# Patient Record
Sex: FEMALE | Race: ASIAN | NOT HISPANIC OR LATINO
[De-identification: names, ages, dates, MRNs, and addresses within clinical notes are randomized per-mention and may not be internally consistent; named-entity substitution may affect disease eponyms.]

---

## 2017-04-13 ENCOUNTER — APPOINTMENT (OUTPATIENT)
Dept: RADIOLOGY | Facility: IMAGING CENTER | Age: 82
End: 2017-04-13

## 2017-04-13 ENCOUNTER — OUTPATIENT (OUTPATIENT)
Dept: OUTPATIENT SERVICES | Facility: HOSPITAL | Age: 82
LOS: 1 days | End: 2017-04-13
Payer: MEDICAID

## 2017-04-13 DIAGNOSIS — Z00.8 ENCOUNTER FOR OTHER GENERAL EXAMINATION: ICD-10-CM

## 2017-04-13 PROCEDURE — 77080 DXA BONE DENSITY AXIAL: CPT

## 2017-04-13 PROCEDURE — 71046 X-RAY EXAM CHEST 2 VIEWS: CPT

## 2022-05-04 ENCOUNTER — INPATIENT (INPATIENT)
Facility: HOSPITAL | Age: 87
LOS: 14 days | Discharge: HOME HEALTH | End: 2022-05-19
Attending: INTERNAL MEDICINE | Admitting: INTERNAL MEDICINE
Payer: MEDICARE

## 2022-05-04 VITALS
OXYGEN SATURATION: 100 % | DIASTOLIC BLOOD PRESSURE: 88 MMHG | RESPIRATION RATE: 16 BRPM | HEART RATE: 109 BPM | SYSTOLIC BLOOD PRESSURE: 179 MMHG | TEMPERATURE: 98 F

## 2022-05-04 DIAGNOSIS — R41.0 DISORIENTATION, UNSPECIFIED: ICD-10-CM

## 2022-05-04 LAB
ALBUMIN SERPL ELPH-MCNC: 3.7 G/DL — SIGNIFICANT CHANGE UP (ref 3.3–5)
ALP SERPL-CCNC: 104 U/L — SIGNIFICANT CHANGE UP (ref 40–120)
ALT FLD-CCNC: 23 U/L — SIGNIFICANT CHANGE UP (ref 4–33)
ANION GAP SERPL CALC-SCNC: 13 MMOL/L — SIGNIFICANT CHANGE UP (ref 7–14)
APPEARANCE UR: CLEAR — SIGNIFICANT CHANGE UP
APTT BLD: 29.6 SEC — SIGNIFICANT CHANGE UP (ref 27–36.3)
AST SERPL-CCNC: 43 U/L — HIGH (ref 4–32)
BASOPHILS # BLD AUTO: 0.08 K/UL — SIGNIFICANT CHANGE UP (ref 0–0.2)
BASOPHILS NFR BLD AUTO: 1 % — SIGNIFICANT CHANGE UP (ref 0–2)
BILIRUB SERPL-MCNC: 0.4 MG/DL — SIGNIFICANT CHANGE UP (ref 0.2–1.2)
BILIRUB UR-MCNC: NEGATIVE — SIGNIFICANT CHANGE UP
BLOOD GAS VENOUS COMPREHENSIVE RESULT: SIGNIFICANT CHANGE UP
BUN SERPL-MCNC: 18 MG/DL — SIGNIFICANT CHANGE UP (ref 7–23)
CA-I BLD-SCNC: 1.29 MMOL/L — SIGNIFICANT CHANGE UP (ref 1.15–1.29)
CALCIUM SERPL-MCNC: 11.4 MG/DL — HIGH (ref 8.4–10.5)
CHLORIDE SERPL-SCNC: 95 MMOL/L — LOW (ref 98–107)
CO2 SERPL-SCNC: 22 MMOL/L — SIGNIFICANT CHANGE UP (ref 22–31)
COLOR SPEC: SIGNIFICANT CHANGE UP
CREAT SERPL-MCNC: 1.24 MG/DL — SIGNIFICANT CHANGE UP (ref 0.5–1.3)
DIFF PNL FLD: NEGATIVE — SIGNIFICANT CHANGE UP
EGFR: 42 ML/MIN/1.73M2 — LOW
EOSINOPHIL # BLD AUTO: 0.14 K/UL — SIGNIFICANT CHANGE UP (ref 0–0.5)
EOSINOPHIL NFR BLD AUTO: 1.7 % — SIGNIFICANT CHANGE UP (ref 0–6)
FLUAV AG NPH QL: SIGNIFICANT CHANGE UP
FLUBV AG NPH QL: SIGNIFICANT CHANGE UP
GLUCOSE SERPL-MCNC: 86 MG/DL — SIGNIFICANT CHANGE UP (ref 70–99)
GLUCOSE UR QL: NEGATIVE — SIGNIFICANT CHANGE UP
HCT VFR BLD CALC: 38 % — SIGNIFICANT CHANGE UP (ref 34.5–45)
HGB BLD-MCNC: 12.6 G/DL — SIGNIFICANT CHANGE UP (ref 11.5–15.5)
IANC: 6.18 K/UL — SIGNIFICANT CHANGE UP (ref 1.8–7.4)
IGA FLD-MCNC: 301 MG/DL — SIGNIFICANT CHANGE UP (ref 70–400)
IGG FLD-MCNC: 1310 MG/DL — SIGNIFICANT CHANGE UP (ref 700–1600)
IGM SERPL-MCNC: 37 MG/DL — LOW (ref 40–230)
IMM GRANULOCYTES NFR BLD AUTO: 0.7 % — SIGNIFICANT CHANGE UP (ref 0–1.5)
INR BLD: 1.18 RATIO — HIGH (ref 0.88–1.16)
KETONES UR-MCNC: NEGATIVE — SIGNIFICANT CHANGE UP
LEUKOCYTE ESTERASE UR-ACNC: NEGATIVE — SIGNIFICANT CHANGE UP
LYMPHOCYTES # BLD AUTO: 1.18 K/UL — SIGNIFICANT CHANGE UP (ref 1–3.3)
LYMPHOCYTES # BLD AUTO: 14.5 % — SIGNIFICANT CHANGE UP (ref 13–44)
MCHC RBC-ENTMCNC: 29.4 PG — SIGNIFICANT CHANGE UP (ref 27–34)
MCHC RBC-ENTMCNC: 33.2 GM/DL — SIGNIFICANT CHANGE UP (ref 32–36)
MCV RBC AUTO: 88.8 FL — SIGNIFICANT CHANGE UP (ref 80–100)
MONOCYTES # BLD AUTO: 0.49 K/UL — SIGNIFICANT CHANGE UP (ref 0–0.9)
MONOCYTES NFR BLD AUTO: 6 % — SIGNIFICANT CHANGE UP (ref 2–14)
NEUTROPHILS # BLD AUTO: 6.18 K/UL — SIGNIFICANT CHANGE UP (ref 1.8–7.4)
NEUTROPHILS NFR BLD AUTO: 76.1 % — SIGNIFICANT CHANGE UP (ref 43–77)
NITRITE UR-MCNC: NEGATIVE — SIGNIFICANT CHANGE UP
NRBC # BLD: 0 /100 WBCS — SIGNIFICANT CHANGE UP
NRBC # FLD: 0 K/UL — SIGNIFICANT CHANGE UP
PH UR: 6.5 — SIGNIFICANT CHANGE UP (ref 5–8)
PLATELET # BLD AUTO: 444 K/UL — HIGH (ref 150–400)
POTASSIUM SERPL-MCNC: 4.7 MMOL/L — SIGNIFICANT CHANGE UP (ref 3.5–5.3)
POTASSIUM SERPL-SCNC: 4.7 MMOL/L — SIGNIFICANT CHANGE UP (ref 3.5–5.3)
PROT SERPL-MCNC: 7.3 G/DL — SIGNIFICANT CHANGE UP (ref 6–8.3)
PROT SERPL-MCNC: 7.7 G/DL — SIGNIFICANT CHANGE UP (ref 6–8.3)
PROT UR-MCNC: NEGATIVE — SIGNIFICANT CHANGE UP
PROTHROM AB SERPL-ACNC: 13.7 SEC — HIGH (ref 10.5–13.4)
PTH-INTACT FLD-MCNC: 29 PG/ML — SIGNIFICANT CHANGE UP (ref 15–65)
PTH-INTACT FLD-MCNC: 31 PG/ML — SIGNIFICANT CHANGE UP (ref 15–65)
RBC # BLD: 4.28 M/UL — SIGNIFICANT CHANGE UP (ref 3.8–5.2)
RBC # FLD: 12.6 % — SIGNIFICANT CHANGE UP (ref 10.3–14.5)
RSV RNA NPH QL NAA+NON-PROBE: SIGNIFICANT CHANGE UP
SARS-COV-2 RNA SPEC QL NAA+PROBE: SIGNIFICANT CHANGE UP
SODIUM SERPL-SCNC: 130 MMOL/L — LOW (ref 135–145)
SP GR SPEC: 1.01 — SIGNIFICANT CHANGE UP (ref 1–1.05)
TROPONIN T, HIGH SENSITIVITY RESULT: 15 NG/L — SIGNIFICANT CHANGE UP
TROPONIN T, HIGH SENSITIVITY RESULT: 16 NG/L — SIGNIFICANT CHANGE UP
TSH SERPL-MCNC: 0.71 UIU/ML — SIGNIFICANT CHANGE UP (ref 0.27–4.2)
UROBILINOGEN FLD QL: SIGNIFICANT CHANGE UP
WBC # BLD: 8.13 K/UL — SIGNIFICANT CHANGE UP (ref 3.8–10.5)
WBC # FLD AUTO: 8.13 K/UL — SIGNIFICANT CHANGE UP (ref 3.8–10.5)

## 2022-05-04 PROCEDURE — 86334 IMMUNOFIX E-PHORESIS SERUM: CPT | Mod: 26

## 2022-05-04 PROCEDURE — 99222 1ST HOSP IP/OBS MODERATE 55: CPT | Mod: GC

## 2022-05-04 PROCEDURE — 70450 CT HEAD/BRAIN W/O DYE: CPT | Mod: 26,MA

## 2022-05-04 PROCEDURE — 71045 X-RAY EXAM CHEST 1 VIEW: CPT | Mod: 26

## 2022-05-04 PROCEDURE — 93010 ELECTROCARDIOGRAM REPORT: CPT

## 2022-05-04 PROCEDURE — 84165 PROTEIN E-PHORESIS SERUM: CPT | Mod: 26

## 2022-05-04 PROCEDURE — 99284 EMERGENCY DEPT VISIT MOD MDM: CPT | Mod: FS,25

## 2022-05-04 RX ORDER — CEFTRIAXONE 500 MG/1
1000 INJECTION, POWDER, FOR SOLUTION INTRAMUSCULAR; INTRAVENOUS EVERY 24 HOURS
Refills: 0 | Status: DISCONTINUED | OUTPATIENT
Start: 2022-05-05 | End: 2022-05-05

## 2022-05-04 RX ORDER — ENOXAPARIN SODIUM 100 MG/ML
40 INJECTION SUBCUTANEOUS EVERY 24 HOURS
Refills: 0 | Status: DISCONTINUED | OUTPATIENT
Start: 2022-05-04 | End: 2022-05-19

## 2022-05-04 RX ORDER — SODIUM CHLORIDE 9 MG/ML
1000 INJECTION INTRAMUSCULAR; INTRAVENOUS; SUBCUTANEOUS ONCE
Refills: 0 | Status: COMPLETED | OUTPATIENT
Start: 2022-05-04 | End: 2022-05-04

## 2022-05-04 RX ORDER — THIAMINE MONONITRATE (VIT B1) 100 MG
100 TABLET ORAL DAILY
Refills: 0 | Status: COMPLETED | OUTPATIENT
Start: 2022-05-04 | End: 2022-05-08

## 2022-05-04 RX ORDER — CEFTRIAXONE 500 MG/1
1000 INJECTION, POWDER, FOR SOLUTION INTRAMUSCULAR; INTRAVENOUS ONCE
Refills: 0 | Status: COMPLETED | OUTPATIENT
Start: 2022-05-04 | End: 2022-05-04

## 2022-05-04 RX ORDER — LANOLIN ALCOHOL/MO/W.PET/CERES
6 CREAM (GRAM) TOPICAL AT BEDTIME
Refills: 0 | Status: DISCONTINUED | OUTPATIENT
Start: 2022-05-04 | End: 2022-05-09

## 2022-05-04 RX ORDER — SODIUM CHLORIDE 9 MG/ML
1000 INJECTION INTRAMUSCULAR; INTRAVENOUS; SUBCUTANEOUS
Refills: 0 | Status: DISCONTINUED | OUTPATIENT
Start: 2022-05-04 | End: 2022-05-05

## 2022-05-04 RX ADMIN — SODIUM CHLORIDE 1000 MILLILITER(S): 9 INJECTION INTRAMUSCULAR; INTRAVENOUS; SUBCUTANEOUS at 10:42

## 2022-05-04 RX ADMIN — SODIUM CHLORIDE 70 MILLILITER(S): 9 INJECTION INTRAMUSCULAR; INTRAVENOUS; SUBCUTANEOUS at 18:05

## 2022-05-04 RX ADMIN — CEFTRIAXONE 100 MILLIGRAM(S): 500 INJECTION, POWDER, FOR SOLUTION INTRAMUSCULAR; INTRAVENOUS at 10:43

## 2022-05-04 NOTE — PATIENT PROFILE ADULT - FALL HARM RISK - HARM RISK INTERVENTIONS

## 2022-05-04 NOTE — ED ADULT TRIAGE NOTE - CHIEF COMPLAINT QUOTE
Brought in by family for confusion for couple days, LKW Sunday, family stating pt is seeing things and talking to self, wandering.  Currently taking Trimethoprim for UTI hx of gout and HTN

## 2022-05-04 NOTE — CONSULT NOTE ADULT - SUBJECTIVE AND OBJECTIVE BOX
[facility] [place] [facility]   MD KRISTEN MARQUES MD KRISTINE SOLTANPOUR, ALBIN ABRAMS        TEL:  OFFICE: [phone]  From 5pm-7am answering service [phone]    --- INITIAL RENAL CONSULT NOTE ---date of service 22 @ 15:24    HPI:  86 yo F non smoker with PMHX of HTN, HLD, GERD, and recently dx depression, brought in by daughter with confusion and restlessness. Patient is A+Ox3 stating her daughter brought her in but was not clear for what reason. stating she is feeling tired but denied other complaints   nephrology consulted for electrolyte abnormalities      Allergies:  penicillin (Unknown)      PAST MEDICAL & SURGICAL HISTORY:      Home Medications Reviewed    Hospital Medications:   MEDICATIONS  (STANDING):      SOCIAL HISTORY:  Denies ETOh, Smoking,     FAMILY HISTORY:      REVIEW OF SYSTEMS:  CONSTITUTIONAL: No weakness, fevers or chills  EYES/ENT: No visual changes;  No vertigo or throat pain   NECK: No pain or stiffness  RESPIRATORY: No cough, wheezing, hemoptysis; No shortness of breath  CARDIOVASCULAR: No chest pain or palpitations.  GASTROINTESTINAL: No abdominal or epigastric pain. No nausea, vomiting, or hematemesis; No diarrhea or constipation. No melena or hematochezia.  GENITOURINARY: No dysuria, frequency, foamy urine, urinary urgency, incontinence or hematuria  NEUROLOGICAL: No numbness or weakness  SKIN: No itching, burning, rashes, or lesions   VASCULAR: No bilateral lower extremity edema.   All other review of systems is negative unless indicated above.    VITALS:  T(F): 98.3 (22 @ 14:16), Max: 98.7 (22 @ 10:39)  HR: 108 (22 @ 14:16)  BP: 161/81 (22 @ 14:16)  RR: 18 (22 @ 14:16)  SpO2: 100% (22 @ 14:16)  Wt(kg): --        PHYSICAL EXAM:  Constitutional: NAD  HEENT: anicteric sclera, oropharynx clear, MMM  Neck: No JVD  Respiratory: CTAB, no wheezes, rales or rhonchi  Cardiovascular: S1, S2, RRR  Gastrointestinal: BS+, soft, NT/ND  Extremities: No cyanosis or clubbing. No peripheral edema  Neurological: A/O x 3, no focal deficits  Psychiatric: Normal mood, normal affect  : No CVA tenderness. No chery.   Skin: No rashes      LABS:      130<L>  |  95<L>  |  18  ----------------------------<  86  4.7   |  22  |  1.24    Ca    11.4<H>      04 May 2022 10:43    TPro  7.7  /  Alb  3.7  /  TBili  0.4  /  DBili      /  AST  43<H>  /  ALT  23  /  AlkPhos  104      Creatinine Trend: 1.24 <--                        12.6   8.13  )-----------( 444      ( 04 May 2022 10:43 )             38.0     Urine Studies:  Urinalysis Basic - ( 04 May 2022 12:06 )    Color: Light Yellow / Appearance: Clear / S.006 / pH:   Gluc:  / Ketone: Negative  / Bili: Negative / Urobili: <2 mg/dL   Blood:  / Protein: Negative / Nitrite: Negative   Leuk Esterase: Negative / RBC:  / WBC    Sq Epi:  / Non Sq Epi:  / Bacteria:           RADIOLOGY & ADDITIONAL STUDIES:

## 2022-05-04 NOTE — ED ADULT NURSE NOTE - INTERVENTIONS DEFINITIONS
[place] to call system/Call bell, personal items and telephone within reach/Instruct patient to call for assistance/Room bathroom lighting operational/Non-slip footwear when patient is off stretcher/Physically safe environment: no spills, clutter or unnecessary equipment/Stretcher in lowest position, wheels locked, appropriate side rails in place/Provide visual cue, wrist band, yellow gown, etc./Monitor gait and stability/Monitor for mental status changes and reorient to person, place, and time

## 2022-05-04 NOTE — ED PROVIDER NOTE - CLINICAL SUMMARY MEDICAL DECISION MAKING FREE TEXT BOX
86 yo F non smoker with PMHX of HTN, HLD, GERD, and recently dx depression, here with Daughter at bedside brought in for worsening confusion and restlessness first beginning 2 days ago, intermittently, then today worse with hallucinations ( auditory and visual),and generalized weakness.  family concerned patient was going to wander. Recently dx with UTI  5 days ago has been on bactrim for 5 days ( unclear if patient missed a dose due to developing confusion). Patient A& o x2, normally ambulates and performs ADLS independently.   vitals noted,   concern for sepsis 2/2 to uti given recent dx.   Will send labs, cultures, cxr, ct head, ekg, trop.   will give ceftriaxone, plan for admission.

## 2022-05-04 NOTE — CONSULT NOTE ADULT - SUBJECTIVE AND OBJECTIVE BOX
Cardiology    HISTORY OF PRESENT ILLNESS:   Ms Rich is a pleasant 88yo woman sent in by family for confusion and hallucinations. Lives w/ her daughter but increasingly unable to take care of herself.    Patient is unable to provide a pertinent HPI / ROS due to confusion/metabolic encephalopathy.  Mumbles and doesnt answer questions in a meaningful way.  Has been taking Bactrim for a UTI.  Has abnormal electrolytes (low Na, high Ca) on admission.      PAST MEDICAL & SURGICAL HISTORY:  unknown    MEDICATIONS  (STANDING):  melatonin 5 milliGRAM(s) Oral at bedtime  thiamine Injectable 100 milliGRAM(s) IV Push daily    Allergies    penicillin (Unknown)    Intolerances    FAMILY HISTORY:    Non-contributary for premature coronary disease or sudden cardiac death    SOCIAL HISTORY:    [x ] Non-smoker  [ ] Smoker  [ ] Alcohol    PHYSICAL EXAM:  T(C): 36.8 (05-04-22 @ 14:16), Max: 37.1 (05-04-22 @ 10:39)  HR: 108 (05-04-22 @ 14:16) (106 - 109)  BP: 161/81 (05-04-22 @ 14:16) (158/67 - 179/88)  RR: 18 (05-04-22 @ 14:16) (16 - 18)  SpO2: 100% (05-04-22 @ 14:16) (100% - 100%)  Wt(kg): --    Appearance: thin elderly woman in no acute distress, oriented to name only.	  HEENT:   Normal oral mucosa, PERRL, EOMI	  Lymphatic: No lymphadenopathy , no edema  Cardiovascular: Normal S1 S2, No JVD, No murmurs , Peripheral pulses palpable 2+ bilaterally  Respiratory: Lungs clear to auscultation, normal effort 	  Gastrointestinal:  Soft, Non-tender, + BS	  Skin: No rashes, No ecchymoses, No cyanosis, warm to touch  Musculoskeletal: Normal range of motion, normal strength  Psychiatry:  Mood is "Im fine" & affect is flat    ECG: NSR, shortening/narrowing of T wave.  	  LABS:	 	                          12.6   8.13  )-----------( 444      ( 04 May 2022 10:43 )             38.0     05-04    130<L>  |  95<L>  |  18  ----------------------------<  86  4.7   |  22  |  1.24    Ca    11.4<H>      04 May 2022 10:43    TPro  7.7  /  Alb  3.7  /  TBili  0.4  /  DBili  x   /  AST  43<H>  /  ALT  23  /  AlkPhos  104  05-04  TSH: Thyroid Stimulating Hormone, Serum: 0.71 uIU/mL (05-04 @ 10:43)      ASSESSMENT/PLAN: 	87y Female with acute metabolic encephalopathy.  Previous UTI treatment. Hypercalcemic.    Ongoing nephro / ID / hematology workup.  Rec. IV hydration re: hypercalcemia.  Check an echocardiogram.  Maintain telemetry upstairs.  Will follow.      Nick Brooks M.D.  Cardiac Electrophysiology    office [phone]  pager [phone]

## 2022-05-04 NOTE — ED ADULT NURSE NOTE - OBJECTIVE STATEMENT
Pt received AOx3 (at the moment), ambulatory at baseline w. pmhx of HTN, HLD, GERD, recent dx w. depression presents to the ED w. chief complaint of confusion and visual hallucinations since 2 days ago. Per daughter at bedside proving history, pt was recently placed on abx for UTI about 5 days ago. Pt has not been sleeping for the past 2 days due to restlessness. Denies CP, SOB, NVD, abd pain, chills, fever, cough at the moment. Afebrile rectally. 20G placed in LAC. Safety precautions maintained.

## 2022-05-04 NOTE — ED PROVIDER NOTE - PROGRESS NOTE DETAILS
Spoke with Dr. Contreras. can admit patient to Dr. Shay. Discussed case with Dr. Shay, admission for confusion, unclear source, potentially hypercalcemia, UA clear, pending urine culture and labs. Patient can be admitted to Dr. Shay.

## 2022-05-04 NOTE — H&P ADULT - NSHPLABSRESULTS_GEN_ALL_CORE
Lab Results:  CBC  CBC Full  -  ( 04 May 2022 10:43 )  WBC Count : 8.13 K/uL  RBC Count : 4.28 M/uL  Hemoglobin : 12.6 g/dL  Hematocrit : 38.0 %  Platelet Count - Automated : 444 K/uL  Mean Cell Volume : 88.8 fL  Mean Cell Hemoglobin : 29.4 pg  Mean Cell Hemoglobin Concentration : 33.2 gm/dL  Auto Neutrophil # : 6.18 K/uL  Auto Lymphocyte # : 1.18 K/uL  Auto Monocyte # : 0.49 K/uL  Auto Eosinophil # : 0.14 K/uL  Auto Basophil # : 0.08 K/uL  Auto Neutrophil % : 76.1 %  Auto Lymphocyte % : 14.5 %  Auto Monocyte % : 6.0 %  Auto Eosinophil % : 1.7 %  Auto Basophil % : 1.0 %    .		Differential:	[] Automated		[] Manual  Chemistry                        12.6   8.13  )-----------( 444      ( 04 May 2022 10:43 )             38.0     05-04    130<L>  |  95<L>  |  18  ----------------------------<  86  4.7   |  22  |  1.24    Ca    11.4<H>      04 May 2022 10:43    TPro  7.3  /  Alb  x   /  TBili  x   /  DBili  x   /  AST  x   /  ALT  x   /  AlkPhos  x   05-04    LIVER FUNCTIONS - ( 04 May 2022 16:19 )  Alb: x     / Pro: 7.3 g/dL / ALK PHOS: x     / ALT: x     / AST: x     / GGT: x           PT/INR - ( 04 May 2022 10:43 )   PT: 13.7 sec;   INR: 1.18 ratio         PTT - ( 04 May 2022 10:43 )  PTT:29.6 sec  Urinalysis Basic - ( 04 May 2022 12:06 )    Color: Light Yellow / Appearance: Clear / S.006 / pH: x  Gluc: x / Ketone: Negative  / Bili: Negative / Urobili: <2 mg/dL   Blood: x / Protein: Negative / Nitrite: Negative   Leuk Esterase: Negative / RBC: x / WBC x   Sq Epi: x / Non Sq Epi: x / Bacteria: x            MICROBIOLOGY/CULTURES:      RADIOLOGY RESULTS: reviewed

## 2022-05-04 NOTE — ED PROVIDER NOTE - NS ED ATTENDING STATEMENT MOD
This was a shared visit with the BRYANT. I reviewed and verified the documentation and independently performed the documented:

## 2022-05-04 NOTE — CONSULT NOTE ADULT - NS ATTEND AMEND GEN_ALL_CORE FT
88 y/o female with history of HTN admitted with altered mental status, hallucinations. Hematology consulted for hypercalcemia.    - Calcium level of 11.4. Obtain PTH, PTHRP, plasma cell neoplasm workup.  - Continue to monitor BMP.  - Follow-up culture results.  - CT head with no acute changes.    Will continue to follow.    Bon Conti MD  Hematology/Oncology  O: [phone]

## 2022-05-04 NOTE — CONSULT NOTE ADULT - ASSESSMENT
86 yo F non smoker with PMHX of HTN, HLD, GERD, and recently dx depression, brought in by family for confusion. nephrology consulted for electrolyte abnormities    hypercalcemia  ? etiology  per patient takes MVI and vit d supplement  hold supplements   PTH 31, ionized calcium high normal  check vit d 1,25 vit d 25, PTHrp  spep, SIF, immuno free light chain  s/p IVF    hyponatremia  ? etiology  low SG in urine  check urine na, osmo  check TSH, cortisol in the AM  on mirtazepine for depression  monitor     htn  resume home meds  monitor 86 yo F non smoker with PMHX of HTN, HLD, GERD, and recently dx depression, brought in by family for confusion. nephrology consulted for electrolyte abnormities    Hypercalcemia  ? etiology  per patient takes MVI and vit d supplement  hold supplements   PTH 31, ionized calcium high normal  check vit d 1,25 vit d 25, PTHrp  spep, SIF, immuno free light chain  s/p IVF  Monitor Ca level    Hyponatremia  ? etiology  low SG in urine  check urine na, osmo  check TSH, cortisol in the AM  on mirtazepine for depression  monitor     HTN  resume home meds  monitor

## 2022-05-04 NOTE — H&P ADULT - ASSESSMENT
88 yo F non smoker with PMHX of HTN, HLD, GERD, and recently dx depression, here with Daughter at bedside brought in for worsening confusion and restlessness first beginning 2   days ago, intermittently, then today worse with hallucinations ( auditory and visual),and generalized weakness.  family concerned patient was going to wander. Recently dx with UTI  5 days ago has been on bactrim for 5 days ( unclear if patient missed a dose due to developing confusion). Patient A& o x2-3, having episode of confusion normally ambulates and performs ADLS independently. Denies cp ,sob, abdominal pain, nausea, vomiting, diarrhea, headache.    1 AMS  - likely metabolic encephalopathy sec to UTI  - cw antibiotics  - fu cultures  - ID following  - neuro fu apprreciated    2 Hypercalcemia  - unclear etiology  - ivf  - renal following     3 Lovenox for DVT prrophylaxis

## 2022-05-04 NOTE — ED PROVIDER NOTE - ATTENDING APP SHARED VISIT CONTRIBUTION OF CARE
87 year old with increasing confusion. recently treated for uti with bactrim. patient afebrile. labs for electrolyte abn vs renal failure vs liver failure vs anemia vs uti failing outpatient treatment vs pna vs cva vs progressive dementia. trop ekg bnp ua urine culture admit. given afebrile will not pursue LP at this time

## 2022-05-04 NOTE — CONSULT NOTE ADULT - SUBJECTIVE AND OBJECTIVE BOX
Patient is a 87y old  Female who presents with a chief complaint of   HPI:       REVIEW OF SYSTEMS  [  ] ROS unobtainable because:    [ x ] All other systems negative except as noted below    Constitutional:  [ ] fever [ ] chills  [ ] weight loss  [ ]night sweat  [ ]poor appetite/PO intake [ ]fatigue   Skin:  [ ] rash [ ] phlebitis	  Eyes: [ ] icterus [ ] pain  [ ] discharge	  ENMT: [ ] sore throat  [ ] thrush [ ] ulcers [ ] exudates [ ]anosmia  Respiratory: [ ] dyspnea [ ] hemoptysis [ ] cough [ ] sputum	  Cardiovascular:  [ ] chest pain [ ] palpitations [ ] edema	  Gastrointestinal:  [ ] nausea [ ] vomiting [ ] diarrhea [ ] constipation [ ] pain	  Genitourinary:  [ ] dysuria [ ] frequency [ ] hematuria [ ] discharge [ ] flank pain  [ ] incontinence  Musculoskeletal:  [ ] myalgias [ ] arthralgias [ ] arthritis  [ ] back pain  Neurological:  [ ] headache [ ] weakness [ ] seizures  [ ] confusion/altered mental status    prior hospital charts reviewed [V]  primary team notes reviewed [V]  other consultant notes reviewed [V]    PAST MEDICAL & SURGICAL HISTORY:  HLD  HTN    FAMILY HISTORY:  No pertinent family history in first degree relatives    SOCIAL HISTORY:  Lives with family    Allergies  penicillin (Unknown)      ANTIMICROBIALS:      ANTIMICROBIALS (past 90 days):   MEDICATIONS  (STANDING):  cefTRIAXone   IVPB   100 mL/Hr IV Intermittent (22 @ 10:43)    MEDICATIONS  (STANDING):      VITALS:  Vital Signs Last 24 Hrs  T(F): 98.3 (22 @ 14:16), Max: 98.7 (22 @ 10:39)  Vital Signs Last 24 Hrs  HR: 108 (22 @ 14:16) (106 - 109)  BP: 161/81 (22 @ 14:16) (158/67 - 179/88)  RR: 18 (22 @ 14:16)  SpO2: 100% (22 @ 14:16) (100% - 100%)  Wt(kg): --    PHYSICAL EXAM:  Constitutional: non-toxic, no distress  HEAD/EYES: anicteric, no conjunctival injection  ENT:  supple, no thrush  Cardiovascular:   normal S1/S2, no murmur, no edema  Respiratory:  clear BS bilaterally, no wheezes, no rales  GI:  soft, non-tender, normal bowel sounds  :  no chery, no CVA tenderness  Musculoskeletal:  no synovitis, normal ROM  Neurologic: awake and alert,  no focal findings  Skin:  no rash, no erythema, no phlebitis  Heme/Onc: no lymphadenopathy   Psychiatric:  awake, alert, appropriate mood      Labs:                        12.6   8.13  )-----------( 444      ( 04 May 2022 10:43 )             38.0         130<L>  |  95<L>  |  18  ----------------------------<  86  4.7   |  22  |  1.24    Ca    11.4<H>      04 May 2022 10:43    TPro  7.7  /  Alb  3.7  /  TBili  0.4  /  DBili  x   /  AST  43<H>  /  ALT  23  /  AlkPhos  104        WBC Trend:  WBC Count: 8.13 (22 @ 10:43)    Auto Neutrophil #: 6.18 K/uL (22 @ 10:43)    Auto Eosinophil %: 1.7 % (22 @ 10:43)    Urinalysis Basic - ( 04 May 2022 12:06 )    Color: Light Yellow / Appearance: Clear / S.006 / pH: x  Gluc: x / Ketone: Negative  / Bili: Negative / Urobili: <2 mg/dL   Blood: x / Protein: Negative / Nitrite: Negative   Leuk Esterase: Negative / RBC: x / WBC x   Sq Epi: x / Non Sq Epi: x / Bacteria: x        MICROBIOLOGY:        RADIOLOGY:  imaging below personally reviewed    r< from: CT Head No Cont (22 @ 11:41) >  IMPRESSION:  No evidence of an acute intracranial hemorrhage, midline shift, or   hydrocephalus. Chronic appearing lacunar infarct left thalamus.    < end of copied text >   Patient is a 87y old  Female who presents with a chief complaint of confusion.    HPI:  86 yo F non smoker with PMHX of HTN, HLD, GERD, and recently dx depression, here with Daughter at bedside brought in for worsening confusion and restlessness first beginning 2   days ago, intermittently, then today worse with hallucinations ( auditory and visual),and generalized weakness.  family concerned patient was going to wander. Recently dx with UTI  5 days ago has been on bactrim for 5 days ( unclear if patient missed a dose due to developing confusion). Patient A& o x2-3, having episode of confusion normally ambulates and performs ADLS independently. Denies cp ,sob, abdominal pain, nausea, vomiting, diarrhea, headache.    ID consulted for possible UTI. Patient currently not endorsing any dysuria, no abdominal pain. No fever/chills.        REVIEW OF SYSTEMS  [  ] ROS unobtainable because:    [ x ] All other systems negative except as noted below    Constitutional:  [ ] fever [ ] chills  [ ] weight loss  [ ]night sweat  [ ]poor appetite/PO intake [ ]fatigue   Skin:  [ ] rash [ ] phlebitis	  Eyes: [ ] icterus [ ] pain  [ ] discharge	  ENMT: [ ] sore throat  [ ] thrush [ ] ulcers [ ] exudates [ ]anosmia  Respiratory: [ ] dyspnea [ ] hemoptysis [ ] cough [ ] sputum	  Cardiovascular:  [ ] chest pain [ ] palpitations [ ] edema	  Gastrointestinal:  [ ] nausea [ ] vomiting [ ] diarrhea [ ] constipation [ ] pain	  Genitourinary:  [ ] dysuria [ ] frequency [ ] hematuria [ ] discharge [ ] flank pain  [ ] incontinence  Musculoskeletal:  [ ] myalgias [ ] arthralgias [ ] arthritis  [ ] back pain  Neurological:  [ ] headache [ ] weakness [ ] seizures  [ ] confusion/altered mental status    prior hospital charts reviewed [V]  primary team notes reviewed [V]  other consultant notes reviewed [V]    PAST MEDICAL & SURGICAL HISTORY:  HLD  HTN    FAMILY HISTORY:  No pertinent family history in first degree relatives    SOCIAL HISTORY:  Lives with family    Allergies  penicillin (Unknown)      ANTIMICROBIALS:      ANTIMICROBIALS (past 90 days):   MEDICATIONS  (STANDING):  cefTRIAXone   IVPB   100 mL/Hr IV Intermittent (22 @ 10:43)    MEDICATIONS  (STANDING):      VITALS:  Vital Signs Last 24 Hrs  T(F): 98.3 (22 @ 14:16), Max: 98.7 (22 @ 10:39)  Vital Signs Last 24 Hrs  HR: 108 (22 @ 14:16) (106 - 109)  BP: 161/81 (22 @ 14:16) (158/67 - 179/88)  RR: 18 (22 @ 14:16)  SpO2: 100% (22 @ 14:16) (100% - 100%)  Wt(kg): --    PHYSICAL EXAM:  Constitutional: no distress  HEAD/EYES: anicteric, no conjunctival injection  ENT:  supple, no thrush  Cardiovascular:   +S1/S2  Respiratory:  +BS bilaterally  GI:  soft, non-tender, +bowel sounds  :  no chery, no CVA tenderness  Musculoskeletal:  no synovitis, normal ROM  Neurologic: awake, confused, no focal findings  Skin:  no rash, no erythema, no phlebitis  Heme/Onc: no lymphadenopathy   Psychiatric:  awake, alert, appropriate mood      Labs:                        12.6   8.13  )-----------( 444      ( 04 May 2022 10:43 )             38.0         130<L>  |  95<L>  |  18  ----------------------------<  86  4.7   |  22  |  1.24    Ca    11.4<H>      04 May 2022 10:43    TPro  7.7  /  Alb  3.7  /  TBili  0.4  /  DBili  x   /  AST  43<H>  /  ALT  23  /  AlkPhos  104        WBC Trend:  WBC Count: 8.13 (22 @ 10:43)    Auto Neutrophil #: 6.18 K/uL (22 @ 10:43)    Auto Eosinophil %: 1.7 % (22 @ 10:43)    Urinalysis Basic - ( 04 May 2022 12:06 )    Color: Light Yellow / Appearance: Clear / S.006 / pH: x  Gluc: x / Ketone: Negative  / Bili: Negative / Urobili: <2 mg/dL   Blood: x / Protein: Negative / Nitrite: Negative   Leuk Esterase: Negative / RBC: x / WBC x   Sq Epi: x / Non Sq Epi: x / Bacteria: x        MICROBIOLOGY:        RADIOLOGY:  imaging below personally reviewed    r< from: CT Head No Cont (22 @ 11:41) >  IMPRESSION:  No evidence of an acute intracranial hemorrhage, midline shift, or   hydrocephalus. Chronic appearing lacunar infarct left thalamus.  < end of copied text >

## 2022-05-04 NOTE — CONSULT NOTE ADULT - SUBJECTIVE AND OBJECTIVE BOX
[facility]        Patient is a 87y old  Female who presents with a chief complaint of AMS (04 May 2022 14:17)    Excerpt from H&P,"  88 yo F non smoker with PMHX of HTN, HLD, GERD, and recently dx depression, here with Daughter at bedside brought in for worsening confusion and restlessness first beginning 2 days ago, intermittently, then today worse with hallucinations ( auditory and visual),and generalized weakness.  family concerned patient was going to wander. Recently dx with UTI  5 days ago has been on bactrim for 5 days ( unclear if patient missed a dose due to developing confusion). Patient A& o x2-3, having episode of confusion normally ambulates  hx obtained from chart   family not available at bedside at the time of my encounter            *****PAST MEDICAL / Surgical  HISTORY:  PAST MEDICAL & SURGICAL HISTORY:           *****FAMILY HISTORY:  FAMILY HISTORY:           *****SOCIAL HISTORY:  Alcohol: None  Smoking: None         *****ALLERGIES:   Allergies    penicillin (Unknown)    Intolerances             *****MEDICATIONS: current medication reviewed and documented.   MEDICATIONS  (STANDING):    MEDICATIONS  (PRN):           *****REVIEW OF SYSTEM:  GEN: no fever, no chills, no pain  RESP: no SOB, no cough, no sputum  CVS: no chest pain, no palpitations, no edema  GI: no abdominal pain, no nausea, no vomiting, no constipation, no diarrhea  : no dysurea, no frequency, no hematurea  Neuro: no headache, no dizziness  PSYCH: no anxiety, no depression  Derm : no itching, no rash         *****VITAL SIGNS:  T(C): 36.8 (22 @ 14:16), Max: 37.1 (22 @ 10:39)  HR: 108 (22 @ 14:16) (106 - 109)  BP: 161/81 (22 @ 14:16) (158/67 - 179/88)  RR: 18 (22 @ 14:16) (16 - 18)  SpO2: 100% (22 @ 14:16) (100% - 100%)  Wt(kg): --           *****PHYSICAL EXAM:   Alert oriented 2 did not know the date or the name of the hospital   Attention comprehension are limited  Able to name, repeat,  without any difficulty.   Able to follow 1 step commands.     EOMI fundi not visualized,  blinks to threat   Tongue is midline      Moving all 4 ext symmetrically    sensation is grossly symmetric  Gait : not assessed.  B/L down going toes               *****LAB AND IMAGIN.6   8.13  )-----------( 444      ( 04 May 2022 10:43 )             38.0               05-04    130<L>  |  95<L>  |  18  ----------------------------<  86  4.7   |  22  |  1.24    Ca    11.4<H>      04 May 2022 10:43    TPro  7.7  /  Alb  3.7  /  TBili  0.4  /  DBili  x   /  AST  43<H>  /  ALT  23  /  AlkPhos  104  05-04    PT/INR - ( 04 May 2022 10:43 )   PT: 13.7 sec;   INR: 1.18 ratio         PTT - ( 04 May 2022 10:43 )  PTT:29.6 sec                        Urinalysis Basic - ( 04 May 2022 12:06 )    Color: Light Yellow / Appearance: Clear / S.006 / pH: x  Gluc: x / Ketone: Negative  / Bili: Negative / Urobili: <2 mg/dL   Blood: x / Protein: Negative / Nitrite: Negative   Leuk Esterase: Negative / RBC: x / WBC x   Sq Epi: x / Non Sq Epi: x / Bacteria: x        [All pertinent recent Imaging reports reviewed]         *****A S S E S S M E N T   A N D   P L A N :        Excerpt from H&P,"  88 yo F non smoker with PMHX of HTN, HLD, GERD, and recently dx depression, here with Daughter at bedside brought in for worsening confusion and restlessness first beginning 2 days ago, intermittently, then today worse with hallucinations ( auditory and visual),and generalized weakness.  family concerned patient was going to wander. Recently dx with UTI  5 days ago has been on bactrim for 5 days ( unclear if patient missed a dose due to developing confusion). Patient A& o x2-3, having episode of confusion normally ambulates  hx obtained from chart   family not available at bedside at the time of my encounter     Problem/Recommendations 1: ams   likely toxic metabolic encephalopathy due to hyponatremia +/- hypercalcemia +/- uti worsening underlying cognitive impairment   Plan : rx infection   avoid daytime somnolence   encourage po intake   melatonin for sleep augmentation   thiamine iv 100 daily     Problem/Recommendations 2:weakness generalized   deconditioning vs. related to hypercalcemia    pt at risk for developing delirium, therefore please institute the following preventative measures if possible          - initiating early mobilization          -minimizing "tethers" - IV, oxygen, catheters, etc          -avoiding   sedatives          -maintaining hydration/nutrition          -avoid anticholinergics - diphenhydramine, etc          -pain control          -sleep wake cycle regulation; avoid day time somnolence           -supportive environment    ___________________________  Will follow with you.  Thank you,  Sandhya Crow MD  Diplomate of the American Board of Neurology and Psychiatry.  Diplomate of the American Board of Vascular Neurology.   [facility] ([facility]   Ph: [phone]    Differential diagnosis and plan of care discussed with patient after the evaluation.   Advanced care planning options discussed.   Pain assessed and judicious use of narcotics when appropriate was discussed.  Importance of Fall prevention discussed.  Counseling on Smoking and Alcohol cessation was offered when appropriate.  Counseling on Diet, exercise, and medication compliance was done.   83 minutes spent on the total encounter;  more than 50 % of the visit was spent on counseling  and or coordinating care by the attending physician.    Thank you for allowing me to participate in the care of this jose patient. Please do not hesitate to call me if you have any questions.     This and subsequent notes  will  inherently be subject to errors including those of syntax and substitutions which may escape proofreading. In such instances original meaning may be extrapolated by contextual derivation.

## 2022-05-04 NOTE — CONSULT NOTE ADULT - ASSESSMENT
Hematology/Oncology consulting on this 87 year old female with history of HTN, HLD and GERD presenting to [facility] ED with confusion and restlessness x 2 days and now with auditory and visual hallucinations. Currently on Bactrim for UTI. Labs significant for calcium 11.4      UTI  --ceftriaxone completed in ED  --urine culture pending    AMS  --CXR performed, results pending  --blood culture pending  --CT Head negative  --rec Neuro consult      Hypercalcemia  --calcium 11.4  --Checking PTH  --checking SPEP, Immunofixation, Immunoglobulin to rule out Multiple Myeloma    Patient may follow with Dr Conti of [facility] after discharge    Thank you for allowing us to participate in Mrs Rich's care    Tammy Mathews NP  Hematology/Oncology  [facility]  [phone] (Office)  [phone] (Alt office)  Evenings and weekends please call MD on call or office

## 2022-05-04 NOTE — H&P ADULT - NSHPPHYSICALEXAM_GEN_ALL_CORE
General: frrail  PERRLA  Neurology: A&Ox0  Respiratory: CTA B/L  CV: RRR, S1S2, no murmurs, rubs or gallops  Abdominal: Soft, NT, ND +BS, Last BM  Extremities: edema +  Skin Normal Airborne+Contact precautions

## 2022-05-04 NOTE — ED PROVIDER NOTE - OBJECTIVE STATEMENT
88 yo F non smoker with PMHX of HTN, HLD, GERD, and recently dx depression, here with Daughter at bedside brought in for worsening confusion and restlessness first beginning 2 days ago, intermittently, then today worse with hallucinations ( auditory and visual),and generalized weakness.  family concerned patient was going to wander. Recently dx with UTI  5 days ago has been on bactrim for 5 days ( unclear if patient missed a dose due to developing confusion). Patient A& o x2-3, having episode of confusion normally ambulates and performs ADLS independently.   Denies cp ,sob, abdominal pain, nausea, vomiting, diarrhea, headache.    Meds: Losartan 100mg, Metoprolol Succ ER 25mg, Fenofibrate, Pantoprazole, [id number]

## 2022-05-04 NOTE — H&P ADULT - HISTORY OF PRESENT ILLNESS
88 yo F non smoker with PMHX of HTN, HLD, GERD, and recently dx depression, here with Daughter at bedside brought in for worsening confusion and restlessness first beginning 2   days ago, intermittently, then today worse with hallucinations ( auditory and visual),and generalized weakness.  family concerned patient was going to wander. Recently dx with UTI  5 days ago has been on bactrim for 5 days ( unclear if patient missed a dose due to developing confusion). Patient A& o x2-3, having episode of confusion normally ambulates and performs ADLS independently. Denies cp ,sob, abdominal pain, nausea, vomiting, diarrhea, headache.

## 2022-05-04 NOTE — CONSULT NOTE ADULT - SUBJECTIVE AND OBJECTIVE BOX
Reason for consult: Hypercalcemia    HPI:   88 yo F non smoker with PMHX of HTN, HLD, GERD, and recently dx depression, here with Daughter at bedside brought in for worsening confusion and restlessness first beginning 2   days ago, intermittently, then today worse with hallucinations ( auditory and visual),and generalized weakness.  family concerned patient was going to wander. Recently dx with UTI  5 days ago has been on bactrim for 5 days ( unclear if patient missed a dose due to developing confusion). Patient A& o x2-3, having episode of confusion normally ambulates and performs ADLS independently. Denies cp ,sob, abdominal pain, nausea, vomiting, diarrhea, headache.    Hematology/Oncology consulting on this 87 year old female with history of HTN, HLD and GERD presenting to [facility] ED with confusion and restlessness x 2 days and now with auditory and visual hallucinations. Currently on Bactrim for UTI. Labs significant for calcium 11.4      PAST MEDICAL & SURGICAL HISTORY:      FAMILY HISTORY:      Alcohol Denied  Smoking: Nonsmoker  Drug Use: Denied  Marital Status:         Allergies    penicillin (Unknown)    Intolerances        MEDICATIONS  (STANDING):    MEDICATIONS  (PRN):      ROS  unable to assess, confusion    T(C): 36.8 (05-04-22 @ 14:16), Max: 37.1 (05-04-22 @ 10:39)  HR: 108 (05-04-22 @ 14:16) (106 - 109)  BP: 161/81 (05-04-22 @ 14:16) (158/67 - 179/88)  RR: 18 (05-04-22 @ 14:16) (16 - 18)  SpO2: 100% (05-04-22 @ 14:16) (100% - 100%)  Wt(kg): --    PE  NAD  Awake, disoriented  Anicteric, MMM  RRR  CTAB  Abd soft, NT, ND  No c/c/e  No rash grossly                            12.6   8.13  )-----------( 444      ( 04 May 2022 10:43 )             38.0       05-04    130<L>  |  95<L>  |  18  ----------------------------<  86  4.7   |  22  |  1.24    Ca    11.4<H>      04 May 2022 10:43    TPro  7.7  /  Alb  3.7  /  TBili  0.4  /  DBili  x   /  AST  43<H>  /  ALT  23  /  AlkPhos  104  05-04

## 2022-05-04 NOTE — CONSULT NOTE ADULT - ASSESSMENT
87 year old F with history of HTN, HLD and GERD presenting to [facility] ED with confusion and restlessness x 2 days, along with auditory and visual hallucinations. ID consulted for possible UTI.     Impression:  #Encephalopathy - DDx includes infectious 2/2 ?UTI vs non-infectious 2/2 electrolyte abnormalities. No evidence of meningitis, neck supple w/o rigidity. UA benign, but patient did receive course of Bactrim as outpatient.     Recs:  - c/w ceftriaxone 1g IV q24H for possible cystitis  - f/u blood cultures  - f/u urine culture  - monitor temp, WBCs    Plan discussed with ACP    Paulo Montana MD  Infectious Disease Fellow  Available on [employer] Teams  Before 9AM or after 5PM: [phone]

## 2022-05-04 NOTE — ED PROVIDER NOTE - CONSTITUTIONAL, MLM
normal... Well appearing, awake, alert, oriented to person, place, fleeting orientation to time and in no apparent distress.

## 2022-05-05 LAB
24R-OH-CALCIDIOL SERPL-MCNC: 76.6 NG/ML — SIGNIFICANT CHANGE UP (ref 30–80)
ANION GAP SERPL CALC-SCNC: 16 MMOL/L — HIGH (ref 7–14)
BUN SERPL-MCNC: 14 MG/DL — SIGNIFICANT CHANGE UP (ref 7–23)
CALCIUM SERPL-MCNC: 11.3 MG/DL — HIGH (ref 8.4–10.5)
CHLORIDE SERPL-SCNC: 101 MMOL/L — SIGNIFICANT CHANGE UP (ref 98–107)
CO2 SERPL-SCNC: 19 MMOL/L — LOW (ref 22–31)
CREAT SERPL-MCNC: 1.06 MG/DL — SIGNIFICANT CHANGE UP (ref 0.5–1.3)
CULTURE RESULTS: SIGNIFICANT CHANGE UP
EGFR: 51 ML/MIN/1.73M2 — LOW
GLUCOSE SERPL-MCNC: 92 MG/DL — SIGNIFICANT CHANGE UP (ref 70–99)
HCT VFR BLD CALC: 39.6 % — SIGNIFICANT CHANGE UP (ref 34.5–45)
HGB BLD-MCNC: 13 G/DL — SIGNIFICANT CHANGE UP (ref 11.5–15.5)
KAPPA LC SER QL IFE: 4.05 MG/DL — HIGH (ref 0.33–1.94)
KAPPA/LAMBDA FREE LIGHT CHAIN RATIO, SERUM: 0.74 RATIO — SIGNIFICANT CHANGE UP (ref 0.26–1.65)
LAMBDA LC SER QL IFE: 5.44 MG/DL — HIGH (ref 0.57–2.63)
MCHC RBC-ENTMCNC: 29.1 PG — SIGNIFICANT CHANGE UP (ref 27–34)
MCHC RBC-ENTMCNC: 32.8 GM/DL — SIGNIFICANT CHANGE UP (ref 32–36)
MCV RBC AUTO: 88.8 FL — SIGNIFICANT CHANGE UP (ref 80–100)
NRBC # BLD: 0 /100 WBCS — SIGNIFICANT CHANGE UP
NRBC # FLD: 0 K/UL — SIGNIFICANT CHANGE UP
PLATELET # BLD AUTO: 474 K/UL — HIGH (ref 150–400)
POTASSIUM SERPL-MCNC: 4.7 MMOL/L — SIGNIFICANT CHANGE UP (ref 3.5–5.3)
POTASSIUM SERPL-SCNC: 4.7 MMOL/L — SIGNIFICANT CHANGE UP (ref 3.5–5.3)
RBC # BLD: 4.46 M/UL — SIGNIFICANT CHANGE UP (ref 3.8–5.2)
RBC # FLD: 12.7 % — SIGNIFICANT CHANGE UP (ref 10.3–14.5)
SODIUM SERPL-SCNC: 136 MMOL/L — SIGNIFICANT CHANGE UP (ref 135–145)
SPECIMEN SOURCE: SIGNIFICANT CHANGE UP
VIT D25+D1,25 OH+D1,25 PNL SERPL-MCNC: 71 PG/ML — SIGNIFICANT CHANGE UP (ref 19.9–79.3)
WBC # BLD: 7.88 K/UL — SIGNIFICANT CHANGE UP (ref 3.8–10.5)
WBC # FLD AUTO: 7.88 K/UL — SIGNIFICANT CHANGE UP (ref 3.8–10.5)

## 2022-05-05 PROCEDURE — 93010 ELECTROCARDIOGRAM REPORT: CPT

## 2022-05-05 PROCEDURE — 93306 TTE W/DOPPLER COMPLETE: CPT | Mod: 26

## 2022-05-05 PROCEDURE — 90792 PSYCH DIAG EVAL W/MED SRVCS: CPT

## 2022-05-05 PROCEDURE — 99232 SBSQ HOSP IP/OBS MODERATE 35: CPT | Mod: GC

## 2022-05-05 RX ORDER — ACETAMINOPHEN 500 MG
1000 TABLET ORAL ONCE
Refills: 0 | Status: COMPLETED | OUTPATIENT
Start: 2022-05-05 | End: 2022-05-05

## 2022-05-05 RX ORDER — SODIUM CHLORIDE 9 MG/ML
1000 INJECTION INTRAMUSCULAR; INTRAVENOUS; SUBCUTANEOUS
Refills: 0 | Status: DISCONTINUED | OUTPATIENT
Start: 2022-05-05 | End: 2022-05-06

## 2022-05-05 RX ORDER — HALOPERIDOL DECANOATE 100 MG/ML
0.5 INJECTION INTRAMUSCULAR EVERY 6 HOURS
Refills: 0 | Status: DISCONTINUED | OUTPATIENT
Start: 2022-05-05 | End: 2022-05-09

## 2022-05-05 RX ORDER — OLANZAPINE 15 MG/1
2.5 TABLET, FILM COATED ORAL ONCE
Refills: 0 | Status: DISCONTINUED | OUTPATIENT
Start: 2022-05-05 | End: 2022-05-05

## 2022-05-05 RX ORDER — ACETAMINOPHEN 500 MG
650 TABLET ORAL EVERY 6 HOURS
Refills: 0 | Status: DISCONTINUED | OUTPATIENT
Start: 2022-05-05 | End: 2022-05-19

## 2022-05-05 RX ADMIN — Medication 6 MILLIGRAM(S): at 22:00

## 2022-05-05 RX ADMIN — ENOXAPARIN SODIUM 40 MILLIGRAM(S): 100 INJECTION SUBCUTANEOUS at 17:38

## 2022-05-05 RX ADMIN — Medication 1000 MILLIGRAM(S): at 12:05

## 2022-05-05 RX ADMIN — CEFTRIAXONE 100 MILLIGRAM(S): 500 INJECTION, POWDER, FOR SOLUTION INTRAMUSCULAR; INTRAVENOUS at 09:45

## 2022-05-05 RX ADMIN — SODIUM CHLORIDE 70 MILLILITER(S): 9 INJECTION INTRAMUSCULAR; INTRAVENOUS; SUBCUTANEOUS at 00:32

## 2022-05-05 RX ADMIN — Medication 400 MILLIGRAM(S): at 11:35

## 2022-05-05 RX ADMIN — Medication 6 MILLIGRAM(S): at 00:32

## 2022-05-05 RX ADMIN — Medication 100 MILLIGRAM(S): at 12:10

## 2022-05-05 RX ADMIN — SODIUM CHLORIDE 70 MILLILITER(S): 9 INJECTION INTRAMUSCULAR; INTRAVENOUS; SUBCUTANEOUS at 08:23

## 2022-05-05 NOTE — BH CONSULTATION LIAISON ASSESSMENT NOTE - NSBHCHARTREVIEWVS_PSY_A_CORE FT
Vital Signs Last 24 Hrs  T(C): 36.6 (05 May 2022 11:47), Max: 37.1 (04 May 2022 21:03)  T(F): 97.8 (05 May 2022 11:47), Max: 98.8 (04 May 2022 21:03)  HR: 122 (05 May 2022 11:47) (93 - 143)  BP: 146/90 (05 May 2022 11:47) (146/90 - 175/89)  BP(mean): --  RR: 18 (05 May 2022 11:47) (18 - 19)  SpO2: 100% (05 May 2022 11:47) (97% - 100%)

## 2022-05-05 NOTE — PROGRESS NOTE ADULT - ASSESSMENT
87 year old F with history of HTN, HLD and GERD presenting to [facility] ED with confusion and restlessness x 2 days, along with auditory and visual hallucinations. ID consulted for possible UTI.     Impression:  #Encephalopathy - Likely non-infectious 2/2 electrolyte abnormalities. Reported history of recent UTI treated with Bactrim. UA here without pyuria, UCx no growth. No evidence of meningitis, neck supple w/o rigidity    Recs:  - urine culture no growth, d/c ceftriaxone   - f/u blood cultures  - monitor temp, WBCs    Paulo Montana MD  Infectious Disease Fellow  Available on [employer] Teams  Before 9AM or after 5PM: [phone]

## 2022-05-05 NOTE — BH CONSULTATION LIAISON ASSESSMENT NOTE - SUMMARY
88 yo F with PPHx of depressive d/o ( per EMR) and PMHX of HTN, HLD, GERD, andpresented on 5/4 with confusionof 2 day duration, weakness and hallucinations ( visual and auditory) w/ recent dx of UTI ( tx w/ bactrim for 5 days) for whom Psychiatry was consulted d/t behavioral disturbance and agitation. Patient AAOx 1 on assessment and hypersomnolent. Patient will not open eyes for provider as she is scared her "dead  is sitting next to [her]." Per nursing staff patient was restless and responding to internal stimuli last night. Per EMR, prior to the UTI patient was AAO x 2-3 and able to perform ADLs on her own. Patient does not respond when asked about presence of SI/HI. Unable to elicit further information d/t severe disorientation and hypersomnolence.   ---    PLAN:  -No standing psychotropic medication at this time.  -Agitation: Haldol 0.5 mg PO q 6 Hrs PRN agitation IF Qtc < 500.  -Frequent day/night orientation recommended  -Please avoid Benzodiazepine/anticholinergic meds which may worsen delirium  -F/U TSH/FT4, Vitamin B12, Folate  -Monitor EKG, ensure QtC < 500 ( 5/5 Qtc: 409)  -Pending collateral Hx  -Psychiatry will continue to follow and reassess

## 2022-05-05 NOTE — BH CONSULTATION LIAISON ASSESSMENT NOTE - HPI (INCLUDE ILLNESS QUALITY, SEVERITY, DURATION, TIMING, CONTEXT, MODIFYING FACTORS, ASSOCIATED SIGNS AND SYMPTOMS)
86 yo F with PPHx of depressive d/o ( per EMR) and PMHX of HTN, HLD, GERD, andpresented on 5/4 with confusionof 2 day duration, weakness and hallucinations ( visual and auditory) w/ recent dx of UTI ( tx w/ bactrim for 5 days) for whom Psychiatry was consulted d/t behavioral disturbance and agitation. Patient AAOx 1 on assessment and hypersomnolent. Patient will not open eyes for provider as she is scared her "dead  is sitting next to [her]." Per nursing staff patient was restless and responding to internal stimuli last night. Per EMR, prior to the UTI patient was AAO x 2-3 and able to perform ADLs on her own. Patient does not respond when asked about presence of SI/HI. Unable to elicit further information d/t severe disorientation and hypersomnolence.     Attempted to call patient's daughter America for collateral history, unreachable;  hence will reattempt. 86 yo F with PPHx of depressive d/o ( per EMR) and PMHX of HTN, HLD, GERD, andpresented on 5/4 with confusionof 2 day duration, weakness and hallucinations ( visual and auditory) w/ recent dx of UTI ( tx w/ bactrim for 5 days) for whom Psychiatry was consulted d/t behavioral disturbance and agitation. Patient AAOx 1 on assessment and hypersomnolent. Patient will not open eyes for provider as she is scared her "dead  is sitting next to [her]." Per nursing staff patient was restless and responding to internal stimuli last night. Per EMR, prior to the UTI patient was AAO x 2-3 and able to perform ADLs on her own. Patient does not respond when asked about presence of SI/HI. Unable to elicit further information d/t severe disorientation and hypersomnolence.     ** Patient quite warm to touch on exam, and tachycardic; primary team made aware.     Attempted to call patient's daughter America for collateral history, unreachable;  hence will reattempt.

## 2022-05-05 NOTE — BH CONSULTATION LIAISON ASSESSMENT NOTE - CURRENT MEDICATION
MEDICATIONS  (STANDING):  enoxaparin Injectable 40 milliGRAM(s) SubCutaneous every 24 hours  melatonin 6 milliGRAM(s) Oral at bedtime  sodium chloride 0.9%. 1000 milliLiter(s) (70 mL/Hr) IV Continuous <Continuous>  thiamine Injectable 100 milliGRAM(s) IV Push daily    MEDICATIONS  (PRN):  acetaminophen     Tablet .. 650 milliGRAM(s) Oral every 6 hours PRN Temp greater or equal to 38C (100.4F), Mild Pain (1 - 3), Moderate Pain (4 - 6)  haloperidol     Tablet 0.5 milliGRAM(s) Oral every 6 hours PRN agitation

## 2022-05-05 NOTE — BH CONSULTATION LIAISON ASSESSMENT NOTE - NSBHCHARTREVIEWLAB_PSY_A_CORE FT
13.0   7.88  )-----------( 474      ( 05 May 2022 06:48 )             39.6     05-05    136  |  101  |  14  ----------------------------<  92  4.7   |  19<L>  |  1.06    Ca    11.3<H>      05 May 2022 06:48    TPro  7.3  /  Alb  x   /  TBili  x   /  DBili  x   /  AST  x   /  ALT  x   /  AlkPhos  x   05-04

## 2022-05-05 NOTE — PROGRESS NOTE ADULT - ASSESSMENT
88 yo F non smoker with PMHX of HTN, HLD, GERD, and recently dx depression, here with Daughter at bedside brought in for worsening confusion and restlessness first beginning 2   days ago, intermittently, then today worse with hallucinations ( auditory and visual),and generalized weakness.  family concerned patient was going to wander. Recently dx with UTI  5 days ago has been on bactrim for 5 days ( unclear if patient missed a dose due to developing confusion). Patient A& o x2-3, having episode of confusion normally ambulates and performs ADLS independently. Denies cp ,sob, abdominal pain, nausea, vomiting, diarrhea, headache.    1 AMS  - likely metabolic encephalopathy sec to UTI  - cw antibiotics  - fu cultures  - ID following  - neuro fu appreciated  - psych consult     2 Hypercalcemia  - unclear etiology  - ivf  - renal following     3 Lovenox for DVT prrophylaxis

## 2022-05-05 NOTE — PROGRESS NOTE ADULT - SUBJECTIVE AND OBJECTIVE BOX
[facility]   MD KRISTEN MARQUES MD KRISTINE SOLTANPOUR, ALBIN ABARMS    TEL:  OFFICE: [phone]  From 5pm-7am Answering Service [phone]    -- RENAL FOLLOW UP NOTE ---Date of Service 05-05-22 @ 12:06    Patient is a 87y old  Female who presents with a chief complaint of ams and dysuria (05 May 2022 11:13)      Patient seen and examined at bedside. No chest pain/sob    VITALS:  T(F): 97.8 (05-05-22 @ 11:47), Max: 98.8 (05-04-22 @ 21:03)  HR: 122 (05-05-22 @ 11:47)  BP: 146/90 (05-05-22 @ 11:47)  RR: 18 (05-05-22 @ 11:47)  SpO2: 100% (05-05-22 @ 11:47)  Wt(kg): --    Height (cm): 157.5 (05-05 @ 03:34)  Weight (kg): 58 (05-05 @ 03:34)  BMI (kg/m2): 23.4 (05-05 @ 03:34)  BSA (m2): 1.58 (05-05 @ 03:34)    PHYSICAL EXAM:  Constitutional: NAD  Neck: No JVD  Respiratory: CTAB, no wheezes, rales or rhonchi  Cardiovascular: S1, S2, RRR  Gastrointestinal: BS+, soft, NT/ND  Extremities: No peripheral edema    Hospital Medications:   MEDICATIONS  (STANDING):  enoxaparin Injectable 40 milliGRAM(s) SubCutaneous every 24 hours  melatonin 6 milliGRAM(s) Oral at bedtime  sodium chloride 0.9%. 1000 milliLiter(s) (70 mL/Hr) IV Continuous <Continuous>  thiamine Injectable 100 milliGRAM(s) IV Push daily      LABS:  05-05    136  |  101  |  14  ----------------------------<  92  4.7   |  19<L>  |  1.06    Ca    11.3<H>      05 May 2022 06:48    TPro  7.3  /  Alb      /  TBili      /  DBili      /  AST      /  ALT      /  AlkPhos      05-04    Creatinine Trend: 1.06 <--, 1.24 <--    Vitamin D, 25-Hydroxy: 76.6 ng/mL (05-05 @ 01:54)    calcium--  intact pth29  parathyroid hormone intact, serum--                            13.0   7.88  )-----------( 474      ( 05 May 2022 06:48 )             39.6     Urine Studies:  Urinalysis - [05-04-22 @ 12:06]      Color Light Yellow / Appearance Clear / SG 1.006 / pH 6.5      Gluc Negative / Ketone Negative  / Bili Negative / Urobili <2 mg/dL       Blood Negative / Protein Negative / Leuk Est Negative / Nitrite Negative      RBC  / WBC  / Hyaline  / Gran  / Sq Epi  / Non Sq Epi  / Bacteria       PTH -- (Ca --)      [05-04-22 @ 16:19]   29  PTH -- (Ca --)      [05-04-22 @ 12:06]   31  Vitamin D (25OH) 76.6      [05-05-22 @ 01:54]  TSH 0.71      [05-04-22 @ 10:43]        RADIOLOGY & ADDITIONAL STUDIES:

## 2022-05-05 NOTE — PROGRESS NOTE ADULT - ASSESSMENT
86 yo F non smoker with PMHX of HTN, HLD, GERD, and recently dx depression, brought in by family for confusion. 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

## 2022-05-05 NOTE — PROGRESS NOTE ADULT - SUBJECTIVE AND OBJECTIVE BOX
Cardiology    HISTORY OF PRESENT ILLNESS:   Ms Rich is a pleasant 86yo woman sent in by family for confusion and hallucinations. Lives w/ her daughter but increasingly unable to take care of herself.    Patient is unable to provide a pertinent HPI / ROS due to confusion/metabolic encephalopathy.  Mumbles and doesnt answer questions in a meaningful way.  Has been taking Bactrim for a UTI.  Has abnormal electrolytes (low Na, high Ca) on admission.    Date of service 5/5- no new complaints, resting comfortably. echo pending.    acetaminophen     Tablet .. 650 milliGRAM(s) Oral every 6 hours PRN  acetaminophen   IVPB .. 1000 milliGRAM(s) IV Intermittent once  cefTRIAXone   IVPB 1000 milliGRAM(s) IV Intermittent every 24 hours  enoxaparin Injectable 40 milliGRAM(s) SubCutaneous every 24 hours  haloperidol     Tablet 0.5 milliGRAM(s) Oral every 6 hours PRN  melatonin 6 milliGRAM(s) Oral at bedtime  sodium chloride 0.9%. 1000 milliLiter(s) IV Continuous <Continuous>  thiamine Injectable 100 milliGRAM(s) IV Push daily                        13.0   7.88  )-----------( 474      ( 05 May 2022 06:48 )             39.6       05-05    136  |  101  |  14  ----------------------------<  92  4.7   |  19<L>  |  1.06    Ca    11.3<H>      05 May 2022 06:48    TPro  7.3  /  Alb  x   /  TBili  x   /  DBili  x   /  AST  x   /  ALT  x   /  AlkPhos  x   05-04    T(C): 37.1 (05-04-22 @ 21:03), Max: 37.1 (05-04-22 @ 21:03)  HR: 143 (05-05-22 @ 08:08) (93 - 143)  BP: 175/89 (05-05-22 @ 08:08) (149/60 - 175/89)  RR: 18 (05-05-22 @ 08:08) (18 - 19)  SpO2: 98% (05-05-22 @ 08:08) (97% - 100%)  Wt(kg): --    I&O's Summary      Appearance: thin elderly woman in no acute distress, oriented to name only.	  HEENT:   Normal oral mucosa, PERRL, EOMI	  Lymphatic: No lymphadenopathy , no edema  Cardiovascular: Normal S1 S2, No JVD, No murmurs , Peripheral pulses palpable 2+ bilaterally  Respiratory: Lungs clear to auscultation, normal effort 	  Gastrointestinal:  Soft, Non-tender, + BS	  Skin: No rashes, No ecchymoses, No cyanosis, warm to touch  Musculoskeletal: Normal range of motion, normal strength  Psychiatry:  Mood is "Im fine" & affect is flat    ECG: NSR, shortening/narrowing of T wave.  	  LABS:	 	                        12.6   8.13  )-----------( 444      ( 04 May 2022 10:43 )             38.0     05-04    130<L>  |  95<L>  |  18  ----------------------------<  86  4.7   |  22  |  1.24    Ca    11.4<H>      04 May 2022 10:43    TPro  7.7  /  Alb  3.7  /  TBili  0.4  /  DBili  x   /  AST  43<H>  /  ALT  23  /  AlkPhos  104  05-04  TSH: Thyroid Stimulating Hormone, Serum: 0.71 uIU/mL (05-04 @ 10:43)      ASSESSMENT/PLAN: 	87y Female with acute metabolic encephalopathy.  Previous UTI treatment. Hypercalcemic.    Ongoing nephro / ID / hematology workup.  Rec. IV hydration re: hypercalcemia.  Check an echocardiogram - pending  Maintain telemetry upstairs.  Will follow.      Nick Brooks M.D.  Cardiac Electrophysiology    office [phone]  pager [phone] Returned patient's call, left  for Mary Ann to call back to schedule.  Call can be put through to [facility]

## 2022-05-05 NOTE — PROGRESS NOTE ADULT - SUBJECTIVE AND OBJECTIVE BOX
Patient is a 87y old  Female who presents with a chief complaint of ams and dysuria (05 May 2022 12:05)    Date of servie : 22 @ 13:21  INTERVAL HPI/OVERNIGHT EVENTS:  T(C): 36.6 (22 @ 11:47), Max: 37.1 (22 @ 21:03)  HR: 122 (22 @ 11:47) (93 - 143)  BP: 146/90 (22 @ 11:47) (146/90 - 175/89)  RR: 18 (22 @ 11:47) (18 - 19)  SpO2: 100% (22 @ 11:47) (97% - 100%)  Wt(kg): --  I&O's Summary      LABS:                        13.0   7.88  )-----------( 474      ( 05 May 2022 06:48 )             39.6         136  |  101  |  14  ----------------------------<  92  4.7   |  19<L>  |  1.06    Ca    11.3<H>      05 May 2022 06:48    TPro  7.3  /  Alb  x   /  TBili  x   /  DBili  x   /  AST  x   /  ALT  x   /  AlkPhos  x   -04    PT/INR - ( 04 May 2022 10:43 )   PT: 13.7 sec;   INR: 1.18 ratio         PTT - ( 04 May 2022 10:43 )  PTT:29.6 sec  Urinalysis Basic - ( 04 May 2022 12:06 )    Color: Light Yellow / Appearance: Clear / S.006 / pH: x  Gluc: x / Ketone: Negative  / Bili: Negative / Urobili: <2 mg/dL   Blood: x / Protein: Negative / Nitrite: Negative   Leuk Esterase: Negative / RBC: x / WBC x   Sq Epi: x / Non Sq Epi: x / Bacteria: x      CAPILLARY BLOOD GLUCOSE            Urinalysis Basic - ( 04 May 2022 12:06 )    Color: Light Yellow / Appearance: Clear / S.006 / pH: x  Gluc: x / Ketone: Negative  / Bili: Negative / Urobili: <2 mg/dL   Blood: x / Protein: Negative / Nitrite: Negative   Leuk Esterase: Negative / RBC: x / WBC x   Sq Epi: x / Non Sq Epi: x / Bacteria: x        MEDICATIONS  (STANDING):  enoxaparin Injectable 40 milliGRAM(s) SubCutaneous every 24 hours  melatonin 6 milliGRAM(s) Oral at bedtime  sodium chloride 0.9%. 1000 milliLiter(s) (70 mL/Hr) IV Continuous <Continuous>  thiamine Injectable 100 milliGRAM(s) IV Push daily    MEDICATIONS  (PRN):  acetaminophen     Tablet .. 650 milliGRAM(s) Oral every 6 hours PRN Temp greater or equal to 38C (100.4F), Mild Pain (1 - 3), Moderate Pain (4 - 6)  haloperidol     Tablet 0.5 milliGRAM(s) Oral every 6 hours PRN agitation          PHYSICAL EXAM:  GENERAL: frail, confused   CHEST/LUNG: Clear to percussion bilaterally; No rales, rhonchi, wheezing, or rubs  HEART: Regular rate and rhythm; No murmurs, rubs, or gallops  ABDOMEN: Soft, Nontender, Nondistended; Bowel sounds present  EXTREMITIES:  2+ Peripheral Pulses, No clubbing, cyanosis, or edema  LYMPH: No lymphadenopathy noted  SKIN: No rashes or lesions    Care Discussed with Consultants/Other Providers [x ] YES  [ ] NO

## 2022-05-05 NOTE — PROGRESS NOTE ADULT - SUBJECTIVE AND OBJECTIVE BOX
Follow Up:  encephalopathy    Interval History/ROS:  No fever/chills overnight    Allergies  penicillin (Unknown)    ANTIMICROBIALS:  cefTRIAXone   IVPB 1000 every 24 hours      OTHER MEDS:  MEDICATIONS  (STANDING):  acetaminophen     Tablet .. 650 every 6 hours PRN  acetaminophen   IVPB .. 1000 once  enoxaparin Injectable 40 every 24 hours  melatonin 6 at bedtime  OLANZapine 2.5 once      Vital Signs Last 24 Hrs  T(C): 37.1 (04 May 2022 21:03), Max: 37.1 (04 May 2022 10:39)  T(F): 98.8 (04 May 2022 21:03), Max: 98.8 (04 May 2022 21:03)  HR: 143 (05 May 2022 08:08) (93 - 143)  BP: 175/89 (05 May 2022 08:08) (149/60 - 175/89)  BP(mean): 96 (04 May 2022 10:02) (96 - 96)  RR: 18 (05 May 2022 08:08) (18 - 19)  SpO2: 98% (05 May 2022 08:08) (97% - 100%)    PHYSICAL EXAM:  Constitutional: no distress  HEAD/EYES: anicteric, no conjunctival injection  ENT:  supple, no thrush  Cardiovascular:   +S1/S2  Respiratory:  +BS bilaterally  GI:  soft, non-tender, +bowel sounds  :  no chery, no CVA tenderness  Musculoskeletal:  no synovitis, normal ROM  Neurologic: awake, no focal findings  Skin:  no rash, no erythema, no phlebitis                            13.0   7.88  )-----------( 474      ( 05 May 2022 06:48 )             39.6           136  |  101  |  14  ----------------------------<  92  4.7   |  19<L>  |  1.06    Ca    11.3<H>      05 May 2022 06:48    TPro  7.3  /  Alb  x   /  TBili  x   /  DBili  x   /  AST  x   /  ALT  x   /  AlkPhos  x         Urinalysis Basic - ( 04 May 2022 12:06 )    Color: Light Yellow / Appearance: Clear / S.006 / pH: x  Gluc: x / Ketone: Negative  / Bili: Negative / Urobili: <2 mg/dL   Blood: x / Protein: Negative / Nitrite: Negative   Leuk Esterase: Negative / RBC: x / WBC x   Sq Epi: x / Non Sq Epi: x / Bacteria: x        MICROBIOLOGY:      RADIOLOGY:  < from: Xray Chest 1 View AP/PA (22 @ 11:35) >  IMPRESSION: No acute pulmonary disease.    < end of copied text >     Follow Up:  encephalopathy    Interval History/ROS:  No fever/chills overnight. Still with intermittent agitation this AM.     Allergies  penicillin (Unknown)    ANTIMICROBIALS:  cefTRIAXone   IVPB 1000 every 24 hours      OTHER MEDS:  MEDICATIONS  (STANDING):  acetaminophen     Tablet .. 650 every 6 hours PRN  acetaminophen   IVPB .. 1000 once  enoxaparin Injectable 40 every 24 hours  melatonin 6 at bedtime  OLANZapine 2.5 once      Vital Signs Last 24 Hrs  T(C): 37.1 (04 May 2022 21:03), Max: 37.1 (04 May 2022 10:39)  T(F): 98.8 (04 May 2022 21:03), Max: 98.8 (04 May 2022 21:03)  HR: 143 (05 May 2022 08:08) (93 - 143)  BP: 175/89 (05 May 2022 08:08) (149/60 - 175/89)  BP(mean): 96 (04 May 2022 10:02) (96 - 96)  RR: 18 (05 May 2022 08:08) (18 - 19)  SpO2: 98% (05 May 2022 08:08) (97% - 100%)    PHYSICAL EXAM:  Constitutional: no distress  HEAD/EYES: anicteric, no conjunctival injection  ENT:  supple, no thrush  Cardiovascular:   +S1/S2  Respiratory:  +BS bilaterally  GI:  soft, non-tender, +bowel sounds  :  no chery, no CVA tenderness  Musculoskeletal:  no synovitis, normal ROM  Neurologic: awake, no focal findings  Skin:  no rash, no erythema, no phlebitis                            13.0   7.88  )-----------( 474      ( 05 May 2022 06:48 )             39.6           136  |  101  |  14  ----------------------------<  92  4.7   |  19<L>  |  1.06    Ca    11.3<H>      05 May 2022 06:48    TPro  7.3  /  Alb  x   /  TBili  x   /  DBili  x   /  AST  x   /  ALT  x   /  AlkPhos  x         Urinalysis Basic - ( 04 May 2022 12:06 )    Color: Light Yellow / Appearance: Clear / S.006 / pH: x  Gluc: x / Ketone: Negative  / Bili: Negative / Urobili: <2 mg/dL   Blood: x / Protein: Negative / Nitrite: Negative   Leuk Esterase: Negative / RBC: x / WBC x   Sq Epi: x / Non Sq Epi: x / Bacteria: x        MICROBIOLOGY:      RADIOLOGY:  < from: Xray Chest 1 View AP/PA (22 @ 11:35) >  IMPRESSION: No acute pulmonary disease.    < end of copied text >

## 2022-05-06 LAB
ALBUMIN SERPL ELPH-MCNC: 3.7 G/DL — SIGNIFICANT CHANGE UP (ref 3.3–5)
ALP SERPL-CCNC: 93 U/L — SIGNIFICANT CHANGE UP (ref 40–120)
ALT FLD-CCNC: 21 U/L — SIGNIFICANT CHANGE UP (ref 4–33)
ANION GAP SERPL CALC-SCNC: 16 MMOL/L — HIGH (ref 7–14)
AST SERPL-CCNC: 34 U/L — HIGH (ref 4–32)
BILIRUB SERPL-MCNC: 0.4 MG/DL — SIGNIFICANT CHANGE UP (ref 0.2–1.2)
BUN SERPL-MCNC: 14 MG/DL — SIGNIFICANT CHANGE UP (ref 7–23)
CALCIUM SERPL-MCNC: 11.2 MG/DL — HIGH (ref 8.4–10.5)
CHLORIDE SERPL-SCNC: 100 MMOL/L — SIGNIFICANT CHANGE UP (ref 98–107)
CO2 SERPL-SCNC: 20 MMOL/L — LOW (ref 22–31)
CREAT SERPL-MCNC: 0.98 MG/DL — SIGNIFICANT CHANGE UP (ref 0.5–1.3)
EGFR: 56 ML/MIN/1.73M2 — LOW
GLUCOSE SERPL-MCNC: 95 MG/DL — SIGNIFICANT CHANGE UP (ref 70–99)
HCT VFR BLD CALC: 39.9 % — SIGNIFICANT CHANGE UP (ref 34.5–45)
HGB BLD-MCNC: 13.1 G/DL — SIGNIFICANT CHANGE UP (ref 11.5–15.5)
MAGNESIUM SERPL-MCNC: 1.9 MG/DL — SIGNIFICANT CHANGE UP (ref 1.6–2.6)
MCHC RBC-ENTMCNC: 29.3 PG — SIGNIFICANT CHANGE UP (ref 27–34)
MCHC RBC-ENTMCNC: 32.8 GM/DL — SIGNIFICANT CHANGE UP (ref 32–36)
MCV RBC AUTO: 89.3 FL — SIGNIFICANT CHANGE UP (ref 80–100)
NRBC # BLD: 0 /100 WBCS — SIGNIFICANT CHANGE UP
NRBC # FLD: 0 K/UL — SIGNIFICANT CHANGE UP
PHOSPHATE SERPL-MCNC: 2.3 MG/DL — LOW (ref 2.5–4.5)
PLATELET # BLD AUTO: 476 K/UL — HIGH (ref 150–400)
POTASSIUM SERPL-MCNC: 4.4 MMOL/L — SIGNIFICANT CHANGE UP (ref 3.5–5.3)
POTASSIUM SERPL-SCNC: 4.4 MMOL/L — SIGNIFICANT CHANGE UP (ref 3.5–5.3)
PROT SERPL-MCNC: 7.6 G/DL — SIGNIFICANT CHANGE UP (ref 6–8.3)
RBC # BLD: 4.47 M/UL — SIGNIFICANT CHANGE UP (ref 3.8–5.2)
RBC # FLD: 12.7 % — SIGNIFICANT CHANGE UP (ref 10.3–14.5)
SODIUM SERPL-SCNC: 136 MMOL/L — SIGNIFICANT CHANGE UP (ref 135–145)
WBC # BLD: 8.13 K/UL — SIGNIFICANT CHANGE UP (ref 3.8–10.5)
WBC # FLD AUTO: 8.13 K/UL — SIGNIFICANT CHANGE UP (ref 3.8–10.5)

## 2022-05-06 PROCEDURE — 99232 SBSQ HOSP IP/OBS MODERATE 35: CPT

## 2022-05-06 RX ORDER — SODIUM,POTASSIUM PHOSPHATES 278-250MG
1 POWDER IN PACKET (EA) ORAL
Refills: 0 | Status: COMPLETED | OUTPATIENT
Start: 2022-05-06 | End: 2022-05-07

## 2022-05-06 RX ORDER — SODIUM CHLORIDE 9 MG/ML
1000 INJECTION INTRAMUSCULAR; INTRAVENOUS; SUBCUTANEOUS
Refills: 0 | Status: DISCONTINUED | OUTPATIENT
Start: 2022-05-06 | End: 2022-05-09

## 2022-05-06 RX ORDER — VALPROIC ACID (AS SODIUM SALT) 250 MG/5ML
100 SOLUTION, ORAL ORAL
Refills: 0 | Status: DISCONTINUED | OUTPATIENT
Start: 2022-05-06 | End: 2022-05-09

## 2022-05-06 RX ADMIN — Medication 100 MILLIGRAM(S): at 12:02

## 2022-05-06 RX ADMIN — Medication 63.75 MILLIMOLE(S): at 21:26

## 2022-05-06 RX ADMIN — SODIUM CHLORIDE 70 MILLILITER(S): 9 INJECTION INTRAMUSCULAR; INTRAVENOUS; SUBCUTANEOUS at 21:22

## 2022-05-06 RX ADMIN — HALOPERIDOL DECANOATE 0.5 MILLIGRAM(S): 100 INJECTION INTRAMUSCULAR at 03:21

## 2022-05-06 RX ADMIN — Medication 51 MILLIGRAM(S): at 19:24

## 2022-05-06 RX ADMIN — Medication 6 MILLIGRAM(S): at 21:28

## 2022-05-06 RX ADMIN — Medication 1 PACKET(S): at 12:02

## 2022-05-06 RX ADMIN — SODIUM CHLORIDE 70 MILLILITER(S): 9 INJECTION INTRAMUSCULAR; INTRAVENOUS; SUBCUTANEOUS at 18:25

## 2022-05-06 RX ADMIN — ENOXAPARIN SODIUM 40 MILLIGRAM(S): 100 INJECTION SUBCUTANEOUS at 17:05

## 2022-05-06 RX ADMIN — Medication 1 PACKET(S): at 17:04

## 2022-05-06 NOTE — PROGRESS NOTE ADULT - ASSESSMENT
86 yo F non smoker with PMHX of HTN, HLD, GERD, and recently dx depression, brought in by family for confusion. nephrology consulted for electrolyte abnormities    Hypercalcemia  per patient takes MVI and vit d supplement  hold supplements   PTH 31, ionized calcium high normal  elevated vit d possible sec to vit d toxicity   K/L 0.74  pending PTHrp spep, SIF  continue gentle hydration  Monitor Ca level    Hyponatremia  low SG in urine  normal TSH  likely polydipsia  on mirtazepine for depression  improved   monitor     HTN  acceptable  not on meds  monitor 86 yo F non smoker with PMHX of HTN, HLD, GERD, and recently dx depression, brought in by family for confusion. nephrology consulted for electrolyte abnormities    Hypercalcemia  per patient takes MVI and vit d supplement  hold supplements   PTH 31, ionized calcium high normal  elevated vit d possible sec to vit d toxicity   K/L 0.74  pending PTHrp spep, SIF  continue gentle hydration  Monitor Ca level    Hyponatremia  low SG in urine  normal TSH  likely polydipsia  on mirtazepine for depression  improved   monitor     HTN  acceptable  not on meds  monitor    Hypophosphatemia   Supplemented with Na phos of 30 mmol x one dose.

## 2022-05-06 NOTE — PHYSICAL THERAPY INITIAL EVALUATION ADULT - GENERAL OBSERVATIONS, REHAB EVAL
Pt received seated in chair, +telemetry, in NAD. Son at bedside. Pt agreeable to participate in PT evaluation. Pt left seated in chair, all lines intact and RN aware.

## 2022-05-06 NOTE — PROGRESS NOTE ADULT - NS ATTEND AMEND GEN_ALL_CORE FT
I have fully participated in the care of this patient. I have made amendments to the documentation where necessary, and agree with the history, physical exam, and plan as documented by the ACP.         Thank you for the consultation    Jaswinder Bailey MD  Hematology/Oncology  [phone]

## 2022-05-06 NOTE — PROGRESS NOTE ADULT - SUBJECTIVE AND OBJECTIVE BOX
Date of service: 05/06/22    HISTORY OF PRESENT ILLNESS:   Ms Rich is a pleasant 88yo woman sent in by family for confusion and hallucinations.     S: no chest pain or sob; ros limited but otherwise negative.         acetaminophen     Tablet .. 650 milliGRAM(s) Oral every 6 hours PRN  enoxaparin Injectable 40 milliGRAM(s) SubCutaneous every 24 hours  haloperidol     Tablet 0.5 milliGRAM(s) Oral every 6 hours PRN  melatonin 6 milliGRAM(s) Oral at bedtime  potassium phosphate / sodium phosphate Powder (PHOS-NaK) 1 Packet(s) Oral three times a day before meals  sodium chloride 0.9%. 1000 milliLiter(s) IV Continuous <Continuous>  thiamine Injectable 100 milliGRAM(s) IV Push daily                            13.1   8.13  )-----------( 476      ( 06 May 2022 06:42 )             39.9       05-06    136  |  100  |  14  ----------------------------<  95  4.4   |  20<L>  |  0.98    Ca    11.2<H>      06 May 2022 06:42  Phos  2.3     05-06  Mg     1.90     05-06    TPro  7.6  /  Alb  3.7  /  TBili  0.4  /  DBili  x   /  AST  34<H>  /  ALT  21  /  AlkPhos  93  05-06            T(C): 36.9 (05-06-22 @ 13:38), Max: 36.9 (05-06-22 @ 13:38)  HR: 112 (05-06-22 @ 13:38) (110 - 120)  BP: 144/76 (05-06-22 @ 13:38) (144/76 - 155/80)  RR: 18 (05-06-22 @ 13:38) (17 - 18)  SpO2: 99% (05-06-22 @ 13:38) (98% - 99%)  Wt(kg): --    I&O's Summary      HEENT:   Normal oral mucosa, PERRL, EOMI	  Lymphatic: No lymphadenopathy , no edema  Cardiovascular: Normal S1 S2, No JVD, No murmurs , Peripheral pulses palpable 2+ bilaterally  Respiratory: Lungs clear to auscultation, normal effort 	  Gastrointestinal:  Soft, Non-tender, + BS	  Skin: No rashes, No ecchymoses, No cyanosis, warm to touch      ECG: NSR, shortening/narrowing of T wave.  	  LABS:	 	                        12.6   8.13  )-----------( 444      ( 04 May 2022 10:43 )             38.0     05-04    130<L>  |  95<L>  |  18  ----------------------------<  86  4.7   |  22  |  1.24    Ca    11.4<H>      04 May 2022 10:43    TPro  7.7  /  Alb  3.7  /  TBili  0.4  /  DBili  x   /  AST  43<H>  /  ALT  23  /  AlkPhos  104  05-04  TSH: Thyroid Stimulating Hormone, Serum: 0.71 uIU/mL (05-04 @ 10:43)    Tele: SR     ASSESSMENT/PLAN: 	87y Female with acute metabolic encephalopathy.  Previous UTI treatment. Hypercalcemic.    -monitor tele - sr with no events today thus far  -tte with normal LV function   -treatment of hypercalcemia per primary team   -no further inpatient cardiac workup expected as long as tele remains stable    Carolyn Pike MD

## 2022-05-06 NOTE — PROGRESS NOTE ADULT - ASSESSMENT
88 yo F non smoker with PMHX of HTN, HLD, GERD, and recently dx depression, here with Daughter at bedside brought in for worsening confusion and restlessness first beginning 2   days ago, intermittently, then today worse with hallucinations ( auditory and visual),and generalized weakness.  family concerned patient was going to wander. Recently dx with UTI  5 days ago has been on bactrim for 5 days ( unclear if patient missed a dose due to developing confusion). Patient A& o x2-3, having episode of confusion normally ambulates and performs ADLS independently. Denies cp ,sob, abdominal pain, nausea, vomiting, diarrhea, headache.    1 AMS  - likely metabolic encephalopathy due to electrolytes abnormality   - dc antibiotics, uti ruled out   - fu cultures  - ID following  - neuro fu appreciated  - psych consult     2 Hypercalcemia  - unclear etiology  - ivf  - renal following     3 Lovenox for DVT prophylaxis

## 2022-05-06 NOTE — PROGRESS NOTE ADULT - SUBJECTIVE AND OBJECTIVE BOX
Patient is a 87y old  Female who presents with a chief complaint of ams and dysuria (06 May 2022 14:29)    Date of servie : 05-06-22 @ 18:44  INTERVAL HPI/OVERNIGHT EVENTS:  T(C): 36.9 (05-06-22 @ 13:38), Max: 36.9 (05-06-22 @ 13:38)  HR: 112 (05-06-22 @ 13:38) (110 - 118)  BP: 144/76 (05-06-22 @ 13:38) (144/76 - 155/80)  RR: 18 (05-06-22 @ 13:38) (17 - 18)  SpO2: 99% (05-06-22 @ 13:38) (98% - 99%)  Wt(kg): --  I&O's Summary      LABS:                        13.1   8.13  )-----------( 476      ( 06 May 2022 06:42 )             39.9     05-06    136  |  100  |  14  ----------------------------<  95  4.4   |  20<L>  |  0.98    Ca    11.2<H>      06 May 2022 06:42  Phos  2.3     05-06  Mg     1.90     05-06    TPro  7.6  /  Alb  3.7  /  TBili  0.4  /  DBili  x   /  AST  34<H>  /  ALT  21  /  AlkPhos  93  05-06        CAPILLARY BLOOD GLUCOSE                MEDICATIONS  (STANDING):  enoxaparin Injectable 40 milliGRAM(s) SubCutaneous every 24 hours  melatonin 6 milliGRAM(s) Oral at bedtime  potassium phosphate / sodium phosphate Powder (PHOS-NaK) 1 Packet(s) Oral three times a day before meals  sodium chloride 0.9%. 1000 milliLiter(s) (70 mL/Hr) IV Continuous <Continuous>  sodium phosphate IVPB 15 milliMole(s) IV Intermittent once  thiamine Injectable 100 milliGRAM(s) IV Push daily  valproate sodium IVPB 100 milliGRAM(s) IV Intermittent <User Schedule>    MEDICATIONS  (PRN):  acetaminophen     Tablet .. 650 milliGRAM(s) Oral every 6 hours PRN Temp greater or equal to 38C (100.4F), Mild Pain (1 - 3), Moderate Pain (4 - 6)  haloperidol     Tablet 0.5 milliGRAM(s) Oral every 6 hours PRN agitation          PHYSICAL EXAM:  GENERAL: frail  CHEST/LUNG: Clear to percussion bilaterally; No rales, rhonchi, wheezing, or rubs  HEART: Regular rate and rhythm; No murmurs, rubs, or gallops  ABDOMEN: Soft, Nontender, Nondistended; Bowel sounds present  EXTREMITIES: edema +    Care Discussed with Consultants/Other Providers [ ] YES  [ ] NO

## 2022-05-06 NOTE — PHYSICAL THERAPY INITIAL EVALUATION ADULT - PERTINENT HX OF CURRENT PROBLEM, REHAB EVAL
Pt is a 87 year old female presenting with worsening confusion and restlessness. Admitted for UTI, AMS, hypercalcemia. PMH listed below.

## 2022-05-06 NOTE — PROGRESS NOTE ADULT - NS ATTEND AMEND GEN_ALL_CORE FT
Pt Scr is improving. Pt Scr is improving. Phosphorus is low and was supplemented with 15 mmol of sodium phosphorus.

## 2022-05-06 NOTE — PROGRESS NOTE ADULT - SUBJECTIVE AND OBJECTIVE BOX
URSULA HARSH 87y [id number]    Patient is a 87y old  Female who presents with a chief complaint of ams and dysuria (06 May 2022 07:26)      Follow Up/CC:  ID following for    Interval History/ROS:    Allergies    penicillin (Unknown)    Intolerances        ANTIMICROBIALS:      MEDICATIONS  (STANDING):  enoxaparin Injectable 40 milliGRAM(s) SubCutaneous every 24 hours  melatonin 6 milliGRAM(s) Oral at bedtime  potassium phosphate / sodium phosphate Powder (PHOS-NaK) 1 Packet(s) Oral three times a day before meals  sodium chloride 0.9%. 1000 milliLiter(s) (70 mL/Hr) IV Continuous <Continuous>  thiamine Injectable 100 milliGRAM(s) IV Push daily    MEDICATIONS  (PRN):  acetaminophen     Tablet .. 650 milliGRAM(s) Oral every 6 hours PRN Temp greater or equal to 38C (100.4F), Mild Pain (1 - 3), Moderate Pain (4 - 6)  haloperidol     Tablet 0.5 milliGRAM(s) Oral every 6 hours PRN agitation        Vital Signs Last 24 Hrs  T(C): 36.8 (06 May 2022 06:24), Max: 36.8 (06 May 2022 06:24)  T(F): 98.2 (06 May 2022 06:24), Max: 98.2 (06 May 2022 06:24)  HR: 118 (06 May 2022 06:24) (110 - 122)  BP: 155/80 (06 May 2022 06:24) (144/86 - 155/80)  BP(mean): --  RR: 18 (06 May 2022 06:24) (17 - 18)  SpO2: 99% (06 May 2022 06:24) (98% - 100%)    CBC Full  -  ( 06 May 2022 06:42 )  WBC Count : 8.13 K/uL  RBC Count : 4.47 M/uL  Hemoglobin : 13.1 g/dL  Hematocrit : 39.9 %  Platelet Count - Automated : 476 K/uL  Mean Cell Volume : 89.3 fL  Mean Cell Hemoglobin : 29.3 pg  Mean Cell Hemoglobin Concentration : 32.8 gm/dL  Auto Neutrophil # : x  Auto Lymphocyte # : x  Auto Monocyte # : x  Auto Eosinophil # : x  Auto Basophil # : x  Auto Neutrophil % : x  Auto Lymphocyte % : x  Auto Monocyte % : x  Auto Eosinophil % : x  Auto Basophil % : x        136  |  100  |  14  ----------------------------<  95  4.4   |  20<L>  |  0.98    Ca    11.2<H>      06 May 2022 06:42  Phos  2.3       Mg     1.90         TPro  7.6  /  Alb  3.7  /  TBili  0.4  /  DBili  x   /  AST  34<H>  /  ALT  21  /  AlkPhos  93      LIVER FUNCTIONS - ( 06 May 2022 06:42 )  Alb: 3.7 g/dL / Pro: 7.6 g/dL / ALK PHOS: 93 U/L / ALT: 21 U/L / AST: 34 U/L / GGT: x           Urinalysis Basic - ( 04 May 2022 12:06 )    Color: Light Yellow / Appearance: Clear / S.006 / pH: x  Gluc: x / Ketone: Negative  / Bili: Negative / Urobili: <2 mg/dL   Blood: x / Protein: Negative / Nitrite: Negative   Leuk Esterase: Negative / RBC: x / WBC x   Sq Epi: x / Non Sq Epi: x / Bacteria: x        MICROBIOLOGY:  .Blood Blood-Peripheral  22   No growth to date.  --  --      Clean Catch Clean Catch (Midstream)  22   <10,000 CFU/mL Normal Urogenital Va  --  --              v            RADIOLOGY     URSULA HARSH 87y [id number]    Patient is a 87y old  Female who presents with a chief complaint of ams and dysuria (06 May 2022 07:26)      Follow Up/CC:  ID following for encephalopathy    Interval History/ROS: no fever    Allergies    penicillin (Unknown)    Intolerances        ANTIMICROBIALS:      MEDICATIONS  (STANDING):  enoxaparin Injectable 40 milliGRAM(s) SubCutaneous every 24 hours  melatonin 6 milliGRAM(s) Oral at bedtime  potassium phosphate / sodium phosphate Powder (PHOS-NaK) 1 Packet(s) Oral three times a day before meals  sodium chloride 0.9%. 1000 milliLiter(s) (70 mL/Hr) IV Continuous <Continuous>  thiamine Injectable 100 milliGRAM(s) IV Push daily    MEDICATIONS  (PRN):  acetaminophen     Tablet .. 650 milliGRAM(s) Oral every 6 hours PRN Temp greater or equal to 38C (100.4F), Mild Pain (1 - 3), Moderate Pain (4 - 6)  haloperidol     Tablet 0.5 milliGRAM(s) Oral every 6 hours PRN agitation        Vital Signs Last 24 Hrs  T(C): 36.8 (06 May 2022 06:24), Max: 36.8 (06 May 2022 06:24)  T(F): 98.2 (06 May 2022 06:24), Max: 98.2 (06 May 2022 06:24)  HR: 118 (06 May 2022 06:24) (110 - 122)  BP: 155/80 (06 May 2022 06:24) (144/86 - 155/80)  BP(mean): --  RR: 18 (06 May 2022 06:24) (17 - 18)  SpO2: 99% (06 May 2022 06:24) (98% - 100%)    CBC Full  -  ( 06 May 2022 06:42 )  WBC Count : 8.13 K/uL  RBC Count : 4.47 M/uL  Hemoglobin : 13.1 g/dL  Hematocrit : 39.9 %  Platelet Count - Automated : 476 K/uL  Mean Cell Volume : 89.3 fL  Mean Cell Hemoglobin : 29.3 pg  Mean Cell Hemoglobin Concentration : 32.8 gm/dL  Auto Neutrophil # : x  Auto Lymphocyte # : x  Auto Monocyte # : x  Auto Eosinophil # : x  Auto Basophil # : x  Auto Neutrophil % : x  Auto Lymphocyte % : x  Auto Monocyte % : x  Auto Eosinophil % : x  Auto Basophil % : x        136  |  100  |  14  ----------------------------<  95  4.4   |  20<L>  |  0.98    Ca    11.2<H>      06 May 2022 06:42  Phos  2.3       Mg     1.90         TPro  7.6  /  Alb  3.7  /  TBili  0.4  /  DBili  x   /  AST  34<H>  /  ALT  21  /  AlkPhos  93      LIVER FUNCTIONS - ( 06 May 2022 06:42 )  Alb: 3.7 g/dL / Pro: 7.6 g/dL / ALK PHOS: 93 U/L / ALT: 21 U/L / AST: 34 U/L / GGT: x           Urinalysis Basic - ( 04 May 2022 12:06 )    Color: Light Yellow / Appearance: Clear / S.006 / pH: x  Gluc: x / Ketone: Negative  / Bili: Negative / Urobili: <2 mg/dL   Blood: x / Protein: Negative / Nitrite: Negative   Leuk Esterase: Negative / RBC: x / WBC x   Sq Epi: x / Non Sq Epi: x / Bacteria: x        MICROBIOLOGY:  .Blood Blood-Peripheral  22   No growth to date.  --  --      Clean Catch Clean Catch (Midstream)  22   <10,000 CFU/mL Normal Urogenital Va  --  --              v            RADIOLOGY

## 2022-05-06 NOTE — PROGRESS NOTE ADULT - ASSESSMENT
87 year old F with history of HTN, HLD and GERD presenting to [facility] ED with confusion and restlessness x 2 days, along with auditory and visual hallucinations. ID consulted for possible UTI.     Impression:  #Encephalopathy - Likely non-infectious 2/2 electrolyte abnormalities. Reported history of recent UTI treated with Bactrim. UA here without pyuria, UCx no growth. No evidence of meningitis, neck supple w/o rigidity    Recs:  - urine culture no growth, d/c ceftriaxone   - blood cultures negative  - monitor temp, WBCs    Angel Chand  Attending Physician   Division of Infectious Disease  Office #[phone]  Available on Microsoft Teams also  After 5pm/weekend or no response, call #[phone]

## 2022-05-06 NOTE — PHYSICAL THERAPY INITIAL EVALUATION ADULT - PATIENT PROFILE REVIEW, REHAB EVAL
PT evaluate and treat orders received: no formal activity orders. Consult with JIM ROD, pt may participate in PT evaluation./yes

## 2022-05-06 NOTE — PHYSICAL THERAPY INITIAL EVALUATION ADULT - ADDITIONAL COMMENTS
Pt is not a reliable historian, reports living in a house, + stairs to negotiate. Prior to admission, pt ambulated independently, no assistive device.

## 2022-05-06 NOTE — PROGRESS NOTE ADULT - SUBJECTIVE AND OBJECTIVE BOX
[facility]      Seen earlier today, and examined.  - Today, patient is without complaints.           *****MEDICATIONS: Current medication reviewed and documented.    MEDICATIONS  (STANDING):  enoxaparin Injectable 40 milliGRAM(s) SubCutaneous every 24 hours  melatonin 6 milliGRAM(s) Oral at bedtime  potassium phosphate / sodium phosphate Powder (PHOS-NaK) 1 Packet(s) Oral three times a day before meals  sodium chloride 0.9%. 1000 milliLiter(s) (70 mL/Hr) IV Continuous <Continuous>  sodium phosphate IVPB 15 milliMole(s) IV Intermittent once  thiamine Injectable 100 milliGRAM(s) IV Push daily  valproate sodium IVPB 100 milliGRAM(s) IV Intermittent at bedtime    MEDICATIONS  (PRN):  acetaminophen     Tablet .. 650 milliGRAM(s) Oral every 6 hours PRN Temp greater or equal to 38C (100.4F), Mild Pain (1 - 3), Moderate Pain (4 - 6)  haloperidol     Tablet 0.5 milliGRAM(s) Oral every 6 hours PRN agitation          ***** VITAL SIGNS:  T(F): 98.4 (22 @ 13:38), Max: 98.4 (22 @ 13:38)  HR: 112 (22 @ 13:38) (110 - 120)  BP: 144/76 (22 @ 13:38) (144/76 - 155/80)  RR: 18 (22 @ 13:38) (17 - 18)  SpO2: 99% (22 @ 13:38) (98% - 99%)  Wt(kg): --  ,   I&O's Summary           *****PHYSICAL EXAM: Alert oriented 2 did not know the date or the name of the hospital   Attention comprehension are limited  Able to name, repeat,  without any difficulty.   Able to follow 1 step commands.     EOMI fundi not visualized,  blinks to threat   Tongue is midline      Moving all 4 ext symmetrically    sensation is grossly symmetric  Gait : not assessed.  B/L down going toes               *****LAB AND IMAGIN.1   8.13  )-----------( 476      ( 06 May 2022 06:42 )             39.9                   136  |  100  |  14  ----------------------------<  95  4.4   |  20<L>  |  0.98    Ca    11.2<H>      06 May 2022 06:42  Phos  2.3     05-  Mg     1.90     -    TPro  7.6  /  Alb  3.7  /  TBili  0.4  /  DBili  x   /  AST  34<H>  /  ALT  21  /  AlkPhos  93                           [All pertinent recent Imaging/Reports reviewed]           *****A S S E S S M E N T   A N D   P L A N :    Excerpt from H&P,"  86 yo F non smoker with PMHX of HTN, HLD, GERD, and recently dx depression, here with Daughter at bedside brought in for worsening confusion and restlessness first beginning 2 days ago, intermittently, then today worse with hallucinations ( auditory and visual),and generalized weakness.  family concerned patient was going to wander. Recently dx with UTI  5 days ago has been on bactrim for 5 days ( unclear if patient missed a dose due to developing confusion). Patient A& o x2-3, having episode of confusion normally ambulates  hx obtained from chart   family not available at bedside at the time of my encounter     Problem/Recommendations 1: ams   likely toxic metabolic encephalopathy due to hyponatremia +/- hypercalcemia +/- uti worsening underlying cognitive impairment   Plan : rx infection   avoid daytime somnolence   encourage po intake   melatonin for sleep augmentation   thiamine iv 100 daily     Problem/Recommendations 2:weakness generalized   deconditioning vs. related to hypercalcemia    pt at risk for developing delirium, therefore please institute the following preventative measures if possible          - initiating early mobilization          -minimizing "tethers" - IV, oxygen, catheters, etc          -avoiding   sedatives          -maintaining hydration/nutrition          -avoid anticholinergics - diphenhydramine, etc          -pain control          -sleep wake cycle regulation; avoid day time somnolence           -supportive environment      Thank you for allowing me to participate in the care of this patient. Will continue to follow patient periodically. Please do not hesitate to call me if you have any  questions or if there has been a change in patients neurological status     ________________  Sandhya Crow MD  [facility] (PN)[facility]  [phone]      33 minutes spent on total encounter; more than 50 % of the visit was  spent counseling about plan of care, compliance to diet/exercise and medication regimen and or  coordinating care by the attending physician.      It is advised that stroke patients follow up with SHEBA Salazar @ [phone] in 1- 2 weeks.   Others please follow up with Dr. Michael Nissenbaum [phone] [facility]      Seen earlier today, and examined.  - Today, patient is without complaints.           *****MEDICATIONS: Current medication reviewed and documented.    MEDICATIONS  (STANDING):  enoxaparin Injectable 40 milliGRAM(s) SubCutaneous every 24 hours  melatonin 6 milliGRAM(s) Oral at bedtime  potassium phosphate / sodium phosphate Powder (PHOS-NaK) 1 Packet(s) Oral three times a day before meals  sodium chloride 0.9%. 1000 milliLiter(s) (70 mL/Hr) IV Continuous <Continuous>  sodium phosphate IVPB 15 milliMole(s) IV Intermittent once  thiamine Injectable 100 milliGRAM(s) IV Push daily  valproate sodium IVPB 100 milliGRAM(s) IV Intermittent at bedtime    MEDICATIONS  (PRN):  acetaminophen     Tablet .. 650 milliGRAM(s) Oral every 6 hours PRN Temp greater or equal to 38C (100.4F), Mild Pain (1 - 3), Moderate Pain (4 - 6)  haloperidol     Tablet 0.5 milliGRAM(s) Oral every 6 hours PRN agitation          ***** VITAL SIGNS:  T(F): 98.4 (22 @ 13:38), Max: 98.4 (22 @ 13:38)  HR: 112 (22 @ 13:38) (110 - 120)  BP: 144/76 (22 @ 13:38) (144/76 - 155/80)  RR: 18 (22 @ 13:38) (17 - 18)  SpO2: 99% (22 @ 13:38) (98% - 99%)  Wt(kg): --  ,   I&O's Summary           *****PHYSICAL EXAM: Alert oriented 2 did not know the date knows the month or the name of the hospital   Attention comprehension are limited  Able to name, repeat,  without any difficulty.   Able to follow 1 step commands.     EOMI fundi not visualized,  blinks to threat   Tongue is midline      Moving all 4 ext symmetrically    sensation is grossly symmetric  Gait : not assessed.  B/L down going toes               *****LAB AND IMAGIN.1   8.13  )-----------( 476      ( 06 May 2022 06:42 )             39.9                   136  |  100  |  14  ----------------------------<  95  4.4   |  20<L>  |  0.98    Ca    11.2<H>      06 May 2022 06:42  Phos  2.3     05-  Mg     1.90     -    TPro  7.6  /  Alb  3.7  /  TBili  0.4  /  DBili  x   /  AST  34<H>  /  ALT  21  /  AlkPhos  93                           [All pertinent recent Imaging/Reports reviewed]           *****A S S E S S M E N T   A N D   P L A N :    Excerpt from H&P,"  86 yo F non smoker with PMHX of HTN, HLD, GERD, and recently dx depression, here with Daughter at bedside brought in for worsening confusion and restlessness first beginning 2 days ago, intermittently, then today worse with hallucinations ( auditory and visual),and generalized weakness.  family concerned patient was going to wander. Recently dx with UTI  5 days ago has been on bactrim for 5 days ( unclear if patient missed a dose due to developing confusion). Patient A& o x2-3, having episode of confusion normally ambulates  hx obtained from chart   family not available at bedside at the time of my encounter     Problem/Recommendations 1: ams   likely toxic metabolic encephalopathy due to hyponatremia +/- hypercalcemia +/- uti worsening underlying cognitive impairment   Plan : rx infection   avoid daytime somnolence   encourage po intake   melatonin for sleep augmentation   thiamine iv 100 daily     Problem/Recommendations 2:weakness generalized   deconditioning vs. related to hypercalcemia    pt at risk for developing delirium, therefore please institute the following preventative measures if possible          - initiating early mobilization          -minimizing "tethers" - IV, oxygen, catheters, etc          -avoiding   sedatives          -maintaining hydration/nutrition          -avoid anticholinergics - diphenhydramine, etc          -pain control          -sleep wake cycle regulation; avoid day time somnolence           -supportive environment      Thank you for allowing me to participate in the care of this patient. Will continue to follow patient periodically. Please do not hesitate to call me if you have any  questions or if there has been a change in patients neurological status     ________________  Sandhya Crow MD  [facility] (PN)[facility]  [phone]      33 minutes spent on total encounter; more than 50 % of the visit was  spent counseling about plan of care, compliance to diet/exercise and medication regimen and or  coordinating care by the attending physician.      It is advised that stroke patients follow up with SHEBA Salazar @ [phone] in 1- 2 weeks.   Others please follow up with Dr. Michael Nissenbaum [phone]

## 2022-05-06 NOTE — PROGRESS NOTE ADULT - SUBJECTIVE AND OBJECTIVE BOX
Patient sitting in chair in nursing corridor, confused but calm      MEDICATIONS  (STANDING):  enoxaparin Injectable 40 milliGRAM(s) SubCutaneous every 24 hours  melatonin 6 milliGRAM(s) Oral at bedtime  sodium chloride 0.9%. 1000 milliLiter(s) (70 mL/Hr) IV Continuous <Continuous>  thiamine Injectable 100 milliGRAM(s) IV Push daily    MEDICATIONS  (PRN):  acetaminophen     Tablet .. 650 milliGRAM(s) Oral every 6 hours PRN Temp greater or equal to 38C (100.4F), Mild Pain (1 - 3), Moderate Pain (4 - 6)  haloperidol     Tablet 0.5 milliGRAM(s) Oral every 6 hours PRN agitation      ROS  unable to assess, confused    Vital Signs Last 24 Hrs  T(C): 36.7 (05 May 2022 15:17), Max: 36.7 (05 May 2022 15:17)  T(F): 98 (05 May 2022 15:17), Max: 98 (05 May 2022 15:17)  HR: 120 (05 May 2022 15:17) (120 - 143)  BP: 144/86 (05 May 2022 15:17) (144/86 - 175/89)  BP(mean): --  RR: 18 (05 May 2022 15:17) (18 - 18)  SpO2: 99% (05 May 2022 15:17) (98% - 100%)    PE  NAD  Awake, disoriented  Anicteric, MMM  No c/c/e  No rash grossly                            13.1   8.13  )-----------( 476      ( 06 May 2022 06:42 )             39.9       05-05    136  |  101  |  14  ----------------------------<  92  4.7   |  19<L>  |  1.06    Ca    11.3<H>      05 May 2022 06:48    TPro  7.3  /  Alb  x   /  TBili  x   /  DBili  x   /  AST  x   /  ALT  x   /  AlkPhos  x   05-04      ACC: [id number] EXAM:  XR CHEST AP OR PA 1V                          PROCEDURE DATE:  05/04/2022          INTERPRETATION:  EXAMINATION: XR CHEST    CLINICAL INDICATION: Sepsis    TECHNIQUE: Single frontal, portable view of the chest was obtained.    COMPARISON: Chest x-ray 4/13/2017    FINDINGS:  The heart is normal in size. Calcified aortic knob.  Right perihilar opacity present but decreased from prior chest x-ray. No   new focal infiltrates.  There is no pneumothorax or pleural effusion.    IMPRESSION: No acute pulmonary disease.      --- End of Report ---

## 2022-05-06 NOTE — PROGRESS NOTE ADULT - ASSESSMENT
Hematology/Oncology consulting on this 87 year old female with history of HTN, HLD and GERD presenting to [facility] ED with confusion and restlessness x 2 days and now with auditory and visual hallucinations. Currently on Bactrim for UTI. Labs significant for calcium 11.4      UTI  --ceftriaxone completed in ED  --urine culture NGTD    AMS  --CXR  IMPRESSION: No acute pulmonary disease.  --blood culture negative  --CT Head negative  --likely toxic metabolic encephalopathy per Neurology  -- consulted, Haldol as needed for acute delirium    Hypercalcemia  --calcium 11.3  -- PTH normal  --checking SPEP,  Immunoglobulin to rule out Multiple Myeloma  --JULISSA [place] II/Lambda elevated, may be inflammatory due to infection    Patient may follow with Dr Conti of [facility] after discharge    Thank you for allowing us to participate in Mrs Rich's care    Tammy Mathews NP  Hematology/Oncology  [facility]  [phone] (Office)  [phone] (Alt office)  Evenings and weekends please call MD on call or office

## 2022-05-06 NOTE — PROGRESS NOTE ADULT - SUBJECTIVE AND OBJECTIVE BOX
[facility]   MD KRISTEN MARQUES MD KRISTINE SOLTANPOUR, ALBIN ABRAMS    TEL:  OFFICE: [phone]  From 5pm-7am Answering Service [phone]    -- RENAL FOLLOW UP NOTE ---Date of Service 05-06-22 @ 12:14    Patient is a 87y old  Female who presents with a chief complaint of ams and dysuria (06 May 2022 09:05)      Patient seen and examined at bedside. No chest pain/sob    VITALS:  T(F): 98.2 (05-06-22 @ 06:24), Max: 98.2 (05-06-22 @ 06:24)  HR: 118 (05-06-22 @ 06:24)  BP: 155/80 (05-06-22 @ 06:24)  RR: 18 (05-06-22 @ 06:24)  SpO2: 99% (05-06-22 @ 06:24)  Wt(kg): --        PHYSICAL EXAM:  Constitutional: NAD  Neck: No JVD  Respiratory: CTAB, no wheezes, rales or rhonchi  Cardiovascular: S1, S2, RRR  Gastrointestinal: BS+, soft, NT/ND  Extremities: No peripheral edema    Hospital Medications:   MEDICATIONS  (STANDING):  enoxaparin Injectable 40 milliGRAM(s) SubCutaneous every 24 hours  melatonin 6 milliGRAM(s) Oral at bedtime  potassium phosphate / sodium phosphate Powder (PHOS-NaK) 1 Packet(s) Oral three times a day before meals  sodium chloride 0.9%. 1000 milliLiter(s) (70 mL/Hr) IV Continuous <Continuous>  thiamine Injectable 100 milliGRAM(s) IV Push daily      LABS:  05-06    136  |  100  |  14  ----------------------------<  95  4.4   |  20<L>  |  0.98    Ca    11.2<H>      06 May 2022 06:42  Phos  2.3     05-06  Mg     1.90     05-06    TPro  7.6  /  Alb  3.7  /  TBili  0.4  /  DBili      /  AST  34<H>  /  ALT  21  /  AlkPhos  93  05-06    Creatinine Trend: 0.98 <--, 1.06 <--, 1.24 <--    Albumin, Serum: 3.7 g/dL (05-06 @ 06:42)  Phosphorus Level, Serum: 2.3 mg/dL (05-06 @ 06:42)                              13.1   8.13  )-----------( 476      ( 06 May 2022 06:42 )             39.9     Urine Studies:  Urinalysis - [05-04-22 @ 12:06]      Color Light Yellow / Appearance Clear / SG 1.006 / pH 6.5      Gluc Negative / Ketone Negative  / Bili Negative / Urobili <2 mg/dL       Blood Negative / Protein Negative / Leuk Est Negative / Nitrite Negative      RBC  / WBC  / Hyaline  / Gran  / Sq Epi  / Non Sq Epi  / Bacteria       PTH -- (Ca --)      [05-04-22 @ 16:19]   29  PTH -- (Ca --)      [05-04-22 @ 12:06]   31  Vitamin D (25OH) 76.6      [05-05-22 @ 01:54]  TSH 0.71      [05-04-22 @ 10:43]      Free Light Chains: kappa 4.05, lambda 5.44, ratio = 0.74      [05-05 @ 04:37]    RADIOLOGY & ADDITIONAL STUDIES:

## 2022-05-07 LAB
ALBUMIN SERPL ELPH-MCNC: 3.6 G/DL — SIGNIFICANT CHANGE UP (ref 3.3–5)
ALP SERPL-CCNC: 93 U/L — SIGNIFICANT CHANGE UP (ref 40–120)
ALT FLD-CCNC: 21 U/L — SIGNIFICANT CHANGE UP (ref 4–33)
ANION GAP SERPL CALC-SCNC: 15 MMOL/L — HIGH (ref 7–14)
AST SERPL-CCNC: 33 U/L — HIGH (ref 4–32)
BILIRUB SERPL-MCNC: 0.5 MG/DL — SIGNIFICANT CHANGE UP (ref 0.2–1.2)
BUN SERPL-MCNC: 12 MG/DL — SIGNIFICANT CHANGE UP (ref 7–23)
CALCIUM SERPL-MCNC: 10.7 MG/DL — HIGH (ref 8.4–10.5)
CHLORIDE SERPL-SCNC: 98 MMOL/L — SIGNIFICANT CHANGE UP (ref 98–107)
CO2 SERPL-SCNC: 23 MMOL/L — SIGNIFICANT CHANGE UP (ref 22–31)
CREAT SERPL-MCNC: 0.88 MG/DL — SIGNIFICANT CHANGE UP (ref 0.5–1.3)
EGFR: 64 ML/MIN/1.73M2 — SIGNIFICANT CHANGE UP
GLUCOSE SERPL-MCNC: 75 MG/DL — SIGNIFICANT CHANGE UP (ref 70–99)
HCT VFR BLD CALC: 40.3 % — SIGNIFICANT CHANGE UP (ref 34.5–45)
HGB BLD-MCNC: 13 G/DL — SIGNIFICANT CHANGE UP (ref 11.5–15.5)
MAGNESIUM SERPL-MCNC: 1.9 MG/DL — SIGNIFICANT CHANGE UP (ref 1.6–2.6)
MCHC RBC-ENTMCNC: 29.3 PG — SIGNIFICANT CHANGE UP (ref 27–34)
MCHC RBC-ENTMCNC: 32.3 GM/DL — SIGNIFICANT CHANGE UP (ref 32–36)
MCV RBC AUTO: 91 FL — SIGNIFICANT CHANGE UP (ref 80–100)
NRBC # BLD: 0 /100 WBCS — SIGNIFICANT CHANGE UP
NRBC # FLD: 0 K/UL — SIGNIFICANT CHANGE UP
PHOSPHATE SERPL-MCNC: 3.3 MG/DL — SIGNIFICANT CHANGE UP (ref 2.5–4.5)
PLATELET # BLD AUTO: 448 K/UL — HIGH (ref 150–400)
POTASSIUM SERPL-MCNC: 4.2 MMOL/L — SIGNIFICANT CHANGE UP (ref 3.5–5.3)
POTASSIUM SERPL-SCNC: 4.2 MMOL/L — SIGNIFICANT CHANGE UP (ref 3.5–5.3)
PROT SERPL-MCNC: 7.4 G/DL — SIGNIFICANT CHANGE UP (ref 6–8.3)
RBC # BLD: 4.43 M/UL — SIGNIFICANT CHANGE UP (ref 3.8–5.2)
RBC # FLD: 13 % — SIGNIFICANT CHANGE UP (ref 10.3–14.5)
SODIUM SERPL-SCNC: 136 MMOL/L — SIGNIFICANT CHANGE UP (ref 135–145)
WBC # BLD: 9.64 K/UL — SIGNIFICANT CHANGE UP (ref 3.8–10.5)
WBC # FLD AUTO: 9.64 K/UL — SIGNIFICANT CHANGE UP (ref 3.8–10.5)

## 2022-05-07 RX ORDER — SODIUM CHLORIDE 9 MG/ML
1000 INJECTION INTRAMUSCULAR; INTRAVENOUS; SUBCUTANEOUS
Refills: 0 | Status: DISCONTINUED | OUTPATIENT
Start: 2022-05-07 | End: 2022-05-09

## 2022-05-07 RX ADMIN — Medication 100 MILLIGRAM(S): at 11:18

## 2022-05-07 RX ADMIN — ENOXAPARIN SODIUM 40 MILLIGRAM(S): 100 INJECTION SUBCUTANEOUS at 17:21

## 2022-05-07 RX ADMIN — Medication 51 MILLIGRAM(S): at 17:17

## 2022-05-07 RX ADMIN — Medication 1 PACKET(S): at 11:17

## 2022-05-07 RX ADMIN — SODIUM CHLORIDE 75 MILLILITER(S): 9 INJECTION INTRAMUSCULAR; INTRAVENOUS; SUBCUTANEOUS at 13:37

## 2022-05-07 NOTE — PROGRESS NOTE ADULT - SUBJECTIVE AND OBJECTIVE BOX
Patient is a 87y old  Female who presents with a chief complaint of ams and dysuria (07 May 2022 14:17)    Date of servie : 05-07-22 @ 15:10  INTERVAL HPI/OVERNIGHT EVENTS:  T(C): 37.2 (05-07-22 @ 11:17), Max: 37.2 (05-07-22 @ 11:17)  HR: 110 (05-07-22 @ 11:17) (110 - 120)  BP: 137/74 (05-07-22 @ 11:17) (137/74 - 150/82)  RR: 18 (05-07-22 @ 11:17) (18 - 18)  SpO2: 98% (05-07-22 @ 11:17) (98% - 99%)  Wt(kg): --  I&O's Summary    06 May 2022 07:01  -  07 May 2022 07:00  --------------------------------------------------------  IN: 200 mL / OUT: 350 mL / NET: -150 mL        LABS:                        13.0   9.64  )-----------( 448      ( 07 May 2022 08:43 )             40.3     05-07    136  |  98  |  12  ----------------------------<  75  4.2   |  23  |  0.88    Ca    10.7<H>      07 May 2022 08:43  Phos  3.3     05-07  Mg     1.90     05-07    TPro  7.4  /  Alb  3.6  /  TBili  0.5  /  DBili  x   /  AST  33<H>  /  ALT  21  /  AlkPhos  93  05-07        CAPILLARY BLOOD GLUCOSE                MEDICATIONS  (STANDING):  enoxaparin Injectable 40 milliGRAM(s) SubCutaneous every 24 hours  melatonin 6 milliGRAM(s) Oral at bedtime  sodium chloride 0.9%. 1000 milliLiter(s) (70 mL/Hr) IV Continuous <Continuous>  sodium chloride 0.9%. 1000 milliLiter(s) (75 mL/Hr) IV Continuous <Continuous>  thiamine Injectable 100 milliGRAM(s) IV Push daily  valproate sodium IVPB 100 milliGRAM(s) IV Intermittent <User Schedule>    MEDICATIONS  (PRN):  acetaminophen     Tablet .. 650 milliGRAM(s) Oral every 6 hours PRN Temp greater or equal to 38C (100.4F), Mild Pain (1 - 3), Moderate Pain (4 - 6)  haloperidol     Tablet 0.5 milliGRAM(s) Oral every 6 hours PRN agitation          PHYSICAL EXAM:  GENERAL: frail  CHEST/LUNG: Clear to percussion bilaterally; No rales, rhonchi, wheezing, or rubs  HEART: Regular rate and rhythm; No murmurs, rubs, or gallops  ABDOMEN: Soft, Nontender, Nondistended; Bowel sounds present  EXTREMITIES: edema +    Care Discussed with Consultants/Other Providers [x ] YES  [ ] NO

## 2022-05-07 NOTE — PROGRESS NOTE ADULT - ASSESSMENT
88 yo F non smoker with PMHX of HTN, HLD, GERD, and recently dx depression, brought in by family for confusion. nephrology consulted for electrolyte abnormities    Hypercalcemia  per patient takes MVI and vit d supplement  hold supplements   PTH 31, ionized calcium high normal  elevated vit d possible sec to vit d toxicity   K/L 0.74  pending PTHrp spep, SIF  continue gentle hydration  Monitor Ca level    Hyponatremia  low SG in urine  normal TSH  likely polydipsia  on mirtazepine for depression  improved   monitor     HTN  acceptable  not on meds  monitor    Hypophosphatemia   Supplemented as needed

## 2022-05-07 NOTE — PROGRESS NOTE ADULT - SUBJECTIVE AND OBJECTIVE BOX
Date of service: 05/07/22    HISTORY OF PRESENT ILLNESS:   Ms Rich is a pleasant 86yo woman sent in by family for confusion and hallucinations.     S: no chest pain or sob; ros limited but otherwise negative.     Review of Systems:   Constitutional: [ ] fevers, [ ] chills.   Skin: [ ] dry skin. [ ] rashes.  Psychiatric: [ ] depression, [ ] anxiety.   Gastrointestinal: [ ] BRBPR, [ ] melena.   Neurological: [ ] confusion. [ ] seizures. [ ] shuffling gait.   Ears,Nose,Mouth and Throat: [ ] ear pain [ ] sore throat.   Eyes: [ ] diplopia.   Respiratory: [ ] hemoptysis. [ ] shortness of breath  Cardiovascular: See HPI above  Hematologic/Lymphatic: [ ] anemia. [ ] painful nodes. [ ] prolonged bleeding.   Genitourinary: [ ] hematuria. [ ] flank pain.   Endocrine: [ ] significant change in weight. [ ] intolerance to heat and cold.     Review of systems [ x] otherwise negative, [ ] otherwise unable to obtain    FH: no family history of sudden cardiac death in first degree relatives    SH: [ ] tobacco, [ ] alcohol, [ ] drugs    acetaminophen     Tablet .. 650 milliGRAM(s) Oral every 6 hours PRN  enoxaparin Injectable 40 milliGRAM(s) SubCutaneous every 24 hours  haloperidol     Tablet 0.5 milliGRAM(s) Oral every 6 hours PRN  melatonin 6 milliGRAM(s) Oral at bedtime  sodium chloride 0.9%. 1000 milliLiter(s) IV Continuous <Continuous>  sodium chloride 0.9%. 1000 milliLiter(s) IV Continuous <Continuous>  thiamine Injectable 100 milliGRAM(s) IV Push daily  valproate sodium IVPB 100 milliGRAM(s) IV Intermittent <User Schedule>                            13.0   9.64  )-----------( 448      ( 07 May 2022 08:43 )             40.3       05-07    136  |  98  |  12  ----------------------------<  75  4.2   |  23  |  0.88    Ca    10.7<H>      07 May 2022 08:43  Phos  3.3     05-07  Mg     1.90     05-07    TPro  7.4  /  Alb  3.6  /  TBili  0.5  /  DBili  x   /  AST  33<H>  /  ALT  21  /  AlkPhos  93  05-07    T(C): 37.2 (05-07-22 @ 11:17), Max: 37.2 (05-07-22 @ 11:17)  HR: 110 (05-07-22 @ 11:17) (110 - 120)  BP: 137/74 (05-07-22 @ 11:17) (137/74 - 150/82)  RR: 18 (05-07-22 @ 11:17) (18 - 18)  SpO2: 98% (05-07-22 @ 11:17) (98% - 99%)      HEENT:   Normal oral mucosa, PERRL, EOMI	  Lymphatic: No lymphadenopathy , no edema  Cardiovascular: Normal S1 S2, No JVD, No murmurs , Peripheral pulses palpable 2+ bilaterally  Respiratory: Lungs clear to auscultation, normal effort 	  Gastrointestinal:  Soft, Non-tender, + BS	  Skin: No rashes, No ecchymoses, No cyanosis, warm to touch    LABS:	 	                        12.6   8.13  )-----------( 444      ( 04 May 2022 10:43 )             38.0     05-04    130<L>  |  95<L>  |  18  ----------------------------<  86  4.7   |  22  |  1.24    Ca    11.4<H>      04 May 2022 10:43    TPro  7.7  /  Alb  3.7  /  TBili  0.4  /  DBili  x   /  AST  43<H>  /  ALT  23  /  AlkPhos  104  05-04  TSH: Thyroid Stimulating Hormone, Serum: 0.71 uIU/mL (05-04 @ 10:43)    DATA    ECG: NSR, shortening/narrowing of T wave.  	  ASSESSMENT/PLAN: 	87y Female with acute metabolic encephalopathy.  Previous UTI treatment. Hypercalcemic.    -monitor tele - sr with no events today thus far  -tte with normal LV function   -treatment of hypercalcemia per primary team   -no further inpatient cardiac workup expected as long as tele remains stable

## 2022-05-07 NOTE — PROGRESS NOTE ADULT - SUBJECTIVE AND OBJECTIVE BOX
[facility]   MD KRISTEN MARQUES MD RUORU WONG, PA    TEL:  FROM 9 AM to 5 PM ---OFFICE: [phone]    FROM 5 PM - 9 AM PLEASE CALL ANSWERING SERVICE: [phone]    RENAL FOLLOW UP NOTE--Date of Service 05-07-22 @ 12:53  --------------------------------------------------------------------------------  HPI:      Pt seen and examined at bedside.       PAST HISTORY  --------------------------------------------------------------------------------  No significant changes to PMH, PSH, FHx, SHx, unless otherwise noted    ALLERGIES & MEDICATIONS  --------------------------------------------------------------------------------  Allergies    penicillin (Unknown)    Intolerances      Standing Inpatient Medications  enoxaparin Injectable 40 milliGRAM(s) SubCutaneous every 24 hours  melatonin 6 milliGRAM(s) Oral at bedtime  sodium chloride 0.9%. 1000 milliLiter(s) IV Continuous <Continuous>  thiamine Injectable 100 milliGRAM(s) IV Push daily  valproate sodium IVPB 100 milliGRAM(s) IV Intermittent <User Schedule>    PRN Inpatient Medications  acetaminophen     Tablet .. 650 milliGRAM(s) Oral every 6 hours PRN  haloperidol     Tablet 0.5 milliGRAM(s) Oral every 6 hours PRN      REVIEW OF SYSTEMS  --------------------------------------------------------------------------------  General: no fever  MSK: no edema     VITALS/PHYSICAL EXAM  --------------------------------------------------------------------------------  T(C): 37.2 (05-07-22 @ 11:17), Max: 37.2 (05-07-22 @ 11:17)  HR: 110 (05-07-22 @ 11:17) (110 - 120)  BP: 137/74 (05-07-22 @ 11:17) (137/74 - 150/82)  RR: 18 (05-07-22 @ 11:17) (18 - 18)  SpO2: 98% (05-07-22 @ 11:17) (98% - 99%)  Wt(kg): --        05-06-22 @ 07:01  -  05-07-22 @ 07:00  --------------------------------------------------------  IN: 200 mL / OUT: 350 mL / NET: -150 mL      Physical Exam:  	Gen: NAD  	HEENT: MMM  	Pulm: CTA B/L  	CV: S1S2  	Abd: Soft, +BS  	Ext: No LE edema B/L                      Neuro: Awake   	Skin: Warm and Dry   	Vascular access: NO HD catheter            no  miri  LABS/STUDIES  --------------------------------------------------------------------------------              13.0   9.64  >-----------<  448      [05-07-22 @ 08:43]              40.3     136  |  98  |  12  ----------------------------<  75      [05-07-22 @ 08:43]  4.2   |  23  |  0.88        Ca     10.7     [05-07-22 @ 08:43]      Mg     1.90     [05-07-22 @ 08:43]      Phos  3.3     [05-07-22 @ 08:43]    TPro  7.4  /  Alb  3.6  /  TBili  0.5  /  DBili  x   /  AST  33  /  ALT  21  /  AlkPhos  93  [05-07-22 @ 08:43]          Creatinine Trend:  SCr 0.88 [05-07 @ 08:43]  SCr 0.98 [05-06 @ 06:42]  SCr 1.06 [05-05 @ 06:48]  SCr 1.24 [05-04 @ 10:43]    Urinalysis - [05-04-22 @ 12:06]      Color Light Yellow / Appearance Clear / SG 1.006 / pH 6.5      Gluc Negative / Ketone Negative  / Bili Negative / Urobili <2 mg/dL       Blood Negative / Protein Negative / Leuk Est Negative / Nitrite Negative      RBC  / WBC  / Hyaline  / Gran  / Sq Epi  / Non Sq Epi  / Bacteria       PTH -- (Ca --)      [05-04-22 @ 16:19]   29  PTH -- (Ca --)      [05-04-22 @ 12:06]   31  Vitamin D (25OH) 76.6      [05-05-22 @ 01:54]  TSH 0.71      [05-04-22 @ 10:43]      Free Light Chains: kappa 4.05, lambda 5.44, ratio = 0.74      [05-05 @ 04:37]

## 2022-05-08 LAB
ALBUMIN SERPL ELPH-MCNC: 3.5 G/DL — SIGNIFICANT CHANGE UP (ref 3.3–5)
ALP SERPL-CCNC: 84 U/L — SIGNIFICANT CHANGE UP (ref 40–120)
ALT FLD-CCNC: 20 U/L — SIGNIFICANT CHANGE UP (ref 4–33)
ANION GAP SERPL CALC-SCNC: 11 MMOL/L — SIGNIFICANT CHANGE UP (ref 7–14)
AST SERPL-CCNC: 24 U/L — SIGNIFICANT CHANGE UP (ref 4–32)
BILIRUB SERPL-MCNC: 0.4 MG/DL — SIGNIFICANT CHANGE UP (ref 0.2–1.2)
BUN SERPL-MCNC: 10 MG/DL — SIGNIFICANT CHANGE UP (ref 7–23)
CALCIUM SERPL-MCNC: 10.3 MG/DL — SIGNIFICANT CHANGE UP (ref 8.4–10.5)
CHLORIDE SERPL-SCNC: 100 MMOL/L — SIGNIFICANT CHANGE UP (ref 98–107)
CO2 SERPL-SCNC: 22 MMOL/L — SIGNIFICANT CHANGE UP (ref 22–31)
CREAT SERPL-MCNC: 0.82 MG/DL — SIGNIFICANT CHANGE UP (ref 0.5–1.3)
EGFR: 70 ML/MIN/1.73M2 — SIGNIFICANT CHANGE UP
GLUCOSE SERPL-MCNC: 103 MG/DL — HIGH (ref 70–99)
HCT VFR BLD CALC: 38.8 % — SIGNIFICANT CHANGE UP (ref 34.5–45)
HGB BLD-MCNC: 12.6 G/DL — SIGNIFICANT CHANGE UP (ref 11.5–15.5)
MAGNESIUM SERPL-MCNC: 1.6 MG/DL — SIGNIFICANT CHANGE UP (ref 1.6–2.6)
MCHC RBC-ENTMCNC: 29.2 PG — SIGNIFICANT CHANGE UP (ref 27–34)
MCHC RBC-ENTMCNC: 32.5 GM/DL — SIGNIFICANT CHANGE UP (ref 32–36)
MCV RBC AUTO: 89.8 FL — SIGNIFICANT CHANGE UP (ref 80–100)
NRBC # BLD: 0 /100 WBCS — SIGNIFICANT CHANGE UP
NRBC # FLD: 0 K/UL — SIGNIFICANT CHANGE UP
PHOSPHATE SERPL-MCNC: 2.3 MG/DL — LOW (ref 2.5–4.5)
PLATELET # BLD AUTO: 431 K/UL — HIGH (ref 150–400)
POTASSIUM SERPL-MCNC: 4.2 MMOL/L — SIGNIFICANT CHANGE UP (ref 3.5–5.3)
POTASSIUM SERPL-SCNC: 4.2 MMOL/L — SIGNIFICANT CHANGE UP (ref 3.5–5.3)
PROT SERPL-MCNC: 6.8 G/DL — SIGNIFICANT CHANGE UP (ref 6–8.3)
RBC # BLD: 4.32 M/UL — SIGNIFICANT CHANGE UP (ref 3.8–5.2)
RBC # FLD: 13 % — SIGNIFICANT CHANGE UP (ref 10.3–14.5)
SODIUM SERPL-SCNC: 133 MMOL/L — LOW (ref 135–145)
WBC # BLD: 8.35 K/UL — SIGNIFICANT CHANGE UP (ref 3.8–10.5)
WBC # FLD AUTO: 8.35 K/UL — SIGNIFICANT CHANGE UP (ref 3.8–10.5)

## 2022-05-08 RX ORDER — POTASSIUM PHOSPHATE, MONOBASIC POTASSIUM PHOSPHATE, DIBASIC 236; 224 MG/ML; MG/ML
15 INJECTION, SOLUTION INTRAVENOUS ONCE
Refills: 0 | Status: COMPLETED | OUTPATIENT
Start: 2022-05-08 | End: 2022-05-08

## 2022-05-08 RX ORDER — SODIUM,POTASSIUM PHOSPHATES 278-250MG
1 POWDER IN PACKET (EA) ORAL ONCE
Refills: 0 | Status: COMPLETED | OUTPATIENT
Start: 2022-05-08 | End: 2022-05-08

## 2022-05-08 RX ADMIN — Medication 1 PACKET(S): at 08:00

## 2022-05-08 RX ADMIN — Medication 100 MILLIGRAM(S): at 11:22

## 2022-05-08 RX ADMIN — Medication 51 MILLIGRAM(S): at 18:02

## 2022-05-08 RX ADMIN — ENOXAPARIN SODIUM 40 MILLIGRAM(S): 100 INJECTION SUBCUTANEOUS at 18:02

## 2022-05-08 RX ADMIN — Medication 6 MILLIGRAM(S): at 00:02

## 2022-05-08 RX ADMIN — POTASSIUM PHOSPHATE, MONOBASIC POTASSIUM PHOSPHATE, DIBASIC 62.5 MILLIMOLE(S): 236; 224 INJECTION, SOLUTION INTRAVENOUS at 11:22

## 2022-05-08 NOTE — PROGRESS NOTE ADULT - SUBJECTIVE AND OBJECTIVE BOX
Date of service: 05/08/22    HISTORY OF PRESENT ILLNESS:   Ms Rich is a pleasant 86yo woman sent in by family for confusion and hallucinations.     S: no chest pain or sob; ros limited but otherwise negative.     Review of Systems:   Constitutional: [ ] fevers, [ ] chills.   Skin: [ ] dry skin. [ ] rashes.  Psychiatric: [ ] depression, [ ] anxiety.   Gastrointestinal: [ ] BRBPR, [ ] melena.   Neurological: [ ] confusion. [ ] seizures. [ ] shuffling gait.   Ears,Nose,Mouth and Throat: [ ] ear pain [ ] sore throat.   Eyes: [ ] diplopia.   Respiratory: [ ] hemoptysis. [ ] shortness of breath  Cardiovascular: See HPI above  Hematologic/Lymphatic: [ ] anemia. [ ] painful nodes. [ ] prolonged bleeding.   Genitourinary: [ ] hematuria. [ ] flank pain.   Endocrine: [ ] significant change in weight. [ ] intolerance to heat and cold.     Review of systems [ x] otherwise negative, [ ] otherwise unable to obtain    FH: no family history of sudden cardiac death in first degree relatives    SH: [ ] tobacco, [ ] alcohol, [ ] drugs    acetaminophen     Tablet .. 650 milliGRAM(s) Oral every 6 hours PRN  enoxaparin Injectable 40 milliGRAM(s) SubCutaneous every 24 hours  haloperidol     Tablet 0.5 milliGRAM(s) Oral every 6 hours PRN  melatonin 6 milliGRAM(s) Oral at bedtime  sodium chloride 0.9%. 1000 milliLiter(s) IV Continuous <Continuous>  sodium chloride 0.9%. 1000 milliLiter(s) IV Continuous <Continuous>  thiamine Injectable 100 milliGRAM(s) IV Push daily  valproate sodium IVPB 100 milliGRAM(s) IV Intermittent <User Schedule>                            13.0   9.64  )-----------( 448      ( 07 May 2022 08:43 )             40.3       05-07    136  |  98  |  12  ----------------------------<  75  4.2   |  23  |  0.88    Ca    10.7<H>      07 May 2022 08:43  Phos  3.3     05-07  Mg     1.90     05-07    TPro  7.4  /  Alb  3.6  /  TBili  0.5  /  DBili  x   /  AST  33<H>  /  ALT  21  /  AlkPhos  93  05-07    T(C): 37.2 (05-07-22 @ 11:17), Max: 37.2 (05-07-22 @ 11:17)  HR: 110 (05-07-22 @ 11:17) (110 - 120)  BP: 137/74 (05-07-22 @ 11:17) (137/74 - 150/82)  RR: 18 (05-07-22 @ 11:17) (18 - 18)  SpO2: 98% (05-07-22 @ 11:17) (98% - 99%)      HEENT:   Normal oral mucosa, PERRL, EOMI	  Lymphatic: No lymphadenopathy , no edema  Cardiovascular: Normal S1 S2, No JVD, No murmurs , Peripheral pulses palpable 2+ bilaterally  Respiratory: Lungs clear to auscultation, normal effort 	  Gastrointestinal:  Soft, Non-tender, + BS	  Skin: No rashes, No ecchymoses, No cyanosis, warm to touch    LABS:	 	                        12.6   8.13  )-----------( 444      ( 04 May 2022 10:43 )             38.0     05-04    130<L>  |  95<L>  |  18  ----------------------------<  86  4.7   |  22  |  1.24    Ca    11.4<H>      04 May 2022 10:43    TPro  7.7  /  Alb  3.7  /  TBili  0.4  /  DBili  x   /  AST  43<H>  /  ALT  23  /  AlkPhos  104  05-04  TSH: Thyroid Stimulating Hormone, Serum: 0.71 uIU/mL (05-04 @ 10:43)    DATA    ECG: NSR, shortening/narrowing of T wave.  	  ASSESSMENT/PLAN: 	87y Female with acute metabolic encephalopathy.  Previous UTI treatment. Hypercalcemic.    -monitor tele - sr with no events today thus far  -tte with normal LV function   -treatment of hypercalcemia per primary team   -no further inpatient cardiac workup expected as long as tele remains stable

## 2022-05-08 NOTE — PROGRESS NOTE ADULT - SUBJECTIVE AND OBJECTIVE BOX
Patient is a 87y old  Female who presents with a chief complaint of ams and dysuria (08 May 2022 11:00)    Date of servie : 05-08-22 @ 18:12  INTERVAL HPI/OVERNIGHT EVENTS:  T(C): 36.8 (05-08-22 @ 11:10), Max: 37.1 (05-08-22 @ 05:52)  HR: 105 (05-08-22 @ 11:10) (88 - 105)  BP: 132/54 (05-08-22 @ 11:10) (122/66 - 159/69)  RR: 18 (05-08-22 @ 11:10) (18 - 18)  SpO2: 95% (05-08-22 @ 11:10) (95% - 99%)  Wt(kg): --  I&O's Summary      LABS:                        12.6   8.35  )-----------( 431      ( 08 May 2022 06:10 )             38.8     05-08    133<L>  |  100  |  10  ----------------------------<  103<H>  4.2   |  22  |  0.82    Ca    10.3      08 May 2022 06:10  Phos  2.3     05-08  Mg     1.60     05-08    TPro  6.8  /  Alb  3.5  /  TBili  0.4  /  DBili  x   /  AST  24  /  ALT  20  /  AlkPhos  84  05-08        CAPILLARY BLOOD GLUCOSE                MEDICATIONS  (STANDING):  enoxaparin Injectable 40 milliGRAM(s) SubCutaneous every 24 hours  melatonin 6 milliGRAM(s) Oral at bedtime  sodium chloride 0.9%. 1000 milliLiter(s) (70 mL/Hr) IV Continuous <Continuous>  sodium chloride 0.9%. 1000 milliLiter(s) (75 mL/Hr) IV Continuous <Continuous>  valproate sodium IVPB 100 milliGRAM(s) IV Intermittent <User Schedule>    MEDICATIONS  (PRN):  acetaminophen     Tablet .. 650 milliGRAM(s) Oral every 6 hours PRN Temp greater or equal to 38C (100.4F), Mild Pain (1 - 3), Moderate Pain (4 - 6)  haloperidol     Tablet 0.5 milliGRAM(s) Oral every 6 hours PRN agitation          PHYSICAL EXAM:  GENERAL: NAD, well-groomed, well-developed  HEAD:  Atraumatic, Normocephalic  CHEST/LUNG: Clear to percussion bilaterally; No rales, rhonchi, wheezing, or rubs  HEART: Regular rate and rhythm; No murmurs, rubs, or gallops  ABDOMEN: Soft, Nontender, Nondistended; Bowel sounds present  EXTREMITIES:  2+ Peripheral Pulses, No clubbing, cyanosis, or edema  LYMPH: No lymphadenopathy noted  SKIN: No rashes or lesions    Care Discussed with Consultants/Other Providers [ ] YES  [ ] NO

## 2022-05-08 NOTE — PROGRESS NOTE ADULT - SUBJECTIVE AND OBJECTIVE BOX
[facility]      Seen earlier today, and examined.  - Today, patient is without complaints.           *****MEDICATIONS: Current medication reviewed and documented.    MEDICATIONS  (STANDING):  enoxaparin Injectable 40 milliGRAM(s) SubCutaneous every 24 hours  melatonin 6 milliGRAM(s) Oral at bedtime  sodium chloride 0.9%. 1000 milliLiter(s) (70 mL/Hr) IV Continuous <Continuous>  sodium chloride 0.9%. 1000 milliLiter(s) (75 mL/Hr) IV Continuous <Continuous>  valproate sodium IVPB 100 milliGRAM(s) IV Intermittent <User Schedule>    MEDICATIONS  (PRN):  acetaminophen     Tablet .. 650 milliGRAM(s) Oral every 6 hours PRN Temp greater or equal to 38C (100.4F), Mild Pain (1 - 3), Moderate Pain (4 - 6)  haloperidol     Tablet 0.5 milliGRAM(s) Oral every 6 hours PRN agitation          ***** VITAL SIGNS:  T(F): 98.7 (22 @ 00:12), Max: 98.8 (22 @ 05:52)  HR: 94 (22 @ 00:12) (94 - 105)  BP: 149/60 (22 @ 00:12) (132/54 - 159/69)  RR: 18 (22 @ 00:12) (18 - 18)  SpO2: 98% (22 @ 00:12) (95% - 99%)  Wt(kg): --  ,   I&O's Summary           *****PHYSICAL EXAM:   alert oriented x 3 attention comprehension are fair.  Able to name, repeat.   EOmi fundi not visualized   no nystagmus VFF to confrontation  Tongue is midline  Palate elevates symmetrically   Moving all 4 ext spontaneously no drift appreciated    Gait not assessed.            *****LAB AND IMAGIN.6   8.35  )-----------( 431      ( 08 May 2022 06:10 )             38.8                   133<L>  |  100  |  10  ----------------------------<  103<H>  4.2   |  22  |  0.82    Ca    10.3      08 May 2022 06:10  Phos  2.3     05-  Mg     1.60     -    TPro  6.8  /  Alb  3.5  /  TBili  0.4  /  DBili  x   /  AST  24  /  ALT  20  /  AlkPhos  84                           [All pertinent recent Imaging/Reports reviewed]           *****A S S E S S M E N T   A N D   P L A N :  Excerpt from H&P,"  86 yo F non smoker with PMHX of HTN, HLD, GERD, and recently dx depression, here with Daughter at bedside brought in for worsening confusion and restlessness first beginning 2 days ago, intermittently, then today worse with hallucinations ( auditory and visual),and generalized weakness.  family concerned patient was going to wander. Recently dx with UTI  5 days ago has been on bactrim for 5 days ( unclear if patient missed a dose due to developing confusion). Patient A& o x2-3, having episode of confusion normally ambulates  hx obtained from chart   family not available at bedside at the time of my encounter     Problem/Recommendations 1: ams   likely toxic metabolic encephalopathy due to hyponatremia +/- hypercalcemia +/- uti worsening underlying cognitive impairment   Plan : rx infection   avoid daytime somnolence   encourage po intake   melatonin for sleep augmentation   thiamine iv 100 daily   doing well     Problem/Recommendations 2:weakness generalized   deconditioning vs. related to hypercalcemia         Thank you for allowing me to participate in the care of this patient. Will continue to follow patient periodically. Please do not hesitate to call me if you have any  questions or if there has been a change in patients neurological status     ________________  Sandhya Crow MD  [facility] ([facility]  [phone]      33 minutes spent on total encounter; more than 50 % of the visit was  spent counseling about plan of care, compliance to diet/exercise and medication regimen and or  coordinating care by the attending physician.      It is advised that stroke patients follow up with SHEBA Salazar @ [phone] in 1- 2 weeks.   Others please follow up with Dr. Michael Nissenbaum [phone]

## 2022-05-08 NOTE — PROGRESS NOTE ADULT - ASSESSMENT
86 yo F non smoker with PMHX of HTN, HLD, GERD, and recently dx depression, here with Daughter at bedside brought in for worsening confusion and restlessness first beginning 2   days ago, intermittently, then today worse with hallucinations ( auditory and visual),and generalized weakness.  family concerned patient was going to wander. Recently dx with UTI  5 days ago has been on bactrim for 5 days ( unclear if patient missed a dose due to developing confusion). Patient A& o x2-3, having episode of confusion normally ambulates and performs ADLS independently. Denies cp ,sob, abdominal pain, nausea, vomiting, diarrhea, headache.    1 AMS  - likely metabolic encephalopathy due to electrolytes abnormality   - dc antibiotics, uti ruled out   - fu cultures  - ID following  - neuro fu appreciated  - psych consult     2 Hypercalcemia  - unclear etiology  - ivf  - renal following     3 Lovenox for DVT prophylaxis

## 2022-05-08 NOTE — PROGRESS NOTE ADULT - SUBJECTIVE AND OBJECTIVE BOX
[facility]   MD KRISTEN MARQUES MD RUORU WONG, PA    TEL:  FROM 9 AM to 5 PM ---OFFICE: [phone]    FROM 5 PM - 9 AM PLEASE CALL ANSWERING SERVICE: [phone]    RENAL FOLLOW UP NOTE--Date of Service 05-08-22 @ 11:00  --------------------------------------------------------------------------------  HPI:    Pt seen and examined at bedside.     PAST HISTORY  --------------------------------------------------------------------------------  No significant changes to PMH, PSH, FHx, SHx, unless otherwise noted    ALLERGIES & MEDICATIONS  --------------------------------------------------------------------------------  Allergies    penicillin (Unknown)    Intolerances      Standing Inpatient Medications  enoxaparin Injectable 40 milliGRAM(s) SubCutaneous every 24 hours  melatonin 6 milliGRAM(s) Oral at bedtime  potassium phosphate IVPB 15 milliMole(s) IV Intermittent once  sodium chloride 0.9%. 1000 milliLiter(s) IV Continuous <Continuous>  sodium chloride 0.9%. 1000 milliLiter(s) IV Continuous <Continuous>  thiamine Injectable 100 milliGRAM(s) IV Push daily  valproate sodium IVPB 100 milliGRAM(s) IV Intermittent <User Schedule>    PRN Inpatient Medications  acetaminophen     Tablet .. 650 milliGRAM(s) Oral every 6 hours PRN  haloperidol     Tablet 0.5 milliGRAM(s) Oral every 6 hours PRN      REVIEW OF SYSTEMS  --------------------------------------------------------------------------------  General: no fever  MSK: no edema     VITALS/PHYSICAL EXAM  --------------------------------------------------------------------------------  T(C): 37.1 (05-08-22 @ 05:52), Max: 37.2 (05-07-22 @ 11:17)  HR: 102 (05-08-22 @ 05:52) (88 - 110)  BP: 159/69 (05-08-22 @ 05:52) (122/66 - 159/69)  RR: 18 (05-08-22 @ 05:52) (18 - 18)  SpO2: 99% (05-08-22 @ 05:52) (98% - 99%)  Wt(kg): --        Physical Exam:  	Gen: NAD  	HEENT: MMM  	Pulm: CTA B/L  	CV: S1S2  	Abd: Soft, +BS  	Ext: No LE edema B/L                      Neuro: Awake   	Skin: Warm and Dry   	Vascular access: NO HD catheter            no chery  LABS/STUDIES  --------------------------------------------------------------------------------              12.6   8.35  >-----------<  431      [05-08-22 @ 06:10]              38.8     133  |  100  |  10  ----------------------------<  103      [05-08-22 @ 06:10]  4.2   |  22  |  0.82        Ca     10.3     [05-08-22 @ 06:10]      Mg     1.60     [05-08-22 @ 06:10]      Phos  2.3     [05-08-22 @ 06:10]    TPro  6.8  /  Alb  3.5  /  TBili  0.4  /  DBili  x   /  AST  24  /  ALT  20  /  AlkPhos  84  [05-08-22 @ 06:10]          Creatinine Trend:  SCr 0.82 [05-08 @ 06:10]  SCr 0.88 [05-07 @ 08:43]  SCr 0.98 [05-06 @ 06:42]  SCr 1.06 [05-05 @ 06:48]  SCr 1.24 [05-04 @ 10:43]    Urinalysis - [05-04-22 @ 12:06]      Color Light Yellow / Appearance Clear / SG 1.006 / pH 6.5      Gluc Negative / Ketone Negative  / Bili Negative / Urobili <2 mg/dL       Blood Negative / Protein Negative / Leuk Est Negative / Nitrite Negative      RBC  / WBC  / Hyaline  / Gran  / Sq Epi  / Non Sq Epi  / Bacteria       PTH -- (Ca --)      [05-04-22 @ 16:19]   29  PTH -- (Ca --)      [05-04-22 @ 12:06]   31  Vitamin D (25OH) 76.6      [05-05-22 @ 01:54]  TSH 0.71      [05-04-22 @ 10:43]      Free Light Chains: kappa 4.05, lambda 5.44, ratio = 0.74      [05-05 @ 04:37]

## 2022-05-08 NOTE — PROGRESS NOTE ADULT - ASSESSMENT
88 yo F non smoker with PMHX of HTN, HLD, GERD, and recently dx depression, brought in by family for confusion. nephrology consulted for electrolyte abnormities    Hypercalcemia  per patient takes MVI and vit d supplement  hold supplements   PTH 31, ionized calcium high normal  elevated vit d possible sec to vit d toxicity   K/L 0.74  pending PTHrp spep, SIF  Calcium improving  Monitor Ca level    Hyponatremia  likely polydipsia  low SG in urine  normal TSH  on mirtazepine for depression  monitor  serum NA    HTN  BP fluctuating   not on meds  monitor    Hypophosphatemia   Supplement K phos today  Monitor serum Po4

## 2022-05-09 ENCOUNTER — TRANSCRIPTION ENCOUNTER (OUTPATIENT)
Age: 87
End: 2022-05-09

## 2022-05-09 LAB
ALBUMIN SERPL ELPH-MCNC: 3.6 G/DL — SIGNIFICANT CHANGE UP (ref 3.3–5)
ALP SERPL-CCNC: 83 U/L — SIGNIFICANT CHANGE UP (ref 40–120)
ALT FLD-CCNC: 17 U/L — SIGNIFICANT CHANGE UP (ref 4–33)
ANION GAP SERPL CALC-SCNC: 8 MMOL/L — SIGNIFICANT CHANGE UP (ref 7–14)
AST SERPL-CCNC: 23 U/L — SIGNIFICANT CHANGE UP (ref 4–32)
BILIRUB SERPL-MCNC: 0.3 MG/DL — SIGNIFICANT CHANGE UP (ref 0.2–1.2)
BUN SERPL-MCNC: 8 MG/DL — SIGNIFICANT CHANGE UP (ref 7–23)
CALCIUM SERPL-MCNC: 10.3 MG/DL — SIGNIFICANT CHANGE UP (ref 8.4–10.5)
CHLORIDE SERPL-SCNC: 100 MMOL/L — SIGNIFICANT CHANGE UP (ref 98–107)
CO2 SERPL-SCNC: 27 MMOL/L — SIGNIFICANT CHANGE UP (ref 22–31)
CREAT SERPL-MCNC: 0.78 MG/DL — SIGNIFICANT CHANGE UP (ref 0.5–1.3)
CULTURE RESULTS: SIGNIFICANT CHANGE UP
CULTURE RESULTS: SIGNIFICANT CHANGE UP
EGFR: 74 ML/MIN/1.73M2 — SIGNIFICANT CHANGE UP
GLUCOSE SERPL-MCNC: 96 MG/DL — SIGNIFICANT CHANGE UP (ref 70–99)
HCT VFR BLD CALC: 40.5 % — SIGNIFICANT CHANGE UP (ref 34.5–45)
HGB BLD-MCNC: 12.7 G/DL — SIGNIFICANT CHANGE UP (ref 11.5–15.5)
MAGNESIUM SERPL-MCNC: 1.9 MG/DL — SIGNIFICANT CHANGE UP (ref 1.6–2.6)
MCHC RBC-ENTMCNC: 28.7 PG — SIGNIFICANT CHANGE UP (ref 27–34)
MCHC RBC-ENTMCNC: 31.4 GM/DL — LOW (ref 32–36)
MCV RBC AUTO: 91.6 FL — SIGNIFICANT CHANGE UP (ref 80–100)
NRBC # BLD: 0 /100 WBCS — SIGNIFICANT CHANGE UP
NRBC # FLD: 0 K/UL — SIGNIFICANT CHANGE UP
PHOSPHATE SERPL-MCNC: 3 MG/DL — SIGNIFICANT CHANGE UP (ref 2.5–4.5)
PLATELET # BLD AUTO: 440 K/UL — HIGH (ref 150–400)
POTASSIUM SERPL-MCNC: 4.4 MMOL/L — SIGNIFICANT CHANGE UP (ref 3.5–5.3)
POTASSIUM SERPL-SCNC: 4.4 MMOL/L — SIGNIFICANT CHANGE UP (ref 3.5–5.3)
PROT SERPL-MCNC: 7.3 G/DL — SIGNIFICANT CHANGE UP (ref 6–8.3)
RBC # BLD: 4.42 M/UL — SIGNIFICANT CHANGE UP (ref 3.8–5.2)
RBC # FLD: 13.2 % — SIGNIFICANT CHANGE UP (ref 10.3–14.5)
SODIUM SERPL-SCNC: 135 MMOL/L — SIGNIFICANT CHANGE UP (ref 135–145)
SPECIMEN SOURCE: SIGNIFICANT CHANGE UP
SPECIMEN SOURCE: SIGNIFICANT CHANGE UP
WBC # BLD: 6 K/UL — SIGNIFICANT CHANGE UP (ref 3.8–10.5)
WBC # FLD AUTO: 6 K/UL — SIGNIFICANT CHANGE UP (ref 3.8–10.5)

## 2022-05-09 PROCEDURE — 99232 SBSQ HOSP IP/OBS MODERATE 35: CPT

## 2022-05-09 RX ORDER — PANTOPRAZOLE SODIUM 20 MG/1
40 TABLET, DELAYED RELEASE ORAL
Refills: 0 | Status: DISCONTINUED | OUTPATIENT
Start: 2022-05-09 | End: 2022-05-19

## 2022-05-09 RX ORDER — LANOLIN ALCOHOL/MO/W.PET/CERES
2 CREAM (GRAM) TOPICAL
Qty: 0 | Refills: 0 | DISCHARGE
Start: 2022-05-09

## 2022-05-09 RX ORDER — METOPROLOL TARTRATE 50 MG
25 TABLET ORAL DAILY
Refills: 0 | Status: DISCONTINUED | OUTPATIENT
Start: 2022-05-09 | End: 2022-05-19

## 2022-05-09 RX ORDER — LANOLIN ALCOHOL/MO/W.PET/CERES
3 CREAM (GRAM) TOPICAL
Qty: 0 | Refills: 0 | DISCHARGE
Start: 2022-05-09

## 2022-05-09 RX ORDER — FENOFIBRATE,MICRONIZED 130 MG
145 CAPSULE ORAL AT BEDTIME
Refills: 0 | Status: DISCONTINUED | OUTPATIENT
Start: 2022-05-09 | End: 2022-05-19

## 2022-05-09 RX ORDER — THIAMINE MONONITRATE (VIT B1) 100 MG
100 TABLET ORAL DAILY
Refills: 0 | Status: DISCONTINUED | OUTPATIENT
Start: 2022-05-09 | End: 2022-05-19

## 2022-05-09 RX ORDER — THIAMINE MONONITRATE (VIT B1) 100 MG
1 TABLET ORAL
Qty: 0 | Refills: 0 | DISCHARGE
Start: 2022-05-09

## 2022-05-09 RX ORDER — MIRTAZAPINE 45 MG/1
15 TABLET, ORALLY DISINTEGRATING ORAL
Refills: 0 | Status: DISCONTINUED | OUTPATIENT
Start: 2022-05-09 | End: 2022-05-19

## 2022-05-09 RX ORDER — ALLOPURINOL 300 MG
100 TABLET ORAL AT BEDTIME
Refills: 0 | Status: DISCONTINUED | OUTPATIENT
Start: 2022-05-09 | End: 2022-05-19

## 2022-05-09 RX ADMIN — ENOXAPARIN SODIUM 40 MILLIGRAM(S): 100 INJECTION SUBCUTANEOUS at 17:23

## 2022-05-09 RX ADMIN — Medication 145 MILLIGRAM(S): at 22:33

## 2022-05-09 RX ADMIN — Medication 25 MILLIGRAM(S): at 14:21

## 2022-05-09 RX ADMIN — Medication 100 MILLIGRAM(S): at 22:33

## 2022-05-09 RX ADMIN — MIRTAZAPINE 15 MILLIGRAM(S): 45 TABLET, ORALLY DISINTEGRATING ORAL at 22:33

## 2022-05-09 RX ADMIN — Medication 100 MILLIGRAM(S): at 11:33

## 2022-05-09 NOTE — DISCHARGE NOTE PROVIDER - CARE PROVIDERS DIRECT ADDRESSES
,[email or web address],DirectAddress_Unknown,DirectAddress_Unknown,DirectAddress_Unknown

## 2022-05-09 NOTE — DISCHARGE NOTE NURSING/CASE MANAGEMENT/SOCIAL WORK - NSSCNAMETXT_GEN_ALL_CORE
CDPap aide 10 hours/week through [facility] DEBI Weber [phone]. Agency: [facility] [phone] [facility] [phone].  Nurse to visit on the day after discharge.  Other appropriate services to be arranged thereafter.   CDPap aide 10 hours/week through [facility] DEBI Weber [phone]. Agency: [facility] [phone]

## 2022-05-09 NOTE — PROGRESS NOTE ADULT - ASSESSMENT
88 yo F non smoker with PMHX of HTN, HLD, GERD, and recently dx depression, here with Daughter at bedside brought in for worsening confusion and restlessness first beginning 2   days ago, intermittently, then today worse with hallucinations ( auditory and visual),and generalized weakness.  family concerned patient was going to wander. Recently dx with UTI  5 days ago has been on bactrim for 5 days ( unclear if patient missed a dose due to developing confusion). Patient A& o x2-3, having episode of confusion normally ambulates and performs ADLS independently. Denies cp ,sob, abdominal pain, nausea, vomiting, diarrhea, headache.    1 AMS  - likely metabolic encephalopathy due to electrolytes abnormality   - dc antibiotics, uti ruled out   - fu cultures  - ID following  - neuro fu appreciated  - psych consult     2 Hypercalcemia  - unclear etiology  - ivf  - renal following   - check CT abdomen to ro malignancy     3 Lovenox for DVT prophylaxis

## 2022-05-09 NOTE — DISCHARGE NOTE PROVIDER - NSDCCPCAREPLAN_GEN_ALL_CORE_FT
PRINCIPAL DISCHARGE DIAGNOSIS  Diagnosis: Confusion  Assessment and Plan of Treatment: Please continue your medications as prescribed and allow help with daily acitivities of living. Maintain a safe environment and make movements in a careful manner to prevent falls. Ensure that you are eating and drinking adequately and maintaining a healthy sleep cycle.   If you are in need of assistance with medication adjustment you can follow-up with your outpatient provider or refer to the [facility] clinic by calling [phone].      SECONDARY DISCHARGE DIAGNOSES  Diagnosis: Hypercalcemia  Assessment and Plan of Treatment: Please follow-up with oncology as outpatient for complete work-up to rule out multiple myeloma. You were given IV fluids during hospitalization and calcium levels have normalized. You are also suggested to STOP taking supplements with calcium and stop taking vitatmin D as high levels can cause toxicity.   Repeat labs with vitamin D and calcium levels in the coming weeks post hospital discharge.     PRINCIPAL DISCHARGE DIAGNOSIS  Diagnosis: Confusion  Assessment and Plan of Treatment: Please continue your medications as prescribed and allow help with daily acitivities of living. Maintain a safe environment and make movements in a careful manner to prevent falls. Ensure that you are eating and drinking adequately and maintaining a healthy sleep cycle.   If you are in need of assistance with medication adjustment you can follow-up with your outpatient provider or refer to the [facility] Geriatric Psychiatry clinic by calling [phone].      SECONDARY DISCHARGE DIAGNOSES  Diagnosis: Hypercalcemia  Assessment and Plan of Treatment: Please follow-up with oncology as outpatient for complete work-up to rule out multiple myeloma. You were given IV fluids during hospitalization and calcium levels have normalized. You are also suggested to STOP taking supplements with calcium and stop taking vitatmin D as high levels can cause toxicity.   Repeat labs with vitamin D and calcium levels in the coming weeks post hospital discharge.    Diagnosis: Abdominal infection  Assessment and Plan of Treatment: You had a CT scan of your chest, abdomen, and pelvis, which showed evidence of a perforated gallbaldder with surrounding fluid collection and dilation of surrounding ducts. Infectious disease was consulted and you were started on antibiotics. Surgery and gastroenterology were consulted. MRI of abdomen and pelvis showed concern for gangrenous acute cholecystitis with perforation and two small collections. 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 As per surgery, pt can be discharged on antibiotics and get elective surgery (cholecystectomy with possible wedge resection of colon) outpatient. Please follow up with your primary care physician and surgeon after discharge for continued monitoring and management.

## 2022-05-09 NOTE — PROGRESS NOTE ADULT - SUBJECTIVE AND OBJECTIVE BOX
[facility]      Seen earlier today, and examined.  - Today, patient is without complaints.           *****MEDICATIONS: Current medication reviewed and documented.    MEDICATIONS  (STANDING):  allopurinol 100 milliGRAM(s) Oral at bedtime  enoxaparin Injectable 40 milliGRAM(s) SubCutaneous every 24 hours  fenofibrate Tablet 145 milliGRAM(s) Oral at bedtime  metoprolol succinate ER 25 milliGRAM(s) Oral daily  mirtazapine 15 milliGRAM(s) Oral <User Schedule>  pantoprazole    Tablet 40 milliGRAM(s) Oral before breakfast  thiamine 100 milliGRAM(s) Oral daily    MEDICATIONS  (PRN):  acetaminophen     Tablet .. 650 milliGRAM(s) Oral every 6 hours PRN Temp greater or equal to 38C (100.4F), Mild Pain (1 - 3), Moderate Pain (4 - 6)          ***** VITAL SIGNS:  T(F): 98.1 (22 @ 11:45), Max: 98.7 (22 @ 00:12)  HR: 94 (22 @ 14:18) (74 - 98)  BP: 170/75 (22 @ 14:18) (122/68 - 170/75)  RR: 17 (22 @ 11:45) (17 - 18)  SpO2: 99% (22 @ 11:45) (98% - 100%)  Wt(kg): --  ,   I&O's Summary           *****PHYSICAL EXAM:   alert oriented x 3 attention comprehension are fair.  Able to name, repeat.   EOmi fundi not visualized   no nystagmus VFF to confrontation  Tongue is midline  Palate elevates symmetrically   Moving all 4 ext spontaneously no drift appreciated    Gait not assessed.            *****LAB AND IMAGIN.7   6.00  )-----------( 440      ( 09 May 2022 06:39 )             40.5                   135  |  100  |  8   ----------------------------<  96  4.4   |  27  |  0.78    Ca    10.3      09 May 2022 06:39  Phos  3.0       Mg     1.90         TPro  7.3  /  Alb  3.6  /  TBili  0.3  /  DBili  x   /  AST  23  /  ALT  17  /  AlkPhos  83                           [All pertinent recent Imaging/Reports reviewed]           *****A S S E S S M E N T   A N D   P L A N :  Excerpt from H&P,"  86 yo F non smoker with PMHX of HTN, HLD, GERD, and recently dx depression, here with Daughter at bedside brought in for worsening confusion and restlessness first beginning 2 days ago, intermittently, then today worse with hallucinations ( auditory and visual),and generalized weakness.  family concerned patient was going to wander. Recently dx with UTI  5 days ago has been on bactrim for 5 days ( unclear if patient missed a dose due to developing confusion). Patient A& o x2-3, having episode of confusion normally ambulates  hx obtained from chart   family not available at bedside at the time of my encounter     Problem/Recommendations 1: ams   likely toxic metabolic encephalopathy due to hyponatremia +/- hypercalcemia +/- uti worsening underlying cognitive impairment   Plan : rx infection   avoid daytime somnolence   encourage po intake   melatonin for sleep augmentation   thiamine iv 100 daily   doing well    doing well     Problem/Recommendations 2:weakness generalized   deconditioning vs. related to hypercalcemia   oob to chair daily please           Thank you for allowing me to participate in the care of this patient. Will continue to follow patient periodically. Please do not hesitate to call me if you have any  questions or if there has been a change in patients neurological status     ________________  Sandhya Crow MD  [facility] ([facility]  [phone]      33 minutes spent on total encounter; more than 50 % of the visit was  spent counseling about plan of care, compliance to diet/exercise and medication regimen and or  coordinating care by the attending physician.      It is advised that stroke patients follow up with NP Tanya Salazar @ [phone] in 1- 2 weeks.   Others please follow up with Dr. Michael Nissenbaum [phone]

## 2022-05-09 NOTE — DISCHARGE NOTE PROVIDER - NSDCMRMEDTOKEN_GEN_ALL_CORE_FT
Commode:   melatonin 3 mg oral tablet: 2 tab(s) orally once a day (at bedtime)  thiamine 100 mg oral tablet: 1 tab(s) orally once a day   allopurinol 100 mg oral tablet: 100 milligram(s) orally once a day (at bedtime)  Commode:   fenofibrate 160 mg oral tablet: 1 tab(s) orally once a day  losartan 100 mg oral tablet: 1 tab(s) orally once a day  melatonin 3 mg oral tablet: 2 tab(s) orally once a day (at bedtime)  Protonix 40 mg oral delayed release tablet: 1 tab(s) orally once a day  Remeron 15 mg oral tablet: 1 tab(s) orally once a day (at bedtime)  thiamine 100 mg oral tablet: 1 tab(s) orally once a day  Toprol-XL 25 mg oral tablet, extended release: 1 tab(s) orally once a day   allopurinol 100 mg oral tablet: 100 milligram(s) orally once a day (at bedtime)  cefpodoxime 200 mg oral tablet: 1 tab(s) orally 2 times a day   fenofibrate 160 mg oral tablet: 1 tab(s) orally once a day  losartan 50 mg oral tablet: 1 tab(s) orally once a day  melatonin 3 mg oral tablet: 2 tab(s) orally once a day (at bedtime)  metroNIDAZOLE 500 mg oral tablet: 1 tab(s) orally 2 times a day  Protonix 40 mg oral delayed release tablet: 1 tab(s) orally once a day  Remeron 15 mg oral tablet: 1 tab(s) orally once a day (at bedtime)  thiamine 100 mg oral tablet: 1 tab(s) orally once a day  Toprol-XL 25 mg oral tablet, extended release: 1 tab(s) orally once a day

## 2022-05-09 NOTE — PROGRESS NOTE ADULT - ASSESSMENT
Hematology/Oncology consulting on this 87 year old female with history of HTN, HLD and GERD presenting to [facility] ED with confusion and restlessness x 2 days and now with auditory and visual hallucinations. Currently on Bactrim for UTI. Labs significant for calcium 11.4    UTI  --ceftriaxone completed in ED  --urine culture NGTD    AMS  --Disoriented, calm, on Haldol as needed  --CXR  IMPRESSION: No acute pulmonary disease.  --blood culture negative  --CT Head negative  --likely toxic metabolic encephalopathy per Neurology  -- consulted, Haldol as needed for acute delirium    Hypercalcemia  --calcium 10.3  -- PTH normal  -- IgM 37 slightly low, will, repeat in outpatient setting  --JULISSA [place]/Lambda elevated, may be inflammatory due to infection    Patient may follow with Dr Conti of [facility] after discharge    Thank you for allowing us to participate in Mrs Rich's care    Tammy Mathews NP  Hematology/Oncology  [facility]  [phone] (Office)  [phone] (Alt office)  Evenings and weekends please call MD on call or office   Hematology/Oncology consulting on this 87 year old female with history of HTN, HLD and GERD presenting to [facility] ED with confusion and restlessness x 2 days and now with auditory and visual hallucinations. Currently on Bactrim for UTI. Labs significant for calcium 11.4    UTI  --ceftriaxone completed in ED  --urine culture NGTD    AMS  --Disoriented, calm, on Haldol as needed  --CXR  IMPRESSION: No acute pulmonary disease.  --blood culture negative  --CT Head negative  --likely toxic metabolic encephalopathy per Neurology  -- consulted, Haldol as needed for acute delirium    Hypercalcemia  --calcium 10.3  -- PTH normal  -- IgM 37 slightly low, will, repeat in outpatient setting  --JULISSA [place]/Lambda elevated, may be inflammatory due to infection    Patient may follow with Dr Conti of [facility] after discharge    Thank you for allowing us to participate in Mrs Rich's care    Tammy Mathews NP  Hematology/Oncology  [facility]  [phone] (Office)  [phone] (Alt office)  Evenings and weekends please call MD on call or office      Patient seen and examined. Will request CT C/A/P ro malignancy. PTH normal     Andrea Madison D.O  Hematology Oncology   [facility]  [phone] ( Office)   Evenings and weekends please call MD on call or office

## 2022-05-09 NOTE — DISCHARGE NOTE NURSING/CASE MANAGEMENT/SOCIAL WORK - PATIENT PORTAL LINK FT
You can access the FollowMyHealth Patient Portal offered by [facility] by registering at the following website: [email or web address]. By joining [facility]’s FollowMyHealth portal, you will also be able to view your health information using other applications (apps) compatible with our system.

## 2022-05-09 NOTE — DISCHARGE NOTE PROVIDER - PROVIDER TOKENS
PROVIDER:[TOKEN:[[id number]]],PROVIDER:[TOKEN:[[id number]]],PROVIDER:[TOKEN:[[id number]]],PROVIDER:[TOKEN:[[id number]]],PROVIDER:[TOKEN:[[id number]]]

## 2022-05-09 NOTE — BH CONSULTATION LIAISON PROGRESS NOTE - CASE SUMMARY
patient returning to baseline, confusion abating. no si/hi, AOx3 on exam, able to eat without issues observed eating. No indication for psych meds at this time, no psych c/i to discharge.

## 2022-05-09 NOTE — BH CONSULTATION LIAISON PROGRESS NOTE - NSBHCHARTREVIEWVS_PSY_A_CORE FT
Vital Signs Last 24 Hrs  T(C): 36.7 (09 May 2022 11:45), Max: 37.1 (09 May 2022 00:12)  T(F): 98.1 (09 May 2022 11:45), Max: 98.7 (09 May 2022 00:12)  HR: 94 (09 May 2022 14:18) (74 - 98)  BP: 170/75 (09 May 2022 14:18) (122/68 - 170/75)  BP(mean): --  RR: 17 (09 May 2022 11:45) (17 - 18)  SpO2: 99% (09 May 2022 11:45) (98% - 100%)

## 2022-05-09 NOTE — BH CONSULTATION LIAISON PROGRESS NOTE - NSBHFUPINTERVALHXFT_PSY_A_CORE
Patient seen at bedside, AAOx3 on assessment. Notes she slept well yesterday and feels "good." No acute events reported overnight. Denies any SI/HI/AVH and has no plan or intent of self-harm.

## 2022-05-09 NOTE — PROGRESS NOTE ADULT - SUBJECTIVE AND OBJECTIVE BOX
Patient is a 87y old  Female who presents with a chief complaint of ams and dysuria (08 May 2022 21:32)      MEDICATIONS  (STANDING):  enoxaparin Injectable 40 milliGRAM(s) SubCutaneous every 24 hours  melatonin 6 milliGRAM(s) Oral at bedtime  sodium chloride 0.9%. 1000 milliLiter(s) (70 mL/Hr) IV Continuous <Continuous>  sodium chloride 0.9%. 1000 milliLiter(s) (75 mL/Hr) IV Continuous <Continuous>  valproate sodium IVPB 100 milliGRAM(s) IV Intermittent <User Schedule>    MEDICATIONS  (PRN):  acetaminophen     Tablet .. 650 milliGRAM(s) Oral every 6 hours PRN Temp greater or equal to 38C (100.4F), Mild Pain (1 - 3), Moderate Pain (4 - 6)  haloperidol     Tablet 0.5 milliGRAM(s) Oral every 6 hours PRN agitation      ROS  No fever, sweats, chills  No epistaxis, HA, sore throat  No CP, SOB, cough, sputum  No n/v/d, abd pain, melena, hematochezia  No edema  No rash  No anxiety  No back pain, joint pain  No bleeding, bruising  No dysuria, hematuria    Vital Signs Last 24 Hrs  T(C): 36.8 (09 May 2022 06:07), Max: 37.1 (09 May 2022 00:12)  T(F): 98.2 (09 May 2022 06:07), Max: 98.7 (09 May 2022 00:12)  HR: 74 (09 May 2022 06:07) (74 - 105)  BP: 122/68 (09 May 2022 06:07) (122/68 - 149/60)  BP(mean): --  RR: 18 (09 May 2022 06:07) (18 - 18)  SpO2: 100% (09 May 2022 06:07) (95% - 100%)    PE  NAD  Awake, disoriented and calm  Anicteric, MMM  Abd soft, NT, ND  No c/c/e  No rash grossly                            12.7   6.00  )-----------( 440      ( 09 May 2022 06:39 )             40.5       05-09    135  |  100  |  8   ----------------------------<  96  4.4   |  27  |  0.78    Ca    10.3      09 May 2022 06:39  Phos  3.0     05-09  Mg     1.90     05-09    TPro  7.3  /  Alb  3.6  /  TBili  0.3  /  DBili  x   /  AST  23  /  ALT  17  /  AlkPhos  83  05-09

## 2022-05-09 NOTE — BH CONSULTATION LIAISON PROGRESS NOTE - NSBHCHARTREVIEWLAB_PSY_A_CORE FT
12.7   6.00  )-----------( 440      ( 09 May 2022 06:39 )             40.5     05-09    135  |  100  |  8   ----------------------------<  96  4.4   |  27  |  0.78    Ca    10.3      09 May 2022 06:39  Phos  3.0     05-09  Mg     1.90     05-09    TPro  7.3  /  Alb  3.6  /  TBili  0.3  /  DBili  x   /  AST  23  /  ALT  17  /  AlkPhos  83  05-09

## 2022-05-09 NOTE — BH CONSULTATION LIAISON PROGRESS NOTE - NSBHASSESSMENTFT_PSY_ALL_CORE
88 yo F with PPHx of depressive d/o ( per EMR) and PMHX of HTN, HLD, GERD, and presented on 5/4 with confusionof 2 day duration, weakness and hallucinations ( visual and auditory) w/ recent dx of UTI ( tx w/ bactrim for 5 days) for whom Psychiatry was consulted d/t behavioral disturbance and agitation. No acute events overnight, patient AAOx 3 today.  ---    PLAN:  -No standing psychotropic medication at this time.  -Agitation: Haldol 0.5 mg PO q 6 Hrs PRN agitation IF Qtc < 500.  -Frequent day/night orientation recommended  -Please avoid Benzodiazepine/anticholinergic meds which may worsen delirium  -Monitor EKG, ensure QtC < 500 ( 5/5 Qtc: 409)  -No indication for inpatient psychiatric hospitalization.

## 2022-05-09 NOTE — PROGRESS NOTE ADULT - SUBJECTIVE AND OBJECTIVE BOX
Date of service: 05/09/22    HISTORY OF PRESENT ILLNESS:   Ms Rich is a pleasant 88yo woman sent in by family for confusion and hallucinations.     S: no chest pain or sob; ros limited but otherwise negative.     Review of Systems:   Constitutional: [ ] fevers, [ ] chills.   Skin: [ ] dry skin. [ ] rashes.  Psychiatric: [ ] depression, [ ] anxiety.   Gastrointestinal: [ ] BRBPR, [ ] melena.   Neurological: [ ] confusion. [ ] seizures. [ ] shuffling gait.   Ears,Nose,Mouth and Throat: [ ] ear pain [ ] sore throat.   Eyes: [ ] diplopia.   Respiratory: [ ] hemoptysis. [ ] shortness of breath  Cardiovascular: See HPI above  Hematologic/Lymphatic: [ ] anemia. [ ] painful nodes. [ ] prolonged bleeding.   Genitourinary: [ ] hematuria. [ ] flank pain.   Endocrine: [ ] significant change in weight. [ ] intolerance to heat and cold.     Review of systems [ x] otherwise negative, [ ] otherwise unable to obtain    FH: no family history of sudden cardiac death in first degree relatives    SH: [ ] tobacco, [ ] alcohol, [ ] drugs    acetaminophen     Tablet .. 650 milliGRAM(s) Oral every 6 hours PRN  enoxaparin Injectable 40 milliGRAM(s) SubCutaneous every 24 hours  melatonin 6 milliGRAM(s) Oral at bedtime  thiamine 100 milliGRAM(s) Oral daily  valproate sodium IVPB 100 milliGRAM(s) IV Intermittent <User Schedule>                            12.7   6.00  )-----------( 440      ( 09 May 2022 06:39 )             40.5       05-09    135  |  100  |  8   ----------------------------<  96  4.4   |  27  |  0.78    Ca    10.3      09 May 2022 06:39  Phos  3.0     05-09  Mg     1.90     05-09    TPro  7.3  /  Alb  3.6  /  TBili  0.3  /  DBili  x   /  AST  23  /  ALT  17  /  AlkPhos  83  05-09            T(C): 36.8 (05-09-22 @ 06:07), Max: 37.1 (05-09-22 @ 00:12)  HR: 74 (05-09-22 @ 06:07) (74 - 105)  BP: 122/68 (05-09-22 @ 06:07) (122/68 - 149/60)  RR: 18 (05-09-22 @ 06:07) (18 - 18)  SpO2: 100% (05-09-22 @ 06:07) (95% - 100%)  Wt(kg): --    I&O's Summary      HEENT:   Normal oral mucosa, PERRL, EOMI	  Lymphatic: No lymphadenopathy , no edema  Cardiovascular: Normal S1 S2, No JVD, No murmurs , Peripheral pulses palpable 2+ bilaterally  Respiratory: Lungs clear to auscultation, normal effort 	  Gastrointestinal:  Soft, Non-tender, + BS	  Skin: No rashes, No ecchymoses, No cyanosis, warm to touch    LABS:	 	                        12.6   8.13  )-----------( 444      ( 04 May 2022 10:43 )             38.0     05-04    130<L>  |  95<L>  |  18  ----------------------------<  86  4.7   |  22  |  1.24    Ca    11.4<H>      04 May 2022 10:43    TPro  7.7  /  Alb  3.7  /  TBili  0.4  /  DBili  x   /  AST  43<H>  /  ALT  23  /  AlkPhos  104  05-04  TSH: Thyroid Stimulating Hormone, Serum: 0.71 uIU/mL (05-04 @ 10:43)    DATA    ECG: NSR, shortening/narrowing of T wave.  	  ASSESSMENT/PLAN: 	87y Female with acute metabolic encephalopathy.  Previous UTI treatment. Hypercalcemic.    -monitor tele - sr with no events today thus far  -tte with normal LV function   -treatment of hypercalcemia per primary team - calcium levels improved   -no further inpatient cardiac workup expected as long as tele remains stable    Carolyn Pike MD

## 2022-05-09 NOTE — PROGRESS NOTE ADULT - SUBJECTIVE AND OBJECTIVE BOX
Patient is a 87y old  Female who presents with a chief complaint of ams and dysuria (09 May 2022 10:42)    Date of servie : 05-09-22 @ 12:55  INTERVAL HPI/OVERNIGHT EVENTS:  T(C): 36.7 (05-09-22 @ 11:45), Max: 37.1 (05-09-22 @ 00:12)  HR: 98 (05-09-22 @ 11:45) (74 - 98)  BP: 134/62 (05-09-22 @ 11:45) (122/68 - 149/60)  RR: 17 (05-09-22 @ 11:45) (17 - 18)  SpO2: 99% (05-09-22 @ 11:45) (98% - 100%)  Wt(kg): --  I&O's Summary      LABS:                        12.7   6.00  )-----------( 440      ( 09 May 2022 06:39 )             40.5     05-09    135  |  100  |  8   ----------------------------<  96  4.4   |  27  |  0.78    Ca    10.3      09 May 2022 06:39  Phos  3.0     05-09  Mg     1.90     05-09    TPro  7.3  /  Alb  3.6  /  TBili  0.3  /  DBili  x   /  AST  23  /  ALT  17  /  AlkPhos  83  05-09        CAPILLARY BLOOD GLUCOSE                MEDICATIONS  (STANDING):  enoxaparin Injectable 40 milliGRAM(s) SubCutaneous every 24 hours  melatonin 6 milliGRAM(s) Oral at bedtime  thiamine 100 milliGRAM(s) Oral daily  valproate sodium IVPB 100 milliGRAM(s) IV Intermittent <User Schedule>    MEDICATIONS  (PRN):  acetaminophen     Tablet .. 650 milliGRAM(s) Oral every 6 hours PRN Temp greater or equal to 38C (100.4F), Mild Pain (1 - 3), Moderate Pain (4 - 6)          PHYSICAL EXAM:  GENERAL: NAD, well-groomed, well-developed  HEAD:  Atraumatic, Normocephalic  CHEST/LUNG: Clear to percussion bilaterally; No rales, rhonchi, wheezing, or rubs  HEART: Regular rate and rhythm; No murmurs, rubs, or gallops  ABDOMEN: Soft, Nontender, Nondistended; Bowel sounds present  EXTREMITIES:  2+ Peripheral Pulses, No clubbing, cyanosis, or edema  LYMPH: No lymphadenopathy noted  SKIN: No rashes or lesions    Care Discussed with Consultants/Other Providers [x ] YES  [ ] NO

## 2022-05-09 NOTE — DISCHARGE NOTE PROVIDER - HOSPITAL COURSE
87F h/o HTN, HLD, and recently dx depression p/w worsening confusion and restlessness w/ associated hallucinations both auditory and visual per daughter. Recent UTI has been on Bactrim for 5 days. At baseline A&O2-3, ambulates, and performs ADLS independently    Encephalopathy w/ psychosis   - Recent UTI completed short course of Bactrim presenting w/ worsening confusion and restlessness w/ associated hallucinations both auditory and visual per daughter  - Upon admission placed on Ceftriaxone and ID consulted; Urine culture negative   - CXR clear; Blood culture NGTD; CT Head negative  - Neuro consulted suggests likely toxic metabolic encephalopathy due to hyponatremia +/- hypercalcemia +/- UTI w/ worsening underlying cognitive impairment     Hypercalcemia  - Calcium noted 11.4 s/p IVF now normalized; PTH WNL  - Will need further w/u as outpatient w/ oncology at [facility] (SPEP, Immunofixation, Immunoglobulin to rule out Multiple Myeloma)  - Renal consulted and suggested to stop MVI and Vitamin D supplement as patient noted w/ elevated levels possible 2/2 vitamin D toxicity     Hyponatremia likely polydipsia w/ low SG in urine and normal TSH; On mirtazepine for depression    Tachycardia  - Monitored on telemetry noted w/ SR; Stable and HR better controlled  - TTE with normal LV function   - Per cardiology no further inpatient cardiac workup expected inpatient     Dispo - 87F h/o HTN, HLD, and recently dx depression p/w worsening confusion and restlessness w/ associated hallucinations both auditory and visual per daughter. Recent UTI has been on Bactrim for 5 days. At baseline A&O2-3, ambulates, and performs ADLS independently    Encephalopathy w/ psychosis   - Recent UTI completed short course of Bactrim presenting w/ worsening confusion and restlessness w/ associated hallucinations both auditory and visual per daughter  - Upon admission placed on Ceftriaxone and ID consulted; Urine culture negative   - CXR clear; Blood culture NGTD; CT Head negative  - Neuro consulted suggests likely toxic metabolic encephalopathy due to hyponatremia +/- hypercalcemia +/- UTI w/ worsening underlying cognitive impairment     Hypercalcemia  - Calcium noted 11.4 s/p IVF now normalized; PTH WNL  - Will need further w/u as outpatient w/ oncology at [facility] (SPEP, Immunofixation, Immunoglobulin to rule out Multiple Myeloma)  - Renal consulted and suggested to stop MVI and Vitamin D supplement as patient noted w/ elevated levels possible 2/2 vitamin D toxicity   - Per oncology malignancy w/u as inpatient ordered for CT C/A/P PO/IV contrast reveals adnexal mass, perforated GB, urianry bladder thickening, cannot exclude biliary CA    Intra-abdominal infection   - Noted on CT C/A/P evidence of perforated GB w/ surrounding fluid collection and dilation of surrounding ducts; ID on board started CTX/Flagyl and surgery consulted  - MRI A/P w/ IV contrast w/ ongoing concern for gangrenous acute cholecystitis with perforation and associated 2 small collections. GI/Sx on board; Suspect colonoscopy will likely be of low yield and of unnecessary risk given adjacent infection/inflammatory process. GI will discuss with advanced endoscopy +/- possibility of ERCP, As per GI, no need for ERCP   - s/p C-scope 5/18 - fistulization at hepatic flexture, but no mass  - As per surgery, pt can be discharged on PO abx and get elective surgery outpatient     Hyponatremia likely polydipsia w/ low SG in urine and normal TSH; On mirtazepine for depression    Tachycardia  - Monitored on telemetry noted w/ SR; Stable and HR better controlled  - TTE with normal LV function   - Per cardiology no further inpatient cardiac workup expected inpatient     On ___ this case was reviewed with  ____, the patient is medically stable and optimized for discharge. All medications were reviewed and prescriptions were sent to mutually agreed upon pharmacy. 87F h/o HTN, HLD, and recently dx depression p/w worsening confusion and restlessness w/ associated hallucinations both auditory and visual per daughter. Recent UTI has been on Bactrim for 5 days. At baseline A&O2-3, ambulates, and performs ADLS independently    Encephalopathy w/ psychosis   - Recent UTI completed short course of Bactrim presenting w/ worsening confusion and restlessness w/ associated hallucinations both auditory and visual per daughter  - Upon admission placed on Ceftriaxone and ID consulted; Urine culture negative   - CXR clear; Blood culture NGTD; CT Head negative  - Neuro consulted suggests likely toxic metabolic encephalopathy due to hyponatremia +/- hypercalcemia +/- UTI w/ worsening underlying cognitive impairment     Hypercalcemia  - Calcium noted 11.4 s/p IVF now normalized; PTH WNL  - Will need further w/u as outpatient w/ oncology at [facility] (SPEP, Immunofixation, Immunoglobulin to rule out Multiple Myeloma)  - Renal consulted and suggested to stop MVI and Vitamin D supplement as patient noted w/ elevated levels possible 2/2 vitamin D toxicity   - Per oncology malignancy w/u as inpatient ordered for CT C/A/P PO/IV contrast reveals adnexal mass, perforated GB, urianry bladder thickening, cannot exclude biliary CA    Intra-abdominal infection   - Noted on CT C/A/P evidence of perforated GB w/ surrounding fluid collection and dilation of surrounding ducts; ID on board started CTX/Flagyl and surgery consulted  - MRI A/P w/ IV contrast w/ ongoing concern for gangrenous acute cholecystitis with perforation and associated 2 small collections. GI/Sx on board; Suspect colonoscopy will likely be of low yield and of unnecessary risk given adjacent infection/inflammatory process. GI will discuss with advanced endoscopy +/- possibility of ERCP, As per GI, no need for ERCP   - s/p C-scope 5/18 - fistulization at hepatic flexture, but no mass  - As per surgery, pt can be discharged on PO abx and get elective surgery outpatient     Hyponatremia likely polydipsia w/ low SG in urine and normal TSH; On mirtazepine for depression    Tachycardia  - Monitored on telemetry noted w/ SR; Stable and HR better controlled  - TTE with normal LV function   - Per cardiology no further inpatient cardiac workup expected inpatient     On 5/19/2022 this case was reviewed with Dr. Shay, the patient is medically stable and optimized for discharge. All medications were reviewed and prescriptions were sent to [facility] agreed upon pharmacy.

## 2022-05-09 NOTE — DISCHARGE NOTE PROVIDER - CARE PROVIDER_API CALL
Deep Roche)  Surgery  [address] [facility], [place] [address]  Phone: [phone]  Fax: [phone]  Follow Up Time:     Serg Brooks)  Gastroenterology; Internal Medicine  [address] [place] [address]  Phone: [phone]  Fax: [phone]  Follow Up Time:     Delroy Contreras  INTERNAL MEDICINE  [address] [place] [address]  Phone: [phone]  Fax: [phone]  Follow Up Time:     Carolyn Pike)  Cardiovascular Disease; Internal Medicine; Interventional Cardiology  [address] [place] [address]  Phone: [phone]  Fax: [phone]  Follow Up Time:     Bon [place]i)  Internal Medicine  [address] [place] [address]  Phone: [phone]  Fax: [phone]  Follow Up Time:

## 2022-05-09 NOTE — BH CONSULTATION LIAISON PROGRESS NOTE - CURRENT MEDICATION
MEDICATIONS  (STANDING):  allopurinol 100 milliGRAM(s) Oral at bedtime  enoxaparin Injectable 40 milliGRAM(s) SubCutaneous every 24 hours  fenofibrate Tablet 145 milliGRAM(s) Oral at bedtime  metoprolol succinate ER 25 milliGRAM(s) Oral daily  mirtazapine 15 milliGRAM(s) Oral <User Schedule>  pantoprazole    Tablet 40 milliGRAM(s) Oral before breakfast  thiamine 100 milliGRAM(s) Oral daily    MEDICATIONS  (PRN):  acetaminophen     Tablet .. 650 milliGRAM(s) Oral every 6 hours PRN Temp greater or equal to 38C (100.4F), Mild Pain (1 - 3), Moderate Pain (4 - 6)

## 2022-05-09 NOTE — PROGRESS NOTE ADULT - SUBJECTIVE AND OBJECTIVE BOX
[facility]   MD KRISTEN MARQUES MD KRISTINE SOLTANPOUR, ALBIN ABRAMS    TEL:  OFFICE: [phone]  From 5pm-7am Answering Service [phone]    -- RENAL FOLLOW UP NOTE ---Date of Service 05-09-22 @ 13:12    Patient is a 87y old  Female who presents with a chief complaint of ams and dysuria (09 May 2022 12:53)      Patient seen and examined at bedside. No chest pain/sob    VITALS:  T(F): 98.1 (05-09-22 @ 11:45), Max: 98.7 (05-09-22 @ 00:12)  HR: 98 (05-09-22 @ 11:45)  BP: 134/62 (05-09-22 @ 11:45)  RR: 17 (05-09-22 @ 11:45)  SpO2: 99% (05-09-22 @ 11:45)  Wt(kg): --        PHYSICAL EXAM:  Constitutional: NAD  Neck: No JVD  Respiratory: CTAB, no wheezes, rales or rhonchi  Cardiovascular: S1, S2, RRR  Gastrointestinal: BS+, soft, NT/ND  Extremities: No peripheral edema    Hospital Medications:   MEDICATIONS  (STANDING):  enoxaparin Injectable 40 milliGRAM(s) SubCutaneous every 24 hours  melatonin 6 milliGRAM(s) Oral at bedtime  thiamine 100 milliGRAM(s) Oral daily  valproate sodium IVPB 100 milliGRAM(s) IV Intermittent <User Schedule>      LABS:  05-09    135  |  100  |  8   ----------------------------<  96  4.4   |  27  |  0.78    Ca    10.3      09 May 2022 06:39  Phos  3.0     05-09  Mg     1.90     05-09    TPro  7.3  /  Alb  3.6  /  TBili  0.3  /  DBili      /  AST  23  /  ALT  17  /  AlkPhos  83  05-09    Creatinine Trend: 0.78 <--, 0.82 <--, 0.88 <--, 0.98 <--, 1.06 <--, 1.24 <--    Albumin, Serum: 3.6 g/dL (05-09 @ 06:39)  Phosphorus Level, Serum: 3.0 mg/dL (05-09 @ 06:39)                              12.7   6.00  )-----------( 440      ( 09 May 2022 06:39 )             40.5     Urine Studies:  Urinalysis - [05-04-22 @ 12:06]      Color Light Yellow / Appearance Clear / SG 1.006 / pH 6.5      Gluc Negative / Ketone Negative  / Bili Negative / Urobili <2 mg/dL       Blood Negative / Protein Negative / Leuk Est Negative / Nitrite Negative      RBC  / WBC  / Hyaline  / Gran  / Sq Epi  / Non Sq Epi  / Bacteria       PTH -- (Ca --)      [05-04-22 @ 16:19]   29  PTH -- (Ca --)      [05-04-22 @ 12:06]   31  Vitamin D (25OH) 76.6      [05-05-22 @ 01:54]  TSH 0.71      [05-04-22 @ 10:43]      Free Light Chains: kappa 4.05, lambda 5.44, ratio = 0.74      [05-05 @ 04:37]    RADIOLOGY & ADDITIONAL STUDIES:

## 2022-05-09 NOTE — BH CONSULTATION LIAISON PROGRESS NOTE - NSBHCONSULTFOLLOWAFTERCARE_PSY_A_CORE FT
Improved/patient awake, alert, interactive, VSS.
If warranted, can follow up at [facility] [phone]

## 2022-05-09 NOTE — PROGRESS NOTE ADULT - ASSESSMENT
88 yo F non smoker with PMHX of HTN, HLD, GERD, and recently dx depression, brought in by family for confusion. nephrology consulted for electrolyte abnormities    Hypercalcemia  per patient takes MVI and vit d supplement  hold supplements   PTH 31, ionized calcium high normal  elevated vit d possible sec to vit d toxicity   K/L 0.74  pending PTHrp spep, SIF  Calcium improving  Monitor Ca level    Hyponatremia  likely polydipsia  low SG in urine  normal TSH  on mirtazepine for depression  monitor  serum NA  improved    HTN  BP relatively controlled  not on meds  monitor    Hypophosphatemia   Supplement K phos today  Monitor serum Po4 88 yo F non smoker with PMHX of HTN, HLD, GERD, and recently dx depression, brought in by family for confusion. nephrology consulted for electrolyte abnormities    Hypercalcemia  per patient takes MVI and vit d supplement  hold supplements   PTH 31 and 29, ionized calcium high normal  elevated vit d possible sec to vit d toxicity   K/L 0.74  pending PTHrp SPEP, SIF  Calcium improving  Monitor Ca level    Hyponatremia  likely polydipsia  low SG in urine  normal TSH  on mirtazepine for depression  monitor  serum NA  improved    HTN  BP relatively controlled  not on meds  monitor    Hypophosphatemia off and on  Monitor serum Po4  Replete as needed.

## 2022-05-10 LAB
% ALBUMIN: 41 % — SIGNIFICANT CHANGE UP
% ALPHA 1: 5.5 % — SIGNIFICANT CHANGE UP
% ALPHA 2: 8.2 % — SIGNIFICANT CHANGE UP
% BETA: 25.5 % — SIGNIFICANT CHANGE UP
% GAMMA: 19.7 % — SIGNIFICANT CHANGE UP
ALBUMIN SERPL ELPH-MCNC: 2.99 G/DL — LOW (ref 3.3–4.4)
ALBUMIN/GLOB SERPL ELPH: 0.7 RATIO — SIGNIFICANT CHANGE UP
ALPHA1 GLOB SERPL ELPH-MCNC: 0.4 G/DL — HIGH (ref 0.1–0.3)
ALPHA2 GLOB SERPL ELPH-MCNC: 0.6 G/DL — SIGNIFICANT CHANGE UP (ref 0.6–1)
B-GLOBULIN SERPL ELPH-MCNC: 1.86 G/DL — HIGH (ref 0.6–1.1)
GAMMA GLOBULIN: 1.44 G/DL — SIGNIFICANT CHANGE UP (ref 0.7–1.7)
HCT VFR BLD CALC: 38.6 % — SIGNIFICANT CHANGE UP (ref 34.5–45)
HGB BLD-MCNC: 12.5 G/DL — SIGNIFICANT CHANGE UP (ref 11.5–15.5)
INTERPRETATION SERPL IFE-IMP: SIGNIFICANT CHANGE UP
MCHC RBC-ENTMCNC: 28.8 PG — SIGNIFICANT CHANGE UP (ref 27–34)
MCHC RBC-ENTMCNC: 32.4 GM/DL — SIGNIFICANT CHANGE UP (ref 32–36)
MCV RBC AUTO: 88.9 FL — SIGNIFICANT CHANGE UP (ref 80–100)
NRBC # BLD: 0 /100 WBCS — SIGNIFICANT CHANGE UP
NRBC # FLD: 0 K/UL — SIGNIFICANT CHANGE UP
PLATELET # BLD AUTO: 434 K/UL — HIGH (ref 150–400)
PROT PATTERN SERPL ELPH-IMP: SIGNIFICANT CHANGE UP
PROT SERPL-MCNC: 7.3 G/DL — SIGNIFICANT CHANGE UP
RBC # BLD: 4.34 M/UL — SIGNIFICANT CHANGE UP (ref 3.8–5.2)
RBC # FLD: 13.2 % — SIGNIFICANT CHANGE UP (ref 10.3–14.5)
WBC # BLD: 5.91 K/UL — SIGNIFICANT CHANGE UP (ref 3.8–10.5)
WBC # FLD AUTO: 5.91 K/UL — SIGNIFICANT CHANGE UP (ref 3.8–10.5)

## 2022-05-10 PROCEDURE — 74177 CT ABD & PELVIS W/CONTRAST: CPT | Mod: 26

## 2022-05-10 PROCEDURE — 71260 CT THORAX DX C+: CPT | Mod: 26

## 2022-05-10 PROCEDURE — 99223 1ST HOSP IP/OBS HIGH 75: CPT | Mod: GC

## 2022-05-10 RX ORDER — METRONIDAZOLE 500 MG
500 TABLET ORAL
Refills: 0 | Status: DISCONTINUED | OUTPATIENT
Start: 2022-05-10 | End: 2022-05-19

## 2022-05-10 RX ORDER — CEFTRIAXONE 500 MG/1
1000 INJECTION, POWDER, FOR SOLUTION INTRAMUSCULAR; INTRAVENOUS EVERY 24 HOURS
Refills: 0 | Status: DISCONTINUED | OUTPATIENT
Start: 2022-05-10 | End: 2022-05-19

## 2022-05-10 RX ADMIN — PANTOPRAZOLE SODIUM 40 MILLIGRAM(S): 20 TABLET, DELAYED RELEASE ORAL at 05:54

## 2022-05-10 RX ADMIN — Medication 100 MILLIGRAM(S): at 21:01

## 2022-05-10 RX ADMIN — MIRTAZAPINE 15 MILLIGRAM(S): 45 TABLET, ORALLY DISINTEGRATING ORAL at 21:01

## 2022-05-10 RX ADMIN — ENOXAPARIN SODIUM 40 MILLIGRAM(S): 100 INJECTION SUBCUTANEOUS at 16:50

## 2022-05-10 RX ADMIN — Medication 500 MILLIGRAM(S): at 16:49

## 2022-05-10 RX ADMIN — Medication 100 MILLIGRAM(S): at 16:49

## 2022-05-10 RX ADMIN — Medication 25 MILLIGRAM(S): at 05:54

## 2022-05-10 RX ADMIN — Medication 145 MILLIGRAM(S): at 21:01

## 2022-05-10 RX ADMIN — CEFTRIAXONE 100 MILLIGRAM(S): 500 INJECTION, POWDER, FOR SOLUTION INTRAMUSCULAR; INTRAVENOUS at 16:50

## 2022-05-10 NOTE — PROGRESS NOTE ADULT - SUBJECTIVE AND OBJECTIVE BOX
Patient is a 87y old  Female who presents with a chief complaint of ams and dysuria (10 May 2022 13:49)    Date of servie : 05-10-22 @ 14:11  INTERVAL HPI/OVERNIGHT EVENTS:  T(C): 36.7 (05-10-22 @ 12:40), Max: 36.9 (05-09-22 @ 22:40)  HR: 80 (05-10-22 @ 12:40) (79 - 94)  BP: 148/54 (05-10-22 @ 12:40) (145/62 - 170/75)  RR: 18 (05-10-22 @ 12:40) (17 - 18)  SpO2: 95% (05-10-22 @ 12:40) (95% - 97%)  Wt(kg): --  I&O's Summary      LABS:                        12.5   5.91  )-----------( 434      ( 10 May 2022 05:45 )             38.6     05-09    135  |  100  |  8   ----------------------------<  96  4.4   |  27  |  0.78    Ca    10.3      09 May 2022 06:39  Phos  3.0     05-09  Mg     1.90     05-09    TPro  7.3  /  Alb  3.6  /  TBili  0.3  /  DBili  x   /  AST  23  /  ALT  17  /  AlkPhos  83  05-09        CAPILLARY BLOOD GLUCOSE                MEDICATIONS  (STANDING):  allopurinol 100 milliGRAM(s) Oral at bedtime  enoxaparin Injectable 40 milliGRAM(s) SubCutaneous every 24 hours  fenofibrate Tablet 145 milliGRAM(s) Oral at bedtime  metoprolol succinate ER 25 milliGRAM(s) Oral daily  mirtazapine 15 milliGRAM(s) Oral <User Schedule>  pantoprazole    Tablet 40 milliGRAM(s) Oral before breakfast  thiamine 100 milliGRAM(s) Oral daily    MEDICATIONS  (PRN):  acetaminophen     Tablet .. 650 milliGRAM(s) Oral every 6 hours PRN Temp greater or equal to 38C (100.4F), Mild Pain (1 - 3), Moderate Pain (4 - 6)          PHYSICAL EXAM:  GENERAL: NAD, well-groomed, well-developed  HEAD:  Atraumatic, Normocephalic  CHEST/LUNG: Clear to percussion bilaterally; No rales, rhonchi, wheezing, or rubs  HEART: Regular rate and rhythm; No murmurs, rubs, or gallops  ABDOMEN: Soft, Nontender, Nondistended; Bowel sounds present  EXTREMITIES:  2+ Peripheral Pulses, No clubbing, cyanosis, or edema  LYMPH: No lymphadenopathy noted  SKIN: No rashes or lesions    Care Discussed with Consultants/Other Providers [ ] YES  [ ] NO

## 2022-05-10 NOTE — CONSULT NOTE ADULT - SUBJECTIVE AND OBJECTIVE BOX
GENERAL SURGERY CONSULT NOTE  --------------------------------------------------------------------------------------------  HPI:  88 yo F non smoker with PMHX of HTN, HLD, GERD, and recently dx depression, here with Daughter at bedside brought in for worsening confusion and restlessness first beginning 2   days ago, intermittently, then today worse with hallucinations ( auditory and visual),and generalized weakness.  family concerned patient was going to wander. Recently dx with UTI  5 days ago has been on bactrim for 5 days ( unclear if patient missed a dose due to developing confusion). Patient A& o x2-3, having episode of confusion normally ambulates and performs ADLS independently. Denies cp ,sob, abdominal pain, nausea, vomiting, diarrhea, headache. (04 May 2022 17:48)            PAST MEDICAL & SURGICAL HISTORY:  No pertinent past medical history      No significant past surgical history        FAMILY HISTORY:  No pertinent family history in first degree relatives    [] Family history not pertinent as reviewed with the patient and family    SOCIAL HISTORY:  ***    ALLERGIES: penicillin (Unknown)      HOME MEDICATIONS: ***    CURRENT MEDICATIONS  MEDICATIONS (STANDING): allopurinol 100 milliGRAM(s) Oral at bedtime  cefTRIAXone   IVPB 1000 milliGRAM(s) IV Intermittent every 24 hours  enoxaparin Injectable 40 milliGRAM(s) SubCutaneous every 24 hours  fenofibrate Tablet 145 milliGRAM(s) Oral at bedtime  metoprolol succinate ER 25 milliGRAM(s) Oral daily  metroNIDAZOLE    Tablet 500 milliGRAM(s) Oral two times a day  mirtazapine 15 milliGRAM(s) Oral <User Schedule>  pantoprazole    Tablet 40 milliGRAM(s) Oral before breakfast  thiamine 100 milliGRAM(s) Oral daily    MEDICATIONS (PRN):acetaminophen     Tablet .. 650 milliGRAM(s) Oral every 6 hours PRN Temp greater or equal to 38C (100.4F), Mild Pain (1 - 3), Moderate Pain (4 - 6)    --------------------------------------------------------------------------------------------    Vitals:   T(C): 36.7 (05-10-22 @ 12:40), Max: 36.9 (05-09-22 @ 22:40)  HR: 80 (05-10-22 @ 12:40) (79 - 80)  BP: 148/54 (05-10-22 @ 12:40) (145/62 - 157/69)  RR: 18 (05-10-22 @ 12:40) (17 - 18)  SpO2: 95% (05-10-22 @ 12:40) (95% - 97%)  CAPILLARY BLOOD GLUCOSE          Height (cm): 157.5 (05-05 @ 03:34)  Weight (kg): 58 (05-05 @ 03:34)  BMI (kg/m2): 23.4 (05-05 @ 03:34)  BSA (m2): 1.58 (05-05 @ 03:34)      PHYSICAL EXAM: ***  General: NAD, Lying in bed comfortably  Neuro: Alert and answering questions appropriately   HEENT: Grossly normal, EOMI  Cardio: No peripherial edema  Resp: Good effort, no signs of respiratory distress  GI/Abd: Soft, NT/ND, no rebound/guarding, no masses palpated  Vascular: All 4 extremities warm  Musculoskeletal: All 4 extremities moving spontaneously, no limitations  --------------------------------------------------------------------------------------------    LABS  CBC (05-10 @ 05:45)                              12.5                           5.91    )----------------(  434<H>     --    % Neutrophils, --    % Lymphocytes, ANC: --                                  38.6                  --------------------------------------------------------------------------------------------    MICROBIOLOGY    -> .Blood Blood-Peripheral Culture (05-04 @ 11:00)     NG    NG    No Growth Final    -> .Blood Blood-Peripheral Culture (05-04 @ 10:30)     NG    NG    No Growth Final      --------------------------------------------------------------------------------------------    IMAGING  ***    --------------------------------------------------------------------------------------------    ASSESSMENT: Patient is a 87yf pmhx ***    PLAN:  ***  -   -   -   -   - Patient seen/examined with attending.  - Plan to be discussed with Attending,   GENERAL SURGERY CONSULT NOTE  --------------------------------------------------------------------------------------------  HPI:  88 yo F non smoker with PMHX of HTN, HLD, GERD, and recently dx depression, ventral hernia repair 20+ years ago here with Daughter at bedside brought in for worsening confusion and restlessness first beginning 2   days ago, intermittently, then today worse with hallucinations ( auditory and visual),and generalized weakness.  family concerned patient was going to wander. Recently dx with UTI  5 days ago has been on bactrim for 5 days ( unclear if patient missed a dose due to developing confusion). Patient A& o x2-3, having episode of confusion normally ambulates and performs ADLS independently. Denies cp ,sob, abdominal pain, nausea, vomiting, diarrhea, headache.    Patient was found to have electrolyte abnormalities and received CT scan for hypercalcemia and was found to have abnormal gallbladder finding.     PAST MEDICAL & SURGICAL HISTORY:  No pertinent past medical history    No significant past surgical history    FAMILY HISTORY:  No pertinent family history in first degree relatives    SOCIAL HISTORY:     ALLERGIES: penicillin (Unknown)      HOME MEDICATIONS: ***    CURRENT MEDICATIONS  MEDICATIONS (STANDING): allopurinol 100 milliGRAM(s) Oral at bedtime  cefTRIAXone   IVPB 1000 milliGRAM(s) IV Intermittent every 24 hours  enoxaparin Injectable 40 milliGRAM(s) SubCutaneous every 24 hours  fenofibrate Tablet 145 milliGRAM(s) Oral at bedtime  metoprolol succinate ER 25 milliGRAM(s) Oral daily  metroNIDAZOLE    Tablet 500 milliGRAM(s) Oral two times a day  mirtazapine 15 milliGRAM(s) Oral <User Schedule>  pantoprazole    Tablet 40 milliGRAM(s) Oral before breakfast  thiamine 100 milliGRAM(s) Oral daily    MEDICATIONS (PRN):acetaminophen     Tablet .. 650 milliGRAM(s) Oral every 6 hours PRN Temp greater or equal to 38C (100.4F), Mild Pain (1 - 3), Moderate Pain (4 - 6)    --------------------------------------------------------------------------------------------    Vitals:   T(C): 36.7 (05-10-22 @ 12:40), Max: 36.9 (05-09-22 @ 22:40)  HR: 80 (05-10-22 @ 12:40) (79 - 80)  BP: 148/54 (05-10-22 @ 12:40) (145/62 - 157/69)  RR: 18 (05-10-22 @ 12:40) (17 - 18)  SpO2: 95% (05-10-22 @ 12:40) (95% - 97%)  CAPILLARY BLOOD GLUCOSE          Height (cm): 157.5 (05-05 @ 03:34)  Weight (kg): 58 (05-05 @ 03:34)  BMI (kg/m2): 23.4 (05-05 @ 03:34)  BSA (m2): 1.58 (05-05 @ 03:34)      PHYSICAL EXAM: ***  General: NAD, Lying in bed comfortably  Neuro: Alert and answering questions appropriately   HEENT: Grossly normal, EOMI  Cardio: No peripherial edema  Resp: Good effort, no signs of respiratory distress  GI/Abd: Soft, NT/ND, no rebound/guarding, no masses palpated  Vascular: All 4 extremities warm  Musculoskeletal: All 4 extremities moving spontaneously, no limitations  --------------------------------------------------------------------------------------------    LABS  CBC (05-10 @ 05:45)                              12.5                           5.91    )----------------(  434<H>     --    % Neutrophils, --    % Lymphocytes, ANC: --                                  38.6                  --------------------------------------------------------------------------------------------    MICROBIOLOGY    -> .Blood Blood-Peripheral Culture (05-04 @ 11:00)     NG    NG    No Growth Final    -> .Blood Blood-Peripheral Culture (05-04 @ 10:30)     NG    NG    No Growth Final      --------------------------------------------------------------------------------------------    IMAGING  ***    --------------------------------------------------------------------------------------------    ASSESSMENT: Patient is a 87yf pmhx ***    PLAN:  ***  -   -   -   -   - Patient seen/examined with attending.  - Plan to be discussed with Attending,   GENERAL SURGERY CONSULT NOTE  --------------------------------------------------------------------------------------------  HPI:  86 yo F non smoker with PMHX of HTN, HLD, GERD, and recently dx depression, ventral hernia repair 20+ years ago here with Daughter at bedside brought in for worsening confusion and restlessness first beginning 2   days ago, intermittently, then today worse with hallucinations ( auditory and visual),and generalized weakness.  family concerned patient was going to wander. Recently dx with UTI  5 days ago has been on bactrim for 5 days ( unclear if patient missed a dose due to developing confusion). Patient A& o x2-3, having episode of confusion normally ambulates and performs ADLS independently. Denies cp ,sob, abdominal pain, nausea, vomiting, diarrhea, headache.    Patient was found to have electrolyte abnormalities and received CT scan for hypercalcemia and was found to have abnormal gallbladder finding.     PAST MEDICAL & SURGICAL HISTORY:  No pertinent past medical history    No significant past surgical history    FAMILY HISTORY:  No pertinent family history in first degree relatives    SOCIAL HISTORY:     ALLERGIES: penicillin (Unknown)      HOME MEDICATIONS: ***    CURRENT MEDICATIONS  MEDICATIONS (STANDING): allopurinol 100 milliGRAM(s) Oral at bedtime  cefTRIAXone   IVPB 1000 milliGRAM(s) IV Intermittent every 24 hours  enoxaparin Injectable 40 milliGRAM(s) SubCutaneous every 24 hours  fenofibrate Tablet 145 milliGRAM(s) Oral at bedtime  metoprolol succinate ER 25 milliGRAM(s) Oral daily  metroNIDAZOLE    Tablet 500 milliGRAM(s) Oral two times a day  mirtazapine 15 milliGRAM(s) Oral <User Schedule>  pantoprazole    Tablet 40 milliGRAM(s) Oral before breakfast  thiamine 100 milliGRAM(s) Oral daily    MEDICATIONS (PRN):acetaminophen     Tablet .. 650 milliGRAM(s) Oral every 6 hours PRN Temp greater or equal to 38C (100.4F), Mild Pain (1 - 3), Moderate Pain (4 - 6)    --------------------------------------------------------------------------------------------    Vitals:   T(C): 36.7 (05-10-22 @ 12:40), Max: 36.9 (05-09-22 @ 22:40)  HR: 80 (05-10-22 @ 12:40) (79 - 80)  BP: 148/54 (05-10-22 @ 12:40) (145/62 - 157/69)  RR: 18 (05-10-22 @ 12:40) (17 - 18)  SpO2: 95% (05-10-22 @ 12:40) (95% - 97%)  CAPILLARY BLOOD GLUCOSE          Height (cm): 157.5 (05-05 @ 03:34)  Weight (kg): 58 (05-05 @ 03:34)  BMI (kg/m2): 23.4 (05-05 @ 03:34)  BSA (m2): 1.58 (05-05 @ 03:34)      PHYSICAL EXAM:   General: NAD, Lying in bed comfortably  Neuro: Alert and answering questions appropriately   HEENT: Grossly normal, EOMI  Cardio: No peripherial edema  Resp: Good effort, no signs of respiratory distress  GI/Abd: Soft, NT/ND, no rebound/guarding, no masses palpated  Vascular: All 4 extremities warm  Musculoskeletal: All 4 extremities moving spontaneously, no limitations  --------------------------------------------------------------------------------------------    LABS  CBC (05-10 @ 05:45)                              12.5                           5.91    )----------------(  434<H>     --    % Neutrophils, --    % Lymphocytes, ANC: --                                  38.6                  --------------------------------------------------------------------------------------------    MICROBIOLOGY    -> .Blood Blood-Peripheral Culture (05-04 @ 11:00)     NG    NG    No Growth Final    -> .Blood Blood-Peripheral Culture (05-04 @ 10:30)     NG    NG    No Growth Final      --------------------------------------------------------------------------------------------    IMAGING  < from: CT Chest w/ IV Cont (05.10.22 @ 11:42) >  IMPRESSION:  1.  Distended gallbladder with pericholecystic inflammation and diffuse   irregular wall thickening that is most pronounced by the gallbladder   neck. Contained perforation of the gallbladder with adjacent small   collection of fluid and air that appears to communicatewith hepatic   flexure, raising suspicion for fistula. Findings are concerning for acute   gangrenous cholecystitis, though a neoplastic etiology with superimposed   infection is also considered.  2.  Mild biliary duct dilation. Mild wall thickening and hyperenhancement   of the common bile duct and cystic duct, may reflect cholangitis.  3.  Mild wall thickening of the urinary bladder, which may be due to   underdistention. Trace intravesicular air. Suggest correlation with   urinalysis to assessfor cystitis and for recent   instrumentation/catheterization.  4.  Left adnexal lesion measuring 3.4 cm, possibly a fibroid. Consider   correlation with pelvic ultrasound when clinically feasible.  5.  Dilated pulmonary artery, which may be seen with pulmonary   hypertension.  6.  Mucoid impaction within the right lower lobe. Left lower lobe   bronchiectasis.    Findings discussed with ALBIN Garrido on 5/10/2022 at 1:46 PM.    --- End of Report ---    < end of copied text >      --------------------------------------------------------------------------------------------

## 2022-05-10 NOTE — PROGRESS NOTE ADULT - SUBJECTIVE AND OBJECTIVE BOX
[facility] – [place] [facility]   MD KRISTEN MARQUES MD KRISTINE SOLTANPOUR, DO ANGELA WONG, PA    TEL:  OFFICE: [phone]  From 5pm-7am Answering Service [phone]    -- RENAL FOLLOW UP NOTE ---Date of Service 05-10-22 @ 15:41    Patient is a 87y old  Female who presents with a chief complaint of ams and dysuria (10 May 2022 14:11)      Patient seen and examined at bedside. No chest pain/sob    VITALS:  T(F): 98 (05-10-22 @ 12:40), Max: 98.5 (05-09-22 @ 22:40)  HR: 80 (05-10-22 @ 12:40)  BP: 148/54 (05-10-22 @ 12:40)  RR: 18 (05-10-22 @ 12:40)  SpO2: 95% (05-10-22 @ 12:40)  Wt(kg): --        PHYSICAL EXAM:  Constitutional: NAD  Neck: No JVD  Respiratory: CTAB, no wheezes, rales or rhonchi  Cardiovascular: S1, S2, RRR  Gastrointestinal: BS+, soft, NT/ND  Extremities: No peripheral edema    Hospital Medications:   MEDICATIONS  (STANDING):  allopurinol 100 milliGRAM(s) Oral at bedtime  cefTRIAXone   IVPB 1000 milliGRAM(s) IV Intermittent every 24 hours  enoxaparin Injectable 40 milliGRAM(s) SubCutaneous every 24 hours  fenofibrate Tablet 145 milliGRAM(s) Oral at bedtime  metoprolol succinate ER 25 milliGRAM(s) Oral daily  metroNIDAZOLE    Tablet 500 milliGRAM(s) Oral two times a day  mirtazapine 15 milliGRAM(s) Oral <User Schedule>  pantoprazole    Tablet 40 milliGRAM(s) Oral before breakfast  thiamine 100 milliGRAM(s) Oral daily      LABS:  05-09    135  |  100  |  8   ----------------------------<  96  4.4   |  27  |  0.78    Ca    10.3      09 May 2022 06:39  Phos  3.0     05-09  Mg     1.90     05-09    TPro  7.3  /  Alb  3.6  /  TBili  0.3  /  DBili      /  AST  23  /  ALT  17  /  AlkPhos  83  05-09    Creatinine Trend: 0.78 <--, 0.82 <--, 0.88 <--, 0.98 <--, 1.06 <--, 1.24 <--                                12.5   5.91  )-----------( 434      ( 10 May 2022 05:45 )             38.6     Urine Studies:  Urinalysis - [05-04-22 @ 12:06]      Color Light Yellow / Appearance Clear / SG 1.006 / pH 6.5      Gluc Negative / Ketone Negative  / Bili Negative / Urobili <2 mg/dL       Blood Negative / Protein Negative / Leuk Est Negative / Nitrite Negative      RBC  / WBC  / Hyaline  / Gran  / Sq Epi  / Non Sq Epi  / Bacteria       PTH -- (Ca --)      [05-04-22 @ 16:19]   29  PTH -- (Ca --)      [05-04-22 @ 12:06]   31  Vitamin D (25OH) 76.6      [05-05-22 @ 01:54]  TSH 0.71      [05-04-22 @ 10:43]      Free Light Chains: kappa 4.05, lambda 5.44, ratio = 0.74      [05-05 @ 04:37]  Immunofixation Serum:   No Monoclonal Band Identified  Britt Marshall M.D.    Reference Range: None Detected      [05-04-22 @ 16:19]  SPEP Interpretation: Hypoalbuminemia with increased Alpha-1 fraction consistent with acute  phase reaction.  Increased Beta fraction, possibly transferrin increase..  Britt Marshall M.D.      [05-04-22 @ 16:19]    RADIOLOGY & ADDITIONAL STUDIES:

## 2022-05-10 NOTE — CONSULT NOTE ADULT - ASSESSMENT
86 yo F non smoker with PMHX of HTN, HLD, GERD, and recently dx depression, ventral hernia repair 20+ years ago here with Daughter at bedside brought in for worsening confusion and restlessness. CT with finding concern for gangrenous cholecystitis with thickening CBD, however patient without pain.     PLAN:   - No acute surgical intervention  - Recommend malignancy work up with MRCP and GI consult for possible EGD  - Patient seen/examined with attending.  - Plan discussed with Attending, Dr. Sophy Oneal MD, PGY 3  B Team Surgery  [id number]     88 yo F non smoker with PMHX of HTN, HLD, GERD, and recently dx depression, ventral hernia repair 20+ years ago here with Daughter at bedside brought in for worsening confusion and restlessness. CT with finding concern for gangrenous cholecystitis with thickening CBD, however patient without pain.     PLAN:   - No acute surgical intervention  - Agrees with IV abx  - Recommend malignancy work up with MRCP and GI consult for possible EGD  - Patient seen/examined with attending.  - Plan discussed with Attending, Dr. Sophy Oneal MD, PGY 3  B Team Surgery  [id number]

## 2022-05-10 NOTE — PROGRESS NOTE ADULT - SUBJECTIVE AND OBJECTIVE BOX
Date of service: 05/10/22    HISTORY OF PRESENT ILLNESS:   Ms Rich is a pleasant 88yo woman sent in by family for confusion and hallucinations.     S: no chest pain or sob; ros limited but otherwise negative.     Review of Systems:   Constitutional: [ ] fevers, [ ] chills.   Skin: [ ] dry skin. [ ] rashes.  Psychiatric: [ ] depression, [ ] anxiety.   Gastrointestinal: [ ] BRBPR, [ ] melena.   Neurological: [ ] confusion. [ ] seizures. [ ] shuffling gait.   Ears,Nose,Mouth and Throat: [ ] ear pain [ ] sore throat.   Eyes: [ ] diplopia.   Respiratory: [ ] hemoptysis. [ ] shortness of breath  Cardiovascular: See HPI above  Hematologic/Lymphatic: [ ] anemia. [ ] painful nodes. [ ] prolonged bleeding.   Genitourinary: [ ] hematuria. [ ] flank pain.   Endocrine: [ ] significant change in weight. [ ] intolerance to heat and cold.     Review of systems [ x] otherwise negative, [ ] otherwise unable to obtain    FH: no family history of sudden cardiac death in first degree relatives    SH: [ ] tobacco, [ ] alcohol, [ ] drugs    acetaminophen     Tablet .. 650 milliGRAM(s) Oral every 6 hours PRN  allopurinol 100 milliGRAM(s) Oral at bedtime  enoxaparin Injectable 40 milliGRAM(s) SubCutaneous every 24 hours  fenofibrate Tablet 145 milliGRAM(s) Oral at bedtime  metoprolol succinate ER 25 milliGRAM(s) Oral daily  mirtazapine 15 milliGRAM(s) Oral <User Schedule>  pantoprazole    Tablet 40 milliGRAM(s) Oral before breakfast  thiamine 100 milliGRAM(s) Oral daily                            12.5   5.91  )-----------( 434      ( 10 May 2022 05:45 )             38.6       05-09    135  |  100  |  8   ----------------------------<  96  4.4   |  27  |  0.78    Ca    10.3      09 May 2022 06:39  Phos  3.0     05-09  Mg     1.90     05-09    TPro  7.3  /  Alb  3.6  /  TBili  0.3  /  DBili  x   /  AST  23  /  ALT  17  /  AlkPhos  83  05-09            T(C): 36.7 (05-10-22 @ 12:40), Max: 36.9 (05-09-22 @ 22:40)  HR: 80 (05-10-22 @ 12:40) (79 - 94)  BP: 148/54 (05-10-22 @ 12:40) (145/62 - 170/75)  RR: 18 (05-10-22 @ 12:40) (17 - 18)  SpO2: 95% (05-10-22 @ 12:40) (95% - 97%)  Wt(kg): --    I&O's Summary      HEENT:   Normal oral mucosa, PERRL, EOMI	  Lymphatic: No lymphadenopathy , no edema  Cardiovascular: Normal S1 S2, No JVD, No murmurs , Peripheral pulses palpable 2+ bilaterally  Respiratory: Lungs clear to auscultation, normal effort 	  Gastrointestinal:  Soft, Non-tender, + BS	  Skin: No rashes, No ecchymoses, No cyanosis, warm to touch    LABS:	 	                        12.6   8.13  )-----------( 444      ( 04 May 2022 10:43 )             38.0     05-04    130<L>  |  95<L>  |  18  ----------------------------<  86  4.7   |  22  |  1.24    Ca    11.4<H>      04 May 2022 10:43    TPro  7.7  /  Alb  3.7  /  TBili  0.4  /  DBili  x   /  AST  43<H>  /  ALT  23  /  AlkPhos  104  05-04  TSH: Thyroid Stimulating Hormone, Serum: 0.71 uIU/mL (05-04 @ 10:43)    DATA    ECG: NSR, shortening/narrowing of T wave.  	  ASSESSMENT/PLAN: 	87y Female with acute metabolic encephalopathy.  Previous UTI treatment. Hypercalcemic.    -monitor tele - sr with no events today thus far  -tte with normal LV function   -treatment of hypercalcemia per primary team - calcium levels improved   -no further inpatient cardiac workup expected as long as tele remains stable  -follow up with [facility] after discharge - son to call office to schedule appointment     Carolyn Pike MD

## 2022-05-10 NOTE — PROGRESS NOTE ADULT - ASSESSMENT
88 yo F non smoker with PMHX of HTN, HLD, GERD, and recently dx depression, brought in by family for confusion. nephrology consulted for electrolyte abnormities    Hypercalcemia  per patient takes MVI and vit d supplement  hold supplements   PTH 31 and 29, ionized calcium high normal  elevated vit d possible sec to vit d toxicity   K/L 0.74, SIF neg  pending PTHrp SPEP  Calcium improving  Monitor Ca level    Hyponatremia  likely polydipsia  low SG in urine  normal TSH  on mirtazepine for depression  monitor  serum NA  improved    HTN  BP relatively controlled  not on meds  monitor    Hypophosphatemia off and on  Monitor serum Po4  Replete as needed.

## 2022-05-10 NOTE — CHART NOTE - NSCHARTNOTEFT_GEN_A_CORE
CT C/A/P results reviewed. Surgery consulted for perforation of gallbladder with small collection of fluid and air. Attending Dr. Shay made aware of CT results, who will reach oncology. Reached out to ID due to patient's penicillin allergy. ID recommended to start on ceftriaxone and flagyl. CT C/A/P results reviewed. Surgery consulted for perforation of gallbladder with small collection of fluid and air. Attending Dr. Shay made aware of CT results, who will reach oncology. Reached out to ID, Dr. Chand, due to patient's penicillin allergy. Dr. Chand recommended to start on ceftriaxone and flagyl.

## 2022-05-10 NOTE — PROGRESS NOTE ADULT - ASSESSMENT
86 yo F non smoker with PMHX of HTN, HLD, GERD, and recently dx depression, here with Daughter at bedside brought in for worsening confusion and restlessness first beginning 2   days ago, intermittently, then today worse with hallucinations ( auditory and visual),and generalized weakness.  family concerned patient was going to wander. Recently dx with UTI  5 days ago has been on bactrim for 5 days ( unclear if patient missed a dose due to developing confusion). Patient A& o x2-3, having episode of confusion normally ambulates and performs ADLS independently. Denies cp ,sob, abdominal pain, nausea, vomiting, diarrhea, headache.    1 AMS  - likely metabolic encephalopathy due to electrolytes abnormality   - dc antibiotics, uti ruled out   - fu cultures  - ID following  - neuro fu appreciated  - psych consult     2 Hypercalcemia  - unclear etiology  - ivf  - renal following   - check CT abdomen to ro malignancy     3 Lovenox for DVT prophylaxis

## 2022-05-11 LAB
ALBUMIN SERPL ELPH-MCNC: 3.2 G/DL — LOW (ref 3.3–5)
ALP SERPL-CCNC: 76 U/L — SIGNIFICANT CHANGE UP (ref 40–120)
ALT FLD-CCNC: 17 U/L — SIGNIFICANT CHANGE UP (ref 4–33)
ANION GAP SERPL CALC-SCNC: 9 MMOL/L — SIGNIFICANT CHANGE UP (ref 7–14)
AST SERPL-CCNC: 22 U/L — SIGNIFICANT CHANGE UP (ref 4–32)
BASOPHILS # BLD AUTO: 0.08 K/UL — SIGNIFICANT CHANGE UP (ref 0–0.2)
BASOPHILS NFR BLD AUTO: 1.4 % — SIGNIFICANT CHANGE UP (ref 0–2)
BILIRUB DIRECT SERPL-MCNC: <0.2 MG/DL — SIGNIFICANT CHANGE UP (ref 0–0.3)
BILIRUB INDIRECT FLD-MCNC: >0 MG/DL — SIGNIFICANT CHANGE UP (ref 0–1)
BILIRUB SERPL-MCNC: 0.2 MG/DL — SIGNIFICANT CHANGE UP (ref 0.2–1.2)
BILIRUB SERPL-MCNC: 0.2 MG/DL — SIGNIFICANT CHANGE UP (ref 0.2–1.2)
BUN SERPL-MCNC: 11 MG/DL — SIGNIFICANT CHANGE UP (ref 7–23)
CALCIUM SERPL-MCNC: 10.1 MG/DL — SIGNIFICANT CHANGE UP (ref 8.4–10.5)
CHLORIDE SERPL-SCNC: 96 MMOL/L — LOW (ref 98–107)
CO2 SERPL-SCNC: 25 MMOL/L — SIGNIFICANT CHANGE UP (ref 22–31)
CREAT SERPL-MCNC: 0.8 MG/DL — SIGNIFICANT CHANGE UP (ref 0.5–1.3)
EGFR: 72 ML/MIN/1.73M2 — SIGNIFICANT CHANGE UP
EOSINOPHIL # BLD AUTO: 0.2 K/UL — SIGNIFICANT CHANGE UP (ref 0–0.5)
EOSINOPHIL NFR BLD AUTO: 3.5 % — SIGNIFICANT CHANGE UP (ref 0–6)
GLUCOSE SERPL-MCNC: 85 MG/DL — SIGNIFICANT CHANGE UP (ref 70–99)
HCT VFR BLD CALC: 37.9 % — SIGNIFICANT CHANGE UP (ref 34.5–45)
HGB BLD-MCNC: 12.3 G/DL — SIGNIFICANT CHANGE UP (ref 11.5–15.5)
IANC: 3.49 K/UL — SIGNIFICANT CHANGE UP (ref 1.8–7.4)
IMM GRANULOCYTES NFR BLD AUTO: 0.4 % — SIGNIFICANT CHANGE UP (ref 0–1.5)
LYMPHOCYTES # BLD AUTO: 1.51 K/UL — SIGNIFICANT CHANGE UP (ref 1–3.3)
LYMPHOCYTES # BLD AUTO: 26.7 % — SIGNIFICANT CHANGE UP (ref 13–44)
MAGNESIUM SERPL-MCNC: 1.8 MG/DL — SIGNIFICANT CHANGE UP (ref 1.6–2.6)
MCHC RBC-ENTMCNC: 29.4 PG — SIGNIFICANT CHANGE UP (ref 27–34)
MCHC RBC-ENTMCNC: 32.5 GM/DL — SIGNIFICANT CHANGE UP (ref 32–36)
MCV RBC AUTO: 90.7 FL — SIGNIFICANT CHANGE UP (ref 80–100)
MONOCYTES # BLD AUTO: 0.35 K/UL — SIGNIFICANT CHANGE UP (ref 0–0.9)
MONOCYTES NFR BLD AUTO: 6.2 % — SIGNIFICANT CHANGE UP (ref 2–14)
NEUTROPHILS # BLD AUTO: 3.49 K/UL — SIGNIFICANT CHANGE UP (ref 1.8–7.4)
NEUTROPHILS NFR BLD AUTO: 61.8 % — SIGNIFICANT CHANGE UP (ref 43–77)
NRBC # BLD: 0 /100 WBCS — SIGNIFICANT CHANGE UP
NRBC # FLD: 0 K/UL — SIGNIFICANT CHANGE UP
PHOSPHATE SERPL-MCNC: 3.3 MG/DL — SIGNIFICANT CHANGE UP (ref 2.5–4.5)
PLATELET # BLD AUTO: 376 K/UL — SIGNIFICANT CHANGE UP (ref 150–400)
POTASSIUM SERPL-MCNC: 4 MMOL/L — SIGNIFICANT CHANGE UP (ref 3.5–5.3)
POTASSIUM SERPL-SCNC: 4 MMOL/L — SIGNIFICANT CHANGE UP (ref 3.5–5.3)
PROT SERPL-MCNC: 6.4 G/DL — SIGNIFICANT CHANGE UP (ref 6–8.3)
PTH RELATED PROT SERPL-MCNC: <2 PMOL/L — SIGNIFICANT CHANGE UP
PTH RELATED PROT SERPL-MCNC: <2 PMOL/L — SIGNIFICANT CHANGE UP
RBC # BLD: 4.18 M/UL — SIGNIFICANT CHANGE UP (ref 3.8–5.2)
RBC # FLD: 13.6 % — SIGNIFICANT CHANGE UP (ref 10.3–14.5)
SARS-COV-2 RNA SPEC QL NAA+PROBE: SIGNIFICANT CHANGE UP
SODIUM SERPL-SCNC: 130 MMOL/L — LOW (ref 135–145)
WBC # BLD: 5.65 K/UL — SIGNIFICANT CHANGE UP (ref 3.8–10.5)
WBC # FLD AUTO: 5.65 K/UL — SIGNIFICANT CHANGE UP (ref 3.8–10.5)

## 2022-05-11 PROCEDURE — 99232 SBSQ HOSP IP/OBS MODERATE 35: CPT | Mod: GC

## 2022-05-11 PROCEDURE — 99223 1ST HOSP IP/OBS HIGH 75: CPT | Mod: GC

## 2022-05-11 PROCEDURE — 99232 SBSQ HOSP IP/OBS MODERATE 35: CPT

## 2022-05-11 RX ADMIN — ENOXAPARIN SODIUM 40 MILLIGRAM(S): 100 INJECTION SUBCUTANEOUS at 16:47

## 2022-05-11 RX ADMIN — Medication 25 MILLIGRAM(S): at 05:34

## 2022-05-11 RX ADMIN — PANTOPRAZOLE SODIUM 40 MILLIGRAM(S): 20 TABLET, DELAYED RELEASE ORAL at 05:35

## 2022-05-11 RX ADMIN — Medication 145 MILLIGRAM(S): at 22:59

## 2022-05-11 RX ADMIN — Medication 500 MILLIGRAM(S): at 16:48

## 2022-05-11 RX ADMIN — MIRTAZAPINE 15 MILLIGRAM(S): 45 TABLET, ORALLY DISINTEGRATING ORAL at 22:59

## 2022-05-11 RX ADMIN — CEFTRIAXONE 100 MILLIGRAM(S): 500 INJECTION, POWDER, FOR SOLUTION INTRAMUSCULAR; INTRAVENOUS at 16:46

## 2022-05-11 RX ADMIN — Medication 100 MILLIGRAM(S): at 16:47

## 2022-05-11 RX ADMIN — Medication 500 MILLIGRAM(S): at 05:34

## 2022-05-11 RX ADMIN — Medication 100 MILLIGRAM(S): at 22:59

## 2022-05-11 NOTE — PROGRESS NOTE ADULT - SUBJECTIVE AND OBJECTIVE BOX
Patient is a 87y old  Female who presents with a chief complaint of ams and dysuria (11 May 2022 12:41)    Date of servie : 05-11-22 @ 16:12  INTERVAL HPI/OVERNIGHT EVENTS:  T(C): 36.7 (05-11-22 @ 05:23), Max: 36.7 (05-10-22 @ 22:24)  HR: 86 (05-11-22 @ 05:23) (78 - 86)  BP: 118/70 (05-11-22 @ 05:23) (118/70 - 155/61)  RR: 17 (05-11-22 @ 05:23) (17 - 18)  SpO2: 98% (05-11-22 @ 05:23) (98% - 98%)  Wt(kg): --  I&O's Summary      LABS:                        12.3   5.65  )-----------( 376      ( 11 May 2022 06:44 )             37.9     05-11    130<L>  |  96<L>  |  11  ----------------------------<  85  4.0   |  25  |  0.80    Ca    10.1      11 May 2022 06:44  Phos  3.3     05-11  Mg     1.80     05-11    TPro  6.4  /  Alb  3.2<L>  /  TBili  0.2  /  DBili  <0.2  /  AST  22  /  ALT  17  /  AlkPhos  76  05-11        CAPILLARY BLOOD GLUCOSE                MEDICATIONS  (STANDING):  allopurinol 100 milliGRAM(s) Oral at bedtime  cefTRIAXone   IVPB 1000 milliGRAM(s) IV Intermittent every 24 hours  enoxaparin Injectable 40 milliGRAM(s) SubCutaneous every 24 hours  fenofibrate Tablet 145 milliGRAM(s) Oral at bedtime  metoprolol succinate ER 25 milliGRAM(s) Oral daily  metroNIDAZOLE    Tablet 500 milliGRAM(s) Oral two times a day  mirtazapine 15 milliGRAM(s) Oral <User Schedule>  pantoprazole    Tablet 40 milliGRAM(s) Oral before breakfast  thiamine 100 milliGRAM(s) Oral daily    MEDICATIONS  (PRN):  acetaminophen     Tablet .. 650 milliGRAM(s) Oral every 6 hours PRN Temp greater or equal to 38C (100.4F), Mild Pain (1 - 3), Moderate Pain (4 - 6)          PHYSICAL EXAM:  GENERAL: frail  CHEST/LUNG: Clear to percussion bilaterally; No rales, rhonchi, wheezing, or rubs  HEART: Regular rate and rhythm; No murmurs, rubs, or gallops  ABDOMEN: Soft, Nontender, Nondistended; Bowel sounds present  EXTREMITIES: edema +    Care Discussed with Consultants/Other Providers [ ] YES  [ ] NO

## 2022-05-11 NOTE — PROGRESS NOTE ADULT - ASSESSMENT
86 yo F non smoker with PMHX of HTN, HLD, GERD, and recently dx depression, here with Daughter at bedside brought in for worsening confusion and restlessness first beginning 2   days ago, intermittently, then today worse with hallucinations ( auditory and visual),and generalized weakness.  family concerned patient was going to wander. Recently dx with UTI  5 days ago has been on bactrim for 5 days ( unclear if patient missed a dose due to developing confusion). Patient A& o x2-3, having episode of confusion normally ambulates and performs ADLS independently. Denies cp ,sob, abdominal pain, nausea, vomiting, diarrhea, headache.    1 AMS  - likely metabolic encephalopathy due to electrolytes abnormality   - dc antibiotics, uti ruled out   - fu cultures  - ID following  - neuro fu appreciated  - psych consult     2 Hypercalcemia  - unclear etiology  - ivf  - renal following       3 Gangrenous cholecystitis  - cw antibiotics  - ID fu  - surgery and gi fu  - check MRI abdomen to ro malignancy

## 2022-05-11 NOTE — PROGRESS NOTE ADULT - SUBJECTIVE AND OBJECTIVE BOX
SURGERY  Pager: [phone]      INTERVAL EVENTS/SUBJECTIVE: Patient seen and examined at bedside this AM, no nausea or vomiting, reports passing gas but no bowel movements. No abdominal pain endorsed.     ______________________________________________  OBJECTIVE:   T(C): 36.7 (05-11-22 @ 05:23), Max: 36.7 (05-10-22 @ 12:40)  HR: 86 (05-11-22 @ 05:23) (78 - 86)  BP: 118/70 (05-11-22 @ 05:23) (118/70 - 155/61)  RR: 17 (05-11-22 @ 05:23) (17 - 18)  SpO2: 98% (05-11-22 @ 05:23) (95% - 98%)  Wt(kg): --  CAPILLARY BLOOD GLUCOSE        I&O's Detail      Physical exam:  Gen: resting in bed comfortably in NAD  Chest: no increased WOB, regular inspiratory effort   Abdomen: Soft, nontender, nondistended with no rebound tenderness or guarding.  Vascular: WWP, MCCANN x4  NEURO: awake, alert  ______________________________________________  LABS:  CBC Full  -  ( 11 May 2022 06:44 )  WBC Count : 5.65 K/uL  RBC Count : 4.18 M/uL  Hemoglobin : 12.3 g/dL  Hematocrit : 37.9 %  Platelet Count - Automated : 376 K/uL  Mean Cell Volume : 90.7 fL  Mean Cell Hemoglobin : 29.4 pg  Mean Cell Hemoglobin Concentration : 32.5 gm/dL  Auto Neutrophil # : 3.49 K/uL  Auto Lymphocyte # : 1.51 K/uL  Auto Monocyte # : 0.35 K/uL  Auto Eosinophil # : 0.20 K/uL  Auto Basophil # : 0.08 K/uL  Auto Neutrophil % : 61.8 %  Auto Lymphocyte % : 26.7 %  Auto Monocyte % : 6.2 %  Auto Eosinophil % : 3.5 %  Auto Basophil % : 1.4 %    05-11    130<L>  |  96<L>  |  11  ----------------------------<  85  4.0   |  25  |  0.80    Ca    10.1      11 May 2022 06:44  Phos  3.3     05-11  Mg     1.80     05-11    TPro  6.4  /  Alb  3.2<L>  /  TBili  0.2  /  DBili  <0.2  /  AST  22  /  ALT  17  /  AlkPhos  76  05-11    _____________________________________________  RADIOLOGY:

## 2022-05-11 NOTE — CONSULT NOTE ADULT - SUBJECTIVE AND OBJECTIVE BOX
Left vm to call back.     Allergies:  penicillin (Unknown)        Hospital Medications:  acetaminophen     Tablet .. 650 milliGRAM(s) Oral every 6 hours PRN  allopurinol 100 milliGRAM(s) Oral at bedtime  cefTRIAXone   IVPB 1000 milliGRAM(s) IV Intermittent every 24 hours  enoxaparin Injectable 40 milliGRAM(s) SubCutaneous every 24 hours  fenofibrate Tablet 145 milliGRAM(s) Oral at bedtime  metoprolol succinate ER 25 milliGRAM(s) Oral daily  metroNIDAZOLE    Tablet 500 milliGRAM(s) Oral two times a day  mirtazapine 15 milliGRAM(s) Oral <User Schedule>  pantoprazole    Tablet 40 milliGRAM(s) Oral before breakfast  thiamine 100 milliGRAM(s) Oral daily      PMHX/PSHX:  No pertinent past medical history    No significant past surgical history        Family history:  No pertinent family history in first degree relatives        Social History: no smoking    ROS:   General:  No fevers, chills or night sweats  ENT:  No sore throat or dysphagia  CV:  No pain or palpitations  Resp:  No dyspnea, cough or  wheezing  GI:  as above  Skin:  No rash or edema  Neuro: no weakness   Hematologic: no bleeding  Musculoskeletal: no muscle pain or join pain  Psych: no agitation     : no dysuria      PHYSICAL EXAM:   GENERAL:  NAD, Appears stated age  HEENT:  NC/AT,  conjunctivae clear and pink, sclera -anicteric  CHEST:  CTA B/L, Normal effort  HEART:  RRR S1/S2,  ABDOMEN:  Soft, non-tender, non-distended,  no masses   EXTREMITIES:  No cyanosis or Edema  SKIN:  Warm & Dry. No rash or erythema  NEURO:  Alert, oriented, no focal deficit    Vital Signs:  Vital Signs Last 24 Hrs  T(C): 36.7 (11 May 2022 05:23), Max: 36.7 (10 May 2022 12:40)  T(F): 98.1 (11 May 2022 05:23), Max: 98.1 (11 May 2022 05:23)  HR: 86 (11 May 2022 05:23) (78 - 86)  BP: 118/70 (11 May 2022 05:23) (118/70 - 155/61)  BP(mean): --  RR: 17 (11 May 2022 05:23) (17 - 18)  SpO2: 98% (11 May 2022 05:23) (95% - 98%)  Daily     Daily     LABS:                        12.3   5.65  )-----------( 376      ( 11 May 2022 06:44 )             37.9     Mean Cell Volume: 90.7 fL (05-11-22 @ 06:44)    05-11    130<L>  |  96<L>  |  11  ----------------------------<  85  4.0   |  25  |  0.80    Ca    10.1      11 May 2022 06:44  Phos  3.3     05-11  Mg     1.80     05-11    TPro  6.4  /  Alb  3.2<L>  /  TBili  0.2  /  DBili  <0.2  /  AST  22  /  ALT  17  /  AlkPhos  76  05-11    LIVER FUNCTIONS - ( 11 May 2022 06:44 )  Alb: 3.2 g/dL / Pro: 6.4 g/dL / ALK PHOS: 76 U/L / ALT: 17 U/L / AST: 22 U/L / GGT: x                                       12.3   5.65  )-----------( 376      ( 11 May 2022 06:44 )             37.9                         12.5   5.91  )-----------( 434      ( 10 May 2022 05:45 )             38.6                         12.7   6.00  )-----------( 440      ( 09 May 2022 06:39 )             40.5     Imaging:  < from: CT Abdomen and Pelvis w/ Oral Cont and w/ IV Cont (05.10.22 @ 11:42) >  ACC: [id number] EXAM:  CT CHEST IC                        ACC: [id number] EXAM:  CT ABDOMEN AND PELVIS OC IC                          PROCEDURE DATE:  05/10/2022          INTERPRETATION:  CLINICAL INFORMATION: Altered mental status.   Hypercalcemia. Evaluate for malignancy.    COMPARISON: None.    CONTRAST/COMPLICATIONS:  IV Contrast: Omnipaque 350  90 cc administered   10 cc discarded  Oral Contrast: Omnipaque 300  Complications: None reported at time of study completion    PROCEDURE:  CT of the Chest, Abdomen and Pelvis was performed.  Sagittal and coronal reformats were performed.    FINDINGS:  CHEST:  LUNGS, PLEURA AND LARGE AIRWAYS: Mucoid impaction within distal right   lower lobe airways. Left lower lobe segmental atelectasis and   bronchiectasis. Subsegmental atelectasis in the right middle lobe and   lingula.  VESSELS: Atherosclerotic changes. Coronary artery calcifications. Dilated   main pulmonary artery to 3.5 cm in caliber.  HEART: Heart size is normal. Mitral annular calcification. No pericardial   effusion.  MEDIASTINUM AND CARMELLA: No lymphadenopathy.  CHEST WALL AND LOWER NECK: Within normal limits.    ABDOMEN AND PELVIS:  LIVER: Within normal limits.  BILE DUCTS: Mild intrahepatic duct dilation. The common bile duct is   dilated to 1.2 cm in caliber with tapering towards the ampulla. Mild wall   thickening and hyperenhancement of the CBD and cystic duct.  GALLBLADDER: Mildly distended with diffuse irregular wall thickening,   most pronounced in the gallbladder neck. Pericholecystic fat stranding.   There is a contained perforation with adjacent small collection of fluid   and air that appears to communicate with the hepatic flexure (series 5   images 102 and 103), raising suspicion for fistula.  SPLEEN: Within normal limits.  PANCREAS: Within normal limits.  ADRENALS: Within normal limits.  KIDNEYS/URETERS: Bilateral subcentimeter hypodense foci that are too   small to characterize. No hydronephrosis.    BLADDER: Trace air within the urinary bladder. Mild wallthickening,   which may be secondary to underdistention.  REPRODUCTIVE ORGANS: Left adnexal lesion measuring 3.4 x 2.4 cm (series 2   image 250), possibly a fibroid.    BOWEL: Colonic diverticulosis. No bowel obstruction. Additional findings   as above.  PERITONEUM: No ascites.  VESSELS: Atherosclerotic changes.  RETROPERITONEUM/LYMPH NODES: No lymphadenopathy.  ABDOMINAL WALL: Within normal limits.  BONES: Degenerative changes. Diffuse osseous demineralization.    IMPRESSION:  1.  Distended gallbladder with pericholecystic inflammation and diffuse   irregular wall thickening that is most pronounced by the gallbladder   neck. Contained perforation of the gallbladder with adjacent small   collection of fluid and air that appears to communicatewith hepatic   flexure, raising suspicion for fistula. Findings are concerning for acute   gangrenous cholecystitis, though a neoplastic etiology with superimposed   infection is also considered.  2.  Mild biliary duct dilation. Mild wall thickening and hyperenhancement   of the common bile duct and cystic duct, may reflect cholangitis.  3.  Mild wall thickening of the urinary bladder, which may be due to   underdistention. Trace intravesicular air. Suggest correlation with   urinalysis to assessfor cystitis and for recent   instrumentation/catheterization.  4.  Left adnexal lesion measuring 3.4 cm, possibly a fibroid. Consider   correlation with pelvic ultrasound when clinically feasible.  5.  Dilated pulmonary artery, which may be seen with pulmonary   hypertension.  6.  Mucoid impaction within the right lower lobe. Left lower lobe   bronchiectasis.    Findings discussed with ALBIN Garrido on 5/10/2022 at 1:46 PM.    --- End of Report ---    < end of copied text >           HPI:  88 yo F non smoker with PMHX of HTN, HLD, GERD, and recently dx depression, here with Daughter at bedside brought in for worsening confusion and restlessness first beginning 2 days ago, intermittently, then today worse with hallucinations ( auditory and visual),and generalized weakness.  family concerned patient was going to wander. Recently dx with UTI  5 days ago has been on bactrim for 5 days ( unclear if patient missed a dose due to developing confusion). Patient A& o x2-3, having episode of confusion normally ambulates and performs ADLS independently. Denies cp ,sob, abdominal pain, nausea, vomiting, diarrhea, headache.     She was noted to have hypercalcemia of 11.4 and so CT abdomen and pelvis was obtained to rule out malignancy. Found to have abnormal appearing gall bladder ( Distended gallbladder with pericholecystic inflammation and diffuse irregular wall thickening ) suspicious for contained perforation of the gallbladder with adjacent small collection of fluid and air that appears to communicate with hepatic flexure, raising suspicion for fistula. Findings are concerning for acute gangrenous cholecystitis vs neoplastic etiology.      Allergies:  penicillin (Unknown)    Home Medications:  allopurinol 100 mg oral tablet: 100 milligram(s) orally once a day (at bedtime) (09 May 2022 13:36)  fenofibrate 160 mg oral tablet: 1 tab(s) orally once a day (09 May 2022 13:36)  losartan 100 mg oral tablet: 1 tab(s) orally once a day (09 May 2022 13:36)  melatonin 3 mg oral tablet: 2 tab(s) orally once a day (at bedtime) (09 May 2022 13:36)  Protonix 40 mg oral delayed release tablet: 1 tab(s) orally once a day (09 May 2022 13:36)  Remeron 15 mg oral tablet: 1 tab(s) orally once a day (at bedtime) (09 May 2022 13:36)  thiamine 100 mg oral tablet: 1 tab(s) orally once a day (09 May 2022 13:36)  Toprol-XL 25 mg oral tablet, extended release: 1 tab(s) orally once a day (09 May 2022 13:36)      Hospital Medications:  acetaminophen     Tablet .. 650 milliGRAM(s) Oral every 6 hours PRN  allopurinol 100 milliGRAM(s) Oral at bedtime  cefTRIAXone   IVPB 1000 milliGRAM(s) IV Intermittent every 24 hours  enoxaparin Injectable 40 milliGRAM(s) SubCutaneous every 24 hours  fenofibrate Tablet 145 milliGRAM(s) Oral at bedtime  metoprolol succinate ER 25 milliGRAM(s) Oral daily  metroNIDAZOLE    Tablet 500 milliGRAM(s) Oral two times a day  mirtazapine 15 milliGRAM(s) Oral <User Schedule>  pantoprazole    Tablet 40 milliGRAM(s) Oral before breakfast  thiamine 100 milliGRAM(s) Oral daily      PMHX/PSHX:  No pertinent past medical history    No significant past surgical history        Family history:  No pertinent family history in first degree relatives        Social History: no smoking    ROS:   General:  No fevers, chills or night sweats  ENT:  No sore throat or dysphagia  CV:  No pain or palpitations  Resp:  No dyspnea, cough or  wheezing  GI:  as above  Skin:  No rash or edema  Neuro: no weakness   Hematologic: no bleeding  Musculoskeletal: no muscle pain or join pain  Psych: no agitation     : no dysuria      PHYSICAL EXAM:   GENERAL:  NAD, Appears stated age  HEENT:  NC/AT,  conjunctivae clear and pink, sclera -anicteric  CHEST:  CTA B/L, Normal effort  HEART:  RRR S1/S2,  ABDOMEN:  Soft, non-tender, non-distended,  no masses   EXTREMITIES:  No cyanosis or Edema  SKIN:  Warm & Dry. No rash or erythema  NEURO:  Alert, oriented, no focal deficit    Vital Signs:  Vital Signs Last 24 Hrs  T(C): 36.7 (11 May 2022 05:23), Max: 36.7 (10 May 2022 12:40)  T(F): 98.1 (11 May 2022 05:23), Max: 98.1 (11 May 2022 05:23)  HR: 86 (11 May 2022 05:23) (78 - 86)  BP: 118/70 (11 May 2022 05:23) (118/70 - 155/61)  BP(mean): --  RR: 17 (11 May 2022 05:23) (17 - 18)  SpO2: 98% (11 May 2022 05:23) (95% - 98%)  Daily     Daily     LABS:                        12.3   5.65  )-----------( 376      ( 11 May 2022 06:44 )             37.9     Mean Cell Volume: 90.7 fL (05-11-22 @ 06:44)    05-11    130<L>  |  96<L>  |  11  ----------------------------<  85  4.0   |  25  |  0.80    Ca    10.1      11 May 2022 06:44  Phos  3.3     05-11  Mg     1.80     05-11    TPro  6.4  /  Alb  3.2<L>  /  TBili  0.2  /  DBili  <0.2  /  AST  22  /  ALT  17  /  AlkPhos  76  05-11    LIVER FUNCTIONS - ( 11 May 2022 06:44 )  Alb: 3.2 g/dL / Pro: 6.4 g/dL / ALK PHOS: 76 U/L / ALT: 17 U/L / AST: 22 U/L / GGT: x                                       12.3   5.65  )-----------( 376      ( 11 May 2022 06:44 )             37.9                         12.5   5.91  )-----------( 434      ( 10 May 2022 05:45 )             38.6                         12.7   6.00  )-----------( 440      ( 09 May 2022 06:39 )             40.5     Imaging:  < from: CT Abdomen and Pelvis w/ Oral Cont and w/ IV Cont (05.10.22 @ 11:42) >  ACC: [id number] EXAM:  CT CHEST IC                        ACC: [id number] EXAM:  CT ABDOMEN AND PELVIS OC IC                          PROCEDURE DATE:  05/10/2022          INTERPRETATION:  CLINICAL INFORMATION: Altered mental status.   Hypercalcemia. Evaluate for malignancy.    COMPARISON: None.    CONTRAST/COMPLICATIONS:  IV Contrast: Omnipaque 350  90 cc administered   10 cc discarded  Oral Contrast: Omnipaque 300  Complications: None reported at time of study completion    PROCEDURE:  CT of the Chest, Abdomen and Pelvis was performed.  Sagittal and coronal reformats were performed.    FINDINGS:  CHEST:  LUNGS, PLEURA AND LARGE AIRWAYS: Mucoid impaction within distal right   lower lobe airways. Left lower lobe segmental atelectasis and   bronchiectasis. Subsegmental atelectasis in the right middle lobe and   lingula.  VESSELS: Atherosclerotic changes. Coronary artery calcifications. Dilated   main pulmonary artery to 3.5 cm in caliber.  HEART: Heart size is normal. Mitral annular calcification. No pericardial   effusion.  MEDIASTINUM AND CARMELLA: No lymphadenopathy.  CHEST WALL AND LOWER NECK: Within normal limits.    ABDOMEN AND PELVIS:  LIVER: Within normal limits.  BILE DUCTS: Mild intrahepatic duct dilation. The common bile duct is   dilated to 1.2 cm in caliber with tapering towards the ampulla. Mild wall   thickening and hyperenhancement of the CBD and cystic duct.  GALLBLADDER: Mildly distended with diffuse irregular wall thickening,   most pronounced in the gallbladder neck. Pericholecystic fat stranding.   There is a contained perforation with adjacent small collection of fluid   and air that appears to communicate with the hepatic flexure (series 5   images 102 and 103), raising suspicion for fistula.  SPLEEN: Within normal limits.  PANCREAS: Within normal limits.  ADRENALS: Within normal limits.  KIDNEYS/URETERS: Bilateral subcentimeter hypodense foci that are too   small to characterize. No hydronephrosis.    BLADDER: Trace air within the urinary bladder. Mild wallthickening,   which may be secondary to underdistention.  REPRODUCTIVE ORGANS: Left adnexal lesion measuring 3.4 x 2.4 cm (series 2   image 250), possibly a fibroid.    BOWEL: Colonic diverticulosis. No bowel obstruction. Additional findings   as above.  PERITONEUM: No ascites.  VESSELS: Atherosclerotic changes.  RETROPERITONEUM/LYMPH NODES: No lymphadenopathy.  ABDOMINAL WALL: Within normal limits.  BONES: Degenerative changes. Diffuse osseous demineralization.    IMPRESSION:  1.  Distended gallbladder with pericholecystic inflammation and diffuse   irregular wall thickening that is most pronounced by the gallbladder   neck. Contained perforation of the gallbladder with adjacent small   collection of fluid and air that appears to communicate with hepatic   flexure, raising suspicion for fistula. Findings are concerning for acute   gangrenous cholecystitis, though a neoplastic etiology with superimposed   infection is also considered.  2.  Mild biliary duct dilation. Mild wall thickening and hyper enhancement   of the common bile duct and cystic duct, may reflect cholangitis.  3.  Mild wall thickening of the urinary bladder, which may be due to   underdistention. Trace intravesicular air. Suggest correlation with   urinalysis to assessfor cystitis and for recent   instrumentation/catheterization.  4.  Left adnexal lesion measuring 3.4 cm, possibly a fibroid. Consider   correlation with pelvic ultrasound when clinically feasible.  5.  Dilated pulmonary artery, which may be seen with pulmonary   hypertension.  6.  Mucoid impaction within the right lower lobe. Left lower lobe   bronchiectasis.    Findings discussed with ALBIN Garrido on 5/10/2022 at 1:46 PM.    --- End of Report ---    < end of copied text >

## 2022-05-11 NOTE — PROGRESS NOTE ADULT - SUBJECTIVE AND OBJECTIVE BOX
[facility]   MD KRISTEN MARQUES MD KRISTINE SOLTANPOUR, ALBIN ABRAMS    TEL:  OFFICE: [phone]  From 5pm-7am Answering Service [phone]    -- RENAL FOLLOW UP NOTE ---Date of Service 05-11-22 @ 12:22    Patient is a 87y old  Female who presents with a chief complaint of ams and dysuria (11 May 2022 11:52)      Patient seen and examined at bedside. No chest pain/sob    VITALS:  T(F): 98.1 (05-11-22 @ 05:23), Max: 98.1 (05-11-22 @ 05:23)  HR: 86 (05-11-22 @ 05:23)  BP: 118/70 (05-11-22 @ 05:23)  RR: 17 (05-11-22 @ 05:23)  SpO2: 98% (05-11-22 @ 05:23)  Wt(kg): --        PHYSICAL EXAM:  Constitutional: NAD  Neck: No JVD  Respiratory: CTAB, no wheezes, rales or rhonchi  Cardiovascular: S1, S2, RRR  Gastrointestinal: BS+, soft, NT/ND  Extremities: No peripheral edema    Hospital Medications:   MEDICATIONS  (STANDING):  allopurinol 100 milliGRAM(s) Oral at bedtime  cefTRIAXone   IVPB 1000 milliGRAM(s) IV Intermittent every 24 hours  enoxaparin Injectable 40 milliGRAM(s) SubCutaneous every 24 hours  fenofibrate Tablet 145 milliGRAM(s) Oral at bedtime  metoprolol succinate ER 25 milliGRAM(s) Oral daily  metroNIDAZOLE    Tablet 500 milliGRAM(s) Oral two times a day  mirtazapine 15 milliGRAM(s) Oral <User Schedule>  pantoprazole    Tablet 40 milliGRAM(s) Oral before breakfast  thiamine 100 milliGRAM(s) Oral daily      LABS:  05-11    130<L>  |  96<L>  |  11  ----------------------------<  85  4.0   |  25  |  0.80    Ca    10.1      11 May 2022 06:44  Phos  3.3     05-11  Mg     1.80     05-11    TPro  6.4  /  Alb  3.2<L>  /  TBili  0.2  /  DBili  <0.2  /  AST  22  /  ALT  17  /  AlkPhos  76  05-11    Creatinine Trend: 0.80 <--, 0.78 <--, 0.82 <--, 0.88 <--, 0.98 <--, 1.06 <--    Phosphorus Level, Serum: 3.3 mg/dL (05-11 @ 06:44)  Albumin, Serum: 3.2 g/dL (05-11 @ 06:44)                              12.3   5.65  )-----------( 376      ( 11 May 2022 06:44 )             37.9     Urine Studies:  Urinalysis - [05-04-22 @ 12:06]      Color Light Yellow / Appearance Clear / SG 1.006 / pH 6.5      Gluc Negative / Ketone Negative  / Bili Negative / Urobili <2 mg/dL       Blood Negative / Protein Negative / Leuk Est Negative / Nitrite Negative      RBC  / WBC  / Hyaline  / Gran  / Sq Epi  / Non Sq Epi  / Bacteria       PTH -- (Ca --)      [05-04-22 @ 16:19]   29  PTH -- (Ca --)      [05-04-22 @ 12:06]   31  Vitamin D (25OH) 76.6      [05-05-22 @ 01:54]  TSH 0.71      [05-04-22 @ 10:43]      Free Light Chains: kappa 4.05, lambda 5.44, ratio = 0.74      [05-05 @ 04:37]  Immunofixation Serum:   No Monoclonal Band Identified  Britt Marshall M.D.    Reference Range: None Detected      [05-04-22 @ 16:19]  SPEP Interpretation: Hypoalbuminemia with increased Alpha-1 fraction consistent with acute  phase reaction.  Increased Beta fraction, possibly transferrin increase..  Britt Marshall M.D.      [05-04-22 @ 16:19]    RADIOLOGY & ADDITIONAL STUDIES:

## 2022-05-11 NOTE — DIETITIAN INITIAL EVALUATION ADULT - OTHER INFO
Pt 86 yo, non smoker, female with PMHX of HTN, HLD, GERD, and recently dx depression, ventral hernia repair 20+ years ago presented with confusion and restlessness; CT with finding concern for gangrenous cholecystitis with thickening CBD, however Pt without pain - per chart review.     At time of visit, Pt OOB to chair with her eyes closed. RDN called Pt by her name multiple times, but Pt did not answer/Pt did not open her eyes either. Per nurse, Pt was NPO earlier this morning, but procedure got cancelled, PO diet resumed. Yesterday, Pt ate ~50% of meals reported. Rec to continue Remeron, as ordered. Add PO supplement: Ensure Enlive - 2x daily to diet rx. No report of chewing or swallowing difficulty; no report of vomiting or diarrhea @ this time. +BM (5/6) per flow sheet. Unable to discuss diet or weight history at present. Case discussed with nurse. RDN remains available, nurse made aware.

## 2022-05-11 NOTE — CONSULT NOTE ADULT - ATTENDING COMMENTS
Pt seen and examined.  Agree with resident eval and plan.  Imp: Ddx - Chronic cholecystitis with Cholecystocolonic fistula vs. Gallbladder neoplasm.  Recommend MRI/MRCP and GI evaluation.  IV antibiotics.
87 with encephelopathy with ho recent UTI with electrolyte abnormalities   -suspect this is more related to electrolyte issues  -cont CTX 1 gm iv q24 for possible UTI given recent UTI issues - u/a without pyuria  -f/u blood and urine cx - if negative, DC abx  -renal following for electrolyte issues  -appreciate Neuro input     Angel Chand  Attending Physician   Division of Infectious Disease  Office #[phone]  Available on Microsoft Teams also  After 5pm/weekend or no response, call #[phone]    D/w Dr. Shay
86 yo F Found to have abnormal appearing gall bladder ( Distended gallbladder with pericholecystic inflammation and diffuse irregular wall thickening ) suspicious for acute gangrenous cholecystitis with contained perforation with adjacent small collection of fluid and air that appears to communicate with hepatic flexure, raising suspicion for fistula vs neoplastic etiology.    Patient without abdominal pain. Normal liver enzymes, no fever and normal WBC. Suspect malignancy process.    Impression:  # Abnormal imaging as above    Recommendations:  - Obtain MRI abdomen with and without contrast to rule out hepatopancreatic biliary cancers- suspect gall bladder cancer    - Check tumor markers

## 2022-05-11 NOTE — PROGRESS NOTE ADULT - SUBJECTIVE AND OBJECTIVE BOX
IVANHARSH DUMONT 87y [id number]    Patient is a 87y old  Female who presents with a chief complaint of ams and dysuria (11 May 2022 12:21)      Follow Up/CC:  ID following for perforated GB    Interval History/ROS: no fever    Allergies    penicillin (Unknown)    Intolerances        ANTIMICROBIALS:  cefTRIAXone   IVPB 1000 every 24 hours  metroNIDAZOLE    Tablet 500 two times a day      MEDICATIONS  (STANDING):  allopurinol 100 milliGRAM(s) Oral at bedtime  cefTRIAXone   IVPB 1000 milliGRAM(s) IV Intermittent every 24 hours  enoxaparin Injectable 40 milliGRAM(s) SubCutaneous every 24 hours  fenofibrate Tablet 145 milliGRAM(s) Oral at bedtime  metoprolol succinate ER 25 milliGRAM(s) Oral daily  metroNIDAZOLE    Tablet 500 milliGRAM(s) Oral two times a day  mirtazapine 15 milliGRAM(s) Oral <User Schedule>  pantoprazole    Tablet 40 milliGRAM(s) Oral before breakfast  thiamine 100 milliGRAM(s) Oral daily    MEDICATIONS  (PRN):  acetaminophen     Tablet .. 650 milliGRAM(s) Oral every 6 hours PRN Temp greater or equal to 38C (100.4F), Mild Pain (1 - 3), Moderate Pain (4 - 6)        Vital Signs Last 24 Hrs  T(C): 36.7 (11 May 2022 05:23), Max: 36.7 (10 May 2022 12:40)  T(F): 98.1 (11 May 2022 05:23), Max: 98.1 (11 May 2022 05:23)  HR: 86 (11 May 2022 05:23) (78 - 86)  BP: 118/70 (11 May 2022 05:23) (118/70 - 155/61)  BP(mean): --  RR: 17 (11 May 2022 05:23) (17 - 18)  SpO2: 98% (11 May 2022 05:23) (95% - 98%)    CBC Full  -  ( 11 May 2022 06:44 )  WBC Count : 5.65 K/uL  RBC Count : 4.18 M/uL  Hemoglobin : 12.3 g/dL  Hematocrit : 37.9 %  Platelet Count - Automated : 376 K/uL  Mean Cell Volume : 90.7 fL  Mean Cell Hemoglobin : 29.4 pg  Mean Cell Hemoglobin Concentration : 32.5 gm/dL  Auto Neutrophil # : 3.49 K/uL  Auto Lymphocyte # : 1.51 K/uL  Auto Monocyte # : 0.35 K/uL  Auto Eosinophil # : 0.20 K/uL  Auto Basophil # : 0.08 K/uL  Auto Neutrophil % : 61.8 %  Auto Lymphocyte % : 26.7 %  Auto Monocyte % : 6.2 %  Auto Eosinophil % : 3.5 %  Auto Basophil % : 1.4 %    05-11    130<L>  |  96<L>  |  11  ----------------------------<  85  4.0   |  25  |  0.80    Ca    10.1      11 May 2022 06:44  Phos  3.3     05-11  Mg     1.80     05-11    TPro  6.4  /  Alb  3.2<L>  /  TBili  0.2  /  DBili  <0.2  /  AST  22  /  ALT  17  /  AlkPhos  76  05-11    LIVER FUNCTIONS - ( 11 May 2022 06:44 )  Alb: 3.2 g/dL / Pro: 6.4 g/dL / ALK PHOS: 76 U/L / ALT: 17 U/L / AST: 22 U/L / GGT: x               MICROBIOLOGY:  .Blood Blood-Peripheral  05-04-22   No Growth Final  --  --      .Blood Blood-Peripheral  05-04-22   No Growth Final  --  --              v            RADIOLOGY

## 2022-05-11 NOTE — PROGRESS NOTE ADULT - SUBJECTIVE AND OBJECTIVE BOX
[facility]      Seen earlier today, and examined.  - Today, patient is without complaints.           *****MEDICATIONS: Current medication reviewed and documented.    MEDICATIONS  (STANDING):  allopurinol 100 milliGRAM(s) Oral at bedtime  cefTRIAXone   IVPB 1000 milliGRAM(s) IV Intermittent every 24 hours  enoxaparin Injectable 40 milliGRAM(s) SubCutaneous every 24 hours  fenofibrate Tablet 145 milliGRAM(s) Oral at bedtime  metoprolol succinate ER 25 milliGRAM(s) Oral daily  metroNIDAZOLE    Tablet 500 milliGRAM(s) Oral two times a day  mirtazapine 15 milliGRAM(s) Oral <User Schedule>  pantoprazole    Tablet 40 milliGRAM(s) Oral before breakfast  thiamine 100 milliGRAM(s) Oral daily    MEDICATIONS  (PRN):  acetaminophen     Tablet .. 650 milliGRAM(s) Oral every 6 hours PRN Temp greater or equal to 38C (100.4F), Mild Pain (1 - 3), Moderate Pain (4 - 6)          ***** VITAL SIGNS:  T(F): 98 (22 @ 22:56), Max: 98.8 (22 @ 18:16)  HR: 102 (22 @ 22:56) (86 - 102)  BP: 140/72 (22 @ 22:56) (118/70 - 149/66)  RR: 18 (22 @ 22:56) (16 - 18)  SpO2: 98% (22 @ 22:56) (98% - 99%)  Wt(kg): --  ,   I&O's Summary           *****PHYSICAL EXAM:cooperative   pleasant      alert oriented x 2 attention comprehension are fair.  Able to name, repeat.     EOmi fundi not visualized   no nystagmus VFF to confrontation  Tongue is midline  Palate elevates symmetrically   Moving all 4 ext spontaneously no drift appreciated    Gait not assessed.            *****LAB AND IMAGIN.3   5.65  )-----------( 376      ( 11 May 2022 06:44 )             37.9               05-11    130<L>  |  96<L>  |  11  ----------------------------<  85  4.0   |  25  |  0.80    Ca    10.1      11 May 2022 06:44  Phos  3.3     05-  Mg     1.80     -    TPro  6.4  /  Alb  3.2<L>  /  TBili  0.2  /  DBili  <0.2  /  AST  22  /  ALT  17  /  AlkPhos  76  -                         [All pertinent recent Imaging/Reports reviewed]           *****A S S E S S M E N T   A N D   P L A N :  Excerpt from H&P,"  88 yo F non smoker with PMHX of HTN, HLD, GERD, and recently dx depression, here with Daughter at bedside brought in for worsening confusion and restlessness first beginning 2 days ago, intermittently, then today worse with hallucinations ( auditory and visual),and generalized weakness.  family concerned patient was going to wander. Recently dx with UTI  5 days ago has been on bactrim for 5 days ( unclear if patient missed a dose due to developing confusion). Patient A& o x2-3, having episode of confusion normally ambulates  hx obtained from chart   family not available at bedside at the time of my encounter     Problem/Recommendations 1: ams   likely toxic metabolic encephalopathy due to hyponatremia +/- hypercalcemia +/- uti worsening underlying cognitive impairment   Plan : rx infection   avoid daytime somnolence   encourage po intake   melatonin for sleep augmentation   thiamine iv 100 daily   doing well    doing well    noted to have gangrenous gall bladder defer to gi   mildly dilated cbd       Problem/Recommendations 2:weakness generalized   deconditioning vs. related to hypercalcemia   oob to chair daily please         Thank you for allowing me to participate in the care of this patient. Will continue to follow patient periodically. Please do not hesitate to call me if you have any  questions or if there has been a change in patients neurological status     ________________  Sandhya Crow MD  [facility] ([facility]  [phone]      33 minutes spent on total encounter; more than 50 % of the visit was  spent counseling about plan of care, compliance to diet/exercise and medication regimen and or  coordinating care by the attending physician.      It is advised that stroke patients follow up with SHEBA Salazar @ [phone] in 1- 2 weeks.   Others please follow up with Dr. Michael Nissenbaum [phone]

## 2022-05-11 NOTE — PROGRESS NOTE ADULT - ASSESSMENT
87 year old F with history of HTN, HLD and GERD presenting to [facility] ED with confusion and restlessness x 2 days, along with auditory and visual hallucinations. ID consulted for possible UTI.     Impression:  #Encephalopathy - Likely non-infectious 2/2 electrolyte abnormalities. Reported history of recent UTI treated with Bactrim. UA here without pyuria, UCx no growth. No evidence of meningitis, neck supple w/o rigidity  #perf GB - infection vs cancer    - appreciate GI input  - cont CTX/flagyl    Angel Chand  Attending Physician   Division of Infectious Disease  Office #[phone]  Available on [employer] Teams also  After 5pm/weekend or no response, call #[phone]

## 2022-05-11 NOTE — PROGRESS NOTE ADULT - ASSESSMENT
86 yo F non smoker with PMHX of HTN, HLD, GERD, and recently dx depression, brought in by family for confusion. nephrology consulted for electrolyte abnormities    Hypercalcemia  per patient takes MVI and vit d supplement  hold supplements   PTH 31 and 29, ionized calcium high normal  elevated vit d possible sec to vit d toxicity   K/L 0.74, SIF neg  pending PTHrp SPEP  pending malignancy work up  Calcium improving  Monitor Ca level    Hyponatremia  low Na on presentation likely polydipsia resolved now again low today  r/o SIADH  check urine na osmo  free water restriction <1L/day  normal TSH  on mirtazepine for depression  monitor  serum NA  improved    HTN  BP relatively controlled  not on meds  monitor    Hypophosphatemia off and on  Monitor serum Po4  Replete as needed.

## 2022-05-11 NOTE — DIETITIAN INITIAL EVALUATION ADULT - PERTINENT LABORATORY DATA
05-11    130<L>  |  96<L>  |  11  ----------------------------<  85  4.0   |  25  |  0.80    Ca    10.1      11 May 2022 06:44  Phos  3.3     05-11  Mg     1.80     05-11    TPro  6.4  /  Alb  3.2<L>  /  TBili  0.2  /  DBili  <0.2  /  AST  22  /  ALT  17  /  AlkPhos  76  05-11

## 2022-05-11 NOTE — PROGRESS NOTE ADULT - ASSESSMENT
86 yo F non smoker with PMHX of HTN, HLD, GERD, and recently dx depression, ventral hernia repair 20+ years ago here with Daughter at bedside brought in for worsening confusion and restlessness. CT with finding concern for gangrenous cholecystitis with thickening CBD, however patient without pain.     PLAN:   - No acute surgical intervention; benign abdominal exam  - Agree with IV abx  - Recommend malignancy work up with MRCP and GI consult for possible EGD    B Team Surgery  [id number]

## 2022-05-11 NOTE — CONSULT NOTE ADULT - ASSESSMENT
Recommendations:  Obtain MRI abdomen with and without contrast to rule out hepatopancreatic biliary cancers- suspect gall bladder cancer      Carol Emery MD  Gastroenterology Fellow  Pager: [phone]  Please call answering service [phone] / on-call GI fellow after 5pm and before 8am, and on weekends. 86 yo F non smoker with PMHX of HTN, HLD, GERD, and recently dx depression, questionable dementia admitted for altered mental status. Noted to have hypercalcemia of 11.4 and so CT abdomen and pelvis was obtained to rule out malignancy.   Found to have abnormal appearing gall bladder ( Distended gallbladder with pericholecystic inflammation and diffuse irregular wall thickening ) suspicious for acute gangrenous cholecystitis with contained perforation with adjacent small collection of fluid and air that appears to communicate with hepatic flexure, raising suspicion for fistula vs neoplastic etiology.    Patient without abdominal pain. Normal liver enzymes, no fever and normal WBC. Suspect malignancy process.    Impression:  # Abnormal imaging as above    Recommendations:  - Obtain MRI abdomen with and without contrast to rule out hepatopancreatic biliary cancers- suspect gall bladder cancer    - Check tumor markers       Carol Emery MD  Gastroenterology Fellow  Pager: [phone]  Please call answering service [phone] / on-call GI fellow after 5pm and before 8am, and on weekends.

## 2022-05-11 NOTE — PROGRESS NOTE ADULT - SUBJECTIVE AND OBJECTIVE BOX
Date of service: 05/11/22    HISTORY OF PRESENT ILLNESS:   Ms Rich is a pleasant 86yo woman sent in by family for confusion and hallucinations.     S: no chest pain or sob; ros limited but otherwise negative.     Review of Systems:   Constitutional: [ ] fevers, [ ] chills.   Skin: [ ] dry skin. [ ] rashes.  Psychiatric: [ ] depression, [ ] anxiety.   Gastrointestinal: [ ] BRBPR, [ ] melena.   Neurological: [ ] confusion. [ ] seizures. [ ] shuffling gait.   Ears,Nose,Mouth and Throat: [ ] ear pain [ ] sore throat.   Eyes: [ ] diplopia.   Respiratory: [ ] hemoptysis. [ ] shortness of breath  Cardiovascular: See HPI above  Hematologic/Lymphatic: [ ] anemia. [ ] painful nodes. [ ] prolonged bleeding.   Genitourinary: [ ] hematuria. [ ] flank pain.   Endocrine: [ ] significant change in weight. [ ] intolerance to heat and cold.     Review of systems [ x] otherwise negative, [ ] otherwise unable to obtain    FH: no family history of sudden cardiac death in first degree relatives    SH: [ ] tobacco, [ ] alcohol, [ ] drugs      acetaminophen     Tablet .. 650 milliGRAM(s) Oral every 6 hours PRN  allopurinol 100 milliGRAM(s) Oral at bedtime  cefTRIAXone   IVPB 1000 milliGRAM(s) IV Intermittent every 24 hours  enoxaparin Injectable 40 milliGRAM(s) SubCutaneous every 24 hours  fenofibrate Tablet 145 milliGRAM(s) Oral at bedtime  metoprolol succinate ER 25 milliGRAM(s) Oral daily  metroNIDAZOLE    Tablet 500 milliGRAM(s) Oral two times a day  mirtazapine 15 milliGRAM(s) Oral <User Schedule>  pantoprazole    Tablet 40 milliGRAM(s) Oral before breakfast  thiamine 100 milliGRAM(s) Oral daily                            12.3   5.65  )-----------( 376      ( 11 May 2022 06:44 )             37.9       05-11    130<L>  |  96<L>  |  11  ----------------------------<  85  4.0   |  25  |  0.80    Ca    10.1      11 May 2022 06:44  Phos  3.3     05-11  Mg     1.80     05-11    TPro  6.4  /  Alb  3.2<L>  /  TBili  0.2  /  DBili  <0.2  /  AST  22  /  ALT  17  /  AlkPhos  76  05-11            T(C): 36.7 (05-11-22 @ 05:23), Max: 36.7 (05-10-22 @ 22:24)  HR: 86 (05-11-22 @ 05:23) (78 - 86)  BP: 118/70 (05-11-22 @ 05:23) (118/70 - 155/61)  RR: 17 (05-11-22 @ 05:23) (17 - 18)  SpO2: 98% (05-11-22 @ 05:23) (98% - 98%)  Wt(kg): --    I&O's Summary    DATA    ECG: NSR, shortening/narrowing of T wave.  	  ASSESSMENT/PLAN: 	87y Female with acute metabolic encephalopathy.  Previous UTI treatment. Hypercalcemic.    -monitor tele - sr with no events thus far and now tele dc   -tte with normal LV function   -treatment of hypercalcemia per primary team - calcium levels improved   -no further inpatient cardiac workup expected as long as tele remains stable  -follow up with [facility] after discharge - son to call office to schedule appointment     Carolyn Pike MD

## 2022-05-11 NOTE — DIETITIAN INITIAL EVALUATION ADULT - ADD RECOMMEND
1. Encourage & assist Pt with meals; Monitor PO diet tolerance;    2. Continue Remeron, as ordered;      3. Add Multivitamins 1 tab daily for micronutrient coverage;      3. Monitor labs, hydration status;

## 2022-05-12 LAB
ANION GAP SERPL CALC-SCNC: 10 MMOL/L — SIGNIFICANT CHANGE UP (ref 7–14)
BUN SERPL-MCNC: 17 MG/DL — SIGNIFICANT CHANGE UP (ref 7–23)
CALCIUM SERPL-MCNC: 10.6 MG/DL — HIGH (ref 8.4–10.5)
CHLORIDE SERPL-SCNC: 95 MMOL/L — LOW (ref 98–107)
CO2 SERPL-SCNC: 25 MMOL/L — SIGNIFICANT CHANGE UP (ref 22–31)
CREAT SERPL-MCNC: 0.87 MG/DL — SIGNIFICANT CHANGE UP (ref 0.5–1.3)
EGFR: 65 ML/MIN/1.73M2 — SIGNIFICANT CHANGE UP
GLUCOSE SERPL-MCNC: 95 MG/DL — SIGNIFICANT CHANGE UP (ref 70–99)
HCT VFR BLD CALC: 38.7 % — SIGNIFICANT CHANGE UP (ref 34.5–45)
HGB BLD-MCNC: 12.7 G/DL — SIGNIFICANT CHANGE UP (ref 11.5–15.5)
MAGNESIUM SERPL-MCNC: 2 MG/DL — SIGNIFICANT CHANGE UP (ref 1.6–2.6)
MCHC RBC-ENTMCNC: 28.9 PG — SIGNIFICANT CHANGE UP (ref 27–34)
MCHC RBC-ENTMCNC: 32.8 GM/DL — SIGNIFICANT CHANGE UP (ref 32–36)
MCV RBC AUTO: 88 FL — SIGNIFICANT CHANGE UP (ref 80–100)
NRBC # BLD: 0 /100 WBCS — SIGNIFICANT CHANGE UP
NRBC # FLD: 0 K/UL — SIGNIFICANT CHANGE UP
PHOSPHATE SERPL-MCNC: 3.4 MG/DL — SIGNIFICANT CHANGE UP (ref 2.5–4.5)
PLATELET # BLD AUTO: 370 K/UL — SIGNIFICANT CHANGE UP (ref 150–400)
POTASSIUM SERPL-MCNC: 4.5 MMOL/L — SIGNIFICANT CHANGE UP (ref 3.5–5.3)
POTASSIUM SERPL-SCNC: 4.5 MMOL/L — SIGNIFICANT CHANGE UP (ref 3.5–5.3)
RBC # BLD: 4.4 M/UL — SIGNIFICANT CHANGE UP (ref 3.8–5.2)
RBC # FLD: 13.7 % — SIGNIFICANT CHANGE UP (ref 10.3–14.5)
SODIUM SERPL-SCNC: 130 MMOL/L — LOW (ref 135–145)
WBC # BLD: 7.88 K/UL — SIGNIFICANT CHANGE UP (ref 3.8–10.5)
WBC # FLD AUTO: 7.88 K/UL — SIGNIFICANT CHANGE UP (ref 3.8–10.5)

## 2022-05-12 PROCEDURE — 74183 MRI ABD W/O CNTR FLWD CNTR: CPT | Mod: 26

## 2022-05-12 PROCEDURE — 72197 MRI PELVIS W/O & W/DYE: CPT | Mod: 26

## 2022-05-12 PROCEDURE — 99232 SBSQ HOSP IP/OBS MODERATE 35: CPT

## 2022-05-12 PROCEDURE — 99232 SBSQ HOSP IP/OBS MODERATE 35: CPT | Mod: GC

## 2022-05-12 RX ADMIN — ENOXAPARIN SODIUM 40 MILLIGRAM(S): 100 INJECTION SUBCUTANEOUS at 18:19

## 2022-05-12 RX ADMIN — Medication 500 MILLIGRAM(S): at 18:19

## 2022-05-12 RX ADMIN — CEFTRIAXONE 100 MILLIGRAM(S): 500 INJECTION, POWDER, FOR SOLUTION INTRAMUSCULAR; INTRAVENOUS at 15:26

## 2022-05-12 RX ADMIN — MIRTAZAPINE 15 MILLIGRAM(S): 45 TABLET, ORALLY DISINTEGRATING ORAL at 22:49

## 2022-05-12 RX ADMIN — Medication 100 MILLIGRAM(S): at 11:51

## 2022-05-12 RX ADMIN — Medication 500 MILLIGRAM(S): at 05:57

## 2022-05-12 RX ADMIN — Medication 100 MILLIGRAM(S): at 22:50

## 2022-05-12 RX ADMIN — PANTOPRAZOLE SODIUM 40 MILLIGRAM(S): 20 TABLET, DELAYED RELEASE ORAL at 05:57

## 2022-05-12 RX ADMIN — Medication 25 MILLIGRAM(S): at 05:57

## 2022-05-12 RX ADMIN — Medication 145 MILLIGRAM(S): at 22:49

## 2022-05-12 NOTE — PROGRESS NOTE ADULT - ASSESSMENT
86 yo F non smoker with PMHX of HTN, HLD, GERD, and recently dx depression, brought in by family for confusion. nephrology consulted for electrolyte abnormities    Hypercalcemia  per patient takes MVI and vit d supplement  hold supplements   PTH 31 and 29, ionized calcium high normal  elevated vit d possible sec to vit d toxicity   K/L 0.74, SIF neg  pending PTHrp SPEP  pending malignancy work up  Monitor Ca level    Hyponatremia  low Na on presentation likely polydipsia resolved now again low today  r/o SIADH  check urine na osmo  free water restriction <1L/day  normal TSH  on mirtazepine for depression  monitor  serum NA  improved    HTN  BP relatively controlled  not on meds  monitor    Hypophosphatemia off and on  Monitor serum Po4  Replete as needed.

## 2022-05-12 NOTE — PROGRESS NOTE ADULT - SUBJECTIVE AND OBJECTIVE BOX
Date of service: 05/12/22    HISTORY OF PRESENT ILLNESS:   Ms Rich is a pleasant 88yo woman sent in by family for confusion and hallucinations.     S: no chest pain or sob; ros limited but otherwise negative.     Review of Systems:   Constitutional: [ ] fevers, [ ] chills.   Skin: [ ] dry skin. [ ] rashes.  Psychiatric: [ ] depression, [ ] anxiety.   Gastrointestinal: [ ] BRBPR, [ ] melena.   Neurological: [ ] confusion. [ ] seizures. [ ] shuffling gait.   Ears,Nose,Mouth and Throat: [ ] ear pain [ ] sore throat.   Eyes: [ ] diplopia.   Respiratory: [ ] hemoptysis. [ ] shortness of breath  Cardiovascular: See HPI above  Hematologic/Lymphatic: [ ] anemia. [ ] painful nodes. [ ] prolonged bleeding.   Genitourinary: [ ] hematuria. [ ] flank pain.   Endocrine: [ ] significant change in weight. [ ] intolerance to heat and cold.     Review of systems [x ] otherwise negative, [ ] otherwise unable to obtain    FH: no family history of sudden cardiac death in first degree relatives    SH: [ ] tobacco, [ ] alcohol, [ ] drugs    acetaminophen     Tablet .. 650 milliGRAM(s) Oral every 6 hours PRN  allopurinol 100 milliGRAM(s) Oral at bedtime  cefTRIAXone   IVPB 1000 milliGRAM(s) IV Intermittent every 24 hours  enoxaparin Injectable 40 milliGRAM(s) SubCutaneous every 24 hours  fenofibrate Tablet 145 milliGRAM(s) Oral at bedtime  metoprolol succinate ER 25 milliGRAM(s) Oral daily  metroNIDAZOLE    Tablet 500 milliGRAM(s) Oral two times a day  mirtazapine 15 milliGRAM(s) Oral <User Schedule>  pantoprazole    Tablet 40 milliGRAM(s) Oral before breakfast  thiamine 100 milliGRAM(s) Oral daily                            12.7   7.88  )-----------( 370      ( 12 May 2022 07:36 )             38.7       05-12    130<L>  |  95<L>  |  17  ----------------------------<  95  4.5   |  25  |  0.87    Ca    10.6<H>      12 May 2022 07:36  Phos  3.4     05-12  Mg     2.00     05-12    TPro  6.4  /  Alb  3.2<L>  /  TBili  0.2  /  DBili  <0.2  /  AST  22  /  ALT  17  /  AlkPhos  76  05-11    T(C): 37.1 (05-12-22 @ 14:58), Max: 37.1 (05-11-22 @ 18:16)  HR: 99 (05-12-22 @ 14:58) (94 - 106)  BP: 127/78 (05-12-22 @ 14:58) (127/78 - 149/66)  RR: 18 (05-12-22 @ 14:58) (16 - 18)  SpO2: 99% (05-12-22 @ 14:58) (98% - 99%)      General: Well nourished in no acute distress. Alert and Oriented * 3.   Head: Normocephalic and atraumatic.   Neck: No JVD. No bruits. Supple. Does not appear to be enlarged.   Cardiovascular: + S1,S2 ; RRR Soft systolic murmur at the left lower sternal border. No rubs noted.    Lungs: CTA b/l. No rhonchi, rales or wheezes.   Abdomen: + BS, soft. Non tender. Non distended. No rebound. No guarding.   Extremities: No clubbing/cyanosis/edema.   Neurologic: Moves all four extremities. Full range of motion.   Skin: Warm and moist. The patient's skin has normal elasticity and good skin turgor.   Psychiatric: Appropriate mood and affect.  Musculoskeletal: Normal range of motion, normal strength    DATA    ECG: NSR, shortening/narrowing of T wave.  	  ASSESSMENT/PLAN: 	87y Female with acute metabolic encephalopathy.  Previous UTI treatment. Hypercalcemic.    -monitor tele - sr with no events thus far and now tele dc   -tte with normal LV function   -treatment of hypercalcemia per primary team - calcium levels improved   -no further inpatient cardiac workup expected as long as tele remains stable  -follow up with [facility] after discharge - son to call office to schedule appointment

## 2022-05-12 NOTE — PROGRESS NOTE ADULT - ASSESSMENT
HARSH VERGARA is a 87y Female who presents with a chief complaint of altered mental status    Hypercalcemia  - Calcium remains mildly elevated. Cause unclear. PTH normal. PTHRP negative.  - Noted plasma cell neoplasm work; no monoclonal protein seen on immunofixation.  - Continue to monitor calcium levels. IVF as needed. Nephrology is following.    Gangrenous Cholecystitis  - CT finding. Surgery is following.  - Pending MRCP to rule out malignancy.    Will continue to follow.    Bon Conti MD  Hematology/Oncology  C: [phone]  O: [phone] HARSH VERGARA is a 87y Female who presents with a chief complaint of altered mental status    Hypercalcemia  - Calcium remains mildly elevated. Cause unclear. PTH normal. PTHRP negative.  - Noted plasma cell neoplasm work; no monoclonal protein seen on immunofixation.  - Continue to monitor calcium levels. IVF as needed. Nephrology is following.    Gangrenous Cholecystitis  - CT finding. Surgery is following.  - Pending MRCP to rule out malignancy.    Will continue to follow.    Bon Conti MD  Hematology/Oncology  O: [phone]

## 2022-05-12 NOTE — PROGRESS NOTE ADULT - ASSESSMENT
87 year old F with history of HTN, HLD and GERD presenting to [facility] ED with confusion and restlessness x 2 days, along with auditory and visual hallucinations. ID consulted for possible UTI.     Impression:  #Encephalopathy - Likely non-infectious 2/2 electrolyte abnormalities. Reported history of recent UTI treated with Bactrim. UA here without pyuria, UCx no growth. No evidence of meningitis, neck supple w/o rigidity  #perf GB - infection vs cancer    - appreciate GI input  - cont CTX/flagyl    Angel Chand  Attending Physician   Division of Infectious Disease  Office #[phone]  Available on [employer] Teams also  After 5pm/weekend or no response, call #[phone]

## 2022-05-12 NOTE — PROGRESS NOTE ADULT - SUBJECTIVE AND OBJECTIVE BOX
SURGERY  Pager: [phone]      INTERVAL EVENTS/SUBJECTIVE: Patient seen and examined at bedside. Continues to be nontender on exam, passing gas.     ______________________________________________  OBJECTIVE:   T(C): 37 (05-12-22 @ 05:55), Max: 37.1 (05-11-22 @ 18:16)  HR: 106 (05-12-22 @ 05:55) (94 - 106)  BP: 144/70 (05-12-22 @ 05:55) (140/72 - 149/66)  RR: 18 (05-12-22 @ 05:55) (16 - 18)  SpO2: 99% (05-12-22 @ 05:55) (98% - 99%)  Wt(kg): --  CAPILLARY BLOOD GLUCOSE        I&O's Detail      Physical exam:  Gen: resting in bed comfortably in NAD  Chest: no increased WOB, regular inspiratory effort   Abdomen: Soft, nontender, nondistended with no rebound tenderness or guarding.  Vascular: WWP, MCCANN x4  NEURO: awake, alert  ______________________________________________  LABS:  CBC Full  -  ( 12 May 2022 07:36 )  WBC Count : 7.88 K/uL  RBC Count : 4.40 M/uL  Hemoglobin : 12.7 g/dL  Hematocrit : 38.7 %  Platelet Count - Automated : 370 K/uL  Mean Cell Volume : 88.0 fL  Mean Cell Hemoglobin : 28.9 pg  Mean Cell Hemoglobin Concentration : 32.8 gm/dL  Auto Neutrophil # : x  Auto Lymphocyte # : x  Auto Monocyte # : x  Auto Eosinophil # : x  Auto Basophil # : x  Auto Neutrophil % : x  Auto Lymphocyte % : x  Auto Monocyte % : x  Auto Eosinophil % : x  Auto Basophil % : x    05-12    130<L>  |  95<L>  |  17  ----------------------------<  95  4.5   |  25  |  0.87    Ca    10.6<H>      12 May 2022 07:36  Phos  3.4     05-12  Mg     2.00     05-12    TPro  6.4  /  Alb  3.2<L>  /  TBili  0.2  /  DBili  <0.2  /  AST  22  /  ALT  17  /  AlkPhos  76  05-11    _____________________________________________  RADIOLOGY:

## 2022-05-12 NOTE — PROGRESS NOTE ADULT - SUBJECTIVE AND OBJECTIVE BOX
Interval Events:   MRI performed - no CBD stones     Hospital Medications:  acetaminophen     Tablet .. 650 milliGRAM(s) Oral every 6 hours PRN  allopurinol 100 milliGRAM(s) Oral at bedtime  cefTRIAXone   IVPB 1000 milliGRAM(s) IV Intermittent every 24 hours  enoxaparin Injectable 40 milliGRAM(s) SubCutaneous every 24 hours  fenofibrate Tablet 145 milliGRAM(s) Oral at bedtime  metoprolol succinate ER 25 milliGRAM(s) Oral daily  metroNIDAZOLE    Tablet 500 milliGRAM(s) Oral two times a day  mirtazapine 15 milliGRAM(s) Oral <User Schedule>  pantoprazole    Tablet 40 milliGRAM(s) Oral before breakfast  thiamine 100 milliGRAM(s) Oral daily        ROS:   General:  No fevers, chills or night sweats  ENT:  No sore throat or dysphagia  CV:  No pain or palpitations  Resp:  No dyspnea, cough or  wheezing  GI:  as above  Skin:  No rash or edema  Neuro: no weakness   Hematologic: no bleeding  Musculoskeletal: no muscle pain or join pain  Psych: no agitation      PHYSICAL EXAM:   Vital Signs:  Vital Signs Last 24 Hrs  T(C): 37 (12 May 2022 05:55), Max: 37.1 (11 May 2022 18:16)  T(F): 98.6 (12 May 2022 05:55), Max: 98.8 (11 May 2022 18:16)  HR: 106 (12 May 2022 05:55) (94 - 106)  BP: 144/70 (12 May 2022 05:55) (140/72 - 149/66)  BP(mean): --  RR: 18 (12 May 2022 05:55) (16 - 18)  SpO2: 99% (12 May 2022 05:55) (98% - 99%)  Daily     Daily     GENERAL:  NAD, Appears stated age  HEENT:  NC/AT,  conjunctivae clear and pink, sclera -anicteric  CHEST:  Normal Effort, Breath sounds clear  HEART:  RRR, S1 + S2, no murmurs  ABDOMEN:  Soft, non-tender, non-distended  EXTREMITIES:  no cyanosis or edema  SKIN:  Warm & Dry. No rash or erythema  NEURO:  Alert, oriented, no focal deficit    LABS:                        12.7   7.88  )-----------( 370      ( 12 May 2022 07:36 )             38.7     Mean Cell Volume: 88.0 fL (05-12-22 @ 07:36)    05-12    130<L>  |  95<L>  |  17  ----------------------------<  95  4.5   |  25  |  0.87    Ca    10.6<H>      12 May 2022 07:36  Phos  3.4     05-12  Mg     2.00     05-12    TPro  6.4  /  Alb  3.2<L>  /  TBili  0.2  /  DBili  <0.2  /  AST  22  /  ALT  17  /  AlkPhos  76  05-11    LIVER FUNCTIONS - ( 11 May 2022 06:44 )  Alb: 3.2 g/dL / Pro: 6.4 g/dL / ALK PHOS: 76 U/L / ALT: 17 U/L / AST: 22 U/L / GGT: x                                       12.7   7.88  )-----------( 370      ( 12 May 2022 07:36 )             38.7                         12.3   5.65  )-----------( 376      ( 11 May 2022 06:44 )             37.9                         12.5   5.91  )-----------( 434      ( 10 May 2022 05:45 )             38.6       Imaging:  < from: MR Abdomen w/wo IV Cont (05.12.22 @ 10:32) >    ACC: [id number] EXAM:  MR PELVIS WAW IC                        ACC: [id number] EXAM:  MR ABDOMEN WAW                           PROCEDURE DATE:  05/12/2022          INTERPRETATION:  CLINICAL INFORMATION: Evaluate gallbladder and left   adnexal lesion,identified on recent CT.    COMPARISON: 5/10/2022.    CONTRAST/COMPLICATIONS:  IV Contrast: Gadavist  5.5ml cc administered   2ml cc discarded  Oral Contrast: NONE  Complications: None reported at time of study completion    PROCEDURE:  MRI of the abdomen and pelvis was performed.  -    FINDINGS: Evaluation is significantly degraded from motion artifacts.  LOWER CHEST: Within normal limits.    LIVER: Within normal limits.  BILE DUCTS: Normal caliber.  GALLBLADDER: Moderately abnormal appearance of the gallbladder with   irregular and nodular mural thickening and defect in the posterior and   inferior wall. 2 small rim-enhancing collections are again noted with   communication with the hepatic flexure. Adjacent fatty stranding. No   obvious abnormality of the adjacent liver. Associated restricted   diffusion.  SPLEEN: Within normal limits.  PANCREAS: Within normal limits.  ADRENALS: Within normal limits.  KIDNEYS/URETERS: Within normal limits.    BLADDER: Within normal limits.  REPRODUCTIVE ORGANS: Atrophic uterus. The endometrial cavity is distended   to 8mm. Previously identified left adnexal lesion is consistent with an   intramural fibroid, measuring 2.7 cm. No adnexal mass.    BOWEL: No bowel obstruction. Colonic diverticulosis.  PERITONEUM: No ascites.  VESSELS: Within normal limits.  RETROPERITONEUM/LYMPH NODES: No lymphadenopathy.  ABDOMINAL WALL: Within normal limits.  BONES: Degenerative changes.    IMPRESSION:  No significant interval change in overall appearance of the gallbladder   which is again suggestive of gangrenous acute cholecystitis with   perforation and associated 2 small collections. Communication with   adjacent hepatic flexure is again likely. However, underlying neoplasm is   not entirely excluded. Surgical consultation is advised, if not already   obtained.  Left adnexal lesion is consistent with an intramural fibroid. No adnexal   mass. Dilated endometrial cavity in this postmenopausal patient which can   be better evaluated with a pelvic ultrasound.      --- End of Report ---        < end of copied text >

## 2022-05-12 NOTE — PROGRESS NOTE ADULT - NS ATTEND AMEND GEN_ALL_CORE FT
PTH related peptide is less than 2. Had CT with IV contrast on 5/10 thus reason of mild elevation of Scr.

## 2022-05-12 NOTE — PROGRESS NOTE ADULT - ASSESSMENT
88 yo F non smoker with PMHX of HTN, HLD, GERD, and recently dx depression, ventral hernia repair 20+ years ago here with Daughter at bedside brought in for worsening confusion and restlessness. CT with finding concern for gangrenous cholecystitis with thickening CBD, however patient without pain.     PLAN:   - No acute surgical intervention; benign abdominal exam  - GI following  - Agree with IV abx  - MRCP pending    B Team Surgery  [id number]   86 yo F non smoker with PMHX of HTN, HLD, GERD, and recently dx depression, ventral hernia repair 20+ years ago here with Daughter at bedside brought in for worsening confusion and restlessness. CT with finding concern for gangrenous cholecystitis with thickening CBD, however patient without pain.     PLAN:   - No acute surgical intervention; benign abdominal exam  - GI following  - Agree with IV abx  - MRCP pending  - CEA/Ca 19-9 added on for AM labs    B Team Surgery  [id number]

## 2022-05-12 NOTE — PROGRESS NOTE ADULT - SUBJECTIVE AND OBJECTIVE BOX
CHIEF COMPLAINT  Altered Mental Status    HISTORY OF PRESENT ILLNESS  HARSH VERGARA is a 87y Female who presents with a chief complaint of altered mental status    No acute events. No complaints.    REVIEW OF SYSTEMS  A complete review of systems was performed; negative except per HPI    PHYSICAL EXAM  T(C): 37 (05-12-22 @ 05:55), Max: 37.1 (05-11-22 @ 18:16)  HR: 106 (05-12-22 @ 05:55) (94 - 106)  BP: 144/70 (05-12-22 @ 05:55) (140/72 - 149/66)  RR: 18 (05-12-22 @ 05:55) (16 - 18)  SpO2: 99% (05-12-22 @ 05:55) (98% - 99%)  Constitutional: alert, awake, in no acute distress  Eyes: PERRL, EOMI  HEENT: normocephalic, atraumatic  Neck: supple, non-tender  Cardiovascular: normal perfusion, no peripheral edema  Respiratory: normal respiratory efforts; no increased use of accessory muscles  Gastrointestinal: soft, non-tender  Musculoskeletal: normal range of motion, no deformities noted  Neurological: alert, CN II to XI grossly intact  Skin: warm, dry    LABORATORY DATA                        12.7   7.88  )-----------( 370      ( 12 May 2022 07:36 )             38.7     05-12    130<L>  |  95<L>  |  17  ----------------------------<  95  4.5   |  25  |  0.87    Ca    10.6<H>      12 May 2022 07:36  Phos  3.4     05-12  Mg     2.00     05-12    TPro  6.4  /  Alb  3.2<L>  /  TBili  0.2  /  DBili  <0.2  /  AST  22  /  ALT  17  /  AlkPhos  76  05-11   CHIEF COMPLAINT  Altered Mental Status    HISTORY OF PRESENT ILLNESS  HARSH VERGARA is a 87y Female who presents with a chief complaint of altered mental status    No acute events. No complaints.    REVIEW OF SYSTEMS  A complete review of systems was performed; negative except per HPI    PHYSICAL EXAM  T(C): 37 (05-12-22 @ 05:55), Max: 37.1 (05-11-22 @ 18:16)  HR: 106 (05-12-22 @ 05:55) (94 - 106)  BP: 144/70 (05-12-22 @ 05:55) (140/72 - 149/66)  RR: 18 (05-12-22 @ 05:55) (16 - 18)  SpO2: 99% (05-12-22 @ 05:55) (98% - 99%)  Constitutional: awake, in no acute distress  Eyes: PERRL, EOMI  HEENT: normocephalic, atraumatic  Neck: supple, non-tender  Cardiovascular: normal perfusion, no peripheral edema  Respiratory: normal respiratory efforts; no increased use of accessory muscles  Gastrointestinal: soft, non-tender  Musculoskeletal: normal range of motion, no deformities noted  Skin: warm, dry    LABORATORY DATA                        12.7   7.88  )-----------( 370      ( 12 May 2022 07:36 )             38.7     05-12    130<L>  |  95<L>  |  17  ----------------------------<  95  4.5   |  25  |  0.87    Ca    10.6<H>      12 May 2022 07:36  Phos  3.4     05-12  Mg     2.00     05-12    TPro  6.4  /  Alb  3.2<L>  /  TBili  0.2  /  DBili  <0.2  /  AST  22  /  ALT  17  /  AlkPhos  76  05-11

## 2022-05-12 NOTE — PROGRESS NOTE ADULT - SUBJECTIVE AND OBJECTIVE BOX
SHANONHARSH VENEGAS 87y [id number]    Patient is a 87y old  Female who presents with a chief complaint of ams and dysuria (12 May 2022 13:44)      Follow Up/CC:  ID following for cholecystitis     Interval History/ROS: no fever    Allergies    penicillin (Unknown)    Intolerances        ANTIMICROBIALS:  cefTRIAXone   IVPB 1000 every 24 hours  metroNIDAZOLE    Tablet 500 two times a day      MEDICATIONS  (STANDING):  allopurinol 100 milliGRAM(s) Oral at bedtime  cefTRIAXone   IVPB 1000 milliGRAM(s) IV Intermittent every 24 hours  enoxaparin Injectable 40 milliGRAM(s) SubCutaneous every 24 hours  fenofibrate Tablet 145 milliGRAM(s) Oral at bedtime  metoprolol succinate ER 25 milliGRAM(s) Oral daily  metroNIDAZOLE    Tablet 500 milliGRAM(s) Oral two times a day  mirtazapine 15 milliGRAM(s) Oral <User Schedule>  pantoprazole    Tablet 40 milliGRAM(s) Oral before breakfast  thiamine 100 milliGRAM(s) Oral daily    MEDICATIONS  (PRN):  acetaminophen     Tablet .. 650 milliGRAM(s) Oral every 6 hours PRN Temp greater or equal to 38C (100.4F), Mild Pain (1 - 3), Moderate Pain (4 - 6)        Vital Signs Last 24 Hrs  T(C): 37 (12 May 2022 05:55), Max: 37.1 (11 May 2022 18:16)  T(F): 98.6 (12 May 2022 05:55), Max: 98.8 (11 May 2022 18:16)  HR: 106 (12 May 2022 05:55) (94 - 106)  BP: 144/70 (12 May 2022 05:55) (140/72 - 149/66)  BP(mean): --  RR: 18 (12 May 2022 05:55) (16 - 18)  SpO2: 99% (12 May 2022 05:55) (98% - 99%)    CBC Full  -  ( 12 May 2022 07:36 )  WBC Count : 7.88 K/uL  RBC Count : 4.40 M/uL  Hemoglobin : 12.7 g/dL  Hematocrit : 38.7 %  Platelet Count - Automated : 370 K/uL  Mean Cell Volume : 88.0 fL  Mean Cell Hemoglobin : 28.9 pg  Mean Cell Hemoglobin Concentration : 32.8 gm/dL  Auto Neutrophil # : x  Auto Lymphocyte # : x  Auto Monocyte # : x  Auto Eosinophil # : x  Auto Basophil # : x  Auto Neutrophil % : x  Auto Lymphocyte % : x  Auto Monocyte % : x  Auto Eosinophil % : x  Auto Basophil % : x    05-12    130<L>  |  95<L>  |  17  ----------------------------<  95  4.5   |  25  |  0.87    Ca    10.6<H>      12 May 2022 07:36  Phos  3.4     05-12  Mg     2.00     05-12    TPro  6.4  /  Alb  3.2<L>  /  TBili  0.2  /  DBili  <0.2  /  AST  22  /  ALT  17  /  AlkPhos  76  05-11    LIVER FUNCTIONS - ( 11 May 2022 06:44 )  Alb: 3.2 g/dL / Pro: 6.4 g/dL / ALK PHOS: 76 U/L / ALT: 17 U/L / AST: 22 U/L / GGT: x               MICROBIOLOGY:  .Blood Blood-Peripheral  05-04-22   No Growth Final  --  --      .Blood Blood-Peripheral  05-04-22   No Growth Final  --  --      RADIOLOGY    < from: MR Abdomen w/wo IV Cont (05.12.22 @ 10:32) >  No significant interval change in overall appearance of the gallbladder   which is again suggestive of gangrenous acute cholecystitis with   perforation and associated 2 small collections. Communication with   adjacent hepatic flexure is again likely. However, underlying neoplasm is   not entirely excluded. Surgical consultation is advised, if not already   obtained.  Left adnexal lesion is consistent with an intramural fibroid. No adnexal   mass. Dilated endometrial cavity in this postmenopausal patient which can   be better evaluated with a pelvic ultrasound.    < end of copied text >

## 2022-05-12 NOTE — PROGRESS NOTE ADULT - SUBJECTIVE AND OBJECTIVE BOX
Patient is a 87y old  Female who presents with a chief complaint of ams and dysuria (12 May 2022 13:59)    Date of servie : 05-12-22 @ 15:39  INTERVAL HPI/OVERNIGHT EVENTS:  T(C): 37.1 (05-12-22 @ 14:58), Max: 37.1 (05-11-22 @ 18:16)  HR: 99 (05-12-22 @ 14:58) (94 - 106)  BP: 127/78 (05-12-22 @ 14:58) (127/78 - 149/66)  RR: 18 (05-12-22 @ 14:58) (16 - 18)  SpO2: 99% (05-12-22 @ 14:58) (98% - 99%)  Wt(kg): --  I&O's Summary      LABS:                        12.7   7.88  )-----------( 370      ( 12 May 2022 07:36 )             38.7     05-12    130<L>  |  95<L>  |  17  ----------------------------<  95  4.5   |  25  |  0.87    Ca    10.6<H>      12 May 2022 07:36  Phos  3.4     05-12  Mg     2.00     05-12    TPro  6.4  /  Alb  3.2<L>  /  TBili  0.2  /  DBili  <0.2  /  AST  22  /  ALT  17  /  AlkPhos  76  05-11        CAPILLARY BLOOD GLUCOSE                MEDICATIONS  (STANDING):  allopurinol 100 milliGRAM(s) Oral at bedtime  cefTRIAXone   IVPB 1000 milliGRAM(s) IV Intermittent every 24 hours  enoxaparin Injectable 40 milliGRAM(s) SubCutaneous every 24 hours  fenofibrate Tablet 145 milliGRAM(s) Oral at bedtime  metoprolol succinate ER 25 milliGRAM(s) Oral daily  metroNIDAZOLE    Tablet 500 milliGRAM(s) Oral two times a day  mirtazapine 15 milliGRAM(s) Oral <User Schedule>  pantoprazole    Tablet 40 milliGRAM(s) Oral before breakfast  thiamine 100 milliGRAM(s) Oral daily    MEDICATIONS  (PRN):  acetaminophen     Tablet .. 650 milliGRAM(s) Oral every 6 hours PRN Temp greater or equal to 38C (100.4F), Mild Pain (1 - 3), Moderate Pain (4 - 6)          PHYSICAL EXAM:  GENERAL: frail  CHEST/LUNG: Clear to percussion bilaterally; No rales, rhonchi, wheezing, or rubs  HEART: Regular rate and rhythm; No murmurs, rubs, or gallops  ABDOMEN: Soft, Nontender, Nondistended; Bowel sounds present  EXTREMITIES:  edema +    Care Discussed with Consultants/Other Providers [ ] YES  [ ] NO

## 2022-05-12 NOTE — PROGRESS NOTE ADULT - ASSESSMENT
Impression:  # Acute cholecystitis with perforation and associated 2 small collections.    Plan:  No plans for EUS or ERCP from advanced GI standpoint.  Will sign off. Call us back with questions.      Carol Emery MD  Gastroenterology Fellow  Pager: [phone]  Please call answering service [phone] / on-call GI fellow after 5pm and before 8am, and on weekends.

## 2022-05-12 NOTE — PROGRESS NOTE ADULT - SUBJECTIVE AND OBJECTIVE BOX
[facility]    ** please note this is a delayed entry note**   Seen earlier today, and examined.  - Today, patient is without complaints.    eating, sitting at bedside        *****MEDICATIONS: Current medication reviewed and documented.    MEDICATIONS  (STANDING):  allopurinol 100 milliGRAM(s) Oral at bedtime  cefTRIAXone   IVPB 1000 milliGRAM(s) IV Intermittent every 24 hours  enoxaparin Injectable 40 milliGRAM(s) SubCutaneous every 24 hours  fenofibrate Tablet 145 milliGRAM(s) Oral at bedtime  metoprolol succinate ER 25 milliGRAM(s) Oral daily  metroNIDAZOLE    Tablet 500 milliGRAM(s) Oral two times a day  mirtazapine 15 milliGRAM(s) Oral <User Schedule>  pantoprazole    Tablet 40 milliGRAM(s) Oral before breakfast  thiamine 100 milliGRAM(s) Oral daily    MEDICATIONS  (PRN):  acetaminophen     Tablet .. 650 milliGRAM(s) Oral every 6 hours PRN Temp greater or equal to 38C (100.4F), Mild Pain (1 - 3), Moderate Pain (4 - 6)          ***** VITAL SIGNS:   VSS         *****PHYSICAL EXAM:   alert oriented x 2 attention comprehension are fair.  Able to name, repeat.   EOmi fundi not visualized   no nystagmus VFF to confrontation  Tongue is midline  Palate elevates symmetrically   Moving all 4 ext spontaneously no drift appreciated    Gait not assessed.            *****LAB AND IMAGIN.1   7.08  )-----------( 332      ( 13 May 2022 06:00 )             38.2               05-12    130<L>  |  95<L>  |  17  ----------------------------<  95  4.5   |  25  |  0.87    Ca    10.6<H>      12 May 2022 07:36  Phos  3.4     05-12  Mg     2.00     05-12                           [All pertinent recent Imaging/Reports reviewed]           *****A S S E S S M E N T   A N D   P L A N :      Excerpt from H&P,"  86 yo F non smoker with PMHX of HTN, HLD, GERD, and recently dx depression, here with Daughter at bedside brought in for worsening confusion and restlessness first beginning 2 days ago, intermittently, then today worse with hallucinations ( auditory and visual),and generalized weakness.  family concerned patient was going to wander. Recently dx with UTI  5 days ago has been on bactrim for 5 days ( unclear if patient missed a dose due to developing confusion). Patient A& o x2-3, having episode of confusion normally ambulates  hx obtained from chart   family not available at bedside at the time of my encounter     Problem/Recommendations 1: ams   likely toxic metabolic encephalopathy due to hyponatremia +/- hypercalcemia +/- uti worsening underlying cognitive impairment   Plan : rx infection   avoid daytime somnolence   encourage po intake   melatonin for sleep augmentation   thiamine 100 daily   doing well    doing well    noted to have gangrenous gall bladder defer to gi   mildly dilated cbd      feeding herself     Problem/Recommendations 2:weakness generalized   deconditioning vs. related to hypercalcemia   oob to chair daily please         Thank you for allowing me to participate in the care of this patient. Will continue to follow patient periodically. Please do not hesitate to call me if you have any  questions or if there has been a change in patients neurological status     ________________  Sandhya Crow MD  [facility] ([facility]  [phone]      33 minutes spent on total encounter; more than 50 % of the visit was  spent counseling about plan of care, compliance to diet/exercise and medication regimen and or  coordinating care by the attending physician.      It is advised that stroke patients follow up with SHEBA Salazar @ [phone] in 1- 2 weeks.   Others please follow up with Dr. Michael Nissenbaum [phone]

## 2022-05-12 NOTE — PROGRESS NOTE ADULT - SUBJECTIVE AND OBJECTIVE BOX
[facility] [place] [facility]   MD KRISTEN MARQUES MD KRISTINE SOLTANPOUR, ALBIN ABRAMS    TEL:  OFFICE: [phone]  From 5pm-7am Answering Service [phone]    -- RENAL FOLLOW UP NOTE ---Date of Service 05-12-22 @ 12:43    Patient is a 87y old  Female who presents with a chief complaint of ams and dysuria (12 May 2022 10:06)      Patient seen and examined at bedside. No chest pain/sob    VITALS:  T(F): 98.6 (05-12-22 @ 05:55), Max: 98.8 (05-11-22 @ 18:16)  HR: 106 (05-12-22 @ 05:55)  BP: 144/70 (05-12-22 @ 05:55)  RR: 18 (05-12-22 @ 05:55)  SpO2: 99% (05-12-22 @ 05:55)  Wt(kg): --        PHYSICAL EXAM:  Constitutional: NAD  Neck: No JVD  Respiratory: CTAB, no wheezes, rales or rhonchi  Cardiovascular: S1, S2, RRR  Gastrointestinal: BS+, soft, NT/ND  Extremities: No peripheral edema    Hospital Medications:   MEDICATIONS  (STANDING):  allopurinol 100 milliGRAM(s) Oral at bedtime  cefTRIAXone   IVPB 1000 milliGRAM(s) IV Intermittent every 24 hours  enoxaparin Injectable 40 milliGRAM(s) SubCutaneous every 24 hours  fenofibrate Tablet 145 milliGRAM(s) Oral at bedtime  metoprolol succinate ER 25 milliGRAM(s) Oral daily  metroNIDAZOLE    Tablet 500 milliGRAM(s) Oral two times a day  mirtazapine 15 milliGRAM(s) Oral <User Schedule>  pantoprazole    Tablet 40 milliGRAM(s) Oral before breakfast  thiamine 100 milliGRAM(s) Oral daily      LABS:  05-12    130<L>  |  95<L>  |  17  ----------------------------<  95  4.5   |  25  |  0.87    Ca    10.6<H>      12 May 2022 07:36  Phos  3.4     05-12  Mg     2.00     05-12    TPro  6.4  /  Alb  3.2<L>  /  TBili  0.2  /  DBili  <0.2  /  AST  22  /  ALT  17  /  AlkPhos  76  05-11    Creatinine Trend: 0.87 <--, 0.80 <--, 0.78 <--, 0.82 <--, 0.88 <--, 0.98 <--    Phosphorus Level, Serum: 3.4 mg/dL (05-12 @ 07:36)                              12.7   7.88  )-----------( 370      ( 12 May 2022 07:36 )             38.7     Urine Studies:  Urinalysis - [05-04-22 @ 12:06]      Color Light Yellow / Appearance Clear / SG 1.006 / pH 6.5      Gluc Negative / Ketone Negative  / Bili Negative / Urobili <2 mg/dL       Blood Negative / Protein Negative / Leuk Est Negative / Nitrite Negative      RBC  / WBC  / Hyaline  / Gran  / Sq Epi  / Non Sq Epi  / Bacteria       PTH -- (Ca --)      [05-04-22 @ 16:19]   29  PTH -- (Ca --)      [05-04-22 @ 12:06]   31  Vitamin D (25OH) 76.6      [05-05-22 @ 01:54]  TSH 0.71      [05-04-22 @ 10:43]      Free Light Chains: kappa 4.05, lambda 5.44, ratio = 0.74      [05-05 @ 04:37]  Immunofixation Serum:   No Monoclonal Band Identified  Britt Marshall M.D.    Reference Range: None Detected      [05-04-22 @ 16:19]  SPEP Interpretation: Hypoalbuminemia with increased Alpha-1 fraction consistent with acute  phase reaction.  Increased Beta fraction, possibly transferrin increase..  Britt Marshall M.D.      [05-04-22 @ 16:19]    RADIOLOGY & ADDITIONAL STUDIES:

## 2022-05-13 LAB
ANION GAP SERPL CALC-SCNC: 11 MMOL/L — SIGNIFICANT CHANGE UP (ref 7–14)
ANION GAP SERPL CALC-SCNC: 9 MMOL/L — SIGNIFICANT CHANGE UP (ref 7–14)
BUN SERPL-MCNC: 17 MG/DL — SIGNIFICANT CHANGE UP (ref 7–23)
BUN SERPL-MCNC: 17 MG/DL — SIGNIFICANT CHANGE UP (ref 7–23)
CALCIUM SERPL-MCNC: 10.6 MG/DL — HIGH (ref 8.4–10.5)
CALCIUM SERPL-MCNC: 10.8 MG/DL — HIGH (ref 8.4–10.5)
CANCER AG19-9 SERPL-ACNC: 272 U/ML — HIGH
CEA SERPL-MCNC: 28 NG/ML — HIGH (ref 1–3.8)
CHLORIDE SERPL-SCNC: 97 MMOL/L — LOW (ref 98–107)
CHLORIDE SERPL-SCNC: 98 MMOL/L — SIGNIFICANT CHANGE UP (ref 98–107)
CO2 SERPL-SCNC: 25 MMOL/L — SIGNIFICANT CHANGE UP (ref 22–31)
CO2 SERPL-SCNC: 26 MMOL/L — SIGNIFICANT CHANGE UP (ref 22–31)
CREAT SERPL-MCNC: 0.92 MG/DL — SIGNIFICANT CHANGE UP (ref 0.5–1.3)
CREAT SERPL-MCNC: 0.94 MG/DL — SIGNIFICANT CHANGE UP (ref 0.5–1.3)
EGFR: 59 ML/MIN/1.73M2 — LOW
EGFR: 61 ML/MIN/1.73M2 — SIGNIFICANT CHANGE UP
GLUCOSE SERPL-MCNC: 182 MG/DL — HIGH (ref 70–99)
GLUCOSE SERPL-MCNC: 89 MG/DL — SIGNIFICANT CHANGE UP (ref 70–99)
HCT VFR BLD CALC: 38.2 % — SIGNIFICANT CHANGE UP (ref 34.5–45)
HGB BLD-MCNC: 12.1 G/DL — SIGNIFICANT CHANGE UP (ref 11.5–15.5)
MAGNESIUM SERPL-MCNC: 2.1 MG/DL — SIGNIFICANT CHANGE UP (ref 1.6–2.6)
MAGNESIUM SERPL-MCNC: 2.2 MG/DL — SIGNIFICANT CHANGE UP (ref 1.6–2.6)
MCHC RBC-ENTMCNC: 29 PG — SIGNIFICANT CHANGE UP (ref 27–34)
MCHC RBC-ENTMCNC: 31.7 GM/DL — LOW (ref 32–36)
MCV RBC AUTO: 91.6 FL — SIGNIFICANT CHANGE UP (ref 80–100)
NRBC # BLD: 0 /100 WBCS — SIGNIFICANT CHANGE UP
NRBC # FLD: 0 K/UL — SIGNIFICANT CHANGE UP
PHOSPHATE SERPL-MCNC: 2.9 MG/DL — SIGNIFICANT CHANGE UP (ref 2.5–4.5)
PHOSPHATE SERPL-MCNC: 3.1 MG/DL — SIGNIFICANT CHANGE UP (ref 2.5–4.5)
PLATELET # BLD AUTO: 332 K/UL — SIGNIFICANT CHANGE UP (ref 150–400)
POTASSIUM SERPL-MCNC: 5.4 MMOL/L — HIGH (ref 3.5–5.3)
POTASSIUM SERPL-MCNC: 5.9 MMOL/L — HIGH (ref 3.5–5.3)
POTASSIUM SERPL-SCNC: 5.4 MMOL/L — HIGH (ref 3.5–5.3)
POTASSIUM SERPL-SCNC: 5.9 MMOL/L — HIGH (ref 3.5–5.3)
RBC # BLD: 4.17 M/UL — SIGNIFICANT CHANGE UP (ref 3.8–5.2)
RBC # FLD: 14.1 % — SIGNIFICANT CHANGE UP (ref 10.3–14.5)
SODIUM SERPL-SCNC: 133 MMOL/L — LOW (ref 135–145)
SODIUM SERPL-SCNC: 133 MMOL/L — LOW (ref 135–145)
WBC # BLD: 7.08 K/UL — SIGNIFICANT CHANGE UP (ref 3.8–10.5)
WBC # FLD AUTO: 7.08 K/UL — SIGNIFICANT CHANGE UP (ref 3.8–10.5)

## 2022-05-13 PROCEDURE — 99232 SBSQ HOSP IP/OBS MODERATE 35: CPT

## 2022-05-13 RX ORDER — SODIUM ZIRCONIUM CYCLOSILICATE 10 G/10G
10 POWDER, FOR SUSPENSION ORAL ONCE
Refills: 0 | Status: COMPLETED | OUTPATIENT
Start: 2022-05-13 | End: 2022-05-13

## 2022-05-13 RX ADMIN — Medication 500 MILLIGRAM(S): at 17:35

## 2022-05-13 RX ADMIN — Medication 25 MILLIGRAM(S): at 05:01

## 2022-05-13 RX ADMIN — MIRTAZAPINE 15 MILLIGRAM(S): 45 TABLET, ORALLY DISINTEGRATING ORAL at 22:42

## 2022-05-13 RX ADMIN — CEFTRIAXONE 100 MILLIGRAM(S): 500 INJECTION, POWDER, FOR SOLUTION INTRAMUSCULAR; INTRAVENOUS at 14:39

## 2022-05-13 RX ADMIN — SODIUM ZIRCONIUM CYCLOSILICATE 10 GRAM(S): 10 POWDER, FOR SUSPENSION ORAL at 18:10

## 2022-05-13 RX ADMIN — Medication 145 MILLIGRAM(S): at 22:42

## 2022-05-13 RX ADMIN — PANTOPRAZOLE SODIUM 40 MILLIGRAM(S): 20 TABLET, DELAYED RELEASE ORAL at 05:01

## 2022-05-13 RX ADMIN — Medication 500 MILLIGRAM(S): at 05:01

## 2022-05-13 RX ADMIN — Medication 100 MILLIGRAM(S): at 11:58

## 2022-05-13 RX ADMIN — ENOXAPARIN SODIUM 40 MILLIGRAM(S): 100 INJECTION SUBCUTANEOUS at 17:36

## 2022-05-13 RX ADMIN — Medication 100 MILLIGRAM(S): at 22:42

## 2022-05-13 NOTE — PROGRESS NOTE ADULT - ASSESSMENT
88 yo F non smoker with PMHX of HTN, HLD, GERD, and recently dx depression, here with Daughter at bedside brought in for worsening confusion and restlessness first beginning 2   days ago, intermittently, then today worse with hallucinations ( auditory and visual),and generalized weakness.  family concerned patient was going to wander. Recently dx with UTI  5 days ago has been on bactrim for 5 days ( unclear if patient missed a dose due to developing confusion). Patient A& o x2-3, having episode of confusion normally ambulates and performs ADLS independently. Denies cp ,sob, abdominal pain, nausea, vomiting, diarrhea, headache.    1 AMS  - likely metabolic encephalopathy due to electrolytes abnormality   - dc antibiotics, uti ruled out   - fu cultures  - ID following  - neuro fu appreciated  - psych consult     2 Hypercalcemia  - unclear etiology  - ivf  - renal following       3 Gangrenous cholecystitis, ro gallbladder cancer   - cw antibiotics  - ID fu  - surgery and gi fu

## 2022-05-13 NOTE — CONSULT NOTE ADULT - CONSULT REASON
abnormal ekg
acute cholecystectomy
hypercalcemia, hyponatremia
r/o UTI
confusion
Hypercalcemia
Jaundice
gallbladder neoplasm

## 2022-05-13 NOTE — PROGRESS NOTE ADULT - SUBJECTIVE AND OBJECTIVE BOX
[facility]   MD KRISTEN MARQUES MD KRISTINE SOLTANPOUR, ALBIN ABRAMS    TEL:  OFFICE: [phone]  From 5pm-7am Answering Service [phone]    -- RENAL FOLLOW UP NOTE ---Date of Service 05-13-22 @ 12:33    Patient is a 87y old  Female who presents with a chief complaint of ams and dysuria (13 May 2022 11:46)      Patient seen and examined at bedside. No chest pain/sob    VITALS:  T(F): 98 (05-13-22 @ 04:59), Max: 98.7 (05-12-22 @ 14:58)  HR: 90 (05-13-22 @ 04:59)  BP: 149/58 (05-13-22 @ 04:59)  RR: 18 (05-13-22 @ 04:59)  SpO2: 96% (05-13-22 @ 04:59)  Wt(kg): --        PHYSICAL EXAM:  Constitutional: NAD  Neck: No JVD  Respiratory: CTAB, no wheezes, rales or rhonchi  Cardiovascular: S1, S2, RRR  Gastrointestinal: BS+, soft, NT/ND  Extremities: No peripheral edema    Hospital Medications:   MEDICATIONS  (STANDING):  allopurinol 100 milliGRAM(s) Oral at bedtime  cefTRIAXone   IVPB 1000 milliGRAM(s) IV Intermittent every 24 hours  enoxaparin Injectable 40 milliGRAM(s) SubCutaneous every 24 hours  fenofibrate Tablet 145 milliGRAM(s) Oral at bedtime  metoprolol succinate ER 25 milliGRAM(s) Oral daily  metroNIDAZOLE    Tablet 500 milliGRAM(s) Oral two times a day  mirtazapine 15 milliGRAM(s) Oral <User Schedule>  pantoprazole    Tablet 40 milliGRAM(s) Oral before breakfast  thiamine 100 milliGRAM(s) Oral daily      LABS:  05-13    133<L>  |  97<L>  |  17  ----------------------------<  182<H>  5.9<H>   |  25  |  0.94    Ca    10.8<H>      13 May 2022 09:25  Phos  2.9     05-13  Mg     2.10     05-13      Creatinine Trend: 0.94 <--, 0.92 <--, 0.87 <--, 0.80 <--, 0.78 <--, 0.82 <--, 0.88 <--    Phosphorus Level, Serum: 2.9 mg/dL (05-13 @ 09:25)  Phosphorus Level, Serum: 3.1 mg/dL (05-13 @ 06:00)                              12.1   7.08  )-----------( 332      ( 13 May 2022 06:00 )             38.2     Urine Studies:  Urinalysis - [05-04-22 @ 12:06]      Color Light Yellow / Appearance Clear / SG 1.006 / pH 6.5      Gluc Negative / Ketone Negative  / Bili Negative / Urobili <2 mg/dL       Blood Negative / Protein Negative / Leuk Est Negative / Nitrite Negative      RBC  / WBC  / Hyaline  / Gran  / Sq Epi  / Non Sq Epi  / Bacteria       PTH -- (Ca --)      [05-04-22 @ 16:19]   29  PTH -- (Ca --)      [05-04-22 @ 12:06]   31  Vitamin D (25OH) 76.6      [05-05-22 @ 01:54]  TSH 0.71      [05-04-22 @ 10:43]      Free Light Chains: kappa 4.05, lambda 5.44, ratio = 0.74      [05-05 @ 04:37]  Immunofixation Serum:   No Monoclonal Band Identified  Britt Marshall M.D.    Reference Range: None Detected      [05-04-22 @ 16:19]  SPEP Interpretation: Hypoalbuminemia with increased Alpha-1 fraction consistent with acute  phase reaction.  Increased Beta fraction, possibly transferrin increase..  Britt Marshall M.D.      [05-04-22 @ 16:19]    RADIOLOGY & ADDITIONAL STUDIES:

## 2022-05-13 NOTE — CONSULT NOTE ADULT - ASSESSMENT
87F PMHX of HTN, HLD, GERD, recent dx of depression, ventral hernia repair 20+ years ago, brought in by daughter for worsening confusion and restlessness. 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 MRCP showing irregular and nodular mural thickening and defect in the posterior and inferior wall of gallbladder. CEA and CA 19-9 elevated. Patient currently with bowel function and with benign abdominal exam    PLAN:         To be discussed with attending, Dr. Melchor LEWIS Team / Surgical Oncology  [id number] 87F PMHX of HTN, HLD, GERD, recent dx of depression, ventral hernia repair 20+ years ago, brought in by daughter for worsening confusion and restlessness. 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 MRCP showing irregular and nodular mural thickening and defect in the posterior and inferior wall of gallbladder; CTAP with no overt signs of metastasis. CEA and CA 19-9 elevated. Patient currently with bowel function and with benign abdominal exam    PLAN:   - No acute surgical intervention at this time   - Please obtain GI consult: ERCP for brushings and colonoscopy to evaluate possible hepatic flexure fistula     Discussed with attending, Dr. Melchor LEWIS Team / Surgical Oncology  [id number] 87F PMHX of HTN, HLD, GERD, recent dx of depression, ventral hernia repair 20+ years ago, brought in by daughter for worsening confusion and restlessness. 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 MRCP showing irregular and nodular mural thickening and defect in the posterior and inferior wall of gallbladder; CT chest with no overt signs of metastasis. CEA and CA 19-9 elevated. Patient currently with bowel function and with benign abdominal exam.    PLAN:   - No acute surgical intervention at this time   - Please obtain GI consult: ERCP for brushings and colonoscopy to evaluate possible hepatic flexure fistula     Discussed with attending, Dr. Melchor LEWIS Team / Surgical Oncology  [id number]

## 2022-05-13 NOTE — PROGRESS NOTE ADULT - SUBJECTIVE AND OBJECTIVE BOX
Date of service: 05/13/22    HISTORY OF PRESENT ILLNESS:   Ms Rich is a pleasant 86yo woman sent in by family for confusion and hallucinations.     S: no chest pain or sob; ros limited but otherwise negative.     Review of Systems:   Constitutional: [ ] fevers, [ ] chills.   Skin: [ ] dry skin. [ ] rashes.  Psychiatric: [ ] depression, [ ] anxiety.   Gastrointestinal: [ ] BRBPR, [ ] melena.   Neurological: [ ] confusion. [ ] seizures. [ ] shuffling gait.   Ears,Nose,Mouth and Throat: [ ] ear pain [ ] sore throat.   Eyes: [ ] diplopia.   Respiratory: [ ] hemoptysis. [ ] shortness of breath  Cardiovascular: See HPI above  Hematologic/Lymphatic: [ ] anemia. [ ] painful nodes. [ ] prolonged bleeding.   Genitourinary: [ ] hematuria. [ ] flank pain.   Endocrine: [ ] significant change in weight. [ ] intolerance to heat and cold.     Review of systems [x ] otherwise negative, [ ] otherwise unable to obtain    FH: no family history of sudden cardiac death in first degree relatives    SH: [ ] tobacco, [ ] alcohol, [ ] drugs      acetaminophen     Tablet .. 650 milliGRAM(s) Oral every 6 hours PRN  allopurinol 100 milliGRAM(s) Oral at bedtime  cefTRIAXone   IVPB 1000 milliGRAM(s) IV Intermittent every 24 hours  enoxaparin Injectable 40 milliGRAM(s) SubCutaneous every 24 hours  fenofibrate Tablet 145 milliGRAM(s) Oral at bedtime  metoprolol succinate ER 25 milliGRAM(s) Oral daily  metroNIDAZOLE    Tablet 500 milliGRAM(s) Oral two times a day  mirtazapine 15 milliGRAM(s) Oral <User Schedule>  pantoprazole    Tablet 40 milliGRAM(s) Oral before breakfast  sodium zirconium cyclosilicate 10 Gram(s) Oral once  thiamine 100 milliGRAM(s) Oral daily                            12.1   7.08  )-----------( 332      ( 13 May 2022 06:00 )             38.2       05-13    133<L>  |  97<L>  |  17  ----------------------------<  182<H>  5.9<H>   |  25  |  0.94    Ca    10.8<H>      13 May 2022 09:25  Phos  2.9     05-13  Mg     2.10     05-13              T(C): 36.7 (05-13-22 @ 04:59), Max: 37.1 (05-12-22 @ 14:58)  HR: 90 (05-13-22 @ 04:59) (90 - 106)  BP: 149/58 (05-13-22 @ 04:59) (127/78 - 149/58)  RR: 18 (05-13-22 @ 04:59) (18 - 18)  SpO2: 96% (05-13-22 @ 04:59) (96% - 99%)  Wt(kg): --    I&O's Summary      General: Well nourished in no acute distress. Alert and Oriented * 3.   Head: Normocephalic and atraumatic.   Neck: No JVD. No bruits. Supple. Does not appear to be enlarged.   Cardiovascular: + S1,S2 ; RRR Soft systolic murmur at the left lower sternal border. No rubs noted.    Lungs: CTA b/l. No rhonchi, rales or wheezes.   Abdomen: + BS, soft. Non tender. Non distended. No rebound. No guarding.   Extremities: No clubbing/cyanosis/edema.   Neurologic: Moves all four extremities. Full range of motion.   Skin: Warm and moist. The patient's skin has normal elasticity and good skin turgor.   Psychiatric: Appropriate mood and affect.  Musculoskeletal: Normal range of motion, normal strength    DATA    ECG: NSR, shortening/narrowing of T wave.  	  ASSESSMENT/PLAN: 	87y Female with acute metabolic encephalopathy.  Previous UTI treatment. Hypercalcemic.    -monitor tele - sr with no events thus far and now tele dc   -tte with normal LV function   -treatment of hypercalcemia per primary team - calcium levels improved   -follow up surgery and possible surgical plans  -further cardiac workup pending above   -follow up with [facility] after discharge - son to call office to schedule appointment     Carolyn Pike MD

## 2022-05-13 NOTE — PROGRESS NOTE ADULT - SUBJECTIVE AND OBJECTIVE BOX
Patient is a 87y old  Female who presents with a chief complaint of ams and dysuria (13 May 2022 14:38)    Date of servie : 05-13-22 @ 16:15  INTERVAL HPI/OVERNIGHT EVENTS:  T(C): 37.1 (05-13-22 @ 14:20), Max: 37.1 (05-13-22 @ 14:20)  HR: 80 (05-13-22 @ 14:20) (80 - 106)  BP: 128/51 (05-13-22 @ 14:20) (128/51 - 149/58)  RR: 18 (05-13-22 @ 14:20) (18 - 18)  SpO2: 96% (05-13-22 @ 14:20) (96% - 98%)  Wt(kg): --  I&O's Summary      LABS:                        12.1   7.08  )-----------( 332      ( 13 May 2022 06:00 )             38.2     05-13    133<L>  |  97<L>  |  17  ----------------------------<  182<H>  5.9<H>   |  25  |  0.94    Ca    10.8<H>      13 May 2022 09:25  Phos  2.9     05-13  Mg     2.10     05-13          CAPILLARY BLOOD GLUCOSE                MEDICATIONS  (STANDING):  allopurinol 100 milliGRAM(s) Oral at bedtime  cefTRIAXone   IVPB 1000 milliGRAM(s) IV Intermittent every 24 hours  enoxaparin Injectable 40 milliGRAM(s) SubCutaneous every 24 hours  fenofibrate Tablet 145 milliGRAM(s) Oral at bedtime  metoprolol succinate ER 25 milliGRAM(s) Oral daily  metroNIDAZOLE    Tablet 500 milliGRAM(s) Oral two times a day  mirtazapine 15 milliGRAM(s) Oral <User Schedule>  pantoprazole    Tablet 40 milliGRAM(s) Oral before breakfast  sodium zirconium cyclosilicate 10 Gram(s) Oral once  thiamine 100 milliGRAM(s) Oral daily    MEDICATIONS  (PRN):  acetaminophen     Tablet .. 650 milliGRAM(s) Oral every 6 hours PRN Temp greater or equal to 38C (100.4F), Mild Pain (1 - 3), Moderate Pain (4 - 6)          PHYSICAL EXAM:  GENERAL: frail  CHEST/LUNG: Clear to percussion bilaterally; No rales, rhonchi, wheezing, or rubs  HEART: Regular rate and rhythm; No murmurs, rubs, or gallops  ABDOMEN: Soft, Nontender, Nondistended; Bowel sounds present  EXTREMITIES: edema +    Care Discussed with Consultants/Other Providers [ ] YES  [ ] NO

## 2022-05-13 NOTE — PROGRESS NOTE ADULT - SUBJECTIVE AND OBJECTIVE BOX
[facility]      Seen earlier today, and examined.  - Today, patient is without complaints.           *****MEDICATIONS: Current medication reviewed and documented.    MEDICATIONS  (STANDING):  allopurinol 100 milliGRAM(s) Oral at bedtime  cefTRIAXone   IVPB 1000 milliGRAM(s) IV Intermittent every 24 hours  enoxaparin Injectable 40 milliGRAM(s) SubCutaneous every 24 hours  fenofibrate Tablet 145 milliGRAM(s) Oral at bedtime  metoprolol succinate ER 25 milliGRAM(s) Oral daily  metroNIDAZOLE    Tablet 500 milliGRAM(s) Oral two times a day  mirtazapine 15 milliGRAM(s) Oral <User Schedule>  pantoprazole    Tablet 40 milliGRAM(s) Oral before breakfast  sodium zirconium cyclosilicate 10 Gram(s) Oral once  thiamine 100 milliGRAM(s) Oral daily    MEDICATIONS  (PRN):  acetaminophen     Tablet .. 650 milliGRAM(s) Oral every 6 hours PRN Temp greater or equal to 38C (100.4F), Mild Pain (1 - 3), Moderate Pain (4 - 6)          ***** VITAL SIGNS:  T(F): 98 (22 @ 04:59), Max: 98.7 (22 @ 14:58)  HR: 90 (22 @ 04:59) (90 - 106)  BP: 149/58 (22 @ 04:59) (127/78 - 149/58)  RR: 18 (22 @ 04:59) (18 - 18)  SpO2: 96% (22 @ 04:59) (96% - 99%)  Wt(kg): --  ,   I&O's Summary           *****PHYSICAL EXAM:   alert oriented x 3 attention comprehension are fair.  Able to name, repeat.   EOmi fundi not visualized   no nystagmus VFF to confrontation  Tongue is midline  Palate elevates symmetrically   Moving all 4 ext spontaneously no drift appreciated    Gait not assessed.            *****LAB AND IMAGIN.1   7.08  )-----------( 332      ( 13 May 2022 06:00 )             38.2               05-13    133<L>  |  97<L>  |  17  ----------------------------<  182<H>  5.9<H>   |  25  |  0.94    Ca    10.8<H>      13 May 2022 09:25  Phos  2.9     05-  Mg     2.10     -    < from: MR Abdomen w/wo IV Cont (22 @ 10:32) >  IMPRESSION:  No significant interval change in overall appearance of the gallbladder   which is again suggestive of gangrenous acute cholecystitis with   perforation and associated 2 small collections. Communication with   adjacent hepatic flexure is again likely. However, underlying neoplasm is   not entirely excluded. Surgical consultation is advised, if not already   obtained.  Left adnexal lesion is consistent with an intramural fibroid. No adnexal   mass. Dilated endometrial cavity in this postmenopausal patient which can   be better evaluated with a pelvic ultrasound.    < end of copied text >                         [All pertinent recent Imaging/Reports reviewed]           *****A S S E S S M E N T   A N D   P L A N :    Excerpt from H&P,"  88 yo F non smoker with PMHX of HTN, HLD, GERD, and recently dx depression, here with Daughter at bedside brought in for worsening confusion and restlessness first beginning 2 days ago, intermittently, then today worse with hallucinations ( auditory and visual),and generalized weakness.  family concerned patient was going to wander. Recently dx with UTI  5 days ago has been on bactrim for 5 days ( unclear if patient missed a dose due to developing confusion). Patient A& o x2-3, having episode of confusion normally ambulates  hx obtained from chart   family not available at bedside at the time of my encounter     Problem/Recommendations 1: ams   likely toxic metabolic encephalopathy due to hyponatremia +/- hypercalcemia +/- uti worsening underlying cognitive impairment   Plan : rx infection   avoid daytime somnolence   encourage po intake   melatonin for sleep augmentation   thiamine 100 daily   doing well    doing well    noted to have gangrenous gall bladder defer to gi   mildly dilated cbd      feeding herself    son at bedside       Problem/Recommendations 2:weakness generalized   deconditioning vs. related to hypercalcemia   oob to chair daily please         Thank you for allowing me to participate in the care of this patient. Will continue to follow patient periodically. Please do not hesitate to call me if you have any  questions or if there has been a change in patients neurological status     ________________  Sandhya Crow MD  [facility]  [phone]      33 minutes spent on total encounter; more than 50 % of the visit was  spent counseling about plan of care, compliance to diet/exercise and medication regimen and or  coordinating care by the attending physician.      It is advised that stroke patients follow up with SHEBA Salazar @ [phone] in 1- 2 weeks.   Others please follow up with Dr. Michael Nissenbaum [phone]

## 2022-05-13 NOTE — PROGRESS NOTE ADULT - ASSESSMENT
87 year old F with history of HTN, HLD and GERD presenting to [facility] ED with confusion and restlessness x 2 days, along with auditory and visual hallucinations. ID consulted for possible UTI.     Impression:  #Encephalopathy - Likely non-infectious 2/2 electrolyte abnormalities. Reported history of recent UTI treated with Bactrim. UA here without pyuria, UCx no growth. No evidence of meningitis, neck supple w/o rigidity  #perf GB - infection vs cancer    - appreciate GI/surgery input  - cont CTX/flagyl  - total 14 days of abx  - change to po on DC   - no fever or leukocytosis     Angel Chand  Attending Physician   Division of Infectious Disease  Office #[phone]  Available on Microsoft Teams also  After 5pm/weekend or no response, call #[phone]

## 2022-05-13 NOTE — PROGRESS NOTE ADULT - NSPROGADDITIONALINFOA_GEN_ALL_CORE
Will sign off, recall ID if needed #[phone].
Please call the ID service [phone] with questions or concerns over the weekend.

## 2022-05-13 NOTE — CONSULT NOTE ADULT - SUBJECTIVE AND OBJECTIVE BOX
HPI:        PAST MEDICAL & SURGICAL HISTORY:          MEDICATIONS  (STANDING):  allopurinol 100 milliGRAM(s) Oral at bedtime  cefTRIAXone   IVPB 1000 milliGRAM(s) IV Intermittent every 24 hours  enoxaparin Injectable 40 milliGRAM(s) SubCutaneous every 24 hours  fenofibrate Tablet 145 milliGRAM(s) Oral at bedtime  metoprolol succinate ER 25 milliGRAM(s) Oral daily  metroNIDAZOLE    Tablet 500 milliGRAM(s) Oral two times a day  mirtazapine 15 milliGRAM(s) Oral <User Schedule>  pantoprazole    Tablet 40 milliGRAM(s) Oral before breakfast  sodium zirconium cyclosilicate 10 Gram(s) Oral once  thiamine 100 milliGRAM(s) Oral daily    MEDICATIONS  (PRN):  acetaminophen     Tablet .. 650 milliGRAM(s) Oral every 6 hours PRN Temp greater or equal to 38C (100.4F), Mild Pain (1 - 3), Moderate Pain (4 - 6)      Allergies    penicillin (Unknown)    Intolerances        SOCIAL HISTORY:    FAMILY HISTORY:  No pertinent family history in first degree relatives            Physical Exam:  General: NAD, resting comfortably  HEENT: NC/AT, EOMI, normal hearing, no oral lesions, no LAD, neck supple  Pulmonary: normal resp effort, CTA-B  Cardiovascular: NSR, no murmurs  Abdominal: soft, ND/NT, no organomegaly  Extremities: WWP, normal strength, no clubbing/cyanosis/edema  Neuro: A/O x 3, CNs II-XII grossly intact, normal sensation, no focal deficits  Pulses: palpable distal pulses    Vital Signs Last 24 Hrs  T(C): 37.1 (13 May 2022 14:20), Max: 37.1 (12 May 2022 14:58)  T(F): 98.7 (13 May 2022 14:20), Max: 98.7 (12 May 2022 14:58)  HR: 80 (13 May 2022 14:20) (80 - 106)  BP: 128/51 (13 May 2022 14:20) (127/78 - 149/58)  BP(mean): --  RR: 18 (13 May 2022 14:20) (18 - 18)  SpO2: 96% (13 May 2022 14:20) (96% - 99%)    I&O's Summary          LABS:                        12.1   7.08  )-----------( 332      ( 13 May 2022 06:00 )             38.2     05-13    133<L>  |  97<L>  |  17  ----------------------------<  182<H>  5.9<H>   |  25  |  0.94    Ca    10.8<H>      13 May 2022 09:25  Phos  2.9     05-13  Mg     2.10     05-13          CAPILLARY BLOOD GLUCOSE            Cultures:      RADIOLOGY & ADDITIONAL STUDIES:      Plan:           HPI:  87F PMHX of HTN, HLD, GERD, recent dx of depression, ventral hernia repair 20+ years ago, brought in by daughter for worsening confusion and restlessness, has been on medicine service for past week for likely metabolic encephalopathy due to electrolyte abnormalities. 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 MRCP showed irregular and nodular mural thickening and defect in the posterior and inferior wall of gallbladder; CT chest with no overt signs of metastasis. CEA and CA 19-9 elevated.    Patient currently afebrile, no leukocytosis, LFTs WNL, denies abdominal pain, nausea, vomiting. Currently passing flatus and having regular bowel movements. Denies recent weight loss.      PAST MEDICAL & SURGICAL HISTORY:          MEDICATIONS  (STANDING):  allopurinol 100 milliGRAM(s) Oral at bedtime  cefTRIAXone   IVPB 1000 milliGRAM(s) IV Intermittent every 24 hours  enoxaparin Injectable 40 milliGRAM(s) SubCutaneous every 24 hours  fenofibrate Tablet 145 milliGRAM(s) Oral at bedtime  metoprolol succinate ER 25 milliGRAM(s) Oral daily  metroNIDAZOLE    Tablet 500 milliGRAM(s) Oral two times a day  mirtazapine 15 milliGRAM(s) Oral <User Schedule>  pantoprazole    Tablet 40 milliGRAM(s) Oral before breakfast  sodium zirconium cyclosilicate 10 Gram(s) Oral once  thiamine 100 milliGRAM(s) Oral daily    MEDICATIONS  (PRN):  acetaminophen     Tablet .. 650 milliGRAM(s) Oral every 6 hours PRN Temp greater or equal to 38C (100.4F), Mild Pain (1 - 3), Moderate Pain (4 - 6)      Allergies    penicillin (Unknown)    Intolerances        SOCIAL HISTORY:    FAMILY HISTORY:  No pertinent family history in first degree relatives            Physical Exam:  General: NAD, resting comfortably  HEENT: NC/AT, EOMI, no jaundice or scleral icterus   Pulmonary: normal resp effort  Cardiovascular: NSR, no murmurs  Abdominal: soft, ND/NT, no organomegaly  Extremities: WWP, no clubbing/cyanosis/edema  Neuro: A/O x 3, CNs II-XII grossly intact, normal sensation, no focal deficits  Pulses: palpable distal pulses    Vital Signs Last 24 Hrs  T(C): 37.1 (13 May 2022 14:20), Max: 37.1 (12 May 2022 14:58)  T(F): 98.7 (13 May 2022 14:20), Max: 98.7 (12 May 2022 14:58)  HR: 80 (13 May 2022 14:20) (80 - 106)  BP: 128/51 (13 May 2022 14:20) (127/78 - 149/58)  BP(mean): --  RR: 18 (13 May 2022 14:20) (18 - 18)  SpO2: 96% (13 May 2022 14:20) (96% - 99%)      I&O's Summary        LABS:                        12.1   7.08  )-----------( 332      ( 13 May 2022 06:00 )             38.2     05-13    133<L>  |  97<L>  |  17  ----------------------------<  182<H>  5.9<H>   |  25  |  0.94    Ca    10.8<H>      13 May 2022 09:25  Phos  2.9     05-13  Mg     2.10     05-13              CAPILLARY BLOOD GLUCOSE          RADIOLOGY & ADDITIONAL STUDIES:        < from: CT Chest w/ IV Cont (05.10.22 @ 11:42) >  IMPRESSION:  1.  Distended gallbladder with pericholecystic inflammation and diffuse   irregular wall thickening that is most pronounced by the gallbladder   neck. Contained perforation of the gallbladder with adjacent small   collection of fluid and air that appears to communicatewith hepatic   flexure, raising suspicion for fistula. Findings are concerning for acute   gangrenous cholecystitis, though a neoplastic etiology with superimposed   infection is also considered.  2.  Mild biliary duct dilation. Mild wall thickening and hyperenhancement   of the common bile duct and cystic duct, may reflect cholangitis.  3.  Mild wall thickening of the urinary bladder, which may be due to   underdistention. Trace intravesicular air. Suggest correlation with   urinalysis to assessfor cystitis and for recent   instrumentation/catheterization.  4.  Left adnexal lesion measuring 3.4 cm, possibly a fibroid. Consider   correlation with pelvic ultrasound when clinically feasible.  5.  Dilated pulmonary artery, which may be seen with pulmonary   hypertension.  6.  Mucoid impaction within the right lower lobe. Left lower lobe   bronchiectasis.    < end of copied text >    < from: MR Abdomen w/wo IV Cont (05.12.22 @ 10:32) >    IMPRESSION:  No significant interval change in overall appearance of the gallbladder   which is again suggestive of gangrenous acute cholecystitis with   perforation and associated 2 small collections. Communication with   adjacent hepatic flexure is again likely. However, underlying neoplasm is   not entirely excluded. Surgical consultation is advised, if not already   obtained.  Left adnexal lesion is consistent with an intramural fibroid. No adnexal   mass. Dilated endometrial cavity in this postmenopausal patient which can   be better evaluated with a pelvic ultrasound.      < end of copied text >    Cancer Antigen, GI Ca 19-9 (05.13.22 @ 06:00)    Cancer Antigen, GI Ca 19-9: 272: Test Repeated  Method: Roche Electrochemiluminescence Immuno Assay  Values obtained with different assay methods or kits cannot be  used interchangeably.  Results cannot be interpreted as absolute evidence of the presence  or absence of malignant disease. U/mL      Carcinoembryonic Antigen (05.13.22 @ 06:00)    Carcinoembryonic Antigen: 28.0 ng/mL

## 2022-05-13 NOTE — PROGRESS NOTE ADULT - SUBJECTIVE AND OBJECTIVE BOX
SHANONHARSH VENEGAS 87y [id number]    Patient is a 87y old  Female who presents with a chief complaint of ams and dysuria (12 May 2022 15:39)      Follow Up/CC:  ID following for cholecystitis     Interval History/ROS: no fever    Allergies    penicillin (Unknown)    Intolerances        ANTIMICROBIALS:  cefTRIAXone   IVPB 1000 every 24 hours  metroNIDAZOLE    Tablet 500 two times a day      MEDICATIONS  (STANDING):  allopurinol 100 milliGRAM(s) Oral at bedtime  cefTRIAXone   IVPB 1000 milliGRAM(s) IV Intermittent every 24 hours  enoxaparin Injectable 40 milliGRAM(s) SubCutaneous every 24 hours  fenofibrate Tablet 145 milliGRAM(s) Oral at bedtime  metoprolol succinate ER 25 milliGRAM(s) Oral daily  metroNIDAZOLE    Tablet 500 milliGRAM(s) Oral two times a day  mirtazapine 15 milliGRAM(s) Oral <User Schedule>  pantoprazole    Tablet 40 milliGRAM(s) Oral before breakfast  thiamine 100 milliGRAM(s) Oral daily    MEDICATIONS  (PRN):  acetaminophen     Tablet .. 650 milliGRAM(s) Oral every 6 hours PRN Temp greater or equal to 38C (100.4F), Mild Pain (1 - 3), Moderate Pain (4 - 6)        Vital Signs Last 24 Hrs  T(C): 36.7 (13 May 2022 04:59), Max: 37.1 (12 May 2022 14:58)  T(F): 98 (13 May 2022 04:59), Max: 98.7 (12 May 2022 14:58)  HR: 90 (13 May 2022 04:59) (90 - 106)  BP: 149/58 (13 May 2022 04:59) (127/78 - 149/58)  BP(mean): --  RR: 18 (13 May 2022 04:59) (18 - 18)  SpO2: 96% (13 May 2022 04:59) (96% - 99%)    CBC Full  -  ( 13 May 2022 06:00 )  WBC Count : 7.08 K/uL  RBC Count : 4.17 M/uL  Hemoglobin : 12.1 g/dL  Hematocrit : 38.2 %  Platelet Count - Automated : 332 K/uL  Mean Cell Volume : 91.6 fL  Mean Cell Hemoglobin : 29.0 pg  Mean Cell Hemoglobin Concentration : 31.7 gm/dL  Auto Neutrophil # : x  Auto Lymphocyte # : x  Auto Monocyte # : x  Auto Eosinophil # : x  Auto Basophil # : x  Auto Neutrophil % : x  Auto Lymphocyte % : x  Auto Monocyte % : x  Auto Eosinophil % : x  Auto Basophil % : x    05-13    133<L>  |  97<L>  |  17  ----------------------------<  182<H>  5.9<H>   |  25  |  0.94    Ca    10.8<H>      13 May 2022 09:25  Phos  2.9     05-13  Mg     2.10     05-13            MICROBIOLOGY:  .Blood Blood-Peripheral  05-04-22   No Growth Final  --  --      .Blood Blood-Peripheral  05-04-22   No Growth Final  --  --        RADIOLOGY    < from: MR Abdomen w/wo IV Cont (05.12.22 @ 10:32) >  No significant interval change in overall appearance of the gallbladder   which is again suggestive of gangrenous acute cholecystitis with   perforation and associated 2 small collections. Communication with   adjacent hepatic flexure is again likely. However, underlying neoplasm is   not entirely excluded. Surgical consultation is advised, if not already   obtained.  Left adnexal lesion is consistent with an intramural fibroid. No adnexal   mass. Dilated endometrial cavity in this postmenopausal patient which can   be better evaluated with a pelvic ultrasound.    < end of copied text >

## 2022-05-13 NOTE — CONSULT NOTE ADULT - CONSULT REQUESTED DATE/TIME
04-May-2022 16:21
13-May-2022 14:38
04-May-2022 15:35
04-May-2022 14:18
04-May-2022 15:24
10-May-2022 21:51
04-May-2022 15:29
11-May-2022 09:56

## 2022-05-13 NOTE — PROGRESS NOTE ADULT - ASSESSMENT
86 yo F non smoker with PMHX of HTN, HLD, GERD, and recently dx depression, brought in by family for confusion. nephrology consulted for electrolyte abnormities    Hypercalcemia  per patient takes MVI and vit d supplement  hold supplements   PTH 31 and 29, ionized calcium high normal  elevated vit d possible sec to vit d toxicity   K/L 0.74, SIF neg  neg PTHrp SPEP  pending malignancy work up  Monitor Ca level    hyperkalemia  hemolyzed specimen  give lokelma 10x1  monitor    Hyponatremia  low Na on presentation likely polydipsia resolved now again low again  r/o SIADH  check urine na osmo  free water restriction <1L/day  normal TSH  on mirtazepine for depression  monitor  serum NA  improved    HTN  BP relatively controlled  not on meds  monitor    Hypophosphatemia off and on  Monitor serum Po4  Replete as needed.  88 yo F non smoker with PMHX of HTN, HLD, GERD, and recently dx depression, brought in by family for confusion. nephrology consulted for electrolyte abnormities    Hypercalcemia  per patient takes MVI and Vitamin D supplement  hold supplements   PTH 31 and 29, ionized calcium high normal  elevated vit d possible sec to vit d toxicity   K/L 0.74, SIF neg  neg PTHrp SPEP  pending malignancy work up  Monitor Ca level    hyperkalemia  hemolyzed specimen  give lokelma 10x1  monitor    Hyponatremia  low Na on presentation likely polydipsia resolved now again low again  r/o SIADH  check urine na osmo  free water restriction <1L/day  normal TSH  on mirtazepine for depression  monitor  serum NA  improved    HTN  BP relatively controlled  not on meds  monitor    Hypophosphatemia off and on  Monitor serum Po4  Replete as needed.

## 2022-05-14 LAB
ANION GAP SERPL CALC-SCNC: 12 MMOL/L — SIGNIFICANT CHANGE UP (ref 7–14)
BUN SERPL-MCNC: 18 MG/DL — SIGNIFICANT CHANGE UP (ref 7–23)
CALCIUM SERPL-MCNC: 10.5 MG/DL — SIGNIFICANT CHANGE UP (ref 8.4–10.5)
CHLORIDE SERPL-SCNC: 98 MMOL/L — SIGNIFICANT CHANGE UP (ref 98–107)
CO2 SERPL-SCNC: 24 MMOL/L — SIGNIFICANT CHANGE UP (ref 22–31)
CREAT SERPL-MCNC: 0.93 MG/DL — SIGNIFICANT CHANGE UP (ref 0.5–1.3)
EGFR: 60 ML/MIN/1.73M2 — SIGNIFICANT CHANGE UP
GLUCOSE SERPL-MCNC: 96 MG/DL — SIGNIFICANT CHANGE UP (ref 70–99)
HCT VFR BLD CALC: 37.8 % — SIGNIFICANT CHANGE UP (ref 34.5–45)
HGB BLD-MCNC: 12.3 G/DL — SIGNIFICANT CHANGE UP (ref 11.5–15.5)
MAGNESIUM SERPL-MCNC: 2 MG/DL — SIGNIFICANT CHANGE UP (ref 1.6–2.6)
MCHC RBC-ENTMCNC: 29.4 PG — SIGNIFICANT CHANGE UP (ref 27–34)
MCHC RBC-ENTMCNC: 32.5 GM/DL — SIGNIFICANT CHANGE UP (ref 32–36)
MCV RBC AUTO: 90.2 FL — SIGNIFICANT CHANGE UP (ref 80–100)
NRBC # BLD: 0 /100 WBCS — SIGNIFICANT CHANGE UP
NRBC # FLD: 0 K/UL — SIGNIFICANT CHANGE UP
PHOSPHATE SERPL-MCNC: 2.6 MG/DL — SIGNIFICANT CHANGE UP (ref 2.5–4.5)
PLATELET # BLD AUTO: 351 K/UL — SIGNIFICANT CHANGE UP (ref 150–400)
POTASSIUM SERPL-MCNC: 4.2 MMOL/L — SIGNIFICANT CHANGE UP (ref 3.5–5.3)
POTASSIUM SERPL-SCNC: 4.2 MMOL/L — SIGNIFICANT CHANGE UP (ref 3.5–5.3)
RBC # BLD: 4.19 M/UL — SIGNIFICANT CHANGE UP (ref 3.8–5.2)
RBC # FLD: 14.3 % — SIGNIFICANT CHANGE UP (ref 10.3–14.5)
SODIUM SERPL-SCNC: 134 MMOL/L — LOW (ref 135–145)
WBC # BLD: 6.91 K/UL — SIGNIFICANT CHANGE UP (ref 3.8–10.5)
WBC # FLD AUTO: 6.91 K/UL — SIGNIFICANT CHANGE UP (ref 3.8–10.5)

## 2022-05-14 PROCEDURE — 99232 SBSQ HOSP IP/OBS MODERATE 35: CPT | Mod: GC

## 2022-05-14 RX ADMIN — ENOXAPARIN SODIUM 40 MILLIGRAM(S): 100 INJECTION SUBCUTANEOUS at 17:05

## 2022-05-14 RX ADMIN — CEFTRIAXONE 100 MILLIGRAM(S): 500 INJECTION, POWDER, FOR SOLUTION INTRAMUSCULAR; INTRAVENOUS at 14:15

## 2022-05-14 RX ADMIN — Medication 500 MILLIGRAM(S): at 17:03

## 2022-05-14 RX ADMIN — MIRTAZAPINE 15 MILLIGRAM(S): 45 TABLET, ORALLY DISINTEGRATING ORAL at 21:33

## 2022-05-14 RX ADMIN — Medication 100 MILLIGRAM(S): at 21:34

## 2022-05-14 RX ADMIN — Medication 25 MILLIGRAM(S): at 06:04

## 2022-05-14 RX ADMIN — Medication 145 MILLIGRAM(S): at 21:33

## 2022-05-14 RX ADMIN — PANTOPRAZOLE SODIUM 40 MILLIGRAM(S): 20 TABLET, DELAYED RELEASE ORAL at 06:04

## 2022-05-14 RX ADMIN — Medication 100 MILLIGRAM(S): at 12:01

## 2022-05-14 RX ADMIN — Medication 500 MILLIGRAM(S): at 06:04

## 2022-05-14 NOTE — PROGRESS NOTE ADULT - SUBJECTIVE AND OBJECTIVE BOX
Vital Signs Last 24 Hrs  T(C): 36.6 (14 May 2022 06:02), Max: 37.1 (13 May 2022 14:20)  T(F): 97.8 (14 May 2022 06:02), Max: 98.7 (13 May 2022 14:20)  HR: 60 (14 May 2022 06:02) (60 - 90)  BP: 161/74 (14 May 2022 06:02) (128/51 - 161/74)  BP(mean): --  RR: 18 (14 May 2022 06:02) (18 - 18)  SpO2: 100% (14 May 2022 06:02) (96% - 100%)    I&O's Detail                            12.1   7.08  )-----------( 332      ( 13 May 2022 06:00 )             38.2       05-14    134<L>  |  98  |  18  ----------------------------<  96  4.2   |  24  |  0.93    Ca    10.5      14 May 2022 06:21  Phos  2.6     05-14  Mg     2.00     05-14    Abdomen soft  patient comfortable            PLAN:  Colonoscopy and ERCP         Subjective: Patient seen and examined at bedside. Patient mildly responsive to questioning. Overnight VSS, AF.     Vital Signs Last 24 Hrs  T(C): 36.6 (14 May 2022 06:02), Max: 37.1 (13 May 2022 14:20)  T(F): 97.8 (14 May 2022 06:02), Max: 98.7 (13 May 2022 14:20)  HR: 60 (14 May 2022 06:02) (60 - 90)  BP: 161/74 (14 May 2022 06:02) (128/51 - 161/74)  BP(mean): --  RR: 18 (14 May 2022 06:02) (18 - 18)  SpO2: 100% (14 May 2022 06:02) (96% - 100%)    I&O's Detail                            12.1   7.08  )-----------( 332      ( 13 May 2022 06:00 )             38.2       05-14    134<L>  |  98  |  18  ----------------------------<  96  4.2   |  24  |  0.93    Ca    10.5      14 May 2022 06:21  Phos  2.6     05-14  Mg     2.00     05-14    Abdomen soft  patient comfortable            PLAN:  Colonoscopy and ERCP

## 2022-05-14 NOTE — PROGRESS NOTE ADULT - ASSESSMENT
Impression:  88 yo F w/ PMHx HTN, HLD, GERD p/w AMS, found to have hypercalcemia and CT c/f gangrenous cholecystitis w/ possible fistulous tract to hepatic flexure. GI initially consulted for dilated CBD on CT w/ nl LFT and no abd pain, MRCP w/ nl biliary tree. Labs notable for elevated Ca 19-9 (272) and CEA (28)    # gangrenous cholecystitis - c/f perforation and fistulous tract to hepatic flexure. Will discuss w/ surgery and advanced GI the utility/risks/benefits of colonoscopy to determine fistulous tract  # elevated tumor markers - pt w/ thickened biliary tree and gallbladder, cannot rule out neoplasm. No abd pain, no CBD stone, liver enzymes wnl. In setting of acute gangrenous cholecystitis any ERCP is higher risk, and will need to weight benefits    Recommendations:  - c/w abx  - management of gangrenous cholecystitis per surgery  - will discuss w/ advanced GI attending role of ERCP +/- colonoscopy    GI will continue to follow.     All recommendations are tentative until note is attested by attending.     Skip Brooks, PGY5  Gastroenterology/Hepatology Fellow  Available on [employer] Teams  [phone] (Cater to u Short Range Pager)  [phone] (Long Range Pager)    After 5pm, please contact the on-call GI fellow. [phone] Impression:  86 yo F w/ PMHx HTN, HLD, GERD p/w AMS, found to have hypercalcemia and CT c/f gangrenous cholecystitis w/ possible fistulous tract to hepatic flexure. GI initially consulted for dilated CBD on CT w/ nl LFT and no abd pain, MRCP w/ nl biliary tree. Labs notable for elevated Ca 19-9 (272) and CEA (28)    # gangrenous cholecystitis - c/f perforation and fistulous tract to hepatic flexure. Will discuss w/ surgery and advanced GI the utility/risks/benefits of colonoscopy to determine fistulous tract. It will be technically challenging to localize fistulous tract, and will need to clarify how it will   # elevated tumor markers - pt w/ thickened biliary tree and gallbladder, cannot rule out neoplasm. No abd pain, no CBD stone, liver enzymes wnl. Labs c/f possible gallbladder malignancy. Given that patient has normal MRCP, it is unclear the utility of ERCP for biliary brushings. Would clarify patient's surgical plan - if either way going for cholecystectomy it is unclear the need for ERCP    Recommendations:  - c/w abx  - management of gangrenous cholecystitis per surgery  - will discuss w/ surgery the need/utility of ERCP for biliary brushings  - rest of care per primary team    D/W Dr. Caro    GI will continue to follow.     All recommendations are tentative until note is attested by attending.     Skip Brooks, PGY5  Gastroenterology/Hepatology Fellow  Available on [employer] Teams  [id number] (U-NOTE Short Range Pager)  [phone] (Long Range Pager)    After 5pm, please contact the on-call GI fellow. [phone]

## 2022-05-14 NOTE — PROGRESS NOTE ADULT - SUBJECTIVE AND OBJECTIVE BOX
Gastroenterology/Hepatology Progress Note      Interval Events:   - GI reconsulted by surgery to request ERCP for brushings (given elevated Ca 19-9 and CEA) and colonoscopy to assess for hepatic-colonic fistula  - patient w/ no new complaints    Allergies:  penicillin (Unknown)      Hospital Medications:  acetaminophen     Tablet .. 650 milliGRAM(s) Oral every 6 hours PRN  allopurinol 100 milliGRAM(s) Oral at bedtime  cefTRIAXone   IVPB 1000 milliGRAM(s) IV Intermittent every 24 hours  enoxaparin Injectable 40 milliGRAM(s) SubCutaneous every 24 hours  fenofibrate Tablet 145 milliGRAM(s) Oral at bedtime  metoprolol succinate ER 25 milliGRAM(s) Oral daily  metroNIDAZOLE    Tablet 500 milliGRAM(s) Oral two times a day  mirtazapine 15 milliGRAM(s) Oral <User Schedule>  pantoprazole    Tablet 40 milliGRAM(s) Oral before breakfast  thiamine 100 milliGRAM(s) Oral daily      ROS: 14 point ROS negative unless otherwise state in subjective    PHYSICAL EXAM:   Vital Signs:  Vital Signs Last 24 Hrs  T(C): 36.6 (14 May 2022 06:02), Max: 37.1 (13 May 2022 14:20)  T(F): 97.8 (14 May 2022 06:02), Max: 98.7 (13 May 2022 14:20)  HR: 60 (14 May 2022 06:02) (60 - 90)  BP: 161/74 (14 May 2022 06:02) (128/51 - 161/74)  BP(mean): --  RR: 18 (14 May 2022 06:02) (18 - 18)  SpO2: 100% (14 May 2022 06:02) (96% - 100%)  Daily     Daily     GENERAL:  No acute distress  HEENT:  NCAT, no scleral icterus  CHEST: no resp distress  HEART:  RRR  ABDOMEN:  Soft, non-tender, non-distended, normoactive bowel sounds, no masses  EXTREMITIES:  No cyanosis, clubbing, or edema  SKIN:  No rash/erythema/ecchymoses/petechiae/wounds/abscess/warm/dry  NEURO:  Alert and oriented x 3, no asterixis, no tremor    LABS:                        12.1   7.08  )-----------( 332      ( 13 May 2022 06:00 )             38.2       05-14    134<L>  |  98  |  18  ----------------------------<  96  4.2   |  24  |  0.93    Ca    10.5      14 May 2022 06:21  Phos  2.6     05-14  Mg     2.00     05-14                  Imaging:           Gastroenterology/Hepatology Progress Note      Interval Events:   - GI reconsulted by surgery to request ERCP for brushings (given elevated Ca 19-9 and CEA) and colonoscopy to assess for hepatic-colonic fistula  - patient w/ no new complaints    Allergies:  penicillin (Unknown)      Hospital Medications:  acetaminophen     Tablet .. 650 milliGRAM(s) Oral every 6 hours PRN  allopurinol 100 milliGRAM(s) Oral at bedtime  cefTRIAXone   IVPB 1000 milliGRAM(s) IV Intermittent every 24 hours  enoxaparin Injectable 40 milliGRAM(s) SubCutaneous every 24 hours  fenofibrate Tablet 145 milliGRAM(s) Oral at bedtime  metoprolol succinate ER 25 milliGRAM(s) Oral daily  metroNIDAZOLE    Tablet 500 milliGRAM(s) Oral two times a day  mirtazapine 15 milliGRAM(s) Oral <User Schedule>  pantoprazole    Tablet 40 milliGRAM(s) Oral before breakfast  thiamine 100 milliGRAM(s) Oral daily      ROS: 14 point ROS negative unless otherwise state in subjective    PHYSICAL EXAM:   Vital Signs:  Vital Signs Last 24 Hrs  T(C): 36.6 (14 May 2022 06:02), Max: 37.1 (13 May 2022 14:20)  T(F): 97.8 (14 May 2022 06:02), Max: 98.7 (13 May 2022 14:20)  HR: 60 (14 May 2022 06:02) (60 - 90)  BP: 161/74 (14 May 2022 06:02) (128/51 - 161/74)  BP(mean): --  RR: 18 (14 May 2022 06:02) (18 - 18)  SpO2: 100% (14 May 2022 06:02) (96% - 100%)  Daily     Daily     GENERAL:  No acute distress  HEENT:  NCAT, no scleral icterus  CHEST: no resp distress  HEART:  RRR  ABDOMEN:  Soft, non-tender, non-distended, normoactive bowel sounds, no masses  EXTREMITIES:  No cyanosis, clubbing, or edema  SKIN:  No rash/erythema/ecchymoses/petechiae/wounds/abscess/warm/dry  NEURO:  Alert and oriented x 3, no asterixis, no tremor    LABS:                        12.1   7.08  )-----------( 332      ( 13 May 2022 06:00 )             38.2       05-14    134<L>  |  98  |  18  ----------------------------<  96  4.2   |  24  |  0.93    Ca    10.5      14 May 2022 06:21  Phos  2.6     05-14  Mg     2.00     05-14        Imaging:      < from: MR Abdomen w/wo IV Cont (05.12.22 @ 10:32) >  IMPRESSION:  No significant interval change in overall appearance of the gallbladder   which is again suggestive of gangrenous acute cholecystitis with   perforation and associated 2 small collections. Communication with   adjacent hepatic flexure is again likely. However, underlying neoplasm is   not entirely excluded. Surgical consultation is advised, if not already   obtained.  Left adnexal lesion is consistent with an intramural fibroid. No adnexal   mass. Dilated endometrial cavity in this postmenopausal patient which can   be better evaluated with a pelvic ultrasound.    < end of copied text >

## 2022-05-14 NOTE — PROGRESS NOTE ADULT - SUBJECTIVE AND OBJECTIVE BOX
Date of service: 05/14/22    HISTORY OF PRESENT ILLNESS:   Ms Rich is a pleasant 86yo woman sent in by family for confusion and hallucinations.     S: no chest pain or sob; ros limited but otherwise negative.     Review of Systems:   Constitutional: [ ] fevers, [ ] chills.   Skin: [ ] dry skin. [ ] rashes.  Psychiatric: [ ] depression, [ ] anxiety.   Gastrointestinal: [ ] BRBPR, [ ] melena.   Neurological: [ ] confusion. [ ] seizures. [ ] shuffling gait.   Ears,Nose,Mouth and Throat: [ ] ear pain [ ] sore throat.   Eyes: [ ] diplopia.   Respiratory: [ ] hemoptysis. [ ] shortness of breath  Cardiovascular: See HPI above  Hematologic/Lymphatic: [ ] anemia. [ ] painful nodes. [ ] prolonged bleeding.   Genitourinary: [ ] hematuria. [ ] flank pain.   Endocrine: [ ] significant change in weight. [ ] intolerance to heat and cold.     Review of systems [ ] otherwise negative, [x ] otherwise unable to obtain    FH: no family history of sudden cardiac death in first degree relatives    SH: [ ] tobacco, [ ] alcohol, [ ] drugs    acetaminophen     Tablet .. 650 milliGRAM(s) Oral every 6 hours PRN  allopurinol 100 milliGRAM(s) Oral at bedtime  cefTRIAXone   IVPB 1000 milliGRAM(s) IV Intermittent every 24 hours  enoxaparin Injectable 40 milliGRAM(s) SubCutaneous every 24 hours  fenofibrate Tablet 145 milliGRAM(s) Oral at bedtime  metoprolol succinate ER 25 milliGRAM(s) Oral daily  metroNIDAZOLE    Tablet 500 milliGRAM(s) Oral two times a day  mirtazapine 15 milliGRAM(s) Oral <User Schedule>  pantoprazole    Tablet 40 milliGRAM(s) Oral before breakfast  thiamine 100 milliGRAM(s) Oral daily                            12.3   6.91  )-----------( 351      ( 14 May 2022 11:24 )             37.8       05-14    134<L>  |  98  |  18  ----------------------------<  96  4.2   |  24  |  0.93    Ca    10.5      14 May 2022 06:21  Phos  2.6     05-14  Mg     2.00     05-14    T(C): 36.6 (05-14-22 @ 06:02), Max: 37.1 (05-13-22 @ 14:20)  HR: 60 (05-14-22 @ 06:02) (60 - 90)  BP: 161/74 (05-14-22 @ 06:02) (128/51 - 161/74)  RR: 18 (05-14-22 @ 06:02) (18 - 18)  SpO2: 100% (05-14-22 @ 06:02) (96% - 100%)    General: Well nourished in no acute distress.   Head: Normocephalic and atraumatic.   Neck: No JVD. No bruits. Supple. Does not appear to be enlarged.   Cardiovascular: + S1,S2 ; RRR Soft systolic murmur at the left lower sternal border. No rubs noted.    Lungs: CTA b/l. No rhonchi, rales or wheezes.   Abdomen: + BS, soft. Non tender. Non distended. No rebound. No guarding.   Extremities: No clubbing/cyanosis/edema.   Neurologic: Moves all four extremities. Full range of motion.   Skin: Warm and moist. The patient's skin has normal elasticity and good skin turgor.   Psychiatric: unable to assess  Musculoskeletal: unable to assess      DATA    ECG: NSR, shortening/narrowing of T wave.  	  ASSESSMENT/PLAN: 	87y Female with acute metabolic encephalopathy.  Previous UTI treatment. Hypercalcemic.    -monitor tele - sr with no events thus far and now tele dc   -tte with normal LV function   -treatment of hypercalcemia per primary team - calcium levels improved   -planned for ERCP and colonoscopy this week  -optimized from CV perspective

## 2022-05-14 NOTE — PROGRESS NOTE ADULT - SUBJECTIVE AND OBJECTIVE BOX
Patient is a 87y old  Female who presents with a chief complaint of ams and dysuria (14 May 2022 14:06)    Date of servie : 05-14-22 @ 17:41  INTERVAL HPI/OVERNIGHT EVENTS:  T(C): 36.6 (05-14-22 @ 06:02), Max: 36.7 (05-13-22 @ 22:40)  HR: 60 (05-14-22 @ 06:02) (60 - 90)  BP: 161/74 (05-14-22 @ 06:02) (144/74 - 161/74)  RR: 18 (05-14-22 @ 06:02) (18 - 18)  SpO2: 100% (05-14-22 @ 06:02) (96% - 100%)  Wt(kg): --  I&O's Summary      LABS:                        12.3   6.91  )-----------( 351      ( 14 May 2022 11:24 )             37.8     05-14    134<L>  |  98  |  18  ----------------------------<  96  4.2   |  24  |  0.93    Ca    10.5      14 May 2022 06:21  Phos  2.6     05-14  Mg     2.00     05-14          CAPILLARY BLOOD GLUCOSE                MEDICATIONS  (STANDING):  allopurinol 100 milliGRAM(s) Oral at bedtime  cefTRIAXone   IVPB 1000 milliGRAM(s) IV Intermittent every 24 hours  enoxaparin Injectable 40 milliGRAM(s) SubCutaneous every 24 hours  fenofibrate Tablet 145 milliGRAM(s) Oral at bedtime  metoprolol succinate ER 25 milliGRAM(s) Oral daily  metroNIDAZOLE    Tablet 500 milliGRAM(s) Oral two times a day  mirtazapine 15 milliGRAM(s) Oral <User Schedule>  pantoprazole    Tablet 40 milliGRAM(s) Oral before breakfast  thiamine 100 milliGRAM(s) Oral daily    MEDICATIONS  (PRN):  acetaminophen     Tablet .. 650 milliGRAM(s) Oral every 6 hours PRN Temp greater or equal to 38C (100.4F), Mild Pain (1 - 3), Moderate Pain (4 - 6)          PHYSICAL EXAM:  GENERAL: NAD, well-groomed, well-developed  HEAD:  Atraumatic, Normocephalic  CHEST/LUNG: Clear to percussion bilaterally; No rales, rhonchi, wheezing, or rubs  HEART: Regular rate and rhythm; No murmurs, rubs, or gallops  ABDOMEN: Soft, Nontender, Nondistended; Bowel sounds present  EXTREMITIES:  2+ Peripheral Pulses, No clubbing, cyanosis, or edema  LYMPH: No lymphadenopathy noted  SKIN: No rashes or lesions    Care Discussed with Consultants/Other Providers [ ] YES  [ ] NO

## 2022-05-14 NOTE — PROGRESS NOTE ADULT - ASSESSMENT
87F PMHX of HTN, HLD, GERD, recent dx of depression, ventral hernia repair 20+ years ago, brought in by daughter for worsening confusion and restlessness. 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 MRCP showing irregular and nodular mural thickening and defect in the posterior and inferior wall of gallbladder; CT chest with no overt signs of metastasis. CEA and CA 19-9 elevated. Patient currently with bowel function and with benign abdominal exam.    PLAN:   - No acute surgical intervention at this time   - Pending ERCP and colonoscopy with GI to r/o colon or gallbladder CA, poss Monday 5/16  - Regular diet    Surgical Oncology  #[id number]     87F PMHX of HTN, HLD, GERD, recent dx of depression, ventral hernia repair 20+ years ago, brought in by daughter for worsening confusion and restlessness. 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 MRCP showing irregular and nodular mural thickening and defect in the posterior and inferior wall of gallbladder; CT chest with no overt signs of metastasis. CEA and CA 19-9 elevated. Patient currently with bowel function and with benign abdominal exam.    PLAN:   - No acute surgical intervention at this time   - Pending ERCP and colonoscopy with GI to r/o primary colon CAA or gallbladder CA, poss Monday 5/16  - Cholecystectomy pending evaluation  - Regular diet    Surgical Oncology  #[id number]     87F PMHX of HTN, HLD, GERD, recent dx of depression, ventral hernia repair 20+ years ago, brought in by daughter for worsening confusion and restlessness. 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 MRCP showing irregular and nodular mural thickening and defect in the posterior and inferior wall of gallbladder; CT chest with no overt signs of metastasis. CEA and CA 19-9 elevated. Patient currently with bowel function and with benign abdominal exam.    PLAN:   - No acute surgical intervention at this time   - Pending ERCP and colonoscopy with GI to r/o primary colon CAA or gallbladder CA, poss Monday 5/16  - Cholecystectomy pending evaluation  - Regular diet    Surgical Oncology  #[id number]    Seen at bedside with Dr. Roche

## 2022-05-14 NOTE — PROGRESS NOTE ADULT - SUBJECTIVE AND OBJECTIVE BOX
[facility]   MD KRISTEN MARQUES MD KRISTINE SOLTANPOUR, ALBIN ABRAMS    TEL:  OFFICE: [phone]  From 5pm-7am Answering Service [phone]    -- RENAL FOLLOW UP NOTE ---Date of Service 05-14-22 @ 13:39    Patient is a 87y old  Female who presents with a chief complaint of ams and dysuria (14 May 2022 11:31)      Patient seen and examined at bedside. No chest pain/sob    VITALS:  T(F): 97.8 (05-14-22 @ 06:02), Max: 98.7 (05-13-22 @ 14:20)  HR: 60 (05-14-22 @ 06:02)  BP: 161/74 (05-14-22 @ 06:02)  RR: 18 (05-14-22 @ 06:02)  SpO2: 100% (05-14-22 @ 06:02)  Wt(kg): --        PHYSICAL EXAM:  Constitutional: NAD  Neck: No JVD  Respiratory: CTAB, no wheezes, rales or rhonchi  Cardiovascular: S1, S2, RRR  Gastrointestinal: BS+, soft, NT/ND  Extremities: No peripheral edema    Hospital Medications:   MEDICATIONS  (STANDING):  allopurinol 100 milliGRAM(s) Oral at bedtime  cefTRIAXone   IVPB 1000 milliGRAM(s) IV Intermittent every 24 hours  enoxaparin Injectable 40 milliGRAM(s) SubCutaneous every 24 hours  fenofibrate Tablet 145 milliGRAM(s) Oral at bedtime  metoprolol succinate ER 25 milliGRAM(s) Oral daily  metroNIDAZOLE    Tablet 500 milliGRAM(s) Oral two times a day  mirtazapine 15 milliGRAM(s) Oral <User Schedule>  pantoprazole    Tablet 40 milliGRAM(s) Oral before breakfast  thiamine 100 milliGRAM(s) Oral daily      LABS:  05-14    134<L>  |  98  |  18  ----------------------------<  96  4.2   |  24  |  0.93    Ca    10.5      14 May 2022 06:21  Phos  2.6     05-14  Mg     2.00     05-14      Creatinine Trend: 0.93 <--, 0.94 <--, 0.92 <--, 0.87 <--, 0.80 <--, 0.78 <--, 0.82 <--    Phosphorus Level, Serum: 2.6 mg/dL (05-14 @ 06:21)                              12.3   6.91  )-----------( 351      ( 14 May 2022 11:24 )             37.8     Urine Studies:  Urinalysis - [05-04-22 @ 12:06]      Color Light Yellow / Appearance Clear / SG 1.006 / pH 6.5      Gluc Negative / Ketone Negative  / Bili Negative / Urobili <2 mg/dL       Blood Negative / Protein Negative / Leuk Est Negative / Nitrite Negative      RBC  / WBC  / Hyaline  / Gran  / Sq Epi  / Non Sq Epi  / Bacteria       PTH -- (Ca --)      [05-04-22 @ 16:19]   29  PTH -- (Ca --)      [05-04-22 @ 12:06]   31  Vitamin D (25OH) 76.6      [05-05-22 @ 01:54]  TSH 0.71      [05-04-22 @ 10:43]      Free Light Chains: kappa 4.05, lambda 5.44, ratio = 0.74      [05-05 @ 04:37]  Immunofixation Serum:   No Monoclonal Band Identified  Britt Marshall M.D.    Reference Range: None Detected      [05-04-22 @ 16:19]  SPEP Interpretation: Hypoalbuminemia with increased Alpha-1 fraction consistent with acute  phase reaction.  Increased Beta fraction, possibly transferrin increase..  Britt Marshall M.D.      [05-04-22 @ 16:19]    RADIOLOGY & ADDITIONAL STUDIES:

## 2022-05-14 NOTE — PROGRESS NOTE ADULT - ASSESSMENT
88 yo F non smoker with PMHX of HTN, HLD, GERD, and recently dx depression, brought in by family for confusion. nephrology consulted for electrolyte abnormities    Hypercalcemia  per patient takes MVI and Vitamin D supplement  hold supplements   PTH 31 and 29, ionized calcium high normal  elevated vit d possible sec to vit d toxicity   K/L 0.74, SIF neg  neg PTHrp SPEP  pending malignancy work up  Monitor Ca level    hyperkalemia  hemolyzed specimen  give lokelma 10x1  improved  monitor    Hyponatremia  low Na on presentation likely polydipsia resolved now again low again  r/o SIADH  check urine na osmo  free water restriction <1L/day  normal TSH  on mirtazepine for depression  monitor  serum NA  improved    HTN  BP relatively controlled  started on low dose bb  monitor    Hypophosphatemia off and on  Monitor serum Po4  Replete as needed.

## 2022-05-14 NOTE — PROGRESS NOTE ADULT - ASSESSMENT
86 yo F non smoker with PMHX of HTN, HLD, GERD, and recently dx depression, here with Daughter at bedside brought in for worsening confusion and restlessness first beginning 2   days ago, intermittently, then today worse with hallucinations ( auditory and visual),and generalized weakness.  family concerned patient was going to wander. Recently dx with UTI  5 days ago has been on bactrim for 5 days ( unclear if patient missed a dose due to developing confusion). Patient A& o x2-3, having episode of confusion normally ambulates and performs ADLS independently. Denies cp ,sob, abdominal pain, nausea, vomiting, diarrhea, headache.    1 AMS  - likely metabolic encephalopathy due to electrolytes abnormality   - dc antibiotics, uti ruled out   - fu cultures  - ID following  - neuro fu appreciated  - psych consult     2 Hypercalcemia  - unclear etiology  - ivf  - renal following       3 Gangrenous cholecystitis, ro gallbladder cancer   - cw antibiotics  - ID fu  - surgery and gi fu

## 2022-05-14 NOTE — PROGRESS NOTE ADULT - SUBJECTIVE AND OBJECTIVE BOX
Surgery Progress Note    Subjective:     Patient seen and examined at bedside. Patient without complaints this AM. Overnight VSS, AF.     OBJECTIVE:     T(C): 36.6 (05-14-22 @ 06:02), Max: 37.1 (05-13-22 @ 14:20)  HR: 60 (05-14-22 @ 06:02) (60 - 90)  BP: 161/74 (05-14-22 @ 06:02) (128/51 - 161/74)  RR: 18 (05-14-22 @ 06:02) (18 - 18)  SpO2: 100% (05-14-22 @ 06:02) (96% - 100%)  Wt(kg): --    I&O's Detail      PHYSICAL EXAM:    General- NAD. Mildly confused  Abdomen- Soft, non tender. Non TTP  Lungs- Breathing comfortably on RA    MEDICATIONS  (STANDING):  allopurinol 100 milliGRAM(s) Oral at bedtime  cefTRIAXone   IVPB 1000 milliGRAM(s) IV Intermittent every 24 hours  enoxaparin Injectable 40 milliGRAM(s) SubCutaneous every 24 hours  fenofibrate Tablet 145 milliGRAM(s) Oral at bedtime  metoprolol succinate ER 25 milliGRAM(s) Oral daily  metroNIDAZOLE    Tablet 500 milliGRAM(s) Oral two times a day  mirtazapine 15 milliGRAM(s) Oral <User Schedule>  pantoprazole    Tablet 40 milliGRAM(s) Oral before breakfast  thiamine 100 milliGRAM(s) Oral daily    MEDICATIONS  (PRN):  acetaminophen     Tablet .. 650 milliGRAM(s) Oral every 6 hours PRN Temp greater or equal to 38C (100.4F), Mild Pain (1 - 3), Moderate Pain (4 - 6)      LABS:                          12.1   7.08  )-----------( 332      ( 13 May 2022 06:00 )             38.2     05-14    134<L>  |  98  |  18  ----------------------------<  96  4.2   |  24  |  0.93    Ca    10.5      14 May 2022 06:21  Phos  2.6     05-14  Mg     2.00     05-14                 Surgery Progress Note    Subjective:     Patient seen and examined at bedside. Patient without complaints this AM. Overnight VSS, AF. CT scan shows gallbladder mass w/ possible fistula to hepatic flexure.     OBJECTIVE:     T(C): 36.6 (05-14-22 @ 06:02), Max: 37.1 (05-13-22 @ 14:20)  HR: 60 (05-14-22 @ 06:02) (60 - 90)  BP: 161/74 (05-14-22 @ 06:02) (128/51 - 161/74)  RR: 18 (05-14-22 @ 06:02) (18 - 18)  SpO2: 100% (05-14-22 @ 06:02) (96% - 100%)  Wt(kg): --    I&O's Detail      PHYSICAL EXAM:    General- NAD. Mildly confused  Abdomen- Soft, non tender. Non TTP  Lungs- Breathing comfortably on RA    MEDICATIONS  (STANDING):  allopurinol 100 milliGRAM(s) Oral at bedtime  cefTRIAXone   IVPB 1000 milliGRAM(s) IV Intermittent every 24 hours  enoxaparin Injectable 40 milliGRAM(s) SubCutaneous every 24 hours  fenofibrate Tablet 145 milliGRAM(s) Oral at bedtime  metoprolol succinate ER 25 milliGRAM(s) Oral daily  metroNIDAZOLE    Tablet 500 milliGRAM(s) Oral two times a day  mirtazapine 15 milliGRAM(s) Oral <User Schedule>  pantoprazole    Tablet 40 milliGRAM(s) Oral before breakfast  thiamine 100 milliGRAM(s) Oral daily    MEDICATIONS  (PRN):  acetaminophen     Tablet .. 650 milliGRAM(s) Oral every 6 hours PRN Temp greater or equal to 38C (100.4F), Mild Pain (1 - 3), Moderate Pain (4 - 6)      LABS:                          12.1   7.08  )-----------( 332      ( 13 May 2022 06:00 )             38.2     05-14    134<L>  |  98  |  18  ----------------------------<  96  4.2   |  24  |  0.93    Ca    10.5      14 May 2022 06:21  Phos  2.6     05-14  Mg     2.00     05-14

## 2022-05-15 LAB
ANION GAP SERPL CALC-SCNC: 11 MMOL/L — SIGNIFICANT CHANGE UP (ref 7–14)
BUN SERPL-MCNC: 15 MG/DL — SIGNIFICANT CHANGE UP (ref 7–23)
CALCIUM SERPL-MCNC: 10.6 MG/DL — HIGH (ref 8.4–10.5)
CHLORIDE SERPL-SCNC: 98 MMOL/L — SIGNIFICANT CHANGE UP (ref 98–107)
CO2 SERPL-SCNC: 25 MMOL/L — SIGNIFICANT CHANGE UP (ref 22–31)
CREAT SERPL-MCNC: 0.89 MG/DL — SIGNIFICANT CHANGE UP (ref 0.5–1.3)
EGFR: 63 ML/MIN/1.73M2 — SIGNIFICANT CHANGE UP
GLUCOSE SERPL-MCNC: 90 MG/DL — SIGNIFICANT CHANGE UP (ref 70–99)
HCT VFR BLD CALC: 39.6 % — SIGNIFICANT CHANGE UP (ref 34.5–45)
HGB BLD-MCNC: 12.5 G/DL — SIGNIFICANT CHANGE UP (ref 11.5–15.5)
MAGNESIUM SERPL-MCNC: 2 MG/DL — SIGNIFICANT CHANGE UP (ref 1.6–2.6)
MCHC RBC-ENTMCNC: 29.3 PG — SIGNIFICANT CHANGE UP (ref 27–34)
MCHC RBC-ENTMCNC: 31.6 GM/DL — LOW (ref 32–36)
MCV RBC AUTO: 92.7 FL — SIGNIFICANT CHANGE UP (ref 80–100)
NRBC # BLD: 0 /100 WBCS — SIGNIFICANT CHANGE UP
NRBC # FLD: 0 K/UL — SIGNIFICANT CHANGE UP
OSMOLALITY UR: 178 MOSM/KG — SIGNIFICANT CHANGE UP (ref 50–1200)
PHOSPHATE SERPL-MCNC: 3 MG/DL — SIGNIFICANT CHANGE UP (ref 2.5–4.5)
PLATELET # BLD AUTO: 333 K/UL — SIGNIFICANT CHANGE UP (ref 150–400)
POTASSIUM SERPL-MCNC: 4.3 MMOL/L — SIGNIFICANT CHANGE UP (ref 3.5–5.3)
POTASSIUM SERPL-SCNC: 4.3 MMOL/L — SIGNIFICANT CHANGE UP (ref 3.5–5.3)
RBC # BLD: 4.27 M/UL — SIGNIFICANT CHANGE UP (ref 3.8–5.2)
RBC # FLD: 14.3 % — SIGNIFICANT CHANGE UP (ref 10.3–14.5)
SARS-COV-2 RNA SPEC QL NAA+PROBE: SIGNIFICANT CHANGE UP
SODIUM SERPL-SCNC: 134 MMOL/L — LOW (ref 135–145)
SODIUM UR-SCNC: <20 MMOL/L — SIGNIFICANT CHANGE UP
WBC # BLD: 6.8 K/UL — SIGNIFICANT CHANGE UP (ref 3.8–10.5)
WBC # FLD AUTO: 6.8 K/UL — SIGNIFICANT CHANGE UP (ref 3.8–10.5)

## 2022-05-15 RX ADMIN — MIRTAZAPINE 15 MILLIGRAM(S): 45 TABLET, ORALLY DISINTEGRATING ORAL at 20:34

## 2022-05-15 RX ADMIN — Medication 500 MILLIGRAM(S): at 05:37

## 2022-05-15 RX ADMIN — Medication 100 MILLIGRAM(S): at 20:33

## 2022-05-15 RX ADMIN — CEFTRIAXONE 100 MILLIGRAM(S): 500 INJECTION, POWDER, FOR SOLUTION INTRAMUSCULAR; INTRAVENOUS at 13:21

## 2022-05-15 RX ADMIN — Medication 145 MILLIGRAM(S): at 20:34

## 2022-05-15 RX ADMIN — Medication 500 MILLIGRAM(S): at 17:13

## 2022-05-15 RX ADMIN — Medication 100 MILLIGRAM(S): at 12:00

## 2022-05-15 RX ADMIN — ENOXAPARIN SODIUM 40 MILLIGRAM(S): 100 INJECTION SUBCUTANEOUS at 17:13

## 2022-05-15 RX ADMIN — PANTOPRAZOLE SODIUM 40 MILLIGRAM(S): 20 TABLET, DELAYED RELEASE ORAL at 05:11

## 2022-05-15 RX ADMIN — Medication 25 MILLIGRAM(S): at 05:37

## 2022-05-15 NOTE — PROGRESS NOTE ADULT - SUBJECTIVE AND OBJECTIVE BOX
SURGERY PROGRESS NOTE  Hospital Day #05-04-22 (11d)    Overnight, no acute events. Pt seen and examined at bedside.      Vital Signs   T(C): 36.9 (14 May 2022 22:12), Max: 36.9 (14 May 2022 22:12)  T(F): 98.5 (14 May 2022 22:12), Max: 98.5 (14 May 2022 22:12)  HR: 79 (14 May 2022 22:12) (60 - 79)  BP: 155/56 (14 May 2022 22:12) (154/76 - 161/74)  RR: 18 (14 May 2022 22:12) (18 - 18)  SpO2: 96% (14 May 2022 22:12) (96% - 100%)    PHYSICAL EXAM   General:  AAOx3 in NAD  Chest:  Symmetrical chest rise bilaterally, breathing comfortably on RA   Abdomen:  Soft, nondistended, nontender to palpation in all four quadrants     MEDICATIONS:   DVT PROPHYLAXIS: enoxaparin Injectable 40 milliGRAM(s)  GI PROPHYLAXIS: pantoprazole    Tablet 40 milliGRAM(s)  ANTIBIOTICS: cefTRIAXone   IVPB 1000 milliGRAM(s) metroNIDAZOLE    Tablet 500 milliGRAM(s)    LAB/STUDIES:             12.3   6.91  )-----------( 351      ( 14 May 2022 11:24 )             37.8   134<L>  |  98  |  18  ----------------------------<  96  4.2   |  24  |  0.93  Ca    10.5      14 May 2022 06:21  Phos  2.6     05-14  Mg     2.00     05-14

## 2022-05-15 NOTE — PROGRESS NOTE ADULT - SUBJECTIVE AND OBJECTIVE BOX
[facility] [place] [facility]   MD KRISTEN MARQUES MD KRISTINE SOLTANPOUR, DO ANGELA WONG, PA    TEL:  OFFICE: [phone]  From 5pm-7am Answering Service [phone]    -- RENAL FOLLOW UP NOTE ---Date of Service 05-15-22 @ 20:39    Patient is a 87y old  Female who presents with a chief complaint of ams and dysuria (15 May 2022 11:45)      Patient seen and examined at bedside. No chest pain/sob    VITALS:  T(F): 98.6 (05-15-22 @ 13:31), Max: 98.6 (05-15-22 @ 13:31)  HR: 74 (05-15-22 @ 07:01)  BP: 135/64 (05-15-22 @ 13:31)  RR: 19 (05-15-22 @ 13:31)  SpO2: 97% (05-15-22 @ 13:31)          PHYSICAL EXAM:  Constitutional: NAD  Neck: No JVD  Respiratory: CTAB, no wheezes, rales or rhonchi  Cardiovascular: S1, S2, RRR  Gastrointestinal: BS+, soft, NT/ND  Extremities: 1+ peripheral edema    Hospital Medications:   MEDICATIONS  (STANDING):  allopurinol 100 milliGRAM(s) Oral at bedtime  cefTRIAXone   IVPB 1000 milliGRAM(s) IV Intermittent every 24 hours  enoxaparin Injectable 40 milliGRAM(s) SubCutaneous every 24 hours  fenofibrate Tablet 145 milliGRAM(s) Oral at bedtime  metoprolol succinate ER 25 milliGRAM(s) Oral daily  metroNIDAZOLE    Tablet 500 milliGRAM(s) Oral two times a day  mirtazapine 15 milliGRAM(s) Oral <User Schedule>  pantoprazole    Tablet 40 milliGRAM(s) Oral before breakfast  thiamine 100 milliGRAM(s) Oral daily      LABS:  05-15    134<L>  |  98  |  15  ----------------------------<  90  4.3   |  25  |  0.89    Ca    10.6<H>      15 May 2022 05:54  Phos  3.0     05-15  Mg     2.00     05-15      Creatinine Trend: 0.89 <--, 0.93 <--, 0.94 <--, 0.92 <--, 0.87 <--, 0.80 <--, 0.78 <--    Phosphorus Level, Serum: 3.0 mg/dL (05-15 @ 05:54)                              12.5   6.80  )-----------( 333      ( 15 May 2022 05:54 )             39.6     Urine Studies:  Urinalysis - [05-04-22 @ 12:06]      Color Light Yellow / Appearance Clear / SG 1.006 / pH 6.5      Gluc Negative / Ketone Negative  / Bili Negative / Urobili <2 mg/dL       Blood Negative / Protein Negative / Leuk Est Negative / Nitrite Negative      RBC  / WBC  / Hyaline  / Gran  / Sq Epi  / Non Sq Epi  / Bacteria       PTH -- (Ca --)      [05-04-22 @ 16:19]   29  PTH -- (Ca --)      [05-04-22 @ 12:06]   31  Vitamin D (25OH) 76.6      [05-05-22 @ 01:54]  TSH 0.71      [05-04-22 @ 10:43]      Free Light Chains: kappa 4.05, lambda 5.44, ratio = 0.74      [05-05 @ 04:37]  Immunofixation Serum:   No Monoclonal Band Identified  Britt Marshall M.D.    Reference Range: None Detected      [05-04-22 @ 16:19]  SPEP Interpretation: Hypoalbuminemia with increased Alpha-1 fraction consistent with acute  phase reaction.  Increased Beta fraction, possibly transferrin increase..  Britt Marshall M.D.      [05-04-22 @ 16:19]    RADIOLOGY & ADDITIONAL STUDIES:

## 2022-05-15 NOTE — PROGRESS NOTE ADULT - SUBJECTIVE AND OBJECTIVE BOX
Patient is a 87y old  Female who presents with a chief complaint of ams and dysuria (15 May 2022 08:59)    Date of servie : 05-15-22 @ 11:45  INTERVAL HPI/OVERNIGHT EVENTS:  T(C): 36.7 (05-15-22 @ 07:01), Max: 36.9 (05-14-22 @ 22:12)  HR: 74 (05-15-22 @ 07:01) (74 - 79)  BP: 140/90 (05-15-22 @ 07:01) (140/90 - 155/56)  RR: 17 (05-15-22 @ 07:01) (17 - 18)  SpO2: 98% (05-15-22 @ 07:01) (96% - 98%)  Wt(kg): --  I&O's Summary      LABS:                        12.5   6.80  )-----------( 333      ( 15 May 2022 05:54 )             39.6     05-15    134<L>  |  98  |  15  ----------------------------<  90  4.3   |  25  |  0.89    Ca    10.6<H>      15 May 2022 05:54  Phos  3.0     05-15  Mg     2.00     05-15          CAPILLARY BLOOD GLUCOSE                MEDICATIONS  (STANDING):  allopurinol 100 milliGRAM(s) Oral at bedtime  cefTRIAXone   IVPB 1000 milliGRAM(s) IV Intermittent every 24 hours  enoxaparin Injectable 40 milliGRAM(s) SubCutaneous every 24 hours  fenofibrate Tablet 145 milliGRAM(s) Oral at bedtime  metoprolol succinate ER 25 milliGRAM(s) Oral daily  metroNIDAZOLE    Tablet 500 milliGRAM(s) Oral two times a day  mirtazapine 15 milliGRAM(s) Oral <User Schedule>  pantoprazole    Tablet 40 milliGRAM(s) Oral before breakfast  thiamine 100 milliGRAM(s) Oral daily    MEDICATIONS  (PRN):  acetaminophen     Tablet .. 650 milliGRAM(s) Oral every 6 hours PRN Temp greater or equal to 38C (100.4F), Mild Pain (1 - 3), Moderate Pain (4 - 6)          PHYSICAL EXAM:  GENERAL: NAD, well-groomed, well-developed  HEAD:  Atraumatic, Normocephalic  CHEST/LUNG: Clear to percussion bilaterally; No rales, rhonchi, wheezing, or rubs  HEART: Regular rate and rhythm; No murmurs, rubs, or gallops  ABDOMEN: Soft, Nontender, Nondistended; Bowel sounds present  EXTREMITIES:  2+ Peripheral Pulses, No clubbing, cyanosis, or edema  LYMPH: No lymphadenopathy noted  SKIN: No rashes or lesions    Care Discussed with Consultants/Other Providers [ ] YES  [ ] NO

## 2022-05-15 NOTE — PROGRESS NOTE ADULT - ASSESSMENT
88 yo F non smoker with PMHX of HTN, HLD, GERD, and recently dx depression, brought in by family for confusion. nephrology consulted for electrolyte abnormities    Hypercalcemia  per patient takes MVI and Vitamin D supplement  hold supplements   PTH 31 and 29, ionized calcium high normal  elevated vit d possible sec to vit d toxicity   K/L 0.74, SIF neg  neg PTHrp SPEP  pending malignancy work up  Monitor Ca level    hyperkalemia resolved.   s/p lokelma 10x1  improved  monitor    Hyponatremia  low Na on presentation likely polydipsia resolved now again low again  r/o SIADH  check urine na osmo  free water restriction <1L/day  normal TSH  on mirtazepine for depression  monitor  serum NA  improved    HTN  BP relatively controlled  started on low dose bb  monitor    Hypophosphatemia off and on  Monitor serum Po4  Replete as needed.

## 2022-05-15 NOTE — PROGRESS NOTE ADULT - SUBJECTIVE AND OBJECTIVE BOX
Date of service: 05/15/22    HISTORY OF PRESENT ILLNESS:   Ms Rich is a pleasant 88yo woman sent in by family for confusion and hallucinations.     S:  pt seen and examined, no complaints, ROS - .     Review of Systems:   Constitutional: [ ] fevers, [ ] chills.   Skin: [ ] dry skin. [ ] rashes.  Psychiatric: [ ] depression, [ ] anxiety.   Gastrointestinal: [ ] BRBPR, [ ] melena.   Neurological: [ ] confusion. [ ] seizures. [ ] shuffling gait.   Ears,Nose,Mouth and Throat: [ ] ear pain [ ] sore throat.   Eyes: [ ] diplopia.   Respiratory: [ ] hemoptysis. [ ] shortness of breath  Cardiovascular: See HPI above  Hematologic/Lymphatic: [ ] anemia. [ ] painful nodes. [ ] prolonged bleeding.   Genitourinary: [ ] hematuria. [ ] flank pain.   Endocrine: [ ] significant change in weight. [ ] intolerance to heat and cold.     Review of systems [ ] otherwise negative, [x ] otherwise unable to obtain    FH: no family history of sudden cardiac death in first degree relatives    SH: [ ] tobacco, [ ] alcohol, [ ] drugs           acetaminophen     Tablet .. 650 milliGRAM(s) Oral every 6 hours PRN  allopurinol 100 milliGRAM(s) Oral at bedtime  cefTRIAXone   IVPB 1000 milliGRAM(s) IV Intermittent every 24 hours  enoxaparin Injectable 40 milliGRAM(s) SubCutaneous every 24 hours  fenofibrate Tablet 145 milliGRAM(s) Oral at bedtime  metoprolol succinate ER 25 milliGRAM(s) Oral daily  metroNIDAZOLE    Tablet 500 milliGRAM(s) Oral two times a day  mirtazapine 15 milliGRAM(s) Oral <User Schedule>  pantoprazole    Tablet 40 milliGRAM(s) Oral before breakfast  thiamine 100 milliGRAM(s) Oral daily                            12.5   6.80  )-----------( 333      ( 15 May 2022 05:54 )             39.6       Hemoglobin: 12.5 g/dL (05-15 @ 05:54)  Hemoglobin: 12.3 g/dL (05-14 @ 11:24)  Hemoglobin: 12.1 g/dL (05-13 @ 06:00)  Hemoglobin: 12.7 g/dL (05-12 @ 07:36)  Hemoglobin: 12.3 g/dL (05-11 @ 06:44)      05-15    134<L>  |  98  |  15  ----------------------------<  90  4.3   |  25  |  0.89    Ca    10.6<H>      15 May 2022 05:54  Phos  3.0     05-15  Mg     2.00     05-15      Creatinine Trend: 0.89<--, 0.93<--, 0.94<--, 0.92<--, 0.87<--, 0.80<--    COAGS:           T(C): 36.9 (05-14-22 @ 22:12), Max: 36.9 (05-14-22 @ 22:12)  HR: 79 (05-14-22 @ 22:12) (76 - 79)  BP: 155/56 (05-14-22 @ 22:12) (154/76 - 155/56)  RR: 18 (05-14-22 @ 22:12) (18 - 18)  SpO2: 96% (05-14-22 @ 22:12) (96% - 98%)  Wt(kg): --    I&O's Summary      General: Well nourished in no acute distress.   Head: Normocephalic and atraumatic.   Neck: No JVD. No bruits. Supple. Does not appear to be enlarged.   Cardiovascular: + S1,S2 ; RRR Soft systolic murmur at the left lower sternal border. No rubs noted.    Lungs: CTA b/l. No rhonchi, rales or wheezes.   Abdomen: + BS, soft. Non tender. Non distended. No rebound. No guarding.   Extremities: No clubbing/cyanosis/edema.   Neurologic: Moves all four extremities. Full range of motion.   Skin: Warm and moist. The patient's skin has normal elasticity and good skin turgor.   Psychiatric: unable to assess  Musculoskeletal: unable to assess      DATA    ECG: NSR, shortening/narrowing of T wave.  	  ASSESSMENT/PLAN: 	87y Female with acute metabolic encephalopathy.  Previous UTI treatment. Hypercalcemic.       -tte with normal LV function   -treatment of hypercalcemia per primary team - calcium levels improved   -planned for ERCP VS  colonoscopy this week  -optimized from CV perspective

## 2022-05-15 NOTE — PROGRESS NOTE ADULT - ASSESSMENT
87F PMHX of HTN, HLD, GERD, recent dx of depression, ventral hernia repair 20+ years ago, brought in by daughter for worsening confusion and restlessness. 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 MRCP showing irregular and nodular mural thickening and defect in the posterior and inferior wall of gallbladder; CT chest with no overt signs of metastasis. CEA and CA 19-9 elevated. Patient currently with bowel function and with benign abdominal exam.    PLAN:   - No acute surgical intervention at this time   - GI: Colonoscopy versus ERCP for Biliary Brushings 87F PMHX of HTN, HLD, GERD, recent dx of depression, ventral hernia repair 20+ years ago, brought in by daughter for worsening confusion and restlessness. 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 MRCP showing irregular and nodular mural thickening and defect in the posterior and inferior wall of gallbladder; CT chest with no overt signs of metastasis. CEA and CA 19-9 elevated. Patient currently with bowel function and with benign abdominal exam.    PLAN:   - No acute surgical intervention at this time   - Please book patient for Colonoscopy and ERCP and prep patient  - Appreciate GI management    D Team Surgery  #[id number] 87F PMHX of HTN, HLD, GERD, recent dx of depression, ventral hernia repair 20+ years ago, brought in by daughter for worsening confusion and restlessness. 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 MRCP showing irregular and nodular mural thickening and defect in the posterior and inferior wall of gallbladder; CT chest with no overt signs of metastasis. CEA and CA 19-9 elevated. Patient currently with bowel function and with benign abdominal exam.    PLAN:   - No acute surgical intervention at this time   - Please book patient for Colonoscopy and ERCP and prep patient for 5/16  - Appreciate GI management    D Team Surgery  #[id number]

## 2022-05-16 LAB
ANION GAP SERPL CALC-SCNC: 10 MMOL/L — SIGNIFICANT CHANGE UP (ref 7–14)
BUN SERPL-MCNC: 14 MG/DL — SIGNIFICANT CHANGE UP (ref 7–23)
CALCIUM SERPL-MCNC: 10.6 MG/DL — HIGH (ref 8.4–10.5)
CHLORIDE SERPL-SCNC: 100 MMOL/L — SIGNIFICANT CHANGE UP (ref 98–107)
CO2 SERPL-SCNC: 25 MMOL/L — SIGNIFICANT CHANGE UP (ref 22–31)
CREAT SERPL-MCNC: 0.85 MG/DL — SIGNIFICANT CHANGE UP (ref 0.5–1.3)
EGFR: 67 ML/MIN/1.73M2 — SIGNIFICANT CHANGE UP
GLUCOSE SERPL-MCNC: 88 MG/DL — SIGNIFICANT CHANGE UP (ref 70–99)
HCT VFR BLD CALC: 38.4 % — SIGNIFICANT CHANGE UP (ref 34.5–45)
HGB BLD-MCNC: 12.5 G/DL — SIGNIFICANT CHANGE UP (ref 11.5–15.5)
MAGNESIUM SERPL-MCNC: 2 MG/DL — SIGNIFICANT CHANGE UP (ref 1.6–2.6)
MCHC RBC-ENTMCNC: 29.3 PG — SIGNIFICANT CHANGE UP (ref 27–34)
MCHC RBC-ENTMCNC: 32.6 GM/DL — SIGNIFICANT CHANGE UP (ref 32–36)
MCV RBC AUTO: 90.1 FL — SIGNIFICANT CHANGE UP (ref 80–100)
NRBC # BLD: 0 /100 WBCS — SIGNIFICANT CHANGE UP
NRBC # FLD: 0 K/UL — SIGNIFICANT CHANGE UP
PHOSPHATE SERPL-MCNC: 2.8 MG/DL — SIGNIFICANT CHANGE UP (ref 2.5–4.5)
PLATELET # BLD AUTO: 319 K/UL — SIGNIFICANT CHANGE UP (ref 150–400)
POTASSIUM SERPL-MCNC: 4.2 MMOL/L — SIGNIFICANT CHANGE UP (ref 3.5–5.3)
POTASSIUM SERPL-SCNC: 4.2 MMOL/L — SIGNIFICANT CHANGE UP (ref 3.5–5.3)
RBC # BLD: 4.26 M/UL — SIGNIFICANT CHANGE UP (ref 3.8–5.2)
RBC # FLD: 14.6 % — HIGH (ref 10.3–14.5)
SODIUM SERPL-SCNC: 135 MMOL/L — SIGNIFICANT CHANGE UP (ref 135–145)
WBC # BLD: 5.97 K/UL — SIGNIFICANT CHANGE UP (ref 3.8–10.5)
WBC # FLD AUTO: 5.97 K/UL — SIGNIFICANT CHANGE UP (ref 3.8–10.5)

## 2022-05-16 PROCEDURE — 99232 SBSQ HOSP IP/OBS MODERATE 35: CPT | Mod: GC

## 2022-05-16 PROCEDURE — 99232 SBSQ HOSP IP/OBS MODERATE 35: CPT

## 2022-05-16 RX ORDER — LOSARTAN POTASSIUM 100 MG/1
50 TABLET, FILM COATED ORAL DAILY
Refills: 0 | Status: DISCONTINUED | OUTPATIENT
Start: 2022-05-16 | End: 2022-05-19

## 2022-05-16 RX ADMIN — MIRTAZAPINE 15 MILLIGRAM(S): 45 TABLET, ORALLY DISINTEGRATING ORAL at 21:39

## 2022-05-16 RX ADMIN — Medication 100 MILLIGRAM(S): at 21:40

## 2022-05-16 RX ADMIN — Medication 500 MILLIGRAM(S): at 17:41

## 2022-05-16 RX ADMIN — Medication 100 MILLIGRAM(S): at 12:32

## 2022-05-16 RX ADMIN — PANTOPRAZOLE SODIUM 40 MILLIGRAM(S): 20 TABLET, DELAYED RELEASE ORAL at 05:11

## 2022-05-16 RX ADMIN — Medication 145 MILLIGRAM(S): at 21:39

## 2022-05-16 RX ADMIN — Medication 500 MILLIGRAM(S): at 05:11

## 2022-05-16 RX ADMIN — CEFTRIAXONE 100 MILLIGRAM(S): 500 INJECTION, POWDER, FOR SOLUTION INTRAMUSCULAR; INTRAVENOUS at 12:31

## 2022-05-16 RX ADMIN — ENOXAPARIN SODIUM 40 MILLIGRAM(S): 100 INJECTION SUBCUTANEOUS at 17:41

## 2022-05-16 RX ADMIN — Medication 25 MILLIGRAM(S): at 05:12

## 2022-05-16 NOTE — PROGRESS NOTE ADULT - SUBJECTIVE AND OBJECTIVE BOX
IVANHARSH DUMONT 87y [id number]    Patient is a 87y old  Female who presents with a chief complaint of ams and dysuria (16 May 2022 13:28)      Follow Up/CC:  ID following for cholecystitis    Interval History/ROS:    Allergies    penicillin (Unknown)    Intolerances        ANTIMICROBIALS:  cefTRIAXone   IVPB 1000 every 24 hours  metroNIDAZOLE    Tablet 500 two times a day      MEDICATIONS  (STANDING):  allopurinol 100 milliGRAM(s) Oral at bedtime  cefTRIAXone   IVPB 1000 milliGRAM(s) IV Intermittent every 24 hours  enoxaparin Injectable 40 milliGRAM(s) SubCutaneous every 24 hours  fenofibrate Tablet 145 milliGRAM(s) Oral at bedtime  losartan 50 milliGRAM(s) Oral daily  metoprolol succinate ER 25 milliGRAM(s) Oral daily  metroNIDAZOLE    Tablet 500 milliGRAM(s) Oral two times a day  mirtazapine 15 milliGRAM(s) Oral <User Schedule>  pantoprazole    Tablet 40 milliGRAM(s) Oral before breakfast  thiamine 100 milliGRAM(s) Oral daily    MEDICATIONS  (PRN):  acetaminophen     Tablet .. 650 milliGRAM(s) Oral every 6 hours PRN Temp greater or equal to 38C (100.4F), Mild Pain (1 - 3), Moderate Pain (4 - 6)        Vital Signs Last 24 Hrs  T(C): 36.9 (16 May 2022 12:30), Max: 37 (15 May 2022 20:10)  T(F): 98.4 (16 May 2022 12:30), Max: 98.6 (15 May 2022 20:10)  HR: 69 (16 May 2022 12:30) (69 - 83)  BP: 124/60 (16 May 2022 12:30) (124/60 - 149/63)  BP(mean): --  RR: 18 (16 May 2022 12:30) (17 - 18)  SpO2: 98% (16 May 2022 12:30) (94% - 98%)    CBC Full  -  ( 16 May 2022 05:35 )  WBC Count : 5.97 K/uL  RBC Count : 4.26 M/uL  Hemoglobin : 12.5 g/dL  Hematocrit : 38.4 %  Platelet Count - Automated : 319 K/uL  Mean Cell Volume : 90.1 fL  Mean Cell Hemoglobin : 29.3 pg  Mean Cell Hemoglobin Concentration : 32.6 gm/dL  Auto Neutrophil # : x  Auto Lymphocyte # : x  Auto Monocyte # : x  Auto Eosinophil # : x  Auto Basophil # : x  Auto Neutrophil % : x  Auto Lymphocyte % : x  Auto Monocyte % : x  Auto Eosinophil % : x  Auto Basophil % : x    05-16    135  |  100  |  14  ----------------------------<  88  4.2   |  25  |  0.85    Ca    10.6<H>      16 May 2022 05:35  Phos  2.8     05-16  Mg     2.00     05-16            MICROBIOLOGY:  .Blood Blood-Peripheral  05-04-22   No Growth Final  --  --      .Blood Blood-Peripheral  05-04-22   No Growth Final  --  --              v            RADIOLOGY     IVANHARSH DUMONT 87y [id number]    Patient is a 87y old  Female who presents with a chief complaint of ams and dysuria (16 May 2022 13:28)      Follow Up/CC:  ID following for cholecystitis    Interval History/ROS: no fever    Allergies    penicillin (Unknown)    Intolerances        ANTIMICROBIALS:  cefTRIAXone   IVPB 1000 every 24 hours  metroNIDAZOLE    Tablet 500 two times a day      MEDICATIONS  (STANDING):  allopurinol 100 milliGRAM(s) Oral at bedtime  cefTRIAXone   IVPB 1000 milliGRAM(s) IV Intermittent every 24 hours  enoxaparin Injectable 40 milliGRAM(s) SubCutaneous every 24 hours  fenofibrate Tablet 145 milliGRAM(s) Oral at bedtime  losartan 50 milliGRAM(s) Oral daily  metoprolol succinate ER 25 milliGRAM(s) Oral daily  metroNIDAZOLE    Tablet 500 milliGRAM(s) Oral two times a day  mirtazapine 15 milliGRAM(s) Oral <User Schedule>  pantoprazole    Tablet 40 milliGRAM(s) Oral before breakfast  thiamine 100 milliGRAM(s) Oral daily    MEDICATIONS  (PRN):  acetaminophen     Tablet .. 650 milliGRAM(s) Oral every 6 hours PRN Temp greater or equal to 38C (100.4F), Mild Pain (1 - 3), Moderate Pain (4 - 6)        Vital Signs Last 24 Hrs  T(C): 36.9 (16 May 2022 12:30), Max: 37 (15 May 2022 20:10)  T(F): 98.4 (16 May 2022 12:30), Max: 98.6 (15 May 2022 20:10)  HR: 69 (16 May 2022 12:30) (69 - 83)  BP: 124/60 (16 May 2022 12:30) (124/60 - 149/63)  BP(mean): --  RR: 18 (16 May 2022 12:30) (17 - 18)  SpO2: 98% (16 May 2022 12:30) (94% - 98%)    CBC Full  -  ( 16 May 2022 05:35 )  WBC Count : 5.97 K/uL  RBC Count : 4.26 M/uL  Hemoglobin : 12.5 g/dL  Hematocrit : 38.4 %  Platelet Count - Automated : 319 K/uL  Mean Cell Volume : 90.1 fL  Mean Cell Hemoglobin : 29.3 pg  Mean Cell Hemoglobin Concentration : 32.6 gm/dL  Auto Neutrophil # : x  Auto Lymphocyte # : x  Auto Monocyte # : x  Auto Eosinophil # : x  Auto Basophil # : x  Auto Neutrophil % : x  Auto Lymphocyte % : x  Auto Monocyte % : x  Auto Eosinophil % : x  Auto Basophil % : x    05-16    135  |  100  |  14  ----------------------------<  88  4.2   |  25  |  0.85    Ca    10.6<H>      16 May 2022 05:35  Phos  2.8     05-16  Mg     2.00     05-16            MICROBIOLOGY:  .Blood Blood-Peripheral  05-04-22   No Growth Final  --  --      .Blood Blood-Peripheral  05-04-22   No Growth Final  --  --              v            RADIOLOGY

## 2022-05-16 NOTE — PROGRESS NOTE ADULT - NS ATTEND AMEND GEN_ALL_CORE FT
Patient seen and examined.  Agree with above.   Optimized from cv perspective for planned colonoscopy     Carolyn Pike MD

## 2022-05-16 NOTE — PROGRESS NOTE ADULT - SUBJECTIVE AND OBJECTIVE BOX
Date of service: 05/16/22    HISTORY OF PRESENT ILLNESS:   Ms Rich is a pleasant 88yo woman sent in by family for confusion and hallucinations.     S: no chest pain or sob; ros limited but otherwise negative.     Review of Systems:   Constitutional: [ ] fevers, [ ] chills.   Skin: [ ] dry skin. [ ] rashes.  Psychiatric: [ ] depression, [ ] anxiety.   Gastrointestinal: [ ] BRBPR, [ ] melena.   Neurological: [ ] confusion. [ ] seizures. [ ] shuffling gait.   Ears,Nose,Mouth and Throat: [ ] ear pain [ ] sore throat.   Eyes: [ ] diplopia.   Respiratory: [ ] hemoptysis. [ ] shortness of breath  Cardiovascular: See HPI above  Hematologic/Lymphatic: [ ] anemia. [ ] painful nodes. [ ] prolonged bleeding.   Genitourinary: [ ] hematuria. [ ] flank pain.   Endocrine: [ ] significant change in weight. [ ] intolerance to heat and cold.     Review of systems [x ] otherwise negative, [ ] otherwise unable to obtain    FH: no family history of sudden cardiac death in first degree relatives    SH: [ ] tobacco, [ ] alcohol, [ ] drugs    acetaminophen     Tablet .. 650 milliGRAM(s) Oral every 6 hours PRN  allopurinol 100 milliGRAM(s) Oral at bedtime  cefTRIAXone   IVPB 1000 milliGRAM(s) IV Intermittent every 24 hours  enoxaparin Injectable 40 milliGRAM(s) SubCutaneous every 24 hours  fenofibrate Tablet 145 milliGRAM(s) Oral at bedtime  losartan 50 milliGRAM(s) Oral daily  metoprolol succinate ER 25 milliGRAM(s) Oral daily  metroNIDAZOLE    Tablet 500 milliGRAM(s) Oral two times a day  mirtazapine 15 milliGRAM(s) Oral <User Schedule>  pantoprazole    Tablet 40 milliGRAM(s) Oral before breakfast  thiamine 100 milliGRAM(s) Oral daily                            12.5   5.97  )-----------( 319      ( 16 May 2022 05:35 )             38.4       05-16    135  |  100  |  14  ----------------------------<  88  4.2   |  25  |  0.85    Ca    10.6<H>      16 May 2022 05:35  Phos  2.8     05-16  Mg     2.00     05-16      T(C): 36.9 (05-16-22 @ 12:30), Max: 37 (05-15-22 @ 20:10)  HR: 69 (05-16-22 @ 12:30) (69 - 83)  BP: 124/60 (05-16-22 @ 12:30) (124/60 - 149/63)  RR: 18 (05-16-22 @ 12:30) (17 - 18)  SpO2: 98% (05-16-22 @ 12:30) (94% - 98%)      General: Well nourished in no acute distress.   Head: Normocephalic and atraumatic.   Neck: No JVD. No bruits. Supple. Does not appear to be enlarged.   Cardiovascular: + S1,S2 ; RRR Soft systolic murmur at the left lower sternal border. No rubs noted.    Lungs: CTA b/l. No rhonchi, rales or wheezes.   Abdomen: + BS, soft. Non tender. Non distended. No rebound. No guarding.   Extremities: No clubbing/cyanosis/edema.   Neurologic: Moves all four extremities. Full range of motion.   Skin: Warm and moist. The patient's skin has normal elasticity and good skin turgor.   Psychiatric: unable to assess  Musculoskeletal: unable to assess      DATA    ECG: NSR, shortening/narrowing of T wave.  	  ASSESSMENT/PLAN: 	87y Female with acute metabolic encephalopathy.  Previous UTI treatment. Hypercalcemic.    -monitor tele - sr with no events thus far and now tele dc   -tte with normal LV function   -treatment of hypercalcemia per primary team - calcium levels improved   -planned for ERCP and colonoscopy this week  -optimized from CV perspective

## 2022-05-16 NOTE — PROGRESS NOTE ADULT - SUBJECTIVE AND OBJECTIVE BOX
[facility]      Seen earlier today, and examined.  - Today, patient is without complaints.           *****MEDICATIONS: Current medication reviewed and documented.    MEDICATIONS  (STANDING):  allopurinol 100 milliGRAM(s) Oral at bedtime  cefTRIAXone   IVPB 1000 milliGRAM(s) IV Intermittent every 24 hours  enoxaparin Injectable 40 milliGRAM(s) SubCutaneous every 24 hours  fenofibrate Tablet 145 milliGRAM(s) Oral at bedtime  losartan 50 milliGRAM(s) Oral daily  metoprolol succinate ER 25 milliGRAM(s) Oral daily  metroNIDAZOLE    Tablet 500 milliGRAM(s) Oral two times a day  mirtazapine 15 milliGRAM(s) Oral <User Schedule>  pantoprazole    Tablet 40 milliGRAM(s) Oral before breakfast  thiamine 100 milliGRAM(s) Oral daily    MEDICATIONS  (PRN):  acetaminophen     Tablet .. 650 milliGRAM(s) Oral every 6 hours PRN Temp greater or equal to 38C (100.4F), Mild Pain (1 - 3), Moderate Pain (4 - 6)          ***** VITAL SIGNS:  T(F): 98.4 (22 @ 12:30), Max: 98.6 (05-15-22 @ 20:10)  HR: 69 (22 @ 12:30) (69 - 83)  BP: 124/60 (22 @ 12:30) (124/60 - 149/63)  RR: 18 (22 @ 12:30) (17 - 18)  SpO2: 98% (22 @ 12:30) (94% - 98%)  Wt(kg): --  ,   I&O's Summary           *****PHYSICAL EXAM:  alert oriented x 2 attention comprehension are fair.  Able to name, repeat.   EOmi fundi not visualized   no nystagmus VFF to confrontation  Tongue is midline  Palate elevates symmetrically   Moving all 4 ext spontaneously no drift appreciated    Gait not assessed.            *****LAB AND IMAGIN.5   5.97  )-----------( 319      ( 16 May 2022 05:35 )             38.4               05-16    135  |  100  |  14  ----------------------------<  88  4.2   |  25  |  0.85    Ca    10.6<H>      16 May 2022 05:35  Phos  2.8     05-  Mg     2.00     -16                           [All pertinent recent Imaging/Reports reviewed]           *****A S S E S S M E N T   A N D   P L A N :  Excerpt from H&P,"  86 yo F non smoker with PMHX of HTN, HLD, GERD, and recently dx depression, here with Daughter at bedside brought in for worsening confusion and restlessness first beginning 2 days ago, intermittently, then today worse with hallucinations ( auditory and visual),and generalized weakness.  family concerned patient was going to wander. Recently dx with UTI  5 days ago has been on bactrim for 5 days ( unclear if patient missed a dose due to developing confusion). Patient A& o x2-3, having episode of confusion normally ambulates  hx obtained from chart   family not available at bedside at the time of my encounter     Problem/Recommendations 1: ams   likely toxic metabolic encephalopathy due to hyponatremia +/- hypercalcemia +/- uti worsening underlying cognitive impairment   Plan : rx infection   avoid daytime somnolence   encourage po intake   melatonin for sleep augmentation   thiamine 100 daily   doing well    doing well    noted to have gangrenous gall bladder defer to gi   mildly dilated cbd   malignancy workup      feeding herself    son at bedside    doing ok  colonoscopy     Problem/Recommendations 2:weakness generalized   deconditioning vs. related to hypercalcemia   oob to chair daily please           Thank you for allowing me to participate in the care of this patient. Will continue to follow patient periodically. Please do not hesitate to call me if you have any  questions or if there has been a change in patients neurological status     ________________  Sandhya Crow MD  [facility]rological [facility] ([facility]  [phone]      33 minutes spent on total encounter; more than 50 % of the visit was  spent counseling about plan of care, compliance to diet/exercise and medication regimen and or  coordinating care by the attending physician.      It is advised that stroke patients follow up with SHEBA Salazar @ [phone] in 1- 2 weeks.   Others please follow up with Dr. Michael Nissenbaum [phone]

## 2022-05-16 NOTE — PROGRESS NOTE ADULT - SUBJECTIVE AND OBJECTIVE BOX
Gastroenterology/Hepatology Progress Note      Interval Events:   - patient evaluated at bedside, no complaints     Allergies:  penicillin (Unknown)      Hospital Medications:  acetaminophen     Tablet .. 650 milliGRAM(s) Oral every 6 hours PRN  allopurinol 100 milliGRAM(s) Oral at bedtime  cefTRIAXone   IVPB 1000 milliGRAM(s) IV Intermittent every 24 hours  enoxaparin Injectable 40 milliGRAM(s) SubCutaneous every 24 hours  fenofibrate Tablet 145 milliGRAM(s) Oral at bedtime  losartan 50 milliGRAM(s) Oral daily  metoprolol succinate ER 25 milliGRAM(s) Oral daily  metroNIDAZOLE    Tablet 500 milliGRAM(s) Oral two times a day  mirtazapine 15 milliGRAM(s) Oral <User Schedule>  pantoprazole    Tablet 40 milliGRAM(s) Oral before breakfast  thiamine 100 milliGRAM(s) Oral daily      ROS: 14 point ROS negative unless otherwise state in subjective    PHYSICAL EXAM:   Vital Signs:  Vital Signs Last 24 Hrs  T(C): 36.7 (16 May 2022 04:52), Max: 37 (15 May 2022 13:31)  T(F): 98.1 (16 May 2022 04:52), Max: 98.6 (15 May 2022 13:31)  HR: 74 (16 May 2022 04:52) (74 - 83)  BP: 134/55 (16 May 2022 04:52) (134/55 - 149/63)  BP(mean): --  RR: 17 (16 May 2022 04:52) (17 - 19)  SpO2: 94% (16 May 2022 04:52) (94% - 97%)  Daily     Daily     GENERAL:  No acute distress  HEENT:  NCAT, no scleral icterus  CHEST: no resp distress  HEART:  RRR  ABDOMEN:  Soft, non-tender, non-distended, normoactive bowel sounds, no masses  EXTREMITIES:  No cyanosis, clubbing, or edema  SKIN:  No rash/erythema/ecchymoses/petechiae/wounds/abscess/warm/dry  NEURO:  Alert and oriented x 3, no asterixis, no tremor    LABS:                        12.5   5.97  )-----------( 319      ( 16 May 2022 05:35 )             38.4     Mean Cell Volume: 90.1 fL (05-16-22 @ 05:35)    05-16    135  |  100  |  14  ----------------------------<  88  4.2   |  25  |  0.85    Ca    10.6<H>      16 May 2022 05:35  Phos  2.8     05-16  Mg     2.00     05-16                  Imaging:           Gastroenterology/Hepatology Progress Note    Interval Events:   - patient evaluated at bedside, no complaints   - patient initially requested both ercp and colonoscopy prior to eventual surgical intervention, now requesting only colonoscopy    Allergies:  penicillin (Unknown)      Hospital Medications:  acetaminophen     Tablet .. 650 milliGRAM(s) Oral every 6 hours PRN  allopurinol 100 milliGRAM(s) Oral at bedtime  cefTRIAXone   IVPB 1000 milliGRAM(s) IV Intermittent every 24 hours  enoxaparin Injectable 40 milliGRAM(s) SubCutaneous every 24 hours  fenofibrate Tablet 145 milliGRAM(s) Oral at bedtime  losartan 50 milliGRAM(s) Oral daily  metoprolol succinate ER 25 milliGRAM(s) Oral daily  metroNIDAZOLE    Tablet 500 milliGRAM(s) Oral two times a day  mirtazapine 15 milliGRAM(s) Oral <User Schedule>  pantoprazole    Tablet 40 milliGRAM(s) Oral before breakfast  thiamine 100 milliGRAM(s) Oral daily      ROS: 14 point ROS negative unless otherwise state in subjective    PHYSICAL EXAM:   Vital Signs:  Vital Signs Last 24 Hrs  T(C): 36.7 (16 May 2022 04:52), Max: 37 (15 May 2022 13:31)  T(F): 98.1 (16 May 2022 04:52), Max: 98.6 (15 May 2022 13:31)  HR: 74 (16 May 2022 04:52) (74 - 83)  BP: 134/55 (16 May 2022 04:52) (134/55 - 149/63)  BP(mean): --  RR: 17 (16 May 2022 04:52) (17 - 19)  SpO2: 94% (16 May 2022 04:52) (94% - 97%)  Daily     Daily     GENERAL:  No acute distress  HEENT:  NCAT, no scleral icterus  CHEST: no resp distress  HEART:  RRR  ABDOMEN:  Soft, non-tender, non-distended, normoactive bowel sounds, no masses  EXTREMITIES:  No cyanosis, clubbing, or edema  SKIN:  No rash/erythema/ecchymoses/petechiae/wounds/abscess/warm/dry  NEURO:  Alert and oriented x 3, no asterixis, no tremor    LABS:                        12.5   5.97  )-----------( 319      ( 16 May 2022 05:35 )             38.4     Mean Cell Volume: 90.1 fL (05-16-22 @ 05:35)    05-16    135  |  100  |  14  ----------------------------<  88  4.2   |  25  |  0.85    Ca    10.6<H>      16 May 2022 05:35  Phos  2.8     05-16  Mg     2.00     05-16                  Imaging:

## 2022-05-16 NOTE — PROGRESS NOTE ADULT - SUBJECTIVE AND OBJECTIVE BOX
[facility] NEPHROLOGY PRACTICE   MD KRISTEN MARQUES MD KRISTINE SOLTANPOUR, DO ANGELA WONG, PA    TEL:  OFFICE: [phone]  From 5pm-7am Answering Service [phone]    -- RENAL FOLLOW UP NOTE ---Date of Service 05-16-22 @ 14:37    Patient is a 87y old  Female who presents with a chief complaint of ams and dysuria (16 May 2022 13:37)      Patient seen and examined at bedside. No chest pain/sob    VITALS:  T(F): 98.4 (05-16-22 @ 12:30), Max: 98.6 (05-15-22 @ 20:10)  HR: 69 (05-16-22 @ 12:30)  BP: 124/60 (05-16-22 @ 12:30)  RR: 18 (05-16-22 @ 12:30)  SpO2: 98% (05-16-22 @ 12:30)  Wt(kg): --        PHYSICAL EXAM:  Constitutional: NAD  Neck: No JVD  Respiratory: CTAB, no wheezes, rales or rhonchi  Cardiovascular: S1, S2, RRR  Gastrointestinal: BS+, soft, NT/ND  Extremities: No peripheral edema    Hospital Medications:   MEDICATIONS  (STANDING):  allopurinol 100 milliGRAM(s) Oral at bedtime  cefTRIAXone   IVPB 1000 milliGRAM(s) IV Intermittent every 24 hours  enoxaparin Injectable 40 milliGRAM(s) SubCutaneous every 24 hours  fenofibrate Tablet 145 milliGRAM(s) Oral at bedtime  losartan 50 milliGRAM(s) Oral daily  metoprolol succinate ER 25 milliGRAM(s) Oral daily  metroNIDAZOLE    Tablet 500 milliGRAM(s) Oral two times a day  mirtazapine 15 milliGRAM(s) Oral <User Schedule>  pantoprazole    Tablet 40 milliGRAM(s) Oral before breakfast  thiamine 100 milliGRAM(s) Oral daily      LABS:  05-16    135  |  100  |  14  ----------------------------<  88  4.2   |  25  |  0.85    Ca    10.6<H>      16 May 2022 05:35  Phos  2.8     05-16  Mg     2.00     05-16      Creatinine Trend: 0.85 <--, 0.89 <--, 0.93 <--, 0.94 <--, 0.92 <--, 0.87 <--, 0.80 <--    Phosphorus Level, Serum: 2.8 mg/dL (05-16 @ 05:35)                              12.5   5.97  )-----------( 319      ( 16 May 2022 05:35 )             38.4     Urine Studies:  Urinalysis - [05-04-22 @ 12:06]      Color Light Yellow / Appearance Clear / SG 1.006 / pH 6.5      Gluc Negative / Ketone Negative  / Bili Negative / Urobili <2 mg/dL       Blood Negative / Protein Negative / Leuk Est Negative / Nitrite Negative      RBC  / WBC  / Hyaline  / Gran  / Sq Epi  / Non Sq Epi  / Bacteria     Urine Sodium <20      [05-15-22 @ 21:00]  Urine Osmolality 178      [05-15-22 @ 21:00]    PTH -- (Ca --)      [05-04-22 @ 16:19]   29  PTH -- (Ca --)      [05-04-22 @ 12:06]   31  Vitamin D (25OH) 76.6      [05-05-22 @ 01:54]  TSH 0.71      [05-04-22 @ 10:43]      Free Light Chains: kappa 4.05, lambda 5.44, ratio = 0.74      [05-05 @ 04:37]  Immunofixation Serum:   No Monoclonal Band Identified  Britt Marshall M.D.    Reference Range: None Detected      [05-04-22 @ 16:19]  SPEP Interpretation: Hypoalbuminemia with increased Alpha-1 fraction consistent with acute  phase reaction.  Increased Beta fraction, possibly transferrin increase..  Britt Marshall M.D.      [05-04-22 @ 16:19]    RADIOLOGY & ADDITIONAL STUDIES:

## 2022-05-16 NOTE — PROGRESS NOTE ADULT - ASSESSMENT
86 yo F non smoker with PMHX of HTN, HLD, GERD, and recently dx depression, here with Daughter at bedside brought in for worsening confusion and restlessness first beginning 2   days ago, intermittently, then today worse with hallucinations ( auditory and visual),and generalized weakness.  family concerned patient was going to wander. Recently dx with UTI  5 days ago has been on bactrim for 5 days ( unclear if patient missed a dose due to developing confusion). Patient A& o x2-3, having episode of confusion normally ambulates and performs ADLS independently. Denies cp ,sob, abdominal pain, nausea, vomiting, diarrhea, headache.    1 AMS  - likely metabolic encephalopathy due to electrolytes abnormality   - dc antibiotics, uti ruled out   - fu cultures  - ID following  - neuro fu appreciated  - psych consult     2 Hypercalcemia  - unclear etiology  - ivf  - renal following       3 Gangrenous cholecystitis, ro gallbladder cancer   - cw antibiotics  - ID fu  - surgery and gi fu   88 yo F non smoker with PMHX of HTN, HLD, GERD, and recently dx depression, here with Daughter at bedside brought in for worsening confusion and restlessness first beginning 2   days ago, intermittently, then today worse with hallucinations ( auditory and visual),and generalized weakness.  family concerned patient was going to wander. Recently dx with UTI  5 days ago has been on bactrim for 5 days ( unclear if patient missed a dose due to developing confusion). Patient A& o x2-3, having episode of confusion normally ambulates and performs ADLS independently. Denies cp ,sob, abdominal pain, nausea, vomiting, diarrhea, headache.    1 AMS  - likely metabolic encephalopathy due to electrolytes abnormality   - dc antibiotics, uti ruled out   - fu cultures  - ID following  - neuro fu appreciated  - psych consult     2 Hypercalcemia  - unclear etiology  - ivf  - renal following       3 Gangrenous cholecystitis, ro gallbladder cancer   - cw antibiotics  - ID fu  - surgery and gi fu  - colonoscopy wednesday

## 2022-05-16 NOTE — PROGRESS NOTE ADULT - SUBJECTIVE AND OBJECTIVE BOX
Patient is a 87y old  Female who presents with a chief complaint of ams and dysuria (16 May 2022 08:39)    Date of servie : 05-16-22 @ 11:51  INTERVAL HPI/OVERNIGHT EVENTS:  T(C): 36.7 (05-16-22 @ 04:52), Max: 37 (05-15-22 @ 13:31)  HR: 74 (05-16-22 @ 04:52) (74 - 83)  BP: 134/55 (05-16-22 @ 04:52) (134/55 - 149/63)  RR: 17 (05-16-22 @ 04:52) (17 - 19)  SpO2: 94% (05-16-22 @ 04:52) (94% - 97%)  Wt(kg): --  I&O's Summary      LABS:                        12.5   5.97  )-----------( 319      ( 16 May 2022 05:35 )             38.4     05-16    135  |  100  |  14  ----------------------------<  88  4.2   |  25  |  0.85    Ca    10.6<H>      16 May 2022 05:35  Phos  2.8     05-16  Mg     2.00     05-16          CAPILLARY BLOOD GLUCOSE                MEDICATIONS  (STANDING):  allopurinol 100 milliGRAM(s) Oral at bedtime  cefTRIAXone   IVPB 1000 milliGRAM(s) IV Intermittent every 24 hours  enoxaparin Injectable 40 milliGRAM(s) SubCutaneous every 24 hours  fenofibrate Tablet 145 milliGRAM(s) Oral at bedtime  losartan 50 milliGRAM(s) Oral daily  metoprolol succinate ER 25 milliGRAM(s) Oral daily  metroNIDAZOLE    Tablet 500 milliGRAM(s) Oral two times a day  mirtazapine 15 milliGRAM(s) Oral <User Schedule>  pantoprazole    Tablet 40 milliGRAM(s) Oral before breakfast  thiamine 100 milliGRAM(s) Oral daily    MEDICATIONS  (PRN):  acetaminophen     Tablet .. 650 milliGRAM(s) Oral every 6 hours PRN Temp greater or equal to 38C (100.4F), Mild Pain (1 - 3), Moderate Pain (4 - 6)          PHYSICAL EXAM:  GENERAL: NAD, well-groomed, well-developed  HEAD:  Atraumatic, Normocephalic  CHEST/LUNG: Clear to percussion bilaterally; No rales, rhonchi, wheezing, or rubs  HEART: Regular rate and rhythm; No murmurs, rubs, or gallops  ABDOMEN: Soft, Nontender, Nondistended; Bowel sounds present  EXTREMITIES:  2+ Peripheral Pulses, No clubbing, cyanosis, or edema  LYMPH: No lymphadenopathy noted  SKIN: No rashes or lesions    Care Discussed with Consultants/Other Providers [x ] YES  [ ] NO

## 2022-05-16 NOTE — PROGRESS NOTE ADULT - ASSESSMENT
87 year old F with history of HTN, HLD and GERD presenting to [facility] ED with confusion and restlessness x 2 days, along with auditory and visual hallucinations. ID consulted for possible UTI.     Impression:  #Encephalopathy - Likely non-infectious 2/2 electrolyte abnormalities. Reported history of recent UTI treated with Bactrim. UA here without pyuria, UCx no growth. No evidence of meningitis, neck supple w/o rigidity  #perf GB - infection vs cancer    - appreciate GI/surgery input  - cont CTX/flagyl  - total 14 days of abx  - change to po on DC   - no fever or leukocytosis     Angel Chand  Attending Physician   Division of Infectious Disease  Office #[phone]  Available on Microsoft Teams also  After 5pm/weekend or no response, call #[phone]

## 2022-05-16 NOTE — PROGRESS NOTE ADULT - SUBJECTIVE AND OBJECTIVE BOX
Vital Signs Last 24 Hrs  T(C): 36.7 (16 May 2022 04:52), Max: 37 (15 May 2022 13:31)  T(F): 98.1 (16 May 2022 04:52), Max: 98.6 (15 May 2022 13:31)  HR: 74 (16 May 2022 04:52) (74 - 83)  BP: 134/55 (16 May 2022 04:52) (134/55 - 149/63)  BP(mean): --  RR: 17 (16 May 2022 04:52) (17 - 19)  SpO2: 94% (16 May 2022 04:52) (94% - 97%)    I&O's Detail                            12.5   5.97  )-----------( 319      ( 16 May 2022 05:35 )             38.4       05-15    134<L>  |  98  |  15  ----------------------------<  90  4.3   |  25  |  0.89    Ca    10.6<H>      15 May 2022 05:54  Phos  3.0     05-15  Mg     2.00     05-15            PLAN:  awaiting colonoscopy

## 2022-05-16 NOTE — PROGRESS NOTE ADULT - ASSESSMENT
86 yo F non smoker with PMHX of HTN, HLD, GERD, and recently dx depression, brought in by family for confusion. nephrology consulted for electrolyte abnormities    Hypercalcemia  per patient takes MVI and Vitamin D supplement  hold supplements   PTH 31 and 29, ionized calcium high normal  elevated vit d possible sec to vit d toxicity   K/L 0.74, SIF neg  neg PTHrp SPEP  check ACE level  malignancy work up per team  Monitor Ca level    hyperkalemia resolved.   s/p lokelma 10x1  improved  monitor    Hyponatremia  low Na on presentation likely polydipsia resolved now again low again  urine work up still suggested polydipsia   free water restriction <1L/day  normal TSH  on mirtazepine for depression  monitor  serum NA  improved    HTN  BP relatively controlled  started on low dose bb  monitor    Hypophosphatemia off and on  Monitor serum Po4  Replete as needed.    88 yo F non smoker with PMHX of HTN, HLD, GERD, and recently dx depression, brought in by family for confusion. nephrology consulted for electrolyte abnormities    Hypercalcemia  per patient takes MVI and Vitamin D supplement  hold supplements   PTH 31 and 29, ionized calcium high normal  elevated vit d possible sec to vit d toxicity   K/L 0.74, SIF neg  neg PTHrp SPEP  check ACE level  malignancy work up per team  Monitor Ca level    hyperkalemia resolved.   s/p lokelma 10 grams x1  improved  monitor    Hyponatremia  low Na on presentation likely polydipsia resolved now again low again  urine work up still suggested polydipsia   free water restriction <1L/day  normal TSH  on mirtazepine for depression  monitor  serum NA  improved    HTN  BP relatively controlled  started on low dose bb  monitor    Hypophosphatemia off and on  Monitor serum Po4  Replete as needed.

## 2022-05-17 LAB
24R-OH-CALCIDIOL SERPL-MCNC: 53.8 NG/ML — SIGNIFICANT CHANGE UP (ref 30–80)
ALBUMIN SERPL ELPH-MCNC: 3.7 G/DL — SIGNIFICANT CHANGE UP (ref 3.3–5)
ALP SERPL-CCNC: 61 U/L — SIGNIFICANT CHANGE UP (ref 40–120)
ALT FLD-CCNC: 15 U/L — SIGNIFICANT CHANGE UP (ref 4–33)
ANION GAP SERPL CALC-SCNC: 11 MMOL/L — SIGNIFICANT CHANGE UP (ref 7–14)
AST SERPL-CCNC: 18 U/L — SIGNIFICANT CHANGE UP (ref 4–32)
BILIRUB SERPL-MCNC: 0.3 MG/DL — SIGNIFICANT CHANGE UP (ref 0.2–1.2)
BUN SERPL-MCNC: 12 MG/DL — SIGNIFICANT CHANGE UP (ref 7–23)
CALCIUM SERPL-MCNC: 10.7 MG/DL — HIGH (ref 8.4–10.5)
CHLORIDE SERPL-SCNC: 100 MMOL/L — SIGNIFICANT CHANGE UP (ref 98–107)
CO2 SERPL-SCNC: 24 MMOL/L — SIGNIFICANT CHANGE UP (ref 22–31)
CREAT SERPL-MCNC: 0.9 MG/DL — SIGNIFICANT CHANGE UP (ref 0.5–1.3)
EGFR: 62 ML/MIN/1.73M2 — SIGNIFICANT CHANGE UP
GLUCOSE SERPL-MCNC: 80 MG/DL — SIGNIFICANT CHANGE UP (ref 70–99)
HCT VFR BLD CALC: 43.5 % — SIGNIFICANT CHANGE UP (ref 34.5–45)
HGB BLD-MCNC: 13.5 G/DL — SIGNIFICANT CHANGE UP (ref 11.5–15.5)
MAGNESIUM SERPL-MCNC: 2 MG/DL — SIGNIFICANT CHANGE UP (ref 1.6–2.6)
MCHC RBC-ENTMCNC: 29.4 PG — SIGNIFICANT CHANGE UP (ref 27–34)
MCHC RBC-ENTMCNC: 31 GM/DL — LOW (ref 32–36)
MCV RBC AUTO: 94.8 FL — SIGNIFICANT CHANGE UP (ref 80–100)
NRBC # BLD: 0 /100 WBCS — SIGNIFICANT CHANGE UP
NRBC # FLD: 0 K/UL — SIGNIFICANT CHANGE UP
PHOSPHATE SERPL-MCNC: 2.4 MG/DL — LOW (ref 2.5–4.5)
PLATELET # BLD AUTO: 326 K/UL — SIGNIFICANT CHANGE UP (ref 150–400)
POTASSIUM SERPL-MCNC: 4.3 MMOL/L — SIGNIFICANT CHANGE UP (ref 3.5–5.3)
POTASSIUM SERPL-SCNC: 4.3 MMOL/L — SIGNIFICANT CHANGE UP (ref 3.5–5.3)
PROT SERPL-MCNC: 7.1 G/DL — SIGNIFICANT CHANGE UP (ref 6–8.3)
RBC # BLD: 4.59 M/UL — SIGNIFICANT CHANGE UP (ref 3.8–5.2)
RBC # FLD: 14.7 % — HIGH (ref 10.3–14.5)
SARS-COV-2 RNA SPEC QL NAA+PROBE: SIGNIFICANT CHANGE UP
SODIUM SERPL-SCNC: 135 MMOL/L — SIGNIFICANT CHANGE UP (ref 135–145)
WBC # BLD: 5.39 K/UL — SIGNIFICANT CHANGE UP (ref 3.8–10.5)
WBC # FLD AUTO: 5.39 K/UL — SIGNIFICANT CHANGE UP (ref 3.8–10.5)

## 2022-05-17 PROCEDURE — 99232 SBSQ HOSP IP/OBS MODERATE 35: CPT | Mod: GC

## 2022-05-17 RX ORDER — SOD SULF/SODIUM/NAHCO3/KCL/PEG
1000 SOLUTION, RECONSTITUTED, ORAL ORAL ONCE
Refills: 0 | Status: COMPLETED | OUTPATIENT
Start: 2022-05-18 | End: 2022-05-18

## 2022-05-17 RX ORDER — SOD SULF/SODIUM/NAHCO3/KCL/PEG
1000 SOLUTION, RECONSTITUTED, ORAL ORAL ONCE
Refills: 0 | Status: COMPLETED | OUTPATIENT
Start: 2022-05-17 | End: 2022-05-17

## 2022-05-17 RX ADMIN — Medication 100 MILLIGRAM(S): at 13:01

## 2022-05-17 RX ADMIN — Medication 100 MILLIGRAM(S): at 21:01

## 2022-05-17 RX ADMIN — Medication 500 MILLIGRAM(S): at 17:37

## 2022-05-17 RX ADMIN — Medication 145 MILLIGRAM(S): at 21:01

## 2022-05-17 RX ADMIN — Medication 500 MILLIGRAM(S): at 05:39

## 2022-05-17 RX ADMIN — Medication 25 MILLIGRAM(S): at 05:40

## 2022-05-17 RX ADMIN — LOSARTAN POTASSIUM 50 MILLIGRAM(S): 100 TABLET, FILM COATED ORAL at 06:34

## 2022-05-17 RX ADMIN — PANTOPRAZOLE SODIUM 40 MILLIGRAM(S): 20 TABLET, DELAYED RELEASE ORAL at 05:40

## 2022-05-17 RX ADMIN — Medication 1000 MILLILITER(S): at 17:53

## 2022-05-17 RX ADMIN — ENOXAPARIN SODIUM 40 MILLIGRAM(S): 100 INJECTION SUBCUTANEOUS at 17:37

## 2022-05-17 RX ADMIN — CEFTRIAXONE 100 MILLIGRAM(S): 500 INJECTION, POWDER, FOR SOLUTION INTRAMUSCULAR; INTRAVENOUS at 14:09

## 2022-05-17 RX ADMIN — MIRTAZAPINE 15 MILLIGRAM(S): 45 TABLET, ORALLY DISINTEGRATING ORAL at 20:59

## 2022-05-17 NOTE — PROGRESS NOTE ADULT - ASSESSMENT
HARSH VERGARA is a 87y Female who presents with a chief complaint of altered mental status    Hypercalcemia  - Calcium remains mildly elevated. Cause unclear. PTH normal. PTHRP negative.  - Noted plasma cell neoplasm work; no monoclonal protein seen on immunofixation.  - Continue to monitor calcium levels. IVF as needed. Nephrology is following.    Gangrenous Cholecystitis  - CT finding. Surgery and GI following.  - Plan for colonoscopy tomorrow, 5/18    Will continue to follow. Upon discharge, patient may follow up with Dr. Bon Conti of [facility].    Moise Akhtar PA-C  Hematology/Oncology  [facility]  [phone] (office)  [phone] (alt office)  Evenings and weekends please call MD on call or office HARSH VERGARA is a 87y Female who presents with a chief complaint of altered mental status    Hypercalcemia  - Calcium remains mildly elevated. Cause unclear. PTH normal. PTHRP negative.  -; no monoclonal protein seen on immunofixation.  - Continue to monitor calcium levels. IVF as needed. Nephrology is following.    Gangrenous Cholecystitis  - CT finding. Surgery and GI following.  - Plan for colonoscopy tomorrow, 5/18    Will continue to follow. Upon discharge, patient may follow up with Dr. Bon Conti of [facility].    Moise Akhtar PA-C  Hematology/Oncology  [facility]  [phone] (office)  [phone] (alt office)  Evenings and weekends please call MD on call or office

## 2022-05-17 NOTE — PROGRESS NOTE ADULT - NS ATTEND AMEND GEN_ALL_CORE FT
I have fully participated in the care of this patient. I have made amendments to the documentation where necessary, and agree with the history, physical exam, and plan as documented by the ACP.     1.  elevation of ca19-9,cea in the setting of acute gallbladder infection/perf.  after surgical intervention; f/u on pathology (r/o gb malignancy).    Thank you for the consultation    Jaswinder Bailey MD  Hematology/Oncology  [phone]

## 2022-05-17 NOTE — PROGRESS NOTE ADULT - ASSESSMENT
88 yo F non smoker with PMHX of HTN, HLD, GERD, and recently dx depression, brought in by family for confusion. nephrology consulted for electrolyte abnormities    Hypercalcemia  per patient takes MVI and Vitamin D supplement  hold supplements   PTH 31 and 29, ionized calcium high normal  elevated vit d possible sec to vit d toxicity   K/L 0.74, SIF neg  neg PTHrp SPEP  pending ACE level  malignancy work up per team  Monitor Ca level    hyperkalemia resolved.   s/p lokelma 10 grams x1  improved  monitor    Hyponatremia  low Na on presentation likely polydipsia resolved now again low again  urine work up still suggested polydipsia   free water restriction <1L/day  normal TSH  on mirtazepine for depression  monitor  serum NA  improved    HTN  BP relatively controlled  started on low dose bb  monitor    Hypophosphatemia off and on  Monitor serum Po4  Replete as needed.

## 2022-05-17 NOTE — PROGRESS NOTE ADULT - SUBJECTIVE AND OBJECTIVE BOX
Chief Complaint:  Patient is a 87y old  Female who presents with a chief complaint of ams and dysuria (16 May 2022 18:56)      Interval Events:       patient c/o mild right upper abdominal pain  she reports 2 bowel movements, soft, but describes the first one as black and the second as brown stool    Allergies:  penicillin (Unknown)      Hospital Medications:  acetaminophen     Tablet .. 650 milliGRAM(s) Oral every 6 hours PRN  allopurinol 100 milliGRAM(s) Oral at bedtime  cefTRIAXone   IVPB 1000 milliGRAM(s) IV Intermittent every 24 hours  enoxaparin Injectable 40 milliGRAM(s) SubCutaneous every 24 hours  fenofibrate Tablet 145 milliGRAM(s) Oral at bedtime  losartan 50 milliGRAM(s) Oral daily  metoprolol succinate ER 25 milliGRAM(s) Oral daily  metroNIDAZOLE    Tablet 500 milliGRAM(s) Oral two times a day  mirtazapine 15 milliGRAM(s) Oral <User Schedule>  pantoprazole    Tablet 40 milliGRAM(s) Oral before breakfast  thiamine 100 milliGRAM(s) Oral daily      PMHX/PSHX:  No pertinent past medical history    No significant past surgical history        Family history:  No pertinent family history in first degree relatives        ROS:     General:  No weight loss, fevers, chills, night sweats, fatigue   Eyes:  No vision changes  ENT:  No sore throat, pain, runny nose  CV:  No chest pain, palpitations, dizziness   Resp:  No SOB, cough, wheezing  GI:  See HPI  :  No burning with urination, hematuria  Muscle:  No pain, weakness  Neuro:  No weakness/tingling, memory problems  Psych:  No fatigue, insomnia, mood problems, depression  Heme:  No easy bruisability  Skin:  No rash, edema      PHYSICAL EXAM:     GENERAL:  Well developed, no distress, non-toxic  HEENT:  Conjunctivae clear, sclera anicteric  CHEST:  No increased effort w/ respirations  HEART:  Regular rhythm & rate  ABDOMEN:  Soft, non-distended +mild RUQ abdominal pain  EXTREMITIES:  no LE  edema  SKIN:  No rash/erythema/ecchymoses/petechiae/wounds/jaundice  NEURO:  Alert, orientedx2    Vital Signs:  Vital Signs Last 24 Hrs  T(C): 37 (17 May 2022 05:17), Max: 37 (16 May 2022 20:44)  T(F): 98.6 (17 May 2022 05:17), Max: 98.6 (16 May 2022 20:44)  HR: 84 (17 May 2022 05:17) (69 - 84)  BP: 127/61 (17 May 2022 05:17) (124/60 - 153/71)  BP(mean): --  RR: 17 (17 May 2022 05:17) (17 - 18)  SpO2: 96% (17 May 2022 05:17) (96% - 98%)  Daily     Daily     LABS:                        13.5   5.39  )-----------( 326      ( 17 May 2022 07:15 )             43.5     05-17    135  |  100  |  12  ----------------------------<  80  4.3   |  24  |  0.90    Ca    10.7<H>      17 May 2022 07:15  Phos  2.4     05-17  Mg     2.00     05-17    TPro  7.1  /  Alb  3.7  /  TBili  0.3  /  DBili  x   /  AST  18  /  ALT  15  /  AlkPhos  61  05-17    LIVER FUNCTIONS - ( 17 May 2022 07:15 )  Alb: 3.7 g/dL / Pro: 7.1 g/dL / ALK PHOS: 61 U/L / ALT: 15 U/L / AST: 18 U/L / GGT: x                   Imaging:           Interval Events:     patient c/o mild right upper abdominal pain  she reports 2 bowel movements, soft, but describes the first one as black and the second as brown stool    Allergies:  penicillin (Unknown)      Hospital Medications:  acetaminophen     Tablet .. 650 milliGRAM(s) Oral every 6 hours PRN  allopurinol 100 milliGRAM(s) Oral at bedtime  cefTRIAXone   IVPB 1000 milliGRAM(s) IV Intermittent every 24 hours  enoxaparin Injectable 40 milliGRAM(s) SubCutaneous every 24 hours  fenofibrate Tablet 145 milliGRAM(s) Oral at bedtime  losartan 50 milliGRAM(s) Oral daily  metoprolol succinate ER 25 milliGRAM(s) Oral daily  metroNIDAZOLE    Tablet 500 milliGRAM(s) Oral two times a day  mirtazapine 15 milliGRAM(s) Oral <User Schedule>  pantoprazole    Tablet 40 milliGRAM(s) Oral before breakfast  thiamine 100 milliGRAM(s) Oral daily      PMHX/PSHX:  No pertinent past medical history    No significant past surgical history        Family history:  No pertinent family history in first degree relatives        ROS:   14-point ROS reviewed and negative except as per HPI above      PHYSICAL EXAM:     GENERAL:  Well developed, no distress, non-toxic  HEENT:  Conjunctivae clear, sclera anicteric  CHEST:  No increased effort w/ respirations  HEART:  Regular rhythm & rate  ABDOMEN:  Soft, non-distended +mild RUQ abdominal pain  EXTREMITIES:  no LE  edema  SKIN:  No rash/erythema/ecchymoses/petechiae/wounds/jaundice  NEURO:  Alert, orientedx2    Vital Signs:  Vital Signs Last 24 Hrs  T(C): 37 (17 May 2022 05:17), Max: 37 (16 May 2022 20:44)  T(F): 98.6 (17 May 2022 05:17), Max: 98.6 (16 May 2022 20:44)  HR: 84 (17 May 2022 05:17) (69 - 84)  BP: 127/61 (17 May 2022 05:17) (124/60 - 153/71)  BP(mean): --  RR: 17 (17 May 2022 05:17) (17 - 18)  SpO2: 96% (17 May 2022 05:17) (96% - 98%)  Daily     Daily     LABS:                        13.5   5.39  )-----------( 326      ( 17 May 2022 07:15 )             43.5     05-17    135  |  100  |  12  ----------------------------<  80  4.3   |  24  |  0.90    Ca    10.7<H>      17 May 2022 07:15  Phos  2.4     05-17  Mg     2.00     05-17    TPro  7.1  /  Alb  3.7  /  TBili  0.3  /  DBili  x   /  AST  18  /  ALT  15  /  AlkPhos  61  05-17    LIVER FUNCTIONS - ( 17 May 2022 07:15 )  Alb: 3.7 g/dL / Pro: 7.1 g/dL / ALK PHOS: 61 U/L / ALT: 15 U/L / AST: 18 U/L / GGT: x                   Imaging:      No new/recent imaging

## 2022-05-17 NOTE — PROGRESS NOTE ADULT - ASSESSMENT
Impression:    88 yo woman w/ HTN, HLD, GERD p/w AMS, found to have hypercalcemia and CT c/f gangrenous cholecystitis w/ possible fistulous tract to hepatic flexure. GI initially consulted for dilated CBD on CT w/ nl LFT and no abd pain, MRCP w/ nl biliary tree. Labs notable for elevated Ca 19-9 (272) and CEA (28).    # Gangrenous cholecystitis - c/f perforation and fistulous tract to hepatic flexure. Per surgery, requesting colonoscopy to assess colonic involvement and aid in surgical planning. Discussed w/ patient and daughter that colonoscopy can be performed, though higher risk procedure given possible fistulous tract. Family agreeable to risks. Will plan for colonoscopy 5/18/22  # Elevated tumor markers - pt w/ thickened biliary tree and gallbladder, cannot rule out neoplasm. No abd pain, no CBD stone, liver enzymes wnl. Labs c/f possible gallbladder malignancy. Given that patient has normal MRCP and no biliary tree involvement of possible gallbladder cancer, there is no role for ERCP for brushings. Case reviewed w/ Dr. Mak to confirm this    Recommendations:  - clear liquid diet today; keep NPO at midnight  - plan for colonoscopy Wednesday tomorrow; GI fellow to order bowel prep this afternoon  - no plans for ERCP  - f/u Surgery recommendations  - rest of care per primary team    Plan of care discussed with daughter America over the phone        Merly Bermudez PGY-6  Gastroenterology/Hepatology Fellow  Page #[phone]   Page #[phone] 5pm-7am on weekdays, and on weekends     86 yo woman w/ HTN, HLD, GERD p/w AMS, found to have hypercalcemia and CT c/f gangrenous cholecystitis w/ possible fistulous tract to hepatic flexure. GI initially consulted for dilated CBD on CT w/ nl LFT and no abd pain, MRCP w/ nl biliary tree. Labs notable for elevated Ca 19-9 (272) and CEA (28).    # Gangrenous cholecystitis - c/f perforation and fistulous tract to hepatic flexure. Per surgery, requesting colonoscopy to assess colonic involvement and aid in surgical planning. Discussed w/ patient and daughter that colonoscopy can be performed, though higher risk procedure given possible fistulous tract. Family agreeable to risks. Will plan for colonoscopy 5/18/22  # Elevated tumor markers - pt w/ thickened biliary tree and gallbladder, cannot rule out neoplasm. No abd pain, no CBD stone, liver enzymes wnl. Labs c/f possible gallbladder malignancy. Given that patient has normal MRCP and no biliary tree involvement of possible gallbladder cancer, there is no role for ERCP for brushings as per discussion with Dr. Tam Mak    Recommendations:  - clear liquid diet today; keep NPO at midnight  - plan for colonoscopy tomorrow; GI fellow to order bowel prep this afternoon  - no plans for ERCP  - f/u Surgery recommendations  - rest of care per primary team    Plan of care discussed with daughter America over the phone        Merly Bermudez PGY-6  Gastroenterology/Hepatology Fellow  Page #[phone]   Page #[phone] 5pm-7am on weekdays, and on weekends

## 2022-05-17 NOTE — PROGRESS NOTE ADULT - SUBJECTIVE AND OBJECTIVE BOX
Patient is a 87y old  Female who presents with a chief complaint of ams and dysuria (17 May 2022 14:05)    Pt seen and examined at bedside.    MEDICATIONS  (STANDING):  allopurinol 100 milliGRAM(s) Oral at bedtime  cefTRIAXone   IVPB 1000 milliGRAM(s) IV Intermittent every 24 hours  enoxaparin Injectable 40 milliGRAM(s) SubCutaneous every 24 hours  fenofibrate Tablet 145 milliGRAM(s) Oral at bedtime  losartan 50 milliGRAM(s) Oral daily  metoprolol succinate ER 25 milliGRAM(s) Oral daily  metroNIDAZOLE    Tablet 500 milliGRAM(s) Oral two times a day  mirtazapine 15 milliGRAM(s) Oral <User Schedule>  pantoprazole    Tablet 40 milliGRAM(s) Oral before breakfast  thiamine 100 milliGRAM(s) Oral daily    MEDICATIONS  (PRN):  acetaminophen     Tablet .. 650 milliGRAM(s) Oral every 6 hours PRN Temp greater or equal to 38C (100.4F), Mild Pain (1 - 3), Moderate Pain (4 - 6)      ROS  No fever, sweats, chills  No epistaxis, HA, sore throat  No CP, SOB, cough, sputum  No n/v/d, abd pain, melena, hematochezia  No edema  No rash  No anxiety  No back pain, joint pain  No bleeding, bruising  No dysuria, hematuria    Vital Signs Last 24 Hrs  T(C): 37.1 (17 May 2022 14:03), Max: 37.1 (17 May 2022 14:03)  T(F): 98.7 (17 May 2022 14:03), Max: 98.7 (17 May 2022 14:03)  HR: 76 (17 May 2022 14:03) (76 - 84)  BP: 148/66 (17 May 2022 14:03) (127/61 - 153/71)  BP(mean): --  RR: 19 (17 May 2022 14:03) (17 - 19)  SpO2: 94% (17 May 2022 14:03) (94% - 96%)    PE  NAD  Awake, alert  Anicteric, MMM  No c/c/e  No rash grossly                       13.5   5.39  )-----------( 326      ( 17 May 2022 07:15 )             43.5       05-17    135  |  100  |  12  ----------------------------<  80  4.3   |  24  |  0.90    Ca    10.7<H>      17 May 2022 07:15  Phos  2.4     05-17  Mg     2.00     05-17    TPro  7.1  /  Alb  3.7  /  TBili  0.3  /  DBili  x   /  AST  18  /  ALT  15  /  AlkPhos  61  05-17

## 2022-05-17 NOTE — PROGRESS NOTE ADULT - ASSESSMENT
86 yo F non smoker with PMHX of HTN, HLD, GERD, and recently dx depression, here with Daughter at bedside brought in for worsening confusion and restlessness first beginning 2   days ago, intermittently, then today worse with hallucinations ( auditory and visual),and generalized weakness.  family concerned patient was going to wander. Recently dx with UTI  5 days ago has been on bactrim for 5 days ( unclear if patient missed a dose due to developing confusion). Patient A& o x2-3, having episode of confusion normally ambulates and performs ADLS independently. Denies cp ,sob, abdominal pain, nausea, vomiting, diarrhea, headache.    1 AMS  - likely metabolic encephalopathy due to electrolytes abnormality   - dc antibiotics, uti ruled out   - fu cultures  - ID following  - neuro fu appreciated  - psych consult     2 Hypercalcemia  - unclear etiology  - ivf  - renal following       3 Gangrenous cholecystitis, ro gallbladder cancer   - cw antibiotics  - ID fu  - surgery and gi fu  - colonoscopy  in am

## 2022-05-17 NOTE — PROGRESS NOTE ADULT - SUBJECTIVE AND OBJECTIVE BOX
Date of service: 05/17/22    HISTORY OF PRESENT ILLNESS:   Ms Rich is a pleasant 86yo woman sent in by family for confusion and hallucinations.     S: no chest pain or sob; ros limited but otherwise negative.     Review of Systems:   Constitutional: [ ] fevers, [ ] chills.   Skin: [ ] dry skin. [ ] rashes.  Psychiatric: [ ] depression, [ ] anxiety.   Gastrointestinal: [ ] BRBPR, [ ] melena.   Neurological: [ ] confusion. [ ] seizures. [ ] shuffling gait.   Ears,Nose,Mouth and Throat: [ ] ear pain [ ] sore throat.   Eyes: [ ] diplopia.   Respiratory: [ ] hemoptysis. [ ] shortness of breath  Cardiovascular: See HPI above  Hematologic/Lymphatic: [ ] anemia. [ ] painful nodes. [ ] prolonged bleeding.   Genitourinary: [ ] hematuria. [ ] flank pain.   Endocrine: [ ] significant change in weight. [ ] intolerance to heat and cold.     Review of systems [x ] otherwise negative, [ ] otherwise unable to obtain    FH: no family history of sudden cardiac death in first degree relatives    SH: [ ] tobacco, [ ] alcohol, [ ] drugs    acetaminophen     Tablet .. 650 milliGRAM(s) Oral every 6 hours PRN  allopurinol 100 milliGRAM(s) Oral at bedtime  cefTRIAXone   IVPB 1000 milliGRAM(s) IV Intermittent every 24 hours  enoxaparin Injectable 40 milliGRAM(s) SubCutaneous every 24 hours  fenofibrate Tablet 145 milliGRAM(s) Oral at bedtime  losartan 50 milliGRAM(s) Oral daily  metoprolol succinate ER 25 milliGRAM(s) Oral daily  metroNIDAZOLE    Tablet 500 milliGRAM(s) Oral two times a day  mirtazapine 15 milliGRAM(s) Oral <User Schedule>  pantoprazole    Tablet 40 milliGRAM(s) Oral before breakfast  thiamine 100 milliGRAM(s) Oral daily                            13.5   5.39  )-----------( 326      ( 17 May 2022 07:15 )             43.5       05-17    135  |  100  |  12  ----------------------------<  80  4.3   |  24  |  0.90    Ca    10.7<H>      17 May 2022 07:15  Phos  2.4     05-17  Mg     2.00     05-17    TPro  7.1  /  Alb  3.7  /  TBili  0.3  /  DBili  x   /  AST  18  /  ALT  15  /  AlkPhos  61  05-17            T(C): 37 (05-17-22 @ 05:17), Max: 37 (05-16-22 @ 20:44)  HR: 84 (05-17-22 @ 05:17) (77 - 84)  BP: 127/61 (05-17-22 @ 05:17) (127/61 - 153/71)  RR: 17 (05-17-22 @ 05:17) (17 - 18)  SpO2: 96% (05-17-22 @ 05:17) (96% - 96%)  Wt(kg): --    I&O's Summary    General: Well nourished in no acute distress.   Head: Normocephalic and atraumatic.   Neck: No JVD. No bruits. Supple. Does not appear to be enlarged.   Cardiovascular: + S1,S2 ; RRR Soft systolic murmur at the left lower sternal border. No rubs noted.    Lungs: CTA b/l. No rhonchi, rales or wheezes.   Abdomen: + BS, soft. Non tender. Non distended. No rebound. No guarding.   Extremities: No clubbing/cyanosis/edema.   Neurologic: Moves all four extremities. Full range of motion.   Skin: Warm and moist. The patient's skin has normal elasticity and good skin turgor.   Psychiatric: unable to assess  Musculoskeletal: unable to assess      DATA    ECG: NSR, shortening/narrowing of T wave.  	  ASSESSMENT/PLAN: 	87y Female with acute metabolic encephalopathy.  Previous UTI treatment. Hypercalcemic.    -monitor tele - sr with no events thus far and now tele dc   -tte with normal LV function   -treatment of hypercalcemia per primary team - calcium levels improved   -planned for ERCP and colonoscopy this week  -optimized from CV perspective for planned colonoscopy tomorrow    Carolyn Pike MD

## 2022-05-17 NOTE — PROGRESS NOTE ADULT - SUBJECTIVE AND OBJECTIVE BOX
Patient is a 87y old  Female who presents with a chief complaint of ams and dysuria (17 May 2022 12:42)    Date of servie : 05-17-22 @ 14:07  INTERVAL HPI/OVERNIGHT EVENTS:  T(C): 37.1 (05-17-22 @ 14:03), Max: 37.1 (05-17-22 @ 14:03)  HR: 76 (05-17-22 @ 14:03) (76 - 84)  BP: 148/66 (05-17-22 @ 14:03) (127/61 - 153/71)  RR: 19 (05-17-22 @ 14:03) (17 - 19)  SpO2: 94% (05-17-22 @ 14:03) (94% - 96%)  Wt(kg): --  I&O's Summary      LABS:                        13.5   5.39  )-----------( 326      ( 17 May 2022 07:15 )             43.5     05-17    135  |  100  |  12  ----------------------------<  80  4.3   |  24  |  0.90    Ca    10.7<H>      17 May 2022 07:15  Phos  2.4     05-17  Mg     2.00     05-17    TPro  7.1  /  Alb  3.7  /  TBili  0.3  /  DBili  x   /  AST  18  /  ALT  15  /  AlkPhos  61  05-17        CAPILLARY BLOOD GLUCOSE                MEDICATIONS  (STANDING):  allopurinol 100 milliGRAM(s) Oral at bedtime  cefTRIAXone   IVPB 1000 milliGRAM(s) IV Intermittent every 24 hours  enoxaparin Injectable 40 milliGRAM(s) SubCutaneous every 24 hours  fenofibrate Tablet 145 milliGRAM(s) Oral at bedtime  losartan 50 milliGRAM(s) Oral daily  metoprolol succinate ER 25 milliGRAM(s) Oral daily  metroNIDAZOLE    Tablet 500 milliGRAM(s) Oral two times a day  mirtazapine 15 milliGRAM(s) Oral <User Schedule>  pantoprazole    Tablet 40 milliGRAM(s) Oral before breakfast  thiamine 100 milliGRAM(s) Oral daily    MEDICATIONS  (PRN):  acetaminophen     Tablet .. 650 milliGRAM(s) Oral every 6 hours PRN Temp greater or equal to 38C (100.4F), Mild Pain (1 - 3), Moderate Pain (4 - 6)          PHYSICAL EXAM:  GENERAL: frail  CHEST/LUNG: Clear to percussion bilaterally; No rales, rhonchi, wheezing, or rubs  HEART: Regular rate and rhythm; No murmurs, rubs, or gallops  ABDOMEN: Soft, Nontender, Nondistended; Bowel sounds present  EXTREMITIES:  edema +    Care Discussed with Consultants/Other Providers [ ] YES  [ ] NO

## 2022-05-18 ENCOUNTER — RESULT REVIEW (OUTPATIENT)
Age: 87
End: 2022-05-18

## 2022-05-18 LAB
ACE SERPL-CCNC: 32 U/L — SIGNIFICANT CHANGE UP (ref 14–82)
ANION GAP SERPL CALC-SCNC: 13 MMOL/L — SIGNIFICANT CHANGE UP (ref 7–14)
APTT BLD: 34 SEC — SIGNIFICANT CHANGE UP (ref 27–36.3)
BUN SERPL-MCNC: 9 MG/DL — SIGNIFICANT CHANGE UP (ref 7–23)
CALCIUM SERPL-MCNC: 10.9 MG/DL — HIGH (ref 8.4–10.5)
CHLORIDE SERPL-SCNC: 109 MMOL/L — HIGH (ref 98–107)
CO2 SERPL-SCNC: 21 MMOL/L — LOW (ref 22–31)
CREAT SERPL-MCNC: 0.94 MG/DL — SIGNIFICANT CHANGE UP (ref 0.5–1.3)
EGFR: 59 ML/MIN/1.73M2 — LOW
GLUCOSE SERPL-MCNC: 98 MG/DL — SIGNIFICANT CHANGE UP (ref 70–99)
HCT VFR BLD CALC: 45.2 % — HIGH (ref 34.5–45)
HGB BLD-MCNC: 13.7 G/DL — SIGNIFICANT CHANGE UP (ref 11.5–15.5)
INR BLD: 1.13 RATIO — SIGNIFICANT CHANGE UP (ref 0.88–1.16)
MAGNESIUM SERPL-MCNC: 2.1 MG/DL — SIGNIFICANT CHANGE UP (ref 1.6–2.6)
MCHC RBC-ENTMCNC: 28.7 PG — SIGNIFICANT CHANGE UP (ref 27–34)
MCHC RBC-ENTMCNC: 30.3 GM/DL — LOW (ref 32–36)
MCV RBC AUTO: 94.6 FL — SIGNIFICANT CHANGE UP (ref 80–100)
NRBC # BLD: 0 /100 WBCS — SIGNIFICANT CHANGE UP
NRBC # FLD: 0 K/UL — SIGNIFICANT CHANGE UP
PHOSPHATE SERPL-MCNC: 3 MG/DL — SIGNIFICANT CHANGE UP (ref 2.5–4.5)
PLATELET # BLD AUTO: 310 K/UL — SIGNIFICANT CHANGE UP (ref 150–400)
POTASSIUM SERPL-MCNC: 4.6 MMOL/L — SIGNIFICANT CHANGE UP (ref 3.5–5.3)
POTASSIUM SERPL-SCNC: 4.6 MMOL/L — SIGNIFICANT CHANGE UP (ref 3.5–5.3)
PROTHROM AB SERPL-ACNC: 13.1 SEC — SIGNIFICANT CHANGE UP (ref 10.5–13.4)
RBC # BLD: 4.78 M/UL — SIGNIFICANT CHANGE UP (ref 3.8–5.2)
RBC # FLD: 14.7 % — HIGH (ref 10.3–14.5)
SODIUM SERPL-SCNC: 143 MMOL/L — SIGNIFICANT CHANGE UP (ref 135–145)
WBC # BLD: 5.11 K/UL — SIGNIFICANT CHANGE UP (ref 3.8–10.5)
WBC # FLD AUTO: 5.11 K/UL — SIGNIFICANT CHANGE UP (ref 3.8–10.5)

## 2022-05-18 PROCEDURE — 88305 TISSUE EXAM BY PATHOLOGIST: CPT | Mod: 26

## 2022-05-18 PROCEDURE — 45380 COLONOSCOPY AND BIOPSY: CPT | Mod: 53

## 2022-05-18 RX ADMIN — Medication 500 MILLIGRAM(S): at 17:15

## 2022-05-18 RX ADMIN — MIRTAZAPINE 15 MILLIGRAM(S): 45 TABLET, ORALLY DISINTEGRATING ORAL at 19:54

## 2022-05-18 RX ADMIN — Medication 100 MILLIGRAM(S): at 12:03

## 2022-05-18 RX ADMIN — Medication 1000 MILLILITER(S): at 01:16

## 2022-05-18 RX ADMIN — ENOXAPARIN SODIUM 40 MILLIGRAM(S): 100 INJECTION SUBCUTANEOUS at 17:16

## 2022-05-18 RX ADMIN — Medication 100 MILLIGRAM(S): at 21:00

## 2022-05-18 RX ADMIN — Medication 145 MILLIGRAM(S): at 21:00

## 2022-05-18 RX ADMIN — CEFTRIAXONE 100 MILLIGRAM(S): 500 INJECTION, POWDER, FOR SOLUTION INTRAMUSCULAR; INTRAVENOUS at 14:13

## 2022-05-18 NOTE — PROGRESS NOTE ADULT - ASSESSMENT
88 yo F non smoker with PMHX of HTN, HLD, GERD, and recently dx depression, here with Daughter at bedside brought in for worsening confusion and restlessness first beginning 2   days ago, intermittently, then today worse with hallucinations ( auditory and visual),and generalized weakness.  family concerned patient was going to wander. Recently dx with UTI  5 days ago has been on bactrim for 5 days ( unclear if patient missed a dose due to developing confusion). Patient A& o x2-3, having episode of confusion normally ambulates and performs ADLS independently. Denies cp ,sob, abdominal pain, nausea, vomiting, diarrhea, headache.    1 AMS  - likely metabolic encephalopathy due to electrolytes abnormality   - dc antibiotics, uti ruled out   - fu cultures  - ID following  - neuro fu appreciated  - psych consult     2 Hypercalcemia  - unclear etiology  - ivf  - renal following       3 Gangrenous cholecystitis, ro gallbladder cancer   - cw antibiotics  - ID fu  - surgery and gi fu  - colonoscopy  today

## 2022-05-18 NOTE — PROGRESS NOTE ADULT - SUBJECTIVE AND OBJECTIVE BOX
Date of service: 05/18/22    HISTORY OF PRESENT ILLNESS:   Ms Rich is a pleasant 88yo woman sent in by family for confusion and hallucinations.     S: no chest pain or sob; ros limited but otherwise negative.     Review of Systems:   Constitutional: [ ] fevers, [ ] chills.   Skin: [ ] dry skin. [ ] rashes.  Psychiatric: [ ] depression, [ ] anxiety.   Gastrointestinal: [ ] BRBPR, [ ] melena.   Neurological: [ ] confusion. [ ] seizures. [ ] shuffling gait.   Ears,Nose,Mouth and Throat: [ ] ear pain [ ] sore throat.   Eyes: [ ] diplopia.   Respiratory: [ ] hemoptysis. [ ] shortness of breath  Cardiovascular: See HPI above  Hematologic/Lymphatic: [ ] anemia. [ ] painful nodes. [ ] prolonged bleeding.   Genitourinary: [ ] hematuria. [ ] flank pain.   Endocrine: [ ] significant change in weight. [ ] intolerance to heat and cold.     Review of systems [x ] otherwise negative, [ ] otherwise unable to obtain    FH: no family history of sudden cardiac death in first degree relatives  acetaminophen     Tablet .. 650 milliGRAM(s) Oral every 6 hours PRN  allopurinol 100 milliGRAM(s) Oral at bedtime  cefTRIAXone   IVPB 1000 milliGRAM(s) IV Intermittent every 24 hours  enoxaparin Injectable 40 milliGRAM(s) SubCutaneous every 24 hours  fenofibrate Tablet 145 milliGRAM(s) Oral at bedtime  losartan 50 milliGRAM(s) Oral daily  metoprolol succinate ER 25 milliGRAM(s) Oral daily  metroNIDAZOLE    Tablet 500 milliGRAM(s) Oral two times a day  mirtazapine 15 milliGRAM(s) Oral <User Schedule>  pantoprazole    Tablet 40 milliGRAM(s) Oral before breakfast  thiamine 100 milliGRAM(s) Oral daily                            13.7   5.11  )-----------( 310      ( 18 May 2022 06:00 )             45.2       05-18    143  |  109<H>  |  9   ----------------------------<  98  4.6   |  21<L>  |  0.94    Ca    10.9<H>      18 May 2022 06:00  Phos  3.0     05-18  Mg     2.10     05-18    TPro  7.1  /  Alb  3.7  /  TBili  0.3  /  DBili  x   /  AST  18  /  ALT  15  /  AlkPhos  61  05-17            T(C): 36.3 (05-18-22 @ 10:10), Max: 37.1 (05-17-22 @ 14:03)  HR: 80 (05-18-22 @ 10:40) (75 - 100)  BP: 149/67 (05-18-22 @ 10:40) (113/59 - 161/78)  RR: 20 (05-18-22 @ 10:40) (16 - 21)  SpO2: 100% (05-18-22 @ 10:40) (94% - 100%)  Wt(kg): --    I&O's Summary    SH: [ ] tobacco, [ ] alcohol, [ ] drugs        General: Well nourished in no acute distress.   Head: Normocephalic and atraumatic.   Neck: No JVD. No bruits. Supple. Does not appear to be enlarged.   Cardiovascular: + S1,S2 ; RRR Soft systolic murmur at the left lower sternal border. No rubs noted.    Lungs: CTA b/l. No rhonchi, rales or wheezes.   Abdomen: + BS, soft. Non tender. Non distended. No rebound. No guarding.   Extremities: No clubbing/cyanosis/edema.   Neurologic: Moves all four extremities. Full range of motion.   Skin: Warm and moist. The patient's skin has normal elasticity and good skin turgor.   Psychiatric: unable to assess  Musculoskeletal: unable to assess      DATA    ECG: NSR, shortening/narrowing of T wave.  	  ASSESSMENT/PLAN: 	87y Female with acute metabolic encephalopathy.  Previous UTI treatment. Hypercalcemic.    -tte with normal LV function   -treatment of hypercalcemia per primary team - calcium levels improved   -tolerated colonoscopy well in terms of cv perspective  -follow up WILFREDO Pike MD

## 2022-05-18 NOTE — PROGRESS NOTE ADULT - SUBJECTIVE AND OBJECTIVE BOX
Vital Signs Last 24 Hrs  T(C): 36.3 (18 May 2022 10:10), Max: 36.9 (17 May 2022 21:00)  T(F): 97.3 (18 May 2022 10:10), Max: 98.5 (17 May 2022 21:00)  HR: 80 (18 May 2022 10:40) (75 - 100)  BP: 149/67 (18 May 2022 10:40) (113/59 - 161/78)  BP(mean): --  RR: 20 (18 May 2022 10:40) (16 - 21)  SpO2: 100% (18 May 2022 10:40) (96% - 100%)    I&O's Detail                            13.7   5.11  )-----------( 310      ( 18 May 2022 06:00 )             45.2       05-18    143  |  109<H>  |  9   ----------------------------<  98  4.6   |  21<L>  |  0.94    Ca    10.9<H>      18 May 2022 06:00  Phos  3.0     05-18  Mg     2.10     05-18    TPro  7.1  /  Alb  3.7  /  TBili  0.3  /  DBili  x   /  AST  18  /  ALT  15  /  AlkPhos  61  05-17      PT/INR - ( 18 May 2022 06:00 )   PT: 13.1 sec;   INR: 1.13 ratio         PTT - ( 18 May 2022 06:00 )  PTT:34.0 sec    colonoscopy shows edematous tissue but no shannon tumor    PLAN:  patient will need cholecystectomy with possible wedge resection of the colon  i will speak to the medical team about medical evaluation pre-operatively

## 2022-05-18 NOTE — PROGRESS NOTE ADULT - SUBJECTIVE AND OBJECTIVE BOX
[facility]   MD KRISTEN MARQUES MD KRISTINE SOLTANPOUR, ALBIN ABRAMS    TEL:  OFFICE: [phone]  From 5pm-7am Answering Service [phone]    -- RENAL FOLLOW UP NOTE ---Date of Service 05-18-22 @ 15:26    Patient is a 87y old  Female who presents with a chief complaint of ams and dysuria (18 May 2022 14:25)      Patient seen and examined at bedside. No chest pain/sob    VITALS:  T(F): 97.3 (05-18-22 @ 10:10), Max: 98.5 (05-17-22 @ 21:00)  HR: 80 (05-18-22 @ 10:40)  BP: 149/67 (05-18-22 @ 10:40)  RR: 20 (05-18-22 @ 10:40)  SpO2: 100% (05-18-22 @ 10:40)  Wt(kg): --    Height (cm): 157.5 (05-18 @ 09:23)  Weight (kg): 58 (05-18 @ 09:23)  BMI (kg/m2): 23.4 (05-18 @ 09:23)  BSA (m2): 1.58 (05-18 @ 09:23)    PHYSICAL EXAM:  Constitutional: NAD  Neck: No JVD  Respiratory: CTAB, no wheezes, rales or rhonchi  Cardiovascular: S1, S2, RRR  Gastrointestinal: BS+, soft, NT/ND  Extremities: No peripheral edema    Hospital Medications:   MEDICATIONS  (STANDING):  allopurinol 100 milliGRAM(s) Oral at bedtime  cefTRIAXone   IVPB 1000 milliGRAM(s) IV Intermittent every 24 hours  enoxaparin Injectable 40 milliGRAM(s) SubCutaneous every 24 hours  fenofibrate Tablet 145 milliGRAM(s) Oral at bedtime  losartan 50 milliGRAM(s) Oral daily  metoprolol succinate ER 25 milliGRAM(s) Oral daily  metroNIDAZOLE    Tablet 500 milliGRAM(s) Oral two times a day  mirtazapine 15 milliGRAM(s) Oral <User Schedule>  pantoprazole    Tablet 40 milliGRAM(s) Oral before breakfast  thiamine 100 milliGRAM(s) Oral daily      LABS:  05-18    143  |  109<H>  |  9   ----------------------------<  98  4.6   |  21<L>  |  0.94    Ca    10.9<H>      18 May 2022 06:00  Phos  3.0     05-18  Mg     2.10     05-18    TPro  7.1  /  Alb  3.7  /  TBili  0.3  /  DBili      /  AST  18  /  ALT  15  /  AlkPhos  61  05-17    Creatinine Trend: 0.94 <--, 0.90 <--, 0.85 <--, 0.89 <--, 0.93 <--, 0.94 <--, 0.92 <--, 0.87 <--    Phosphorus Level, Serum: 3.0 mg/dL (05-18 @ 06:00)                              13.7   5.11  )-----------( 310      ( 18 May 2022 06:00 )             45.2     Urine Studies:  Urinalysis - [05-04-22 @ 12:06]      Color Light Yellow / Appearance Clear / SG 1.006 / pH 6.5      Gluc Negative / Ketone Negative  / Bili Negative / Urobili <2 mg/dL       Blood Negative / Protein Negative / Leuk Est Negative / Nitrite Negative      RBC  / WBC  / Hyaline  / Gran  / Sq Epi  / Non Sq Epi  / Bacteria     Urine Sodium <20      [05-15-22 @ 21:00]  Urine Osmolality 178      [05-15-22 @ 21:00]    PTH -- (Ca --)      [05-04-22 @ 16:19]   29  PTH -- (Ca --)      [05-04-22 @ 12:06]   31  Vitamin D (25OH) 53.8      [05-17-22 @ 07:15]  TSH 0.71      [05-04-22 @ 10:43]      Free Light Chains: kappa 4.05, lambda 5.44, ratio = 0.74      [05-05 @ 04:37]  Immunofixation Serum:   No Monoclonal Band Identified  Britt Marshall M.D.    Reference Range: None Detected      [05-04-22 @ 16:19]  SPEP Interpretation: Hypoalbuminemia with increased Alpha-1 fraction consistent with acute  phase reaction.  Increased Beta fraction, possibly transferrin increase..  Britt Marshall M.D.      [05-04-22 @ 16:19]    RADIOLOGY & ADDITIONAL STUDIES:

## 2022-05-18 NOTE — PROGRESS NOTE ADULT - SUBJECTIVE AND OBJECTIVE BOX
Patient is a 87y old  Female who presents with a chief complaint of ams and dysuria (18 May 2022 14:09)    Date of servie : 05-18-22 @ 14:26  INTERVAL HPI/OVERNIGHT EVENTS:  T(C): 36.3 (05-18-22 @ 10:10), Max: 36.9 (05-17-22 @ 21:00)  HR: 80 (05-18-22 @ 10:40) (75 - 100)  BP: 149/67 (05-18-22 @ 10:40) (113/59 - 161/78)  RR: 20 (05-18-22 @ 10:40) (16 - 21)  SpO2: 100% (05-18-22 @ 10:40) (96% - 100%)  Wt(kg): --  I&O's Summary      LABS:                        13.7   5.11  )-----------( 310      ( 18 May 2022 06:00 )             45.2     05-18    143  |  109<H>  |  9   ----------------------------<  98  4.6   |  21<L>  |  0.94    Ca    10.9<H>      18 May 2022 06:00  Phos  3.0     05-18  Mg     2.10     05-18    TPro  7.1  /  Alb  3.7  /  TBili  0.3  /  DBili  x   /  AST  18  /  ALT  15  /  AlkPhos  61  05-17    PT/INR - ( 18 May 2022 06:00 )   PT: 13.1 sec;   INR: 1.13 ratio         PTT - ( 18 May 2022 06:00 )  PTT:34.0 sec    CAPILLARY BLOOD GLUCOSE                MEDICATIONS  (STANDING):  allopurinol 100 milliGRAM(s) Oral at bedtime  cefTRIAXone   IVPB 1000 milliGRAM(s) IV Intermittent every 24 hours  enoxaparin Injectable 40 milliGRAM(s) SubCutaneous every 24 hours  fenofibrate Tablet 145 milliGRAM(s) Oral at bedtime  losartan 50 milliGRAM(s) Oral daily  metoprolol succinate ER 25 milliGRAM(s) Oral daily  metroNIDAZOLE    Tablet 500 milliGRAM(s) Oral two times a day  mirtazapine 15 milliGRAM(s) Oral <User Schedule>  pantoprazole    Tablet 40 milliGRAM(s) Oral before breakfast  thiamine 100 milliGRAM(s) Oral daily    MEDICATIONS  (PRN):  acetaminophen     Tablet .. 650 milliGRAM(s) Oral every 6 hours PRN Temp greater or equal to 38C (100.4F), Mild Pain (1 - 3), Moderate Pain (4 - 6)          PHYSICAL EXAM:  GENERAL: NAD, well-groomed, well-developed  HEAD:  Atraumatic, Normocephalic  CHEST/LUNG: Clear to percussion bilaterally; No rales, rhonchi, wheezing, or rubs  HEART: Regular rate and rhythm; No murmurs, rubs, or gallops  ABDOMEN: Soft, Nontender, Nondistended; Bowel sounds present  EXTREMITIES:  2+ Peripheral Pulses, No clubbing, cyanosis, or edema  LYMPH: No lymphadenopathy noted  SKIN: No rashes or lesions    Care Discussed with Consultants/Other Providers [ ] YES  [ ] NO

## 2022-05-18 NOTE — PROGRESS NOTE ADULT - ASSESSMENT
HARSH VERGARA is a 87y Female who presents with a chief complaint of altered mental status    Hypercalcemia  - Calcium remains mildly elevated. Cause unclear. PTH normal. PTHRP negative.  - No monoclonal protein seen on immunofixation.  - Continue to monitor calcium levels. IVF as needed. Nephrology is following.    Gangrenous Cholecystitis  - CT finding. Surgery and GI following.  - elevation of ca 19-9, cea in the setting of acute gallbladder infection/perf.  - S/p colonoscopy, f/u pathology (r/o gb malignancy)    Will continue to follow. Upon discharge, patient may follow up with Dr. Bon Conti of [facility].    Moise Akhtar PA-C  Hematology/Oncology  [facility]  [phone] (office)  [phone] (alt office)  Evenings and weekends please call MD on call or office

## 2022-05-18 NOTE — PROGRESS NOTE ADULT - SUBJECTIVE AND OBJECTIVE BOX
Patient is a 87y old  Female who presents with a chief complaint of ams and dysuria (18 May 2022 13:58)    Pt seen and examined at bedside.     MEDICATIONS  (STANDING):  allopurinol 100 milliGRAM(s) Oral at bedtime  cefTRIAXone   IVPB 1000 milliGRAM(s) IV Intermittent every 24 hours  enoxaparin Injectable 40 milliGRAM(s) SubCutaneous every 24 hours  fenofibrate Tablet 145 milliGRAM(s) Oral at bedtime  losartan 50 milliGRAM(s) Oral daily  metoprolol succinate ER 25 milliGRAM(s) Oral daily  metroNIDAZOLE    Tablet 500 milliGRAM(s) Oral two times a day  mirtazapine 15 milliGRAM(s) Oral <User Schedule>  pantoprazole    Tablet 40 milliGRAM(s) Oral before breakfast  thiamine 100 milliGRAM(s) Oral daily    MEDICATIONS  (PRN):  acetaminophen     Tablet .. 650 milliGRAM(s) Oral every 6 hours PRN Temp greater or equal to 38C (100.4F), Mild Pain (1 - 3), Moderate Pain (4 - 6)    Vital Signs Last 24 Hrs  T(C): 36.3 (18 May 2022 10:10), Max: 36.9 (17 May 2022 21:00)  T(F): 97.3 (18 May 2022 10:10), Max: 98.5 (17 May 2022 21:00)  HR: 80 (18 May 2022 10:40) (75 - 100)  BP: 149/67 (18 May 2022 10:40) (113/59 - 161/78)  BP(mean): --  RR: 20 (18 May 2022 10:40) (16 - 21)  SpO2: 100% (18 May 2022 10:40) (96% - 100%)    PE  NAD  Awake, alert  Anicteric, MMM  No c/c/e  No rash grossly                        13.7   5.11  )-----------( 310      ( 18 May 2022 06:00 )             45.2       05-18    143  |  109<H>  |  9   ----------------------------<  98  4.6   |  21<L>  |  0.94    Ca    10.9<H>      18 May 2022 06:00  Phos  3.0     05-18  Mg     2.10     05-18    TPro  7.1  /  Alb  3.7  /  TBili  0.3  /  DBili  x   /  AST  18  /  ALT  15  /  AlkPhos  61  05-17

## 2022-05-19 VITALS
SYSTOLIC BLOOD PRESSURE: 118 MMHG | DIASTOLIC BLOOD PRESSURE: 79 MMHG | OXYGEN SATURATION: 98 % | TEMPERATURE: 98 F | RESPIRATION RATE: 18 BRPM | HEART RATE: 77 BPM

## 2022-05-19 LAB
ANION GAP SERPL CALC-SCNC: 9 MMOL/L — SIGNIFICANT CHANGE UP (ref 7–14)
BUN SERPL-MCNC: 7 MG/DL — SIGNIFICANT CHANGE UP (ref 7–23)
CALCIUM SERPL-MCNC: 10.2 MG/DL — SIGNIFICANT CHANGE UP (ref 8.4–10.5)
CHLORIDE SERPL-SCNC: 104 MMOL/L — SIGNIFICANT CHANGE UP (ref 98–107)
CO2 SERPL-SCNC: 23 MMOL/L — SIGNIFICANT CHANGE UP (ref 22–31)
CREAT SERPL-MCNC: 0.84 MG/DL — SIGNIFICANT CHANGE UP (ref 0.5–1.3)
EGFR: 68 ML/MIN/1.73M2 — SIGNIFICANT CHANGE UP
GLUCOSE SERPL-MCNC: 81 MG/DL — SIGNIFICANT CHANGE UP (ref 70–99)
HCT VFR BLD CALC: 38.4 % — SIGNIFICANT CHANGE UP (ref 34.5–45)
HGB BLD-MCNC: 12.1 G/DL — SIGNIFICANT CHANGE UP (ref 11.5–15.5)
MAGNESIUM SERPL-MCNC: 1.7 MG/DL — SIGNIFICANT CHANGE UP (ref 1.6–2.6)
MCHC RBC-ENTMCNC: 29.7 PG — SIGNIFICANT CHANGE UP (ref 27–34)
MCHC RBC-ENTMCNC: 31.5 GM/DL — LOW (ref 32–36)
MCV RBC AUTO: 94.1 FL — SIGNIFICANT CHANGE UP (ref 80–100)
NRBC # BLD: 0 /100 WBCS — SIGNIFICANT CHANGE UP
NRBC # FLD: 0 K/UL — SIGNIFICANT CHANGE UP
PHOSPHATE SERPL-MCNC: 2.5 MG/DL — SIGNIFICANT CHANGE UP (ref 2.5–4.5)
PLATELET # BLD AUTO: 280 K/UL — SIGNIFICANT CHANGE UP (ref 150–400)
POTASSIUM SERPL-MCNC: 4.1 MMOL/L — SIGNIFICANT CHANGE UP (ref 3.5–5.3)
POTASSIUM SERPL-SCNC: 4.1 MMOL/L — SIGNIFICANT CHANGE UP (ref 3.5–5.3)
RBC # BLD: 4.08 M/UL — SIGNIFICANT CHANGE UP (ref 3.8–5.2)
RBC # FLD: 14.6 % — HIGH (ref 10.3–14.5)
SODIUM SERPL-SCNC: 136 MMOL/L — SIGNIFICANT CHANGE UP (ref 135–145)
VIT D25+D1,25 OH+D1,25 PNL SERPL-MCNC: 50.6 PG/ML — SIGNIFICANT CHANGE UP (ref 19.9–79.3)
WBC # BLD: 5.06 K/UL — SIGNIFICANT CHANGE UP (ref 3.8–10.5)
WBC # FLD AUTO: 5.06 K/UL — SIGNIFICANT CHANGE UP (ref 3.8–10.5)

## 2022-05-19 PROCEDURE — 99232 SBSQ HOSP IP/OBS MODERATE 35: CPT

## 2022-05-19 PROCEDURE — 99232 SBSQ HOSP IP/OBS MODERATE 35: CPT | Mod: GC

## 2022-05-19 RX ORDER — CEFPODOXIME PROXETIL 100 MG
1 TABLET ORAL
Qty: 28 | Refills: 0
Start: 2022-05-19 | End: 2022-06-01

## 2022-05-19 RX ORDER — LOSARTAN POTASSIUM 100 MG/1
1 TABLET, FILM COATED ORAL
Qty: 0 | Refills: 0 | DISCHARGE

## 2022-05-19 RX ORDER — LOSARTAN POTASSIUM 100 MG/1
1 TABLET, FILM COATED ORAL
Qty: 30 | Refills: 0
Start: 2022-05-19 | End: 2022-06-17

## 2022-05-19 RX ORDER — METRONIDAZOLE 500 MG
1 TABLET ORAL
Qty: 28 | Refills: 0
Start: 2022-05-19 | End: 2022-06-01

## 2022-05-19 RX ADMIN — Medication 25 MILLIGRAM(S): at 05:16

## 2022-05-19 RX ADMIN — Medication 100 MILLIGRAM(S): at 11:05

## 2022-05-19 RX ADMIN — Medication 500 MILLIGRAM(S): at 05:16

## 2022-05-19 RX ADMIN — LOSARTAN POTASSIUM 50 MILLIGRAM(S): 100 TABLET, FILM COATED ORAL at 05:16

## 2022-05-19 RX ADMIN — PANTOPRAZOLE SODIUM 40 MILLIGRAM(S): 20 TABLET, DELAYED RELEASE ORAL at 05:17

## 2022-05-19 RX ADMIN — CEFTRIAXONE 100 MILLIGRAM(S): 500 INJECTION, POWDER, FOR SOLUTION INTRAMUSCULAR; INTRAVENOUS at 14:18

## 2022-05-19 NOTE — PROGRESS NOTE ADULT - GASTROINTESTINAL DETAILS
soft/nontender/no distention/no rebound tenderness/no guarding
soft/nontender/no distention/no guarding/no rigidity
soft/nontender/no guarding/no rigidity

## 2022-05-19 NOTE — PROGRESS NOTE ADULT - SUBJECTIVE AND OBJECTIVE BOX
[facility]   MD KRISTEN MARQUES MD KRISTINE SOLTANPOUR, DO ANGELA WONG, PA    TEL:  OFFICE: [phone]  From 5pm-7am Answering Service [phone]    -- RENAL FOLLOW UP NOTE ---Date of Service 05-19-22 @ 12:50    Patient is a 87y old  Female who presents with a chief complaint of ams and dysuria (19 May 2022 11:19)      Patient seen and examined at bedside. No chest pain/sob    VITALS:  T(F): 98.1 (05-19-22 @ 10:47), Max: 98.5 (05-18-22 @ 20:59)  HR: 77 (05-19-22 @ 10:47)  BP: 118/79 (05-19-22 @ 10:47)  RR: 18 (05-19-22 @ 10:47)  SpO2: 98% (05-19-22 @ 10:47)  Wt(kg): --        PHYSICAL EXAM:  Constitutional: NAD  Neck: No JVD  Respiratory: CTAB, no wheezes, rales or rhonchi  Cardiovascular: S1, S2, RRR  Gastrointestinal: BS+, soft, NT/ND  Extremities: No peripheral edema    Hospital Medications:   MEDICATIONS  (STANDING):  allopurinol 100 milliGRAM(s) Oral at bedtime  cefTRIAXone   IVPB 1000 milliGRAM(s) IV Intermittent every 24 hours  enoxaparin Injectable 40 milliGRAM(s) SubCutaneous every 24 hours  fenofibrate Tablet 145 milliGRAM(s) Oral at bedtime  losartan 50 milliGRAM(s) Oral daily  metoprolol succinate ER 25 milliGRAM(s) Oral daily  metroNIDAZOLE    Tablet 500 milliGRAM(s) Oral two times a day  mirtazapine 15 milliGRAM(s) Oral <User Schedule>  pantoprazole    Tablet 40 milliGRAM(s) Oral before breakfast  thiamine 100 milliGRAM(s) Oral daily      LABS:  05-19    136  |  104  |  7   ----------------------------<  81  4.1   |  23  |  0.84    Ca    10.2      19 May 2022 06:15  Phos  2.5     05-19  Mg     1.70     05-19      Creatinine Trend: 0.84 <--, 0.94 <--, 0.90 <--, 0.85 <--, 0.89 <--, 0.93 <--, 0.94 <--, 0.92 <--    Phosphorus Level, Serum: 2.5 mg/dL (05-19 @ 06:15)                              12.1   5.06  )-----------( 280      ( 19 May 2022 06:15 )             38.4     Urine Studies:  Urinalysis - [05-04-22 @ 12:06]      Color Light Yellow / Appearance Clear / SG 1.006 / pH 6.5      Gluc Negative / Ketone Negative  / Bili Negative / Urobili <2 mg/dL       Blood Negative / Protein Negative / Leuk Est Negative / Nitrite Negative      RBC  / WBC  / Hyaline  / Gran  / Sq Epi  / Non Sq Epi  / Bacteria     Urine Sodium <20      [05-15-22 @ 21:00]  Urine Osmolality 178      [05-15-22 @ 21:00]    PTH -- (Ca --)      [05-04-22 @ 16:19]   29  PTH -- (Ca --)      [05-04-22 @ 12:06]   31  Vitamin D (25OH) 53.8      [05-17-22 @ 07:15]  TSH 0.71      [05-04-22 @ 10:43]      Free Light Chains: kappa 4.05, lambda 5.44, ratio = 0.74      [05-05 @ 04:37]  Immunofixation Serum:   No Monoclonal Band Identified  Britt Marshall M.D.    Reference Range: None Detected      [05-04-22 @ 16:19]  SPEP Interpretation: Hypoalbuminemia with increased Alpha-1 fraction consistent with acute  phase reaction.  Increased Beta fraction, possibly transferrin increase..  Britt Marshall M.D.      [05-04-22 @ 16:19]    RADIOLOGY & ADDITIONAL STUDIES:

## 2022-05-19 NOTE — PROGRESS NOTE ADULT - NS ATTEND OPT1 GEN_ALL_CORE
I independently performed the documented:
I attest my time as attending is greater than 50% of the total combined time spent on qualifying patient care activities by the PA/NP and attending.
I independently performed the documented:
I attest my time as attending is greater than 50% of the total combined time spent on qualifying patient care activities by the PA/NP and attending.

## 2022-05-19 NOTE — PROGRESS NOTE ADULT - MS EXT PE MLT D E PC
no cyanosis
no cyanosis/no pedal edema
no cyanosis/no pedal edema
no cyanosis
no cyanosis/no pedal edema
no cyanosis/no pedal edema

## 2022-05-19 NOTE — PROGRESS NOTE ADULT - ASSESSMENT
86 yo F non smoker with PMHX of HTN, HLD, GERD, and recently dx depression, brought in by family for confusion. nephrology consulted for electrolyte abnormities    Hypercalcemia  per patient takes MVI and Vitamin D supplement  hold supplements   PTH 31 and 29, ionized calcium high normal  elevated vit d possible sec to vit d toxicity   K/L 0.74, SIF neg  neg PTHrp SPEP  pending ACE level  malignancy work up per team  Monitor Ca level    hyperkalemia resolved.   s/p lokelma 10 grams x1  improved  monitor    Hyponatremia  low Na on presentation likely polydipsia resolved now again low again  urine work up still suggested polydipsia   free water restriction <1L/day  normal TSH  on mirtazepine for depression  monitor  serum NA  improved    HTN  BP relatively controlled  started on low dose bb  monitor    Hypophosphatemia off and on  Monitor serum Po4  Replete as needed.

## 2022-05-19 NOTE — PROGRESS NOTE ADULT - SUBJECTIVE AND OBJECTIVE BOX
ANESTHESIA POSTOP CHECK    87y Female POSTOP DAY 1 S/P     [ X] NO APPARENT ANESTHESIA COMPLICATIONS      Comments:

## 2022-05-19 NOTE — PROGRESS NOTE ADULT - ASSESSMENT
86 yo woman w/ HTN, HLD, GERD p/w AMS, found to have hypercalcemia and CT c/f gangrenous cholecystitis w/ possible fistulous tract to hepatic flexure. GI initially consulted for dilated CBD on CT w/ nl LFT and no abd pain, MRCP w/ nl biliary tree. Labs notable for elevated Ca 19-9 (272) and CEA (28).    # Colonic fistula - Edematous colon mucosa with overlying exudative material, possible purulent vs. ulcerative, with luminal narrowing at hepatic flexure; suspect abnormal tract from inflamed, gangrenous gallbladder and not likely malignancy  # Elevated tumor markers - Thickened biliary tree and gallbladder, cannot rule out neoplasm. No abdominal pain, no CBD stone, liver enzyme elevation. Normal MRCP - no biliary tree involvement of possible gallbladder cancer. No role for ERCP for brushings as per discussion with Advanced GI Attending. Dr. Tam Mak    Recommendations:  - await pathology results   - diet as per Surgery  - GI Signing off. Please call  back with questions.         Merly Bermudez PGY-6  Gastroenterology/Hepatology Fellow  Page #[phone]   Page #[phone] 5pm-7am on weekdays, and on weekends

## 2022-05-19 NOTE — PROGRESS NOTE ADULT - ATTENDING COMMENTS
I have personally interviewed and examined this patient, reviewed pertinent labs and imaging, and discussed the case with colleagues, residents, and physician assistants on B Team rounds.  More than 50% of this 35 minute encounter including face to face with the patient was spent counseling and/or coordination of care on gangrenous cholecystitis.  Time included review of vitals, labs, imaging, discussion with consultants and coordination with the operating room/emergency department.    88yo F admitted 5/4 with confusion and hypercalcemia, found to have gangrenous cholecystitis vs biliary ca on CT abd/pelvis. Pt denies pain. Tolerating diet. Nontender on exam. WBC WNL. Evidence of possible fistula between gallbladder and colon on CT.     - Diet as tolerated   - MRCP to evaluate for possible malignancy.   - Antibiotics for cholecystitis     The active care issues are:  1. possible gangrenous cholecystitis with fistula to colon    The Acute Care Surgery (B Team) Attending Group Practice:  Dr. Sol Jaimes    urgent issues - spectra [id number]  nonurgent issues - [phone]  patient appointments or afterhours - [phone]
Patient seen and examined with the GI fellow. I agree with the above assessment and plan. Thank you for allowing us to care for your patient.
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
87 with encephelopathy with ho recent UTI with electrolyte abnormalities   -suspect this is more related to electrolyte issues  -urine cx negative - DC CTX  -renal following for electrolyte issues  -appreciate Neuro input     Angel Chand  Attending Physician   Division of Infectious Disease  Office #[phone]  Available on Microsoft Teams also  After 5pm/weekend or no response, call #[phone]    D/w Dr. Shay
As previously noted, recent MR reviewed with ongoing concern for "gangrenous acute cholecystitis with perforation and associated 2 small collections. Communication with adjacent hepatic flexure is again likely." GI consulted for pre-op ERCP and colonoscopy. Case had been discussed with on-call advanced attending, but ultimate decision re: ERCP deferred to weekday team. Case reviewed with Dr. Tam Mak; given normal-appearing biliary tree on imaging, no role for ERCP. Given adjacent suspected gallbladder perforation with fistulization to colon, colonoscopy will be of increased risk of complications, including perforation. Case reviewed with family/patient; as colonoscopy is required by surgical team for surgical planning, will plan to prep for colonoscopy this week. They are amenable. Further recommendations as above.
S/p colonoscopy yesterday with evidence of left-sided diverticulosis as well as severely edematous mucosa at the hepatic flexure with focal area of overlying exudative material, possibly purulent and/or ulcerative. No obvious mass was seen.   Patient awaiting surgery, likely cholecystectomy, possible wedge resection of colon.  Further management per surgical team.
GI reconsulted for re-evaluation for possible ERCP and/or colonoscopy. Recent MR reviewed with ongoing concern for "gangrenous acute cholecystitis with perforation and associated 2 small collections. Communication with adjacent hepatic flexure is again likely." Suspect colonoscopy will likely be of low yield and of unnecessary risk given adjacent infection/inflammatory process. Will discuss with advanced endoscopy team on-call re: possibility of ERCP early next week. No immediate plans for intervention.

## 2022-05-19 NOTE — PROGRESS NOTE ADULT - NEUROLOGICAL DETAILS
responds to verbal commands
disoriented
responds to verbal commands

## 2022-05-19 NOTE — PROGRESS NOTE ADULT - ASSESSMENT
87 year old F with history of HTN, HLD and GERD presenting to [facility] ED with confusion and restlessness x 2 days, along with auditory and visual hallucinations. ID consulted for possible UTI.     Impression:  #Encephalopathy - Likely non-infectious 2/2 electrolyte abnormalities. Reported history of recent UTI treated with Bactrim. UA here without pyuria, UCx no growth. No evidence of meningitis, neck supple w/o rigidity  #perf GB - infection vs cancer    - appreciate GI/surgery input  - cont CTX/flagyl  - total 14 days of abx  - change to po on DC   - no fever or leukocytosis     Angel Chand  Attending Physician   Division of Infectious Disease  Office #[phone]  Available on Microsoft Teams also  After 5pm/weekend or no response, call #[phone]

## 2022-05-19 NOTE — PROGRESS NOTE ADULT - PROVIDER SPECIALTY LIST ADULT
Cardiology
Cardiology
Heme/Onc
Heme/Onc
Hospitalist
Nephrology
Neurology
Surgery
Surgery
Anesthesia
Cardiology
Gastroenterology
Heme/Onc
Heme/Onc
Infectious Disease
Infectious Disease
Nephrology
Neurology
Neurology
Surgery
Cardiology
Gastroenterology
Heme/Onc
Hospitalist
Infectious Disease
Infectious Disease
Nephrology
Neurology
Surgery
Surgery
Gastroenterology
Hospitalist
Nephrology
Surgery
Infectious Disease

## 2022-05-19 NOTE — PROGRESS NOTE ADULT - SUBJECTIVE AND OBJECTIVE BOX
Vital Signs Last 24 Hrs  T(C): 36.7 (19 May 2022 05:10), Max: 36.9 (18 May 2022 20:59)  T(F): 98.1 (19 May 2022 05:10), Max: 98.5 (18 May 2022 20:59)  HR: 84 (19 May 2022 05:10) (78 - 100)  BP: 140/60 (19 May 2022 05:10) (113/59 - 161/78)  BP(mean): --  RR: 18 (19 May 2022 05:10) (16 - 21)  SpO2: 99% (19 May 2022 05:10) (96% - 100%)    I&O's Detail                            12.1   5.06  )-----------( 280      ( 19 May 2022 06:15 )             38.4       05-19    136  |  104  |  7   ----------------------------<  81  4.1   |  23  |  0.84    Ca    10.2      19 May 2022 06:15  Phos  2.5     05-19  Mg     1.70     05-19        PT/INR - ( 18 May 2022 06:00 )   PT: 13.1 sec;   INR: 1.13 ratio         PTT - ( 18 May 2022 06:00 )  PTT:34.0 sec    PLAN:  I will speak to Dr. Shay about possible discharge on oral antibiotics and re-admission next week for elective surgery

## 2022-05-19 NOTE — PROGRESS NOTE ADULT - SUBJECTIVE AND OBJECTIVE BOX
Chief Complaint:  Patient is a 87y old  Female who presents with a chief complaint of ams and dysuria (19 May 2022 14:07)      Interval Events:     s/p colonoscopy yesterday revealing severely edematous mucosa with overlying exudative material causing luminal narrowing at hepatic flexure  patient feels well today, remains on clear liquid diet, planned for cholecystectomy and possible wedge colon resection    Allergies:  penicillin (Unknown)      Hospital Medications:  acetaminophen     Tablet .. 650 milliGRAM(s) Oral every 6 hours PRN  allopurinol 100 milliGRAM(s) Oral at bedtime  cefTRIAXone   IVPB 1000 milliGRAM(s) IV Intermittent every 24 hours  enoxaparin Injectable 40 milliGRAM(s) SubCutaneous every 24 hours  fenofibrate Tablet 145 milliGRAM(s) Oral at bedtime  losartan 50 milliGRAM(s) Oral daily  metoprolol succinate ER 25 milliGRAM(s) Oral daily  metroNIDAZOLE    Tablet 500 milliGRAM(s) Oral two times a day  mirtazapine 15 milliGRAM(s) Oral <User Schedule>  pantoprazole    Tablet 40 milliGRAM(s) Oral before breakfast  thiamine 100 milliGRAM(s) Oral daily      PMHX/PSHX:  No pertinent past medical history    No significant past surgical history        Family history:  No pertinent family history in first degree relatives        ROS:     General:  No weight loss, fevers, chills, night sweats, fatigue   Eyes:  No vision changes  ENT:  No sore throat, pain, runny nose  CV:  No chest pain, palpitations, dizziness   Resp:  No SOB, cough, wheezing  GI:  See HPI  :  No burning with urination, hematuria  Muscle:  No pain, weakness  Neuro:  No weakness/tingling, memory problems  Psych:  No fatigue, insomnia, mood problems, depression  Heme:  No easy bruisability  Skin:  No rash, edema      PHYSICAL EXAM:     GENERAL:  Elderly, no distress, sitting upright in chair  HEENT: Conjunctivae clear, sclera anicteric  CHEST:  No increased effort w/ respirations  HEART:  Regular rhythm & rate  ABDOMEN:  Soft, non-distended +minimal RUQ tenderness  EXTREMITIES:  no LE  edema  SKIN:  No rash/erythema/ecchymoses/petechiae/wounds/jaundice  NEURO:  Alert, orientedx3    Vital Signs:  Vital Signs Last 24 Hrs  T(C): 36.7 (19 May 2022 10:47), Max: 36.9 (18 May 2022 20:59)  T(F): 98.1 (19 May 2022 10:47), Max: 98.5 (18 May 2022 20:59)  HR: 77 (19 May 2022 10:47) (77 - 85)  BP: 118/79 (19 May 2022 10:47) (118/79 - 142/63)  BP(mean): --  RR: 18 (19 May 2022 10:47) (18 - 19)  SpO2: 98% (19 May 2022 10:47) (98% - 99%)  Daily     Daily     LABS:                        12.1   5.06  )-----------( 280      ( 19 May 2022 06:15 )             38.4     05-19    136  |  104  |  7   ----------------------------<  81  4.1   |  23  |  0.84    Ca    10.2      19 May 2022 06:15  Phos  2.5     05-19  Mg     1.70     05-19      PT/INR - ( 18 May 2022 06:00 )   PT: 13.1 sec;   INR: 1.13 ratio    PTT - ( 18 May 2022 06:00 )  PTT:34.0 sec    Imaging:      [facility]  _______________________________________________________________________________  Patient Name: Kiki Rich         Procedure Date: 5/18/2022 9:33 AM  MRN: [id number]                     Account Number: [id number]  YOB: 1935              Admit Type: Inpatient  Room: [place] [address]                         Gender: Female  Attending MD: PRIMO FONSECA MD        _______________________________________________________________________________     Procedure:           Colonoscopy  Indications:         Abnormal CT of the GI tract (acute cholecystitis with                        cholecystocolonic fistula, but unable to rule out                        underlying mass), Preoperative assessment for surgical                        planning  Providers:           PRIMO FONSECA MD, MANE FARRELL MD (Fellow)  Medicines:           Monitored Anesthesia Care  Complications:       No immediate complications.      Findings:       The perianal and digital rectal examinations were normal.       Non-bleeding internal hemorrhoids were found during endoscopy.       Multiple small-mouthed diverticula were found in the recto-sigmoid colon        and sigmoid colon with associated myochosis and luminal narrowing.       A localized area of severely edematous mucosa was found at the hepatic        flexure with focal area of overlying exudative material, possibly        purulent and/or ulcerative. This region was biopsied with a cold forceps        for histology.                                                                                   Impression:          - Non-bleeding internal hemorrhoids.                       - Diverticulosis in the recto-sigmoid colon and in the                        sigmoid colon.                       - A localized area of severely edematous mucosa was                        found at the hepatic flexure with focal area of                        overlying exudative material, possibly purulent and/or                        ulcerative. No obvious mass. Suspect this is likely site                        of fistulization. This region was biopsied with a cold                        forceps for histology.    Recommendation:      - [facility].                       - Diet per surgical team.                       - Await pathology results.  - Surgical intervention per surgical team.                                                                                   Attending Participation:       I was present and participated during the entire procedure, including        non-key portions.                                                                                                Interval Events:     s/p colonoscopy yesterday revealing severely edematous mucosa with overlying exudative material.  patient feels well today, remains on clear liquid diet, planned for cholecystectomy and possible wedge colon resection    Allergies:  penicillin (Unknown)      Hospital Medications:  acetaminophen     Tablet .. 650 milliGRAM(s) Oral every 6 hours PRN  allopurinol 100 milliGRAM(s) Oral at bedtime  cefTRIAXone   IVPB 1000 milliGRAM(s) IV Intermittent every 24 hours  enoxaparin Injectable 40 milliGRAM(s) SubCutaneous every 24 hours  fenofibrate Tablet 145 milliGRAM(s) Oral at bedtime  losartan 50 milliGRAM(s) Oral daily  metoprolol succinate ER 25 milliGRAM(s) Oral daily  metroNIDAZOLE    Tablet 500 milliGRAM(s) Oral two times a day  mirtazapine 15 milliGRAM(s) Oral <User Schedule>  pantoprazole    Tablet 40 milliGRAM(s) Oral before breakfast  thiamine 100 milliGRAM(s) Oral daily      PMHX/PSHX:  No pertinent past medical history    No significant past surgical history        Family history:  No pertinent family history in first degree relatives        ROS:   14-point ROS reviewed and negative except as per HPI above    PHYSICAL EXAM:     GENERAL:  Elderly, no distress, sitting upright in chair  HEENT: Conjunctivae clear, sclera anicteric  CHEST:  No increased effort w/ respirations  HEART:  Regular rhythm & rate  ABDOMEN:  Soft, non-distended +minimal RUQ tenderness  EXTREMITIES:  no LE  edema  SKIN:  No rash/erythema/ecchymoses/petechiae/wounds/jaundice  NEURO:  Alert, orientedx3    Vital Signs:  Vital Signs Last 24 Hrs  T(C): 36.7 (19 May 2022 10:47), Max: 36.9 (18 May 2022 20:59)  T(F): 98.1 (19 May 2022 10:47), Max: 98.5 (18 May 2022 20:59)  HR: 77 (19 May 2022 10:47) (77 - 85)  BP: 118/79 (19 May 2022 10:47) (118/79 - 142/63)  BP(mean): --  RR: 18 (19 May 2022 10:47) (18 - 19)  SpO2: 98% (19 May 2022 10:47) (98% - 99%)  Daily     Daily     LABS:                        12.1   5.06  )-----------( 280      ( 19 May 2022 06:15 )             38.4     05-19    136  |  104  |  7   ----------------------------<  81  4.1   |  23  |  0.84    Ca    10.2      19 May 2022 06:15  Phos  2.5     05-19  Mg     1.70     05-19      PT/INR - ( 18 May 2022 06:00 )   PT: 13.1 sec;   INR: 1.13 ratio    PTT - ( 18 May 2022 06:00 )  PTT:34.0 sec    Imaging:      [facility]  _______________________________________________________________________________  Patient Name: Kiki Rich         Procedure Date: 5/18/2022 9:33 AM  MRN: [id number]                     Account Number: [id number]  YOB: 1935              Admit Type: Inpatient  Room: [place] [address]                         Gender: Female  Attending MD: PRIMO FONSECA MD        _______________________________________________________________________________     Procedure:           Colonoscopy  Indications:         Abnormal CT of the GI tract (acute cholecystitis with                        cholecystocolonic fistula, but unable to rule out                        underlying mass), Preoperative assessment for surgical                        planning  Providers:           PRIMO FONSECA MD, MANE FARRELL MD (Fellow)  Medicines:           Monitored Anesthesia Care  Complications:       No immediate complications.      Findings:       The perianal and digital rectal examinations were normal.       Non-bleeding internal hemorrhoids were found during endoscopy.       Multiple small-mouthed diverticula were found in the recto-sigmoid colon        and sigmoid colon with associated myochosis and luminal narrowing.       A localized area of severely edematous mucosa was found at the hepatic        flexure with focal area of overlying exudative material, possibly        purulent and/or ulcerative. This region was biopsied with a cold forceps        for histology.                                                                                   Impression:          - Non-bleeding internal hemorrhoids.                       - Diverticulosis in the recto-sigmoid colon and in the                        sigmoid colon.                       - A localized area of severely edematous mucosa was                        found at the hepatic flexure with focal area of                        overlying exudative material, possibly purulent and/or                        ulcerative. No obvious mass. Suspect this is likely site                        of fistulization. This region was biopsied with a cold                        forceps for histology.    Recommendation:      - [facility] for [facility] care.                       - Diet per surgical team.                       - Await pathology results.  - Surgical intervention per surgical team.                                                                                   Attending Participation:       I was present and participated during the entire procedure, including        non-key portions.

## 2022-05-19 NOTE — PROGRESS NOTE ADULT - SUBJECTIVE AND OBJECTIVE BOX
URSULA HARSH 87y [id number]    Patient is a 87y old  Female who presents with a chief complaint of ams and dysuria (19 May 2022 08:22)      Follow Up/CC:  ID following for    Interval History/ROS:    Allergies    penicillin (Unknown)    Intolerances        ANTIMICROBIALS:  cefTRIAXone   IVPB 1000 every 24 hours  metroNIDAZOLE    Tablet 500 two times a day      MEDICATIONS  (STANDING):  allopurinol 100 milliGRAM(s) Oral at bedtime  cefTRIAXone   IVPB 1000 milliGRAM(s) IV Intermittent every 24 hours  enoxaparin Injectable 40 milliGRAM(s) SubCutaneous every 24 hours  fenofibrate Tablet 145 milliGRAM(s) Oral at bedtime  losartan 50 milliGRAM(s) Oral daily  metoprolol succinate ER 25 milliGRAM(s) Oral daily  metroNIDAZOLE    Tablet 500 milliGRAM(s) Oral two times a day  mirtazapine 15 milliGRAM(s) Oral <User Schedule>  pantoprazole    Tablet 40 milliGRAM(s) Oral before breakfast  thiamine 100 milliGRAM(s) Oral daily    MEDICATIONS  (PRN):  acetaminophen     Tablet .. 650 milliGRAM(s) Oral every 6 hours PRN Temp greater or equal to 38C (100.4F), Mild Pain (1 - 3), Moderate Pain (4 - 6)        Vital Signs Last 24 Hrs  T(C): 36.7 (19 May 2022 05:10), Max: 36.9 (18 May 2022 20:59)  T(F): 98.1 (19 May 2022 05:10), Max: 98.5 (18 May 2022 20:59)  HR: 84 (19 May 2022 05:10) (78 - 87)  BP: 140/60 (19 May 2022 05:10) (113/59 - 149/67)  BP(mean): --  RR: 18 (19 May 2022 05:10) (16 - 20)  SpO2: 99% (19 May 2022 05:10) (96% - 100%)    CBC Full  -  ( 19 May 2022 06:15 )  WBC Count : 5.06 K/uL  RBC Count : 4.08 M/uL  Hemoglobin : 12.1 g/dL  Hematocrit : 38.4 %  Platelet Count - Automated : 280 K/uL  Mean Cell Volume : 94.1 fL  Mean Cell Hemoglobin : 29.7 pg  Mean Cell Hemoglobin Concentration : 31.5 gm/dL  Auto Neutrophil # : x  Auto Lymphocyte # : x  Auto Monocyte # : x  Auto Eosinophil # : x  Auto Basophil # : x  Auto Neutrophil % : x  Auto Lymphocyte % : x  Auto Monocyte % : x  Auto Eosinophil % : x  Auto Basophil % : x    05-19    136  |  104  |  7   ----------------------------<  81  4.1   |  23  |  0.84    Ca    10.2      19 May 2022 06:15  Phos  2.5     05-19  Mg     1.70     05-19            MICROBIOLOGY:  .Blood Blood-Peripheral  05-04-22   No Growth Final  --  --      .Blood Blood-Peripheral  05-04-22   No Growth Final  --  --              v            RADIOLOGY     IVANHARSH DUMONT 87y [id number]    Patient is a 87y old  Female who presents with a chief complaint of ams and dysuria (19 May 2022 08:22)      Follow Up/CC:  ID following for cholecystitis     Interval History/ROS: no fever    Allergies    penicillin (Unknown)    Intolerances        ANTIMICROBIALS:  cefTRIAXone   IVPB 1000 every 24 hours  metroNIDAZOLE    Tablet 500 two times a day      MEDICATIONS  (STANDING):  allopurinol 100 milliGRAM(s) Oral at bedtime  cefTRIAXone   IVPB 1000 milliGRAM(s) IV Intermittent every 24 hours  enoxaparin Injectable 40 milliGRAM(s) SubCutaneous every 24 hours  fenofibrate Tablet 145 milliGRAM(s) Oral at bedtime  losartan 50 milliGRAM(s) Oral daily  metoprolol succinate ER 25 milliGRAM(s) Oral daily  metroNIDAZOLE    Tablet 500 milliGRAM(s) Oral two times a day  mirtazapine 15 milliGRAM(s) Oral <User Schedule>  pantoprazole    Tablet 40 milliGRAM(s) Oral before breakfast  thiamine 100 milliGRAM(s) Oral daily    MEDICATIONS  (PRN):  acetaminophen     Tablet .. 650 milliGRAM(s) Oral every 6 hours PRN Temp greater or equal to 38C (100.4F), Mild Pain (1 - 3), Moderate Pain (4 - 6)        Vital Signs Last 24 Hrs  T(C): 36.7 (19 May 2022 05:10), Max: 36.9 (18 May 2022 20:59)  T(F): 98.1 (19 May 2022 05:10), Max: 98.5 (18 May 2022 20:59)  HR: 84 (19 May 2022 05:10) (78 - 87)  BP: 140/60 (19 May 2022 05:10) (113/59 - 149/67)  BP(mean): --  RR: 18 (19 May 2022 05:10) (16 - 20)  SpO2: 99% (19 May 2022 05:10) (96% - 100%)    CBC Full  -  ( 19 May 2022 06:15 )  WBC Count : 5.06 K/uL  RBC Count : 4.08 M/uL  Hemoglobin : 12.1 g/dL  Hematocrit : 38.4 %  Platelet Count - Automated : 280 K/uL  Mean Cell Volume : 94.1 fL  Mean Cell Hemoglobin : 29.7 pg  Mean Cell Hemoglobin Concentration : 31.5 gm/dL  Auto Neutrophil # : x  Auto Lymphocyte # : x  Auto Monocyte # : x  Auto Eosinophil # : x  Auto Basophil # : x  Auto Neutrophil % : x  Auto Lymphocyte % : x  Auto Monocyte % : x  Auto Eosinophil % : x  Auto Basophil % : x    05-19    136  |  104  |  7   ----------------------------<  81  4.1   |  23  |  0.84    Ca    10.2      19 May 2022 06:15  Phos  2.5     05-19  Mg     1.70     05-19            MICROBIOLOGY:  .Blood Blood-Peripheral  05-04-22   No Growth Final  --  --      .Blood Blood-Peripheral  05-04-22   No Growth Final  --  --      RADIOLOGY    < from: MR Abdomen w/wo IV Cont (05.12.22 @ 10:32) >  No significant interval change in overall appearance of the gallbladder   which is again suggestive of gangrenous acute cholecystitis with   perforation and associated 2 small collections. Communication with   adjacent hepatic flexure is again likely. However, underlying neoplasm is   not entirely excluded. Surgical consultation is advised, if not already   obtained.  Left adnexal lesion is consistent with an intramural fibroid. No adnexal   mass. Dilated endometrial cavity in this postmenopausal patient which can   be better evaluated with a pelvic ultrasound.    < end of copied text >

## 2022-05-19 NOTE — PROGRESS NOTE ADULT - REASON FOR ADMISSION
ams and dysuria

## 2022-05-19 NOTE — PROGRESS NOTE ADULT - SUBJECTIVE AND OBJECTIVE BOX
[facility]      Seen earlier today, and examined.  - Today, patient is without complaints.           *****MEDICATIONS: Current medication reviewed and documented.    MEDICATIONS  (STANDING):  allopurinol 100 milliGRAM(s) Oral at bedtime  cefTRIAXone   IVPB 1000 milliGRAM(s) IV Intermittent every 24 hours  enoxaparin Injectable 40 milliGRAM(s) SubCutaneous every 24 hours  fenofibrate Tablet 145 milliGRAM(s) Oral at bedtime  losartan 50 milliGRAM(s) Oral daily  metoprolol succinate ER 25 milliGRAM(s) Oral daily  metroNIDAZOLE    Tablet 500 milliGRAM(s) Oral two times a day  mirtazapine 15 milliGRAM(s) Oral <User Schedule>  pantoprazole    Tablet 40 milliGRAM(s) Oral before breakfast  thiamine 100 milliGRAM(s) Oral daily    MEDICATIONS  (PRN):  acetaminophen     Tablet .. 650 milliGRAM(s) Oral every 6 hours PRN Temp greater or equal to 38C (100.4F), Mild Pain (1 - 3), Moderate Pain (4 - 6)          ***** VITAL SIGNS:  T(F): 98.1 (22 @ 10:47), Max: 98.5 (22 @ 20:59)  HR: 77 (22 @ 10:47) (77 - 85)  BP: 118/79 (22 @ 10:47) (118/79 - 142/63)  RR: 18 (22 @ 10:47) (18 - 19)  SpO2: 98% (22 @ 10:47) (98% - 99%)  Wt(kg): --  ,   I&O's Summary           *****PHYSICAL EXAM:alert oriented x 2 attention comprehension are fair.  Able to name, repeat.   EOmi fundi not visualized   no nystagmus VFF to confrontation  Tongue is midline  Palate elevates symmetrically   Moving all 4 ext spontaneously no drift appreciated    Gait not assessed.      *****LAB AND IMAGIN.1   5.06  )-----------( 280      ( 19 May 2022 06:15 )             38.4                   136  |  104  |  7   ----------------------------<  81  4.1   |  23  |  0.84    Ca    10.2      19 May 2022 06:15  Phos  2.5     05-  Mg     1.70     05-19      PT/INR - ( 18 May 2022 06:00 )   PT: 13.1 sec;   INR: 1.13 ratio         PTT - ( 18 May 2022 06:00 )  PTT:34.0 sec                     [All pertinent recent Imaging/Reports reviewed]           *****A S S E S S M E N T   A N D   P L A N :    Excerpt from H&P,"  88 yo F non smoker with PMHX of HTN, HLD, GERD, and recently dx depression, here with Daughter at bedside brought in for worsening confusion and restlessness first beginning 2 days ago, intermittently, then today worse with hallucinations ( auditory and visual),and generalized weakness.  family concerned patient was going to wander. Recently dx with UTI  5 days ago has been on bactrim for 5 days ( unclear if patient missed a dose due to developing confusion). Patient A& o x2-3, having episode of confusion normally ambulates  hx obtained from chart   family not available at bedside at the time of my encounter     Problem/Recommendations 1: ams   likely toxic metabolic encephalopathy due to hyponatremia +/- hypercalcemia +/- uti worsening underlying cognitive impairment   Plan : rx infection   avoid daytime somnolence   encourage po intake   melatonin for sleep augmentation   thiamine 100 daily   doing well    doing well    noted to have gangrenous gall bladder defer to gi   mildly dilated cbd   malignancy workup      feeding herself    son at bedside    doing ok  colonoscopy     Problem/Recommendations 2:weakness generalized   deconditioning vs. related to hypercalcemia   oob to chair daily please         Thank you for allowing me to participate in the care of this patient. Will continue to follow patient periodically. Please do not hesitate to call me if you have any  questions or if there has been a change in patients neurological status     ________________  Sandhya Crow MD  [facility] ([facility]  [phone]      33 minutes spent on total encounter; more than 50 % of the visit was  spent counseling about plan of care, compliance to diet/exercise and medication regimen and or  coordinating care by the attending physician.      It is advised that stroke patients follow up with SHEBA Salazar @ [phone] in 1- 2 weeks.   Others please follow up with Dr. Michael Nissenbaum [phone] [facility]      Seen earlier today, and examined.  - Today, patient is without complaints.  when can i go home ?          *****MEDICATIONS: Current medication reviewed and documented.    MEDICATIONS  (STANDING):  allopurinol 100 milliGRAM(s) Oral at bedtime  cefTRIAXone   IVPB 1000 milliGRAM(s) IV Intermittent every 24 hours  enoxaparin Injectable 40 milliGRAM(s) SubCutaneous every 24 hours  fenofibrate Tablet 145 milliGRAM(s) Oral at bedtime  losartan 50 milliGRAM(s) Oral daily  metoprolol succinate ER 25 milliGRAM(s) Oral daily  metroNIDAZOLE    Tablet 500 milliGRAM(s) Oral two times a day  mirtazapine 15 milliGRAM(s) Oral <User Schedule>  pantoprazole    Tablet 40 milliGRAM(s) Oral before breakfast  thiamine 100 milliGRAM(s) Oral daily    MEDICATIONS  (PRN):  acetaminophen     Tablet .. 650 milliGRAM(s) Oral every 6 hours PRN Temp greater or equal to 38C (100.4F), Mild Pain (1 - 3), Moderate Pain (4 - 6)          ***** VITAL SIGNS:  T(F): 98.1 (22 @ 10:47), Max: 98.5 (22 @ 20:59)  HR: 77 (22 @ 10:47) (77 - 85)  BP: 118/79 (22 @ 10:47) (118/79 - 142/63)  RR: 18 (22 @ 10:47) (18 - 19)  SpO2: 98% (22 @ 10:47) (98% - 99%)  Wt(kg): --  ,   I&O's Summary           *****PHYSICAL EXAM:alert oriented x 2 attention comprehension are fair.  Able to name, repeat.   EOmi fundi not visualized   no nystagmus VFF to confrontation  Tongue is midline  Palate elevates symmetrically   Moving all 4 ext spontaneously no drift appreciated    Gait not assessed.      *****LAB AND IMAGIN.1   5.06  )-----------( 280      ( 19 May 2022 06:15 )             38.4                   136  |  104  |  7   ----------------------------<  81  4.1   |  23  |  0.84    Ca    10.2      19 May 2022 06:15  Phos  2.5     -  Mg     1.70     -      PT/INR - ( 18 May 2022 06:00 )   PT: 13.1 sec;   INR: 1.13 ratio         PTT - ( 18 May 2022 06:00 )  PTT:34.0 sec                     [All pertinent recent Imaging/Reports reviewed]           *****A S S E S S M E N T   A N D   P L A N :    Excerpt from H&P,"  88 yo F non smoker with PMHX of HTN, HLD, GERD, and recently dx depression, here with Daughter at bedside brought in for worsening confusion and restlessness first beginning 2 days ago, intermittently, then today worse with hallucinations ( auditory and visual),and generalized weakness.  family concerned patient was going to wander. Recently dx with UTI  5 days ago has been on bactrim for 5 days ( unclear if patient missed a dose due to developing confusion). Patient A& o x2-3, having episode of confusion normally ambulates  hx obtained from chart   family not available at bedside at the time of my encounter     Problem/Recommendations 1: ams   likely toxic metabolic encephalopathy due to hyponatremia +/- hypercalcemia +/- uti worsening underlying cognitive impairment   Plan : rx infection   avoid daytime somnolence   encourage po intake   melatonin for sleep augmentation   thiamine 100 daily   doing well    doing well    noted to have gangrenous gall bladder defer to gi   mildly dilated cbd   malignancy workup      feeding herself    son at bedside    doing ok  readmit for elective surgery next week , cleared by id, surgery       Problem/Recommendations 2:weakness generalized   deconditioning vs. related to hypercalcemia   oob to chair daily please         Thank you for allowing me to participate in the care of this patient. Will continue to follow patient periodically. Please do not hesitate to call me if you have any  questions or if there has been a change in patients neurological status     ________________  Sandhya Crow MD  [facility] ([facility]  [phone]      33 minutes spent on total encounter; more than 50 % of the visit was  spent counseling about plan of care, compliance to diet/exercise and medication regimen and or  coordinating care by the attending physician.      It is advised that stroke patients follow up with SHEBA Salazar @ [phone] in 1- 2 weeks.   Others please follow up with Dr. Michael Nissenbaum [phone]

## 2022-05-19 NOTE — PROGRESS NOTE ADULT - RS GEN PE MLT RESP DETAILS PC
clear to auscultation bilaterally/no wheezes
respirations non-labored/clear to auscultation bilaterally/no wheezes

## 2022-05-19 NOTE — PROGRESS NOTE ADULT - SUBJECTIVE AND OBJECTIVE BOX
Date of service: 05/19/22    HISTORY OF PRESENT ILLNESS:   Ms Rich is a pleasant 86yo woman sent in by family for confusion and hallucinations.     S: no chest pain or sob; ros limited but otherwise negative.     Review of Systems:   Constitutional: [ ] fevers, [ ] chills.   Skin: [ ] dry skin. [ ] rashes.  Psychiatric: [ ] depression, [ ] anxiety.   Gastrointestinal: [ ] BRBPR, [ ] melena.   Neurological: [ ] confusion. [ ] seizures. [ ] shuffling gait.   Ears,Nose,Mouth and Throat: [ ] ear pain [ ] sore throat.   Eyes: [ ] diplopia.   Respiratory: [ ] hemoptysis. [ ] shortness of breath  Cardiovascular: See HPI above  Hematologic/Lymphatic: [ ] anemia. [ ] painful nodes. [ ] prolonged bleeding.   Genitourinary: [ ] hematuria. [ ] flank pain.   Endocrine: [ ] significant change in weight. [ ] intolerance to heat and cold.     Review of systems [x ] otherwise negative, [ ] otherwise unable to obtain    FH: no family history of sudden cardiac death in first degree relatives      acetaminophen     Tablet .. 650 milliGRAM(s) Oral every 6 hours PRN  allopurinol 100 milliGRAM(s) Oral at bedtime  cefTRIAXone   IVPB 1000 milliGRAM(s) IV Intermittent every 24 hours  enoxaparin Injectable 40 milliGRAM(s) SubCutaneous every 24 hours  fenofibrate Tablet 145 milliGRAM(s) Oral at bedtime  losartan 50 milliGRAM(s) Oral daily  metoprolol succinate ER 25 milliGRAM(s) Oral daily  metroNIDAZOLE    Tablet 500 milliGRAM(s) Oral two times a day  mirtazapine 15 milliGRAM(s) Oral <User Schedule>  pantoprazole    Tablet 40 milliGRAM(s) Oral before breakfast  thiamine 100 milliGRAM(s) Oral daily                            12.1   5.06  )-----------( 280      ( 19 May 2022 06:15 )             38.4       05-19    136  |  104  |  7   ----------------------------<  81  4.1   |  23  |  0.84    Ca    10.2      19 May 2022 06:15  Phos  2.5     05-19  Mg     1.70     05-19              T(C): 36.7 (05-19-22 @ 10:47), Max: 36.9 (05-18-22 @ 20:59)  HR: 77 (05-19-22 @ 10:47) (77 - 85)  BP: 118/79 (05-19-22 @ 10:47) (118/79 - 142/63)  RR: 18 (05-19-22 @ 10:47) (18 - 19)  SpO2: 98% (05-19-22 @ 10:47) (98% - 99%)  Wt(kg): --    I&O's Summary      General: Well nourished in no acute distress.   Head: Normocephalic and atraumatic.   Neck: No JVD. No bruits. Supple. Does not appear to be enlarged.   Cardiovascular: + S1,S2 ; RRR Soft systolic murmur at the left lower sternal border. No rubs noted.    Lungs: CTA b/l. No rhonchi, rales or wheezes.   Abdomen: + BS, soft. Non tender. Non distended. No rebound. No guarding.   Extremities: No clubbing/cyanosis/edema.   Neurologic: Moves all four extremities. Full range of motion.   Skin: Warm and moist. The patient's skin has normal elasticity and good skin turgor.   Psychiatric: unable to assess  Musculoskeletal: unable to assess      DATA    ECG: NSR, shortening/narrowing of T wave.  	  ASSESSMENT/PLAN: 	87y Female with acute metabolic encephalopathy.  Previous UTI treatment. Hypercalcemic.    -tte with normal LV function   -treatment of hypercalcemia per primary team - calcium levels improved   -tolerated colonoscopy well in terms of cv perspective  -follow up GI and surgery     Carolyn Pike MD

## 2022-05-24 ENCOUNTER — OUTPATIENT (OUTPATIENT)
Dept: OUTPATIENT SERVICES | Facility: HOSPITAL | Age: 87
LOS: 1 days | End: 2022-05-24
Payer: MEDICARE

## 2022-05-24 VITALS
HEART RATE: 81 BPM | SYSTOLIC BLOOD PRESSURE: 132 MMHG | WEIGHT: 119.05 LBS | DIASTOLIC BLOOD PRESSURE: 78 MMHG | TEMPERATURE: 97 F | HEIGHT: 58 IN | OXYGEN SATURATION: 97 % | RESPIRATION RATE: 24 BRPM

## 2022-05-24 DIAGNOSIS — N39.0 URINARY TRACT INFECTION, SITE NOT SPECIFIED: ICD-10-CM

## 2022-05-24 DIAGNOSIS — K82.8 OTHER SPECIFIED DISEASES OF GALLBLADDER: ICD-10-CM

## 2022-05-24 DIAGNOSIS — Z90.710 ACQUIRED ABSENCE OF BOTH CERVIX AND UTERUS: Chronic | ICD-10-CM

## 2022-05-24 DIAGNOSIS — I10 ESSENTIAL (PRIMARY) HYPERTENSION: ICD-10-CM

## 2022-05-24 LAB
A1C WITH ESTIMATED AVERAGE GLUCOSE RESULT: 5.6 % — SIGNIFICANT CHANGE UP (ref 4–5.6)
ANION GAP SERPL CALC-SCNC: 11 MMOL/L — SIGNIFICANT CHANGE UP (ref 7–14)
BLD GP AB SCN SERPL QL: NEGATIVE — SIGNIFICANT CHANGE UP
BUN SERPL-MCNC: 12 MG/DL — SIGNIFICANT CHANGE UP (ref 7–23)
CALCIUM SERPL-MCNC: 10.5 MG/DL — SIGNIFICANT CHANGE UP (ref 8.4–10.5)
CHLORIDE SERPL-SCNC: 101 MMOL/L — SIGNIFICANT CHANGE UP (ref 98–107)
CO2 SERPL-SCNC: 26 MMOL/L — SIGNIFICANT CHANGE UP (ref 22–31)
CREAT SERPL-MCNC: 0.82 MG/DL — SIGNIFICANT CHANGE UP (ref 0.5–1.3)
EGFR: 70 ML/MIN/1.73M2 — SIGNIFICANT CHANGE UP
ESTIMATED AVERAGE GLUCOSE: 114 — SIGNIFICANT CHANGE UP
GLUCOSE SERPL-MCNC: 90 MG/DL — SIGNIFICANT CHANGE UP (ref 70–99)
HCT VFR BLD CALC: 40.6 % — SIGNIFICANT CHANGE UP (ref 34.5–45)
HGB BLD-MCNC: 12.9 G/DL — SIGNIFICANT CHANGE UP (ref 11.5–15.5)
MCHC RBC-ENTMCNC: 28.8 PG — SIGNIFICANT CHANGE UP (ref 27–34)
MCHC RBC-ENTMCNC: 31.8 GM/DL — LOW (ref 32–36)
MCV RBC AUTO: 90.6 FL — SIGNIFICANT CHANGE UP (ref 80–100)
NRBC # BLD: 0 /100 WBCS — SIGNIFICANT CHANGE UP
NRBC # FLD: 0 K/UL — SIGNIFICANT CHANGE UP
PLATELET # BLD AUTO: 273 K/UL — SIGNIFICANT CHANGE UP (ref 150–400)
POTASSIUM SERPL-MCNC: 4.1 MMOL/L — SIGNIFICANT CHANGE UP (ref 3.5–5.3)
POTASSIUM SERPL-SCNC: 4.1 MMOL/L — SIGNIFICANT CHANGE UP (ref 3.5–5.3)
RBC # BLD: 4.48 M/UL — SIGNIFICANT CHANGE UP (ref 3.8–5.2)
RBC # FLD: 14.9 % — HIGH (ref 10.3–14.5)
RH IG SCN BLD-IMP: POSITIVE — SIGNIFICANT CHANGE UP
SODIUM SERPL-SCNC: 138 MMOL/L — SIGNIFICANT CHANGE UP (ref 135–145)
WBC # BLD: 5.77 K/UL — SIGNIFICANT CHANGE UP (ref 3.8–10.5)
WBC # FLD AUTO: 5.77 K/UL — SIGNIFICANT CHANGE UP (ref 3.8–10.5)

## 2022-05-24 PROCEDURE — 93010 ELECTROCARDIOGRAM REPORT: CPT

## 2022-05-24 RX ORDER — MIRTAZAPINE 45 MG/1
1 TABLET, ORALLY DISINTEGRATING ORAL
Qty: 0 | Refills: 0 | DISCHARGE

## 2022-05-24 NOTE — H&P PST ADULT - MUSCULOSKELETAL
No joint pain, swelling or deformity; no limitation of movement details… Tumor Depth: Less than 6mm from granular layer and no invasion beyond the subcutaneous fat

## 2022-05-24 NOTE — H&P PST ADULT - NEGATIVE GENERAL GENITOURINARY SYMPTOMS
no hematuria/no renal colic/no flank pain L/no flank pain R/no urine discoloration/no gas in urine/no bladder infections/no dysuria/no urinary hesitancy/normal urinary frequency/no nocturia/normal libido

## 2022-05-24 NOTE — H&P PST ADULT - RESPIRATORY COMMENTS
as per daughter and patient that is normal, all pulm and cardiac work was normal as per daughter and patient that is normal, all pulm and cardiac work up was normal

## 2022-05-24 NOTE — H&P PST ADULT - PROBLEM SELECTOR PLAN 2
Patient instructed to take losartan with a sip of water on the morning of procedure. Patient instructed to take losartan with a sip of water on the morning of procedure.    copy of ECHO in chart    Requesting medical clearance due recent hospital admission    As per daughter, patient to be admitted 1 day before surgery,

## 2022-05-24 NOTE — H&P PST ADULT - PROBLEM SELECTOR PLAN 3
discharged from hospital for UTI on oral antibiotics    Patient instructed to take flagyl and with a sip of water on the morning of procedure. Discharged from hospital for UTI on oral antibiotics    Patient instructed to take flagyl and cefpodoxime with a sip of water on the morning of procedure.

## 2022-05-24 NOTE — H&P PST ADULT - PROBLEM SELECTOR PLAN 1
Patient tentatively scheduled for    Pre-op instructions provided. Pt given verbal and written instructions with teach back on chlorhexidine shampoo and pepcid. Pt verbalized understanding with return demonstration.    Pt has a scheduled preop COVID test. Patient tentatively scheduled for cholecystectomy possible colon resection on 5/27/22    Pre-op instructions provided. Pt given verbal and written instructions with teach back on chlorhexidine shampoo and Pepcid. Pt verbalized understanding with return demonstration.    Pt has a scheduled preop COVID test.

## 2022-05-24 NOTE — H&P PST ADULT - NEGATIVE GENERAL SYMPTOMS
no fever/no chills/no sweating/no anorexia/no weight loss/no polyuria/no polydipsia/no malaise/no fatigue

## 2022-05-24 NOTE — H&P PST ADULT - HISTORY OF PRESENT ILLNESS
87 year od female recently hospitalized due UTI and Altered mental status change. Patient was discharged on 5/19/22. patient now presents to PST with preop dx of other specified disease of gallbladder patient is scheduled for cholecystectomy possible colon resection  87 year od female recently hospitalized due UTI and Altered mental status change. Patient was discharged on 5/19/22 on oral antibiotics. Patient now presents to PST with preop dx of other specified disease of gallbladder patient is scheduled for cholecystectomy possible colon resection

## 2022-05-24 NOTE — H&P PST ADULT - NSICDXPASTMEDICALHX_GEN_ALL_CORE_FT
PAST MEDICAL HISTORY:  GERD (gastroesophageal reflux disease)     Gout     Hyperlipidemia     Hypertension     Insomnia     Other gallbladder disorder

## 2022-05-24 NOTE — H&P PST ADULT - NEGATIVE ENMT SYMPTOMS
no sinus symptoms/no nasal congestion/no nasal discharge/no post-nasal discharge/no recurrent cold sores/no abnormal taste sensation/no dry mouth/no throat pain/no dysphagia

## 2022-05-25 LAB — SARS-COV-2 N GENE NPH QL NAA+PROBE: NOT DETECTED

## 2022-05-26 ENCOUNTER — INPATIENT (INPATIENT)
Facility: HOSPITAL | Age: 87
LOS: 7 days | Discharge: HOME HEALTH | End: 2022-06-03
Attending: INTERNAL MEDICINE | Admitting: INTERNAL MEDICINE
Payer: MEDICARE

## 2022-05-26 ENCOUNTER — APPOINTMENT (OUTPATIENT)
Dept: GASTROENTEROLOGY | Facility: CLINIC | Age: 87
End: 2022-05-26

## 2022-05-26 VITALS
RESPIRATION RATE: 19 BRPM | OXYGEN SATURATION: 97 % | SYSTOLIC BLOOD PRESSURE: 144 MMHG | HEIGHT: 58 IN | TEMPERATURE: 99 F | HEART RATE: 90 BPM | DIASTOLIC BLOOD PRESSURE: 87 MMHG

## 2022-05-26 DIAGNOSIS — Z90.710 ACQUIRED ABSENCE OF BOTH CERVIX AND UTERUS: Chronic | ICD-10-CM

## 2022-05-26 PROBLEM — I10 ESSENTIAL (PRIMARY) HYPERTENSION: Chronic | Status: ACTIVE | Noted: 2022-05-24

## 2022-05-26 PROBLEM — K82.8 OTHER SPECIFIED DISEASES OF GALLBLADDER: Chronic | Status: ACTIVE | Noted: 2022-05-24

## 2022-05-26 PROBLEM — K21.9 GASTRO-ESOPHAGEAL REFLUX DISEASE WITHOUT ESOPHAGITIS: Chronic | Status: ACTIVE | Noted: 2022-05-24

## 2022-05-26 PROBLEM — G47.00 INSOMNIA, UNSPECIFIED: Chronic | Status: ACTIVE | Noted: 2022-05-24

## 2022-05-26 PROBLEM — E78.5 HYPERLIPIDEMIA, UNSPECIFIED: Chronic | Status: ACTIVE | Noted: 2022-05-24

## 2022-05-26 PROBLEM — M10.9 GOUT, UNSPECIFIED: Chronic | Status: ACTIVE | Noted: 2022-05-24

## 2022-05-26 LAB
ALBUMIN SERPL ELPH-MCNC: 3.9 G/DL — SIGNIFICANT CHANGE UP (ref 3.3–5)
ALP SERPL-CCNC: 49 U/L — SIGNIFICANT CHANGE UP (ref 40–120)
ALT FLD-CCNC: 14 U/L — SIGNIFICANT CHANGE UP (ref 4–33)
ANION GAP SERPL CALC-SCNC: 12 MMOL/L — SIGNIFICANT CHANGE UP (ref 7–14)
ANION GAP SERPL CALC-SCNC: 9 MMOL/L — SIGNIFICANT CHANGE UP (ref 7–14)
APPEARANCE UR: CLEAR — SIGNIFICANT CHANGE UP
APTT BLD: 29.8 SEC — SIGNIFICANT CHANGE UP (ref 27–36.3)
AST SERPL-CCNC: 63 U/L — HIGH (ref 4–32)
BASOPHILS # BLD AUTO: 0.11 K/UL — SIGNIFICANT CHANGE UP (ref 0–0.2)
BASOPHILS NFR BLD AUTO: 1.8 % — SIGNIFICANT CHANGE UP (ref 0–2)
BILIRUB SERPL-MCNC: 0.5 MG/DL — SIGNIFICANT CHANGE UP (ref 0.2–1.2)
BILIRUB UR-MCNC: NEGATIVE — SIGNIFICANT CHANGE UP
BLD GP AB SCN SERPL QL: NEGATIVE — SIGNIFICANT CHANGE UP
BUN SERPL-MCNC: 13 MG/DL — SIGNIFICANT CHANGE UP (ref 7–23)
BUN SERPL-MCNC: 13 MG/DL — SIGNIFICANT CHANGE UP (ref 7–23)
CALCIUM SERPL-MCNC: 10 MG/DL — SIGNIFICANT CHANGE UP (ref 8.4–10.5)
CALCIUM SERPL-MCNC: 10.8 MG/DL — HIGH (ref 8.4–10.5)
CHLORIDE SERPL-SCNC: 95 MMOL/L — LOW (ref 98–107)
CHLORIDE SERPL-SCNC: 95 MMOL/L — LOW (ref 98–107)
CO2 SERPL-SCNC: 23 MMOL/L — SIGNIFICANT CHANGE UP (ref 22–31)
CO2 SERPL-SCNC: 27 MMOL/L — SIGNIFICANT CHANGE UP (ref 22–31)
COLOR SPEC: SIGNIFICANT CHANGE UP
CREAT SERPL-MCNC: 0.78 MG/DL — SIGNIFICANT CHANGE UP (ref 0.5–1.3)
CREAT SERPL-MCNC: 0.8 MG/DL — SIGNIFICANT CHANGE UP (ref 0.5–1.3)
DIFF PNL FLD: NEGATIVE — SIGNIFICANT CHANGE UP
EGFR: 72 ML/MIN/1.73M2 — SIGNIFICANT CHANGE UP
EGFR: 74 ML/MIN/1.73M2 — SIGNIFICANT CHANGE UP
EOSINOPHIL # BLD AUTO: 0.21 K/UL — SIGNIFICANT CHANGE UP (ref 0–0.5)
EOSINOPHIL NFR BLD AUTO: 3.5 % — SIGNIFICANT CHANGE UP (ref 0–6)
FLUAV AG NPH QL: SIGNIFICANT CHANGE UP
FLUBV AG NPH QL: SIGNIFICANT CHANGE UP
GLUCOSE SERPL-MCNC: 104 MG/DL — HIGH (ref 70–99)
GLUCOSE SERPL-MCNC: 108 MG/DL — HIGH (ref 70–99)
GLUCOSE UR QL: NEGATIVE — SIGNIFICANT CHANGE UP
HCT VFR BLD CALC: 43.2 % — SIGNIFICANT CHANGE UP (ref 34.5–45)
HGB BLD-MCNC: 13.8 G/DL — SIGNIFICANT CHANGE UP (ref 11.5–15.5)
IANC: 3.5 K/UL — SIGNIFICANT CHANGE UP (ref 1.8–7.4)
IMM GRANULOCYTES NFR BLD AUTO: 0.2 % — SIGNIFICANT CHANGE UP (ref 0–1.5)
INR BLD: 1.09 RATIO — SIGNIFICANT CHANGE UP (ref 0.88–1.16)
KETONES UR-MCNC: NEGATIVE — SIGNIFICANT CHANGE UP
LEUKOCYTE ESTERASE UR-ACNC: ABNORMAL
LYMPHOCYTES # BLD AUTO: 1.76 K/UL — SIGNIFICANT CHANGE UP (ref 1–3.3)
LYMPHOCYTES # BLD AUTO: 29 % — SIGNIFICANT CHANGE UP (ref 13–44)
MCHC RBC-ENTMCNC: 28.6 PG — SIGNIFICANT CHANGE UP (ref 27–34)
MCHC RBC-ENTMCNC: 31.9 GM/DL — LOW (ref 32–36)
MCV RBC AUTO: 89.6 FL — SIGNIFICANT CHANGE UP (ref 80–100)
MONOCYTES # BLD AUTO: 0.47 K/UL — SIGNIFICANT CHANGE UP (ref 0–0.9)
MONOCYTES NFR BLD AUTO: 7.8 % — SIGNIFICANT CHANGE UP (ref 2–14)
NEUTROPHILS # BLD AUTO: 3.5 K/UL — SIGNIFICANT CHANGE UP (ref 1.8–7.4)
NEUTROPHILS NFR BLD AUTO: 57.7 % — SIGNIFICANT CHANGE UP (ref 43–77)
NITRITE UR-MCNC: NEGATIVE — SIGNIFICANT CHANGE UP
NRBC # BLD: 0 /100 WBCS — SIGNIFICANT CHANGE UP
NRBC # FLD: 0 K/UL — SIGNIFICANT CHANGE UP
PH UR: 7 — SIGNIFICANT CHANGE UP (ref 5–8)
PLATELET # BLD AUTO: 292 K/UL — SIGNIFICANT CHANGE UP (ref 150–400)
POTASSIUM SERPL-MCNC: 4.2 MMOL/L — SIGNIFICANT CHANGE UP (ref 3.5–5.3)
POTASSIUM SERPL-MCNC: SIGNIFICANT CHANGE UP MMOL/L (ref 3.5–5.3)
POTASSIUM SERPL-SCNC: 4.2 MMOL/L — SIGNIFICANT CHANGE UP (ref 3.5–5.3)
POTASSIUM SERPL-SCNC: SIGNIFICANT CHANGE UP MMOL/L (ref 3.5–5.3)
PROT SERPL-MCNC: 7.9 G/DL — SIGNIFICANT CHANGE UP (ref 6–8.3)
PROT UR-MCNC: ABNORMAL
PROTHROM AB SERPL-ACNC: 12.6 SEC — SIGNIFICANT CHANGE UP (ref 10.5–13.4)
RBC # BLD: 4.82 M/UL — SIGNIFICANT CHANGE UP (ref 3.8–5.2)
RBC # FLD: 14.9 % — HIGH (ref 10.3–14.5)
RH IG SCN BLD-IMP: POSITIVE — SIGNIFICANT CHANGE UP
RSV RNA NPH QL NAA+NON-PROBE: SIGNIFICANT CHANGE UP
SARS-COV-2 RNA SPEC QL NAA+PROBE: SIGNIFICANT CHANGE UP
SODIUM SERPL-SCNC: 130 MMOL/L — LOW (ref 135–145)
SODIUM SERPL-SCNC: 131 MMOL/L — LOW (ref 135–145)
SP GR SPEC: 1.01 — SIGNIFICANT CHANGE UP (ref 1–1.05)
UROBILINOGEN FLD QL: SIGNIFICANT CHANGE UP
WBC # BLD: 6.06 K/UL — SIGNIFICANT CHANGE UP (ref 3.8–10.5)
WBC # FLD AUTO: 6.06 K/UL — SIGNIFICANT CHANGE UP (ref 3.8–10.5)

## 2022-05-26 PROCEDURE — 71045 X-RAY EXAM CHEST 1 VIEW: CPT | Mod: 26

## 2022-05-26 PROCEDURE — 93010 ELECTROCARDIOGRAM REPORT: CPT

## 2022-05-26 PROCEDURE — 72170 X-RAY EXAM OF PELVIS: CPT | Mod: 26

## 2022-05-26 PROCEDURE — G1004: CPT

## 2022-05-26 PROCEDURE — 99285 EMERGENCY DEPT VISIT HI MDM: CPT | Mod: 25

## 2022-05-26 PROCEDURE — 72125 CT NECK SPINE W/O DYE: CPT | Mod: 26,MA

## 2022-05-26 PROCEDURE — 70450 CT HEAD/BRAIN W/O DYE: CPT | Mod: 26,MG,77

## 2022-05-26 PROCEDURE — 70450 CT HEAD/BRAIN W/O DYE: CPT | Mod: 26,MA

## 2022-05-26 RX ORDER — SODIUM CHLORIDE 9 MG/ML
1000 INJECTION INTRAMUSCULAR; INTRAVENOUS; SUBCUTANEOUS ONCE
Refills: 0 | Status: COMPLETED | OUTPATIENT
Start: 2022-05-26 | End: 2022-05-26

## 2022-05-26 RX ORDER — ACETAMINOPHEN 500 MG
650 TABLET ORAL ONCE
Refills: 0 | Status: COMPLETED | OUTPATIENT
Start: 2022-05-26 | End: 2022-05-26

## 2022-05-26 RX ADMIN — SODIUM CHLORIDE 1000 MILLILITER(S): 9 INJECTION INTRAMUSCULAR; INTRAVENOUS; SUBCUTANEOUS at 14:45

## 2022-05-26 RX ADMIN — Medication 650 MILLIGRAM(S): at 23:22

## 2022-05-26 RX ADMIN — Medication 650 MILLIGRAM(S): at 09:33

## 2022-05-26 NOTE — CONSULT NOTE ADULT - ASSESSMENT
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      PLAN:    - Patient is currently not a candidate for surgery given the acute subdural hematoma.   - Recommend NSx consult and dispo per ED.   - Plan discussed with Attending, Dr. Melchor Oneal MD, PGY 3  D Team Surgery  [id number]

## 2022-05-26 NOTE — ED ADULT NURSE NOTE - OBJECTIVE STATEMENT
pt presenting to room 3 AxOx4 ambulatory at baseline, arriving as a rapid response s/p mechanical fall. PMH HTN, GERD, gout, HLD. Not currently on any active blood thinners. As per daughter at bedside, pt lost her balance and fell backwards. Pt arriving with C-Collar. Endorsing neck and back of head pain. No obvious deformities noted. No active bleeding noted. Ecchymosis noted to lower abdominal quadrants. Skin to sacral area dry and intact. Afebrile rectally. RR even and unlabored. Pallor/diaphoresis not noted. Pt denies CP, SOB, dizziness, N/V, visual changes. IV established with 20g in RAC. Labs drawn and sent. Pt NSR on monitor. MD at bedside for eval. Will continue to monitor.

## 2022-05-26 NOTE — ED PROVIDER NOTE - NSTIMEPROVIDERCAREINITIATE_GEN_ER
[Normal] : Developmental history within normal limits [Verbally] : verbally [FreeTextEntry2] : no [FreeTextEntry3] : no 26-May-2022 09:03

## 2022-05-26 NOTE — CONSULT NOTE ADULT - SUBJECTIVE AND OBJECTIVE BOX
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 Patient was scheduled for cholecystectomy and possible bowel resection with plan for 1 day preadmission for bowel prep. Pt difficult hearing and is w/ daughter who states that she was told to be on clears yesterday and felt weak this morning not eating anything. She as in the process of trying to admit her to the hospital when the patient had a ground level fall with witnessed head strike. patient reports headache and neck pain.        PAST MEDICAL & SURGICAL HISTORY:  Hypertension      Hyperlipidemia      Gout      GERD (gastroesophageal reflux disease)      Insomnia      Other gallbladder disorder      H/O: hysterectomy        FAMILY HISTORY:  FH: hypertension (Father, Mother)    SOCIAL HISTORY:      ALLERGIES: penicillin (Unknown)      HOME MEDICATIONS:     CURRENT MEDICATIONS  MEDICATIONS (STANDING):   MEDICATIONS (PRN):  --------------------------------------------------------------------------------------------    Vitals:   T(C): 37.2 (22 @ 14:48), Max: 37.3 (22 @ 08:49)  HR: 103 (22 @ 14:48) (90 - 103)  BP: 157/94 (22 @ 14:48) (144/87 - 165/88)  RR: 18 (22 @ 14:48) (16 - 19)  SpO2: 100% (22 @ 14:48) (97% - 100%)  CAPILLARY BLOOD GLUCOSE          Height (cm): 147.3 ( @ 08:49)  Weight (kg): 54 ( @ 08:39)  BMI (kg/m2): 24.9 ( @ 08:49)  BSA (m2): 1.46 ( @ 08:49)      PHYSICAL EXAM:   General: NAD, Lying in bed comfortably  Neuro: Alert and answering questions appropriately   HEENT: Grossly normal, EOMI, mild midline cervical tenderness  Cardio: No peripheral edema  Resp: Good effort, no signs of respiratory distress  Back: No spinal step offs or chest tenderness  GI/Abd: Soft, NT/ND, no rebound/guarding, no masses palpated  Vascular: All 4 extremities warm  Musculoskeletal: All 4 extremities moving spontaneously, no limitations  --------------------------------------------------------------------------------------------    LABS  CBC ( 09:43)                              13.8                           6.06    )----------------(  292        57.7  % Neutrophils, 29.0  % Lymphocytes, ANC: 3.50                                43.2      BMP ( @ 10:50)             131<L>  |  95<L>   |  13    		Ca++ --      Ca 10.0               ---------------------------------( 108<H>		Mg --                 4.2     |  27      |  0.78  			Ph --      BMP ( @ 09:43)             130<L>  |  95<L>   |  13    		Ca++ --      Ca 10.8<H>             ---------------------------------( 104<H>		Mg --                 TNP     |  23      |  0.80  			Ph --        LFTs ( 09:43)      TPro 7.9 / Alb 3.9 / TBili 0.5 / DBili -- / AST 63<H> / ALT 14 / AlkPhos 49    Coags ( @ 09:43)  aPTT 29.8 / INR 1.09 / PT 12.6          --------------------------------------------------------------------------------------------    MICROBIOLOGY  Urinalysis ( @ 10:17):     Color: Light Yellow / Appearance: Clear / S.010 / pH: 7.0 / Gluc: Negative / Ketones: Negative / Bili: Negative / Urobili: <2 mg/dL / Protein :Trace<!> / Nitrites: Negative / Leuk.Est: Small<!> / RBC: 1 / WBC: 2 / Sq Epi:  / Non Sq Epi: 1 / Bacteria Negative         --------------------------------------------------------------------------------------------    IMAGING  < from: CT Cervical Spine No Cont (22 @ 13:48) >  IMPRESSION:  CT HEAD: Extensive large posterior predominantly left parietal scalp   swelling and hematoma.  Acute small right temporal convexity subdural hematoma. Acute right   parietal convexity subdural hematoma measuring 8 mm in greatest   thickness. No significant associated mass effect or midline shift. No   depressed calvarial fracture. New patchy opacification of the right   mastoid air cells. Underlying skull base fracture not entirely excluded.   Additional chronic findings as above.    CT CERVICAL SPINE: No fracture or acute traumatic malalignment.        < end of copied text >    --------------------------------------------------------------------------------------------

## 2022-05-26 NOTE — CONSULT NOTE ADULT - ASSESSMENT
88yo female s/p fall backwards hitting head, scheduled for a cholecystectomy tomorrow, with acute SDH    PLAN:   - rpt CTH in 6 hrs  - hold all AC  - will need discussion w surgery on if cholecystectomy can be done tomorrow     Case discussed with attending neurosurgeon Dr. Pond  86yo female s/p fall backwards hitting head, scheduled for a cholecystectomy tomorrow, with acute SDH    PLAN:   - rpt CTH in 6 hrs  - hold all AC  - surgery will need to be cancelled tomorrow. If surgery is urgent, convo should be had attending to attending to decide on risk vs benefit     Case discussed with attending neurosurgeon Dr. Pond  88yo female s/p fall backwards hitting head, scheduled for a cholecystectomy tomorrow, with acute SDH    PLAN:   - rpt CTH in 6 hrs  - hold all AC  - surgery will need to be cancelled tomorrow given newly dx acute SDH. If surgery is urgent, convo should be had attending to attending to decide on risk vs benefit -- would prefer if surgery is rescheduled     Case discussed with attending neurosurgeon Dr. Pond

## 2022-05-26 NOTE — ED PROVIDER NOTE - ATTENDING CONTRIBUTION TO CARE
Attending Statement: I have personally seen and examined this patient. I have fully participated in the care of this patient. I have reviewed all pertinent clinical information, including history physical exam, plan and the Resident's note and agree except as noted  87yoF hx of HLD, HTN, GERD, recent admission to [facility] 5-4-22 to 5-19-22 for gangrenous cholecystitis due to have surgery tomorrow per daughter, pt was coming  for PST today, got out of car, lost balance and fell backwards. Hit back of head,no LOC. not dizzy or lightheaded/ cp/ sob before or after fall. Complaining of headache. no neck pain. no cp no abdominal pain. no n/v/d no dysuria no fever. Taking po abx at home. Forgot to take BP meds this am: losartan and metoprolol.   Vital signs noted.  elderly female laying in bed w collar in place. no sign of head/facial trauma. no scalp lac or hematoma. no midline cervical/thoracic/lumbar tenderness. no ecchymosis of back/chest. ecchymosis on lower right abdomen from lovenox at home. rectal temp 99 stable pelvis. no shortening or rotation of bl lower ext. no lac or abrasion. moving all ext. no focal deficits.   plan ekg, labs, cxr, ct head/neck, cxr, tba

## 2022-05-26 NOTE — ED PROVIDER NOTE - PROGRESS NOTE DETAILS
James, PGY3: called by radiologist about SDH without midline shift, fluid in mastoid process. neurosurgery consulted, will see pt in ED pt resting, at baseline per daughter, no pain now. NSG evaluated pt, recommend repeat ct head in 6 hours. signout to night team Daughter will come back at 530pm cell [phone] Anthony, PGY3 - received pt as sign-out ~11pm, pt found to have SDH, interval scan with stable/improved SDH but new trace SAH, was previously admitted to med Dr. Shay, but medicine team later requested CT chest/A/P for complete trauma eval. CTs with mild soft tissue contusion the left anterior abdominal wall, no intraabd/thoracic injuries identified. Discussed SDH/SAH with nsgy, states that pt does not require repeat CTH, can go to floor with q4hr neuro checks. Re-admitted back to Dr. Shay, hospital paged Anthony, PGY3 - updated hospitalist Dr. Espinoza on CT results and admission

## 2022-05-26 NOTE — PROGRESS NOTE ADULT - ASSESSMENT
Impression:  88 yo F w/ PMHx HTN, HLD, GERD p/w AMS, found to have hypercalcemia and CT c/f gangrenous cholecystitis w/ possible fistulous tract to hepatic flexure. GI initially consulted for dilated CBD on CT w/ nl LFT and no abd pain, MRCP w/ nl biliary tree. Labs notable for elevated Ca 19-9 (272) and CEA (28)    # gangrenous cholecystitis - c/f perforation and fistulous tract to hepatic flexure. Per surgery, requesting colonoscopy to assess colonic involvement and aid in surgical planning. Discussed w/ patient and daughter that colonoscopy can be performed, though higher risk procedure given possible fistulous tract. Family agreeable to risks. Will plan for colonoscopy 5/18/22  # elevated tumor markers - pt w/ thickened biliary tree and gallbladder, cannot rule out neoplasm. No abd pain, no CBD stone, liver enzymes wnl. Labs c/f possible gallbladder malignancy. Given that patient has normal MRCP and no biliary tree involvement of possible gallbladder cancer, there is no role for ERCP for brushings. Case reviewed w/ Dr. Mak to confirm this    Recommendations:  - regular diet today, clear liquid diet starting tomorrow (5/17/22 AM)  - plan for colonoscopy wednesday (5/18/22). GI fellow to order bowel prep  - no plans for ERCP  - f/u surgery recs  - rest of care per primary team    GI will continue to follow.     All recommendations are tentative until note is attested by attending.     Skip Brooks, PGY5  Gastroenterology/Hepatology Fellow  Available on [employer] Teams  [phone] ([employer] Short Range Pager)  [phone] (Long Range Pager)    After 5pm, please contact the on-call GI fellow. [phone] Impression:  88 yo F w/ PMHx HTN, HLD, GERD p/w AMS, found to have hypercalcemia and CT c/f gangrenous cholecystitis w/ possible fistulous tract to hepatic flexure. GI initially consulted for dilated CBD on CT w/ nl LFT and no abd pain, MRCP w/ nl biliary tree. Labs notable for elevated Ca 19-9 (272) and CEA (28).    # gangrenous cholecystitis - c/f perforation and fistulous tract to hepatic flexure. Per surgery, requesting colonoscopy to assess colonic involvement and aid in surgical planning. Discussed w/ patient and daughter that colonoscopy can be performed, though higher risk procedure given possible fistulous tract. Family agreeable to risks. Will plan for colonoscopy 5/18/22  # elevated tumor markers - pt w/ thickened biliary tree and gallbladder, cannot rule out neoplasm. No abd pain, no CBD stone, liver enzymes wnl. Labs c/f possible gallbladder malignancy. Given that patient has normal MRCP and no biliary tree involvement of possible gallbladder cancer, there is no role for ERCP for brushings. Case reviewed w/ Dr. Mak to confirm this    Recommendations:  - regular diet today, clear liquid diet starting tomorrow (5/17/22 AM)  - plan for colonoscopy Wednesday (5/18/22). GI fellow to order bowel prep  - no plans for ERCP  - f/u surgery recs  - rest of care per primary team    GI will continue to follow.     All recommendations are tentative until note is attested by attending.     Skip Brooks, PGY5  Gastroenterology/Hepatology Fellow  Available on [employer] Teams  [phone] ([employer] Short Range Pager)  [phone] (Long Range Pager)    After 5pm, please contact the on-call GI fellow. [phone] Ketoconazole Pregnancy And Lactation Text: This medication is Pregnancy Category C and it isn't know if it is safe during pregnancy. It is also excreted in breast milk and breast feeding isn't recommended.

## 2022-05-26 NOTE — ED PROVIDER NOTE - CLINICAL SUMMARY MEDICAL DECISION MAKING FREE TEXT BOX
88y/o F w/ h/o HTN, HLD, recent hospitalization for UTI, cholecystitis p/w likely mechanical fall without LOC, vomiting, AC use, numbness, tingling, neck pain. C/o headache now. Some posterior neck pain with normal neuro exam. Will CTH C spine as per nexus and [place] ct head rules, preop labs as pt was going to be admitted and clear from a trauma standpoint.

## 2022-05-26 NOTE — ED PROVIDER NOTE - PHYSICAL EXAMINATION
Gen: NAD, non-toxic appearing  Head: normal appearing  HEENT: normal conjunctiva, oral mucosa moist  Lung: no respiratory distress, speaking in full sentences, CTA b/l     CV: regular rate and rhythm, no murmurs  Abd: soft, non distended, non tender   MSK: posterior midline tenderness over C4 region   Neuro: No focal deficits, AAOx3  Skin: Warm  Psych: normal affect

## 2022-05-26 NOTE — ED PROVIDER NOTE - OBJECTIVE STATEMENT
86y/o F w/ h/o HTN, HLD, recent hospitalization for UTI, cholecystitis p/w fall. Pt w/ daughter states that she was in the process of admission for cholecystecomty tomorrw and was walking up stairs fell backwards on head shortly before arriving in ER. No LOC, vomiting, AC use, numbness, tingling, neck pain. C/o headache now. Denies fevers, chills, recent falls, chest pain, cough, sob, abdominal pain, back pain, dysuria. 86y/o F w/ h/o HTN, HLD, recent hospitalization for UTI, cholecystitis p/w fall. Pt w/ daughter states that she was in the process of admission for cholecystecomty tomorrow and was walking up stairs fell backwards on head shortly before arriving in ER. No LOC, vomiting, AC use, numbness, tingling, neck pain. C/o headache now. Denies fevers, chills, recent falls, chest pain, cough, sob, abdominal pain, back pain, dysuria.

## 2022-05-26 NOTE — ED ADULT TRIAGE NOTE - CHIEF COMPLAINT QUOTE
pt reports she was going for pre-surgical testing for her gallbladder. as per daughter pt loss her balance and fell. +head trauma. pts last lovenox dose was a week ago. pt denies LOC. pt c/o pain to posterior head.

## 2022-05-26 NOTE — CONSULT NOTE ADULT - SUBJECTIVE AND OBJECTIVE BOX
NEUROSURGERY CONSULT    HPI: 86y/o F w/ h/o HTN, HLD, recent hospitalization for UTI, cholecystitis p/w fall. Pt w/ daughter states that she was in the process of admission for cholecystecomty tomorrow and was walking up stairs fell backwards on head shortly before arriving in ER. No LOC, vomiting, AC use, numbness, tingling, neck pain. C/o headache now. Denies fevers, chills, recent falls, chest pain, cough, sob, abdominal pain, back pain, dysuria    RADIOLOGY:     < from: CT Head No Cont (05.26.22 @ 13:47) >  IMPRESSION:  CT HEAD: Extensive large posterior predominantly left parietal scalp   swelling and hematoma.  Acute small right temporal convexity subdural hematoma. Acute right   parietal convexity subdural hematoma measuring 8 mm in greatest   thickness. No significant associated mass effect or midline shift. No   depressed calvarial fracture. New patchy opacification of the right   mastoid air cells. Underlying skull base fracture not entirely excluded.   Additional chronic findings as above.    CT CERVICAL SPINE: No fracture or acute traumatic malalignment.      PHYSICAL EXAM:  Vital Signs Last 24 Hrs  T(C): 37.2 (26 May 2022 14:48), Max: 37.3 (26 May 2022 08:49)  T(F): 98.9 (26 May 2022 14:48), Max: 99.2 (26 May 2022 10:18)  HR: 103 (26 May 2022 14:48) (90 - 103)  BP: 157/94 (26 May 2022 14:48) (144/87 - 165/88)  BP(mean): --  RR: 18 (26 May 2022 14:48) (16 - 19)  SpO2: 100% (26 May 2022 14:48) (97% - 100%)    AOx3, appropriate, follows commands  PERRL, EOMI, face symmetrical   MCCANN x 4 with good strength   Sensation intact to light touch   No pronator drift       LABS:                        13.8   6.06  )-----------( 292      ( 26 May 2022 09:43 )             43.2     05-26    131<L>  |  95<L>  |  13  ----------------------------<  108<H>  4.2   |  27  |  0.78    Ca    10.0      26 May 2022 10:50    TPro  7.9  /  Alb  3.9  /  TBili  0.5  /  DBili  x   /  AST  63<H>  /  ALT  14  /  AlkPhos  49  05-26    PT/INR - ( 26 May 2022 09:43 )   PT: 12.6 sec;   INR: 1.09 ratio         PTT - ( 26 May 2022 09:43 )  PTT:29.8 sec

## 2022-05-26 NOTE — ED ADULT NURSE REASSESSMENT NOTE - NS ED NURSE REASSESS COMMENT FT1
Received patient in 3A, pending CT results. Side rails up and fall precaution in place. Safety maintained.
pt at baseline mental status, resting with RR even and unlabored. Pt denies CP, SOB, h/a, n/v, visual changes, unilateral weakness, numbness/tingling. Upper and lower extremities presenting with equal strength. Facial droop/slurred speech not noted. Pt endorsing improvement in pain to occipital head. Awaiting neurosurg consult.
pt at baseline mental status, resting comfortably with RR even and unlabored. Facial droop/slurred speech/extremity drift not noted. Equal strength noted to bilateral upper and lower extremities. Endorsing mild h/a. Denies CP, SOB, visual changes, N/V. Awaiting CT results.

## 2022-05-27 ENCOUNTER — APPOINTMENT (OUTPATIENT)
Dept: SURGICAL ONCOLOGY | Facility: HOSPITAL | Age: 87
End: 2022-05-27

## 2022-05-27 DIAGNOSIS — S06.5X9A TRAUMATIC SUBDURAL HEMORRHAGE WITH LOSS OF CONSCIOUSNESS OF UNSPECIFIED DURATION, INITIAL ENCOUNTER: ICD-10-CM

## 2022-05-27 PROCEDURE — 74177 CT ABD & PELVIS W/CONTRAST: CPT | Mod: 26,MA

## 2022-05-27 PROCEDURE — 71260 CT THORAX DX C+: CPT | Mod: 26,MA

## 2022-05-27 PROCEDURE — 99222 1ST HOSP IP/OBS MODERATE 55: CPT | Mod: GC

## 2022-05-27 PROCEDURE — 99221 1ST HOSP IP/OBS SF/LOW 40: CPT

## 2022-05-27 RX ORDER — HYDRALAZINE HCL 50 MG
5 TABLET ORAL ONCE
Refills: 0 | Status: COMPLETED | OUTPATIENT
Start: 2022-05-27 | End: 2022-05-27

## 2022-05-27 RX ORDER — ALLOPURINOL 300 MG
100 TABLET ORAL AT BEDTIME
Refills: 0 | Status: DISCONTINUED | OUTPATIENT
Start: 2022-05-27 | End: 2022-06-03

## 2022-05-27 RX ORDER — ACETAMINOPHEN 500 MG
650 TABLET ORAL EVERY 6 HOURS
Refills: 0 | Status: DISCONTINUED | OUTPATIENT
Start: 2022-05-27 | End: 2022-06-03

## 2022-05-27 RX ORDER — LOSARTAN POTASSIUM 100 MG/1
50 TABLET, FILM COATED ORAL DAILY
Refills: 0 | Status: DISCONTINUED | OUTPATIENT
Start: 2022-05-27 | End: 2022-06-03

## 2022-05-27 RX ORDER — METOPROLOL TARTRATE 50 MG
25 TABLET ORAL DAILY
Refills: 0 | Status: DISCONTINUED | OUTPATIENT
Start: 2022-05-27 | End: 2022-05-27

## 2022-05-27 RX ORDER — CEFPODOXIME PROXETIL 100 MG
100 TABLET ORAL EVERY 12 HOURS
Refills: 0 | Status: DISCONTINUED | OUTPATIENT
Start: 2022-05-27 | End: 2022-05-27

## 2022-05-27 RX ORDER — METOPROLOL TARTRATE 50 MG
50 TABLET ORAL DAILY
Refills: 0 | Status: DISCONTINUED | OUTPATIENT
Start: 2022-05-28 | End: 2022-05-29

## 2022-05-27 RX ORDER — METRONIDAZOLE 500 MG
500 TABLET ORAL
Refills: 0 | Status: DISCONTINUED | OUTPATIENT
Start: 2022-05-27 | End: 2022-05-27

## 2022-05-27 RX ORDER — PANTOPRAZOLE SODIUM 20 MG/1
40 TABLET, DELAYED RELEASE ORAL
Refills: 0 | Status: DISCONTINUED | OUTPATIENT
Start: 2022-05-27 | End: 2022-06-03

## 2022-05-27 RX ORDER — FENOFIBRATE,MICRONIZED 130 MG
145 CAPSULE ORAL DAILY
Refills: 0 | Status: DISCONTINUED | OUTPATIENT
Start: 2022-05-27 | End: 2022-06-03

## 2022-05-27 RX ADMIN — Medication 650 MILLIGRAM(S): at 15:23

## 2022-05-27 RX ADMIN — Medication 25 MILLIGRAM(S): at 11:39

## 2022-05-27 RX ADMIN — Medication 650 MILLIGRAM(S): at 00:22

## 2022-05-27 RX ADMIN — Medication 650 MILLIGRAM(S): at 14:23

## 2022-05-27 RX ADMIN — Medication 100 MILLIGRAM(S): at 21:19

## 2022-05-27 RX ADMIN — Medication 5 MILLIGRAM(S): at 22:12

## 2022-05-27 RX ADMIN — Medication 5 MILLIGRAM(S): at 15:34

## 2022-05-27 RX ADMIN — LOSARTAN POTASSIUM 50 MILLIGRAM(S): 100 TABLET, FILM COATED ORAL at 11:39

## 2022-05-27 RX ADMIN — Medication 145 MILLIGRAM(S): at 11:39

## 2022-05-27 RX ADMIN — Medication 5 MILLIGRAM(S): at 14:19

## 2022-05-27 NOTE — CONSULT NOTE ADULT - SUBJECTIVE AND OBJECTIVE BOX
Date of service: 05/27/22    Requesting Physician : Dr. Shay     Reason for Consultation: HTN     HISTORY OF PRESENT ILLNESS:  88y/o F w/ h/o HTN, HLD, recent hospitalization for UTI, cholecystitis p/w fall.  The patient's daughter per chart stated she was walking up a flight of stairs when she fell backwards.  She had no LOC.  She was admitted for further workup.  She has no chest pain or anginal symptoms.  Recent TTE with normal LV function.         PAST MEDICAL & SURGICAL HISTORY:  Hypertension      Hyperlipidemia      Gout      GERD (gastroesophageal reflux disease)      Insomnia      Other gallbladder disorder      H/O: hysterectomy              MEDICATIONS:  MEDICATIONS  (STANDING):  allopurinol 100 milliGRAM(s) Oral at bedtime  fenofibrate Tablet 145 milliGRAM(s) Oral daily  losartan 50 milliGRAM(s) Oral daily  metoprolol succinate ER 25 milliGRAM(s) Oral daily  pantoprazole    Tablet 40 milliGRAM(s) Oral before breakfast      Allergies    penicillin (Unknown)    Intolerances        FAMILY HISTORY:  FH: hypertension (Father, Mother)      Non-contributary for premature coronary disease or sudden cardiac death    SOCIAL HISTORY:    [x ] Non-smoker  [ ] Smoker  [ ] Alcohol      REVIEW OF SYSTEMS:  [ ]chest pain  [  ]shortness of breath  [  ]palpitations  [  ]syncope  [ ]near syncope [ ]upper extremity weakness   [ ] lower extremity weakness  [  ]diplopia  [  ]altered mental status   [  ]fevers  [ ]chills [ ]nausea  [ ]vomitting  [  ]dysphagia    [ ]abdominal pain  [ ]melena  [ ]BRBPR    [  ]epistaxis  [  ]rash    [ ]lower extremity edema        [x ] All others negative	  [ ] Unable to obtain    PHYSICAL EXAM:  T(C): 36.7 (05-27-22 @ 14:02), Max: 36.8 (05-26-22 @ 23:19)  HR: 91 (05-27-22 @ 14:02) (91 - 113)  BP: 172/72 (05-27-22 @ 14:02) (145/97 - 189/93)  RR: 18 (05-27-22 @ 14:02) (17 - 20)  SpO2: 97% (05-27-22 @ 14:02) (96% - 100%)  Wt(kg): --  I&O's Summary        HEENT:   Normal oral mucosa, PERRL, EOMI	  Lymphatic: No lymphadenopathy , no edema  Cardiovascular: Normal S1 S2, No JVD, No murmurs , Peripheral pulses palpable 2+ bilaterally  Respiratory: Lungs clear to auscultation, normal effort 	  Gastrointestinal:  Soft, Non-tender, + BS	  Skin: No rashes, No ecchymoses, No cyanosis, warm to touch      TELEMETRY: 	    ECG:  	  RADIOLOGY:  OTHER:     DIAGNOSTIC TESTING:  [ ] Echocardiogram: < from: Transthoracic Echocardiogram (05.05.22 @ 15:36) >  1. Calcified trileaflet aortic valve with normal opening.  Minimal aortic regurgitation.  2. Normal left ventricular internal dimensions and wall  thicknesses.  3. Hyperdynamic left ventricle.  4. Normal right ventricular size and function.    < end of copied text >    [ ]  Catheterization:  [ ] Stress Test:    	  	  LABS:	 	    CARDIAC MARKERS:                              13.8   6.06  )-----------( 292      ( 26 May 2022 09:43 )             43.2     05-26    131<L>  |  95<L>  |  13  ----------------------------<  108<H>  4.2   |  27  |  0.78    Ca    10.0      26 May 2022 10:50    TPro  7.9  /  Alb  3.9  /  TBili  0.5  /  DBili  x   /  AST  63<H>  /  ALT  14  /  AlkPhos  49  05-26    proBNP:   Lipid Profile:   HgA1c:   TSH:     ASSESSMENT/PLAN:  88y/o F w/ h/o HTN, HLD, recent hospitalization for UTI, cholecystitis p/w fall.    -Pt. with no LOC/syncope   -No clinical heart failure or anginal symptoms  -Recent TTE with normal LV function   -No further inpatient cardiac workup expected at this time    Carolyn Pike MD

## 2022-05-27 NOTE — H&P ADULT - ASSESSMENT
86y/o F w/ h/o HTN, HLD, recent hospitalization for UTI, cholecystitis p/w fall. Pt w/ daughter states that she was in the process of admission for cholecystectomy tomorrow and was walking up stairs fell backwards on head shortly before arriving in ER. No LOC, vomiting, AC use, numbness, tingling, neck pain. C/o headache now. Denies fevers, chills, recent falls, chest pain, cough, sob, abdominal pain, back pain, dysuria    1 Fall, SDH, SAH  - neuro and neurosurgery fu  - neurochecks  - hold any ac  - fall precautions    2 Cholecystitis, likely gall bladder cancer  - surgery fu  - cw antibiotics  - ID fu    3 HTN  - cw home meds    Venodynes for DVT prophylaxis

## 2022-05-27 NOTE — CONSULT NOTE ADULT - ASSESSMENT
Ms Rich is a 87 year old lady with PMHx of HTN, HLD, recent hospitalization for UTI, cholecystitis p/w fall. Pt was recently admitted 5/4 - 5/19 for AMS and was found to have perforated GB w/ surrounding fluid collection and dilation of surrounding ducts on CT a/p. Pt started CTX/Flagyl and MRI A/P w/ IV contrast w/ ongoing concern for gangrenous acute cholecystitis with perforation and associated 2 small collections s/p C-scope 5/18 - fistulization at hepatic flexture, but no mass. Pt was discharged on cefpodoxime and flagyl and planned for an elective cholecystectomy. In ER Pt was found to have Right temporal and parietal convexity subdural hematoma and elective cholecystectomy was cancelled. ID consulted for abx management.    WORKUP  UA (5/26): Negative  CT HEAD: Extensive large posterior predominantly left parietal scalp  swelling and hematoma. Acute small right temporal convexity subdural hematoma. Acute right  parietal convexity subdural hematoma measuring 8 mm in greatest thickness. No significant associated mass effect or midline shift. No  depressed calvarial fracture. New patchy opacification of the right  mastoid air cells. Underlying skull base fracture not entirely excluded.    CT chest, abdomen and pelvis with contrast: Suggestion of mild soft tissue contusion the left anterior abdominal wall. no sequela of acute traumatic injury in the chest, abdomen or  pelvis.  Redemonstrated marked irregular gallbladder thickening, gangrenous acute  cholecystitis or neoplasm difficult to exclude. The ascending colon/hepatic flexure traverses a region of  previously identified contained perforation; fistulization again cannot  be excluded. Continued prominence of endometrial echo complex     IMPRESSION  ·	Recent Gangrenous cholecystitis w/ contained perforation and fistulous tract to hepatic flexure  ·	Right temporal and parietal convexity subdural hematoma  ·	left parietal scalp hematoma.    RECOMMENDATIONS  Afebrile with no leukocytosis  Currently on cefpodoxime 100 every 12 hours and metroNIDAZOLE    Tablet 500 two times a day  Surgery assessed patient and deemed pt not a candidate for surgery given the acute subdural hematoma.     - Pt has been on CTX/Flagyl since 5/10.     PT TO BE SEEN. PRELIM NOTE  PENDING RECS. PLEASE WAIT FOR FINAL RECS AFTER DISCUSSION WITH ATTENDINGHannah Renee MD, PGY4   ID fellow  [employer] Teams Preferred  After 5pm/weekends call [phone]   Ms Rich is a 87 year old lady with PMHx of HTN, HLD, recent hospitalization for UTI, cholecystitis p/w fall. Pt was recently admitted 5/4 - 5/19 for AMS and was found to have perforated GB w/ surrounding fluid collection and dilation of surrounding ducts on CT a/p. Pt started CTX/Flagyl and MRI A/P w/ IV contrast w/ ongoing concern for gangrenous acute cholecystitis with perforation and associated 2 small collections s/p C-scope 5/18 - fistulization at hepatic flexture, but no mass. Pt was discharged on cefpodoxime and flagyl and planned for an elective cholecystectomy. In ER Pt was found to have Right temporal and parietal convexity subdural hematoma and elective cholecystectomy was cancelled. ID consulted for abx management.    WORKUP  UA (5/26): Negative  CT HEAD: Extensive large posterior predominantly left parietal scalp  swelling and hematoma. Acute small right temporal convexity subdural hematoma. Acute right  parietal convexity subdural hematoma measuring 8 mm in greatest thickness. No significant associated mass effect or midline shift. No  depressed calvarial fracture. New patchy opacification of the right  mastoid air cells. Underlying skull base fracture not entirely excluded.    CT chest, abdomen and pelvis with contrast: Suggestion of mild soft tissue contusion the left anterior abdominal wall. no sequela of acute traumatic injury in the chest, abdomen or  pelvis.  Redemonstrated marked irregular gallbladder thickening, gangrenous acute  cholecystitis or neoplasm difficult to exclude. The ascending colon/hepatic flexure traverses a region of  previously identified contained perforation; fistulization again cannot  be excluded. Continued prominence of endometrial echo complex     IMPRESSION  ·	Recent Gangrenous cholecystitis w/ contained perforation and fistulous tract to hepatic flexure  ·	Right temporal and parietal convexity subdural hematoma  ·	left parietal scalp hematoma.    RECOMMENDATIONS  Afebrile with no leukocytosis  Currently on cefpodoxime 100 every 12 hours and metroNIDAZOLE    Tablet 500 two times a day  Surgery assessed patient and deemed pt not a candidate for surgery given the acute subdural hematoma.     - Pt has been on CTX/Flagyl followed by cefpodoxime /flagyl since 5/10.   - Has completed abx course for cholecystitis -> Can stop abx and monitor for symptoms at this point.   - Will likely need elective surgery down the road when cleared by neurosx.    Pt seen and examined. Case d/w attending and primary team    Tay Renee MD, PGY4   ID fellow  [employer] Teams Preferred  After 5pm/weekends call [phone]

## 2022-05-27 NOTE — CONSULT NOTE ADULT - SUBJECTIVE AND OBJECTIVE BOX
Patient is a 87y old  Female who presents with a chief complaint of fall (27 May 2022 08:43)    HPI: Ms Rich is a 87 year old lady with PMHx of HTN, HLD, recent hospitalization for UTI, cholecystitis p/w fall. Pt w/ daughter states that she was in the process of admission for cholecystectomy tomorrow and was walking up stairs fell backwards on head shortly before arriving in ER. No LOC, vomiting, AC use, numbness, tingling, neck pain. C/o headache now. Denies fevers, chills, recent falls, chest pain, cough, sob, abdominal pain, back pain, dysuria.    Pt was recently admitted  -  for AMS and was found to have perforated GB w/ surrounding fluid collection and dilation of surrounding ducts on CT a/p. Pt started CTX/Flagyl and MRI A/P w/ IV contrast w/ ongoing concern for gangrenous acute cholecystitis with perforation and associated 2 small collections s/p C-scope  - fistulization at hepatic flexture, but no mass. Pt was discharged on cefpodoxime and flagyl and planned for an elective cholecystectomy    In ER pt is Afebrile with VSS and no leukocytosis. Pt was found to have Right temporal and parietal convexity subdural hematoma and elective chloecystectomy was cancelled. ID consulted for abx management.    REVIEW OF SYSTEMS  [  ] ROS unobtainable because:    [ x ] All other systems negative except as noted below    Constitutional:  [ ] fever [ ] chills  [ ] weight loss  [ ]night sweat  [ ]poor appetite/PO intake [ ]fatigue   Skin:  [ ] rash [ ] phlebitis	  Eyes: [ ] icterus [ ] pain  [ ] discharge	  ENMT: [ ] sore throat  [ ] thrush [ ] ulcers [ ] exudates [ ]anosmia  Respiratory: [ ] dyspnea [ ] hemoptysis [ ] cough [ ] sputum	  Cardiovascular:  [ ] chest pain [ ] palpitations [ ] edema	  Gastrointestinal:  [ ] nausea [ ] vomiting [ ] diarrhea [ ] constipation [ ] pain	  Genitourinary:  [ ] dysuria [ ] frequency [ ] hematuria [ ] discharge [ ] flank pain  [ ] incontinence  Musculoskeletal:  [ ] myalgias [ ] arthralgias [ ] arthritis  [ ] back pain  Neurological:  [ ] headache [ ] weakness [ ] seizures  [ ] confusion/altered mental status    prior hospital charts reviewed [V]  primary team notes reviewed [V]  other consultant notes reviewed [V]    PAST MEDICAL & SURGICAL HISTORY:  Hypertension  Hyperlipidemia  Gout  GERD (gastroesophageal reflux disease)  Insomnia  Other gallbladder disorder  H/O: hysterectomy    SOCIAL HISTORY:  - Denied smoking/vaping/alcohol/recreational drug use    FAMILY HISTORY:  FH: hypertension (Father, Mother)        Allergies  penicillin (Unknown)        ANTIMICROBIALS:  cefpodoxime 100 every 12 hours  metroNIDAZOLE    Tablet 500 two times a day      ANTIMICROBIALS (past 90 days):  MEDICATIONS  (STANDING):        OTHER MEDS:   MEDICATIONS  (STANDING):  allopurinol 100 at bedtime  fenofibrate Tablet 145 daily  losartan 50 daily  metoprolol succinate ER 25 daily  pantoprazole    Tablet 40 before breakfast      VITALS:  Vital Signs Last 24 Hrs  T(F): 98.1 (22 @ 06:05), Max: 99.2 (22 @ 10:18)    Vital Signs Last 24 Hrs  HR: 99 (22 @ 07:20) (92 - 113)  BP: 145/97 (22 @ 07:20) (145/97 - 189/88)  RR: 19 (22 @ 07:20)  SpO2: 97% (22 @ 07:20) (96% - 100%)  Wt(kg): --    EXAM:    GA: NAD, AOx3  HEENT: oral cavity no lesion  CV: nl S1/S2, no RMG  Lungs: CTAB, No distress  Abd: BS+, soft, nontender, no rebounding pain  Ext: no edema  Neuro: No focal deficits  Skin: Intact  IV: no phlebitis    Labs:                        13.8   6.06  )-----------( 292      ( 26 May 2022 09:43 )             43.2         131<L>  |  95<L>  |  13  ----------------------------<  108<H>  4.2   |  27  |  0.78    Ca    10.0      26 May 2022 10:50    TPro  7.9  /  Alb  3.9  /  TBili  0.5  /  DBili  x   /  AST  63<H>  /  ALT  14  /  AlkPhos  49        WBC Trend:  WBC Count: 6.06 (22 @ 09:43)  WBC Count: 5.77 (22 @ 11:25)      Auto Neutrophil #: 3.50 K/uL (22 @ 09:43)  Auto Neutrophil #: 3.49 K/uL (22 @ 06:44)  Auto Neutrophil #: 6.18 K/uL (22 @ 10:43)      Creatine Trend:  Creatinine, Serum: 0.78 ()  Creatinine, Serum: 0.80 ()  Creatinine, Serum: 0.82 ()      Liver Biochemical Testing Trend:  Alanine Aminotransferase (ALT/SGPT): 14 ()  Alanine Aminotransferase (ALT/SGPT): 15 ()  Alanine Aminotransferase (ALT/SGPT): 17 ()  Alanine Aminotransferase (ALT/SGPT): 17 ()  Alanine Aminotransferase (ALT/SGPT): 20 ()  Aspartate Aminotransferase (AST/SGOT): 63 (22 @ 09:43)  Aspartate Aminotransferase (AST/SGOT): 18 (22 @ 07:15)  Aspartate Aminotransferase (AST/SGOT): 22 (22 @ 06:44)  Aspartate Aminotransferase (AST/SGOT): 23 (22 @ 06:39)  Aspartate Aminotransferase (AST/SGOT): 24 (22 @ 06:10)  Bilirubin Total, Serum: 0.5 ()  Bilirubin Total, Serum: 0.3 ()  Bilirubin Total, Serum: 0.2 ()  Bilirubin Direct, Serum: <0.2 ()  Bilirubin Total, Serum: 0.2 (11)      Trend LDH      Auto Eosinophil %: 3.5 % (22 @ 09:43)      Urinalysis Basic - ( 26 May 2022 10:17 )    Color: Light Yellow / Appearance: Clear / S.010 / pH: x  Gluc: x / Ketone: Negative  / Bili: Negative / Urobili: <2 mg/dL   Blood: x / Protein: Trace / Nitrite: Negative   Leuk Esterase: Small / RBC: 1 /HPF / WBC 2 /HPF   Sq Epi: x / Non Sq Epi: 1 /HPF / Bacteria: Negative        MICROBIOLOGY:        Culture - Urine (collected 04 May 2022 11:00)  Source: Clean Catch Clean Catch (Midstream)  Final Report:    <10,000 CFU/mL Normal Urogenital Va    Culture - Blood (collected 04 May 2022 11:00)  Source: .Blood Blood-Peripheral  Final Report:    No Growth Final    Culture - Blood (collected 04 May 2022 10:30)  Source: .Blood Blood-Peripheral  Final Report:    No Growth Final    COVID-19 PCR: NotDetec (22 @ 13:28)  COVID-19 PCR: NotDetec (05-15-22 @ 11:03)  COVID-19 PCR: NotDetec (22 @ 08:00)      A1C with Estimated Average Glucose Result: 5.6 % (22 @ 11:25)      RADIOLOGY:  imaging below personally reviewed    < from: CT Cervical Spine No Cont (22 @ 13:48) >  CT HEAD: Extensive large posterior predominantly left parietal scalp  swelling and hematoma. Acute small right temporal convexity subdural hematoma. Acute right  parietal convexity subdural hematoma measuring 8 mm in greatest thickness. No significant associated mass effect or midline shift. No  depressed calvarial fracture. New patchy opacification of the right  mastoid air cells. Underlying skull base fracture not entirely excluded.  Additional chronic findings as above.   CT CERVICAL SPINE: No fracture or acute traumatic malalignment.  < end of copied text >    CT chest, abdomen and pelvis with contrast: Suggestion of mild soft tissue contusion the left anterior abdominal wall, Otherwise, no sequela of acute traumatic injury in the chest, abdomen or  pelvis.  Redemonstrated marked irregular gallbladder thickening, gangrenous acute  cholecystitis or neoplasm difficult to exclude as better delineated on  recent MRI. The ascending colon/hepatic flexure traverses a region of  previously identified contained perforation; fistulization again cannot  be excluded. Continued prominence of endometrial echo complex for which nonemergent  pelvic ultrasound correlation is advised to exclude endometrial  malignancy.  Asymmetric right breast retroareolar nodular density (68:2) for which  nonemergent breast imaging correlation is advised.   Patient is a 87y old  Female who presents with a chief complaint of fall (27 May 2022 08:43)    HPI: Ms Rich is a 87 year old lady with PMHx of HTN, HLD, recent hospitalization for UTI, cholecystitis p/w fall. Pt w/ daughter states that she was in the process of admission for cholecystectomy tomorrow and was walking up stairs fell backwards on head shortly before arriving in ER. No LOC, vomiting, AC use, numbness, tingling, neck pain. C/o headache now. Denies fevers, chills, recent falls, chest pain, cough, sob, abdominal pain, back pain, dysuria.    Pt was recently admitted  -  for AMS and was found to have perforated GB w/ surrounding fluid collection and dilation of surrounding ducts on CT a/p. Pt started CTX/Flagyl and MRI A/P w/ IV contrast w/ ongoing concern for gangrenous acute cholecystitis with perforation and associated 2 small collections s/p C-scope  - fistulization at hepatic flexture, but no mass. Pt was discharged on cefpodoxime and flagyl and planned for an elective cholecystectomy    In ER pt is Afebrile with VSS and no leukocytosis. Pt was found to have Right temporal and parietal convexity subdural hematoma and elective chloecystectomy was cancelled. ID consulted for abx management. Pt denies fever, chill, abdominal pain, N/V or diarrhea.    REVIEW OF SYSTEMS  [  ] ROS unobtainable because:    [ x ] All other systems negative except as noted below    Constitutional:  [ ] fever [ ] chills  [ ] weight loss  [ ]night sweat  [ ]poor appetite/PO intake [ ]fatigue   Skin:  [ ] rash [ ] phlebitis	  Eyes: [ ] icterus [ ] pain  [ ] discharge	  ENMT: [ ] sore throat  [ ] thrush [ ] ulcers [ ] exudates [ ]anosmia  Respiratory: [ ] dyspnea [ ] hemoptysis [ ] cough [ ] sputum	  Cardiovascular:  [ ] chest pain [ ] palpitations [ ] edema	  Gastrointestinal:  [ ] nausea [ ] vomiting [ ] diarrhea [ ] constipation [ ] pain	  Genitourinary:  [ ] dysuria [ ] frequency [ ] hematuria [ ] discharge [ ] flank pain  [ ] incontinence  Musculoskeletal:  [ ] myalgias [ ] arthralgias [ ] arthritis  [ ] back pain  Neurological:  [ ] headache [ ] weakness [ ] seizures  [ ] confusion/altered mental status    prior hospital charts reviewed [V]  primary team notes reviewed [V]  other consultant notes reviewed [V]    PAST MEDICAL & SURGICAL HISTORY:  Hypertension  Hyperlipidemia  Gout  GERD (gastroesophageal reflux disease)  Insomnia  Other gallbladder disorder  H/O: hysterectomy    SOCIAL HISTORY:  - Denied smoking/vaping/alcohol/recreational drug use    FAMILY HISTORY:  FH: hypertension (Father, Mother)        Allergies  penicillin (Unknown)        ANTIMICROBIALS:  cefpodoxime 100 every 12 hours  metroNIDAZOLE    Tablet 500 two times a day      ANTIMICROBIALS (past 90 days):  MEDICATIONS  (STANDING):        OTHER MEDS:   MEDICATIONS  (STANDING):  allopurinol 100 at bedtime  fenofibrate Tablet 145 daily  losartan 50 daily  metoprolol succinate ER 25 daily  pantoprazole    Tablet 40 before breakfast      VITALS:  Vital Signs Last 24 Hrs  T(F): 98.1 (22 @ 06:05), Max: 99.2 (22 @ 10:18)    Vital Signs Last 24 Hrs  HR: 99 (22 @ 07:20) (92 - 113)  BP: 145/97 (22 @ 07:20) (145/97 - 189/88)  RR: 19 (22 @ 07:20)  SpO2: 97% (22 @ 07:20) (96% - 100%)  Wt(kg): --    EXAM:    PHYSICAL EXAM:  General:  non-toxic, AOx3  HEAD/EYES: PERRL, white sclera   ENT:  normal, No thrush and no pharyngeal exudate  Neck: Supple  Cardiovascular:   No murmur,  normal S1 and S2  Respiratory: clear to ausculation bilaterally  GI:  soft, non-tender, normal bowel sounds  : no chery, no CVA tenderness   Musculoskeletal:  no synovitis  Neurologic: non-focal exam   Skin: no rash  Lymph:  no lymphadenopathy  Psychiatric: Cooperative, appropriate affect, alert & oriented  Lines:  no phlebitis         Labs:                        13.8   6.06  )-----------( 292      ( 26 May 2022 09:43 )             43.2         131<L>  |  95<L>  |  13  ----------------------------<  108<H>  4.2   |  27  |  0.78    Ca    10.0      26 May 2022 10:50    TPro  7.9  /  Alb  3.9  /  TBili  0.5  /  DBili  x   /  AST  63<H>  /  ALT  14  /  AlkPhos  49        WBC Trend:  WBC Count: 6.06 (22 @ 09:43)  WBC Count: 5.77 (22 @ 11:25)      Auto Neutrophil #: 3.50 K/uL (22 @ 09:43)  Auto Neutrophil #: 3.49 K/uL (22 @ 06:44)  Auto Neutrophil #: 6.18 K/uL (22 @ 10:43)      Creatine Trend:  Creatinine, Serum: 0.78 ()  Creatinine, Serum: 0.80 ()  Creatinine, Serum: 0.82 ()      Liver Biochemical Testing Trend:  Alanine Aminotransferase (ALT/SGPT): 14 ()  Alanine Aminotransferase (ALT/SGPT): 15 ()  Alanine Aminotransferase (ALT/SGPT): 17 ()  Alanine Aminotransferase (ALT/SGPT): 17 ()  Alanine Aminotransferase (ALT/SGPT): 20 ()  Aspartate Aminotransferase (AST/SGOT): 63 (22 @ 09:43)  Aspartate Aminotransferase (AST/SGOT): 18 (22 @ 07:15)  Aspartate Aminotransferase (AST/SGOT): 22 (22 @ 06:44)  Aspartate Aminotransferase (AST/SGOT): 23 (22 @ 06:39)  Aspartate Aminotransferase (AST/SGOT): 24 (22 @ 06:10)  Bilirubin Total, Serum: 0.5 ()  Bilirubin Total, Serum: 0.3 ()  Bilirubin Total, Serum: 0.2 ()  Bilirubin Direct, Serum: <0.2 ()  Bilirubin Total, Serum: 0.2 ()      Trend LDH      Auto Eosinophil %: 3.5 % (22 @ 09:43)      Urinalysis Basic - ( 26 May 2022 10:17 )    Color: Light Yellow / Appearance: Clear / S.010 / pH: x  Gluc: x / Ketone: Negative  / Bili: Negative / Urobili: <2 mg/dL   Blood: x / Protein: Trace / Nitrite: Negative   Leuk Esterase: Small / RBC: 1 /HPF / WBC 2 /HPF   Sq Epi: x / Non Sq Epi: 1 /HPF / Bacteria: Negative        MICROBIOLOGY:        Culture - Urine (collected 04 May 2022 11:00)  Source: Clean Catch Clean Catch (Midstream)  Final Report:    <10,000 CFU/mL Normal Urogenital Va    Culture - Blood (collected 04 May 2022 11:00)  Source: .Blood Blood-Peripheral  Final Report:    No Growth Final    Culture - Blood (collected 04 May 2022 10:30)  Source: .Blood Blood-Peripheral  Final Report:    No Growth Final    COVID-19 PCR: NotDetec (22 @ 13:28)  COVID-19 PCR: NotDetec (05-15-22 @ 11:03)  COVID-19 PCR: NotDetec (22 @ 08:00)      A1C with Estimated Average Glucose Result: 5.6 % (22 @ 11:25)      RADIOLOGY:  imaging below personally reviewed    < from: CT Cervical Spine No Cont (22 @ 13:48) >  CT HEAD: Extensive large posterior predominantly left parietal scalp  swelling and hematoma. Acute small right temporal convexity subdural hematoma. Acute right  parietal convexity subdural hematoma measuring 8 mm in greatest thickness. No significant associated mass effect or midline shift. No  depressed calvarial fracture. New patchy opacification of the right  mastoid air cells. Underlying skull base fracture not entirely excluded.  Additional chronic findings as above.   CT CERVICAL SPINE: No fracture or acute traumatic malalignment.  < end of copied text >    CT chest, abdomen and pelvis with contrast: Suggestion of mild soft tissue contusion the left anterior abdominal wall, Otherwise, no sequela of acute traumatic injury in the chest, abdomen or  pelvis.  Redemonstrated marked irregular gallbladder thickening, gangrenous acute  cholecystitis or neoplasm difficult to exclude as better delineated on  recent MRI. The ascending colon/hepatic flexure traverses a region of  previously identified contained perforation; fistulization again cannot  be excluded. Continued prominence of endometrial echo complex for which nonemergent  pelvic ultrasound correlation is advised to exclude endometrial  malignancy.  Asymmetric right breast retroareolar nodular density (68:2) for which  nonemergent breast imaging correlation is advised.

## 2022-05-27 NOTE — CONSULT NOTE ADULT - ASSESSMENT
86y/o F w/ h/o HTN, HLD, recent hospitalization for UTI, cholecystitis p/w fall.    Hyponatremia  has hx of hyponatremia on last admssion. work up suggested polydipsia  Na 131 on admission  repeat work up sent urine osmo and na  free water restriction <1L/day  avoid hypotonic solution  monitor    HTN  BP uncontrolled  un titrate bb if needed  monitor bp and HR    hematuria and proteinuria  recent UTI  prior UA 5/4 is blend    s/pfall    check CK level    86y/o F w/ h/o HTN, HLD, recent hospitalization for UTI, cholecystitis p/w fall.    Hyponatremia  Has hx of hyponatremia on last admission. Work up suggested polydipsia  Na 131 on admission  repeat work up sent urine osmo and na  free water restriction <1L/day  avoid hypotonic solution  monitor    HTN  BP uncontrolled  un titrate bb if needed  monitor bp and HR    hematuria and proteinuria  recent UTI  prior UA 5/4 is blend    s/pfall    check CK level

## 2022-05-27 NOTE — CONSULT NOTE ADULT - ATTENDING COMMENTS
87F with gangrenous cholecystis with fistula with fall   -no fever  -on day 17 of abx  -abd soft  -surgery on hold due to SDH  -favor dc abx and observe for now  -monitor clinically    Angel Chand  Attending Physician   Division of Infectious Disease  Office #[phone]  Available on [employer] Teams also  After 5pm/weekend or no response, call #[phone]    ID coverage available over Memorial Day 3-day weekend (May 28 - May30) if needed. Call #[phone] for questions/concerns.

## 2022-05-27 NOTE — SWALLOW BEDSIDE ASSESSMENT ADULT - COMMENTS
Pt was alert and cooperative for a clinical assessment of swallow function this PM. Pt positioned upright in bed. Per charting, pt is an " 86y/o F w/ h/o HTN, HLD, recent hospitalization for UTI, cholecystitis p/w fall. Pt w/ daughter states that she was in the process of admission for cholecystectomy tomorrow and was walking up stairs fell backwards on head shortly before arriving in ER. No LOC, vomiting, AC use, numbness, tingling, neck pain. C/o headache now. Denies fevers, chills, recent falls, chest pain, cough, sob, abdominal pain, back pain, dysuria"     Recent head CT revealed "Previously visualized very small acute subdural hemorrhage overlying the right frontoparietal and temporal lobes appears smaller than on prior exam. There is interval new minimal subarachnoid hemorrhage within the right parietal lobe. Chronic lacunar infarct in the right cerebellum and bilateral basal ganglia are reidentified. Large posterior left parietal scalp swelling and hematoma, without significant interval change compared to prior."  Recent CXR "Clear lungs."

## 2022-05-27 NOTE — SWALLOW BEDSIDE ASSESSMENT ADULT - SWALLOW EVAL: RECOMMENDED FEEDING/EATING TECHNIQUES
allow for swallow between intakes/hard swallow w/ each bite or sip/maintain upright posture during/after eating for 30 mins/oral hygiene/position upright (90 degrees)/small sips/bites

## 2022-05-27 NOTE — SWALLOW BEDSIDE ASSESSMENT ADULT - ADDITIONAL RECOMMENDATIONS
This dept to continue to f/u as schedule permits for diet tolerance/advancement. Given functional stage of pharyngeal swallow, dysphagia remediation not warranted at this time.

## 2022-05-27 NOTE — CONSULT NOTE ADULT - SUBJECTIVE AND OBJECTIVE BOX
[facility] NEPHROLOGY PRACTICE   MD KRISTEN MARQUES MD KRISTINE SOLTANPOUR, DO ANGELA WONG, PA        TEL:  OFFICE: [phone]  From 5pm-7am answering service [phone]    --- INITIAL RENAL CONSULT NOTE ---date of service 22 @ 12:37    HPI:   86y/o F w/ h/o HTN, HLD, recent hospitalization for UTI, cholecystitis p/w fall. Pt w/ daughter states that she was in the process of admission for cholecystecomty tomorrow and was walking up stairs fell backwards on head shortly before arriving in ER. No LOC, vomiting, AC use, numbness, tingling, neck pain. C/o headache now. Denies fevers, chills, recent falls, chest pain, cough, sob, abdominal pain, back pain, dysuria        Allergies:  penicillin (Unknown)      PAST MEDICAL & SURGICAL HISTORY:  Hypertension      Hyperlipidemia      Gout      GERD (gastroesophageal reflux disease)      Insomnia      Other gallbladder disorder      H/O: hysterectomy          Home Medications Reviewed    Hospital Medications:   MEDICATIONS  (STANDING):  allopurinol 100 milliGRAM(s) Oral at bedtime  cefpodoxime 100 milliGRAM(s) Oral every 12 hours  fenofibrate Tablet 145 milliGRAM(s) Oral daily  losartan 50 milliGRAM(s) Oral daily  metoprolol succinate ER 25 milliGRAM(s) Oral daily  metroNIDAZOLE    Tablet 500 milliGRAM(s) Oral two times a day  pantoprazole    Tablet 40 milliGRAM(s) Oral before breakfast      SOCIAL HISTORY:  Denies ETOh, Smoking,     FAMILY HISTORY:  FH: hypertension (Father, Mother)        REVIEW OF SYSTEMS:  CONSTITUTIONAL: No weakness, fevers or chills  EYES/ENT: No visual changes;  No vertigo or throat pain   NECK: No pain or stiffness  RESPIRATORY: No cough, wheezing, hemoptysis; No shortness of breath  CARDIOVASCULAR: No chest pain or palpitations.  GASTROINTESTINAL: No abdominal or epigastric pain. No nausea, vomiting, or hematemesis; No diarrhea or constipation. No melena or hematochezia.  GENITOURINARY: No dysuria, frequency, foamy urine, urinary urgency, incontinence or hematuria  NEUROLOGICAL: No numbness or weakness  SKIN: No itching, burning, rashes, or lesions   VASCULAR: No bilateral lower extremity edema.   All other review of systems is negative unless indicated above.    VITALS:  T(F): 98.3 (22 @ 11:29), Max: 98.9 (22 @ 14:48)  HR: 97 (22 @ 11:29)  BP: 189/93 (22 @ 11:29)  RR: 18 (22 @ 11:29)  SpO2: 97% (22 @ 11:29)  Wt(kg): --    Height (cm): 149.9 ( @ 10:00)  Weight (kg): 53.9 ( @ 10:00)  BMI (kg/m2): 24 ( @ 10:00)  BSA (m2): 1.48 ( @ 10:00)    PHYSICAL EXAM:  Constitutional: NAD  HEENT: anicteric sclera, oropharynx clear, MMM  Neck: No JVD  Respiratory: CTAB, no wheezes, rales or rhonchi  Cardiovascular: S1, S2, RRR  Gastrointestinal: BS+, soft, NT/ND  Extremities: No cyanosis or clubbing. No peripheral edema  Neurological: A/O x 2-3  Psychiatric: Normal mood, normal affect  : No CVA tenderness. No chery.   Skin: No rashes      LABS:      131<L>  |  95<L>  |  13  ----------------------------<  108<H>  4.2   |  27  |  0.78    Ca    10.0      26 May 2022 10:50    TPro  7.9  /  Alb  3.9  /  TBili  0.5  /  DBili      /  AST  63<H>  /  ALT  14  /  AlkPhos  49      Creatinine Trend: 0.78 <--, 0.80 <--, 0.82 <--                        13.8   6.06  )-----------( 292      ( 26 May 2022 09:43 )             43.2     Urine Studies:  Urinalysis Basic - ( 26 May 2022 10:17 )    Color: Light Yellow / Appearance: Clear / S.010 / pH:   Gluc:  / Ketone: Negative  / Bili: Negative / Urobili: <2 mg/dL   Blood:  / Protein: Trace / Nitrite: Negative   Leuk Esterase: Small / RBC: 1 /HPF / WBC 2 /HPF   Sq Epi:  / Non Sq Epi: 1 /HPF / Bacteria: Negative          RADIOLOGY & ADDITIONAL STUDIES:

## 2022-05-27 NOTE — CONSULT NOTE ADULT - ASSESSMENT
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  + left scalp hematoma recent hospitalization for UTI, cholecystitis p/w fall. no LOC.  now mild HA improving.   CTH left scalp hematoma. ascute R SDH 8mm. old ischemic strokes  CT C spine neg  CTH with acute small R SDH improving but new minimal SAH in R parietal lobe.  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 no tongue bite or convulsions or LOC.  doubt seizure 2) SDH related to prior fall 3) strokes seem 2/2 small vessel diseaes  - check orthostatics  - limit sedating meds  - now on cefpodoxime and metronidazole for cholecystitis    - for HA, tylenol PRN   - nsx called for SDH. .  cancel planned rolanda sx given new SDH  - SBP < 160  - hold AC/AP, dvt ppx okay after 48hrs  - would get rEEG  - eventually will need to be on asa 81 for secondary stroke prevention  - statin therapy  with LDL goal < 70  - A1c and lipid panel   - TTE  - telemetry  - PT/OT/SS/SLP, OOBC  - check FS, glucose control <180  - GI/DVT ppx  - Counseling on diet, exercise, and medication adherence was done  - Counseling on smoking cessation and alcohol consumption offered when appropriate.  - Pain assessed and judicious use of narcotics when appropriate was discussed.    - Stroke education given when appropriate.  - Importance of fall prevention discussed.   - Differential diagnosis and plan of care discussed with patient and/or family and primary team  - Thank you for allowing me to participate in the care of this patient. Call with questions.   Wesley Turcios MD  Vascular Neurology  Office: [phone]

## 2022-05-27 NOTE — H&P ADULT - NSHPLABSRESULTS_GEN_ALL_CORE
Lab Results:  CBC  CBC Full  -  ( 26 May 2022 09:43 )  WBC Count : 6.06 K/uL  RBC Count : 4.82 M/uL  Hemoglobin : 13.8 g/dL  Hematocrit : 43.2 %  Platelet Count - Automated : 292 K/uL  Mean Cell Volume : 89.6 fL  Mean Cell Hemoglobin : 28.6 pg  Mean Cell Hemoglobin Concentration : 31.9 gm/dL  Auto Neutrophil # : 3.50 K/uL  Auto Lymphocyte # : 1.76 K/uL  Auto Monocyte # : 0.47 K/uL  Auto Eosinophil # : 0.21 K/uL  Auto Basophil # : 0.11 K/uL  Auto Neutrophil % : 57.7 %  Auto Lymphocyte % : 29.0 %  Auto Monocyte % : 7.8 %  Auto Eosinophil % : 3.5 %  Auto Basophil % : 1.8 %    .		Differential:	[] Automated		[] Manual  Chemistry                        13.8   6.06  )-----------( 292      ( 26 May 2022 09:43 )             43.2     05-26    131<L>  |  95<L>  |  13  ----------------------------<  108<H>  4.2   |  27  |  0.78    Ca    10.0      26 May 2022 10:50    TPro  7.9  /  Alb  3.9  /  TBili  0.5  /  DBili  x   /  AST  63<H>  /  ALT  14  /  AlkPhos  49  05-26    LIVER FUNCTIONS - ( 26 May 2022 09:43 )  Alb: 3.9 g/dL / Pro: 7.9 g/dL / ALK PHOS: 49 U/L / ALT: 14 U/L / AST: 63 U/L / GGT: x           PT/INR - ( 26 May 2022 09:43 )   PT: 12.6 sec;   INR: 1.09 ratio         PTT - ( 26 May 2022 09:43 )  PTT:29.8 sec  Urinalysis Basic - ( 26 May 2022 10:17 )    Color: Light Yellow / Appearance: Clear / S.010 / pH: x  Gluc: x / Ketone: Negative  / Bili: Negative / Urobili: <2 mg/dL   Blood: x / Protein: Trace / Nitrite: Negative   Leuk Esterase: Small / RBC: 1 /HPF / WBC 2 /HPF   Sq Epi: x / Non Sq Epi: 1 /HPF / Bacteria: Negative            MICROBIOLOGY/CULTURES:      RADIOLOGY RESULTS: reviewed

## 2022-05-27 NOTE — CONSULT NOTE ADULT - SUBJECTIVE AND OBJECTIVE BOX
Neurology Consult    Reason for Consult: Patient is a 87y old  Female who presents with a chief complaint of fall (27 May 2022 09:03)      HPI:   88y/o F w/ h/o HTN, HLD, recent hospitalization for UTI, cholecystitis p/w fall. Pt w/ daughter states that she was in the process of admission for cholecystecomty tomorrow and was walking up stairs fell backwards on head shortly before arriving in ER. No LOC, vomiting, AC use, numbness, tingling, neck pain. C/o headache now. Denies fevers, chills, recent falls, chest pain, cough, sob, abdominal pain, back pain, dysuria (27 May 2022 08:43)       PAST MEDICAL & SURGICAL HISTORY:  Hypertension      Hyperlipidemia      Gout      GERD (gastroesophageal reflux disease)      Insomnia      Other gallbladder disorder      H/O: hysterectomy          Allergies: Allergies    penicillin (Unknown)    Intolerances        Social History: Denies toxic habits including tobacco, ETOH or illicit drugs.    Family History: FAMILY HISTORY:  FH: hypertension (Father, Mother)    . No family history of strokes    Medications: MEDICATIONS  (STANDING):  allopurinol 100 milliGRAM(s) Oral at bedtime  cefpodoxime 100 milliGRAM(s) Oral every 12 hours  fenofibrate Tablet 145 milliGRAM(s) Oral daily  losartan 50 milliGRAM(s) Oral daily  metoprolol succinate ER 25 milliGRAM(s) Oral daily  metroNIDAZOLE    Tablet 500 milliGRAM(s) Oral two times a day  pantoprazole    Tablet 40 milliGRAM(s) Oral before breakfast    MEDICATIONS  (PRN):      Review of Systems:  CONSTITUTIONAL:  No weight loss, fever, chills, weakness or fatigue.  HEENT:  Eyes:  No visual loss, blurred vision, double vision or yellow sclera. Ears, Nose, Throat:  No hearing loss, sneezing, congestion, runny nose or sore throat.  SKIN:  No rash or itching.  CARDIOVASCULAR:  No chest pain, chest pressure or chest discomfort. No palpitations or edema.  RESPIRATORY:  No shortness of breath, cough or sputum.  GASTROINTESTINAL:  No anorexia, nausea, vomiting or diarrhea. No abdominal pain or blood.  GENITOURINARY:  No burning on urination or incontinence   NEUROLOGICAL:  No headache, dizziness, syncope, paralysis, ataxia, numbness or tingling in the extremities. No change in bowel or bladder control. no limb weakness. no vision changes.   MUSCULOSKELETAL:  No muscle, back pain, joint pain or stiffness.  HEMATOLOGIC:  No anemia, bleeding or bruising.  LYMPHATICS:  No enlarged nodes. No history of splenectomy.  PSYCHIATRIC:  No history of depression or anxiety.  ENDOCRINOLOGIC:  No reports of sweating, cold or heat intolerance. No polyuria or polydipsia.      Vitals:  Vital Signs Last 24 Hrs  T(C): 36.7 (27 May 2022 06:05), Max: 37.3 (26 May 2022 10:18)  T(F): 98.1 (27 May 2022 06:05), Max: 99.2 (26 May 2022 10:18)  HR: 99 (27 May 2022 07:20) (92 - 113)  BP: 145/97 (27 May 2022 07:20) (145/97 - 189/88)  BP(mean): 128 (27 May 2022 07:20) (128 - 128)  RR: 19 (27 May 2022 07:20) (16 - 20)  SpO2: 97% (27 May 2022 07:20) (96% - 100%)  Orthostatic VS      General Exam:   General Appearance: Appropriately dressed and in no acute distress       Head: Normocephalic, atraumatic and no dysmorphic features  Ear, Nose, and Throat: Moist mucous membranes  CVS: S1S2+  Resp: No SOB, no wheeze or rhonchi  GI: soft NT/ND  Extremities: No edema or cyanosis  Skin: No bruises or rashes     Neurological Exam:  Mental Status: Awake, alert and oriented x 3.  Able to follow simple and complex verbal commands. Able to name and repeat. fluent speech. No obvious aphasia or dysarthria noted.   Cranial Nerves: PERRL, EOMI, VFFC, sensation V1-V3 intact,  no obvious facial asymmetry, equal elevation of palate, scm/trap 5/5, tongue is midline on protrusion. no obvious papilledema on fundoscopic exam. hearing is grossly intact.   Motor: Normal bulk, tone and strength throughout. Fine finger movements were intact and symmetric. no tremors or drift noted.    Sensation: Intact to light touch and pinprick throughout. no right/left confusion. no extinction to tactile on DSS.    Reflexes: 1+ throughout at biceps, brachioradialis, triceps, patellars and ankles bilaterally and equal. No clonus. R toe and L toe were both downgoing.  Coordination: No dysmetria on FNF    Gait: deferred     Data/Labs/Imaging which I personally reviewed.     Labs:     CBC Full  -  ( 26 May 2022 09:43 )  WBC Count : 6.06 K/uL  RBC Count : 4.82 M/uL  Hemoglobin : 13.8 g/dL  Hematocrit : 43.2 %  Platelet Count - Automated : 292 K/uL  Mean Cell Volume : 89.6 fL  Mean Cell Hemoglobin : 28.6 pg  Mean Cell Hemoglobin Concentration : 31.9 gm/dL  Auto Neutrophil # : 3.50 K/uL  Auto Lymphocyte # : 1.76 K/uL  Auto Monocyte # : 0.47 K/uL  Auto Eosinophil # : 0.21 K/uL  Auto Basophil # : 0.11 K/uL  Auto Neutrophil % : 57.7 %  Auto Lymphocyte % : 29.0 %  Auto Monocyte % : 7.8 %  Auto Eosinophil % : 3.5 %  Auto Basophil % : 1.8 %        131<L>  |  95<L>  |  13  ----------------------------<  108<H>  4.2   |  27  |  0.78    Ca    10.0      26 May 2022 10:50    TPro  7.9  /  Alb  3.9  /  TBili  0.5  /  DBili  x   /  AST  63<H>  /  ALT  14  /  AlkPhos  49  05-26    LIVER FUNCTIONS - ( 26 May 2022 09:43 )  Alb: 3.9 g/dL / Pro: 7.9 g/dL / ALK PHOS: 49 U/L / ALT: 14 U/L / AST: 63 U/L / GGT: x           PT/INR - ( 26 May 2022 09:43 )   PT: 12.6 sec;   INR: 1.09 ratio         PTT - ( 26 May 2022 09:43 )  PTT:29.8 sec  Urinalysis Basic - ( 26 May 2022 10:17 )    Color: Light Yellow / Appearance: Clear / S.010 / pH: x  Gluc: x / Ketone: Negative  / Bili: Negative / Urobili: <2 mg/dL   Blood: x / Protein: Trace / Nitrite: Negative   Leuk Esterase: Small / RBC: 1 /HPF / WBC 2 /HPF   Sq Epi: x / Non Sq Epi: 1 /HPF / Bacteria: Negative  < from: CT Head No Cont (22 @ 13:47) >    ACC: [id number] EXAM:  CT CERVICAL SPINE                        ACC: [id number] EXAM:  CT BRAIN                          PROCEDURE DATE:  2022          INTERPRETATION:  CT OF THE HEAD WITHOUT CONTRAST  CT CERVICAL SPINE WITHOUT CONTRAST    CLINICAL INDICATION: Trauma code    TECHNIQUE: Volumetric CT acquisition was performed through the brain and   reviewed using brain and bone window technique. Dose optimization   techniques were utilized including kVp/mA modulation along with iterative   reconstructions.  Thin section CT images were obtained through cervical spine with   overlapping reconstructions.  Sagittal, coronal and bilateral oblique 2D   reformats were then generated from the initial images.    COMPARISON: CT head 2022    FINDINGS:  CT head:  Extensive large posterior predominantly left parietal scalp swelling and   hematoma.  Acute right temporal convexity subdural hematoma measuring 3 mm in   greatest thickness. Acute right parietal convexity subdural hematoma   measuring 8 mm in greatest thickness. No significant associated mass   effect or midline shift.  The ventricular and sulcal size and configuration is age appropriate.     There is no acute loss of gray-white differentiation. There are moderate   patchy areasof hypodensity in the periventricular and subcortical white   matter which are likely related to chronic microangiopathic changes.     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    There is no evidence of mass effect, midline shift.     The calvarium is intact. The paranasal sinuses are clear.Patchy   opacification of the right mastoid air cells which is new since the prior   study. The orbits are unremarkable.      CT cervical spine:      There is straightening of usual cervical lordosis. There is no   significant subluxation. Vertebral body height is preserved throughout   the cervical spine. There is no significant loss of intervertebral disc   height throughout the cervical spine.    There is no significant disc herniation. No definite high-grade central   canal stenosis.     The paravertebral soft tissues are unremarkable.      IMPRESSION:  CT HEAD: Extensive large posterior predominantly left parietal scalp   swelling and hematoma.  Acute small right temporal convexity subdural hematoma. Acute right   parietal convexity subdural hematoma measuring 8 mm in greatest   thickness. No significant associated mass effect or midline shift. No   depressed calvarial fracture. New patchy opacification of the right   mastoid air cells. Underlying skull base fracture not entirely excluded.   Additional chronic findings as above.    CT CERVICAL SPINE: No fracture or acute traumatic malalignment.      Notification to clinician of alert:  Dr. Ramirez was notified about the findings at 2:26 PM on 2022 with   readback confirmation. The opportunity for questions was provided and all   questions asked were answered.    --- End of Report ---            LEXIS SILVER MD; Attending Radiologist  This document has been electronically signed. May 26 2022  2:26PM    < end of copied text >      < from: CT Head No Cont (22 @ 20:37) >    ACC: [id number] EXAM:  CT BRAIN                          PROCEDURE DATE:  2022          INTERPRETATION:  CLINICAL INFORMATION: Subdural hemorrhage, follow-up    TECHNIQUE: Noncontrast axial CT images were acquired of the head.   Two-dimensionalsagittal and coronal reformats were generated.    COMPARISON STUDY: CT head 2022 1:40 PM.    FINDINGS:  Previously visualized very small acute subdural hemorrhage overlying the   right frontoparietal and temporal lobes appears smaller than on prior   exam. There is interval new minimal subarachnoid hemorrhage within the   right parietal lobe. Chronic lacunar infarct in the right cerebellum and   bilateral basal ganglia are reidentified. Large posterior left parietal   scalp swelling and hematoma, without significant interval change compared   to prior.    Patchy opacification the right mastoid air cells, similar to prior.    No displaced calvarial fracture.      IMPRESSION:  Previously visualized very small acute subdural hemorrhage overlying the   right frontoparietal and temporal lobes appears smaller than on prior   exam. There is interval new minimal subarachnoid hemorrhage within the   right parietal lobe.    Chronic lacunar infarct in the right cerebellum and bilateral basal   ganglia are reidentified.    Large posterior left parietal scalp swelling and hematoma, without   significant interval change compared to prior.    --- End of Report ---          THAI RAMOS MD; Resident Radiology  This document has been electronically signed.  LEOBARDO NUÑEZ MD; Attending Radiologist  This document has been electronically signed. May 26 2022  9:02PM    < end of copied text >

## 2022-05-27 NOTE — H&P ADULT - HISTORY OF PRESENT ILLNESS
86y/o F w/ h/o HTN, HLD, recent hospitalization for UTI, cholecystitis p/w fall. Pt w/ daughter states that she was in the process of admission for cholecystecomty tomorrow and was walking up stairs fell backwards on head shortly before arriving in ER. No LOC, vomiting, AC use, numbness, tingling, neck pain. C/o headache now. Denies fevers, chills, recent falls, chest pain, cough, sob, abdominal pain, back pain, dysuria

## 2022-05-27 NOTE — H&P ADULT - NSHPPHYSICALEXAM_GEN_ALL_CORE
General: frail  PERRLA  Neurology: A&Ox0  Respiratory: CTA B/L  CV: RRR, S1S2, no murmurs, rubs or gallops  Abdominal: Soft, NT, ND +BS, Last BM  Extremities: edema +  Skin Normal

## 2022-05-27 NOTE — SWALLOW BEDSIDE ASSESSMENT ADULT - SWALLOW EVAL: DIAGNOSIS
1- functional oral management given soft and bite sized solids, minced and moist solids, puree, mildly thick liquids and thin liquids marked by adequate bolus collection, transfer and transport. 2- mild oral dysphagia given regular/solids marked by prolonged mastication resulting in delayed oral preparation/AP transit. trace-mild lingual residue remained post swallow which pt reduces given liquid wash. 3- functional pharyngeal management given regular/solid, soft and bite sized solid, minced and moist solid, puree, mildly thick liquid and thin liquid textures marked by timely and complete swallow trigger and hyolaryngeal excursion without any overt s/s of penetration/aspiration noted.

## 2022-05-27 NOTE — CONSULT NOTE ADULT - NS ATTEND AMEND GEN_ALL_CORE FT
Had Hypercalcemia and still has hyponatremia and workup is being done again.    Calcium was elevated.. Cause unclear. PTH normal. PTHRP negative.   No monoclonal protein seen on immunofixation.  - Continue to monitor calcium levels. IVF as needed.

## 2022-05-27 NOTE — PATIENT PROFILE ADULT - FALL HARM RISK - HARM RISK INTERVENTIONS

## 2022-05-27 NOTE — CONSULT NOTE ADULT - CONSULT REQUESTED DATE/TIME
27-May-2022 12:37
26-May-2022 14:55
26-May-2022 15:12
27-May-2022 15:08
27-May-2022 09:05
27-May-2022 09:50

## 2022-05-28 LAB
A1C WITH ESTIMATED AVERAGE GLUCOSE RESULT: 5.3 % — SIGNIFICANT CHANGE UP (ref 4–5.6)
ANION GAP SERPL CALC-SCNC: 12 MMOL/L — SIGNIFICANT CHANGE UP (ref 7–14)
BUN SERPL-MCNC: 8 MG/DL — SIGNIFICANT CHANGE UP (ref 7–23)
CALCIUM SERPL-MCNC: 10.3 MG/DL — SIGNIFICANT CHANGE UP (ref 8.4–10.5)
CHLORIDE SERPL-SCNC: 97 MMOL/L — LOW (ref 98–107)
CHOLEST SERPL-MCNC: 123 MG/DL — SIGNIFICANT CHANGE UP
CO2 SERPL-SCNC: 23 MMOL/L — SIGNIFICANT CHANGE UP (ref 22–31)
CREAT SERPL-MCNC: 0.68 MG/DL — SIGNIFICANT CHANGE UP (ref 0.5–1.3)
EGFR: 85 ML/MIN/1.73M2 — SIGNIFICANT CHANGE UP
ESTIMATED AVERAGE GLUCOSE: 105 — SIGNIFICANT CHANGE UP
GLUCOSE SERPL-MCNC: 117 MG/DL — HIGH (ref 70–99)
HCT VFR BLD CALC: 39.6 % — SIGNIFICANT CHANGE UP (ref 34.5–45)
HDLC SERPL-MCNC: 43 MG/DL — LOW
HGB BLD-MCNC: 12.9 G/DL — SIGNIFICANT CHANGE UP (ref 11.5–15.5)
LIPID PNL WITH DIRECT LDL SERPL: 55 MG/DL — SIGNIFICANT CHANGE UP
MAGNESIUM SERPL-MCNC: 1.7 MG/DL — SIGNIFICANT CHANGE UP (ref 1.6–2.6)
MCHC RBC-ENTMCNC: 29.6 PG — SIGNIFICANT CHANGE UP (ref 27–34)
MCHC RBC-ENTMCNC: 32.6 GM/DL — SIGNIFICANT CHANGE UP (ref 32–36)
MCV RBC AUTO: 90.8 FL — SIGNIFICANT CHANGE UP (ref 80–100)
NON HDL CHOLESTEROL: 80 MG/DL — SIGNIFICANT CHANGE UP
NRBC # BLD: 0 /100 WBCS — SIGNIFICANT CHANGE UP
NRBC # FLD: 0 K/UL — SIGNIFICANT CHANGE UP
PHOSPHATE SERPL-MCNC: 1.9 MG/DL — LOW (ref 2.5–4.5)
PLATELET # BLD AUTO: 272 K/UL — SIGNIFICANT CHANGE UP (ref 150–400)
POTASSIUM SERPL-MCNC: 3.7 MMOL/L — SIGNIFICANT CHANGE UP (ref 3.5–5.3)
POTASSIUM SERPL-SCNC: 3.7 MMOL/L — SIGNIFICANT CHANGE UP (ref 3.5–5.3)
RBC # BLD: 4.36 M/UL — SIGNIFICANT CHANGE UP (ref 3.8–5.2)
RBC # FLD: 14.9 % — HIGH (ref 10.3–14.5)
SODIUM SERPL-SCNC: 132 MMOL/L — LOW (ref 135–145)
TRIGL SERPL-MCNC: 123 MG/DL — SIGNIFICANT CHANGE UP
WBC # BLD: 7.19 K/UL — SIGNIFICANT CHANGE UP (ref 3.8–10.5)
WBC # FLD AUTO: 7.19 K/UL — SIGNIFICANT CHANGE UP (ref 3.8–10.5)

## 2022-05-28 PROCEDURE — 70450 CT HEAD/BRAIN W/O DYE: CPT | Mod: 26

## 2022-05-28 RX ORDER — MAGNESIUM SULFATE 500 MG/ML
2 VIAL (ML) INJECTION ONCE
Refills: 0 | Status: COMPLETED | OUTPATIENT
Start: 2022-05-28 | End: 2022-05-28

## 2022-05-28 RX ORDER — POTASSIUM CHLORIDE 20 MEQ
20 PACKET (EA) ORAL ONCE
Refills: 0 | Status: COMPLETED | OUTPATIENT
Start: 2022-05-28 | End: 2022-05-28

## 2022-05-28 RX ORDER — POTASSIUM PHOSPHATE, MONOBASIC POTASSIUM PHOSPHATE, DIBASIC 236; 224 MG/ML; MG/ML
15 INJECTION, SOLUTION INTRAVENOUS ONCE
Refills: 0 | Status: COMPLETED | OUTPATIENT
Start: 2022-05-28 | End: 2022-05-28

## 2022-05-28 RX ADMIN — Medication 50 MILLIGRAM(S): at 06:42

## 2022-05-28 RX ADMIN — Medication 20 MILLIEQUIVALENT(S): at 09:27

## 2022-05-28 RX ADMIN — LOSARTAN POTASSIUM 50 MILLIGRAM(S): 100 TABLET, FILM COATED ORAL at 06:42

## 2022-05-28 RX ADMIN — Medication 25 GRAM(S): at 09:26

## 2022-05-28 RX ADMIN — Medication 100 MILLIGRAM(S): at 21:40

## 2022-05-28 RX ADMIN — PANTOPRAZOLE SODIUM 40 MILLIGRAM(S): 20 TABLET, DELAYED RELEASE ORAL at 06:42

## 2022-05-28 RX ADMIN — Medication 145 MILLIGRAM(S): at 13:01

## 2022-05-28 RX ADMIN — POTASSIUM PHOSPHATE, MONOBASIC POTASSIUM PHOSPHATE, DIBASIC 62.5 MILLIMOLE(S): 236; 224 INJECTION, SOLUTION INTRAVENOUS at 09:26

## 2022-05-28 NOTE — PHYSICAL THERAPY INITIAL EVALUATION ADULT - LEVEL OF INDEPENDENCE: GAIT, REHAB EVAL
deferring gait at this time due to fear of falling.  Pt appears to be more than competent to ambulate.  Will try gait training again if time permits./unable to perform

## 2022-05-28 NOTE — PHYSICAL THERAPY INITIAL EVALUATION ADULT - DISCHARGE DISPOSITION, PT EVAL
Unable to make recommendation at this time.  Pt deferring gait training at this time.  Anticipate Home vs. Home PT once gait is assessed.  Follow PT notes for functional progress.

## 2022-05-28 NOTE — PHYSICAL THERAPY INITIAL EVALUATION ADULT - PERTINENT HX OF CURRENT PROBLEM, REHAB EVAL
88y/o F w/ h/o HTN, HLD, recent hospitalization for UTI, cholecystitis p/w fall. Pt w/ daughter states that she was in the process of admission for cholecystecomty tomorrow and was walking up stairs fell backwards on head shortly before arriving in ER.

## 2022-05-28 NOTE — PHYSICAL THERAPY INITIAL EVALUATION ADULT - ADDITIONAL COMMENTS
Pt states she lives in a house.  Ambulates with a walker.  Lives with her daughter.  4 steps to enter home, stays on the first floor.  No history of falls other than this fall which led her to admission.

## 2022-05-28 NOTE — PHYSICAL THERAPY INITIAL EVALUATION ADULT - LEVEL OF INDEPENDENCE: SCOOT/BRIDGE, REHAB EVAL
Called and left a message for Dionne to call the OB clinic back for her results. Call back number left.    contact guard

## 2022-05-28 NOTE — PROGRESS NOTE ADULT - SUBJECTIVE AND OBJECTIVE BOX
pt seen and examined, no complaints, ROS - .     acetaminophen     Tablet .. 650 milliGRAM(s) Oral every 6 hours PRN  allopurinol 100 milliGRAM(s) Oral at bedtime  fenofibrate Tablet 145 milliGRAM(s) Oral daily  losartan 50 milliGRAM(s) Oral daily  metoprolol succinate ER 50 milliGRAM(s) Oral daily  pantoprazole    Tablet 40 milliGRAM(s) Oral before breakfast                            12.9   7.19  )-----------( 272      ( 28 May 2022 05:29 )             39.6       Hemoglobin: 12.9 g/dL (05-28 @ 05:29)  Hemoglobin: 13.8 g/dL (05-26 @ 09:43)  Hemoglobin: 12.9 g/dL (05-24 @ 11:25)      05-28    132<L>  |  97<L>  |  8   ----------------------------<  117<H>  3.7   |  23  |  0.68    Ca    10.3      28 May 2022 05:29  Phos  1.9     05-28  Mg     1.70     05-28      Creatinine Trend: 0.68<--, 0.78<--, 0.80<--, 0.82<--, 0.84<--, 0.94<--    COAGS:           T(C): 36.8 (05-28-22 @ 06:30), Max: 36.9 (05-27-22 @ 21:32)  HR: 100 (05-28-22 @ 06:30) (87 - 102)  BP: 119/76 (05-28-22 @ 06:30) (119/76 - 181/78)  RR: 17 (05-28-22 @ 06:30) (17 - 20)  SpO2: 100% (05-28-22 @ 06:30) (95% - 100%)  Wt(kg): --    I&O's Summary      HEENT:   Normal oral mucosa, PERRL, EOMI	  Lymphatic: No lymphadenopathy , no edema  Cardiovascular: Normal S1 S2, No JVD, No murmurs , Peripheral pulses palpable 2+ bilaterally  Respiratory: Lungs clear to auscultation, normal effort 	  Gastrointestinal:  Soft, Non-tender, + BS	  Skin: No rashes, No ecchymoses, No cyanosis, warm to touch      DIAGNOSTIC TESTING:  [ ] Echocardiogram: < from: Transthoracic Echocardiogram (05.05.22 @ 15:36) >  1. Calcified trileaflet aortic valve with normal opening.  Minimal aortic regurgitation.  2. Normal left ventricular internal dimensions and wall  thicknesses.  3. Hyperdynamic left ventricle.  4. Normal right ventricular size and function.    < end of copied text >    ASSESSMENT/PLAN:  88y/o F w/ h/o HTN, HLD, recent hospitalization for UTI, cholecystitis p/w fall.    -Pt. with no LOC/syncope   - tolerating BB/ ACE   -No clinical heart failure or anginal symptoms  -Recent TTE with normal LV function   -No further inpatient cardiac workup expected at this time

## 2022-05-28 NOTE — PROGRESS NOTE ADULT - SUBJECTIVE AND OBJECTIVE BOX
Patient is a 87y old  Female who presents with a chief complaint of fall (28 May 2022 11:46)    Date of servie : 05-28-22 @ 18:34  INTERVAL HPI/OVERNIGHT EVENTS:  T(C): 37.1 (05-28-22 @ 11:50), Max: 37.1 (05-28-22 @ 11:50)  HR: 85 (05-28-22 @ 11:50) (85 - 102)  BP: 130/62 (05-28-22 @ 11:50) (119/76 - 181/78)  RR: 18 (05-28-22 @ 11:50) (17 - 18)  SpO2: 96% (05-28-22 @ 11:50) (96% - 100%)  Wt(kg): --  I&O's Summary      LABS:                        12.9   7.19  )-----------( 272      ( 28 May 2022 05:29 )             39.6     05-28    132<L>  |  97<L>  |  8   ----------------------------<  117<H>  3.7   |  23  |  0.68    Ca    10.3      28 May 2022 05:29  Phos  1.9     05-28  Mg     1.70     05-28          CAPILLARY BLOOD GLUCOSE                MEDICATIONS  (STANDING):  allopurinol 100 milliGRAM(s) Oral at bedtime  fenofibrate Tablet 145 milliGRAM(s) Oral daily  losartan 50 milliGRAM(s) Oral daily  metoprolol succinate ER 50 milliGRAM(s) Oral daily  pantoprazole    Tablet 40 milliGRAM(s) Oral before breakfast    MEDICATIONS  (PRN):  acetaminophen     Tablet .. 650 milliGRAM(s) Oral every 6 hours PRN Moderate Pain (4 - 6)          PHYSICAL EXAM:  GENERAL: frail  CHEST/LUNG: Coarse breath sounds bilaterally   HEART: Regular rate and rhythm; No murmurs, rubs, or gallops  ABDOMEN: Soft, Nontender, Nondistended; Bowel sounds present  EXTREMITIES: edema +    Care Discussed with Consultants/Other Providers [ ] YES  [ ] NO

## 2022-05-29 LAB
ANION GAP SERPL CALC-SCNC: 11 MMOL/L — SIGNIFICANT CHANGE UP (ref 7–14)
BUN SERPL-MCNC: 9 MG/DL — SIGNIFICANT CHANGE UP (ref 7–23)
CALCIUM SERPL-MCNC: 10.4 MG/DL — SIGNIFICANT CHANGE UP (ref 8.4–10.5)
CHLORIDE SERPL-SCNC: 100 MMOL/L — SIGNIFICANT CHANGE UP (ref 98–107)
CO2 SERPL-SCNC: 24 MMOL/L — SIGNIFICANT CHANGE UP (ref 22–31)
CREAT SERPL-MCNC: 0.7 MG/DL — SIGNIFICANT CHANGE UP (ref 0.5–1.3)
EGFR: 84 ML/MIN/1.73M2 — SIGNIFICANT CHANGE UP
GLUCOSE SERPL-MCNC: 109 MG/DL — HIGH (ref 70–99)
HCT VFR BLD CALC: 40.2 % — SIGNIFICANT CHANGE UP (ref 34.5–45)
HGB BLD-MCNC: 13.3 G/DL — SIGNIFICANT CHANGE UP (ref 11.5–15.5)
MAGNESIUM SERPL-MCNC: 2.1 MG/DL — SIGNIFICANT CHANGE UP (ref 1.6–2.6)
MCHC RBC-ENTMCNC: 29 PG — SIGNIFICANT CHANGE UP (ref 27–34)
MCHC RBC-ENTMCNC: 33.1 GM/DL — SIGNIFICANT CHANGE UP (ref 32–36)
MCV RBC AUTO: 87.8 FL — SIGNIFICANT CHANGE UP (ref 80–100)
NRBC # BLD: 0 /100 WBCS — SIGNIFICANT CHANGE UP
NRBC # FLD: 0 K/UL — SIGNIFICANT CHANGE UP
PHOSPHATE SERPL-MCNC: 2.8 MG/DL — SIGNIFICANT CHANGE UP (ref 2.5–4.5)
PLATELET # BLD AUTO: 295 K/UL — SIGNIFICANT CHANGE UP (ref 150–400)
POTASSIUM SERPL-MCNC: 3.7 MMOL/L — SIGNIFICANT CHANGE UP (ref 3.5–5.3)
POTASSIUM SERPL-SCNC: 3.7 MMOL/L — SIGNIFICANT CHANGE UP (ref 3.5–5.3)
RBC # BLD: 4.58 M/UL — SIGNIFICANT CHANGE UP (ref 3.8–5.2)
RBC # FLD: 15.1 % — HIGH (ref 10.3–14.5)
SODIUM SERPL-SCNC: 135 MMOL/L — SIGNIFICANT CHANGE UP (ref 135–145)
WBC # BLD: 5.45 K/UL — SIGNIFICANT CHANGE UP (ref 3.8–10.5)
WBC # FLD AUTO: 5.45 K/UL — SIGNIFICANT CHANGE UP (ref 3.8–10.5)

## 2022-05-29 RX ORDER — LANOLIN ALCOHOL/MO/W.PET/CERES
9 CREAM (GRAM) TOPICAL AT BEDTIME
Refills: 0 | Status: DISCONTINUED | OUTPATIENT
Start: 2022-05-29 | End: 2022-06-03

## 2022-05-29 RX ORDER — METOPROLOL TARTRATE 50 MG
75 TABLET ORAL DAILY
Refills: 0 | Status: DISCONTINUED | OUTPATIENT
Start: 2022-05-29 | End: 2022-06-03

## 2022-05-29 RX ORDER — HYDRALAZINE HCL 50 MG
5 TABLET ORAL ONCE
Refills: 0 | Status: COMPLETED | OUTPATIENT
Start: 2022-05-29 | End: 2022-05-29

## 2022-05-29 RX ADMIN — Medication 650 MILLIGRAM(S): at 11:22

## 2022-05-29 RX ADMIN — Medication 9 MILLIGRAM(S): at 00:41

## 2022-05-29 RX ADMIN — PANTOPRAZOLE SODIUM 40 MILLIGRAM(S): 20 TABLET, DELAYED RELEASE ORAL at 05:32

## 2022-05-29 RX ADMIN — Medication 5 MILLIGRAM(S): at 00:41

## 2022-05-29 RX ADMIN — Medication 145 MILLIGRAM(S): at 11:22

## 2022-05-29 RX ADMIN — Medication 100 MILLIGRAM(S): at 21:48

## 2022-05-29 RX ADMIN — Medication 9 MILLIGRAM(S): at 21:48

## 2022-05-29 RX ADMIN — Medication 50 MILLIGRAM(S): at 05:33

## 2022-05-29 RX ADMIN — Medication 650 MILLIGRAM(S): at 12:30

## 2022-05-29 RX ADMIN — LOSARTAN POTASSIUM 50 MILLIGRAM(S): 100 TABLET, FILM COATED ORAL at 05:32

## 2022-05-29 NOTE — PROVIDER CONTACT NOTE (OTHER) - ACTION/TREATMENT ORDERED:
ACP notified. ACP ordered hydralazine IV push and melatonin to help with insomnia.
Additional hydralazine 5mg IVP ordered
IVP hydralazine + PO tylenol ordered
ACP notified. RN continue to monitor BP per routine.
ACP aware, will continue to monitor

## 2022-05-29 NOTE — PROGRESS NOTE ADULT - SUBJECTIVE AND OBJECTIVE BOX
Neurology Progress Note    S: Patient seen and examined. No new events overnight. patient denied CP, SOB, HA or pain.     Medication:  acetaminophen     Tablet .. 650 milliGRAM(s) Oral every 6 hours PRN  allopurinol 100 milliGRAM(s) Oral at bedtime  fenofibrate Tablet 145 milliGRAM(s) Oral daily  losartan 50 milliGRAM(s) Oral daily  melatonin 9 milliGRAM(s) Oral at bedtime  metoprolol succinate ER 50 milliGRAM(s) Oral daily  pantoprazole    Tablet 40 milliGRAM(s) Oral before breakfast      Vitals:  Vital Signs Last 24 Hrs  T(C): 36.7 (29 May 2022 05:30), Max: 37.1 (28 May 2022 11:50)  T(F): 98 (29 May 2022 05:30), Max: 98.8 (28 May 2022 11:50)  HR: 88 (29 May 2022 05:30) (85 - 88)  BP: 150/80 (29 May 2022 05:30) (130/62 - 180/74)  BP(mean): --  RR: 18 (29 May 2022 05:30) (18 - 18)  SpO2: 98% (29 May 2022 05:30) (96% - 98%)    General Exam:   General Appearance: Appropriately dressed and in no acute distress       Head: Normocephalic, atraumatic and no dysmorphic features  Ear, Nose, and Throat: Moist mucous membranes  CVS: S1S2+  Resp: No SOB, no wheeze or rhonchi  Abd: soft NTND  Extremities: No edema, no cyanosis  Skin: No bruises, no rashes     Neurological Exam:  Mental Status: Awake, alert and oriented x 2-3.  Able to follow simple and complex verbal commands. Able to name and repeat. fluent speech. No obvious aphasia or dysarthria noted.   Cranial Nerves: PERRL, EOMI, VFFC, sensation V1-V3 intact,  no obvious facial asymmetry, equal elevation of palate, scm/trap 5/5, tongue is midline on protrusion. no obvious papilledema on fundoscopic exam. hearing is grossly intact.   Motor: Normal bulk, tone and strength throughout. Fine finger movements were intact and symmetric. no tremors or drift noted.  at least 4/5 throughout   Sensation: Intact to light touch and pinprick throughout. no right/left confusion. no extinction to tactile on DSS.    Reflexes: 1+ throughout at biceps, brachioradialis, triceps, patellars and ankles bilaterally and equal. No clonus. R toe and L toe were both downgoing.  Coordination: No dysmetria on FNF    Gait: deferred      I personally reviewed the below data/images/labs:      CBC Full  -  ( 29 May 2022 05:19 )  WBC Count : 5.45 K/uL  RBC Count : 4.58 M/uL  Hemoglobin : 13.3 g/dL  Hematocrit : 40.2 %  Platelet Count - Automated : 295 K/uL  Mean Cell Volume : 87.8 fL  Mean Cell Hemoglobin : 29.0 pg  Mean Cell Hemoglobin Concentration : 33.1 gm/dL  Auto Neutrophil # : x  Auto Lymphocyte # : x  Auto Monocyte # : x  Auto Eosinophil # : x  Auto Basophil # : x  Auto Neutrophil % : x  Auto Lymphocyte % : x  Auto Monocyte % : x  Auto Eosinophil % : x  Auto Basophil % : x    05-29    135  |  100  |  9   ----------------------------<  109<H>  3.7   |  24  |  0.70    Ca    10.4      29 May 2022 05:19  Phos  2.8     05-29  Mg     2.10     05-29          ACC: [id number] EXAM:  CT CERVICAL SPINE                        ACC: [id number] EXAM:  CT BRAIN                          PROCEDURE DATE:  05/26/2022          INTERPRETATION:  CT OF THE HEAD WITHOUT CONTRAST  CT CERVICAL SPINE WITHOUT CONTRAST    CLINICAL INDICATION: Trauma code    TECHNIQUE: Volumetric CT acquisition was performed through the brain and   reviewed using brain and bone window technique. Dose optimization   techniques were utilized including kVp/mA modulation along with iterative   reconstructions.  Thin section CT images were obtained through cervical spine with   overlapping reconstructions.  Sagittal, coronal and bilateral oblique 2D   reformats were then generated from the initial images.    COMPARISON: CT head 5/4/2022    FINDINGS:  CT head:  Extensive large posterior predominantly left parietal scalp swelling and   hematoma.  Acute right temporal convexity subdural hematoma measuring 3 mm in   greatest thickness. Acute right parietal convexity subdural hematoma   measuring 8 mm in greatest thickness. No significant associated mass   effect or midline shift.  The ventricular and sulcal size and configuration is age appropriate.     There is no acute loss of gray-white differentiation. There are moderate   patchy areasof hypodensity in the periventricular and subcortical white   matter which are likely related to chronic microangiopathic changes.     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    There is no evidence of mass effect, midline shift.     The calvarium is intact. The paranasal sinuses are clear.Patchy   opacification of the right mastoid air cells which is new since the prior   study. The orbits are unremarkable.      CT cervical spine:      There is straightening of usual cervical lordosis. There is no   significant subluxation. Vertebral body height is preserved throughout   the cervical spine. There is no significant loss of intervertebral disc   height throughout the cervical spine.    There is no significant disc herniation. No definite high-grade central   canal stenosis.     The paravertebral soft tissues are unremarkable.      IMPRESSION:  CT HEAD: Extensive large posterior predominantly left parietal scalp   swelling and hematoma.  Acute small right temporal convexity subdural hematoma. Acute right   parietal convexity subdural hematoma measuring 8 mm in greatest   thickness. No significant associated mass effect or midline shift. No   depressed calvarial fracture. New patchy opacification of the right   mastoid air cells. Underlying skull base fracture not entirely excluded.   Additional chronic findings as above.    CT CERVICAL SPINE: No fracture or acute traumatic malalignment.      Notification to clinician of alert:  Dr. Ramirez was notified about the findings at 2:26 PM on 5/26/2022 with   readback confirmation. The opportunity for questions was provided and all   questions asked were answered.    --- End of Report ---            LEXIS SILVER MD; Attending Radiologist  This document has been electronically signed. May 26 2022  2:26PM    < end of copied text >      < from: CT Head No Cont (05.26.22 @ 20:37) >    ACC: [id number] EXAM:  CT BRAIN                          PROCEDURE DATE:  05/26/2022          INTERPRETATION:  CLINICAL INFORMATION: Subdural hemorrhage, follow-up    TECHNIQUE: Noncontrast axial CT images were acquired of the head.   Two-dimensionalsagittal and coronal reformats were generated.    COMPARISON STUDY: CT head 5/26/2022 1:40 PM.    FINDINGS:  Previously visualized very small acute subdural hemorrhage overlying the   right frontoparietal and temporal lobes appears smaller than on prior   exam. There is interval new minimal subarachnoid hemorrhage within the   right parietal lobe. Chronic lacunar infarct in the right cerebellum and   bilateral basal ganglia are reidentified. Large posterior left parietal   scalp swelling and hematoma, without significant interval change compared   to prior.    Patchy opacification the right mastoid air cells, similar to prior.    No displaced calvarial fracture.      IMPRESSION:  Previously visualized very small acute subdural hemorrhage overlying the   right frontoparietal and temporal lobes appears smaller than on prior   exam. There is interval new minimal subarachnoid hemorrhage within the   right parietal lobe.    Chronic lacunar infarct in the right cerebellum and bilateral basal   ganglia are reidentified.    Large posterior left parietal scalp swelling and hematoma, without   significant interval change compared to prior.    --- End of Report ---          THAI RAMOS MD; Resident Radiology  This document has been electronically signed.  LEOBARDO NUÑEZ MD; Attending Radiologist  This document has been electronically signed. May 26 2022  9:02PM    < end of copied text >      < from: CT Head No Cont (05.26.22 @ 20:37) >    ACC: [id number] EXAM:  CT BRAIN                          PROCEDURE DATE:  05/26/2022          INTERPRETATION:  CLINICAL INFORMATION: Subdural hemorrhage, follow-up    TECHNIQUE: Noncontrast axial CT images were acquired of the head.   Two-dimensionalsagittal and coronal reformats were generated.    COMPARISON STUDY: CT head 5/26/2022 1:40 PM.    FINDINGS:  Previously visualized very small acute subdural hemorrhage overlying the   right frontoparietal and temporal lobes appears smaller than on prior   exam. There is interval new minimal subarachnoid hemorrhage within the   right parietal lobe. Chronic lacunar infarct in the right cerebellum and   bilateral basal ganglia are reidentified. Large posterior left parietal   scalp swelling and hematoma, without significant interval change compared   to prior.    Patchy opacification the right mastoid air cells, similar to prior.    No displaced calvarial fracture.      IMPRESSION:  Previously visualized very small acute subdural hemorrhage overlying the   right frontoparietal and temporal lobes appears smaller than on prior   exam. There is interval new minimal subarachnoid hemorrhage within the   right parietal lobe.    Chronic lacunar infarct in the right cerebellum and bilateral basal   ganglia are reidentified.    Large posterior left parietal scalp swelling and hematoma, without   significant interval change compared to prior.    --- End of Report ---          THAI RAMOS MD; Resident Radiology  This document has been electronically signed.  LEOBARDO NUÑEZ MD; Attending Radiologist  This document has been electronically signed. May 26 2022  9:02PM    < end of copied text >  < from: CT Head No Cont (05.28.22 @ 12:47) >    ACC: [id number] EXAM:  CT BRAIN                          PROCEDURE DATE:  05/28/2022          INTERPRETATION:  INDICATIONS:  Interval evaluation right subdural and   subarachnoid hemorrhages.    TECHNIQUE:  Serial axial images were obtained from the skull base to the   vertex without intravenous contrast. Sagittal and coronal reformats were   performed.    COMPARISON EXAMINATION: Head CTs dated 5/26/2022    FINDINGS:  Interval increase in size of hypodense right frontoparietal convexity   collection measuring up to 9 mm in thickness. There is new 2 mm leftward   midline shift.    Scattered subarachnoid hemorrhage noted within the right temporal   occipital region with unchanged trace right subdural hemorrhage. No   associated midline shift orherniation.    No new intraparenchymal hemorrhage is identified. Moderate chronic white   matter microvascular changes. Bilateral basal ganglia calcifications.   Small old lacunar infarcts in the left thalamus and right cerebellum are   again noted.    No hydrocephalus.    The calvarium is intact. A scalp hematoma along the left parietal region   is decreased in size from 5/26/2022.    Partial opacification of the right mastoid air cells, unchanged. The   visualized portions of the paranasal sinuses are clear.    IMPRESSION:  -Interval increase in size of hypodense right frontoparietal convexity   collection measuring up to 9 mm in thickness. This is favored to   represent a subdural CSF hygroma given hypodense appearance. There is new   2 mmleftward midline shift. Recommend short interval follow-up.    -Residual right temporal occipital subarachnoid hemorrhage again seen.    -Interval decrease in size of a left scalp hematoma.    --- End of Report ---          CARLOS DRAPER MD; Resident Radiologist  This document has been electronically signed.  LUIS DANIEL WAGGONER MD; Attending Radiologist  This document has been electronically signed. May 28 2022  1:44PM    < end of copied text >

## 2022-05-29 NOTE — PROVIDER CONTACT NOTE (OTHER) - SITUATION
Positive orthos
Patients blood pressure 173/74 and stressed
Manual /72
Manual /64
Patients blood pressure 180/74

## 2022-05-29 NOTE — PROGRESS NOTE ADULT - SUBJECTIVE AND OBJECTIVE BOX
Patient is a 87y old  Female who presents with a chief complaint of fall (29 May 2022 10:00)    Date of servie : 05-29-22 @ 13:39  INTERVAL HPI/OVERNIGHT EVENTS:  T(C): 36.7 (05-29-22 @ 05:30), Max: 36.7 (05-29-22 @ 05:30)  HR: 88 (05-29-22 @ 05:30) (86 - 88)  BP: 150/80 (05-29-22 @ 05:30) (150/80 - 180/74)  RR: 18 (05-29-22 @ 05:30) (18 - 18)  SpO2: 98% (05-29-22 @ 05:30) (97% - 98%)  Wt(kg): --  I&O's Summary      LABS:                        13.3   5.45  )-----------( 295      ( 29 May 2022 05:19 )             40.2     05-29    135  |  100  |  9   ----------------------------<  109<H>  3.7   |  24  |  0.70    Ca    10.4      29 May 2022 05:19  Phos  2.8     05-29  Mg     2.10     05-29          CAPILLARY BLOOD GLUCOSE                MEDICATIONS  (STANDING):  allopurinol 100 milliGRAM(s) Oral at bedtime  fenofibrate Tablet 145 milliGRAM(s) Oral daily  losartan 50 milliGRAM(s) Oral daily  melatonin 9 milliGRAM(s) Oral at bedtime  metoprolol succinate ER 50 milliGRAM(s) Oral daily  pantoprazole    Tablet 40 milliGRAM(s) Oral before breakfast    MEDICATIONS  (PRN):  acetaminophen     Tablet .. 650 milliGRAM(s) Oral every 6 hours PRN Moderate Pain (4 - 6)          PHYSICAL EXAM:  GENERAL: frail  CHEST/LUNG: Clear to percussion bilaterally; No rales, rhonchi, wheezing, or rubs  HEART: Regular rate and rhythm; No murmurs, rubs, or gallops  ABDOMEN: Soft, Nontender, Nondistended; Bowel sounds present  EXTREMITIES:  edema +    Care Discussed with Consultants/Other Providers [x ] YES  [ ] NO

## 2022-05-29 NOTE — PROGRESS NOTE ADULT - SUBJECTIVE AND OBJECTIVE BOX
HARSH VERGARA  87y  Patient is a 87y old  Female who presents with a chief complaint of fall (29 May 2022 13:38)    HPI:  Seen and examined. No new complaints.    HEALTH ISSUES - PROBLEM Dx:        MEDICATIONS  (STANDING):  allopurinol 100 milliGRAM(s) Oral at bedtime  fenofibrate Tablet 145 milliGRAM(s) Oral daily  losartan 50 milliGRAM(s) Oral daily  melatonin 9 milliGRAM(s) Oral at bedtime  metoprolol succinate ER 50 milliGRAM(s) Oral daily  pantoprazole    Tablet 40 milliGRAM(s) Oral before breakfast    MEDICATIONS  (PRN):  acetaminophen     Tablet .. 650 milliGRAM(s) Oral every 6 hours PRN Moderate Pain (4 - 6)    Vital Signs Last 24 Hrs  T(C): 36.7 (29 May 2022 05:30), Max: 36.7 (29 May 2022 05:30)  T(F): 98 (29 May 2022 05:30), Max: 98 (29 May 2022 05:30)  HR: 88 (29 May 2022 05:30) (86 - 88)  BP: 150/80 (29 May 2022 05:30) (150/80 - 180/74)  BP(mean): --  RR: 18 (29 May 2022 05:30) (18 - 18)  SpO2: 98% (29 May 2022 05:30) (97% - 98%)  Daily     Daily     PHYSICAL EXAM:  Constitutional:  She appears comfortable and not distressed. Not diaphoretic.    Neck:  The thyroid is normal. Trachea is midline.     Respiratory: The lungs are clear to auscultation. No dullness and expansion is normal.    Cardiovascular: S1 and S2 are normal. No mummurs, rubs or gallops are present.    Gastrointestinal: The abdomen is soft. No tenderness is present. No masses are present. Bowel sounds are normal.    Genitourinary: The bladder is not distended. No CVA tenderness is present.    Extremities: No edema is noted. No deformities are present.    Neurological: Cognition is normal. Tone, power and sensation are normal. Gait is steady.    Skin: No lesions are seen  or palpated.    Lymph Nodes: No lymphadenopathy is present.    Psychiatric: Mood is appropriate. No hallucinations or flight of ideas are noted.                              13.3   5.45  )-----------( 295      ( 29 May 2022 05:19 )             40.2     05-29    135  |  100  |  9   ----------------------------<  109<H>  3.7   |  24  |  0.70    Ca    10.4      29 May 2022 05:19  Phos  2.8     05-29  Mg     2.10     05-29

## 2022-05-29 NOTE — PROVIDER CONTACT NOTE (OTHER) - ASSESSMENT
Patients blood pressure 180/74. All other vitals are stable (see vitals). patient symptomatic
Patient asymptomatic of positive orthos
Patient is stressed and anxious in bed. patient states they don't want the nurse to leave. Patient is asymptomatic in bed. Other vitals stable (see vitals).
Pt denies blurry vision, pupils PERRLA   Pt c/o head pain 2/2 fall

## 2022-05-29 NOTE — PROVIDER CONTACT NOTE (OTHER) - BACKGROUND
Alert-The patient is alert, awake and responds to voice. The patient is oriented to time, place, and person. The triage nurse is able to obtain subjective information.
Patient admitted for subdural hemorrhage, pmh of HTN
Patient admitted for traumatic subdural hemorrhage with loss of consciousness. Has a past medical history of hyperlipidemia, hypertension, and gout.
Patient admitted for a subdural hemartoma. Patient has a history of GERD, Gout and hyperlipidemia
s/p hydralazine 5mg IVP

## 2022-05-29 NOTE — PROVIDER CONTACT NOTE (OTHER) - REASON
Patients blood pressure 173/74 and stressed
Manual /64
Manual /72
Patients blood pressure 180/74
Positive orthos

## 2022-05-29 NOTE — PROGRESS NOTE ADULT - SUBJECTIVE AND OBJECTIVE BOX
pt seen and examined, no chest pain , or sob            acetaminophen     Tablet .. 650 milliGRAM(s) Oral every 6 hours PRN  allopurinol 100 milliGRAM(s) Oral at bedtime  fenofibrate Tablet 145 milliGRAM(s) Oral daily  losartan 50 milliGRAM(s) Oral daily  melatonin 9 milliGRAM(s) Oral at bedtime  metoprolol succinate ER 50 milliGRAM(s) Oral daily  pantoprazole    Tablet 40 milliGRAM(s) Oral before breakfast                            13.3   5.45  )-----------( 295      ( 29 May 2022 05:19 )             40.2       Hemoglobin: 13.3 g/dL (05-29 @ 05:19)  Hemoglobin: 12.9 g/dL (05-28 @ 05:29)  Hemoglobin: 13.8 g/dL (05-26 @ 09:43)  Hemoglobin: 12.9 g/dL (05-24 @ 11:25)      05-29    135  |  100  |  9   ----------------------------<  109<H>  3.7   |  24  |  0.70    Ca    10.4      29 May 2022 05:19  Phos  2.8     05-29  Mg     2.10     05-29      Creatinine Trend: 0.70<--, 0.68<--, 0.78<--, 0.80<--, 0.82<--, 0.84<--    COAGS:           T(C): 36.7 (05-29-22 @ 05:30), Max: 37.1 (05-28-22 @ 11:50)  HR: 88 (05-29-22 @ 05:30) (85 - 88)  BP: 150/80 (05-29-22 @ 05:30) (130/62 - 180/74)  RR: 18 (05-29-22 @ 05:30) (18 - 18)  SpO2: 98% (05-29-22 @ 05:30) (96% - 98%)  Wt(kg): --    I&O's Summary    HEENT:   Normal oral mucosa, PERRL, EOMI	  Lymphatic: No lymphadenopathy , no edema  Cardiovascular: Normal S1 S2, No JVD, No murmurs , Peripheral pulses palpable 2+ bilaterally  Respiratory: Lungs clear to auscultation, normal effort 	  Gastrointestinal:  Soft, Non-tender, + BS	  Skin: No rashes, No ecchymoses, No cyanosis, warm to touch      DIAGNOSTIC TESTING:  [ ] Echocardiogram: < from: Transthoracic Echocardiogram (05.05.22 @ 15:36) >  1. Calcified trileaflet aortic valve with normal opening.  Minimal aortic regurgitation.  2. Normal left ventricular internal dimensions and wall  thicknesses.  3. Hyperdynamic left ventricle.  4. Normal right ventricular size and function.    < end of copied text >    ASSESSMENT/PLAN:  86y/o F w/ h/o HTN, HLD, recent hospitalization for UTI, cholecystitis p/w fall.    - CT scan noted, ( head ) no intervention per neuro sx.   - tolerating BB/ ACE   -No clinical heart failure or anginal symptoms  -Recent TTE with normal LV function   -No further inpatient cardiac workup expected at this time

## 2022-05-30 LAB
ANION GAP SERPL CALC-SCNC: 9 MMOL/L — SIGNIFICANT CHANGE UP (ref 7–14)
BUN SERPL-MCNC: 14 MG/DL — SIGNIFICANT CHANGE UP (ref 7–23)
CALCIUM SERPL-MCNC: 10.2 MG/DL — SIGNIFICANT CHANGE UP (ref 8.4–10.5)
CHLORIDE SERPL-SCNC: 97 MMOL/L — LOW (ref 98–107)
CK SERPL-CCNC: 26 U/L — SIGNIFICANT CHANGE UP (ref 25–170)
CO2 SERPL-SCNC: 25 MMOL/L — SIGNIFICANT CHANGE UP (ref 22–31)
CREAT SERPL-MCNC: 0.94 MG/DL — SIGNIFICANT CHANGE UP (ref 0.5–1.3)
EGFR: 59 ML/MIN/1.73M2 — LOW
GLUCOSE SERPL-MCNC: 97 MG/DL — SIGNIFICANT CHANGE UP (ref 70–99)
HCT VFR BLD CALC: 41.1 % — SIGNIFICANT CHANGE UP (ref 34.5–45)
HGB BLD-MCNC: 13.2 G/DL — SIGNIFICANT CHANGE UP (ref 11.5–15.5)
MAGNESIUM SERPL-MCNC: 1.9 MG/DL — SIGNIFICANT CHANGE UP (ref 1.6–2.6)
MCHC RBC-ENTMCNC: 28.9 PG — SIGNIFICANT CHANGE UP (ref 27–34)
MCHC RBC-ENTMCNC: 32.1 GM/DL — SIGNIFICANT CHANGE UP (ref 32–36)
MCV RBC AUTO: 90.1 FL — SIGNIFICANT CHANGE UP (ref 80–100)
NRBC # BLD: 0 /100 WBCS — SIGNIFICANT CHANGE UP
NRBC # FLD: 0 K/UL — SIGNIFICANT CHANGE UP
PHOSPHATE SERPL-MCNC: 3.7 MG/DL — SIGNIFICANT CHANGE UP (ref 2.5–4.5)
PLATELET # BLD AUTO: 265 K/UL — SIGNIFICANT CHANGE UP (ref 150–400)
POTASSIUM SERPL-MCNC: 4.1 MMOL/L — SIGNIFICANT CHANGE UP (ref 3.5–5.3)
POTASSIUM SERPL-SCNC: 4.1 MMOL/L — SIGNIFICANT CHANGE UP (ref 3.5–5.3)
RBC # BLD: 4.56 M/UL — SIGNIFICANT CHANGE UP (ref 3.8–5.2)
RBC # FLD: 15.1 % — HIGH (ref 10.3–14.5)
SODIUM SERPL-SCNC: 131 MMOL/L — LOW (ref 135–145)
WBC # BLD: 4.71 K/UL — SIGNIFICANT CHANGE UP (ref 3.8–10.5)
WBC # FLD AUTO: 4.71 K/UL — SIGNIFICANT CHANGE UP (ref 3.8–10.5)

## 2022-05-30 RX ORDER — SODIUM CHLORIDE 9 MG/ML
500 INJECTION INTRAMUSCULAR; INTRAVENOUS; SUBCUTANEOUS ONCE
Refills: 0 | Status: COMPLETED | OUTPATIENT
Start: 2022-05-30 | End: 2022-05-30

## 2022-05-30 RX ADMIN — Medication 75 MILLIGRAM(S): at 05:30

## 2022-05-30 RX ADMIN — PANTOPRAZOLE SODIUM 40 MILLIGRAM(S): 20 TABLET, DELAYED RELEASE ORAL at 05:30

## 2022-05-30 RX ADMIN — Medication 145 MILLIGRAM(S): at 14:36

## 2022-05-30 RX ADMIN — Medication 9 MILLIGRAM(S): at 21:20

## 2022-05-30 RX ADMIN — Medication 650 MILLIGRAM(S): at 15:50

## 2022-05-30 RX ADMIN — Medication 100 MILLIGRAM(S): at 21:20

## 2022-05-30 RX ADMIN — SODIUM CHLORIDE 500 MILLILITER(S): 9 INJECTION INTRAMUSCULAR; INTRAVENOUS; SUBCUTANEOUS at 15:51

## 2022-05-30 RX ADMIN — LOSARTAN POTASSIUM 50 MILLIGRAM(S): 100 TABLET, FILM COATED ORAL at 05:30

## 2022-05-30 RX ADMIN — Medication 650 MILLIGRAM(S): at 17:00

## 2022-05-30 NOTE — PROGRESS NOTE ADULT - SUBJECTIVE AND OBJECTIVE BOX
Patient is a 87y old  Female who presents with a chief complaint of fall (30 May 2022 13:39)    Date of servie : 05-30-22 @ 20:52  INTERVAL HPI/OVERNIGHT EVENTS:  T(C): 37 (05-30-22 @ 11:00), Max: 37 (05-30-22 @ 11:00)  HR: 78 (05-30-22 @ 11:00) (78 - 84)  BP: 112/72 (05-30-22 @ 11:00) (112/72 - 139/60)  RR: 17 (05-30-22 @ 11:00) (16 - 17)  SpO2: 100% (05-30-22 @ 11:00) (96% - 100%)  Wt(kg): --  I&O's Summary      LABS:                        13.2   4.71  )-----------( 265      ( 30 May 2022 05:14 )             41.1     05-30    131<L>  |  97<L>  |  14  ----------------------------<  97  4.1   |  25  |  0.94    Ca    10.2      30 May 2022 05:14  Phos  3.7     05-30  Mg     1.90     05-30          CAPILLARY BLOOD GLUCOSE                MEDICATIONS  (STANDING):  allopurinol 100 milliGRAM(s) Oral at bedtime  fenofibrate Tablet 145 milliGRAM(s) Oral daily  losartan 50 milliGRAM(s) Oral daily  melatonin 9 milliGRAM(s) Oral at bedtime  metoprolol succinate ER 75 milliGRAM(s) Oral daily  pantoprazole    Tablet 40 milliGRAM(s) Oral before breakfast    MEDICATIONS  (PRN):  acetaminophen     Tablet .. 650 milliGRAM(s) Oral every 6 hours PRN Moderate Pain (4 - 6)          PHYSICAL EXAM:  GENERAL: NAD, well-groomed, well-developed  HEAD:  Atraumatic, Normocephalic  CHEST/LUNG: Clear to percussion bilaterally; No rales, rhonchi, wheezing, or rubs  HEART: Regular rate and rhythm; No murmurs, rubs, or gallops  ABDOMEN: Soft, Nontender, Nondistended; Bowel sounds present  EXTREMITIES:  2+ Peripheral Pulses, No clubbing, cyanosis, or edema  LYMPH: No lymphadenopathy noted  SKIN: No rashes or lesions    Care Discussed with Consultants/Other Providers [ ] YES  [ ] NO

## 2022-05-30 NOTE — PROGRESS NOTE ADULT - SUBJECTIVE AND OBJECTIVE BOX
Neurology Progress Note    S: Patient seen and examined. No new events overnight. patient denied CP, SOB, HA or pain.     Medication:    MEDICATIONS  (STANDING):  allopurinol 100 milliGRAM(s) Oral at bedtime  fenofibrate Tablet 145 milliGRAM(s) Oral daily  losartan 50 milliGRAM(s) Oral daily  melatonin 9 milliGRAM(s) Oral at bedtime  metoprolol succinate ER 75 milliGRAM(s) Oral daily  pantoprazole    Tablet 40 milliGRAM(s) Oral before breakfast    MEDICATIONS  (PRN):  acetaminophen     Tablet .. 650 milliGRAM(s) Oral every 6 hours PRN Moderate Pain (4 - 6)      Vitals:    Vital Signs Last 24 Hrs  T(C): 36.9 (05-30-22 @ 05:06), Max: 37.2 (05-29-22 @ 20:44)  T(F): 98.5 (05-30-22 @ 05:06), Max: 98.9 (05-29-22 @ 20:44)  HR: 84 (05-30-22 @ 05:06) (77 - 84)  BP: 139/60 (05-30-22 @ 05:06) (118/65 - 148/75)  BP(mean): --  RR: 16 (05-30-22 @ 05:06) (16 - 18)  SpO2: 96% (05-30-22 @ 05:06) (96% - 99%)    Orthostatic VS  05-29-22 @ 15:37  Lying BP: 160/76 HR: 77  Sitting BP: 148/75 HR: 80  Standing BP: 135/69 HR: 81  Site: upper right arm  Mode: electronic      General Exam:   General Appearance: Appropriately dressed and in no acute distress       Head: Normocephalic, atraumatic and no dysmorphic features  Ear, Nose, and Throat: Moist mucous membranes  CVS: S1S2+  Resp: No SOB, no wheeze or rhonchi  Abd: soft NTND  Extremities: No edema, no cyanosis  Skin: No bruises, no rashes     Neurological Exam:  Mental Status: Awake, alert and oriented x 2-3.  Able to follow simple and complex verbal commands. Able to name and repeat. fluent speech. No obvious aphasia or dysarthria noted.   Cranial Nerves: PERRL, EOMI, VFFC, sensation V1-V3 intact,  no obvious facial asymmetry, equal elevation of palate, scm/trap 5/5, tongue is midline on protrusion. no obvious papilledema on fundoscopic exam. hearing is grossly intact.   Motor: Normal bulk, tone and strength throughout. Fine finger movements were intact and symmetric. no tremors or drift noted.  at least 4/5 throughout   Sensation: Intact to light touch and pinprick throughout. no right/left confusion. no extinction to tactile on DSS.    Reflexes: 1+ throughout at biceps, brachioradialis, triceps, patellars and ankles bilaterally and equal. No clonus. R toe and L toe were both downgoing.  Coordination: No dysmetria on FNF    Gait: deferred      I personally reviewed the below data/images/labs:      CBC Full  -  ( 30 May 2022 05:14 )  WBC Count : 4.71 K/uL  RBC Count : 4.56 M/uL  Hemoglobin : 13.2 g/dL  Hematocrit : 41.1 %  Platelet Count - Automated : 265 K/uL  Mean Cell Volume : 90.1 fL  Mean Cell Hemoglobin : 28.9 pg  Mean Cell Hemoglobin Concentration : 32.1 gm/dL  Auto Neutrophil # : x  Auto Lymphocyte # : x  Auto Monocyte # : x  Auto Eosinophil # : x  Auto Basophil # : x  Auto Neutrophil % : x  Auto Lymphocyte % : x  Auto Monocyte % : x  Auto Eosinophil % : x  Auto Basophil % : x        05-30    131<L>  |  97<L>  |  14  ----------------------------<  97  4.1   |  25  |  0.94    Ca    10.2      30 May 2022 05:14  Phos  3.7     05-30  Mg     1.90     05-30        ACC: [id number] EXAM:  CT CERVICAL SPINE                        ACC: [id number] EXAM:  CT BRAIN                          PROCEDURE DATE:  05/26/2022          INTERPRETATION:  CT OF THE HEAD WITHOUT CONTRAST  CT CERVICAL SPINE WITHOUT CONTRAST    CLINICAL INDICATION: Trauma code    TECHNIQUE: Volumetric CT acquisition was performed through the brain and   reviewed using brain and bone window technique. Dose optimization   techniques were utilized including kVp/mA modulation along with iterative   reconstructions.  Thin section CT images were obtained through cervical spine with   overlapping reconstructions.  Sagittal, coronal and bilateral oblique 2D   reformats were then generated from the initial images.    COMPARISON: CT head 5/4/2022    FINDINGS:  CT head:  Extensive large posterior predominantly left parietal scalp swelling and   hematoma.  Acute right temporal convexity subdural hematoma measuring 3 mm in   greatest thickness. Acute right parietal convexity subdural hematoma   measuring 8 mm in greatest thickness. No significant associated mass   effect or midline shift.  The ventricular and sulcal size and configuration is age appropriate.     There is no acute loss of gray-white differentiation. There are moderate   patchy areasof hypodensity in the periventricular and subcortical white   matter which are likely related to chronic microangiopathic changes.     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    There is no evidence of mass effect, midline shift.     The calvarium is intact. The paranasal sinuses are clear.Patchy   opacification of the right mastoid air cells which is new since the prior   study. The orbits are unremarkable.      CT cervical spine:      There is straightening of usual cervical lordosis. There is no   significant subluxation. Vertebral body height is preserved throughout   the cervical spine. There is no significant loss of intervertebral disc   height throughout the cervical spine.    There is no significant disc herniation. No definite high-grade central   canal stenosis.     The paravertebral soft tissues are unremarkable.      IMPRESSION:  CT HEAD: Extensive large posterior predominantly left parietal scalp   swelling and hematoma.  Acute small right temporal convexity subdural hematoma. Acute right   parietal convexity subdural hematoma measuring 8 mm in greatest   thickness. No significant associated mass effect or midline shift. No   depressed calvarial fracture. New patchy opacification of the right   mastoid air cells. Underlying skull base fracture not entirely excluded.   Additional chronic findings as above.    CT CERVICAL SPINE: No fracture or acute traumatic malalignment.      Notification to clinician of alert:  Dr. Ramirez was notified about the findings at 2:26 PM on 5/26/2022 with   readback confirmation. The opportunity for questions was provided and all   questions asked were answered.    --- End of Report ---            LEXIS SILVER MD; Attending Radiologist  This document has been electronically signed. May 26 2022  2:26PM    < end of copied text >      < from: CT Head No Cont (05.26.22 @ 20:37) >    ACC: [id number] EXAM:  CT BRAIN                          PROCEDURE DATE:  05/26/2022          INTERPRETATION:  CLINICAL INFORMATION: Subdural hemorrhage, follow-up    TECHNIQUE: Noncontrast axial CT images were acquired of the head.   Two-dimensionalsagittal and coronal reformats were generated.    COMPARISON STUDY: CT head 5/26/2022 1:40 PM.    FINDINGS:  Previously visualized very small acute subdural hemorrhage overlying the   right frontoparietal and temporal lobes appears smaller than on prior   exam. There is interval new minimal subarachnoid hemorrhage within the   right parietal lobe. Chronic lacunar infarct in the right cerebellum and   bilateral basal ganglia are reidentified. Large posterior left parietal   scalp swelling and hematoma, without significant interval change compared   to prior.    Patchy opacification the right mastoid air cells, similar to prior.    No displaced calvarial fracture.      IMPRESSION:  Previously visualized very small acute subdural hemorrhage overlying the   right frontoparietal and temporal lobes appears smaller than on prior   exam. There is interval new minimal subarachnoid hemorrhage within the   right parietal lobe.    Chronic lacunar infarct in the right cerebellum and bilateral basal   ganglia are reidentified.    Large posterior left parietal scalp swelling and hematoma, without   significant interval change compared to prior.    --- End of Report ---          THAI RAMOS MD; Resident Radiology  This document has been electronically signed.  LEOBARDO NUÑEZ MD; Attending Radiologist  This document has been electronically signed. May 26 2022  9:02PM    < end of copied text >      < from: CT Head No Cont (05.26.22 @ 20:37) >    ACC: [id number] EXAM:  CT BRAIN                          PROCEDURE DATE:  05/26/2022          INTERPRETATION:  CLINICAL INFORMATION: Subdural hemorrhage, follow-up    TECHNIQUE: Noncontrast axial CT images were acquired of the head.   Two-dimensionalsagittal and coronal reformats were generated.    COMPARISON STUDY: CT head 5/26/2022 1:40 PM.    FINDINGS:  Previously visualized very small acute subdural hemorrhage overlying the   right frontoparietal and temporal lobes appears smaller than on prior   exam. There is interval new minimal subarachnoid hemorrhage within the   right parietal lobe. Chronic lacunar infarct in the right cerebellum and   bilateral basal ganglia are reidentified. Large posterior left parietal   scalp swelling and hematoma, without significant interval change compared   to prior.    Patchy opacification the right mastoid air cells, similar to prior.    No displaced calvarial fracture.      IMPRESSION:  Previously visualized very small acute subdural hemorrhage overlying the   right frontoparietal and temporal lobes appears smaller than on prior   exam. There is interval new minimal subarachnoid hemorrhage within the   right parietal lobe.    Chronic lacunar infarct in the right cerebellum and bilateral basal   ganglia are reidentified.    Large posterior left parietal scalp swelling and hematoma, without   significant interval change compared to prior.    --- End of Report ---          THAI RAMOS MD; Resident Radiology  This document has been electronically signed.  LEOBARDO NUÑEZ MD; Attending Radiologist  This document has been electronically signed. May 26 2022  9:02PM    < end of copied text >  < from: CT Head No Cont (05.28.22 @ 12:47) >    ACC: [id number] EXAM:  CT BRAIN                          PROCEDURE DATE:  05/28/2022          INTERPRETATION:  INDICATIONS:  Interval evaluation right subdural and   subarachnoid hemorrhages.    TECHNIQUE:  Serial axial images were obtained from the skull base to the   vertex without intravenous contrast. Sagittal and coronal reformats were   performed.    COMPARISON EXAMINATION: Head CTs dated 5/26/2022    FINDINGS:  Interval increase in size of hypodense right frontoparietal convexity   collection measuring up to 9 mm in thickness. There is new 2 mm leftward   midline shift.    Scattered subarachnoid hemorrhage noted within the right temporal   occipital region with unchanged trace right subdural hemorrhage. No   associated midline shift orherniation.    No new intraparenchymal hemorrhage is identified. Moderate chronic white   matter microvascular changes. Bilateral basal ganglia calcifications.   Small old lacunar infarcts in the left thalamus and right cerebellum are   again noted.    No hydrocephalus.    The calvarium is intact. A scalp hematoma along the left parietal region   is decreased in size from 5/26/2022.    Partial opacification of the right mastoid air cells, unchanged. The   visualized portions of the paranasal sinuses are clear.    IMPRESSION:  -Interval increase in size of hypodense right frontoparietal convexity   collection measuring up to 9 mm in thickness. This is favored to   represent a subdural CSF hygroma given hypodense appearance. There is new   2 mmleftward midline shift. Recommend short interval follow-up.    -Residual right temporal occipital subarachnoid hemorrhage again seen.    -Interval decrease in size of a left scalp hematoma.    --- End of Report ---          CARLOS DRAPER MD; Resident Radiologist  This document has been electronically signed.  LUIS DANIEL WAGGONER MD; Attending Radiologist  This document has been electronically signed. May 28 2022  1:44PM    < end of copied text >      < from: Transthoracic Echocardiogram (05.05.22 @ 15:36) >    Patient name: HARSH VERGARA  YOB: 1935   Age: 87 (F)   MR#: [id number]  Study Date: 5/5/2022  Location: [id number]: JOHNNIE Chauhan  Study quality: Technically Difficult  Referring Physician: Jonny Shay MD  Blood Pressure: 146/90 mmHg  Height: 157 cm  Weight: 58 kg  BSA: 1.6 m2  Heart Rate: 119 mmHg  ------------------------------------------------------------------------  PROCEDURE: Transthoracic echocardiogram with 2-D, M-Mode  and complete spectral and color flow Doppler.  INDICATION: Abnormal electrocardiogram (ECG) (EKG) (R94.31)  ------------------------------------------------------------------------  DIMENSIONS:  Dimensions:     Normal Values:  LA:     3.6 cm    2.0 - 4.0 cm  Ao:     3.8 cm    2.0 - 3.8 cm  SEPTUM: 1.0 cm    0.6 - 1.2 cm  PWT:    1.1 cm    0.6 - 1.1 cm  LVIDd:  4.7 cm    3.0 - 5.6 cm  LVIDs:    ---     1.8 - 4.0 cm  Derived Variables:  LVMI: 111 g/m2  RWT: 0.46  ------------------------------------------------------------------------  OBSERVATIONS:  Mitral Valve: Mitral annular calcification, otherwise  normal mitral valve. Minimal mitral regurgitation.  Aortic Root: Aortic Root: 3.8 cm.  Ascending Aorta: 3.8 cm.  Aortic Valve: Calcified trileaflet aortic valve with normal  opening. Minimal aortic regurgitation.  Left Atrium: Normal left atrium.  LA volume index = 16  cc/m2.  Left Ventricle: Hyperdynamic left ventricle. Normal left  ventricular internal dimensions and wall thicknesses.  Right Heart: Normal right atrium.Normal right ventricular  size and function. Normal tricuspid valve. Minimal  tricuspid regurgitation. Normal pulmonic valve. Minimal  pulmonic regurgitation.  Pericardium/PleuraNormal pericardium with no pericardial  effusion.  ------------------------------------------------------------------------  CONCLUSIONS:  1. Calcified trileaflet aortic valve with normal opening.  Minimal aortic regurgitation.  2. Normal left ventricular internal dimensions and wall  thicknesses.  3. Hyperdynamic left ventricle.  4. Normal right ventricular size and function.  ------------------------------------------------------------------------  Confirmed on  5/5/2022 - 19:03:12 by Jae Cortes M.D. RPVI  ------------------------------------------------------------------------    < end of copied text >

## 2022-05-30 NOTE — CHART NOTE - NSCHARTNOTEFT_GEN_A_CORE
discussed ct head results with NS - no intervention planned and no further imaging required
orthostatic positive -will give 500cc bolus
Case and imaging reviewed. Interval CT head shows stable SDH. No neurosurgical intervention at this time. Neurosurgery signing off. Reconsult PRN. Pt can follow up outpatient with Dr. Pond. Case d/w attending.

## 2022-05-30 NOTE — PROGRESS NOTE ADULT - SUBJECTIVE AND OBJECTIVE BOX
C A R D I O L O G Y  **********************************     DATE OF SERVICE: 05-30-22    Patient denies chest pain or shortness of breath.   Review of systems otherwise negative.  	  MEDICATIONS:  MEDICATIONS  (STANDING):  allopurinol 100 milliGRAM(s) Oral at bedtime  fenofibrate Tablet 145 milliGRAM(s) Oral daily  losartan 50 milliGRAM(s) Oral daily  melatonin 9 milliGRAM(s) Oral at bedtime  metoprolol succinate ER 75 milliGRAM(s) Oral daily  pantoprazole    Tablet 40 milliGRAM(s) Oral before breakfast      LABS:	 	    CARDIAC MARKERS:  CARDIAC MARKERS ( 30 May 2022 05:14 )  x     / x     / 26 U/L / x     / x                                    13.2   4.71  )-----------( 265      ( 30 May 2022 05:14 )             41.1     Hemoglobin: 13.2 g/dL (05-30 @ 05:14)  Hemoglobin: 13.3 g/dL (05-29 @ 05:19)  Hemoglobin: 12.9 g/dL (05-28 @ 05:29)  Hemoglobin: 13.8 g/dL (05-26 @ 09:43)      05-30    131<L>  |  97<L>  |  14  ----------------------------<  97  4.1   |  25  |  0.94    Ca    10.2      30 May 2022 05:14  Phos  3.7     05-30  Mg     1.90     05-30      Creatinine Trend: 0.94<--, 0.70<--, 0.68<--, 0.78<--, 0.80<--, 0.82<--    COAGS:       proBNP:   Lipid Profile:   HgA1c:   TSH:       PHYSICAL EXAM:  T(C): 37 (05-30-22 @ 11:00), Max: 37.2 (05-29-22 @ 20:44)  HR: 78 (05-30-22 @ 11:00) (77 - 84)  BP: 112/72 (05-30-22 @ 11:00) (112/72 - 148/75)  RR: 17 (05-30-22 @ 11:00) (16 - 18)  SpO2: 100% (05-30-22 @ 11:00) (96% - 100%)  Wt(kg): --  I&O's Summary        Gen: Appears well in NAD  HEENT:  (-)icterus (-)pallor  CV: N S1 S2 1/6 SUJATA (+)2 Pulses B/l  Resp:  Clear to auscultation B/L, normal effort  GI: (+) BS Soft, NT, ND  Lymph:  (-)Edema, (-)obvious lymphadenopathy  Skin: Warm to touch, Normal turgor  Psych: Appropriate mood and affect      TELEMETRY: SR	      DIAGNOSTIC TESTING:  [ ] Echocardiogram: < from: Transthoracic Echocardiogram (05.05.22 @ 15:36) >  1. Calcified trileaflet aortic valve with normal opening.  Minimal aortic regurgitation.  2. Normal left ventricular internal dimensions and wall  thicknesses.  3. Hyperdynamic left ventricle.  4. Normal right ventricular size and function.    < end of copied text >    ASSESSMENT/PLAN:  88y/o F w/ h/o HTN, HLD, recent hospitalization for UTI, cholecystitis p/w fall.    - Tele stable in NSR  - No evidence of clinical HF or anginal symptoms  - CTH noted, no intervention per neuro sx.   - Monitor orthostatics  - Recent TTE with normal LV function   - No further inpatient cardiac workup expected at this time    Fredy Fischer PA-C  Pager: [phone]

## 2022-05-31 LAB
ANION GAP SERPL CALC-SCNC: 8 MMOL/L — SIGNIFICANT CHANGE UP (ref 7–14)
BUN SERPL-MCNC: 15 MG/DL — SIGNIFICANT CHANGE UP (ref 7–23)
CALCIUM SERPL-MCNC: 10.1 MG/DL — SIGNIFICANT CHANGE UP (ref 8.4–10.5)
CHLORIDE SERPL-SCNC: 101 MMOL/L — SIGNIFICANT CHANGE UP (ref 98–107)
CK SERPL-CCNC: 26 U/L — SIGNIFICANT CHANGE UP (ref 25–170)
CO2 SERPL-SCNC: 24 MMOL/L — SIGNIFICANT CHANGE UP (ref 22–31)
CREAT SERPL-MCNC: 0.83 MG/DL — SIGNIFICANT CHANGE UP (ref 0.5–1.3)
EGFR: 69 ML/MIN/1.73M2 — SIGNIFICANT CHANGE UP
GLUCOSE SERPL-MCNC: 96 MG/DL — SIGNIFICANT CHANGE UP (ref 70–99)
HCT VFR BLD CALC: 40.4 % — SIGNIFICANT CHANGE UP (ref 34.5–45)
HGB BLD-MCNC: 12.6 G/DL — SIGNIFICANT CHANGE UP (ref 11.5–15.5)
MAGNESIUM SERPL-MCNC: 1.8 MG/DL — SIGNIFICANT CHANGE UP (ref 1.6–2.6)
MCHC RBC-ENTMCNC: 29.4 PG — SIGNIFICANT CHANGE UP (ref 27–34)
MCHC RBC-ENTMCNC: 31.2 GM/DL — LOW (ref 32–36)
MCV RBC AUTO: 94.4 FL — SIGNIFICANT CHANGE UP (ref 80–100)
NRBC # BLD: 0 /100 WBCS — SIGNIFICANT CHANGE UP
NRBC # FLD: 0 K/UL — SIGNIFICANT CHANGE UP
OSMOLALITY UR: 389 MOSM/KG — SIGNIFICANT CHANGE UP (ref 50–1200)
PHOSPHATE SERPL-MCNC: 3.2 MG/DL — SIGNIFICANT CHANGE UP (ref 2.5–4.5)
PLATELET # BLD AUTO: 253 K/UL — SIGNIFICANT CHANGE UP (ref 150–400)
POTASSIUM SERPL-MCNC: 4.5 MMOL/L — SIGNIFICANT CHANGE UP (ref 3.5–5.3)
POTASSIUM SERPL-SCNC: 4.5 MMOL/L — SIGNIFICANT CHANGE UP (ref 3.5–5.3)
RBC # BLD: 4.28 M/UL — SIGNIFICANT CHANGE UP (ref 3.8–5.2)
RBC # FLD: 14.6 % — HIGH (ref 10.3–14.5)
SODIUM SERPL-SCNC: 133 MMOL/L — LOW (ref 135–145)
SODIUM UR-SCNC: 71 MMOL/L — SIGNIFICANT CHANGE UP
WBC # BLD: 4.99 K/UL — SIGNIFICANT CHANGE UP (ref 3.8–10.5)
WBC # FLD AUTO: 4.99 K/UL — SIGNIFICANT CHANGE UP (ref 3.8–10.5)

## 2022-05-31 PROCEDURE — 99232 SBSQ HOSP IP/OBS MODERATE 35: CPT

## 2022-05-31 PROCEDURE — 95816 EEG AWAKE AND DROWSY: CPT | Mod: 26

## 2022-05-31 RX ADMIN — Medication 9 MILLIGRAM(S): at 21:19

## 2022-05-31 RX ADMIN — LOSARTAN POTASSIUM 50 MILLIGRAM(S): 100 TABLET, FILM COATED ORAL at 05:08

## 2022-05-31 RX ADMIN — Medication 75 MILLIGRAM(S): at 05:08

## 2022-05-31 RX ADMIN — Medication 145 MILLIGRAM(S): at 12:10

## 2022-05-31 RX ADMIN — PANTOPRAZOLE SODIUM 40 MILLIGRAM(S): 20 TABLET, DELAYED RELEASE ORAL at 05:08

## 2022-05-31 RX ADMIN — Medication 100 MILLIGRAM(S): at 21:19

## 2022-05-31 NOTE — PROGRESS NOTE ADULT - SUBJECTIVE AND OBJECTIVE BOX
Neurology Progress Note    S: Patient seen and examined. No new events overnight. patient denied CP, SOB, HA or pain. + orthostatic    Medication:    MEDICATIONS  (STANDING):  allopurinol 100 milliGRAM(s) Oral at bedtime  fenofibrate Tablet 145 milliGRAM(s) Oral daily  losartan 50 milliGRAM(s) Oral daily  melatonin 9 milliGRAM(s) Oral at bedtime  metoprolol succinate ER 75 milliGRAM(s) Oral daily  pantoprazole    Tablet 40 milliGRAM(s) Oral before breakfast    MEDICATIONS  (PRN):  acetaminophen     Tablet .. 650 milliGRAM(s) Oral every 6 hours PRN Moderate Pain (4 - 6)      Vitals:    Vital Signs Last 24 Hrs  T(C): 36.9 (05-31-22 @ 05:11), Max: 37.1 (05-30-22 @ 21:08)  T(F): 98.5 (05-31-22 @ 05:11), Max: 98.7 (05-30-22 @ 21:08)  HR: 62 (05-31-22 @ 05:11) (62 - 82)  BP: 125/52 (05-31-22 @ 05:11) (112/72 - 150/57)  BP(mean): --  RR: 17 (05-31-22 @ 05:11) (17 - 17)  SpO2: 99% (05-31-22 @ 05:11) (99% - 100%)    Orthostatic VS  05-30-22 @ 15:31  Lying BP: 154/73 HR: 76  Sitting BP: 139/65 HR: 77  Standing BP: 127/63 HR: 77  Site: --  Mode: --  Orthostatic VS  05-29-22 @ 15:37  Lying BP: 160/76 HR: 77  Sitting BP: 148/75 HR: 80  Standing BP: 135/69 HR: 81  Site: upper right arm  Mode: electronic      Orthostatic VS  05-29-22 @ 15:37  Lying BP: 160/76 HR: 77  Sitting BP: 148/75 HR: 80  Standing BP: 135/69 HR: 81  Site: upper right arm  Mode: electronic      General Exam:   General Appearance: Appropriately dressed and in no acute distress       Head: Normocephalic, atraumatic and no dysmorphic features  Ear, Nose, and Throat: Moist mucous membranes  CVS: S1S2+  Resp: No SOB, no wheeze or rhonchi  Abd: soft NTND  Extremities: No edema, no cyanosis  Skin: No bruises, no rashes     Neurological Exam:  Mental Status: Awake, alert and oriented x 2-3.  Able to follow simple and complex verbal commands. Able to name and repeat. fluent speech. No obvious aphasia or dysarthria noted.   Cranial Nerves: PERRL, EOMI, VFFC, sensation V1-V3 intact,  no obvious facial asymmetry, equal elevation of palate, scm/trap 5/5, tongue is midline on protrusion. no obvious papilledema on fundoscopic exam. hearing is grossly intact.   Motor: Normal bulk, tone and strength throughout. Fine finger movements were intact and symmetric. no tremors or drift noted.  at least 4/5 throughout   Sensation: Intact to light touch and pinprick throughout. no right/left confusion. no extinction to tactile on DSS.    Reflexes: 1+ throughout at biceps, brachioradialis, triceps, patellars and ankles bilaterally and equal. No clonus. R toe and L toe were both downgoing.  Coordination: No dysmetria on FNF    Gait: deferred      I personally reviewed the below data/images/labs:    CBC Full  -  ( 30 May 2022 05:14 )  WBC Count : 4.71 K/uL  RBC Count : 4.56 M/uL  Hemoglobin : 13.2 g/dL  Hematocrit : 41.1 %  Platelet Count - Automated : 265 K/uL  Mean Cell Volume : 90.1 fL  Mean Cell Hemoglobin : 28.9 pg  Mean Cell Hemoglobin Concentration : 32.1 gm/dL  Auto Neutrophil # : x  Auto Lymphocyte # : x  Auto Monocyte # : x  Auto Eosinophil # : x  Auto Basophil # : x  Auto Neutrophil % : x  Auto Lymphocyte % : x  Auto Monocyte % : x  Auto Eosinophil % : x  Auto Basophil % : x        05-30    131<L>  |  97<L>  |  14  ----------------------------<  97  4.1   |  25  |  0.94    Ca    10.2      30 May 2022 05:14  Phos  3.7     05-30  Mg     1.90     05-30      ACC: [id number] EXAM:  CT CERVICAL SPINE                        ACC: [id number] EXAM:  CT BRAIN                          PROCEDURE DATE:  05/26/2022          INTERPRETATION:  CT OF THE HEAD WITHOUT CONTRAST  CT CERVICAL SPINE WITHOUT CONTRAST    CLINICAL INDICATION: Trauma code    TECHNIQUE: Volumetric CT acquisition was performed through the brain and   reviewed using brain and bone window technique. Dose optimization   techniques were utilized including kVp/mA modulation along with iterative   reconstructions.  Thin section CT images were obtained through cervical spine with   overlapping reconstructions.  Sagittal, coronal and bilateral oblique 2D   reformats were then generated from the initial images.    COMPARISON: CT head 5/4/2022    FINDINGS:  CT head:  Extensive large posterior predominantly left parietal scalp swelling and   hematoma.  Acute right temporal convexity subdural hematoma measuring 3 mm in   greatest thickness. Acute right parietal convexity subdural hematoma   measuring 8 mm in greatest thickness. No significant associated mass   effect or midline shift.  The ventricular and sulcal size and configuration is age appropriate.     There is no acute loss of gray-white differentiation. There are moderate   patchy areasof hypodensity in the periventricular and subcortical white   matter which are likely related to chronic microangiopathic changes.     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    There is no evidence of mass effect, midline shift.     The calvarium is intact. The paranasal sinuses are clear.Patchy   opacification of the right mastoid air cells which is new since the prior   study. The orbits are unremarkable.      CT cervical spine:      There is straightening of usual cervical lordosis. There is no   significant subluxation. Vertebral body height is preserved throughout   the cervical spine. There is no significant loss of intervertebral disc   height throughout the cervical spine.    There is no significant disc herniation. No definite high-grade central   canal stenosis.     The paravertebral soft tissues are unremarkable.      IMPRESSION:  CT HEAD: Extensive large posterior predominantly left parietal scalp   swelling and hematoma.  Acute small right temporal convexity subdural hematoma. Acute right   parietal convexity subdural hematoma measuring 8 mm in greatest   thickness. No significant associated mass effect or midline shift. No   depressed calvarial fracture. New patchy opacification of the right   mastoid air cells. Underlying skull base fracture not entirely excluded.   Additional chronic findings as above.    CT CERVICAL SPINE: No fracture or acute traumatic malalignment.      Notification to clinician of alert:  Dr. Ramirez was notified about the findings at 2:26 PM on 5/26/2022 with   readback confirmation. The opportunity for questions was provided and all   questions asked were answered.    --- End of Report ---            LEXIS SILVER MD; Attending Radiologist  This document has been electronically signed. May 26 2022  2:26PM    < end of copied text >      < from: CT Head No Cont (05.26.22 @ 20:37) >    ACC: [id number] EXAM:  CT BRAIN                          PROCEDURE DATE:  05/26/2022          INTERPRETATION:  CLINICAL INFORMATION: Subdural hemorrhage, follow-up    TECHNIQUE: Noncontrast axial CT images were acquired of the head.   Two-dimensionalsagittal and coronal reformats were generated.    COMPARISON STUDY: CT head 5/26/2022 1:40 PM.    FINDINGS:  Previously visualized very small acute subdural hemorrhage overlying the   right frontoparietal and temporal lobes appears smaller than on prior   exam. There is interval new minimal subarachnoid hemorrhage within the   right parietal lobe. Chronic lacunar infarct in the right cerebellum and   bilateral basal ganglia are reidentified. Large posterior left parietal   scalp swelling and hematoma, without significant interval change compared   to prior.    Patchy opacification the right mastoid air cells, similar to prior.    No displaced calvarial fracture.      IMPRESSION:  Previously visualized very small acute subdural hemorrhage overlying the   right frontoparietal and temporal lobes appears smaller than on prior   exam. There is interval new minimal subarachnoid hemorrhage within the   right parietal lobe.    Chronic lacunar infarct in the right cerebellum and bilateral basal   ganglia are reidentified.    Large posterior left parietal scalp swelling and hematoma, without   significant interval change compared to prior.    --- End of Report ---          THAI RAMOS MD; Resident Radiology  This document has been electronically signed.  LEOBARDO NUÑEZ MD; Attending Radiologist  This document has been electronically signed. May 26 2022  9:02PM    < end of copied text >      < from: CT Head No Cont (05.26.22 @ 20:37) >    ACC: [id number] EXAM:  CT BRAIN                          PROCEDURE DATE:  05/26/2022          INTERPRETATION:  CLINICAL INFORMATION: Subdural hemorrhage, follow-up    TECHNIQUE: Noncontrast axial CT images were acquired of the head.   Two-dimensionalsagittal and coronal reformats were generated.    COMPARISON STUDY: CT head 5/26/2022 1:40 PM.    FINDINGS:  Previously visualized very small acute subdural hemorrhage overlying the   right frontoparietal and temporal lobes appears smaller than on prior   exam. There is interval new minimal subarachnoid hemorrhage within the   right parietal lobe. Chronic lacunar infarct in the right cerebellum and   bilateral basal ganglia are reidentified. Large posterior left parietal   scalp swelling and hematoma, without significant interval change compared   to prior.    Patchy opacification the right mastoid air cells, similar to prior.    No displaced calvarial fracture.      IMPRESSION:  Previously visualized very small acute subdural hemorrhage overlying the   right frontoparietal and temporal lobes appears smaller than on prior   exam. There is interval new minimal subarachnoid hemorrhage within the   right parietal lobe.    Chronic lacunar infarct in the right cerebellum and bilateral basal   ganglia are reidentified.    Large posterior left parietal scalp swelling and hematoma, without   significant interval change compared to prior.    --- End of Report ---          THAI RAMOS MD; Resident Radiology  This document has been electronically signed.  LEOBARDO NUÑEZ MD; Attending Radiologist  This document has been electronically signed. May 26 2022  9:02PM    < end of copied text >  < from: CT Head No Cont (05.28.22 @ 12:47) >    ACC: [id number] EXAM:  CT BRAIN                          PROCEDURE DATE:  05/28/2022          INTERPRETATION:  INDICATIONS:  Interval evaluation right subdural and   subarachnoid hemorrhages.    TECHNIQUE:  Serial axial images were obtained from the skull base to the   vertex without intravenous contrast. Sagittal and coronal reformats were   performed.    COMPARISON EXAMINATION: Head CTs dated 5/26/2022    FINDINGS:  Interval increase in size of hypodense right frontoparietal convexity   collection measuring up to 9 mm in thickness. There is new 2 mm leftward   midline shift.    Scattered subarachnoid hemorrhage noted within the right temporal   occipital region with unchanged trace right subdural hemorrhage. No   associated midline shift orherniation.    No new intraparenchymal hemorrhage is identified. Moderate chronic white   matter microvascular changes. Bilateral basal ganglia calcifications.   Small old lacunar infarcts in the left thalamus and right cerebellum are   again noted.    No hydrocephalus.    The calvarium is intact. A scalp hematoma along the left parietal region   is decreased in size from 5/26/2022.    Partial opacification of the right mastoid air cells, unchanged. The   visualized portions of the paranasal sinuses are clear.    IMPRESSION:  -Interval increase in size of hypodense right frontoparietal convexity   collection measuring up to 9 mm in thickness. This is favored to   represent a subdural CSF hygroma given hypodense appearance. There is new   2 mmleftward midline shift. Recommend short interval follow-up.    -Residual right temporal occipital subarachnoid hemorrhage again seen.    -Interval decrease in size of a left scalp hematoma.    --- End of Report ---          CARLOS DRAPER MD; Resident Radiologist  This document has been electronically signed.  LUIS DANIEL WAGGONER MD; Attending Radiologist  This document has been electronically signed. May 28 2022  1:44PM    < end of copied text >      < from: Transthoracic Echocardiogram (05.05.22 @ 15:36) >    Patient name: HARSH VERGARA  YOB: 1935   Age: 87 (F)   MR#: [id number]  Study Date: 5/5/2022  Location: [id number]: JOHNNIE Chauhan  Study quality: Technically Difficult  Referring Physician: Jonny Shay MD  Blood Pressure: 146/90 mmHg  Height: 157 cm  Weight: 58 kg  BSA: 1.6 m2  Heart Rate: 119 mmHg  ------------------------------------------------------------------------  PROCEDURE: Transthoracic echocardiogram with 2-D, M-Mode  and complete spectral and color flow Doppler.  INDICATION: Abnormal electrocardiogram (ECG) (EKG) (R94.31)  ------------------------------------------------------------------------  DIMENSIONS:  Dimensions:     Normal Values:  LA:     3.6 cm    2.0 - 4.0 cm  Ao:     3.8 cm    2.0 - 3.8 cm  SEPTUM: 1.0 cm    0.6 - 1.2 cm  PWT:    1.1 cm    0.6 - 1.1 cm  LVIDd:  4.7 cm    3.0 - 5.6 cm  LVIDs:    ---     1.8 - 4.0 cm  Derived Variables:  LVMI: 111 g/m2  RWT: 0.46  ------------------------------------------------------------------------  OBSERVATIONS:  Mitral Valve: Mitral annular calcification, otherwise  normal mitral valve. Minimal mitral regurgitation.  Aortic Root: Aortic Root: 3.8 cm.  Ascending Aorta: 3.8 cm.  Aortic Valve: Calcified trileaflet aortic valve with normal  opening. Minimal aortic regurgitation.  Left Atrium: Normal left atrium.  LA volume index = 16  cc/m2.  Left Ventricle: Hyperdynamic left ventricle. Normal left  ventricular internal dimensions and wall thicknesses.  Right Heart: Normal right atrium.Normal right ventricular  size and function. Normal tricuspid valve. Minimal  tricuspid regurgitation. Normal pulmonic valve. Minimal  pulmonic regurgitation.  Pericardium/PleuraNormal pericardium with no pericardial  effusion.  ------------------------------------------------------------------------  CONCLUSIONS:  1. Calcified trileaflet aortic valve with normal opening.  Minimal aortic regurgitation.  2. Normal left ventricular internal dimensions and wall  thicknesses.  3. Hyperdynamic left ventricle.  4. Normal right ventricular size and function.  ------------------------------------------------------------------------  Confirmed on  5/5/2022 - 19:03:12 by AGUSTIN Crowe  ------------------------------------------------------------------------    < end of copied text >

## 2022-05-31 NOTE — PROGRESS NOTE ADULT - SUBJECTIVE AND OBJECTIVE BOX
C A R D I O [facility] O G [facility]  **********************************     DATE OF SERVICE: 05-31-22    S: no chest pain or shortness of breath.   Review of systems otherwise negative.  	  acetaminophen     Tablet .. 650 milliGRAM(s) Oral every 6 hours PRN  allopurinol 100 milliGRAM(s) Oral at bedtime  fenofibrate Tablet 145 milliGRAM(s) Oral daily  losartan 50 milliGRAM(s) Oral daily  melatonin 9 milliGRAM(s) Oral at bedtime  metoprolol succinate ER 75 milliGRAM(s) Oral daily  pantoprazole    Tablet 40 milliGRAM(s) Oral before breakfast                            12.6   4.99  )-----------( 253      ( 31 May 2022 07:40 )             40.4       05-31    133<L>  |  101  |  15  ----------------------------<  96  4.5   |  24  |  0.83    Ca    10.1      31 May 2022 07:40  Phos  3.2     05-31  Mg     1.80     05-31        CARDIAC MARKERS ( 31 May 2022 07:40 )  x     / x     / 26 U/L / x     / x      CARDIAC MARKERS ( 30 May 2022 05:14 )  x     / x     / 26 U/L / x     / x            T(C): 36.9 (05-31-22 @ 05:11), Max: 37.1 (05-30-22 @ 21:08)  HR: 62 (05-31-22 @ 05:11) (62 - 82)  BP: 125/52 (05-31-22 @ 05:11) (125/52 - 150/57)  RR: 17 (05-31-22 @ 05:11) (17 - 17)  SpO2: 99% (05-31-22 @ 05:11) (99% - 99%)  Wt(kg): --    I&O's Summary      Gen: Appears well in NAD  HEENT:  (-)icterus (-)pallor  CV: N S1 S2 1/6 SUJATA (+)2 Pulses B/l  Resp:  Clear to auscultation B/L, normal effort  GI: (+) BS Soft, NT, ND  Lymph:  (-)Edema, (-)obvious lymphadenopathy  Skin: Warm to touch, Normal turgor  Psych: Appropriate mood and affect      TELEMETRY: SR	      DIAGNOSTIC TESTING:  [ ] Echocardiogram: < from: Transthoracic Echocardiogram (05.05.22 @ 15:36) >  1. Calcified trileaflet aortic valve with normal opening.  Minimal aortic regurgitation.  2. Normal left ventricular internal dimensions and wall  thicknesses.  3. Hyperdynamic left ventricle.  4. Normal right ventricular size and function.    < end of copied text >    ASSESSMENT/PLAN:  88y/o F w/ h/o HTN, HLD, recent hospitalization for UTI, cholecystitis p/w fall.    - Tele stable in NSR  - No evidence of clinical HF or anginal symptoms  - CTH noted, no intervention per neuro sx.   - Monitor orthostatics  - Recent TTE with normal LV function   - No further inpatient cardiac workup expected at this time  - EEG noted - follow up neurology     Carolyn Pike MD

## 2022-05-31 NOTE — EEG REPORT - NS EEG TEXT BOX
HARSH VERGARA [id number]     Study Date: 		05-31-22    --------------------------------------------------------------------------------------------------  History:  CC/ HPI Patient is a 87y old  Female who presents with a chief complaint of fall (31 May 2022 07:58)    MEDICATIONS  (STANDING):  allopurinol 100 milliGRAM(s) Oral at bedtime  fenofibrate Tablet 145 milliGRAM(s) Oral daily  losartan 50 milliGRAM(s) Oral daily  melatonin 9 milliGRAM(s) Oral at bedtime  metoprolol succinate ER 75 milliGRAM(s) Oral daily  pantoprazole    Tablet 40 milliGRAM(s) Oral before breakfast    --------------------------------------------------------------------------------------------------  Study Interpretation:    [Abbreviation Key:  PDR=alpha rhythm/posterior dominant rhythm. A-P=anterior posterior.  Amplitude: ‘very low’:<20; ‘low’:20-49; ‘medium’:; ‘high’:>150uV.  Persistence for periodic/rhythmic patterns (% of epoch) ‘rare’:<1%; ‘occasional’:1-10%; ‘frequent’:10-50%; ‘abundant’:50-90%; ‘continuous’:>90%.  Persistence for sporadic discharges: ‘rare’:<1/hr; ‘occasional’:1/min-1/hr; ‘frequent’:>1/min; ‘abundant’:>1/10 sec.  RPP=rhythmic and periodic patterns; GRDA=generalized rhythmic delta activity; FIRDA=frontal intermittent GRDA; LRDA=lateralized rhythmic delta activity; TIRDA=temporal intermittent rhythmic delta activity;  LPD=PLED=lateralized periodic discharges; GPD=generalized periodic discharges; BIPDs =bilateral independent periodic discharges; Mf=multifocal; SIRPDs=stimulus induced rhythmic, periodic, or ictal appearing discharges; BIRDs=brief potentially ictal rhythmic discharges >4 Hz, lasting .5-10s; PFA (paroxysmal bursts >13 Hz or =8 Hz <10s).  Modifiers: +F=with fast component; +S=with spike component; +R=with rhythmic component.  S-B=burst suppression pattern.  Max=maximal. N1-drowsy; N2-stage II sleep; N3-slow wave sleep. SSS/BETS=small sharp spikes/benign epileptiform transients of sleep. HV=hyperventilation; PS=photic stimulation]    Daily EEG Visual Analysis  FINDINGS:      Background:  Continuous: continuous  Symmetry: symmetric  PDR: 7.5-8 Hz activity, with amplitude to 40 uV, that attenuated to eye opening.    Reactivity: present  Voltage: normal (between 20-150uV)  Anterior Posterior Gradient: present  Other background findings: none  Breach: absent    Background Slowing:  Generalized slowing: diffuse irregular delta and theta activity.  Focal slowing: none was present.    State Changes:   -N2 sleep transients were not recorded.    Sporadic Epileptiform Discharges:    None    Rhythmic and Periodic Patterns (RPPs):  None     Electrographic and Electroclinical seizures:  None    Other Clinical Events:  None    Artifacts:  Intermittent myogenic and movement artifacts were noted.    ECG:  The heart rate on single channel ECG was predominantly between 54-66 BPM.    EEG Classification / Summary:  Abnormal EEG study  Mild generalized background slowing    -----------------------------------------------------------------------------------------------------    Clinical Impression:  mild diffuse/multifocal cerebral dysfunction, not specific as to etiology.  There were no epileptiform abnormalities recorded.      -------------------------------------------------------------------------------------------------------  [facility] EEG Reading Room Ph#: [phone]  Epilepsy Answering Service after 5PM and before 8:30AM: Ph#: [phone]    Farhan Suarez M.D.   of Neurology, [facility] Epilepsy [place]

## 2022-05-31 NOTE — PROGRESS NOTE ADULT - SUBJECTIVE AND OBJECTIVE BOX
HARSH VERGARA 87y [id number]    Patient is a 87y old  Female who presents with a chief complaint of fall (31 May 2022 07:58)      Follow Up/CC:  ID following for    Interval History/ROS:    Allergies    penicillin (Unknown)    Intolerances        ANTIMICROBIALS:      MEDICATIONS  (STANDING):  allopurinol 100 milliGRAM(s) Oral at bedtime  fenofibrate Tablet 145 milliGRAM(s) Oral daily  losartan 50 milliGRAM(s) Oral daily  melatonin 9 milliGRAM(s) Oral at bedtime  metoprolol succinate ER 75 milliGRAM(s) Oral daily  pantoprazole    Tablet 40 milliGRAM(s) Oral before breakfast    MEDICATIONS  (PRN):  acetaminophen     Tablet .. 650 milliGRAM(s) Oral every 6 hours PRN Moderate Pain (4 - 6)        Vital Signs Last 24 Hrs  T(C): 36.9 (31 May 2022 05:11), Max: 37.1 (30 May 2022 21:08)  T(F): 98.5 (31 May 2022 05:11), Max: 98.7 (30 May 2022 21:08)  HR: 62 (31 May 2022 05:11) (62 - 82)  BP: 125/52 (31 May 2022 05:11) (125/52 - 150/57)  BP(mean): --  RR: 17 (31 May 2022 05:11) (17 - 17)  SpO2: 99% (31 May 2022 05:11) (99% - 99%)    CBC Full  -  ( 31 May 2022 07:40 )  WBC Count : 4.99 K/uL  RBC Count : 4.28 M/uL  Hemoglobin : 12.6 g/dL  Hematocrit : 40.4 %  Platelet Count - Automated : 253 K/uL  Mean Cell Volume : 94.4 fL  Mean Cell Hemoglobin : 29.4 pg  Mean Cell Hemoglobin Concentration : 31.2 gm/dL  Auto Neutrophil # : x  Auto Lymphocyte # : x  Auto Monocyte # : x  Auto Eosinophil # : x  Auto Basophil # : x  Auto Neutrophil % : x  Auto Lymphocyte % : x  Auto Monocyte % : x  Auto Eosinophil % : x  Auto Basophil % : x    05-31    133<L>  |  101  |  15  ----------------------------<  96  4.5   |  24  |  0.83    Ca    10.1      31 May 2022 07:40  Phos  3.2     05-31  Mg     1.80     05-31            MICROBIOLOGY:  .Blood Blood-Peripheral  05-04-22   No Growth Final  --  --      .Blood Blood-Peripheral  05-04-22   No Growth Final  --  --              v            RADIOLOGY     HARSH VERGARA 87y [id number]    Patient is a 87y old  Female who presents with a chief complaint of fall (31 May 2022 07:58)      Follow Up/CC:  ID following for gallbladder perforation    Interval History/ROS: no fever, no abd pain    Allergies    penicillin (Unknown)    Intolerances        ANTIMICROBIALS:      MEDICATIONS  (STANDING):  allopurinol 100 milliGRAM(s) Oral at bedtime  fenofibrate Tablet 145 milliGRAM(s) Oral daily  losartan 50 milliGRAM(s) Oral daily  melatonin 9 milliGRAM(s) Oral at bedtime  metoprolol succinate ER 75 milliGRAM(s) Oral daily  pantoprazole    Tablet 40 milliGRAM(s) Oral before breakfast    MEDICATIONS  (PRN):  acetaminophen     Tablet .. 650 milliGRAM(s) Oral every 6 hours PRN Moderate Pain (4 - 6)        Vital Signs Last 24 Hrs  T(C): 36.9 (31 May 2022 05:11), Max: 37.1 (30 May 2022 21:08)  T(F): 98.5 (31 May 2022 05:11), Max: 98.7 (30 May 2022 21:08)  HR: 62 (31 May 2022 05:11) (62 - 82)  BP: 125/52 (31 May 2022 05:11) (125/52 - 150/57)  BP(mean): --  RR: 17 (31 May 2022 05:11) (17 - 17)  SpO2: 99% (31 May 2022 05:11) (99% - 99%)    CBC Full  -  ( 31 May 2022 07:40 )  WBC Count : 4.99 K/uL  RBC Count : 4.28 M/uL  Hemoglobin : 12.6 g/dL  Hematocrit : 40.4 %  Platelet Count - Automated : 253 K/uL  Mean Cell Volume : 94.4 fL  Mean Cell Hemoglobin : 29.4 pg  Mean Cell Hemoglobin Concentration : 31.2 gm/dL  Auto Neutrophil # : x  Auto Lymphocyte # : x  Auto Monocyte # : x  Auto Eosinophil # : x  Auto Basophil # : x  Auto Neutrophil % : x  Auto Lymphocyte % : x  Auto Monocyte % : x  Auto Eosinophil % : x  Auto Basophil % : x    05-31    133<L>  |  101  |  15  ----------------------------<  96  4.5   |  24  |  0.83    Ca    10.1      31 May 2022 07:40  Phos  3.2     05-31  Mg     1.80     05-31            MICROBIOLOGY:  .Blood Blood-Peripheral  05-04-22   No Growth Final  --  --      .Blood Blood-Peripheral  05-04-22   No Growth Final  --  --      RADIOLOGY    < from: CT Chest/abd w/ IV Cont (05.27.22 @ 04:05) >  Suggestion of mild soft tissue contusion the left anterior abdominal wall   (75:3).    Otherwise, no sequela of acute traumatic injury in the chest, abdomen or   pelvis.    Redemonstrated marked irregular gallbladder thickening, gangrenous acute   cholecystitis or neoplasm difficult to exclude as better delineated on   recent MRI. The ascending colon/hepatic flexure traverses a region of   previously identified contained perforation; fistulization again cannot   be excluded.    Continued prominence of endometrial echo complex for which nonemergent   pelvic ultrasound correlation is advised to exclude endometrial   malignancy.    Asymmetric right breast retroareolar nodular density (68:2) for which   nonemergent breast imaging correlation is advised.    Additional findings as mentionedabove.    < end of copied text >

## 2022-05-31 NOTE — PROGRESS NOTE ADULT - SUBJECTIVE AND OBJECTIVE BOX
Patient is a 87y old  Female who presents with a chief complaint of fall (31 May 2022 15:04)    Date of servie : 05-31-22 @ 15:46  INTERVAL HPI/OVERNIGHT EVENTS:  T(C): 36.9 (05-31-22 @ 05:11), Max: 37.1 (05-30-22 @ 21:08)  HR: 62 (05-31-22 @ 05:11) (62 - 82)  BP: 125/52 (05-31-22 @ 05:11) (125/52 - 150/57)  RR: 17 (05-31-22 @ 05:11) (17 - 17)  SpO2: 99% (05-31-22 @ 05:11) (99% - 99%)  Wt(kg): --  I&O's Summary    31 May 2022 07:01  -  31 May 2022 15:46  --------------------------------------------------------  IN: 400 mL / OUT: 300 mL / NET: 100 mL        LABS:                        12.6   4.99  )-----------( 253      ( 31 May 2022 07:40 )             40.4     05-31    133<L>  |  101  |  15  ----------------------------<  96  4.5   |  24  |  0.83    Ca    10.1      31 May 2022 07:40  Phos  3.2     05-31  Mg     1.80     05-31          CAPILLARY BLOOD GLUCOSE                MEDICATIONS  (STANDING):  allopurinol 100 milliGRAM(s) Oral at bedtime  fenofibrate Tablet 145 milliGRAM(s) Oral daily  losartan 50 milliGRAM(s) Oral daily  melatonin 9 milliGRAM(s) Oral at bedtime  metoprolol succinate ER 75 milliGRAM(s) Oral daily  pantoprazole    Tablet 40 milliGRAM(s) Oral before breakfast    MEDICATIONS  (PRN):  acetaminophen     Tablet .. 650 milliGRAM(s) Oral every 6 hours PRN Moderate Pain (4 - 6)          PHYSICAL EXAM:  GENERAL: frail  CHEST/LUNG: Clear to percussion bilaterally; No rales, rhonchi, wheezing, or rubs  HEART: Regular rate and rhythm; No murmurs, rubs, or gallops  ABDOMEN: Soft, Nontender, Nondistended; Bowel sounds present  EXTREMITIES:  edema +    Care Discussed with Consultants/Other Providers [ ] YES  [ ] NO

## 2022-05-31 NOTE — PROGRESS NOTE ADULT - SUBJECTIVE AND OBJECTIVE BOX
[facility]   MD KRISTEN MARQUES MD KRISTINE SOLTANPOUR, DO ANGELA WONG, PA    TEL:  OFFICE: [phone]  From 5pm-7am Answering Service [phone]    -- RENAL FOLLOW UP NOTE ---Date of Service 05-31-22 @ 15:05    Patient is a 87y old  Female who presents with a chief complaint of fall (31 May 2022 12:25)      Patient seen and examined at bedside. No chest pain/sob    VITALS:  T(F): 98.5 (05-31-22 @ 05:11), Max: 98.7 (05-30-22 @ 21:08)  HR: 62 (05-31-22 @ 05:11)  BP: 125/52 (05-31-22 @ 05:11)  RR: 17 (05-31-22 @ 05:11)  SpO2: 99% (05-31-22 @ 05:11)  Wt(kg): --    05-31 @ 07:01  -  05-31 @ 15:05  --------------------------------------------------------  IN: 400 mL / OUT: 300 mL / NET: 100 mL          PHYSICAL EXAM:  Constitutional: NAD  Neck: No JVD  Respiratory: CTAB, no wheezes, rales or rhonchi  Cardiovascular: S1, S2, RRR  Gastrointestinal: BS+, soft, NT/ND  Extremities: No peripheral edema    Hospital Medications:   MEDICATIONS  (STANDING):  allopurinol 100 milliGRAM(s) Oral at bedtime  fenofibrate Tablet 145 milliGRAM(s) Oral daily  losartan 50 milliGRAM(s) Oral daily  melatonin 9 milliGRAM(s) Oral at bedtime  metoprolol succinate ER 75 milliGRAM(s) Oral daily  pantoprazole    Tablet 40 milliGRAM(s) Oral before breakfast      LABS:  05-31    133<L>  |  101  |  15  ----------------------------<  96  4.5   |  24  |  0.83    Ca    10.1      31 May 2022 07:40  Phos  3.2     05-31  Mg     1.80     05-31      Creatinine Trend: 0.83 <--, 0.94 <--, 0.70 <--, 0.68 <--, 0.78 <--, 0.80 <--    Phosphorus Level, Serum: 3.2 mg/dL (05-31 @ 07:40)                              12.6   4.99  )-----------( 253      ( 31 May 2022 07:40 )             40.4     Urine Studies:  Urinalysis - [05-26-22 @ 10:17]      Color Light Yellow / Appearance Clear / SG 1.010 / pH 7.0      Gluc Negative / Ketone Negative  / Bili Negative / Urobili <2 mg/dL       Blood Negative / Protein Trace / Leuk Est Small / Nitrite Negative      RBC 1 / WBC 2 / Hyaline  / Gran  / Sq Epi  / Non Sq Epi 1 / Bacteria Negative      PTH -- (Ca --)      [05-04-22 @ 16:19]   29  PTH -- (Ca --)      [05-04-22 @ 12:06]   31  Vitamin D (25OH) 53.8      [05-17-22 @ 07:15]  TSH 0.71      [05-04-22 @ 10:43]  Lipid: chol 123, , HDL 43, LDL --      [05-28-22 @ 05:29]      Free Light Chains: kappa 4.05, lambda 5.44, ratio = 0.74      [05-05 @ 04:37]  Immunofixation Serum:   No Monoclonal Band Identified  Britt Marshall M.D.    Reference Range: None Detected      [05-04-22 @ 16:19]  SPEP Interpretation: Hypoalbuminemia with increased Alpha-1 fraction consistent with acute  phase reaction.  Increased Beta fraction, possibly transferrin increase..  Britt Marshall M.D.      [05-04-22 @ 16:19]    RADIOLOGY & ADDITIONAL STUDIES:

## 2022-06-01 LAB
ANION GAP SERPL CALC-SCNC: 9 MMOL/L — SIGNIFICANT CHANGE UP (ref 7–14)
BUN SERPL-MCNC: 13 MG/DL — SIGNIFICANT CHANGE UP (ref 7–23)
CALCIUM SERPL-MCNC: 10.1 MG/DL — SIGNIFICANT CHANGE UP (ref 8.4–10.5)
CHLORIDE SERPL-SCNC: 95 MMOL/L — LOW (ref 98–107)
CO2 SERPL-SCNC: 25 MMOL/L — SIGNIFICANT CHANGE UP (ref 22–31)
CREAT SERPL-MCNC: 0.76 MG/DL — SIGNIFICANT CHANGE UP (ref 0.5–1.3)
EGFR: 76 ML/MIN/1.73M2 — SIGNIFICANT CHANGE UP
GLUCOSE SERPL-MCNC: 96 MG/DL — SIGNIFICANT CHANGE UP (ref 70–99)
HCT VFR BLD CALC: 37.7 % — SIGNIFICANT CHANGE UP (ref 34.5–45)
HGB BLD-MCNC: 12.3 G/DL — SIGNIFICANT CHANGE UP (ref 11.5–15.5)
MAGNESIUM SERPL-MCNC: 1.7 MG/DL — SIGNIFICANT CHANGE UP (ref 1.6–2.6)
MCHC RBC-ENTMCNC: 29.9 PG — SIGNIFICANT CHANGE UP (ref 27–34)
MCHC RBC-ENTMCNC: 32.6 GM/DL — SIGNIFICANT CHANGE UP (ref 32–36)
MCV RBC AUTO: 91.7 FL — SIGNIFICANT CHANGE UP (ref 80–100)
NRBC # BLD: 0 /100 WBCS — SIGNIFICANT CHANGE UP
NRBC # FLD: 0 K/UL — SIGNIFICANT CHANGE UP
PHOSPHATE SERPL-MCNC: 2.4 MG/DL — LOW (ref 2.5–4.5)
PLATELET # BLD AUTO: 253 K/UL — SIGNIFICANT CHANGE UP (ref 150–400)
POTASSIUM SERPL-MCNC: 4.1 MMOL/L — SIGNIFICANT CHANGE UP (ref 3.5–5.3)
POTASSIUM SERPL-SCNC: 4.1 MMOL/L — SIGNIFICANT CHANGE UP (ref 3.5–5.3)
RBC # BLD: 4.11 M/UL — SIGNIFICANT CHANGE UP (ref 3.8–5.2)
RBC # FLD: 14.4 % — SIGNIFICANT CHANGE UP (ref 10.3–14.5)
SODIUM SERPL-SCNC: 129 MMOL/L — LOW (ref 135–145)
WBC # BLD: 5.61 K/UL — SIGNIFICANT CHANGE UP (ref 3.8–10.5)
WBC # FLD AUTO: 5.61 K/UL — SIGNIFICANT CHANGE UP (ref 3.8–10.5)

## 2022-06-01 RX ORDER — MIDODRINE HYDROCHLORIDE 2.5 MG/1
2.5 TABLET ORAL THREE TIMES A DAY
Refills: 0 | Status: DISCONTINUED | OUTPATIENT
Start: 2022-06-01 | End: 2022-06-03

## 2022-06-01 RX ORDER — SODIUM CHLORIDE 9 MG/ML
1 INJECTION INTRAMUSCULAR; INTRAVENOUS; SUBCUTANEOUS THREE TIMES A DAY
Refills: 0 | Status: COMPLETED | OUTPATIENT
Start: 2022-06-01 | End: 2022-06-03

## 2022-06-01 RX ADMIN — Medication 100 MILLIGRAM(S): at 21:44

## 2022-06-01 RX ADMIN — SODIUM CHLORIDE 1 GRAM(S): 9 INJECTION INTRAMUSCULAR; INTRAVENOUS; SUBCUTANEOUS at 21:48

## 2022-06-01 RX ADMIN — SODIUM CHLORIDE 1 GRAM(S): 9 INJECTION INTRAMUSCULAR; INTRAVENOUS; SUBCUTANEOUS at 13:37

## 2022-06-01 RX ADMIN — LOSARTAN POTASSIUM 50 MILLIGRAM(S): 100 TABLET, FILM COATED ORAL at 05:24

## 2022-06-01 RX ADMIN — Medication 75 MILLIGRAM(S): at 05:24

## 2022-06-01 RX ADMIN — Medication 145 MILLIGRAM(S): at 13:37

## 2022-06-01 RX ADMIN — MIDODRINE HYDROCHLORIDE 2.5 MILLIGRAM(S): 2.5 TABLET ORAL at 17:58

## 2022-06-01 RX ADMIN — PANTOPRAZOLE SODIUM 40 MILLIGRAM(S): 20 TABLET, DELAYED RELEASE ORAL at 05:24

## 2022-06-01 RX ADMIN — Medication 9 MILLIGRAM(S): at 21:44

## 2022-06-01 NOTE — PROGRESS NOTE ADULT - SUBJECTIVE AND OBJECTIVE BOX
Neurology Progress Note    S: Patient seen and examined. No new events overnight. patient denied CP, SOB, HA or pain. + orthostatic    Medication:    MEDICATIONS  (STANDING):  allopurinol 100 milliGRAM(s) Oral at bedtime  fenofibrate Tablet 145 milliGRAM(s) Oral daily  losartan 50 milliGRAM(s) Oral daily  melatonin 9 milliGRAM(s) Oral at bedtime  metoprolol succinate ER 75 milliGRAM(s) Oral daily  midodrine. 2.5 milliGRAM(s) Oral three times a day  pantoprazole    Tablet 40 milliGRAM(s) Oral before breakfast  sodium chloride 1 Gram(s) Oral three times a day    MEDICATIONS  (PRN):  acetaminophen     Tablet .. 650 milliGRAM(s) Oral every 6 hours PRN Moderate Pain (4 - 6)      Vitals:    Vital Signs Last 24 Hrs  T(C): 36.8 (06-01-22 @ 12:05), Max: 37 (05-31-22 @ 21:13)  T(F): 98.3 (06-01-22 @ 12:05), Max: 98.6 (05-31-22 @ 21:13)  HR: 65 (06-01-22 @ 12:05) (65 - 77)  BP: 134/65 (06-01-22 @ 12:05) (126/50 - 159/72)  BP(mean): --  RR: 18 (06-01-22 @ 12:05) (17 - 18)  SpO2: 96% (06-01-22 @ 12:05) (95% - 96%)    Orthostatic VS  06-01-22 @ 10:15  Lying BP: 144/60 HR: 67  Sitting BP: 127/60 HR: 68  Standing BP: 123/61 HR: 71  Site: upper left arm  Mode: electronic      Orthostatic VS  05-30-22 @ 15:31  Lying BP: 154/73 HR: 76  Sitting BP: 139/65 HR: 77  Standing BP: 127/63 HR: 77  Site: --  Mode: --  Orthostatic VS  05-29-22 @ 15:37  Lying BP: 160/76 HR: 77  Sitting BP: 148/75 HR: 80  Standing BP: 135/69 HR: 81  Site: upper right arm  Mode: electronic      Orthostatic VS  05-29-22 @ 15:37  Lying BP: 160/76 HR: 77  Sitting BP: 148/75 HR: 80  Standing BP: 135/69 HR: 81  Site: upper right arm  Mode: electronic      General Exam:   General Appearance: Appropriately dressed and in no acute distress       Head: Normocephalic, atraumatic and no dysmorphic features  Ear, Nose, and Throat: Moist mucous membranes  CVS: S1S2+  Resp: No SOB, no wheeze or rhonchi  Abd: soft NTND  Extremities: No edema, no cyanosis  Skin: No bruises, no rashes     Neurological Exam:  Mental Status: Awake, alert and oriented x 2-3.  Able to follow simple and complex verbal commands. Able to name and repeat. fluent speech. No obvious aphasia or dysarthria noted.   Cranial Nerves: PERRL, EOMI, VFFC, sensation V1-V3 intact,  no obvious facial asymmetry, equal elevation of palate, scm/trap 5/5, tongue is midline on protrusion. no obvious papilledema on fundoscopic exam. hearing is grossly intact.   Motor: Normal bulk, tone and strength throughout. Fine finger movements were intact and symmetric. no tremors or drift noted.  at least 4/5 throughout   Sensation: Intact to light touch and pinprick throughout. no right/left confusion. no extinction to tactile on DSS.    Reflexes: 1+ throughout at biceps, brachioradialis, triceps, patellars and ankles bilaterally and equal. No clonus. R toe and L toe were both downgoing.  Coordination: No dysmetria on FNF    Gait: deferred      I personally reviewed the below data/images/labs:      CBC Full  -  ( 01 Jun 2022 05:34 )  WBC Count : 5.61 K/uL  RBC Count : 4.11 M/uL  Hemoglobin : 12.3 g/dL  Hematocrit : 37.7 %  Platelet Count - Automated : 253 K/uL  Mean Cell Volume : 91.7 fL  Mean Cell Hemoglobin : 29.9 pg  Mean Cell Hemoglobin Concentration : 32.6 gm/dL  Auto Neutrophil # : x  Auto Lymphocyte # : x  Auto Monocyte # : x  Auto Eosinophil # : x  Auto Basophil # : x  Auto Neutrophil % : x  Auto Lymphocyte % : x  Auto Monocyte % : x  Auto Eosinophil % : x  Auto Basophil % : x      06-01    129<L>  |  95<L>  |  13  ----------------------------<  96  4.1   |  25  |  0.76    Ca    10.1      01 Jun 2022 05:34  Phos  2.4     06-01  Mg     1.70     06-01          ACC: [id number] EXAM:  CT CERVICAL SPINE                        ACC: [id number] EXAM:  CT BRAIN                          PROCEDURE DATE:  05/26/2022          INTERPRETATION:  CT OF THE HEAD WITHOUT CONTRAST  CT CERVICAL SPINE WITHOUT CONTRAST    CLINICAL INDICATION: Trauma code    TECHNIQUE: Volumetric CT acquisition was performed through the brain and   reviewed using brain and bone window technique. Dose optimization   techniques were utilized including kVp/mA modulation along with iterative   reconstructions.  Thin section CT images were obtained through cervical spine with   overlapping reconstructions.  Sagittal, coronal and bilateral oblique 2D   reformats were then generated from the initial images.    COMPARISON: CT head 5/4/2022    FINDINGS:  CT head:  Extensive large posterior predominantly left parietal scalp swelling and   hematoma.  Acute right temporal convexity subdural hematoma measuring 3 mm in   greatest thickness. Acute right parietal convexity subdural hematoma   measuring 8 mm in greatest thickness. No significant associated mass   effect or midline shift.  The ventricular and sulcal size and configuration is age appropriate.     There is no acute loss of gray-white differentiation. There are moderate   patchy areasof hypodensity in the periventricular and subcortical white   matter which are likely related to chronic microangiopathic changes.     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    There is no evidence of mass effect, midline shift.     The calvarium is intact. The paranasal sinuses are clear.Patchy   opacification of the right mastoid air cells which is new since the prior   study. The orbits are unremarkable.      CT cervical spine:      There is straightening of usual cervical lordosis. There is no   significant subluxation. Vertebral body height is preserved throughout   the cervical spine. There is no significant loss of intervertebral disc   height throughout the cervical spine.    There is no significant disc herniation. No definite high-grade central   canal stenosis.     The paravertebral soft tissues are unremarkable.      IMPRESSION:  CT HEAD: Extensive large posterior predominantly left parietal scalp   swelling and hematoma.  Acute small right temporal convexity subdural hematoma. Acute right   parietal convexity subdural hematoma measuring 8 mm in greatest   thickness. No significant associated mass effect or midline shift. No   depressed calvarial fracture. New patchy opacification of the right   mastoid air cells. Underlying skull base fracture not entirely excluded.   Additional chronic findings as above.    CT CERVICAL SPINE: No fracture or acute traumatic malalignment.      Notification to clinician of alert:  Dr. Ramirez was notified about the findings at 2:26 PM on 5/26/2022 with   readback confirmation. The opportunity for questions was provided and all   questions asked were answered.    --- End of Report ---            LEXIS SILVER MD; Attending Radiologist  This document has been electronically signed. May 26 2022  2:26PM    < end of copied text >      < from: CT Head No Cont (05.26.22 @ 20:37) >    ACC: [id number] EXAM:  CT BRAIN                          PROCEDURE DATE:  05/26/2022          INTERPRETATION:  CLINICAL INFORMATION: Subdural hemorrhage, follow-up    TECHNIQUE: Noncontrast axial CT images were acquired of the head.   Two-dimensionalsagittal and coronal reformats were generated.    COMPARISON STUDY: CT head 5/26/2022 1:40 PM.    FINDINGS:  Previously visualized very small acute subdural hemorrhage overlying the   right frontoparietal and temporal lobes appears smaller than on prior   exam. There is interval new minimal subarachnoid hemorrhage within the   right parietal lobe. Chronic lacunar infarct in the right cerebellum and   bilateral basal ganglia are reidentified. Large posterior left parietal   scalp swelling and hematoma, without significant interval change compared   to prior.    Patchy opacification the right mastoid air cells, similar to prior.    No displaced calvarial fracture.      IMPRESSION:  Previously visualized very small acute subdural hemorrhage overlying the   right frontoparietal and temporal lobes appears smaller than on prior   exam. There is interval new minimal subarachnoid hemorrhage within the   right parietal lobe.    Chronic lacunar infarct in the right cerebellum and bilateral basal   ganglia are reidentified.    Large posterior left parietal scalp swelling and hematoma, without   significant interval change compared to prior.    --- End of Report ---          THAI RAMOS MD; Resident Radiology  This document has been electronically signed.  LEOBARDO NUÑEZ MD; Attending Radiologist  This document has been electronically signed. May 26 2022  9:02PM    < end of copied text >      < from: CT Head No Cont (05.26.22 @ 20:37) >    ACC: [id number] EXAM:  CT BRAIN                          PROCEDURE DATE:  05/26/2022          INTERPRETATION:  CLINICAL INFORMATION: Subdural hemorrhage, follow-up    TECHNIQUE: Noncontrast axial CT images were acquired of the head.   Two-dimensionalsagittal and coronal reformats were generated.    COMPARISON STUDY: CT head 5/26/2022 1:40 PM.    FINDINGS:  Previously visualized very small acute subdural hemorrhage overlying the   right frontoparietal and temporal lobes appears smaller than on prior   exam. There is interval new minimal subarachnoid hemorrhage within the   right parietal lobe. Chronic lacunar infarct in the right cerebellum and   bilateral basal ganglia are reidentified. Large posterior left parietal   scalp swelling and hematoma, without significant interval change compared   to prior.    Patchy opacification the right mastoid air cells, similar to prior.    No displaced calvarial fracture.      IMPRESSION:  Previously visualized very small acute subdural hemorrhage overlying the   right frontoparietal and temporal lobes appears smaller than on prior   exam. There is interval new minimal subarachnoid hemorrhage within the   right parietal lobe.    Chronic lacunar infarct in the right cerebellum and bilateral basal   ganglia are reidentified.    Large posterior left parietal scalp swelling and hematoma, without   significant interval change compared to prior.    --- End of Report ---          THAI RAMOS MD; Resident Radiology  This document has been electronically signed.  LEOBARDO NUÑEZ MD; Attending Radiologist  This document has been electronically signed. May 26 2022  9:02PM    < end of copied text >  < from: CT Head No Cont (05.28.22 @ 12:47) >    ACC: [id number] EXAM:  CT BRAIN                          PROCEDURE DATE:  05/28/2022          INTERPRETATION:  INDICATIONS:  Interval evaluation right subdural and   subarachnoid hemorrhages.    TECHNIQUE:  Serial axial images were obtained from the skull base to the   vertex without intravenous contrast. Sagittal and coronal reformats were   performed.    COMPARISON EXAMINATION: Head CTs dated 5/26/2022    FINDINGS:  Interval increase in size of hypodense right frontoparietal convexity   collection measuring up to 9 mm in thickness. There is new 2 mm leftward   midline shift.    Scattered subarachnoid hemorrhage noted within the right temporal   occipital region with unchanged trace right subdural hemorrhage. No   associated midline shift orherniation.    No new intraparenchymal hemorrhage is identified. Moderate chronic white   matter microvascular changes. Bilateral basal ganglia calcifications.   Small old lacunar infarcts in the left thalamus and right cerebellum are   again noted.    No hydrocephalus.    The calvarium is intact. A scalp hematoma along the left parietal region   is decreased in size from 5/26/2022.    Partial opacification of the right mastoid air cells, unchanged. The   visualized portions of the paranasal sinuses are clear.    IMPRESSION:  -Interval increase in size of hypodense right frontoparietal convexity   collection measuring up to 9 mm in thickness. This is favored to   represent a subdural CSF hygroma given hypodense appearance. There is new   2 mmleftward midline shift. Recommend short interval follow-up.    -Residual right temporal occipital subarachnoid hemorrhage again seen.    -Interval decrease in size of a left scalp hematoma.    --- End of Report ---          CARLOS DRAPER MD; Resident Radiologist  This document has been electronically signed.  LUIS DANIEL WAGGONER MD; Attending Radiologist  This document has been electronically signed. May 28 2022  1:44PM    < end of copied text >      < from: Transthoracic Echocardiogram (05.05.22 @ 15:36) >    Patient name: HARSH VERGARA  YOB: 1935   Age: 87 (F)   MR#: [id number]  Study Date: 5/5/2022  Location: [id number]: JOHNNIE Chauhan  Study quality: Technically Difficult  Referring Physician: Jonny Shay MD  Blood Pressure: 146/90 mmHg  Height: 157 cm  Weight: 58 kg  BSA: 1.6 m2  Heart Rate: 119 mmHg  ------------------------------------------------------------------------  PROCEDURE: Transthoracic echocardiogram with 2-D, M-Mode  and complete spectral and color flow Doppler.  INDICATION: Abnormal electrocardiogram (ECG) (EKG) (R94.31)  ------------------------------------------------------------------------  DIMENSIONS:  Dimensions:     Normal Values:  LA:     3.6 cm    2.0 - 4.0 cm  Ao:     3.8 cm    2.0 - 3.8 cm  SEPTUM: 1.0 cm    0.6 - 1.2 cm  PWT:    1.1 cm    0.6 - 1.1 cm  LVIDd:  4.7 cm    3.0 - 5.6 cm  LVIDs:    ---     1.8 - 4.0 cm  Derived Variables:  LVMI: 111 g/m2  RWT: 0.46  ------------------------------------------------------------------------  OBSERVATIONS:  Mitral Valve: Mitral annular calcification, otherwise  normal mitral valve. Minimal mitral regurgitation.  Aortic Root: Aortic Root: 3.8 cm.  Ascending Aorta: 3.8 cm.  Aortic Valve: Calcified trileaflet aortic valve with normal  opening. Minimal aortic regurgitation.  Left Atrium: Normal left atrium.  LA volume index = 16  cc/m2.  Left Ventricle: Hyperdynamic left ventricle. Normal left  ventricular internal dimensions and wall thicknesses.  Right Heart: Normal right atrium.Normal right ventricular  size and function. Normal tricuspid valve. Minimal  tricuspid regurgitation. Normal pulmonic valve. Minimal  pulmonic regurgitation.  Pericardium/PleuraNormal pericardium with no pericardial  effusion.  ------------------------------------------------------------------------  CONCLUSIONS:  1. Calcified trileaflet aortic valve with normal opening.  Minimal aortic regurgitation.  2. Normal left ventricular internal dimensions and wall  thicknesses.  3. Hyperdynamic left ventricle.  4. Normal right ventricular size and function.  ------------------------------------------------------------------------  Confirmed on  5/5/2022 - 19:03:12 by AGUSTIN Crowe  ------------------------------------------------------------------------    < end of copied text >      Clinical Impression:  mild diffuse/multifocal cerebral dysfunction, not specific as to etiology.  There were no epileptiform abnormalities recorded.

## 2022-06-01 NOTE — PROGRESS NOTE ADULT - SUBJECTIVE AND OBJECTIVE BOX
C A R D I O L O G Y  **********************************     DATE OF SERVICE: 06-01-22    S: no chest pain or shortness of breath.   Review of systems otherwise negative.    Review of Systems:   Constitutional: [ ] fevers, [ ] chills.   Skin: [ ] dry skin. [ ] rashes.  Psychiatric: [ ] depression, [ ] anxiety.   Gastrointestinal: [ ] BRBPR, [ ] melena.   Neurological: [ ] confusion. [ ] seizures. [ ] shuffling gait.   Ears,Nose,Mouth and Throat: [ ] ear pain [ ] sore throat.   Eyes: [ ] diplopia.   Respiratory: [ ] hemoptysis. [ ] shortness of breath  Cardiovascular: See HPI above  Hematologic/Lymphatic: [ ] anemia. [ ] painful nodes. [ ] prolonged bleeding.   Genitourinary: [ ] hematuria. [ ] flank pain.   Endocrine: [ ] significant change in weight. [ ] intolerance to heat and cold.     Review of systems [x ] otherwise negative, [ ] otherwise unable to obtain    FH: no family history of sudden cardiac death in first degree relatives    SH: [ ] tobacco, [ ] alcohol, [ ] drugs    acetaminophen     Tablet .. 650 milliGRAM(s) Oral every 6 hours PRN  allopurinol 100 milliGRAM(s) Oral at bedtime  fenofibrate Tablet 145 milliGRAM(s) Oral daily  losartan 50 milliGRAM(s) Oral daily  melatonin 9 milliGRAM(s) Oral at bedtime  metoprolol succinate ER 75 milliGRAM(s) Oral daily  pantoprazole    Tablet 40 milliGRAM(s) Oral before breakfast  sodium chloride 1 Gram(s) Oral three times a day                        12.3   5.61  )-----------( 253      ( 01 Jun 2022 05:34 )             37.7     129<L>  |  95<L>  |  13  ----------------------------<  96  4.1   |  25  |  0.76    Ca    10.1      01 Jun 2022 05:34  Phos  2.4     06-01  Mg     1.70     06-01    CARDIAC MARKERS ( 31 May 2022 07:40 )  x     / x     / 26 U/L / x     / x      CARDIAC MARKERS ( 30 May 2022 05:14 )  x     / x     / 26 U/L / x     / x          T(C): 36.8 (06-01-22 @ 12:05), Max: 37 (05-31-22 @ 21:13)  HR: 65 (06-01-22 @ 12:05) (65 - 77)  BP: 134/65 (06-01-22 @ 12:05) (126/50 - 159/72)  RR: 18 (06-01-22 @ 12:05) (17 - 18)  SpO2: 96% (06-01-22 @ 12:05) (95% - 96%)    Gen: Appears well in NAD  HEENT:  (-)icterus (-)pallor  CV: N S1 S2 1/6 SUJATA (+)2 Pulses B/l  Resp:  Clear to auscultation B/L, normal effort  GI: (+) BS Soft, NT, ND  Lymph:  (-)Edema, (-)obvious lymphadenopathy  Skin: Warm to touch, Normal turgor  Psych: Appropriate mood and affect      TELEMETRY: SR	      DIAGNOSTIC TESTING:  < from: Transthoracic Echocardiogram (05.05.22 @ 15:36) >  1. Calcified trileaflet aortic valve with normal opening.  Minimal aortic regurgitation.  2. Normal left ventricular internal dimensions and wall  thicknesses.  3. Hyperdynamic left ventricle.  4. Normal right ventricular size and function.  < end of copied text >    < from: CT Head No Cont (05.28.22 @ 12:47) >  IMPRESSION:  -Interval increase in size of hypodense right frontoparietal convexity   collection measuring up to 9 mm in thickness. This is favored to   represent a subdural CSF hygroma given hypodense appearance. There is new   2 mmleftward midline shift. Recommend short interval follow-up.    -Residual right temporal occipital subarachnoid hemorrhage again seen.    -Interval decrease in size of a left scalp hematoma.  < end of copied text >    < from: CT Chest w/ IV Cont (05.27.22 @ 04:05) >  IMPRESSION:    Suggestion of mild soft tissue contusion the left anterior abdominal wall   (75:3).    Otherwise, no sequela of acute traumatic injury in the chest, abdomen or   pelvis.    Redemonstrated marked irregular gallbladder thickening, gangrenous acute   cholecystitis or neoplasm difficult to exclude as better delineated on   recent MRI. The ascending colon/hepatic flexure traverses a region of   previously identified contained perforation; fistulization again cannot   be excluded.    Continued prominence of endometrial echo complex for which nonemergent   pelvic ultrasound correlation is advised to exclude endometrial   malignancy.    Asymmetric right breast retroareolar nodular density (68:2) for which   nonemergent breast imaging correlation is advised.    Additional findings as mentioned above.  < end of copied text >      ASSESSMENT/PLAN:  86y/o F w/ h/o HTN, HLD, recent hospitalization for UTI, cholecystitis p/w fall.    - Tele stable in NSR  - No evidence of clinical HF or anginal symptoms  - CTH noted, no intervention per neuro sx.   - Orthostatics remain pos s/p IVF, place compression stockings  - Recent TTE with normal LV function   - No further inpatient cardiac workup expected at this time  - EEG noted - follow up neurology

## 2022-06-01 NOTE — PROGRESS NOTE ADULT - SUBJECTIVE AND OBJECTIVE BOX
Patient is a 87y old  Female who presents with a chief complaint of fall (01 Jun 2022 13:00)    Date of servie : 06-01-22 @ 14:06  INTERVAL HPI/OVERNIGHT EVENTS:  T(C): 36.8 (06-01-22 @ 12:05), Max: 37 (05-31-22 @ 21:13)  HR: 65 (06-01-22 @ 12:05) (65 - 77)  BP: 134/65 (06-01-22 @ 12:05) (126/50 - 159/72)  RR: 18 (06-01-22 @ 12:05) (17 - 18)  SpO2: 96% (06-01-22 @ 12:05) (95% - 96%)  Wt(kg): --  I&O's Summary    31 May 2022 07:01  -  01 Jun 2022 07:00  --------------------------------------------------------  IN: 400 mL / OUT: 300 mL / NET: 100 mL        LABS:                        12.3   5.61  )-----------( 253      ( 01 Jun 2022 05:34 )             37.7     06-01    129<L>  |  95<L>  |  13  ----------------------------<  96  4.1   |  25  |  0.76    Ca    10.1      01 Jun 2022 05:34  Phos  2.4     06-01  Mg     1.70     06-01          CAPILLARY BLOOD GLUCOSE                MEDICATIONS  (STANDING):  allopurinol 100 milliGRAM(s) Oral at bedtime  fenofibrate Tablet 145 milliGRAM(s) Oral daily  losartan 50 milliGRAM(s) Oral daily  melatonin 9 milliGRAM(s) Oral at bedtime  metoprolol succinate ER 75 milliGRAM(s) Oral daily  midodrine. 2.5 milliGRAM(s) Oral three times a day  pantoprazole    Tablet 40 milliGRAM(s) Oral before breakfast  sodium chloride 1 Gram(s) Oral three times a day    MEDICATIONS  (PRN):  acetaminophen     Tablet .. 650 milliGRAM(s) Oral every 6 hours PRN Moderate Pain (4 - 6)          PHYSICAL EXAM:  GENERAL: frail  CHEST/LUNG: Clear to percussion bilaterally; No rales, rhonchi, wheezing, or rubs  HEART: Regular rate and rhythm; No murmurs, rubs, or gallops  ABDOMEN: Soft, Nontender, Nondistended; Bowel sounds present  EXTREMITIES: edema +    Care Discussed with Consultants/Other Providers [x ] YES  [ ] NO

## 2022-06-01 NOTE — PROGRESS NOTE ADULT - SUBJECTIVE AND OBJECTIVE BOX
[facility] [place] [facility]   MD KRISTEN MARQUES MD KRISTINE SOLTANPOUR, DO ANGELA WONG, PA    TEL:  OFFICE: [phone]  From 5pm-7am Answering Service [phone]    -- RENAL FOLLOW UP NOTE ---Date of Service 06-01-22 @ 12:28    Patient is a 87y old  Female who presents with a chief complaint of fall (31 May 2022 15:46)      Patient seen and examined at bedside. No chest pain/sob    VITALS:  T(F): 98.3 (06-01-22 @ 12:05), Max: 98.6 (05-31-22 @ 21:13)  HR: 65 (06-01-22 @ 12:05)  BP: 134/65 (06-01-22 @ 12:05)  RR: 18 (06-01-22 @ 12:05)  SpO2: 96% (06-01-22 @ 12:05)  Wt(kg): --    05-31 @ 07:01  -  06-01 @ 07:00  --------------------------------------------------------  IN: 400 mL / OUT: 300 mL / NET: 100 mL          PHYSICAL EXAM:  Constitutional: NAD  Neck: No JVD  Respiratory: CTAB, no wheezes, rales or rhonchi  Cardiovascular: S1, S2, RRR  Gastrointestinal: BS+, soft, NT/ND  Extremities: No peripheral edema    Hospital Medications:   MEDICATIONS  (STANDING):  allopurinol 100 milliGRAM(s) Oral at bedtime  fenofibrate Tablet 145 milliGRAM(s) Oral daily  losartan 50 milliGRAM(s) Oral daily  melatonin 9 milliGRAM(s) Oral at bedtime  metoprolol succinate ER 75 milliGRAM(s) Oral daily  pantoprazole    Tablet 40 milliGRAM(s) Oral before breakfast  sodium chloride 1 Gram(s) Oral three times a day      LABS:  06-01    129<L>  |  95<L>  |  13  ----------------------------<  96  4.1   |  25  |  0.76    Ca    10.1      01 Jun 2022 05:34  Phos  2.4     06-01  Mg     1.70     06-01      Creatinine Trend: 0.76 <--, 0.83 <--, 0.94 <--, 0.70 <--, 0.68 <--, 0.78 <--, 0.80 <--    Phosphorus Level, Serum: 2.4 mg/dL (06-01 @ 05:34)                              12.3   5.61  )-----------( 253      ( 01 Jun 2022 05:34 )             37.7     Urine Studies:  Urinalysis - [05-26-22 @ 10:17]      Color Light Yellow / Appearance Clear / SG 1.010 / pH 7.0      Gluc Negative / Ketone Negative  / Bili Negative / Urobili <2 mg/dL       Blood Negative / Protein Trace / Leuk Est Small / Nitrite Negative      RBC 1 / WBC 2 / Hyaline  / Gran  / Sq Epi  / Non Sq Epi 1 / Bacteria Negative    Urine Sodium 71      [05-31-22 @ 19:25]  Urine Osmolality 389      [05-31-22 @ 19:25]    PTH -- (Ca --)      [05-04-22 @ 16:19]   29  PTH -- (Ca --)      [05-04-22 @ 12:06]   31  Vitamin D (25OH) 53.8      [05-17-22 @ 07:15]  TSH 0.71      [05-04-22 @ 10:43]  Lipid: chol 123, , HDL 43, LDL --      [05-28-22 @ 05:29]      Free Light Chains: kappa 4.05, lambda 5.44, ratio = 0.74      [05-05 @ 04:37]  Immunofixation Serum:   No Monoclonal Band Identified  Britt Marshall M.D.    Reference Range: None Detected      [05-04-22 @ 16:19]  SPEP Interpretation: Hypoalbuminemia with increased Alpha-1 fraction consistent with acute  phase reaction.  Increased Beta fraction, possibly transferrin increase..  Britt Marshall M.D.      [05-04-22 @ 16:19]    RADIOLOGY & ADDITIONAL STUDIES:

## 2022-06-02 LAB
ANION GAP SERPL CALC-SCNC: 8 MMOL/L — SIGNIFICANT CHANGE UP (ref 7–14)
BUN SERPL-MCNC: 11 MG/DL — SIGNIFICANT CHANGE UP (ref 7–23)
CALCIUM SERPL-MCNC: 10 MG/DL — SIGNIFICANT CHANGE UP (ref 8.4–10.5)
CHLORIDE SERPL-SCNC: 96 MMOL/L — LOW (ref 98–107)
CO2 SERPL-SCNC: 25 MMOL/L — SIGNIFICANT CHANGE UP (ref 22–31)
CREAT SERPL-MCNC: 0.73 MG/DL — SIGNIFICANT CHANGE UP (ref 0.5–1.3)
EGFR: 80 ML/MIN/1.73M2 — SIGNIFICANT CHANGE UP
GLUCOSE SERPL-MCNC: 104 MG/DL — HIGH (ref 70–99)
HCT VFR BLD CALC: 37.6 % — SIGNIFICANT CHANGE UP (ref 34.5–45)
HGB BLD-MCNC: 11.9 G/DL — SIGNIFICANT CHANGE UP (ref 11.5–15.5)
MAGNESIUM SERPL-MCNC: 1.8 MG/DL — SIGNIFICANT CHANGE UP (ref 1.6–2.6)
MCHC RBC-ENTMCNC: 29.3 PG — SIGNIFICANT CHANGE UP (ref 27–34)
MCHC RBC-ENTMCNC: 31.6 GM/DL — LOW (ref 32–36)
MCV RBC AUTO: 92.6 FL — SIGNIFICANT CHANGE UP (ref 80–100)
NRBC # BLD: 0 /100 WBCS — SIGNIFICANT CHANGE UP
NRBC # FLD: 0 K/UL — SIGNIFICANT CHANGE UP
PHOSPHATE SERPL-MCNC: 2.5 MG/DL — SIGNIFICANT CHANGE UP (ref 2.5–4.5)
PLATELET # BLD AUTO: 239 K/UL — SIGNIFICANT CHANGE UP (ref 150–400)
POTASSIUM SERPL-MCNC: 4 MMOL/L — SIGNIFICANT CHANGE UP (ref 3.5–5.3)
POTASSIUM SERPL-SCNC: 4 MMOL/L — SIGNIFICANT CHANGE UP (ref 3.5–5.3)
RBC # BLD: 4.06 M/UL — SIGNIFICANT CHANGE UP (ref 3.8–5.2)
RBC # FLD: 14.4 % — SIGNIFICANT CHANGE UP (ref 10.3–14.5)
SARS-COV-2 RNA SPEC QL NAA+PROBE: SIGNIFICANT CHANGE UP
SODIUM SERPL-SCNC: 129 MMOL/L — LOW (ref 135–145)
WBC # BLD: 5.5 K/UL — SIGNIFICANT CHANGE UP (ref 3.8–10.5)
WBC # FLD AUTO: 5.5 K/UL — SIGNIFICANT CHANGE UP (ref 3.8–10.5)

## 2022-06-02 RX ADMIN — Medication 75 MILLIGRAM(S): at 05:31

## 2022-06-02 RX ADMIN — MIDODRINE HYDROCHLORIDE 2.5 MILLIGRAM(S): 2.5 TABLET ORAL at 17:00

## 2022-06-02 RX ADMIN — SODIUM CHLORIDE 1 GRAM(S): 9 INJECTION INTRAMUSCULAR; INTRAVENOUS; SUBCUTANEOUS at 05:31

## 2022-06-02 RX ADMIN — MIDODRINE HYDROCHLORIDE 2.5 MILLIGRAM(S): 2.5 TABLET ORAL at 05:32

## 2022-06-02 RX ADMIN — Medication 9 MILLIGRAM(S): at 21:17

## 2022-06-02 RX ADMIN — Medication 145 MILLIGRAM(S): at 11:07

## 2022-06-02 RX ADMIN — MIDODRINE HYDROCHLORIDE 2.5 MILLIGRAM(S): 2.5 TABLET ORAL at 11:08

## 2022-06-02 RX ADMIN — PANTOPRAZOLE SODIUM 40 MILLIGRAM(S): 20 TABLET, DELAYED RELEASE ORAL at 05:32

## 2022-06-02 RX ADMIN — Medication 100 MILLIGRAM(S): at 21:17

## 2022-06-02 RX ADMIN — SODIUM CHLORIDE 1 GRAM(S): 9 INJECTION INTRAMUSCULAR; INTRAVENOUS; SUBCUTANEOUS at 21:17

## 2022-06-02 RX ADMIN — LOSARTAN POTASSIUM 50 MILLIGRAM(S): 100 TABLET, FILM COATED ORAL at 05:32

## 2022-06-02 NOTE — PROGRESS NOTE ADULT - SUBJECTIVE AND OBJECTIVE BOX
Patient is a 87y old  Female who presents with a chief complaint of fall (02 Jun 2022 12:38)    Date of servie : 06-02-22 @ 13:53  INTERVAL HPI/OVERNIGHT EVENTS:  T(C): 36.8 (06-02-22 @ 10:56), Max: 36.8 (06-02-22 @ 10:56)  HR: 72 (06-02-22 @ 10:56) (69 - 72)  BP: 141/73 (06-02-22 @ 10:56) (141/73 - 152/66)  RR: 18 (06-02-22 @ 10:56) (16 - 18)  SpO2: 98% (06-02-22 @ 10:56) (95% - 98%)  Wt(kg): --  I&O's Summary    01 Jun 2022 07:01  -  02 Jun 2022 07:00  --------------------------------------------------------  IN: 400 mL / OUT: 0 mL / NET: 400 mL    02 Jun 2022 07:01  -  02 Jun 2022 13:53  --------------------------------------------------------  IN: 75 mL / OUT: 250 mL / NET: -175 mL        LABS:                        11.9   5.50  )-----------( 239      ( 02 Jun 2022 05:26 )             37.6     06-02    129<L>  |  96<L>  |  11  ----------------------------<  104<H>  4.0   |  25  |  0.73    Ca    10.0      02 Jun 2022 05:26  Phos  2.5     06-02  Mg     1.80     06-02          CAPILLARY BLOOD GLUCOSE                MEDICATIONS  (STANDING):  allopurinol 100 milliGRAM(s) Oral at bedtime  fenofibrate Tablet 145 milliGRAM(s) Oral daily  losartan 50 milliGRAM(s) Oral daily  melatonin 9 milliGRAM(s) Oral at bedtime  metoprolol succinate ER 75 milliGRAM(s) Oral daily  midodrine. 2.5 milliGRAM(s) Oral three times a day  pantoprazole    Tablet 40 milliGRAM(s) Oral before breakfast  sodium chloride 1 Gram(s) Oral three times a day    MEDICATIONS  (PRN):  acetaminophen     Tablet .. 650 milliGRAM(s) Oral every 6 hours PRN Moderate Pain (4 - 6)          PHYSICAL EXAM:  GENERAL: NAD, well-groomed, well-developed  HEAD:  Atraumatic, Normocephalic  CHEST/LUNG: Clear to percussion bilaterally; No rales, rhonchi, wheezing, or rubs  HEART: Regular rate and rhythm; No murmurs, rubs, or gallops  ABDOMEN: Soft, Nontender, Nondistended; Bowel sounds present  EXTREMITIES:  2+ Peripheral Pulses, No clubbing, cyanosis, or edema  LYMPH: No lymphadenopathy noted  SKIN: No rashes or lesions    Care Discussed with Consultants/Other Providers [ ] YES  [ ] NO

## 2022-06-02 NOTE — PROGRESS NOTE ADULT - SUBJECTIVE AND OBJECTIVE BOX
[facility] [place] [facility]   MD KRISTEN MARQUES MD KRISTINE SOLTANPOUR, ALBIN ABRAMS    TEL:  OFFICE: [phone]  From 5pm-7am Answering Service [phone]    -- RENAL FOLLOW UP NOTE ---Date of Service 06-02-22 @ 14:29    Patient is a 87y old  Female who presents with a chief complaint of fall (02 Jun 2022 13:53)      Patient seen and examined at bedside. No chest pain/sob    VITALS:  T(F): 98.3 (06-02-22 @ 10:56), Max: 98.3 (06-02-22 @ 10:56)  HR: 72 (06-02-22 @ 10:56)  BP: 141/73 (06-02-22 @ 10:56)  RR: 18 (06-02-22 @ 10:56)  SpO2: 98% (06-02-22 @ 10:56)  Wt(kg): --    06-01 @ 07:01  -  06-02 @ 07:00  --------------------------------------------------------  IN: 400 mL / OUT: 0 mL / NET: 400 mL    06-02 @ 07:01  -  06-02 @ 14:29  --------------------------------------------------------  IN: 75 mL / OUT: 250 mL / NET: -175 mL          PHYSICAL EXAM:  Constitutional: NAD  Neck: No JVD  Respiratory: CTAB, no wheezes, rales or rhonchi  Cardiovascular: S1, S2, RRR  Gastrointestinal: BS+, soft, NT/ND  Extremities: No peripheral edema    Hospital Medications:   MEDICATIONS  (STANDING):  allopurinol 100 milliGRAM(s) Oral at bedtime  fenofibrate Tablet 145 milliGRAM(s) Oral daily  losartan 50 milliGRAM(s) Oral daily  melatonin 9 milliGRAM(s) Oral at bedtime  metoprolol succinate ER 75 milliGRAM(s) Oral daily  midodrine. 2.5 milliGRAM(s) Oral three times a day  pantoprazole    Tablet 40 milliGRAM(s) Oral before breakfast  sodium chloride 1 Gram(s) Oral three times a day      LABS:  06-02    129<L>  |  96<L>  |  11  ----------------------------<  104<H>  4.0   |  25  |  0.73    Ca    10.0      02 Jun 2022 05:26  Phos  2.5     06-02  Mg     1.80     06-02      Creatinine Trend: 0.73 <--, 0.76 <--, 0.83 <--, 0.94 <--, 0.70 <--, 0.68 <--    Phosphorus Level, Serum: 2.5 mg/dL (06-02 @ 05:26)                              11.9   5.50  )-----------( 239      ( 02 Jun 2022 05:26 )             37.6     Urine Studies:  Urinalysis - [05-26-22 @ 10:17]      Color Light Yellow / Appearance Clear / SG 1.010 / pH 7.0      Gluc Negative / Ketone Negative  / Bili Negative / Urobili <2 mg/dL       Blood Negative / Protein Trace / Leuk Est Small / Nitrite Negative      RBC 1 / WBC 2 / Hyaline  / Gran  / Sq Epi  / Non Sq Epi 1 / Bacteria Negative    Urine Sodium 71      [05-31-22 @ 19:25]  Urine Osmolality 389      [05-31-22 @ 19:25]    PTH -- (Ca --)      [05-04-22 @ 16:19]   29  PTH -- (Ca --)      [05-04-22 @ 12:06]   31  Vitamin D (25OH) 53.8      [05-17-22 @ 07:15]  TSH 0.71      [05-04-22 @ 10:43]  Lipid: chol 123, , HDL 43, LDL --      [05-28-22 @ 05:29]      Free Light Chains: kappa 4.05, lambda 5.44, ratio = 0.74      [05-05 @ 04:37]  Immunofixation Serum:   No Monoclonal Band Identified  Britt Marshall M.D.    Reference Range: None Detected      [05-04-22 @ 16:19]  SPEP Interpretation: Hypoalbuminemia with increased Alpha-1 fraction consistent with acute  phase reaction.  Increased Beta fraction, possibly transferrin increase..  Britt Marshall M.D.      [05-04-22 @ 16:19]    RADIOLOGY & ADDITIONAL STUDIES:

## 2022-06-02 NOTE — PROGRESS NOTE ADULT - SUBJECTIVE AND OBJECTIVE BOX
C A R D I O L O G Y  **********************************     DATE OF SERVICE: 06-02-22    S: no chest pain or shortness of breath.   Review of systems otherwise negative.    Review of Systems:   Constitutional: [ ] fevers, [ ] chills.   Skin: [ ] dry skin. [ ] rashes.  Psychiatric: [ ] depression, [ ] anxiety.   Gastrointestinal: [ ] BRBPR, [ ] melena.   Neurological: [ ] confusion. [ ] seizures. [ ] shuffling gait.   Ears,Nose,Mouth and Throat: [ ] ear pain [ ] sore throat.   Eyes: [ ] diplopia.   Respiratory: [ ] hemoptysis. [ ] shortness of breath  Cardiovascular: See HPI above  Hematologic/Lymphatic: [ ] anemia. [ ] painful nodes. [ ] prolonged bleeding.   Genitourinary: [ ] hematuria. [ ] flank pain.   Endocrine: [ ] significant change in weight. [ ] intolerance to heat and cold.     Review of systems [x ] otherwise negative, [ ] otherwise unable to obtain    FH: no family history of sudden cardiac death in first degree relatives    SH: [ ] tobacco, [ ] alcohol, [ ] drugs    acetaminophen     Tablet .. 650 milliGRAM(s) Oral every 6 hours PRN  allopurinol 100 milliGRAM(s) Oral at bedtime  fenofibrate Tablet 145 milliGRAM(s) Oral daily  losartan 50 milliGRAM(s) Oral daily  melatonin 9 milliGRAM(s) Oral at bedtime  metoprolol succinate ER 75 milliGRAM(s) Oral daily  midodrine. 2.5 milliGRAM(s) Oral three times a day  pantoprazole    Tablet 40 milliGRAM(s) Oral before breakfast  sodium chloride 1 Gram(s) Oral three times a day                            11.9   5.50  )-----------( 239      ( 02 Jun 2022 05:26 )             37.6       129<L>  |  96<L>  |  11  ----------------------------<  104<H>  4.0   |  25  |  0.73    Ca    10.0      02 Jun 2022 05:26  Phos  2.5     06-02  Mg     1.80     06-02      CARDIAC MARKERS ( 31 May 2022 07:40 )  x     / x     / 26 U/L / x     / x          T(C): 36.8 (06-02-22 @ 10:56), Max: 36.8 (06-02-22 @ 10:56)  HR: 72 (06-02-22 @ 10:56) (69 - 72)  BP: 141/73 (06-02-22 @ 10:56) (141/73 - 152/66)  RR: 18 (06-02-22 @ 10:56) (16 - 18)  SpO2: 98% (06-02-22 @ 10:56) (95% - 98%)  Wt(kg): --    I&O's Summary    01 Jun 2022 07:01  -  02 Jun 2022 07:00  --------------------------------------------------------  IN: 400 mL / OUT: 0 mL / NET: 400 mL    02 Jun 2022 07:01  -  02 Jun 2022 12:40  --------------------------------------------------------  IN: 75 mL / OUT: 250 mL / NET: -175 mL        General: Well nourished in no acute distress. Alert and Oriented * 3.   Head: Normocephalic and atraumatic.   Neck: No JVD. No bruits. Supple. Does not appear to be enlarged.   Cardiovascular: + S1,S2 ; RRR Soft systolic murmur at the left lower sternal border. No rubs noted.    Lungs: CTA b/l. No rhonchi, rales or wheezes.   Abdomen: + BS, soft. Non tender. Non distended. No rebound. No guarding.   Extremities: No clubbing/cyanosis/edema.   Neurologic: Moves all four extremities. Full range of motion.   Skin: Warm and moist. The patient's skin has normal elasticity and good skin turgor.   Psychiatric: Appropriate mood and affect.  Musculoskeletal: Normal range of motion, normal strength      TELEMETRY: SR	      DIAGNOSTIC TESTING:  < from: Transthoracic Echocardiogram (05.05.22 @ 15:36) >  1. Calcified trileaflet aortic valve with normal opening.  Minimal aortic regurgitation.  2. Normal left ventricular internal dimensions and wall  thicknesses.  3. Hyperdynamic left ventricle.  4. Normal right ventricular size and function.  < end of copied text >    < from: CT Head No Cont (05.28.22 @ 12:47) >  IMPRESSION:  -Interval increase in size of hypodense right frontoparietal convexity   collection measuring up to 9 mm in thickness. This is favored to   represent a subdural CSF hygroma given hypodense appearance. There is new   2 mmleftward midline shift. Recommend short interval follow-up.    -Residual right temporal occipital subarachnoid hemorrhage again seen.    -Interval decrease in size of a left scalp hematoma.  < end of copied text >    < from: CT Chest w/ IV Cont (05.27.22 @ 04:05) >  IMPRESSION:    Suggestion of mild soft tissue contusion the left anterior abdominal wall   (75:3).    Otherwise, no sequela of acute traumatic injury in the chest, abdomen or   pelvis.    Redemonstrated marked irregular gallbladder thickening, gangrenous acute   cholecystitis or neoplasm difficult to exclude as better delineated on   recent MRI. The ascending colon/hepatic flexure traverses a region of   previously identified contained perforation; fistulization again cannot   be excluded.    Continued prominence of endometrial echo complex for which nonemergent   pelvic ultrasound correlation is advised to exclude endometrial   malignancy.    Asymmetric right breast retroareolar nodular density (68:2) for which   nonemergent breast imaging correlation is advised.    Additional findings as mentioned above.  < end of copied text >      ASSESSMENT/PLAN:  88y/o F w/ h/o HTN, HLD, recent hospitalization for UTI, cholecystitis p/w fall.    - Tele stable in NSR  - No evidence of clinical HF or anginal symptoms  - CTH noted, no intervention per neuro sx.   - Orthostatics remain pos s/p IVF, place compression stockings  - started on Midodrine  - Recent TTE with normal LV function   - No further inpatient cardiac workup expected at this time  - EEG noted - follow up neurology

## 2022-06-03 ENCOUNTER — TRANSCRIPTION ENCOUNTER (OUTPATIENT)
Age: 87
End: 2022-06-03

## 2022-06-03 VITALS
HEART RATE: 67 BPM | SYSTOLIC BLOOD PRESSURE: 134 MMHG | RESPIRATION RATE: 18 BRPM | DIASTOLIC BLOOD PRESSURE: 64 MMHG | OXYGEN SATURATION: 96 % | TEMPERATURE: 98 F

## 2022-06-03 LAB
ANION GAP SERPL CALC-SCNC: 10 MMOL/L — SIGNIFICANT CHANGE UP (ref 7–14)
BUN SERPL-MCNC: 10 MG/DL — SIGNIFICANT CHANGE UP (ref 7–23)
CALCIUM SERPL-MCNC: 10.3 MG/DL — SIGNIFICANT CHANGE UP (ref 8.4–10.5)
CHLORIDE SERPL-SCNC: 95 MMOL/L — LOW (ref 98–107)
CO2 SERPL-SCNC: 24 MMOL/L — SIGNIFICANT CHANGE UP (ref 22–31)
CREAT SERPL-MCNC: 0.75 MG/DL — SIGNIFICANT CHANGE UP (ref 0.5–1.3)
EGFR: 77 ML/MIN/1.73M2 — SIGNIFICANT CHANGE UP
GLUCOSE SERPL-MCNC: 102 MG/DL — HIGH (ref 70–99)
HCT VFR BLD CALC: 37.6 % — SIGNIFICANT CHANGE UP (ref 34.5–45)
HGB BLD-MCNC: 12.1 G/DL — SIGNIFICANT CHANGE UP (ref 11.5–15.5)
MAGNESIUM SERPL-MCNC: 1.7 MG/DL — SIGNIFICANT CHANGE UP (ref 1.6–2.6)
MCHC RBC-ENTMCNC: 29.7 PG — SIGNIFICANT CHANGE UP (ref 27–34)
MCHC RBC-ENTMCNC: 32.2 GM/DL — SIGNIFICANT CHANGE UP (ref 32–36)
MCV RBC AUTO: 92.4 FL — SIGNIFICANT CHANGE UP (ref 80–100)
NRBC # BLD: 0 /100 WBCS — SIGNIFICANT CHANGE UP
NRBC # FLD: 0 K/UL — SIGNIFICANT CHANGE UP
PHOSPHATE SERPL-MCNC: 2.3 MG/DL — LOW (ref 2.5–4.5)
PLATELET # BLD AUTO: 259 K/UL — SIGNIFICANT CHANGE UP (ref 150–400)
POTASSIUM SERPL-MCNC: 4 MMOL/L — SIGNIFICANT CHANGE UP (ref 3.5–5.3)
POTASSIUM SERPL-SCNC: 4 MMOL/L — SIGNIFICANT CHANGE UP (ref 3.5–5.3)
RBC # BLD: 4.07 M/UL — SIGNIFICANT CHANGE UP (ref 3.8–5.2)
RBC # FLD: 14.4 % — SIGNIFICANT CHANGE UP (ref 10.3–14.5)
SODIUM SERPL-SCNC: 129 MMOL/L — LOW (ref 135–145)
WBC # BLD: 5.91 K/UL — SIGNIFICANT CHANGE UP (ref 3.8–10.5)
WBC # FLD AUTO: 5.91 K/UL — SIGNIFICANT CHANGE UP (ref 3.8–10.5)

## 2022-06-03 RX ORDER — MIDODRINE HYDROCHLORIDE 2.5 MG/1
1 TABLET ORAL
Qty: 90 | Refills: 0
Start: 2022-06-03 | End: 2022-07-02

## 2022-06-03 RX ORDER — OMEGA-3 ACID ETHYL ESTERS 1 G
1 CAPSULE ORAL
Qty: 0 | Refills: 0 | DISCHARGE

## 2022-06-03 RX ORDER — ACETAMINOPHEN 500 MG
2 TABLET ORAL
Qty: 0 | Refills: 0 | DISCHARGE
Start: 2022-06-03

## 2022-06-03 RX ORDER — SODIUM CHLORIDE 9 MG/ML
1 INJECTION INTRAMUSCULAR; INTRAVENOUS; SUBCUTANEOUS THREE TIMES A DAY
Refills: 0 | Status: DISCONTINUED | OUTPATIENT
Start: 2022-06-03 | End: 2022-06-03

## 2022-06-03 RX ADMIN — Medication 75 MILLIGRAM(S): at 05:11

## 2022-06-03 RX ADMIN — MIDODRINE HYDROCHLORIDE 2.5 MILLIGRAM(S): 2.5 TABLET ORAL at 05:10

## 2022-06-03 RX ADMIN — MIDODRINE HYDROCHLORIDE 2.5 MILLIGRAM(S): 2.5 TABLET ORAL at 12:45

## 2022-06-03 RX ADMIN — SODIUM CHLORIDE 1 GRAM(S): 9 INJECTION INTRAMUSCULAR; INTRAVENOUS; SUBCUTANEOUS at 05:10

## 2022-06-03 RX ADMIN — Medication 650 MILLIGRAM(S): at 01:42

## 2022-06-03 RX ADMIN — PANTOPRAZOLE SODIUM 40 MILLIGRAM(S): 20 TABLET, DELAYED RELEASE ORAL at 05:10

## 2022-06-03 RX ADMIN — Medication 650 MILLIGRAM(S): at 01:15

## 2022-06-03 RX ADMIN — SODIUM CHLORIDE 1 GRAM(S): 9 INJECTION INTRAMUSCULAR; INTRAVENOUS; SUBCUTANEOUS at 12:49

## 2022-06-03 RX ADMIN — Medication 145 MILLIGRAM(S): at 12:46

## 2022-06-03 RX ADMIN — LOSARTAN POTASSIUM 50 MILLIGRAM(S): 100 TABLET, FILM COATED ORAL at 05:10

## 2022-06-03 NOTE — PROGRESS NOTE ADULT - SUBJECTIVE AND OBJECTIVE BOX
[facility] [place] [facility]   MD KRISTEN MARQUES MD KRISTINE SOLTANPOUR, DO ANGELA WONG, PA    TEL:  OFFICE: [phone]  From 5pm-7am Answering Service [phone]    -- RENAL FOLLOW UP NOTE ---Date of Service 06-03-22 @ 14:07    Patient is a 87y old  Female who presents with a chief complaint of fall (03 Jun 2022 13:03)      Patient seen and examined at bedside. No chest pain/sob    VITALS:  T(F): 98.4 (06-03-22 @ 11:59), Max: 98.6 (06-03-22 @ 05:05)  HR: 67 (06-03-22 @ 11:59)  BP: 134/64 (06-03-22 @ 11:59)  RR: 18 (06-03-22 @ 11:59)  SpO2: 96% (06-03-22 @ 11:59)  Wt(kg): --    06-02 @ 07:01  -  06-03 @ 07:00  --------------------------------------------------------  IN: 75 mL / OUT: 250 mL / NET: -175 mL          PHYSICAL EXAM:  Constitutional: NAD  Neck: No JVD  Respiratory: CTAB, no wheezes, rales or rhonchi  Cardiovascular: S1, S2, RRR  Gastrointestinal: BS+, soft, NT/ND  Extremities: No peripheral edema    Hospital Medications:   MEDICATIONS  (STANDING):  allopurinol 100 milliGRAM(s) Oral at bedtime  fenofibrate Tablet 145 milliGRAM(s) Oral daily  losartan 50 milliGRAM(s) Oral daily  melatonin 9 milliGRAM(s) Oral at bedtime  metoprolol succinate ER 75 milliGRAM(s) Oral daily  midodrine. 2.5 milliGRAM(s) Oral three times a day  pantoprazole    Tablet 40 milliGRAM(s) Oral before breakfast  sodium chloride 1 Gram(s) Oral three times a day      LABS:  06-03    129<L>  |  95<L>  |  10  ----------------------------<  102<H>  4.0   |  24  |  0.75    Ca    10.3      03 Jun 2022 03:28  Phos  2.3     06-03  Mg     1.70     06-03      Creatinine Trend: 0.75 <--, 0.73 <--, 0.76 <--, 0.83 <--, 0.94 <--, 0.70 <--, 0.68 <--    Phosphorus Level, Serum: 2.3 mg/dL (06-03 @ 03:28)                              12.1   5.91  )-----------( 259      ( 03 Jun 2022 03:28 )             37.6     Urine Studies:  Urinalysis - [05-26-22 @ 10:17]      Color Light Yellow / Appearance Clear / SG 1.010 / pH 7.0      Gluc Negative / Ketone Negative  / Bili Negative / Urobili <2 mg/dL       Blood Negative / Protein Trace / Leuk Est Small / Nitrite Negative      RBC 1 / WBC 2 / Hyaline  / Gran  / Sq Epi  / Non Sq Epi 1 / Bacteria Negative    Urine Sodium 71      [05-31-22 @ 19:25]  Urine Osmolality 389      [05-31-22 @ 19:25]    PTH -- (Ca --)      [05-04-22 @ 16:19]   29  PTH -- (Ca --)      [05-04-22 @ 12:06]   31  Vitamin D (25OH) 53.8      [05-17-22 @ 07:15]  TSH 0.71      [05-04-22 @ 10:43]  Lipid: chol 123, , HDL 43, LDL --      [05-28-22 @ 05:29]      Free Light Chains: kappa 4.05, lambda 5.44, ratio = 0.74      [05-05 @ 04:37]  Immunofixation Serum:   No Monoclonal Band Identified  Britt Marshall M.D.    Reference Range: None Detected      [05-04-22 @ 16:19]  SPEP Interpretation: Hypoalbuminemia with increased Alpha-1 fraction consistent with acute  phase reaction.  Increased Beta fraction, possibly transferrin increase..  Britt Marshall M.D.      [05-04-22 @ 16:19]    RADIOLOGY & ADDITIONAL STUDIES:

## 2022-06-03 NOTE — DISCHARGE NOTE PROVIDER - CARE PROVIDERS DIRECT ADDRESSES
,[email or web address]

## 2022-06-03 NOTE — PROGRESS NOTE ADULT - PROVIDER SPECIALTY LIST ADULT
----- Message from Josette Billingsley sent at 6/9/2020 11:08 AM CDT -----  Contact: Patient  Patient is returning a call , please omar him back at [phone]  
Cardiology
Cardiology
Hospitalist
Hospitalist
Nephrology
Neurology
Pt unsure of who called or what was needed.  
Cardiology
Infectious Disease
Cardiology
Cardiology
Hospitalist
Nephrology
Hospitalist
Nephrology
Neurology
Neurology

## 2022-06-03 NOTE — PROGRESS NOTE ADULT - ASSESSMENT
86y/o F w/ h/o HTN, HLD, recent hospitalization for UTI, cholecystitis p/w fall. Pt w/ daughter states that she was in the process of admission for cholecystectomy tomorrow and was walking up stairs fell backwards on head shortly before arriving in ER. No LOC, vomiting, AC use, numbness, tingling, neck pain. C/o headache now. Denies fevers, chills, recent falls, chest pain, cough, sob, abdominal pain, back pain, dysuria    1 Fall, SDH, SAH  - neuro and neurosurgery fu  - neurochecks  - hold any ac  - fall precautions  - repeat CT reviewed   - no further imaging as per neurosurgery   - no acute intervention as per neurosurgery     2 Cholecystitis, likely gall bladder cancer  - surgery fu  - dc  antibiotics  - ID fu    3 HTN  - cw home meds    Venodynes for DVT prophylaxis 
 88y/o F w/ h/o HTN, HLD, recent hospitalization for UTI, cholecystitis p/w fall.    Hyponatremia  Has hx of hyponatremia on last admission. Work up suggested polydipsia  Na again low. 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 <1L/day  stable today  continue Na tab 1g tid on discharge  monitor    HTN  BP controlled  monitor bp and HR    hematuria and proteinuria  recent UTI  prior UA 5/4 is blend    s/pfall  ck wnl  
 88y/o F w/ h/o HTN, HLD, recent hospitalization for UTI, cholecystitis p/w fall.    Hyponatremia  Has hx of hyponatremia on last admission. Work up suggested polydipsia  Na again low. 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 <1L/day  start Na tab 1g tid x2day  monitor    HTN  BP controlled  monitor bp and HR    hematuria and proteinuria  recent UTI  prior UA 5/4 is blend    s/pfall  ck wnl  
 88y/o F w/ h/o HTN, HLD, recent hospitalization for UTI, cholecystitis p/w fall. Pt w/ daughter states that she was in the process of admission for cholecystectomy tomorrow and was walking up stairs fell backwards on head shortly before arriving in ER. No LOC, vomiting, AC use, numbness, tingling, neck pain. C/o headache now. Denies fevers, chills, recent falls, chest pain, cough, sob, abdominal pain, back pain, dysuria    1 Fall, SDH, SAH  - neuro and neurosurgery fu  - neurochecks  - hold any ac  - fall precautions  - no further imaging as per neurosurgery     2 Cholecystitis, likely gall bladder cancer  - surgery fu  - dc  antibiotics  - ID fu    3 HTN  - cw home meds    Venodynes for DVT prophylaxis 
 88y/o F w/ h/o HTN, HLD, recent hospitalization for UTI, cholecystitis p/w fall. Pt w/ daughter states that she was in the process of admission for cholecystectomy tomorrow and was walking up stairs fell backwards on head shortly before arriving in ER. No LOC, vomiting, AC use, numbness, tingling, neck pain. C/o headache now. Denies fevers, chills, recent falls, chest pain, cough, sob, abdominal pain, back pain, dysuria    1 Fall, SDH, SAH  - neuro and neurosurgery fu  - neurochecks  - hold any ac  - fall precautions  - repeat CT reviewed   - no further imaging as per neurosurgery   - no acute intervention as per neurosurgery     2 Cholecystitis, likely gall bladder cancer  - surgery fu  - dc  antibiotics  - ID fu    3 HTN  - cw home meds    Venodynes for DVT prophylaxis   
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  + left scalp hematoma recent hospitalization for UTI, cholecystitis p/w fall. no LOC.  now mild HA improving.   CTH left scalp hematoma. ascute R SDH 8mm. old ischemic strokes  CT C spine neg  CTH with acute small R SDH improving but new minimal SAH in R parietal lobe.  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  new 2mm leftward shift.    TTE 5/5 above   A1c 5.3  LDL 55  eeg no seizures   still orthostatic     Impression:1) fall, mechanical?/presyncope of unclear etiology. no tongue bite or convulsions or LOC.  doubt seizure 2) SDH related to prior fall 3) strokes seem 2/2 small vessel diseaese  ==> found to be + orthostatic  still + 6/1  - neurosx f/u: no acute intervention, per nsx no further scans.     - would repeat scan if any HA or change in neuro exam   - check orthostatics --> + IVF. if no improvement try midodrine now 2.5mg TID, but 6/1 still + would increase to 5mg TID   - limit sedating meds  - now on cefpodoxime and metronidazole for cholecystitis    - for HA, tylenol PRN   - nsx called for SDH. .  cancel planned rolanda sx given new SDH,    - SBP < 160  - hold AC/AP, dvt ppx okay after 48hrs  - eventually will need to be on asa 81 for secondary stroke prevention  - statin therapy  with LDL goal < 70  - telemetry  - PT/OT/SS/SLP, OOBC  - check FS, glucose control <180  - GI/DVT ppx  - Thank you for allowing me to participate in the care of this patient. Call with questions.   Wesley Turcios MD  Vascular Neurology  Office: [phone]   
 86y/o F w/ h/o HTN, HLD, recent hospitalization for UTI, cholecystitis p/w fall.    Hyponatremia  Has hx of hyponatremia on last admission. Work up suggested polydipsia  Na 131 on admission, now 135.  - Fluid restriction.    HTN  BP uncontrolled  un titrate bb if needed  monitor bp and HR    hematuria and proteinuria  recent UTI  prior UA 5/4 is blend    s/pfall    check CK level  
 86y/o F w/ h/o HTN, HLD, recent hospitalization for UTI, cholecystitis p/w fall.    Hyponatremia  Has hx of hyponatremia on last admission. Work up suggested polydipsia  Na again low. check urine na and osmo  - Fluid restriction.    HTN  BP controlled  monitor bp and HR    hematuria and proteinuria  recent UTI  prior UA 5/4 is blend    s/pfall  ck wnl  
 88y/o F w/ h/o HTN, HLD, recent hospitalization for UTI, cholecystitis p/w fall. Pt w/ daughter states that she was in the process of admission for cholecystectomy tomorrow and was walking up stairs fell backwards on head shortly before arriving in ER. No LOC, vomiting, AC use, numbness, tingling, neck pain. C/o headache now. Denies fevers, chills, recent falls, chest pain, cough, sob, abdominal pain, back pain, dysuria    1 Fall, SDH, SAH  - neuro and neurosurgery fu  - neurochecks  - fall precautions  - repeat CT reviewed   - no further imaging as per neurosurgery   - no acute intervention as per neurosurgery     2 Cholecystitis, likely gall bladder cancer  - surgery fu, cholecystectomy as outpt   - dc  antibiotics  - ID fu    3 HTN  - cw home meds    4 Hyponatremia   - monitor bmp   - renal fu     Venodynes for DVT prophylaxis       
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  + left scalp hematoma recent hospitalization for UTI, cholecystitis p/w fall. no LOC.  now mild HA improving.   CTH left scalp hematoma. ascute R SDH 8mm. old ischemic strokes  CT C spine neg  CTH with acute small R SDH improving but new minimal SAH in R parietal lobe.  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  new 2mm leftward shift.      Impression:1) fall, mechanical?/presyncope of unclear etiology. no tongue bite or convulsions or LOC.  doubt seizure 2) SDH related to prior fall 3) strokes seem 2/2 small vessel diseaes  - neurosx f/u: no acute intervention, per nsx no further scans.    - would repeat scan if any HA or change in neuro exam   - check orthostatics  - limit sedating meds  - now on cefpodoxime and metronidazole for cholecystitis    - for HA, tylenol PRN   - nsx called for SDH. .  cancel planned ro[place]a sx given new SDH  - SBP < 160  - hold AC/AP, dvt ppx okay after 48hrs  - would get rEEG  - eventually will need to be on asa 81 for secondary stroke prevention  - statin therapy  with LDL goal < 70  - A1c and lipid panel   - TTE  - telemetry  - PT/OT/SS/SLP, OOBC  - check FS, glucose control <180  - GI/DVT ppx  - Thank you for allowing me to participate in the care of this patient. Call with questions.   Wesley Turcios MD  Vascular Neurology  Office: [phone]   
 86y/o F w/ h/o HTN, HLD, recent hospitalization for UTI, cholecystitis p/w fall.    Hyponatremia  Has hx of hyponatremia on last admission. Work up suggested polydipsia  Na again low. 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 <1L/day  stable today  continue Na tab 1g tid  monitor    HTN  BP controlled  monitor bp and HR    hematuria and proteinuria  recent UTI  prior UA 5/4 is blend    s/pfall  ck wnl  
 86y/o F w/ h/o HTN, HLD, recent hospitalization for UTI, cholecystitis p/w fall. Pt w/ daughter states that she was in the process of admission for cholecystectomy tomorrow and was walking up stairs fell backwards on head shortly before arriving in ER. No LOC, vomiting, AC use, numbness, tingling, neck pain. C/o headache now. Denies fevers, chills, recent falls, chest pain, cough, sob, abdominal pain, back pain, dysuria    1 Fall, SDH, SAH  - neuro and neurosurgery fu  - neurochecks  - hold any ac  - fall precautions  - repeat CT reviewed   - no further imaging as per neurosurgery   - no acute intervention as per neurosurgery     2 Cholecystitis, likely gall bladder cancer  - surgery fu  - dc  antibiotics  - ID fu    3 HTN  - cw home meds    Venodynes for DVT prophylaxis     
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  + left scalp hematoma recent hospitalization for UTI, cholecystitis p/w fall. no LOC.  now mild HA improving.   CTH left scalp hematoma. ascute R SDH 8mm. old ischemic strokes  CT C spine neg  CTH with acute small R SDH improving but new minimal SAH in R parietal lobe.  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  new 2mm leftward shift.    TTE 5/5 above   A1c 5.3  LDL 55    Impression:1) fall, mechanical?/presyncope of unclear etiology. no tongue bite or convulsions or LOC.  doubt seizure 2) SDH related to prior fall 3) strokes seem 2/2 small vessel diseaese  ==> found to be + orthostatic   - neurosx f/u: no acute intervention, per nsx no further scans.    - would repeat scan if any HA or change in neuro exam   - check orthostatics --> + IVF. if no improvement try midodrine   - limit sedating meds  - now on cefpodoxime and metronidazole for cholecystitis    - for HA, tylenol PRN   - nsx called for SDH. .  cancel planned [place]a sx given new SDH  - SBP < 160  - hold AC/AP, dvt ppx okay after 48hrs  - would get rEEG  - eventually will need to be on asa 81 for secondary stroke prevention  - statin therapy  with LDL goal < 70  - telemetry  - PT/OT/SS/SLP, OOBC  - check FS, glucose control <180  - GI/DVT ppx  - Thank you for allowing me to participate in the care of this patient. Call with questions.   Wesley Turcios MD  Vascular Neurology  Office: [phone]   
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  + left scalp hematoma recent hospitalization for UTI, cholecystitis p/w fall. no LOC.  now mild HA improving.   CTH left scalp hematoma. ascute R SDH 8mm. old ischemic strokes  CT C spine neg  CTH with acute small R SDH improving but new minimal SAH in R parietal lobe.  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  new 2mm leftward shift.    TTE 5/5 above   A1c 5.3  LDL 55  eeg no seizures    orthostatic     Impression:1) fall, mechanical?/presyncope of unclear etiology. no tongue bite or convulsions or LOC.  doubt seizure 2) SDH related to prior fall 3) strokes seem 2/2 small vessel diseaese  ==> found to be + orthostatic  still + 6/1  - monitor Na now down to 129   - neurosx f/u: no acute intervention, per nsx no further scans.     - would repeat scan if any HA or change in neuro exam   - check orthostatics --> + IVF. if no improvement try midodrine now 2.5mg TID, but 6/1 still + would increase to 5mg TID   - limit sedating meds  - now on cefpodoxime and metronidazole for cholecystitis    - for HA, tylenol PRN   - nsx called for SDH. .  cancel planned ro[place]a sx given new SDH,    - SBP < 160  - hold AC/AP, dvt ppx okay after 48hrs  - eventually will need to be on asa 81 for secondary stroke prevention  - statin therapy  with LDL goal < 70  - telemetry  - PT/OT/SS/SLP, OOBC  - check FS, glucose control <180  - GI/DVT ppx  - Thank you for allowing me to participate in the care of this patient. Call with questions.   - d/c plan when medically stable   Wesley Turcois MD  Vascular Neurology  Office: [phone]   
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  + left scalp hematoma recent hospitalization for UTI, cholecystitis p/w fall. no LOC.  now mild HA improving.   CTH left scalp hematoma. ascute R SDH 8mm. old ischemic strokes  CT C spine neg  CTH with acute small R SDH improving but new minimal SAH in R parietal lobe.  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  new 2mm leftward shift.    TTE 5/5 above   A1c 5.3  LDL 55  still orthostatic   Impression:1) fall, mechanical?/presyncope of unclear etiology. no tongue bite or convulsions or LOC.  doubt seizure 2) SDH related to prior fall 3) strokes seem 2/2 small vessel diseaese  ==> found to be + orthostatic   - neurosx f/u: no acute intervention, per nsx no further scans.    - would repeat scan if any HA or change in neuro exam   - check orthostatics --> + IVF. if no improvement try midodrine   - limit sedating meds  - now on cefpodoxime and metronidazole for cholecystitis    - for HA, tylenol PRN   - nsx called for SDH. .  cancel planned [place]a sx given new SDH  - SBP < 160  - hold AC/AP, dvt ppx okay after 48hrs  - would get rEEG  - eventually will need to be on asa 81 for secondary stroke prevention  - statin therapy  with LDL goal < 70  - telemetry  - PT/OT/SS/SLP, OOBC  - check FS, glucose control <180  - GI/DVT ppx  - Thank you for allowing me to participate in the care of this patient. Call with questions.   Wesley Turcios MD  Vascular Neurology  Office: [phone]   
 86y/o F w/ h/o HTN, HLD, recent hospitalization for UTI, cholecystitis p/w fall. Pt w/ daughter states that she was in the process of admission for cholecystectomy tomorrow and was walking up stairs fell backwards on head shortly before arriving in ER. No LOC, vomiting, AC use, numbness, tingling, neck pain. C/o headache now. Denies fevers, chills, recent falls, chest pain, cough, sob, abdominal pain, back pain, dysuria    1 Fall, SDH, SAH  - neuro and neurosurgery fu  - neurochecks  - fall precautions  - repeat CT reviewed   - no further imaging as per neurosurgery   - no acute intervention as per neurosurgery     2 Cholecystitis, likely gall bladder cancer  - surgery fu, cholecystectomy as outpt   - dc  antibiotics  - ID fu    3 HTN  - cw home meds    4 Hyponatremia   - monitor bmp   - renal fu     Venodynes for DVT prophylaxis     dc planning in am if sodium improves 
Attending addendum:   Agree with above  Monitor orthostatics   No further inpatient cardiac workup needed at this time.     Carolyn Pike MD 
CARDIOLOGY ATTENDING    Agree with above. No further inpatient cardiac workup expected.
CARDIOLOGY ATTENDING    Agree with above. No further inpatient cardiac workup expected.  
Ms iRch is a 87 year old lady with PMHx of HTN, HLD, recent hospitalization for UTI, cholecystitis p/w fall. Pt was recently admitted 5/4 - 5/19 for AMS and was found to have perforated GB w/ surrounding fluid collection and dilation of surrounding ducts on CT a/p. Pt started CTX/Flagyl and MRI A/P w/ IV contrast w/ ongoing concern for gangrenous acute cholecystitis with perforation and associated 2 small collections s/p C-scope 5/18 - fistulization at hepatic flexture, but no mass. Pt was discharged on cefpodoxime and flagyl and planned for an elective cholecystectomy. In ER Pt was found to have Right temporal and parietal convexity subdural hematoma and elective cholecystectomy was cancelled. ID consulted for abx management.    WORKUP  UA (5/26): Negative  CT HEAD: Extensive large posterior predominantly left parietal scalp  swelling and hematoma. Acute small right temporal convexity subdural hematoma. Acute right  parietal convexity subdural hematoma measuring 8 mm in greatest thickness. No significant associated mass effect or midline shift. No  depressed calvarial fracture. New patchy opacification of the right  mastoid air cells. Underlying skull base fracture not entirely excluded.    CT chest, abdomen and pelvis with contrast: Suggestion of mild soft tissue contusion the left anterior abdominal wall. no sequela of acute traumatic injury in the chest, abdomen or  pelvis.  Redemonstrated marked irregular gallbladder thickening, gangrenous acute  cholecystitis or neoplasm difficult to exclude. The ascending colon/hepatic flexure traverses a region of  previously identified contained perforation; fistulization again cannot  be excluded. Continued prominence of endometrial echo complex     IMPRESSION  ·	Recent Gangrenous cholecystitis w/ contained perforation and fistulous tract to hepatic flexure  ·	Right temporal and parietal convexity subdural hematoma  ·	left parietal scalp hematoma.    RECOMMENDATIONS  Afebrile with no leukocytosis  Currently on cefpodoxime 100 every 12 hours and metroNIDAZOLE    Tablet 500 two times a day  Surgery assessed patient and deemed pt not a candidate for surgery given the acute subdural hematoma.     - Pt has been on CTX/Flagyl followed by cefpodoxime /flagyl since 5/10.   - stable off abx  - no fever or leukocytosis and abd exam benign     Angel Chand  Attending Physician   Division of Infectious Disease  Office #[phone]  Available on Microsoft Teams also  After 5pm/weekend or no response, call #[phone]

## 2022-06-03 NOTE — DISCHARGE NOTE PROVIDER - CARE PROVIDER_API CALL
Deep Roche)  Surgery  [address] [place] [address], [place] [address]  Phone: [phone]  Fax: [phone]  Established Patient  Follow Up Time:    Deep Roche (MD)  Surgery  [address] [facility], [place] [address]  Phone: [phone]  Fax: [phone]  Established Patient  Follow Up Time:     Delroy Contreras  INTERNAL MEDICINE  [address] [place] [address]  Phone: [phone]  Fax: [phone]  Established Patient  Follow Up Time:

## 2022-06-03 NOTE — DISCHARGE NOTE PROVIDER - HOSPITAL COURSE
88y/o F w/ h/o HTN, HLD, recent hospitalization for UTI, cholecystitis p/w fall. Pt w/ daughter states that she was in the process of admission for cholecystectomy tomorrow and was walking up stairs fell backwards on head shortly before arriving in ER. No LOC, vomiting, AC use, numbness, tingling, neck pain. C/o headache now. Denies fevers, chills, recent falls, chest pain, cough, sob, abdominal pain, back pain, dysuria    Fall with SDH, SAH  - neuro and neurosurgery fu  - neurochecks were part of her daily vital signs  - fall precautions  - repeat CT reviewed   - no further imaging as per neurosurgery   - no acute intervention as per neurosurgery     2 Cholecystitis, likely gall bladder cancer  - surgery fu, cholecystectomy as outpt   - dc  antibiotics  - surgery follow up outpatient    3 HTN  - cw home meds    4 Hyponatremia   - monitor bmp   - renal fu     Venodynes for DVT prophylaxis       On 6/3/22, case was discussed with Dr. Shay, patient is medically cleared and optimized for discharge today. All medications were reviewed with attending, and sent to mutually agreed upon pharmacy    86y/o F w/ h/o HTN, HLD, recent hospitalization for UTI, cholecystitis p/w fall. Pt w/ daughter states that she was in the process of admission for cholecystectomy tomorrow and was walking up stairs fell backwards on head shortly before arriving in ER. No LOC, vomiting, AC use, numbness, tingling, neck pain. C/o headache now. Denies fevers, chills, recent falls, chest pain, cough, sob, abdominal pain, back pain, dysuria    Fall with SDH, SAH  - neuro and neurosurgery were consulted but without intervention  - neurochecks were part of her daily vital signs  - fall precautions  - repeat CT reviewed   - no further imaging as per neurosurgery   - no acute intervention as per neurosurgery     2 Cholecystitis, likely gall bladder cancer  - surgery fu, cholecystectomy as outpt   - dc  antibiotics  - surgery follow up outpatient    3 HTN  - cw home meds    4 Hyponatremia   - monitor bmp   - renal fu     Venodynes for DVT prophylaxis       On 6/3/22, case was discussed with Dr. Shay, patient is medically cleared and optimized for discharge today. All medications were reviewed with attending, and sent to mutually agreed upon pharmacy

## 2022-06-03 NOTE — PROGRESS NOTE ADULT - NSPROGADDITIONALINFOA_GEN_ALL_CORE
- Counseling on diet, exercise, and medication adherence was done  - Counseling on smoking cessation and alcohol consumption offered when appropriate.  - Pain assessed and judicious use of narcotics when appropriate was discussed.    - Stroke education given when appropriate.  - Importance of fall prevention discussed.   - Differential diagnosis and plan of care discussed with patient and/or family and primary team
Will sign off, recall ID if needed #[phone].

## 2022-06-03 NOTE — DISCHARGE NOTE PROVIDER - PROVIDER TOKENS
PROVIDER:[TOKEN:[[id number]],ESTABLISHEDPATIENT:[T]] PROVIDER:[TOKEN:[[id number]],ESTABLISHEDPATIENT:[T]],PROVIDER:[TOKEN:[[id number]],ESTABLISHEDPATIENT:[T]]

## 2022-06-03 NOTE — PROGRESS NOTE ADULT - NS ATTEND AMEND GEN_ALL_CORE FT
Patient seen and examined.  Agree with above.   No further inpatient cardiac workup needed at this time.     Carolyn Pike MD
Seen earlier Pt could have element of SIADH. Continue to follow.
Pt needs to follow up for hyponatremia. She also had hypophosphatemia.
Pt serum sodium went down again. Started NaCl tablets.

## 2022-06-03 NOTE — DISCHARGE NOTE PROVIDER - NSDCCPCAREPLAN_GEN_ALL_CORE_FT
PRINCIPAL DISCHARGE DIAGNOSIS  Diagnosis: Subdural hematoma  Assessment and Plan of Treatment: You fell and were found to have some bleeding in your brain.  Please follow up with neurology in 1-2 weeks and as needed.

## 2022-06-03 NOTE — DISCHARGE NOTE NURSING/CASE MANAGEMENT/SOCIAL WORK - PATIENT PORTAL LINK FT
You can access the FollowMyHealth Patient Portal offered by [facility] by registering at the following website: [email or web address]. By joining [facility]’s FollowMyHealth portal, you will also be able to view your health information using other applications (apps) compatible with our system.

## 2022-06-03 NOTE — PROGRESS NOTE ADULT - SUBJECTIVE AND OBJECTIVE BOX
Neurology Progress Note    S: Patient seen and examined. No new events overnight. patient denied CP, SOB, HA or pain. + orthostatic    Medication:    MEDICATIONS  (STANDING):  allopurinol 100 milliGRAM(s) Oral at bedtime  fenofibrate Tablet 145 milliGRAM(s) Oral daily  losartan 50 milliGRAM(s) Oral daily  melatonin 9 milliGRAM(s) Oral at bedtime  metoprolol succinate ER 75 milliGRAM(s) Oral daily  midodrine. 2.5 milliGRAM(s) Oral three times a day  pantoprazole    Tablet 40 milliGRAM(s) Oral before breakfast  sodium chloride 1 Gram(s) Oral three times a day    MEDICATIONS  (PRN):  acetaminophen     Tablet .. 650 milliGRAM(s) Oral every 6 hours PRN Moderate Pain (4 - 6)    Vitals:      Vital Signs Last 24 Hrs  T(C): 36.9 (06-03-22 @ 11:59), Max: 37 (06-03-22 @ 05:05)  T(F): 98.4 (06-03-22 @ 11:59), Max: 98.6 (06-03-22 @ 05:05)  HR: 67 (06-03-22 @ 11:59) (67 - 86)  BP: 134/64 (06-03-22 @ 11:59) (132/66 - 147/61)  BP(mean): --  RR: 18 (06-03-22 @ 11:59) (18 - 18)  SpO2: 96% (06-03-22 @ 11:59) (96% - 98%)        Orthostatic VS  06-01-22 @ 10:15  Lying BP: 144/60 HR: 67  Sitting BP: 127/60 HR: 68  Standing BP: 123/61 HR: 71  Site: upper left arm  Mode: electronic      Orthostatic VS  05-30-22 @ 15:31  Lying BP: 154/73 HR: 76  Sitting BP: 139/65 HR: 77  Standing BP: 127/63 HR: 77  Site: --  Mode: --  Orthostatic VS  05-29-22 @ 15:37  Lying BP: 160/76 HR: 77  Sitting BP: 148/75 HR: 80  Standing BP: 135/69 HR: 81  Site: upper right arm  Mode: electronic      Orthostatic VS  05-29-22 @ 15:37  Lying BP: 160/76 HR: 77  Sitting BP: 148/75 HR: 80  Standing BP: 135/69 HR: 81  Site: upper right arm  Mode: electronic      General Exam:   General Appearance: Appropriately dressed and in no acute distress       Head: Normocephalic, atraumatic and no dysmorphic features  Ear, Nose, and Throat: Moist mucous membranes  CVS: S1S2+  Resp: No SOB, no wheeze or rhonchi  Abd: soft NTND  Extremities: No edema, no cyanosis  Skin: No bruises, no rashes     Neurological Exam:  Mental Status: Awake, alert and oriented x 2-3.  Able to follow simple and complex verbal commands. Able to name and repeat. fluent speech. No obvious aphasia or dysarthria noted.   Cranial Nerves: PERRL, EOMI, VFFC, sensation V1-V3 intact,  no obvious facial asymmetry, equal elevation of palate, scm/trap 5/5, tongue is midline on protrusion. no obvious papilledema on fundoscopic exam. hearing is grossly intact.   Motor: Normal bulk, tone and strength throughout. Fine finger movements were intact and symmetric. no tremors or drift noted.  at least 4/5 throughout   Sensation: Intact to light touch and pinprick throughout. no right/left confusion. no extinction to tactile on DSS.    Reflexes: 1+ throughout at biceps, brachioradialis, triceps, patellars and ankles bilaterally and equal. No clonus. R toe and L toe were both downgoing.  Coordination: No dysmetria on FNF    Gait: deferred      I personally reviewed the below data/images/labs:      CBC Full  -  ( 03 Jun 2022 03:28 )  WBC Count : 5.91 K/uL  RBC Count : 4.07 M/uL  Hemoglobin : 12.1 g/dL  Hematocrit : 37.6 %  Platelet Count - Automated : 259 K/uL  Mean Cell Volume : 92.4 fL  Mean Cell Hemoglobin : 29.7 pg  Mean Cell Hemoglobin Concentration : 32.2 gm/dL  Auto Neutrophil # : x  Auto Lymphocyte # : x  Auto Monocyte # : x  Auto Eosinophil # : x  Auto Basophil # : x  Auto Neutrophil % : x  Auto Lymphocyte % : x  Auto Monocyte % : x  Auto Eosinophil % : x  Auto Basophil % : x    06-03    129<L>  |  95<L>  |  10  ----------------------------<  102<H>  4.0   |  24  |  0.75    Ca    10.3      03 Jun 2022 03:28  Phos  2.3     06-03  Mg     1.70     06-03            ACC: [id number] EXAM:  CT CERVICAL SPINE                        ACC: [id number] EXAM:  CT BRAIN                          PROCEDURE DATE:  05/26/2022          INTERPRETATION:  CT OF THE HEAD WITHOUT CONTRAST  CT CERVICAL SPINE WITHOUT CONTRAST    CLINICAL INDICATION: Trauma code    TECHNIQUE: Volumetric CT acquisition was performed through the brain and   reviewed using brain and bone window technique. Dose optimization   techniques were utilized including kVp/mA modulation along with iterative   reconstructions.  Thin section CT images were obtained through cervical spine with   overlapping reconstructions.  Sagittal, coronal and bilateral oblique 2D   reformats were then generated from the initial images.    COMPARISON: CT head 5/4/2022    FINDINGS:  CT head:  Extensive large posterior predominantly left parietal scalp swelling and   hematoma.  Acute right temporal convexity subdural hematoma measuring 3 mm in   greatest thickness. Acute right parietal convexity subdural hematoma   measuring 8 mm in greatest thickness. No significant associated mass   effect or midline shift.  The ventricular and sulcal size and configuration is age appropriate.     There is no acute loss of gray-white differentiation. There are moderate   patchy areasof hypodensity in the periventricular and subcortical white   matter which are likely related to chronic microangiopathic changes.     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    There is no evidence of mass effect, midline shift.     The calvarium is intact. The paranasal sinuses are clear.Patchy   opacification of the right mastoid air cells which is new since the prior   study. The orbits are unremarkable.      CT cervical spine:      There is straightening of usual cervical lordosis. There is no   significant subluxation. Vertebral body height is preserved throughout   the cervical spine. There is no significant loss of intervertebral disc   height throughout the cervical spine.    There is no significant disc herniation. No definite high-grade central   canal stenosis.     The paravertebral soft tissues are unremarkable.      IMPRESSION:  CT HEAD: Extensive large posterior predominantly left parietal scalp   swelling and hematoma.  Acute small right temporal convexity subdural hematoma. Acute right   parietal convexity subdural hematoma measuring 8 mm in greatest   thickness. No significant associated mass effect or midline shift. No   depressed calvarial fracture. New patchy opacification of the right   mastoid air cells. Underlying skull base fracture not entirely excluded.   Additional chronic findings as above.    CT CERVICAL SPINE: No fracture or acute traumatic malalignment.      Notification to clinician of alert:  Dr. Ramirez was notified about the findings at 2:26 PM on 5/26/2022 with   readback confirmation. The opportunity for questions was provided and all   questions asked were answered.    --- End of Report ---            LEXIS SILVER MD; Attending Radiologist  This document has been electronically signed. May 26 2022  2:26PM    < end of copied text >      < from: CT Head No Cont (05.26.22 @ 20:37) >    ACC: [id number] EXAM:  CT BRAIN                          PROCEDURE DATE:  05/26/2022          INTERPRETATION:  CLINICAL INFORMATION: Subdural hemorrhage, follow-up    TECHNIQUE: Noncontrast axial CT images were acquired of the head.   Two-dimensionalsagittal and coronal reformats were generated.    COMPARISON STUDY: CT head 5/26/2022 1:40 PM.    FINDINGS:  Previously visualized very small acute subdural hemorrhage overlying the   right frontoparietal and temporal lobes appears smaller than on prior   exam. There is interval new minimal subarachnoid hemorrhage within the   right parietal lobe. Chronic lacunar infarct in the right cerebellum and   bilateral basal ganglia are reidentified. Large posterior left parietal   scalp swelling and hematoma, without significant interval change compared   to prior.    Patchy opacification the right mastoid air cells, similar to prior.    No displaced calvarial fracture.      IMPRESSION:  Previously visualized very small acute subdural hemorrhage overlying the   right frontoparietal and temporal lobes appears smaller than on prior   exam. There is interval new minimal subarachnoid hemorrhage within the   right parietal lobe.    Chronic lacunar infarct in the right cerebellum and bilateral basal   ganglia are reidentified.    Large posterior left parietal scalp swelling and hematoma, without   significant interval change compared to prior.    --- End of Report ---          THAI RAMOS MD; Resident Radiology  This document has been electronically signed.  LEOBARDO NUÑEZ MD; Attending Radiologist  This document has been electronically signed. May 26 2022  9:02PM    < end of copied text >      < from: CT Head No Cont (05.26.22 @ 20:37) >    ACC: [id number] EXAM:  CT BRAIN                          PROCEDURE DATE:  05/26/2022          INTERPRETATION:  CLINICAL INFORMATION: Subdural hemorrhage, follow-up    TECHNIQUE: Noncontrast axial CT images were acquired of the head.   Two-dimensionalsagittal and coronal reformats were generated.    COMPARISON STUDY: CT head 5/26/2022 1:40 PM.    FINDINGS:  Previously visualized very small acute subdural hemorrhage overlying the   right frontoparietal and temporal lobes appears smaller than on prior   exam. There is interval new minimal subarachnoid hemorrhage within the   right parietal lobe. Chronic lacunar infarct in the right cerebellum and   bilateral basal ganglia are reidentified. Large posterior left parietal   scalp swelling and hematoma, without significant interval change compared   to prior.    Patchy opacification the right mastoid air cells, similar to prior.    No displaced calvarial fracture.      IMPRESSION:  Previously visualized very small acute subdural hemorrhage overlying the   right frontoparietal and temporal lobes appears smaller than on prior   exam. There is interval new minimal subarachnoid hemorrhage within the   right parietal lobe.    Chronic lacunar infarct in the right cerebellum and bilateral basal   ganglia are reidentified.    Large posterior left parietal scalp swelling and hematoma, without   significant interval change compared to prior.    --- End of Report ---          THAI RAMOS MD; Resident Radiology  This document has been electronically signed.  LEOBARDO NUÑEZ MD; Attending Radiologist  This document has been electronically signed. May 26 2022  9:02PM    < end of copied text >  < from: CT Head No Cont (05.28.22 @ 12:47) >    ACC: [id number] EXAM:  CT BRAIN                          PROCEDURE DATE:  05/28/2022          INTERPRETATION:  INDICATIONS:  Interval evaluation right subdural and   subarachnoid hemorrhages.    TECHNIQUE:  Serial axial images were obtained from the skull base to the   vertex without intravenous contrast. Sagittal and coronal reformats were   performed.    COMPARISON EXAMINATION: Head CTs dated 5/26/2022    FINDINGS:  Interval increase in size of hypodense right frontoparietal convexity   collection measuring up to 9 mm in thickness. There is new 2 mm leftward   midline shift.    Scattered subarachnoid hemorrhage noted within the right temporal   occipital region with unchanged trace right subdural hemorrhage. No   associated midline shift orherniation.    No new intraparenchymal hemorrhage is identified. Moderate chronic white   matter microvascular changes. Bilateral basal ganglia calcifications.   Small old lacunar infarcts in the left thalamus and right cerebellum are   again noted.    No hydrocephalus.    The calvarium is intact. A scalp hematoma along the left parietal region   is decreased in size from 5/26/2022.    Partial opacification of the right mastoid air cells, unchanged. The   visualized portions of the paranasal sinuses are clear.    IMPRESSION:  -Interval increase in size of hypodense right frontoparietal convexity   collection measuring up to 9 mm in thickness. This is favored to   represent a subdural CSF hygroma given hypodense appearance. There is new   2 mmleftward midline shift. Recommend short interval follow-up.    -Residual right temporal occipital subarachnoid hemorrhage again seen.    -Interval decrease in size of a left scalp hematoma.    --- End of Report ---          CARLOS DRAPER MD; Resident Radiologist  This document has been electronically signed.  LUIS DANIEL WAGGONER MD; Attending Radiologist  This document has been electronically signed. May 28 2022  1:44PM    < end of copied text >      < from: Transthoracic Echocardiogram (05.05.22 @ 15:36) >    Patient name: HARSH VERGARA  YOB: 1935   Age: 87 (F)   MR#: [id number]  Study Date: 5/5/2022  Location: [id number]: JOHNNIE Chauhan  Study quality: Technically Difficult  Referring Physician: Jonny Shay MD  Blood Pressure: 146/90 mmHg  Height: 157 cm  Weight: 58 kg  BSA: 1.6 m2  Heart Rate: 119 mmHg  ------------------------------------------------------------------------  PROCEDURE: Transthoracic echocardiogram with 2-D, M-Mode  and complete spectral and color flow Doppler.  INDICATION: Abnormal electrocardiogram (ECG) (EKG) (R94.31)  ------------------------------------------------------------------------  DIMENSIONS:  Dimensions:     Normal Values:  LA:     3.6 cm    2.0 - 4.0 cm  Ao:     3.8 cm    2.0 - 3.8 cm  SEPTUM: 1.0 cm    0.6 - 1.2 cm  PWT:    1.1 cm    0.6 - 1.1 cm  LVIDd:  4.7 cm    3.0 - 5.6 cm  LVIDs:    ---     1.8 - 4.0 cm  Derived Variables:  LVMI: 111 g/m2  RWT: 0.46  ------------------------------------------------------------------------  OBSERVATIONS:  Mitral Valve: Mitral annular calcification, otherwise  normal mitral valve. Minimal mitral regurgitation.  Aortic Root: Aortic Root: 3.8 cm.  Ascending Aorta: 3.8 cm.  Aortic Valve: Calcified trileaflet aortic valve with normal  opening. Minimal aortic regurgitation.  Left Atrium: Normal left atrium.  LA volume index = 16  cc/m2.  Left Ventricle: Hyperdynamic left ventricle. Normal left  ventricular internal dimensions and wall thicknesses.  Right Heart: Normal right atrium.Normal right ventricular  size and function. Normal tricuspid valve. Minimal  tricuspid regurgitation. Normal pulmonic valve. Minimal  pulmonic regurgitation.  Pericardium/PleuraNormal pericardium with no pericardial  effusion.  ------------------------------------------------------------------------  CONCLUSIONS:  1. Calcified trileaflet aortic valve with normal opening.  Minimal aortic regurgitation.  2. Normal left ventricular internal dimensions and wall  thicknesses.  3. Hyperdynamic left ventricle.  4. Normal right ventricular size and function.  ------------------------------------------------------------------------  Confirmed on  5/5/2022 - 19:03:12 by AGUSTIN Crowe  ------------------------------------------------------------------------    < end of copied text >      Clinical Impression:  mild diffuse/multifocal cerebral dysfunction, not specific as to etiology.  There were no epileptiform abnormalities recorded.

## 2022-06-03 NOTE — DISCHARGE NOTE PROVIDER - NSDCMRMEDTOKEN_GEN_ALL_CORE_FT
allopurinol 100 mg oral tablet: 100 milligram(s) orally once a day (at bedtime)  cefpodoxime 200 mg oral tablet: 1 tab(s) orally 2 times a day   fenofibrate 160 mg oral tablet: 1 tab(s) orally once a day  Fish Oil oral capsule: 1 cap(s) orally once a day  losartan 50 mg oral tablet: 1 tab(s) orally once a day  melatonin 3 mg oral tablet: 2 tab(s) orally once a day (at bedtime)  metroNIDAZOLE 500 mg oral tablet: 1 tab(s) orally 2 times a day  Protonix 40 mg oral delayed release tablet: 1 tab(s) orally once a day  thiamine 100 mg oral tablet: 1 tab(s) orally once a day  Toprol-XL 25 mg oral tablet, extended release: 1 tab(s) orally once a day   acetaminophen 325 mg oral tablet: 2 tab(s) orally every 6 hours, As needed, Moderate Pain (4 - 6)  allopurinol 100 mg oral tablet: 100 milligram(s) orally once a day (at bedtime)  Commode: 1 application buccal once a day   fenofibrate 160 mg oral tablet: 1 tab(s) orally once a day  losartan 50 mg oral tablet: 1 tab(s) orally once a day  melatonin 3 mg oral tablet: 3 tab(s) orally once a day (at bedtime)  Protonix 40 mg oral delayed release tablet: 1 tab(s) orally once a day  thiamine 100 mg oral tablet: 1 tab(s) orally once a day  Toprol-XL 25 mg oral tablet, extended release: 1 tab(s) orally once a day   acetaminophen 325 mg oral tablet: 2 tab(s) orally every 6 hours, As needed, Moderate Pain (4 - 6)  allopurinol 100 mg oral tablet: 100 milligram(s) orally once a day (at bedtime)  Commode: 1 application buccal once a day   fenofibrate 160 mg oral tablet: 1 tab(s) orally once a day  losartan 50 mg oral tablet: 1 tab(s) orally once a day  melatonin 3 mg oral tablet: 3 tab(s) orally once a day (at bedtime)  midodrine 2.5 mg oral tablet: 1 tab(s) orally 3 times a day  Protonix 40 mg oral delayed release tablet: 1 tab(s) orally once a day  thiamine 100 mg oral tablet: 1 tab(s) orally once a day  Toprol-XL 25 mg oral tablet, extended release: 1 tab(s) orally once a day

## 2022-06-23 ENCOUNTER — APPOINTMENT (OUTPATIENT)
Dept: SURGICAL ONCOLOGY | Facility: CLINIC | Age: 87
End: 2022-06-23
Payer: MEDICARE

## 2022-06-23 VITALS
HEART RATE: 77 BPM | DIASTOLIC BLOOD PRESSURE: 75 MMHG | TEMPERATURE: 97.7 F | OXYGEN SATURATION: 96 % | SYSTOLIC BLOOD PRESSURE: 184 MMHG | WEIGHT: 115 LBS | RESPIRATION RATE: 16 BRPM

## 2022-06-23 PROCEDURE — 99214 OFFICE O/P EST MOD 30 MIN: CPT

## 2022-06-23 NOTE — HISTORY OF PRESENT ILLNESS
[de-identified] : Ms. 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 \par \par colonoscopy 5/18/22: benign colonic mucosa with mildly hypercellular lamina propria. Negative for malignancy. \par \par \par

## 2022-06-23 NOTE — ASSESSMENT
[FreeTextEntry1] : IMP: 87 year old female with a gallbladder mass and colon involvement\par \par PLAN: \par cholecystectomy and possible colon resection

## 2022-06-23 NOTE — PHYSICAL EXAM
[Normal] : supple, no neck mass and thyroid not enlarged [Normal Supraclavicular Lymph Nodes] : normal supraclavicular lymph nodes [Normal Groin Lymph Nodes] : normal groin lymph nodes [Normal] : oriented to person, place and time, with appropriate affect [de-identified] : abdomen soft without any pain or mass

## 2022-08-11 NOTE — SWALLOW BEDSIDE ASSESSMENT ADULT - SWALLOW EVAL: THERAPY FREQUENCY
ED Attending Note      Patient: Ghassan Benito   Age: 79 year old   Sex: male     MRN: [id number]   Encounter Date: 8/11/2022      Chief Complaint   Patient presents with   • Medical Screening Exam       History   79-year-old male past medical history of hypertension, hyperlipidemia, BPH, schizophrenia, seizure, prior stroke, presenting from a nursing home for concern for dark stool.  Patient states over the last couple of days he noted to have 1 episode of black stool which is since resolved.  Patient also reports somewhat loose gray watery stools.  Patient does have multiple complaints, reports chronic intermittent right-sided chest discomfort and pain worse with movement, at times it feels like needles.  Patient denies any shortness of breath, exertional chest pain, nausea, vomiting, back pain, hematochezia, dysuria, hematuria urgency or frequency.  Patient does take Provera but is not on any anticoagulation.  No falls or trauma.  Patient came into the ED just to become acutely evaluated.    Past Medical History:   Diagnosis Date   • Benign prostate hyperplasia    • Diabetes mellitus (CMS/HCC)    • Epilepsy (CMS/HCC)    • Essential (primary) hypertension    • Glaucoma    • High cholesterol    • Schizophrenia (CMS/HCC)    • Seizures (CMS/HCC)        Past Surgical History:   Procedure Laterality Date   • NO PAST SURGERIES         Family History   Problem Relation Age of Onset   • Psychiatric Mother        Social History     Tobacco Use   • Smoking status: Former Smoker   • Smokeless tobacco: Never Used   Substance Use Topics   • Alcohol use: Never   • Drug use: Never       No Known Allergies    ROS   Review of Systems:  (Bold is Positive)  General: fevers, chills, sweats  Skin: rash, jaundice, pruritus  Head & Neck: headaches, dizziness, trauma  Eyes: vision changes, blurry vision, diplopia  Ears: hearing loss, ear pain, tinnitus  Mouth/Throat: sore throat, dry mouth, difficulty swallowing  Cardiovascular: chest pain,  chest pressure, palpitations, lower extremity edema  Pulmonary: dyspnea on exertion, cough, sputum production  Gastrointestinal: nausea, vomiting, diarrhea, constipation, abdominal pain  Genitourinary: dysuria, hematuria, urinary frequency, melena  Musculoskeletal: joint pain, joint tenderness, muscle pain  Neurologic: syncope, vertigo, headache, loss of consciousness, numbness, tingling       Physical Exam     ED Triage Vitals [08/11/22 0045]   ED Triage Vitals Group      Temp 97.9 °F (36.6 °C)      Heart Rate 64      Resp 18      /77      SpO2 100 %      EtCO2 mmHg       Height       Weight       Weight Scale Used       BMI (Calculated)       IBW/kg (Calculated)        Physical Exam:  General Appearance: Alert, no apparent distress  Skin: Warm and dry, no rashes or bruising, well perfused  Eyes: PERRLA, EOMI, conjunctivae normal  Neck: Nontender, supple, trachea midline  Head, Ears, Nose, Throat: Normocephalic, atraumatic, MMM, no pharyngeal erythema or exudate  Cardiovascular: Regular rate and rhythm, no murmurs, DP/PT/radial pulses +2 bl  Resp: CTA bilaterally, respirations are nonlabored, breath sounds are equal  Back: Nontender, no malalignments or step-off  GI: Soft, nontender, nondistended, not peritonitic, no rebound or guarding, BS+  : Rectal exam just showed mild brown stool, no melena, no pain with digital rectal exam, otherwise unremarkable  Musculoskeletal: No edema. No calf tenderness. No unilateral swelling  Neuro: A&O x3, moving all extremities, no focal deficits      MDM   Patient presented to ED for evaluation of acute melena.  Patient otherwise hemodynamic stable, nontoxic-appearing.  Patient exam as noted above.  Concern for possible GI bleed versus diverticular bleed versus other.  Patient has no acute other abdominal pain, was well-appearing.  Symptoms have resolved.  Patient not taking the iron tablets.  Will obtain basic labs, EKG, check hemoglobin and reevaluate.  Patient has no  acute complaints at this time.    0240: Reevaluation of patient resting calmly bed, has no acute complaints.  Patient's hemoglobin is stable at 13.7, troponin negative, electrolytes within normal limits.  Patient's PT and INR within normal limits.  Patient given a dose of pantoprazole.  Discussing with patient about proper follow-up and strict return precautions.    Will discharge patient back to the nursing home.  ED Course as of 08/11/22 0249   Thu Aug 11, 2022   0245 Labs unremarkable.  No leukocytosis, anemia, electro normalities, or RADHA.  Troponin negative.  Low suspicion for ACS or PE.  No evidence of acute bleeding on exam.  Given stable hemoglobin patient is stable for discharge back to NH with PCP follow-up.  Return precautions provided.  He is agreeable with the plan. [FA]      ED Course User Index  [FA] Melina Mendieta MD       Procedures    Previous documentation reviewed to assist in evaluation and management of patient's current presentation:   [x] Previous ER visit records  [] Previous inpatient hospitalization records  [x] Nursing Home documentation  [] Outside hospital records    Lab Results     Results for orders placed or performed during the hospital encounter of 08/11/22   Comprehensive Metabolic Panel   Result Value Ref Range    Fasting Status      Sodium 140 135 - 145 mmol/L    Potassium 4.2 3.4 - 5.1 mmol/L    Chloride 104 97 - 110 mmol/L    Carbon Dioxide 26 21 - 32 mmol/L    Anion Gap 14 7 - 19 mmol/L    Glucose 71 70 - 99 mg/dL    BUN 14 6 - 20 mg/dL    Creatinine 0.87 0.67 - 1.17 mg/dL    Glomerular Filtration Rate 88 >=60    BUN/ Creatinine Ratio 16 7 - 25    Calcium 8.8 8.4 - 10.2 mg/dL    Bilirubin, Total 0.1 (L) 0.2 - 1.0 mg/dL    GOT/AST 15 <=37 Units/L    GPT/ALT 22 <64 Units/L    Alkaline Phosphatase 122 (H) 45 - 117 Units/L    Albumin 3.2 (L) 3.6 - 5.1 g/dL    Protein, Total 7.0 6.4 - 8.2 g/dL    Globulin 3.8 2.0 - 4.0 g/dL    A/G Ratio 0.8 (L) 1.0 - 2.4   TROPONIN I, HIGH SENSITIVITY    Result Value Ref Range    Troponin I, High Sensitivity <4 <77 ng/L   NT proBNP   Result Value Ref Range    NT-proBNP 57 <=450 pg/mL   Partial Thromboplastin Time   Result Value Ref Range    PTT 31 (H) 22 - 30 sec   Prothrombin Time   Result Value Ref Range    Prothrombin Time 10.5 9.7 - 11.8 sec    INR 1.0     CBC with Automated Differential (performable only)   Result Value Ref Range    WBC 7.0 4.2 - 11.0 K/mcL    RBC 5.08 4.50 - 5.90 mil/mcL    HGB 13.7 13.0 - 17.0 g/dL    HCT 42.6 39.0 - 51.0 %    MCV 83.9 78.0 - 100.0 fl    MCH 27.0 26.0 - 34.0 pg    MCHC 32.2 32.0 - 36.5 g/dL    RDW-CV 16.4 (H) 11.0 - 15.0 %    RDW-SD 49.5 39.0 - 50.0 fL     140 - 450 K/mcL    NRBC 0 <=0 /100 WBC    Neutrophil, Percent 53 %    Lymphocytes, Percent 35 %    Mono, Percent 6 %    Eosinophils, Percent 5 %    Basophils, Percent 1 %    Immature Granulocytes 0 %    Absolute Neutrophils 3.7 1.8 - 7.7 K/mcL    Absolute Lymphocytes 2.5 1.0 - 4.0 K/mcL    Absolute Monocytes 0.5 0.3 - 0.9 K/mcL    Absolute Eosinophils  0.3 0.0 - 0.5 K/mcL    Absolute Basophils 0.1 0.0 - 0.3 K/mcL    Absolute Immmature Granulocytes 0.0 0.0 - 0.2 K/mcL   TYPE/SCREEN   Result Value Ref Range    ABO/RH(D) O Rh Positive     ANTIBODY SCREEN Negative     TYPE AND SCREEN EXPIRATION DATE 08/14/2022 23:59        EKG Results   EKG Interpretation: Normal sinus rhythm rate of 69, with no ST elevation or depressions, no T wave changes.  Low voltage noted.  QTC is 445, MN interval is 152, QRS intervals 82.  No ectopy.    EKG tracing interpreted by ED physician    Radiology Results     No orders to display      Imaging Results    None           Clinical Impression      1. General medical evaluation    There is no disposition no dispo time  There is no comment      Discharge Meds        Summary of your Discharge Medications      You have not been prescribed any medications.          We have discussed with the patient the results of tests, differential diagnosis,  and warning signs and symptoms that should prompt immediate return.  The patient is agreeable to return should any problems/concerns/complications arise.  Appropriate follow-up given.  The patient states understanding of these instructions and agrees to the follow-up plan provided.       Joaquina Wells MD  Attending Physician  Dept of Emergency Medicine      Teaching Addendum   I participated in the following activities of this patients care: the medical history, the physical exam, medical decision making.   I personally performed: supervision of the patient's care, the medical history, the physical exam, the medical decision making.   The case was discussed with: the resident.   Evaluation and management service: I agree with the evaluation and management decisions made in this patient's care.   Results interpretation: I agree with the study interpretation in this patient's care.        Joaquina Wells MD  08/11/22 0249     as schedule permits

## 2022-08-17 ENCOUNTER — TRANSCRIPTION ENCOUNTER (OUTPATIENT)
Age: 87
End: 2022-08-17

## 2022-08-18 ENCOUNTER — TRANSCRIPTION ENCOUNTER (OUTPATIENT)
Age: 87
End: 2022-08-18

## 2022-08-18 ENCOUNTER — INPATIENT (INPATIENT)
Facility: HOSPITAL | Age: 87
LOS: 19 days | Discharge: HOME | End: 2022-09-07
Attending: INTERNAL MEDICINE | Admitting: INTERNAL MEDICINE

## 2022-08-18 VITALS
RESPIRATION RATE: 18 BRPM | SYSTOLIC BLOOD PRESSURE: 185 MMHG | HEART RATE: 83 BPM | HEIGHT: 59 IN | OXYGEN SATURATION: 98 % | DIASTOLIC BLOOD PRESSURE: 73 MMHG | TEMPERATURE: 96 F

## 2022-08-18 DIAGNOSIS — Z90.710 ACQUIRED ABSENCE OF BOTH CERVIX AND UTERUS: Chronic | ICD-10-CM

## 2022-08-18 DIAGNOSIS — R10.9 UNSPECIFIED ABDOMINAL PAIN: ICD-10-CM

## 2022-08-18 LAB
ALBUMIN SERPL ELPH-MCNC: 4.2 G/DL — SIGNIFICANT CHANGE UP (ref 3.3–5)
ALP SERPL-CCNC: 55 U/L — SIGNIFICANT CHANGE UP (ref 40–120)
ALT FLD-CCNC: 18 U/L — SIGNIFICANT CHANGE UP (ref 4–33)
ANION GAP SERPL CALC-SCNC: 11 MMOL/L — SIGNIFICANT CHANGE UP (ref 7–14)
ANION GAP SERPL CALC-SCNC: 9 MMOL/L — SIGNIFICANT CHANGE UP (ref 7–14)
APPEARANCE UR: CLEAR — SIGNIFICANT CHANGE UP
AST SERPL-CCNC: 62 U/L — HIGH (ref 4–32)
BACTERIA # UR AUTO: ABNORMAL
BASE EXCESS BLDV CALC-SCNC: 4 MMOL/L — HIGH (ref -2–3)
BASOPHILS # BLD AUTO: 0.08 K/UL — SIGNIFICANT CHANGE UP (ref 0–0.2)
BASOPHILS NFR BLD AUTO: 1.4 % — SIGNIFICANT CHANGE UP (ref 0–2)
BILIRUB SERPL-MCNC: 0.6 MG/DL — SIGNIFICANT CHANGE UP (ref 0.2–1.2)
BILIRUB UR-MCNC: NEGATIVE — SIGNIFICANT CHANGE UP
BLOOD GAS VENOUS COMPREHENSIVE RESULT: SIGNIFICANT CHANGE UP
BUN SERPL-MCNC: 20 MG/DL — SIGNIFICANT CHANGE UP (ref 7–23)
BUN SERPL-MCNC: 21 MG/DL — SIGNIFICANT CHANGE UP (ref 7–23)
CALCIUM SERPL-MCNC: 10.5 MG/DL — SIGNIFICANT CHANGE UP (ref 8.4–10.5)
CALCIUM SERPL-MCNC: 10.6 MG/DL — HIGH (ref 8.4–10.5)
CHLORIDE BLDV-SCNC: 104 MMOL/L — SIGNIFICANT CHANGE UP (ref 96–108)
CHLORIDE SERPL-SCNC: 101 MMOL/L — SIGNIFICANT CHANGE UP (ref 98–107)
CHLORIDE SERPL-SCNC: 102 MMOL/L — SIGNIFICANT CHANGE UP (ref 98–107)
CO2 BLDV-SCNC: 30.6 MMOL/L — HIGH (ref 22–26)
CO2 SERPL-SCNC: 23 MMOL/L — SIGNIFICANT CHANGE UP (ref 22–31)
CO2 SERPL-SCNC: 28 MMOL/L — SIGNIFICANT CHANGE UP (ref 22–31)
COLOR SPEC: SIGNIFICANT CHANGE UP
CREAT SERPL-MCNC: 0.95 MG/DL — SIGNIFICANT CHANGE UP (ref 0.5–1.3)
CREAT SERPL-MCNC: 0.99 MG/DL — SIGNIFICANT CHANGE UP (ref 0.5–1.3)
DIFF PNL FLD: NEGATIVE — SIGNIFICANT CHANGE UP
EGFR: 56 ML/MIN/1.73M2 — LOW
EGFR: 58 ML/MIN/1.73M2 — LOW
EOSINOPHIL # BLD AUTO: 0.26 K/UL — SIGNIFICANT CHANGE UP (ref 0–0.5)
EOSINOPHIL NFR BLD AUTO: 4.6 % — SIGNIFICANT CHANGE UP (ref 0–6)
EPI CELLS # UR: 3 /HPF — SIGNIFICANT CHANGE UP (ref 0–5)
FLUAV AG NPH QL: SIGNIFICANT CHANGE UP
FLUBV AG NPH QL: SIGNIFICANT CHANGE UP
GAS PNL BLDV: 135 MMOL/L — LOW (ref 136–145)
GAS PNL BLDV: SIGNIFICANT CHANGE UP
GLUCOSE BLDV-MCNC: 139 MG/DL — HIGH (ref 70–99)
GLUCOSE SERPL-MCNC: 134 MG/DL — HIGH (ref 70–99)
GLUCOSE SERPL-MCNC: 84 MG/DL — SIGNIFICANT CHANGE UP (ref 70–99)
GLUCOSE UR QL: NEGATIVE — SIGNIFICANT CHANGE UP
HCO3 BLDV-SCNC: 29 MMOL/L — SIGNIFICANT CHANGE UP (ref 22–29)
HCT VFR BLD CALC: 44.2 % — SIGNIFICANT CHANGE UP (ref 34.5–45)
HCT VFR BLDA CALC: 41 % — SIGNIFICANT CHANGE UP (ref 34.5–46.5)
HGB BLD CALC-MCNC: 13.6 G/DL — SIGNIFICANT CHANGE UP (ref 11.5–15.5)
HGB BLD-MCNC: 14.1 G/DL — SIGNIFICANT CHANGE UP (ref 11.5–15.5)
IANC: 3.28 K/UL — SIGNIFICANT CHANGE UP (ref 1.8–7.4)
IMM GRANULOCYTES NFR BLD AUTO: 0.4 % — SIGNIFICANT CHANGE UP (ref 0–1.5)
KETONES UR-MCNC: NEGATIVE — SIGNIFICANT CHANGE UP
LACTATE BLDV-MCNC: 1.7 MMOL/L — SIGNIFICANT CHANGE UP (ref 0.5–2)
LEUKOCYTE ESTERASE UR-ACNC: ABNORMAL
LIDOCAIN IGE QN: 200 U/L — HIGH (ref 7–60)
LYMPHOCYTES # BLD AUTO: 1.51 K/UL — SIGNIFICANT CHANGE UP (ref 1–3.3)
LYMPHOCYTES # BLD AUTO: 26.9 % — SIGNIFICANT CHANGE UP (ref 13–44)
MCHC RBC-ENTMCNC: 29.4 PG — SIGNIFICANT CHANGE UP (ref 27–34)
MCHC RBC-ENTMCNC: 31.9 GM/DL — LOW (ref 32–36)
MCV RBC AUTO: 92.3 FL — SIGNIFICANT CHANGE UP (ref 80–100)
MONOCYTES # BLD AUTO: 0.47 K/UL — SIGNIFICANT CHANGE UP (ref 0–0.9)
MONOCYTES NFR BLD AUTO: 8.4 % — SIGNIFICANT CHANGE UP (ref 2–14)
NEUTROPHILS # BLD AUTO: 3.28 K/UL — SIGNIFICANT CHANGE UP (ref 1.8–7.4)
NEUTROPHILS NFR BLD AUTO: 58.3 % — SIGNIFICANT CHANGE UP (ref 43–77)
NITRITE UR-MCNC: NEGATIVE — SIGNIFICANT CHANGE UP
NRBC # BLD: 0 /100 WBCS — SIGNIFICANT CHANGE UP (ref 0–0)
NRBC # FLD: 0 K/UL — SIGNIFICANT CHANGE UP (ref 0–0)
PCO2 BLDV: 45 MMHG — HIGH (ref 39–42)
PH BLDV: 7.42 — SIGNIFICANT CHANGE UP (ref 7.32–7.43)
PH UR: 7.5 — SIGNIFICANT CHANGE UP (ref 5–8)
PLATELET # BLD AUTO: 234 K/UL — SIGNIFICANT CHANGE UP (ref 150–400)
PO2 BLDV: 41 MMHG — SIGNIFICANT CHANGE UP
POTASSIUM BLDV-SCNC: 6.9 MMOL/L — CRITICAL HIGH (ref 3.5–5.1)
POTASSIUM SERPL-MCNC: 4 MMOL/L — SIGNIFICANT CHANGE UP (ref 3.5–5.3)
POTASSIUM SERPL-MCNC: 5.9 MMOL/L — HIGH (ref 3.5–5.3)
POTASSIUM SERPL-SCNC: 4 MMOL/L — SIGNIFICANT CHANGE UP (ref 3.5–5.3)
POTASSIUM SERPL-SCNC: 5.9 MMOL/L — HIGH (ref 3.5–5.3)
PROT SERPL-MCNC: 7.7 G/DL — SIGNIFICANT CHANGE UP (ref 6–8.3)
PROT UR-MCNC: NEGATIVE — SIGNIFICANT CHANGE UP
RBC # BLD: 4.79 M/UL — SIGNIFICANT CHANGE UP (ref 3.8–5.2)
RBC # FLD: 14.2 % — SIGNIFICANT CHANGE UP (ref 10.3–14.5)
RBC CASTS # UR COMP ASSIST: 2 /HPF — SIGNIFICANT CHANGE UP (ref 0–4)
RSV RNA NPH QL NAA+NON-PROBE: SIGNIFICANT CHANGE UP
SAO2 % BLDV: 74 % — SIGNIFICANT CHANGE UP
SARS-COV-2 N GENE NPH QL NAA+PROBE: NOT DETECTED
SARS-COV-2 RNA SPEC QL NAA+PROBE: SIGNIFICANT CHANGE UP
SODIUM SERPL-SCNC: 135 MMOL/L — SIGNIFICANT CHANGE UP (ref 135–145)
SODIUM SERPL-SCNC: 139 MMOL/L — SIGNIFICANT CHANGE UP (ref 135–145)
SP GR SPEC: 1.01 — SIGNIFICANT CHANGE UP (ref 1.01–1.05)
UROBILINOGEN FLD QL: SIGNIFICANT CHANGE UP
WBC # BLD: 5.62 K/UL — SIGNIFICANT CHANGE UP (ref 3.8–10.5)
WBC # FLD AUTO: 5.62 K/UL — SIGNIFICANT CHANGE UP (ref 3.8–10.5)
WBC UR QL: 15 /HPF — HIGH (ref 0–5)

## 2022-08-18 PROCEDURE — 99284 EMERGENCY DEPT VISIT MOD MDM: CPT | Mod: GC

## 2022-08-18 PROCEDURE — 99232 SBSQ HOSP IP/OBS MODERATE 35: CPT | Mod: 25

## 2022-08-18 PROCEDURE — 71045 X-RAY EXAM CHEST 1 VIEW: CPT | Mod: 26

## 2022-08-18 PROCEDURE — 74177 CT ABD & PELVIS W/CONTRAST: CPT | Mod: 26,MA

## 2022-08-18 RX ORDER — ACETAMINOPHEN 500 MG
650 TABLET ORAL ONCE
Refills: 0 | Status: COMPLETED | OUTPATIENT
Start: 2022-08-18 | End: 2022-08-18

## 2022-08-18 RX ORDER — METRONIDAZOLE 500 MG
250 TABLET ORAL EVERY 8 HOURS
Refills: 0 | Status: COMPLETED | OUTPATIENT
Start: 2022-08-18 | End: 2022-08-19

## 2022-08-18 RX ORDER — FAMOTIDINE 10 MG/ML
20 INJECTION INTRAVENOUS ONCE
Refills: 0 | Status: COMPLETED | OUTPATIENT
Start: 2022-08-18 | End: 2022-08-18

## 2022-08-18 RX ORDER — GABAPENTIN 400 MG/1
600 CAPSULE ORAL ONCE
Refills: 0 | Status: COMPLETED | OUTPATIENT
Start: 2022-08-18 | End: 2022-08-18

## 2022-08-18 RX ORDER — HEPARIN SODIUM 5000 [USP'U]/ML
5000 INJECTION INTRAVENOUS; SUBCUTANEOUS EVERY 8 HOURS
Refills: 0 | Status: DISCONTINUED | OUTPATIENT
Start: 2022-08-18 | End: 2022-08-19

## 2022-08-18 RX ORDER — POLYETHYLENE GLYCOL 3350 17 G/17G
17 POWDER, FOR SOLUTION ORAL
Refills: 0 | Status: COMPLETED | OUTPATIENT
Start: 2022-08-18 | End: 2022-08-19

## 2022-08-18 RX ORDER — LOSARTAN POTASSIUM 100 MG/1
50 TABLET, FILM COATED ORAL DAILY
Refills: 0 | Status: DISCONTINUED | OUTPATIENT
Start: 2022-08-18 | End: 2022-08-18

## 2022-08-18 RX ORDER — NEOMYCIN SULFATE 500 MG/1
500 TABLET ORAL EVERY 8 HOURS
Refills: 0 | Status: COMPLETED | OUTPATIENT
Start: 2022-08-18 | End: 2022-08-19

## 2022-08-18 RX ORDER — ALLOPURINOL 300 MG
100 TABLET ORAL ONCE
Refills: 0 | Status: COMPLETED | OUTPATIENT
Start: 2022-08-18 | End: 2022-08-18

## 2022-08-18 RX ORDER — METOPROLOL TARTRATE 50 MG
25 TABLET ORAL DAILY
Refills: 0 | Status: DISCONTINUED | OUTPATIENT
Start: 2022-08-18 | End: 2022-08-20

## 2022-08-18 RX ORDER — CELECOXIB 200 MG/1
400 CAPSULE ORAL ONCE
Refills: 0 | Status: COMPLETED | OUTPATIENT
Start: 2022-08-18 | End: 2022-08-18

## 2022-08-18 RX ADMIN — HEPARIN SODIUM 5000 UNIT(S): 5000 INJECTION INTRAVENOUS; SUBCUTANEOUS at 22:25

## 2022-08-18 RX ADMIN — Medication 650 MILLIGRAM(S): at 10:26

## 2022-08-18 RX ADMIN — NEOMYCIN SULFATE 500 MILLIGRAM(S): 500 TABLET ORAL at 14:56

## 2022-08-18 RX ADMIN — FAMOTIDINE 20 MILLIGRAM(S): 10 INJECTION INTRAVENOUS at 10:26

## 2022-08-18 RX ADMIN — Medication 30 MILLILITER(S): at 10:27

## 2022-08-18 RX ADMIN — NEOMYCIN SULFATE 500 MILLIGRAM(S): 500 TABLET ORAL at 22:20

## 2022-08-18 RX ADMIN — POLYETHYLENE GLYCOL 3350 17 GRAM(S): 17 POWDER, FOR SOLUTION ORAL at 21:50

## 2022-08-18 RX ADMIN — Medication 250 MILLIGRAM(S): at 14:45

## 2022-08-18 RX ADMIN — HEPARIN SODIUM 5000 UNIT(S): 5000 INJECTION INTRAVENOUS; SUBCUTANEOUS at 14:45

## 2022-08-18 RX ADMIN — Medication 100 MILLIGRAM(S): at 14:45

## 2022-08-18 RX ADMIN — GABAPENTIN 600 MILLIGRAM(S): 400 CAPSULE ORAL at 14:45

## 2022-08-18 RX ADMIN — POLYETHYLENE GLYCOL 3350 17 GRAM(S): 17 POWDER, FOR SOLUTION ORAL at 14:44

## 2022-08-18 RX ADMIN — POLYETHYLENE GLYCOL 3350 17 GRAM(S): 17 POWDER, FOR SOLUTION ORAL at 14:46

## 2022-08-18 RX ADMIN — Medication 250 MILLIGRAM(S): at 22:20

## 2022-08-18 RX ADMIN — POLYETHYLENE GLYCOL 3350 17 GRAM(S): 17 POWDER, FOR SOLUTION ORAL at 18:36

## 2022-08-18 RX ADMIN — Medication 25 MILLIGRAM(S): at 14:48

## 2022-08-18 RX ADMIN — POLYETHYLENE GLYCOL 3350 17 GRAM(S): 17 POWDER, FOR SOLUTION ORAL at 20:55

## 2022-08-18 RX ADMIN — CELECOXIB 400 MILLIGRAM(S): 200 CAPSULE ORAL at 14:56

## 2022-08-18 RX ADMIN — POLYETHYLENE GLYCOL 3350 17 GRAM(S): 17 POWDER, FOR SOLUTION ORAL at 23:23

## 2022-08-18 NOTE — H&P ADULT - ASSESSMENT
87 year old female to undergo ex-lap, cholecystectomy, and possible colon resection tomorrow.    Plan:  - Admit to Surgical Oncology (D Team), Dr. Roche  - ERP Bowel Prep (diet, abx, miralax)  - Will obtain consent  - OR tomorrow for above surgery    Discussed with attending, Dr. Roche.      D Team Surgery  [id number]

## 2022-08-18 NOTE — H&P ADULT - NSHPPHYSICALEXAM_GEN_ALL_CORE
Gen: NAD, resting comfortably in bed  CV: Regular rate  Resp: Nonlabored breathing on room air  Abd: Soft, nondistended, nontender  Ext: Warm and well perfused

## 2022-08-18 NOTE — ED ADULT NURSE NOTE - OBJECTIVE STATEMENT
86y/o female A&ox4, ambulatory with assistance received in rm22. pmhx gallbladder issues, htn. pt c/o lower abd pain since last night. denies cp sob, abd distention, n/v/d, urinary symptoms. RLQ and LLQ tender to touch at this time. as per son, pt also notes pt had an episode of visual hallucination last night, states this happened previously "last year when the pt had hypercalcemia." respirations even and unlabored. pt in NAD, airway patent. pt [place] at this this, son at bedside for assistance. MD at bedside for eval. bed in lowest position, side rails up, call bell in hand, safety maintained. awaiting further orders. will continue to monitor.

## 2022-08-18 NOTE — ED PROVIDER NOTE - PROGRESS NOTE DETAILS
At son's request, spoke to Dr. Deep Roche, surgeon following pt for cholecystitis. Surgeon said to admit pt. Will await pending workup and consider admission as appropriate. Pt labs reviewed. Elevated potassium will obtain EKG and treat if changes seen.   Lipase elevated. Urine positive for many blood cells, LE and WBC.    Waiting on CT abd/pelv. Pt labs reviewed. Elevated potassium, EKG shows no acute changes in setting of hyperkalemia .   Lipase elevated. Waiting on CT abd/pelv.    Urine positive for many blood cells, LE and WBC will empirically treat with metronidazole and neomycin.

## 2022-08-18 NOTE — ED PROVIDER NOTE - OBJECTIVE STATEMENT
Pt is an 88 yo F with pmhx HTN, HLD, GERD, hard of hearing with recent hospitalizations for UTI and then gangrenous cholecystitis treated with abx (told to f/u out patient for gallbladder removal) presents complaining of diffuse lower abdominal pain starting last night. Pt is native [employer] speaker able to minimally communicate understand English, son (Gurjit) at bedside translated as needed. Pt with prior hysterectomy in the [facility] 20 years ago. Pt reports normal appetite, typical BMs (fecal pellets followed by softer stool) and foul smelling urine. Pt denies N/V/F/C, hematochezia, dysuria, hematuria, HA, SOB, chest pain.    considering: Acute cholecystitis with ?perforation? vs appendicitis vs pancreatitis vs UTI vs SBO vs Diverticulitis     Plan:   Labs, chest xray, CT abdomen pelvis w/ contrast, EKG, UA with culture Pt is an 88 yo F with pmhx HTN, HLD, GERD, hard of hearing with recent hospitalizations for UTI and then gangrenous cholecystitis treated with abx (told to f/u out patient for gallbladder removal) presents complaining of diffuse lower abdominal pain starting last night. Pt is native [employer] speaker able to minimally communicate understand English, son (Gurjit) at bedside translated as needed. Pt with prior hysterectomy in the [facility] 20 years ago. Pt reports normal appetite, typical BMs (fecal pellets followed by softer stool) and foul smelling urine. Pt denies N/V/F/C, hematochezia, dysuria, hematuria, HA, SOB, chest pain. Son also reports intermittent visual hallucination that is not new. No acute change.

## 2022-08-18 NOTE — ED PROVIDER NOTE - CLINICAL SUMMARY MEDICAL DECISION MAKING FREE TEXT BOX
considering: Acute cholecystitis with ?perforation? vs appendicitis vs pancreatitis vs UTI vs SBO vs Diverticulitis     Plan:   Labs, chest xray, CT abdomen pelvis w/ contrast, EKG, UA with culture

## 2022-08-18 NOTE — ED ADULT NURSE NOTE - CHIEF COMPLAINT QUOTE
pt is [place] (as per family member pt can be forgetful and has been seeing things that are not there)

## 2022-08-18 NOTE — ED ADULT TRIAGE NOTE - CHIEF COMPLAINT QUOTE
pt is [place] (as per family member pt can be forgetful and has been seeing things that are not there)

## 2022-08-18 NOTE — ED ADULT TRIAGE NOTE - ESI TRIAGE ACUITY LEVEL, MLM
Labor and Delivery Progress Note    Subjective     Patient comfortable without epidural.    Objective     Temp:  [36.8 °C (98.3 °F)] 36.8 °C (98.3 °F)  Heart Rate:  [83-88] 88  Resp:  [18] 18  BP: (107-116)/(66-76) 107/66    Fetal Monitoring:  FHR Baseline: 150  FHR Variability: moderate  FHR Accelerations: present   FHR Decelerations: absent    Contraction Frequency: irregular contraction patter     Latest cervical exam:  Cervical Dilation (cm): 1  Cervical Effacement: 60  Fetal Station: -3  Method: sterile exam per physician (20 0665)    Assessment/Plan     Grace Mobley is a 35 y.o.  at 39w1d admitted for  IOL 2/2 PROM/TOLAC.    1. FHR: Category I  2. GBS: positive, cont ampicillin prophylaxis   3. IOL/TOLAC:  ROM plus found to be positive.Admit to L&D for pitocin. Will con't to monitor FHT and TOCO. Will recheck PRN. TOLAC consent signed with attending.     D/w Dr. Odom.    Johnathan Sandhu DO   3

## 2022-08-18 NOTE — H&P ADULT - HISTORY OF PRESENT ILLNESS
Patient is a 87 year old female with PMHx of HTN, HLD, GERD, who presents for bowel prep prior to surgery tomorrow. Has a history of gangrenous cholecystitis. Patient is scheduled to undergo exploratory laparotomy, cholecystectomy, possible bowel resection. Last time she attempted bowel prep at home, she got dizzy and fell.

## 2022-08-18 NOTE — CHART NOTE - NSCHARTNOTEFT_GEN_A_CORE
PRE-OPERATIVE DIAGNOSIS: Gangrenous cholecystitis  SURGEON: Dr. Roche  PROCEDURE: Cholecystectomy, possible colon resection      VITAL SIGNS:  T(C): 36.4 (18 Aug 2022 14:51), Max: 36.7 (18 Aug 2022 09:47)  T(F): 97.6 (18 Aug 2022 14:51), Max: 98 (18 Aug 2022 09:47)  HR: 75 (18 Aug 2022 14:51) (75 - 83)  BP: 155/83 (18 Aug 2022 14:51) (155/83 - 185/73)  RR: 18 (18 Aug 2022 14:51) (18 - 18)  SpO2: 99% (18 Aug 2022 14:51) (97% - 99%)      LABS:    CBC:                      14.1   5.62  )-----------( 234      ( 18 Aug 2022 10:20 )             44.2     CHEM:  139  |  102  |  20  ----------------------------<  84  4.0   |  28  |  0.99    Ca    10.5      18 Aug 2022 12:38    TPro  7.7  /  Alb  4.2  /  TBili  0.6  /  DBili  x   /  AST  62<H>  /  ALT  18  /  AlkPhos  55  08-18        ASSESSMENT:  Patient is an 87 year old female with a PMHx of HTN, HLD and GERD who presented for bowel prep prior to scheduled surgery tomorrow.  CT Abdomen / Pelvis performed showing marked gallbladder wall thickening and irregularity as previously demonstrated with possible involvement of adjacent transverse colon.      PLAN:  - OR on 8/19/22 for cholecystectomy, possible colon resection with Dr. Roche  - NPO past midnight  - IV fluids while NPO  - Consent signed and in chart  - COVID swab negative from 8/18/22      #[id number]  D Team Surgery PRE-OPERATIVE DIAGNOSIS: Gangrenous cholecystitis  SURGEON: Dr. Roche  PROCEDURE: Cholecystectomy, possible colon resection      VITAL SIGNS:  T(C): 36.4 (18 Aug 2022 14:51), Max: 36.7 (18 Aug 2022 09:47)  T(F): 97.6 (18 Aug 2022 14:51), Max: 98 (18 Aug 2022 09:47)  HR: 75 (18 Aug 2022 14:51) (75 - 83)  BP: 155/83 (18 Aug 2022 14:51) (155/83 - 185/73)  RR: 18 (18 Aug 2022 14:51) (18 - 18)  SpO2: 99% (18 Aug 2022 14:51) (97% - 99%)      LABS:    CBC:                      14.1   5.62  )-----------( 234      ( 18 Aug 2022 10:20 )             44.2     CHEM:  139  |  102  |  20  ----------------------------<  84  4.0   |  28  |  0.99    Ca    10.5      18 Aug 2022 12:38    TPro  7.7  /  Alb  4.2  /  TBili  0.6  /  DBili  x   /  AST  62<H>  /  ALT  18  /  AlkPhos  55  08-18        ASSESSMENT:  Patient is an 87 year old female with a PMHx of HTN, HLD and GERD who presented for bowel prep prior to scheduled surgery tomorrow.  CT Abdomen / Pelvis performed showing marked gallbladder wall thickening and irregularity as previously demonstrated with possible involvement of adjacent transverse colon.      PLAN:  - OR on 8/19/22 for cholecystectomy, possible colon resection with Dr. Roche  - ERP protocol  - Bowel prep starting today  - Consent signed and in chart  - COVID swab negative from 8/18/22      #[id number]  D Team Surgery

## 2022-08-18 NOTE — H&P ADULT - NSHPLABSRESULTS_GEN_ALL_CORE
14.1   5.62  )-----------( 234      ( 18 Aug 2022 10:20 )             44.2     08-18    135  |  101  |  21  ----------------------------<  134<H>  5.9<H>   |  23  |  0.95    Ca    10.6<H>      18 Aug 2022 10:20    TPro  7.7  /  Alb  4.2  /  TBili  0.6  /  DBili  x   /  AST  62<H>  /  ALT  18  /  AlkPhos  55  08-18

## 2022-08-18 NOTE — ED PROVIDER NOTE - ATTENDING CONTRIBUTION TO CARE
I have personally performed a face to face medical and diagnostic evaluation of the patient. I have discussed with and reviewed the Resident's note and agree with the History, ROS, Physical Exam and MDM unless otherwise indicated. A brief summary of my personal evaluation and impression can be found below.    87y F w/ PMHx HTN, HLD, GERD w/ recent hospitalizations for UTI and then gangrenous cholecystitis treated with abx (told to f/u out patient for gallbladder removal) presents complaining of diffuse lower abdominal pain starting last night. Pt is native [employer] speaker able to minimally communicate understand English, son (Gurjit) at bedside translated as needed. Pt with prior hysterectomy in the [facility] 20 years ago. Pt reports normal appetite, typical BMs (fecal pellets followed by softer stool) and foul smelling urine. Pt denies N/V/F/C, hematochezia, dysuria, hematuria, HA, SOB, chest pain. Son also reports intermittent visual hallucination that is not new. No acute change.    Physical Exam:  Gen: NAD, non-toxic appearing  HEENT: normal conjunctiva, tongue midline, oral mucosa moist  Lung: CTAB, no respiratory distress, no wheezes/rhonchi/rales B/L, speaking in full sentences  CV: RRR, no murmurs, rubs or gallops  Abd: soft, +B/L lower quadrant tenderness to palpation, ND, no guarding, no rigidity, no rebound tenderness, no CVA tenderness   MSK: no visible deformities, ROM normal in UE/LE  Neuro: No focal sensory or motor deficits  Skin: Warm, well perfused, no rash, no leg swelling    Labs, CT A/P will call Dr. Roche (Patient's surgeon), will likely require surgery consult vs admission, pain control, reassess. I have personally performed a face to face medical and diagnostic evaluation of the patient. I have discussed with and reviewed the Student/Resident's note and agree with the History, ROS, Physical Exam and MDM unless otherwise indicated. A brief summary of my personal evaluation and impression can be found below.    87y F w/ PMHx HTN, HLD, GERD w/ recent hospitalizations for UTI and then gangrenous cholecystitis treated with abx (told to f/u out patient for gallbladder removal) presents complaining of diffuse lower abdominal pain starting last night. Pt is native [employer] speaker able to minimally communicate understand English, son (Gurjit) at bedside translated as needed. Pt with prior hysterectomy in the [facility] years ago. Pt reports normal appetite, typical BMs (fecal pellets followed by softer stool) and foul smelling urine. Pt denies N/V/F/C, hematochezia, dysuria, hematuria, HA, SOB, chest pain. Son also reports intermittent visual hallucination that is not new. No acute change.    Physical Exam:  Gen: NAD, non-toxic appearing  HEENT: normal conjunctiva, tongue midline, oral mucosa moist  Lung: CTAB, no respiratory distress, no wheezes/rhonchi/rales B/L, speaking in full sentences  CV: RRR, no murmurs, rubs or gallops  Abd: soft, +B/L lower quadrant tenderness to palpation, ND, no guarding, no rigidity, no rebound tenderness, no CVA tenderness   MSK: no visible deformities, ROM normal in UE/LE  Neuro: No focal sensory or motor deficits  Skin: Warm, well perfused, no rash, no leg swelling    Labs, CT A/P will call Dr. Roche (Patient's surgeon), will likely require surgery consult vs admission, pain control, reassess.

## 2022-08-19 ENCOUNTER — APPOINTMENT (OUTPATIENT)
Dept: SURGICAL ONCOLOGY | Facility: HOSPITAL | Age: 87
End: 2022-08-19

## 2022-08-19 ENCOUNTER — RESULT REVIEW (OUTPATIENT)
Age: 87
End: 2022-08-19

## 2022-08-19 LAB
ALBUMIN SERPL ELPH-MCNC: 3.6 G/DL — SIGNIFICANT CHANGE UP (ref 3.3–5)
ALP SERPL-CCNC: 31 U/L — LOW (ref 40–120)
ALT FLD-CCNC: 110 U/L — HIGH (ref 4–33)
ANION GAP SERPL CALC-SCNC: 13 MMOL/L — SIGNIFICANT CHANGE UP (ref 7–14)
ANION GAP SERPL CALC-SCNC: 9 MMOL/L — SIGNIFICANT CHANGE UP (ref 7–14)
APTT BLD: 31.9 SEC — SIGNIFICANT CHANGE UP (ref 27–36.3)
AST SERPL-CCNC: 333 U/L — HIGH (ref 4–32)
BASE EXCESS BLDA CALC-SCNC: -3.2 MMOL/L — LOW (ref -2–3)
BILIRUB SERPL-MCNC: 1.1 MG/DL — SIGNIFICANT CHANGE UP (ref 0.2–1.2)
BLD GP AB SCN SERPL QL: NEGATIVE — SIGNIFICANT CHANGE UP
BLOOD GAS ARTERIAL - LYTES,HGB,ICA,LACT RESULT: SIGNIFICANT CHANGE UP
BLOOD GAS COMMENTS ARTERIAL: SIGNIFICANT CHANGE UP
BUN SERPL-MCNC: 15 MG/DL — SIGNIFICANT CHANGE UP (ref 7–23)
BUN SERPL-MCNC: 19 MG/DL — SIGNIFICANT CHANGE UP (ref 7–23)
CALCIUM SERPL-MCNC: 10.4 MG/DL — SIGNIFICANT CHANGE UP (ref 8.4–10.5)
CALCIUM SERPL-MCNC: 8 MG/DL — LOW (ref 8.4–10.5)
CHLORIDE SERPL-SCNC: 104 MMOL/L — SIGNIFICANT CHANGE UP (ref 98–107)
CHLORIDE SERPL-SCNC: 97 MMOL/L — LOW (ref 98–107)
CO2 BLDA-SCNC: 25 MMOL/L — HIGH (ref 19–24)
CO2 SERPL-SCNC: 25 MMOL/L — SIGNIFICANT CHANGE UP (ref 22–31)
CO2 SERPL-SCNC: 25 MMOL/L — SIGNIFICANT CHANGE UP (ref 22–31)
CREAT SERPL-MCNC: 0.83 MG/DL — SIGNIFICANT CHANGE UP (ref 0.5–1.3)
CREAT SERPL-MCNC: 0.99 MG/DL — SIGNIFICANT CHANGE UP (ref 0.5–1.3)
EGFR: 56 ML/MIN/1.73M2 — LOW
EGFR: 69 ML/MIN/1.73M2 — SIGNIFICANT CHANGE UP
GAS PNL BLDA: SIGNIFICANT CHANGE UP
GLUCOSE SERPL-MCNC: 167 MG/DL — HIGH (ref 70–99)
GLUCOSE SERPL-MCNC: 92 MG/DL — SIGNIFICANT CHANGE UP (ref 70–99)
HCO3 BLDA-SCNC: 24 MMOL/L — SIGNIFICANT CHANGE UP (ref 21–28)
HCT VFR BLD CALC: 28.4 % — LOW (ref 34.5–45)
HCT VFR BLD CALC: 42.7 % — SIGNIFICANT CHANGE UP (ref 34.5–45)
HGB BLD-MCNC: 13.4 G/DL — SIGNIFICANT CHANGE UP (ref 11.5–15.5)
HGB BLD-MCNC: 9.4 G/DL — LOW (ref 11.5–15.5)
INR BLD: 1 RATIO — SIGNIFICANT CHANGE UP (ref 0.88–1.16)
MAGNESIUM SERPL-MCNC: 1.4 MG/DL — LOW (ref 1.6–2.6)
MAGNESIUM SERPL-MCNC: 2.1 MG/DL — SIGNIFICANT CHANGE UP (ref 1.6–2.6)
MCHC RBC-ENTMCNC: 29.5 PG — SIGNIFICANT CHANGE UP (ref 27–34)
MCHC RBC-ENTMCNC: 30 PG — SIGNIFICANT CHANGE UP (ref 27–34)
MCHC RBC-ENTMCNC: 31.4 GM/DL — LOW (ref 32–36)
MCHC RBC-ENTMCNC: 33.1 GM/DL — SIGNIFICANT CHANGE UP (ref 32–36)
MCV RBC AUTO: 90.7 FL — SIGNIFICANT CHANGE UP (ref 80–100)
MCV RBC AUTO: 93.8 FL — SIGNIFICANT CHANGE UP (ref 80–100)
NRBC # BLD: 0 /100 WBCS — SIGNIFICANT CHANGE UP (ref 0–0)
NRBC # BLD: 0 /100 WBCS — SIGNIFICANT CHANGE UP (ref 0–0)
NRBC # FLD: 0 K/UL — SIGNIFICANT CHANGE UP (ref 0–0)
NRBC # FLD: 0 K/UL — SIGNIFICANT CHANGE UP (ref 0–0)
PCO2 BLDA: 48 MMHG — HIGH (ref 32–35)
PH BLDA: 7.3 — LOW (ref 7.35–7.45)
PHOSPHATE SERPL-MCNC: 3.4 MG/DL — SIGNIFICANT CHANGE UP (ref 2.5–4.5)
PHOSPHATE SERPL-MCNC: 3.6 MG/DL — SIGNIFICANT CHANGE UP (ref 2.5–4.5)
PLATELET # BLD AUTO: 150 K/UL — SIGNIFICANT CHANGE UP (ref 150–400)
PLATELET # BLD AUTO: 287 K/UL — SIGNIFICANT CHANGE UP (ref 150–400)
PO2 BLDA: 87 MMHG — SIGNIFICANT CHANGE UP (ref 83–108)
POTASSIUM SERPL-MCNC: 3.7 MMOL/L — SIGNIFICANT CHANGE UP (ref 3.5–5.3)
POTASSIUM SERPL-MCNC: 4.1 MMOL/L — SIGNIFICANT CHANGE UP (ref 3.5–5.3)
POTASSIUM SERPL-SCNC: 3.7 MMOL/L — SIGNIFICANT CHANGE UP (ref 3.5–5.3)
POTASSIUM SERPL-SCNC: 4.1 MMOL/L — SIGNIFICANT CHANGE UP (ref 3.5–5.3)
PROT SERPL-MCNC: 5 G/DL — LOW (ref 6–8.3)
PROTHROM AB SERPL-ACNC: 11.6 SEC — SIGNIFICANT CHANGE UP (ref 10.5–13.4)
RBC # BLD: 3.13 M/UL — LOW (ref 3.8–5.2)
RBC # BLD: 4.55 M/UL — SIGNIFICANT CHANGE UP (ref 3.8–5.2)
RBC # FLD: 13.9 % — SIGNIFICANT CHANGE UP (ref 10.3–14.5)
RBC # FLD: 15.1 % — HIGH (ref 10.3–14.5)
RH IG SCN BLD-IMP: POSITIVE — SIGNIFICANT CHANGE UP
SAO2 % BLDA: 97.7 % — SIGNIFICANT CHANGE UP (ref 94–98)
SODIUM SERPL-SCNC: 135 MMOL/L — SIGNIFICANT CHANGE UP (ref 135–145)
SODIUM SERPL-SCNC: 138 MMOL/L — SIGNIFICANT CHANGE UP (ref 135–145)
WBC # BLD: 4.97 K/UL — SIGNIFICANT CHANGE UP (ref 3.8–10.5)
WBC # BLD: 7.91 K/UL — SIGNIFICANT CHANGE UP (ref 3.8–10.5)
WBC # FLD AUTO: 4.97 K/UL — SIGNIFICANT CHANGE UP (ref 3.8–10.5)
WBC # FLD AUTO: 7.91 K/UL — SIGNIFICANT CHANGE UP (ref 3.8–10.5)

## 2022-08-19 PROCEDURE — 49905 OMENTAL FLAP INTRA-ABDOM: CPT

## 2022-08-19 PROCEDURE — 44050 REDUCE BOWEL OBSTRUCTION: CPT | Mod: 82

## 2022-08-19 PROCEDURE — 44140 PARTIAL REMOVAL OF COLON: CPT | Mod: 82

## 2022-08-19 PROCEDURE — 88307 TISSUE EXAM BY PATHOLOGIST: CPT | Mod: 26

## 2022-08-19 PROCEDURE — 44140 PARTIAL REMOVAL OF COLON: CPT

## 2022-08-19 PROCEDURE — 49905 OMENTAL FLAP INTRA-ABDOM: CPT | Mod: 82

## 2022-08-19 PROCEDURE — 88313 SPECIAL STAINS GROUP 2: CPT | Mod: 26

## 2022-08-19 PROCEDURE — 44050 REDUCE BOWEL OBSTRUCTION: CPT

## 2022-08-19 PROCEDURE — 47120 PARTIAL REMOVAL OF LIVER: CPT | Mod: 82

## 2022-08-19 PROCEDURE — 88309 TISSUE EXAM BY PATHOLOGIST: CPT | Mod: 26

## 2022-08-19 PROCEDURE — 88305 TISSUE EXAM BY PATHOLOGIST: CPT | Mod: 26

## 2022-08-19 PROCEDURE — 47120 PARTIAL REMOVAL OF LIVER: CPT

## 2022-08-19 PROCEDURE — 99233 SBSQ HOSP IP/OBS HIGH 50: CPT | Mod: GC

## 2022-08-19 RX ORDER — PANTOPRAZOLE SODIUM 20 MG/1
40 TABLET, DELAYED RELEASE ORAL DAILY
Refills: 0 | Status: DISCONTINUED | OUTPATIENT
Start: 2022-08-19 | End: 2022-08-22

## 2022-08-19 RX ORDER — POTASSIUM CHLORIDE 20 MEQ
10 PACKET (EA) ORAL
Refills: 0 | Status: COMPLETED | OUTPATIENT
Start: 2022-08-19 | End: 2022-08-19

## 2022-08-19 RX ORDER — LABETALOL HCL 100 MG
10 TABLET ORAL ONCE
Refills: 0 | Status: COMPLETED | OUTPATIENT
Start: 2022-08-19 | End: 2022-08-19

## 2022-08-19 RX ORDER — MAGNESIUM SULFATE 500 MG/ML
2 VIAL (ML) INJECTION ONCE
Refills: 0 | Status: COMPLETED | OUTPATIENT
Start: 2022-08-19 | End: 2022-08-19

## 2022-08-19 RX ORDER — ACETAMINOPHEN 500 MG
1000 TABLET ORAL EVERY 8 HOURS
Refills: 0 | Status: COMPLETED | OUTPATIENT
Start: 2022-08-19 | End: 2022-08-20

## 2022-08-19 RX ORDER — HYDROMORPHONE HYDROCHLORIDE 2 MG/ML
1 INJECTION INTRAMUSCULAR; INTRAVENOUS; SUBCUTANEOUS
Refills: 0 | Status: DISCONTINUED | OUTPATIENT
Start: 2022-08-19 | End: 2022-08-19

## 2022-08-19 RX ORDER — SODIUM CHLORIDE 9 MG/ML
1000 INJECTION, SOLUTION INTRAVENOUS
Refills: 0 | Status: DISCONTINUED | OUTPATIENT
Start: 2022-08-19 | End: 2022-08-20

## 2022-08-19 RX ORDER — EPINEPHRINE 11.25MG/ML
0.5 SOLUTION, NON-ORAL INHALATION ONCE
Refills: 0 | Status: COMPLETED | OUTPATIENT
Start: 2022-08-19 | End: 2022-08-19

## 2022-08-19 RX ORDER — SODIUM CHLORIDE 9 MG/ML
500 INJECTION, SOLUTION INTRAVENOUS ONCE
Refills: 0 | Status: COMPLETED | OUTPATIENT
Start: 2022-08-19 | End: 2022-08-19

## 2022-08-19 RX ORDER — NALOXONE HYDROCHLORIDE 4 MG/.1ML
0.2 SPRAY NASAL ONCE
Refills: 0 | Status: COMPLETED | OUTPATIENT
Start: 2022-08-19 | End: 2022-08-19

## 2022-08-19 RX ORDER — SODIUM CHLORIDE 9 MG/ML
1000 INJECTION, SOLUTION INTRAVENOUS
Refills: 0 | Status: DISCONTINUED | OUTPATIENT
Start: 2022-08-19 | End: 2022-08-19

## 2022-08-19 RX ORDER — HEPARIN SODIUM 5000 [USP'U]/ML
5000 INJECTION INTRAVENOUS; SUBCUTANEOUS EVERY 8 HOURS
Refills: 0 | Status: DISCONTINUED | OUTPATIENT
Start: 2022-08-19 | End: 2022-09-07

## 2022-08-19 RX ORDER — HYDROMORPHONE HYDROCHLORIDE 2 MG/ML
0.5 INJECTION INTRAMUSCULAR; INTRAVENOUS; SUBCUTANEOUS
Refills: 0 | Status: DISCONTINUED | OUTPATIENT
Start: 2022-08-19 | End: 2022-08-19

## 2022-08-19 RX ORDER — CHLORHEXIDINE GLUCONATE 213 G/1000ML
15 SOLUTION TOPICAL EVERY 12 HOURS
Refills: 0 | Status: DISCONTINUED | OUTPATIENT
Start: 2022-08-19 | End: 2022-08-19

## 2022-08-19 RX ORDER — ONDANSETRON 8 MG/1
4 TABLET, FILM COATED ORAL ONCE
Refills: 0 | Status: COMPLETED | OUTPATIENT
Start: 2022-08-19 | End: 2022-08-19

## 2022-08-19 RX ORDER — CEFOTETAN DISODIUM 1 G
2 VIAL (EA) INJECTION EVERY 12 HOURS
Refills: 0 | Status: COMPLETED | OUTPATIENT
Start: 2022-08-19 | End: 2022-08-20

## 2022-08-19 RX ORDER — LABETALOL HCL 100 MG
5 TABLET ORAL ONCE
Refills: 0 | Status: DISCONTINUED | OUTPATIENT
Start: 2022-08-19 | End: 2022-08-19

## 2022-08-19 RX ORDER — METOCLOPRAMIDE HCL 10 MG
10 TABLET ORAL ONCE
Refills: 0 | Status: DISCONTINUED | OUTPATIENT
Start: 2022-08-19 | End: 2022-08-19

## 2022-08-19 RX ADMIN — HEPARIN SODIUM 5000 UNIT(S): 5000 INJECTION INTRAVENOUS; SUBCUTANEOUS at 22:10

## 2022-08-19 RX ADMIN — Medication 100 GRAM(S): at 18:55

## 2022-08-19 RX ADMIN — SODIUM CHLORIDE 1000 MILLILITER(S): 9 INJECTION, SOLUTION INTRAVENOUS at 22:46

## 2022-08-19 RX ADMIN — Medication 10 MILLIGRAM(S): at 15:15

## 2022-08-19 RX ADMIN — POLYETHYLENE GLYCOL 3350 17 GRAM(S): 17 POWDER, FOR SOLUTION ORAL at 00:21

## 2022-08-19 RX ADMIN — Medication 1 ENEMA: at 05:02

## 2022-08-19 RX ADMIN — Medication 250 MILLIGRAM(S): at 05:20

## 2022-08-19 RX ADMIN — Medication 1000 MILLIGRAM(S): at 21:52

## 2022-08-19 RX ADMIN — Medication 100 MILLIEQUIVALENT(S): at 17:45

## 2022-08-19 RX ADMIN — Medication 400 MILLIGRAM(S): at 21:36

## 2022-08-19 RX ADMIN — NALOXONE HYDROCHLORIDE 0.2 MILLIGRAM(S): 4 SPRAY NASAL at 18:30

## 2022-08-19 RX ADMIN — Medication 100 MILLIEQUIVALENT(S): at 19:00

## 2022-08-19 RX ADMIN — ONDANSETRON 4 MILLIGRAM(S): 8 TABLET, FILM COATED ORAL at 23:00

## 2022-08-19 RX ADMIN — Medication 25 MILLIGRAM(S): at 05:20

## 2022-08-19 RX ADMIN — NEOMYCIN SULFATE 500 MILLIGRAM(S): 500 TABLET ORAL at 05:20

## 2022-08-19 RX ADMIN — Medication 25 GRAM(S): at 22:52

## 2022-08-19 RX ADMIN — Medication 100 MILLIEQUIVALENT(S): at 21:15

## 2022-08-19 RX ADMIN — CHLORHEXIDINE GLUCONATE 15 MILLILITER(S): 213 SOLUTION TOPICAL at 18:00

## 2022-08-19 RX ADMIN — Medication 0.5 MILLILITER(S): at 14:50

## 2022-08-19 NOTE — PACU DISCHARGE NOTE - COMMENTS
with O2  liters nasal cannula with O2  liters nasal cannula. Discussed patient with SICU team bedside, per SICU resident, Dr. Cunningham accepted the patient to the SICU.

## 2022-08-19 NOTE — BRIEF OPERATIVE NOTE - OPERATION/FINDINGS
Gallbladder-hepatic flexure fistula  Open cholecystectomy. Gallbladder sent for frozen (+malignancy)  Portal lymphadenectomy, liver segment 4b/5 resection  Right hemicolectomy

## 2022-08-19 NOTE — CHART NOTE - NSCHARTNOTEFT_GEN_A_CORE
Post Operative Note  Patient: HARSH VERGARA 87y (1935) Female   MRN: [id number]  Location: [facility]  Visit: 08-18-22 Inpatient  Date: 08-19-22 @ 17:38    Procedure:    Subjective:   Pt extubated in PACU. Pt still recovering from anesthesia but able to squeeze fingers when prompted.     Objective:  Vitals: T(F): 97.8 (08-19-22 @ 13:15), Max: 97.8 (08-18-22 @ 22:58)  HR: 77 (08-19-22 @ 17:00)  BP: 106/57 (08-19-22 @ 17:00) (103/55 - 186/53)  RR: 13 (08-19-22 @ 17:00)  SpO2: 95% (08-19-22 @ 17:00)  Vent Settings: Mode: SIMV with PS, RR (machine): 10, TV (machine): 400, FiO2: 50, PEEP: 5, PS: 10, MAP: 6, PIP: 17    In:   08-18-22 @ 07:01  -  08-19-22 @ 07:00  --------------------------------------------------------  IN: 740 mL    08-19-22 @ 07:01  -  08-19-22 @ 17:38  --------------------------------------------------------  IN: 450 mL      IV Fluids: lactated ringers. 1000 milliLiter(s) (100 mL/Hr) IV Continuous <Continuous>  magnesium sulfate  IVPB 2 Gram(s) IV Intermittent once  potassium chloride  10 mEq/100 mL IVPB 10 milliEquivalent(s) IV Intermittent every 1 hour      Out:   08-18-22 @ 07:01  -  08-19-22 @ 07:00  --------------------------------------------------------  OUT: 0 mL    08-19-22 @ 07:01  -  08-19-22 @ 17:38  --------------------------------------------------------  OUT: 435 mL      EBL:     Voided Urine:   08-18-22 @ 07:01  -  08-19-22 @ 07:00  --------------------------------------------------------  OUT: 0 mL    08-19-22 @ 07:01  -  08-19-22 @ 17:38  --------------------------------------------------------  OUT: 435 mL      Otto Catheter: yes         Physical Examination:  General Appearance: still recovering from sedation.   HEENT: NCAT  Heart: RRR  Abdomen:  Soft, nondistended, No rigidity, guarding, or rebound tenderness. No masses or fluctuance palpated. Prevena in place to suction.   Skin: Warm/dry, Normal color      Medications: [Standing]  acetaminophen   IVPB .. 1000 milliGRAM(s) IV Intermittent every 8 hours  cefoTEtan  IVPB 2 Gram(s) IV Intermittent every 12 hours  chlorhexidine 0.12% Liquid 15 milliLiter(s) Oral Mucosa every 12 hours  heparin   Injectable 5000 Unit(s) SubCutaneous every 8 hours  HYDROmorphone  Injectable 0.5 milliGRAM(s) IV Push every 10 minutes PRN  HYDROmorphone  Injectable 1 milliGRAM(s) IV Push every 10 minutes PRN  lactated ringers. 1000 milliLiter(s) IV Continuous <Continuous>  magnesium sulfate  IVPB 2 Gram(s) IV Intermittent once  metoclopramide Injectable 10 milliGRAM(s) IV Push once PRN  metoprolol succinate ER 25 milliGRAM(s) Oral daily  ondansetron Injectable 4 milliGRAM(s) IV Push once PRN  pantoprazole  Injectable 40 milliGRAM(s) IV Push daily  potassium chloride  10 mEq/100 mL IVPB 10 milliEquivalent(s) IV Intermittent every 1 hour    Medications: [PRN]  acetaminophen   IVPB .. 1000 milliGRAM(s) IV Intermittent every 8 hours  cefoTEtan  IVPB 2 Gram(s) IV Intermittent every 12 hours  chlorhexidine 0.12% Liquid 15 milliLiter(s) Oral Mucosa every 12 hours  heparin   Injectable 5000 Unit(s) SubCutaneous every 8 hours  HYDROmorphone  Injectable 0.5 milliGRAM(s) IV Push every 10 minutes PRN  HYDROmorphone  Injectable 1 milliGRAM(s) IV Push every 10 minutes PRN  lactated ringers. 1000 milliLiter(s) IV Continuous <Continuous>  magnesium sulfate  IVPB 2 Gram(s) IV Intermittent once  metoclopramide Injectable 10 milliGRAM(s) IV Push once PRN  metoprolol succinate ER 25 milliGRAM(s) Oral daily  ondansetron Injectable 4 milliGRAM(s) IV Push once PRN  pantoprazole  Injectable 40 milliGRAM(s) IV Push daily  potassium chloride  10 mEq/100 mL IVPB 10 milliEquivalent(s) IV Intermittent every 1 hour    Labs:                        9.4    7.91  )-----------( 150      ( 19 Aug 2022 13:15 )             28.4     08-19    138  |  104  |  15  ----------------------------<  167<H>  3.7   |  25  |  0.83    Ca    8.0<L>      19 Aug 2022 13:15  Phos  3.6     08-19  Mg     1.40     08-19    TPro  5.0<L>  /  Alb  3.6  /  TBili  1.1  /  DBili  x   /  AST  333<H>  /  ALT  110<H>  /  AlkPhos  31<L>  08-19    PT/INR - ( 19 Aug 2022 01:00 )   PT: 11.6 sec;   INR: 1.00 ratio         PTT - ( 19 Aug 2022 01:00 )  PTT:31.9 sec      Imaging:  No post-op imaging studies    Assessment:  87yFemale patient S/P ex-lap, cholecystectomy, portal lymphadenopathy, seg 4b, 5 liver resection, right hemicolectomy for gallbladder cancer.     Plan:  -Continue to monitor patient in PACU until ready for floor.   - Prevena to suction  -NGT (confirmed intraop)  -cefotetan for 24 hrs  -dilaudid, tylenol for pain   -Otto  -f/u PACU labs  -[facility]    Surgery [id number]    Date/Time: 08-19-22 @ 17:38

## 2022-08-19 NOTE — BRIEF OPERATIVE NOTE - NSICDXBRIEFPROCEDURE_GEN_ALL_CORE_FT
PROCEDURES:  Exploratory laparotomy in adult 21-Aug-2022 17:41:41  Brunilda Dela Cruz  Open cholecystectomy 21-Aug-2022 17:41:51  Brunilda Dela Cruz  Hepatectomy, partial 21-Aug-2022 17:42:00  Brunilda Dela Cruz  Open portal lymphadenectomy 21-Aug-2022 17:42:17  Brunilda Dela Cruz  Open right hemicolectomy 21-Aug-2022 17:42:35  Brunilda Dela Cruz

## 2022-08-19 NOTE — CONSULT NOTE ADULT - ASSESSMENT
87 year old female with a PMHx of HTN, HLD and GERD presented for cholecystectomy for gangrene gallbladder. 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 Patient was unable to be extubated at the end of case due to oversedation and not able to ventilate adequate volumes. SICU consulted for hemodynamic monitoring.     Plan:  - No acute SICU needs at this time  - extubation per anesthesia  - follow up PACU labs  - Please call back if patient's clinical status changes or unable to be extubated in PACU    Plan discussed with SICU attending Dr. Adriel Benavides PGY-2   [facility] Spectra [id number] 87 year old female with a PMHx of HTN, HLD and GERD presented for cholecystectomy for gangrene gallbladder. 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 Patient was unable to be extubated at the end of case due to oversedation and not able to ventilate adequate volumes. SICU consulted for hemodynamic monitoring.     Plan:  - Upon further reevaluation by SICU team and attending, agreeable to overnight monitoring in SICU   - extubation per anesthesia  - follow up PACU labs, hypercarbic respiratory acidosis   - SICU disposition     Plan discussed with SICU attendings Dr. Morel and Dr. Octavio PERES Spectra [id number]

## 2022-08-19 NOTE — PROGRESS NOTE ADULT - ASSESSMENT
Patient is an 87 year old female with a PMHx of HTN, HLD and GERD who presented for bowel prep prior to scheduled surgery tomorrow.  CT Abdomen / Pelvis performed showing marked gallbladder wall thickening and irregularity as previously demonstrated with possible involvement of adjacent transverse colon.      PLAN:  - Planned for OR today  - NPO  - LR @100cc/hr  - Out of bed      #[id number]  D Team Surgery

## 2022-08-19 NOTE — PROGRESS NOTE ADULT - SUBJECTIVE AND OBJECTIVE BOX
Surgery Daily Progress Note  =====================================================  Interval / Overnight Events: No acute events overnight.      HPI:  Patient is an 87 year old female with a PMHx of HTN, HLD and GERD who presented for bowel prep prior to scheduled surgery tomorrow.  CT Abdomen / Pelvis performed showing marked gallbladder wall thickening and irregularity as previously demonstrated with possible involvement of adjacent transverse colon.      PAST MEDICAL & SURGICAL HISTORY:  Hypertension  Hyperlipidemia  Gout  GERD (gastroesophageal reflux disease)  Insomnia  Other gallbladder disorder  H/O: hysterectomy      ALLERGIES:  penicillin (Unknown)    --------------------------------------------------------------------------------------    MEDICATIONS:    Cardiovascular Medications  metoprolol succinate ER 25 milliGRAM(s) Oral daily    Gastrointestinal Medications  lactated ringers. 1000 milliLiter(s) IV Continuous <Continuous>    --------------------------------------------------------------------------------------    VITAL SIGNS:  T(C): 36.4 (19 Aug 2022 05:30), Max: 36.7 (18 Aug 2022 09:47)  T(F): 97.6 (19 Aug 2022 05:30), Max: 98 (18 Aug 2022 09:47)  HR: 65 (19 Aug 2022 05:30) (54 - 83)  BP: 156/65 (19 Aug 2022 05:30) (148/77 - 185/73)  RR: 18 (19 Aug 2022 05:30) (17 - 18)  SpO2: 99% (19 Aug 2022 05:30) (96% - 100%)    --------------------------------------------------------------------------------------    INS AND OUTS:    18 Aug 2022 07:01  -  19 Aug 2022 06:10  --------------------------------------------------------  IN:    Oral Fluid: 740 mL  Total IN: 740 mL    OUT:  Total OUT: 0 mL    Total NET: 740 mL    --------------------------------------------------------------------------------------    EXAM    NEUROLOGY  Exam: Normal, in no acute distress.    HEENT  Exam: Normocephalic, atraumatic.    RESPIRATORY  Exam: Normal expansion / effort.    CARDIOVASCULAR  Exam: S1, S2.  Regular rate and rhythm.    GI/NUTRITION  Exam: Abdomen soft, Non-tender, Non-distended.  Current Diet: NPO    MUSCULOSKELETAL  Exam: All extremities moving spontaneously without limitations.      METABOLIC / FLUIDS / ELECTROLYTES  lactated ringers. 1000 milliLiter(s) IV Continuous <Continuous>    --------------------------------------------------------------------------------------

## 2022-08-20 LAB
ANION GAP SERPL CALC-SCNC: 11 MMOL/L — SIGNIFICANT CHANGE UP (ref 7–14)
APPEARANCE UR: CLEAR — SIGNIFICANT CHANGE UP
BACTERIA # UR AUTO: NEGATIVE — SIGNIFICANT CHANGE UP
BILIRUB UR-MCNC: NEGATIVE — SIGNIFICANT CHANGE UP
BLOOD GAS ARTERIAL - LYTES,HGB,ICA,LACT RESULT: SIGNIFICANT CHANGE UP
BUN SERPL-MCNC: 16 MG/DL — SIGNIFICANT CHANGE UP (ref 7–23)
CALCIUM SERPL-MCNC: 8.4 MG/DL — SIGNIFICANT CHANGE UP (ref 8.4–10.5)
CHLORIDE SERPL-SCNC: 102 MMOL/L — SIGNIFICANT CHANGE UP (ref 98–107)
CO2 SERPL-SCNC: 22 MMOL/L — SIGNIFICANT CHANGE UP (ref 22–31)
COLOR SPEC: YELLOW — SIGNIFICANT CHANGE UP
CREAT SERPL-MCNC: 0.95 MG/DL — SIGNIFICANT CHANGE UP (ref 0.5–1.3)
DIFF PNL FLD: ABNORMAL
EGFR: 58 ML/MIN/1.73M2 — LOW
EPI CELLS # UR: 2 /HPF — SIGNIFICANT CHANGE UP (ref 0–5)
GAS PNL BLDA: SIGNIFICANT CHANGE UP
GLUCOSE SERPL-MCNC: 133 MG/DL — HIGH (ref 70–99)
GLUCOSE UR QL: NEGATIVE — SIGNIFICANT CHANGE UP
HCT VFR BLD CALC: 28 % — LOW (ref 34.5–45)
HGB BLD-MCNC: 9.3 G/DL — LOW (ref 11.5–15.5)
HYALINE CASTS # UR AUTO: 2 /LPF — SIGNIFICANT CHANGE UP (ref 0–7)
KETONES UR-MCNC: NEGATIVE — SIGNIFICANT CHANGE UP
LEUKOCYTE ESTERASE UR-ACNC: NEGATIVE — SIGNIFICANT CHANGE UP
MAGNESIUM SERPL-MCNC: 1.7 MG/DL — SIGNIFICANT CHANGE UP (ref 1.6–2.6)
MCHC RBC-ENTMCNC: 29.9 PG — SIGNIFICANT CHANGE UP (ref 27–34)
MCHC RBC-ENTMCNC: 33.2 GM/DL — SIGNIFICANT CHANGE UP (ref 32–36)
MCV RBC AUTO: 90 FL — SIGNIFICANT CHANGE UP (ref 80–100)
NITRITE UR-MCNC: NEGATIVE — SIGNIFICANT CHANGE UP
NRBC # BLD: 0 /100 WBCS — SIGNIFICANT CHANGE UP (ref 0–0)
NRBC # FLD: 0 K/UL — SIGNIFICANT CHANGE UP (ref 0–0)
PH UR: 6 — SIGNIFICANT CHANGE UP (ref 5–8)
PHOSPHATE SERPL-MCNC: 3.5 MG/DL — SIGNIFICANT CHANGE UP (ref 2.5–4.5)
PLATELET # BLD AUTO: 154 K/UL — SIGNIFICANT CHANGE UP (ref 150–400)
POTASSIUM SERPL-MCNC: 4.7 MMOL/L — SIGNIFICANT CHANGE UP (ref 3.5–5.3)
POTASSIUM SERPL-SCNC: 4.7 MMOL/L — SIGNIFICANT CHANGE UP (ref 3.5–5.3)
PROT UR-MCNC: ABNORMAL
RBC # BLD: 3.11 M/UL — LOW (ref 3.8–5.2)
RBC # FLD: 15.5 % — HIGH (ref 10.3–14.5)
RBC CASTS # UR COMP ASSIST: 27 /HPF — HIGH (ref 0–4)
SODIUM SERPL-SCNC: 135 MMOL/L — SIGNIFICANT CHANGE UP (ref 135–145)
SP GR SPEC: 1.02 — SIGNIFICANT CHANGE UP (ref 1.01–1.05)
UROBILINOGEN FLD QL: SIGNIFICANT CHANGE UP
WBC # BLD: 10.82 K/UL — HIGH (ref 3.8–10.5)
WBC # FLD AUTO: 10.82 K/UL — HIGH (ref 3.8–10.5)
WBC UR QL: 4 /HPF — SIGNIFICANT CHANGE UP (ref 0–5)

## 2022-08-20 PROCEDURE — 99233 SBSQ HOSP IP/OBS HIGH 50: CPT | Mod: GC

## 2022-08-20 RX ORDER — METOPROLOL TARTRATE 50 MG
5 TABLET ORAL EVERY 6 HOURS
Refills: 0 | Status: DISCONTINUED | OUTPATIENT
Start: 2022-08-20 | End: 2022-08-22

## 2022-08-20 RX ORDER — ACETAMINOPHEN 500 MG
1000 TABLET ORAL EVERY 6 HOURS
Refills: 0 | Status: COMPLETED | OUTPATIENT
Start: 2022-08-21 | End: 2022-08-21

## 2022-08-20 RX ORDER — SODIUM CHLORIDE 9 MG/ML
500 INJECTION, SOLUTION INTRAVENOUS ONCE
Refills: 0 | Status: COMPLETED | OUTPATIENT
Start: 2022-08-20 | End: 2022-08-20

## 2022-08-20 RX ORDER — MAGNESIUM SULFATE 500 MG/ML
2 VIAL (ML) INJECTION ONCE
Refills: 0 | Status: COMPLETED | OUTPATIENT
Start: 2022-08-20 | End: 2022-08-20

## 2022-08-20 RX ORDER — SODIUM CHLORIDE 9 MG/ML
1000 INJECTION, SOLUTION INTRAVENOUS
Refills: 0 | Status: DISCONTINUED | OUTPATIENT
Start: 2022-08-20 | End: 2022-08-23

## 2022-08-20 RX ADMIN — Medication 100 GRAM(S): at 06:41

## 2022-08-20 RX ADMIN — HEPARIN SODIUM 5000 UNIT(S): 5000 INJECTION INTRAVENOUS; SUBCUTANEOUS at 06:04

## 2022-08-20 RX ADMIN — HEPARIN SODIUM 5000 UNIT(S): 5000 INJECTION INTRAVENOUS; SUBCUTANEOUS at 21:35

## 2022-08-20 RX ADMIN — SODIUM CHLORIDE 1000 MILLILITER(S): 9 INJECTION, SOLUTION INTRAVENOUS at 02:50

## 2022-08-20 RX ADMIN — SODIUM CHLORIDE 100 MILLILITER(S): 9 INJECTION, SOLUTION INTRAVENOUS at 08:00

## 2022-08-20 RX ADMIN — Medication 1000 MILLIGRAM(S): at 14:50

## 2022-08-20 RX ADMIN — Medication 400 MILLIGRAM(S): at 13:58

## 2022-08-20 RX ADMIN — Medication 25 GRAM(S): at 02:49

## 2022-08-20 RX ADMIN — Medication 1000 MILLIGRAM(S): at 06:30

## 2022-08-20 RX ADMIN — SODIUM CHLORIDE 75 MILLILITER(S): 9 INJECTION, SOLUTION INTRAVENOUS at 08:32

## 2022-08-20 RX ADMIN — Medication 5 MILLIGRAM(S): at 12:28

## 2022-08-20 RX ADMIN — Medication 400 MILLIGRAM(S): at 06:04

## 2022-08-20 RX ADMIN — Medication 400 MILLIGRAM(S): at 21:35

## 2022-08-20 RX ADMIN — HEPARIN SODIUM 5000 UNIT(S): 5000 INJECTION INTRAVENOUS; SUBCUTANEOUS at 13:58

## 2022-08-20 RX ADMIN — Medication 5 MILLIGRAM(S): at 18:11

## 2022-08-20 RX ADMIN — PANTOPRAZOLE SODIUM 40 MILLIGRAM(S): 20 TABLET, DELAYED RELEASE ORAL at 12:28

## 2022-08-20 RX ADMIN — Medication 5 MILLIGRAM(S): at 06:04

## 2022-08-20 NOTE — PROGRESS NOTE ADULT - ASSESSMENT
Patient is an 87 year old female with a PMHx of HTN, HLD and GERD who presented for bowel prep prior to scheduled surgery tomorrow.  CT Abdomen / Pelvis performed showing marked gallbladder wall thickening and irregularity as previously demonstrated with possible involvement of adjacent transverse colon. S/p ex lap, cholecystectomy, portal lymphadenectomy and segment 4b and 5 liver resection, right hemicolectomy for gallbladder cancer.     PLAN:  - Care per SICU  - NPO  - LR @100cc/hr  - Out of bed    #[id number]  D Team Surgery

## 2022-08-20 NOTE — PROGRESS NOTE ADULT - SUBJECTIVE AND OBJECTIVE BOX
SICU Daily Progress Note  =====================================================  Interval/Overnight Events:       - 500cc bolus given for decreased UOP    HPI:  87 year old female with a PMHx of HTN, HLD and GERD presented for cholecystectomy. 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 SICU consulted for hemodynamic monitoring.     PAST MEDICAL & SURGICAL HISTORY:  Hypertension  Hyperlipidemia  Gout  GERD (gastroesophageal reflux disease)  Insomnia  Other gallbladder disorder  H/O: hysterectomy          Allergies: penicillin (Unknown)      MEDICATIONS:   --------------------------------------------------------------------------------------  Neurologic Medications  acetaminophen   IVPB .. 1000 milliGRAM(s) IV Intermittent every 8 hours    Cardiovascular Medications  metoprolol succinate ER 25 milliGRAM(s) Oral daily    Gastrointestinal Medications  lactated ringers. 1000 milliLiter(s) IV Continuous <Continuous>  pantoprazole  Injectable 40 milliGRAM(s) IV Push daily    Hematologic/Oncologic Medications  heparin   Injectable 5000 Unit(s) SubCutaneous every 8 hours    Antimicrobial/Immunologic Medications  cefoTEtan  IVPB 2 Gram(s) IV Intermittent every 12 hours      --------------------------------------------------------------------------------------    VITAL SIGNS, INS/OUTS (last 24 hours):  ICU Vital Signs Last 24 Hrs  T(C): 36.5 (19 Aug 2022 22:00), Max: 36.6 (19 Aug 2022 06:39)  T(F): 97.7 (19 Aug 2022 22:00), Max: 97.8 (19 Aug 2022 06:39)  HR: 91 (19 Aug 2022 23:00) (60 - 91)  BP: 144/66 (19 Aug 2022 23:00) (103/55 - 186/53)  BP(mean): 89 (19 Aug 2022 23:00) (67 - 160)  ABP: 168/70 (19 Aug 2022 21:30) (107/42 - 182/76)  ABP(mean): 110 (19 Aug 2022 21:30) (69 - 121)  RR: 20 (19 Aug 2022 23:00) (7 - 26)  SpO2: 100% (19 Aug 2022 23:00) (94% - 100%)    O2 Parameters below as of 19 Aug 2022 23:00  Patient On (Oxygen Delivery Method): nasal cannula  O2 Flow (L/min): 2      I&O's Detail    18 Aug 2022 07:01  -  19 Aug 2022 07:00  --------------------------------------------------------  IN:    Oral Fluid: 740 mL  Total IN: 740 mL    OUT:  Total OUT: 0 mL    Total NET: 740 mL      19 Aug 2022 07:01  -  20 Aug 2022 00:03  --------------------------------------------------------  IN:    Lactated Ringers: 950 mL  Total IN: 950 mL    OUT:    Bulb (mL): 160 mL    Indwelling Catheter - Urethral (mL): 595 mL    Nasogastric/Oral tube (mL): 100 mL  Total OUT: 855 mL    Total NET: 95 mL        --------------------------------------------------------------------------------------    EXAM  NEUROLOGY  Exam: A&Ox3. No focal deficits. No signs of distress.     RESPIRATORY  Exam:  Normal expansion/effort. No signs of respiratory distress.     CARDIOVASCULAR  Exam: S1, S2.  Regular rate and rhythm on monitor.     GI/NUTRITION  Exam: Abdomen soft,  Non-distended. Midline incision with prevena holding to suction. CATARINA drain site with dressing in place without breakthrough. CATARINA SS. NGT in place.   Current Diet:  NPO    VASCULAR  Exam: Extremities warm.    MUSCULOSKELETAL  Exam: All extremities moving spontaneously without limitations.    SKIN  Exam: Good skin turgor.    METABOLIC/FLUIDS/ELECTROLYTES  lactated ringers. 1000 milliLiter(s) IV Continuous <Continuous>      HEMATOLOGIC  [x] VTE Prophylaxis: heparin   Injectable 5000 Unit(s) SubCutaneous every 8 hours      INFECTIOUS DISEASE  Antimicrobials/Immunologic Medications:  cefoTEtan  IVPB 2 Gram(s) IV Intermittent every 12 hours      Tubes/Lines/Drains    [x] Peripheral IV  [] Central Venous Line     	[] R	[] L	[] IJ	[] Fem	[] SC	Date Placed:   [] Arterial Line		[] R	[] L	[] Fem	[] Rad	[] Ax	Date Placed:   [] PICC		[] Midline		[] Mediport  [X] Urinary Catheter		Date Placed: 08/18/2022  [x] Necessity of urinary, arterial, and venous catheters discussed    LABS    ((Insert SICU Labs here))***  --------------------------------------------------------------------------------------

## 2022-08-20 NOTE — PATIENT PROFILE ADULT - TRANSPORTATION
Xray appears negative for any fracture, discussed results with patient after radiologist report was obtained  Recommend icing the area every 2 hours for 20-30 minutes, take Ibuprofen every 6-8 hours to reduce inflammation  follow up with PCP in the next week  Emphasized taking deep breaths to prevent pneumonia  Can use pillow to help brace himself with changing positions getting out of the CT 
no

## 2022-08-20 NOTE — PATIENT PROFILE ADULT - FALL HARM RISK - HARM RISK INTERVENTIONS

## 2022-08-20 NOTE — PROGRESS NOTE ADULT - ASSESSMENT
ASSESSMENT:  87 year old female with a PMHx of HTN, HLD and GERD presented for cholecystectomy. 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 SICU consulted for hemodynamic monitoring.     PLAN:  NEUROLOGIC   - no active issues  - Pain control: IV tylenol      RESPIRATORY   - satting well on NC  - Monitor SpO2 goal >92%  - Incentive spirometry     CARDIOVASCULAR   - Monitor hemodynamics   - MAP >65  - metoprolol 25 daily    GASTROINTESTINAL   - Diet: NPO except meds  - NGT to LWS, currently bilious   - Protonix   - Zofran prn    /RENAL   - IV fluids: LR @ 100cc/hr  - Maintain Otto catheter, strict I/Os  - Monitor electrolytes, replete PRN    HEMATOLOGIC:  - monitor H/H on CBC   - DVT ppx: SQH     INFECTIOUS DISEASE  - Monitor fever curve and trend WBC on CBC  - cefotetan per primary team     ENDOCRINE  - no active issues  - Monitor glucose on BMP    LINES  - Otto (8/18)  - PIV     DISPO: SICU       ASSESSMENT:  87 year old female with a PMHx of HTN, HLD and GERD presented for cholecystectomy. 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 SICU consulted for hemodynamic monitoring.     PLAN:  NEUROLOGIC   - no active issues  - Pain control: IV tylenol      RESPIRATORY   - satting well on NC  - Monitor SpO2 goal >92%  - Incentive spirometry     CARDIOVASCULAR   - Monitor hemodynamics   - MAP >65  - lopressor 5 q6    GASTROINTESTINAL   - Diet: NPO except meds  - NGT to LWS, currently bilious   - Protonix     /RENAL   - IV fluids: D51/2NS@75cc/hr  - Maintain Otto catheter, strict I/Os  - Monitor electrolytes, replete PRN    HEMATOLOGIC:  - monitor H/H on CBC   - DVT ppx: SQH     INFECTIOUS DISEASE  - Monitor fever curve and trend WBC on CBC  - cefotetan per primary team     ENDOCRINE  - no active issues  - Monitor glucose on BMP    LINES  - Otto (8/18)  - PIV     DISPO: SICU       ASSESSMENT:  87 year old female with a PMHx of HTN, HLD and GERD presented for cholecystectomy. 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 SICU consulted for hemodynamic monitoring.     PLAN:  NEUROLOGIC   - no active issues  - Pain control: IV tylenol      RESPIRATORY   - satting well on NC  - Monitor SpO2 goal >92%  - Incentive spirometry     CARDIOVASCULAR   - Monitor hemodynamics   - MAP >65  - lopressor 5 q6    GASTROINTESTINAL   - Diet: NPO except meds  - NGT to LWS, currently bilious   - Protonix     /RENAL   - IV fluids: D51/2NS@75cc/hr  - Maintain Otto catheter, strict I/Os  - Monitor electrolytes, replete PRN    HEMATOLOGIC:  - monitor H/H on CBC   - DVT ppx: SQH     INFECTIOUS DISEASE  - Monitor fever curve and trend WBC on CBC  - dc cefotetan s/p 2 doses    ENDOCRINE  - no active issues  - Monitor glucose on BMP    LINES  - Otto (8/18)  - PIV     DISPO: SICU

## 2022-08-20 NOTE — PATIENT PROFILE ADULT - FUNCTIONAL SCREEN CURRENT LEVEL: COMMUNICATION, MLM
pt is [place] and wears dentures/2 = difficulty understanding and speaking (not related to language barrier)

## 2022-08-20 NOTE — PROGRESS NOTE ADULT - SUBJECTIVE AND OBJECTIVE BOX
Surgery Daily Progress Note  =====================================================  Interval / Overnight Events: Given narcan.       HPI:  Patient is an 87 year old female with a PMHx of HTN, HLD and GERD who presented for bowel prep prior to scheduled surgery tomorrow.  CT Abdomen / Pelvis performed showing marked gallbladder wall thickening and irregularity as previously demonstrated with possible involvement of adjacent transverse colon.      PAST MEDICAL & SURGICAL HISTORY:  Hypertension  Hyperlipidemia  Gout  GERD (gastroesophageal reflux disease)  Insomnia  Other gallbladder disorder  H/O: hysterectomy      ALLERGIES:  penicillin (Unknown)    --------------------------------------------------------------------------------------    MEDICATIONS  (STANDING):  acetaminophen   IVPB .. 1000 milliGRAM(s) IV Intermittent every 8 hours  dextrose 5% + sodium chloride 0.45%. 1000 milliLiter(s) (75 mL/Hr) IV Continuous <Continuous>  heparin   Injectable 5000 Unit(s) SubCutaneous every 8 hours  metoprolol tartrate Injectable 5 milliGRAM(s) IV Push every 6 hours  pantoprazole  Injectable 40 milliGRAM(s) IV Push daily    MEDICATIONS  (PRN):    --------------------------------------------------------------------------------------    ICU Vital Signs Last 24 Hrs  T(C): 36.2 (20 Aug 2022 08:00), Max: 37.1 (20 Aug 2022 00:00)  T(F): 97.2 (20 Aug 2022 08:00), Max: 98.8 (20 Aug 2022 00:00)  HR: 97 (20 Aug 2022 11:00) (71 - 97)  BP: 135/64 (20 Aug 2022 11:00) (103/55 - 174/72)  BP(mean): 85 (20 Aug 2022 11:00) (63 - 160)  ABP: 168/70 (19 Aug 2022 21:30) (107/42 - 182/76)  ABP(mean): 110 (19 Aug 2022 21:30) (69 - 121)  RR: 20 (20 Aug 2022 11:00) (7 - 26)  SpO2: 97% (20 Aug 2022 11:00) (92% - 100%)    O2 Parameters below as of 20 Aug 2022 11:00  Patient On (Oxygen Delivery Method): nasal cannula  O2 Flow (L/min): 5    --------------------------------------------------------------------------------------    I&O's Detail    19 Aug 2022 07:01  -  20 Aug 2022 07:00  --------------------------------------------------------  IN:    IV PiggyBack: 800 mL    Lactated Ringers: 1650 mL  Total IN: 2450 mL    OUT:    Blood Loss (mL): 40 mL    Bulb (mL): 170 mL    Indwelling Catheter - Urethral (mL): 1170 mL    Nasogastric/Oral tube (mL): 200 mL  Total OUT: 1580 mL    Total NET: 870 mL      20 Aug 2022 07:01  -  20 Aug 2022 12:19  --------------------------------------------------------  IN:    dextrose 5% + sodium chloride 0.45%: 300 mL    Lactated Ringers: 100 mL  Total IN: 400 mL    OUT:    Indwelling Catheter - Urethral (mL): 375 mL  Total OUT: 375 mL    Total NET: 25 mL    --------------------------------------------------------------------------------------    EXAM    NEUROLOGY  Exam: Normal, in mild distress.    HEENT  Exam: Normocephalic, atraumatic.    RESPIRATORY  Exam: Normal expansion / effort.    CARDIOVASCULAR  Exam: S1, S2.  Regular rate and rhythm.    GI/NUTRITION  Exam: Abdomen soft, Non-tender, Non-distended.  Current Diet: NPO    MUSCULOSKELETAL  Exam: All extremities moving spontaneously without limitations.      METABOLIC / FLUIDS / ELECTROLYTES  lactated ringers. 1000 milliLiter(s) IV Continuous <Continuous>    --------------------------------------------------------------------------------------

## 2022-08-20 NOTE — PATIENT PROFILE ADULT - FUNCTIONAL ASSESSMENT - BASIC MOBILITY 6.
2-calculated by average/Not able to assess (calculate score using [facility] averaging method)

## 2022-08-21 LAB
-  AMIKACIN: SIGNIFICANT CHANGE UP
-  AMOXICILLIN/CLAVULANIC ACID: SIGNIFICANT CHANGE UP
-  AMPICILLIN/SULBACTAM: SIGNIFICANT CHANGE UP
-  AMPICILLIN: SIGNIFICANT CHANGE UP
-  AZTREONAM: SIGNIFICANT CHANGE UP
-  CEFAZOLIN: SIGNIFICANT CHANGE UP
-  CEFEPIME: SIGNIFICANT CHANGE UP
-  CEFOXITIN: SIGNIFICANT CHANGE UP
-  CEFTRIAXONE: SIGNIFICANT CHANGE UP
-  CIPROFLOXACIN: SIGNIFICANT CHANGE UP
-  ERTAPENEM: SIGNIFICANT CHANGE UP
-  GENTAMICIN: SIGNIFICANT CHANGE UP
-  IMIPENEM: SIGNIFICANT CHANGE UP
-  LEVOFLOXACIN: SIGNIFICANT CHANGE UP
-  MEROPENEM: SIGNIFICANT CHANGE UP
-  NITROFURANTOIN: SIGNIFICANT CHANGE UP
-  PIPERACILLIN/TAZOBACTAM: SIGNIFICANT CHANGE UP
-  TIGECYCLINE: SIGNIFICANT CHANGE UP
-  TOBRAMYCIN: SIGNIFICANT CHANGE UP
-  TRIMETHOPRIM/SULFAMETHOXAZOLE: SIGNIFICANT CHANGE UP
ALBUMIN SERPL ELPH-MCNC: 3.1 G/DL — LOW (ref 3.3–5)
ALP SERPL-CCNC: 42 U/L — SIGNIFICANT CHANGE UP (ref 40–120)
ALT FLD-CCNC: 144 U/L — HIGH (ref 4–33)
ANION GAP SERPL CALC-SCNC: 8 MMOL/L — SIGNIFICANT CHANGE UP (ref 7–14)
AST SERPL-CCNC: 210 U/L — HIGH (ref 4–32)
BILIRUB SERPL-MCNC: 0.7 MG/DL — SIGNIFICANT CHANGE UP (ref 0.2–1.2)
BUN SERPL-MCNC: 12 MG/DL — SIGNIFICANT CHANGE UP (ref 7–23)
CALCIUM SERPL-MCNC: 9 MG/DL — SIGNIFICANT CHANGE UP (ref 8.4–10.5)
CHLORIDE SERPL-SCNC: 102 MMOL/L — SIGNIFICANT CHANGE UP (ref 98–107)
CO2 SERPL-SCNC: 24 MMOL/L — SIGNIFICANT CHANGE UP (ref 22–31)
CREAT SERPL-MCNC: 0.87 MG/DL — SIGNIFICANT CHANGE UP (ref 0.5–1.3)
CULTURE RESULTS: SIGNIFICANT CHANGE UP
EGFR: 65 ML/MIN/1.73M2 — SIGNIFICANT CHANGE UP
GLUCOSE SERPL-MCNC: 118 MG/DL — HIGH (ref 70–99)
HCT VFR BLD CALC: 29.8 % — LOW (ref 34.5–45)
HGB BLD-MCNC: 9.9 G/DL — LOW (ref 11.5–15.5)
MAGNESIUM SERPL-MCNC: 2 MG/DL — SIGNIFICANT CHANGE UP (ref 1.6–2.6)
MCHC RBC-ENTMCNC: 30.5 PG — SIGNIFICANT CHANGE UP (ref 27–34)
MCHC RBC-ENTMCNC: 33.2 GM/DL — SIGNIFICANT CHANGE UP (ref 32–36)
MCV RBC AUTO: 91.7 FL — SIGNIFICANT CHANGE UP (ref 80–100)
METHOD TYPE: SIGNIFICANT CHANGE UP
NRBC # BLD: 0 /100 WBCS — SIGNIFICANT CHANGE UP (ref 0–0)
NRBC # FLD: 0 K/UL — SIGNIFICANT CHANGE UP (ref 0–0)
ORGANISM # SPEC MICROSCOPIC CNT: SIGNIFICANT CHANGE UP
ORGANISM # SPEC MICROSCOPIC CNT: SIGNIFICANT CHANGE UP
PHOSPHATE SERPL-MCNC: 2.2 MG/DL — LOW (ref 2.5–4.5)
PLATELET # BLD AUTO: 148 K/UL — LOW (ref 150–400)
POTASSIUM SERPL-MCNC: 4.2 MMOL/L — SIGNIFICANT CHANGE UP (ref 3.5–5.3)
POTASSIUM SERPL-SCNC: 4.2 MMOL/L — SIGNIFICANT CHANGE UP (ref 3.5–5.3)
PROT SERPL-MCNC: 5.6 G/DL — LOW (ref 6–8.3)
RBC # BLD: 3.25 M/UL — LOW (ref 3.8–5.2)
RBC # FLD: 15.1 % — HIGH (ref 10.3–14.5)
SODIUM SERPL-SCNC: 134 MMOL/L — LOW (ref 135–145)
SPECIMEN SOURCE: SIGNIFICANT CHANGE UP
WBC # BLD: 11 K/UL — HIGH (ref 3.8–10.5)
WBC # FLD AUTO: 11 K/UL — HIGH (ref 3.8–10.5)

## 2022-08-21 PROCEDURE — 71045 X-RAY EXAM CHEST 1 VIEW: CPT | Mod: 26

## 2022-08-21 RX ORDER — POTASSIUM PHOSPHATE, MONOBASIC POTASSIUM PHOSPHATE, DIBASIC 236; 224 MG/ML; MG/ML
30 INJECTION, SOLUTION INTRAVENOUS ONCE
Refills: 0 | Status: COMPLETED | OUTPATIENT
Start: 2022-08-21 | End: 2022-08-21

## 2022-08-21 RX ORDER — POTASSIUM PHOSPHATE, MONOBASIC POTASSIUM PHOSPHATE, DIBASIC 236; 224 MG/ML; MG/ML
30 INJECTION, SOLUTION INTRAVENOUS ONCE
Refills: 0 | Status: DISCONTINUED | OUTPATIENT
Start: 2022-08-21 | End: 2022-08-21

## 2022-08-21 RX ADMIN — SODIUM CHLORIDE 75 MILLILITER(S): 9 INJECTION, SOLUTION INTRAVENOUS at 07:19

## 2022-08-21 RX ADMIN — SODIUM CHLORIDE 75 MILLILITER(S): 9 INJECTION, SOLUTION INTRAVENOUS at 17:47

## 2022-08-21 RX ADMIN — Medication 400 MILLIGRAM(S): at 23:03

## 2022-08-21 RX ADMIN — Medication 5 MILLIGRAM(S): at 17:47

## 2022-08-21 RX ADMIN — Medication 5 MILLIGRAM(S): at 12:06

## 2022-08-21 RX ADMIN — PANTOPRAZOLE SODIUM 40 MILLIGRAM(S): 20 TABLET, DELAYED RELEASE ORAL at 12:06

## 2022-08-21 RX ADMIN — HEPARIN SODIUM 5000 UNIT(S): 5000 INJECTION INTRAVENOUS; SUBCUTANEOUS at 14:54

## 2022-08-21 RX ADMIN — Medication 5 MILLIGRAM(S): at 23:03

## 2022-08-21 RX ADMIN — POTASSIUM PHOSPHATE, MONOBASIC POTASSIUM PHOSPHATE, DIBASIC 83.33 MILLIMOLE(S): 236; 224 INJECTION, SOLUTION INTRAVENOUS at 03:36

## 2022-08-21 RX ADMIN — Medication 400 MILLIGRAM(S): at 14:54

## 2022-08-21 RX ADMIN — Medication 5 MILLIGRAM(S): at 00:46

## 2022-08-21 RX ADMIN — Medication 85 MILLIMOLE(S): at 10:28

## 2022-08-21 RX ADMIN — HEPARIN SODIUM 5000 UNIT(S): 5000 INJECTION INTRAVENOUS; SUBCUTANEOUS at 23:03

## 2022-08-21 RX ADMIN — HEPARIN SODIUM 5000 UNIT(S): 5000 INJECTION INTRAVENOUS; SUBCUTANEOUS at 05:35

## 2022-08-21 RX ADMIN — Medication 400 MILLIGRAM(S): at 09:04

## 2022-08-21 RX ADMIN — Medication 400 MILLIGRAM(S): at 03:31

## 2022-08-21 RX ADMIN — SODIUM CHLORIDE 75 MILLILITER(S): 9 INJECTION, SOLUTION INTRAVENOUS at 23:07

## 2022-08-21 NOTE — PROGRESS NOTE ADULT - ASSESSMENT
ASSESSMENT:  87 year old female with a PMHx of HTN, HLD and GERD presented for cholecystectomy. 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 SICU consulted for hemodynamic monitoring.       PLAN:  NEUROLOGIC   - episodes of sundowing, haldol prn  - Pain control: IV tylenol    RESPIRATORY   - Monitor SpO2 goal >92%  - Incentive spirometry     CARDIOVASCULAR   - Monitor hemodynamics   - MAP >65  - lopressor 5 q6    GASTROINTESTINAL   - Diet: NPO/NGT except meds  - NGT to LWS, currently bilious   - Protonix     /RENAL   - IV fluids: D51/2NS @75cc/hr  - Maintain Otto catheter, strict I/Os  - Monitor electrolytes, replete PRN    HEMATOLOGIC:  - monitor H/H on CBC   - DVT ppx: SQH     INFECTIOUS DISEASE  - Monitor fever curve and trend WBC on CBC  - cefotetan d/c s/p 2 doses    ENDOCRINE  - no active issues  - Monitor glucose on BMP    LINES  - Otto (8/18)  - PIV   - NGT  - CATARINA drain    DISPO: SICU       ASSESSMENT:  87 year old female with a PMHx of HTN, HLD and GERD presented for cholecystectomy. 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 SICU consulted for hemodynamic monitoring.       PLAN:  NEUROLOGIC   - Episodes of sundowning, haldol prn  - Pain control: IV tylenol  - Avoid narcotics when possible     RESPIRATORY   - on NC   - Monitor SpO2 goal >92%  - Incentive spirometry     CARDIOVASCULAR   - Monitor hemodynamics   - MAP >65  - Lopressor 5mg q6 -> transition to PO meds as tolerated    GASTROINTESTINAL   - Diet: NPO/NGT except meds -> will d/c NGT today, ADAT   - NGT to LWS  - Protonix (home med)     /RENAL   - IV fluids: D51/2NS @75cc/hr -> plan to d/c once tolerating PO   - Otto catheter -> will d/c today, TOV   - Monitor I/Os  - Monitor electrolytes, replete PRN    HEMATOLOGIC:  - monitor H/H on CBC   - DVT ppx: SQH     INFECTIOUS DISEASE  - Monitor fever curve and trend WBC on CBC  - S/p cefotetan x2 doses    ENDOCRINE  - no active issues  - Monitor glucose on BMP    LINES  - Otto (8/18) -> will d/c today, POV  - PIV   - NGT -> will d/c today  - CATARINA drain    DISPO: SICU       ASSESSMENT:  87 year old female with a PMHx of HTN, HLD and GERD presented for cholecystectomy. 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 SICU consulted for hemodynamic monitoring.       PLAN:  NEUROLOGIC   - Episodes of sundowning, haldol prn  - Pain control: IV tylenol  - Avoid narcotics when possible     RESPIRATORY   - on NC   - Monitor SpO2 goal >92%  - Incentive spirometry     CARDIOVASCULAR   - Monitor hemodynamics   - MAP >65  - Lopressor 5mg q6 -> transition to PO meds as tolerated    GASTROINTESTINAL   - Diet: NPO/NGT except meds -> will d/c NGT today, ADAT   - NGT to LWS  - Protonix (home med)     /RENAL   - IV fluids: D51/2NS @75cc/hr -> plan to d/c once tolerating PO   - Otto catheter -> will d/c today, TOV   - Monitor I/Os  - Monitor electrolytes, replete PRN    HEMATOLOGIC:  - monitor H/H on CBC   - DVT ppx: SQH     INFECTIOUS DISEASE  - Monitor fever curve and trend WBC on CBC  - S/p cefotetan x2 doses    ENDOCRINE  - no active issues  - Monitor glucose on BMP    LINES  - Otto (8/18) -> will d/c today, POV  - PIV   - NGT -> will d/c today  - CATARINA drain    DISPO:   - SICU  - Will list for floors       ASSESSMENT:  87 year old female with a PMHx of HTN, HLD and GERD presented for cholecystectomy. 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 SICU consulted for hemodynamic monitoring.       PLAN:  NEUROLOGIC   - Episodes of sundowning, haldol prn  - Pain control: IV tylenol  - Avoid narcotics when possible     RESPIRATORY   - on NC   - Monitor SpO2 goal >92%  - Incentive spirometry     CARDIOVASCULAR   - Monitor hemodynamics   - MAP >65  - Lopressor 5mg q6 -> transition to PO meds as tolerated    GASTROINTESTINAL   - Diet: NPO/NGT except meds -> will d/c NGT today  - f/u with primary team about advancing diet  - Protonix (home med)     /RENAL   - IV fluids: D51/2NS @75cc/hr -> plan to d/c once tolerating PO   - Otto catheter -> will d/c today, TOV   - Monitor I/Os  - Monitor electrolytes, replete PRN    HEMATOLOGIC:  - monitor H/H on CBC   - DVT ppx: SQH     INFECTIOUS DISEASE  - Monitor fever curve and trend WBC on CBC  - S/p cefotetan x2 doses    ENDOCRINE  - no active issues  - Monitor glucose on BMP    LINES  - Otto (8/18) -> will d/c today, POV  - PIV   - NGT -> will d/c today  - CATARINA drain    DISPO:   - SICU  - Will list for floors

## 2022-08-21 NOTE — PROGRESS NOTE ADULT - SUBJECTIVE AND OBJECTIVE BOX
Surgery Daily Progress Note  =====================================================  Interval / Overnight Events:       HPI:  Patient is an 87 year old female with a PMHx of HTN, HLD and GERD who presented for bowel prep prior to scheduled surgery tomorrow.  CT Abdomen / Pelvis performed showing marked gallbladder wall thickening and irregularity as previously demonstrated with possible involvement of adjacent transverse colon.      PAST MEDICAL & SURGICAL HISTORY:  Hypertension  Hyperlipidemia  Gout  GERD (gastroesophageal reflux disease)  Insomnia  Other gallbladder disorder  H/O: hysterectomy      ALLERGIES:  penicillin (Unknown)    --------------------------------------------------------------------------------------    MEDICATIONS  (STANDING):  acetaminophen   IVPB .. 1000 milliGRAM(s) IV Intermittent every 8 hours  dextrose 5% + sodium chloride 0.45%. 1000 milliLiter(s) (75 mL/Hr) IV Continuous <Continuous>  heparin   Injectable 5000 Unit(s) SubCutaneous every 8 hours  metoprolol tartrate Injectable 5 milliGRAM(s) IV Push every 6 hours  pantoprazole  Injectable 40 milliGRAM(s) IV Push daily    MEDICATIONS  (PRN):    --------------------------------------------------------------------------------------    ICU Vital Signs Last 24 Hrs  T(C): 36.2 (20 Aug 2022 08:00), Max: 37.1 (20 Aug 2022 00:00)  T(F): 97.2 (20 Aug 2022 08:00), Max: 98.8 (20 Aug 2022 00:00)  HR: 97 (20 Aug 2022 11:00) (71 - 97)  BP: 135/64 (20 Aug 2022 11:00) (103/55 - 174/72)  BP(mean): 85 (20 Aug 2022 11:00) (63 - 160)  ABP: 168/70 (19 Aug 2022 21:30) (107/42 - 182/76)  ABP(mean): 110 (19 Aug 2022 21:30) (69 - 121)  RR: 20 (20 Aug 2022 11:00) (7 - 26)  SpO2: 97% (20 Aug 2022 11:00) (92% - 100%)    O2 Parameters below as of 20 Aug 2022 11:00  Patient On (Oxygen Delivery Method): nasal cannula  O2 Flow (L/min): 5    --------------------------------------------------------------------------------------    I&O's Detail    19 Aug 2022 07:01  -  20 Aug 2022 07:00  --------------------------------------------------------  IN:    IV PiggyBack: 800 mL    Lactated Ringers: 1650 mL  Total IN: 2450 mL    OUT:    Blood Loss (mL): 40 mL    Bulb (mL): 170 mL    Indwelling Catheter - Urethral (mL): 1170 mL    Nasogastric/Oral tube (mL): 200 mL  Total OUT: 1580 mL    Total NET: 870 mL      20 Aug 2022 07:01  -  20 Aug 2022 12:19  --------------------------------------------------------  IN:    dextrose 5% + sodium chloride 0.45%: 300 mL    Lactated Ringers: 100 mL  Total IN: 400 mL    OUT:    Indwelling Catheter - Urethral (mL): 375 mL  Total OUT: 375 mL    Total NET: 25 mL    --------------------------------------------------------------------------------------    EXAM    NEUROLOGY  Exam: Normal, in mild distress.    HEENT  Exam: Normocephalic, atraumatic.    RESPIRATORY  Exam: Normal expansion / effort.    CARDIOVASCULAR  Exam: S1, S2.  Regular rate and rhythm.    GI/NUTRITION  Exam: Abdomen soft, Non-tender, Non-distended.  Current Diet: NPO    MUSCULOSKELETAL  Exam: All extremities moving spontaneously without limitations.      METABOLIC / FLUIDS / ELECTROLYTES  lactated ringers. 1000 milliLiter(s) IV Continuous <Continuous>    -------------------------------------------------------------------------------------- Surgery Daily Progress Note  =====================================================  Interval / Overnight Events: NAEO; on 5L oxygen overnight. Denies Bm or flatus      HPI:  Patient is an 87 year old female with a PMHx of HTN, HLD and GERD who presented for bowel prep prior to scheduled surgery tomorrow.  CT Abdomen / Pelvis performed showing marked gallbladder wall thickening and irregularity as previously demonstrated with possible involvement of adjacent transverse colon.      PAST MEDICAL & SURGICAL HISTORY:  Hypertension  Hyperlipidemia  Gout  GERD (gastroesophageal reflux disease)  Insomnia  Other gallbladder disorder  H/O: hysterectomy      ALLERGIES:  penicillin (Unknown)    --------------------------------------------------------------------------------------    MEDICATIONS  (STANDING):  acetaminophen   IVPB .. 1000 milliGRAM(s) IV Intermittent every 8 hours  dextrose 5% + sodium chloride 0.45%. 1000 milliLiter(s) (75 mL/Hr) IV Continuous <Continuous>  heparin   Injectable 5000 Unit(s) SubCutaneous every 8 hours  metoprolol tartrate Injectable 5 milliGRAM(s) IV Push every 6 hours  pantoprazole  Injectable 40 milliGRAM(s) IV Push daily    MEDICATIONS  (PRN):    --------------------------------------------------------------------------------------    ICU Vital Signs Last 24 Hrs  T(C): 36.2 (20 Aug 2022 08:00), Max: 37.1 (20 Aug 2022 00:00)  T(F): 97.2 (20 Aug 2022 08:00), Max: 98.8 (20 Aug 2022 00:00)  HR: 97 (20 Aug 2022 11:00) (71 - 97)  BP: 135/64 (20 Aug 2022 11:00) (103/55 - 174/72)  BP(mean): 85 (20 Aug 2022 11:00) (63 - 160)  ABP: 168/70 (19 Aug 2022 21:30) (107/42 - 182/76)  ABP(mean): 110 (19 Aug 2022 21:30) (69 - 121)  RR: 20 (20 Aug 2022 11:00) (7 - 26)  SpO2: 97% (20 Aug 2022 11:00) (92% - 100%)    O2 Parameters below as of 20 Aug 2022 11:00  Patient On (Oxygen Delivery Method): nasal cannula  O2 Flow (L/min): 5    --------------------------------------------------------------------------------------  I&O's Detail    20 Aug 2022 07:01  -  21 Aug 2022 07:00  --------------------------------------------------------  IN:    dextrose 5% + sodium chloride 0.45%: 1725 mL    IV PiggyBack: 300 mL    Lactated Ringers: 100 mL  Total IN: 2125 mL    OUT:    Bulb (mL): 210 mL    Indwelling Catheter - Urethral (mL): 1445 mL    Nasogastric/Oral tube (mL): 350 mL  Total OUT: 2005 mL    Total NET: 120 mL      21 Aug 2022 07:01  -  21 Aug 2022 15:40  --------------------------------------------------------  IN:    dextrose 5% + sodium chloride 0.45%: 525 mL    IV PiggyBack: 432 mL  Total IN: 957 mL    OUT:    Bulb (mL): 70 mL    Indwelling Catheter - Urethral (mL): 275 mL    Nasogastric/Oral tube (mL): 150 mL    Voided (mL): 150 mL  Total OUT: 645 mL    Total NET: 312 mL      --------------------------------------------------------------------------------------    EXAM    NEUROLOGY  Exam: Normal, sitting comfortably in chair    HEENT  Exam: Normocephalic, atraumatic.    RESPIRATORY  Exam: Normal expansion / effort.    CARDIOVASCULAR  Exam: S1, S2.  Regular rate and rhythm.    GI/NUTRITION  Exam: Abdomen soft, appropriately tender, Non-distended.  Current Diet: NPO    MUSCULOSKELETAL  Exam: All extremities moving spontaneously without limitations.      METABOLIC / FLUIDS / ELECTROLYTES  lactated ringers. 1000 milliLiter(s) IV Continuous <Continuous>    --------------------------------------------------------------------------------------

## 2022-08-21 NOTE — PROGRESS NOTE ADULT - SUBJECTIVE AND OBJECTIVE BOX
SICU Daily Progress Note  =====================================================  Interval/Overnight Events:      - ABG - resolving respiratory acidosis, stop serial ABGs  - list for floor?      HPI:  87 year old female with a PMHx of HTN, HLD and GERD presented for cholecystectomy. 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 SICU consulted for hemodynamic monitoring.     PAST MEDICAL & SURGICAL HISTORY:  Hypertension  Hyperlipidemia  Gout  GERD (gastroesophageal reflux disease)  Insomnia  Other gallbladder disorder  H/O: hysterectomy      Allergies: penicillin (Unknown)      MEDICATIONS:   --------------------------------------------------------------------------------------  Neurologic Medications  acetaminophen   IVPB .. 1000 milliGRAM(s) IV Intermittent every 6 hours      Cardiovascular Medications  metoprolol tartrate Injectable 5 milliGRAM(s) IV Push every 6 hours    Gastrointestinal Medications  dextrose 5% + sodium chloride 0.45%. 1000 milliLiter(s) IV Continuous <Continuous>  pantoprazole  Injectable 40 milliGRAM(s) IV Push daily      Hematologic/Oncologic Medications  heparin   Injectable 5000 Unit(s) SubCutaneous every 8 hours      --------------------------------------------------------------------------------------    VITAL SIGNS, INS/OUTS (last 24 hours):  ICU Vital Signs Last 24 Hrs  T(C): 37.8 (20 Aug 2022 20:00), Max: 37.8 (20 Aug 2022 20:00)  T(F): 100.1 (20 Aug 2022 20:00), Max: 100.1 (20 Aug 2022 20:00)  HR: 83 (20 Aug 2022 23:00) (70 - 97)  BP: 128/50 (20 Aug 2022 23:00) (104/46 - 164/62)  BP(mean): 70 (20 Aug 2022 23:00) (63 - 111)  RR: 19 (20 Aug 2022 23:00) (15 - 27)  SpO2: 99% (20 Aug 2022 23:00) (92% - 100%)    I&O's Detail    19 Aug 2022 07:01  -  20 Aug 2022 07:00  --------------------------------------------------------  IN:    IV PiggyBack: 800 mL    Lactated Ringers: 1650 mL  Total IN: 2450 mL    OUT:    Blood Loss (mL): 40 mL    Bulb (mL): 170 mL    Indwelling Catheter - Urethral (mL): 1170 mL    Nasogastric/Oral tube (mL): 200 mL  Total OUT: 1580 mL    Total NET: 870 mL      20 Aug 2022 07:01  -  21 Aug 2022 00:04  --------------------------------------------------------  IN:    dextrose 5% + sodium chloride 0.45%: 1125 mL    IV PiggyBack: 200 mL    Lactated Ringers: 100 mL  Total IN: 1425 mL    OUT:    Bulb (mL): 150 mL    Indwelling Catheter - Urethral (mL): 1005 mL    Nasogastric/Oral tube (mL): 200 mL  Total OUT: 1355 mL    Total NET: 70 mL            --------------------------------------------------------------------------------------    EXAM  NEUROLOGY  Exam: A&Ox3. No focal deficits. No signs of distress.     RESPIRATORY  Exam:  Normal expansion/effort. No signs of respiratory distress.     CARDIOVASCULAR  Exam: S1, S2.  Regular rate and rhythm on monitor.     GI/NUTRITION  Exam: Abdomen soft,  Non-distended. Midline incision with prevena holding to suction. CATARINA drain site with dressing in place without breakthrough. CATARINA SS. NGT in place.   Current Diet:  NPO    VASCULAR  Exam: Extremities warm.    MUSCULOSKELETAL  Exam: All extremities moving spontaneously without limitations.    SKIN  Exam: Good skin turgor.    METABOLIC/FLUIDS/ELECTROLYTES  dextrose 5% + sodium chloride 0.45%. 1000 milliLiter(s) IV Continuous <Continuous>      HEMATOLOGIC  [x] VTE Prophylaxis: heparin   Injectable 5000 Unit(s) SubCutaneous every 8 hours        LABS                        9.3    10.82 )-----------( 154      ( 20 Aug 2022 01:30 )             28.0   08-20    135  |  102  |  16  ----------------------------<  133<H>  4.7   |  22  |  0.95    Ca    8.4      20 Aug 2022 01:30  Phos  3.5     08-20  Mg     1.70     08-20    TPro  5.0<L>  /  Alb  3.6  /  TBili  1.1  /  DBili  x   /  AST  333<H>  /  ALT  110<H>  /  AlkPhos  31<L>  08-19        ABG - ( 20 Aug 2022 04:30 )  pH, Arterial: 7.34  pH, Blood: x     /  pCO2: 48    /  pO2: 68    / HCO3: 26    / Base Excess: -0.2  /  SaO2: 94.6        --------------------------------------------------------------------------------------   SICU Daily Progress Note  =====================================================  Interval/Overnight Events:      - ABG - resolving respiratory acidosis      HPI:  87 year old female with a PMHx of HTN, HLD and GERD presented for cholecystectomy. 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 SICU consulted for hemodynamic monitoring.     PAST MEDICAL & SURGICAL HISTORY:  Hypertension  Hyperlipidemia  Gout  GERD (gastroesophageal reflux disease)  Insomnia  Other gallbladder disorder  H/O: hysterectomy      Allergies: penicillin (Unknown)      MEDICATIONS:   --------------------------------------------------------------------------------------  Neurologic Medications  acetaminophen   IVPB .. 1000 milliGRAM(s) IV Intermittent every 6 hours      Cardiovascular Medications  metoprolol tartrate Injectable 5 milliGRAM(s) IV Push every 6 hours    Gastrointestinal Medications  dextrose 5% + sodium chloride 0.45%. 1000 milliLiter(s) IV Continuous <Continuous>  pantoprazole  Injectable 40 milliGRAM(s) IV Push daily      Hematologic/Oncologic Medications  heparin   Injectable 5000 Unit(s) SubCutaneous every 8 hours      --------------------------------------------------------------------------------------    VITAL SIGNS, INS/OUTS (last 24 hours):  ICU Vital Signs Last 24 Hrs  T(C): 37.8 (20 Aug 2022 20:00), Max: 37.8 (20 Aug 2022 20:00)  T(F): 100.1 (20 Aug 2022 20:00), Max: 100.1 (20 Aug 2022 20:00)  HR: 83 (20 Aug 2022 23:00) (70 - 97)  BP: 128/50 (20 Aug 2022 23:00) (104/46 - 164/62)  BP(mean): 70 (20 Aug 2022 23:00) (63 - 111)  RR: 19 (20 Aug 2022 23:00) (15 - 27)  SpO2: 99% (20 Aug 2022 23:00) (92% - 100%)    I&O's Detail    19 Aug 2022 07:01  -  20 Aug 2022 07:00  --------------------------------------------------------  IN:    IV PiggyBack: 800 mL    Lactated Ringers: 1650 mL  Total IN: 2450 mL    OUT:    Blood Loss (mL): 40 mL    Bulb (mL): 170 mL    Indwelling Catheter - Urethral (mL): 1170 mL    Nasogastric/Oral tube (mL): 200 mL  Total OUT: 1580 mL    Total NET: 870 mL      20 Aug 2022 07:01  -  21 Aug 2022 00:04  --------------------------------------------------------  IN:    dextrose 5% + sodium chloride 0.45%: 1125 mL    IV PiggyBack: 200 mL    Lactated Ringers: 100 mL  Total IN: 1425 mL    OUT:    Bulb (mL): 150 mL    Indwelling Catheter - Urethral (mL): 1005 mL    Nasogastric/Oral tube (mL): 200 mL  Total OUT: 1355 mL    Total NET: 70 mL            --------------------------------------------------------------------------------------    EXAM  NEUROLOGY  Exam: A&Ox3. No focal deficits. No signs of distress.     RESPIRATORY  Exam:  Normal expansion/effort. No signs of respiratory distress.     CARDIOVASCULAR  Exam: S1, S2.  Regular rate and rhythm on monitor.     GI/NUTRITION  Exam: Abdomen soft,  Non-distended. Midline incision with prevena holding to suction. CATARINA drain site with dressing in place without breakthrough. CATARINA SS. NGT in place.   Current Diet:  NPO    VASCULAR  Exam: Extremities warm.    MUSCULOSKELETAL  Exam: All extremities moving spontaneously without limitations.    SKIN  Exam: Good skin turgor.    METABOLIC/FLUIDS/ELECTROLYTES  dextrose 5% + sodium chloride 0.45%. 1000 milliLiter(s) IV Continuous <Continuous>      HEMATOLOGIC  [x] VTE Prophylaxis: heparin   Injectable 5000 Unit(s) SubCutaneous every 8 hours        LABS                        9.3    10.82 )-----------( 154      ( 20 Aug 2022 01:30 )             28.0   08-20    135  |  102  |  16  ----------------------------<  133<H>  4.7   |  22  |  0.95    Ca    8.4      20 Aug 2022 01:30  Phos  3.5     08-20  Mg     1.70     08-20    TPro  5.0<L>  /  Alb  3.6  /  TBili  1.1  /  DBili  x   /  AST  333<H>  /  ALT  110<H>  /  AlkPhos  31<L>  08-19        ABG - ( 20 Aug 2022 04:30 )  pH, Arterial: 7.34  pH, Blood: x     /  pCO2: 48    /  pO2: 68    / HCO3: 26    / Base Excess: -0.2  /  SaO2: 94.6        --------------------------------------------------------------------------------------

## 2022-08-21 NOTE — PHYSICAL THERAPY INITIAL EVALUATION ADULT - GAIT DEVIATIONS NOTED, PT EVAL
alert decreased shane/increased time in double stance/decreased velocity of limb motion/decreased step length/decreased weight-shifting ability

## 2022-08-21 NOTE — PROGRESS NOTE ADULT - ASSESSMENT
Patient is an 87 year old female with a PMHx of HTN, HLD and GERD who presented for bowel prep prior to scheduled surgery tomorrow.  CT Abdomen / Pelvis performed showing marked gallbladder wall thickening and irregularity as previously demonstrated with possible involvement of adjacent transverse colon. S/p ex lap, cholecystectomy, portal lymphadenectomy and segment 4b and 5 liver resection, right hemicolectomy for gallbladder cancer.     PLAN:  - Care per SICU  - NPO  - Follow up UA  - LR @100cc/hr  - Out of bed    #[id number]  D Team Surgery Patient is an 87 year old female with a PMHx of HTN, HLD and GERD who presented for bowel prep prior to scheduled surgery tomorrow.  CT Abdomen / Pelvis performed showing marked gallbladder wall thickening and irregularity as previously demonstrated with possible involvement of adjacent transverse colon. S/p ex lap, cholecystectomy, portal lymphadenectomy and segment 4b and 5 liver resection, right hemicolectomy for gallbladder cancer.     PLAN:  - Care per SICU  - NPO  - Remove NGT and chery  -f/u TOV  -f/u CXR  - Follow up UA  - LR @100cc/hr  - Out of bed    #[id number]  D Team Surgery

## 2022-08-21 NOTE — PHYSICAL THERAPY INITIAL EVALUATION ADULT - PERTINENT HX OF CURRENT PROBLEM, REHAB EVAL
patient is a 87 year old female who presented for cholecystectomy. 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

## 2022-08-22 LAB
ALBUMIN SERPL ELPH-MCNC: 2.7 G/DL — LOW (ref 3.3–5)
ALP SERPL-CCNC: 53 U/L — SIGNIFICANT CHANGE UP (ref 40–120)
ALT FLD-CCNC: 94 U/L — HIGH (ref 4–33)
ANION GAP SERPL CALC-SCNC: 8 MMOL/L — SIGNIFICANT CHANGE UP (ref 7–14)
AST SERPL-CCNC: 84 U/L — HIGH (ref 4–32)
BILIRUB SERPL-MCNC: 0.5 MG/DL — SIGNIFICANT CHANGE UP (ref 0.2–1.2)
BUN SERPL-MCNC: 7 MG/DL — SIGNIFICANT CHANGE UP (ref 7–23)
CALCIUM SERPL-MCNC: 8.6 MG/DL — SIGNIFICANT CHANGE UP (ref 8.4–10.5)
CHLORIDE SERPL-SCNC: 100 MMOL/L — SIGNIFICANT CHANGE UP (ref 98–107)
CO2 SERPL-SCNC: 26 MMOL/L — SIGNIFICANT CHANGE UP (ref 22–31)
CREAT SERPL-MCNC: 0.64 MG/DL — SIGNIFICANT CHANGE UP (ref 0.5–1.3)
EGFR: 86 ML/MIN/1.73M2 — SIGNIFICANT CHANGE UP
GLUCOSE SERPL-MCNC: 100 MG/DL — HIGH (ref 70–99)
HCT VFR BLD CALC: 25.7 % — LOW (ref 34.5–45)
HGB BLD-MCNC: 8.2 G/DL — LOW (ref 11.5–15.5)
INR BLD: 1.17 RATIO — HIGH (ref 0.88–1.16)
MAGNESIUM SERPL-MCNC: 1.6 MG/DL — SIGNIFICANT CHANGE UP (ref 1.6–2.6)
MCHC RBC-ENTMCNC: 29.5 PG — SIGNIFICANT CHANGE UP (ref 27–34)
MCHC RBC-ENTMCNC: 31.9 GM/DL — LOW (ref 32–36)
MCV RBC AUTO: 92.4 FL — SIGNIFICANT CHANGE UP (ref 80–100)
NRBC # BLD: 0 /100 WBCS — SIGNIFICANT CHANGE UP (ref 0–0)
NRBC # FLD: 0 K/UL — SIGNIFICANT CHANGE UP (ref 0–0)
PHOSPHATE SERPL-MCNC: 2.7 MG/DL — SIGNIFICANT CHANGE UP (ref 2.5–4.5)
PLATELET # BLD AUTO: 150 K/UL — SIGNIFICANT CHANGE UP (ref 150–400)
POTASSIUM SERPL-MCNC: 4.2 MMOL/L — SIGNIFICANT CHANGE UP (ref 3.5–5.3)
POTASSIUM SERPL-SCNC: 4.2 MMOL/L — SIGNIFICANT CHANGE UP (ref 3.5–5.3)
PROT SERPL-MCNC: 5.4 G/DL — LOW (ref 6–8.3)
PROTHROM AB SERPL-ACNC: 13.6 SEC — HIGH (ref 10.5–13.4)
RBC # BLD: 2.78 M/UL — LOW (ref 3.8–5.2)
RBC # FLD: 15 % — HIGH (ref 10.3–14.5)
SODIUM SERPL-SCNC: 134 MMOL/L — LOW (ref 135–145)
WBC # BLD: 9.62 K/UL — SIGNIFICANT CHANGE UP (ref 3.8–10.5)
WBC # FLD AUTO: 9.62 K/UL — SIGNIFICANT CHANGE UP (ref 3.8–10.5)

## 2022-08-22 PROCEDURE — 99232 SBSQ HOSP IP/OBS MODERATE 35: CPT

## 2022-08-22 PROCEDURE — 71045 X-RAY EXAM CHEST 1 VIEW: CPT | Mod: 26

## 2022-08-22 RX ORDER — METOPROLOL TARTRATE 50 MG
25 TABLET ORAL DAILY
Refills: 0 | Status: DISCONTINUED | OUTPATIENT
Start: 2022-08-22 | End: 2022-08-24

## 2022-08-22 RX ORDER — POTASSIUM PHOSPHATE, MONOBASIC POTASSIUM PHOSPHATE, DIBASIC 236; 224 MG/ML; MG/ML
15 INJECTION, SOLUTION INTRAVENOUS ONCE
Refills: 0 | Status: COMPLETED | OUTPATIENT
Start: 2022-08-22 | End: 2022-08-22

## 2022-08-22 RX ORDER — PANTOPRAZOLE SODIUM 20 MG/1
40 TABLET, DELAYED RELEASE ORAL
Refills: 0 | Status: DISCONTINUED | OUTPATIENT
Start: 2022-08-22 | End: 2022-09-07

## 2022-08-22 RX ORDER — THIAMINE MONONITRATE (VIT B1) 100 MG
100 TABLET ORAL DAILY
Refills: 0 | Status: DISCONTINUED | OUTPATIENT
Start: 2022-08-22 | End: 2022-09-07

## 2022-08-22 RX ORDER — MAGNESIUM SULFATE 500 MG/ML
2 VIAL (ML) INJECTION ONCE
Refills: 0 | Status: COMPLETED | OUTPATIENT
Start: 2022-08-22 | End: 2022-08-22

## 2022-08-22 RX ORDER — HYDRALAZINE HCL 50 MG
10 TABLET ORAL ONCE
Refills: 0 | Status: COMPLETED | OUTPATIENT
Start: 2022-08-22 | End: 2022-08-22

## 2022-08-22 RX ORDER — FUROSEMIDE 40 MG
20 TABLET ORAL ONCE
Refills: 0 | Status: COMPLETED | OUTPATIENT
Start: 2022-08-22 | End: 2022-08-22

## 2022-08-22 RX ORDER — ACETAMINOPHEN 500 MG
1000 TABLET ORAL EVERY 6 HOURS
Refills: 0 | Status: COMPLETED | OUTPATIENT
Start: 2022-08-22 | End: 2022-08-23

## 2022-08-22 RX ADMIN — Medication 25 GRAM(S): at 04:12

## 2022-08-22 RX ADMIN — PANTOPRAZOLE SODIUM 40 MILLIGRAM(S): 20 TABLET, DELAYED RELEASE ORAL at 12:16

## 2022-08-22 RX ADMIN — Medication 5 MILLIGRAM(S): at 05:04

## 2022-08-22 RX ADMIN — HEPARIN SODIUM 5000 UNIT(S): 5000 INJECTION INTRAVENOUS; SUBCUTANEOUS at 05:05

## 2022-08-22 RX ADMIN — SODIUM CHLORIDE 75 MILLILITER(S): 9 INJECTION, SOLUTION INTRAVENOUS at 07:49

## 2022-08-22 RX ADMIN — Medication 25 MILLIGRAM(S): at 12:25

## 2022-08-22 RX ADMIN — SODIUM CHLORIDE 75 MILLILITER(S): 9 INJECTION, SOLUTION INTRAVENOUS at 01:42

## 2022-08-22 RX ADMIN — Medication 20 MILLIGRAM(S): at 09:12

## 2022-08-22 RX ADMIN — SODIUM CHLORIDE 40 MILLILITER(S): 9 INJECTION, SOLUTION INTRAVENOUS at 21:13

## 2022-08-22 RX ADMIN — SODIUM CHLORIDE 40 MILLILITER(S): 9 INJECTION, SOLUTION INTRAVENOUS at 13:44

## 2022-08-22 RX ADMIN — Medication 10 MILLIGRAM(S): at 01:42

## 2022-08-22 RX ADMIN — Medication 400 MILLIGRAM(S): at 15:30

## 2022-08-22 RX ADMIN — HEPARIN SODIUM 5000 UNIT(S): 5000 INJECTION INTRAVENOUS; SUBCUTANEOUS at 13:27

## 2022-08-22 RX ADMIN — Medication 400 MILLIGRAM(S): at 10:30

## 2022-08-22 RX ADMIN — HEPARIN SODIUM 5000 UNIT(S): 5000 INJECTION INTRAVENOUS; SUBCUTANEOUS at 21:17

## 2022-08-22 RX ADMIN — Medication 1000 MILLIGRAM(S): at 10:45

## 2022-08-22 RX ADMIN — POTASSIUM PHOSPHATE, MONOBASIC POTASSIUM PHOSPHATE, DIBASIC 62.5 MILLIMOLE(S): 236; 224 INJECTION, SOLUTION INTRAVENOUS at 04:12

## 2022-08-22 RX ADMIN — Medication 1 TABLET(S): at 12:25

## 2022-08-22 RX ADMIN — Medication 100 MILLIGRAM(S): at 13:27

## 2022-08-22 RX ADMIN — Medication 1000 MILLIGRAM(S): at 16:00

## 2022-08-22 NOTE — PROGRESS NOTE ADULT - ASSESSMENT
ASSESSMENT:  87 year old female with a PMHx of HTN, HLD and GERD presented for cholecystectomy. 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 SICU consulted for hemodynamic monitoring.       PLAN:  NEUROLOGIC   - Episodes of sundowning, haldol prn  - Pain control: IV tylenol  - Avoid narcotics when possible     RESPIRATORY   - on NC   - Monitor SpO2 goal >92%  - Incentive spirometry     CARDIOVASCULAR   - Monitor hemodynamics   - MAP >65  - Lopressor 5mg q6 -> transition to PO meds as tolerated    GASTROINTESTINAL   - Diet: sips and chips, NGT out  - f/u with primary team about advancing diet  - Protonix (home med)     /RENAL   - IV fluids: D51/2NS @75cc/hr -> plan to d/c once tolerating PO   - d/c'ed chery, voided  - Monitor I/Os  - Monitor electrolytes, replete PRN    HEMATOLOGIC:  - monitor H/H on CBC   - DVT ppx: SQH     INFECTIOUS DISEASE  - Monitor fever curve and trend WBC on CBC  - S/p cefotetan x2 doses    ENDOCRINE  - no active issues  - Monitor glucose on BMP    LINES  - PIV   - CATARINA drain    DISPO:   - SICU  - Will list for floors       ASSESSMENT:  87 year old female with a PMHx of HTN, HLD and GERD presented for cholecystectomy. 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 SICU consulted for hemodynamic monitoring.       PLAN:  NEUROLOGIC   - Prior episode of sundowning, no haldol prn required overnight   - Pain control: IV tylenol   - Avoid narcotics when possible     RESPIRATORY   - on 2L NC   - Monitor SpO2 goal >92%  - Incentive spirometry   - CXR with b/l pleural effusions -> plan for diuresis today   - OOB     CARDIOVASCULAR   - Monitor hemodynamics   - MAP >65  - Lopressor 5mg q6 -> transition to PO meds as tolerated    GASTROINTESTINAL   - Diet: sips and chips, NGT d/c'ed yesterday  - bedside speech & swallow  - Protonix (home med)     /RENAL   - IV fluids: D5 1/2NS @75cc/hr -> plan to d/c once tolerating PO   - passed TOV   - Monitor I/Os  - Monitor electrolytes, replete PRN    HEMATOLOGIC:  - monitor H/H on CBC   - DVT ppx: SQH     INFECTIOUS DISEASE  - Monitor fever curve and trend WBC on CBC  - S/p cefotetan x2 doses    ENDOCRINE  - no active issues  - Monitor glucose on BMP    LINES  - PIV   - CATARINA drain    DISPO:   - SICU  - Potentially list for floors   ASSESSMENT:  87 year old female with a PMHx of HTN, HLD and GERD presented for cholecystectomy. 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 SICU consulted for hemodynamic monitoring.       PLAN:  NEUROLOGIC   - Prior episode of sundowning, no haldol prn required overnight   - Pain control: IV tylenol   - Avoid narcotics when possible     RESPIRATORY   - on 2L NC   - Monitor SpO2 goal >92%  - Incentive spirometry   - CXR with b/l pleural effusions -> plan for diuresis today   - OOB     CARDIOVASCULAR   - Monitor hemodynamics   - MAP >65  - Lopressor 5mg q6 -> transition to PO meds as tolerated    GASTROINTESTINAL   - Diet: sips and chips, NGT d/c'ed yesterday  - bedside speech & swallow  - Protonix (home med)     /RENAL   - IV fluids: D5 1/2NS @75cc/hr -> plan to d/c once tolerating PO   - passed TOV   - Monitor I/Os  - Monitor electrolytes, replete PRN    HEMATOLOGIC:  - monitor H/H on CBC   - DVT ppx: SQH     INFECTIOUS DISEASE  - Monitor fever curve and trend WBC on CBC  - S/p cefotetan x2 doses    ENDOCRINE  - no active issues  - Monitor glucose on BMP    LINES  - PIV   - CATARINA drain    DISPO:   - SICU  - Listed for floors   87 year old female with a PMHx of HTN, HLD and GERD presented for cholecystectomy. 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 SICU consulted for hemodynamic monitoring.     PLAN:  NEUROLOGIC   - Prior episode of sundowning, no haldol prn required overnight   - Pain control: IV tylenol  - Avoid narcotics when possible     RESPIRATORY   - on 2L NC   - Monitor SpO2 goal >92%  - Incentive spirometry   - CXR with b/l pleural effusions -> Lasix 20mg today  - OOB     CARDIOVASCULAR   - Monitor hemodynamics   - MAP >65  - Lopressor 5mg q6 -> transition to PO meds as tolerated    GASTROINTESTINAL   - Diet: sips and chips, NGT d/c'ed yesterday  - Passed bedside speech & swallow  - Protonix (home med)     /RENAL   - IV fluids: D5 1/2NS @75cc/hr -> plan to d/c once tolerating PO   - passed TOV   - Monitor I/Os  - Monitor electrolytes, replete PRN    HEMATOLOGIC:  - monitor H/H on CBC   - DVT ppx: SQH     INFECTIOUS DISEASE  - Monitor fever curve and trend WBC on CBC  - S/p cefotetan x2 doses    ENDOCRINE  - no active issues  - Monitor glucose on BMP    LINES  - PIV   - CATARINA drain    DISPO:   - SICU  - Listed for floors

## 2022-08-22 NOTE — DIETITIAN INITIAL EVALUATION ADULT - PERTINENT LABORATORY DATA
08-22    134<L>  |  100  |  7   ----------------------------<  100<H>  4.2   |  26  |  0.64    Ca    8.6      22 Aug 2022 00:55  Phos  2.7     08-22  Mg     1.60     08-22    TPro  5.4<L>  /  Alb  2.7<L>  /  TBili  0.5  /  DBili  x   /  AST  84<H>  /  ALT  94<H>  /  AlkPhos  53  08-22  A1C with Estimated Average Glucose Result: 5.3 % (05-28-22 @ 05:29)  A1C with Estimated Average Glucose Result: 5.6 % (05-24-22 @ 11:25)

## 2022-08-22 NOTE — DIETITIAN INITIAL EVALUATION ADULT - OTHER INFO
Patient is an 87 year old female with a PMHx of HTN, HLD and GERD who presented for bowel prep prior to scheduled surgery tomorrow.  CT Abdomen / Pelvis performed showing marked gallbladder wall thickening and irregularity as previously demonstrated with possible involvement of adjacent transverse colon.  Patient is now S/P open cholecystectomy, portal lymphadenectomy, liver segment 4b / 5 resection and right hemicolectomy on 8/19/22.  Patient transferred to SICU post operatively.

## 2022-08-22 NOTE — DIETITIAN INITIAL EVALUATION ADULT - ADD RECOMMEND
1) Monitor weights, labs, BM's, skin integrity, p.o. intake; 2) Encourage and assist pt w/meals; 3) Add high kcal nutrition supplement between meals as needed to help pt meet current kcal/protein needs and stabilize wt status.

## 2022-08-22 NOTE — DIETITIAN INITIAL EVALUATION ADULT - ORAL INTAKE PTA/DIET HISTORY
Met w/pt who provided nutrition hx.  Pt w/o allergies to food.  She reports that she does not have much of an appetite at this time, and that her appetite was better at home.  Pt now NPOX3 days.  NGT was d/c'd yesterday.  S/P bedside swallow evaluation this morning.  Pt to start on oral nutrition today.  Awaiting SLP recommendation.  Pt w/recent [facility] admission - noted wt loss of approximately 10lbs since May 2022.  Pt unaware of wt loss.

## 2022-08-22 NOTE — PROGRESS NOTE ADULT - SUBJECTIVE AND OBJECTIVE BOX
SICU Daily Progress Note  =====================================================  Interval Events:  - d/c NGT  - d/c Otto -> TOV  - on sips and chips  - listed for floors         HPI:  87 year old female with a PMHx of HTN, HLD and GERD presented for cholecystectomy. 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 SICU consulted for hemodynamic monitoring.     PAST MEDICAL & SURGICAL HISTORY:  Hypertension  Hyperlipidemia  Gout  GERD (gastroesophageal reflux disease)  Insomnia  Other gallbladder disorder  H/O: hysterectomy      Allergies: penicillin (Unknown)      MEDICATIONS:   --------------------------------------------------------------------------------------  MEDICATIONS  (STANDING):  dextrose 5% + sodium chloride 0.45%. 1000 milliLiter(s) (75 mL/Hr) IV Continuous <Continuous>  heparin   Injectable 5000 Unit(s) SubCutaneous every 8 hours  metoprolol tartrate Injectable 5 milliGRAM(s) IV Push every 6 hours  pantoprazole  Injectable 40 milliGRAM(s) IV Push daily    MEDICATIONS  (PRN):      --------------------------------------------------------------------------------------  ICU Vital Signs Last 24 Hrs  T(C): 36.1 (22 Aug 2022 00:00), Max: 37.3 (21 Aug 2022 04:00)  T(F): 97 (22 Aug 2022 00:00), Max: 99.1 (21 Aug 2022 04:00)  HR: 77 (22 Aug 2022 00:00) (63 - 83)  BP: 158/54 (21 Aug 2022 20:00) (99/53 - 162/57)  BP(mean): 85 (21 Aug 2022 20:00) (47 - 94)  ABP: --  ABP(mean): --  RR: 18 (22 Aug 2022 00:00) (17 - 28)  SpO2: 98% (22 Aug 2022 00:00) (77% - 100%)    O2 Parameters below as of 22 Aug 2022 00:00  Patient On (Oxygen Delivery Method): nasal cannula  O2 Flow (L/min): 3        I&O's Detail    20 Aug 2022 07:01  -  21 Aug 2022 07:00  --------------------------------------------------------  IN:    dextrose 5% + sodium chloride 0.45%: 1725 mL    IV PiggyBack: 300 mL    Lactated Ringers: 100 mL  Total IN: 2125 mL    OUT:    Bulb (mL): 210 mL    Indwelling Catheter - Urethral (mL): 1445 mL    Nasogastric/Oral tube (mL): 350 mL  Total OUT: 2005 mL    Total NET: 120 mL      21 Aug 2022 07:01  -  22 Aug 2022 00:39  --------------------------------------------------------  IN:    dextrose 5% + sodium chloride 0.45%: 1125 mL    IV PiggyBack: 432 mL  Total IN: 1557 mL    OUT:    Bulb (mL): 170 mL    Indwelling Catheter - Urethral (mL): 275 mL    Nasogastric/Oral tube (mL): 150 mL    Voided (mL): 550 mL  Total OUT: 1145 mL    Total NET: 412 mL      --------------------------------------------------------------------------------------    EXAM  NEUROLOGY  Exam: A&Ox3. No focal deficits. No signs of distress.     RESPIRATORY  Exam:  Normal expansion/effort. No signs of respiratory distress.     CARDIOVASCULAR  Exam: S1, S2.  Regular rate and rhythm on monitor.     GI/NUTRITION  Exam: Abdomen soft,  Non-distended. Midline incision with prevena holding to suction. CATARINA drain site with dressing in place without breakthrough. CATARINA SS. NGT in place.   Current Diet:  NPO    VASCULAR  Exam: Extremities warm.    MUSCULOSKELETAL  Exam: All extremities moving spontaneously without limitations.    SKIN  Exam: Good skin turgor.    METABOLIC/FLUIDS/ELECTROLYTES  dextrose 5% + sodium chloride 0.45%. 1000 milliLiter(s) IV Continuous <Continuous>      HEMATOLOGIC  [x] VTE Prophylaxis: heparin   Injectable 5000 Unit(s) SubCutaneous every 8 hours        LABS           CBC (08-21 @ 01:00)                              9.9<L>                         11.00<H>  )----------------(  148<L>     --    % Neutrophils, --    % Lymphocytes, ANC: --                                  29.8<L>    BMP (08-21 @ 01:00)             134<L>  |  102     |  12    		Ca++ --      Ca 9.0                ---------------------------------( 118<H>		Mg 2.00               4.2     |  24      |  0.87  			Ph 2.2<L>    LFTs (08-21 @ 01:00)      TPro 5.6<L> / Alb 3.1<L> / TBili 0.7 / DBili -- / <H> / <H> / AlkPhos 42        --------------------------------------------------------------------------------------   SICU Daily Progress Note  =====================================================  Interval Events:  - d/c NGT  - d/c Otto -> TOV  - on sips and chips  - diuresed - 1x Lasix 20mg  - listed for floors     HPI:  87 year old female with a PMHx of HTN, HLD and GERD presented for cholecystectomy. 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 SICU consulted for hemodynamic monitoring.     PAST MEDICAL & SURGICAL HISTORY:  Hypertension  Hyperlipidemia  Gout  GERD (gastroesophageal reflux disease)  Insomnia  Other gallbladder disorder  H/O: hysterectomy      Allergies: penicillin (Unknown)      MEDICATIONS:   --------------------------------------------------------------------------------------  MEDICATIONS  (STANDING):  dextrose 5% + sodium chloride 0.45%. 1000 milliLiter(s) (75 mL/Hr) IV Continuous <Continuous>  heparin   Injectable 5000 Unit(s) SubCutaneous every 8 hours  metoprolol tartrate Injectable 5 milliGRAM(s) IV Push every 6 hours  pantoprazole  Injectable 40 milliGRAM(s) IV Push daily    MEDICATIONS  (PRN):      --------------------------------------------------------------------------------------  ICU Vital Signs Last 24 Hrs  T(C): 36.1 (22 Aug 2022 00:00), Max: 37.3 (21 Aug 2022 04:00)  T(F): 97 (22 Aug 2022 00:00), Max: 99.1 (21 Aug 2022 04:00)  HR: 77 (22 Aug 2022 00:00) (63 - 83)  BP: 158/54 (21 Aug 2022 20:00) (99/53 - 162/57)  BP(mean): 85 (21 Aug 2022 20:00) (47 - 94)  ABP: --  ABP(mean): --  RR: 18 (22 Aug 2022 00:00) (17 - 28)  SpO2: 98% (22 Aug 2022 00:00) (77% - 100%)    O2 Parameters below as of 22 Aug 2022 00:00  Patient On (Oxygen Delivery Method): nasal cannula  O2 Flow (L/min): 3        I&O's Detail    20 Aug 2022 07:01  -  21 Aug 2022 07:00  --------------------------------------------------------  IN:    dextrose 5% + sodium chloride 0.45%: 1725 mL    IV PiggyBack: 300 mL    Lactated Ringers: 100 mL  Total IN: 2125 mL    OUT:    Bulb (mL): 210 mL    Indwelling Catheter - Urethral (mL): 1445 mL    Nasogastric/Oral tube (mL): 350 mL  Total OUT: 2005 mL    Total NET: 120 mL      21 Aug 2022 07:01  -  22 Aug 2022 00:39  --------------------------------------------------------  IN:    dextrose 5% + sodium chloride 0.45%: 1125 mL    IV PiggyBack: 432 mL  Total IN: 1557 mL    OUT:    Bulb (mL): 170 mL    Indwelling Catheter - Urethral (mL): 275 mL    Nasogastric/Oral tube (mL): 150 mL    Voided (mL): 550 mL  Total OUT: 1145 mL    Total NET: 412 mL      --------------------------------------------------------------------------------------    EXAM  NEUROLOGY  Exam: A&Ox3. No focal deficits. No signs of distress.     RESPIRATORY  Exam:  Normal expansion/effort. No signs of respiratory distress.     CARDIOVASCULAR  Exam: S1, S2.  Regular rate and rhythm on monitor.     GI/NUTRITION  Exam: Abdomen soft,  Non-distended. Midline incision with prevena holding to suction. CATARINA drain site with dressing in place without breakthrough. CATARINA SS. NGT in place.   Current Diet:  NPO    VASCULAR  Exam: Extremities warm.    MUSCULOSKELETAL  Exam: All extremities moving spontaneously without limitations.    SKIN  Exam: Good skin turgor.    METABOLIC/FLUIDS/ELECTROLYTES  dextrose 5% + sodium chloride 0.45%. 1000 milliLiter(s) IV Continuous <Continuous>      HEMATOLOGIC  [x] VTE Prophylaxis: heparin   Injectable 5000 Unit(s) SubCutaneous every 8 hours        LABS           CBC (08-21 @ 01:00)                              9.9<L>                         11.00<H>  )----------------(  148<L>     --    % Neutrophils, --    % Lymphocytes, ANC: --                                  29.8<L>    BMP (08-21 @ 01:00)             134<L>  |  102     |  12    		Ca++ --      Ca 9.0                ---------------------------------( 118<H>		Mg 2.00               4.2     |  24      |  0.87  			Ph 2.2<L>    LFTs (08-21 @ 01:00)      TPro 5.6<L> / Alb 3.1<L> / TBili 0.7 / DBili -- / <H> / <H> / AlkPhos 42        --------------------------------------------------------------------------------------

## 2022-08-22 NOTE — DIETITIAN INITIAL EVALUATION ADULT - NUTRITIONGOAL OUTCOME1
pt to resume oral nutrition.  Advance diet as tolerated as per SLP recommendations.  Pt to meet at least 75% of est needs.

## 2022-08-22 NOTE — PROGRESS NOTE ADULT - SUBJECTIVE AND OBJECTIVE BOX
Surgery Daily Progress Note  =====================================================  Interval / Overnight Events: No acute events overnight.      HPI:  Patient is an 87 year old female with a PMHx of HTN, HLD and GERD who presented for bowel prep prior to scheduled surgery tomorrow.  CT Abdomen / Pelvis performed showing marked gallbladder wall thickening and irregularity as previously demonstrated with possible involvement of adjacent transverse colon.      PAST MEDICAL & SURGICAL HISTORY:  Hypertension  Hyperlipidemia  Gout  GERD (gastroesophageal reflux disease)  Insomnia  Other gallbladder disorder  H/O: hysterectomy      ALLERGIES:  penicillin (Unknown)    --------------------------------------------------------------------------------------    MEDICATIONS:    Cardiovascular Medications  metoprolol tartrate Injectable 5 milliGRAM(s) IV Push every 6 hours    Gastrointestinal Medications  dextrose 5% + sodium chloride 0.45%. 1000 milliLiter(s) IV Continuous <Continuous>  pantoprazole  Injectable 40 milliGRAM(s) IV Push daily    Hematologic/Oncologic Medications  heparin   Injectable 5000 Unit(s) SubCutaneous every 8 hours    --------------------------------------------------------------------------------------    VITAL SIGNS:  T(C): 36.8 (22 Aug 2022 04:00), Max: 37.3 (21 Aug 2022 16:00)  T(F): 98.2 (22 Aug 2022 04:00), Max: 99.1 (21 Aug 2022 16:00)  HR: 73 (22 Aug 2022 06:00) (63 - 89)  BP: 138/53 (22 Aug 2022 06:00) (99/53 - 165/56)  BP(mean): 79 (22 Aug 2022 06:00) (47 - 105)  RR: 22 (22 Aug 2022 06:00) (17 - 28)  SpO2: 98% (22 Aug 2022 06:00) (77% - 100%)    --------------------------------------------------------------------------------------    INS AND OUTS:    21 Aug 2022 07:01  -  22 Aug 2022 07:00  --------------------------------------------------------  IN:    dextrose 5% + sodium chloride 0.45%: 1500 mL    IV PiggyBack: 482 mL  Total IN: 1982 mL    OUT:    Bulb (mL): 270 mL    Indwelling Catheter - Urethral (mL): 275 mL    Nasogastric/Oral tube (mL): 150 mL    Voided (mL): 750 mL  Total OUT: 1445 mL    Total NET: 537 mL    --------------------------------------------------------------------------------------    EXAM    NEUROLOGY  Exam: Normal, in no acute distress.    HEENT  Exam: Normocephalic, atraumatic.    RESPIRATORY  Exam: Normal expansion / effort.    CARDIOVASCULAR  Exam: S1, S2.  Regular rate and rhythm.    GI/NUTRITION  Exam: Abdomen soft, Non-tender, Non-distended.  Provena VAC in place.  CATARINA drain x1 with serosanguinous output.  Current Diet: NPO    MUSCULOSKELETAL  Exam: All extremities moving spontaneously without limitations.      METABOLIC / FLUIDS / ELECTROLYTES  dextrose 5% + sodium chloride 0.45%. 1000 milliLiter(s) IV Continuous <Continuous>      HEMATOLOGIC  [x] VTE Prophylaxis: heparin   Injectable 5000 Unit(s) SubCutaneous every 8 hours    --------------------------------------------------------------------------------------

## 2022-08-22 NOTE — DIETITIAN INITIAL EVALUATION ADULT - PERTINENT MEDS FT
MEDICATIONS  (STANDING):  acetaminophen   IVPB .. 1000 milliGRAM(s) IV Intermittent every 6 hours  dextrose 5% + sodium chloride 0.45%. 1000 milliLiter(s) (40 mL/Hr) IV Continuous <Continuous>  heparin   Injectable 5000 Unit(s) SubCutaneous every 8 hours  metoprolol succinate ER 25 milliGRAM(s) Oral daily  multivitamin 1 Tablet(s) Oral daily  pantoprazole    Tablet 40 milliGRAM(s) Oral before breakfast  thiamine 100 milliGRAM(s) Oral daily    MEDICATIONS  (PRN):

## 2022-08-22 NOTE — PROGRESS NOTE ADULT - ASSESSMENT
Patient is an 87 year old female with a PMHx of HTN, HLD and GERD who presented for bowel prep prior to scheduled surgery tomorrow.  CT Abdomen / Pelvis performed showing marked gallbladder wall thickening and irregularity as previously demonstrated with possible involvement of adjacent transverse colon.  Patient is now S/P open cholecystectomy, portal lymphadenectomy, liver segment 4b / 5 resection and right hemicolectomy on 8/19/22.  Patient transferred to SICU post operatively.      PLAN:  - NPO  - D5 + 1/2 NS @75cc/hr  - Out of bed  - Pain control  - VTE prophylaxis with Heparin subcutaneous  - Appreciate care per SICU      #[id number]  D Team Surgery

## 2022-08-22 NOTE — DIETITIAN INITIAL EVALUATION ADULT - NSFNSGIIOFT_GEN_A_CORE
08-21-22 @ 07:01  -  08-22-22 @ 07:00  --------------------------------------------------------  OUT:    Nasogastric/Oral tube (mL): 150 mL  Total OUT: 150 mL    Total NET: -150 mL

## 2022-08-22 NOTE — CHART NOTE - NSCHARTNOTEFT_GEN_A_CORE
SICU Transfer Note    Transfer from: SICU  Transfer to: 8T  Accepting Physican: Dr. Roche      SICU COURSE: Patient transferred to SICU post operatively for hypercarbic respiratory acidosis.      VITAL SIGNS:  T(C): 36.5 (22 Aug 2022 14:00), Max: 38 (22 Aug 2022 10:30)  T(F): 97.7 (22 Aug 2022 14:00), Max: 100.4 (22 Aug 2022 10:30)  HR: 85 (22 Aug 2022 14:00) (63 - 93)  BP: 149/58 (22 Aug 2022 14:00) (124/98 - 165/56)  BP(mean): 81 (22 Aug 2022 13:00) (76 - 105)  RR: 16 (22 Aug 2022 14:00) (16 - 31)  SpO2: 100% (22 Aug 2022 14:00) (77% - 100%)      INS AND OUTS:  21 Aug 2022 07:01  -  22 Aug 2022 07:00  --------------------------------------------------------  IN: 1982 mL / OUT: 1445 mL / NET: 537 mL    22 Aug 2022 07:01  -  22 Aug 2022 19:09  --------------------------------------------------------  IN: 580 mL / OUT: 1605 mL / NET: -1025 mL      MEDICATIONS:  acetaminophen   IVPB .. 1000 milliGRAM(s) IV Intermittent every 6 hours  dextrose 5% + sodium chloride 0.45%. 1000 milliLiter(s) (40 mL/Hr) IV Continuous <Continuous>  heparin   Injectable 5000 Unit(s) SubCutaneous every 8 hours  metoprolol succinate ER 25 milliGRAM(s) Oral daily  multivitamin 1 Tablet(s) Oral daily  pantoprazole    Tablet 40 milliGRAM(s) Oral before breakfast  thiamine 100 milliGRAM(s) Oral daily      ASSESSMENT:  Patient is an 87 year old female with a PMHx of HTN, HLD and GERD who presented for bowel prep prior to scheduled surgery tomorrow.  CT Abdomen / Pelvis performed showing marked gallbladder wall thickening and irregularity as previously demonstrated with possible involvement of adjacent transverse colon.  Patient is now S/P open cholecystectomy, portal lymphadenectomy, liver segment 4b / 5 resection and right hemicolectomy on 8/19/22.  Patient transferred to SICU post operatively.       PLAN:  - Clear liquid diet  - D5 + 1/2 NS @40cc/hr  - Follow up fever work-up  - Out of bed  - Pain control  - VTE prophylaxis with Heparin subcutaneous        #[id number]  D Team Surgery

## 2022-08-23 LAB
ALBUMIN SERPL ELPH-MCNC: 3 G/DL — LOW (ref 3.3–5)
ALP SERPL-CCNC: 119 U/L — SIGNIFICANT CHANGE UP (ref 40–120)
ALT FLD-CCNC: 72 U/L — HIGH (ref 4–33)
ANION GAP SERPL CALC-SCNC: 11 MMOL/L — SIGNIFICANT CHANGE UP (ref 7–14)
ANION GAP SERPL CALC-SCNC: 9 MMOL/L — SIGNIFICANT CHANGE UP (ref 7–14)
APPEARANCE UR: CLEAR — SIGNIFICANT CHANGE UP
AST SERPL-CCNC: 58 U/L — HIGH (ref 4–32)
BILIRUB SERPL-MCNC: 0.9 MG/DL — SIGNIFICANT CHANGE UP (ref 0.2–1.2)
BILIRUB UR-MCNC: NEGATIVE — SIGNIFICANT CHANGE UP
BUN SERPL-MCNC: 6 MG/DL — LOW (ref 7–23)
BUN SERPL-MCNC: 7 MG/DL — SIGNIFICANT CHANGE UP (ref 7–23)
CALCIUM SERPL-MCNC: 9.1 MG/DL — SIGNIFICANT CHANGE UP (ref 8.4–10.5)
CALCIUM SERPL-MCNC: 9.4 MG/DL — SIGNIFICANT CHANGE UP (ref 8.4–10.5)
CHLORIDE SERPL-SCNC: 95 MMOL/L — LOW (ref 98–107)
CHLORIDE SERPL-SCNC: 96 MMOL/L — LOW (ref 98–107)
CO2 SERPL-SCNC: 28 MMOL/L — SIGNIFICANT CHANGE UP (ref 22–31)
CO2 SERPL-SCNC: 28 MMOL/L — SIGNIFICANT CHANGE UP (ref 22–31)
COLOR SPEC: COLORLESS — SIGNIFICANT CHANGE UP
CREAT SERPL-MCNC: 0.55 MG/DL — SIGNIFICANT CHANGE UP (ref 0.5–1.3)
CREAT SERPL-MCNC: 0.67 MG/DL — SIGNIFICANT CHANGE UP (ref 0.5–1.3)
DIFF PNL FLD: ABNORMAL
EGFR: 85 ML/MIN/1.73M2 — SIGNIFICANT CHANGE UP
EGFR: 89 ML/MIN/1.73M2 — SIGNIFICANT CHANGE UP
GLUCOSE SERPL-MCNC: 122 MG/DL — HIGH (ref 70–99)
GLUCOSE SERPL-MCNC: 99 MG/DL — SIGNIFICANT CHANGE UP (ref 70–99)
GLUCOSE UR QL: ABNORMAL
HCT VFR BLD CALC: 26.4 % — LOW (ref 34.5–45)
HGB BLD-MCNC: 8.8 G/DL — LOW (ref 11.5–15.5)
KETONES UR-MCNC: NEGATIVE — SIGNIFICANT CHANGE UP
LEUKOCYTE ESTERASE UR-ACNC: NEGATIVE — SIGNIFICANT CHANGE UP
MAGNESIUM SERPL-MCNC: 1.8 MG/DL — SIGNIFICANT CHANGE UP (ref 1.6–2.6)
MAGNESIUM SERPL-MCNC: 2.1 MG/DL — SIGNIFICANT CHANGE UP (ref 1.6–2.6)
MCHC RBC-ENTMCNC: 30.1 PG — SIGNIFICANT CHANGE UP (ref 27–34)
MCHC RBC-ENTMCNC: 33.3 GM/DL — SIGNIFICANT CHANGE UP (ref 32–36)
MCV RBC AUTO: 90.4 FL — SIGNIFICANT CHANGE UP (ref 80–100)
NITRITE UR-MCNC: NEGATIVE — SIGNIFICANT CHANGE UP
NRBC # BLD: 0 /100 WBCS — SIGNIFICANT CHANGE UP (ref 0–0)
NRBC # FLD: 0 K/UL — SIGNIFICANT CHANGE UP (ref 0–0)
PH UR: 7 — SIGNIFICANT CHANGE UP (ref 5–8)
PHOSPHATE SERPL-MCNC: 1.6 MG/DL — LOW (ref 2.5–4.5)
PHOSPHATE SERPL-MCNC: 4.4 MG/DL — SIGNIFICANT CHANGE UP (ref 2.5–4.5)
PLATELET # BLD AUTO: 216 K/UL — SIGNIFICANT CHANGE UP (ref 150–400)
POTASSIUM SERPL-MCNC: 3.9 MMOL/L — SIGNIFICANT CHANGE UP (ref 3.5–5.3)
POTASSIUM SERPL-MCNC: 4.1 MMOL/L — SIGNIFICANT CHANGE UP (ref 3.5–5.3)
POTASSIUM SERPL-SCNC: 3.9 MMOL/L — SIGNIFICANT CHANGE UP (ref 3.5–5.3)
POTASSIUM SERPL-SCNC: 4.1 MMOL/L — SIGNIFICANT CHANGE UP (ref 3.5–5.3)
PROT SERPL-MCNC: 5.7 G/DL — LOW (ref 6–8.3)
PROT UR-MCNC: NEGATIVE — SIGNIFICANT CHANGE UP
RBC # BLD: 2.92 M/UL — LOW (ref 3.8–5.2)
RBC # FLD: 14.4 % — SIGNIFICANT CHANGE UP (ref 10.3–14.5)
SODIUM SERPL-SCNC: 133 MMOL/L — LOW (ref 135–145)
SODIUM SERPL-SCNC: 134 MMOL/L — LOW (ref 135–145)
SP GR SPEC: 1.01 — LOW (ref 1.01–1.05)
UROBILINOGEN FLD QL: SIGNIFICANT CHANGE UP
WBC # BLD: 8.92 K/UL — SIGNIFICANT CHANGE UP (ref 3.8–10.5)
WBC # FLD AUTO: 8.92 K/UL — SIGNIFICANT CHANGE UP (ref 3.8–10.5)

## 2022-08-23 PROCEDURE — 71045 X-RAY EXAM CHEST 1 VIEW: CPT | Mod: 26

## 2022-08-23 PROCEDURE — 93010 ELECTROCARDIOGRAM REPORT: CPT

## 2022-08-23 RX ORDER — FUROSEMIDE 40 MG
20 TABLET ORAL ONCE
Refills: 0 | Status: COMPLETED | OUTPATIENT
Start: 2022-08-23 | End: 2022-08-23

## 2022-08-23 RX ORDER — MAGNESIUM SULFATE 500 MG/ML
2 VIAL (ML) INJECTION ONCE
Refills: 0 | Status: COMPLETED | OUTPATIENT
Start: 2022-08-23 | End: 2022-08-23

## 2022-08-23 RX ADMIN — Medication 400 MILLIGRAM(S): at 07:03

## 2022-08-23 RX ADMIN — HEPARIN SODIUM 5000 UNIT(S): 5000 INJECTION INTRAVENOUS; SUBCUTANEOUS at 22:14

## 2022-08-23 RX ADMIN — Medication 1 TABLET(S): at 13:50

## 2022-08-23 RX ADMIN — Medication 400 MILLIGRAM(S): at 01:11

## 2022-08-23 RX ADMIN — Medication 20 MILLIGRAM(S): at 13:49

## 2022-08-23 RX ADMIN — Medication 25 MILLIGRAM(S): at 06:58

## 2022-08-23 RX ADMIN — Medication 25 GRAM(S): at 07:46

## 2022-08-23 RX ADMIN — Medication 1000 MILLIGRAM(S): at 02:15

## 2022-08-23 RX ADMIN — Medication 1000 MILLIGRAM(S): at 08:03

## 2022-08-23 RX ADMIN — HEPARIN SODIUM 5000 UNIT(S): 5000 INJECTION INTRAVENOUS; SUBCUTANEOUS at 13:50

## 2022-08-23 RX ADMIN — PANTOPRAZOLE SODIUM 40 MILLIGRAM(S): 20 TABLET, DELAYED RELEASE ORAL at 06:58

## 2022-08-23 RX ADMIN — Medication 85 MILLIMOLE(S): at 08:28

## 2022-08-23 RX ADMIN — HEPARIN SODIUM 5000 UNIT(S): 5000 INJECTION INTRAVENOUS; SUBCUTANEOUS at 06:58

## 2022-08-23 RX ADMIN — Medication 100 MILLIGRAM(S): at 13:50

## 2022-08-23 NOTE — PROGRESS NOTE ADULT - ASSESSMENT
Patient is an 87 year old female with a PMHx of HTN, HLD and GERD who presented for bowel prep prior to scheduled surgery tomorrow.  CT Abdomen / Pelvis performed showing marked gallbladder wall thickening and irregularity as previously demonstrated with possible involvement of adjacent transverse colon.  Patient is now S/P open cholecystectomy, portal lymphadenectomy, liver segment 4b / 5 resection and right hemicolectomy on 8/19/22.  Patient transferred to SICU post operatively.      PLAN:  - Clear liquid diet  - IV lock  - Follow up x-ray to evaluate pleural effusion  - Out of bed  - Pain control  - VTE prophylaxis with Heparin subcutaneous      #[id number]  D Team Surgery

## 2022-08-23 NOTE — PROGRESS NOTE ADULT - SUBJECTIVE AND OBJECTIVE BOX
Vital Signs Last 24 Hrs  T(C): 36.6 (23 Aug 2022 12:32), Max: 36.7 (23 Aug 2022 04:00)  T(F): 97.8 (23 Aug 2022 12:32), Max: 98 (23 Aug 2022 04:00)  HR: 78 (23 Aug 2022 12:32) (78 - 101)  BP: 154/58 (23 Aug 2022 12:32) (149/58 - 190/94)  BP(mean): --  RR: 18 (23 Aug 2022 12:32) (16 - 20)  SpO2: 100% (23 Aug 2022 12:32) (93% - 100%)    Parameters below as of 23 Aug 2022 12:32  Patient On (Oxygen Delivery Method): nasal cannula        I&O's Detail    22 Aug 2022 07:01  -  23 Aug 2022 07:00  --------------------------------------------------------  IN:    dextrose 5% + sodium chloride 0.45%: 580 mL    Oral Fluid: 120 mL  Total IN: 700 mL    OUT:    Bulb (mL): 185 mL    Voided (mL): 1500 mL  Total OUT: 1685 mL    Total NET: -985 mL      23 Aug 2022 07:01  -  23 Aug 2022 13:03  --------------------------------------------------------  IN:  Total IN: 0 mL    OUT:    Bulb (mL): 20 mL  Total OUT: 20 mL    Total NET: -20 mL                                8.8    8.92  )-----------( 216      ( 23 Aug 2022 05:30 )             26.4       08-23    133<L>  |  96<L>  |  6<L>  ----------------------------<  99  3.9   |  28  |  0.55    Ca    9.1      23 Aug 2022 05:30  Phos  1.6     08-23  Mg     1.80     08-23    TPro  5.7<L>  /  Alb  3.0<L>  /  TBili  0.9  /  DBili  x   /  AST  58<H>  /  ALT  72<H>  /  AlkPhos  119  08-23      PT/INR - ( 22 Aug 2022 00:55 )   PT: 13.6 sec;   INR: 1.17 ratio      Prevena in place  tolerating small amounts of solid food  j-P : sero-sanguinous  Breathing comfortably           PLAN:  Ambulate  Possible discharge home tomorrow

## 2022-08-24 LAB
ALBUMIN SERPL ELPH-MCNC: 3.1 G/DL — LOW (ref 3.3–5)
ALP SERPL-CCNC: 208 U/L — HIGH (ref 40–120)
ALT FLD-CCNC: 48 U/L — HIGH (ref 4–33)
ANION GAP SERPL CALC-SCNC: 8 MMOL/L — SIGNIFICANT CHANGE UP (ref 7–14)
AST SERPL-CCNC: 34 U/L — HIGH (ref 4–32)
BILIRUB DIRECT SERPL-MCNC: 0.4 MG/DL — HIGH (ref 0–0.3)
BILIRUB INDIRECT FLD-MCNC: 0.3 MG/DL — SIGNIFICANT CHANGE UP (ref 0–1)
BILIRUB SERPL-MCNC: 0.7 MG/DL — SIGNIFICANT CHANGE UP (ref 0.2–1.2)
BUN SERPL-MCNC: 10 MG/DL — SIGNIFICANT CHANGE UP (ref 7–23)
CALCIUM SERPL-MCNC: 9.8 MG/DL — SIGNIFICANT CHANGE UP (ref 8.4–10.5)
CHLORIDE SERPL-SCNC: 97 MMOL/L — LOW (ref 98–107)
CO2 SERPL-SCNC: 31 MMOL/L — SIGNIFICANT CHANGE UP (ref 22–31)
CREAT SERPL-MCNC: 0.65 MG/DL — SIGNIFICANT CHANGE UP (ref 0.5–1.3)
EGFR: 86 ML/MIN/1.73M2 — SIGNIFICANT CHANGE UP
GLUCOSE SERPL-MCNC: 119 MG/DL — HIGH (ref 70–99)
HCT VFR BLD CALC: 25.7 % — LOW (ref 34.5–45)
HGB BLD-MCNC: 8.5 G/DL — LOW (ref 11.5–15.5)
MAGNESIUM SERPL-MCNC: 1.8 MG/DL — SIGNIFICANT CHANGE UP (ref 1.6–2.6)
MCHC RBC-ENTMCNC: 29.8 PG — SIGNIFICANT CHANGE UP (ref 27–34)
MCHC RBC-ENTMCNC: 33.1 GM/DL — SIGNIFICANT CHANGE UP (ref 32–36)
MCV RBC AUTO: 90.2 FL — SIGNIFICANT CHANGE UP (ref 80–100)
NRBC # BLD: 0 /100 WBCS — SIGNIFICANT CHANGE UP (ref 0–0)
NRBC # FLD: 0.02 K/UL — HIGH (ref 0–0)
PHOSPHATE SERPL-MCNC: 1.9 MG/DL — LOW (ref 2.5–4.5)
PLATELET # BLD AUTO: 289 K/UL — SIGNIFICANT CHANGE UP (ref 150–400)
POTASSIUM SERPL-MCNC: 4 MMOL/L — SIGNIFICANT CHANGE UP (ref 3.5–5.3)
POTASSIUM SERPL-SCNC: 4 MMOL/L — SIGNIFICANT CHANGE UP (ref 3.5–5.3)
PROT SERPL-MCNC: 6.1 G/DL — SIGNIFICANT CHANGE UP (ref 6–8.3)
RBC # BLD: 2.85 M/UL — LOW (ref 3.8–5.2)
RBC # FLD: 14.3 % — SIGNIFICANT CHANGE UP (ref 10.3–14.5)
SODIUM SERPL-SCNC: 136 MMOL/L — SIGNIFICANT CHANGE UP (ref 135–145)
WBC # BLD: 10.69 K/UL — HIGH (ref 3.8–10.5)
WBC # FLD AUTO: 10.69 K/UL — HIGH (ref 3.8–10.5)

## 2022-08-24 PROCEDURE — 71275 CT ANGIOGRAPHY CHEST: CPT | Mod: 26

## 2022-08-24 PROCEDURE — 93010 ELECTROCARDIOGRAM REPORT: CPT

## 2022-08-24 RX ORDER — METOPROLOL TARTRATE 50 MG
2.5 TABLET ORAL EVERY 6 HOURS
Refills: 0 | Status: DISCONTINUED | OUTPATIENT
Start: 2022-08-24 | End: 2022-08-24

## 2022-08-24 RX ORDER — HALOPERIDOL DECANOATE 100 MG/ML
2.5 INJECTION INTRAMUSCULAR ONCE
Refills: 0 | Status: COMPLETED | OUTPATIENT
Start: 2022-08-24 | End: 2022-08-24

## 2022-08-24 RX ORDER — METOPROLOL TARTRATE 50 MG
12.5 TABLET ORAL EVERY 12 HOURS
Refills: 0 | Status: DISCONTINUED | OUTPATIENT
Start: 2022-08-24 | End: 2022-08-30

## 2022-08-24 RX ORDER — QUETIAPINE FUMARATE 200 MG/1
12.5 TABLET, FILM COATED ORAL AT BEDTIME
Refills: 0 | Status: DISCONTINUED | OUTPATIENT
Start: 2022-08-24 | End: 2022-08-25

## 2022-08-24 RX ORDER — ACETAMINOPHEN 500 MG
1000 TABLET ORAL EVERY 6 HOURS
Refills: 0 | Status: DISCONTINUED | OUTPATIENT
Start: 2022-08-24 | End: 2022-09-07

## 2022-08-24 RX ORDER — ACETAMINOPHEN 500 MG
1000 TABLET ORAL EVERY 6 HOURS
Refills: 0 | Status: DISCONTINUED | OUTPATIENT
Start: 2022-08-24 | End: 2022-08-24

## 2022-08-24 RX ORDER — LOSARTAN POTASSIUM 100 MG/1
50 TABLET, FILM COATED ORAL DAILY
Refills: 0 | Status: DISCONTINUED | OUTPATIENT
Start: 2022-08-24 | End: 2022-09-01

## 2022-08-24 RX ORDER — METOPROLOL TARTRATE 50 MG
2.5 TABLET ORAL ONCE
Refills: 0 | Status: COMPLETED | OUTPATIENT
Start: 2022-08-24 | End: 2022-08-24

## 2022-08-24 RX ADMIN — Medication 1 TABLET(S): at 12:50

## 2022-08-24 RX ADMIN — Medication 2.5 MILLIGRAM(S): at 09:34

## 2022-08-24 RX ADMIN — QUETIAPINE FUMARATE 12.5 MILLIGRAM(S): 200 TABLET, FILM COATED ORAL at 23:02

## 2022-08-24 RX ADMIN — PANTOPRAZOLE SODIUM 40 MILLIGRAM(S): 20 TABLET, DELAYED RELEASE ORAL at 09:16

## 2022-08-24 RX ADMIN — Medication 100 MILLIGRAM(S): at 12:49

## 2022-08-24 RX ADMIN — HEPARIN SODIUM 5000 UNIT(S): 5000 INJECTION INTRAVENOUS; SUBCUTANEOUS at 17:55

## 2022-08-24 RX ADMIN — Medication 1000 MILLIGRAM(S): at 13:42

## 2022-08-24 RX ADMIN — HEPARIN SODIUM 5000 UNIT(S): 5000 INJECTION INTRAVENOUS; SUBCUTANEOUS at 09:16

## 2022-08-24 RX ADMIN — LOSARTAN POTASSIUM 50 MILLIGRAM(S): 100 TABLET, FILM COATED ORAL at 12:49

## 2022-08-24 RX ADMIN — Medication 1000 MILLIGRAM(S): at 12:47

## 2022-08-24 RX ADMIN — HEPARIN SODIUM 5000 UNIT(S): 5000 INJECTION INTRAVENOUS; SUBCUTANEOUS at 23:02

## 2022-08-24 RX ADMIN — Medication 1000 MILLIGRAM(S): at 18:54

## 2022-08-24 RX ADMIN — Medication 12.5 MILLIGRAM(S): at 17:55

## 2022-08-24 RX ADMIN — Medication 1000 MILLIGRAM(S): at 17:55

## 2022-08-24 RX ADMIN — Medication 1000 MILLIGRAM(S): at 23:02

## 2022-08-24 NOTE — PROGRESS NOTE ADULT - ASSESSMENT
Patient is an 87 year old female with a PMHx of HTN, HLD and GERD who presented for bowel prep prior to scheduled surgery tomorrow.  CT Abdomen / Pelvis performed showing marked gallbladder wall thickening and irregularity as previously demonstrated with possible involvement of adjacent transverse colon.  Patient is now S/P open cholecystectomy, portal lymphadenectomy, liver segment 4b / 5 resection and right hemicolectomy on 8/19/22.  Patient transferred to SICU post operatively.      PLAN:  - Clear liquid diet  - IV lock  - Follow up x-ray to evaluate pleural effusion  - Out of bed  - Pain control  - VTE prophylaxis with Heparin subcutaneous    D Team Surgery  [id number]  Patient is an 87 year old female with a PMHx of HTN, HLD and GERD who presented for bowel prep prior to scheduled surgery tomorrow.  CT Abdomen / Pelvis performed showing marked gallbladder wall thickening and irregularity as previously demonstrated with possible involvement of adjacent transverse colon.  Patient is now S/P open cholecystectomy, portal lymphadenectomy, liver segment 4b / 5 resection and right hemicolectomy on 8/19/22.      PLAN:  - Pain control Tylenol ATC  - Improve sleep hygiene and delirium with Seroquel QHS ( on EKG)  - Wean nasal cannula as able, maintain SpO2 >92  - OOB/ Ambulate/ Incentive spirometry  - CTA chest to r/o PE  - Resume home antihypertensives (Losartan, Metoprolol)  - Low fiber diet, protonix, multivitamin  - VTE prophylaxis with Heparin subcutaneous  - Dispo planning: Home with home PT and rolling walker; CATARINA drain teaching    D Team Surgery  [id number]

## 2022-08-24 NOTE — PROGRESS NOTE ADULT - SUBJECTIVE AND OBJECTIVE BOX
D TEAM Surgery Progress Note  Patient is a 87y old  Female who presents with a chief complaint of Bowel prep for surgery tomorrow (23 Aug 2022 13:03)      INTERVAL EVENTS: Patient is POD#5 s/p open cholecystectomy, portal lymphadenectomy, liver segment 4b/5 resection, right hemicolectomy. Patient agitated, delirious overnight with HR 120s-130s, SBP 190s. SpO2 88 on RA, refused nasal cannula. Patient confused, A+Ox0 upon evaluation in native language with [place] . Patient reoriented upon reevaluation, amenable to nasal cannula and administration of PO meds. HR 100s, SpO2 100% on 3L NC.     SUBJECTIVE: Patient seen and examined at bedside with surgical team, patient . Denies fever, chills, CP, SOB nausea, vomiting, abdominal pain.    OBJECTIVE:    Vital Signs Last 24 Hrs  T(C): 37.1 (24 Aug 2022 09:00), Max: 37.2 (23 Aug 2022 21:31)  T(F): 98.7 (24 Aug 2022 09:00), Max: 98.9 (23 Aug 2022 21:31)  HR: 106 (24 Aug 2022 10:30) (78 - 134)  BP: 142/62 (24 Aug 2022 10:30) (142/62 - 205/75)  BP(mean): 94 (24 Aug 2022 05:21) (94 - 94)  RR: 22 (24 Aug 2022 10:30) (18 - 22)  SpO2: 100% (24 Aug 2022 10:30) (88% - 100%)    Parameters below as of 24 Aug 2022 10:30  Patient On (Oxygen Delivery Method): nasal cannula  O2 Flow (L/min): 3  I&O's Detail    23 Aug 2022 07:01  -  24 Aug 2022 07:00  --------------------------------------------------------  IN:    Oral Fluid: 120 mL  Total IN: 120 mL    OUT:    Bulb (mL): 50 mL  Total OUT: 50 mL    Total NET: 70 mL      MEDICATIONS  (STANDING):  acetaminophen     Tablet .. 1000 milliGRAM(s) Oral every 6 hours  heparin   Injectable 5000 Unit(s) SubCutaneous every 8 hours  losartan 50 milliGRAM(s) Oral daily  metoprolol tartrate 12.5 milliGRAM(s) Oral every 12 hours  multivitamin 1 Tablet(s) Oral daily  pantoprazole    Tablet 40 milliGRAM(s) Oral before breakfast  QUEtiapine 12.5 milliGRAM(s) Oral at bedtime  thiamine 100 milliGRAM(s) Oral daily    MEDICATIONS  (PRN):      PHYSICAL EXAM:  Constitutional: A&Ox3, NAD  Respiratory: Unlabored breathing  Abdomen: Soft, nondistended, NTTP. No rebound or guarding.  Extremities: WWP, MCCANN spontaneously    LABS:                        8.8    8.92  )-----------( 216      ( 23 Aug 2022 05:30 )             26.4         134<L>  |  95<L>  |  7   ----------------------------<  122<H>  4.1   |  28  |  0.67    Ca    9.4      23 Aug 2022 16:06  Phos  4.4       Mg     2.10         TPro  5.7<L>  /  Alb  3.0<L>  /  TBili  0.9  /  DBili  x   /  AST  58<H>  /  ALT  72<H>  /  AlkPhos  119        LIVER FUNCTIONS - ( 23 Aug 2022 05:30 )  Alb: 3.0 g/dL / Pro: 5.7 g/dL / ALK PHOS: 119 U/L / ALT: 72 U/L / AST: 58 U/L / GGT: x           Urinalysis Basic - ( 23 Aug 2022 00:20 )    Color: Colorless / Appearance: Clear / S.006 / pH: x  Gluc: x / Ketone: Negative  / Bili: Negative / Urobili: <2 mg/dL   Blood: x / Protein: Negative / Nitrite: Negative   Leuk Esterase: Negative / RBC: 6-10 /HPF / WBC 0-2 /HPF   Sq Epi: x / Non Sq Epi: x / Bacteria: Negative         D TEAM Surgery Progress Note  Patient is a 87y old  Female who presents with a chief complaint of Bowel prep for surgery tomorrow (23 Aug 2022 13:03)      INTERVAL EVENTS: Patient is POD#5 s/p open cholecystectomy, portal lymphadenectomy, liver segment 4b/5 resection, right hemicolectomy. Patient agitated, delirious overnight with HR 120s-130s, SBP 190s. SpO2 88 on RA, refused nasal cannula. Patient confused, A+Ox0 upon evaluation in native language with [place] . Patient reoriented upon reevaluation, amenable to nasal cannula and administration of PO meds. HR 100s, SpO2 100% on 3L NC.     SUBJECTIVE: Patient seen and examined at bedside with surgical team, patient . Denies fever, chills, CP, SOB nausea, vomiting, abdominal pain.    OBJECTIVE:    Vital Signs Last 24 Hrs  T(C): 37.1 (24 Aug 2022 09:00), Max: 37.2 (23 Aug 2022 21:31)  T(F): 98.7 (24 Aug 2022 09:00), Max: 98.9 (23 Aug 2022 21:31)  HR: 106 (24 Aug 2022 10:30) (78 - 134)  BP: 142/62 (24 Aug 2022 10:30) (142/62 - 205/75)  BP(mean): 94 (24 Aug 2022 05:21) (94 - 94)  RR: 22 (24 Aug 2022 10:30) (18 - 22)  SpO2: 100% (24 Aug 2022 10:30) (88% - 100%)    Parameters below as of 24 Aug 2022 10:30  Patient On (Oxygen Delivery Method): nasal cannula  O2 Flow (L/min): 3  I&O's Detail    23 Aug 2022 07:01  -  24 Aug 2022 07:00  --------------------------------------------------------  IN:    Oral Fluid: 120 mL  Total IN: 120 mL    OUT:    Bulb (mL): 50 mL  Total OUT: 50 mL    Total NET: 70 mL      MEDICATIONS  (STANDING):  acetaminophen     Tablet .. 1000 milliGRAM(s) Oral every 6 hours  heparin   Injectable 5000 Unit(s) SubCutaneous every 8 hours  losartan 50 milliGRAM(s) Oral daily  metoprolol tartrate 12.5 milliGRAM(s) Oral every 12 hours  multivitamin 1 Tablet(s) Oral daily  pantoprazole    Tablet 40 milliGRAM(s) Oral before breakfast  QUEtiapine 12.5 milliGRAM(s) Oral at bedtime  thiamine 100 milliGRAM(s) Oral daily    MEDICATIONS  (PRN):      PHYSICAL EXAM:  Constitutional: A&Ox3, NAD  Respiratory: Unlabored breathing  Abdomen: Soft, nondistended, NTTP. No rebound or guarding. Midline incision C/D/I with prevena holding suction. CATARINA drain serosanguinous.  Extremities: WWP, MCCANN spontaneously    LABS:                        8.8    8.92  )-----------( 216      ( 23 Aug 2022 05:30 )             26.4     08-23    134<L>  |  95<L>  |  7   ----------------------------<  122<H>  4.1   |  28  |  0.67    Ca    9.4      23 Aug 2022 16:06  Phos  4.4     08-  Mg     2.10     08-    TPro  5.7<L>  /  Alb  3.0<L>  /  TBili  0.9  /  DBili  x   /  AST  58<H>  /  ALT  72<H>  /  AlkPhos  119  08-23      LIVER FUNCTIONS - ( 23 Aug 2022 05:30 )  Alb: 3.0 g/dL / Pro: 5.7 g/dL / ALK PHOS: 119 U/L / ALT: 72 U/L / AST: 58 U/L / GGT: x           Urinalysis Basic - ( 23 Aug 2022 00:20 )    Color: Colorless / Appearance: Clear / S.006 / pH: x  Gluc: x / Ketone: Negative  / Bili: Negative / Urobili: <2 mg/dL   Blood: x / Protein: Negative / Nitrite: Negative   Leuk Esterase: Negative / RBC: 6-10 /HPF / WBC 0-2 /HPF   Sq Epi: x / Non Sq Epi: x / Bacteria: Negative

## 2022-08-25 ENCOUNTER — TRANSCRIPTION ENCOUNTER (OUTPATIENT)
Age: 87
End: 2022-08-25

## 2022-08-25 LAB
ALBUMIN SERPL ELPH-MCNC: 2.7 G/DL — LOW (ref 3.3–5)
ALP SERPL-CCNC: 211 U/L — HIGH (ref 40–120)
ALT FLD-CCNC: 39 U/L — HIGH (ref 4–33)
ANION GAP SERPL CALC-SCNC: 9 MMOL/L — SIGNIFICANT CHANGE UP (ref 7–14)
AST SERPL-CCNC: 27 U/L — SIGNIFICANT CHANGE UP (ref 4–32)
BILIRUB DIRECT SERPL-MCNC: 0.3 MG/DL — SIGNIFICANT CHANGE UP (ref 0–0.3)
BILIRUB INDIRECT FLD-MCNC: 0.3 MG/DL — SIGNIFICANT CHANGE UP (ref 0–1)
BILIRUB SERPL-MCNC: 0.6 MG/DL — SIGNIFICANT CHANGE UP (ref 0.2–1.2)
BLD GP AB SCN SERPL QL: NEGATIVE — SIGNIFICANT CHANGE UP
BLOOD GAS ARTERIAL - LYTES,HGB,ICA,LACT RESULT: SIGNIFICANT CHANGE UP
BUN SERPL-MCNC: 12 MG/DL — SIGNIFICANT CHANGE UP (ref 7–23)
CALCIUM SERPL-MCNC: 9.4 MG/DL — SIGNIFICANT CHANGE UP (ref 8.4–10.5)
CHLORIDE SERPL-SCNC: 97 MMOL/L — LOW (ref 98–107)
CO2 SERPL-SCNC: 29 MMOL/L — SIGNIFICANT CHANGE UP (ref 22–31)
CREAT SERPL-MCNC: 0.72 MG/DL — SIGNIFICANT CHANGE UP (ref 0.5–1.3)
EGFR: 81 ML/MIN/1.73M2 — SIGNIFICANT CHANGE UP
GLUCOSE SERPL-MCNC: 98 MG/DL — SIGNIFICANT CHANGE UP (ref 70–99)
HCT VFR BLD CALC: 24 % — LOW (ref 34.5–45)
HCT VFR BLD CALC: 24.2 % — LOW (ref 34.5–45)
HCT VFR BLD CALC: 25 % — LOW (ref 34.5–45)
HGB BLD-MCNC: 7.6 G/DL — LOW (ref 11.5–15.5)
HGB BLD-MCNC: 7.7 G/DL — LOW (ref 11.5–15.5)
HGB BLD-MCNC: 8 G/DL — LOW (ref 11.5–15.5)
MAGNESIUM SERPL-MCNC: 1.7 MG/DL — SIGNIFICANT CHANGE UP (ref 1.6–2.6)
MCHC RBC-ENTMCNC: 29.5 PG — SIGNIFICANT CHANGE UP (ref 27–34)
MCHC RBC-ENTMCNC: 29.6 PG — SIGNIFICANT CHANGE UP (ref 27–34)
MCHC RBC-ENTMCNC: 29.9 PG — SIGNIFICANT CHANGE UP (ref 27–34)
MCHC RBC-ENTMCNC: 31.4 GM/DL — LOW (ref 32–36)
MCHC RBC-ENTMCNC: 32 GM/DL — SIGNIFICANT CHANGE UP (ref 32–36)
MCHC RBC-ENTMCNC: 32.1 GM/DL — SIGNIFICANT CHANGE UP (ref 32–36)
MCV RBC AUTO: 92.3 FL — SIGNIFICANT CHANGE UP (ref 80–100)
MCV RBC AUTO: 93.3 FL — SIGNIFICANT CHANGE UP (ref 80–100)
MCV RBC AUTO: 93.8 FL — SIGNIFICANT CHANGE UP (ref 80–100)
NRBC # BLD: 0 /100 WBCS — SIGNIFICANT CHANGE UP (ref 0–0)
NRBC # FLD: 0.02 K/UL — HIGH (ref 0–0)
NRBC # FLD: 0.02 K/UL — HIGH (ref 0–0)
NRBC # FLD: 0.03 K/UL — HIGH (ref 0–0)
PHOSPHATE SERPL-MCNC: 3.1 MG/DL — SIGNIFICANT CHANGE UP (ref 2.5–4.5)
PLATELET # BLD AUTO: 275 K/UL — SIGNIFICANT CHANGE UP (ref 150–400)
PLATELET # BLD AUTO: 329 K/UL — SIGNIFICANT CHANGE UP (ref 150–400)
PLATELET # BLD AUTO: 330 K/UL — SIGNIFICANT CHANGE UP (ref 150–400)
POTASSIUM SERPL-MCNC: 3.7 MMOL/L — SIGNIFICANT CHANGE UP (ref 3.5–5.3)
POTASSIUM SERPL-SCNC: 3.7 MMOL/L — SIGNIFICANT CHANGE UP (ref 3.5–5.3)
PROT SERPL-MCNC: 5.6 G/DL — LOW (ref 6–8.3)
RBC # BLD: 2.58 M/UL — LOW (ref 3.8–5.2)
RBC # BLD: 2.6 M/UL — LOW (ref 3.8–5.2)
RBC # BLD: 2.68 M/UL — LOW (ref 3.8–5.2)
RBC # FLD: 14.4 % — SIGNIFICANT CHANGE UP (ref 10.3–14.5)
RBC # FLD: 14.6 % — HIGH (ref 10.3–14.5)
RBC # FLD: 14.8 % — HIGH (ref 10.3–14.5)
RH IG SCN BLD-IMP: POSITIVE — SIGNIFICANT CHANGE UP
SARS-COV-2 RNA SPEC QL NAA+PROBE: SIGNIFICANT CHANGE UP
SODIUM SERPL-SCNC: 135 MMOL/L — SIGNIFICANT CHANGE UP (ref 135–145)
WBC # BLD: 10.72 K/UL — HIGH (ref 3.8–10.5)
WBC # BLD: 9.93 K/UL — SIGNIFICANT CHANGE UP (ref 3.8–10.5)
WBC # BLD: 9.95 K/UL — SIGNIFICANT CHANGE UP (ref 3.8–10.5)
WBC # FLD AUTO: 10.72 K/UL — HIGH (ref 3.8–10.5)
WBC # FLD AUTO: 9.93 K/UL — SIGNIFICANT CHANGE UP (ref 3.8–10.5)
WBC # FLD AUTO: 9.95 K/UL — SIGNIFICANT CHANGE UP (ref 3.8–10.5)

## 2022-08-25 PROCEDURE — 71045 X-RAY EXAM CHEST 1 VIEW: CPT | Mod: 26

## 2022-08-25 PROCEDURE — 93010 ELECTROCARDIOGRAM REPORT: CPT

## 2022-08-25 RX ORDER — MAGNESIUM SULFATE 500 MG/ML
1 VIAL (ML) INJECTION ONCE
Refills: 0 | Status: COMPLETED | OUTPATIENT
Start: 2022-08-25 | End: 2022-08-25

## 2022-08-25 RX ORDER — POTASSIUM CHLORIDE 20 MEQ
10 PACKET (EA) ORAL
Refills: 0 | Status: COMPLETED | OUTPATIENT
Start: 2022-08-25 | End: 2022-08-25

## 2022-08-25 RX ORDER — POTASSIUM CHLORIDE 20 MEQ
40 PACKET (EA) ORAL ONCE
Refills: 0 | Status: DISCONTINUED | OUTPATIENT
Start: 2022-08-25 | End: 2022-08-25

## 2022-08-25 RX ORDER — ACETAMINOPHEN 500 MG
2 TABLET ORAL
Qty: 0 | Refills: 0 | DISCHARGE
Start: 2022-08-25

## 2022-08-25 RX ORDER — HYDRALAZINE HCL 50 MG
10 TABLET ORAL ONCE
Refills: 0 | Status: COMPLETED | OUTPATIENT
Start: 2022-08-25 | End: 2022-08-25

## 2022-08-25 RX ADMIN — Medication 1000 MILLIGRAM(S): at 18:57

## 2022-08-25 RX ADMIN — Medication 1000 MILLIGRAM(S): at 13:09

## 2022-08-25 RX ADMIN — Medication 1000 MILLIGRAM(S): at 07:50

## 2022-08-25 RX ADMIN — Medication 12.5 MILLIGRAM(S): at 18:53

## 2022-08-25 RX ADMIN — Medication 100 MILLIGRAM(S): at 12:16

## 2022-08-25 RX ADMIN — Medication 1 TABLET(S): at 12:16

## 2022-08-25 RX ADMIN — HEPARIN SODIUM 5000 UNIT(S): 5000 INJECTION INTRAVENOUS; SUBCUTANEOUS at 22:52

## 2022-08-25 RX ADMIN — HEPARIN SODIUM 5000 UNIT(S): 5000 INJECTION INTRAVENOUS; SUBCUTANEOUS at 06:26

## 2022-08-25 RX ADMIN — Medication 1000 MILLIGRAM(S): at 12:16

## 2022-08-25 RX ADMIN — Medication 100 GRAM(S): at 10:40

## 2022-08-25 RX ADMIN — HEPARIN SODIUM 5000 UNIT(S): 5000 INJECTION INTRAVENOUS; SUBCUTANEOUS at 12:16

## 2022-08-25 RX ADMIN — Medication 100 MILLIEQUIVALENT(S): at 11:55

## 2022-08-25 RX ADMIN — Medication 100 MILLIEQUIVALENT(S): at 12:16

## 2022-08-25 RX ADMIN — Medication 1000 MILLIGRAM(S): at 18:54

## 2022-08-25 RX ADMIN — LOSARTAN POTASSIUM 50 MILLIGRAM(S): 100 TABLET, FILM COATED ORAL at 06:26

## 2022-08-25 RX ADMIN — Medication 10 MILLIGRAM(S): at 23:04

## 2022-08-25 RX ADMIN — Medication 1000 MILLIGRAM(S): at 08:48

## 2022-08-25 RX ADMIN — Medication 12.5 MILLIGRAM(S): at 06:26

## 2022-08-25 RX ADMIN — Medication 100 MILLIEQUIVALENT(S): at 13:54

## 2022-08-25 RX ADMIN — PANTOPRAZOLE SODIUM 40 MILLIGRAM(S): 20 TABLET, DELAYED RELEASE ORAL at 06:26

## 2022-08-25 NOTE — PROGRESS NOTE ADULT - SUBJECTIVE AND OBJECTIVE BOX
Vital Signs Last 24 Hrs  T(C): 36.6 (25 Aug 2022 09:46), Max: 36.9 (24 Aug 2022 16:32)  T(F): 97.9 (25 Aug 2022 09:46), Max: 98.4 (24 Aug 2022 16:32)  HR: 70 (25 Aug 2022 09:46) (53 - 106)  BP: 122/42 (25 Aug 2022 09:46) (110/67 - 177/57)  BP(mean): --  RR: 20 (25 Aug 2022 09:50) (18 - 22)  SpO2: 99% (25 Aug 2022 09:50) (81% - 100%)    Parameters below as of 25 Aug 2022 09:50  Patient On (Oxygen Delivery Method): nasal cannula  O2 Flow (L/min): 1      I&O's Detail    24 Aug 2022 07:01  -  25 Aug 2022 07:00  --------------------------------------------------------  IN:    IV PiggyBack: 620 mL  Total IN: 620 mL    OUT:    Bulb (mL): 11 mL    Voided (mL): 300 mL  Total OUT: 311 mL    Total NET: 309 mL                                8.0    9.95  )-----------( 275      ( 25 Aug 2022 06:03 )             25.0       08-25    135  |  97<L>  |  12  ----------------------------<  98  3.7   |  29  |  0.72    Ca    9.4      25 Aug 2022 06:03  Phos  3.1     08-25  Mg     1.70     08-25    TPro  5.6<L>  /  Alb  2.7<L>  /  TBili  0.6  /  DBili  0.3  /  AST  27  /  ALT  39<H>  /  AlkPhos  211<H>  08-25    CT angio: negative  patient still obtunded  Tolerating regular diet          PLAN:  Pulmonary toilet  physical therapy  Remove J-P today

## 2022-08-25 NOTE — DISCHARGE NOTE PROVIDER - NSDCCPCAREPLAN_GEN_ALL_CORE_FT
PRINCIPAL DISCHARGE DIAGNOSIS  Diagnosis: Gallbladder mass  Assessment and Plan of Treatment: WOUND CARE:  Please keep incisions clean and dry. Please do not Scrub or rub incisions. Do not use lotion or powder on incisions.   BATHING: You may shower and/or sponge bathe. You may use warm soapy water in the shower and rinse, pat dry.  ACTIVITY: No heavy lifting or straining. Otherwise, you may return to your usual level of physical activity. If you are taking narcotic pain medication DO NOT drive a car, operate machinery or make important decisions.  DIET: Low fiber diet  NOTIFY YOUR SURGEON IF YOU HAVE: any bleeding that does not stop, any pus draining from your wound(s), any fever (over 100.4 F) persistent nausea/vomiting, or if your pain is not controlled on your discharge pain medications, unable to urinate.  FOLLOW UP:  1. Please follow up with your primary care physician in one week regarding your hospitalization, bring copies of your discharge paperwork.  2. Please follow up with your surgeon, Dr. Roche. Call to make an appointment.       PRINCIPAL DISCHARGE DIAGNOSIS  Diagnosis: Gallbladder mass  Assessment and Plan of Treatment: WOUND CARE:  Please keep incisions clean and dry. Please do not Scrub or rub incisions. Do not use lotion or powder on incisions.   BATHING: You may shower and/or sponge bathe. You may use warm soapy water in the shower and rinse, pat dry.  ACTIVITY: No heavy lifting or straining. Otherwise, you may return to your usual level of physical activity. If you are taking narcotic pain medication DO NOT drive a car, operate machinery or make important decisions.  DIET: Low fiber diet  NOTIFY YOUR SURGEON IF YOU HAVE: any bleeding that does not stop, any pus draining from your wound(s), any fever (over 100.4 F) persistent nausea/vomiting, or if your pain is not controlled on your discharge pain medications, unable to urinate.  FOLLOW UP:  1. Please follow up with your primary care physician in one week regarding your hospitalization, bring copies of your discharge paperwork.  2. Please follow up with your surgeon, Dr. Roche. Call to make an appointment.      SECONDARY DISCHARGE DIAGNOSES  Diagnosis: Pleural effusion  Assessment and Plan of Treatment: A chest xray was done showing mild fluid around the lungs. From pulmonary perspective, it is small enough to not contribute or worsen the oxygenation. Please follow up with your primary care provider in 1 week for further care.    Diagnosis: Pneumonia, aspiration  Assessment and Plan of Treatment: Possible lung infection after surgery, pulmonary team following. You completed a five day course of antibiotics. You were weaned off O2 sating 93-94% at rest and similiarly when ambulating with the nurse. You are cleared for discharge to follow up in 1 week for further care.    Diagnosis: Hypertension  Assessment and Plan of Treatment: Continue blood pressure medication regimen as directed. Monitor for any visual changes, headaches or dizziness.  Monitor blood pressure regularly.  Follow up with your primary care provider for further management for high blood pressure.  Your blood pressure medications were optimized, please take accordingly and follow up with [facility] primary care provider in 1 week for further adjustment.

## 2022-08-25 NOTE — DISCHARGE NOTE PROVIDER - NSDCMRMEDTOKEN_GEN_ALL_CORE_FT
acetaminophen 500 mg oral tablet: 2 tab(s) orally every 6 hours  allopurinol 100 mg oral tablet: 100 milligram(s) orally once a day (at bedtime)  fenofibrate 160 mg oral tablet: 1 tab(s) orally once a day  losartan 50 mg oral tablet: 1 tab(s) orally once a day  Multiple Vitamins oral tablet: 1 tab(s) orally once a day  Protonix 40 mg oral delayed release tablet: 1 tab(s) orally once a day  thiamine 100 mg oral tablet: 1 tab(s) orally once a day  Toprol-XL 25 mg oral tablet, extended release: 1 tab(s) orally once a day   acetaminophen 500 mg oral tablet: 2 tab(s) orally every 6 hours  allopurinol 100 mg oral tablet: 100 milligram(s) orally once a day (at bedtime)  amLODIPine 2.5 mg oral tablet: 1 tab(s) orally once a day  carvedilol 6.25 mg oral tablet: 1 tab(s) orally every 12 hours  fenofibrate 160 mg oral tablet: 1 tab(s) orally once a day  losartan 100 mg oral tablet: 1 tab(s) orally once a day  Multiple Vitamins oral tablet: 1 tab(s) orally once a day  Protonix 40 mg oral delayed release tablet: 1 tab(s) orally once a day  thiamine 100 mg oral tablet: 1 tab(s) orally once a day

## 2022-08-25 NOTE — DISCHARGE NOTE PROVIDER - HOSPITAL COURSE
Patient is a 87 year old female with PMHx of HTN, HLD, GERD, who presented to [facility] ED on 8/18/22 for bowel prep prior to surgery. Patient scheduled for exploratory laparotomy, cholecystectomy, possible bowel resection. Patient admitted to Surgical Oncology (D Team) under the care of Dr. Roche. Patient given ERP Bowel Prep (diet, abx, miralax). Patient taken to the OR 8/19/22 and underwent open cholecystectomy, Portal lymphadenectomy, liver segment 4b/5 resection, Right hemicolectomy. Patient tolerated operation well. Patient transferred to SICU post op days0-1. Patient remained NPO with NGT. On POD#2 NGT removed and diet was restarted and advanced as tolerated. Patient requiring nasal cannula, found to have new, left-sided small and moderate right pleural effusions on CXR. Diuresed with 20mg IV lasix for fluid overload. Patient remained hemodynamically stable after lasix and was deemed stable for transfer from SICU to surgical floors. POD#4-5 patient noted to develop delirium, sundowning. Started on seroquel nightly. Patient also had episode of hypoxia on RA, SpO2 88%, started back on nasal cannula. CTA chest obtained to rule out PE, negative. Patient seen by physical therapy throughout hospital stay who recommended patient be discharged home with home PT and rolling walker. Abdominal CATARINA drain removed prior to discharge. At this time, patient is currently ambulating, voiding, tolerating a regular diet. Pain well controlled on PO pain meds. Patient has been deemed stable for discharge home with follow up as an outpatient.    Patient is a 87 year old female with PMHx of HTN, HLD, GERD, who presented to [facility] ED on 8/18/22 for bowel prep prior to surgery. Patient scheduled for exploratory laparotomy, cholecystectomy, possible bowel resection. Patient admitted to Surgical Oncology (D Team) under the care of Dr. Roche. Patient given ERP Bowel Prep (diet, abx, miralax). Patient taken to the OR 8/19/22 and underwent open cholecystectomy, Portal lymphadenectomy, liver segment 4b/5 resection, Right hemicolectomy. Patient tolerated operation well. Patient transferred to SICU post op days0-1. Patient remained NPO with NGT. On POD#2 NGT removed and diet was restarted and advanced as tolerated. Patient requiring nasal cannula, found to have new, left-sided small and moderate right pleural effusions on CXR. Diuresed with 20mg IV lasix for fluid overload. Patient remained hemodynamically stable after lasix and was deemed stable for transfer from SICU to surgical floors. POD#4-5 patient noted to develop delirium, sundowning. Started on seroquel nightly. Patient also had episode of hypoxia on RA, SpO2 88%, started back on nasal cannula. CTA chest obtained to rule out PE, negative. Patient seen by physical therapy throughout hospital stay who recommended patient be discharged home with home PT and rolling walker. Abdominal CATARINA drain removed prior to discharge. At this time, patient is currently ambulating, voiding, tolerating a regular diet. Pain well controlled on PO pain meds. Patient has been deemed stable for discharge home with follow up as an outpatient.     On_________, discussed with __________, patient is medically cleared and optimized for discharge today. All medications were reviewed with attending, and sent to mutually agreed upon pharmacy.  Reviewed discharge medications with patient; All new medications requiring new prescription sent to pharmacy of patients choice. Reviewed need for prescription for previous home medications' and new prescriptions sent if requested. Patient in agreement and understands.     Patient is a 87 year old female with PMHx of HTN, HLD, GERD, who presented to [facility] ED on 8/18/22 for bowel prep prior to surgery. Patient scheduled for exploratory laparotomy, cholecystectomy, possible bowel resection. Patient admitted to Surgical Oncology (D Team) under the care of Dr. Roche. Patient given ERP Bowel Prep (diet, abx, miralax). Patient taken to the OR 8/19/22 and underwent open cholecystectomy, Portal lymphadenectomy, liver segment 4b/5 resection, Right hemicolectomy. Patient tolerated operation well. Patient transferred to SICU post op days0-1. Patient remained NPO with NGT. On POD#2 NGT removed and diet was restarted and advanced as tolerated. Patient requiring nasal cannula, found to have new, left-sided small and moderate right pleural effusions on CXR. Diuresed with 20mg IV lasix for fluid overload. Patient remained hemodynamically stable after lasix and was deemed stable for transfer from SICU to surgical floors. POD#4-5 patient noted to develop delirium, sundowning. Started on seroquel nightly. Patient also had episode of hypoxia on RA, SpO2 88%, started back on nasal cannula. CTA chest obtained to rule out PE, negative. Patient seen by physical therapy throughout hospital stay who recommended patient be discharged home with home PT and rolling walker. Abdominal CATARINA drain removed prior to discharge. At this time, patient is currently ambulating, voiding, tolerating a regular diet. Pain well controlled on PO pain meds. Patient has been deemed stable for discharge home with follow up as an outpatient.     On 9/7/22, discussed with Dr. Shay, patient is medically cleared and optimized for discharge today. All medications were reviewed with attending, and sent to mutually agreed upon pharmacy.  Reviewed discharge medications with patient; All new medications requiring new prescription sent to pharmacy of patients choice. Reviewed need for prescription for previous home medications' and new prescriptions sent if requested. Patient in agreement and understands.     Patient is a 87 year old female with PMHx of HTN, HLD, GERD, who presented to [facility] ED on 8/18/22 for bowel prep prior to surgery. Patient scheduled for exploratory laparotomy, cholecystectomy, possible bowel resection. Patient admitted to Surgical Oncology (D Team) under the care of Dr. Roche. Patient given ERP Bowel Prep (diet, abx, miralax). Patient taken to the OR 8/19/22 and underwent open cholecystectomy, Portal lymphadenectomy, liver segment 4b/5 resection, Right hemicolectomy. Patient tolerated operation well. Patient transferred to SICU post op days0-1. Patient remained NPO with NGT. On POD#2 NGT removed and diet was restarted and advanced as tolerated. Patient requiring nasal cannula, found to have new, left-sided small and moderate right pleural effusions on CXR. Diuresed with 20mg IV lasix for fluid overload. Patient remained hemodynamically stable after lasix and was deemed stable for transfer from SICU to surgical floors. POD#4-5 patient noted to develop delirium, sundowning. Started on seroquel nightly. Patient also had episode of hypoxia on RA, SpO2 88%, started back on nasal cannula. CTA chest obtained to rule out PE, negative. O2 improved as we monitored, ambulatory pulse sats ~ 5 minutes being 96% with RN. Patient seen by physical therapy throughout hospital stay who recommended patient be discharged home with home PT and rolling walker. Abdominal CATARINA drain removed prior to discharge. At this time, patient is currently ambulating, voiding, tolerating a regular diet. Pain well controlled on PO pain meds. Patient has been deemed stable for discharge home with follow up as an outpatient.     On 9/7/22, discussed with Dr. Shay, patient is medically cleared and optimized for discharge today. All medications were reviewed with attending, and sent to mutually agreed upon pharmacy.  Reviewed discharge medications with patient; All new medications requiring new prescription sent to pharmacy of patients choice. Reviewed need for prescription for previous home medications' and new prescriptions sent if requested. Patient in agreement and understands

## 2022-08-25 NOTE — PROGRESS NOTE ADULT - ASSESSMENT
87F PMHx of HTN, HLD and GERD who presented for open cholecystectomy, portal lymphadenectomy, liver segment 4b / 5 resection and right hemicolectomy on 8/19/22. Course c/b pulmonary edema requiring diuresis in SICU. Transferred to floors. Course further c/b hypoxemic and hypercapnic respiratory insufficiency requiring supplemental O2 and chest physiotherapy.    PLAN:  - Pain control Tylenol ATC  - Improve sleep hygiene with melatonin, d/c Seroquel if contributing to somnolence  - Chest physiotherapy, wean nasal cannula as able, maintain SpO2 >92  - OOB/ Ambulate/ pt educated on incentive spirometer and performed correctly yesterday, will reinforce today  - CTA chest prelim no PE, + bl lower consolidation, f/u final read  - Continue home antihypertensives (Losartan, Metoprolol) with hold parameters  - Low fiber diet, protonix, multivitamin  - Monitor for bloody BMs, H/H stable  - CATARINA drain removed  - VTE prophylaxis with Heparin subcutaneous  - Dispo planning: Home with home PT and rolling walker    D Team Surgery  [id number]

## 2022-08-25 NOTE — PROGRESS NOTE ADULT - SUBJECTIVE AND OBJECTIVE BOX
D TEAM Surgery Progress Note  Patient is a 87y old  Female who presents with a chief complaint of Bowel prep for surgery tomorrow (25 Aug 2022 10:23)    INTERVAL EVENTS: Patient is POD#6 s/p open cholecystectomy, portal lymphadenectomy, liver segment 4b/5 resection, right hemicolectomy. CTA chest obtained yesterday to r/o PE, no PE level of lobar arteries. B/l lower consolidations seen. Patient had episode of hypertension overnight that resolved spontaneously. Patient with episode of hypoxia on RA to 81% this morning. Placed on 2L NC with SpO2 99%. ABG performed showing hypercapnia (7.41/55/119/35).     SUBJECTIVE: Patient seen and examined at bedside with surgical team, patient somnolent but arousable and confused. Denies pain. Tolerated several small bites of breakfast this morning. Patient reported she needed to have a BM and subsequently had BM with smear of dark red blood. Denies fever, chills, CP, SOB nausea, vomiting.      OBJECTIVE:    Vital Signs Last 24 Hrs  T(C): 36.6 (25 Aug 2022 09:46), Max: 36.9 (24 Aug 2022 16:32)  T(F): 97.9 (25 Aug 2022 09:46), Max: 98.4 (24 Aug 2022 16:32)  HR: 70 (25 Aug 2022 09:46) (53 - 103)  BP: 122/42 (25 Aug 2022 09:46) (110/67 - 177/57)  BP(mean): --  RR: 20 (25 Aug 2022 09:50) (18 - 20)  SpO2: 99% (25 Aug 2022 09:50) (81% - 100%)    Parameters below as of 25 Aug 2022 09:50  Patient On (Oxygen Delivery Method): nasal cannula  O2 Flow (L/min): 1  I&O's Detail    24 Aug 2022 07:01  -  25 Aug 2022 07:00  --------------------------------------------------------  IN:    IV PiggyBack: 620 mL  Total IN: 620 mL    OUT:    Bulb (mL): 11 mL    Voided (mL): 300 mL  Total OUT: 311 mL    Total NET: 309 mL      MEDICATIONS  (STANDING):  acetaminophen     Tablet .. 1000 milliGRAM(s) Oral every 6 hours  heparin   Injectable 5000 Unit(s) SubCutaneous every 8 hours  losartan 50 milliGRAM(s) Oral daily  metoprolol tartrate 12.5 milliGRAM(s) Oral every 12 hours  multivitamin 1 Tablet(s) Oral daily  pantoprazole    Tablet 40 milliGRAM(s) Oral before breakfast  potassium chloride  10 mEq/100 mL IVPB 10 milliEquivalent(s) IV Intermittent every 1 hour  thiamine 100 milliGRAM(s) Oral daily    MEDICATIONS  (PRN):      PHYSICAL EXAM:  Constitutional: A&Ox1, Confused  Respiratory: Unlabored breathing  Abdomen: Soft, nondistended, NTTP. No rebound or guarding. Incision C/D/I/ CATARINA serosanguinous.   Extremities: WWP, MCCANN spontaneously    LABS:                        8.0    9.95  )-----------( 275      ( 25 Aug 2022 06:03 )             25.0     08-25    135  |  97<L>  |  12  ----------------------------<  98  3.7   |  29  |  0.72    Ca    9.4      25 Aug 2022 06:03  Phos  3.1     08-25  Mg     1.70     08-25    TPro  5.6<L>  /  Alb  2.7<L>  /  TBili  0.6  /  DBili  0.3  /  AST  27  /  ALT  39<H>  /  AlkPhos  211<H>  08-25      LIVER FUNCTIONS - ( 25 Aug 2022 06:03 )  Alb: 2.7 g/dL / Pro: 5.6 g/dL / ALK PHOS: 211 U/L / ALT: 39 U/L / AST: 27 U/L / GGT: x                 IMAGING:  ACC: [id number] EXAM:  CT ANGIO CHEST PULM [facility]                          PROCEDURE DATE:  08/24/2022    ******PRELIMINARY REPORT******      ******PRELIMINARY REPORT******           INTERPRETATION:  Limited evaluation of some segmental and subsegmental   pulmonary arteries. There is no pulmonary embolism to the level of the   lobar arteries and within the visualized segmental and subsegmental   arteries    Bilateral lower lobe consolidations.    Small right pleural effusion.

## 2022-08-25 NOTE — DISCHARGE NOTE PROVIDER - NSFOLLOWUPCLINICS_GEN_ALL_ED_FT
[facility] Pulmonolgy and Sleep Medicine  Pulmonology  [address] [place] [address], [place] [address]  Phone: [phone]  Fax:

## 2022-08-25 NOTE — DISCHARGE NOTE PROVIDER - PROVIDER TOKENS
PROVIDER:[TOKEN:[[id number]],FOLLOWUP:[1 week]] PROVIDER:[TOKEN:[[id number]],FOLLOWUP:[1 week]],PROVIDER:[TOKEN:[[id number]],FOLLOWUP:[1 week]]

## 2022-08-25 NOTE — DISCHARGE NOTE PROVIDER - CARE PROVIDERS DIRECT ADDRESSES
,[email or web address],DirectAddress_Unknown

## 2022-08-25 NOTE — DISCHARGE NOTE PROVIDER - NSDCCPTREATMENT_GEN_ALL_CORE_FT
PRINCIPAL PROCEDURE  Procedure: Exploratory laparotomy in adult  Findings and Treatment:       SECONDARY PROCEDURE  Procedure: Open portal lymphadenectomy  Findings and Treatment:     Procedure: Hepatectomy, partial  Findings and Treatment:     Procedure: Open cholecystectomy  Findings and Treatment:      PRINCIPAL PROCEDURE  Procedure: Exploratory laparotomy in adult  Findings and Treatment:       SECONDARY PROCEDURE  Procedure: Open portal lymphadenectomy  Findings and Treatment:     Procedure: Hepatectomy, partial  Findings and Treatment:     Procedure: Open cholecystectomy  Findings and Treatment:     Procedure: CT head wo con  Findings and Treatment: You had a CT scan head noncontrast which showed the following:   -Multiple axial sections were performed from base skull to vertex without contrast enhancement. Coronal and sagittal reconstructions were performed as well.  -Parenchymal volume loss and chronic microvascular ischemic changes are again seen when compared with the prior head CT performed on May 20, 2022.  -There is no acute hemorrhage mass or mass effect seen.  -Stable lucency is seen involving the left calvarial vertex. This could be   compatible with hemangioma though continued close interval is recommended to ensure stability. MRI can also be done to better characterize finding if there are no contraindications.  -The visualized paranasal sinuses mastoid and middle ear regions appear clear.  ***Please bring these findings to your primary care provider to deterine if follow up imaging required for the table lucency seen involving the left calvarial vertex, possible hemangioma.

## 2022-08-25 NOTE — DISCHARGE NOTE PROVIDER - CARE PROVIDER_API CALL
Deep Roche)  Surgery  [address] [facility], [place] [address]  Phone: [phone]  Fax: [phone]  Follow Up Time: 1 week   Deep Roche)  Surgery  [address], [place] [address]  Phone: [phone]  Fax: [phone]  Follow Up Time: 1 week    Delroy Contreras  INTERNAL MEDICINE  [address] [place] [address]  Phone: [phone]  Fax: [phone]  Follow Up Time: 1 week

## 2022-08-25 NOTE — DISCHARGE NOTE PROVIDER - REASON FOR ADMISSION
Bowel prep for surgery tomorrow Bowel prep for surgery  S/P cholecystectomy for gallbladder mass and hypoxia

## 2022-08-26 LAB
ANION GAP SERPL CALC-SCNC: 12 MMOL/L — SIGNIFICANT CHANGE UP (ref 7–14)
BUN SERPL-MCNC: 12 MG/DL — SIGNIFICANT CHANGE UP (ref 7–23)
CALCIUM SERPL-MCNC: 9.9 MG/DL — SIGNIFICANT CHANGE UP (ref 8.4–10.5)
CHLORIDE SERPL-SCNC: 94 MMOL/L — LOW (ref 98–107)
CO2 SERPL-SCNC: 27 MMOL/L — SIGNIFICANT CHANGE UP (ref 22–31)
CREAT SERPL-MCNC: 0.61 MG/DL — SIGNIFICANT CHANGE UP (ref 0.5–1.3)
EGFR: 87 ML/MIN/1.73M2 — SIGNIFICANT CHANGE UP
GLUCOSE SERPL-MCNC: 139 MG/DL — HIGH (ref 70–99)
HCT VFR BLD CALC: 27.6 % — LOW (ref 34.5–45)
HGB BLD-MCNC: 8.7 G/DL — LOW (ref 11.5–15.5)
MAGNESIUM SERPL-MCNC: 1.9 MG/DL — SIGNIFICANT CHANGE UP (ref 1.6–2.6)
MCHC RBC-ENTMCNC: 29.3 PG — SIGNIFICANT CHANGE UP (ref 27–34)
MCHC RBC-ENTMCNC: 31.5 GM/DL — LOW (ref 32–36)
MCV RBC AUTO: 92.9 FL — SIGNIFICANT CHANGE UP (ref 80–100)
NRBC # BLD: 0 /100 WBCS — SIGNIFICANT CHANGE UP (ref 0–0)
NRBC # FLD: 0.05 K/UL — HIGH (ref 0–0)
PHOSPHATE SERPL-MCNC: 2 MG/DL — LOW (ref 2.5–4.5)
PLATELET # BLD AUTO: 400 K/UL — SIGNIFICANT CHANGE UP (ref 150–400)
POTASSIUM SERPL-MCNC: 4.1 MMOL/L — SIGNIFICANT CHANGE UP (ref 3.5–5.3)
POTASSIUM SERPL-SCNC: 4.1 MMOL/L — SIGNIFICANT CHANGE UP (ref 3.5–5.3)
RBC # BLD: 2.97 M/UL — LOW (ref 3.8–5.2)
RBC # FLD: 14.8 % — HIGH (ref 10.3–14.5)
SODIUM SERPL-SCNC: 133 MMOL/L — LOW (ref 135–145)
WBC # BLD: 12.9 K/UL — HIGH (ref 3.8–10.5)
WBC # FLD AUTO: 12.9 K/UL — HIGH (ref 3.8–10.5)

## 2022-08-26 PROCEDURE — 99223 1ST HOSP IP/OBS HIGH 75: CPT | Mod: GC

## 2022-08-26 PROCEDURE — 70450 CT HEAD/BRAIN W/O DYE: CPT | Mod: 26

## 2022-08-26 RX ORDER — METOPROLOL TARTRATE 50 MG
5 TABLET ORAL ONCE
Refills: 0 | Status: COMPLETED | OUTPATIENT
Start: 2022-08-26 | End: 2022-08-26

## 2022-08-26 RX ADMIN — Medication 12.5 MILLIGRAM(S): at 06:30

## 2022-08-26 RX ADMIN — Medication 12.5 MILLIGRAM(S): at 18:36

## 2022-08-26 RX ADMIN — Medication 1 TABLET(S): at 11:45

## 2022-08-26 RX ADMIN — PANTOPRAZOLE SODIUM 40 MILLIGRAM(S): 20 TABLET, DELAYED RELEASE ORAL at 06:32

## 2022-08-26 RX ADMIN — HEPARIN SODIUM 5000 UNIT(S): 5000 INJECTION INTRAVENOUS; SUBCUTANEOUS at 06:31

## 2022-08-26 RX ADMIN — HEPARIN SODIUM 5000 UNIT(S): 5000 INJECTION INTRAVENOUS; SUBCUTANEOUS at 14:04

## 2022-08-26 RX ADMIN — Medication 1000 MILLIGRAM(S): at 18:36

## 2022-08-26 RX ADMIN — Medication 1000 MILLIGRAM(S): at 21:18

## 2022-08-26 RX ADMIN — LOSARTAN POTASSIUM 50 MILLIGRAM(S): 100 TABLET, FILM COATED ORAL at 06:30

## 2022-08-26 RX ADMIN — Medication 1000 MILLIGRAM(S): at 06:31

## 2022-08-26 RX ADMIN — Medication 1000 MILLIGRAM(S): at 11:45

## 2022-08-26 RX ADMIN — HEPARIN SODIUM 5000 UNIT(S): 5000 INJECTION INTRAVENOUS; SUBCUTANEOUS at 21:14

## 2022-08-26 RX ADMIN — Medication 85 MILLIMOLE(S): at 11:45

## 2022-08-26 RX ADMIN — Medication 5 MILLIGRAM(S): at 03:42

## 2022-08-26 RX ADMIN — Medication 100 MILLIGRAM(S): at 11:46

## 2022-08-26 NOTE — PROGRESS NOTE ADULT - SUBJECTIVE AND OBJECTIVE BOX
Vital Signs Last 24 Hrs  T(C): 36.9 (26 Aug 2022 12:00), Max: 37.7 (26 Aug 2022 05:00)  T(F): 98.5 (26 Aug 2022 12:00), Max: 99.8 (26 Aug 2022 05:00)  HR: 86 (26 Aug 2022 12:00) (86 - 138)  BP: 145/48 (26 Aug 2022 12:00) (124/46 - 210/87)  BP(mean): --  RR: 18 (26 Aug 2022 12:00) (18 - 20)  SpO2: 99% (26 Aug 2022 12:00) (95% - 99%)    Parameters below as of 26 Aug 2022 12:00  Patient On (Oxygen Delivery Method): room air        I&O's Detail    25 Aug 2022 07:01  -  26 Aug 2022 07:00  --------------------------------------------------------  IN:    IV PiggyBack: 350 mL    Oral Fluid: 340 mL  Total IN: 690 mL    OUT:    Voided (mL): 200 mL  Total OUT: 200 mL    Total NET: 490 mL                                8.7    12.90 )-----------( 400      ( 26 Aug 2022 01:05 )             27.6       08-26    133<L>  |  94<L>  |  12  ----------------------------<  139<H>  4.1   |  27  |  0.61    Ca    9.9      26 Aug 2022 01:05  Phos  2.0     08-26  Mg     1.90     08-26    TPro  5.6<L>  /  Alb  2.7<L>  /  TBili  0.6  /  DBili  0.3  /  AST  27  /  ALT  39<H>  /  AlkPhos  211<H>  08-25    Incision clear  one BM with old blood but H/H stable    CXR shows increased effusion on the right but [id number] on the left  Patient is confused          PLAN:  Pulmonary consultation for probable right thorocentesis  OOB to chair

## 2022-08-26 NOTE — PROGRESS NOTE ADULT - SUBJECTIVE AND OBJECTIVE BOX
D TEAM Surgery Progress Note  Patient is a 87y old  Female who presents with a chief complaint of Bowel prep for surgery tomorrow (25 Aug 2022 10:23)    INTERVAL EVENTS: Patient is s/p open cholecystectomy, portal lymphadenectomy, liver segment 4b/5 resection, right hemicolectomy. CTA chest obtained yesterday to r/o PE, no PE level of lobar arteries. B/l lower consolidations seen. Patient had episode of hypertension overnight that resolved spontaneously. Patient with episode of hypoxia on RA to 81% this morning. Placed on 2L NC with SpO2 99%. ABG performed showing hypercapnia (7.41/55/119/35).     SUBJECTIVE: Patient seen and examined at bedside with surgical team. Patient reported BM with smear of dark red blood. Denies fever, chills, CP, SOB nausea, vomiting.      OBJECTIVE:    ICU Vital Signs Last 24 Hrs  T(C): 37.7 (26 Aug 2022 05:00), Max: 37.7 (26 Aug 2022 05:00)  T(F): 99.8 (26 Aug 2022 05:00), Max: 99.8 (26 Aug 2022 05:00)  HR: 110 (26 Aug 2022 05:00) (70 - 138)  BP: 166/78 (26 Aug 2022 05:00) (122/42 - 210/87)  BP(mean): --  ABP: --  ABP(mean): --  RR: 18 (26 Aug 2022 05:00) (18 - 20)  SpO2: 96% (26 Aug 2022 05:00) (81% - 100%)    O2 Parameters below as of 26 Aug 2022 05:00  Patient On (Oxygen Delivery Method): nasal cannula  O2 Flow (L/min): 1  --------------------------------------------------------  I&O's Summary    25 Aug 2022 07:01  -  26 Aug 2022 07:00  --------------------------------------------------------  IN: 690 mL / OUT: 200 mL / NET: 490 mL      MEDICATIONS  (STANDING):  acetaminophen     Tablet .. 1000 milliGRAM(s) Oral every 6 hours  heparin   Injectable 5000 Unit(s) SubCutaneous every 8 hours  losartan 50 milliGRAM(s) Oral daily  metoprolol tartrate 12.5 milliGRAM(s) Oral every 12 hours  multivitamin 1 Tablet(s) Oral daily  pantoprazole    Tablet 40 milliGRAM(s) Oral before breakfast  thiamine 100 milliGRAM(s) Oral daily    MEDICATIONS  (PRN):    PHYSICAL EXAM  Constitutional: A&Ox1, Confused  Respiratory: Unlabored breathing  Abdomen: Soft, nondistended, NTTP. No rebound or guarding. Incision C/D/I/  Extremities: WWP, MCCANN spontaneously    LABS:                   8.7    12.90 )-----------( 400      ( 26 Aug 2022 01:05 )             27.6       08-26    133<L>  |  94<L>  |  12  ----------------------------<  139<H>  4.1   |  27  |  0.61    Ca    9.9      26 Aug 2022 01:05  Phos  2.0     08-26  Mg     1.90     08-26    TPro  5.6<L>  /  Alb  2.7<L>  /  TBili  0.6  /  DBili  0.3  /  AST  27  /  ALT  39<H>  /  AlkPhos  211<H>  08-25

## 2022-08-26 NOTE — PROVIDER CONTACT NOTE (CHANGE IN STATUS NOTIFICATION) - ACTION/TREATMENT ORDERED:
Provider aware with orders for telemetry monitoring and Lopressor 5mg IVP to be given.  Heart rate improved to 106 with re assessment following Lopressor 5mg IVP.

## 2022-08-26 NOTE — PROGRESS NOTE ADULT - ASSESSMENT
87F PMHx of HTN, HLD and GERD who presented for open cholecystectomy, portal lymphadenectomy, liver segment 4b / 5 resection and right hemicolectomy on 8/19/22. Course c/b pulmonary edema requiring diuresis in SICU. Transferred to floors. Course further c/b hypoxemic and hypercapnic respiratory insufficiency requiring supplemental O2 and chest physiotherapy.    PLAN:  - Pain control Tylenol ATC  - Chest physiotherapy, wean off nasal cannula as able, maintain SpO2 >92  - OOB/ Ambulate/ pt educated on incentive spirometer and performed correctly yesterday  - Continue home antihypertensives (Losartan, Metoprolol) with hold parameters  - Low fiber diet, protonix, multivitamin  - Monitor for bloody BMs, H/H stable  - VTE prophylaxis with Heparin subcutaneous  - Dispo planning: Home with home PT and rolling walker    D Team Surgery  [id number]

## 2022-08-26 NOTE — CHART NOTE - NSCHARTNOTEFT_GEN_A_CORE
The patient has been tachycardia throughout the night. An ECG was done that did not show any changes compared to the previous ones over the past few nights. Since this event has been recurring, the surgical team felt the urge to place the patient on Tele for closer monitoring. Therefore, the patient was placed on Tele. To manage the HR of 130, patient received an IV push of Metoprolol.     The patient has been awake all night, asymptomatic, and not complaining of any symptoms. There have not been any delirious episodes tonight.

## 2022-08-26 NOTE — CONSULT NOTE ADULT - ATTENDING COMMENTS
Patient seen and examined at bedside with the Pulmonary Fellow. Agree with above.     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  Post op needed SICU for diuresis for pulmonary edema. Pulmonary was consulted for hypoxemia on NC as well as mild hypercapnia. Of note patient has been altered even in the beginning of the admission. Recent hx of SDH. Last TTE with hyperdynamic LV in May.     CTA without PE but poor quality. Intermittently on RA. With small right sided effusions with atelectasis new since surgery. Effusions is very small. Not likely malignant or infectious    # Acute hypoxemic and hypercapnic RF  # Small right sided effusions associated with surgery  # Hx of SDH  - Mild compensated hypercapnic setting of recent OR, Hx of SDH, small effusion that is new on CXR right after post op. S/p diuresis with improvement since SICU.   - No role of thoracentesis, effusion is too small, likely related with OR and fluid overload s/p diuresis  - On RA or intermittent O2.  - No need for NIPPV at this time, with poor mental status, not a great candidate.  - S/S eval.  - Check TTE, pro-bnp, CTH. Can try diuresis pending these results  - OOB to chair.   - Please have GOC with family.    D/W Pulm fellow.
s/p the above surgical course  -given hepatectomy - keep low volume resuscitation during postop period, monitor bmp, crit  -pain control  -Extubation this PM  -holding home meds  -Respiratory insufficiency postop - will extubate ASAP     Fantasma Morel MD  Acute and Critical Care Surgery    The Acute Care Surgery (B Team) Attending Group Practice:  Dr. Luis Cunningham, Dr. Chandler Mckeon, Dr. Fantasma Morel,  Dr. Juan Manuel Wetzel and Dr. Sol Jaimes     Urgent issues - spectra [phone] or [id number]  Nonurgent issues - [phone]  Patient appointments or after hours - [phone]

## 2022-08-26 NOTE — CONSULT NOTE ADULT - ASSESSMENT
88 yo F pmhx HTN, HLD, recent admission for SAH/SDH and hyponatremia, UTI, now presenting for cholecystectomy with intraoperative findings c/f carcinoma so s/p ex lap, cholecystectomy segment 4b/5 hepatectomy, and right colectomy due to fistula tract vs metastasis 8/19/ Patient was unable to be extubated at the end of case due to oversedation and not able to ventilate adequate volumes and so had brief SICU stay. For the last few days patient with worsening hypoxemia and hypercapnic respiratory failure so pulmonary consulted for further recommendations:    CT scan reviewed with notable new R pleural effusion and R atlectasis in setting of recent surgery     #Hypoxic/Hypercapnic Respiratory Failure  #R pleural effusion     Recommendations:  - Patient with recent fall and SDH, has had somnolence during admission per team. DDx may be delirium though would check CTH due to hx  - Check TTE  - Check proBNP  - If findings support fluid overload, despite one sided pleural effusion would recommend gentle diuresis  - Check speech and swallow evaluation, c/f aspiration given mental status and CT imaging findings.   - Out of bed as tolerated

## 2022-08-27 LAB
ANION GAP SERPL CALC-SCNC: 8 MMOL/L — SIGNIFICANT CHANGE UP (ref 7–14)
BUN SERPL-MCNC: 15 MG/DL — SIGNIFICANT CHANGE UP (ref 7–23)
CALCIUM SERPL-MCNC: 9.2 MG/DL — SIGNIFICANT CHANGE UP (ref 8.4–10.5)
CHLORIDE SERPL-SCNC: 97 MMOL/L — LOW (ref 98–107)
CO2 SERPL-SCNC: 32 MMOL/L — HIGH (ref 22–31)
CREAT SERPL-MCNC: 0.74 MG/DL — SIGNIFICANT CHANGE UP (ref 0.5–1.3)
EGFR: 79 ML/MIN/1.73M2 — SIGNIFICANT CHANGE UP
GLUCOSE SERPL-MCNC: 86 MG/DL — SIGNIFICANT CHANGE UP (ref 70–99)
HCT VFR BLD CALC: 24 % — LOW (ref 34.5–45)
HGB BLD-MCNC: 7.4 G/DL — LOW (ref 11.5–15.5)
MAGNESIUM SERPL-MCNC: 1.9 MG/DL — SIGNIFICANT CHANGE UP (ref 1.6–2.6)
MCHC RBC-ENTMCNC: 29.7 PG — SIGNIFICANT CHANGE UP (ref 27–34)
MCHC RBC-ENTMCNC: 30.8 GM/DL — LOW (ref 32–36)
MCV RBC AUTO: 96.4 FL — SIGNIFICANT CHANGE UP (ref 80–100)
NRBC # BLD: 0 /100 WBCS — SIGNIFICANT CHANGE UP (ref 0–0)
NRBC # FLD: 0.03 K/UL — HIGH (ref 0–0)
NT-PROBNP SERPL-SCNC: 2451 PG/ML — HIGH
PHOSPHATE SERPL-MCNC: 4.2 MG/DL — SIGNIFICANT CHANGE UP (ref 2.5–4.5)
PLATELET # BLD AUTO: 434 K/UL — HIGH (ref 150–400)
POTASSIUM SERPL-MCNC: 4.2 MMOL/L — SIGNIFICANT CHANGE UP (ref 3.5–5.3)
POTASSIUM SERPL-SCNC: 4.2 MMOL/L — SIGNIFICANT CHANGE UP (ref 3.5–5.3)
RBC # BLD: 2.49 M/UL — LOW (ref 3.8–5.2)
RBC # FLD: 15.1 % — HIGH (ref 10.3–14.5)
SODIUM SERPL-SCNC: 137 MMOL/L — SIGNIFICANT CHANGE UP (ref 135–145)
WBC # BLD: 11.15 K/UL — HIGH (ref 3.8–10.5)
WBC # FLD AUTO: 11.15 K/UL — HIGH (ref 3.8–10.5)

## 2022-08-27 PROCEDURE — 93306 TTE W/DOPPLER COMPLETE: CPT | Mod: 26

## 2022-08-27 PROCEDURE — 71045 X-RAY EXAM CHEST 1 VIEW: CPT | Mod: 26

## 2022-08-27 RX ORDER — FUROSEMIDE 40 MG
20 TABLET ORAL ONCE
Refills: 0 | Status: COMPLETED | OUTPATIENT
Start: 2022-08-27 | End: 2022-08-27

## 2022-08-27 RX ADMIN — PANTOPRAZOLE SODIUM 40 MILLIGRAM(S): 20 TABLET, DELAYED RELEASE ORAL at 06:39

## 2022-08-27 RX ADMIN — HEPARIN SODIUM 5000 UNIT(S): 5000 INJECTION INTRAVENOUS; SUBCUTANEOUS at 06:40

## 2022-08-27 RX ADMIN — HEPARIN SODIUM 5000 UNIT(S): 5000 INJECTION INTRAVENOUS; SUBCUTANEOUS at 22:08

## 2022-08-27 RX ADMIN — Medication 12.5 MILLIGRAM(S): at 06:39

## 2022-08-27 RX ADMIN — HEPARIN SODIUM 5000 UNIT(S): 5000 INJECTION INTRAVENOUS; SUBCUTANEOUS at 14:16

## 2022-08-27 RX ADMIN — Medication 1000 MILLIGRAM(S): at 06:40

## 2022-08-27 RX ADMIN — LOSARTAN POTASSIUM 50 MILLIGRAM(S): 100 TABLET, FILM COATED ORAL at 06:39

## 2022-08-27 RX ADMIN — Medication 20 MILLIGRAM(S): at 14:14

## 2022-08-27 NOTE — PROGRESS NOTE ADULT - SUBJECTIVE AND OBJECTIVE BOX
D TEAM Surgery Progress Note  Patient is a 87y old  Female who presents with a chief complaint of Bowel prep for surgery tomorrow (25 Aug 2022 10:23)    SUBJECTIVE: Patient seen and examined at bedside with surgical team.  ICU Vital Signs Last 24 Hrs  T(C): 36.6 (27 Aug 2022 05:00), Max: 37.1 (27 Aug 2022 00:41)  T(F): 97.9 (27 Aug 2022 05:00), Max: 98.7 (27 Aug 2022 00:41)  HR: 85 (27 Aug 2022 05:00) (66 - 89)  BP: 137/50 (27 Aug 2022 05:00) (128/55 - 157/61)  BP(mean): --  ABP: --  ABP(mean): --  RR: 18 (27 Aug 2022 05:00) (18 - 18)  SpO2: 99% (27 Aug 2022 05:00) (99% - 100%)    O2 Parameters below as of 27 Aug 2022 05:00  Patient On (Oxygen Delivery Method): room air      OBJECTIVE:      --------------------------------------------------------  I&O's Summary    25 Aug 2022 07:01  -  26 Aug 2022 07:00  --------------------------------------------------------  IN: 690 mL / OUT: 200 mL / NET: 490 mL      MEDICATIONS  (STANDING):  acetaminophen     Tablet .. 1000 milliGRAM(s) Oral every 6 hours  heparin   Injectable 5000 Unit(s) SubCutaneous every 8 hours  losartan 50 milliGRAM(s) Oral daily  metoprolol tartrate 12.5 milliGRAM(s) Oral every 12 hours  multivitamin 1 Tablet(s) Oral daily  pantoprazole    Tablet 40 milliGRAM(s) Oral before breakfast  thiamine 100 milliGRAM(s) Oral daily    MEDICATIONS  (PRN):    PHYSICAL EXAM  Constitutional: A&Ox1, Confused  Respiratory: Unlabored breathing  Abdomen: Soft, nondistended, NTTP. No rebound or guarding. Incision C/D/I/  Extremities: WWP, MCCANN spontaneously    LABS:                   8.7    12.90 )-----------( 400      ( 26 Aug 2022 01:05 )             27.6       08-26    133<L>  |  94<L>  |  12  ----------------------------<  139<H>  4.1   |  27  |  0.61    Ca    9.9      26 Aug 2022 01:05  Phos  2.0     08-26  Mg     1.90     08-26    TPro  5.6<L>  /  Alb  2.7<L>  /  TBili  0.6  /  DBili  0.3  /  AST  27  /  ALT  39<H>  /  AlkPhos  211<H>  08-25   D TEAM Surgery Progress Note  Patient is a 87y old  Female who presents with a chief complaint of Bowel prep for surgery tomorrow (25 Aug 2022 10:23)    SUBJECTIVE: Patient seen and examined at bedside with surgical team.  ICU Vital Signs Last 24 Hrs  T(C): 36.6 (27 Aug 2022 05:00), Max: 37.1 (27 Aug 2022 00:41)  T(F): 97.9 (27 Aug 2022 05:00), Max: 98.7 (27 Aug 2022 00:41)  HR: 85 (27 Aug 2022 05:00) (66 - 89)  BP: 137/50 (27 Aug 2022 05:00) (128/55 - 157/61)  BP(mean): --  ABP: --  ABP(mean): --  RR: 18 (27 Aug 2022 05:00) (18 - 18)  SpO2: 99% (27 Aug 2022 05:00) (99% - 100%)    O2 Parameters below as of 27 Aug 2022 05:00  Patient On (Oxygen Delivery Method): room air    OBJECTIVE:  I&O's Detail    26 Aug 2022 07:01  -  27 Aug 2022 07:00  --------------------------------------------------------  IN:  Total IN: 0 mL    OUT:    Voided (mL): 500 mL  Total OUT: 500 mL    Total NET: -500 mL    MEDICATIONS  (STANDING):  acetaminophen     Tablet .. 1000 milliGRAM(s) Oral every 6 hours  furosemide   Injectable 20 milliGRAM(s) IV Push once  heparin   Injectable 5000 Unit(s) SubCutaneous every 8 hours  losartan 50 milliGRAM(s) Oral daily  metoprolol tartrate 12.5 milliGRAM(s) Oral every 12 hours  multivitamin 1 Tablet(s) Oral daily  pantoprazole    Tablet 40 milliGRAM(s) Oral before breakfast  thiamine 100 milliGRAM(s) Oral daily    MEDICATIONS  (PRN):    PHYSICAL EXAM  Constitutional: A&Ox1, Confused  Respiratory: Unlabored breathing  Abdomen: Soft, nondistended, NTTP. No rebound or guarding. Incision C/D/I/  Extremities: WWP, MCCANN spontaneously    LABS:                              7.4    11.15 )-----------( 434      ( 27 Aug 2022 06:40 )             24.0       08-27    137  |  97<L>  |  15  ----------------------------<  86  4.2   |  32<H>  |  0.74    Ca    9.2      27 Aug 2022 06:40  Phos  4.2     08-27  Mg     1.90     08-27

## 2022-08-27 NOTE — PROGRESS NOTE ADULT - ASSESSMENT
87F PMHx of HTN, HLD and GERD who presented for open cholecystectomy, portal lymphadenectomy, liver segment 4b / 5 resection and right hemicolectomy on 8/19/22. Course c/b pulmonary edema requiring diuresis in SICU. Transferred to floors. Course further c/b hypoxemic and hypercapnic respiratory insufficiency requiring supplemental O2 and chest physiotherapy.    PLAN:  - Pain control Tylenol ATC  - Chest physiotherapy, wean off nasal cannula as able, maintain SpO2 >92  - OOB/ Ambulate/ pt educated on incentive spirometer and performed correctly yesterday  - Continue home antihypertensives (Losartan, Metoprolol) with hold parameters  - Low fiber diet, protonix, multivitamin  - Monitor for bloody BMs, H/H stable  - VTE prophylaxis with Heparin subcutaneous  - Dispo planning: Home with home PT and rolling walker    D Team Surgery  [id number]    87F PMHx of HTN, HLD and GERD who presented for open cholecystectomy, portal lymphadenectomy, liver segment 4b / 5 resection and right hemicolectomy on 8/19/22. Course c/b pulmonary edema requiring diuresis in SICU. Transferred to floors. Course further c/b hypoxemic and hypercapnic respiratory insufficiency requiring supplemental O2 and chest physiotherapy.    PLAN:  - Follow up pro BNP and echocardiogram   - 1 dose lasix 20 IV   - Pain control Tylenol ATC  - Chest physiotherapy, wean off nasal cannula as able, maintain SpO2 >92  - OOB/ Ambulate/ pt educated on incentive spirometer and performed correctly yesterday  - Continue home antihypertensives (Losartan, Metoprolol) with hold parameters  - Low fiber diet, protonix, multivitamin  - Monitor for bloody BMs, H/H stable  - VTE prophylaxis with Heparin subcutaneous  - Dispo planning: Home with home PT and rolling walker    D Team Surgery  [id number]

## 2022-08-28 LAB
ANION GAP SERPL CALC-SCNC: 10 MMOL/L — SIGNIFICANT CHANGE UP (ref 7–14)
BUN SERPL-MCNC: 20 MG/DL — SIGNIFICANT CHANGE UP (ref 7–23)
CALCIUM SERPL-MCNC: 9.9 MG/DL — SIGNIFICANT CHANGE UP (ref 8.4–10.5)
CHLORIDE SERPL-SCNC: 97 MMOL/L — LOW (ref 98–107)
CO2 SERPL-SCNC: 31 MMOL/L — SIGNIFICANT CHANGE UP (ref 22–31)
CREAT SERPL-MCNC: 0.8 MG/DL — SIGNIFICANT CHANGE UP (ref 0.5–1.3)
CULTURE RESULTS: SIGNIFICANT CHANGE UP
CULTURE RESULTS: SIGNIFICANT CHANGE UP
EGFR: 72 ML/MIN/1.73M2 — SIGNIFICANT CHANGE UP
GLUCOSE SERPL-MCNC: 100 MG/DL — HIGH (ref 70–99)
HCT VFR BLD CALC: 24.3 % — LOW (ref 34.5–45)
HGB BLD-MCNC: 7.5 G/DL — LOW (ref 11.5–15.5)
MAGNESIUM SERPL-MCNC: 2.1 MG/DL — SIGNIFICANT CHANGE UP (ref 1.6–2.6)
MCHC RBC-ENTMCNC: 29.8 PG — SIGNIFICANT CHANGE UP (ref 27–34)
MCHC RBC-ENTMCNC: 30.9 GM/DL — LOW (ref 32–36)
MCV RBC AUTO: 96.4 FL — SIGNIFICANT CHANGE UP (ref 80–100)
NRBC # BLD: 0 /100 WBCS — SIGNIFICANT CHANGE UP (ref 0–0)
NRBC # FLD: 0.03 K/UL — HIGH (ref 0–0)
PHOSPHATE SERPL-MCNC: 2.6 MG/DL — SIGNIFICANT CHANGE UP (ref 2.5–4.5)
PLATELET # BLD AUTO: 513 K/UL — HIGH (ref 150–400)
POTASSIUM SERPL-MCNC: 4.7 MMOL/L — SIGNIFICANT CHANGE UP (ref 3.5–5.3)
POTASSIUM SERPL-SCNC: 4.7 MMOL/L — SIGNIFICANT CHANGE UP (ref 3.5–5.3)
RBC # BLD: 2.52 M/UL — LOW (ref 3.8–5.2)
RBC # FLD: 15 % — HIGH (ref 10.3–14.5)
SODIUM SERPL-SCNC: 138 MMOL/L — SIGNIFICANT CHANGE UP (ref 135–145)
SPECIMEN SOURCE: SIGNIFICANT CHANGE UP
SPECIMEN SOURCE: SIGNIFICANT CHANGE UP
WBC # BLD: 11.35 K/UL — HIGH (ref 3.8–10.5)
WBC # FLD AUTO: 11.35 K/UL — HIGH (ref 3.8–10.5)

## 2022-08-28 PROCEDURE — 71045 X-RAY EXAM CHEST 1 VIEW: CPT | Mod: 26

## 2022-08-28 RX ORDER — FUROSEMIDE 40 MG
20 TABLET ORAL ONCE
Refills: 0 | Status: COMPLETED | OUTPATIENT
Start: 2022-08-28 | End: 2022-08-28

## 2022-08-28 RX ADMIN — PANTOPRAZOLE SODIUM 40 MILLIGRAM(S): 20 TABLET, DELAYED RELEASE ORAL at 06:45

## 2022-08-28 RX ADMIN — HEPARIN SODIUM 5000 UNIT(S): 5000 INJECTION INTRAVENOUS; SUBCUTANEOUS at 15:25

## 2022-08-28 RX ADMIN — Medication 100 MILLIGRAM(S): at 17:36

## 2022-08-28 RX ADMIN — Medication 12.5 MILLIGRAM(S): at 06:42

## 2022-08-28 RX ADMIN — LOSARTAN POTASSIUM 50 MILLIGRAM(S): 100 TABLET, FILM COATED ORAL at 06:42

## 2022-08-28 RX ADMIN — HEPARIN SODIUM 5000 UNIT(S): 5000 INJECTION INTRAVENOUS; SUBCUTANEOUS at 23:10

## 2022-08-28 RX ADMIN — Medication 63.75 MILLIMOLE(S): at 10:41

## 2022-08-28 RX ADMIN — Medication 20 MILLIGRAM(S): at 17:40

## 2022-08-28 RX ADMIN — Medication 1 TABLET(S): at 17:35

## 2022-08-28 RX ADMIN — HEPARIN SODIUM 5000 UNIT(S): 5000 INJECTION INTRAVENOUS; SUBCUTANEOUS at 06:43

## 2022-08-28 RX ADMIN — Medication 12.5 MILLIGRAM(S): at 17:40

## 2022-08-28 RX ADMIN — Medication 1000 MILLIGRAM(S): at 06:43

## 2022-08-28 RX ADMIN — Medication 1000 MILLIGRAM(S): at 17:40

## 2022-08-28 NOTE — PROGRESS NOTE ADULT - ASSESSMENT
87F PMHx of HTN, HLD and GERD who presented for open cholecystectomy, portal lymphadenectomy, liver segment 4b / 5 resection and right hemicolectomy on 8/19/22. Course c/b pulmonary edema requiring diuresis in SICU. Transferred to floors. Course further c/b hypoxemic and hypercapnic respiratory insufficiency requiring supplemental O2 and chest physiotherapy.    PLAN:  - Follow up chest xray, lasix if fluid overloaded  - Pain control Tylenol ATC  - Chest physiotherapy, wean off nasal cannula as able, maintain SpO2 >92  - OOB/ Ambulate/ pt educated on incentive spirometer and performed correctly yesterday  - Continue home antihypertensives (Losartan, Metoprolol) with hold parameters  - Low fiber diet, protonix, multivitamin  - Monitor for bloody BMs, H/H stable  - VTE prophylaxis with Heparin subcutaneous  - Dispo planning: Home with home PT and rolling walker    D Team Surgery  [id number]

## 2022-08-28 NOTE — PROGRESS NOTE ADULT - SUBJECTIVE AND OBJECTIVE BOX
D TEAM Surgery Progress Note  Patient is a 87y old  Female who presents with a chief complaint of Bowel prep for surgery tomorrow (25 Aug 2022 10:23)    SUBJECTIVE: Patient seen and examined at bedside with surgical team. Patient endorses BM and flatus. Patient is agitated this AM.     ICU Vital Signs Last 24 Hrs  T(C): 36.8 (28 Aug 2022 05:54), Max: 37.2 (27 Aug 2022 17:58)  T(F): 98.3 (28 Aug 2022 05:54), Max: 99 (27 Aug 2022 17:58)  HR: 106 (28 Aug 2022 05:54) (92 - 112)  BP: 165/63 (28 Aug 2022 05:54) (109/43 - 165/63)  BP(mean): --  ABP: --  ABP(mean): --  RR: 18 (28 Aug 2022 05:54) (18 - 20)  SpO2: 96% (28 Aug 2022 05:54) (95% - 100%)    O2 Parameters below as of 28 Aug 2022 05:54  Patient On (Oxygen Delivery Method): nasal cannula  O2 Flow (L/min): 3          MEDICATIONS  (STANDING):  acetaminophen     Tablet .. 1000 milliGRAM(s) Oral every 6 hours  I&O's Detail  furosemide   Injectable 20 milliGRAM(s) IV Push once  heparin   Injectable 5000 Unit(s) SubCutaneous every 8 hours  losartan 50 milliGRAM(s) Oral daily  metoprolol tartrate 12.5 milliGRAM(s) Oral every 12 hours  multivitamin 1 Tablet(s) Oral daily  pantoprazole    Tablet 40 milliGRAM(s) Oral before breakfast  thiamine 100 milliGRAM(s) Oral daily    MEDICATIONS  (PRN):    PHYSICAL EXAM  Constitutional: A&Ox1, Confused  Respiratory: Unlabored breathing  Abdomen: Soft, nondistended, NTTP. No rebound or guarding. Incision C/D/I/  Extremities: WWP, MCCANN spontaneously    LABS:                              7.4    11.15 )-----------( 434      ( 27 Aug 2022 06:40 )             24.0       08-27    137  |  97<L>  |  15  ----------------------------<  86  4.2   |  32<H>  |  0.74    Ca    9.2      27 Aug 2022 06:40  Phos  4.2     08-27  Mg     1.90     08-27

## 2022-08-29 LAB
ALBUMIN SERPL ELPH-MCNC: 3.5 G/DL — SIGNIFICANT CHANGE UP (ref 3.3–5)
ALP SERPL-CCNC: 280 U/L — HIGH (ref 40–120)
ALT FLD-CCNC: 27 U/L — SIGNIFICANT CHANGE UP (ref 4–33)
ANION GAP SERPL CALC-SCNC: 9 MMOL/L — SIGNIFICANT CHANGE UP (ref 7–14)
AST SERPL-CCNC: 31 U/L — SIGNIFICANT CHANGE UP (ref 4–32)
BILIRUB DIRECT SERPL-MCNC: <0.2 MG/DL — SIGNIFICANT CHANGE UP (ref 0–0.3)
BILIRUB INDIRECT FLD-MCNC: >0.1 MG/DL — SIGNIFICANT CHANGE UP (ref 0–1)
BILIRUB SERPL-MCNC: 0.3 MG/DL — SIGNIFICANT CHANGE UP (ref 0.2–1.2)
BUN SERPL-MCNC: 16 MG/DL — SIGNIFICANT CHANGE UP (ref 7–23)
CALCIUM SERPL-MCNC: 9.7 MG/DL — SIGNIFICANT CHANGE UP (ref 8.4–10.5)
CHLORIDE SERPL-SCNC: 99 MMOL/L — SIGNIFICANT CHANGE UP (ref 98–107)
CO2 SERPL-SCNC: 33 MMOL/L — HIGH (ref 22–31)
CREAT SERPL-MCNC: 0.77 MG/DL — SIGNIFICANT CHANGE UP (ref 0.5–1.3)
EGFR: 75 ML/MIN/1.73M2 — SIGNIFICANT CHANGE UP
GLUCOSE SERPL-MCNC: 139 MG/DL — HIGH (ref 70–99)
HCT VFR BLD CALC: 27.1 % — LOW (ref 34.5–45)
HGB BLD-MCNC: 8.1 G/DL — LOW (ref 11.5–15.5)
MAGNESIUM SERPL-MCNC: 1.9 MG/DL — SIGNIFICANT CHANGE UP (ref 1.6–2.6)
MCHC RBC-ENTMCNC: 29.9 GM/DL — LOW (ref 32–36)
MCHC RBC-ENTMCNC: 30 PG — SIGNIFICANT CHANGE UP (ref 27–34)
MCV RBC AUTO: 100.4 FL — HIGH (ref 80–100)
NRBC # BLD: 0 /100 WBCS — SIGNIFICANT CHANGE UP (ref 0–0)
NRBC # FLD: 0.08 K/UL — HIGH (ref 0–0)
PHOSPHATE SERPL-MCNC: 3.7 MG/DL — SIGNIFICANT CHANGE UP (ref 2.5–4.5)
PLATELET # BLD AUTO: 623 K/UL — HIGH (ref 150–400)
POTASSIUM SERPL-MCNC: 4.8 MMOL/L — SIGNIFICANT CHANGE UP (ref 3.5–5.3)
POTASSIUM SERPL-SCNC: 4.8 MMOL/L — SIGNIFICANT CHANGE UP (ref 3.5–5.3)
PROT SERPL-MCNC: 7.2 G/DL — SIGNIFICANT CHANGE UP (ref 6–8.3)
RBC # BLD: 2.7 M/UL — LOW (ref 3.8–5.2)
RBC # FLD: 15.7 % — HIGH (ref 10.3–14.5)
SODIUM SERPL-SCNC: 141 MMOL/L — SIGNIFICANT CHANGE UP (ref 135–145)
WBC # BLD: 16.77 K/UL — HIGH (ref 3.8–10.5)
WBC # FLD AUTO: 16.77 K/UL — HIGH (ref 3.8–10.5)

## 2022-08-29 PROCEDURE — 99233 SBSQ HOSP IP/OBS HIGH 50: CPT | Mod: GC

## 2022-08-29 PROCEDURE — 71045 X-RAY EXAM CHEST 1 VIEW: CPT | Mod: 26

## 2022-08-29 RX ORDER — MEROPENEM 1 G/30ML
1000 INJECTION INTRAVENOUS EVERY 12 HOURS
Refills: 0 | Status: DISCONTINUED | OUTPATIENT
Start: 2022-08-29 | End: 2022-09-06

## 2022-08-29 RX ORDER — FUROSEMIDE 40 MG
20 TABLET ORAL ONCE
Refills: 0 | Status: COMPLETED | OUTPATIENT
Start: 2022-08-29 | End: 2022-08-29

## 2022-08-29 RX ADMIN — HEPARIN SODIUM 5000 UNIT(S): 5000 INJECTION INTRAVENOUS; SUBCUTANEOUS at 21:54

## 2022-08-29 RX ADMIN — HEPARIN SODIUM 5000 UNIT(S): 5000 INJECTION INTRAVENOUS; SUBCUTANEOUS at 06:49

## 2022-08-29 RX ADMIN — Medication 1000 MILLIGRAM(S): at 06:49

## 2022-08-29 RX ADMIN — Medication 1000 MILLIGRAM(S): at 19:00

## 2022-08-29 RX ADMIN — Medication 1000 MILLIGRAM(S): at 18:51

## 2022-08-29 RX ADMIN — PANTOPRAZOLE SODIUM 40 MILLIGRAM(S): 20 TABLET, DELAYED RELEASE ORAL at 06:49

## 2022-08-29 RX ADMIN — Medication 100 MILLIGRAM(S): at 16:53

## 2022-08-29 RX ADMIN — Medication 20 MILLIGRAM(S): at 12:45

## 2022-08-29 RX ADMIN — Medication 12.5 MILLIGRAM(S): at 18:52

## 2022-08-29 RX ADMIN — Medication 12.5 MILLIGRAM(S): at 06:49

## 2022-08-29 RX ADMIN — LOSARTAN POTASSIUM 50 MILLIGRAM(S): 100 TABLET, FILM COATED ORAL at 06:49

## 2022-08-29 RX ADMIN — MEROPENEM 100 MILLIGRAM(S): 1 INJECTION INTRAVENOUS at 18:51

## 2022-08-29 RX ADMIN — HEPARIN SODIUM 5000 UNIT(S): 5000 INJECTION INTRAVENOUS; SUBCUTANEOUS at 16:53

## 2022-08-29 RX ADMIN — Medication 1 TABLET(S): at 16:53

## 2022-08-29 NOTE — PROGRESS NOTE ADULT - SUBJECTIVE AND OBJECTIVE BOX
D TEAM Surgery Progress Note  Patient is a 87y old  Female who presents with a chief complaint of Bowel prep for surgery tomorrow (28 Aug 2022 10:00)      INTERVAL EVENTS: Patient is POD#10 s/p open cholecystectomy, portal lymphadenectomy, liver segment 4b/5 resection, right hemicolectomy. No acute events overnight. Continues to require intermittent supplemental O2 with nasal cannula 2-3L.    SUBJECTIVE: Patient seen and examined at bedside with surgical team, patient somnolent. Denies abdominal pain. Denies fever, chills, CP, SOB nausea, vomiting.      OBJECTIVE:    Vital Signs Last 24 Hrs  T(C): 36.6 (29 Aug 2022 03:50), Max: 36.9 (28 Aug 2022 20:03)  T(F): 97.9 (29 Aug 2022 03:50), Max: 98.4 (28 Aug 2022 20:03)  HR: 96 (29 Aug 2022 03:50) (83 - 98)  BP: 169/61 (29 Aug 2022 03:50) (147/79 - 169/61)  BP(mean): --  RR: 18 (29 Aug 2022 03:50) (17 - 18)  SpO2: 96% (29 Aug 2022 03:50) (87% - 96%)    Parameters below as of 29 Aug 2022 03:50  Patient On (Oxygen Delivery Method): room air    I&O's Detail    28 Aug 2022 07:01  -  29 Aug 2022 07:00  --------------------------------------------------------  IN:  Total IN: 0 mL    OUT:    Voided (mL): 300 mL  Total OUT: 300 mL    Total NET: -300 mL      MEDICATIONS  (STANDING):  acetaminophen     Tablet .. 1000 milliGRAM(s) Oral every 6 hours  heparin   Injectable 5000 Unit(s) SubCutaneous every 8 hours  losartan 50 milliGRAM(s) Oral daily  metoprolol tartrate 12.5 milliGRAM(s) Oral every 12 hours  multivitamin 1 Tablet(s) Oral daily  pantoprazole    Tablet 40 milliGRAM(s) Oral before breakfast  thiamine 100 milliGRAM(s) Oral daily    MEDICATIONS  (PRN):      PHYSICAL EXAM:  Constitutional: NAD  Respiratory: Unlabored breathing  Abdomen: Soft, nondistended, NTTP. No rebound or guarding. Midline incision C/D/I.  Extremities: WWP, MCCANN spontaneously    LABS:                        8.1    16.77 )-----------( 623      ( 29 Aug 2022 04:56 )             27.1     08-29    141  |  99  |  16  ----------------------------<  139<H>  4.8   |  33<H>  |  0.77    Ca    9.7      29 Aug 2022 04:56  Phos  3.7     08-29  Mg     1.90     08-29

## 2022-08-29 NOTE — PROGRESS NOTE ADULT - ASSESSMENT
87F PMHx of HTN, HLD and GERD who presented for open cholecystectomy, portal lymphadenectomy, liver segment 4b / 5 resection and right hemicolectomy on 8/19/22. Course c/b pulmonary edema requiring diuresis in SICU. Transferred to floors. Course further c/b hypoxemic and hypercapnic respiratory insufficiency requiring supplemental O2 and chest physiotherapy.    PLAN:  - Follow up chest xray, lasix if fluid overloaded  - Pain control Tylenol ATC  - Chest physiotherapy, wean off nasal cannula as able, maintain SpO2 >92  - OOB/ Ambulate/ pt educated on incentive spirometer and performed correctly yesterday  - f/u pulmonary recommendations  - Continue home antihypertensives (Losartan, Metoprolol) with hold parameters  - Low fiber diet, protonix, multivitamin  - H/H stable  - VTE prophylaxis with Heparin subcutaneous  - Dispo planning: Home with home PT and rolling walker    D Team Surgery  [id number]

## 2022-08-29 NOTE — PROGRESS NOTE ADULT - ASSESSMENT
88 yo F pmhx HTN, HLD, recent admission for SAH/SDH and hyponatremia, UTI, now presenting for cholecystectomy with intraoperative findings c/f carcinoma so s/p ex lap, cholecystectomy segment 4b/5 hepatectomy, and right colectomy due to fistula tract vs metastasis 8/19/ Patient was unable to be extubated at the end of case due to oversedation and not able to ventilate adequate volumes and so had brief SICU stay. For the last few days patient with worsening hypoxemia and hypercapnic respiratory failure so pulmonary consulted for further recommendations:    CT scan reviewed with notable new R pleural effusion and R atlectasis ?consolidation  in setting of recent surgery     #Hypoxic/Hypercapnic Respiratory Failure  #R pleural effusion     Recommendations:  - Due to increasing wbc and c/f aspiration, would add on meropenem for aspiration pneumonia given review of CT findings and clinical course.   - Out of bed as tolerated   - wean oxygen as tolerated  - aspiration precautions, would consider NPO status and ngt if within goals of care

## 2022-08-29 NOTE — PROGRESS NOTE ADULT - SUBJECTIVE AND OBJECTIVE BOX
CHIEF COMPLAINT:    Interval Events:    Pt seen and examined at bedside. Mildly arousable to noxious stimuli     REVIEW OF SYSTEMS:  unable to assess     OBJECTIVE:  ICU Vital Signs Last 24 Hrs  T(C): 36.6 (29 Aug 2022 03:50), Max: 36.9 (28 Aug 2022 20:03)  T(F): 97.9 (29 Aug 2022 03:50), Max: 98.4 (28 Aug 2022 20:03)  HR: 96 (29 Aug 2022 03:50) (83 - 96)  BP: 169/61 (29 Aug 2022 03:50) (154/50 - 169/61)  BP(mean): --  ABP: --  ABP(mean): --  RR: 18 (29 Aug 2022 03:50) (17 - 18)  SpO2: 96% (29 Aug 2022 03:50) (87% - 96%)    O2 Parameters below as of 29 Aug 2022 03:50  Patient On (Oxygen Delivery Method): room air              08-28 @ 07:01  -  08-29 @ 07:00  --------------------------------------------------------  IN: 0 mL / OUT: 300 mL / NET: -300 mL      CAPILLARY BLOOD GLUCOSE          PHYSICAL EXAM:  General: Awake, alert, oriented X 0   HEENT: Atraumatic, normocephalic.                 Mallampatti Grade                 No nasal congestion.                No tonsillar or pharyngeal exudates.  Lymph Nodes: No palpable lymphadenopathy  Neck: No JVD. No carotid bruit.   Respiratory: Normal chest expansion                         Normal percussion                         Normal and equal air entry                         No wheeze, rhonchi or rales.  Cardiovascular: S1 S2 normal. No murmurs, rubs or gallops.   Abdomen: Soft, non-tender, non-distended. No organomegaly.  Extremities: Warm to touch. Peripheral pulse palpable. No pedal edema.   Skin: No rashes or skin lesions    HOSPITAL MEDICATIONS:  MEDICATIONS  (STANDING):  acetaminophen     Tablet .. 1000 milliGRAM(s) Oral every 6 hours  heparin   Injectable 5000 Unit(s) SubCutaneous every 8 hours  losartan 50 milliGRAM(s) Oral daily  metoprolol tartrate 12.5 milliGRAM(s) Oral every 12 hours  multivitamin 1 Tablet(s) Oral daily  pantoprazole    Tablet 40 milliGRAM(s) Oral before breakfast  thiamine 100 milliGRAM(s) Oral daily    MEDICATIONS  (PRN):      LABS:                        8.1    16.77 )-----------( 623      ( 29 Aug 2022 04:56 )             27.1     08-29    141  |  99  |  16  ----------------------------<  139<H>  4.8   |  33<H>  |  0.77    Ca    9.7      29 Aug 2022 04:56  Phos  3.7     08-29  Mg     1.90     08-29    TPro  7.2  /  Alb  3.5  /  TBili  0.3  /  DBili  <0.2  /  AST  31  /  ALT  27  /  AlkPhos  280<H>  08-29              MICROBIOLOGY:     RADIOLOGY:  [ ] Reviewed and interpreted by me    Point of Care Ultrasound Findings:    PFT:    EKG:

## 2022-08-29 NOTE — CHART NOTE - NSCHARTNOTEFT_GEN_A_CORE
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     Weight:    Labs:    Current Diet:    Enteral Recommendations:    RD to Remain Available:    Additional Recommendations:   1)   2)   3) NUTRITION FOLLOW-UP    Pt with history of HTN, HLD and GERD. Pt is s/p open cholecystectomy, portal lymphadenectomy, liver segment 4b/5 resection, right hemicolectomy. Pt requires intermittent supplemental O2 with nasal cannula 2-3L.    Pt was unable to complete a Speech and Swallow evaluation on 8/28/22 due to confusion and refusal to cooperate. Diet was deferred to MD. Pt continues on a low fiber diet.   Pt was being fed cereal at time of visit. She did not eat anything else from tray. Pt did not answer questions. Appeared to be confused.   Unable to assess po intake.    Weight: 58.6 kg (8/29/22)   53.4 kg (8/22/22) Pt had a 5.2 kg increase in weigh in 7 days. Please obtain a new weight to determine weight trend.    Labs: 08-29 Na 141 mmol/L Glu 139 mg/dL<H> K+ 4.8 mmol/L Cr 0.77 mg/dL BUN 16 mg/dL Phos 3.7 mg/dL      Current Diet: Diet, Low Fiber:   Low Fat (LOWFAT) (08-23-22 @ 08:28)    Edema: No edema noted    Skin: Surgical incision midline abdomen    Malnutrition Status: Ongoing severe malnutrition    RD to Remain Available: Christiane Arana MS, RDN, CDN pager [phone]     Additional Recommendations:   1) Ensure Plus HP 2x/day  2) Monitor weight, labs, po intake and skin integrity.

## 2022-08-30 LAB
ALBUMIN SERPL ELPH-MCNC: 3 G/DL — LOW (ref 3.3–5)
ALP SERPL-CCNC: 233 U/L — HIGH (ref 40–120)
ALT FLD-CCNC: 20 U/L — SIGNIFICANT CHANGE UP (ref 4–33)
ANION GAP SERPL CALC-SCNC: 1 MMOL/L — LOW (ref 7–14)
AST SERPL-CCNC: 19 U/L — SIGNIFICANT CHANGE UP (ref 4–32)
BILIRUB DIRECT SERPL-MCNC: <0.2 MG/DL — SIGNIFICANT CHANGE UP (ref 0–0.3)
BILIRUB INDIRECT FLD-MCNC: >0.1 MG/DL — SIGNIFICANT CHANGE UP (ref 0–1)
BILIRUB SERPL-MCNC: 0.3 MG/DL — SIGNIFICANT CHANGE UP (ref 0.2–1.2)
BUN SERPL-MCNC: 15 MG/DL — SIGNIFICANT CHANGE UP (ref 7–23)
CALCIUM SERPL-MCNC: 9.5 MG/DL — SIGNIFICANT CHANGE UP (ref 8.4–10.5)
CHLORIDE SERPL-SCNC: 97 MMOL/L — LOW (ref 98–107)
CO2 SERPL-SCNC: 40 MMOL/L — HIGH (ref 22–31)
CREAT SERPL-MCNC: 0.73 MG/DL — SIGNIFICANT CHANGE UP (ref 0.5–1.3)
EGFR: 80 ML/MIN/1.73M2 — SIGNIFICANT CHANGE UP
GLUCOSE SERPL-MCNC: 84 MG/DL — SIGNIFICANT CHANGE UP (ref 70–99)
HCT VFR BLD CALC: 23.6 % — LOW (ref 34.5–45)
HCT VFR BLD CALC: 25 % — LOW (ref 34.5–45)
HGB BLD-MCNC: 6.9 G/DL — CRITICAL LOW (ref 11.5–15.5)
HGB BLD-MCNC: 7.6 G/DL — LOW (ref 11.5–15.5)
MAGNESIUM SERPL-MCNC: 1.9 MG/DL — SIGNIFICANT CHANGE UP (ref 1.6–2.6)
MCHC RBC-ENTMCNC: 29.1 PG — SIGNIFICANT CHANGE UP (ref 27–34)
MCHC RBC-ENTMCNC: 29.2 GM/DL — LOW (ref 32–36)
MCHC RBC-ENTMCNC: 30.4 GM/DL — LOW (ref 32–36)
MCHC RBC-ENTMCNC: 30.5 PG — SIGNIFICANT CHANGE UP (ref 27–34)
MCV RBC AUTO: 100.4 FL — HIGH (ref 80–100)
MCV RBC AUTO: 99.6 FL — SIGNIFICANT CHANGE UP (ref 80–100)
NRBC # BLD: 0 /100 WBCS — SIGNIFICANT CHANGE UP (ref 0–0)
NRBC # BLD: 0 /100 WBCS — SIGNIFICANT CHANGE UP (ref 0–0)
NRBC # FLD: 0.04 K/UL — HIGH (ref 0–0)
NRBC # FLD: 0.05 K/UL — HIGH (ref 0–0)
PHOSPHATE SERPL-MCNC: 1.9 MG/DL — LOW (ref 2.5–4.5)
PLATELET # BLD AUTO: 532 K/UL — HIGH (ref 150–400)
PLATELET # BLD AUTO: 544 K/UL — HIGH (ref 150–400)
POTASSIUM SERPL-MCNC: 4.4 MMOL/L — SIGNIFICANT CHANGE UP (ref 3.5–5.3)
POTASSIUM SERPL-SCNC: 4.4 MMOL/L — SIGNIFICANT CHANGE UP (ref 3.5–5.3)
PROT SERPL-MCNC: 6 G/DL — SIGNIFICANT CHANGE UP (ref 6–8.3)
RBC # BLD: 2.37 M/UL — LOW (ref 3.8–5.2)
RBC # BLD: 2.49 M/UL — LOW (ref 3.8–5.2)
RBC # FLD: 15.6 % — HIGH (ref 10.3–14.5)
RBC # FLD: 15.6 % — HIGH (ref 10.3–14.5)
SODIUM SERPL-SCNC: 138 MMOL/L — SIGNIFICANT CHANGE UP (ref 135–145)
WBC # BLD: 11.89 K/UL — HIGH (ref 3.8–10.5)
WBC # BLD: 13.38 K/UL — HIGH (ref 3.8–10.5)
WBC # FLD AUTO: 11.89 K/UL — HIGH (ref 3.8–10.5)
WBC # FLD AUTO: 13.38 K/UL — HIGH (ref 3.8–10.5)

## 2022-08-30 PROCEDURE — 71045 X-RAY EXAM CHEST 1 VIEW: CPT | Mod: 26

## 2022-08-30 RX ORDER — METOPROLOL TARTRATE 50 MG
25 TABLET ORAL DAILY
Refills: 0 | Status: DISCONTINUED | OUTPATIENT
Start: 2022-08-30 | End: 2022-08-30

## 2022-08-30 RX ORDER — SODIUM,POTASSIUM PHOSPHATES 278-250MG
1 POWDER IN PACKET (EA) ORAL EVERY 4 HOURS
Refills: 0 | Status: COMPLETED | OUTPATIENT
Start: 2022-08-30 | End: 2022-08-31

## 2022-08-30 RX ORDER — METOPROLOL TARTRATE 50 MG
25 TABLET ORAL DAILY
Refills: 0 | Status: DISCONTINUED | OUTPATIENT
Start: 2022-08-30 | End: 2022-09-01

## 2022-08-30 RX ORDER — SODIUM,POTASSIUM PHOSPHATES 278-250MG
1 POWDER IN PACKET (EA) ORAL EVERY 4 HOURS
Refills: 0 | Status: DISCONTINUED | OUTPATIENT
Start: 2022-08-30 | End: 2022-08-30

## 2022-08-30 RX ADMIN — Medication 1 TABLET(S): at 17:00

## 2022-08-30 RX ADMIN — Medication 1000 MILLIGRAM(S): at 16:59

## 2022-08-30 RX ADMIN — MEROPENEM 100 MILLIGRAM(S): 1 INJECTION INTRAVENOUS at 07:38

## 2022-08-30 RX ADMIN — MEROPENEM 100 MILLIGRAM(S): 1 INJECTION INTRAVENOUS at 18:03

## 2022-08-30 RX ADMIN — Medication 1000 MILLIGRAM(S): at 23:00

## 2022-08-30 RX ADMIN — Medication 100 MILLIGRAM(S): at 17:00

## 2022-08-30 RX ADMIN — Medication 1 TABLET(S): at 22:59

## 2022-08-30 RX ADMIN — Medication 25 MILLIGRAM(S): at 18:06

## 2022-08-30 RX ADMIN — HEPARIN SODIUM 5000 UNIT(S): 5000 INJECTION INTRAVENOUS; SUBCUTANEOUS at 22:59

## 2022-08-30 RX ADMIN — Medication 1000 MILLIGRAM(S): at 05:00

## 2022-08-30 RX ADMIN — HEPARIN SODIUM 5000 UNIT(S): 5000 INJECTION INTRAVENOUS; SUBCUTANEOUS at 17:00

## 2022-08-30 RX ADMIN — HEPARIN SODIUM 5000 UNIT(S): 5000 INJECTION INTRAVENOUS; SUBCUTANEOUS at 05:19

## 2022-08-30 RX ADMIN — Medication 1000 MILLIGRAM(S): at 00:21

## 2022-08-30 NOTE — PROGRESS NOTE ADULT - SUBJECTIVE AND OBJECTIVE BOX
Vital Signs Last 24 Hrs  T(C): 36.4 (30 Aug 2022 05:31), Max: 38.2 (29 Aug 2022 16:44)  T(F): 97.6 (30 Aug 2022 05:31), Max: 100.7 (29 Aug 2022 16:44)  HR: 85 (30 Aug 2022 05:31) (85 - 106)  BP: 157/54 (30 Aug 2022 05:31) (146/50 - 168/58)  BP(mean): 74 (29 Aug 2022 08:55) (74 - 74)  RR: 100 (30 Aug 2022 05:31) (18 - 100)  SpO2: 98% (30 Aug 2022 00:06) (95% - 98%)    Parameters below as of 30 Aug 2022 00:06  Patient On (Oxygen Delivery Method): nasal cannula        I&O's Detail    29 Aug 2022 07:01  -  30 Aug 2022 07:00  --------------------------------------------------------  IN:    Oral Fluid: 60 mL  Total IN: 60 mL    OUT:    Voided (mL): 450 mL  Total OUT: 450 mL    Total NET: -390 mL                                8.1    16.77 )-----------( 623      ( 29 Aug 2022 04:56 )             27.1       08-29    141  |  99  |  16  ----------------------------<  139<H>  4.8   |  33<H>  |  0.77    Ca    9.7      29 Aug 2022 04:56  Phos  3.7     08-29  Mg     1.90     08-29    TPro  7.2  /  Alb  3.5  /  TBili  0.3  /  DBili  <0.2  /  AST  31  /  ALT  27  /  AlkPhos  280<H>  08-29    patient still "sundowning"    CXR still showing effusions and fluid overload          PLAN:  continue diuresis as per pulmonology

## 2022-08-30 NOTE — PROGRESS NOTE ADULT - ASSESSMENT
87F PMHx of HTN, HLD and GERD who presented for open cholecystectomy, portal lymphadenectomy, liver segment 4b / 5 resection and right hemicolectomy on 8/19/22. Course c/b pulmonary edema requiring diuresis in SICU. Transferred to floors. Course further c/b hypoxemic and hypercapnic respiratory insufficiency requiring supplemental O2 and chest physiotherapy.    PLAN:  - Follow up chest xray, lasix if fluid overloaded  - Pain control Tylenol ATC  - Chest physiotherapy, wean off nasal cannula as able, maintain SpO2 >92  - OOB/ Ambulate/ Incentive spirometer  - f/u pulmonary recommendations  - Continue home antihypertensives (Losartan, Metoprolol) with hold parameters  - Low fiber diet, protonix, multivitamin  - H/H stable  - ABX: Meropenem for RLL infiltrate c/f PNA  - Monitor for fevers, leukocytosis improving 16-->13  - VTE prophylaxis with Heparin subcutaneous  - Dispo planning: Home with home PT and rolling walker    D Team Surgery  [id number]

## 2022-08-31 DIAGNOSIS — D64.9 ANEMIA, UNSPECIFIED: ICD-10-CM

## 2022-08-31 DIAGNOSIS — E78.5 HYPERLIPIDEMIA, UNSPECIFIED: ICD-10-CM

## 2022-08-31 DIAGNOSIS — I10 ESSENTIAL (PRIMARY) HYPERTENSION: ICD-10-CM

## 2022-08-31 DIAGNOSIS — K82.8 OTHER SPECIFIED DISEASES OF GALLBLADDER: ICD-10-CM

## 2022-08-31 DIAGNOSIS — J90 PLEURAL EFFUSION, NOT ELSEWHERE CLASSIFIED: ICD-10-CM

## 2022-08-31 LAB
ALBUMIN SERPL ELPH-MCNC: 3 G/DL — LOW (ref 3.3–5)
ALP SERPL-CCNC: 257 U/L — HIGH (ref 40–120)
ALT FLD-CCNC: 20 U/L — SIGNIFICANT CHANGE UP (ref 4–33)
ANION GAP SERPL CALC-SCNC: 5 MMOL/L — LOW (ref 7–14)
AST SERPL-CCNC: 22 U/L — SIGNIFICANT CHANGE UP (ref 4–32)
BILIRUB DIRECT SERPL-MCNC: <0.2 MG/DL — SIGNIFICANT CHANGE UP (ref 0–0.3)
BILIRUB INDIRECT FLD-MCNC: >0.2 MG/DL — SIGNIFICANT CHANGE UP (ref 0–1)
BILIRUB SERPL-MCNC: 0.4 MG/DL — SIGNIFICANT CHANGE UP (ref 0.2–1.2)
BLD GP AB SCN SERPL QL: NEGATIVE — SIGNIFICANT CHANGE UP
BUN SERPL-MCNC: 13 MG/DL — SIGNIFICANT CHANGE UP (ref 7–23)
CALCIUM SERPL-MCNC: 9.6 MG/DL — SIGNIFICANT CHANGE UP (ref 8.4–10.5)
CHLORIDE SERPL-SCNC: 95 MMOL/L — LOW (ref 98–107)
CO2 SERPL-SCNC: 37 MMOL/L — HIGH (ref 22–31)
CREAT SERPL-MCNC: 0.63 MG/DL — SIGNIFICANT CHANGE UP (ref 0.5–1.3)
EGFR: 86 ML/MIN/1.73M2 — SIGNIFICANT CHANGE UP
GLUCOSE SERPL-MCNC: 95 MG/DL — SIGNIFICANT CHANGE UP (ref 70–99)
HCT VFR BLD CALC: 23.2 % — LOW (ref 34.5–45)
HCT VFR BLD CALC: 28.8 % — LOW (ref 34.5–45)
HGB BLD-MCNC: 7 G/DL — CRITICAL LOW (ref 11.5–15.5)
HGB BLD-MCNC: 9.4 G/DL — LOW (ref 11.5–15.5)
MAGNESIUM SERPL-MCNC: 1.8 MG/DL — SIGNIFICANT CHANGE UP (ref 1.6–2.6)
MCHC RBC-ENTMCNC: 29.7 PG — SIGNIFICANT CHANGE UP (ref 27–34)
MCHC RBC-ENTMCNC: 30.2 GM/DL — LOW (ref 32–36)
MCHC RBC-ENTMCNC: 30.5 PG — SIGNIFICANT CHANGE UP (ref 27–34)
MCHC RBC-ENTMCNC: 32.6 GM/DL — SIGNIFICANT CHANGE UP (ref 32–36)
MCV RBC AUTO: 93.5 FL — SIGNIFICANT CHANGE UP (ref 80–100)
MCV RBC AUTO: 98.3 FL — SIGNIFICANT CHANGE UP (ref 80–100)
NRBC # BLD: 0 /100 WBCS — SIGNIFICANT CHANGE UP (ref 0–0)
NRBC # BLD: 0 /100 WBCS — SIGNIFICANT CHANGE UP (ref 0–0)
NRBC # FLD: 0.03 K/UL — HIGH (ref 0–0)
NRBC # FLD: 0.04 K/UL — HIGH (ref 0–0)
PHOSPHATE SERPL-MCNC: 3.1 MG/DL — SIGNIFICANT CHANGE UP (ref 2.5–4.5)
PLATELET # BLD AUTO: 560 K/UL — HIGH (ref 150–400)
PLATELET # BLD AUTO: 586 K/UL — HIGH (ref 150–400)
POTASSIUM SERPL-MCNC: 4 MMOL/L — SIGNIFICANT CHANGE UP (ref 3.5–5.3)
POTASSIUM SERPL-SCNC: 4 MMOL/L — SIGNIFICANT CHANGE UP (ref 3.5–5.3)
PROT SERPL-MCNC: 6 G/DL — SIGNIFICANT CHANGE UP (ref 6–8.3)
RBC # BLD: 2.36 M/UL — LOW (ref 3.8–5.2)
RBC # BLD: 3.08 M/UL — LOW (ref 3.8–5.2)
RBC # FLD: 15.9 % — HIGH (ref 10.3–14.5)
RBC # FLD: 16.6 % — HIGH (ref 10.3–14.5)
RH IG SCN BLD-IMP: POSITIVE — SIGNIFICANT CHANGE UP
SARS-COV-2 RNA SPEC QL NAA+PROBE: SIGNIFICANT CHANGE UP
SODIUM SERPL-SCNC: 137 MMOL/L — SIGNIFICANT CHANGE UP (ref 135–145)
WBC # BLD: 12.81 K/UL — HIGH (ref 3.8–10.5)
WBC # BLD: 13.99 K/UL — HIGH (ref 3.8–10.5)
WBC # FLD AUTO: 12.81 K/UL — HIGH (ref 3.8–10.5)
WBC # FLD AUTO: 13.99 K/UL — HIGH (ref 3.8–10.5)

## 2022-08-31 PROCEDURE — 71045 X-RAY EXAM CHEST 1 VIEW: CPT | Mod: 26

## 2022-08-31 RX ADMIN — HEPARIN SODIUM 5000 UNIT(S): 5000 INJECTION INTRAVENOUS; SUBCUTANEOUS at 21:49

## 2022-08-31 RX ADMIN — PANTOPRAZOLE SODIUM 40 MILLIGRAM(S): 20 TABLET, DELAYED RELEASE ORAL at 06:22

## 2022-08-31 RX ADMIN — Medication 25 MILLIGRAM(S): at 06:22

## 2022-08-31 RX ADMIN — Medication 1 TABLET(S): at 11:52

## 2022-08-31 RX ADMIN — HEPARIN SODIUM 5000 UNIT(S): 5000 INJECTION INTRAVENOUS; SUBCUTANEOUS at 14:30

## 2022-08-31 RX ADMIN — Medication 1000 MILLIGRAM(S): at 18:14

## 2022-08-31 RX ADMIN — Medication 1 TABLET(S): at 02:15

## 2022-08-31 RX ADMIN — Medication 1000 MILLIGRAM(S): at 06:22

## 2022-08-31 RX ADMIN — LOSARTAN POTASSIUM 50 MILLIGRAM(S): 100 TABLET, FILM COATED ORAL at 06:23

## 2022-08-31 RX ADMIN — MEROPENEM 100 MILLIGRAM(S): 1 INJECTION INTRAVENOUS at 18:17

## 2022-08-31 RX ADMIN — Medication 100 MILLIGRAM(S): at 11:53

## 2022-08-31 RX ADMIN — HEPARIN SODIUM 5000 UNIT(S): 5000 INJECTION INTRAVENOUS; SUBCUTANEOUS at 06:21

## 2022-08-31 RX ADMIN — MEROPENEM 100 MILLIGRAM(S): 1 INJECTION INTRAVENOUS at 06:23

## 2022-08-31 RX ADMIN — Medication 1000 MILLIGRAM(S): at 11:52

## 2022-08-31 NOTE — PROVIDER CONTACT NOTE (CRITICAL VALUE NOTIFICATION) - BACKGROUND
Patient admitted for abdominal pain 8/19 ex-lap rolanda colectomy segment hepatectomy   8/29-8/30 CXR pleural effusion

## 2022-08-31 NOTE — CONSULT NOTE ADULT - ASSESSMENT
Patient is a 87 year old female with PMH of HTN, HLD, GERD, who presents for bowel prep  s/p operative intervention now medicine consulted for ongoing confusion and pleural

## 2022-08-31 NOTE — PROGRESS NOTE ADULT - SUBJECTIVE AND OBJECTIVE BOX
Subjective:  Patient seen and examined at bedside with surgical team during morning rounds.  Pain well controlled, offers no new complaints.     Exam:  General: NAD   Head: NCAT, no visible lesions   Chest: no visible deformity, nonlabored respirations   Abdomen: Soft, nondistended, NTTP. No rebound or guarding. Midline incision C/D/I.      T(C): 36.9 (08-31-22 @ 06:20), Max: 37 (08-31-22 @ 01:13)  HR: 92 (08-31-22 @ 06:20) (81 - 122)  BP: 142/55 (08-31-22 @ 06:20) (142/55 - 184/85)  RR: 19 (08-31-22 @ 06:20) (16 - 20)  SpO2: 99% (08-31-22 @ 01:13) (95% - 99%)      08-30-22 @ 07:01  -  08-31-22 @ 07:00  --------------------------------------------------------  IN: 120 mL / OUT: 400 mL / NET: -280 mL        LABS:  cret                        7.0    12.81 )-----------( 586      ( 31 Aug 2022 05:10 )             23.2     08-31    137  |  95<L>  |  13  ----------------------------<  95  4.0   |  37<H>  |  0.63    Ca    9.6      31 Aug 2022 05:10  Phos  3.1     08-31  Mg     1.80     08-31    TPro  6.0  /  Alb  3.0<L>  /  TBili  0.4  /  DBili  <0.2  /  AST  22  /  ALT  20  /  AlkPhos  257<H>  08-31          acetaminophen     Tablet .. 1000 milliGRAM(s) Oral every 6 hours  heparin   Injectable 5000 Unit(s) SubCutaneous every 8 hours  losartan 50 milliGRAM(s) Oral daily  meropenem  IVPB 1000 milliGRAM(s) IV Intermittent every 12 hours  metoprolol tartrate 25 milliGRAM(s) Oral daily  multivitamin 1 Tablet(s) Oral daily  pantoprazole    Tablet 40 milliGRAM(s) Oral before breakfast  thiamine 100 milliGRAM(s) Oral daily

## 2022-08-31 NOTE — PROVIDER CONTACT NOTE (CRITICAL VALUE NOTIFICATION) - ASSESSMENT
Patient in no apparent distress, patient's vital signs are as follows:  T-98.5  HR-70  BP-157/52  O2-96

## 2022-08-31 NOTE — PROGRESS NOTE ADULT - ASSESSMENT
87F PMHx of HTN, HLD and GERD who presented for open cholecystectomy, portal lymphadenectomy, liver segment 4b / 5 resection and right hemicolectomy on 8/19/22. Course c/b pulmonary edema requiring diuresis in SICU. Transferred to floors. Course further c/b hypoxemic and hypercapnic respiratory insufficiency requiring supplemental O2 and chest physiotherapy.    PLAN:  - daily CXR   - Pain control Tylenol ATC  - Chest physiotherapy, wean off nasal cannula as able, maintain SpO2 >92  - OOB/ Ambulate/ Incentive spirometer  - f/u pulmonary recommendations  - Continue home antihypertensives (Losartan, Metoprolol) with hold parameters  - Low fiber diet, protonix, multivitamin  - H/H stable  - ABX: Meropenem for RLL infiltrate c/f PNA  - Monitor for fevers, leukocytosis improving 16-->13  - VTE prophylaxis with Heparin subcutaneous  - Dispo planning: Home with home PT and rolling walker    D Team Surgery  [id number]

## 2022-08-31 NOTE — CONSULT NOTE ADULT - PROBLEM SELECTOR RECOMMENDATION 9
likely multifactorial  poor nutrition, post op, chronic inflammation  will continue to monitor  transfusion as needed if < 7

## 2022-08-31 NOTE — PROGRESS NOTE ADULT - SUBJECTIVE AND OBJECTIVE BOX
Vital Signs Last 24 Hrs  T(C): 36.9 (31 Aug 2022 06:20), Max: 37 (31 Aug 2022 01:13)  T(F): 98.5 (31 Aug 2022 06:20), Max: 98.6 (31 Aug 2022 01:13)  HR: 92 (31 Aug 2022 06:20) (81 - 122)  BP: 142/55 (31 Aug 2022 06:20) (142/55 - 184/85)  BP(mean): 94 (30 Aug 2022 08:20) (94 - 94)  RR: 19 (31 Aug 2022 06:20) (16 - 20)  SpO2: 99% (31 Aug 2022 01:13) (95% - 99%)    Parameters below as of 31 Aug 2022 06:20  Patient On (Oxygen Delivery Method): nasal cannula  O2 Flow (L/min): 3      I&O's Detail    29 Aug 2022 07:01  -  30 Aug 2022 07:00  --------------------------------------------------------  IN:    Oral Fluid: 60 mL  Total IN: 60 mL    OUT:    Voided (mL): 450 mL  Total OUT: 450 mL    Total NET: -390 mL                                7.6    13.38 )-----------( 544      ( 30 Aug 2022 09:47 )             25.0       08-30    138  |  97<L>  |  15  ----------------------------<  84  4.4   |  40<H>  |  0.73    Ca    9.5      30 Aug 2022 07:05  Phos  1.9     08-30  Mg     1.90     08-30    TPro  6.0  /  Alb  3.0<L>  /  TBili  0.3  /  DBili  <0.2  /  AST  19  /  ALT  20  /  AlkPhos  233<H>  08-30    confusion continues  CXR slightly improved this AM          PLAN:  continue antibiotics  continue pulmonary toilet  Question if right thorocentesis may help

## 2022-08-31 NOTE — CONSULT NOTE ADULT - PROBLEM SELECTOR RECOMMENDATION 2
occasionally uncontrolled  will increase losartan to 100 and metoprolol to 50 BID  will continue to monitor

## 2022-09-01 LAB
ALBUMIN SERPL ELPH-MCNC: 3.2 G/DL — LOW (ref 3.3–5)
ALP SERPL-CCNC: 233 U/L — HIGH (ref 40–120)
ALT FLD-CCNC: 18 U/L — SIGNIFICANT CHANGE UP (ref 4–33)
ANION GAP SERPL CALC-SCNC: 7 MMOL/L — SIGNIFICANT CHANGE UP (ref 7–14)
AST SERPL-CCNC: 16 U/L — SIGNIFICANT CHANGE UP (ref 4–32)
BILIRUB SERPL-MCNC: 0.5 MG/DL — SIGNIFICANT CHANGE UP (ref 0.2–1.2)
BUN SERPL-MCNC: 11 MG/DL — SIGNIFICANT CHANGE UP (ref 7–23)
CALCIUM SERPL-MCNC: 10 MG/DL — SIGNIFICANT CHANGE UP (ref 8.4–10.5)
CHLORIDE SERPL-SCNC: 97 MMOL/L — LOW (ref 98–107)
CO2 SERPL-SCNC: 37 MMOL/L — HIGH (ref 22–31)
CREAT SERPL-MCNC: 0.59 MG/DL — SIGNIFICANT CHANGE UP (ref 0.5–1.3)
EGFR: 88 ML/MIN/1.73M2 — SIGNIFICANT CHANGE UP
GLUCOSE SERPL-MCNC: 95 MG/DL — SIGNIFICANT CHANGE UP (ref 70–99)
HCT VFR BLD CALC: 29.6 % — LOW (ref 34.5–45)
HGB BLD-MCNC: 9.2 G/DL — LOW (ref 11.5–15.5)
MAGNESIUM SERPL-MCNC: 1.6 MG/DL — SIGNIFICANT CHANGE UP (ref 1.6–2.6)
MCHC RBC-ENTMCNC: 29.4 PG — SIGNIFICANT CHANGE UP (ref 27–34)
MCHC RBC-ENTMCNC: 31.1 GM/DL — LOW (ref 32–36)
MCV RBC AUTO: 94.6 FL — SIGNIFICANT CHANGE UP (ref 80–100)
NRBC # BLD: 0 /100 WBCS — SIGNIFICANT CHANGE UP (ref 0–0)
NRBC # FLD: 0.02 K/UL — HIGH (ref 0–0)
PHOSPHATE SERPL-MCNC: 2.5 MG/DL — SIGNIFICANT CHANGE UP (ref 2.5–4.5)
PLATELET # BLD AUTO: 557 K/UL — HIGH (ref 150–400)
POTASSIUM SERPL-MCNC: 4 MMOL/L — SIGNIFICANT CHANGE UP (ref 3.5–5.3)
POTASSIUM SERPL-SCNC: 4 MMOL/L — SIGNIFICANT CHANGE UP (ref 3.5–5.3)
PROT SERPL-MCNC: 6.3 G/DL — SIGNIFICANT CHANGE UP (ref 6–8.3)
RBC # BLD: 3.13 M/UL — LOW (ref 3.8–5.2)
RBC # FLD: 16.8 % — HIGH (ref 10.3–14.5)
SODIUM SERPL-SCNC: 141 MMOL/L — SIGNIFICANT CHANGE UP (ref 135–145)
WBC # BLD: 15.36 K/UL — HIGH (ref 3.8–10.5)
WBC # FLD AUTO: 15.36 K/UL — HIGH (ref 3.8–10.5)

## 2022-09-01 RX ORDER — MAGNESIUM SULFATE 500 MG/ML
2 VIAL (ML) INJECTION ONCE
Refills: 0 | Status: COMPLETED | OUTPATIENT
Start: 2022-09-01 | End: 2022-09-01

## 2022-09-01 RX ORDER — METOPROLOL TARTRATE 50 MG
50 TABLET ORAL
Refills: 0 | Status: DISCONTINUED | OUTPATIENT
Start: 2022-09-01 | End: 2022-09-03

## 2022-09-01 RX ORDER — LOSARTAN POTASSIUM 100 MG/1
100 TABLET, FILM COATED ORAL DAILY
Refills: 0 | Status: DISCONTINUED | OUTPATIENT
Start: 2022-09-01 | End: 2022-09-07

## 2022-09-01 RX ADMIN — HEPARIN SODIUM 5000 UNIT(S): 5000 INJECTION INTRAVENOUS; SUBCUTANEOUS at 05:01

## 2022-09-01 RX ADMIN — HEPARIN SODIUM 5000 UNIT(S): 5000 INJECTION INTRAVENOUS; SUBCUTANEOUS at 14:52

## 2022-09-01 RX ADMIN — Medication 100 MILLIGRAM(S): at 11:49

## 2022-09-01 RX ADMIN — MEROPENEM 100 MILLIGRAM(S): 1 INJECTION INTRAVENOUS at 18:29

## 2022-09-01 RX ADMIN — LOSARTAN POTASSIUM 50 MILLIGRAM(S): 100 TABLET, FILM COATED ORAL at 05:00

## 2022-09-01 RX ADMIN — Medication 25 MILLIGRAM(S): at 05:00

## 2022-09-01 RX ADMIN — PANTOPRAZOLE SODIUM 40 MILLIGRAM(S): 20 TABLET, DELAYED RELEASE ORAL at 05:01

## 2022-09-01 RX ADMIN — HEPARIN SODIUM 5000 UNIT(S): 5000 INJECTION INTRAVENOUS; SUBCUTANEOUS at 22:55

## 2022-09-01 RX ADMIN — MEROPENEM 100 MILLIGRAM(S): 1 INJECTION INTRAVENOUS at 05:01

## 2022-09-01 RX ADMIN — Medication 1000 MILLIGRAM(S): at 18:28

## 2022-09-01 RX ADMIN — Medication 1000 MILLIGRAM(S): at 11:44

## 2022-09-01 RX ADMIN — Medication 25 GRAM(S): at 12:00

## 2022-09-01 RX ADMIN — Medication 1000 MILLIGRAM(S): at 04:57

## 2022-09-01 RX ADMIN — Medication 1 TABLET(S): at 11:43

## 2022-09-01 RX ADMIN — Medication 50 MILLIGRAM(S): at 18:30

## 2022-09-01 RX ADMIN — Medication 1000 MILLIGRAM(S): at 22:56

## 2022-09-01 NOTE — PROGRESS NOTE ADULT - ASSESSMENT
87 year old female with PMH of HTN, HLD, GERD, who presents for bowel prep  s/p operative intervention now medicine consulted for ongoing confusion and pleural     Problem/Recommendation - 1:  ·  Problem: Anemia.   ·  Recommendation: likely multifactorial  poor nutrition, post op, chronic inflammation  will continue to monitor  transfusion as needed if < 7.    Problem/Recommendation - 2:  ·  Problem: Hypertension.   ·  Recommendation: occasionally uncontrolled  will increase losartan to 100 and metoprolol to 50 BID  will continue to monitor.    Problem/Recommendation - 3:  ·  Problem: Pleural effusion.   ·  Recommendation: management per pulmonary  continue to follow recommendations.    Problem/Recommendation - 4:  ·  Problem: Hyperlipidemia.   ·  Recommendation: no intervention at this time  outpatient fasting lipid panel.    Problem/Recommendation - 5:  ·  Problem: Other gallbladder disorder. ·  Recommendation: s/p open rolanda  management per surgery  on IV antibiotics.

## 2022-09-01 NOTE — PROGRESS NOTE ADULT - SUBJECTIVE AND OBJECTIVE BOX
Patient is a 87y old  Female who presents with a chief complaint of Bowel prep for surgery tomorrow (01 Sep 2022 10:57)    Date of servie : 09-01-22 @ 15:33  INTERVAL HPI/OVERNIGHT EVENTS:  T(C): 36.9 (09-01-22 @ 12:28), Max: 37 (09-01-22 @ 04:45)  HR: 72 (09-01-22 @ 12:28) (72 - 97)  BP: 125/51 (09-01-22 @ 12:28) (125/51 - 177/54)  RR: 18 (09-01-22 @ 12:28) (17 - 20)  SpO2: 94% (09-01-22 @ 12:28) (94% - 100%)  Wt(kg): --  I&O's Summary    31 Aug 2022 07:01  -  01 Sep 2022 07:00  --------------------------------------------------------  IN: 480 mL / OUT: 700 mL / NET: -220 mL    01 Sep 2022 07:01  -  01 Sep 2022 15:33  --------------------------------------------------------  IN: 240 mL / OUT: 250 mL / NET: -10 mL        LABS:                        9.2    15.36 )-----------( 557      ( 01 Sep 2022 05:26 )             29.6     09-01    141  |  97<L>  |  11  ----------------------------<  95  4.0   |  37<H>  |  0.59    Ca    10.0      01 Sep 2022 05:26  Phos  2.5     09-01  Mg     1.60     09-01    TPro  6.3  /  Alb  3.2<L>  /  TBili  0.5  /  DBili  x   /  AST  16  /  ALT  18  /  AlkPhos  233<H>  09-01        CAPILLARY BLOOD GLUCOSE                MEDICATIONS  (STANDING):  acetaminophen     Tablet .. 1000 milliGRAM(s) Oral every 6 hours  heparin   Injectable 5000 Unit(s) SubCutaneous every 8 hours  losartan 100 milliGRAM(s) Oral daily  meropenem  IVPB 1000 milliGRAM(s) IV Intermittent every 12 hours  metoprolol tartrate 50 milliGRAM(s) Oral two times a day  multivitamin 1 Tablet(s) Oral daily  pantoprazole    Tablet 40 milliGRAM(s) Oral before breakfast  thiamine 100 milliGRAM(s) Oral daily    MEDICATIONS  (PRN):          PHYSICAL EXAM:  GENERAL: Frial  HEAD:  Atraumatic, Normocephalic  CHEST/LUNG: Clear to percussion bilaterally; No rales, rhonchi, wheezing, or rubs  HEART: S1S2+  ABDOMEN: Soft, tender   EXTREMITIES: edema +    Care Discussed with Consultants/Other Providers [ ] YES  [ ] NO

## 2022-09-01 NOTE — PROGRESS NOTE ADULT - SUBJECTIVE AND OBJECTIVE BOX
Subjective:  No acute overnight events.  Patient seen and examined at bedside with surgical team during morning rounds. Offers no complaints.    Exam:  General: NAD   Head: NCAT, no visible lesions   Chest: no visible deformity, nonlabored respirations   Abdomen: Soft, nondistended, NTTP. No rebound or guarding. Midline incision C/D/I.      T(C): 36.8 (09-01-22 @ 09:15), Max: 37 (09-01-22 @ 04:45)  HR: 72 (09-01-22 @ 09:15) (72 - 97)  BP: 157/55 (09-01-22 @ 09:15) (135/48 - 177/54)  RR: 19 (09-01-22 @ 09:15) (17 - 20)  SpO2: 98% (09-01-22 @ 09:15) (97% - 100%)      08-31-22 @ 07:01  -  09-01-22 @ 07:00  --------------------------------------------------------  IN: 480 mL / OUT: 700 mL / NET: -220 mL    09-01-22 @ 07:01  -  09-01-22 @ 10:58  --------------------------------------------------------  IN: 120 mL / OUT: 250 mL / NET: -130 mL        LABS:  cret                        9.2    15.36 )-----------( 557      ( 01 Sep 2022 05:26 )             29.6     09-01    141  |  97<L>  |  11  ----------------------------<  95  4.0   |  37<H>  |  0.59    Ca    10.0      01 Sep 2022 05:26  Phos  2.5     09-01  Mg     1.60     09-01    TPro  6.3  /  Alb  3.2<L>  /  TBili  0.5  /  DBili  x   /  AST  16  /  ALT  18  /  AlkPhos  233<H>  09-01          acetaminophen     Tablet .. 1000 milliGRAM(s) Oral every 6 hours  heparin   Injectable 5000 Unit(s) SubCutaneous every 8 hours  losartan 100 milliGRAM(s) Oral daily  magnesium sulfate  IVPB 2 Gram(s) IV Intermittent once  meropenem  IVPB 1000 milliGRAM(s) IV Intermittent every 12 hours  metoprolol tartrate 50 milliGRAM(s) Oral two times a day  multivitamin 1 Tablet(s) Oral daily  pantoprazole    Tablet 40 milliGRAM(s) Oral before breakfast  thiamine 100 milliGRAM(s) Oral daily

## 2022-09-01 NOTE — PROGRESS NOTE ADULT - ASSESSMENT
87F PMHx of HTN, HLD and GERD who presented for open cholecystectomy, portal lymphadenectomy, liver segment 4b / 5 resection and right hemicolectomy on 8/19/22. Course c/b pulmonary edema requiring diuresis in SICU. Transferred to floors. Course further c/b hypoxemic and hypercapnic respiratory insufficiency requiring supplemental O2 and chest physiotherapy.    PLAN:  - transfer to medicine service today, will follow   - daily CXR   - Pain control Tylenol ATC  - Chest physiotherapy, wean off nasal cannula as able, maintain SpO2 >92  - OOB/ Ambulate/ Incentive spirometer  - f/u pulmonary recommendations  - Continue home antihypertensives (Losartan, Metoprolol) with hold parameters  - Low fiber diet, protonix, multivitamin  - H/H stable  - ABX: Meropenem for RLL infiltrate c/f PNA  - Monitor for fevers, leukocytosis improving 16-->13  - VTE prophylaxis with Heparin subcutaneous  - Dispo planning: Home with home PT and rolling walker    D Team Surgery  [id number]

## 2022-09-02 LAB
ALBUMIN SERPL ELPH-MCNC: 3.2 G/DL — LOW (ref 3.3–5)
ALP SERPL-CCNC: 276 U/L — HIGH (ref 40–120)
ALT FLD-CCNC: 19 U/L — SIGNIFICANT CHANGE UP (ref 4–33)
ANION GAP SERPL CALC-SCNC: 7 MMOL/L — SIGNIFICANT CHANGE UP (ref 7–14)
AST SERPL-CCNC: 23 U/L — SIGNIFICANT CHANGE UP (ref 4–32)
BILIRUB SERPL-MCNC: 0.4 MG/DL — SIGNIFICANT CHANGE UP (ref 0.2–1.2)
BUN SERPL-MCNC: 10 MG/DL — SIGNIFICANT CHANGE UP (ref 7–23)
CALCIUM SERPL-MCNC: 9.8 MG/DL — SIGNIFICANT CHANGE UP (ref 8.4–10.5)
CHLORIDE SERPL-SCNC: 95 MMOL/L — LOW (ref 98–107)
CO2 SERPL-SCNC: 34 MMOL/L — HIGH (ref 22–31)
CREAT SERPL-MCNC: 0.56 MG/DL — SIGNIFICANT CHANGE UP (ref 0.5–1.3)
EGFR: 89 ML/MIN/1.73M2 — SIGNIFICANT CHANGE UP
GLUCOSE SERPL-MCNC: 89 MG/DL — SIGNIFICANT CHANGE UP (ref 70–99)
HCT VFR BLD CALC: 29.8 % — LOW (ref 34.5–45)
HGB BLD-MCNC: 9.6 G/DL — LOW (ref 11.5–15.5)
MAGNESIUM SERPL-MCNC: 2.1 MG/DL — SIGNIFICANT CHANGE UP (ref 1.6–2.6)
MCHC RBC-ENTMCNC: 30 PG — SIGNIFICANT CHANGE UP (ref 27–34)
MCHC RBC-ENTMCNC: 32.2 GM/DL — SIGNIFICANT CHANGE UP (ref 32–36)
MCV RBC AUTO: 93.1 FL — SIGNIFICANT CHANGE UP (ref 80–100)
NRBC # BLD: 0 /100 WBCS — SIGNIFICANT CHANGE UP (ref 0–0)
NRBC # FLD: 0.02 K/UL — HIGH (ref 0–0)
PHOSPHATE SERPL-MCNC: 3.3 MG/DL — SIGNIFICANT CHANGE UP (ref 2.5–4.5)
PLATELET # BLD AUTO: 547 K/UL — HIGH (ref 150–400)
POTASSIUM SERPL-MCNC: 4 MMOL/L — SIGNIFICANT CHANGE UP (ref 3.5–5.3)
POTASSIUM SERPL-SCNC: 4 MMOL/L — SIGNIFICANT CHANGE UP (ref 3.5–5.3)
PROT SERPL-MCNC: 6.2 G/DL — SIGNIFICANT CHANGE UP (ref 6–8.3)
RBC # BLD: 3.2 M/UL — LOW (ref 3.8–5.2)
RBC # FLD: 16.5 % — HIGH (ref 10.3–14.5)
SODIUM SERPL-SCNC: 136 MMOL/L — SIGNIFICANT CHANGE UP (ref 135–145)
WBC # BLD: 12.52 K/UL — HIGH (ref 3.8–10.5)
WBC # FLD AUTO: 12.52 K/UL — HIGH (ref 3.8–10.5)

## 2022-09-02 PROCEDURE — 99233 SBSQ HOSP IP/OBS HIGH 50: CPT | Mod: GC

## 2022-09-02 PROCEDURE — 71045 X-RAY EXAM CHEST 1 VIEW: CPT | Mod: 26

## 2022-09-02 RX ORDER — SODIUM CHLORIDE 9 MG/ML
1000 INJECTION INTRAMUSCULAR; INTRAVENOUS; SUBCUTANEOUS
Refills: 0 | Status: DISCONTINUED | OUTPATIENT
Start: 2022-09-02 | End: 2022-09-02

## 2022-09-02 RX ORDER — FUROSEMIDE 40 MG
20 TABLET ORAL ONCE
Refills: 0 | Status: COMPLETED | OUTPATIENT
Start: 2022-09-02 | End: 2022-09-02

## 2022-09-02 RX ADMIN — MEROPENEM 100 MILLIGRAM(S): 1 INJECTION INTRAVENOUS at 18:03

## 2022-09-02 RX ADMIN — Medication 1000 MILLIGRAM(S): at 05:12

## 2022-09-02 RX ADMIN — LOSARTAN POTASSIUM 100 MILLIGRAM(S): 100 TABLET, FILM COATED ORAL at 05:12

## 2022-09-02 RX ADMIN — Medication 1000 MILLIGRAM(S): at 18:02

## 2022-09-02 RX ADMIN — Medication 100 MILLIGRAM(S): at 11:41

## 2022-09-02 RX ADMIN — Medication 20 MILLIGRAM(S): at 15:40

## 2022-09-02 RX ADMIN — HEPARIN SODIUM 5000 UNIT(S): 5000 INJECTION INTRAVENOUS; SUBCUTANEOUS at 13:40

## 2022-09-02 RX ADMIN — MEROPENEM 100 MILLIGRAM(S): 1 INJECTION INTRAVENOUS at 05:11

## 2022-09-02 RX ADMIN — Medication 1000 MILLIGRAM(S): at 11:41

## 2022-09-02 RX ADMIN — Medication 1000 MILLIGRAM(S): at 22:49

## 2022-09-02 RX ADMIN — HEPARIN SODIUM 5000 UNIT(S): 5000 INJECTION INTRAVENOUS; SUBCUTANEOUS at 05:13

## 2022-09-02 RX ADMIN — Medication 1000 MILLIGRAM(S): at 13:40

## 2022-09-02 RX ADMIN — PANTOPRAZOLE SODIUM 40 MILLIGRAM(S): 20 TABLET, DELAYED RELEASE ORAL at 05:13

## 2022-09-02 RX ADMIN — HEPARIN SODIUM 5000 UNIT(S): 5000 INJECTION INTRAVENOUS; SUBCUTANEOUS at 22:49

## 2022-09-02 RX ADMIN — Medication 1 TABLET(S): at 11:41

## 2022-09-02 RX ADMIN — Medication 1000 MILLIGRAM(S): at 19:00

## 2022-09-02 RX ADMIN — Medication 50 MILLIGRAM(S): at 18:02

## 2022-09-02 RX ADMIN — Medication 50 MILLIGRAM(S): at 05:12

## 2022-09-02 NOTE — PROGRESS NOTE ADULT - SUBJECTIVE AND OBJECTIVE BOX
CHIEF COMPLAINT:    Interval Events:    Pt seen and examined at bedside. Now speaking somewhat coherently, able to make her needs known, conversing.     REVIEW OF SYSTEMS:  unable to fully assess    OBJECTIVE:  ICU Vital Signs Last 24 Hrs  T(C): 37 (02 Sep 2022 10:49), Max: 37.1 (01 Sep 2022 16:21)  T(F): 98.6 (02 Sep 2022 10:49), Max: 98.8 (01 Sep 2022 16:21)  HR: 66 (02 Sep 2022 10:49) (60 - 90)  BP: 140/68 (02 Sep 2022 10:49) (115/82 - 166/55)  BP(mean): --  ABP: --  ABP(mean): --  RR: 16 (02 Sep 2022 10:49) (16 - 20)  SpO2: 99% (02 Sep 2022 10:49) (92% - 99%)    O2 Parameters below as of 02 Sep 2022 10:49  Patient On (Oxygen Delivery Method): nasal cannula  O2 Flow (L/min): 2            09-01 @ 07:01  -  09-02 @ 07:00  --------------------------------------------------------  IN: 600 mL / OUT: 1350 mL / NET: -750 mL      CAPILLARY BLOOD GLUCOSE          PHYSICAL EXAM:  General: Awake, alert, oriented X 3.   HEENT: Atraumatic, normocephalic.                 Mallampatti Grade                 No nasal congestion.                No tonsillar or pharyngeal exudates.  Lymph Nodes: No palpable lymphadenopathy  Neck: No JVD. No carotid bruit.   Respiratory: Normal chest expansion                         Normal percussion                         Normal and equal air entry                         No wheeze, rhonchi or rales.  Cardiovascular: S1 S2 normal. No murmurs, rubs or gallops.   Abdomen: Soft, non-tender, non-distended. No organomegaly.  Extremities: Warm to touch. Peripheral pulse palpable. No pedal edema.   Skin: No rashes or skin lesions  Neurological: Motor and sensory examination equal and normal in all four extremities.  Psychiatry: Appropriate mood and affect.    HOSPITAL MEDICATIONS:  MEDICATIONS  (STANDING):  acetaminophen     Tablet .. 1000 milliGRAM(s) Oral every 6 hours  furosemide   Injectable 20 milliGRAM(s) IV Push once  heparin   Injectable 5000 Unit(s) SubCutaneous every 8 hours  losartan 100 milliGRAM(s) Oral daily  meropenem  IVPB 1000 milliGRAM(s) IV Intermittent every 12 hours  metoprolol tartrate 50 milliGRAM(s) Oral two times a day  multivitamin 1 Tablet(s) Oral daily  pantoprazole    Tablet 40 milliGRAM(s) Oral before breakfast  thiamine 100 milliGRAM(s) Oral daily    MEDICATIONS  (PRN):      LABS:                        9.6    12.52 )-----------( 547      ( 02 Sep 2022 05:13 )             29.8     09-02    136  |  95<L>  |  10  ----------------------------<  89  4.0   |  34<H>  |  0.56    Ca    9.8      02 Sep 2022 05:13  Phos  3.3     09-02  Mg     2.10     09-02    TPro  6.2  /  Alb  3.2<L>  /  TBili  0.4  /  DBili  x   /  AST  23  /  ALT  19  /  AlkPhos  276<H>  09-02              MICROBIOLOGY:     RADIOLOGY:  [ ] Reviewed and interpreted by me    Point of Care Ultrasound Findings:    PFT:    EKG:

## 2022-09-02 NOTE — PROGRESS NOTE ADULT - ASSESSMENT
87F PMHx of HTN, HLD and GERD who presented for open cholecystectomy, portal lymphadenectomy, liver segment 4b / 5 resection and right hemicolectomy on 8/19/22. Course c/b pulmonary edema requiring diuresis in SICU. Transferred to floors. Course further c/b hypoxemic and hypercapnic respiratory insufficiency requiring supplemental O2 and chest physiotherapy.    PLAN:  - f/u pulm recs  - daily CXR   - Pain control Tylenol ATC  - Chest physiotherapy, wean off nasal cannula as able, maintain SpO2 >92  - OOB/ Ambulate/ Incentive spirometer  - f/u pulmonary recommendations  - Continue home antihypertensives (Losartan, Metoprolol) with hold parameters  - Low fiber diet, protonix, multivitamin  - H/H stable  - ABX: Meropenem for RLL infiltrate c/f PNA  - Monitor for fevers, leukocytosis improving 16-->13  - VTE prophylaxis with Heparin subcutaneous  - Dispo planning: Home with home PT and rolling walker    D Team Surgery  [id number]

## 2022-09-02 NOTE — PROGRESS NOTE ADULT - SUBJECTIVE AND OBJECTIVE BOX
Subjective:  No acute overnight events.  Patient seen and examined at bedside with surgical team during morning rounds. Offers no complaints.    Exam:  General: NAD   Head: NCAT, no visible lesions   Chest: no visible deformity, nonlabored respirations   Abdomen: Soft, nondistended, NTTP. No rebound or guarding. Midline incision C/D/I.        T(C): 37.1 (09-02-22 @ 05:10), Max: 37.1 (09-01-22 @ 16:21)  HR: 78 (09-02-22 @ 05:10) (60 - 90)  BP: 115/82 (09-02-22 @ 05:10) (115/82 - 166/55)  RR: 16 (09-02-22 @ 05:10) (16 - 20)  SpO2: 97% (09-02-22 @ 05:10) (92% - 99%)      09-01-22 @ 07:01  -  09-02-22 @ 07:00  --------------------------------------------------------  IN: 600 mL / OUT: 1350 mL / NET: -750 mL        LABS:  cret                        9.6    12.52 )-----------( 547      ( 02 Sep 2022 05:13 )             29.8     09-02    136  |  95<L>  |  10  ----------------------------<  89  4.0   |  34<H>  |  0.56    Ca    9.8      02 Sep 2022 05:13  Phos  3.3     09-02  Mg     2.10     09-02    TPro  6.2  /  Alb  3.2<L>  /  TBili  0.4  /  DBili  x   /  AST  23  /  ALT  19  /  AlkPhos  276<H>  09-02          acetaminophen     Tablet .. 1000 milliGRAM(s) Oral every 6 hours  heparin   Injectable 5000 Unit(s) SubCutaneous every 8 hours  losartan 100 milliGRAM(s) Oral daily  meropenem  IVPB 1000 milliGRAM(s) IV Intermittent every 12 hours  metoprolol tartrate 50 milliGRAM(s) Oral two times a day  multivitamin 1 Tablet(s) Oral daily  pantoprazole    Tablet 40 milliGRAM(s) Oral before breakfast  thiamine 100 milliGRAM(s) Oral daily      Imaging:

## 2022-09-02 NOTE — CHART NOTE - NSCHARTNOTEFT_GEN_A_CORE
NUTRITION FOLLOW-UP    Pt seen for nutrition follow up.    Pt has a history of HTN, HLD and GERD who presented for open cholecystectomy, portal lymphadenectomy, liver segment 4b / 5 resection and right hemicolectomy on 8/19/22. Course c/b pulmonary edema requiring diuresis in SICU. Transferred to floors. Course further c/b hypoxemic and hypercapnic respiratory insufficiency requiring supplemental O2 and chest physiotherapy.    Pt appeared confused but was able to answer some questions. Pt's appetite and po intake have improved. She ate 75% of her breakfast meal today. Pt was able to feed herself. At last visit, Pt needed to be fed and ate on 25% of her meal. Pt had  no complaints of GI distress. As per RN flow sheet, Pt's last BM was 9/1/22.    Weight: 58.8 kg  (8/29/22);   53.4 kg  (8/22/22)  Please obtain current weight to determine weight trends since weights are inconsistent.    Labs: 09-02 Na 136 mmol/L Glu 89 mg/dL K+ 4.0 mmol/L Cr 0.56 mg/dL BUN 10 mg/dL Phos 3.3 mg/dL      Current Diet: Diet, Low Fiber:   Low Fat (LOWFAT) (08-23-22 @ 08:28)    Edema: No edema noteds    Skin: surgical incision midline abdomen    Malnutrition Status: Ongoing severe malnutrition    RD to Remain Available: Christiane Arana MS, RDN, CDN pager [phone]     Additional Recommendations:   1) Monitor weight, labs, po intake and skin integrity.

## 2022-09-02 NOTE — CONSULT NOTE ADULT - ASSESSMENT
Ms. Rich is an 87 year old lady with PMHx of HTN, HLD, GERD, who presents for bowel prep prior to surgery tomorrow. Has a history of gangrenous cholecystitis. Patient is scheduled to undergo exploratory laparotomy, cholecystectomy, possible bowel resection. Last time she attempted bowel prep at home, she got dizzy and fell. (18 Aug 2022 12:39). Previous admission she had hyponatremia and work up suggested polydipsia. Ms. Rich was fluid restricted and now is admitted to get bowel prep prior to surgery. Nephrology consulted to manage fluids and electrolytes.           History Hyponatremia  Resolved.   Has hx of hyponatremia on previous admission. Work up suggested polydipsia  Na again low. 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   Will hold off IVF    HTN  BP controlled  monitor bp and HR    hematuria and proteinuria  recent UTI  Repeat UA.     Anemia  Check iron studies, ferritin and retic.

## 2022-09-02 NOTE — PROGRESS NOTE ADULT - ASSESSMENT
87 year old female with PMH of HTN, HLD, GERD, who presents for bowel prep  s/p operative intervention now medicine consulted for ongoing confusion and pleural     Problem/Recommendation - 1:  ·  Problem: Anemia.   ·  Recommendation: likely multifactorial  poor nutrition, post op, chronic inflammation  will continue to monitor  transfusion as needed if < 7.    Problem/Recommendation - 2:  ·  Problem: Hypertension.   ·  Recommendation: occasionally uncontrolled  will increase losartan to 100 and metoprolol to 50 BID  will continue to monitor.    Problem/Recommendation - 3:  ·  Problem: Pleural effusion.   ·  Recommendation: management per pulmonary  continue to follow recommendations.    Problem/Recommendation - 4:  ·  Problem: Hyperlipidemia.   ·  Recommendation: no intervention at this time  outpatient fasting lipid panel.    Problem/Recommendation - 5:  ·  Problem: Other gallbladder disorder. ·  Recommendation: s/p open rolanda  management per surgeryon IV antibiotics.

## 2022-09-02 NOTE — PROGRESS NOTE ADULT - ATTENDING COMMENTS
Small bilateral effusions, RLL consolidation.  May have component of atelectasis but would cover with antibiotics given rising leukocytosis. Keep negative fluid balance.
Patient overnight no issues. Patient lasix naive, fluid overloaded on cxr, received multiple fluid bolus for replacement of electrolytes. On 2l nc. lasix 20mg ivp, multimodal pain. Patient otherwise hemodynamically stable, perfusing adequately.    I have personally interviewed when possible and examined the patient, reviewed data and laboratory tests/x-rays and all pertinent electronic images.  I was physically present for the key portions of the evaluation and management (E/M) service provided.   The SICU team has a constant risk benefit analyzes discussion with the primary team, all consultants, House Staff and PA's on all decisions.  These diagnoses are unrelated to the surgical procedure noted above.  I meet with family if needed to get further history, discuss the case and make care decisions for this patient who might not be able to participate.  Time involved in performance of separately billable procedures was not counted toward my critical care time. There is no overlap.  I spent 30 minutes ( 0800Hrs-0915Hrs in AM/ 1600Hrs-1715Hrs in PM, or other time indicated) of critical care time for the diagnoses, assessment, plan and interventions.  This time excludes time spent on separate procedures and teaching.
Significant improvement clinically.  Complete 5 day course of antibiotics.  Remains with small effusion but not amenable for thoracentesis.  No further inpatient pulmonary intervention at this time.
Tolerated extubation well, currently saturating well via nasal cannula  Restart home meds  DVT prophylaxis   Tolerate low urine output as long as HR and BP stable - avoid hypervolemia   Pain control  PT/OOB    Fantasma Morel MD  Acute and Critical Care Surgery    The Acute Care Surgery (B Team) Attending Group Practice:  Dr. Luis Cunningham, Dr. Chandler Mckeon, Dr. Fantasma Morel,  Dr. Juan Manuel Wetzel and Dr. Sol Jaimes     Urgent issues - spectra [id number] or [id number]  Nonurgent issues - [phone]  Patient appointments or after hours - [phone]
s/p cholecystectomy, liver resection and right colectomy   -pain control - had received narcan so will decrease morphine equivalents   -restart home meds  -d/c miri MOY  D/c to floor care    Fantasma Morel MD  Acute and Critical Care Surgery    The Acute Care Surgery (B Team) Attending Group Practice:  Dr. Luis Cunningham, Dr. Chandler Mckeon, Dr. Fantasma Morel,  Dr. Juan Manuel Wetzel and Dr. Sol Jaimes     Urgent issues - spectra [phone] or [phone]  Nonurgent issues - [phone]  Patient appointments or after hours - [phone]

## 2022-09-02 NOTE — PROGRESS NOTE ADULT - ASSESSMENT
86 yo F pmhx HTN, HLD, recent admission for SAH/SDH and hyponatremia, UTI, now presenting for cholecystectomy with intraoperative findings c/f carcinoma so s/p ex lap, cholecystectomy segment 4b/5 hepatectomy, and right colectomy due to fistula tract vs metastasis 8/19/ Patient was unable to be extubated at the end of case due to oversedation and not able to ventilate adequate volumes and so had brief SICU stay. For the last few days patient with worsening hypoxemia and hypercapnic respiratory failure so pulmonary consulted for further recommendations:    CT scan reviewed with notable new R pleural effusion and R atlectasis ?consolidation  in setting of recent surgery   Bedside POCUS today revealed small b/l pleural effusions with no pocket for drainage.  patient currently on room air saturating 96%    #Hypoxic/Hypercapnic Respiratory Failure  #R pleural effusion     Recommendations:  - Due to increasing wbc and c/f aspiration, would add on meropenem for aspiration pneumonia given review of CT findings and clinical course. Can continue for total 5-7 day course.   - Out of bed as tolerated   - wean oxygen as tolerated  - aspiration precautions  - Can consider low dose lasix periodically (equivalent 20mg PO) if concerned about fluid overload or worsening effusions, however would use sparingly and not add it as a scheduled medication. Patient currently looks euvolemic. Would defer further lasix dosing to primary medicine team. Small pleural effusions are likely not currently contributing to her previous hypoxemia    pulmonary team to sign off. Please call back with questions.  86 yo F pmhx HTN, HLD, recent admission for SAH/SDH and hyponatremia, UTI, now presenting for cholecystectomy with intraoperative findings c/f carcinoma so s/p ex lap, cholecystectomy segment 4b/5 hepatectomy, and right colectomy due to fistula tract vs metastasis 8/19/ Patient was unable to be extubated at the end of case due to oversedation and not able to ventilate adequate volumes and so had brief SICU stay. For the last few days patient with worsening hypoxemia and hypercapnic respiratory failure so pulmonary consulted for further recommendations:    CT scan reviewed with notable new R pleural effusion and R atlectasis ?consolidation  in setting of recent surgery   Bedside POCUS today revealed small b/l pleural effusions with no pocket for drainage.  patient currently on room air saturating 96%    #Hypoxic/Hypercapnic Respiratory Failure  #R pleural effusion     Recommendations:  - Due to increasing wbc and c/f aspiration, would add on meropenem for aspiration pneumonia given review of CT findings and clinical course. Can continue for total 5 day course.   - Out of bed as tolerated   - wean oxygen as tolerated  - aspiration precautions  - Can consider low dose lasix periodically (equivalent 20mg PO) if concerned about fluid overload or worsening effusions, however would use sparingly and not add it as a scheduled medication. Patient currently looks euvolemic. Would defer further lasix dosing to primary medicine team. Small pleural effusions are likely not currently contributing to her previous hypoxemia    pulmonary team to sign off. Please call back with questions.

## 2022-09-02 NOTE — CONSULT NOTE ADULT - CONSULT REASON
AHRF
Hypoxic respiratory failure
History of hyponatremia. Manage fluids and electrolytes.
hemodynamic monitoring
medical management

## 2022-09-02 NOTE — PROGRESS NOTE ADULT - SUBJECTIVE AND OBJECTIVE BOX
Patient is a 87y old  Female who presents with a chief complaint of Bowel prep for surgery tomorrow (02 Sep 2022 13:35)    Date of servie : 09-02-22 @ 15:23  INTERVAL HPI/OVERNIGHT EVENTS:  T(C): 37 (09-02-22 @ 10:49), Max: 37.1 (09-01-22 @ 16:21)  HR: 66 (09-02-22 @ 10:49) (60 - 90)  BP: 140/68 (09-02-22 @ 10:49) (115/82 - 166/55)  RR: 16 (09-02-22 @ 10:49) (16 - 20)  SpO2: 99% (09-02-22 @ 10:49) (92% - 99%)  Wt(kg): --  I&O's Summary    01 Sep 2022 07:01  -  02 Sep 2022 07:00  --------------------------------------------------------  IN: 600 mL / OUT: 1350 mL / NET: -750 mL        LABS:                        9.6    12.52 )-----------( 547      ( 02 Sep 2022 05:13 )             29.8     09-02    136  |  95<L>  |  10  ----------------------------<  89  4.0   |  34<H>  |  0.56    Ca    9.8      02 Sep 2022 05:13  Phos  3.3     09-02  Mg     2.10     09-02    TPro  6.2  /  Alb  3.2<L>  /  TBili  0.4  /  DBili  x   /  AST  23  /  ALT  19  /  AlkPhos  276<H>  09-02        CAPILLARY BLOOD GLUCOSE                MEDICATIONS  (STANDING):  acetaminophen     Tablet .. 1000 milliGRAM(s) Oral every 6 hours  furosemide   Injectable 20 milliGRAM(s) IV Push once  heparin   Injectable 5000 Unit(s) SubCutaneous every 8 hours  losartan 100 milliGRAM(s) Oral daily  meropenem  IVPB 1000 milliGRAM(s) IV Intermittent every 12 hours  metoprolol tartrate 50 milliGRAM(s) Oral two times a day  multivitamin 1 Tablet(s) Oral daily  pantoprazole    Tablet 40 milliGRAM(s) Oral before breakfast  thiamine 100 milliGRAM(s) Oral daily    MEDICATIONS  (PRN):          PHYSICAL EXAM:  GENERAL: frail  HEAD:  Atraumatic, Normocephalic  CHEST/LUNG: Clear to percussion bilaterally; No rales, rhonchi, wheezing, or rubs  HEART: s1s2+  ABDOMEN: Soft, Nontender, Nondistended; Bowel sounds present  EXTREMITIES: edema +    Care Discussed with Consultants/Other Providers [ ] YES  [ ] NO

## 2022-09-03 LAB
ALBUMIN SERPL ELPH-MCNC: 3 G/DL — LOW (ref 3.3–5)
ALP SERPL-CCNC: 283 U/L — HIGH (ref 40–120)
ALT FLD-CCNC: 18 U/L — SIGNIFICANT CHANGE UP (ref 4–33)
ANION GAP SERPL CALC-SCNC: 9 MMOL/L — SIGNIFICANT CHANGE UP (ref 7–14)
AST SERPL-CCNC: 19 U/L — SIGNIFICANT CHANGE UP (ref 4–32)
BILIRUB SERPL-MCNC: 0.5 MG/DL — SIGNIFICANT CHANGE UP (ref 0.2–1.2)
BUN SERPL-MCNC: 12 MG/DL — SIGNIFICANT CHANGE UP (ref 7–23)
CALCIUM SERPL-MCNC: 9.6 MG/DL — SIGNIFICANT CHANGE UP (ref 8.4–10.5)
CHLORIDE SERPL-SCNC: 96 MMOL/L — LOW (ref 98–107)
CO2 SERPL-SCNC: 31 MMOL/L — SIGNIFICANT CHANGE UP (ref 22–31)
CREAT SERPL-MCNC: 0.58 MG/DL — SIGNIFICANT CHANGE UP (ref 0.5–1.3)
EGFR: 88 ML/MIN/1.73M2 — SIGNIFICANT CHANGE UP
GLUCOSE SERPL-MCNC: 87 MG/DL — SIGNIFICANT CHANGE UP (ref 70–99)
HCT VFR BLD CALC: 31.4 % — LOW (ref 34.5–45)
HGB BLD-MCNC: 9.7 G/DL — LOW (ref 11.5–15.5)
MAGNESIUM SERPL-MCNC: 1.8 MG/DL — SIGNIFICANT CHANGE UP (ref 1.6–2.6)
MCHC RBC-ENTMCNC: 29.2 PG — SIGNIFICANT CHANGE UP (ref 27–34)
MCHC RBC-ENTMCNC: 30.9 GM/DL — LOW (ref 32–36)
MCV RBC AUTO: 94.6 FL — SIGNIFICANT CHANGE UP (ref 80–100)
NRBC # BLD: 0 /100 WBCS — SIGNIFICANT CHANGE UP (ref 0–0)
NRBC # FLD: 0 K/UL — SIGNIFICANT CHANGE UP (ref 0–0)
PHOSPHATE SERPL-MCNC: 2.8 MG/DL — SIGNIFICANT CHANGE UP (ref 2.5–4.5)
PLATELET # BLD AUTO: 510 K/UL — HIGH (ref 150–400)
POTASSIUM SERPL-MCNC: 4 MMOL/L — SIGNIFICANT CHANGE UP (ref 3.5–5.3)
POTASSIUM SERPL-SCNC: 4 MMOL/L — SIGNIFICANT CHANGE UP (ref 3.5–5.3)
PROT SERPL-MCNC: 6.2 G/DL — SIGNIFICANT CHANGE UP (ref 6–8.3)
RBC # BLD: 3.32 M/UL — LOW (ref 3.8–5.2)
RBC # FLD: 16.5 % — HIGH (ref 10.3–14.5)
SODIUM SERPL-SCNC: 136 MMOL/L — SIGNIFICANT CHANGE UP (ref 135–145)
WBC # BLD: 9.06 K/UL — SIGNIFICANT CHANGE UP (ref 3.8–10.5)
WBC # FLD AUTO: 9.06 K/UL — SIGNIFICANT CHANGE UP (ref 3.8–10.5)

## 2022-09-03 PROCEDURE — 71045 X-RAY EXAM CHEST 1 VIEW: CPT | Mod: 26

## 2022-09-03 RX ORDER — CARVEDILOL PHOSPHATE 80 MG/1
6.25 CAPSULE, EXTENDED RELEASE ORAL EVERY 12 HOURS
Refills: 0 | Status: DISCONTINUED | OUTPATIENT
Start: 2022-09-03 | End: 2022-09-03

## 2022-09-03 RX ORDER — CARVEDILOL PHOSPHATE 80 MG/1
6.25 CAPSULE, EXTENDED RELEASE ORAL EVERY 12 HOURS
Refills: 0 | Status: DISCONTINUED | OUTPATIENT
Start: 2022-09-03 | End: 2022-09-07

## 2022-09-03 RX ADMIN — Medication 100 MILLIGRAM(S): at 13:12

## 2022-09-03 RX ADMIN — Medication 1000 MILLIGRAM(S): at 06:37

## 2022-09-03 RX ADMIN — MEROPENEM 100 MILLIGRAM(S): 1 INJECTION INTRAVENOUS at 17:35

## 2022-09-03 RX ADMIN — Medication 50 MILLIGRAM(S): at 06:37

## 2022-09-03 RX ADMIN — HEPARIN SODIUM 5000 UNIT(S): 5000 INJECTION INTRAVENOUS; SUBCUTANEOUS at 21:27

## 2022-09-03 RX ADMIN — Medication 1000 MILLIGRAM(S): at 14:00

## 2022-09-03 RX ADMIN — Medication 1 TABLET(S): at 13:12

## 2022-09-03 RX ADMIN — HEPARIN SODIUM 5000 UNIT(S): 5000 INJECTION INTRAVENOUS; SUBCUTANEOUS at 06:36

## 2022-09-03 RX ADMIN — PANTOPRAZOLE SODIUM 40 MILLIGRAM(S): 20 TABLET, DELAYED RELEASE ORAL at 06:36

## 2022-09-03 RX ADMIN — Medication 1000 MILLIGRAM(S): at 13:12

## 2022-09-03 RX ADMIN — LOSARTAN POTASSIUM 100 MILLIGRAM(S): 100 TABLET, FILM COATED ORAL at 06:36

## 2022-09-03 RX ADMIN — Medication 1000 MILLIGRAM(S): at 17:35

## 2022-09-03 RX ADMIN — MEROPENEM 100 MILLIGRAM(S): 1 INJECTION INTRAVENOUS at 06:34

## 2022-09-03 RX ADMIN — HEPARIN SODIUM 5000 UNIT(S): 5000 INJECTION INTRAVENOUS; SUBCUTANEOUS at 13:12

## 2022-09-03 NOTE — PROGRESS NOTE ADULT - SUBJECTIVE AND OBJECTIVE BOX
Date of service 9/3/22    extended hpi:  87F PMHx of recent SAH/SDH, HTN, HLD and GERD who presented for open cholecystectomy, portal lymphadenectomy, liver segment 4b / 5 resection and right hemicolectomy on 8/19/22 she was unable to be extubated at the end of the case due to sedation and inadequate ventilation hence was admitted to SICU.  Her course was further c/b pulmonary edema requiring diuresis in SICU and hypoxemic and hypercapnic respiratory insufficiency requiring supplemental O2 and chest physiotherapy.     today denies chest pain or SOB    Review of Systems:   Constitutional: [ ] fevers, [ ] chills.   Skin: [ ] dry skin. [ ] rashes.  Psychiatric: [ ] depression, [ ] anxiety.   Gastrointestinal: [ ] BRBPR, [ ] melena.   Neurological: [ ] confusion. [ ] seizures. [ ] shuffling gait.   Ears,Nose,Mouth and Throat: [ ] ear pain [ ] sore throat.   Eyes: [ ] diplopia.   Respiratory: [ ] hemoptysis. [ ] shortness of breath  Cardiovascular: See HPI above  Hematologic/Lymphatic: [ ] anemia. [ ] painful nodes. [ ] prolonged bleeding.   Genitourinary: [ ] hematuria. [ ] flank pain.   Endocrine: [ ] significant change in weight. [ ] intolerance to heat and cold.     Review of systems [ x] otherwise negative, [ ] otherwise unable to obtain    FH: no family history of sudden cardiac death in first degree relatives    SH: [ ] tobacco, [ ] alcohol, [ ] drugs    acetaminophen     Tablet .. 1000 milliGRAM(s) Oral every 6 hours  carvedilol 6.25 milliGRAM(s) Oral every 12 hours  heparin   Injectable 5000 Unit(s) SubCutaneous every 8 hours  losartan 100 milliGRAM(s) Oral daily  meropenem  IVPB 1000 milliGRAM(s) IV Intermittent every 12 hours  multivitamin 1 Tablet(s) Oral daily  pantoprazole    Tablet 40 milliGRAM(s) Oral before breakfast  thiamine 100 milliGRAM(s) Oral daily                            9.7    9.06  )-----------( 510      ( 03 Sep 2022 05:40 )             31.4       09-03    136  |  96<L>  |  12  ----------------------------<  87  4.0   |  31  |  0.58    Ca    9.6      03 Sep 2022 05:40  Phos  2.8     09-03  Mg     1.80     09-03    TPro  6.2  /  Alb  3.0<L>  /  TBili  0.5  /  DBili  x   /  AST  19  /  ALT  18  /  AlkPhos  283<H>  09-03      T(C): 36.9 (09-03-22 @ 10:39), Max: 37.6 (09-03-22 @ 01:33)  HR: 65 (09-03-22 @ 10:39) (65 - 82)  BP: 145/40 (09-03-22 @ 10:39) (118/49 - 175/57)  RR: 18 (09-03-22 @ 10:39) (16 - 18)  SpO2: 99% (09-03-22 @ 10:39) (94% - 99%)  Wt(kg): --    I&O's Summary    02 Sep 2022 07:01  -  03 Sep 2022 07:00  --------------------------------------------------------  IN: 360 mL / OUT: 700 mL / NET: -340 mL    03 Sep 2022 07:01  -  03 Sep 2022 13:07  --------------------------------------------------------  IN: 0 mL / OUT: 200 mL / NET: -200 mL        General: Well nourished in no acute distress.   Head: Normocephalic and atraumatic.   Neck: No JVD. No bruits. Supple. Does not appear to be enlarged.   Cardiovascular: + S1,S2 ; RRR Soft systolic murmur at the left lower sternal border. No rubs noted.    Lungs: CTA b/l. No rhonchi, rales or wheezes.   Abdomen: + BS, soft. Non tender. Non distended. No rebound. No guarding.   Extremities: No clubbing/cyanosis/edema.   Neurologic: Moves all four extremities. Full range of motion.   Skin: Warm and moist. The patient's skin has normal elasticity and good skin turgor.   Psychiatric: Appropriate mood and affect.  Musculoskeletal: Normal range of motion, normal strength      DATA    tele-     < from: Transthoracic Echocardiogram (08.27.22 @ 12:22) >  CONCLUSIONS:  1. Mitral annular calcification and calcified mitral  leaflets with normal diastolic opening. Minimal mitral  regurgitation.  2. Calcified trileaflet aortic valve with normal opening.  Mild aortic regurgitation.  3. Mildly dilated left atrium.  LA volume index = 35 cc/m2.  4. Hyperdynamic left ventricle.  5. Normal right ventricular size and function.  *** Compared with echocardiogram of 5/5/2022, no  significant changes noted.  ------------------------------------------------------------------------  Confirmed on  8/27/2022 - 16:16:07 by Jae Cortes M.D. [facility]    < end of copied text >      ASSESSMENT/PLAN: Pt is a 87F PMHx of recent SAH/SDH, HTN, HLD and GERD who presented for open cholecystectomy, portal lymphadenectomy, liver segment 4b / 5 resection and right hemicolectomy on 8/19/22 she was unable to be extubated at the end of the case due to sedation and inadequate ventilation hence was admitted to SICU.  Her course was further c/b pulmonary edema requiring diuresis in SICU and hypoxemic and hypercapnic respiratory insufficiency requiring supplemental O2 and chest physiotherapy.     - acute hypoxic failure likely primary pulmonary process, she has a WBC count and is improving with chest PT and ABX  - defer ABX to pulm  - blood pressure elevated, suggest changed to Carvedilol 6.25 mg BID, uptitrate tomorrow As tolerated  - EKG non-ischemic but with LVH, ECHo with normal LV wall thickness  - c/w Losartan

## 2022-09-03 NOTE — PROGRESS NOTE ADULT - ASSESSMENT
Ms. Rich is an 87 year old lady with PMHx of HTN, HLD, GERD, who presents for bowel prep prior to surgery tomorrow. Has a history of gangrenous cholecystitis. Patient is scheduled to undergo exploratory laparotomy, cholecystectomy, possible bowel resection. Last time she attempted bowel prep at home, she got dizzy and fell. (18 Aug 2022 12:39). Previous admission she had hyponatremia and work up suggested polydipsia. Ms. Rich was fluid restricted and now is admitted to get bowel prep prior to surgery. Nephrology consulted to manage fluids and electrolytes.       Akalosis  Bicarb is elevated in setting of pleural effusion  improved  monitor    HTN  BP controlled  monitor bp and HR    hematuria and proteinuria  recent UTI  Repeat UA.     Anemia  Check iron studies, ferritin and retic.  Ms. Rich is an 87 year old lady with PMHx of HTN, HLD, GERD, who presents for bowel prep prior to surgery tomorrow. Has a history of gangrenous cholecystitis. Patient is scheduled to undergo exploratory laparotomy, cholecystectomy, possible bowel resection. Last time she attempted bowel prep at home, she got dizzy and fell. (18 Aug 2022 12:39). Previous admission she had hyponatremia and work up suggested polydipsia. Ms. Rich was fluid restricted and now is admitted to get bowel prep prior to surgery. Nephrology consulted to manage fluids and electrolytes.       Akalosis  Bicarb is elevated in setting of pleural effusion  improving  monitor    HTN  BP controlled  monitor bp and HR    hematuria and proteinuria  recent UTI  Repeat UA.     Anemia  Check iron studies, ferritin and retic.

## 2022-09-03 NOTE — PROGRESS NOTE ADULT - SUBJECTIVE AND OBJECTIVE BOX
Patient is a 87y old  Female who presents with a chief complaint of Bowel prep for surgery tomorrow (03 Sep 2022 13:43)    Date of servie : 09-03-22 @ 16:50  INTERVAL HPI/OVERNIGHT EVENTS:  T(C): 36.8 (09-03-22 @ 16:40), Max: 37.6 (09-03-22 @ 01:33)  HR: 83 (09-03-22 @ 16:40) (65 - 83)  BP: 155/60 (09-03-22 @ 16:40) (118/49 - 175/57)  RR: 18 (09-03-22 @ 16:40) (16 - 18)  SpO2: 96% (09-03-22 @ 16:40) (94% - 99%)  Wt(kg): --  I&O's Summary    02 Sep 2022 07:01  -  03 Sep 2022 07:00  --------------------------------------------------------  IN: 360 mL / OUT: 700 mL / NET: -340 mL    03 Sep 2022 07:01  -  03 Sep 2022 16:50  --------------------------------------------------------  IN: 0 mL / OUT: 200 mL / NET: -200 mL        LABS:                        9.7    9.06  )-----------( 510      ( 03 Sep 2022 05:40 )             31.4     09-03    136  |  96<L>  |  12  ----------------------------<  87  4.0   |  31  |  0.58    Ca    9.6      03 Sep 2022 05:40  Phos  2.8     09-03  Mg     1.80     09-03    TPro  6.2  /  Alb  3.0<L>  /  TBili  0.5  /  DBili  x   /  AST  19  /  ALT  18  /  AlkPhos  283<H>  09-03        CAPILLARY BLOOD GLUCOSE                MEDICATIONS  (STANDING):  acetaminophen     Tablet .. 1000 milliGRAM(s) Oral every 6 hours  carvedilol 6.25 milliGRAM(s) Oral every 12 hours  heparin   Injectable 5000 Unit(s) SubCutaneous every 8 hours  losartan 100 milliGRAM(s) Oral daily  meropenem  IVPB 1000 milliGRAM(s) IV Intermittent every 12 hours  multivitamin 1 Tablet(s) Oral daily  pantoprazole    Tablet 40 milliGRAM(s) Oral before breakfast  thiamine 100 milliGRAM(s) Oral daily    MEDICATIONS  (PRN):          PHYSICAL EXAM:  GENERAL: frail  CHEST/LUNG: Clear to percussion bilaterally; No rales, rhonchi, wheezing, or rubs  HEART: s1s2+  ABDOMEN: Soft, Nontender, Nondistended; Bowel sounds present  EXTREMITIES:  edema +    Care Discussed with Consultants/Other Providers [x ] YES  [ ] NO

## 2022-09-03 NOTE — PROGRESS NOTE ADULT - ASSESSMENT
POD #15.    Patient is awake, alert, and comfortable.    Afebrile with stable vital signs.    Tolerating diet.  Voiding.    Abdomen is soft and nontender.    Normal WBC count.  Stable H&H.  No electrolyte abnormalities.    Clinically stable.

## 2022-09-03 NOTE — PROGRESS NOTE ADULT - SUBJECTIVE AND OBJECTIVE BOX
[facility] [place] [facility]   MD KRISTEN MARQUES MD KRISTINE SOLTANPOUR, DO ANGELA WONG, PA    TEL:  OFFICE: [phone]  From 5pm-7am Answering Service [phone]    -- RENAL FOLLOW UP NOTE ---Date of Service 09-03-22 @ 13:43    Patient is a 87y old  Female who presents with a chief complaint of Bowel prep for surgery tomorrow (03 Sep 2022 13:06)      Patient seen and examined at bedside. No chest pain/sob    VITALS:  T(F): 98.4 (09-03-22 @ 10:39), Max: 99.6 (09-03-22 @ 01:33)  HR: 65 (09-03-22 @ 10:39)  BP: 145/40 (09-03-22 @ 10:39)  RR: 18 (09-03-22 @ 10:39)  SpO2: 99% (09-03-22 @ 10:39)  Wt(kg): --    09-02 @ 07:01  -  09-03 @ 07:00  --------------------------------------------------------  IN: 360 mL / OUT: 700 mL / NET: -340 mL    09-03 @ 07:01  -  09-03 @ 13:43  --------------------------------------------------------  IN: 0 mL / OUT: 200 mL / NET: -200 mL          PHYSICAL EXAM:  General: NAD  Neck: No JVD  Respiratory: CTAB, no wheezes, rales or rhonchi  Cardiovascular: S1, S2, RRR  Gastrointestinal: BS+, soft, NT/ND  Extremities: No peripheral edema    Hospital Medications:   MEDICATIONS  (STANDING):  acetaminophen     Tablet .. 1000 milliGRAM(s) Oral every 6 hours  carvedilol 6.25 milliGRAM(s) Oral every 12 hours  heparin   Injectable 5000 Unit(s) SubCutaneous every 8 hours  losartan 100 milliGRAM(s) Oral daily  meropenem  IVPB 1000 milliGRAM(s) IV Intermittent every 12 hours  multivitamin 1 Tablet(s) Oral daily  pantoprazole    Tablet 40 milliGRAM(s) Oral before breakfast  thiamine 100 milliGRAM(s) Oral daily      LABS:  09-03    136  |  96<L>  |  12  ----------------------------<  87  4.0   |  31  |  0.58    Ca    9.6      03 Sep 2022 05:40  Phos  2.8     09-03  Mg     1.80     09-03    TPro  6.2  /  Alb  3.0<L>  /  TBili  0.5  /  DBili      /  AST  19  /  ALT  18  /  AlkPhos  283<H>  09-03    Creatinine Trend: 0.58 <--, 0.56 <--, 0.59 <--, 0.63 <--, 0.73 <--, 0.77 <--, 0.80 <--    Albumin, Serum: 3.0 g/dL (09-03 @ 05:40)  Phosphorus Level, Serum: 2.8 mg/dL (09-03 @ 05:40)                              9.7    9.06  )-----------( 510      ( 03 Sep 2022 05:40 )             31.4     Urine Studies:  Urinalysis - [08-23-22 @ 00:20]      Color Colorless / Appearance Clear / SG 1.006 / pH 7.0      Gluc Trace / Ketone Negative  / Bili Negative / Urobili <2 mg/dL       Blood Small / Protein Negative / Leuk Est Negative / Nitrite Negative      RBC 6-10 / WBC 0-2 / Hyaline  / Gran  / Sq Epi  / Non Sq Epi  / Bacteria Negative      PTH -- (Ca --)      [05-04-22 @ 16:19]   29  PTH -- (Ca --)      [05-04-22 @ 12:06]   31  Vitamin D (25OH) 53.8      [05-17-22 @ 07:15]  TSH 0.71      [05-04-22 @ 10:43]  Lipid: chol 123, , HDL 43, LDL --      [05-28-22 @ 05:29]        RADIOLOGY & ADDITIONAL STUDIES:

## 2022-09-04 LAB
ALBUMIN SERPL ELPH-MCNC: 3 G/DL — LOW (ref 3.3–5)
ALP SERPL-CCNC: 338 U/L — HIGH (ref 40–120)
ALT FLD-CCNC: 22 U/L — SIGNIFICANT CHANGE UP (ref 4–33)
ANION GAP SERPL CALC-SCNC: 11 MMOL/L — SIGNIFICANT CHANGE UP (ref 7–14)
APPEARANCE UR: CLEAR — SIGNIFICANT CHANGE UP
AST SERPL-CCNC: 26 U/L — SIGNIFICANT CHANGE UP (ref 4–32)
BILIRUB SERPL-MCNC: 0.4 MG/DL — SIGNIFICANT CHANGE UP (ref 0.2–1.2)
BILIRUB UR-MCNC: NEGATIVE — SIGNIFICANT CHANGE UP
BUN SERPL-MCNC: 11 MG/DL — SIGNIFICANT CHANGE UP (ref 7–23)
CALCIUM SERPL-MCNC: 9.6 MG/DL — SIGNIFICANT CHANGE UP (ref 8.4–10.5)
CHLORIDE SERPL-SCNC: 97 MMOL/L — LOW (ref 98–107)
CO2 SERPL-SCNC: 26 MMOL/L — SIGNIFICANT CHANGE UP (ref 22–31)
COLOR SPEC: YELLOW — SIGNIFICANT CHANGE UP
CREAT SERPL-MCNC: 0.51 MG/DL — SIGNIFICANT CHANGE UP (ref 0.5–1.3)
DIFF PNL FLD: NEGATIVE — SIGNIFICANT CHANGE UP
EGFR: 91 ML/MIN/1.73M2 — SIGNIFICANT CHANGE UP
GLUCOSE SERPL-MCNC: 92 MG/DL — SIGNIFICANT CHANGE UP (ref 70–99)
GLUCOSE UR QL: NEGATIVE — SIGNIFICANT CHANGE UP
HCT VFR BLD CALC: 31.7 % — LOW (ref 34.5–45)
HGB BLD-MCNC: 10.1 G/DL — LOW (ref 11.5–15.5)
KETONES UR-MCNC: ABNORMAL
LEUKOCYTE ESTERASE UR-ACNC: NEGATIVE — SIGNIFICANT CHANGE UP
MAGNESIUM SERPL-MCNC: 1.9 MG/DL — SIGNIFICANT CHANGE UP (ref 1.6–2.6)
MCHC RBC-ENTMCNC: 29.8 PG — SIGNIFICANT CHANGE UP (ref 27–34)
MCHC RBC-ENTMCNC: 31.9 GM/DL — LOW (ref 32–36)
MCV RBC AUTO: 93.5 FL — SIGNIFICANT CHANGE UP (ref 80–100)
NITRITE UR-MCNC: NEGATIVE — SIGNIFICANT CHANGE UP
NRBC # BLD: 0 /100 WBCS — SIGNIFICANT CHANGE UP (ref 0–0)
NRBC # FLD: 0 K/UL — SIGNIFICANT CHANGE UP (ref 0–0)
OSMOLALITY UR: 462 MOSM/KG — SIGNIFICANT CHANGE UP (ref 50–1200)
PH UR: 6 — SIGNIFICANT CHANGE UP (ref 5–8)
PHOSPHATE SERPL-MCNC: 2.5 MG/DL — SIGNIFICANT CHANGE UP (ref 2.5–4.5)
PLATELET # BLD AUTO: 510 K/UL — HIGH (ref 150–400)
POTASSIUM SERPL-MCNC: 4 MMOL/L — SIGNIFICANT CHANGE UP (ref 3.5–5.3)
POTASSIUM SERPL-SCNC: 4 MMOL/L — SIGNIFICANT CHANGE UP (ref 3.5–5.3)
PROT SERPL-MCNC: 6.2 G/DL — SIGNIFICANT CHANGE UP (ref 6–8.3)
PROT UR-MCNC: SIGNIFICANT CHANGE UP
RBC # BLD: 3.39 M/UL — LOW (ref 3.8–5.2)
RBC # FLD: 16.6 % — HIGH (ref 10.3–14.5)
SODIUM SERPL-SCNC: 134 MMOL/L — LOW (ref 135–145)
SODIUM UR-SCNC: 23 MMOL/L — SIGNIFICANT CHANGE UP
SP GR SPEC: >1.03 — SIGNIFICANT CHANGE UP (ref 1.01–1.05)
UROBILINOGEN FLD QL: SIGNIFICANT CHANGE UP
WBC # BLD: 7.58 K/UL — SIGNIFICANT CHANGE UP (ref 3.8–10.5)
WBC # FLD AUTO: 7.58 K/UL — SIGNIFICANT CHANGE UP (ref 3.8–10.5)

## 2022-09-04 PROCEDURE — 71045 X-RAY EXAM CHEST 1 VIEW: CPT | Mod: 26

## 2022-09-04 RX ORDER — AMLODIPINE BESYLATE 2.5 MG/1
2.5 TABLET ORAL DAILY
Refills: 0 | Status: DISCONTINUED | OUTPATIENT
Start: 2022-09-04 | End: 2022-09-07

## 2022-09-04 RX ORDER — AMLODIPINE BESYLATE 2.5 MG/1
2.5 TABLET ORAL DAILY
Refills: 0 | Status: DISCONTINUED | OUTPATIENT
Start: 2022-09-04 | End: 2022-09-04

## 2022-09-04 RX ADMIN — Medication 1000 MILLIGRAM(S): at 06:43

## 2022-09-04 RX ADMIN — Medication 1000 MILLIGRAM(S): at 17:10

## 2022-09-04 RX ADMIN — HEPARIN SODIUM 5000 UNIT(S): 5000 INJECTION INTRAVENOUS; SUBCUTANEOUS at 06:05

## 2022-09-04 RX ADMIN — Medication 1000 MILLIGRAM(S): at 06:04

## 2022-09-04 RX ADMIN — HEPARIN SODIUM 5000 UNIT(S): 5000 INJECTION INTRAVENOUS; SUBCUTANEOUS at 21:09

## 2022-09-04 RX ADMIN — MEROPENEM 100 MILLIGRAM(S): 1 INJECTION INTRAVENOUS at 06:15

## 2022-09-04 RX ADMIN — HEPARIN SODIUM 5000 UNIT(S): 5000 INJECTION INTRAVENOUS; SUBCUTANEOUS at 13:17

## 2022-09-04 RX ADMIN — Medication 1000 MILLIGRAM(S): at 11:26

## 2022-09-04 RX ADMIN — Medication 100 MILLIGRAM(S): at 11:27

## 2022-09-04 RX ADMIN — Medication 1000 MILLIGRAM(S): at 22:09

## 2022-09-04 RX ADMIN — Medication 1 TABLET(S): at 11:27

## 2022-09-04 RX ADMIN — MEROPENEM 100 MILLIGRAM(S): 1 INJECTION INTRAVENOUS at 17:10

## 2022-09-04 RX ADMIN — CARVEDILOL PHOSPHATE 6.25 MILLIGRAM(S): 80 CAPSULE, EXTENDED RELEASE ORAL at 06:06

## 2022-09-04 RX ADMIN — CARVEDILOL PHOSPHATE 6.25 MILLIGRAM(S): 80 CAPSULE, EXTENDED RELEASE ORAL at 17:11

## 2022-09-04 RX ADMIN — Medication 1000 MILLIGRAM(S): at 23:00

## 2022-09-04 RX ADMIN — LOSARTAN POTASSIUM 100 MILLIGRAM(S): 100 TABLET, FILM COATED ORAL at 06:05

## 2022-09-04 RX ADMIN — AMLODIPINE BESYLATE 2.5 MILLIGRAM(S): 2.5 TABLET ORAL at 21:08

## 2022-09-04 RX ADMIN — PANTOPRAZOLE SODIUM 40 MILLIGRAM(S): 20 TABLET, DELAYED RELEASE ORAL at 06:05

## 2022-09-04 NOTE — SWALLOW BEDSIDE ASSESSMENT ADULT - ASR SWALLOW RECOMMEND DIAG
consider cinesphgram at MD's discretion if there are still concerns for aspiration consider cinesophagram at MD's discretion if there continue to be concerns for aspiration

## 2022-09-04 NOTE — SWALLOW BEDSIDE ASSESSMENT ADULT - ADDITIONAL RECOMMENDATIONS
1) Reconsult this service when patient is noted with improvement in medical and cognitive status to assess for candidacy of an oral diet.
monitor for diet tolerance and reconsult this department as indicated.

## 2022-09-04 NOTE — SWALLOW BEDSIDE ASSESSMENT ADULT - SWALLOW EVAL: RECOMMENDED DIET
1) Defer PO diet to MD given given patient with refusal. 2) Consider NPO with short term non-oral means of nutrition.
continue regular solids with thin liquids

## 2022-09-04 NOTE — SWALLOW BEDSIDE ASSESSMENT ADULT - SWALLOW EVAL: DIAGNOSIS
SLP attempted trials of puree via teaspoon for which patient pushed/swatted SLP arm away and turned away. SLP provided max verbal encouragement and prompting, however patient continued to refuse PO trials. Patient with adamant refusal marked by tight labial seal, turning head and body away from SLP, and pushing SLP away. Oral/pharyngeal stages of swallow could not be assessed given patient's refusal to accept PO intake. Further PO trials deferred. An oral diet cannot be recommended at this time given patient's adamant refusal.
Patient presents with functional oral and pharyngeal stage of swallow given thin liquids and regular solids (1 erika cracker) marked by adequate bolus containment, adequate bolus manipulation, timely mastication with adequate ability to break down regular solids and adequate anterior-posterior transport. adequate oral clearance noted. Pharyngeal stage marked by observed initiation of the pharyngeal swallow trigger judged via laryngeal palpation. No overt s/sx of impaired airway protection observed for all trials. Patient reports solid food occasionally gets "hung up" at the throat, though passes with use of thin liquid wash.

## 2022-09-04 NOTE — PROGRESS NOTE ADULT - ASSESSMENT
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    Comfortable, no abdominal pain.    Afebrile with stable vital signs.    Abdomen soft and nontender.    No significant laboratory abnormalities.    Followup from cardiology, pulmonary medicine, and nephrology appreciated.  Will continue to follow

## 2022-09-04 NOTE — PROGRESS NOTE ADULT - SUBJECTIVE AND OBJECTIVE BOX
[facility] – [place] [facility]   MD KRISTEN MARQUES MD RUORU WONG, PA KRISTINE SOLTANPOUR, DO    TEL:  OFFICE: [phone]    RENAL FOLLOW UP NOTE-- Date of Service ;; 09-04-22 @ 09:57  --------------------------------------------------------------------------------  HPI:  Pt seen and examined at bedside  Denies SOB, chest pain.         PAST HISTORY  --------------------------------------------------------------------------------  No significant changes to PMH, PSH, FHx, SHx, unless otherwise noted    ALLERGIES & MEDICATIONS  --------------------------------------------------------------------------------  Allergies    penicillin (Unknown)    Intolerances      Standing Inpatient Medications  acetaminophen     Tablet .. 1000 milliGRAM(s) Oral every 6 hours  carvedilol 6.25 milliGRAM(s) Oral every 12 hours  heparin   Injectable 5000 Unit(s) SubCutaneous every 8 hours  losartan 100 milliGRAM(s) Oral daily  meropenem  IVPB 1000 milliGRAM(s) IV Intermittent every 12 hours  multivitamin 1 Tablet(s) Oral daily  pantoprazole    Tablet 40 milliGRAM(s) Oral before breakfast  thiamine 100 milliGRAM(s) Oral daily    PRN Inpatient Medications      REVIEW OF SYSTEMS  --------------------------------------------------------------------------------  General: no fever  CVS: no chest pain  RESP: no sob, no cough  ABD: no abdominal pain  : no dysuria,  MSK: no edema     VITALS/PHYSICAL EXAM  --------------------------------------------------------------------------------  T(C): 36.7 (09-04-22 @ 09:06), Max: 36.9 (09-03-22 @ 10:39)  HR: 87 (09-04-22 @ 09:06) (65 - 87)  BP: 141/74 (09-04-22 @ 09:06) (123/44 - 155/60)  RR: 18 (09-04-22 @ 09:06) (16 - 18)  SpO2: 93% (09-04-22 @ 09:06) (92% - 99%)  Wt(kg): --        09-03-22 @ 07:01  -  09-04-22 @ 07:00  --------------------------------------------------------  IN: 360 mL / OUT: 1100 mL / NET: -740 mL    09-04-22 @ 07:01  -  09-04-22 @ 09:57  --------------------------------------------------------  IN: 0 mL / OUT: 200 mL / NET: -200 mL      Physical Exam:  	Gen: NAD  	HEENT: MMM  	Pulm: CTA B/L  	CV: S1S2  	Abd: Soft, +BS  	Ext: No LE edema B/L                      Neuro: Awake , alert  	Skin: Warm and Dry   	Vascular access: None            LABS/STUDIES  --------------------------------------------------------------------------------              10.1   7.58  >-----------<  510      [09-04-22 @ 05:28]              31.7     134  |  97  |  11  ----------------------------<  92      [09-04-22 @ 05:28]  4.0   |  26  |  0.51        Ca     9.6     [09-04-22 @ 05:28]      Mg     1.90     [09-04-22 @ 05:28]      Phos  2.5     [09-04-22 @ 05:28]    TPro  6.2  /  Alb  3.0  /  TBili  0.4  /  DBili  x   /  AST  26  /  ALT  22  /  AlkPhos  338  [09-04-22 @ 05:28]          Creatinine Trend:  SCr 0.51 [09-04 @ 05:28]  SCr 0.58 [09-03 @ 05:40]  SCr 0.56 [09-02 @ 05:13]  SCr 0.59 [09-01 @ 05:26]  SCr 0.63 [08-31 @ 05:10]    Urinalysis - [08-23-22 @ 00:20]      Color Colorless / Appearance Clear / SG 1.006 / pH 7.0      Gluc Trace / Ketone Negative  / Bili Negative / Urobili <2 mg/dL       Blood Small / Protein Negative / Leuk Est Negative / Nitrite Negative      RBC 6-10 / WBC 0-2 / Hyaline  / Gran  / Sq Epi  / Non Sq Epi  / Bacteria Negative      PTH -- (Ca --)      [05-04-22 @ 16:19]   29  PTH -- (Ca --)      [05-04-22 @ 12:06]   31  Vitamin D (25OH) 53.8      [05-17-22 @ 07:15]  TSH 0.71      [05-04-22 @ 10:43]  Lipid: chol 123, , HDL 43, LDL --      [05-28-22 @ 05:29]

## 2022-09-04 NOTE — PROGRESS NOTE ADULT - ASSESSMENT
87 year old female with PMH of HTN, HLD, GERD, who presents for bowel prep  s/p operative intervention now medicine consulted for ongoing confusion and pleural     Problem/Recommendation - 1:  ·  Problem: Anemia.   ·  Recommendation: likely multifactorial  poor nutrition, post op, chronic inflammation  will continue to monitor  transfusion as needed if < 7.    Problem/Recommendation - 2:  ·  Problem: Hypertension.   ·  Recommendation: occasionally uncontrolled  will increase losartan to 100 and metoprolol to 50 BID  added norvasc     Problem/Recommendation - 3:  ·  Problem: Pleural effusion.   ·  Recommendation: management per pulmonary  continue to follow recommendations.    Problem/Recommendation - 4:  ·  Problem: Hyperlipidemia.   ·  Recommendation: no intervention at this time  outpatient fasting lipid panel.    Problem/Recommendation - 5:  ·  Problem: Other gallbladder disorder. ·  Recommendation: s/p open rolanda  management per surgeryon IV antibiotics.

## 2022-09-04 NOTE — PROVIDER CONTACT NOTE (OTHER) - BACKGROUND
Pt admitted with abdominal pain.
Patient admitted for abdominal pain and had ex-lap rolanda,colectomy, segment hepatectomy
Patient with prior episodes of hypertension on this admission. Patient status post ex lap, cholecystectomy and colectomy.
Patient status post ex lap, cholecystectomy and colectomy.
Patient with three prior episodes of dark bloody bowel movements with day RN.

## 2022-09-04 NOTE — PROGRESS NOTE ADULT - SUBJECTIVE AND OBJECTIVE BOX
Patient is a 87y old  Female who presents with a chief complaint of Bowel prep for surgery tomorrow (04 Sep 2022 09:57)    Date of servie : 09-04-22 @ 17:33  INTERVAL HPI/OVERNIGHT EVENTS:  T(C): 36.8 (09-04-22 @ 16:25), Max: 36.9 (09-03-22 @ 20:34)  HR: 84 (09-04-22 @ 17:00) (83 - 87)  BP: 158/90 (09-04-22 @ 17:00) (123/44 - 158/90)  RR: 18 (09-04-22 @ 16:25) (16 - 19)  SpO2: 93% (09-04-22 @ 16:25) (91% - 98%)  Wt(kg): --  I&O's Summary    03 Sep 2022 07:01  -  04 Sep 2022 07:00  --------------------------------------------------------  IN: 360 mL / OUT: 1100 mL / NET: -740 mL    04 Sep 2022 07:01  -  04 Sep 2022 17:33  --------------------------------------------------------  IN: 200 mL / OUT: 200 mL / NET: 0 mL        LABS:                        10.1   7.58  )-----------( 510      ( 04 Sep 2022 05:28 )             31.7     09-04    134<L>  |  97<L>  |  11  ----------------------------<  92  4.0   |  26  |  0.51    Ca    9.6      04 Sep 2022 05:28  Phos  2.5     09-04  Mg     1.90     09-04    TPro  6.2  /  Alb  3.0<L>  /  TBili  0.4  /  DBili  x   /  AST  26  /  ALT  22  /  AlkPhos  338<H>  09-04        CAPILLARY BLOOD GLUCOSE                MEDICATIONS  (STANDING):  acetaminophen     Tablet .. 1000 milliGRAM(s) Oral every 6 hours  amLODIPine   Tablet 2.5 milliGRAM(s) Oral daily  carvedilol 6.25 milliGRAM(s) Oral every 12 hours  heparin   Injectable 5000 Unit(s) SubCutaneous every 8 hours  losartan 100 milliGRAM(s) Oral daily  meropenem  IVPB 1000 milliGRAM(s) IV Intermittent every 12 hours  multivitamin 1 Tablet(s) Oral daily  pantoprazole    Tablet 40 milliGRAM(s) Oral before breakfast  thiamine 100 milliGRAM(s) Oral daily    MEDICATIONS  (PRN):          PHYSICAL EXAM:  GENERAL: frail  HEAD:  Atraumatic, Normocephalic  CHEST/LUNG: Clear to percussion bilaterally; No rales, rhonchi, wheezing, or rubs  HEART: Regular rate and rhythm; No murmurs, rubs, or gallops  ABDOMEN: Soft, Nontender, Nondistended; Bowel sounds present  EXTREMITIES:  edema +    Care Discussed with Consultants/Other Providers [ ] YES  [ ] NO

## 2022-09-04 NOTE — PROVIDER CONTACT NOTE (OTHER) - ASSESSMENT
Patient asymptomatic at this time.
Patient with no signs or symptoms of pain or distress. /57 HR 81 with oxygen saturation 98% on 2L NC.
vitals stable. NAD noted. pt unable to be woken up for safe administration of oral meds.
No signs/symptoms or complaints of distress noted.
Patient asymptomatic with no complaints of dizziness, chest pain or weakness. /62  98% oxygen saturation on 1L nasal cannula. Patient afebrile with 98.7 oral temperature.
Vital signs stable. Patient currently receiving transfusion with no reaction

## 2022-09-04 NOTE — PROGRESS NOTE ADULT - SUBJECTIVE AND OBJECTIVE BOX
Date of service 9/4/22    extended hpi:  87F PMHx of recent SAH/SDH, HTN, HLD and GERD who presented for open cholecystectomy, portal lymphadenectomy, liver segment 4b / 5 resection and right hemicolectomy on 8/19/22 she was unable to be extubated at the end of the case due to sedation and inadequate ventilation hence was admitted to SICU.  Her course was further c/b pulmonary edema requiring diuresis in SICU and hypoxemic and hypercapnic respiratory insufficiency requiring supplemental O2 and chest physiotherapy.     pt seen and examined, no complaints, ROS - .     Review of Systems:   Constitutional: [ ] fevers, [ ] chills.   Skin: [ ] dry skin. [ ] rashes.  Psychiatric: [ ] depression, [ ] anxiety.   Gastrointestinal: [ ] BRBPR, [ ] melena.   Neurological: [ ] confusion. [ ] seizures. [ ] shuffling gait.   Ears,Nose,Mouth and Throat: [ ] ear pain [ ] sore throat.   Eyes: [ ] diplopia.   Respiratory: [ ] hemoptysis. [ ] shortness of breath  Cardiovascular: See HPI above  Hematologic/Lymphatic: [ ] anemia. [ ] painful nodes. [ ] prolonged bleeding.   Genitourinary: [ ] hematuria. [ ] flank pain.   Endocrine: [ ] significant change in weight. [ ] intolerance to heat and cold.     Review of systems [ x] otherwise negative, [ ] otherwise unable to obtain    FH: no family history of sudden cardiac death in first degree relatives    SH: [ ] tobacco, [ ] alcohol, [ ] drugs           acetaminophen     Tablet .. 1000 milliGRAM(s) Oral every 6 hours  carvedilol 6.25 milliGRAM(s) Oral every 12 hours  heparin   Injectable 5000 Unit(s) SubCutaneous every 8 hours  losartan 100 milliGRAM(s) Oral daily  meropenem  IVPB 1000 milliGRAM(s) IV Intermittent every 12 hours  multivitamin 1 Tablet(s) Oral daily  pantoprazole    Tablet 40 milliGRAM(s) Oral before breakfast  thiamine 100 milliGRAM(s) Oral daily                            10.1   7.58  )-----------( 510      ( 04 Sep 2022 05:28 )             31.7       Hemoglobin: 10.1 g/dL (09-04 @ 05:28)  Hemoglobin: 9.7 g/dL (09-03 @ 05:40)  Hemoglobin: 9.6 g/dL (09-02 @ 05:13)  Hemoglobin: 9.2 g/dL (09-01 @ 05:26)  Hemoglobin: 9.4 g/dL (08-31 @ 19:00)      09-04    134<L>  |  97<L>  |  11  ----------------------------<  92  4.0   |  26  |  0.51    Ca    9.6      04 Sep 2022 05:28  Phos  2.5     09-04  Mg     1.90     09-04    TPro  6.2  /  Alb  3.0<L>  /  TBili  0.4  /  DBili  x   /  AST  26  /  ALT  22  /  AlkPhos  338<H>  09-04    Creatinine Trend: 0.51<--, 0.58<--, 0.56<--, 0.59<--, 0.63<--, 0.73<--    COAGS:           T(C): 36.7 (09-04-22 @ 09:06), Max: 36.9 (09-03-22 @ 10:39)  HR: 87 (09-04-22 @ 09:06) (65 - 87)  BP: 141/74 (09-04-22 @ 09:06) (123/44 - 155/60)  RR: 18 (09-04-22 @ 09:06) (16 - 18)  SpO2: 93% (09-04-22 @ 09:06) (92% - 99%)  Wt(kg): --    I&O's Summary    03 Sep 2022 07:01  -  04 Sep 2022 07:00  --------------------------------------------------------  IN: 360 mL / OUT: 1100 mL / NET: -740 mL    04 Sep 2022 07:01  -  04 Sep 2022 09:46  --------------------------------------------------------  IN: 0 mL / OUT: 200 mL / NET: -200 mL        General: Well nourished in no acute distress.   Head: Normocephalic and atraumatic.   Neck: No JVD. No bruits. Supple. Does not appear to be enlarged.   Cardiovascular: + S1,S2 ; RRR Soft systolic murmur at the left lower sternal border. No rubs noted.    Lungs: CTA b/l. No rhonchi, rales or wheezes.   Abdomen: + BS, soft. Non tender. Non distended. No rebound. No guarding.   Extremities: No clubbing/cyanosis/edema.   Neurologic: Moves all four extremities. Full range of motion.   Skin: Warm and moist. The patient's skin has normal elasticity and good skin turgor.   Psychiatric: Appropriate mood and affect.  Musculoskeletal: Normal range of motion, normal strength      DATA    tele- SR    < from: Transthoracic Echocardiogram (08.27.22 @ 12:22) >  CONCLUSIONS:  1. Mitral annular calcification and calcified mitral  leaflets with normal diastolic opening. Minimal mitral  regurgitation.  2. Calcified trileaflet aortic valve with normal opening.  Mild aortic regurgitation.  3. Mildly dilated left atrium.  LA volume index = 35 cc/m2.  4. Hyperdynamic left ventricle.  5. Normal right ventricular size and function.  *** Compared with echocardiogram of 5/5/2022, no  significant changes noted.  ------------------------------------------------------------------------  Confirmed on  8/27/2022 - 16:16:07 by AGUSTIN Crowe    < end of copied text >      ASSESSMENT/PLAN: Pt is a 87F PMHx of recent SAH/SDH, HTN, HLD and GERD who presented for open cholecystectomy, portal lymphadenectomy, liver segment 4b / 5 resection and right hemicolectomy on 8/19/22 she was unable to be extubated at the end of the case due to sedation and inadequate ventilation hence was admitted to SICU.  Her course was further c/b pulmonary edema requiring diuresis in SICU and hypoxemic and hypercapnic respiratory insufficiency requiring supplemental O2 and chest physiotherapy.     - acute hypoxic failure likely primary pulmonary process, she has a WBC count and is improving with chest PT and ABX  - defer ABX to pulm  -  cont coreg  - EKG non-ischemic but with LVH, ECHo with normal LV wall thickness  - c/w Losartan

## 2022-09-04 NOTE — SWALLOW BEDSIDE ASSESSMENT ADULT - COMMENTS
SLP attempted to see pt. this afternoon for clinical swallow assessment, however; as per RN pt. off unit for test. Speech and swallow service to f/u.
Surgery note 8/28 "87F PMHx of HTN, HLD and GERD who presented for open cholecystectomy, portal lymphadenectomy, liver segment 4b / 5 resection and right hemicolectomy on 8/19/22. Course c/b pulmonary edema requiring diuresis in SICU. Transferred to floors. Course further c/b hypoxemic and hypercapnic respiratory insufficiency requiring supplemental O2 and chest physiotherapy.  PLAN:  - Follow up chest xray, lasix if fluid overloaded  - Pain control Tylenol ATC".     CXR 8/27 "Bibasilar atelectasis."    Patient seen at bedside, awake/alert, during clinical swallow assessment this PM. Patient with confusion and difficulty following directions. Per RN, patient noted with agitation, and is refusing PO meals. Patient did not accept PO trials despite max SLP verbal encouragement and prompting.
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 For the last few days patient with worsening hypoxemia and hypercapnic respiratory failure so pulmonary consulted for further recommendations"     Patient is familiar to this department as the patient was seen for 2 swallow evaluations, with the most recent on8/28/22, see consults for details.     Patient received upright in bedside chair, awake and alert. Patient denies symptoms of dysphagia.

## 2022-09-04 NOTE — PROVIDER CONTACT NOTE (OTHER) - ACTION/TREATMENT ORDERED:
Provider made aware with order for hydralazine 10mg IVP to be given.  BP re assessed with improvement  to BP of 161/70. Heart rate now elevated to 130s.
Provider requested to continue transfusion and will look into how to reinstate order
ACP Ortiz Jensen notified & aware. 2L placed back on patient. Continuous SAO2 maintained. Safety maintained.
Provider made aware with order for vital signs recheck in one hour. VS recheck with /65  and 100% oxygen saturation on 2L NC at 00:10. No interventions ordered at this time.
team aware during rounds. states pt did this yesterday too. will cont to monitor.
Provider made aware with CBC and EKG ordered and performed.  CBC result with H+H of 7.6/24.2 with provider evaluating patient and EKG at the bedside.

## 2022-09-05 LAB
ALBUMIN SERPL ELPH-MCNC: 3.5 G/DL — SIGNIFICANT CHANGE UP (ref 3.3–5)
ALP SERPL-CCNC: 352 U/L — HIGH (ref 40–120)
ALT FLD-CCNC: 22 U/L — SIGNIFICANT CHANGE UP (ref 4–33)
ANION GAP SERPL CALC-SCNC: 9 MMOL/L — SIGNIFICANT CHANGE UP (ref 7–14)
AST SERPL-CCNC: 27 U/L — SIGNIFICANT CHANGE UP (ref 4–32)
BILIRUB SERPL-MCNC: 0.4 MG/DL — SIGNIFICANT CHANGE UP (ref 0.2–1.2)
BUN SERPL-MCNC: 10 MG/DL — SIGNIFICANT CHANGE UP (ref 7–23)
CALCIUM SERPL-MCNC: 10.4 MG/DL — SIGNIFICANT CHANGE UP (ref 8.4–10.5)
CHLORIDE SERPL-SCNC: 100 MMOL/L — SIGNIFICANT CHANGE UP (ref 98–107)
CO2 SERPL-SCNC: 31 MMOL/L — SIGNIFICANT CHANGE UP (ref 22–31)
CORTIS AM PEAK SERPL-MCNC: 16.7 UG/DL — SIGNIFICANT CHANGE UP (ref 6–18.4)
CREAT SERPL-MCNC: 0.58 MG/DL — SIGNIFICANT CHANGE UP (ref 0.5–1.3)
EGFR: 88 ML/MIN/1.73M2 — SIGNIFICANT CHANGE UP
GLUCOSE SERPL-MCNC: 87 MG/DL — SIGNIFICANT CHANGE UP (ref 70–99)
HCT VFR BLD CALC: 33.7 % — LOW (ref 34.5–45)
HGB BLD-MCNC: 10.4 G/DL — LOW (ref 11.5–15.5)
MAGNESIUM SERPL-MCNC: 1.9 MG/DL — SIGNIFICANT CHANGE UP (ref 1.6–2.6)
MCHC RBC-ENTMCNC: 29.6 PG — SIGNIFICANT CHANGE UP (ref 27–34)
MCHC RBC-ENTMCNC: 30.9 GM/DL — LOW (ref 32–36)
MCV RBC AUTO: 96 FL — SIGNIFICANT CHANGE UP (ref 80–100)
NRBC # BLD: 0 /100 WBCS — SIGNIFICANT CHANGE UP (ref 0–0)
NRBC # FLD: 0 K/UL — SIGNIFICANT CHANGE UP (ref 0–0)
OSMOLALITY SERPL: 291 MOSM/KG — SIGNIFICANT CHANGE UP (ref 275–295)
PHOSPHATE SERPL-MCNC: 3.1 MG/DL — SIGNIFICANT CHANGE UP (ref 2.5–4.5)
PLATELET # BLD AUTO: 526 K/UL — HIGH (ref 150–400)
POTASSIUM SERPL-MCNC: 4.1 MMOL/L — SIGNIFICANT CHANGE UP (ref 3.5–5.3)
POTASSIUM SERPL-SCNC: 4.1 MMOL/L — SIGNIFICANT CHANGE UP (ref 3.5–5.3)
PROT SERPL-MCNC: 6.5 G/DL — SIGNIFICANT CHANGE UP (ref 6–8.3)
RBC # BLD: 3.51 M/UL — LOW (ref 3.8–5.2)
RBC # FLD: 16.2 % — HIGH (ref 10.3–14.5)
SODIUM SERPL-SCNC: 140 MMOL/L — SIGNIFICANT CHANGE UP (ref 135–145)
TSH SERPL-MCNC: 0.68 UIU/ML — SIGNIFICANT CHANGE UP (ref 0.27–4.2)
WBC # BLD: 7.73 K/UL — SIGNIFICANT CHANGE UP (ref 3.8–10.5)
WBC # FLD AUTO: 7.73 K/UL — SIGNIFICANT CHANGE UP (ref 3.8–10.5)

## 2022-09-05 RX ADMIN — LOSARTAN POTASSIUM 100 MILLIGRAM(S): 100 TABLET, FILM COATED ORAL at 06:05

## 2022-09-05 RX ADMIN — Medication 1000 MILLIGRAM(S): at 11:46

## 2022-09-05 RX ADMIN — Medication 1000 MILLIGRAM(S): at 06:04

## 2022-09-05 RX ADMIN — MEROPENEM 100 MILLIGRAM(S): 1 INJECTION INTRAVENOUS at 17:39

## 2022-09-05 RX ADMIN — CARVEDILOL PHOSPHATE 6.25 MILLIGRAM(S): 80 CAPSULE, EXTENDED RELEASE ORAL at 06:06

## 2022-09-05 RX ADMIN — Medication 1000 MILLIGRAM(S): at 17:40

## 2022-09-05 RX ADMIN — Medication 1000 MILLIGRAM(S): at 07:00

## 2022-09-05 RX ADMIN — Medication 1000 MILLIGRAM(S): at 22:08

## 2022-09-05 RX ADMIN — Medication 100 MILLIGRAM(S): at 11:46

## 2022-09-05 RX ADMIN — HEPARIN SODIUM 5000 UNIT(S): 5000 INJECTION INTRAVENOUS; SUBCUTANEOUS at 22:11

## 2022-09-05 RX ADMIN — HEPARIN SODIUM 5000 UNIT(S): 5000 INJECTION INTRAVENOUS; SUBCUTANEOUS at 14:12

## 2022-09-05 RX ADMIN — HEPARIN SODIUM 5000 UNIT(S): 5000 INJECTION INTRAVENOUS; SUBCUTANEOUS at 06:05

## 2022-09-05 RX ADMIN — Medication 1 TABLET(S): at 11:46

## 2022-09-05 RX ADMIN — CARVEDILOL PHOSPHATE 6.25 MILLIGRAM(S): 80 CAPSULE, EXTENDED RELEASE ORAL at 17:39

## 2022-09-05 RX ADMIN — AMLODIPINE BESYLATE 2.5 MILLIGRAM(S): 2.5 TABLET ORAL at 06:08

## 2022-09-05 RX ADMIN — MEROPENEM 100 MILLIGRAM(S): 1 INJECTION INTRAVENOUS at 06:08

## 2022-09-05 RX ADMIN — PANTOPRAZOLE SODIUM 40 MILLIGRAM(S): 20 TABLET, DELAYED RELEASE ORAL at 06:05

## 2022-09-05 NOTE — PROVIDER CONTACT NOTE (OTHER) - DATE AND TIME:
31-Aug-2022 14:30
05-Sep-2022 10:10
25-Aug-2022 22:30
30-Aug-2022 06:30
24-Aug-2022 22:00
25-Aug-2022 20:15
04-Sep-2022 16:38

## 2022-09-05 NOTE — PROGRESS NOTE ADULT - SUBJECTIVE AND OBJECTIVE BOX
STATUS POST:  Exploratory laparotomy in adult    Exploratory laparotomy in adult    Open cholecystectomy    Hepatectomy, partial    Open portal lymphadenectomy    Open right hemicolectomy    Open cholecystectomy    Open portal lymphadenectomy    Hepatectomy, partial    CT head wo con        POST OPERATIVE DAY #:     Vital Signs Last 24 Hrs  T(C): 36.9 (05 Sep 2022 10:47), Max: 36.9 (05 Sep 2022 10:47)  T(F): 98.4 (05 Sep 2022 10:47), Max: 98.4 (05 Sep 2022 10:47)  HR: 86 (05 Sep 2022 10:47) (76 - 87)  BP: 110/47 (05 Sep 2022 10:47) (110/47 - 168/54)  BP(mean): --  RR: 18 (05 Sep 2022 10:47) (18 - 19)  SpO2: 97% (05 Sep 2022 10:47) (91% - 99%)    Parameters below as of 05 Sep 2022 10:47  Patient On (Oxygen Delivery Method): room air        I&O's Summary    04 Sep 2022 07:01  -  05 Sep 2022 07:00  --------------------------------------------------------  IN: 750 mL / OUT: 550 mL / NET: 200 mL        SUBJECTIVE: Pt seen at bedside. No new complaints.     Physical Exam:  General Appearance: Appears well, NAD  Chest: Equal expansion bilaterally, equal breath sounds  CV: Pulse regular presently  Abdomen: Soft, nondistended, nontender  Extremities: Grossly symmetric    LABS:                        10.4   7.73  )-----------( 526      ( 05 Sep 2022 06:00 )             33.7     09-05    140  |  100  |  10  ----------------------------<  87  4.1   |  31  |  0.58    Ca    10.4      05 Sep 2022 06:00  Phos  3.1     09-05  Mg     1.90     09-05    TPro  6.5  /  Alb  3.5  /  TBili  0.4  /  DBili  x   /  AST  27  /  ALT  22  /  AlkPhos  352<H>  09-05      Urinalysis Basic - ( 04 Sep 2022 20:28 )    Color: Yellow / Appearance: Clear / SG: >1.030 / pH: x  Gluc: x / Ketone: Trace  / Bili: Negative / Urobili: <2 mg/dL   Blood: x / Protein: >300 mg/dl / Nitrite: Negative   Leuk Esterase: Negative / RBC: x / WBC x   Sq Epi: x / Non Sq Epi: x / Bacteria: x        RADIOLOGY & ADDITIONAL STUDIES:

## 2022-09-05 NOTE — PROVIDER CONTACT NOTE (OTHER) - REASON
Patient with dark bloody bowel movement
unable to give scheduled am meds.
Vomitedx2 brown drainage
Patient hypertensive with SBP in the 170s
PRBC order discontinued after transfusion was started
Patient hypertensive with SBP 190s
Pt saturation dropped to 77%

## 2022-09-05 NOTE — PROGRESS NOTE ADULT - ASSESSMENT
Ms. Rich is an 87 year old lady with PMHx of HTN, HLD, GERD, who presents for bowel prep prior to surgery tomorrow. Has a history of gangrenous cholecystitis. Patient is scheduled to undergo exploratory laparotomy, cholecystectomy, possible bowel resection. Last time she attempted bowel prep at home, she got dizzy and fell. (18 Aug 2022 12:39). Previous admission she had hyponatremia and work up suggested polydipsia. Ms. Rich was fluid restricted and now is admitted to get bowel prep prior to surgery. Nephrology consulted to manage fluids and electrolytes.       Akalosis  Bicarb was elevated in setting of pleural effusion  Now improved  monitor    Hyponatremia  work up suggested dehydration  improved today  Monitor Na level    HTN  BP controlled  monitor bp and HR    hematuria and proteinuria  recent UTI  Repeat UA.     Anemia  Check iron studies, ferritin and retic.

## 2022-09-05 NOTE — PROVIDER CONTACT NOTE (OTHER) - SITUATION
Patient hypertensive with SBP in the 170s
Pt has NGT in place with no output. Vomited 300ml smelly brown drainage at 9am, Dr Mederosmade aware. Then at 10am pt vomited 250ml of same smelly brown liquid, Dr Mederos was again notified
Patient with dark bloody bowel movement.
Pt saturation dropped to 77% while asleep.
Patient hypertensive with /75  with 95% oxygen saturation on 1L nasal cannula. Manual /92.
pt sleeping unable to woken up to administer meds.
Patient was ordered for a unit of PRBCs. TRansfusion was started and order was discontinued shortly afterwards.

## 2022-09-05 NOTE — PROGRESS NOTE ADULT - SUBJECTIVE AND OBJECTIVE BOX
Patient is a 87y old  Female who presents with a chief complaint of Bowel prep for surgery tomorrow (05 Sep 2022 14:20)    Date of servie : 09-05-22 @ 16:47  INTERVAL HPI/OVERNIGHT EVENTS:  T(C): 37 (09-05-22 @ 16:10), Max: 37 (09-05-22 @ 16:10)  HR: 84 (09-05-22 @ 16:10) (76 - 87)  BP: 157/63 (09-05-22 @ 16:10) (110/47 - 168/54)  RR: 18 (09-05-22 @ 16:10) (18 - 18)  SpO2: 98% (09-05-22 @ 16:10) (96% - 99%)  Wt(kg): --  I&O's Summary    04 Sep 2022 07:01  -  05 Sep 2022 07:00  --------------------------------------------------------  IN: 750 mL / OUT: 550 mL / NET: 200 mL        LABS:                        10.4   7.73  )-----------( 526      ( 05 Sep 2022 06:00 )             33.7     09-05    140  |  100  |  10  ----------------------------<  87  4.1   |  31  |  0.58    Ca    10.4      05 Sep 2022 06:00  Phos  3.1     09-05  Mg     1.90     09-05    TPro  6.5  /  Alb  3.5  /  TBili  0.4  /  DBili  x   /  AST  27  /  ALT  22  /  AlkPhos  352<H>  09-05      Urinalysis Basic - ( 04 Sep 2022 20:28 )    Color: Yellow / Appearance: Clear / SG: >1.030 / pH: x  Gluc: x / Ketone: Trace  / Bili: Negative / Urobili: <2 mg/dL   Blood: x / Protein: >300 mg/dl / Nitrite: Negative   Leuk Esterase: Negative / RBC: x / WBC x   Sq Epi: x / Non Sq Epi: x / Bacteria: x      CAPILLARY BLOOD GLUCOSE            Urinalysis Basic - ( 04 Sep 2022 20:28 )    Color: Yellow / Appearance: Clear / SG: >1.030 / pH: x  Gluc: x / Ketone: Trace  / Bili: Negative / Urobili: <2 mg/dL   Blood: x / Protein: >300 mg/dl / Nitrite: Negative   Leuk Esterase: Negative / RBC: x / WBC x   Sq Epi: x / Non Sq Epi: x / Bacteria: x        MEDICATIONS  (STANDING):  acetaminophen     Tablet .. 1000 milliGRAM(s) Oral every 6 hours  amLODIPine   Tablet 2.5 milliGRAM(s) Oral daily  carvedilol 6.25 milliGRAM(s) Oral every 12 hours  heparin   Injectable 5000 Unit(s) SubCutaneous every 8 hours  losartan 100 milliGRAM(s) Oral daily  meropenem  IVPB 1000 milliGRAM(s) IV Intermittent every 12 hours  multivitamin 1 Tablet(s) Oral daily  pantoprazole    Tablet 40 milliGRAM(s) Oral before breakfast  thiamine 100 milliGRAM(s) Oral daily    MEDICATIONS  (PRN):          PHYSICAL EXAM:  GENERAL: NAD, well-groomed, well-developed  HEAD:  Atraumatic, Normocephalic  CHEST/LUNG: Clear to percussion bilaterally; No rales, rhonchi, wheezing, or rubs  HEART: Regular rate and rhythm; No murmurs, rubs, or gallops  ABDOMEN: Soft, Nontender, Nondistended; Bowel sounds present  EXTREMITIES:  2+ Peripheral Pulses, No clubbing, cyanosis, or edema  LYMPH: No lymphadenopathy noted  SKIN: No rashes or lesions    Care Discussed with Consultants/Other Providers [ ] YES  [ ] NO

## 2022-09-05 NOTE — PROVIDER CONTACT NOTE (OTHER) - NAME OF MD/NP/PA/DO NOTIFIED:
ACP Ortiz Jensen
ROHAN Glass MD
Dr Mederos terri
Surgery D Team resident Singh LION
Tyrel LEWIS team during am rounds.
Radha Espino MD
KARISSA Glass MD

## 2022-09-05 NOTE — PROGRESS NOTE ADULT - ASSESSMENT
- patient has no abdominal pain and is currently tolerating regular diet well, has adequate GI function  - will continue to follow  - rest of care as per primary team     D team [id number]

## 2022-09-05 NOTE — PROGRESS NOTE ADULT - SUBJECTIVE AND OBJECTIVE BOX
Date of service 9/5/22    extended hpi:  87F PMHx of recent SAH/SDH, HTN, HLD and GERD who presented for open cholecystectomy, portal lymphadenectomy, liver segment 4b / 5 resection and right hemicolectomy on 8/19/22 she was unable to be extubated at the end of the case due to sedation and inadequate ventilation hence was admitted to SICU.  Her course was further c/b pulmonary edema requiring diuresis in SICU and hypoxemic and hypercapnic respiratory insufficiency requiring supplemental O2 and chest physiotherapy.     resting comfortably after surgery. no new complaints    Review of Systems:   Constitutional: [ ] fevers, [ ] chills.   Skin: [ ] dry skin. [ ] rashes.  Psychiatric: [ ] depression, [ ] anxiety.   Gastrointestinal: [ ] BRBPR, [ ] melena.   Neurological: [ ] confusion. [ ] seizures. [ ] shuffling gait.   Ears,Nose,Mouth and Throat: [ ] ear pain [ ] sore throat.   Eyes: [ ] diplopia.   Respiratory: [ ] hemoptysis. [ ] shortness of breath  Cardiovascular: See HPI above  Hematologic/Lymphatic: [ ] anemia. [ ] painful nodes. [ ] prolonged bleeding.   Genitourinary: [ ] hematuria. [ ] flank pain.   Endocrine: [ ] significant change in weight. [ ] intolerance to heat and cold.     Review of systems [ x] otherwise negative, [ ] otherwise unable to obtain    FH: no family history of sudden cardiac death in first degree relatives    SH: [ ] tobacco, [ ] alcohol, [ ] drugs         acetaminophen     Tablet .. 1000 milliGRAM(s) Oral every 6 hours  amLODIPine   Tablet 2.5 milliGRAM(s) Oral daily  carvedilol 6.25 milliGRAM(s) Oral every 12 hours  heparin   Injectable 5000 Unit(s) SubCutaneous every 8 hours  losartan 100 milliGRAM(s) Oral daily  meropenem  IVPB 1000 milliGRAM(s) IV Intermittent every 12 hours  multivitamin 1 Tablet(s) Oral daily  pantoprazole    Tablet 40 milliGRAM(s) Oral before breakfast  thiamine 100 milliGRAM(s) Oral daily                            10.4   7.73  )-----------( 526      ( 05 Sep 2022 06:00 )             33.7       09-05    140  |  100  |  10  ----------------------------<  87  4.1   |  31  |  0.58    Ca    10.4      05 Sep 2022 06:00  Phos  3.1     09-05  Mg     1.90     09-05    TPro  6.5  /  Alb  3.5  /  TBili  0.4  /  DBili  x   /  AST  27  /  ALT  22  /  AlkPhos  352<H>  09-05            T(C): 37 (09-05-22 @ 16:10), Max: 37 (09-05-22 @ 16:10)  HR: 84 (09-05-22 @ 16:10) (76 - 87)  BP: 157/63 (09-05-22 @ 16:10) (110/47 - 168/54)  RR: 18 (09-05-22 @ 16:10) (18 - 18)  SpO2: 98% (09-05-22 @ 16:10) (96% - 99%)  Wt(kg): --    I&O's Summary    04 Sep 2022 07:01  -  05 Sep 2022 07:00  --------------------------------------------------------  IN: 750 mL / OUT: 550 mL / NET: 200 mL          General: Well nourished in no acute distress.   Head: Normocephalic and atraumatic.   Neck: No JVD. No bruits. Supple. Does not appear to be enlarged.   Cardiovascular: + S1,S2 ; RRR Soft systolic murmur at the left lower sternal border. No rubs noted.    Lungs: CTA b/l. No rhonchi, rales or wheezes.   Abdomen: + BS, soft. Non tender. Non distended. No rebound. No guarding.   Extremities: No clubbing/cyanosis/edema.   Neurologic: Moves all four extremities. Full range of motion.   Skin: Warm and moist. The patient's skin has normal elasticity and good skin turgor.   Psychiatric: Appropriate mood and affect.  Musculoskeletal: Normal range of motion, normal strength      DATA    tele- SR    < from: Transthoracic Echocardiogram (08.27.22 @ 12:22) >  CONCLUSIONS:  1. Mitral annular calcification and calcified mitral  leaflets with normal diastolic opening. Minimal mitral  regurgitation.  2. Calcified trileaflet aortic valve with normal opening.  Mild aortic regurgitation.  3. Mildly dilated left atrium.  LA volume index = 35 cc/m2.  4. Hyperdynamic left ventricle.  5. Normal right ventricular size and function.  *** Compared with echocardiogram of 5/5/2022, no  significant changes noted.  ------------------------------------------------------------------------  Confirmed on  8/27/2022 - 16:16:07 by Jae Cortes M.D. RPVI    < end of copied text >      ASSESSMENT/PLAN: Pt is a 87F PMHx of recent SAH/SDH, HTN, HLD and GERD who presented for open cholecystectomy, portal lymphadenectomy, liver segment 4b / 5 resection and right hemicolectomy on 8/19/22 she was unable to be extubated at the end of the case due to sedation and inadequate ventilation hence was admitted to SICU.  Her course was further c/b pulmonary edema requiring diuresis in SICU and hypoxemic and hypercapnic respiratory insufficiency requiring supplemental O2 and chest physiotherapy.     - acute hypoxic failure likely primary pulmonary process, she has a WBC count and is improving with chest PT and ABX  - defer ABX to pulm  -  cont coreg  - EKG non-ischemic but with LVH, ECHo with normal LV wall thickness  - c/w Losartan      Nick Brooks M.D.  Cardiac Electrophysiology  [phone]

## 2022-09-05 NOTE — PROGRESS NOTE ADULT - ASSESSMENT
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    Comfortable.  Continues to require supplemental oxygen.    Afebrile, with stable vital signs.    Abdomen soft and nontender.    No significant laboratory abnormalities, elevated alkaline phosphatases expected given her recent biliary operation.    Continue to follow

## 2022-09-05 NOTE — PROGRESS NOTE ADULT - SUBJECTIVE AND OBJECTIVE BOX
[facility] [place] [facility]   MD KRISTEN MARQUES MD KRISTINE SOLTANPOUR, DO ANGELA WONG, PA    TEL:  OFFICE: [phone]  From 5pm-7am Answering Service [phone]    -- RENAL FOLLOW UP NOTE ---Date of Service 09-05-22 @ 14:22    Patient is a 87y old  Female who presents with a chief complaint of Bowel prep for surgery tomorrow (05 Sep 2022 10:58)      Patient seen and examined at bedside. No chest pain/sob    VITALS:  T(F): 98.4 (09-05-22 @ 10:47), Max: 98.4 (09-05-22 @ 10:47)  HR: 86 (09-05-22 @ 10:47)  BP: 110/47 (09-05-22 @ 10:47)  RR: 18 (09-05-22 @ 10:47)  SpO2: 97% (09-05-22 @ 10:47)  Wt(kg): --    09-04 @ 07:01  -  09-05 @ 07:00  --------------------------------------------------------  IN: 750 mL / OUT: 550 mL / NET: 200 mL          PHYSICAL EXAM:  General: NAD  Neck: No JVD  Respiratory: crackles  Cardiovascular: S1, S2, RRR  Gastrointestinal: BS+, soft, NT/ND  Extremities: No peripheral edema    Hospital Medications:   MEDICATIONS  (STANDING):  acetaminophen     Tablet .. 1000 milliGRAM(s) Oral every 6 hours  amLODIPine   Tablet 2.5 milliGRAM(s) Oral daily  carvedilol 6.25 milliGRAM(s) Oral every 12 hours  heparin   Injectable 5000 Unit(s) SubCutaneous every 8 hours  losartan 100 milliGRAM(s) Oral daily  meropenem  IVPB 1000 milliGRAM(s) IV Intermittent every 12 hours  multivitamin 1 Tablet(s) Oral daily  pantoprazole    Tablet 40 milliGRAM(s) Oral before breakfast  thiamine 100 milliGRAM(s) Oral daily      LABS:  09-05    140  |  100  |  10  ----------------------------<  87  4.1   |  31  |  0.58    Ca    10.4      05 Sep 2022 06:00  Phos  3.1     09-05  Mg     1.90     09-05    TPro  6.5  /  Alb  3.5  /  TBili  0.4  /  DBili      /  AST  27  /  ALT  22  /  AlkPhos  352<H>  09-05    Creatinine Trend: 0.58 <--, 0.51 <--, 0.58 <--, 0.56 <--, 0.59 <--, 0.63 <--, 0.73 <--    Albumin, Serum: 3.5 g/dL (09-05 @ 06:00)  Phosphorus Level, Serum: 3.1 mg/dL (09-05 @ 06:00)                              10.4   7.73  )-----------( 526      ( 05 Sep 2022 06:00 )             33.7     Urine Studies:  Urinalysis - [09-04-22 @ 20:28]      Color Yellow / Appearance Clear / SG >1.030 / pH 6.0      Gluc Negative / Ketone Trace  / Bili Negative / Urobili <2 mg/dL       Blood Negative / Protein >300 mg/dl / Leuk Est Negative / Nitrite Negative      RBC  / WBC  / Hyaline  / Gran  / Sq Epi  / Non Sq Epi  / Bacteria     Urine Sodium 23      [09-04-22 @ 20:28]  Urine Osmolality 462      [09-04-22 @ 20:28]    PTH -- (Ca --)      [05-04-22 @ 16:19]   29  PTH -- (Ca --)      [05-04-22 @ 12:06]   31  Vitamin D (25OH) 53.8      [05-17-22 @ 07:15]  TSH 0.68      [09-05-22 @ 06:00]  Lipid: chol 123, , HDL 43, LDL --      [05-28-22 @ 05:29]        RADIOLOGY & ADDITIONAL STUDIES:

## 2022-09-06 LAB
ALBUMIN SERPL ELPH-MCNC: 3.2 G/DL — LOW (ref 3.3–5)
ALP SERPL-CCNC: 289 U/L — HIGH (ref 40–120)
ALT FLD-CCNC: 23 U/L — SIGNIFICANT CHANGE UP (ref 4–33)
ANION GAP SERPL CALC-SCNC: 10 MMOL/L — SIGNIFICANT CHANGE UP (ref 7–14)
AST SERPL-CCNC: 23 U/L — SIGNIFICANT CHANGE UP (ref 4–32)
BILIRUB SERPL-MCNC: 0.4 MG/DL — SIGNIFICANT CHANGE UP (ref 0.2–1.2)
BUN SERPL-MCNC: 11 MG/DL — SIGNIFICANT CHANGE UP (ref 7–23)
CALCIUM SERPL-MCNC: 9.9 MG/DL — SIGNIFICANT CHANGE UP (ref 8.4–10.5)
CHLORIDE SERPL-SCNC: 99 MMOL/L — SIGNIFICANT CHANGE UP (ref 98–107)
CO2 SERPL-SCNC: 27 MMOL/L — SIGNIFICANT CHANGE UP (ref 22–31)
CREAT SERPL-MCNC: 0.57 MG/DL — SIGNIFICANT CHANGE UP (ref 0.5–1.3)
EGFR: 88 ML/MIN/1.73M2 — SIGNIFICANT CHANGE UP
GLUCOSE SERPL-MCNC: 83 MG/DL — SIGNIFICANT CHANGE UP (ref 70–99)
HCT VFR BLD CALC: 30.9 % — LOW (ref 34.5–45)
HGB BLD-MCNC: 9.5 G/DL — LOW (ref 11.5–15.5)
MAGNESIUM SERPL-MCNC: 1.8 MG/DL — SIGNIFICANT CHANGE UP (ref 1.6–2.6)
MCHC RBC-ENTMCNC: 29.2 PG — SIGNIFICANT CHANGE UP (ref 27–34)
MCHC RBC-ENTMCNC: 30.7 GM/DL — LOW (ref 32–36)
MCV RBC AUTO: 95.1 FL — SIGNIFICANT CHANGE UP (ref 80–100)
NRBC # BLD: 0 /100 WBCS — SIGNIFICANT CHANGE UP (ref 0–0)
NRBC # FLD: 0 K/UL — SIGNIFICANT CHANGE UP (ref 0–0)
PHOSPHATE SERPL-MCNC: 2.8 MG/DL — SIGNIFICANT CHANGE UP (ref 2.5–4.5)
PLATELET # BLD AUTO: 459 K/UL — HIGH (ref 150–400)
POTASSIUM SERPL-MCNC: 4 MMOL/L — SIGNIFICANT CHANGE UP (ref 3.5–5.3)
POTASSIUM SERPL-SCNC: 4 MMOL/L — SIGNIFICANT CHANGE UP (ref 3.5–5.3)
PROT SERPL-MCNC: 6.2 G/DL — SIGNIFICANT CHANGE UP (ref 6–8.3)
RBC # BLD: 3.25 M/UL — LOW (ref 3.8–5.2)
RBC # FLD: 16.3 % — HIGH (ref 10.3–14.5)
SARS-COV-2 RNA SPEC QL NAA+PROBE: SIGNIFICANT CHANGE UP
SODIUM SERPL-SCNC: 136 MMOL/L — SIGNIFICANT CHANGE UP (ref 135–145)
WBC # BLD: 6.73 K/UL — SIGNIFICANT CHANGE UP (ref 3.8–10.5)
WBC # FLD AUTO: 6.73 K/UL — SIGNIFICANT CHANGE UP (ref 3.8–10.5)

## 2022-09-06 RX ADMIN — Medication 1000 MILLIGRAM(S): at 06:07

## 2022-09-06 RX ADMIN — AMLODIPINE BESYLATE 2.5 MILLIGRAM(S): 2.5 TABLET ORAL at 06:08

## 2022-09-06 RX ADMIN — HEPARIN SODIUM 5000 UNIT(S): 5000 INJECTION INTRAVENOUS; SUBCUTANEOUS at 14:21

## 2022-09-06 RX ADMIN — HEPARIN SODIUM 5000 UNIT(S): 5000 INJECTION INTRAVENOUS; SUBCUTANEOUS at 06:07

## 2022-09-06 RX ADMIN — CARVEDILOL PHOSPHATE 6.25 MILLIGRAM(S): 80 CAPSULE, EXTENDED RELEASE ORAL at 06:08

## 2022-09-06 RX ADMIN — LOSARTAN POTASSIUM 100 MILLIGRAM(S): 100 TABLET, FILM COATED ORAL at 06:08

## 2022-09-06 RX ADMIN — Medication 1000 MILLIGRAM(S): at 18:19

## 2022-09-06 RX ADMIN — Medication 1 TABLET(S): at 14:19

## 2022-09-06 RX ADMIN — Medication 100 MILLIGRAM(S): at 14:19

## 2022-09-06 RX ADMIN — PANTOPRAZOLE SODIUM 40 MILLIGRAM(S): 20 TABLET, DELAYED RELEASE ORAL at 06:08

## 2022-09-06 RX ADMIN — Medication 1000 MILLIGRAM(S): at 22:19

## 2022-09-06 RX ADMIN — CARVEDILOL PHOSPHATE 6.25 MILLIGRAM(S): 80 CAPSULE, EXTENDED RELEASE ORAL at 18:16

## 2022-09-06 RX ADMIN — MEROPENEM 100 MILLIGRAM(S): 1 INJECTION INTRAVENOUS at 06:10

## 2022-09-06 RX ADMIN — HEPARIN SODIUM 5000 UNIT(S): 5000 INJECTION INTRAVENOUS; SUBCUTANEOUS at 22:08

## 2022-09-06 NOTE — PROGRESS NOTE ADULT - ASSESSMENT
Ms. Rich is an 87 year old lady with PMHx of HTN, HLD, GERD, who presents for bowel prep prior to surgery tomorrow. Has a history of gangrenous cholecystitis. Patient is scheduled to undergo exploratory laparotomy, cholecystectomy, possible bowel resection. Last time she attempted bowel prep at home, she got dizzy and fell. (18 Aug 2022 12:39). Previous admission she had hyponatremia and work up suggested polydipsia. Ms. Rich was fluid restricted and now is admitted to get bowel prep prior to surgery. Nephrology consulted to manage fluids and electrolytes.       Akalosis  Bicarb was elevated in setting of pleural effusion  Now improved  monitor    Hyponatremia  work up suggested dehydration  improved today  Monitor Na level    HTN  BP controlled  monitor bp and HR    hematuria and proteinuria  recent UTI  repeat UA with no hematuria however elevated proteinuria  check urine p/c ratio    Anemia  Check iron studies, ferritin and retic.

## 2022-09-06 NOTE — PROGRESS NOTE ADULT - SUBJECTIVE AND OBJECTIVE BOX
[facility] [place] [facility]   MD KRISTEN MARQUES MD KRISTINE SOLTANPOUR, DO ANGELA WONG, PA    TEL:  OFFICE: [phone]  From 5pm-7am Answering Service [phone]    -- RENAL FOLLOW UP NOTE ---Date of Service 09-06-22 @ 12:23    Patient is a 87y old  Female who presents with a chief complaint of Bowel prep for surgery tomorrow (06 Sep 2022 07:43)      Patient seen and examined at bedside. No chest pain/sob    VITALS:  T(F): 99 (09-06-22 @ 12:05), Max: 99 (09-06-22 @ 12:05)  HR: 75 (09-06-22 @ 12:05)  BP: 133/57 (09-06-22 @ 12:05)  RR: 18 (09-06-22 @ 12:05)  SpO2: 98% (09-06-22 @ 12:05)  Wt(kg): --    09-05 @ 07:01  -  09-06 @ 07:00  --------------------------------------------------------  IN: 240 mL / OUT: 600 mL / NET: -360 mL          PHYSICAL EXAM:  General: NAD  Neck: No JVD  Respiratory: left base crackles  Cardiovascular: S1, S2, RRR  Gastrointestinal: BS+, soft, NT/ND  Extremities: No peripheral edema    Hospital Medications:   MEDICATIONS  (STANDING):  acetaminophen     Tablet .. 1000 milliGRAM(s) Oral every 6 hours  amLODIPine   Tablet 2.5 milliGRAM(s) Oral daily  carvedilol 6.25 milliGRAM(s) Oral every 12 hours  heparin   Injectable 5000 Unit(s) SubCutaneous every 8 hours  losartan 100 milliGRAM(s) Oral daily  multivitamin 1 Tablet(s) Oral daily  pantoprazole    Tablet 40 milliGRAM(s) Oral before breakfast  thiamine 100 milliGRAM(s) Oral daily      LABS:  09-06    136  |  99  |  11  ----------------------------<  83  4.0   |  27  |  0.57    Ca    9.9      06 Sep 2022 04:54  Phos  2.8     09-06  Mg     1.80     09-06    TPro  6.2  /  Alb  3.2<L>  /  TBili  0.4  /  DBili      /  AST  23  /  ALT  23  /  AlkPhos  289<H>  09-06    Creatinine Trend: 0.57 <--, 0.58 <--, 0.51 <--, 0.58 <--, 0.56 <--, 0.59 <--, 0.63 <--    Albumin, Serum: 3.2 g/dL (09-06 @ 04:54)  Phosphorus Level, Serum: 2.8 mg/dL (09-06 @ 04:54)                              9.5    6.73  )-----------( 459      ( 06 Sep 2022 04:54 )             30.9     Urine Studies:  Urinalysis - [09-04-22 @ 20:28]      Color Yellow / Appearance Clear / SG >1.030 / pH 6.0      Gluc Negative / Ketone Trace  / Bili Negative / Urobili <2 mg/dL       Blood Negative / Protein >300 mg/dl / Leuk Est Negative / Nitrite Negative      RBC  / WBC  / Hyaline  / Gran  / Sq Epi  / Non Sq Epi  / Bacteria     Urine Sodium 23      [09-04-22 @ 20:28]  Urine Osmolality 462      [09-04-22 @ 20:28]    PTH -- (Ca --)      [05-04-22 @ 16:19]   29  PTH -- (Ca --)      [05-04-22 @ 12:06]   31  Vitamin D (25OH) 53.8      [05-17-22 @ 07:15]  TSH 0.68      [09-05-22 @ 06:00]  Lipid: chol 123, , HDL 43, LDL --      [05-28-22 @ 05:29]        RADIOLOGY & ADDITIONAL STUDIES:

## 2022-09-06 NOTE — PROGRESS NOTE ADULT - SUBJECTIVE AND OBJECTIVE BOX
Patient is a 87y old  Female who presents with a chief complaint of Bowel prep for surgery tomorrow (06 Sep 2022 12:22)    Date of servie : 09-06-22 @ 14:12  INTERVAL HPI/OVERNIGHT EVENTS:  T(C): 37.2 (09-06-22 @ 12:05), Max: 37.2 (09-06-22 @ 05:30)  HR: 75 (09-06-22 @ 12:05) (75 - 91)  BP: 133/57 (09-06-22 @ 12:05) (132/51 - 181/68)  RR: 18 (09-06-22 @ 12:05) (18 - 19)  SpO2: 98% (09-06-22 @ 12:05) (94% - 99%)  Wt(kg): --  I&O's Summary    05 Sep 2022 07:01  -  06 Sep 2022 07:00  --------------------------------------------------------  IN: 240 mL / OUT: 600 mL / NET: -360 mL        LABS:                        9.5    6.73  )-----------( 459      ( 06 Sep 2022 04:54 )             30.9     09-06    136  |  99  |  11  ----------------------------<  83  4.0   |  27  |  0.57    Ca    9.9      06 Sep 2022 04:54  Phos  2.8     09-06  Mg     1.80     09-06    TPro  6.2  /  Alb  3.2<L>  /  TBili  0.4  /  DBili  x   /  AST  23  /  ALT  23  /  AlkPhos  289<H>  09-06      Urinalysis Basic - ( 04 Sep 2022 20:28 )    Color: Yellow / Appearance: Clear / SG: >1.030 / pH: x  Gluc: x / Ketone: Trace  / Bili: Negative / Urobili: <2 mg/dL   Blood: x / Protein: >300 mg/dl / Nitrite: Negative   Leuk Esterase: Negative / RBC: x / WBC x   Sq Epi: x / Non Sq Epi: x / Bacteria: x      CAPILLARY BLOOD GLUCOSE            Urinalysis Basic - ( 04 Sep 2022 20:28 )    Color: Yellow / Appearance: Clear / SG: >1.030 / pH: x  Gluc: x / Ketone: Trace  / Bili: Negative / Urobili: <2 mg/dL   Blood: x / Protein: >300 mg/dl / Nitrite: Negative   Leuk Esterase: Negative / RBC: x / WBC x   Sq Epi: x / Non Sq Epi: x / Bacteria: x        MEDICATIONS  (STANDING):  acetaminophen     Tablet .. 1000 milliGRAM(s) Oral every 6 hours  amLODIPine   Tablet 2.5 milliGRAM(s) Oral daily  carvedilol 6.25 milliGRAM(s) Oral every 12 hours  heparin   Injectable 5000 Unit(s) SubCutaneous every 8 hours  losartan 100 milliGRAM(s) Oral daily  multivitamin 1 Tablet(s) Oral daily  pantoprazole    Tablet 40 milliGRAM(s) Oral before breakfast  thiamine 100 milliGRAM(s) Oral daily    MEDICATIONS  (PRN):          PHYSICAL EXAM:  GENERAL: NAD, well-groomed, well-developed  HEAD:  Atraumatic, Normocephalic  CHEST/LUNG: Clear to percussion bilaterally; No rales, rhonchi, wheezing, or rubs  HEART: Regular rate and rhythm; No murmurs, rubs, or gallops  ABDOMEN: Soft, Nontender, Nondistended; Bowel sounds present  EXTREMITIES:  2+ Peripheral Pulses, No clubbing, cyanosis, or edema  LYMPH: No lymphadenopathy noted  SKIN: No rashes or lesions    Care Discussed with Consultants/Other Providers [ ] YES  [ ] NO

## 2022-09-06 NOTE — PROGRESS NOTE ADULT - SUBJECTIVE AND OBJECTIVE BOX
Vital Signs Last 24 Hrs  T(C): 37.2 (06 Sep 2022 05:30), Max: 37.2 (06 Sep 2022 05:30)  T(F): 98.9 (06 Sep 2022 05:30), Max: 98.9 (06 Sep 2022 05:30)  HR: 91 (06 Sep 2022 05:30) (83 - 91)  BP: 147/55 (06 Sep 2022 06:52) (110/47 - 181/68)  BP(mean): --  RR: 19 (06 Sep 2022 05:30) (18 - 19)  SpO2: 99% (06 Sep 2022 05:30) (95% - 99%)    Parameters below as of 06 Sep 2022 05:30  Patient On (Oxygen Delivery Method): nasal cannula  O2 Flow (L/min): 3      I&O's Detail    05 Sep 2022 07:01  -  06 Sep 2022 07:00  --------------------------------------------------------  IN:    Oral Fluid: 240 mL  Total IN: 240 mL    OUT:    Voided (mL): 600 mL  Total OUT: 600 mL    Total NET: -360 mL                                9.5    6.73  )-----------( 459      ( 06 Sep 2022 04:54 )             30.9       09-06    136  |  99  |  11  ----------------------------<  83  4.0   |  27  |  0.57    Ca    9.9      06 Sep 2022 04:54  Phos  2.8     09-06  Mg     1.80     09-06    TPro  6.2  /  Alb  3.2<L>  /  TBili  0.4  /  DBili  x   /  AST  23  /  ALT  23  /  AlkPhos  289<H>  09-06    mentation improved  oxygen saturation improved          PLAN:  Continue pulmonary management as per pulmonology  Physical therapy

## 2022-09-06 NOTE — PROGRESS NOTE ADULT - ASSESSMENT
87 year old female with PMH of HTN, HLD, GERD, who presents for bowel prep  s/p operative intervention now medicine consulted for ongoing confusion and pleural     Problem/Recommendation - 1:  ·  Problem: Anemia.   ·  Recommendation: likely multifactorial  poor nutrition, post op, chronic inflammation  will continue to monitor  transfusion as needed if < 7.    Problem/Recommendation - 2:  ·  Problem: Hypertension.   ·  Recommendation: occasionally uncontrolled  will increase losartan to 100 and metoprolol to 50 BIDadded norvasc     Problem/Recommendation - 3:  ·  Problem: Pleural effusion.   ·  Recommendation: management per pulmonary  continue to follow recommendations.    Problem/Recommendation - 4:  ·  Problem: Hyperlipidemia.   ·  Recommendation: no intervention at this time  outpatient fasting lipid panel.    Problem/Recommendation - 5:  ·  Problem: Other gallbladder disorder. ·  Recommendation: s/p open rolanda  management per chriss loyd

## 2022-09-06 NOTE — CHART NOTE - NSCHARTNOTEFT_GEN_A_CORE
Previously PT recommended rehab, PT saw patient today and recommended home PT with rolling walker. Met with patient to discuss dc planning. Per pt, up to her family members where she goes. Tried to contact patient's family member Luke Cross at [phone], three times, however no one picked up. To follow up with CM tomorrow to help with dc planning process.     Herber WEST [id number]

## 2022-09-06 NOTE — PROGRESS NOTE ADULT - SUBJECTIVE AND OBJECTIVE BOX
DATE OF SERVICE: 09-06-22    Patient denies chest pain or shortness of breath.   Review of symptoms otherwise negative.    MEDICATIONS:  acetaminophen     Tablet .. 1000 milliGRAM(s) Oral every 6 hours  amLODIPine   Tablet 2.5 milliGRAM(s) Oral daily  carvedilol 6.25 milliGRAM(s) Oral every 12 hours  heparin   Injectable 5000 Unit(s) SubCutaneous every 8 hours  losartan 100 milliGRAM(s) Oral daily  multivitamin 1 Tablet(s) Oral daily  pantoprazole    Tablet 40 milliGRAM(s) Oral before breakfast  thiamine 100 milliGRAM(s) Oral daily      LABS:                        9.5    6.73  )-----------( 459      ( 06 Sep 2022 04:54 )             30.9       Hemoglobin: 9.5 g/dL (09-06 @ 04:54)  Hemoglobin: 10.4 g/dL (09-05 @ 06:00)  Hemoglobin: 10.1 g/dL (09-04 @ 05:28)  Hemoglobin: 9.7 g/dL (09-03 @ 05:40)  Hemoglobin: 9.6 g/dL (09-02 @ 05:13)      09-06    136  |  99  |  11  ----------------------------<  83  4.0   |  27  |  0.57    Ca    9.9      06 Sep 2022 04:54  Phos  2.8     09-06  Mg     1.80     09-06    TPro  6.2  /  Alb  3.2<L>  /  TBili  0.4  /  DBili  x   /  AST  23  /  ALT  23  /  AlkPhos  289<H>  09-06    Creatinine Trend: 0.57<--, 0.58<--, 0.51<--, 0.58<--, 0.56<--, 0.59<--    COAGS:           PHYSICAL EXAM:  T(C): 37 (09-06-22 @ 20:42), Max: 37.2 (09-06-22 @ 05:30)  HR: 91 (09-06-22 @ 20:42) (75 - 94)  BP: 146/50 (09-06-22 @ 20:42) (132/51 - 181/68)  RR: 18 (09-06-22 @ 20:42) (18 - 19)  SpO2: 97% (09-06-22 @ 20:42) (94% - 100%)  Wt(kg): --    I&O's Summary    05 Sep 2022 07:01  -  06 Sep 2022 07:00  --------------------------------------------------------  IN: 240 mL / OUT: 600 mL / NET: -360 mL          General: Well nourished in no acute distress.   Head: Normocephalic and atraumatic.   Neck: No JVD. No bruits. Supple. Does not appear to be enlarged.   Cardiovascular: + S1,S2 ; RRR Soft systolic murmur at the left lower sternal border. No rubs noted.    Lungs: CTA b/l. No rhonchi, rales or wheezes.   Abdomen: + BS, soft. Non tender. Non distended. No rebound. No guarding.   Extremities: No clubbing/cyanosis/edema.   Neurologic: Moves all four extremities. Full range of motion.   Skin: Warm and moist. The patient's skin has normal elasticity and good skin turgor.   Psychiatric: Appropriate mood and affect.  Musculoskeletal: Normal range of motion, normal strength      DATA    tele- SR    < from: Transthoracic Echocardiogram (08.27.22 @ 12:22) >  CONCLUSIONS:  1. Mitral annular calcification and calcified mitral  leaflets with normal diastolic opening. Minimal mitral  regurgitation.  2. Calcified trileaflet aortic valve with normal opening.  Mild aortic regurgitation.  3. Mildly dilated left atrium.  LA volume index = 35 cc/m2.  4. Hyperdynamic left ventricle.  5. Normal right ventricular size and function.  *** Compared with echocardiogram of 5/5/2022, no  significant changes noted.  ------------------------------------------------------------------------  Confirmed on  8/27/2022 - 16:16:07 by Jae Cortes M.D. VI    < end of copied text >      ASSESSMENT/PLAN: Pt is a 87F PMHx of recent SAH/SDH, HTN, HLD and GERD who presented for open cholecystectomy, portal lymphadenectomy, liver segment 4b / 5 resection and right hemicolectomy on 8/19/22 she was unable to be extubated at the end of the case due to sedation and inadequate ventilation hence was admitted to SICU.  Her course was further c/b pulmonary edema requiring diuresis in SICU and hypoxemic and hypercapnic respiratory insufficiency requiring supplemental O2 and chest physiotherapy.     - acute hypoxic failure likely primary pulmonary process, she has a WBC count and is improving with chest PT and ABX  - defer ABX to pulm  -  cont coreg can increase dose to 12.5 mg BID, c/w losartan  - EKG non-ischemic but with LVH, ECHO with normal LV wall thickness      Nick Brooks M.D.  Cardiac Electrophysiology  [phone]

## 2022-09-07 ENCOUNTER — TRANSCRIPTION ENCOUNTER (OUTPATIENT)
Age: 87
End: 2022-09-07

## 2022-09-07 VITALS
OXYGEN SATURATION: 98 % | DIASTOLIC BLOOD PRESSURE: 58 MMHG | HEART RATE: 83 BPM | TEMPERATURE: 99 F | RESPIRATION RATE: 18 BRPM | SYSTOLIC BLOOD PRESSURE: 141 MMHG

## 2022-09-07 DIAGNOSIS — J90 PLEURAL EFFUSION, NOT ELSEWHERE CLASSIFIED: ICD-10-CM

## 2022-09-07 LAB
ALBUMIN SERPL ELPH-MCNC: 3 G/DL — LOW (ref 3.3–5)
ALP SERPL-CCNC: 280 U/L — HIGH (ref 40–120)
ALT FLD-CCNC: 21 U/L — SIGNIFICANT CHANGE UP (ref 4–33)
ANION GAP SERPL CALC-SCNC: 8 MMOL/L — SIGNIFICANT CHANGE UP (ref 7–14)
AST SERPL-CCNC: 25 U/L — SIGNIFICANT CHANGE UP (ref 4–32)
BASOPHILS # BLD AUTO: 0.1 K/UL — SIGNIFICANT CHANGE UP (ref 0–0.2)
BASOPHILS NFR BLD AUTO: 1.4 % — SIGNIFICANT CHANGE UP (ref 0–2)
BILIRUB SERPL-MCNC: 0.4 MG/DL — SIGNIFICANT CHANGE UP (ref 0.2–1.2)
BUN SERPL-MCNC: 9 MG/DL — SIGNIFICANT CHANGE UP (ref 7–23)
CALCIUM SERPL-MCNC: 10.1 MG/DL — SIGNIFICANT CHANGE UP (ref 8.4–10.5)
CHLORIDE SERPL-SCNC: 103 MMOL/L — SIGNIFICANT CHANGE UP (ref 98–107)
CO2 SERPL-SCNC: 27 MMOL/L — SIGNIFICANT CHANGE UP (ref 22–31)
CREAT SERPL-MCNC: 0.58 MG/DL — SIGNIFICANT CHANGE UP (ref 0.5–1.3)
EGFR: 88 ML/MIN/1.73M2 — SIGNIFICANT CHANGE UP
EOSINOPHIL # BLD AUTO: 0.24 K/UL — SIGNIFICANT CHANGE UP (ref 0–0.5)
EOSINOPHIL NFR BLD AUTO: 3.3 % — SIGNIFICANT CHANGE UP (ref 0–6)
GLUCOSE SERPL-MCNC: 87 MG/DL — SIGNIFICANT CHANGE UP (ref 70–99)
HCT VFR BLD CALC: 31.1 % — LOW (ref 34.5–45)
HGB BLD-MCNC: 9.5 G/DL — LOW (ref 11.5–15.5)
IANC: 4.91 K/UL — SIGNIFICANT CHANGE UP (ref 1.8–7.4)
IMM GRANULOCYTES NFR BLD AUTO: 0.7 % — SIGNIFICANT CHANGE UP (ref 0–1.5)
LYMPHOCYTES # BLD AUTO: 1.21 K/UL — SIGNIFICANT CHANGE UP (ref 1–3.3)
LYMPHOCYTES # BLD AUTO: 16.7 % — SIGNIFICANT CHANGE UP (ref 13–44)
MAGNESIUM SERPL-MCNC: 1.8 MG/DL — SIGNIFICANT CHANGE UP (ref 1.6–2.6)
MCHC RBC-ENTMCNC: 29.8 PG — SIGNIFICANT CHANGE UP (ref 27–34)
MCHC RBC-ENTMCNC: 30.5 GM/DL — LOW (ref 32–36)
MCV RBC AUTO: 97.5 FL — SIGNIFICANT CHANGE UP (ref 80–100)
MONOCYTES # BLD AUTO: 0.75 K/UL — SIGNIFICANT CHANGE UP (ref 0–0.9)
MONOCYTES NFR BLD AUTO: 10.3 % — SIGNIFICANT CHANGE UP (ref 2–14)
NEUTROPHILS # BLD AUTO: 4.91 K/UL — SIGNIFICANT CHANGE UP (ref 1.8–7.4)
NEUTROPHILS NFR BLD AUTO: 67.6 % — SIGNIFICANT CHANGE UP (ref 43–77)
NRBC # BLD: 0 /100 WBCS — SIGNIFICANT CHANGE UP (ref 0–0)
NRBC # FLD: 0 K/UL — SIGNIFICANT CHANGE UP (ref 0–0)
PHOSPHATE SERPL-MCNC: 3 MG/DL — SIGNIFICANT CHANGE UP (ref 2.5–4.5)
PLATELET # BLD AUTO: 468 K/UL — HIGH (ref 150–400)
POTASSIUM SERPL-MCNC: 4.3 MMOL/L — SIGNIFICANT CHANGE UP (ref 3.5–5.3)
POTASSIUM SERPL-SCNC: 4.3 MMOL/L — SIGNIFICANT CHANGE UP (ref 3.5–5.3)
PROT SERPL-MCNC: 6.2 G/DL — SIGNIFICANT CHANGE UP (ref 6–8.3)
RBC # BLD: 3.19 M/UL — LOW (ref 3.8–5.2)
RBC # FLD: 16.3 % — HIGH (ref 10.3–14.5)
SODIUM SERPL-SCNC: 138 MMOL/L — SIGNIFICANT CHANGE UP (ref 135–145)
SURGICAL PATHOLOGY STUDY: SIGNIFICANT CHANGE UP
WBC # BLD: 7.26 K/UL — SIGNIFICANT CHANGE UP (ref 3.8–10.5)
WBC # FLD AUTO: 7.26 K/UL — SIGNIFICANT CHANGE UP (ref 3.8–10.5)

## 2022-09-07 PROCEDURE — 71045 X-RAY EXAM CHEST 1 VIEW: CPT | Mod: 26

## 2022-09-07 RX ORDER — CARVEDILOL PHOSPHATE 80 MG/1
1 CAPSULE, EXTENDED RELEASE ORAL
Qty: 60 | Refills: 0
Start: 2022-09-07 | End: 2022-10-06

## 2022-09-07 RX ORDER — METOPROLOL TARTRATE 50 MG
1 TABLET ORAL
Qty: 0 | Refills: 0 | DISCHARGE

## 2022-09-07 RX ORDER — LOSARTAN POTASSIUM 100 MG/1
1 TABLET, FILM COATED ORAL
Qty: 30 | Refills: 0
Start: 2022-09-07 | End: 2022-10-06

## 2022-09-07 RX ORDER — AMLODIPINE BESYLATE 2.5 MG/1
1 TABLET ORAL
Qty: 30 | Refills: 0
Start: 2022-09-07 | End: 2022-10-06

## 2022-09-07 RX ADMIN — LOSARTAN POTASSIUM 100 MILLIGRAM(S): 100 TABLET, FILM COATED ORAL at 05:00

## 2022-09-07 RX ADMIN — AMLODIPINE BESYLATE 2.5 MILLIGRAM(S): 2.5 TABLET ORAL at 04:58

## 2022-09-07 RX ADMIN — Medication 1000 MILLIGRAM(S): at 04:58

## 2022-09-07 RX ADMIN — CARVEDILOL PHOSPHATE 6.25 MILLIGRAM(S): 80 CAPSULE, EXTENDED RELEASE ORAL at 05:00

## 2022-09-07 RX ADMIN — HEPARIN SODIUM 5000 UNIT(S): 5000 INJECTION INTRAVENOUS; SUBCUTANEOUS at 04:59

## 2022-09-07 RX ADMIN — PANTOPRAZOLE SODIUM 40 MILLIGRAM(S): 20 TABLET, DELAYED RELEASE ORAL at 04:59

## 2022-09-07 NOTE — CHART NOTE - NSCHARTNOTESELECT_GEN_ALL_CORE
Chart Note/Nutrition Services
Chart Note/Nutrition Services
Event Note
Event Note
Transfer Note
Event Note
Pre Operative Note
SICU to floors transfer

## 2022-09-07 NOTE — CHART NOTE - NSCHARTNOTEFT_GEN_A_CORE
Case discussed with pulmonary team, pt is nearing ready for discharge s/p completion of antibiotics for PNA and improving pleural effusion however sating ~94% on RA. Per pulm no further recommendations.   Will try to get O2 home for patient, ambulatory sats this AM. If unable to, pt will f/u with PCP or private lung specialist.    Herber WEST [id number]

## 2022-09-07 NOTE — CHART NOTE - NSCHARTNOTEFT_GEN_A_CORE
RN at bedside walked patient for ~5 minutes, per RN sating 96% while ambulating. To be discharged home without oxygen with outpatient follow up. Family notified.    Herber WEST [id number]

## 2022-09-07 NOTE — DISCHARGE NOTE NURSING/CASE MANAGEMENT/SOCIAL WORK - PATIENT PORTAL LINK FT
You can access the FollowMyHealth Patient Portal offered by [facility] by registering at the following website: [email or web address]. By joining [facility]’s FollowMyHealth portal, you will also be able to view your health information using other applications (apps) compatible with our system.

## 2022-09-07 NOTE — PROGRESS NOTE ADULT - SUBJECTIVE AND OBJECTIVE BOX
[facility]   MD KRISTEN MARQUES MD KRISTINE SOLTANPOUR, DO ANGELA WONG, PA    TEL:  OFFICE: [phone]  From 5pm-7am Answering Service [phone]    -- RENAL FOLLOW UP NOTE ---Date of Service 09-07-22 @ 11:54    Patient is a 87y old  Female who presents with a chief complaint of Bowel prep for surgery tomorrow (07 Sep 2022 08:45)      Patient seen and examined at bedside. No chest pain/sob    VITALS:  T(F): 99 (09-07-22 @ 08:30), Max: 99 (09-06-22 @ 12:05)  HR: 88 (09-07-22 @ 08:30)  BP: 145/59 (09-07-22 @ 08:30)  RR: 18 (09-07-22 @ 08:30)  SpO2: 96% (09-07-22 @ 08:30)  Wt(kg): --    09-06 @ 07:01  -  09-07 @ 07:00  --------------------------------------------------------  IN: 170 mL / OUT: 250 mL / NET: -80 mL          PHYSICAL EXAM:  General: NAD  Neck: No JVD  Respiratory: CTAB, no wheezes, rales or rhonchi  Cardiovascular: S1, S2, RRR  Gastrointestinal: BS+, soft, NT/ND  Extremities: No peripheral edema    Hospital Medications:   MEDICATIONS  (STANDING):  acetaminophen     Tablet .. 1000 milliGRAM(s) Oral every 6 hours  amLODIPine   Tablet 2.5 milliGRAM(s) Oral daily  carvedilol 6.25 milliGRAM(s) Oral every 12 hours  heparin   Injectable 5000 Unit(s) SubCutaneous every 8 hours  losartan 100 milliGRAM(s) Oral daily  multivitamin 1 Tablet(s) Oral daily  pantoprazole    Tablet 40 milliGRAM(s) Oral before breakfast  thiamine 100 milliGRAM(s) Oral daily      LABS:  09-07    138  |  103  |  9   ----------------------------<  87  4.3   |  27  |  0.58    Ca    10.1      07 Sep 2022 05:34  Phos  3.0     09-07  Mg     1.80     09-07    TPro  6.2  /  Alb  3.0<L>  /  TBili  0.4  /  DBili      /  AST  25  /  ALT  21  /  AlkPhos  280<H>  09-07    Creatinine Trend: 0.58 <--, 0.57 <--, 0.58 <--, 0.51 <--, 0.58 <--, 0.56 <--, 0.59 <--    Albumin, Serum: 3.0 g/dL (09-07 @ 05:34)  Phosphorus Level, Serum: 3.0 mg/dL (09-07 @ 05:34)                              9.5    7.26  )-----------( 468      ( 07 Sep 2022 05:34 )             31.1     Urine Studies:  Urinalysis - [09-04-22 @ 20:28]      Color Yellow / Appearance Clear / SG >1.030 / pH 6.0      Gluc Negative / Ketone Trace  / Bili Negative / Urobili <2 mg/dL       Blood Negative / Protein >300 mg/dl / Leuk Est Negative / Nitrite Negative      RBC  / WBC  / Hyaline  / Gran  / Sq Epi  / Non Sq Epi  / Bacteria     Urine Sodium 23      [09-04-22 @ 20:28]  Urine Osmolality 462      [09-04-22 @ 20:28]    PTH -- (Ca --)      [05-04-22 @ 16:19]   29  PTH -- (Ca --)      [05-04-22 @ 12:06]   31  Vitamin D (25OH) 53.8      [05-17-22 @ 07:15]  TSH 0.68      [09-05-22 @ 06:00]  Lipid: chol 123, , HDL 43, LDL --      [05-28-22 @ 05:29]        RADIOLOGY & ADDITIONAL STUDIES:

## 2022-09-07 NOTE — PROGRESS NOTE ADULT - ASSESSMENT
Plan  - Care as per pulmonology in regards to pulm edema  - No acute surgical issues, will sign off at this time    D team [id number]

## 2022-09-07 NOTE — PROGRESS NOTE ADULT - NS ATTEND AMEND GEN_ALL_CORE FT
Bicarb elevated in setting of pleural effusion. Will continue to monitor.
uneventful overnight, no change to CV plan
Hyponatremia resolved. Still has elevated bicarb but improved.
no addl cv testing planned, will follow with you.

## 2022-09-07 NOTE — DISCHARGE NOTE NURSING/CASE MANAGEMENT/SOCIAL WORK - NSDCPEFALRISK_GEN_ALL_CORE
For information on Fall & Injury Prevention, visit: [email or web address] OR  [email or web address] OR  https://www.cdc.gov/steadi/patient.html

## 2022-09-07 NOTE — DISCHARGE NOTE NURSING/CASE MANAGEMENT/SOCIAL WORK - NSDCPNINST_GEN_ALL_CORE
If there is presence of unresolved pain or signs of infection at the surgical site, please call you regular doctor or go to your local emergency room. Signs of infection include pain, heat, swelling, discharged, fever of 100.2 or above. Please report to your local emergency room if you are experiencing shortness of breath or chest pain.

## 2022-09-07 NOTE — PROGRESS NOTE ADULT - NUTRITIONAL ASSESSMENT
This patient has been assessed with a concern for Malnutrition and has been determined to have a diagnosis/diagnoses of Severe protein-calorie malnutrition.    This patient is being managed with:   Diet Low Fiber-  Low Fat (LOWFAT)  Entered: Aug 23 2022  8:28AM    

## 2022-09-07 NOTE — PROGRESS NOTE ADULT - PROVIDER SPECIALTY LIST ADULT
Cardiology
Hospitalist
Hospitalist
Nephrology
Pulmonology
Surgery
Cardiology
Cardiology
Nephrology
Pulmonology
SICU
Surgery
Cardiology
Cardiology
Hospitalist
SICU
Surgery
Pulmonology
SICU
Surgery
Hospitalist
Nephrology
Surgery

## 2022-09-07 NOTE — PROGRESS NOTE ADULT - SUBJECTIVE AND OBJECTIVE BOX
SURGERY DAILY PROGRESS NOTE:   OBJECTIVE:    Vital Signs Last 24 Hrs  T(C): 37 (07 Sep 2022 04:45), Max: 37.2 (06 Sep 2022 12:05)  T(F): 98.6 (07 Sep 2022 04:45), Max: 99 (06 Sep 2022 12:05)  HR: 85 (07 Sep 2022 04:45) (75 - 94)  BP: 153/54 (07 Sep 2022 04:45) (133/57 - 155/67)  BP(mean): --  RR: 19 (07 Sep 2022 04:45) (18 - 19)  SpO2: 100% (07 Sep 2022 04:45) (94% - 100%)    Parameters below as of 07 Sep 2022 04:45  Patient On (Oxygen Delivery Method): nasal cannula  O2 Flow (L/min): 3    I&O's Detail    06 Sep 2022 07:01  -  07 Sep 2022 07:00  --------------------------------------------------------  IN:    Oral Fluid: 170 mL  Total IN: 170 mL    OUT:    Voided (mL): 250 mL  Total OUT: 250 mL    Total NET: -80 mL    Daily     Daily   MEDICATIONS  (STANDING):  acetaminophen     Tablet .. 1000 milliGRAM(s) Oral every 6 hours  amLODIPine   Tablet 2.5 milliGRAM(s) Oral daily  carvedilol 6.25 milliGRAM(s) Oral every 12 hours  heparin   Injectable 5000 Unit(s) SubCutaneous every 8 hours  losartan 100 milliGRAM(s) Oral daily  multivitamin 1 Tablet(s) Oral daily  pantoprazole    Tablet 40 milliGRAM(s) Oral before breakfast  thiamine 100 milliGRAM(s) Oral daily    MEDICATIONS  (PRN):      LABS:                        9.5    7.26  )-----------( 468      ( 07 Sep 2022 05:34 )             31.1     09-07    138  |  103  |  9   ----------------------------<  87  4.3   |  27  |  0.58    Ca    10.1      07 Sep 2022 05:34  Phos  3.0     09-07  Mg     1.80     09-07    TPro  6.2  /  Alb  3.0<L>  /  TBili  0.4  /  DBili  x   /  AST  25  /  ALT  21  /  AlkPhos  280<H>  09-07

## 2022-09-07 NOTE — DISCHARGE NOTE NURSING/CASE MANAGEMENT/SOCIAL WORK - NSSCTYPOFSERV_GEN_ALL_CORE
Nurse will call and visit day after discharge for home physical therapy. Patient already has a rolling walker at home.

## 2022-09-07 NOTE — PROGRESS NOTE ADULT - SUBJECTIVE AND OBJECTIVE BOX
DATE OF SERVICE: 09-07-22    Patient denies chest pain or shortness of breath.   Review of symptoms otherwise negative.    Review of Systems:   Constitutional: [ ] fevers, [ ] chills.   Skin: [ ] dry skin. [ ] rashes.  Psychiatric: [ ] depression, [ ] anxiety.   Gastrointestinal: [ ] BRBPR, [ ] melena.   Neurological: [ ] confusion. [ ] seizures. [ ] shuffling gait.   Ears,Nose,Mouth and Throat: [ ] ear pain [ ] sore throat.   Eyes: [ ] diplopia.   Respiratory: [ ] hemoptysis. [ ] shortness of breath  Cardiovascular: See HPI above  Hematologic/Lymphatic: [ ] anemia. [ ] painful nodes. [ ] prolonged bleeding.   Genitourinary: [ ] hematuria. [ ] flank pain.   Endocrine: [ ] significant change in weight. [ ] intolerance to heat and cold.     Review of systems [ x] otherwise negative, [ ] otherwise unable to obtain    FH: no family history of sudden cardiac death in first degree relatives    SH: [ ] tobacco, [ ] alcohol, [ ] drugs    acetaminophen     Tablet .. 1000 milliGRAM(s) Oral every 6 hours  amLODIPine   Tablet 2.5 milliGRAM(s) Oral daily  carvedilol 6.25 milliGRAM(s) Oral every 12 hours  heparin   Injectable 5000 Unit(s) SubCutaneous every 8 hours  losartan 100 milliGRAM(s) Oral daily  multivitamin 1 Tablet(s) Oral daily  pantoprazole    Tablet 40 milliGRAM(s) Oral before breakfast  thiamine 100 milliGRAM(s) Oral daily                            9.5    7.26  )-----------( 468      ( 07 Sep 2022 05:34 )             31.1     138  |  103  |  9   ----------------------------<  87  4.3   |  27  |  0.58    Ca    10.1      07 Sep 2022 05:34  Phos  3.0     09-07  Mg     1.80     09-07    TPro  6.2  /  Alb  3.0<L>  /  TBili  0.4  /  DBili  x   /  AST  25  /  ALT  21  /  AlkPhos  280<H>  09-07      T(C): 37.1 (09-07-22 @ 12:28), Max: 37.2 (09-07-22 @ 08:30)  HR: 83 (09-07-22 @ 12:28) (76 - 94)  BP: 141/58 (09-07-22 @ 12:28) (141/58 - 155/67)  RR: 18 (09-07-22 @ 12:28) (18 - 19)  SpO2: 98% (09-07-22 @ 12:28) (96% - 100%)  Wt(kg): --    General: Well nourished in no acute distress.   Head: Normocephalic and atraumatic.   Neck: No JVD. No bruits. Supple. Does not appear to be enlarged.   Cardiovascular: + S1,S2 ; RRR Soft systolic murmur at the left lower sternal border. No rubs noted.    Lungs: CTA b/l. No rhonchi, rales or wheezes.   Abdomen: + BS, soft. Non tender. Non distended. No rebound. No guarding.   Extremities: No clubbing/cyanosis/edema.   Neurologic: Moves all four extremities. Full range of motion.   Skin: Warm and moist. The patient's skin has normal elasticity and good skin turgor.   Psychiatric: Appropriate mood and affect.  Musculoskeletal: Normal range of motion, normal strength      DATA    tele- SR    < from: Transthoracic Echocardiogram (08.27.22 @ 12:22) >  CONCLUSIONS:  1. Mitral annular calcification and calcified mitral  leaflets with normal diastolic opening. Minimal mitral  regurgitation.  2. Calcified trileaflet aortic valve with normal opening.  Mild aortic regurgitation.  3. Mildly dilated left atrium.  LA volume index = 35 cc/m2.  4. Hyperdynamic left ventricle.  5. Normal right ventricular size and function.  *** Compared with echocardiogram of 5/5/2022, no  significant changes noted.  ------------------------------------------------------------------------  Confirmed on  8/27/2022 - 16:16:07 by AGUSTIN Crowe    < end of copied text >      ASSESSMENT/PLAN: Pt is a 87F PMHx of recent SAH/SDH, HTN, HLD and GERD who presented for open cholecystectomy, portal lymphadenectomy, liver segment 4b / 5 resection and right hemicolectomy on 8/19/22 she was unable to be extubated at the end of the case due to sedation and inadequate ventilation hence was admitted to SICU.  Her course was further c/b pulmonary edema requiring diuresis in SICU and hypoxemic and hypercapnic respiratory insufficiency requiring supplemental O2 and chest physiotherapy.     - acute hypoxic failure likely primary pulmonary process, now resolved  - s/p Abx  - cont coreg and losartan  - EKG non-ischemic but with LVH, ECHO with normal LV wall thickness  -no further inpatient cardiac work up planned  -f/u PMD Dr riley after DC

## 2022-09-12 ENCOUNTER — APPOINTMENT (OUTPATIENT)
Dept: SURGICAL ONCOLOGY | Facility: CLINIC | Age: 87
End: 2022-09-12

## 2022-09-12 VITALS
HEART RATE: 114 BPM | TEMPERATURE: 98.5 F | SYSTOLIC BLOOD PRESSURE: 187 MMHG | BODY MASS INDEX: 24.35 KG/M2 | DIASTOLIC BLOOD PRESSURE: 80 MMHG | WEIGHT: 116 LBS | OXYGEN SATURATION: 84 % | RESPIRATION RATE: 15 BRPM | HEIGHT: 58 IN

## 2022-09-12 PROCEDURE — 99024 POSTOP FOLLOW-UP VISIT: CPT | Mod: PD

## 2022-09-12 NOTE — PHYSICAL EXAM
[Normal] : supple, no neck mass and thyroid not enlarged [Normal Supraclavicular Lymph Nodes] : normal supraclavicular lymph nodes [Normal Groin Lymph Nodes] : normal groin lymph nodes [Normal] : oriented to person, place and time, with appropriate affect [de-identified] : surgical incisions healing well, no evidence of infection

## 2022-09-12 NOTE — ASSESSMENT
[FreeTextEntry1] : IMP: 87 year old female with a gallbladder mass and colon involvement, rolanda-colonic fistula \par \par s/p ex lap, cholecystectomy, segment 4b/5 liver resection, portal lymphadenectomy, segmental transverse colon resection & omental flap placement. 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

## 2022-09-12 NOTE — HISTORY OF PRESENT ILLNESS
[de-identified] : Ms. HARSH VERGARA is a 87 year old female here today for a post-op visit\par \par she was scheduled for surgery last month but fell and had a subdural hematoma.\par PMH: HTN, HLD, cholecystitis, s/p fall. \par \par colonoscopy 5/18/22: benign colonic mucosa with mildly hypercellular lamina propria. Negative for malignancy. \par \par s/p ex lap, cholecystectomy, segment 4b/5 liver resection, portal lymphadenectomy, segmental transverse colon resection & omental flap placement. 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 Four lymph nodes, negative for carcinoma (0/4)\par 3. Portion of cystic duct, negative for carcinoma\par 4. Liver, segment 4 and 5, resection: Hepatic parenchyma with focal collection of acute inflammation\par at the junction with the gallbladder, focal portal tracts shows chronic inflammation, Negative for carcinoma\par 5. Right hemicolectomy: segment of colon c/w fistula, sessile serrate adenoma, 0.8 cm, involving ascending colon. proximal, distal & mesenteric resection margins all negative, 14 negative lymph nodes, \par

## 2022-09-14 ENCOUNTER — EMERGENCY (EMERGENCY)
Facility: HOSPITAL | Age: 87
LOS: 0 days | Discharge: ANOTHER HOSPITAL | End: 2022-09-14
Attending: STUDENT IN AN ORGANIZED HEALTH CARE EDUCATION/TRAINING PROGRAM

## 2022-09-14 ENCOUNTER — INPATIENT (INPATIENT)
Facility: HOSPITAL | Age: 87
LOS: 14 days | Discharge: HOME HEALTH | End: 2022-09-29
Attending: SPECIALIST | Admitting: SPECIALIST
Payer: MEDICARE

## 2022-09-14 VITALS
SYSTOLIC BLOOD PRESSURE: 169 MMHG | HEIGHT: 60 IN | OXYGEN SATURATION: 88 % | WEIGHT: 130.07 LBS | DIASTOLIC BLOOD PRESSURE: 62 MMHG | HEART RATE: 114 BPM | TEMPERATURE: 99 F | RESPIRATION RATE: 30 BRPM

## 2022-09-14 VITALS — OXYGEN SATURATION: 94 % | HEART RATE: 97 BPM

## 2022-09-14 VITALS — HEIGHT: 59 IN

## 2022-09-14 DIAGNOSIS — Z85.09 PERSONAL HISTORY OF MALIGNANT NEOPLASM OF OTHER DIGESTIVE ORGANS: Chronic | ICD-10-CM

## 2022-09-14 DIAGNOSIS — Z90.710 ACQUIRED ABSENCE OF BOTH CERVIX AND UTERUS: Chronic | ICD-10-CM

## 2022-09-14 DIAGNOSIS — Z90.49 ACQUIRED ABSENCE OF OTHER SPECIFIED PARTS OF DIGESTIVE TRACT: Chronic | ICD-10-CM

## 2022-09-14 DIAGNOSIS — K75.0 ABSCESS OF LIVER: ICD-10-CM

## 2022-09-14 LAB
ALBUMIN SERPL ELPH-MCNC: 3 G/DL — LOW (ref 3.3–5)
ALBUMIN SERPL ELPH-MCNC: 3.9 G/DL — SIGNIFICANT CHANGE UP (ref 3.3–5)
ALP SERPL-CCNC: 214 U/L — HIGH (ref 40–120)
ALP SERPL-CCNC: 222 U/L — HIGH (ref 40–120)
ALT FLD-CCNC: 20 U/L — SIGNIFICANT CHANGE UP (ref 12–78)
ALT FLD-CCNC: 21 U/L — SIGNIFICANT CHANGE UP (ref 4–33)
ANION GAP SERPL CALC-SCNC: 4 MMOL/L — LOW (ref 5–17)
ANION GAP SERPL CALC-SCNC: 7 MMOL/L — SIGNIFICANT CHANGE UP (ref 7–14)
APTT BLD: 28.7 SEC — SIGNIFICANT CHANGE UP (ref 27.5–35.5)
APTT BLD: 30.6 SEC — SIGNIFICANT CHANGE UP (ref 27–36.3)
AST SERPL-CCNC: 25 U/L — SIGNIFICANT CHANGE UP (ref 4–32)
AST SERPL-CCNC: 26 U/L — SIGNIFICANT CHANGE UP (ref 15–37)
BASE EXCESS BLDV CALC-SCNC: 7.3 MMOL/L — HIGH (ref -2–3)
BASOPHILS # BLD AUTO: 0.08 K/UL — SIGNIFICANT CHANGE UP (ref 0–0.2)
BASOPHILS # BLD AUTO: 0.09 K/UL — SIGNIFICANT CHANGE UP (ref 0–0.2)
BASOPHILS NFR BLD AUTO: 1.1 % — SIGNIFICANT CHANGE UP (ref 0–2)
BASOPHILS NFR BLD AUTO: 1.3 % — SIGNIFICANT CHANGE UP (ref 0–2)
BILIRUB SERPL-MCNC: 0.5 MG/DL — SIGNIFICANT CHANGE UP (ref 0.2–1.2)
BILIRUB SERPL-MCNC: 0.5 MG/DL — SIGNIFICANT CHANGE UP (ref 0.2–1.2)
BLD GP AB SCN SERPL QL: NEGATIVE — SIGNIFICANT CHANGE UP
BLOOD GAS COMMENTS, VENOUS: SIGNIFICANT CHANGE UP
BLOOD GAS VENOUS COMPREHENSIVE RESULT: SIGNIFICANT CHANGE UP
BUN SERPL-MCNC: 10 MG/DL — SIGNIFICANT CHANGE UP (ref 7–23)
BUN SERPL-MCNC: 8 MG/DL — SIGNIFICANT CHANGE UP (ref 7–23)
CALCIUM SERPL-MCNC: 10.1 MG/DL — SIGNIFICANT CHANGE UP (ref 8.4–10.5)
CALCIUM SERPL-MCNC: 9.8 MG/DL — SIGNIFICANT CHANGE UP (ref 8.5–10.1)
CHLORIDE BLDV-SCNC: 102 MMOL/L — SIGNIFICANT CHANGE UP (ref 98–107)
CHLORIDE SERPL-SCNC: 102 MMOL/L — SIGNIFICANT CHANGE UP (ref 98–107)
CHLORIDE SERPL-SCNC: 103 MMOL/L — SIGNIFICANT CHANGE UP (ref 96–108)
CO2 BLDV-SCNC: 38 MMOL/L — HIGH (ref 22–26)
CO2 SERPL-SCNC: 33 MMOL/L — HIGH (ref 22–31)
CO2 SERPL-SCNC: 34 MMOL/L — HIGH (ref 22–31)
CREAT SERPL-MCNC: 0.69 MG/DL — SIGNIFICANT CHANGE UP (ref 0.5–1.3)
CREAT SERPL-MCNC: 0.74 MG/DL — SIGNIFICANT CHANGE UP (ref 0.5–1.3)
EGFR: 79 ML/MIN/1.73M2 — SIGNIFICANT CHANGE UP
EGFR: 84 ML/MIN/1.73M2 — SIGNIFICANT CHANGE UP
EOSINOPHIL # BLD AUTO: 0.07 K/UL — SIGNIFICANT CHANGE UP (ref 0–0.5)
EOSINOPHIL # BLD AUTO: 0.11 K/UL — SIGNIFICANT CHANGE UP (ref 0–0.5)
EOSINOPHIL NFR BLD AUTO: 1 % — SIGNIFICANT CHANGE UP (ref 0–6)
EOSINOPHIL NFR BLD AUTO: 1.6 % — SIGNIFICANT CHANGE UP (ref 0–6)
GAS PNL BLDV: 138 MMOL/L — SIGNIFICANT CHANGE UP (ref 136–145)
GAS PNL BLDV: SIGNIFICANT CHANGE UP
GAS PNL BLDV: SIGNIFICANT CHANGE UP
GLUCOSE BLDC GLUCOMTR-MCNC: 119 MG/DL — HIGH (ref 70–99)
GLUCOSE BLDV-MCNC: 133 MG/DL — HIGH (ref 65–95)
GLUCOSE SERPL-MCNC: 127 MG/DL — HIGH (ref 70–99)
GLUCOSE SERPL-MCNC: 96 MG/DL — SIGNIFICANT CHANGE UP (ref 70–99)
HCO3 BLDV-SCNC: 36 MMOL/L — HIGH (ref 22–28)
HCT VFR BLD CALC: 34.3 % — LOW (ref 34.5–45)
HCT VFR BLD CALC: 35.4 % — SIGNIFICANT CHANGE UP (ref 34.5–45)
HCT VFR BLDA CALC: 34 % — LOW (ref 37–47)
HGB BLD CALC-MCNC: 11.3 G/DL — LOW (ref 11.7–16.1)
HGB BLD-MCNC: 10.4 G/DL — LOW (ref 11.5–15.5)
HGB BLD-MCNC: 10.5 G/DL — LOW (ref 11.5–15.5)
HOROWITZ INDEX BLDV+IHG-RTO: 36 — SIGNIFICANT CHANGE UP
IANC: 5.26 K/UL — SIGNIFICANT CHANGE UP (ref 1.8–7.4)
IMM GRANULOCYTES NFR BLD AUTO: 0.3 % — SIGNIFICANT CHANGE UP (ref 0–1.5)
IMM GRANULOCYTES NFR BLD AUTO: 0.4 % — SIGNIFICANT CHANGE UP (ref 0–1.5)
INR BLD: 1.08 RATIO — SIGNIFICANT CHANGE UP (ref 0.88–1.16)
INR BLD: 1.14 RATIO — SIGNIFICANT CHANGE UP (ref 0.88–1.16)
LACTATE BLDV-MCNC: 0.8 MMOL/L — SIGNIFICANT CHANGE UP (ref 0.56–1.39)
LACTATE SERPL-SCNC: 0.9 MMOL/L — SIGNIFICANT CHANGE UP (ref 0.7–2)
LYMPHOCYTES # BLD AUTO: 0.53 K/UL — LOW (ref 1–3.3)
LYMPHOCYTES # BLD AUTO: 1.02 K/UL — SIGNIFICANT CHANGE UP (ref 1–3.3)
LYMPHOCYTES # BLD AUTO: 14.5 % — SIGNIFICANT CHANGE UP (ref 13–44)
LYMPHOCYTES # BLD AUTO: 7.8 % — LOW (ref 13–44)
MCHC RBC-ENTMCNC: 29.4 PG — SIGNIFICANT CHANGE UP (ref 27–34)
MCHC RBC-ENTMCNC: 29.7 G/DL — LOW (ref 32–36)
MCHC RBC-ENTMCNC: 30.1 PG — SIGNIFICANT CHANGE UP (ref 27–34)
MCHC RBC-ENTMCNC: 30.3 GM/DL — LOW (ref 32–36)
MCV RBC AUTO: 99.1 FL — SIGNIFICANT CHANGE UP (ref 80–100)
MCV RBC AUTO: 99.2 FL — SIGNIFICANT CHANGE UP (ref 80–100)
MONOCYTES # BLD AUTO: 0.27 K/UL — SIGNIFICANT CHANGE UP (ref 0–0.9)
MONOCYTES # BLD AUTO: 0.55 K/UL — SIGNIFICANT CHANGE UP (ref 0–0.9)
MONOCYTES NFR BLD AUTO: 4 % — SIGNIFICANT CHANGE UP (ref 2–14)
MONOCYTES NFR BLD AUTO: 7.8 % — SIGNIFICANT CHANGE UP (ref 2–14)
NEUTROPHILS # BLD AUTO: 5.26 K/UL — SIGNIFICANT CHANGE UP (ref 1.8–7.4)
NEUTROPHILS # BLD AUTO: 5.79 K/UL — SIGNIFICANT CHANGE UP (ref 1.8–7.4)
NEUTROPHILS NFR BLD AUTO: 74.7 % — SIGNIFICANT CHANGE UP (ref 43–77)
NEUTROPHILS NFR BLD AUTO: 85.5 % — HIGH (ref 43–77)
NRBC # BLD: 0 /100 WBCS — SIGNIFICANT CHANGE UP (ref 0–0)
NRBC # BLD: 0 /100 WBCS — SIGNIFICANT CHANGE UP (ref 0–0)
NRBC # FLD: 0 K/UL — SIGNIFICANT CHANGE UP (ref 0–0)
NT-PROBNP SERPL-SCNC: 1264 PG/ML — HIGH (ref 0–450)
PCO2 BLDV: 73 MMHG — HIGH (ref 42–55)
PH BLDV: 7.3 — LOW (ref 7.32–7.43)
PLATELET # BLD AUTO: 315 K/UL — SIGNIFICANT CHANGE UP (ref 150–400)
PLATELET # BLD AUTO: 351 K/UL — SIGNIFICANT CHANGE UP (ref 150–400)
PO2 BLDV: 30 MMHG — SIGNIFICANT CHANGE UP (ref 25–45)
POTASSIUM BLDV-SCNC: 4.4 MMOL/L — SIGNIFICANT CHANGE UP (ref 3.5–5.1)
POTASSIUM SERPL-MCNC: 4.3 MMOL/L — SIGNIFICANT CHANGE UP (ref 3.5–5.3)
POTASSIUM SERPL-MCNC: 4.4 MMOL/L — SIGNIFICANT CHANGE UP (ref 3.5–5.3)
POTASSIUM SERPL-SCNC: 4.3 MMOL/L — SIGNIFICANT CHANGE UP (ref 3.5–5.3)
POTASSIUM SERPL-SCNC: 4.4 MMOL/L — SIGNIFICANT CHANGE UP (ref 3.5–5.3)
PROT SERPL-MCNC: 7.4 G/DL — SIGNIFICANT CHANGE UP (ref 6–8.3)
PROT SERPL-MCNC: 7.7 GM/DL — SIGNIFICANT CHANGE UP (ref 6–8.3)
PROTHROM AB SERPL-ACNC: 12.9 SEC — SIGNIFICANT CHANGE UP (ref 10.5–13.4)
PROTHROM AB SERPL-ACNC: 13.2 SEC — SIGNIFICANT CHANGE UP (ref 10.5–13.4)
RAPID RVP RESULT: SIGNIFICANT CHANGE UP
RBC # BLD: 3.46 M/UL — LOW (ref 3.8–5.2)
RBC # BLD: 3.57 M/UL — LOW (ref 3.8–5.2)
RBC # FLD: 15.4 % — HIGH (ref 10.3–14.5)
RBC # FLD: 15.7 % — HIGH (ref 10.3–14.5)
RH IG SCN BLD-IMP: POSITIVE — SIGNIFICANT CHANGE UP
SAO2 % BLDV: 46.4 % — LOW (ref 94–98)
SARS-COV-2 RNA SPEC QL NAA+PROBE: SIGNIFICANT CHANGE UP
SARS-COV-2 RNA SPEC QL NAA+PROBE: SIGNIFICANT CHANGE UP
SODIUM SERPL-SCNC: 141 MMOL/L — SIGNIFICANT CHANGE UP (ref 135–145)
SODIUM SERPL-SCNC: 142 MMOL/L — SIGNIFICANT CHANGE UP (ref 135–145)
TROPONIN I, HIGH SENSITIVITY RESULT: 35.4 NG/L — SIGNIFICANT CHANGE UP
WBC # BLD: 6.78 K/UL — SIGNIFICANT CHANGE UP (ref 3.8–10.5)
WBC # BLD: 7.04 K/UL — SIGNIFICANT CHANGE UP (ref 3.8–10.5)
WBC # FLD AUTO: 6.78 K/UL — SIGNIFICANT CHANGE UP (ref 3.8–10.5)
WBC # FLD AUTO: 7.04 K/UL — SIGNIFICANT CHANGE UP (ref 3.8–10.5)

## 2022-09-14 PROCEDURE — 99232 SBSQ HOSP IP/OBS MODERATE 35: CPT | Mod: 24

## 2022-09-14 PROCEDURE — 99291 CRITICAL CARE FIRST HOUR: CPT

## 2022-09-14 PROCEDURE — 71045 X-RAY EXAM CHEST 1 VIEW: CPT | Mod: 26

## 2022-09-14 PROCEDURE — 93010 ELECTROCARDIOGRAM REPORT: CPT

## 2022-09-14 PROCEDURE — 71250 CT THORAX DX C-: CPT | Mod: 26,MA

## 2022-09-14 PROCEDURE — 74177 CT ABD & PELVIS W/CONTRAST: CPT | Mod: 26,MA

## 2022-09-14 PROCEDURE — 70450 CT HEAD/BRAIN W/O DYE: CPT | Mod: 26,MA

## 2022-09-14 PROCEDURE — 99291 CRITICAL CARE FIRST HOUR: CPT | Mod: CS

## 2022-09-14 RX ORDER — PIPERACILLIN AND TAZOBACTAM 4; .5 G/20ML; G/20ML
3.38 INJECTION, POWDER, LYOPHILIZED, FOR SOLUTION INTRAVENOUS EVERY 8 HOURS
Refills: 0 | Status: DISCONTINUED | OUTPATIENT
Start: 2022-09-14 | End: 2022-09-20

## 2022-09-14 RX ORDER — SODIUM CHLORIDE 9 MG/ML
1850 INJECTION INTRAMUSCULAR; INTRAVENOUS; SUBCUTANEOUS ONCE
Refills: 0 | Status: DISCONTINUED | OUTPATIENT
Start: 2022-09-14 | End: 2022-09-14

## 2022-09-14 RX ORDER — SODIUM CHLORIDE 9 MG/ML
1000 INJECTION, SOLUTION INTRAVENOUS
Refills: 0 | Status: DISCONTINUED | OUTPATIENT
Start: 2022-09-14 | End: 2022-09-16

## 2022-09-14 RX ORDER — SODIUM CHLORIDE 9 MG/ML
1000 INJECTION INTRAMUSCULAR; INTRAVENOUS; SUBCUTANEOUS ONCE
Refills: 0 | Status: COMPLETED | OUTPATIENT
Start: 2022-09-14 | End: 2022-09-14

## 2022-09-14 RX ORDER — CHLORHEXIDINE GLUCONATE 213 G/1000ML
1 SOLUTION TOPICAL EVERY 24 HOURS
Refills: 0 | Status: DISCONTINUED | OUTPATIENT
Start: 2022-09-14 | End: 2022-09-21

## 2022-09-14 RX ORDER — VANCOMYCIN HCL 1 G
750 VIAL (EA) INTRAVENOUS ONCE
Refills: 0 | Status: COMPLETED | OUTPATIENT
Start: 2022-09-14 | End: 2022-09-14

## 2022-09-14 RX ORDER — PANTOPRAZOLE SODIUM 20 MG/1
40 TABLET, DELAYED RELEASE ORAL DAILY
Refills: 0 | Status: DISCONTINUED | OUTPATIENT
Start: 2022-09-14 | End: 2022-09-16

## 2022-09-14 RX ORDER — METOPROLOL TARTRATE 50 MG
5 TABLET ORAL EVERY 6 HOURS
Refills: 0 | Status: DISCONTINUED | OUTPATIENT
Start: 2022-09-14 | End: 2022-09-16

## 2022-09-14 RX ORDER — PIPERACILLIN AND TAZOBACTAM 4; .5 G/20ML; G/20ML
3.38 INJECTION, POWDER, LYOPHILIZED, FOR SOLUTION INTRAVENOUS ONCE
Refills: 0 | Status: DISCONTINUED | OUTPATIENT
Start: 2022-09-14 | End: 2022-09-14

## 2022-09-14 RX ORDER — PIPERACILLIN AND TAZOBACTAM 4; .5 G/20ML; G/20ML
3.38 INJECTION, POWDER, LYOPHILIZED, FOR SOLUTION INTRAVENOUS ONCE
Refills: 0 | Status: COMPLETED | OUTPATIENT
Start: 2022-09-14 | End: 2022-09-14

## 2022-09-14 RX ORDER — VANCOMYCIN HCL 1 G
750 VIAL (EA) INTRAVENOUS EVERY 12 HOURS
Refills: 0 | Status: DISCONTINUED | OUTPATIENT
Start: 2022-09-14 | End: 2022-09-14

## 2022-09-14 RX ORDER — FUROSEMIDE 40 MG
20 TABLET ORAL ONCE
Refills: 0 | Status: COMPLETED | OUTPATIENT
Start: 2022-09-14 | End: 2022-09-14

## 2022-09-14 RX ORDER — HEPARIN SODIUM 5000 [USP'U]/ML
5000 INJECTION INTRAVENOUS; SUBCUTANEOUS EVERY 8 HOURS
Refills: 0 | Status: COMPLETED | OUTPATIENT
Start: 2022-09-14 | End: 2022-09-14

## 2022-09-14 RX ORDER — VANCOMYCIN HCL 1 G
VIAL (EA) INTRAVENOUS
Refills: 0 | Status: DISCONTINUED | OUTPATIENT
Start: 2022-09-14 | End: 2022-09-14

## 2022-09-14 RX ORDER — ACETAMINOPHEN 500 MG
1000 TABLET ORAL EVERY 6 HOURS
Refills: 0 | Status: DISCONTINUED | OUTPATIENT
Start: 2022-09-14 | End: 2022-09-16

## 2022-09-14 RX ADMIN — PIPERACILLIN AND TAZOBACTAM 25 GRAM(S): 4; .5 INJECTION, POWDER, LYOPHILIZED, FOR SOLUTION INTRAVENOUS at 22:24

## 2022-09-14 RX ADMIN — HEPARIN SODIUM 5000 UNIT(S): 5000 INJECTION INTRAVENOUS; SUBCUTANEOUS at 22:42

## 2022-09-14 RX ADMIN — Medication 5 MILLIGRAM(S): at 22:23

## 2022-09-14 RX ADMIN — PIPERACILLIN AND TAZOBACTAM 200 GRAM(S): 4; .5 INJECTION, POWDER, LYOPHILIZED, FOR SOLUTION INTRAVENOUS at 10:49

## 2022-09-14 RX ADMIN — SODIUM CHLORIDE 1000 MILLILITER(S): 9 INJECTION INTRAMUSCULAR; INTRAVENOUS; SUBCUTANEOUS at 10:50

## 2022-09-14 RX ADMIN — SODIUM CHLORIDE 50 MILLILITER(S): 9 INJECTION, SOLUTION INTRAVENOUS at 20:12

## 2022-09-14 RX ADMIN — Medication 250 MILLIGRAM(S): at 13:33

## 2022-09-14 NOTE — CONSULT NOTE ADULT - SUBJECTIVE AND OBJECTIVE BOX
SICU Consultation Note  ====================================================  HPI: 88 y/o F with HTN, bladder cancer and gallbladder cancer s/p cholecystectomy partial hepatectomy (4b/5), RT hemicolectomy presenting from [facility] for hypoxia and found to have hepatic abscess on CT scan. After her surgery in August her postoperative course was complicated by pleural edema requiring diuresis in SICU and hypoxic and hypercapnic respiratory failure treated with oxygen supplementation, chest physiotherapy and abx. The pt was found by EMS to be lethargic and hypoxic with a sat in the 40%s. She was brought to [facility] where she was initiated on Bipap. The patient was unable to be weaned off nasal canula and subsequently underwent CT A/P which demonstrated a hepatic abscess at the gallbladder fossa. The patient has received a van/zosyn before she was transferred to [facility]. She denies fever, chills, vomiting, or diarrhea.     SICU consulted for hypoxic respiratory failure requiring BiPAP.    SUBJECTIVE: Patient seen and examined at bedside in ED. Patient found sitting in stretcher comfortably on BIPAP 12/6/40% saturating 100%. Patient without complaints besides requesting to go home. Denies dyspnea, SOB, CP. Denies abdominal pain. Reports she recovered in rehab after her recent hospitalization then was home for 2 days. She states she wasn't eating much at home. Denies nausea, vomiting. Denies fever, chills.     Allergies: penicillin (Unknown)    PAST MEDICAL & SURGICAL HISTORY:  Hypertension    Hyperlipidemia    Gout    GERD (gastroesophageal reflux disease)    Insomnia    Other gallbladder disorder    H/O: hysterectomy    History of cholecystectomy    H/O right hemicolectomy    History of resection of liver      FAMILY HISTORY:  FH: hypertension (Father, Mother)    ADVANCE DIRECTIVES: Full Code    REVIEW OF SYSTEMS:   General: Non-Contributory  Skin/Breast: Non-Contributory  Ophthalmologic: Non-Contributory  ENMT: Non-Contributory  Respiratory and Thorax: Non-Contributory  Cardiovascular: Non-Contributory  Gastrointestinal: Non-Contributory  Genitourinary: Non-Contributory  Musculoskeletal: Non-Contributory  Neurological: Non-Contributory  Psychiatric: Non-Contributory  Hematology/Lymphatics: Non-Contributory  Endocrine: Non-Contributory  Allergic/Immunologic: Non-Contributory    HOME MEDICATIONS:   · 	amLODIPine 2.5 mg oral tablet: 1 tab(s) orally once a day  · 	carvedilol 6.25 mg oral tablet: 1 tab(s) orally every 12 hours  · 	losartan 100 mg oral tablet: 1 tab(s) orally once a day  · 	Multiple Vitamins oral tablet: 1 tab(s) orally once a day  · 	acetaminophen 500 mg oral tablet: 2 tab(s) orally every 6 hours  · 	thiamine 100 mg oral tablet: 1 tab(s) orally once a day  · 	allopurinol 100 mg oral tablet: 100 milligram(s) orally once a day (at bedtime)  · 	Protonix 40 mg oral delayed release tablet: 1 tab(s) orally once a day  · 	fenofibrate 160 mg oral tablet: 1 tab(s) orally once a day    OBJECTIVE:  --------------------------------------------------------------------------------------  VITAL SIGNS, INS/OUTS (last 24 hours):  --------------------------------------------------------------------------------------  T(C): 36.8 (09-14-22 @ 17:26), Max: 36.8 (09-14-22 @ 17:26)  HR: 103 (09-14-22 @ 18:59) (82 - 103)  BP: 126/95 (09-14-22 @ 17:26) (126/95 - 151/72)  BP(mean): --  ABP: --  ABP(mean): --  RR: 25 (09-14-22 @ 17:26) (25 - 25)  SpO2: 100% (09-14-22 @ 18:59) (100% - 100%)  Wt(kg): --  CVP(mm Hg): --  CI: --  CAPILLARY BLOOD GLUCOSE     N/A    --------------------------------------------------------------------------------------    EXAM  NEUROLOGY  Exam: Normal, NAD, alert, oriented x 3, no focal deficits.     HEENT  Exam: Normocephalic, atraumatic.  EOMI. Dry mucous membranes.     RESPIRATORY  Exam: Lungs clear to auscultation, Normal expansion/effort.   Ventilation: BiPAP: 12/6/40%, SpO2 100%    CARDIOVASCULAR  Exam: S1, S2.  Regular rate and rhythm.  No peripheral edema.    GI/NUTRITION  Exam: Abdomen soft, Non-tender, Non-distended. Surgical incisions healing well.   Current Diet:  NPO    VASCULAR  Exam: Extremities warm, pink, well-perfused.     MUSCULOSKELETAL  Exam: All extremities moving spontaneously without limitations.     SKIN:  Exam: Good skin turgor, no skin breakdown.     METABOLIC/FLUIDS/ELECTROLYTES      HEMATOLOGIC  [x] DVT Prophylaxis:   Transfusions:	[] PRBC	[] Platelets		[] FFP	[] Cryoprecipitate    INFECTIOUS DISEASE  Antimicrobials/Immunologic Medications:    Day #  1 of   Zosyn    Tubes/Lines/Drains    [x] Peripheral IV  [] Central Venous Line     	[] R	[] L	[] IJ	[] Fem	[] SC	Date Placed:   [] Arterial Line		[] R	[] L	[] Fem	[] Rad	[] Ax	Date Placed:   [] PICC:         	[] Midline		[] Mediport  [] Urinary Catheter		Date Placed:     LABS  --------------------------------------------------------------------------------------                        10.4   7.04  )-----------( 315      ( 14 Sep 2022 17:00 )             34.3   09-14    142  |  102  |  8   ----------------------------<  96  4.3   |  33<H>  |  0.69    Ca    10.1      14 Sep 2022 17:00    TPro  7.4  /  Alb  3.9  /  TBili  0.5  /  DBili  x   /  AST  25  /  ALT  21  /  AlkPhos  222<H>  09-14    --------------------------------------------------------------------------------------    CT:

## 2022-09-14 NOTE — ED PROVIDER NOTE - ATTENDING CONTRIBUTION TO CARE
Brief HPI:  87 year old female PMH HTN, bladder cancer, recent cholecystectomy, presents to [facility] as transfer from [facility] for hepatic abscess.  Patient presented to [facility], found to be hypoxemic and started on NIPPV.  Transferred to [facility] as patient likely requires ICU level care after IR procedure.      Vitals:   Reviewed    Exam:    GEN:  Non-toxic appearing, non-distressed, speaking full sentences, non-diaphoretic, AAOx3  HEENT:  NCAT, neck supple, EOMI, PERRLA, sclera anicteric, no conjunctival pallor or injection, no stridor, normal voice, no tonsillar exudate, uvula midline  CV:  regular rhythm and rate, s1/s2 audible, no murmurs, rubs or gallops, peripheral pulses 2+ and symmetric  PULM:  on NIPPV, lungs clear to auscultation bilaterally, no wheezes, crackles or rales  ABD:  non distended, non-tender, no rebound, no guarding, negative Geronimo's sign, bowel sounds normal, no cvat  MSK:  no gross deformity, non-tender extremities and joints, range of motion grossly normal appearing, no extremity edema, extremities warm and well perfused   NEURO:  AAOx3, CN II-XII intact, motor 5/5 in upper and lower extremities bilaterally, sensation grossly intact in extremities and trunk  SKIN:  warm, dry, no rash or vesicles     A/P:  87 year old female PMH HTN, bladder cancer, recent cholecystectomy, presents to [facility] as transfer from [facility] for hepatic abscess, transferred on nippv for hypoxemic respiratory distress.  CT chest showing atelectasis vs. pna.  Will obtain MICU consult, IR consult.  Anticipate admission.

## 2022-09-14 NOTE — ED ADULT TRIAGE NOTE - CHIEF COMPLAINT QUOTE
pt arrives as a transfer from  for Hepatic abscess/ IR procedure, pt presents on BIPAP. pt is awake, brought directly to room 5.

## 2022-09-14 NOTE — H&P ADULT - NSHPLABSRESULTS_GEN_ALL_CORE
.  LABS:                         10.4   7.04  )-----------( 315      ( 14 Sep 2022 17:00 )             34.3     09-14    142  |  102  |  8   ----------------------------<  96  4.3   |  33<H>  |  0.69    Ca    10.1      14 Sep 2022 17:00    TPro  7.4  /  Alb  3.9  /  TBili  0.5  /  DBili  x   /  AST  25  /  ALT  21  /  AlkPhos  222<H>  09-14    PT/INR - ( 14 Sep 2022 17:00 )   PT: 13.2 sec;   INR: 1.14 ratio         PTT - ( 14 Sep 2022 17:00 )  PTT:30.6 sec          RADIOLOGY, EKG & ADDITIONAL TESTS: Reviewed. .  LABS:                         10.4   7.04  )-----------( 315      ( 14 Sep 2022 17:00 )             34.3     09-14    142  |  102  |  8   ----------------------------<  96  4.3   |  33<H>  |  0.69    Ca    10.1      14 Sep 2022 17:00    TPro  7.4  /  Alb  3.9  /  TBili  0.5  /  DBili  x   /  AST  25  /  ALT  21  /  AlkPhos  222<H>  09-14    PT/INR - ( 14 Sep 2022 17:00 )   PT: 13.2 sec;   INR: 1.14 ratio         PTT - ( 14 Sep 2022 17:00 )  PTT:30.6 sec

## 2022-09-14 NOTE — H&P ADULT - NSICDXPASTSURGICALHX_GEN_ALL_CORE_FT
PAST SURGICAL HISTORY:  H/O right hemicolectomy     H/O: hysterectomy     History of cholecystectomy     History of resection of liver

## 2022-09-14 NOTE — ED PROVIDER NOTE - OBJECTIVE STATEMENT
87 year old female PMH HTN, bladder cancer, recent cholecystectomy, presents to [facility] as transfer from [facility] for hepatic abscess. Patient was at home and found by initial EMS to be hypoxic to 40s%. He was brought to [facility] on CPAP, transitioned to bipap and unable to wean to NC. She had CT A/P performed which demonstrated intrahepatic abscess at the gallbladder fossa, concerns of ascending cholangitis. She received Vanco/Zosyn for abx and was transferred to [facility] given recent surgery here. Patient c/o RUQ abd pain. She denies fever, chills, vomiting, or diarrhea.

## 2022-09-14 NOTE — ED PROVIDER NOTE - NS ED ROS FT
ROS: negative for headache, chest pain, shortness of breath, abd pain, nausea, vomiting, diarrhea, rash, paresthesia--all other systems reviewed are negative.

## 2022-09-14 NOTE — ED ADULT NURSE NOTE - OBJECTIVE STATEMENT
pt transfer from [facility] for hepatic abscess. pt is s/o lap choly 1 week ago 2/2 to [place]. was found today lethargic and altered. upon arrival of EMS pt was found with an O2 sat of 46% and placed on C-pap. pt arrives to our ED on Bipap. pt able to follow commands, NSR on HM. arrives with s/l #20 to L hand. attending and facilitator RN at bedside.

## 2022-09-14 NOTE — ED PROVIDER NOTE - CLINICAL SUMMARY MEDICAL DECISION MAKING FREE TEXT BOX
Hepatic abscess   broad spectrum antibiotics   transfer to [facility] for IR and specialty care as she has primary been admitted there for management recently

## 2022-09-14 NOTE — H&P ADULT - NSHPREVIEWOFSYSTEMS_GEN_ALL_CORE
CONSTITUTIONAL: No weakness, fevers or chills  EYES/ENT: No visual changes;  No vertigo or throat pain   NECK: No pain or stiffness  RESPIRATORY: No cough, wheezing, hemoptysis;   CARDIOVASCULAR: No chest pain or palpitations  GASTROINTESTINAL: No nausea, vomiting, or hematemesis; No diarrhea or constipation. No melena or hematochezia.  GENITOURINARY: No dysuria, frequency or hematuria  NEUROLOGICAL: No numbness or weakness  SKIN: No itching, rashes

## 2022-09-14 NOTE — ED ADULT NURSE NOTE - NSIMPLEMENTINTERV_GEN_ALL_ED
Implemented All Fall with Harm Risk Interventions:  [place] to call system. Call bell, personal items and telephone within reach. Instruct patient to call for assistance. Room bathroom lighting operational. Non-slip footwear when patient is off stretcher. Physically safe environment: no spills, clutter or unnecessary equipment. Stretcher in lowest position, wheels locked, appropriate side rails in place. Provide visual cue, wrist band, yellow gown, etc. Monitor gait and stability. Monitor for mental status changes and reorient to person, place, and time. Review medications for side effects contributing to fall risk. Reinforce activity limits and safety measures with patient and family. Provide visual clues: red socks.

## 2022-09-14 NOTE — ED ADULT NURSE NOTE - OBJECTIVE STATEMENT
Patient is a 87 yr old female brought in by EMS in respiratory distress  found lethargic, sob, o2 sat 46% room air, pt on cpap on arrival, o2 sat 100%. As per daughter patient is  s/p gallbladder removal and discharge x 1 week. left hand #20G iv heplock placed in field.

## 2022-09-14 NOTE — ED PROVIDER NOTE - NS ED ROS FT
GENERAL: no fever, no chills, no weight loss  EYES: no change in vision, no irritation, no discharge, no redness, no pain  HEENT: no trouble swallowing or speaking, no throat pain, no ear pain  CARDIAC: no chest pain, no palpitations   PULMONARY: no cough, +shortness of breath, no wheezing  GI: +abdominal pain, no nausea, no vomiting, no diarrhea, no constipation, no melena, no hematochezia, no hematemesis  : no changes in urination, no dysuria, no frequency, no hematuria, no discharge  SKIN: no rashes  NEURO: no headache, no numbness, no weakness  MSK: no joint pain, no muscle pain, no back pain, no calf pain

## 2022-09-14 NOTE — CONSULT NOTE ADULT - ATTENDING COMMENTS
Patient s/p cholecystectomy partial hepatectomy and colectomy transferred for IR drainage started on bipap in [place]. CT scan shows right lower lobe pneumonia and hepatic abscess. Plan for ir drainage of hepatic abscess.  N mentating well  resp on bipap 12/6 plan to wean down on 40% fio2, diurese and continue on antibiotics  cv hemodynamically stable adequate perfusion  gi npo, ivf for procedure  gu/renal monitor uop  heme vte ppx  id zosyn for hepatic abscess and right lower lobe pneumonia  endo no changes    The patient is a critical care patient with life threatening hemodynamic and metabolic instability in SICU.  I have personally interviewed when possible and examined the patient, reviewed data and laboratory tests/x-rays and all pertinent electronic images.  I was physically present for the key portions of the evaluation and management (E/M) service provided.   The SICU team has a constant risk benefit analyzes discussion with the primary team, all consultants, House Staff and PA's on all decisions.  These diagnoses are unrelated to the surgical procedure noted above.  I meet with family if needed to get further history, discuss the case and make care decisions for this patient who might not be able to participate.  Time involved in performance of separately billable procedures was not counted toward my critical care time. There is no overlap.  I spent 55-75 minutes ( 0800Hrs-0915Hrs in AM/ 1600Hrs-1715Hrs in PM, or other time indicated) of critical care time for the diagnoses, assessment, plan and interventions.  This time excludes time spent on separate procedures and teaching.

## 2022-09-14 NOTE — ED PROVIDER NOTE - CLINICAL SUMMARY MEDICAL DECISION MAKING FREE TEXT BOX
Pee Parks, PGY4: 87 year old female transfer from [facility] for intrahepatic abscess w/ recent surgery at performed here. Also noted to be hypoxic at home, on bipap, unable to wean. Received abx at [facility]. Will admit with IR consult placed.

## 2022-09-14 NOTE — ED PROVIDER NOTE - OBJECTIVE STATEMENT
86 yo femalwe [facility] of HTN recent surgery 8/19 at [facility] Dr. Dove  for gallbladder cancer. also had part of colon biopsy and liver for concern of maetqastatic cancer [id number] reports came back okay. per daughter pt not on checm or radation does not need oncologist as per inga.. Pt as per ems, pt found lethargic, sob, o2 sat 46% room air, pt on cpap on arrival, o2 sat 100%. pt responsive to voice. s/p gallbladder removal and discharge x 1 week. Pt AO x 3 on arrival satting 86% on RA placed on NC now satting 95 % on 4 liters. Per daughter pt was reportedly ambulatorry and performed ADL prior to hospitalization. Pt also had pneumonia /aspiration pneu on hopsitpatzaiton adn required supplemteal O2 priot to being d/c. was not presfcribed home o2

## 2022-09-14 NOTE — ED PROVIDER NOTE - PHYSICAL EXAMINATION
PE:  CONSTITUTIONAL: appears stated age  HEAD: Normocephalic; atraumatic  EYES: PERRL;  ENMT: External appears normal; normal oropharynx  NECK: Supple; non-tender; no cervical lymphadenopathy  CARD: tachycardia S1, S2; no murmurs, rubs, or gallops  RESP: tachypnea, increased WOB, B/l crackles   ABD: Soft, non-distended; non-tender  EXT: Normal ROM in all four extremities; non-tender to palpation; distal pulses intact  SKIN: Warm, dry, no rash  NEURO:   No focal neurological deficiencies,

## 2022-09-14 NOTE — H&P ADULT - HISTORY OF PRESENT ILLNESS
86 y/o F with HTN, bladder cancer and gallbladder cancer s/p cholecystectomy partial hepatectomy (4b/5), RT hemicolectomy presenting from [facility] for hypoxia and found to have hepatic abscess on CT scan. After her surgery in August her postoperative course was complicated by pleural edema requiring diuresis in SICU and hypoxic and hypercapnic respiratory failure treated with oxygen supplementation, chest physiotherapy and abx. The pt was found by EMS to be lethargic and hypoxic with a sat in the 40%s. She was brought to [facility] where she was initiated on Bipap. The patient was unable to be weaned off nasal canula and subsequently underwent CT A/P which demonstrated a hepatic abscess at the gallbladder fossa. The patient has received a van/zosyn before she was transferred to [facility]. She denies fever, chills, vomiting, or diarrhea.     88 y/o F with HTN, bladder cancer and gallbladder cancer s/p cholecystectomy partial hepatectomy (4b/5), RT hemicolectomy presenting from [facility] for hypoxia and found to have hepatic abscess on CT scan. After her surgery in August her postoperative course was complicated by pleural edema requiring diuresis in SICU and hypoxic and hypercapnic respiratory failure treated with oxygen supplementation, chest physiotherapy and abx. The patient's daughter states she has been feeling weak since Monday and was having low O2 sats (in th e80s) while ambulating, but it would improve to the 90s once she rested. This morning, the patient was very weak and could not pull herself up to sit. The daughter took her vitals which demonstrated O2 sat in the 40s, pulse in 120s and elevated blood pressure. At this time EMS was called and the patient was brought to [facility] where she was initiated on Bipap. The patient was unable to be weaned off nasal canula and subsequently underwent CT A/P which demonstrated a hepatic abscess at the gallbladder fossa. The patient has received a van/zosyn before she was transferred to [facility]. She denies fever, chills, vomiting, or diarrhea.     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 Her postoperative course was complicated by a pleural effusion and hypoxic/hypercapnic respiratory failure which required diuresis, oxygen supplementation, chest PT and abx. She was discharged to Rehab on 09/07 and went home a few days ago. Daughter reports that patient was significantly weaker the past two days, lethargic looking and not able to eat or drink anything. On the morning of 09/14, daughter called EMS as her vitals were noted for hypoxia with O2 sat in the 40s/50s%. Patient was taken to [facility] where initial laboratory workup and imaging was obtained. Patient was placed on BiPAP immediately and has not been able to be weaned off. CT A/P at [facility] remarkable for a [id number] hepatic abscess at the gallbladder fossa. The patient has received a van/zosyn before she was transferred to [facility].    In the ED, patient on BiPAP 12/6/40% saturating 100%. She was afebrile, with increased work of breathing but comfortable on BiPAP and stable vitals. She was non-toxic appearing. Denies nausea, vomiting. Denies fever, chills    The CT scans and other labs from earlier can be found under the same on the [facility] chart.   Pathology positive for gallbladder invasive adenocarcinoma, moderately differentiated, negative portal lymph nodes, negative segment 4b/5, negative right colon and mesenteric lymph nodes, negative cystic duct margins, and negative additional sigmoid colon.    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 Her postoperative course was complicated by a pleural effusion and hypoxic/hypercapnic respiratory failure which required diuresis, oxygen supplementation, chest PT and abx. She was discharged to Rehab on 09/07 and went home a few days ago. Daughter reports that patient was significantly weaker the past two days, lethargic looking and not able to eat or drink anything. On the morning of 09/14, daughter called EMS as her vitals were noted for hypoxia with O2 sat in the 40s/50s%. Patient was taken to [facility] where initial laboratory workup and imaging was obtained. Patient was placed on BiPAP immediately and has not been able to be weaned off. CT A/P at [facility] remarkable for a [id number] hepatic abscess at the gallbladder fossa. The patient has received a van/zosyn before she was transferred to [facility].    In the ED, patient on BiPAP 12/6/40% saturating 100%. She was afebrile, with increased work of breathing but comfortable on BiPAP and stable vitals. She was non-toxic appearing. Denies nausea, vomiting. Denies fever, chills    The CT scans and other labs from earlier can be found under the same on the [facility] chart. (MRN [id number])    Pathology positive for gallbladder invasive adenocarcinoma, moderately differentiated, negative portal lymph nodes, negative segment 4b/5, negative right colon and mesenteric lymph nodes, negative cystic duct margins, and negative additional sigmoid colon.

## 2022-09-14 NOTE — ED ADULT NURSE NOTE - NSHOSCREENINGQ1_ED_ALL_ED
No
Quality 431: Preventive Care And Screening: Unhealthy Alcohol Use - Screening: Patient not identified as an unhealthy alcohol user when screened for unhealthy alcohol use using a systematic screening method
Quality 130: Documentation Of Current Medications In The Medical Record: Current Medications Documented
Quality 110: Preventive Care And Screening: Influenza Immunization: Influenza immunization was not ordered or administered, reason not given
Quality 226: Preventive Care And Screening: Tobacco Use: Screening And Cessation Intervention: Patient screened for tobacco use and is an ex/non-smoker
Detail Level: Detailed

## 2022-09-14 NOTE — H&P ADULT - NSHPPHYSICALEXAM_GEN_ALL_CORE
T(C): 36.8 (09-14-22 @ 17:26), Max: 36.8 (09-14-22 @ 17:26)  HR: 86 (09-14-22 @ 17:26) (82 - 86)  BP: 126/95 (09-14-22 @ 17:26) (126/95 - 151/72)  RR: 25 (09-14-22 @ 17:26) (25 - 25)  SpO2: 100% (09-14-22 @ 17:26) (100% - 100%)    CONSTITUTIONAL: Sitting in bed with bipap mask, in no acute distress  EYES: PERRLA and symmetric, EOMI, No conjunctival or scleral injection, non-icteric  RESP: No respiratory distress, no use of accessory muscles;  CV: No cyanosis, no peripheral edema  GI: Soft, NT, ND, no rebound, no guarding; no palpable masses; no hepatosplenomegaly  Extremities: No cyanosis, without misalignment  SKIN: No rashes or ulcers noted;   PSYCH: Awake, alert. Pt unable to appropriately respond to questions  NEURO: No focal deficits noted. T(C): 36.8 (09-14-22 @ 17:26), Max: 36.8 (09-14-22 @ 17:26)  HR: 86 (09-14-22 @ 17:26) (82 - 86)  BP: 126/95 (09-14-22 @ 17:26) (126/95 - 151/72)  RR: 25 (09-14-22 @ 17:26) (25 - 25)  SpO2: 100% (09-14-22 @ 17:26) (100% - 100%)    CONSTITUTIONAL: Sitting in bed with bipap mask, in no acute distress  EYES: PERRLA and symmetric, EOMI, No conjunctival or scleral injection, non-icteric  RESP: on BiPAP, increased work of breathing noted when talking   GI: Soft, NT, ND, no rebound or guarding tenderness, prior incisions well healed   NEURO: A&Ox3

## 2022-09-14 NOTE — ED ADULT NURSE REASSESSMENT NOTE - NS ED NURSE REASSESS COMMENT FT1
Floguadalupe RN: Secondary 20 G IV placed in R AC. Labs drawn and sent. Tolerating bi-pap well at present. Turned and positioned. Blanchable redness noted to sacral area. Normal sinus rhythm on the monitor. Remains on bi-pap mask. Will continue to monitor.

## 2022-09-14 NOTE — H&P ADULT - ASSESSMENT
88 y/o F with HTN, bladder cancer and gallbladder cancer s/p cholecystectomy partial hepatectomy (4b/5), RT hemicolectomy presenting from [facility] for hypoxia and found to have hepatic abscess on CT scan. The patient continue to requires bipap to maintain normal O2 sat, should be evaluated by the SICU. The gallbladder abscess may require drainage and should be evaluated by IR.     Plan  -Admit to D team Surgery  -ICU consult   -Consult IR for abscess drainage  -Continue BiPap  -Continue vanc/zosyn  -DVT prophylaxis- lovenox    88 y/o F with HTN, bladder cancer and gallbladder cancer s/p cholecystectomy partial hepatectomy (4b/5), RT hemicolectomy on 08/19  initially presented to [facility] ED with lethargy for 2 days and hypoxia to 40/50s % with workup remarkable for [id number] hepatic abscess on CT scan, and hypoxia requiring BiPAP.      Plan  -Admit to D team Surgery under Dr. Roche, SICU bed   -SICU consult for hypoxia requiring BiPAP   -Consult IR for abscess drainage, case reviewed with Dr. Dempsey  -IR order placed under Dr. Dempsey for tomorrow 9/15  -NPO after MN, preop labs  -wean BiPAP as tolerated   -Continue Zosyn  -DVT prophylaxis  -Appreciate excellent SICU care     d/w Dr. Roche    D Team Surgery  [id number] 86 y/o F with HTN, bladder cancer and gallbladder cancer s/p cholecystectomy partial hepatectomy (4b/5), RT hemicolectomy on 08/19  initially presented to [facility] ED with lethargy for 2 days and hypoxia to 40/50s % with workup remarkable for a [id number] hepatic abscess and b/l pleural effusions on CT scan, and hypoxia requiring BiPAP.      Plan  -Admit to D team Surgery under Dr. Roche, SICU bed   -SICU consult for hypoxia requiring BiPAP   -Consult IR for abscess drainage, case reviewed with Dr. Dempsey  -IR order placed under Dr. Dempsey for tomorrow 9/15  -will require optimization prior to IR procedure   -will require close monitoring after IR drainage   -NPO after MN, preop labs  -wean BiPAP as tolerated   -Continue Zosyn  -DVT prophylaxis  -Appreciate excellent SICU care     d/w Dr. Roche    D Team Surgery  [id number]

## 2022-09-14 NOTE — ED PROVIDER NOTE - CRITICAL CARE ATTENDING CONTRIBUTION TO CARE
Hepatic abscess   broad spectrum antibiotics   transfer to [facility] for IR and specialty care as she has primary been admitted there for management recently  Dr. Sánchez accepting

## 2022-09-14 NOTE — ED ADULT TRIAGE NOTE - CHIEF COMPLAINT QUOTE
as per ems, pt found lethargic, sob, o2 sat 46% room air, pt on cpap on arrival, o2 sat 100%. pt responsive to voice. s/p gallbladder removal and discharge x 1 week. left hand #20G iv heplock placed in field.

## 2022-09-14 NOTE — ED PROVIDER NOTE - PHYSICAL EXAMINATION
GENERAL: A&Ox2, non-toxic appearing, no acute distress  HEENT: NCAT, EOMI, oral mucosa moist, normal conjunctiva  RESP: CTAB, no respiratory distress, no wheezes/rhonchi/rales, on bipap  CV: RRR, no murmurs/rubs/gallops  ABDOMEN: soft, non-tender, non-distended, no guarding  MSK: no visible deformities  NEURO: no focal sensory or motor deficits, CN 2-12 grossly intact  SKIN: warm, normal color, well perfused, no rash  PSYCH: normal affect GENERAL: A&Ox2, non-toxic appearing, no acute distress  HEENT: NCAT, EOMI, oral mucosa moist, normal conjunctiva  RESP: CTAB, no respiratory distress, no wheezes/rhonchi/rales, on bipap  CV: RRR, no murmurs/rubs/gallops  ABDOMEN: soft, RUQ ttp, non-distended, no guarding, prior surgical sites w/ scabbing  MSK: no visible deformities  NEURO: no focal sensory or motor deficits, CN 2-12 grossly intact, moving all extremities  SKIN: warm, normal color, well perfused, no rash  PSYCH: normal affect

## 2022-09-14 NOTE — ED ADULT NURSE REASSESSMENT NOTE - NS ED NURSE REASSESS COMMENT FT1
Patient noted alert and verbally responsive, not in any acute distress. Placed on Bipap by RT and Stating at 98%. Patient pulled out IV MD aware of delay in medication.

## 2022-09-14 NOTE — CONSULT NOTE ADULT - SUBJECTIVE AND OBJECTIVE BOX
Vascular & Interventional Radiology Consult Note    Evaluate for Procedure: hepatic/GB fossa abscess drainage    HPI: 87y Female with h/o HTN, bladder ca, and recent open cholecystectomy (8/19) transferred from [facility] after initially presenting with respiratory failure. patient was initially found to be hypoxic to 46% by EMS, but has now improved with BIPAP. patient was found to have a fluid and gas collection in the GB bed with IR consulted for drainage. Patient is HD stable with normal WBC. Patient currently being evaluated by surgical service for ICU admission for further work up of respiratory problems.    Allergies: penicillin (Unknown)    Medications (Abx/Cardiac/Anticoagulation/Blood Products)      Data:  149.9  T(C): 36.8  HR: 86  BP: 126/95  RR: 25  SpO2: 100%    -WBC 7.04 / HgB 10.4 / Hct 34.3 / Plt 315  -Na 142 / Cl 102 / BUN 8 / Glucose 96  -K 4.3 / CO2 33 / Cr 0.69  -ALT 21 / Alk Phos 222 / T.Bili 0.5  -INR 1.14 / PTT 30.6      Imaging: CT performed at [facility] demonstrated a gas and fluid collection in the GB fossa

## 2022-09-14 NOTE — CONSULT NOTE ADULT - ASSESSMENT
Interventional Radiology  Evaluate for Procedure: Liver abscess drain    HPI: 87y Female with post-op liver abscess, respiratory failure    Allergies:   Medications (Abx/Cardiac/Anticoagulation/Blood Products)  piperacillin/tazobactam IVPB.: 200 mL/Hr IV Intermittent (09-14 @ 10:49)  vancomycin  IVPB: 250 mL/Hr IV Intermittent (09-14 @ 13:33)    Data:  152.4  59  T(C): 37.8  HR: 97  BP: 131/98  RR: 25  SpO2: 94%  -WBC 6.78 / HgB 10.5 / Hct 35.4 / Plt 351  -Na 141 / Cl 103 / BUN 10 / Glucose 127  -K 4.4 / CO2 34 / Cr 0.74  -ALT 20 / Alk Phos 214 / T.Bili 0.5  -INR 1.08 / PTT 28.7    Radiology: liver abscess    Assessment/Plan:   -87y Female with post-op liver abscess, respiratory failure.  -Per anesthesia, unavailable for procedure.  -Can perform procedure with local anesthesia, however patient is tenuous and high risk for worsening post-procedure sepsis, and disposition after IR procedure should ideally be an ICU setting. If not feasible per ED/hospitalist services, tertiary transfer may be safest option.  -Awaiting disposition planning. D/w Dr. Alexander and Sarah

## 2022-09-15 ENCOUNTER — TRANSCRIPTION ENCOUNTER (OUTPATIENT)
Age: 87
End: 2022-09-15

## 2022-09-15 DIAGNOSIS — J96.90 RESPIRATORY FAILURE, UNSPECIFIED, UNSPECIFIED WHETHER WITH HYPOXIA OR HYPERCAPNIA: ICD-10-CM

## 2022-09-15 DIAGNOSIS — R06.02 SHORTNESS OF BREATH: ICD-10-CM

## 2022-09-15 DIAGNOSIS — Z20.822 CONTACT WITH AND (SUSPECTED) EXPOSURE TO COVID-19: ICD-10-CM

## 2022-09-15 DIAGNOSIS — I10 ESSENTIAL (PRIMARY) HYPERTENSION: ICD-10-CM

## 2022-09-15 DIAGNOSIS — K75.0 ABSCESS OF LIVER: ICD-10-CM

## 2022-09-15 DIAGNOSIS — Z85.09 PERSONAL HISTORY OF MALIGNANT NEOPLASM OF OTHER DIGESTIVE ORGANS: ICD-10-CM

## 2022-09-15 DIAGNOSIS — R00.0 TACHYCARDIA, UNSPECIFIED: ICD-10-CM

## 2022-09-15 LAB
ANION GAP SERPL CALC-SCNC: 6 MMOL/L — LOW (ref 7–14)
BLOOD GAS ARTERIAL COMPREHENSIVE RESULT: SIGNIFICANT CHANGE UP
BLOOD GAS ARTERIAL COMPREHENSIVE RESULT: SIGNIFICANT CHANGE UP
BUN SERPL-MCNC: 6 MG/DL — LOW (ref 7–23)
CALCIUM SERPL-MCNC: 9.4 MG/DL — SIGNIFICANT CHANGE UP (ref 8.4–10.5)
CHLORIDE SERPL-SCNC: 99 MMOL/L — SIGNIFICANT CHANGE UP (ref 98–107)
CO2 SERPL-SCNC: 34 MMOL/L — HIGH (ref 22–31)
CREAT SERPL-MCNC: 0.67 MG/DL — SIGNIFICANT CHANGE UP (ref 0.5–1.3)
EGFR: 85 ML/MIN/1.73M2 — SIGNIFICANT CHANGE UP
GLUCOSE SERPL-MCNC: 104 MG/DL — HIGH (ref 70–99)
GRAM STN FLD: SIGNIFICANT CHANGE UP
MAGNESIUM SERPL-MCNC: 1.6 MG/DL — SIGNIFICANT CHANGE UP (ref 1.6–2.6)
PHOSPHATE SERPL-MCNC: 3 MG/DL — SIGNIFICANT CHANGE UP (ref 2.5–4.5)
POTASSIUM SERPL-MCNC: 3.6 MMOL/L — SIGNIFICANT CHANGE UP (ref 3.5–5.3)
POTASSIUM SERPL-SCNC: 3.6 MMOL/L — SIGNIFICANT CHANGE UP (ref 3.5–5.3)
SODIUM SERPL-SCNC: 139 MMOL/L — SIGNIFICANT CHANGE UP (ref 135–145)
SPECIMEN SOURCE: SIGNIFICANT CHANGE UP

## 2022-09-15 PROCEDURE — 49406 IMAGE CATH FLUID PERI/RETRO: CPT

## 2022-09-15 PROCEDURE — 71045 X-RAY EXAM CHEST 1 VIEW: CPT | Mod: 26

## 2022-09-15 PROCEDURE — 99232 SBSQ HOSP IP/OBS MODERATE 35: CPT | Mod: 24

## 2022-09-15 PROCEDURE — 99291 CRITICAL CARE FIRST HOUR: CPT

## 2022-09-15 RX ORDER — MAGNESIUM SULFATE 500 MG/ML
2 VIAL (ML) INJECTION ONCE
Refills: 0 | Status: COMPLETED | OUTPATIENT
Start: 2022-09-15 | End: 2022-09-15

## 2022-09-15 RX ORDER — POTASSIUM CHLORIDE 20 MEQ
10 PACKET (EA) ORAL
Refills: 0 | Status: COMPLETED | OUTPATIENT
Start: 2022-09-15 | End: 2022-09-15

## 2022-09-15 RX ADMIN — PIPERACILLIN AND TAZOBACTAM 25 GRAM(S): 4; .5 INJECTION, POWDER, LYOPHILIZED, FOR SOLUTION INTRAVENOUS at 22:14

## 2022-09-15 RX ADMIN — SODIUM CHLORIDE 50 MILLILITER(S): 9 INJECTION, SOLUTION INTRAVENOUS at 07:33

## 2022-09-15 RX ADMIN — Medication 5 MILLIGRAM(S): at 13:34

## 2022-09-15 RX ADMIN — Medication 20 MILLIGRAM(S): at 00:08

## 2022-09-15 RX ADMIN — Medication 100 MILLIEQUIVALENT(S): at 08:32

## 2022-09-15 RX ADMIN — PIPERACILLIN AND TAZOBACTAM 25 GRAM(S): 4; .5 INJECTION, POWDER, LYOPHILIZED, FOR SOLUTION INTRAVENOUS at 13:34

## 2022-09-15 RX ADMIN — Medication 5 MILLIGRAM(S): at 05:07

## 2022-09-15 RX ADMIN — Medication 100 MILLIEQUIVALENT(S): at 07:32

## 2022-09-15 RX ADMIN — PANTOPRAZOLE SODIUM 40 MILLIGRAM(S): 20 TABLET, DELAYED RELEASE ORAL at 13:33

## 2022-09-15 RX ADMIN — Medication 25 GRAM(S): at 06:25

## 2022-09-15 RX ADMIN — Medication 100 MILLIEQUIVALENT(S): at 06:25

## 2022-09-15 RX ADMIN — Medication 5 MILLIGRAM(S): at 23:25

## 2022-09-15 RX ADMIN — PIPERACILLIN AND TAZOBACTAM 25 GRAM(S): 4; .5 INJECTION, POWDER, LYOPHILIZED, FOR SOLUTION INTRAVENOUS at 06:01

## 2022-09-15 RX ADMIN — CHLORHEXIDINE GLUCONATE 1 APPLICATION(S): 213 SOLUTION TOPICAL at 06:04

## 2022-09-15 NOTE — PROGRESS NOTE ADULT - SUBJECTIVE AND OBJECTIVE BOX
24 HOUR EVENTS:  - BRUEC    SUBJECTIVE/ROS:  [ ] A ten-point review of systems was otherwise negative except as noted.  [ ] Due to altered mental status/intubation, subjective information were not able to be obtained from the patient. History was obtained, to the extent possible, from review of the chart and collateral sources of information.      NEURO  Exam: awake, alert, oriented  Meds: acetaminophen   IVPB .. 1000 milliGRAM(s) IV Intermittent every 6 hours PRN Temp greater or equal to 38C (100.4F), Mild Pain (1 - 3)    [x] Adequacy of sedation and pain control has been assessed and adjusted      RESPIRATORY  RR: 21 (09-15-22 @ 00:00) (17 - 25)  SpO2: 99% (09-15-22 @ 00:00) (99% - 100%)  Wt(kg): --  Exam: satting >92% on BIPAP   BIPAP 10/5/40%    [N/A] Extubation Readiness Assessed  Meds:       CARDIOVASCULAR  HR: 69 (09-15-22 @ 00:00) (69 - 103)  BP: 93/69 (09-15-22 @ 00:00) (93/69 - 156/97)  BP(mean): 76 (09-15-22 @ 00:00) (76 - 108)  ABP: --  ABP(mean): --  Wt(kg): --  CVP(cm H2O): --  VBG - ( 14 Sep 2022 17:00 )  pH: 7.32  /  pCO2: 69    /  pO2: 25    / HCO3: 36    / Base Excess: 7.7   /  SaO2: 27.5   Lactate: 1.0                Exam: regular rate and rhythm  Cardiac Rhythm: sinus  Perfusion     [x]Adequate   [ ]Inadequate  Mentation   [x]Normal       [ ]Reduced  Extremities  [x]Warm         [ ]Cool  Volume Status [ ]Hypervolemic [x]Euvolemic [ ]Hypovolemic  Meds: metoprolol tartrate Injectable 5 milliGRAM(s) IV Push every 6 hours        GI/NUTRITION  Exam: soft, nontender, nondistended, well healed incision  DIET: NPO   Meds: pantoprazole  Injectable 40 milliGRAM(s) IV Push daily      GENITOURINARY  I&O's Detail    09-14 @ 07:01  -  09-15 @ 01:08  --------------------------------------------------------  IN:    dextrose 5% + sodium chloride 0.45%: 150 mL  Total IN: 150 mL    OUT:    Voided (mL): 250 mL  Total OUT: 250 mL    Total NET: -100 mL        Weight (kg): 54 (09-14 @ 21:00)  09-14    142  |  102  |  8   ----------------------------<  96  4.3   |  33<H>  |  0.69    Ca    10.1      14 Sep 2022 17:00    TPro  7.4  /  Alb  3.9  /  TBili  0.5  /  DBili  x   /  AST  25  /  ALT  21  /  AlkPhos  222<H>  09-14    [ ] Otto catheter, indication: N/A  Meds: dextrose 5% + sodium chloride 0.45%. 1000 milliLiter(s) IV Continuous <Continuous>        HEMATOLOGIC  Meds:   [x] VTE Prophylaxis                        10.4   7.04  )-----------( 315      ( 14 Sep 2022 17:00 )             34.3     PT/INR - ( 14 Sep 2022 17:00 )   PT: 13.2 sec;   INR: 1.14 ratio         PTT - ( 14 Sep 2022 17:00 )  PTT:30.6 sec  Transfusion     [ ] PRBC   [ ] Platelets   [ ] FFP   [ ] Cryoprecipitate      INFECTIOUS DISEASES  WBC Count: 7.04 K/uL (09-14 @ 17:00)    RECENT CULTURES:    Meds: piperacillin/tazobactam IVPB.. 3.375 Gram(s) IV Intermittent every 8 hours        ENDOCRINE  CAPILLARY BLOOD GLUCOSE        Meds:       ACCESS DEVICES:  [ ] Peripheral IV  [ ] Central Venous Line	[ ] R	[ ] L	[ ] IJ	[ ] Fem	[ ] SC	Placed:   [ ] Arterial Line		[ ] R	[ ] L	[ ] Fem	[ ] Rad	[ ] Ax	Placed:   [ ] PICC:					[ ] Mediport  [ ] Urinary Catheter, Date Placed:   [x] Necessity of urinary, arterial, and venous catheters discussed    OTHER MEDICATIONS:  chlorhexidine 4% Liquid 1 Application(s) Topical every 24 hours     TEAM [ D ] Surgery Daily Progress Note  =====================================================    SUBJECTIVE: Patient seen and examined at bedside on AM rounds. Patient is currently on BiPAP 10/5/40% with O2 sat > 92%.     ALLERGIES:  penicillin (Unknown)  --------------------------------------------------------------------------------------    MEDICATIONS:    Neurologic Medications  acetaminophen   IVPB .. 1000 milliGRAM(s) IV Intermittent every 6 hours PRN Temp greater or equal to 38C (100.4F), Mild Pain (1 - 3)    Respiratory Medications    Cardiovascular Medications  metoprolol tartrate Injectable 5 milliGRAM(s) IV Push every 6 hours    Gastrointestinal Medications  dextrose 5% + sodium chloride 0.45%. 1000 milliLiter(s) IV Continuous <Continuous>  pantoprazole  Injectable 40 milliGRAM(s) IV Push daily  potassium chloride  10 mEq/100 mL IVPB 10 milliEquivalent(s) IV Intermittent every 1 hour    Genitourinary Medications    Hematologic/Oncologic Medications    Antimicrobial/Immunologic Medications  piperacillin/tazobactam IVPB.. 3.375 Gram(s) IV Intermittent every 8 hours    Endocrine/Metabolic Medications    Topical/Other Medications  chlorhexidine 4% Liquid 1 Application(s) Topical every 24 hours    --------------------------------------------------------------------------------------    VITAL SIGNS:  ICU Vital Signs Last 24 Hrs  T(C): 37.3 (15 Sep 2022 04:00), Max: 37.3 (14 Sep 2022 21:00)  T(F): 99.1 (15 Sep 2022 04:00), Max: 99.1 (14 Sep 2022 21:00)  HR: 76 (15 Sep 2022 04:00) (69 - 103)  BP: 138/60 (15 Sep 2022 04:00) (93/69 - 156/97)  BP(mean): 82 (15 Sep 2022 04:00) (76 - 108)  ABP: --  ABP(mean): --  RR: 20 (15 Sep 2022 04:00) (17 - 25)  SpO2: 96% (15 Sep 2022 04:00) (96% - 100%)    O2 Parameters below as of 15 Sep 2022 04:00  Patient On (Oxygen Delivery Method): BiPAP/CPAP    O2 Concentration (%): 40  --------------------------------------------------------------------------------------    EXAM    General: NAD, resting in bed comfortably.  Cardiac: regular rate, warm and well perfused  Respiratory: Nonlabored respirations, normal cw expansion.  Abdomen: soft, nontender, nondistended. Midline incision c/d/i, suprapubic incision with keloid formation  Extremities: normal strength, FROM, no deformities    --------------------------------------------------------------------------------------    LABS                        10.4   7.04  )-----------( 315      ( 14 Sep 2022 17:00 )             34.3   09-15    139  |  99  |  6<L>  ----------------------------<  104<H>  3.6   |  34<H>  |  0.67    Ca    9.4      15 Sep 2022 05:06  Phos  3.0     09-15  Mg     1.60     09-15    TPro  7.4  /  Alb  3.9  /  TBili  0.5  /  DBili  x   /  AST  25  /  ALT  21  /  AlkPhos  222<H>  09-14  --------------------------------------------------------------------------------------    INS AND OUTS:  I&O's Detail    14 Sep 2022 07:01  -  15 Sep 2022 07:00  --------------------------------------------------------  IN:    dextrose 5% + sodium chloride 0.45%: 250 mL  Total IN: 250 mL    OUT:    Voided (mL): 1150 mL  Total OUT: 1150 mL    Total NET: -900 mL  --------------------------------------------------------------------------------------

## 2022-09-15 NOTE — DISCHARGE NOTE PROVIDER - NSDCFUSCHEDAPPT_GEN_ALL_CORE_FT
Deep Roche  [facility]  [facility] [address]  Scheduled Appointment: 12/01/2022

## 2022-09-15 NOTE — PATIENT PROFILE ADULT - FALL HARM RISK - HARM RISK INTERVENTIONS

## 2022-09-15 NOTE — DISCHARGE NOTE PROVIDER - NSDCFUADDAPPT_GEN_ALL_CORE_FT
Please call [phone] to follow up with infectious disease specialist Dr. Dominguez in one week.

## 2022-09-15 NOTE — DISCHARGE NOTE PROVIDER - HOSPITAL COURSE
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 Her postoperative course was complicated by a pleural effusion and hypoxic/hypercapnic respiratory failure which required diuresis, oxygen supplementation, chest PT and abx. She was discharged to Rehab on 09/07 and went home a few days ago. Daughter reports that patient was significantly weaker the past two days, lethargic looking and not able to eat or drink anything. On the morning of 09/14, daughter called EMS as her vitals were noted for hypoxia with O2 sat in the 40s/50s%. Patient was taken to [facility] where initial laboratory workup and imaging was obtained. Patient was placed on BiPAP immediately and has not been able to be weaned off. CT A/P at [facility] remarkable for a [id number] hepatic abscess at the gallbladder fossa. The patient has received a van/zosyn before she was transferred to [facility].    In the ED, patient on BiPAP 12/6/40% saturating 100%. She was afebrile, with increased work of breathing but comfortable on BiPAP and stable vitals. She was non-toxic appearing. Denies nausea, vomiting. Denies fever, chills    The CT scans and other labs from earlier can be found under the same on the [facility] chart. (MRN [id number]) Pathology positive for gallbladder invasive adenocarcinoma, moderately differentiated, negative portal lymph nodes, negative segment 4b/5, negative right colon and mesenteric lymph nodes, negative cystic duct margins, and negative additional sigmoid colon.     Patient admitted to surgical oncology service under the care of Dr. Roche    9/14 Patient was transferred to SICU for BiPAP management and respiratory distress. 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 Her postoperative course was complicated by a pleural effusion and hypoxic/hypercapnic respiratory failure which required diuresis, oxygen supplementation, chest PT and abx. She was discharged to Rehab on 09/07 and went home a few days ago. Daughter reports that patient was significantly weaker the past two days, lethargic looking and not able to eat or drink anything. On the morning of 09/14, daughter called EMS as her vitals were noted for hypoxia with O2 sat in the 40s/50s%. Patient was taken to [facility] where initial laboratory workup and imaging was obtained. Patient was placed on BiPAP immediately and has not been able to be weaned off. CT A/P at [facility] remarkable for a 2u0x7hm hepatic abscess at the gallbladder fossa. The patient has received a van/zosyn before she was transferred to [facility].    In the ED, patient on BiPAP 12/6/40% saturating 100%. She was afebrile, with increased work of breathing but comfortable on BiPAP and stable vitals. She was non-toxic appearing. Denies nausea, vomiting. Denies fever, chills    The CT scans and other labs from earlier can be found under the same on the [facility] chart. (MRN [id number]) Pathology positive for gallbladder invasive adenocarcinoma, moderately differentiated, negative portal lymph nodes, negative segment 4b/5, negative right colon and mesenteric lymph nodes, negative cystic duct margins, and negative additional sigmoid colon.     Patient admitted to surgical oncology service under the care of Dr. Roche    9/14 Patient was transferred to SICU for BiPAP management and respiratory distress. IR was consulted for percutaneous drainage of hepatic abscess   9/15 Patient taken by IR for hepatic abscess drainage and 10 Fr drain placement in collection under US guidance and secured to skin w sutures. Connected to gravity drainage. Tolerated procedure well without complication. Culture sent, + GPC in pairs. Continued on Zosyn. While in SICU, weaned off BIPAP to NC.  Diet advanced to clear liquids which she tolerated well.  9/17: Diet advanced ot regular which she tolerated well. Remained hemodynamically stable and saturating well on nasal cannula. Deemed stable for transfer from SICU to surgical floor on continuous pulse oximetry.  9/18: IR cultures resulted, + Enterococcus faecalis. Continued on Zosyn.  9/19: Weaned off supplemental oxygen. Infectious disease consulted for enterococcus faecalis hepatic abscess who recommended to discontinue piperacillin/tazobactam  and start ampicillin/sulbactam 3g q6hr. Anticipating 4 weeks of antibiotics with plan to transition to PO if continues to be clinically stable.  9/20: Patient developed erythema of left arm, ? contact dermatitis from BP cuff versus allergy to Unasyn. ID recommended to transition ABX to vancomycin if erythema worsens.   Patient transitioned to vancomycin.  9/21-9/22: No change.  9/23: Patient taken to IR by Dr. Reza for hepatic drain check demonstrating moderate collection. Reconnected gravity drainage bag.   9/24-9/26: Patient remained hemodynamically stable, on IV antibiotics with Vancomycin per ID. Awaiting repeat drain check 1 week from previous.              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 Her postoperative course was complicated by a pleural effusion and hypoxic/hypercapnic respiratory failure which required diuresis, oxygen supplementation, chest PT and abx. She was discharged to Rehab on 09/07 and went home a few days ago. Daughter reports that patient was significantly weaker the past two days, lethargic looking and not able to eat or drink anything. On the morning of 09/14, daughter called EMS as her vitals were noted for hypoxia with O2 sat in the 40s/50s%. Patient was taken to [facility] where initial laboratory workup and imaging was obtained. Patient was placed on BiPAP immediately and has not been able to be weaned off. CT A/P at [facility] remarkable for a [id number] hepatic abscess at the gallbladder fossa. The patient has received a van/zosyn before she was transferred to [facility].    In the ED, patient on BiPAP 12/6/40% saturating 100%. She was afebrile, with increased work of breathing but comfortable on BiPAP and stable vitals. She was non-toxic appearing. Denies nausea, vomiting. Denies fever, chills    The CT scans and other labs from earlier can be found under the same on the [facility] chart. (MRN [id number]) Pathology positive for gallbladder invasive adenocarcinoma, moderately differentiated, negative portal lymph nodes, negative segment 4b/5, negative right colon and mesenteric lymph nodes, negative cystic duct margins, and negative additional sigmoid colon.     Patient admitted to surgical oncology service under the care of Dr. Roche    9/14 Patient was transferred to SICU for BiPAP management and respiratory distress. IR was consulted for percutaneous drainage of hepatic abscess   9/15 Patient taken by IR for hepatic abscess drainage and 10 Fr drain placement in collection under US guidance and secured to skin w sutures. Connected to gravity drainage. Tolerated procedure well without complication. Culture sent, + GPC in pairs. Continued on Zosyn. While in SICU, weaned off BIPAP to NC.  Diet advanced to clear liquids which she tolerated well.  9/17: Diet advanced ot regular which she tolerated well. Remained hemodynamically stable and saturating well on nasal cannula. Deemed stable for transfer from SICU to surgical floor on continuous pulse oximetry.  9/18: IR cultures resulted, + Enterococcus faecalis. Continued on Zosyn.  9/19: Weaned off supplemental oxygen. Infectious disease consulted for enterococcus faecalis hepatic abscess who recommended to discontinue piperacillin/tazobactam  and start ampicillin/sulbactam 3g q6hr. Anticipating 4 weeks of antibiotics with plan to transition to PO if continues to be clinically stable.  9/20: Patient developed erythema of left arm, ? contact dermatitis from BP cuff versus allergy to Unasyn. ID recommended to transition ABX to vancomycin if erythema worsens.   Patient transitioned to vancomycin.  9/21-9/22: No change.  9/23: Patient taken to IR by Dr. Reza for hepatic drain check demonstrating moderate collection. Reconnected gravity drainage bag.   9/24-9/26: Patient remained hemodynamically stable, on IV antibiotics with Vancomycin per ID.     At this time, patient stable for discharge to home.              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 Her postoperative course was complicated by a pleural effusion and hypoxic/hypercapnic respiratory failure which required diuresis, oxygen supplementation, chest PT and abx. She was discharged to Rehab on 09/07 and went home a few days ago. Daughter reports that patient was significantly weaker the past two days, lethargic looking and not able to eat or drink anything. On the morning of 09/14, daughter called EMS as her vitals were noted for hypoxia with O2 sat in the 40s/50s%. Patient was taken to [facility] where initial laboratory workup and imaging was obtained. Patient was placed on BiPAP immediately and has not been able to be weaned off. CT A/P at [facility] remarkable for a 6u6f7br hepatic abscess at the gallbladder fossa. The patient has received a van/zosyn before she was transferred to [facility].    In the ED, patient on BiPAP 12/6/40% saturating 100%. She was afebrile, with increased work of breathing but comfortable on BiPAP and stable vitals. She was non-toxic appearing. Denies nausea, vomiting. Denies fever, chills    The CT scans and other labs from earlier can be found under the same on the [facility] chart. (MRN [id number]) Pathology positive for gallbladder invasive adenocarcinoma, moderately differentiated, negative portal lymph nodes, negative segment 4b/5, negative right colon and mesenteric lymph nodes, negative cystic duct margins, and negative additional sigmoid colon.     Patient admitted to surgical oncology service under the care of Dr. Roche    9/14 Patient was transferred to SICU for BiPAP management and respiratory distress. IR was consulted for percutaneous drainage of hepatic abscess   9/15 Patient taken by IR for hepatic abscess drainage and 10 Fr drain placement in collection under US guidance and secured to skin w sutures. Connected to gravity drainage. Tolerated procedure well without complication. Culture sent, + GPC in pairs. Continued on Zosyn. While in SICU, weaned off BIPAP to NC.  Diet advanced to clear liquids which she tolerated well.  9/17: Diet advanced ot regular which she tolerated well. Remained hemodynamically stable and saturating well on nasal cannula. Deemed stable for transfer from SICU to surgical floor on continuous pulse oximetry.  9/18: IR cultures resulted, + Enterococcus faecalis. Continued on Zosyn.  9/19: Weaned off supplemental oxygen. Infectious disease consulted for enterococcus faecalis hepatic abscess who recommended to discontinue piperacillin/tazobactam  and start ampicillin/sulbactam 3g q6hr. Anticipating 4 weeks of antibiotics with plan to transition to PO if continues to be clinically stable.  9/20: Patient developed erythema of left arm, ? contact dermatitis from BP cuff versus allergy to Unasyn. ID recommended to transition ABX to vancomycin if erythema worsens.   Patient transitioned to vancomycin.  9/21-9/22: No change.  9/23: Patient taken to IR by Dr. Reza for hepatic drain check demonstrating moderate collection. Reconnected gravity drainage bag.   9/24-9/26: Patient remained hemodynamically stable, on IV antibiotics with Vancomycin per ID.     Awaiting tube check by IR.

## 2022-09-15 NOTE — PROGRESS NOTE ADULT - SUBJECTIVE AND OBJECTIVE BOX
IR Post Procedure Note    Diagnosis: Perihepatic Abscess    Procedure: Abscess Drainage    : Marcos Hopkins MD    Contrast:  10 cc    Anesthesia: 1% Lidocaine Subcutaneous, Sedation administered by Anesthesiology    Estimated Blood Loss: Less than 10cc    Specimens: Identified, Labeled, Confirmed and Sent to Lab.    Complications: No Immediate Complications    Anticoagulation: Resume in 24 Hours    Findings & Plan: 10 Fr drain placed in collection under US guidance and secured to skin w sutures. Connected to gravity drainage      Please call Interventional Radiology with any questions, concerns, or issues.

## 2022-09-15 NOTE — PROGRESS NOTE ADULT - SUBJECTIVE AND OBJECTIVE BOX
24 HOUR EVENTS:  -Admit to SICU i/s/o hypoxic respiratory failure requiring BIPAP   -IR consulted for percutaneous drainage of hepatic abscess   -BIPAP o/n 10/5/40%    SUBJECTIVE/ROS:  [ ] A ten-point review of systems was otherwise negative except as noted.  [ ] Due to altered mental status/intubation, subjective information were not able to be obtained from the patient. History was obtained, to the extent possible, from review of the chart and collateral sources of information.      NEURO  Exam: awake, alert, oriented  Meds: acetaminophen   IVPB .. 1000 milliGRAM(s) IV Intermittent every 6 hours PRN Temp greater or equal to 38C (100.4F), Mild Pain (1 - 3)    [x] Adequacy of sedation and pain control has been assessed and adjusted      RESPIRATORY  RR: 21 (09-15-22 @ 00:00) (17 - 25)  SpO2: 99% (09-15-22 @ 00:00) (99% - 100%)  Wt(kg): --  Exam: satting >92% on BIPAP   BIPAP 10/5/40%    [N/A] Extubation Readiness Assessed  Meds:       CARDIOVASCULAR  HR: 69 (09-15-22 @ 00:00) (69 - 103)  BP: 93/69 (09-15-22 @ 00:00) (93/69 - 156/97)  BP(mean): 76 (09-15-22 @ 00:00) (76 - 108)  ABP: --  ABP(mean): --  Wt(kg): --  CVP(cm H2O): --  VBG - ( 14 Sep 2022 17:00 )  pH: 7.32  /  pCO2: 69    /  pO2: 25    / HCO3: 36    / Base Excess: 7.7   /  SaO2: 27.5   Lactate: 1.0                Exam: regular rate and rhythm  Cardiac Rhythm: sinus  Perfusion     [x]Adequate   [ ]Inadequate  Mentation   [x]Normal       [ ]Reduced  Extremities  [x]Warm         [ ]Cool  Volume Status [ ]Hypervolemic [x]Euvolemic [ ]Hypovolemic  Meds: metoprolol tartrate Injectable 5 milliGRAM(s) IV Push every 6 hours        GI/NUTRITION  Exam: soft, nontender, nondistended, well healed incision  DIET: NPO   Meds: pantoprazole  Injectable 40 milliGRAM(s) IV Push daily      GENITOURINARY  I&O's Detail    09-14 @ 07:01  -  09-15 @ 01:08  --------------------------------------------------------  IN:    dextrose 5% + sodium chloride 0.45%: 150 mL  Total IN: 150 mL    OUT:    Voided (mL): 250 mL  Total OUT: 250 mL    Total NET: -100 mL        Weight (kg): 54 (09-14 @ 21:00)  09-14    142  |  102  |  8   ----------------------------<  96  4.3   |  33<H>  |  0.69    Ca    10.1      14 Sep 2022 17:00    TPro  7.4  /  Alb  3.9  /  TBili  0.5  /  DBili  x   /  AST  25  /  ALT  21  /  AlkPhos  222<H>  09-14    [ ] Otto catheter, indication: N/A  Meds: dextrose 5% + sodium chloride 0.45%. 1000 milliLiter(s) IV Continuous <Continuous>        HEMATOLOGIC  Meds:   [x] VTE Prophylaxis                        10.4   7.04  )-----------( 315      ( 14 Sep 2022 17:00 )             34.3     PT/INR - ( 14 Sep 2022 17:00 )   PT: 13.2 sec;   INR: 1.14 ratio         PTT - ( 14 Sep 2022 17:00 )  PTT:30.6 sec  Transfusion     [ ] PRBC   [ ] Platelets   [ ] FFP   [ ] Cryoprecipitate      INFECTIOUS DISEASES  WBC Count: 7.04 K/uL (09-14 @ 17:00)    RECENT CULTURES:    Meds: piperacillin/tazobactam IVPB.. 3.375 Gram(s) IV Intermittent every 8 hours        ENDOCRINE  CAPILLARY BLOOD GLUCOSE        Meds:       ACCESS DEVICES:  [ ] Peripheral IV  [ ] Central Venous Line	[ ] R	[ ] L	[ ] IJ	[ ] Fem	[ ] SC	Placed:   [ ] Arterial Line		[ ] R	[ ] L	[ ] Fem	[ ] Rad	[ ] Ax	Placed:   [ ] PICC:					[ ] Mediport  [ ] Urinary Catheter, Date Placed:   [x] Necessity of urinary, arterial, and venous catheters discussed    OTHER MEDICATIONS:  chlorhexidine 4% Liquid 1 Application(s) Topical every 24 hours     24 HOUR EVENTS:  -Admit to SICU i/s/o hypoxic respiratory failure requiring BIPAP   -To undergo percutaneous drainage of hepatic abscess today   -BIPAP changed to 12/5/40%  -Repeat ABG @2PM  -F/u CBC  -To confirm w/ IR regarding starting heparin gtt for portal vein thrombosis seen on [place] CT scan    SUBJECTIVE/ROS:  [ ] A ten-point review of systems was otherwise negative except as noted.  [ ] Due to altered mental status/intubation, subjective information were not able to be obtained from the patient. History was obtained, to the extent possible, from review of the chart and collateral sources of information.      NEURO  Exam: awake, alert, oriented  Meds: acetaminophen   IVPB .. 1000 milliGRAM(s) IV Intermittent every 6 hours PRN Temp greater or equal to 38C (100.4F), Mild Pain (1 - 3)    [x] Adequacy of sedation and pain control has been assessed and adjusted      RESPIRATORY  RR: 21 (09-15-22 @ 00:00) (17 - 25)  SpO2: 99% (09-15-22 @ 00:00) (99% - 100%)  Wt(kg): --  Exam: satting >92% on BIPAP   BIPAP 10/5/40%    [N/A] Extubation Readiness Assessed  Meds:       CARDIOVASCULAR  HR: 69 (09-15-22 @ 00:00) (69 - 103)  BP: 93/69 (09-15-22 @ 00:00) (93/69 - 156/97)  BP(mean): 76 (09-15-22 @ 00:00) (76 - 108)  ABP: --  ABP(mean): --  Wt(kg): --  CVP(cm H2O): --  VBG - ( 14 Sep 2022 17:00 )  pH: 7.32  /  pCO2: 69    /  pO2: 25    / HCO3: 36    / Base Excess: 7.7   /  SaO2: 27.5   Lactate: 1.0                Exam: regular rate and rhythm  Cardiac Rhythm: sinus  Perfusion     [x]Adequate   [ ]Inadequate  Mentation   [x]Normal       [ ]Reduced  Extremities  [x]Warm         [ ]Cool  Volume Status [ ]Hypervolemic [x]Euvolemic [ ]Hypovolemic  Meds: metoprolol tartrate Injectable 5 milliGRAM(s) IV Push every 6 hours        GI/NUTRITION  Exam: soft, nontender, nondistended, well healed incision  DIET: NPO   Meds: pantoprazole  Injectable 40 milliGRAM(s) IV Push daily      GENITOURINARY  I&O's Detail    09-14 @ 07:01  -  09-15 @ 01:08  --------------------------------------------------------  IN:    dextrose 5% + sodium chloride 0.45%: 150 mL  Total IN: 150 mL    OUT:    Voided (mL): 250 mL  Total OUT: 250 mL    Total NET: -100 mL        Weight (kg): 54 (09-14 @ 21:00)  09-14    142  |  102  |  8   ----------------------------<  96  4.3   |  33<H>  |  0.69    Ca    10.1      14 Sep 2022 17:00    TPro  7.4  /  Alb  3.9  /  TBili  0.5  /  DBili  x   /  AST  25  /  ALT  21  /  AlkPhos  222<H>  09-14    [ ] Otto catheter, indication: N/A  Meds: dextrose 5% + sodium chloride 0.45%. 1000 milliLiter(s) IV Continuous <Continuous>        HEMATOLOGIC  Meds:   [x] VTE Prophylaxis                        10.4   7.04  )-----------( 315      ( 14 Sep 2022 17:00 )             34.3     PT/INR - ( 14 Sep 2022 17:00 )   PT: 13.2 sec;   INR: 1.14 ratio         PTT - ( 14 Sep 2022 17:00 )  PTT:30.6 sec  Transfusion     [ ] PRBC   [ ] Platelets   [ ] FFP   [ ] Cryoprecipitate      INFECTIOUS DISEASES  WBC Count: 7.04 K/uL (09-14 @ 17:00)    RECENT CULTURES:    Meds: piperacillin/tazobactam IVPB.. 3.375 Gram(s) IV Intermittent every 8 hours        ENDOCRINE  CAPILLARY BLOOD GLUCOSE        Meds:       ACCESS DEVICES:  [ ] Peripheral IV  [ ] Central Venous Line	[ ] R	[ ] L	[ ] IJ	[ ] Fem	[ ] SC	Placed:   [ ] Arterial Line		[ ] R	[ ] L	[ ] Fem	[ ] Rad	[ ] Ax	Placed:   [ ] PICC:					[ ] Mediport  [ ] Urinary Catheter, Date Placed:   [x] Necessity of urinary, arterial, and venous catheters discussed    OTHER MEDICATIONS:  chlorhexidine 4% Liquid 1 Application(s) Topical every 24 hours     24 HOUR EVENTS:  -Admit to SICU i/s/o hypoxic respiratory failure requiring BIPAP   -To undergo percutaneous drainage of hepatic abscess today   -BIPAP changed to 12/5/40%  -Repeat ABG @2PM  -F/u CBC    SUBJECTIVE/ROS:  [ ] A ten-point review of systems was otherwise negative except as noted.  [ ] Due to altered mental status/intubation, subjective information were not able to be obtained from the patient. History was obtained, to the extent possible, from review of the chart and collateral sources of information.      NEURO  Exam: awake, alert, oriented  Meds: acetaminophen   IVPB .. 1000 milliGRAM(s) IV Intermittent every 6 hours PRN Temp greater or equal to 38C (100.4F), Mild Pain (1 - 3)    [x] Adequacy of sedation and pain control has been assessed and adjusted      RESPIRATORY  RR: 21 (09-15-22 @ 00:00) (17 - 25)  SpO2: 99% (09-15-22 @ 00:00) (99% - 100%)  Wt(kg): --  Exam: satting >92% on BIPAP   BIPAP 10/5/40%    [N/A] Extubation Readiness Assessed  Meds:       CARDIOVASCULAR  HR: 69 (09-15-22 @ 00:00) (69 - 103)  BP: 93/69 (09-15-22 @ 00:00) (93/69 - 156/97)  BP(mean): 76 (09-15-22 @ 00:00) (76 - 108)  ABP: --  ABP(mean): --  Wt(kg): --  CVP(cm H2O): --  VBG - ( 14 Sep 2022 17:00 )  pH: 7.32  /  pCO2: 69    /  pO2: 25    / HCO3: 36    / Base Excess: 7.7   /  SaO2: 27.5   Lactate: 1.0                Exam: regular rate and rhythm  Cardiac Rhythm: sinus  Perfusion     [x]Adequate   [ ]Inadequate  Mentation   [x]Normal       [ ]Reduced  Extremities  [x]Warm         [ ]Cool  Volume Status [ ]Hypervolemic [x]Euvolemic [ ]Hypovolemic  Meds: metoprolol tartrate Injectable 5 milliGRAM(s) IV Push every 6 hours        GI/NUTRITION  Exam: soft, nontender, nondistended, well healed incision  DIET: NPO   Meds: pantoprazole  Injectable 40 milliGRAM(s) IV Push daily      GENITOURINARY  I&O's Detail    09-14 @ 07:01  -  09-15 @ 01:08  --------------------------------------------------------  IN:    dextrose 5% + sodium chloride 0.45%: 150 mL  Total IN: 150 mL    OUT:    Voided (mL): 250 mL  Total OUT: 250 mL    Total NET: -100 mL        Weight (kg): 54 (09-14 @ 21:00)  09-14    142  |  102  |  8   ----------------------------<  96  4.3   |  33<H>  |  0.69    Ca    10.1      14 Sep 2022 17:00    TPro  7.4  /  Alb  3.9  /  TBili  0.5  /  DBili  x   /  AST  25  /  ALT  21  /  AlkPhos  222<H>  09-14    [ ] Otto catheter, indication: N/A  Meds: dextrose 5% + sodium chloride 0.45%. 1000 milliLiter(s) IV Continuous <Continuous>        HEMATOLOGIC  Meds:   [x] VTE Prophylaxis                        10.4   7.04  )-----------( 315      ( 14 Sep 2022 17:00 )             34.3     PT/INR - ( 14 Sep 2022 17:00 )   PT: 13.2 sec;   INR: 1.14 ratio         PTT - ( 14 Sep 2022 17:00 )  PTT:30.6 sec  Transfusion     [ ] PRBC   [ ] Platelets   [ ] FFP   [ ] Cryoprecipitate      INFECTIOUS DISEASES  WBC Count: 7.04 K/uL (09-14 @ 17:00)    RECENT CULTURES:    Meds: piperacillin/tazobactam IVPB.. 3.375 Gram(s) IV Intermittent every 8 hours        ENDOCRINE  CAPILLARY BLOOD GLUCOSE        Meds:       ACCESS DEVICES:  [ ] Peripheral IV  [ ] Central Venous Line	[ ] R	[ ] L	[ ] IJ	[ ] Fem	[ ] SC	Placed:   [ ] Arterial Line		[ ] R	[ ] L	[ ] Fem	[ ] Rad	[ ] Ax	Placed:   [ ] PICC:					[ ] Mediport  [ ] Urinary Catheter, Date Placed:   [x] Necessity of urinary, arterial, and venous catheters discussed    OTHER MEDICATIONS:  chlorhexidine 4% Liquid 1 Application(s) Topical every 24 hours

## 2022-09-15 NOTE — DISCHARGE NOTE PROVIDER - NSDCMRMEDTOKEN_GEN_ALL_CORE_FT
allopurinol 100 mg oral tablet: 100 milligram(s) orally once a day (at bedtime)  amLODIPine 2.5 mg oral tablet: 1 tab(s) orally once a day  carvedilol 6.25 mg oral tablet: 1 tab(s) orally every 12 hours  fenofibrate 160 mg oral tablet: 1 tab(s) orally once a day  losartan 100 mg oral tablet: 1 tab(s) orally once a day  Melatonin 3 mg oral tablet, disintegrating: 3 tab(s) orally once a day (at bedtime)  Multiple Vitamins oral tablet: 1 tab(s) orally once a day  Protonix 40 mg oral delayed release tablet: 1 tab(s) orally once a day  Remeron 15 mg oral tablet: 1 tab(s) orally once a day (at bedtime)  thiamine 100 mg oral tablet: 1 tab(s) orally once a day   allopurinol 100 mg oral tablet: 1 tab(s) orally once a day (at bedtime)  amLODIPine 2.5 mg oral tablet: 1 tab(s) orally once a day  carvedilol 6.25 mg oral tablet: 1 tab(s) orally every 12 hours  fenofibrate 145 mg oral tablet: 1 tab(s) orally once a day  losartan 100 mg oral tablet: 1 tab(s) orally 2 times a day  melatonin 3 mg oral tablet: 1 tab(s) orally once a day (at bedtime)  mirtazapine 15 mg oral tablet: 1 tab(s) orally once a day (at bedtime)  Multiple Vitamins oral tablet: 1 tab(s) orally once a day  pantoprazole 40 mg oral delayed release tablet: 1 tab(s) orally once a day (before a meal)  thiamine 100 mg oral tablet: 1 tab(s) orally once a day   allopurinol 100 mg oral tablet: 1 tab(s) orally once a day (at bedtime)  amLODIPine 2.5 mg oral tablet: 1 tab(s) orally once a day  carvedilol 6.25 mg oral tablet: 1 tab(s) orally every 12 hours  fenofibrate 145 mg oral tablet: 1 tab(s) orally once a day  levoFLOXacin 750 mg oral tablet: 1 tab(s) orally every other day (at bedtime)   losartan 100 mg oral tablet: 1 tab(s) orally 2 times a day  melatonin 3 mg oral tablet: 1 tab(s) orally once a day (at bedtime)  metroNIDAZOLE 500 mg oral tablet: 1 tab(s) orally 2 times a day   mirtazapine 15 mg oral tablet: 1 tab(s) orally once a day (at bedtime)  Multiple Vitamins oral tablet: 1 tab(s) orally once a day  pantoprazole 40 mg oral delayed release tablet: 1 tab(s) orally once a day (before a meal)  thiamine 100 mg oral tablet: 1 tab(s) orally once a day

## 2022-09-15 NOTE — DISCHARGE NOTE PROVIDER - NSDCFUADDINST_GEN_ALL_CORE_FT
WOUND CARE:  Please keep incisions clean and dry. Please do not Scrub or rub incisions. Do not use lotion or powder on incisions.   BATHING: You may shower and/or sponge bathe. You may use warm soapy water in the shower and rinse, pat dry.  ACTIVITY: No heavy lifting or straining. Otherwise, you may return to your usual level of physical activity. If you are taking narcotic pain medication DO NOT drive a car, operate machinery or make important decisions.  DIET: Return to your usual diet.  NOTIFY YOUR SURGEON IF YOU HAVE: any bleeding that does not stop, any pus draining from your wound(s), any fever (over 100.4 F) persistent nausea/vomiting, or if your pain is not controlled on your discharge pain medications, unable to urinate.  Please follow up with your primary care physician in one week regarding your hospitalization, bring copies of your discharge paperwork.  Please follow up with your surgeon, Dr. Roche as an outpatient, please call to schedule appointment.

## 2022-09-15 NOTE — DISCHARGE NOTE PROVIDER - PROVIDER TOKENS
PROVIDER:[TOKEN:[[id number]],FOLLOWUP:[2 weeks]] PROVIDER:[TOKEN:[[id number]],FOLLOWUP:[2 weeks]],PROVIDER:[TOKEN:[[id number]],FOLLOWUP:[2 weeks]]

## 2022-09-15 NOTE — DISCHARGE NOTE PROVIDER - DETAILS OF MALNUTRITION DIAGNOSIS/DIAGNOSES
This patient has been assessed with a concern for Malnutrition and was treated during this hospitalization for the following Nutrition diagnosis/diagnoses:     -  09/16/2022: Moderate protein-calorie malnutrition

## 2022-09-15 NOTE — DISCHARGE NOTE PROVIDER - NSDCCPCAREPLAN_GEN_ALL_CORE_FT
PRINCIPAL DISCHARGE DIAGNOSIS  Diagnosis: Hepatic abscess  Assessment and Plan of Treatment:       SECONDARY DISCHARGE DIAGNOSES  Diagnosis: Acute respiratory failure with hypoxia  Assessment and Plan of Treatment:      PRINCIPAL DISCHARGE DIAGNOSIS  Diagnosis: Hepatic abscess  Assessment and Plan of Treatment: DRAIN CARE: Please follow up with Interventional radiology to have your drain evaluated one week from discharge.  Please call today, to schedule an appointment (for one week from discharge date) [phone].   Location is in [facility] on the second floor radiology Room 263. You can park in the [employer] parking garage. Continue to empty and record the drain output daily as you have been taught.   BATHING: You may shower and/or sponge bathe. You may use warm soapy water in the shower and rinse, pat dry.  ACTIVITY: No heavy lifting or straining. Otherwise, you may return to your usual level of physical activity. If you are taking narcotic pain medication DO NOT drive a car, operate machinery or make important decisions.  DIET: Return to your usual diet.  NOTIFY YOUR SURGEON IF YOU HAVE: any bleeding that does not stop, any pus draining from your wound(s), any fever (over 100.4 F) persistent nausea/vomiting, or if your pain is not controlled on your discharge pain medications, unable to urinate.  FOLLOW UP:  1. Please follow up with your primary care physician in one week regarding your hospitalization, bring copies of your discharge paperwork.  2. Please follow up with your surgeon, Dr. Roche. Call to make an appointment.      SECONDARY DISCHARGE DIAGNOSES  Diagnosis: Acute respiratory failure with hypoxia  Assessment and Plan of Treatment:

## 2022-09-15 NOTE — PROGRESS NOTE ADULT - ASSESSMENT
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     Plan  - Pending IR percutaneous drainage of hepatic abscess today  - Diet: NPO  - Zosyn  - D51/2 @ 50  - Wean BiPAP as tolerated  - Appreciate excellent care per SICU

## 2022-09-15 NOTE — DISCHARGE NOTE PROVIDER - CARE PROVIDERS DIRECT ADDRESSES
[email or web address]

## 2022-09-15 NOTE — DISCHARGE NOTE PROVIDER - CARE PROVIDER_API CALL
Deep Roche)  Surgery  [address] [place] [address], [place] [address]  Phone: [phone]  Fax: [phone]  Follow Up Time: 2 weeks   Deep Roche)  Surgery  [address] [facility], [place] [address]  Phone: [phone]  Fax: [phone]  Follow Up Time: 2 weeks    Taras Dominguez)  Internal Medicine  [address] [place] [address]  Phone: [phone]  Fax: [phone]  Follow Up Time: 2 weeks

## 2022-09-15 NOTE — PRE PROCEDURE NOTE - PRE PROCEDURE EVALUATION
Vascular & Interventional Radiology Pre-Procedure Note    Procedure Name: gallbladder fossa abscess drainage    HPI: 87y Female with h/o HTN, bladder ca, and recent open cholecystectomy (8/19) transferred from [facility] after initially presenting with respiratory failure. patient was initially found to be hypoxic to 46% by EMS, but has now improved with BIPAP. patient found to have a gas and fluid collection in the GB fossa.    Allergies: penicillin (Unknown)      Medications:   furosemide   Injectable: 20 milliGRAM(s) IV Push (09-15 @ 00:08)  heparin   Injectable: 5000 Unit(s) SubCutaneous (09-14 @ 22:42)  metoprolol tartrate Injectable: 5 milliGRAM(s) IV Push (09-15 @ 05:07)  piperacillin/tazobactam IVPB..: 25 mL/Hr IV Intermittent (09-15 @ 06:01)      Data:  Vital Signs Last 24 Hrs  T(C): 37.2 (15 Sep 2022 08:00), Max: 37.3 (14 Sep 2022 21:00)  T(F): 98.9 (15 Sep 2022 08:00), Max: 99.1 (14 Sep 2022 21:00)  HR: 64 (15 Sep 2022 10:44) (60 - 103)  BP: 118/50 (15 Sep 2022 10:00) (93/69 - 156/97)  BP(mean): 72 (15 Sep 2022 10:00) (62 - 108)  RR: 18 (15 Sep 2022 10:00) (17 - 26)  SpO2: 96% (15 Sep 2022 10:44) (95% - 100%)    Parameters below as of 15 Sep 2022 10:00  Patient On (Oxygen Delivery Method): BiPAP/CPAP,12/5    O2 Concentration (%): 40    LABS:                        10.4   7.04  )-----------( 315      ( 14 Sep 2022 17:00 )             34.3     09-15    139  |  99  |  6<L>  ----------------------------<  104<H>  3.6   |  34<H>  |  0.67    Ca    9.4      15 Sep 2022 05:06  Phos  3.0     09-15  Mg     1.60     09-15    TPro  7.4  /  Alb  3.9  /  TBili  0.5  /  DBili  x   /  AST  25  /  ALT  21  /  AlkPhos  222<H>  09-14    PT/INR - ( 14 Sep 2022 17:00 )   PT: 13.2 sec;   INR: 1.14 ratio         PTT - ( 14 Sep 2022 17:00 )  PTT:30.6 sec    Plan:   -87y Female presents for gallbladder fossa abscess drainage  -Risks/Benefits/alternatives explained with the patient and witnessed informed consent obtained.

## 2022-09-16 LAB
ALBUMIN SERPL ELPH-MCNC: 2.9 G/DL — LOW (ref 3.3–5)
ALP SERPL-CCNC: 145 U/L — HIGH (ref 40–120)
ALT FLD-CCNC: 11 U/L — SIGNIFICANT CHANGE UP (ref 4–33)
ANION GAP SERPL CALC-SCNC: 6 MMOL/L — LOW (ref 7–14)
AST SERPL-CCNC: 18 U/L — SIGNIFICANT CHANGE UP (ref 4–32)
BILIRUB SERPL-MCNC: 0.5 MG/DL — SIGNIFICANT CHANGE UP (ref 0.2–1.2)
BLOOD GAS ARTERIAL COMPREHENSIVE RESULT: SIGNIFICANT CHANGE UP
BLOOD GAS ARTERIAL COMPREHENSIVE RESULT: SIGNIFICANT CHANGE UP
BUN SERPL-MCNC: 7 MG/DL — SIGNIFICANT CHANGE UP (ref 7–23)
CALCIUM SERPL-MCNC: 9.2 MG/DL — SIGNIFICANT CHANGE UP (ref 8.4–10.5)
CHLORIDE SERPL-SCNC: 99 MMOL/L — SIGNIFICANT CHANGE UP (ref 98–107)
CO2 SERPL-SCNC: 30 MMOL/L — SIGNIFICANT CHANGE UP (ref 22–31)
CREAT SERPL-MCNC: 0.8 MG/DL — SIGNIFICANT CHANGE UP (ref 0.5–1.3)
EGFR: 72 ML/MIN/1.73M2 — SIGNIFICANT CHANGE UP
GLUCOSE SERPL-MCNC: 98 MG/DL — SIGNIFICANT CHANGE UP (ref 70–99)
HCT VFR BLD CALC: 29 % — LOW (ref 34.5–45)
HGB BLD-MCNC: 8.7 G/DL — LOW (ref 11.5–15.5)
MAGNESIUM SERPL-MCNC: 1.8 MG/DL — SIGNIFICANT CHANGE UP (ref 1.6–2.6)
MCHC RBC-ENTMCNC: 29.2 PG — SIGNIFICANT CHANGE UP (ref 27–34)
MCHC RBC-ENTMCNC: 30 GM/DL — LOW (ref 32–36)
MCV RBC AUTO: 97.3 FL — SIGNIFICANT CHANGE UP (ref 80–100)
NRBC # BLD: 0 /100 WBCS — SIGNIFICANT CHANGE UP (ref 0–0)
NRBC # FLD: 0 K/UL — SIGNIFICANT CHANGE UP (ref 0–0)
PHOSPHATE SERPL-MCNC: 3 MG/DL — SIGNIFICANT CHANGE UP (ref 2.5–4.5)
PLATELET # BLD AUTO: 221 K/UL — SIGNIFICANT CHANGE UP (ref 150–400)
POTASSIUM SERPL-MCNC: 4.2 MMOL/L — SIGNIFICANT CHANGE UP (ref 3.5–5.3)
POTASSIUM SERPL-SCNC: 4.2 MMOL/L — SIGNIFICANT CHANGE UP (ref 3.5–5.3)
PROT SERPL-MCNC: 5.8 G/DL — LOW (ref 6–8.3)
RBC # BLD: 2.98 M/UL — LOW (ref 3.8–5.2)
RBC # FLD: 14.7 % — HIGH (ref 10.3–14.5)
SODIUM SERPL-SCNC: 135 MMOL/L — SIGNIFICANT CHANGE UP (ref 135–145)
WBC # BLD: 6.22 K/UL — SIGNIFICANT CHANGE UP (ref 3.8–10.5)
WBC # FLD AUTO: 6.22 K/UL — SIGNIFICANT CHANGE UP (ref 3.8–10.5)

## 2022-09-16 PROCEDURE — 71045 X-RAY EXAM CHEST 1 VIEW: CPT | Mod: 26

## 2022-09-16 PROCEDURE — 99233 SBSQ HOSP IP/OBS HIGH 50: CPT | Mod: GC

## 2022-09-16 PROCEDURE — 99232 SBSQ HOSP IP/OBS MODERATE 35: CPT | Mod: 24

## 2022-09-16 RX ORDER — ALLOPURINOL 300 MG
100 TABLET ORAL AT BEDTIME
Refills: 0 | Status: DISCONTINUED | OUTPATIENT
Start: 2022-09-16 | End: 2022-09-29

## 2022-09-16 RX ORDER — PANTOPRAZOLE SODIUM 20 MG/1
40 TABLET, DELAYED RELEASE ORAL
Refills: 0 | Status: DISCONTINUED | OUTPATIENT
Start: 2022-09-16 | End: 2022-09-29

## 2022-09-16 RX ORDER — ACETAMINOPHEN 500 MG
650 TABLET ORAL EVERY 6 HOURS
Refills: 0 | Status: DISCONTINUED | OUTPATIENT
Start: 2022-09-16 | End: 2022-09-29

## 2022-09-16 RX ORDER — CARVEDILOL PHOSPHATE 80 MG/1
6.25 CAPSULE, EXTENDED RELEASE ORAL EVERY 12 HOURS
Refills: 0 | Status: DISCONTINUED | OUTPATIENT
Start: 2022-09-16 | End: 2022-09-29

## 2022-09-16 RX ORDER — FENOFIBRATE,MICRONIZED 130 MG
160 CAPSULE ORAL DAILY
Refills: 0 | Status: DISCONTINUED | OUTPATIENT
Start: 2022-09-16 | End: 2022-09-16

## 2022-09-16 RX ORDER — THIAMINE MONONITRATE (VIT B1) 100 MG
100 TABLET ORAL DAILY
Refills: 0 | Status: DISCONTINUED | OUTPATIENT
Start: 2022-09-16 | End: 2022-09-29

## 2022-09-16 RX ORDER — MULTIVIT-MIN/FERROUS GLUCONATE 9 MG/15 ML
1 LIQUID (ML) ORAL DAILY
Refills: 0 | Status: DISCONTINUED | OUTPATIENT
Start: 2022-09-16 | End: 2022-09-16

## 2022-09-16 RX ORDER — LANOLIN ALCOHOL/MO/W.PET/CERES
3 CREAM (GRAM) TOPICAL AT BEDTIME
Refills: 0 | Status: DISCONTINUED | OUTPATIENT
Start: 2022-09-16 | End: 2022-09-29

## 2022-09-16 RX ORDER — ENOXAPARIN SODIUM 100 MG/ML
30 INJECTION SUBCUTANEOUS EVERY 24 HOURS
Refills: 0 | Status: DISCONTINUED | OUTPATIENT
Start: 2022-09-16 | End: 2022-09-29

## 2022-09-16 RX ORDER — MIRTAZAPINE 45 MG/1
15 TABLET, ORALLY DISINTEGRATING ORAL AT BEDTIME
Refills: 0 | Status: DISCONTINUED | OUTPATIENT
Start: 2022-09-16 | End: 2022-09-29

## 2022-09-16 RX ORDER — MAGNESIUM SULFATE 500 MG/ML
2 VIAL (ML) INJECTION ONCE
Refills: 0 | Status: COMPLETED | OUTPATIENT
Start: 2022-09-16 | End: 2022-09-16

## 2022-09-16 RX ORDER — IPRATROPIUM/ALBUTEROL SULFATE 18-103MCG
3 AEROSOL WITH ADAPTER (GRAM) INHALATION EVERY 6 HOURS
Refills: 0 | Status: DISCONTINUED | OUTPATIENT
Start: 2022-09-16 | End: 2022-09-29

## 2022-09-16 RX ORDER — FENOFIBRATE,MICRONIZED 130 MG
145 CAPSULE ORAL DAILY
Refills: 0 | Status: DISCONTINUED | OUTPATIENT
Start: 2022-09-16 | End: 2022-09-29

## 2022-09-16 RX ORDER — MAGNESIUM SULFATE 500 MG/ML
2 VIAL (ML) INJECTION ONCE
Refills: 0 | Status: DISCONTINUED | OUTPATIENT
Start: 2022-09-16 | End: 2022-09-16

## 2022-09-16 RX ADMIN — Medication 3 MILLIGRAM(S): at 22:16

## 2022-09-16 RX ADMIN — Medication 5 MILLIGRAM(S): at 06:00

## 2022-09-16 RX ADMIN — Medication 100 MILLIGRAM(S): at 22:16

## 2022-09-16 RX ADMIN — PANTOPRAZOLE SODIUM 40 MILLIGRAM(S): 20 TABLET, DELAYED RELEASE ORAL at 14:17

## 2022-09-16 RX ADMIN — ENOXAPARIN SODIUM 30 MILLIGRAM(S): 100 INJECTION SUBCUTANEOUS at 14:16

## 2022-09-16 RX ADMIN — CHLORHEXIDINE GLUCONATE 1 APPLICATION(S): 213 SOLUTION TOPICAL at 06:01

## 2022-09-16 RX ADMIN — CARVEDILOL PHOSPHATE 6.25 MILLIGRAM(S): 80 CAPSULE, EXTENDED RELEASE ORAL at 18:02

## 2022-09-16 RX ADMIN — Medication 100 MILLIGRAM(S): at 14:17

## 2022-09-16 RX ADMIN — Medication 3 MILLILITER(S): at 23:09

## 2022-09-16 RX ADMIN — Medication 145 MILLIGRAM(S): at 18:02

## 2022-09-16 RX ADMIN — MIRTAZAPINE 15 MILLIGRAM(S): 45 TABLET, ORALLY DISINTEGRATING ORAL at 22:16

## 2022-09-16 RX ADMIN — Medication 25 GRAM(S): at 03:28

## 2022-09-16 RX ADMIN — PIPERACILLIN AND TAZOBACTAM 25 GRAM(S): 4; .5 INJECTION, POWDER, LYOPHILIZED, FOR SOLUTION INTRAVENOUS at 14:17

## 2022-09-16 RX ADMIN — PIPERACILLIN AND TAZOBACTAM 25 GRAM(S): 4; .5 INJECTION, POWDER, LYOPHILIZED, FOR SOLUTION INTRAVENOUS at 22:17

## 2022-09-16 RX ADMIN — Medication 1 TABLET(S): at 14:17

## 2022-09-16 RX ADMIN — PIPERACILLIN AND TAZOBACTAM 25 GRAM(S): 4; .5 INJECTION, POWDER, LYOPHILIZED, FOR SOLUTION INTRAVENOUS at 05:47

## 2022-09-16 RX ADMIN — Medication 3 MILLILITER(S): at 09:48

## 2022-09-16 NOTE — PHYSICAL THERAPY INITIAL EVALUATION ADULT - ADDITIONAL COMMENTS
Difficulty to obtain adequate information secondary to hypophonic speech. Pt lives in a house with her daughter. Ambulated with rolling walker; assistance level unknown.

## 2022-09-16 NOTE — PROGRESS NOTE ADULT - SUBJECTIVE AND OBJECTIVE BOX
24 HOUR EVENTS:  -s/p IR drainage of hepatic abscess  -Remains on BIPAP   -IR fluid cx GPC in pairs     SUBJECTIVE/ROS:  [ ] A ten-point review of systems was otherwise negative except as noted.  [ ] Due to altered mental status/intubation, subjective information were not able to be obtained from the patient. History was obtained, to the extent possible, from review of the chart and collateral sources of information.      NEURO  Exam: awake, alert, oriented  Meds: acetaminophen   IVPB .. 1000 milliGRAM(s) IV Intermittent every 6 hours PRN Temp greater or equal to 38C (100.4F), Mild Pain (1 - 3)    [x] Adequacy of sedation and pain control has been assessed and adjusted      RESPIRATORY  RR: 22 (09-16-22 @ 02:00) (18 - 27)  SpO2: 99% (09-16-22 @ 02:00) (95% - 100%)  Wt(kg): --  Exam: BIPAP  ABG - ( 16 Sep 2022 01:20 )  pH: 7.36  /  pCO2: 61    /  pO2: 102   / HCO3: 34    / Base Excess: 7.8   /  SaO2: 98.2    Lactate: x         [N/A] Extubation Readiness Assessed    CARDIOVASCULAR  HR: 73 (09-16-22 @ 02:00) (50 - 88)  BP: 135/60 (09-16-22 @ 02:00) (100/46 - 145/48)  BP(mean): 83 (09-16-22 @ 02:00) (62 - 88)  ABP: --  ABP(mean): --  Wt(kg): --  CVP(cm H2O): --  VBG - ( 14 Sep 2022 17:00 )  pH: 7.32  /  pCO2: 69    /  pO2: 25    / HCO3: 36    / Base Excess: 7.7   /  SaO2: 27.5   Lactate: 1.0          Exam: regular rate and rhythm  Cardiac Rhythm: sinus  Perfusion     [x]Adequate   [ ]Inadequate  Mentation   [x]Normal       [ ]Reduced  Extremities  [x]Warm         [ ]Cool  Volume Status [ ]Hypervolemic [x]Euvolemic [ ]Hypovolemic  Meds: metoprolol tartrate Injectable 5 milliGRAM(s) IV Push every 6 hours        GI/NUTRITION  Exam: soft, nontender, nondistended, incision well healed, IR drain RUQ with serosang output   Diet: NPO  Meds: pantoprazole  Injectable 40 milliGRAM(s) IV Push daily      GENITOURINARY  I&O's Detail    09-14 @ 07:01  -  09-15 @ 07:00  --------------------------------------------------------  IN:    dextrose 5% + sodium chloride 0.45%: 450 mL    IV PiggyBack: 150 mL  Total IN: 600 mL    OUT:    Voided (mL): 1150 mL  Total OUT: 1150 mL    Total NET: -550 mL      09-15 @ 07:01  -  09-16 @ 02:05  --------------------------------------------------------  IN:    dextrose 5% + sodium chloride 0.45%: 850 mL    IV PiggyBack: 400 mL  Total IN: 1250 mL    OUT:    Drain (mL): 25 mL    Voided (mL): 950 mL  Total OUT: 975 mL    Total NET: 275 mL          09-16    135  |  99  |  x   ----------------------------<  x   4.2   |  x   |  x     Ca    9.4      15 Sep 2022 05:06  Phos  3.0     09-16  Mg     1.80     09-16    TPro  7.4  /  Alb  3.9  /  TBili  0.5  /  DBili  x   /  AST  25  /  ALT  21  /  AlkPhos  222<H>  09-14    [ ] Otto catheter, indication: N/A  Meds: dextrose 5% + sodium chloride 0.45%. 1000 milliLiter(s) IV Continuous <Continuous>        HEMATOLOGIC               8.7    6.22  )-----------( 221      ( 16 Sep 2022 01:20 )             29.0     PT/INR - ( 14 Sep 2022 17:00 )   PT: 13.2 sec;   INR: 1.14 ratio         PTT - ( 14 Sep 2022 17:00 )  PTT:30.6 sec  Transfusion     [ ] PRBC   [ ] Platelets   [ ] FFP   [ ] Cryoprecipitate      INFECTIOUS DISEASES  WBC Count: 6.22 K/uL (09-16 @ 01:20)    RECENT CULTURES:  Specimen Source: .Body Fluid HEPATIC DRAINAGE  Date/Time: 09-15 @ 12:45  Culture Results: --  Gram Stain:   Numerous polymorphonuclear leukocytes seen per low power field  Few Gram positive cocci in pairs seen per oil power field  Organism: --    Meds: piperacillin/tazobactam IVPB.. 3.375 Gram(s) IV Intermittent every 8 hours        ENDOCRINE  CAPILLARY BLOOD GLUCOSE        Meds:       ACCESS DEVICES:  [ ] Peripheral IV  [ ] Central Venous Line	[ ] R	[ ] L	[ ] IJ	[ ] Fem	[ ] SC	Placed:   [ ] Arterial Line		[ ] R	[ ] L	[ ] Fem	[ ] Rad	[ ] Ax	Placed:   [ ] PICC:					[ ] Mediport  [ ] Urinary Catheter, Date Placed:   [x] Necessity of urinary, arterial, and venous catheters discussed    OTHER MEDICATIONS:  chlorhexidine 4% Liquid 1 Application(s) Topical every 24 hours     24 HOUR EVENTS:  -s/p IR drainage of hepatic abscess 09/15  -Weaned off BIPAP to NC   -IR fluid cx GPC in pairs   -f/u repeat ABG   -Duonebs Q6  -Carvedilol 6.25mg started, d/c lopressor   -IVL, advanced to clear liquid diet,   -home meds restarted (allopurinol, fenofibrate, melatonin, thiamine)   -Lovenox started     SUBJECTIVE/ROS:  [ ] A ten-point review of systems was otherwise negative except as noted.  [ ] Due to altered mental status/intubation, subjective information were not able to be obtained from the patient. History was obtained, to the extent possible, from review of the chart and collateral sources of information.      NEURO  Exam: awake, alert, oriented  Meds: acetaminophen   IVPB .. 1000 milliGRAM(s) IV Intermittent every 6 hours PRN Temp greater or equal to 38C (100.4F), Mild Pain (1 - 3)    [x] Adequacy of sedation and pain control has been assessed and adjusted      RESPIRATORY  RR: 22 (09-16-22 @ 02:00) (18 - 27)  SpO2: 99% (09-16-22 @ 02:00) (95% - 100%)  Wt(kg): --  Exam: BIPAP  ABG - ( 16 Sep 2022 01:20 )  pH: 7.36  /  pCO2: 61    /  pO2: 102   / HCO3: 34    / Base Excess: 7.8   /  SaO2: 98.2    Lactate: x         [N/A] Extubation Readiness Assessed    CARDIOVASCULAR  HR: 73 (09-16-22 @ 02:00) (50 - 88)  BP: 135/60 (09-16-22 @ 02:00) (100/46 - 145/48)  BP(mean): 83 (09-16-22 @ 02:00) (62 - 88)  ABP: --  ABP(mean): --  Wt(kg): --  CVP(cm H2O): --  VBG - ( 14 Sep 2022 17:00 )  pH: 7.32  /  pCO2: 69    /  pO2: 25    / HCO3: 36    / Base Excess: 7.7   /  SaO2: 27.5   Lactate: 1.0          Exam: regular rate and rhythm  Cardiac Rhythm: sinus  Perfusion     [x]Adequate   [ ]Inadequate  Mentation   [x]Normal       [ ]Reduced  Extremities  [x]Warm         [ ]Cool  Volume Status [ ]Hypervolemic [x]Euvolemic [ ]Hypovolemic  Meds: metoprolol tartrate Injectable 5 milliGRAM(s) IV Push every 6 hours        GI/NUTRITION  Exam: soft, nontender, nondistended, incision well healed, IR drain RUQ with serosang output   Diet: NPO  Meds: pantoprazole  Injectable 40 milliGRAM(s) IV Push daily      GENITOURINARY  I&O's Detail    09-14 @ 07:01  -  09-15 @ 07:00  --------------------------------------------------------  IN:    dextrose 5% + sodium chloride 0.45%: 450 mL    IV PiggyBack: 150 mL  Total IN: 600 mL    OUT:    Voided (mL): 1150 mL  Total OUT: 1150 mL    Total NET: -550 mL      09-15 @ 07:01  -  09-16 @ 02:05  --------------------------------------------------------  IN:    dextrose 5% + sodium chloride 0.45%: 850 mL    IV PiggyBack: 400 mL  Total IN: 1250 mL    OUT:    Drain (mL): 25 mL    Voided (mL): 950 mL  Total OUT: 975 mL    Total NET: 275 mL          09-16    135  |  99  |  x   ----------------------------<  x   4.2   |  x   |  x     Ca    9.4      15 Sep 2022 05:06  Phos  3.0     09-16  Mg     1.80     09-16    TPro  7.4  /  Alb  3.9  /  TBili  0.5  /  DBili  x   /  AST  25  /  ALT  21  /  AlkPhos  222<H>  09-14    [ ] Otto catheter, indication: N/A  Meds: dextrose 5% + sodium chloride 0.45%. 1000 milliLiter(s) IV Continuous <Continuous>        HEMATOLOGIC               8.7    6.22  )-----------( 221      ( 16 Sep 2022 01:20 )             29.0     PT/INR - ( 14 Sep 2022 17:00 )   PT: 13.2 sec;   INR: 1.14 ratio         PTT - ( 14 Sep 2022 17:00 )  PTT:30.6 sec  Transfusion     [ ] PRBC   [ ] Platelets   [ ] FFP   [ ] Cryoprecipitate      INFECTIOUS DISEASES  WBC Count: 6.22 K/uL (09-16 @ 01:20)    RECENT CULTURES:  Specimen Source: .Body Fluid HEPATIC DRAINAGE  Date/Time: 09-15 @ 12:45  Culture Results: --  Gram Stain:   Numerous polymorphonuclear leukocytes seen per low power field  Few Gram positive cocci in pairs seen per oil power field  Organism: --    Meds: piperacillin/tazobactam IVPB.. 3.375 Gram(s) IV Intermittent every 8 hours        ENDOCRINE  CAPILLARY BLOOD GLUCOSE        Meds:       ACCESS DEVICES:  [ ] Peripheral IV  [ ] Central Venous Line	[ ] R	[ ] L	[ ] IJ	[ ] Fem	[ ] SC	Placed:   [ ] Arterial Line		[ ] R	[ ] L	[ ] Fem	[ ] Rad	[ ] Ax	Placed:   [ ] PICC:					[ ] Mediport  [ ] Urinary Catheter, Date Placed:   [x] Necessity of urinary, arterial, and venous catheters discussed    OTHER MEDICATIONS:  chlorhexidine 4% Liquid 1 Application(s) Topical every 24 hours

## 2022-09-16 NOTE — PHYSICAL THERAPY INITIAL EVALUATION ADULT - GAIT DEVIATIONS NOTED, PT EVAL
forward flexed posture; narrow base of support/decreased shane/decreased velocity of limb motion/decreased step length/decreased stride length/decreased weight-shifting ability

## 2022-09-16 NOTE — PROGRESS NOTE ADULT - ASSESSMENT
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 S/p IR drainage hepatic abscess 09/15.       PLAN:   Neurologic:   - Pain control PRN Tylenol    Respiratory:   - BIPAP 10/5/40%  - Wean BiPAP as able  - F/u repeat ABG  - Maintain SpO2 >92%  - Monitor daily CXR/ R pleural effusion    Cardiovascular:   - Perfusing well; non-tachycardic, normotensive  - Lopressor 5mg q6  - Hold home losartan, amlodipine    Gastrointestinal/Nutrition:   - s/p IR drainage hepatic abscess 09/15  - NPO while BIPAP   - Protonix IV  - Trend LFTs    Renal/Genitourinary:   - D5 + LR @ 50cc  - Monitor I+Os  - Electrolytes stable    Hematologic:   - H/H stable  - Resume AC 24 hours post IR drainage   - CBC q24  - Hepatic vein thrombosis seen on [place] CT scan, no indication for AC at this time     Infectious Disease:   - Zosyn for hepatic abscess    - CBC q24     Tubes/Lines/Drains:   - PIVs    Endocrine:   - -180    Disposition: SICU     87 year old female with a PMHx of HTN, HLD GERD, and 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 S/p IR drainage hepatic abscess 09/15.       PLAN:   Neurologic:   - Pain control PRN Tylenol    Respiratory:   - Weaned off BIPAP to NC  - F/u repeat ABG  - Maintain SpO2 >92%  - Monitor daily CXR/ R pleural effusion    Cardiovascular:   - Perfusing well; non-tachycardic, normotensive  - Carvedilol 6.25mg q12 (home med)  - Hold home losartan, amlodipine    Gastrointestinal/Nutrition:   - s/p IR drainage hepatic abscess 09/15  - Advanced to clear liquid diet   - Protonix IV  - Trend LFTs    Renal/Genitourinary:   - IVL  - Monitor I+Os  - Electrolytes stable    Hematologic:   - H/H stable   - CBC q24  - Lovenox 30mg SQH   - Nonocclusive hepatic vein thrombosis seen on [place] CT scan    Infectious Disease:   - Zosyn for hepatic abscess (through 09/21)  - CBC q24     Tubes/Lines/Drains:   - PIVs    Endocrine:   - -180    Disposition: SICU

## 2022-09-16 NOTE — PHYSICAL THERAPY INITIAL EVALUATION ADULT - DIAGNOSIS, PT EVAL
Upon evaluation, pt presents with impairments in transfers, strength, balance, gait, and endurance. Pt would benefit from skilled PT services in the acute care setting to address impairments to facilitate return to prior level of function.

## 2022-09-16 NOTE — PROGRESS NOTE ADULT - SUBJECTIVE AND OBJECTIVE BOX
TEAM [ D ] Surgery Daily Progress Note  =====================================================  Overnight: Patient stable s/p IR drainage of abscess.     SUBJECTIVE: Patient seen and examined at bedside on AM rounds. Patient is currently on BiPAP 10/5/40% with O2 sat > 92%.     ALLERGIES:  penicillin (Unknown)  --------------------------------------------------------------------------------------    MEDICATIONS:    Neurologic Medications  acetaminophen   IVPB .. 1000 milliGRAM(s) IV Intermittent every 6 hours PRN Temp greater or equal to 38C (100.4F), Mild Pain (1 - 3)    Respiratory Medications    Cardiovascular Medications  metoprolol tartrate Injectable 5 milliGRAM(s) IV Push every 6 hours    Gastrointestinal Medications  dextrose 5% + sodium chloride 0.45%. 1000 milliLiter(s) IV Continuous <Continuous>  pantoprazole  Injectable 40 milliGRAM(s) IV Push daily  potassium chloride  10 mEq/100 mL IVPB 10 milliEquivalent(s) IV Intermittent every 1 hour    Genitourinary Medications    Hematologic/Oncologic Medications    Antimicrobial/Immunologic Medications  piperacillin/tazobactam IVPB.. 3.375 Gram(s) IV Intermittent every 8 hours    Endocrine/Metabolic Medications    Topical/Other Medications  chlorhexidine 4% Liquid 1 Application(s) Topical every 24 hours    --------------------------------------------------------------------------------------    VITAL SIGNS:  ICU Vital Signs Last 24 Hrs  T(C): 37.3 (15 Sep 2022 04:00), Max: 37.3 (14 Sep 2022 21:00)  T(F): 99.1 (15 Sep 2022 04:00), Max: 99.1 (14 Sep 2022 21:00)  HR: 76 (15 Sep 2022 04:00) (69 - 103)  BP: 138/60 (15 Sep 2022 04:00) (93/69 - 156/97)  BP(mean): 82 (15 Sep 2022 04:00) (76 - 108)  ABP: --  ABP(mean): --  RR: 20 (15 Sep 2022 04:00) (17 - 25)  SpO2: 96% (15 Sep 2022 04:00) (96% - 100%)    O2 Parameters below as of 15 Sep 2022 04:00  Patient On (Oxygen Delivery Method): BiPAP/CPAP    O2 Concentration (%): 40  --------------------------------------------------------------------------------------    EXAM    General: NAD, resting in bed comfortably.  Cardiac: regular rate, warm and well perfused  Respiratory: Nonlabored respirations, normal cw expansion.  Abdomen: soft, nontender, nondistended. Midline incision c/d/i, suprapubic incision with keloid formation  Extremities: normal strength, FROM, no deformities    --------------------------------------------------------------------------------------    LABS                        10.4   7.04  )-----------( 315      ( 14 Sep 2022 17:00 )             34.3   09-15    139  |  99  |  6<L>  ----------------------------<  104<H>  3.6   |  34<H>  |  0.67    Ca    9.4      15 Sep 2022 05:06  Phos  3.0     09-15  Mg     1.60     09-15    TPro  7.4  /  Alb  3.9  /  TBili  0.5  /  DBili  x   /  AST  25  /  ALT  21  /  AlkPhos  222<H>  09-14  --------------------------------------------------------------------------------------    INS AND OUTS:  I&O's Detail    14 Sep 2022 07:01  -  15 Sep 2022 07:00  --------------------------------------------------------  IN:    dextrose 5% + sodium chloride 0.45%: 250 mL  Total IN: 250 mL    OUT:    Voided (mL): 1150 mL  Total OUT: 1150 mL    Total NET: -900 mL  -------------------------------------------------------------------------------------- TEAM [ D ] Surgery Daily Progress Note  =====================================================  Overnight: Patient stable s/p IR drainage of abscess.    SUBJECTIVE: Patient seen and examined at bedside on AM rounds. Patient is resting comfortably on 4L NC. Denies any complaints at this time    ALLERGIES:  penicillin (Unknown)  --------------------------------------------------------------------------------------    MEDICATIONS:    Neurologic Medications  acetaminophen   IVPB .. 1000 milliGRAM(s) IV Intermittent every 6 hours PRN Temp greater or equal to 38C (100.4F), Mild Pain (1 - 3)    Respiratory Medications    Cardiovascular Medications  metoprolol tartrate Injectable 5 milliGRAM(s) IV Push every 6 hours    Gastrointestinal Medications  dextrose 5% + sodium chloride 0.45%. 1000 milliLiter(s) IV Continuous <Continuous>  pantoprazole  Injectable 40 milliGRAM(s) IV Push daily  potassium chloride  10 mEq/100 mL IVPB 10 milliEquivalent(s) IV Intermittent every 1 hour    Genitourinary Medications    Hematologic/Oncologic Medications    Antimicrobial/Immunologic Medications  piperacillin/tazobactam IVPB.. 3.375 Gram(s) IV Intermittent every 8 hours    Endocrine/Metabolic Medications    Topical/Other Medications  chlorhexidine 4% Liquid 1 Application(s) Topical every 24 hours    --------------------------------------------------------------------------------------    VITAL SIGNS:  ICU Vital Signs Last 24 Hrs  T(C): 36.3 (09-16-22 @ 04:00), Max: 37.1 (09-15-22 @ 13:15)  HR: 66 (09-16-22 @ 07:48) (50 - 77)  BP: 119/52 (09-16-22 @ 07:00) (100/46 - 145/48)  BP(mean): 72 (09-16-22 @ 07:00) (62 - 88)  ABP: --  ABP(mean): --  RR: 24 (09-16-22 @ 07:00) (17 - 27)  SpO2: 98% (09-16-22 @ 07:48) (96% - 100%)  Wt(kg): --  CVP(mm Hg): --  CI: --  CAPILLARY BLOOD GLUCOSE       N/A      09-15 @ 07:01  -  09-16 @ 07:00  --------------------------------------------------------  IN:    dextrose 5% + sodium chloride 0.45%: 1100 mL    IV PiggyBack: 550 mL  Total IN: 1650 mL    OUT:    Drain (mL): 35 mL    Voided (mL): 1250 mL  Total OUT: 1285 mL    Total NET: 365 mL    --------------------------------------------------------------------------------------    EXAM    General: NAD, resting in bed comfortably.  Cardiac: regular rate, warm and well perfused  Respiratory: Nonlabored respirations, normal cw expansion.  Abdomen: soft, nontender, nondistended. Midline incision c/d/i, suprapubic incision with keloid formation. IR drain with bilious/purulent output  Extremities: normal strength, FROM, no deformities    --------------------------------------------------------------------------------------    LABS             CBC (09-16 @ 01:20)                              8.7<L>                         6.22    )----------------(  221        --    % Neutrophils, --    % Lymphocytes, ANC: --                                  29.0<L>    BMP (09-16 @ 01:20)             135     |  99      |  7     		Ca++ --      Ca 9.2                ---------------------------------( 98    		Mg 1.80               4.2     |  30      |  0.80  			Ph 3.0     BMP (09-15 @ 05:06)             139     |  99      |  6<L>  		Ca++ --      Ca 9.4                ---------------------------------( 104<H>		Mg 1.60               3.6     |  34<H>   |  0.67  			Ph 3.0       LFTs (09-16 @ 01:20)      TPro 5.8<L> / Alb 2.9<L> / TBili 0.5 / DBili -- / AST 18 / ALT 11 / AlkPhos 145<H>        ABG (09-16 @ 01:20)     7.36 / 61<H> / 102 / 34<H> / 7.8<H> / 98.2<H>%     Lactate:     ABG (09-15 @ 16:35)     7.39 / 58<H> / 94 / 35<H> / 8.8<H> / 98.3<H>%     Lactate:           -> .Body Fluid HEPATIC DRAINAGE Culture (09-15 @ 12:45)       Numerous polymorphonuclear leukocytes seen per low power field  Few Gram positive cocci in pairs seen per oil power field    NG    Testing in progress

## 2022-09-16 NOTE — DIETITIAN INITIAL EVALUATION ADULT - SIGNS/SYMPTOMS
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

## 2022-09-16 NOTE — PROGRESS NOTE ADULT - SUBJECTIVE AND OBJECTIVE BOX
87y Female s/p perihepatic abscess drain on 9/15 in Interventional Radiology.   Patient seen and examined bedside resting comfortably. No complaints offered.    T(F): 96 (09-16-22 @ 08:00), Max: 98.7 (09-15-22 @ 13:15)  HR: 63 (09-16-22 @ 10:37) (50 - 77)  BP: 115/47 (09-16-22 @ 09:00) (100/46 - 145/48)  RR: 21 (09-16-22 @ 09:00) (17 - 27)  SpO2: 100% (09-16-22 @ 10:37) (97% - 100%)  Wt(kg): --    LABS:                        8.7    6.22  )-----------( 221      ( 16 Sep 2022 01:20 )             29.0     09-16    135  |  99  |  7   ----------------------------<  98  4.2   |  30  |  0.80    Ca    9.2      16 Sep 2022 01:20  Phos  3.0     09-16  Mg     1.80     09-16    TPro  5.8<L>  /  Alb  2.9<L>  /  TBili  0.5  /  DBili  x   /  AST  18  /  ALT  11  /  AlkPhos  145<H>  09-16    PT/INR - ( 14 Sep 2022 17:00 )   PT: 13.2 sec;   INR: 1.14 ratio         PTT - ( 14 Sep 2022 17:00 )  PTT:30.6 sec  I&O's Detail    15 Sep 2022 07:01  -  16 Sep 2022 07:00  --------------------------------------------------------  IN:    dextrose 5% + sodium chloride 0.45%: 1100 mL    IV PiggyBack: 550 mL  Total IN: 1650 mL    OUT:    Drain (mL): 35 mL    Voided (mL): 1250 mL  Total OUT: 1285 mL    Total NET: 365 mL      16 Sep 2022 07:01  -  16 Sep 2022 10:44  --------------------------------------------------------  IN:    dextrose 5% + sodium chloride 0.45%: 100 mL    IV PiggyBack: 50 mL  Total IN: 150 mL    OUT:  Total OUT: 0 mL    Total NET: 150 mL            PHYSICAL EXAM:  General: Nontoxic, in NAD  Perihepatic drain: dressing c/d/i, flushed with 5cc NS

## 2022-09-16 NOTE — DIETITIAN INITIAL EVALUATION ADULT - PERTINENT LABORATORY DATA
09-16    135  |  99  |  7   ----------------------------<  98  4.2   |  30  |  0.80    Ca    9.2      16 Sep 2022 01:20  Phos  3.0     09-16  Mg     1.80     09-16    TPro  5.8<L>  /  Alb  2.9<L>  /  TBili  0.5  /  DBili  x   /  AST  18  /  ALT  11  /  AlkPhos  145<H>  09-16  A1C with Estimated Average Glucose Result: 5.3 % (05-28-22 @ 05:29)  A1C with Estimated Average Glucose Result: 5.6 % (05-24-22 @ 11:25)

## 2022-09-16 NOTE — PHYSICAL THERAPY INITIAL EVALUATION ADULT - PERTINENT HX OF CURRENT PROBLEM, REHAB EVAL
Pt is an 87 year old female presenting with weakness, lethargy, and poor PO intake; found to be hypoxic in the 40s/50s by EMS requiring BiPAP. CT A/P significant for hepatic abscess at the gallbladder fossa. Pathology positive for gallbladder invasive adenocarcinoma. IR drainage placed 09/15 for hepatic abscess.

## 2022-09-16 NOTE — DIETITIAN INITIAL EVALUATION ADULT - OTHER INFO
88 y/o female with a PMHx of HTN, HLD GERD, and gallbladder cancer for which she underwent an ex lap, open cholecystectomy, segment 4b/5 hepatectomy, and right colectomy due to fistula tract vs metastasis on 08/19, who p/w to S ED with lethargy for 2 days and hypoxia to 40/50s % with workup remarkable for a [id number] hepatic abscess and b/l pleural effusions on CT scan, and hypoxia requiring BiPAP, transferred to [facility] for further management, now admitted to SICU i/s/o persistent NIPPV. S/p IR drainage hepatic abscess 09/15. Diet now advanced to Clear Liquids. Visited with pt to obtain nutrition hx. Pt said she is trying to eat but doesn't feel well enough. She denies food allergies, nausea/vomiting/diarrhea/constipation, or issues with chewing/swallowing. Offered to take food preferences, however pt declined. Recommend ordering Ensure Clear 3x daily (540 kiera and 24 gm protein) to help optimize oral intake and overall nutrition. Pt said her weight has been pretty stable PTA. She exhibits moderate subcutaneous fat store loss and muscle mass wasting which place her at moderate risk for malnutrition. Encourage and monitor oral intake, especially of nutrition supplement. Continue advancing diet as tolerated. RDN services to remain available as needed.

## 2022-09-16 NOTE — DIETITIAN INITIAL EVALUATION ADULT - PERTINENT MEDS FT
MEDICATIONS  (STANDING):  albuterol/ipratropium for Nebulization 3 milliLiter(s) Nebulizer every 6 hours  allopurinol 100 milliGRAM(s) Oral at bedtime  carvedilol 6.25 milliGRAM(s) Oral every 12 hours  chlorhexidine 4% Liquid 1 Application(s) Topical every 24 hours  enoxaparin Injectable 30 milliGRAM(s) SubCutaneous every 24 hours  fenofibrate Tablet 145 milliGRAM(s) Oral daily  melatonin 3 milliGRAM(s) Oral at bedtime  mirtazapine 15 milliGRAM(s) Oral at bedtime  multivitamin 1 Tablet(s) Oral daily  pantoprazole    Tablet 40 milliGRAM(s) Oral before breakfast  piperacillin/tazobactam IVPB.. 3.375 Gram(s) IV Intermittent every 8 hours  thiamine 100 milliGRAM(s) Oral daily    MEDICATIONS  (PRN):  acetaminophen     Tablet .. 650 milliGRAM(s) Oral every 6 hours PRN Temp greater or equal to 38C (100.4F), Mild Pain (1 - 3)

## 2022-09-16 NOTE — PROGRESS NOTE ADULT - ASSESSMENT
87y Female s/p open cholecystectomy found to have a fluid and gas collection in the GB fossa s/p perihepatic abscess drain on 9/15 in Interventional Radiology.     Plan:  - Continue drainage, monitor output  - Can be discharged home with drain if outputs remain high  - Flush drain 5cc NS qd  - Will need noncontrast CT + IR tube check once outputs < 10cc/24hr, can be arranged as outpatient follow-up. Outpatient IR office [phone]    [phone]

## 2022-09-16 NOTE — PROGRESS NOTE ADULT - SUBJECTIVE AND OBJECTIVE BOX
Vital Signs Last 24 Hrs  T(C): 35.6 (16 Sep 2022 08:00), Max: 37.1 (15 Sep 2022 13:15)  T(F): 96 (16 Sep 2022 08:00), Max: 98.7 (15 Sep 2022 13:15)  HR: 59 (16 Sep 2022 08:00) (50 - 77)  BP: 108/52 (16 Sep 2022 08:00) (100/46 - 145/48)  BP(mean): 70 (16 Sep 2022 08:00) (62 - 88)  RR: 17 (16 Sep 2022 08:00) (17 - 27)  SpO2: 97% (16 Sep 2022 08:00) (96% - 100%)    Parameters below as of 16 Sep 2022 08:00  Patient On (Oxygen Delivery Method): nasal cannula  O2 Flow (L/min): 4      I&O's Detail    15 Sep 2022 07:01  -  16 Sep 2022 07:00  --------------------------------------------------------  IN:    dextrose 5% + sodium chloride 0.45%: 1100 mL    IV PiggyBack: 550 mL  Total IN: 1650 mL    OUT:    Drain (mL): 35 mL    Voided (mL): 1250 mL  Total OUT: 1285 mL    Total NET: 365 mL      16 Sep 2022 07:01  -  16 Sep 2022 08:24  --------------------------------------------------------  IN:    dextrose 5% + sodium chloride 0.45%: 100 mL    IV PiggyBack: 50 mL  Total IN: 150 mL    OUT:  Total OUT: 0 mL    Total NET: 150 mL                                8.7    6.22  )-----------( 221      ( 16 Sep 2022 01:20 )             29.0       09-16    135  |  99  |  7   ----------------------------<  98  4.2   |  30  |  0.80    Ca    9.2      16 Sep 2022 01:20  Phos  3.0     09-16  Mg     1.80     09-16    TPro  5.8<L>  /  Alb  2.9<L>  /  TBili  0.5  /  DBili  x   /  AST  18  /  ALT  11  /  AlkPhos  145<H>  09-16      PT/INR - ( 14 Sep 2022 17:00 )   PT: 13.2 sec;   INR: 1.14 ratio         PTT - ( 14 Sep 2022 17:00 )  PTT:30.6 sec    Abscess drained  patient off of CPAP; just on nasa O2    PLAN:  Adjust antibiotics per cultures  Pulmonary toilet  Regular diet

## 2022-09-16 NOTE — PHYSICAL THERAPY INITIAL EVALUATION ADULT - BALANCE DISTURBANCE, IDENTIFIED IMPAIRMENT CONTRIBUTE, REHAB EVAL
impaired endurance/impaired coordination/impaired motor control/impaired postural control/decreased strength

## 2022-09-16 NOTE — PHYSICAL THERAPY INITIAL EVALUATION ADULT - PATIENT PROFILE REVIEW, REHAB EVAL
PT initial evaluation received and chart review completed. Pt agreeable to participate in PT evaluation. Pt cleared by JIM Burns./yes

## 2022-09-16 NOTE — PROGRESS NOTE ADULT - ASSESSMENT
87 year old female with a PMHx of HTN, HLD GERD, and gallbladder cancer for which she underwent an ex lap, open cholecystectomy, segment 4b/5 hepatectomy, and right colectomy due to fistula tract vs metastasis on 08/19, who p/w to S ED with lethargy for 2 days and hypoxia to 40/50s % with workup remarkable for a [id number] hepatic abscess and b/l pleural effusions on CT scan, and hypoxia requiring BiPAP, transferred to [facility] for further management, now admitted to SICU i/s/o persistent NIPPV.     Plan  - Pending IR percutaneous drainage of hepatic abscess today  - Diet: NPO  - Zosyn  - D51/2 @ 50  - Wean BiPAP as tolerated  - Appreciate excellent care per SICU 87 year old female with a PMHx of HTN, HLD GERD, and gallbladder cancer for which she underwent an ex lap, open cholecystectomy, segment 4b/5 hepatectomy, and right colectomy due to fistula tract vs metastasis on 08/19, who p/w to [facility] ED with lethargy for 2 days and hypoxia to 40/50s % with workup remarkable for a [id number] hepatic abscess and b/l pleural effusions on CT scan, and hypoxia requiring BiPAP, transferred to [facility] for further management, admitted to SICU i/s/o persistent NIPPV, s/p IR drainage of collection     Plan  - Diet: CLD  - Zosyn  - D51/2 @ 50  - Wean NC as tolerated  - Appreciate care per SICU    [id number]

## 2022-09-17 LAB
-  AMPICILLIN: SIGNIFICANT CHANGE UP
-  TETRACYCLINE: SIGNIFICANT CHANGE UP
-  VANCOMYCIN: SIGNIFICANT CHANGE UP
ANION GAP SERPL CALC-SCNC: 8 MMOL/L — SIGNIFICANT CHANGE UP (ref 7–14)
BUN SERPL-MCNC: 6 MG/DL — LOW (ref 7–23)
CALCIUM SERPL-MCNC: 9.5 MG/DL — SIGNIFICANT CHANGE UP (ref 8.4–10.5)
CHLORIDE SERPL-SCNC: 100 MMOL/L — SIGNIFICANT CHANGE UP (ref 98–107)
CO2 SERPL-SCNC: 30 MMOL/L — SIGNIFICANT CHANGE UP (ref 22–31)
CREAT SERPL-MCNC: 0.7 MG/DL — SIGNIFICANT CHANGE UP (ref 0.5–1.3)
EGFR: 84 ML/MIN/1.73M2 — SIGNIFICANT CHANGE UP
GLUCOSE SERPL-MCNC: 105 MG/DL — HIGH (ref 70–99)
HCT VFR BLD CALC: 32.4 % — LOW (ref 34.5–45)
HGB BLD-MCNC: 9.7 G/DL — LOW (ref 11.5–15.5)
MAGNESIUM SERPL-MCNC: 1.8 MG/DL — SIGNIFICANT CHANGE UP (ref 1.6–2.6)
MCHC RBC-ENTMCNC: 29 PG — SIGNIFICANT CHANGE UP (ref 27–34)
MCHC RBC-ENTMCNC: 29.9 GM/DL — LOW (ref 32–36)
MCV RBC AUTO: 97 FL — SIGNIFICANT CHANGE UP (ref 80–100)
METHOD TYPE: SIGNIFICANT CHANGE UP
NRBC # BLD: 0 /100 WBCS — SIGNIFICANT CHANGE UP (ref 0–0)
NRBC # FLD: 0 K/UL — SIGNIFICANT CHANGE UP (ref 0–0)
PHOSPHATE SERPL-MCNC: 3 MG/DL — SIGNIFICANT CHANGE UP (ref 2.5–4.5)
PLATELET # BLD AUTO: 255 K/UL — SIGNIFICANT CHANGE UP (ref 150–400)
POTASSIUM SERPL-MCNC: 3.9 MMOL/L — SIGNIFICANT CHANGE UP (ref 3.5–5.3)
POTASSIUM SERPL-SCNC: 3.9 MMOL/L — SIGNIFICANT CHANGE UP (ref 3.5–5.3)
RBC # BLD: 3.34 M/UL — LOW (ref 3.8–5.2)
RBC # FLD: 14.7 % — HIGH (ref 10.3–14.5)
SODIUM SERPL-SCNC: 138 MMOL/L — SIGNIFICANT CHANGE UP (ref 135–145)
WBC # BLD: 4.21 K/UL — SIGNIFICANT CHANGE UP (ref 3.8–10.5)
WBC # FLD AUTO: 4.21 K/UL — SIGNIFICANT CHANGE UP (ref 3.8–10.5)

## 2022-09-17 PROCEDURE — 99232 SBSQ HOSP IP/OBS MODERATE 35: CPT | Mod: 24

## 2022-09-17 PROCEDURE — 99232 SBSQ HOSP IP/OBS MODERATE 35: CPT | Mod: GC

## 2022-09-17 RX ORDER — FENOFIBRATE,MICRONIZED 130 MG
1 CAPSULE ORAL
Qty: 0 | Refills: 0 | DISCHARGE

## 2022-09-17 RX ORDER — FUROSEMIDE 40 MG
10 TABLET ORAL ONCE
Refills: 0 | Status: COMPLETED | OUTPATIENT
Start: 2022-09-17 | End: 2022-09-17

## 2022-09-17 RX ORDER — PANTOPRAZOLE SODIUM 20 MG/1
1 TABLET, DELAYED RELEASE ORAL
Qty: 0 | Refills: 0 | DISCHARGE

## 2022-09-17 RX ORDER — ALLOPURINOL 300 MG
100 TABLET ORAL
Qty: 0 | Refills: 0 | DISCHARGE

## 2022-09-17 RX ORDER — LANOLIN ALCOHOL/MO/W.PET/CERES
3 CREAM (GRAM) TOPICAL
Qty: 0 | Refills: 0 | DISCHARGE

## 2022-09-17 RX ORDER — MAGNESIUM SULFATE 500 MG/ML
2 VIAL (ML) INJECTION ONCE
Refills: 0 | Status: COMPLETED | OUTPATIENT
Start: 2022-09-17 | End: 2022-09-17

## 2022-09-17 RX ORDER — MIRTAZAPINE 45 MG/1
1 TABLET, ORALLY DISINTEGRATING ORAL
Qty: 0 | Refills: 0 | DISCHARGE

## 2022-09-17 RX ADMIN — Medication 100 MILLIGRAM(S): at 11:01

## 2022-09-17 RX ADMIN — Medication 3 MILLIGRAM(S): at 23:17

## 2022-09-17 RX ADMIN — MIRTAZAPINE 15 MILLIGRAM(S): 45 TABLET, ORALLY DISINTEGRATING ORAL at 21:46

## 2022-09-17 RX ADMIN — Medication 3 MILLILITER(S): at 05:04

## 2022-09-17 RX ADMIN — PIPERACILLIN AND TAZOBACTAM 25 GRAM(S): 4; .5 INJECTION, POWDER, LYOPHILIZED, FOR SOLUTION INTRAVENOUS at 05:58

## 2022-09-17 RX ADMIN — Medication 3 MILLILITER(S): at 10:25

## 2022-09-17 RX ADMIN — PANTOPRAZOLE SODIUM 40 MILLIGRAM(S): 20 TABLET, DELAYED RELEASE ORAL at 07:23

## 2022-09-17 RX ADMIN — Medication 10 MILLIGRAM(S): at 11:01

## 2022-09-17 RX ADMIN — CARVEDILOL PHOSPHATE 6.25 MILLIGRAM(S): 80 CAPSULE, EXTENDED RELEASE ORAL at 05:58

## 2022-09-17 RX ADMIN — CHLORHEXIDINE GLUCONATE 1 APPLICATION(S): 213 SOLUTION TOPICAL at 07:23

## 2022-09-17 RX ADMIN — PIPERACILLIN AND TAZOBACTAM 25 GRAM(S): 4; .5 INJECTION, POWDER, LYOPHILIZED, FOR SOLUTION INTRAVENOUS at 14:00

## 2022-09-17 RX ADMIN — Medication 3 MILLILITER(S): at 21:14

## 2022-09-17 RX ADMIN — Medication 100 MILLIGRAM(S): at 21:53

## 2022-09-17 RX ADMIN — Medication 25 GRAM(S): at 07:01

## 2022-09-17 RX ADMIN — PIPERACILLIN AND TAZOBACTAM 25 GRAM(S): 4; .5 INJECTION, POWDER, LYOPHILIZED, FOR SOLUTION INTRAVENOUS at 23:14

## 2022-09-17 RX ADMIN — Medication 1 TABLET(S): at 11:01

## 2022-09-17 NOTE — PROGRESS NOTE ADULT - ASSESSMENT
87 year old female with a PMHx of HTN, HLD GERD, and gallbladder cancer for which she underwent an ex lap, open cholecystectomy, segment 4b/5 hepatectomy, and right colectomy due to fistula tract vs metastasis on 08/19, who p/w to S ED with lethargy for 2 days and hypoxia to 40/50s % with workup remarkable for a [id number] hepatic abscess and b/l pleural effusions on CT scan, and hypoxia requiring BiPAP, transferred to [facility] for further management, now admitted to SICU i/s/o persistent NIPPV. S/p IR drainage hepatic abscess 09/15. Now off BiPAP, satting well on NC.       PLAN:   Neurologic:   - Pain control PRN Tylenol    Respiratory:   - NC  - F/u repeat ABG  - Maintain SpO2 >92%  - Monitor daily CXR/ R pleural effusion  - Duoneb q6     Cardiovascular:   - Perfusing well; non-tachycardic, normotensive  - Carvedilol 6.25mg q12 (home med)  - Fenofibrate  - Home Amlodipine ,Losartan held iso normotension    Gastrointestinal/Nutrition:   - s/p IR drainage hepatic abscess 09/15  - Regular diet   - Protonix PO  - Trend LFTs    Renal/Genitourinary:   - IVL  - Monitor I+Os  - Electrolytes stable, replete PRN    Hematologic:   - H/H stable   - CBC q24  - Lovenox 30mg SQH   - Nonocclusive hepatic vein thrombosis seen on [place] CT scan, no indication for AC at this time    Infectious Disease:   - Zosyn for hepatic abscess (through 09/21)  - CBC q24     Tubes/Lines/Drains:   - PIVs    Endocrine:   - -180  - Allopurinol   - Thiamine  - MV     Disposition: SICU   Patient is an 87 year old female with a PMHx of HTN, HLD GERD and gallbladder cancer (for which she underwent an ex-lap, open cholecystectomy, segment 4b / 5 hepatectomy and right colectomy due to fistula tract vs. metastasis on 8/19/22) who presented to [facility] with hypoxia and lethargy.  CT Abdomen / Pelvis performed at [facility] remarkable for a [id number] hepatic abscess at the gallbladder fossa.  Patient went to IR for drainage of collection on 9/14/22.      PLAN:    NEUROLOGY:  - No active issues  - Tylenol PRN for pain control    RESPIRATORY:  - Weaned off BiPAP  - Saturating well on nasal cannula  - Continuous pulse oximetry  - Maintain O2 saturation >92%    CARDIOVASCULAR:  - No active issues  - Continue with home dose Coreg  - Keep MAP >65    GI / NUTRITION:  - Regular diet  - GI prophylaxis with Protonix 40mg QD    RENAL / GENITOURINARY:  - Monitor electrolytes and replete PRN  - Continue to monitor strict ins and outs q1 hour  - Continue with home dose Allopurinol    HEMATOLOGIC:  - Hemoglobin and hematocrit stable  - Monitor daily CBC  - VTE prophylaxis with Lovenox subcutaneous    INFECTIOUS DISEASE:  - Currently afebrile with no leukocytosis  - Continue with IV Zosyn for E. faecalis growing from abscess fluid    ENDOCRINOLOGY:  - No active issues  - Continue to monitor glucose on BMP (goal 140-180)      Disposition: Floor  --------------------------------------------------------------------------------------

## 2022-09-17 NOTE — PROGRESS NOTE ADULT - SUBJECTIVE AND OBJECTIVE BOX
TEAM Surgery Progress Note  Patient is a 87y old  Female who presents with a chief complaint of hepatic abscess (17 Sep 2022 04:56)      INTERVAL EVENTS: No acute events overnight.  SUBJECTIVE: Patient seen and examined at bedside with surgical team, patient without complaints. Denies fever, chills, CP, SOB nausea, vomiting, abdominal pain.      OBJECTIVE:    Vital Signs Last 24 Hrs  T(C): 36.4 (17 Sep 2022 04:00), Max: 36.7 (17 Sep 2022 00:00)  T(F): 97.6 (17 Sep 2022 04:00), Max: 98.1 (17 Sep 2022 00:00)  HR: 80 (17 Sep 2022 06:00) (56 - 86)  BP: 135/60 (17 Sep 2022 06:00) (115/47 - 145/50)  BP(mean): 80 (17 Sep 2022 06:00) (64 - 96)  RR: 20 (17 Sep 2022 06:00) (12 - 28)  SpO2: 97% (17 Sep 2022 06:00) (86% - 100%)    Parameters below as of 16 Sep 2022 12:00  Patient On (Oxygen Delivery Method): nasal cannula  O2 Flow (L/min): 4  I&O's Detail    16 Sep 2022 07:01  -  17 Sep 2022 07:00  --------------------------------------------------------  IN:    dextrose 5% + sodium chloride 0.45%: 100 mL    IV PiggyBack: 250 mL    Oral Fluid: 440 mL  Total IN: 790 mL    OUT:    Drain (mL): 20 mL    Voided (mL): 400 mL  Total OUT: 420 mL    Total NET: 370 mL      MEDICATIONS  (STANDING):  albuterol/ipratropium for Nebulization 3 milliLiter(s) Nebulizer every 6 hours  allopurinol 100 milliGRAM(s) Oral at bedtime  carvedilol 6.25 milliGRAM(s) Oral every 12 hours  chlorhexidine 4% Liquid 1 Application(s) Topical every 24 hours  enoxaparin Injectable 30 milliGRAM(s) SubCutaneous every 24 hours  fenofibrate Tablet 145 milliGRAM(s) Oral daily  melatonin 3 milliGRAM(s) Oral at bedtime  mirtazapine 15 milliGRAM(s) Oral at bedtime  multivitamin 1 Tablet(s) Oral daily  pantoprazole    Tablet 40 milliGRAM(s) Oral before breakfast  piperacillin/tazobactam IVPB.. 3.375 Gram(s) IV Intermittent every 8 hours  thiamine 100 milliGRAM(s) Oral daily    MEDICATIONS  (PRN):  acetaminophen     Tablet .. 650 milliGRAM(s) Oral every 6 hours PRN Temp greater or equal to 38C (100.4F), Mild Pain (1 - 3)      PHYSICAL EXAM:  Constitutional: A&Ox3, NAD  Respiratory: nasal cannula in place, unlabored  Abdomen: Soft, nondistended. No rebound or guarding.  Extremities: WWP, MCCANN spontaneously    LABS:                        9.7    4.21  )-----------( 255      ( 17 Sep 2022 05:05 )             32.4     09-17    138  |  100  |  6<L>  ----------------------------<  105<H>  3.9   |  30  |  0.70    Ca    9.5      17 Sep 2022 05:05  Phos  3.0     09-17  Mg     1.80     09-17    TPro  5.8<L>  /  Alb  2.9<L>  /  TBili  0.5  /  DBili  x   /  AST  18  /  ALT  11  /  AlkPhos  145<H>  09-16      LIVER FUNCTIONS - ( 16 Sep 2022 01:20 )  Alb: 2.9 g/dL / Pro: 5.8 g/dL / ALK PHOS: 145 U/L / ALT: 11 U/L / AST: 18 U/L / GGT: x

## 2022-09-17 NOTE — PROGRESS NOTE ADULT - SUBJECTIVE AND OBJECTIVE BOX
TEAM [ D ] Surgery Daily Progress Note  =====================================================  Overnight: BRUCE    SUBJECTIVE: Patient seen and examined at bedside on AM rounds. Patient is resting comfortably on 4L NC. Denies any complaints at this time    ALLERGIES:  penicillin (Unknown)  --------------------------------------------------------------------------------------    MEDICATIONS:    Neurologic Medications  acetaminophen   IVPB .. 1000 milliGRAM(s) IV Intermittent every 6 hours PRN Temp greater or equal to 38C (100.4F), Mild Pain (1 - 3)    Respiratory Medications    Cardiovascular Medications  metoprolol tartrate Injectable 5 milliGRAM(s) IV Push every 6 hours    Gastrointestinal Medications  dextrose 5% + sodium chloride 0.45%. 1000 milliLiter(s) IV Continuous <Continuous>  pantoprazole  Injectable 40 milliGRAM(s) IV Push daily  potassium chloride  10 mEq/100 mL IVPB 10 milliEquivalent(s) IV Intermittent every 1 hour    Genitourinary Medications    Hematologic/Oncologic Medications    Antimicrobial/Immunologic Medications  piperacillin/tazobactam IVPB.. 3.375 Gram(s) IV Intermittent every 8 hours    Endocrine/Metabolic Medications    Topical/Other Medications  chlorhexidine 4% Liquid 1 Application(s) Topical every 24 hours    --------------------------------------------------------------------------------------    VITAL SIGNS:  ICU Vital Signs Last 24 Hrs  T(C): 36.3 (09-16-22 @ 04:00), Max: 37.1 (09-15-22 @ 13:15)  HR: 66 (09-16-22 @ 07:48) (50 - 77)  BP: 119/52 (09-16-22 @ 07:00) (100/46 - 145/48)  BP(mean): 72 (09-16-22 @ 07:00) (62 - 88)  ABP: --  ABP(mean): --  RR: 24 (09-16-22 @ 07:00) (17 - 27)  SpO2: 98% (09-16-22 @ 07:48) (96% - 100%)  Wt(kg): --  CVP(mm Hg): --  CI: --  CAPILLARY BLOOD GLUCOSE       N/A      09-15 @ 07:01  -  09-16 @ 07:00  --------------------------------------------------------  IN:    dextrose 5% + sodium chloride 0.45%: 1100 mL    IV PiggyBack: 550 mL  Total IN: 1650 mL    OUT:    Drain (mL): 35 mL    Voided (mL): 1250 mL  Total OUT: 1285 mL    Total NET: 365 mL    --------------------------------------------------------------------------------------    EXAM    General: NAD, resting in bed comfortably.  Cardiac: regular rate, warm and well perfused  Respiratory: Nonlabored respirations, normal cw expansion.  Abdomen: soft, nontender, nondistended. Midline incision c/d/i, suprapubic incision with keloid formation. IR drain with bilious/purulent output  Extremities: normal strength, FROM, no deformities    --------------------------------------------------------------------------------------    LABS             CBC (09-16 @ 01:20)                              8.7<L>                         6.22    )----------------(  221        --    % Neutrophils, --    % Lymphocytes, ANC: --                                  29.0<L>    BMP (09-16 @ 01:20)             135     |  99      |  7     		Ca++ --      Ca 9.2                ---------------------------------( 98    		Mg 1.80               4.2     |  30      |  0.80  			Ph 3.0     BMP (09-15 @ 05:06)             139     |  99      |  6<L>  		Ca++ --      Ca 9.4                ---------------------------------( 104<H>		Mg 1.60               3.6     |  34<H>   |  0.67  			Ph 3.0       LFTs (09-16 @ 01:20)      TPro 5.8<L> / Alb 2.9<L> / TBili 0.5 / DBili -- / AST 18 / ALT 11 / AlkPhos 145<H>        ABG (09-16 @ 01:20)     7.36 / 61<H> / 102 / 34<H> / 7.8<H> / 98.2<H>%     Lactate:     ABG (09-15 @ 16:35)     7.39 / 58<H> / 94 / 35<H> / 8.8<H> / 98.3<H>%     Lactate:           -> .Body Fluid HEPATIC DRAINAGE Culture (09-15 @ 12:45)       Numerous polymorphonuclear leukocytes seen per low power field  Few Gram positive cocci in pairs seen per oil power field    NG    Testing in progress

## 2022-09-17 NOTE — PROGRESS NOTE ADULT - ASSESSMENT
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

## 2022-09-17 NOTE — PROGRESS NOTE ADULT - ATTENDING COMMENTS
I agree with the history, physical, and plan, which I have reviewed and edited where appropriate.  I agree with notes/assessment of health care providers on my service.  I have personally examined the patient.  I was physically present for the key portions of the evaluation and management (E/M) service provided.  I reviewed data and laboratory tests/x-rays and all pertinent electronic images.  The patient is a critical care patient with life threatening hemodynamic and metabolic instability in SICU.  Risk benefit analyses discussed.    The patient is in SICU with diagnosis mentioned in the note.    The plan is specified below.    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 S/p IR drainage hepatic abscess 09/15. Weaned off BiPAP, now on NC with decreasing requirements. Stable for transfer to floor.    PLAN:   Neurologic: post-procedural pain  - Pain control PRN Tylenol    Respiratory: hypoxic and hypercarbic respiratory insufficiency  - Weaning nasal cannula   - duonebs q6h    Cardiovascular: h/o htn  - Carvedilol 6.25mg q12 (home med)  - Hold home losartan, amlodipine    Gastrointestinal/Nutrition: s/p IR drainage hepatic abscess 09/15  - regular diet   - Protonix IV    Renal/Genitourinary:   - IVL  - voiding    Hematologic: Nonocclusive hepatic vein thrombosis seen on [place] CT scan, no AC needed   - Lovenox 30mg dvt ppx    Infectious Disease: subhepatic abscess s/p IR drainage  - Zosyn (through 09/21)  - follow cultures     Endocrine:   - -180 .
I agree with the history, physical, and plan, which I have reviewed and edited where appropriate.  I agree with notes/assessment of health care providers on my service.  I have personally examined the patient.  I was physically present for the key portions of the evaluation and management (E/M) service provided.  I reviewed data and laboratory tests/x-rays and all pertinent electronic images.  The patient is a critical care patient with life threatening hemodynamic and metabolic instability in SICU.  Risk benefit analyses discussed.    The patient is in SICU with diagnosis mentioned in the note.    The plan is specified below.    87 year old female with a h/o gallbladder cancer for which she underwent an ex lap, open cholecystectomy, segment 4b/5 hepatectomy, and right colectomy due to fistula tract vs metastasis on 08/19, who p/w to S ED with lethargy for 2 days and hypoxia to 40/50s % with workup remarkable for a [id number] hepatic abscess and b/l pleural effusions on CT scan, and hypoxia requiring BiPAP, transferred to [facility] for further management, now admitted to SICU i/s/o persistent NIPPV. S/p IR drainage hepatic abscess 09/15. Weaned off BiPAP overnight.       PLAN:   Neurologic: post-procedural pain  - Pain control PRN Tylenol    Respiratory: hypoxic and hypercarbic respiratory insufficiency  - Weaned off BIPAP to NC  - F/u repeat ABG  - start duonebs q6h    Cardiovascular:   - Perfusing well; non-tachycardic, normotensive  - Carvedilol 6.25mg q12 (home med)  - Hold home losartan, amlodipine    Gastrointestinal/Nutrition: s/p IR drainage hepatic abscess 09/15  - Advanced to clear liquid diet   - Protonix IV    Renal/Genitourinary:   - IVL  - voiding    Hematologic: Nonocclusive hepatic vein thrombosis seen on [place] CT scan, no AC needed   - resume Lovenox 30mg dvt ppx    Infectious Disease: subhepatic abscess s/p IR drainage  - Zosyn (through 09/21)  - follow cultures     Endocrine:   - -180
Critical Care Dx    J96.02 Acute hypercapnic respiratory failure   -COPD vs deconditioning, tolerating bipap, settings stable  -repeat ABG this AM  -CXR stable, keep O2 > 88%  C23 Carcinoma gallbladder   Z90.49 H/O resection of liver   -subhepatic collection noted, no evidence of cholangitis clinically  -coagulation profile stable  K65.1 Intra-abdominal abscess   -IR perc today, hold all AC  -Abx as ordered - zosyn   -await culture/sensitivities   I81 Portal vein thrombosis  -hold AC, will let primary team decide on anticoagulation for induced thrombus      The patient is a critical care patient with life threatening respiratory instability in SICU.  I have personally interviewed and examined the patient, reviewed data and laboratory tests/x-rays and all pertinent electronic images.  The SICU team has a constant risk benefit analyzes discussion with the primary team, all consultants, House Staff and PA's on all decisions.   Time involved in performance of separately billable procedures was not counted toward my critical care time. There is no overlap.    I have personally provided 60 minutes of critical care time concurrently with the resident/fellow. This time excludes time spent on separate procedures and time spent teaching. I have reviewed the resident's/fellow's documentation and agree with the assessment and plan of care.  I was physically present for the key portions of the evaluation and management (E/M) service provided.      Fantasma Morel MD  Acute and Critical Care Surgery

## 2022-09-17 NOTE — PROGRESS NOTE ADULT - ASSESSMENT
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

## 2022-09-17 NOTE — PROGRESS NOTE ADULT - SUBJECTIVE AND OBJECTIVE BOX
24 HOUR EVENTS:  -Weaned off BiPAP to NC  -Duoneb  -Regular diet  -IVL  -Listed for floor    SUBJECTIVE/ROS:  [ ] A ten-point review of systems was otherwise negative except as noted.  [ ] Due to altered mental status/intubation, subjective information were not able to be obtained from the patient. History was obtained, to the extent possible, from review of the chart and collateral sources of information.      NEURO  Exam: awake, alert, oriented  Meds: acetaminophen     Tablet .. 650 milliGRAM(s) Oral every 6 hours PRN Temp greater or equal to 38C (100.4F), Mild Pain (1 - 3)  melatonin 3 milliGRAM(s) Oral at bedtime  mirtazapine 15 milliGRAM(s) Oral at bedtime    [x] Adequacy of sedation and pain control has been assessed and adjusted      RESPIRATORY  RR: 22 (09-17-22 @ 00:00) (12 - 28)  SpO2: 98% (09-17-22 @ 00:00) (86% - 100%)  Wt(kg): --  Exam: unlabored, clear to auscultation bilaterally, NC 2L  Mechanical Ventilation:   ABG - ( 16 Sep 2022 12:13 )  pH: 7.45  /  pCO2: 52    /  pO2: 60    / HCO3: 36    / Base Excess: 10.7  /  SaO2: 92.0    Lactate: x                [N/A] Extubation Readiness Assessed  Meds: albuterol/ipratropium for Nebulization 3 milliLiter(s) Nebulizer every 6 hours        CARDIOVASCULAR  HR: 67 (09-17-22 @ 00:00) (56 - 76)  BP: 119/55 (09-17-22 @ 00:00) (108/52 - 145/50)  BP(mean): 75 (09-17-22 @ 00:00) (64 - 96)  ABP: --  ABP(mean): --  Wt(kg): --  CVP(cm H2O): --      Exam: regular rate and rhythm  Cardiac Rhythm: sinus  Perfusion     [x]Adequate   [ ]Inadequate  Mentation   [x]Normal       [ ]Reduced  Extremities  [x]Warm         [ ]Cool  Volume Status [ ]Hypervolemic [x]Euvolemic [ ]Hypovolemic  Meds: carvedilol 6.25 milliGRAM(s) Oral every 12 hours        GI/NUTRITION  Exam: soft, nontender, nondistended, incision well healed, IR drain with SS output   Diet: Regular   Meds: pantoprazole    Tablet 40 milliGRAM(s) Oral before breakfast      GENITOURINARY  I&O's Detail    09-15 @ 07:01  -  09-16 @ 07:00  --------------------------------------------------------  IN:    dextrose 5% + sodium chloride 0.45%: 1100 mL    IV PiggyBack: 550 mL  Total IN: 1650 mL    OUT:    Drain (mL): 35 mL    Voided (mL): 1250 mL  Total OUT: 1285 mL    Total NET: 365 mL      09-16 @ 07:01  -  09-17 @ 02:25  --------------------------------------------------------  IN:    dextrose 5% + sodium chloride 0.45%: 100 mL    IV PiggyBack: 150 mL    Oral Fluid: 440 mL  Total IN: 690 mL    OUT:    Drain (mL): 20 mL    Voided (mL): 300 mL  Total OUT: 320 mL    Total NET: 370 mL          09-16    135  |  99  |  7   ----------------------------<  98  4.2   |  30  |  0.80    Ca    9.2      16 Sep 2022 01:20  Phos  3.0     09-16  Mg     1.80     09-16    TPro  5.8<L>  /  Alb  2.9<L>  /  TBili  0.5  /  DBili  x   /  AST  18  /  ALT  11  /  AlkPhos  145<H>  09-16    [ ] Otto catheter, indication: N/A  Meds: multivitamin 1 Tablet(s) Oral daily  thiamine 100 milliGRAM(s) Oral daily        HEMATOLOGIC  Meds: enoxaparin Injectable 30 milliGRAM(s) SubCutaneous every 24 hours    [x] VTE Prophylaxis                        8.7    6.22  )-----------( 221      ( 16 Sep 2022 01:20 )             29.0       Transfusion     [ ] PRBC   [ ] Platelets   [ ] FFP   [ ] Cryoprecipitate      INFECTIOUS DISEASES    RECENT CULTURES:  Specimen Source: .Body Fluid HEPATIC DRAINAGE  Date/Time: 09-15 @ 12:45  Culture Results:   Moderate Enterococcus faecalis  Gram Stain:   Numerous polymorphonuclear leukocytes seen per low power field  Few Gram positive cocci in pairs seen per oil power field  Organism: --    Meds: piperacillin/tazobactam IVPB.. 3.375 Gram(s) IV Intermittent every 8 hours        ENDOCRINE  CAPILLARY BLOOD GLUCOSE        Meds: allopurinol 100 milliGRAM(s) Oral at bedtime  fenofibrate Tablet 145 milliGRAM(s) Oral daily        ACCESS DEVICES:  [ ] Peripheral IV  [ ] Central Venous Line	[ ] R	[ ] L	[ ] IJ	[ ] Fem	[ ] SC	Placed:   [ ] Arterial Line		[ ] R	[ ] L	[ ] Fem	[ ] Rad	[ ] Ax	Placed:   [ ] PICC:					[ ] Mediport  [ ] Urinary Catheter, Date Placed:   [x] Necessity of urinary, arterial, and venous catheters discussed    OTHER MEDICATIONS:  chlorhexidine 4% Liquid 1 Application(s) Topical every 24 hours     SICU Daily Progress Note  =====================================================  Interval / Overnight Events: Weaned off BiPAP to nasal cannula.      HPI:  Patient is an 87 year old female with a PMHx of HTN, HLD GERD and gallbladder cancer (for which she underwent an ex-lap, open cholecystectomy, segment 4b / 5 hepatectomy and right colectomy due to fistula tract vs. metastasis on 8/19/22) who presented to [facility] with hypoxia and lethargy.  CT Abdomen / Pelvis performed at [facility] remarkable for a [id number] hepatic abscess at the gallbladder fossa. (14 Sep 2022 18:14)      PAST MEDICAL & SURGICAL HISTORY:  Hypertension  Hyperlipidemia  Gout  GERD (gastroesophageal reflux disease)  Insomnia  Other gallbladder disorder  H/O: hysterectomy  History of cholecystectomy  H/O right hemicolectomy  History of resection of liver      ALLERGIES:  penicillin (Unknown)    --------------------------------------------------------------------------------------    MEDICATIONS:    Neurologic Medications  acetaminophen     Tablet .. 650 milliGRAM(s) Oral every 6 hours PRN Temp greater or equal to 38C (100.4F), Mild Pain (1 - 3)  melatonin 3 milliGRAM(s) Oral at bedtime  mirtazapine 15 milliGRAM(s) Oral at bedtime    Respiratory Medications  albuterol/ipratropium for Nebulization 3 milliLiter(s) Nebulizer every 6 hours    Cardiovascular Medications  carvedilol 6.25 milliGRAM(s) Oral every 12 hours    Gastrointestinal Medications  multivitamin 1 Tablet(s) Oral daily  pantoprazole    Tablet 40 milliGRAM(s) Oral before breakfast  thiamine 100 milliGRAM(s) Oral daily    Hematologic/Oncologic Medications  enoxaparin Injectable 30 milliGRAM(s) SubCutaneous every 24 hours    Antimicrobial/Immunologic Medications  piperacillin/tazobactam IVPB.. 3.375 Gram(s) IV Intermittent every 8 hours    Endocrine/Metabolic Medications  allopurinol 100 milliGRAM(s) Oral at bedtime  fenofibrate Tablet 145 milliGRAM(s) Oral daily    Topical/Other Medications  chlorhexidine 4% Liquid 1 Application(s) Topical every 24 hours    --------------------------------------------------------------------------------------    VITAL SIGNS:  T(C): 36.1 (17 Sep 2022 08:00), Max: 36.7 (17 Sep 2022 00:00)  T(F): 97 (17 Sep 2022 08:00), Max: 98.1 (17 Sep 2022 00:00)  HR: 63 (17 Sep 2022 10:25) (63 - 86)  BP: 103/55 (17 Sep 2022 08:00) (103/55 - 145/50)  BP(mean): 65 (17 Sep 2022 08:00) (64 - 95)  RR: 26 (17 Sep 2022 08:00) (15 - 28)  SpO2: 100% (17 Sep 2022 10:25) (95% - 100%)    --------------------------------------------------------------------------------------    INS AND OUTS:    16 Sep 2022 07:01  -  17 Sep 2022 07:00  --------------------------------------------------------  IN:    dextrose 5% + sodium chloride 0.45%: 100 mL    IV PiggyBack: 325 mL    Oral Fluid: 440 mL  Total IN: 865 mL    OUT:    Drain (mL): 20 mL    Voided (mL): 400 mL  Total OUT: 420 mL    Total NET: 445 mL      17 Sep 2022 07:01  -  17 Sep 2022 13:39  --------------------------------------------------------  IN:    IV PiggyBack: 75 mL  Total IN: 75 mL    OUT:  Total OUT: 0 mL    Total NET: 75 mL    --------------------------------------------------------------------------------------    EXAM    NEUROLOGY  Exam: Normal, in no acute distress.    HEENT  Exam: Normocephalic, atraumatic.    RESPIRATORY  Exam: Normal expansion / effort.    CARDIOVASCULAR  Exam: S1, S2.  Regular rate and rhythm.    GI/NUTRITION  Exam: Abdomen soft, Non-tender, Non-distended.  Right IR drain in place.  Current Diet: Regular diet    MUSCULOSKELETAL  Exam: All extremities moving spontaneously without limitations.      METABOLIC / FLUIDS / ELECTROLYTES  multivitamin 1 Tablet(s) Oral daily  thiamine 100 milliGRAM(s) Oral daily      HEMATOLOGIC  [x] VTE Prophylaxis: enoxaparin Injectable 30 milliGRAM(s) SubCutaneous every 24 hours      INFECTIOUS DISEASE  Antimicrobials/Immunologic Medications:  piperacillin/tazobactam IVPB.. 3.375 Gram(s) IV Intermittent every 8 hours      TUBES / LINES / DRAINS  [x] Peripheral IV  [] Central Venous Line     	[] R	[] L	[] IJ	[] Fem	[] SC	Date Placed:   [] Arterial Line		[] R	[] L	[] Fem	[] Rad	[] Ax	Date Placed:   [] PICC		[] Midline		[] Mediport  [] Urinary Catheter		Date Placed:   [x] Necessity of urinary, arterial, and venous catheters discussed    --------------------------------------------------------------------------------------

## 2022-09-18 LAB
ALBUMIN SERPL ELPH-MCNC: 3.3 G/DL — SIGNIFICANT CHANGE UP (ref 3.3–5)
ALP SERPL-CCNC: 133 U/L — HIGH (ref 40–120)
ALT FLD-CCNC: 7 U/L — SIGNIFICANT CHANGE UP (ref 4–33)
ANION GAP SERPL CALC-SCNC: 8 MMOL/L — SIGNIFICANT CHANGE UP (ref 7–14)
AST SERPL-CCNC: 13 U/L — SIGNIFICANT CHANGE UP (ref 4–32)
BILIRUB SERPL-MCNC: 0.4 MG/DL — SIGNIFICANT CHANGE UP (ref 0.2–1.2)
BUN SERPL-MCNC: 6 MG/DL — LOW (ref 7–23)
CALCIUM SERPL-MCNC: 9.9 MG/DL — SIGNIFICANT CHANGE UP (ref 8.4–10.5)
CHLORIDE SERPL-SCNC: 99 MMOL/L — SIGNIFICANT CHANGE UP (ref 98–107)
CO2 SERPL-SCNC: 31 MMOL/L — SIGNIFICANT CHANGE UP (ref 22–31)
CREAT SERPL-MCNC: 0.71 MG/DL — SIGNIFICANT CHANGE UP (ref 0.5–1.3)
EGFR: 83 ML/MIN/1.73M2 — SIGNIFICANT CHANGE UP
GLUCOSE SERPL-MCNC: 173 MG/DL — HIGH (ref 70–99)
HCT VFR BLD CALC: 34.6 % — SIGNIFICANT CHANGE UP (ref 34.5–45)
HGB BLD-MCNC: 10.5 G/DL — LOW (ref 11.5–15.5)
MAGNESIUM SERPL-MCNC: 1.8 MG/DL — SIGNIFICANT CHANGE UP (ref 1.6–2.6)
MCHC RBC-ENTMCNC: 28.8 PG — SIGNIFICANT CHANGE UP (ref 27–34)
MCHC RBC-ENTMCNC: 30.3 GM/DL — LOW (ref 32–36)
MCV RBC AUTO: 94.8 FL — SIGNIFICANT CHANGE UP (ref 80–100)
NRBC # BLD: 0 /100 WBCS — SIGNIFICANT CHANGE UP (ref 0–0)
NRBC # FLD: 0 K/UL — SIGNIFICANT CHANGE UP (ref 0–0)
PHOSPHATE SERPL-MCNC: 2.6 MG/DL — SIGNIFICANT CHANGE UP (ref 2.5–4.5)
PLATELET # BLD AUTO: 266 K/UL — SIGNIFICANT CHANGE UP (ref 150–400)
POTASSIUM SERPL-MCNC: 3.7 MMOL/L — SIGNIFICANT CHANGE UP (ref 3.5–5.3)
POTASSIUM SERPL-SCNC: 3.7 MMOL/L — SIGNIFICANT CHANGE UP (ref 3.5–5.3)
PROT SERPL-MCNC: 6.8 G/DL — SIGNIFICANT CHANGE UP (ref 6–8.3)
RBC # BLD: 3.65 M/UL — LOW (ref 3.8–5.2)
RBC # FLD: 14.7 % — HIGH (ref 10.3–14.5)
SODIUM SERPL-SCNC: 138 MMOL/L — SIGNIFICANT CHANGE UP (ref 135–145)
WBC # BLD: 4.11 K/UL — SIGNIFICANT CHANGE UP (ref 3.8–10.5)
WBC # FLD AUTO: 4.11 K/UL — SIGNIFICANT CHANGE UP (ref 3.8–10.5)

## 2022-09-18 PROCEDURE — 99232 SBSQ HOSP IP/OBS MODERATE 35: CPT | Mod: 24

## 2022-09-18 RX ORDER — MAGNESIUM SULFATE 500 MG/ML
2 VIAL (ML) INJECTION ONCE
Refills: 0 | Status: DISCONTINUED | OUTPATIENT
Start: 2022-09-18 | End: 2022-09-18

## 2022-09-18 RX ORDER — POTASSIUM PHOSPHATE, MONOBASIC POTASSIUM PHOSPHATE, DIBASIC 236; 224 MG/ML; MG/ML
30 INJECTION, SOLUTION INTRAVENOUS ONCE
Refills: 0 | Status: COMPLETED | OUTPATIENT
Start: 2022-09-18 | End: 2022-09-18

## 2022-09-18 RX ORDER — POTASSIUM CHLORIDE 20 MEQ
40 PACKET (EA) ORAL ONCE
Refills: 0 | Status: COMPLETED | OUTPATIENT
Start: 2022-09-18 | End: 2022-09-18

## 2022-09-18 RX ORDER — AMLODIPINE BESYLATE 2.5 MG/1
2.5 TABLET ORAL DAILY
Refills: 0 | Status: DISCONTINUED | OUTPATIENT
Start: 2022-09-18 | End: 2022-09-29

## 2022-09-18 RX ORDER — MAGNESIUM SULFATE 500 MG/ML
2 VIAL (ML) INJECTION ONCE
Refills: 0 | Status: COMPLETED | OUTPATIENT
Start: 2022-09-18 | End: 2022-09-18

## 2022-09-18 RX ADMIN — Medication 40 MILLIEQUIVALENT(S): at 11:56

## 2022-09-18 RX ADMIN — AMLODIPINE BESYLATE 2.5 MILLIGRAM(S): 2.5 TABLET ORAL at 06:04

## 2022-09-18 RX ADMIN — Medication 3 MILLILITER(S): at 15:50

## 2022-09-18 RX ADMIN — Medication 3 MILLILITER(S): at 05:48

## 2022-09-18 RX ADMIN — PIPERACILLIN AND TAZOBACTAM 25 GRAM(S): 4; .5 INJECTION, POWDER, LYOPHILIZED, FOR SOLUTION INTRAVENOUS at 13:25

## 2022-09-18 RX ADMIN — Medication 100 MILLIGRAM(S): at 21:55

## 2022-09-18 RX ADMIN — Medication 3 MILLILITER(S): at 09:53

## 2022-09-18 RX ADMIN — Medication 25 GRAM(S): at 11:56

## 2022-09-18 RX ADMIN — PIPERACILLIN AND TAZOBACTAM 25 GRAM(S): 4; .5 INJECTION, POWDER, LYOPHILIZED, FOR SOLUTION INTRAVENOUS at 21:54

## 2022-09-18 RX ADMIN — Medication 100 MILLIGRAM(S): at 13:24

## 2022-09-18 RX ADMIN — Medication 3 MILLILITER(S): at 20:47

## 2022-09-18 RX ADMIN — Medication 3 MILLIGRAM(S): at 21:55

## 2022-09-18 RX ADMIN — PIPERACILLIN AND TAZOBACTAM 25 GRAM(S): 4; .5 INJECTION, POWDER, LYOPHILIZED, FOR SOLUTION INTRAVENOUS at 06:03

## 2022-09-18 RX ADMIN — CARVEDILOL PHOSPHATE 6.25 MILLIGRAM(S): 80 CAPSULE, EXTENDED RELEASE ORAL at 06:05

## 2022-09-18 RX ADMIN — CARVEDILOL PHOSPHATE 6.25 MILLIGRAM(S): 80 CAPSULE, EXTENDED RELEASE ORAL at 17:53

## 2022-09-18 RX ADMIN — POTASSIUM PHOSPHATE, MONOBASIC POTASSIUM PHOSPHATE, DIBASIC 83.33 MILLIMOLE(S): 236; 224 INJECTION, SOLUTION INTRAVENOUS at 13:25

## 2022-09-18 RX ADMIN — ENOXAPARIN SODIUM 30 MILLIGRAM(S): 100 INJECTION SUBCUTANEOUS at 13:24

## 2022-09-18 RX ADMIN — Medication 145 MILLIGRAM(S): at 13:23

## 2022-09-18 RX ADMIN — Medication 1 TABLET(S): at 13:24

## 2022-09-18 RX ADMIN — PANTOPRAZOLE SODIUM 40 MILLIGRAM(S): 20 TABLET, DELAYED RELEASE ORAL at 06:05

## 2022-09-18 RX ADMIN — MIRTAZAPINE 15 MILLIGRAM(S): 45 TABLET, ORALLY DISINTEGRATING ORAL at 21:55

## 2022-09-18 NOTE — PROGRESS NOTE ADULT - SUBJECTIVE AND OBJECTIVE BOX
TEAM Surgery Progress Note  Patient is a 87y old  Female who presents with a chief complaint of hepatic abscess (17 Sep 2022 08:54)      INTERVAL EVENTS: Patient downgraded from SICU o/n. Became hypoxic to 85% and started on 2L NC with improvements in sats. BPs elevated, started on Amlodipine 2.5 mg.  SUBJECTIVE: Patient seen and examined at bedside with surgical team, patient without complaints. Denies fever, chills, CP, SOB nausea, vomiting, abdominal pain.    REVIEW OF SYSTEMS:  Constitutional: No fevers or chills. No malaise or weakness.  EENT: No vision changes. No ear pain. No nasal congestion or rhinitis. No throat pain or dysphagia.  Respiratory: No cough, wheezing, or SOB. No hemoptysis.  Cardiovascular: No chest pain or palpitations.  Gastrointestinal: No abdominal pain. No nausea, vomiting, diarrhea or constipation. No hematochezia. No melena.  Genitourinary: No dysuria, hematuria, or frequency.  Neurologic: No numbness or tingling. No weakness.  Skin: No rashes or pruritus.     OBJECTIVE:    Vital Signs Last 24 Hrs  T(C): 36.9 (18 Sep 2022 00:11), Max: 36.9 (18 Sep 2022 00:11)  T(F): 98.5 (18 Sep 2022 00:11), Max: 98.5 (18 Sep 2022 00:11)  HR: 80 (18 Sep 2022 00:11) (60 - 95)  BP: 157/47 (18 Sep 2022 00:11) (103/55 - 166/62)  BP(mean): 79 (17 Sep 2022 20:00) (65 - 95)  RR: 20 (18 Sep 2022 00:11) (20 - 26)  SpO2: 100% (18 Sep 2022 00:11) (88% - 100%)    Parameters below as of 18 Sep 2022 00:11  Patient On (Oxygen Delivery Method): nasal cannula    I&O's Detail    16 Sep 2022 07:01  -  17 Sep 2022 07:00  --------------------------------------------------------  IN:    dextrose 5% + sodium chloride 0.45%: 100 mL    IV PiggyBack: 325 mL    Oral Fluid: 440 mL  Total IN: 865 mL    OUT:    Drain (mL): 20 mL    Voided (mL): 400 mL  Total OUT: 420 mL    Total NET: 445 mL      17 Sep 2022 07:01  -  18 Sep 2022 01:21  --------------------------------------------------------  IN:    IV PiggyBack: 175 mL  Total IN: 175 mL    OUT:  Total OUT: 0 mL    Total NET: 175 mL      MEDICATIONS  (STANDING):  albuterol/ipratropium for Nebulization 3 milliLiter(s) Nebulizer every 6 hours  allopurinol 100 milliGRAM(s) Oral at bedtime  amLODIPine   Tablet 2.5 milliGRAM(s) Oral daily  carvedilol 6.25 milliGRAM(s) Oral every 12 hours  chlorhexidine 4% Liquid 1 Application(s) Topical every 24 hours  enoxaparin Injectable 30 milliGRAM(s) SubCutaneous every 24 hours  fenofibrate Tablet 145 milliGRAM(s) Oral daily  melatonin 3 milliGRAM(s) Oral at bedtime  mirtazapine 15 milliGRAM(s) Oral at bedtime  multivitamin 1 Tablet(s) Oral daily  pantoprazole    Tablet 40 milliGRAM(s) Oral before breakfast  piperacillin/tazobactam IVPB.. 3.375 Gram(s) IV Intermittent every 8 hours  thiamine 100 milliGRAM(s) Oral daily    MEDICATIONS  (PRN):  acetaminophen     Tablet .. 650 milliGRAM(s) Oral every 6 hours PRN Temp greater or equal to 38C (100.4F), Mild Pain (1 - 3)      PHYSICAL EXAM:  Constitutional: A&Ox3, NAD  Respiratory: nasal cannula in place, unlabored  Abdomen: Soft, nondistended. No rebound or guarding.  Extremities: WWP, MCCANN spontaneously    LABS:                        9.7    4.21  )-----------( 255      ( 17 Sep 2022 05:05 )             32.4     09-17    138  |  100  |  6<L>  ----------------------------<  105<H>  3.9   |  30  |  0.70    Ca    9.5      17 Sep 2022 05:05  Phos  3.0     09-17  Mg     1.80     09-17                IMAGING:     TEAM Surgery Progress Note  Patient is a 87y old  Female who presents with a chief complaint of hepatic abscess (17 Sep 2022 08:54)      INTERVAL EVENTS: Patient downgraded from SICU o/n. Became hypoxic to 85% and started on 2L NC with improvements in sats. BPs elevated, started on Amlodipine 2.5 mg.    SUBJECTIVE: Patient seen and examined at bedside with surgical team, patient without complaints. Patient on RA, satting well. Denies fever, chills, CP, SOB nausea, vomiting, abdominal pain.      OBJECTIVE:    Vital Signs Last 24 Hrs  T(C): 36.9 (18 Sep 2022 00:11), Max: 36.9 (18 Sep 2022 00:11)  T(F): 98.5 (18 Sep 2022 00:11), Max: 98.5 (18 Sep 2022 00:11)  HR: 80 (18 Sep 2022 00:11) (60 - 95)  BP: 157/47 (18 Sep 2022 00:11) (103/55 - 166/62)  BP(mean): 79 (17 Sep 2022 20:00) (65 - 95)  RR: 20 (18 Sep 2022 00:11) (20 - 26)  SpO2: 100% (18 Sep 2022 00:11) (88% - 100%)    Parameters below as of 18 Sep 2022 00:11  Patient On (Oxygen Delivery Method): nasal cannula    I&O's Detail    16 Sep 2022 07:01  -  17 Sep 2022 07:00  --------------------------------------------------------  IN:    dextrose 5% + sodium chloride 0.45%: 100 mL    IV PiggyBack: 325 mL    Oral Fluid: 440 mL  Total IN: 865 mL    OUT:    Drain (mL): 20 mL    Voided (mL): 400 mL  Total OUT: 420 mL    Total NET: 445 mL      17 Sep 2022 07:01  -  18 Sep 2022 01:21  --------------------------------------------------------  IN:    IV PiggyBack: 175 mL  Total IN: 175 mL    OUT:  Total OUT: 0 mL    Total NET: 175 mL      MEDICATIONS  (STANDING):  albuterol/ipratropium for Nebulization 3 milliLiter(s) Nebulizer every 6 hours  allopurinol 100 milliGRAM(s) Oral at bedtime  amLODIPine   Tablet 2.5 milliGRAM(s) Oral daily  carvedilol 6.25 milliGRAM(s) Oral every 12 hours  chlorhexidine 4% Liquid 1 Application(s) Topical every 24 hours  enoxaparin Injectable 30 milliGRAM(s) SubCutaneous every 24 hours  fenofibrate Tablet 145 milliGRAM(s) Oral daily  melatonin 3 milliGRAM(s) Oral at bedtime  mirtazapine 15 milliGRAM(s) Oral at bedtime  multivitamin 1 Tablet(s) Oral daily  pantoprazole    Tablet 40 milliGRAM(s) Oral before breakfast  piperacillin/tazobactam IVPB.. 3.375 Gram(s) IV Intermittent every 8 hours  thiamine 100 milliGRAM(s) Oral daily    MEDICATIONS  (PRN):  acetaminophen     Tablet .. 650 milliGRAM(s) Oral every 6 hours PRN Temp greater or equal to 38C (100.4F), Mild Pain (1 - 3)      PHYSICAL EXAM:  Constitutional: A&Ox3, NAD  Respiratory: On room air, unlabored   Abdomen: Soft, nondistended. No rebound or guarding.  Extremities: WWP, MCCANN spontaneously    LABS:                        9.7    4.21  )-----------( 255      ( 17 Sep 2022 05:05 )             32.4     09-17    138  |  100  |  6<L>  ----------------------------<  105<H>  3.9   |  30  |  0.70    Ca    9.5      17 Sep 2022 05:05  Phos  3.0     09-17  Mg     1.80     09-17                IMAGING:

## 2022-09-18 NOTE — PROGRESS NOTE ADULT - ASSESSMENT
87 year old female with a PMHx of HTN, HLD GERD, and gallbladder cancer for which she underwent an ex lap, open cholecystectomy, segment 4b/5 hepatectomy, and right colectomy due to fistula tract vs metastasis on 08/19, who p/w to S ED with lethargy for 2 days and hypoxia to 40/50s % with workup remarkable for a [id number] hepatic abscess and b/l pleural effusions on CT scan, and hypoxia requiring BiPAP, transferred to [facility] for further management, admitted to SICU i/s/o persistent NIPPV, s/p IR drainage of collection. Now on floor, requiring 2L NC.    Plan  - Diet: reg  - Zosyn  - D51/2 @ 50  - Wean NC as tolerated    [id number] 87 year old female with a PMHx of HTN, HLD GERD, and gallbladder cancer for which she underwent an ex lap, open cholecystectomy, segment 4b/5 hepatectomy, and right colectomy due to fistula tract vs metastasis on 08/19, who p/w to S ED with lethargy for 2 days and hypoxia to 40/50s % with workup remarkable for a [id number] hepatic abscess and b/l pleural effusions on CT scan, and hypoxia requiring BiPAP, transferred to [facility] for further management, admitted to SICU i/s/o persistent NIPPV, s/p IR drainage of collection. Now on floor, recovering well.     Plan  - Diet: reg  - Zosyn, fluid cultures positive for enterococcus   - Continue home meds   - Wean NC as tolerated  - Dispo: KENISHA LEWIS Team Surgery   [id number]

## 2022-09-19 LAB
ANION GAP SERPL CALC-SCNC: 10 MMOL/L — SIGNIFICANT CHANGE UP (ref 7–14)
BUN SERPL-MCNC: 7 MG/DL — SIGNIFICANT CHANGE UP (ref 7–23)
CALCIUM SERPL-MCNC: 10.1 MG/DL — SIGNIFICANT CHANGE UP (ref 8.4–10.5)
CHLORIDE SERPL-SCNC: 101 MMOL/L — SIGNIFICANT CHANGE UP (ref 98–107)
CO2 SERPL-SCNC: 26 MMOL/L — SIGNIFICANT CHANGE UP (ref 22–31)
CREAT SERPL-MCNC: 0.89 MG/DL — SIGNIFICANT CHANGE UP (ref 0.5–1.3)
CULTURE RESULTS: SIGNIFICANT CHANGE UP
CULTURE RESULTS: SIGNIFICANT CHANGE UP
EGFR: 63 ML/MIN/1.73M2 — SIGNIFICANT CHANGE UP
GLUCOSE SERPL-MCNC: 97 MG/DL — SIGNIFICANT CHANGE UP (ref 70–99)
HCT VFR BLD CALC: 35.5 % — SIGNIFICANT CHANGE UP (ref 34.5–45)
HGB BLD-MCNC: 11.1 G/DL — LOW (ref 11.5–15.5)
MAGNESIUM SERPL-MCNC: 2.1 MG/DL — SIGNIFICANT CHANGE UP (ref 1.6–2.6)
MCHC RBC-ENTMCNC: 29.4 PG — SIGNIFICANT CHANGE UP (ref 27–34)
MCHC RBC-ENTMCNC: 31.3 GM/DL — LOW (ref 32–36)
MCV RBC AUTO: 93.9 FL — SIGNIFICANT CHANGE UP (ref 80–100)
NRBC # BLD: 0 /100 WBCS — SIGNIFICANT CHANGE UP (ref 0–0)
NRBC # FLD: 0 K/UL — SIGNIFICANT CHANGE UP (ref 0–0)
PHOSPHATE SERPL-MCNC: 3.2 MG/DL — SIGNIFICANT CHANGE UP (ref 2.5–4.5)
PLATELET # BLD AUTO: 277 K/UL — SIGNIFICANT CHANGE UP (ref 150–400)
POTASSIUM SERPL-MCNC: 4.1 MMOL/L — SIGNIFICANT CHANGE UP (ref 3.5–5.3)
POTASSIUM SERPL-SCNC: 4.1 MMOL/L — SIGNIFICANT CHANGE UP (ref 3.5–5.3)
RBC # BLD: 3.78 M/UL — LOW (ref 3.8–5.2)
RBC # FLD: 15 % — HIGH (ref 10.3–14.5)
SODIUM SERPL-SCNC: 137 MMOL/L — SIGNIFICANT CHANGE UP (ref 135–145)
SPECIMEN SOURCE: SIGNIFICANT CHANGE UP
SPECIMEN SOURCE: SIGNIFICANT CHANGE UP
WBC # BLD: 3.96 K/UL — SIGNIFICANT CHANGE UP (ref 3.8–10.5)
WBC # FLD AUTO: 3.96 K/UL — SIGNIFICANT CHANGE UP (ref 3.8–10.5)

## 2022-09-19 PROCEDURE — 99222 1ST HOSP IP/OBS MODERATE 55: CPT

## 2022-09-19 PROCEDURE — ZZZZZ: CPT

## 2022-09-19 PROCEDURE — 99232 SBSQ HOSP IP/OBS MODERATE 35: CPT | Mod: 24

## 2022-09-19 RX ADMIN — PIPERACILLIN AND TAZOBACTAM 25 GRAM(S): 4; .5 INJECTION, POWDER, LYOPHILIZED, FOR SOLUTION INTRAVENOUS at 14:04

## 2022-09-19 RX ADMIN — Medication 3 MILLILITER(S): at 03:54

## 2022-09-19 RX ADMIN — Medication 3 MILLIGRAM(S): at 23:40

## 2022-09-19 RX ADMIN — CARVEDILOL PHOSPHATE 6.25 MILLIGRAM(S): 80 CAPSULE, EXTENDED RELEASE ORAL at 23:43

## 2022-09-19 RX ADMIN — Medication 3 MILLILITER(S): at 19:21

## 2022-09-19 RX ADMIN — Medication 100 MILLIGRAM(S): at 23:40

## 2022-09-19 RX ADMIN — CHLORHEXIDINE GLUCONATE 1 APPLICATION(S): 213 SOLUTION TOPICAL at 05:46

## 2022-09-19 RX ADMIN — Medication 3 MILLILITER(S): at 10:47

## 2022-09-19 RX ADMIN — Medication 3 MILLILITER(S): at 15:32

## 2022-09-19 RX ADMIN — Medication 1 TABLET(S): at 12:51

## 2022-09-19 RX ADMIN — AMLODIPINE BESYLATE 2.5 MILLIGRAM(S): 2.5 TABLET ORAL at 05:25

## 2022-09-19 RX ADMIN — ENOXAPARIN SODIUM 30 MILLIGRAM(S): 100 INJECTION SUBCUTANEOUS at 14:05

## 2022-09-19 RX ADMIN — PIPERACILLIN AND TAZOBACTAM 25 GRAM(S): 4; .5 INJECTION, POWDER, LYOPHILIZED, FOR SOLUTION INTRAVENOUS at 23:41

## 2022-09-19 RX ADMIN — MIRTAZAPINE 15 MILLIGRAM(S): 45 TABLET, ORALLY DISINTEGRATING ORAL at 23:41

## 2022-09-19 RX ADMIN — PANTOPRAZOLE SODIUM 40 MILLIGRAM(S): 20 TABLET, DELAYED RELEASE ORAL at 05:36

## 2022-09-19 RX ADMIN — PIPERACILLIN AND TAZOBACTAM 25 GRAM(S): 4; .5 INJECTION, POWDER, LYOPHILIZED, FOR SOLUTION INTRAVENOUS at 05:24

## 2022-09-19 RX ADMIN — Medication 100 MILLIGRAM(S): at 12:52

## 2022-09-19 RX ADMIN — Medication 145 MILLIGRAM(S): at 12:51

## 2022-09-19 RX ADMIN — CARVEDILOL PHOSPHATE 6.25 MILLIGRAM(S): 80 CAPSULE, EXTENDED RELEASE ORAL at 05:25

## 2022-09-19 NOTE — PROGRESS NOTE ADULT - ASSESSMENT
87 year old female with a PMHx of HTN, HLD GERD, and gallbladder cancer for which she underwent an ex lap, open cholecystectomy, segment 4b/5 hepatectomy, and right colectomy due to fistula tract vs metastasis on 08/19, who p/w to S ED with lethargy for 2 days and hypoxia to 40/50s % with workup remarkable for a [id number] hepatic abscess and b/l pleural effusions on CT scan, and hypoxia requiring BiPAP, transferred to [facility] for further management, admitted to SICU i/s/o persistent NIPPV, s/p IR drainage of collection. Now on floor, recovering well.     Plan  - Diet: reg  - Zosyn, fluid cultures positive for enterococcus   - Continue home meds   - Wean NC as tolerated  - Dispo: KENISHA LEWIS Team Surgery   [id number]

## 2022-09-19 NOTE — CONSULT NOTE ADULT - SUBJECTIVE AND OBJECTIVE BOX
Patient is a 87y old  Female who presents with a chief complaint of hepatic abscess (19 Sep 2022 03:13)    HPI:  87 year old female with a PMHx of HTN, HLD GERD, and gallbladder cancer for which she underwent an ex lap, open cholecystectomy, segment 4b/5 hepatectomy, and right colectomy due to fistula tract vs metastasis on 08/19. Her postoperative course was complicated by a pleural effusion and hypoxic/hypercapnic respiratory failure which required diuresis, oxygen supplementation, chest PT and abx. She was discharged to Rehab on 09/07 and went home a few days ago. Daughter reports that patient was significantly weaker the past two days, lethargic looking and not able to eat or drink anything. On the morning of 09/14, daughter called EMS as her vitals were noted for hypoxia with O2 sat in the 40s/50s%. Patient was taken to [facility] where initial laboratory workup and imaging was obtained. Patient was placed on BiPAP immediately and has not been able to be weaned off. CT A/P at [facility] remarkable for a [id number] hepatic abscess at the gallbladder fossa. The patient has received a van/zosyn before she was transferred to [facility].    In the ED, patient on BiPAP 12/6/40% saturating 100%. She was afebrile, with increased work of breathing but comfortable on BiPAP and stable vitals. She was non-toxic appearing. Denies nausea, vomiting. Denies fever, chills    The CT scans and other labs from earlier can be found under the same on the [facility] chart. (MRN [id number])    Pathology positive for gallbladder invasive adenocarcinoma, moderately differentiated, negative portal lymph nodes, negative segment 4b/5, negative right colon and mesenteric lymph nodes, negative cystic duct margins, and negative additional sigmoid colon.    (14 Sep 2022 18:14)    Following admission, patient was able to be weaned off BiPAP in the SICU and transferred to the floors.                prior hospital charts reviewed [  ]  primary team notes reviewed [  ]  other consultant notes reviewed [  ]    PAST MEDICAL & SURGICAL HISTORY:  Hypertension      Hyperlipidemia      Gout      GERD (gastroesophageal reflux disease)      Insomnia      Other gallbladder disorder      H/O: hysterectomy      History of cholecystectomy      H/O right hemicolectomy      History of resection of liver          Allergies  penicillin (Unknown)    ANTIMICROBIALS (past 90 days)  MEDICATIONS  (STANDING):  piperacillin/tazobactam IVPB..   25 mL/Hr IV Intermittent (09-19-22 @ 14:04)   25 mL/Hr IV Intermittent (09-19-22 @ 05:24)   25 mL/Hr IV Intermittent (09-18-22 @ 21:54)   25 mL/Hr IV Intermittent (09-18-22 @ 13:25)   25 mL/Hr IV Intermittent (09-18-22 @ 06:03)   25 mL/Hr IV Intermittent (09-17-22 @ 23:14)   25 mL/Hr IV Intermittent (09-17-22 @ 14:00)   25 mL/Hr IV Intermittent (09-17-22 @ 05:58)   25 mL/Hr IV Intermittent (09-16-22 @ 22:17)   25 mL/Hr IV Intermittent (09-16-22 @ 14:17)   25 mL/Hr IV Intermittent (09-16-22 @ 05:47)   25 mL/Hr IV Intermittent (09-15-22 @ 22:14)   25 mL/Hr IV Intermittent (09-15-22 @ 13:34)   25 mL/Hr IV Intermittent (09-15-22 @ 06:01)   25 mL/Hr IV Intermittent (09-14-22 @ 22:24)        piperacillin/tazobactam IVPB.. 3.375 every 8 hours    MEDICATIONS  (STANDING):  acetaminophen     Tablet .. 650 every 6 hours PRN  albuterol/ipratropium for Nebulization 3 every 6 hours  allopurinol 100 at bedtime  amLODIPine   Tablet 2.5 daily  carvedilol 6.25 every 12 hours  enoxaparin Injectable 30 every 24 hours  fenofibrate Tablet 145 daily  melatonin 3 at bedtime  mirtazapine 15 at bedtime  pantoprazole    Tablet 40 before breakfast    SOCIAL HISTORY:       FAMILY HISTORY:  FH: hypertension (Father, Mother)      REVIEW OF SYSTEMS  [  ] ROS unobtainable because:    [  ] All other systems negative except as noted below:	    Constitutional:  [ ] fever [ ] chills  [ ] weight loss  [ ] weakness  Skin:  [ ] rash [ ] phlebitis	  Eyes: [ ] icterus [ ] pain  [ ] discharge	  ENMT: [ ] sore throat  [ ] thrush [ ] ulcers [ ] exudates  Respiratory: [ ] dyspnea [ ] hemoptysis [ ] cough [ ] sputum	  Cardiovascular:  [ ] chest pain [ ] palpitations [ ] edema	  Gastrointestinal:  [ ] nausea [ ] vomiting [ ] diarrhea [ ] constipation [ ] pain	  Genitourinary:  [ ] dysuria [ ] frequency [ ] hematuria [ ] discharge [ ] flank pain  [ ] incontinence  Musculoskeletal:  [ ] myalgias [ ] arthralgias [ ] arthritis  [ ] back pain  Neurological:  [ ] headache [ ] seizures  [ ] confusion/altered mental status  Psychiatric:  [ ] anxiety [ ] depression	  Hematology/Lymphatics:  [ ] lymphadenopathy  Endocrine:  [ ] adrenal [ ] thyroid  Allergic/Immunologic:	 [ ] transplant [ ] seasonal    Vital Signs Last 24 Hrs  T(F): 98 (09-19-22 @ 16:08), Max: 99.1 (09-14-22 @ 21:00)  Vital Signs Last 24 Hrs  HR: 72 (09-19-22 @ 16:08) (70 - 91)  BP: 117/53 (09-19-22 @ 16:08) (103/43 - 170/74)  RR: 18 (09-19-22 @ 16:08)  SpO2: 100% (09-19-22 @ 16:08) (93% - 100%)  Wt(kg): --    PHYSICAL EXAM:  Constitutional: non-toxic, no distress  HEAD/EYES: anicteric, no conjunctival injection  ENT:  supple, no thrush  Cardiovascular:   normal S1, S2, no murmur, no edema  Respiratory:  clear BS bilaterally, no wheezes, no rales  GI:  soft, non-tender, normal bowel sounds  :  no chery, no CVA tenderness  Musculoskeletal:  no synovitis, normal ROM  Neurologic: awake and alert, normal strength, no focal findings  Skin:  no rash, no erythema, no phlebitis  Heme/Onc: no lymphadenopathy   Psychiatric:  awake, alert, appropriate mood                            11.1   3.96  )-----------( 277      ( 19 Sep 2022 05:39 )             35.5   09-19    137  |  101  |  7   ----------------------------<  97  4.1   |  26  |  0.89    Ca    10.1      19 Sep 2022 05:39  Phos  3.2     09-19  Mg     2.10     09-19    TPro  6.8  /  Alb  3.3  /  TBili  0.4  /  DBili  x   /  AST  13  /  ALT  7   /  AlkPhos  133<H>  09-18    MICROBIOLOGY:  Culture - Fungal, Body Fluid (collected 15 Sep 2022 12:45)  Source: .Body Fluid HEPATIC DRAINAGE  Preliminary Report (16 Sep 2022 07:19):    Testing in progress    Culture - Body Fluid with Gram Stain (collected 15 Sep 2022 12:45)  Source: .Body Fluid HEPATIC DRAINAGE  Gram Stain (15 Sep 2022 23:10):    Numerous polymorphonuclear leukocytes seen per low power field    Few Gram positive cocci in pairs seen per oil power field  Preliminary Report (16 Sep 2022 16:04):    Moderate Enterococcus faecalis  Organism: Enterococcus faecalis (17 Sep 2022 08:21)  Organism: Enterococcus faecalis (17 Sep 2022 08:21)      -  Ampicillin: S <=2 Predicts results to ampicillin/sulbactam, amoxacillin-clavulanate and  piperacillin-tazobactam.      -  Tetra/Doxy: R >8      -  Vancomycin: S 2      Method Type: KELSEY                  RADIOLOGY:  imaging below personally reviewed and agree with findings Patient is a 87y old  Female who presents with a chief complaint of hepatic abscess (19 Sep 2022 03:13)    HPI:  87 year old female with a PMHx of HTN, HLD GERD, and gallbladder cancer for which she underwent an ex lap, open cholecystectomy, segment 4b/5 hepatectomy, and right colectomy due to fistula tract vs metastasis on 08/19. Her postoperative course was complicated by a pleural effusion and hypoxic/hypercapnic respiratory failure which required diuresis, oxygen supplementation, chest PT and abx. She was discharged to Rehab on 09/07 and went home a few days ago. Daughter reports that patient was significantly weaker the past two days, lethargic looking and not able to eat or drink anything. On the morning of 09/14, daughter called EMS as her vitals were noted for hypoxia with O2 sat in the 40s/50s%. Patient was taken to [facility] where initial laboratory workup and imaging was obtained. Patient was placed on BiPAP immediately and has not been able to be weaned off. CT A/P at [facility] remarkable for a [id number] hepatic abscess at the gallbladder fossa. The patient has received a van/zosyn before she was transferred to [facility].    In the ED, patient on BiPAP 12/6/40% saturating 100%. She was afebrile, with increased work of breathing but comfortable on BiPAP and stable vitals. She was non-toxic appearing. Denies nausea, vomiting. Denies fever, chills    The CT scans and other labs from earlier can be found under the same on the [facility] chart. (MRN [id number])    Pathology positive for gallbladder invasive adenocarcinoma, moderately differentiated, negative portal lymph nodes, negative segment 4b/5, negative right colon and mesenteric lymph nodes, negative cystic duct margins, and negative additional sigmoid colon.    (14 Sep 2022 18:14)    Following admission, patient was able to be weaned off BiPAP in the SICU and transferred to the floors. Ir drained following admission and is now growing E. faecalis sensitive to ampicillin. Patient today feels well. reports improvement to her breathing. Denies any new pain or discomfort.      prior hospital charts reviewed [ x ]  primary team notes reviewed [ x ]  other consultant notes reviewed [ x ]    PAST MEDICAL & SURGICAL HISTORY:  Hypertension      Hyperlipidemia      Gout      GERD (gastroesophageal reflux disease)      Insomnia      Other gallbladder disorder      H/O: hysterectomy      History of cholecystectomy      H/O right hemicolectomy      History of resection of liver          Allergies  penicillin (Unknown)    ANTIMICROBIALS (past 90 days)  MEDICATIONS  (STANDING):  piperacillin/tazobactam IVPB..   25 mL/Hr IV Intermittent (09-19-22 @ 14:04)   25 mL/Hr IV Intermittent (09-19-22 @ 05:24)   25 mL/Hr IV Intermittent (09-18-22 @ 21:54)   25 mL/Hr IV Intermittent (09-18-22 @ 13:25)   25 mL/Hr IV Intermittent (09-18-22 @ 06:03)   25 mL/Hr IV Intermittent (09-17-22 @ 23:14)   25 mL/Hr IV Intermittent (09-17-22 @ 14:00)   25 mL/Hr IV Intermittent (09-17-22 @ 05:58)   25 mL/Hr IV Intermittent (09-16-22 @ 22:17)   25 mL/Hr IV Intermittent (09-16-22 @ 14:17)   25 mL/Hr IV Intermittent (09-16-22 @ 05:47)   25 mL/Hr IV Intermittent (09-15-22 @ 22:14)   25 mL/Hr IV Intermittent (09-15-22 @ 13:34)   25 mL/Hr IV Intermittent (09-15-22 @ 06:01)   25 mL/Hr IV Intermittent (09-14-22 @ 22:24)        piperacillin/tazobactam IVPB.. 3.375 every 8 hours    MEDICATIONS  (STANDING):  acetaminophen     Tablet .. 650 every 6 hours PRN  albuterol/ipratropium for Nebulization 3 every 6 hours  allopurinol 100 at bedtime  amLODIPine   Tablet 2.5 daily  carvedilol 6.25 every 12 hours  enoxaparin Injectable 30 every 24 hours  fenofibrate Tablet 145 daily  melatonin 3 at bedtime  mirtazapine 15 at bedtime  pantoprazole    Tablet 40 before breakfast    SOCIAL HISTORY:     · Substance use	No  · Social History (marital status, living situation, occupation, and sexual history)	lives with daughter    FAMILY HISTORY:  FH: hypertension (Father, Mother)      REVIEW OF SYSTEMS  [  ] ROS unobtainable because:    [ x ] All other systems negative except as noted below:	    Constitutional:  [ ] fever [ ] chills  [ ] weight loss  [ ] weakness  Skin:  [ ] rash [ ] phlebitis	  Eyes: [ ] icterus [ ] pain  [ ] discharge	  ENMT: [ ] sore throat  [ ] thrush [ ] ulcers [ ] exudates  Respiratory: [x ] dyspnea [ ] hemoptysis [ ] cough [ ] sputum	  Cardiovascular:  [ ] chest pain [ ] palpitations [ ] edema	  Gastrointestinal:  [ ] nausea [ ] vomiting [ ] diarrhea [ ] constipation [ ] pain	  Genitourinary:  [ ] dysuria [ ] frequency [ ] hematuria [ ] discharge [ ] flank pain  [ ] incontinence  Musculoskeletal:  [ ] myalgias [ ] arthralgias [ ] arthritis  [ ] back pain  Neurological:  [ ] headache [ ] seizures  [ ] confusion/altered mental status  Psychiatric:  [ ] anxiety [ ] depression	  Hematology/Lymphatics:  [ ] lymphadenopathy  Endocrine:  [ ] adrenal [ ] thyroid  Allergic/Immunologic:	 [ ] transplant [ ] seasonal    Vital Signs Last 24 Hrs  T(F): 98 (09-19-22 @ 16:08), Max: 99.1 (09-14-22 @ 21:00)  Vital Signs Last 24 Hrs  HR: 72 (09-19-22 @ 16:08) (70 - 91)  BP: 117/53 (09-19-22 @ 16:08) (103/43 - 170/74)  RR: 18 (09-19-22 @ 16:08)  SpO2: 100% (09-19-22 @ 16:08) (93% - 100%)  Wt(kg): --    PHYSICAL EXAM:  Constitutional: non-toxic, no distress  HEAD/EYES: anicteric, no conjunctival injection  ENT:  supple, no thrush  Cardiovascular:   normal S1, S2, no murmur, no edema  Respiratory:  clear BS bilaterally, no wheezes, no rales  GI:  soft, non-tender, normal bowel sounds  Musculoskeletal:  no synovitis, normal ROM  Neurologic: awake and alert, normal strength, no focal findings  Skin:  no rash, no erythema, no phlebitis                              11.1   3.96  )-----------( 277      ( 19 Sep 2022 05:39 )             35.5   09-19    137  |  101  |  7   ----------------------------<  97  4.1   |  26  |  0.89    Ca    10.1      19 Sep 2022 05:39  Phos  3.2     09-19  Mg     2.10     09-19    TPro  6.8  /  Alb  3.3  /  TBili  0.4  /  DBili  x   /  AST  13  /  ALT  7   /  AlkPhos  133<H>  09-18    MICROBIOLOGY:  Culture - Fungal, Body Fluid (collected 15 Sep 2022 12:45)  Source: .Body Fluid HEPATIC DRAINAGE  Preliminary Report (16 Sep 2022 07:19):    Testing in progress    Culture - Body Fluid with Gram Stain (collected 15 Sep 2022 12:45)  Source: .Body Fluid HEPATIC DRAINAGE  Gram Stain (15 Sep 2022 23:10):    Numerous polymorphonuclear leukocytes seen per low power field    Few Gram positive cocci in pairs seen per oil power field  Preliminary Report (16 Sep 2022 16:04):    Moderate Enterococcus faecalis  Organism: Enterococcus faecalis (17 Sep 2022 08:21)  Organism: Enterococcus faecalis (17 Sep 2022 08:21)      -  Ampicillin: S <=2 Predicts results to ampicillin/sulbactam, amoxacillin-clavulanate and  piperacillin-tazobactam.      -  Tetra/Doxy: R >8      -  Vancomycin: S 2      Method Type: KELSEY                  RADIOLOGY:  imaging below personally reviewed and agree with findings

## 2022-09-19 NOTE — CONSULT NOTE ADULT - ASSESSMENT
ASSESSMENT:  87y Female with HTN bladder cancer and gallbladder cancer s/p cholecystectomy partial hepatectomy (4b/5), RT hemicolectomy presenting from [facility] for hypoxia requiring BiPAP and found to have [id number] hepatic abscess on CT scan.     PLAN: Admit to SICU for BiPAP management    Neurologic:   - GAIL  - Pain control PRN Tylenol    Respiratory:   - Current BiPAP: IPAP 12, EPAP 6, FiO2 40%, saturating 100%  - Wean BiPAP as able  - Maintain SpO2 >92%  - Monitor daily CXR/ R pleural effusion    Cardiovascular:   - Perfusing well; non-tachycardic, normotensive  - Lactate 1  - Continue BB with lopressor 5mg q6h with hold parameters in place of home carvedilol while NPO  - Hold home losartan, amlodipine    Gastrointestinal/Nutrition:   - NPO  - Protonix IV    Renal/Genitourinary:   - Monitor I+Os  - Electrolytes stable  - sCr normal    Hematologic:   - H/H stable  - VTE ppxL: SQH, will hold in AM for IR procedure    Infectious Disease:   - Zosyn for hepatic abscess (tolerated Zosyn at [facility])  - WBC normal (7.04)  - Monitor for fevers  - IR drainage of hepatic abscess tomorrow    Tubes/Lines/Drains:   - PIVs    Endocrine:   - GAIL  - Monitor BG on BMP    Disposition: SICU    --------------------------------------------------------------------------------------    Critical Care Diagnoses: Acute hypoxic respiratory failure    
Assessment: 87y Female transferred from [facility] with respiratory distress and found to have a gas and fluid collection in the GB fossa.    Plan: IR to perform GB fossa/hepatic abscess drainage tomorrow, 9/15  -Please place order for IR Procedure, approving attending Dr. Dempsey  -c/w plan for SICU admission for further work up of respiratory failure  -NPO past midnight prior to procedure  -hold anticoagulants  -AM CBC, BMP, and coags  -COVID PCR within 5 days of procedure  -discussed with surgical team    Jamey Viveros MD  PGY-V, Interventional Radiology    -Available on [employer] TEAMS for all non-urgent questions  -Emergent issues: [facility]-p.[phone]; [facility]-[phone] ([phone])  -Non-emergent consults: Please place a [facility] order "Consult-Interventional Radiology" with an appropriate callback number  -Scheduling questions: [facility]: [phone]; [facility]: [phone]  -Clinic/Outpatient booking: [facility]: [phone]; [facility]: [phone]
87 year old female with a PMHx of HTN, HLD GERD, and gallbladder cancer for which she underwent an ex lap, open cholecystectomy, segment 4b/5 hepatectomy, and right colectomy due to fistula tract vs metastasis on 08/19 who was admitted to [facility] for hypoxia and then transferred to [facility] for further managment in the SICU. Imaging reveals hepatic abscess.    #Abnormal imaging of the abdomen  #Hepatic abscess  Drained by IR on admission to [facility]  Enterococcus faecalis, S to ampicillin    #Penicillin allergy  Denies having allergy, not sure why it is listed  Tolerating piperacillin/tazobactam    #Acute hypoxic respiratory failure, resolved    Recommendations  Discontinue piperacillin/tazobactam  Start ampicillin/sulbactam 3g q6hr  Anticipating 4 weeks of antibiotics  Plan to transition to PO if continues to be clinically stable  Follow fever curve and WBC count    Salvatore Fish MD  Division of Infectious Diseases  Available on Teams

## 2022-09-19 NOTE — PROGRESS NOTE ADULT - SUBJECTIVE AND OBJECTIVE BOX
TEAM Surgery Progress Note  Patient is a 87y old  Female who presents with a chief complaint of hepatic abscess (17 Sep 2022 08:54)      INTERVAL EVENTS: BRUCE  SUBJECTIVE: Patient seen and examined at bedside with surgical team, patient without complaints. Patient on RA, satting well. Denies fever, chills, CP, SOB nausea, vomiting, abdominal pain.      OBJECTIVE:    Vital Signs Last 24 Hrs  T(C): 36.9 (18 Sep 2022 00:11), Max: 36.9 (18 Sep 2022 00:11)  T(F): 98.5 (18 Sep 2022 00:11), Max: 98.5 (18 Sep 2022 00:11)  HR: 80 (18 Sep 2022 00:11) (60 - 95)  BP: 157/47 (18 Sep 2022 00:11) (103/55 - 166/62)  BP(mean): 79 (17 Sep 2022 20:00) (65 - 95)  RR: 20 (18 Sep 2022 00:11) (20 - 26)  SpO2: 100% (18 Sep 2022 00:11) (88% - 100%)    Parameters below as of 18 Sep 2022 00:11  Patient On (Oxygen Delivery Method): nasal cannula    I&O's Detail    16 Sep 2022 07:01  -  17 Sep 2022 07:00  --------------------------------------------------------  IN:    dextrose 5% + sodium chloride 0.45%: 100 mL    IV PiggyBack: 325 mL    Oral Fluid: 440 mL  Total IN: 865 mL    OUT:    Drain (mL): 20 mL    Voided (mL): 400 mL  Total OUT: 420 mL    Total NET: 445 mL      17 Sep 2022 07:01  -  18 Sep 2022 01:21  --------------------------------------------------------  IN:    IV PiggyBack: 175 mL  Total IN: 175 mL    OUT:  Total OUT: 0 mL    Total NET: 175 mL      MEDICATIONS  (STANDING):  albuterol/ipratropium for Nebulization 3 milliLiter(s) Nebulizer every 6 hours  allopurinol 100 milliGRAM(s) Oral at bedtime  amLODIPine   Tablet 2.5 milliGRAM(s) Oral daily  carvedilol 6.25 milliGRAM(s) Oral every 12 hours  chlorhexidine 4% Liquid 1 Application(s) Topical every 24 hours  enoxaparin Injectable 30 milliGRAM(s) SubCutaneous every 24 hours  fenofibrate Tablet 145 milliGRAM(s) Oral daily  melatonin 3 milliGRAM(s) Oral at bedtime  mirtazapine 15 milliGRAM(s) Oral at bedtime  multivitamin 1 Tablet(s) Oral daily  pantoprazole    Tablet 40 milliGRAM(s) Oral before breakfast  piperacillin/tazobactam IVPB.. 3.375 Gram(s) IV Intermittent every 8 hours  thiamine 100 milliGRAM(s) Oral daily    MEDICATIONS  (PRN):  acetaminophen     Tablet .. 650 milliGRAM(s) Oral every 6 hours PRN Temp greater or equal to 38C (100.4F), Mild Pain (1 - 3)      PHYSICAL EXAM:  Constitutional: A&Ox3, NAD  Respiratory: On room air, unlabored   Abdomen: Soft, nondistended. No rebound or guarding.  Extremities: WWP, MCCANN spontaneously    LABS:                        9.7    4.21  )-----------( 255      ( 17 Sep 2022 05:05 )             32.4     09-17    138  |  100  |  6<L>  ----------------------------<  105<H>  3.9   |  30  |  0.70    Ca    9.5      17 Sep 2022 05:05  Phos  3.0     09-17  Mg     1.80     09-17                IMAGING:

## 2022-09-19 NOTE — PROGRESS NOTE ADULT - SUBJECTIVE AND OBJECTIVE BOX
Vital Signs Last 24 Hrs  T(C): 36.7 (19 Sep 2022 16:08), Max: 36.9 (19 Sep 2022 09:08)  T(F): 98 (19 Sep 2022 16:08), Max: 98.4 (19 Sep 2022 09:08)  HR: 72 (19 Sep 2022 16:08) (70 - 91)  BP: 117/53 (19 Sep 2022 16:08) (103/43 - 170/74)  BP(mean): --  RR: 18 (19 Sep 2022 16:08) (17 - 18)  SpO2: 100% (19 Sep 2022 16:08) (93% - 100%)    Parameters below as of 19 Sep 2022 16:08  Patient On (Oxygen Delivery Method): room air        I&O's Detail    18 Sep 2022 07:01  -  19 Sep 2022 07:00  --------------------------------------------------------  IN:  Total IN: 0 mL    OUT:    Drain (mL): 5 mL    Voided (mL): 700 mL  Total OUT: 705 mL    Total NET: -705 mL      19 Sep 2022 07:01  -  19 Sep 2022 19:31  --------------------------------------------------------  IN:  Total IN: 0 mL    OUT:    Drain (mL): 15 mL  Total OUT: 15 mL    Total NET: -15 mL                                11.1   3.96  )-----------( 277      ( 19 Sep 2022 05:39 )             35.5       09-19    137  |  101  |  7   ----------------------------<  97  4.1   |  26  |  0.89    Ca    10.1      19 Sep 2022 05:39  Phos  3.2     09-19  Mg     2.10     09-19    TPro  6.8  /  Alb  3.3  /  TBili  0.4  /  DBili  x   /  AST  13  /  ALT  7   /  AlkPhos  133<H>  09-18    patient doing well  ID note apprciated          PLAN:  Repeat CT later this week  Transition to PO antibiotics per ID

## 2022-09-20 LAB
ANION GAP SERPL CALC-SCNC: 9 MMOL/L — SIGNIFICANT CHANGE UP (ref 7–14)
BUN SERPL-MCNC: 12 MG/DL — SIGNIFICANT CHANGE UP (ref 7–23)
CALCIUM SERPL-MCNC: 9.6 MG/DL — SIGNIFICANT CHANGE UP (ref 8.4–10.5)
CHLORIDE SERPL-SCNC: 103 MMOL/L — SIGNIFICANT CHANGE UP (ref 98–107)
CO2 SERPL-SCNC: 27 MMOL/L — SIGNIFICANT CHANGE UP (ref 22–31)
CREAT SERPL-MCNC: 1.16 MG/DL — SIGNIFICANT CHANGE UP (ref 0.5–1.3)
CULTURE RESULTS: SIGNIFICANT CHANGE UP
EGFR: 46 ML/MIN/1.73M2 — LOW
GLUCOSE SERPL-MCNC: 94 MG/DL — SIGNIFICANT CHANGE UP (ref 70–99)
HCT VFR BLD CALC: 30.7 % — LOW (ref 34.5–45)
HGB BLD-MCNC: 9.6 G/DL — LOW (ref 11.5–15.5)
MAGNESIUM SERPL-MCNC: 1.9 MG/DL — SIGNIFICANT CHANGE UP (ref 1.6–2.6)
MCHC RBC-ENTMCNC: 29.4 PG — SIGNIFICANT CHANGE UP (ref 27–34)
MCHC RBC-ENTMCNC: 31.3 GM/DL — LOW (ref 32–36)
MCV RBC AUTO: 93.9 FL — SIGNIFICANT CHANGE UP (ref 80–100)
NRBC # BLD: 0 /100 WBCS — SIGNIFICANT CHANGE UP (ref 0–0)
NRBC # FLD: 0 K/UL — SIGNIFICANT CHANGE UP (ref 0–0)
ORGANISM # SPEC MICROSCOPIC CNT: SIGNIFICANT CHANGE UP
ORGANISM # SPEC MICROSCOPIC CNT: SIGNIFICANT CHANGE UP
PHOSPHATE SERPL-MCNC: 3.5 MG/DL — SIGNIFICANT CHANGE UP (ref 2.5–4.5)
PLATELET # BLD AUTO: 254 K/UL — SIGNIFICANT CHANGE UP (ref 150–400)
POTASSIUM SERPL-MCNC: 4.4 MMOL/L — SIGNIFICANT CHANGE UP (ref 3.5–5.3)
POTASSIUM SERPL-SCNC: 4.4 MMOL/L — SIGNIFICANT CHANGE UP (ref 3.5–5.3)
RBC # BLD: 3.27 M/UL — LOW (ref 3.8–5.2)
RBC # FLD: 14.9 % — HIGH (ref 10.3–14.5)
SODIUM SERPL-SCNC: 139 MMOL/L — SIGNIFICANT CHANGE UP (ref 135–145)
SPECIMEN SOURCE: SIGNIFICANT CHANGE UP
WBC # BLD: 4.33 K/UL — SIGNIFICANT CHANGE UP (ref 3.8–10.5)
WBC # FLD AUTO: 4.33 K/UL — SIGNIFICANT CHANGE UP (ref 3.8–10.5)

## 2022-09-20 PROCEDURE — 99232 SBSQ HOSP IP/OBS MODERATE 35: CPT

## 2022-09-20 PROCEDURE — 99232 SBSQ HOSP IP/OBS MODERATE 35: CPT | Mod: 24

## 2022-09-20 PROCEDURE — ZZZZZ: CPT

## 2022-09-20 RX ORDER — VANCOMYCIN HCL 1 G
750 VIAL (EA) INTRAVENOUS EVERY 24 HOURS
Refills: 0 | Status: DISCONTINUED | OUTPATIENT
Start: 2022-09-20 | End: 2022-09-25

## 2022-09-20 RX ORDER — AMPICILLIN SODIUM AND SULBACTAM SODIUM 250; 125 MG/ML; MG/ML
3 INJECTION, POWDER, FOR SUSPENSION INTRAMUSCULAR; INTRAVENOUS EVERY 6 HOURS
Refills: 0 | Status: DISCONTINUED | OUTPATIENT
Start: 2022-09-20 | End: 2022-09-20

## 2022-09-20 RX ADMIN — Medication 1 TABLET(S): at 12:47

## 2022-09-20 RX ADMIN — CHLORHEXIDINE GLUCONATE 1 APPLICATION(S): 213 SOLUTION TOPICAL at 07:55

## 2022-09-20 RX ADMIN — PANTOPRAZOLE SODIUM 40 MILLIGRAM(S): 20 TABLET, DELAYED RELEASE ORAL at 06:22

## 2022-09-20 RX ADMIN — Medication 145 MILLIGRAM(S): at 12:47

## 2022-09-20 RX ADMIN — Medication 100 MILLIGRAM(S): at 23:10

## 2022-09-20 RX ADMIN — AMPICILLIN SODIUM AND SULBACTAM SODIUM 200 GRAM(S): 250; 125 INJECTION, POWDER, FOR SUSPENSION INTRAMUSCULAR; INTRAVENOUS at 12:44

## 2022-09-20 RX ADMIN — AMPICILLIN SODIUM AND SULBACTAM SODIUM 200 GRAM(S): 250; 125 INJECTION, POWDER, FOR SUSPENSION INTRAMUSCULAR; INTRAVENOUS at 06:28

## 2022-09-20 RX ADMIN — Medication 3 MILLIGRAM(S): at 23:10

## 2022-09-20 RX ADMIN — CARVEDILOL PHOSPHATE 6.25 MILLIGRAM(S): 80 CAPSULE, EXTENDED RELEASE ORAL at 19:53

## 2022-09-20 RX ADMIN — Medication 3 MILLILITER(S): at 03:20

## 2022-09-20 RX ADMIN — Medication 3 MILLILITER(S): at 21:51

## 2022-09-20 RX ADMIN — ENOXAPARIN SODIUM 30 MILLIGRAM(S): 100 INJECTION SUBCUTANEOUS at 13:18

## 2022-09-20 RX ADMIN — Medication 650 MILLIGRAM(S): at 17:00

## 2022-09-20 RX ADMIN — MIRTAZAPINE 15 MILLIGRAM(S): 45 TABLET, ORALLY DISINTEGRATING ORAL at 23:11

## 2022-09-20 RX ADMIN — Medication 3 MILLILITER(S): at 15:42

## 2022-09-20 RX ADMIN — CARVEDILOL PHOSPHATE 6.25 MILLIGRAM(S): 80 CAPSULE, EXTENDED RELEASE ORAL at 06:23

## 2022-09-20 RX ADMIN — Medication 650 MILLIGRAM(S): at 16:38

## 2022-09-20 RX ADMIN — Medication 3 MILLILITER(S): at 09:32

## 2022-09-20 RX ADMIN — Medication 250 MILLIGRAM(S): at 23:51

## 2022-09-20 RX ADMIN — Medication 100 MILLIGRAM(S): at 12:46

## 2022-09-20 NOTE — PROGRESS NOTE ADULT - SUBJECTIVE AND OBJECTIVE BOX
Follow Up:  Hepatic abscess    Interval History/ROS:  Overnight: no acute events.    Patient seen and examined at bedside. Appears comfortable sitting in her chair. Reports development of erythema and itchiness where blood pressure cuff was placed. Denies any other new symptoms    Allergies        ANTIMICROBIALS:  ampicillin/sulbactam  IVPB 3 every 6 hours      OTHER MEDS:  MEDICATIONS  (STANDING):  acetaminophen     Tablet .. 650 every 6 hours PRN  albuterol/ipratropium for Nebulization 3 every 6 hours  allopurinol 100 at bedtime  amLODIPine   Tablet 2.5 daily  carvedilol 6.25 every 12 hours  enoxaparin Injectable 30 every 24 hours  fenofibrate Tablet 145 daily  melatonin 3 at bedtime  mirtazapine 15 at bedtime  pantoprazole    Tablet 40 before breakfast      Vital Signs Last 24 Hrs  T(C): 36.9 (20 Sep 2022 18:55), Max: 37.1 (20 Sep 2022 04:00)  T(F): 98.4 (20 Sep 2022 18:55), Max: 98.7 (20 Sep 2022 04:00)  HR: 79 (20 Sep 2022 18:55) (65 - 83)  BP: 119/55 (20 Sep 2022 18:55) (105/47 - 124/53)  BP(mean): --  RR: 18 (20 Sep 2022 18:55) (17 - 18)  SpO2: 98% (20 Sep 2022 18:55) (94% - 100%)    Parameters below as of 20 Sep 2022 18:55  Patient On (Oxygen Delivery Method): nasal cannula  O2 Flow (L/min): 2      PHYSICAL EXAM:  Constitutional: non-toxic, no distress  HEAD/EYES: anicteric, no conjunctival injection  ENT:  supple, no thrush  Cardiovascular:   normal S1, S2, no murmur, no edema  Respiratory:  clear BS bilaterally, no wheezes, no rales  GI:  soft, non-tender, normal bowel sounds  Musculoskeletal:  no synovitis, normal ROM  Neurologic: awake and alert, normal strength, no focal findings  Skin:  erythema on left arm, no phlebitis                                  9.6    4.33  )-----------( 254      ( 20 Sep 2022 06:33 )             30.7       09-20    139  |  103  |  12  ----------------------------<  94  4.4   |  27  |  1.16    Ca    9.6      20 Sep 2022 06:33  Phos  3.5     09-20  Mg     1.90     09-20            MICROBIOLOGY:  v                  RADIOLOGY:

## 2022-09-20 NOTE — PROGRESS NOTE ADULT - SUBJECTIVE AND OBJECTIVE BOX
TEAM Surgery Progress Note  Patient is a 87y old  Female who presents with a chief complaint of hepatic abscess (17 Sep 2022 08:54)      INTERVAL EVENTS: BRUCE  SUBJECTIVE: Patient seen and examined at bedside with surgical team, patient without complaints. Patient on RA, satting well. Denies fever, chills, CP, SOB nausea, vomiting, abdominal pain.      OBJECTIVE:    Vital Signs Last 24 Hrs  T(C): 36.9 (18 Sep 2022 00:11), Max: 36.9 (18 Sep 2022 00:11)  T(F): 98.5 (18 Sep 2022 00:11), Max: 98.5 (18 Sep 2022 00:11)  HR: 80 (18 Sep 2022 00:11) (60 - 95)  BP: 157/47 (18 Sep 2022 00:11) (103/55 - 166/62)  BP(mean): 79 (17 Sep 2022 20:00) (65 - 95)  RR: 20 (18 Sep 2022 00:11) (20 - 26)  SpO2: 100% (18 Sep 2022 00:11) (88% - 100%)    Parameters below as of 18 Sep 2022 00:11  Patient On (Oxygen Delivery Method): nasal cannula    I&O's Detail    16 Sep 2022 07:01  -  17 Sep 2022 07:00  --------------------------------------------------------  IN:    dextrose 5% + sodium chloride 0.45%: 100 mL    IV PiggyBack: 325 mL    Oral Fluid: 440 mL  Total IN: 865 mL    OUT:    Drain (mL): 20 mL    Voided (mL): 400 mL  Total OUT: 420 mL    Total NET: 445 mL      17 Sep 2022 07:01  -  18 Sep 2022 01:21  --------------------------------------------------------  IN:    IV PiggyBack: 175 mL  Total IN: 175 mL    OUT:  Total OUT: 0 mL    Total NET: 175 mL      MEDICATIONS  (STANDING):  albuterol/ipratropium for Nebulization 3 milliLiter(s) Nebulizer every 6 hours  allopurinol 100 milliGRAM(s) Oral at bedtime  amLODIPine   Tablet 2.5 milliGRAM(s) Oral daily  carvedilol 6.25 milliGRAM(s) Oral every 12 hours  chlorhexidine 4% Liquid 1 Application(s) Topical every 24 hours  enoxaparin Injectable 30 milliGRAM(s) SubCutaneous every 24 hours  fenofibrate Tablet 145 milliGRAM(s) Oral daily  melatonin 3 milliGRAM(s) Oral at bedtime  mirtazapine 15 milliGRAM(s) Oral at bedtime  multivitamin 1 Tablet(s) Oral daily  pantoprazole    Tablet 40 milliGRAM(s) Oral before breakfast  piperacillin/tazobactam IVPB.. 3.375 Gram(s) IV Intermittent every 8 hours  thiamine 100 milliGRAM(s) Oral daily    MEDICATIONS  (PRN):  acetaminophen     Tablet .. 650 milliGRAM(s) Oral every 6 hours PRN Temp greater or equal to 38C (100.4F), Mild Pain (1 - 3)      PHYSICAL EXAM:  Constitutional: A&Ox3, NAD  Respiratory: On room air, unlabored   Abdomen: Soft, nondistended. No rebound or guarding.  Extremities: WWP, MCCANN spontaneously    LABS:                        9.7    4.21  )-----------( 255      ( 17 Sep 2022 05:05 )             32.4     09-17    138  |  100  |  6<L>  ----------------------------<  105<H>  3.9   |  30  |  0.70    Ca    9.5      17 Sep 2022 05:05  Phos  3.0     09-17  Mg     1.80     09-17                IMAGING:     TEAM Surgery Progress Note    INTERVAL EVENTS: BRUCE  SUBJECTIVE: Patient seen and examined at bedside with surgical team, patient without complaints.        OBJECTIVE:    Vital Signs Last 24 Hrs  T(C): 36.9 (20 Sep 2022 07:50), Max: 37.1 (20 Sep 2022 04:00)  T(F): 98.5 (20 Sep 2022 07:50), Max: 98.7 (20 Sep 2022 04:00)  HR: 72 (20 Sep 2022 09:40) (71 - 83)  BP: 115/61 (20 Sep 2022 07:50) (103/43 - 124/53)  BP(mean): --  RR: 18 (20 Sep 2022 07:50) (17 - 18)  SpO2: 96% (20 Sep 2022 07:50) (94% - 100%)    Parameters below as of 20 Sep 2022 09:40  Patient On (Oxygen Delivery Method): room air      19 Sep 2022 07:01  -  20 Sep 2022 07:00  --------------------------------------------------------  IN:    Oral Fluid: 150 mL  Total IN: 150 mL    OUT:    Drain (mL): 35 mL    Voided (mL): 300 mL  Total OUT: 335 mL    Total NET: -185 mL          MEDICATIONS  (STANDING):  albuterol/ipratropium for Nebulization 3 milliLiter(s) Nebulizer every 6 hours  allopurinol 100 milliGRAM(s) Oral at bedtime  amLODIPine   Tablet 2.5 milliGRAM(s) Oral daily  carvedilol 6.25 milliGRAM(s) Oral every 12 hours  chlorhexidine 4% Liquid 1 Application(s) Topical every 24 hours  enoxaparin Injectable 30 milliGRAM(s) SubCutaneous every 24 hours  fenofibrate Tablet 145 milliGRAM(s) Oral daily  melatonin 3 milliGRAM(s) Oral at bedtime  mirtazapine 15 milliGRAM(s) Oral at bedtime  multivitamin 1 Tablet(s) Oral daily  pantoprazole    Tablet 40 milliGRAM(s) Oral before breakfast  piperacillin/tazobactam IVPB.. 3.375 Gram(s) IV Intermittent every 8 hours  thiamine 100 milliGRAM(s) Oral daily    MEDICATIONS  (PRN):  acetaminophen     Tablet .. 650 milliGRAM(s) Oral every 6 hours PRN Temp greater or equal to 38C (100.4F), Mild Pain (1 - 3)      PHYSICAL EXAM:  Constitutional: A&Ox3, NAD  Respiratory: On room air, unlabored   Abdomen: Soft, nondistended. No rebound or guarding.  Extremities: WWP, MCCANN spontaneously        LABS:  cret                        9.6    4.33  )-----------( 254      ( 20 Sep 2022 06:33 )             30.7     09-20    139  |  103  |  12  ----------------------------<  94  4.4   |  27  |  1.16    Ca    9.6      20 Sep 2022 06:33  Phos  3.5     09-20  Mg     1.90     09-20

## 2022-09-20 NOTE — PROVIDER CONTACT NOTE (OTHER) - ASSESSMENT
Complaining of itchiness and redness all throughout back and L arm, NAD noted otherwise. Denies SOB/chest-pain

## 2022-09-20 NOTE — PROVIDER CONTACT NOTE (OTHER) - ACTION/TREATMENT ORDERED:
Team aware and at bedside. No actions ordered at the moment. Holding ampicillin at the moment as pt. thinks it might be the antibiotic. D team and PM RN aware.

## 2022-09-20 NOTE — PROGRESS NOTE ADULT - ASSESSMENT
87 year old female with a PMHx of HTN, HLD GERD, and gallbladder cancer for which she underwent an ex lap, open cholecystectomy, segment 4b/5 hepatectomy, and right colectomy due to fistula tract vs metastasis on 08/19, who p/w to S ED with lethargy for 2 days and hypoxia to 40/50s % with workup remarkable for a [id number] hepatic abscess and b/l pleural effusions on CT scan, and hypoxia requiring BiPAP, transferred to [facility] for further management, admitted to SICU i/s/o persistent NIPPV, s/p IR drainage of collection. Now on floor, recovering well.     Plan  - Diet: reg  - Zosyn, fluid cultures positive for enterococcus   - Continue home meds   - Wean NC as tolerated  - Dispo: [facility] Team Surgery   [id number] 87 year old female with a PMHx of HTN, HLD GERD, and gallbladder cancer for which she underwent an ex lap, open cholecystectomy, segment 4b/5 hepatectomy, and right colectomy due to fistula tract vs metastasis on 08/19, who p/w to S ED with lethargy for 2 days and hypoxia to 40/50s % with workup remarkable for a [id number] hepatic abscess and b/l pleural effusions on CT scan, and hypoxia requiring BiPAP, transferred to [facility] for further management, admitted to SICU i/s/o persistent NIPPV, s/p IR drainage of collection. Now on floor, recovering well.     Plan  - Diet: reg  - Unasyn, fluid cultures positive for enterococcus   - f/u ID abx recs   - Continue home meds   - Wean NC as tolerated  - Dispo: home with CDPAP, home PT, and VNS for drain     D Team Surgery   [phone]

## 2022-09-20 NOTE — PROGRESS NOTE ADULT - SUBJECTIVE AND OBJECTIVE BOX
Vital Signs Last 24 Hrs  T(C): 37 (20 Sep 2022 16:24), Max: 37.1 (20 Sep 2022 04:00)  T(F): 98.6 (20 Sep 2022 16:24), Max: 98.7 (20 Sep 2022 04:00)  HR: 77 (20 Sep 2022 16:24) (65 - 83)  BP: 113/44 (20 Sep 2022 16:24) (105/47 - 124/53)  BP(mean): --  RR: 18 (20 Sep 2022 16:24) (17 - 18)  SpO2: 98% (20 Sep 2022 16:24) (94% - 100%)    Parameters below as of 20 Sep 2022 16:24  Patient On (Oxygen Delivery Method): room air        I&O's Detail    19 Sep 2022 07:01  -  20 Sep 2022 07:00  --------------------------------------------------------  IN:    Oral Fluid: 150 mL  Total IN: 150 mL    OUT:    Drain (mL): 35 mL    Voided (mL): 300 mL  Total OUT: 335 mL    Total NET: -185 mL      20 Sep 2022 07:01  -  20 Sep 2022 17:32  --------------------------------------------------------  IN:  Total IN: 0 mL    OUT:    Drain (mL): 5 mL    Voided (mL): 350 mL  Total OUT: 355 mL    Total NET: -355 mL                                9.6    4.33  )-----------( 254      ( 20 Sep 2022 06:33 )             30.7       09-20    139  |  103  |  12  ----------------------------<  94  4.4   |  27  |  1.16    Ca    9.6      20 Sep 2022 06:33  Phos  3.5     09-20  Mg     1.90     09-20    tolerating regular diet  Pigtail drainage : minimal            PLAN:  change antibitotics to PO as per ID  Follow-up CT on Thursday

## 2022-09-20 NOTE — PROGRESS NOTE ADULT - ASSESSMENT
87 year old female with a PMHx of HTN, HLD GERD, and gallbladder cancer for which she underwent an ex lap, open cholecystectomy, segment 4b/5 hepatectomy, and right colectomy due to fistula tract vs metastasis on 08/19 who was admitted to [facility] for hypoxia and then transferred to [facility] for further managment in the SICU. Imaging reveals hepatic abscess.    #Abnormal imaging of the abdomen  #Hepatic abscess  Drained by IR on admission to [facility]  Enterococcus faecalis, S to ampicillin vancomycin    #Penicillin allergy  Denies having allergy, is not sure why it is listed  Tolerated piperacillin/tazobactam  Erythema on left arm attributed to blood pressure cuff  will need to be monitored    #Acute hypoxic respiratory failure, resolved    Recommendations  Continue ampicillin/sulbactam 3g q6hr  If erythema worsens this may be due to penicillin allergy as above after all and can be transitioned to vancomycin  Anticipating 4 weeks of antibiotics  Would favor keeping patient on IV antibiotics while drain in place, plan to transition to PO after removal   Follow fever curve and WBC count    Salvatore Fish MD  Division of Infectious Diseases  Available on Teams

## 2022-09-21 LAB
ANION GAP SERPL CALC-SCNC: 8 MMOL/L — SIGNIFICANT CHANGE UP (ref 7–14)
BUN SERPL-MCNC: 14 MG/DL — SIGNIFICANT CHANGE UP (ref 7–23)
CALCIUM SERPL-MCNC: 10.1 MG/DL — SIGNIFICANT CHANGE UP (ref 8.4–10.5)
CHLORIDE SERPL-SCNC: 105 MMOL/L — SIGNIFICANT CHANGE UP (ref 98–107)
CO2 SERPL-SCNC: 26 MMOL/L — SIGNIFICANT CHANGE UP (ref 22–31)
CREAT SERPL-MCNC: 0.96 MG/DL — SIGNIFICANT CHANGE UP (ref 0.5–1.3)
EGFR: 58 ML/MIN/1.73M2 — LOW
GLUCOSE SERPL-MCNC: 103 MG/DL — HIGH (ref 70–99)
HCT VFR BLD CALC: 32.5 % — LOW (ref 34.5–45)
HGB BLD-MCNC: 10 G/DL — LOW (ref 11.5–15.5)
MAGNESIUM SERPL-MCNC: 1.8 MG/DL — SIGNIFICANT CHANGE UP (ref 1.6–2.6)
MCHC RBC-ENTMCNC: 29 PG — SIGNIFICANT CHANGE UP (ref 27–34)
MCHC RBC-ENTMCNC: 30.8 GM/DL — LOW (ref 32–36)
MCV RBC AUTO: 94.2 FL — SIGNIFICANT CHANGE UP (ref 80–100)
NRBC # BLD: 0 /100 WBCS — SIGNIFICANT CHANGE UP (ref 0–0)
NRBC # FLD: 0 K/UL — SIGNIFICANT CHANGE UP (ref 0–0)
PHOSPHATE SERPL-MCNC: 2 MG/DL — LOW (ref 2.5–4.5)
PLATELET # BLD AUTO: 273 K/UL — SIGNIFICANT CHANGE UP (ref 150–400)
POTASSIUM SERPL-MCNC: 4.5 MMOL/L — SIGNIFICANT CHANGE UP (ref 3.5–5.3)
POTASSIUM SERPL-SCNC: 4.5 MMOL/L — SIGNIFICANT CHANGE UP (ref 3.5–5.3)
RBC # BLD: 3.45 M/UL — LOW (ref 3.8–5.2)
RBC # FLD: 14.7 % — HIGH (ref 10.3–14.5)
SARS-COV-2 RNA SPEC QL NAA+PROBE: SIGNIFICANT CHANGE UP
SODIUM SERPL-SCNC: 139 MMOL/L — SIGNIFICANT CHANGE UP (ref 135–145)
WBC # BLD: 6.31 K/UL — SIGNIFICANT CHANGE UP (ref 3.8–10.5)
WBC # FLD AUTO: 6.31 K/UL — SIGNIFICANT CHANGE UP (ref 3.8–10.5)

## 2022-09-21 PROCEDURE — 99232 SBSQ HOSP IP/OBS MODERATE 35: CPT

## 2022-09-21 PROCEDURE — ZZZZZ: CPT

## 2022-09-21 PROCEDURE — 99232 SBSQ HOSP IP/OBS MODERATE 35: CPT | Mod: 24

## 2022-09-21 PROCEDURE — 71045 X-RAY EXAM CHEST 1 VIEW: CPT | Mod: 26

## 2022-09-21 RX ORDER — SODIUM,POTASSIUM PHOSPHATES 278-250MG
1 POWDER IN PACKET (EA) ORAL
Refills: 0 | Status: COMPLETED | OUTPATIENT
Start: 2022-09-21 | End: 2022-09-22

## 2022-09-21 RX ORDER — SODIUM CHLORIDE 9 MG/ML
1000 INJECTION, SOLUTION INTRAVENOUS
Refills: 0 | Status: DISCONTINUED | OUTPATIENT
Start: 2022-09-21 | End: 2022-09-23

## 2022-09-21 RX ORDER — MAGNESIUM SULFATE 500 MG/ML
2 VIAL (ML) INJECTION ONCE
Refills: 0 | Status: COMPLETED | OUTPATIENT
Start: 2022-09-21 | End: 2022-09-21

## 2022-09-21 RX ADMIN — Medication 100 MILLIGRAM(S): at 22:54

## 2022-09-21 RX ADMIN — PANTOPRAZOLE SODIUM 40 MILLIGRAM(S): 20 TABLET, DELAYED RELEASE ORAL at 05:10

## 2022-09-21 RX ADMIN — CARVEDILOL PHOSPHATE 6.25 MILLIGRAM(S): 80 CAPSULE, EXTENDED RELEASE ORAL at 17:28

## 2022-09-21 RX ADMIN — Medication 100 MILLIGRAM(S): at 12:44

## 2022-09-21 RX ADMIN — Medication 3 MILLILITER(S): at 16:15

## 2022-09-21 RX ADMIN — Medication 145 MILLIGRAM(S): at 12:45

## 2022-09-21 RX ADMIN — ENOXAPARIN SODIUM 30 MILLIGRAM(S): 100 INJECTION SUBCUTANEOUS at 17:28

## 2022-09-21 RX ADMIN — Medication 1 PACKET(S): at 12:45

## 2022-09-21 RX ADMIN — Medication 1 TABLET(S): at 12:44

## 2022-09-21 RX ADMIN — Medication 3 MILLIGRAM(S): at 22:54

## 2022-09-21 RX ADMIN — Medication 25 GRAM(S): at 12:45

## 2022-09-21 RX ADMIN — CARVEDILOL PHOSPHATE 6.25 MILLIGRAM(S): 80 CAPSULE, EXTENDED RELEASE ORAL at 05:10

## 2022-09-21 RX ADMIN — Medication 1 PACKET(S): at 17:28

## 2022-09-21 RX ADMIN — Medication 3 MILLILITER(S): at 10:53

## 2022-09-21 RX ADMIN — AMLODIPINE BESYLATE 2.5 MILLIGRAM(S): 2.5 TABLET ORAL at 05:10

## 2022-09-21 RX ADMIN — Medication 250 MILLIGRAM(S): at 22:55

## 2022-09-21 RX ADMIN — Medication 3 MILLILITER(S): at 21:53

## 2022-09-21 RX ADMIN — MIRTAZAPINE 15 MILLIGRAM(S): 45 TABLET, ORALLY DISINTEGRATING ORAL at 22:54

## 2022-09-21 NOTE — PROGRESS NOTE ADULT - SUBJECTIVE AND OBJECTIVE BOX
Vital Signs Last 24 Hrs  T(C): 36.9 (21 Sep 2022 04:40), Max: 37 (20 Sep 2022 16:24)  T(F): 98.5 (21 Sep 2022 04:40), Max: 98.6 (20 Sep 2022 16:24)  HR: 80 (21 Sep 2022 04:40) (65 - 84)  BP: 144/40 (21 Sep 2022 04:40) (105/47 - 151/67)  BP(mean): --  RR: 19 (21 Sep 2022 04:40) (17 - 19)  SpO2: 99% (21 Sep 2022 04:40) (95% - 100%)    Parameters below as of 21 Sep 2022 04:40  Patient On (Oxygen Delivery Method): nasal cannula  O2 Flow (L/min): 2      I&O's Detail    20 Sep 2022 07:01  -  21 Sep 2022 07:00  --------------------------------------------------------  IN:    Oral Fluid: 470 mL  Total IN: 470 mL    OUT:    Drain (mL): 25 mL    Voided (mL): 1550 mL  Total OUT: 1575 mL    Total NET: -1105 mL                                10.0   6.31  )-----------( 273      ( 21 Sep 2022 05:32 )             32.5       09-21    139  |  105  |  14  ----------------------------<  103<H>  4.5   |  26  |  0.96    Ca    10.1      21 Sep 2022 05:32  Phos  2.0     09-21  Mg     1.80     09-21    tolerating diet  Minimal pigtail drainage            PLAN:  CT scan today   Antibiotics per ID

## 2022-09-21 NOTE — PROGRESS NOTE ADULT - SUBJECTIVE AND OBJECTIVE BOX
Follow Up:  hepatic abscess    Interval History/ROS:  Overnight: left arm rash worsened and became itchy. Antibiotics switched from amp/sulbactam to vancomycin.    Patient seen and examined at bedside. Patient appears comfortable. Denies any new pain or discomfort. Reports rash improved, less itchy.    Allergies  ampicillin (Rash)  ampicillin-sulbactam (Rash)        ANTIMICROBIALS:  vancomycin  IVPB 750 every 24 hours      OTHER MEDS:  MEDICATIONS  (STANDING):  acetaminophen     Tablet .. 650 every 6 hours PRN  albuterol/ipratropium for Nebulization 3 every 6 hours  allopurinol 100 at bedtime  amLODIPine   Tablet 2.5 daily  carvedilol 6.25 every 12 hours  enoxaparin Injectable 30 every 24 hours  fenofibrate Tablet 145 daily  melatonin 3 at bedtime  mirtazapine 15 at bedtime  pantoprazole    Tablet 40 before breakfast      Vital Signs Last 24 Hrs  T(C): 37.2 (21 Sep 2022 12:13), Max: 37.2 (21 Sep 2022 12:13)  T(F): 99 (21 Sep 2022 12:13), Max: 99 (21 Sep 2022 12:13)  HR: 77 (21 Sep 2022 12:13) (70 - 84)  BP: 130/51 (21 Sep 2022 12:13) (113/44 - 151/67)  BP(mean): --  RR: 17 (21 Sep 2022 12:13) (17 - 19)  SpO2: 98% (21 Sep 2022 12:13) (95% - 100%)    Parameters below as of 21 Sep 2022 12:13  Patient On (Oxygen Delivery Method): nasal cannula  O2 Flow (L/min): 2      PHYSICAL EXAM:  Constitutional: non-toxic, no distress  HEAD/EYES: anicteric, no conjunctival injection  Cardiovascular:   normal S1, S2, no murmur, no edema  Respiratory:  clear BS bilaterally, no wheezes, no rales  Neurologic: awake and alert, normal strength, no focal findings  Skin:  erythema on left arm worsening, no phlebitis                              10.0   6.31  )-----------( 273      ( 21 Sep 2022 05:32 )             32.5       09-21    139  |  105  |  14  ----------------------------<  103<H>  4.5   |  26  |  0.96    Ca    10.1      21 Sep 2022 05:32  Phos  2.0     09-21  Mg     1.80     09-21            MICROBIOLOGY:  v                  RADIOLOGY:

## 2022-09-21 NOTE — PROGRESS NOTE ADULT - ASSESSMENT
87 year old female with a PMHx of HTN, HLD GERD, and gallbladder cancer for which she underwent an ex lap, open cholecystectomy, segment 4b/5 hepatectomy, and right colectomy due to fistula tract vs metastasis on 08/19 who was admitted to [facility] for hypoxia and then transferred to [facility] for further managment in the SICU. Imaging reveals hepatic abscess.    #Abnormal imaging of the abdomen  #Hepatic abscess  Drained by IR on admission to [facility]  Enterococcus faecalis, S to ampicillin vancomycin  Drain continues to be in place    #Penicillin allergy  Previously denied any penicillin allergy  Tolerated piperacillin/tazobactam on admission  Developed worsening rash and iitchiness on the left arm after initiation of ampicillin/sulbactam  Antibiotic switched to vancomycin    #Acute hypoxic respiratory failure, resolved    Recommendations  Continue vancomycin 750 mg q24h  Obtain vancomycin level tomorrow AM, will need to achieve steady state before discharge  Anticipating 4 weeks of combined IV and then PO antibiotics  Would favor keeping patient on IV antibiotics while drain in place, plan to transition to PO after removal   Follow fever curve and WBC count    Salvatore Fish MD  Division of Infectious Diseases  Available on Teams

## 2022-09-22 LAB
ANION GAP SERPL CALC-SCNC: 8 MMOL/L — SIGNIFICANT CHANGE UP (ref 7–14)
BUN SERPL-MCNC: 13 MG/DL — SIGNIFICANT CHANGE UP (ref 7–23)
CALCIUM SERPL-MCNC: 9.7 MG/DL — SIGNIFICANT CHANGE UP (ref 8.4–10.5)
CHLORIDE SERPL-SCNC: 103 MMOL/L — SIGNIFICANT CHANGE UP (ref 98–107)
CO2 SERPL-SCNC: 26 MMOL/L — SIGNIFICANT CHANGE UP (ref 22–31)
CREAT SERPL-MCNC: 0.84 MG/DL — SIGNIFICANT CHANGE UP (ref 0.5–1.3)
EGFR: 68 ML/MIN/1.73M2 — SIGNIFICANT CHANGE UP
GLUCOSE SERPL-MCNC: 109 MG/DL — HIGH (ref 70–99)
HCT VFR BLD CALC: 32.4 % — LOW (ref 34.5–45)
HGB BLD-MCNC: 9.6 G/DL — LOW (ref 11.5–15.5)
MAGNESIUM SERPL-MCNC: 2 MG/DL — SIGNIFICANT CHANGE UP (ref 1.6–2.6)
MCHC RBC-ENTMCNC: 28.5 PG — SIGNIFICANT CHANGE UP (ref 27–34)
MCHC RBC-ENTMCNC: 29.6 GM/DL — LOW (ref 32–36)
MCV RBC AUTO: 96.1 FL — SIGNIFICANT CHANGE UP (ref 80–100)
NRBC # BLD: 0 /100 WBCS — SIGNIFICANT CHANGE UP (ref 0–0)
NRBC # FLD: 0 K/UL — SIGNIFICANT CHANGE UP (ref 0–0)
PHOSPHATE SERPL-MCNC: 2.5 MG/DL — SIGNIFICANT CHANGE UP (ref 2.5–4.5)
PLATELET # BLD AUTO: 269 K/UL — SIGNIFICANT CHANGE UP (ref 150–400)
POTASSIUM SERPL-MCNC: 4.6 MMOL/L — SIGNIFICANT CHANGE UP (ref 3.5–5.3)
POTASSIUM SERPL-SCNC: 4.6 MMOL/L — SIGNIFICANT CHANGE UP (ref 3.5–5.3)
RBC # BLD: 3.37 M/UL — LOW (ref 3.8–5.2)
RBC # FLD: 14.6 % — HIGH (ref 10.3–14.5)
SODIUM SERPL-SCNC: 137 MMOL/L — SIGNIFICANT CHANGE UP (ref 135–145)
WBC # BLD: 5.8 K/UL — SIGNIFICANT CHANGE UP (ref 3.8–10.5)
WBC # FLD AUTO: 5.8 K/UL — SIGNIFICANT CHANGE UP (ref 3.8–10.5)

## 2022-09-22 PROCEDURE — 99232 SBSQ HOSP IP/OBS MODERATE 35: CPT | Mod: 24

## 2022-09-22 PROCEDURE — ZZZZZ: CPT

## 2022-09-22 PROCEDURE — 74177 CT ABD & PELVIS W/CONTRAST: CPT | Mod: 26

## 2022-09-22 PROCEDURE — 71045 X-RAY EXAM CHEST 1 VIEW: CPT | Mod: 26

## 2022-09-22 PROCEDURE — 71260 CT THORAX DX C+: CPT | Mod: 26

## 2022-09-22 RX ADMIN — Medication 100 MILLIGRAM(S): at 12:21

## 2022-09-22 RX ADMIN — Medication 3 MILLILITER(S): at 22:36

## 2022-09-22 RX ADMIN — PANTOPRAZOLE SODIUM 40 MILLIGRAM(S): 20 TABLET, DELAYED RELEASE ORAL at 05:20

## 2022-09-22 RX ADMIN — Medication 1 TABLET(S): at 12:21

## 2022-09-22 RX ADMIN — Medication 3 MILLILITER(S): at 09:13

## 2022-09-22 RX ADMIN — CARVEDILOL PHOSPHATE 6.25 MILLIGRAM(S): 80 CAPSULE, EXTENDED RELEASE ORAL at 17:30

## 2022-09-22 RX ADMIN — ENOXAPARIN SODIUM 30 MILLIGRAM(S): 100 INJECTION SUBCUTANEOUS at 17:29

## 2022-09-22 RX ADMIN — Medication 145 MILLIGRAM(S): at 12:22

## 2022-09-22 RX ADMIN — SODIUM CHLORIDE 75 MILLILITER(S): 9 INJECTION, SOLUTION INTRAVENOUS at 08:44

## 2022-09-22 RX ADMIN — Medication 3 MILLILITER(S): at 15:19

## 2022-09-22 RX ADMIN — AMLODIPINE BESYLATE 2.5 MILLIGRAM(S): 2.5 TABLET ORAL at 05:20

## 2022-09-22 RX ADMIN — MIRTAZAPINE 15 MILLIGRAM(S): 45 TABLET, ORALLY DISINTEGRATING ORAL at 22:13

## 2022-09-22 RX ADMIN — CARVEDILOL PHOSPHATE 6.25 MILLIGRAM(S): 80 CAPSULE, EXTENDED RELEASE ORAL at 05:20

## 2022-09-22 RX ADMIN — Medication 3 MILLILITER(S): at 03:25

## 2022-09-22 RX ADMIN — Medication 1 PACKET(S): at 05:20

## 2022-09-22 RX ADMIN — SODIUM CHLORIDE 75 MILLILITER(S): 9 INJECTION, SOLUTION INTRAVENOUS at 00:16

## 2022-09-22 RX ADMIN — Medication 100 MILLIGRAM(S): at 22:12

## 2022-09-22 RX ADMIN — Medication 3 MILLIGRAM(S): at 22:13

## 2022-09-22 NOTE — PRE PROCEDURE NOTE - PRE PROCEDURE EVALUATION
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     Procedure: Hepatic drain tube check    Diagnosis/Indication: Patient is a 87y old  Female who presents with a chief complaint of hepatic abscess (22 Sep 2022 07:39)      PAST MEDICAL & SURGICAL HISTORY:  Hypertension      Hyperlipidemia      Gout      GERD (gastroesophageal reflux disease)      Insomnia      Other gallbladder disorder      H/O: hysterectomy      History of cholecystectomy      H/O right hemicolectomy      History of resection of liver           Female    Allergies: ampicillin (Rash)  ampicillin-sulbactam (Rash)      LABS:  CBC Full  -  ( 22 Sep 2022 05:33 )  WBC Count : 5.80 K/uL  RBC Count : 3.37 M/uL  Hemoglobin : 9.6 g/dL  Hematocrit : 32.4 %  Platelet Count - Automated : 269 K/uL  Mean Cell Volume : 96.1 fL  Mean Cell Hemoglobin : 28.5 pg  Mean Cell Hemoglobin Concentration : 29.6 gm/dL      09-22    137  |  103  |  13  ----------------------------<  109<H>  4.6   |  26  |  0.84    Ca    9.7      22 Sep 2022 05:33  Phos  2.5     09-22  Mg     2.00     09-22          Parent present at bedside to sign consent.

## 2022-09-22 NOTE — PROGRESS NOTE ADULT - SUBJECTIVE AND OBJECTIVE BOX
D team surgery    Overnight: BRUCE    Patient seen and examined at bedside. Patient resting in bed, states she slept well, denies N/V pain or any other complaints at this time    Vitals  T(C): 36.9 (09-21-22 @ 04:40), Max: 37 (09-20-22 @ 16:24)  HR: 80 (09-21-22 @ 04:40) (65 - 84)  BP: 144/40 (09-21-22 @ 04:40) (105/47 - 151/67)  BP(mean): --  ABP: --  ABP(mean): --  RR: 19 (09-21-22 @ 04:40) (17 - 19)  SpO2: 99% (09-21-22 @ 04:40) (95% - 100%)  Wt(kg): --  CVP(mm Hg): --  CI: --  CAPILLARY BLOOD GLUCOSE       N/A      09-20 @ 07:01  -  09-21 @ 07:00  --------------------------------------------------------  IN:    Oral Fluid: 470 mL  Total IN: 470 mL    OUT:    Drain (mL): 25 mL    Voided (mL): 1550 mL  Total OUT: 1575 mL    Total NET: -1105 mL      Exam  Gen: NAD  Abd: soft, NT, ND, drain seropurulent    LABS  CBC (09-21 @ 05:32)                              10.0<L>                         6.31    )----------------(  273        --    % Neutrophils, --    % Lymphocytes, ANC: --                                  32.5<L>  CBC (09-20 @ 06:33)                              9.6<L>                         4.33    )----------------(  254        --    % Neutrophils, --    % Lymphocytes, ANC: --                                  30.7<L>    BMP (09-21 @ 05:32)             139     |  105     |  14    		Ca++ --      Ca 10.1               ---------------------------------( 103<H>		Mg 1.80               4.5     |  26      |  0.96  			Ph 2.0<L>  BMP (09-20 @ 06:33)             139     |  103     |  12    		Ca++ --      Ca 9.6                ---------------------------------( 94    		Mg 1.90               4.4     |  27      |  1.16  			Ph 3.5         -> .Body Fluid HEPATIC DRAINAGE Culture (09-15 @ 12:45)       Numerous polymorphonuclear leukocytes seen per low power field  Few Gram positive cocci in pairs seen per oil power field    Enterococcus faecalis    Moderate Enterococcus faecalis    A/P 87 year old female with a PMHx of HTN, HLD GERD, and gallbladder cancer for which she underwent an ex lap, open cholecystectomy, segment 4b/5 hepatectomy, and 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 Now on floor, recovering well.     Plan  - Diet: reg  - Unasyn, fluid cultures positive for enterococcus   - f/u ID abx recs   - CT scan to evaluate collection  - Continue home meds   - Wean NC as tolerated  - Dispo: home with CDPAP, home PT, and VNS for drain     D Team Surgery   [id number] D team surgery    Overnight: BRUCE    Patient seen and examined at bedside. Patient resting in bed, states she slept well, denies N/V pain or any other complaints at this time    Vitals  T(C): 36.8 (09-22-22 @ 05:15), Max: 37.2 (09-21-22 @ 12:13)  HR: 74 (09-22-22 @ 05:15) (67 - 82)  BP: 131/45 (09-22-22 @ 05:15) (130/51 - 153/68)  BP(mean): --  ABP: --  ABP(mean): --  RR: 19 (09-22-22 @ 05:15) (17 - 19)  SpO2: 97% (09-22-22 @ 05:15) (94% - 100%)  Wt(kg): --  CVP(mm Hg): --  CI: --  CAPILLARY BLOOD GLUCOSE       N/A      09-21 @ 07:01  -  09-22 @ 07:00  --------------------------------------------------------  IN:    dextrose 5% + sodium chloride 0.45%: 675 mL    Oral Fluid: 850 mL  Total IN: 1525 mL    OUT:    Drain (mL): 20 mL    Voided (mL): 1500 mL  Total OUT: 1520 mL    Total NET: 5 mL      Exam  Gen: NAD  Abd: soft, NT, ND, drain seropurulent    LABS  CBC (09-22 @ 05:33)                              9.6<L>                         5.80    )----------------(  269        --    % Neutrophils, --    % Lymphocytes, ANC: --                                  32.4<L>  CBC (09-21 @ 05:32)                              10.0<L>                         6.31    )----------------(  273        --    % Neutrophils, --    % Lymphocytes, ANC: --                                  32.5<L>    BMP (09-22 @ 05:33)             137     |  103     |  13    		Ca++ --      Ca 9.7                ---------------------------------( 109<H>		Mg 2.00               4.6     |  26      |  0.84  			Ph 2.5     BMP (09-21 @ 05:32)             139     |  105     |  14    		Ca++ --      Ca 10.1               ---------------------------------( 103<H>		Mg 1.80               4.5     |  26      |  0.96  			Ph 2.0<L>      -> .Body Fluid HEPATIC DRAINAGE Culture (09-15 @ 12:45)       Numerous polymorphonuclear leukocytes seen per low power field  Few Gram positive cocci in pairs seen per oil power field    Enterococcus faecalis    Moderate Enterococcus faecalis      A/P 87 year old female with a PMHx of HTN, HLD GERD, and gallbladder cancer for which she underwent an ex lap, open cholecystectomy, segment 4b/5 hepatectomy, and 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 Now on floor, recovering well.     Plan  - Diet: reg  - Unasyn, fluid cultures positive for enterococcus   - f/u ID abx recs   - CT scan to evaluate collection  - Continue home meds   - Wean NC as tolerated  - Dispo: home with CDPAP, home PT, and VNS for drain     D Team Surgery   [id number]

## 2022-09-22 NOTE — PROGRESS NOTE ADULT - SUBJECTIVE AND OBJECTIVE BOX
Vital Signs Last 24 Hrs  T(C): 36.8 (22 Sep 2022 05:15), Max: 37.2 (21 Sep 2022 12:13)  T(F): 98.2 (22 Sep 2022 05:15), Max: 99 (21 Sep 2022 12:13)  HR: 74 (22 Sep 2022 05:15) (67 - 82)  BP: 131/45 (22 Sep 2022 05:15) (130/51 - 153/68)  BP(mean): --  RR: 19 (22 Sep 2022 05:15) (17 - 19)  SpO2: 97% (22 Sep 2022 05:15) (94% - 100%)    Parameters below as of 22 Sep 2022 05:15  Patient On (Oxygen Delivery Method): nasal cannula  O2 Flow (L/min): 2      I&O's Detail    21 Sep 2022 07:01  -  22 Sep 2022 07:00  --------------------------------------------------------  IN:    dextrose 5% + sodium chloride 0.45%: 675 mL    Oral Fluid: 850 mL  Total IN: 1525 mL    OUT:    Drain (mL): 20 mL    Voided (mL): 1500 mL  Total OUT: 1520 mL    Total NET: 5 mL                                9.6    5.80  )-----------( 269      ( 22 Sep 2022 05:33 )             32.4       09-22    137  |  103  |  13  ----------------------------<  109<H>  4.6   |  26  |  0.84    Ca    9.7      22 Sep 2022 05:33  Phos  2.5     09-22  Mg     2.00     09-22    Tolerating diet  minimal pigtail drainage            PLAN:  CT today to assess abscess cavity

## 2022-09-23 LAB
ANION GAP SERPL CALC-SCNC: 8 MMOL/L — SIGNIFICANT CHANGE UP (ref 7–14)
APTT BLD: 30.2 SEC — SIGNIFICANT CHANGE UP (ref 27–36.3)
BUN SERPL-MCNC: 11 MG/DL — SIGNIFICANT CHANGE UP (ref 7–23)
CALCIUM SERPL-MCNC: 9.7 MG/DL — SIGNIFICANT CHANGE UP (ref 8.4–10.5)
CHLORIDE SERPL-SCNC: 103 MMOL/L — SIGNIFICANT CHANGE UP (ref 98–107)
CO2 SERPL-SCNC: 27 MMOL/L — SIGNIFICANT CHANGE UP (ref 22–31)
CREAT SERPL-MCNC: 0.75 MG/DL — SIGNIFICANT CHANGE UP (ref 0.5–1.3)
EGFR: 77 ML/MIN/1.73M2 — SIGNIFICANT CHANGE UP
GLUCOSE SERPL-MCNC: 144 MG/DL — HIGH (ref 70–99)
HCT VFR BLD CALC: 31.7 % — LOW (ref 34.5–45)
HGB BLD-MCNC: 9.6 G/DL — LOW (ref 11.5–15.5)
INR BLD: 1.03 RATIO — SIGNIFICANT CHANGE UP (ref 0.88–1.16)
MAGNESIUM SERPL-MCNC: 1.7 MG/DL — SIGNIFICANT CHANGE UP (ref 1.6–2.6)
MCHC RBC-ENTMCNC: 28.8 PG — SIGNIFICANT CHANGE UP (ref 27–34)
MCHC RBC-ENTMCNC: 30.3 GM/DL — LOW (ref 32–36)
MCV RBC AUTO: 95.2 FL — SIGNIFICANT CHANGE UP (ref 80–100)
NRBC # BLD: 0 /100 WBCS — SIGNIFICANT CHANGE UP (ref 0–0)
NRBC # FLD: 0 K/UL — SIGNIFICANT CHANGE UP (ref 0–0)
PHOSPHATE SERPL-MCNC: 2.6 MG/DL — SIGNIFICANT CHANGE UP (ref 2.5–4.5)
PLATELET # BLD AUTO: 268 K/UL — SIGNIFICANT CHANGE UP (ref 150–400)
POTASSIUM SERPL-MCNC: 4.5 MMOL/L — SIGNIFICANT CHANGE UP (ref 3.5–5.3)
POTASSIUM SERPL-SCNC: 4.5 MMOL/L — SIGNIFICANT CHANGE UP (ref 3.5–5.3)
PROTHROM AB SERPL-ACNC: 11.9 SEC — SIGNIFICANT CHANGE UP (ref 10.5–13.4)
RBC # BLD: 3.33 M/UL — LOW (ref 3.8–5.2)
RBC # FLD: 14.7 % — HIGH (ref 10.3–14.5)
SARS-COV-2 RNA SPEC QL NAA+PROBE: SIGNIFICANT CHANGE UP
SODIUM SERPL-SCNC: 138 MMOL/L — SIGNIFICANT CHANGE UP (ref 135–145)
VANCOMYCIN TROUGH SERPL-MCNC: 6.5 UG/ML — LOW (ref 10–20)
WBC # BLD: 4.87 K/UL — SIGNIFICANT CHANGE UP (ref 3.8–10.5)
WBC # FLD AUTO: 4.87 K/UL — SIGNIFICANT CHANGE UP (ref 3.8–10.5)

## 2022-09-23 PROCEDURE — 99232 SBSQ HOSP IP/OBS MODERATE 35: CPT

## 2022-09-23 PROCEDURE — 76080 X-RAY EXAM OF FISTULA: CPT | Mod: 26

## 2022-09-23 PROCEDURE — 99231 SBSQ HOSP IP/OBS SF/LOW 25: CPT | Mod: 24

## 2022-09-23 PROCEDURE — 49424 ASSESS CYST CONTRAST INJECT: CPT

## 2022-09-23 RX ORDER — POTASSIUM PHOSPHATE, MONOBASIC POTASSIUM PHOSPHATE, DIBASIC 236; 224 MG/ML; MG/ML
15 INJECTION, SOLUTION INTRAVENOUS ONCE
Refills: 0 | Status: COMPLETED | OUTPATIENT
Start: 2022-09-23 | End: 2022-09-23

## 2022-09-23 RX ORDER — SODIUM,POTASSIUM PHOSPHATES 278-250MG
1 POWDER IN PACKET (EA) ORAL ONCE
Refills: 0 | Status: DISCONTINUED | OUTPATIENT
Start: 2022-09-23 | End: 2022-09-23

## 2022-09-23 RX ORDER — MAGNESIUM SULFATE 500 MG/ML
2 VIAL (ML) INJECTION ONCE
Refills: 0 | Status: COMPLETED | OUTPATIENT
Start: 2022-09-23 | End: 2022-09-23

## 2022-09-23 RX ADMIN — CARVEDILOL PHOSPHATE 6.25 MILLIGRAM(S): 80 CAPSULE, EXTENDED RELEASE ORAL at 05:40

## 2022-09-23 RX ADMIN — Medication 3 MILLILITER(S): at 10:30

## 2022-09-23 RX ADMIN — Medication 3 MILLILITER(S): at 16:09

## 2022-09-23 RX ADMIN — Medication 25 GRAM(S): at 17:28

## 2022-09-23 RX ADMIN — Medication 3 MILLILITER(S): at 04:41

## 2022-09-23 RX ADMIN — Medication 100 MILLIGRAM(S): at 13:11

## 2022-09-23 RX ADMIN — ENOXAPARIN SODIUM 30 MILLIGRAM(S): 100 INJECTION SUBCUTANEOUS at 17:29

## 2022-09-23 RX ADMIN — AMLODIPINE BESYLATE 2.5 MILLIGRAM(S): 2.5 TABLET ORAL at 05:40

## 2022-09-23 RX ADMIN — POTASSIUM PHOSPHATE, MONOBASIC POTASSIUM PHOSPHATE, DIBASIC 62.5 MILLIMOLE(S): 236; 224 INJECTION, SOLUTION INTRAVENOUS at 10:31

## 2022-09-23 RX ADMIN — Medication 145 MILLIGRAM(S): at 13:12

## 2022-09-23 RX ADMIN — Medication 250 MILLIGRAM(S): at 04:43

## 2022-09-23 RX ADMIN — PANTOPRAZOLE SODIUM 40 MILLIGRAM(S): 20 TABLET, DELAYED RELEASE ORAL at 05:42

## 2022-09-23 RX ADMIN — Medication 3 MILLIGRAM(S): at 21:23

## 2022-09-23 RX ADMIN — CARVEDILOL PHOSPHATE 6.25 MILLIGRAM(S): 80 CAPSULE, EXTENDED RELEASE ORAL at 17:29

## 2022-09-23 RX ADMIN — Medication 100 MILLIGRAM(S): at 21:23

## 2022-09-23 RX ADMIN — MIRTAZAPINE 15 MILLIGRAM(S): 45 TABLET, ORALLY DISINTEGRATING ORAL at 21:23

## 2022-09-23 RX ADMIN — Medication 1 TABLET(S): at 13:11

## 2022-09-23 RX ADMIN — Medication 3 MILLILITER(S): at 23:06

## 2022-09-23 NOTE — PROCEDURE NOTE - CONTRAST
[facility] Center    Phone Message    May a detailed message be left on voicemail: yes    Reason for Call: Other: The pt had an MRI done at [facility] approx 9.8.18. The pt would like a call to alfa the results w/Dr Davies. Please call the pt's cell. Thanks.     Action Taken: Message routed to:  [facility] ([facility]     Omnipaque

## 2022-09-23 NOTE — PROGRESS NOTE ADULT - SUBJECTIVE AND OBJECTIVE BOX
Vital Signs Last 24 Hrs  T(C): 36.8 (23 Sep 2022 05:30), Max: 36.8 (22 Sep 2022 11:45)  T(F): 98.3 (23 Sep 2022 05:30), Max: 98.3 (22 Sep 2022 11:45)  HR: 80 (23 Sep 2022 05:30) (68 - 85)  BP: 149/50 (23 Sep 2022 05:30) (121/47 - 154/57)  BP(mean): --  RR: 18 (23 Sep 2022 05:30) (16 - 18)  SpO2: 99% (23 Sep 2022 05:30) (90% - 100%)    Parameters below as of 23 Sep 2022 05:30  Patient On (Oxygen Delivery Method): nasal cannula  O2 Flow (L/min): 2      I&O's Detail    22 Sep 2022 07:01  -  23 Sep 2022 07:00  --------------------------------------------------------  IN:  Total IN: 0 mL    OUT:    Drain (mL): 0 mL    Voided (mL): 1800 mL  Total OUT: 1800 mL    Total NET: -1800 mL                                9.6    4.87  )-----------( 268      ( 23 Sep 2022 07:10 )             31.7       09-23    138  |  103  |  11  ----------------------------<  144<H>  4.5   |  27  |  0.75    Ca    9.7      23 Sep 2022 07:10  Phos  2.6     09-23  Mg     1.70     09-23    CT shows decreased abdominal collection  Tolerating diet            PLAN:  Pigtail study today  Continue vancomycin

## 2022-09-23 NOTE — PROCEDURE NOTE - PROCEDURE FINDINGS AND DETAILS
Hepatic drain check demonstrating moderate collection. Reconnected gravity drainage bag.   Plan for tube check in 1 week.

## 2022-09-23 NOTE — PROGRESS NOTE ADULT - ASSESSMENT
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 Now on floor, recovering well.     Plan  - IR tube check today   - Diet: reg  - Unasyn, fluid cultures positive for enterococcus   - f/u ID abx recs   - Continue home meds   - Wean NC as tolerated  - Dispo: home with CDPAP, home PT, and VNS for drain     D Team Surgery

## 2022-09-23 NOTE — PROGRESS NOTE ADULT - SUBJECTIVE AND OBJECTIVE BOX
Follow Up:  hepatic abscess    Interval History/ROS:  Overnight: no acute events. drain check today with IR,     Patient seen and examined at bedside. Patinet denies any new pain or discomfort.     Allergies  ampicillin (Rash)  ampicillin-sulbactam (Rash)        ANTIMICROBIALS:  vancomycin  IVPB 750 every 24 hours      OTHER MEDS:  MEDICATIONS  (STANDING):  acetaminophen     Tablet .. 650 every 6 hours PRN  albuterol/ipratropium for Nebulization 3 every 6 hours  allopurinol 100 at bedtime  amLODIPine   Tablet 2.5 daily  carvedilol 6.25 every 12 hours  enoxaparin Injectable 30 every 24 hours  fenofibrate Tablet 145 daily  melatonin 3 at bedtime  mirtazapine 15 at bedtime  pantoprazole    Tablet 40 before breakfast      Vital Signs Last 24 Hrs  T(C): 36.7 (23 Sep 2022 11:24), Max: 36.8 (23 Sep 2022 05:30)  T(F): 98.1 (23 Sep 2022 11:24), Max: 98.3 (23 Sep 2022 05:30)  HR: 78 (23 Sep 2022 11:41) (68 - 85)  BP: 151/57 (23 Sep 2022 11:41) (104/40 - 168/61)  BP(mean): 90 (23 Sep 2022 11:24) (90 - 90)  RR: 20 (23 Sep 2022 11:41) (16 - 20)  SpO2: 98% (23 Sep 2022 11:41) (90% - 100%)    Parameters below as of 23 Sep 2022 11:41  Patient On (Oxygen Delivery Method): nasal cannula  O2 Flow (L/min): 2      PHYSICAL EXAM:  Constitutional: non-toxic, no distress  HEAD/EYES: anicteric, no conjunctival injection  Cardiovascular:   normal S1, S2, no murmur, no edema  Respiratory:  clear BS bilaterally, no wheezes, no rales  Neurologic: awake and alert, normal strength, no focal findings  Skin:  erythema on left arm worsening, no phlebitis                 9.6    4.87  )-----------( 268      ( 23 Sep 2022 07:10 )             31.7       09-23    138  |  103  |  11  ----------------------------<  144<H>  4.5   |  27  |  0.75    Ca    9.7      23 Sep 2022 07:10  Phos  2.6     09-23  Mg     1.70     09-23            MICROBIOLOGY:  Vancomycin Level, Trough: 6.5 ug/mL (09-23-22 @ 00:18)  v                  RADIOLOGY:

## 2022-09-23 NOTE — PROGRESS NOTE ADULT - ASSESSMENT
87 year old female with a PMHx of HTN, HLD GERD, and gallbladder cancer for which she underwent an ex lap, open cholecystectomy, segment 4b/5 hepatectomy, and right colectomy due to fistula tract vs metastasis on 08/19 who was admitted to [facility] for hypoxia and then transferred to [facility] for further managment in the SICU. Imaging reveals hepatic abscess.    #Abnormal imaging of the abdomen  #Hepatic abscess  Drained by IR on admission to [facility]  Enterococcus faecalis, S to ampicillin vancomycin  Drain continues to be in place  Drain check with IR today    #Penicillin allergy  Previously denied any penicillin allergy  Tolerated piperacillin/tazobactam on admission  Developed worsening rash and itchiness on the left arm after initiation of ampicillin/sulbactam  Antibiotic switched to vancomycin    #Acute hypoxic respiratory failure  Requiring 2 liters intermittently  CXR shows right sided pleural effusion    #Ongoing use of vancomycin  Trough is 6.5  Renal function stable    Recommendations  Continue vancomycin 750 mg q24h, suspect trough will increase at next check  Follow vancomycin trough to be obtained on 9/25 (order placed)  Anticipating 4 weeks of combined IV and then PO antibiotics  Would favor keeping patient on IV antibiotics while drain in place, plan to transition to PO after removal   Follow fever curve and WBC count    Salvatore Fish MD  Division of Infectious Diseases  Available on Teams

## 2022-09-23 NOTE — PROGRESS NOTE ADULT - SUBJECTIVE AND OBJECTIVE BOX
D team surgery    Overnight: BRUCE    Patient seen and examined at bedside. Patient resting in bed, states she slept well, denies N/V pain or any other complaints at this time    Vitals  T(C): 36.8 (09-22-22 @ 05:15), Max: 37.2 (09-21-22 @ 12:13)  HR: 74 (09-22-22 @ 05:15) (67 - 82)  BP: 131/45 (09-22-22 @ 05:15) (130/51 - 153/68)  BP(mean): --  ABP: --  ABP(mean): --  RR: 19 (09-22-22 @ 05:15) (17 - 19)  SpO2: 97% (09-22-22 @ 05:15) (94% - 100%)  Wt(kg): --  CVP(mm Hg): --  CI: --  CAPILLARY BLOOD GLUCOSE       N/A      09-21 @ 07:01  -  09-22 @ 07:00  --------------------------------------------------------  IN:    dextrose 5% + sodium chloride 0.45%: 675 mL    Oral Fluid: 850 mL  Total IN: 1525 mL    OUT:    Drain (mL): 20 mL    Voided (mL): 1500 mL  Total OUT: 1520 mL    Total NET: 5 mL      Exam  Gen: NAD  Abd: soft, NT, ND, drain seropurulent    LABS  CBC (09-22 @ 05:33)                              9.6<L>                         5.80    )----------------(  269        --    % Neutrophils, --    % Lymphocytes, ANC: --                                  32.4<L>  CBC (09-21 @ 05:32)                              10.0<L>                         6.31    )----------------(  273        --    % Neutrophils, --    % Lymphocytes, ANC: --                                  32.5<L>    BMP (09-22 @ 05:33)             137     |  103     |  13    		Ca++ --      Ca 9.7                ---------------------------------( 109<H>		Mg 2.00               4.6     |  26      |  0.84  			Ph 2.5     BMP (09-21 @ 05:32)             139     |  105     |  14    		Ca++ --      Ca 10.1               ---------------------------------( 103<H>		Mg 1.80               4.5     |  26      |  0.96  			Ph 2.0<L>      -> .Body Fluid HEPATIC DRAINAGE Culture (09-15 @ 12:45)       Numerous polymorphonuclear leukocytes seen per low power field  Few Gram positive cocci in pairs seen per oil power field    Enterococcus faecalis    Moderate Enterococcus faecalis      A/P 87 year old female with a PMHx of HTN, HLD GERD, and gallbladder cancer for which she underwent an ex lap, open cholecystectomy, segment 4b/5 hepatectomy, and right colectomy due to fistula 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 Now on floor, recovering well.     Plan  - Diet: reg  - Unasyn, fluid cultures positive for enterococcus   - f/u ID abx recs   - CT scan to evaluate collection  - Continue home meds   - Wean NC as tolerated  - Dispo: home with CDPAP, home PT, and VNS for drain     D Team Surgery   [id number] D team surgery    Overnight: BRUCE    Patient seen and examined at bedside. Patient resting in bed, states she slept well, denies N/V pain or any other complaints at this time    Vitals  T(C): 36.8 (09-22-22 @ 05:15), Max: 37.2 (09-21-22 @ 12:13)  HR: 74 (09-22-22 @ 05:15) (67 - 82)  BP: 131/45 (09-22-22 @ 05:15) (130/51 - 153/68)  BP(mean): --  ABP: --  ABP(mean): --  RR: 19 (09-22-22 @ 05:15) (17 - 19)  SpO2: 97% (09-22-22 @ 05:15) (94% - 100%)  Wt(kg): --  CVP(mm Hg): --  CI: --  CAPILLARY BLOOD GLUCOSE       N/A      09-21 @ 07:01  -  09-22 @ 07:00  --------------------------------------------------------  IN:    dextrose 5% + sodium chloride 0.45%: 675 mL    Oral Fluid: 850 mL  Total IN: 1525 mL    OUT:    Drain (mL): 20 mL    Voided (mL): 1500 mL  Total OUT: 1520 mL    Total NET: 5 mL    PHYSICAL EXAM:  Constitutional: A&Ox3, NAD  Respiratory: On room air, unlabored   Abdomen: Soft, nondistended. No rebound or guarding. drain seropuruelent  Extremities: WWP, MCCANN spontaneously      LABS  CBC (09-22 @ 05:33)                              9.6<L>                         5.80    )----------------(  269        --    % Neutrophils, --    % Lymphocytes, ANC: --                                  32.4<L>  CBC (09-21 @ 05:32)                              10.0<L>                         6.31    )----------------(  273        --    % Neutrophils, --    % Lymphocytes, ANC: --                                  32.5<L>    BMP (09-22 @ 05:33)             137     |  103     |  13    		Ca++ --      Ca 9.7                ---------------------------------( 109<H>		Mg 2.00               4.6     |  26      |  0.84  			Ph 2.5     BMP (09-21 @ 05:32)             139     |  105     |  14    		Ca++ --      Ca 10.1               ---------------------------------( 103<H>		Mg 1.80               4.5     |  26      |  0.96  			Ph 2.0<L>      -> .Body Fluid HEPATIC DRAINAGE Culture (09-15 @ 12:45)       Numerous polymorphonuclear leukocytes seen per low power field  Few Gram positive cocci in pairs seen per oil power field    Enterococcus faecalis    Moderate Enterococcus faecalis

## 2022-09-24 LAB
ANION GAP SERPL CALC-SCNC: 7 MMOL/L — SIGNIFICANT CHANGE UP (ref 7–14)
BUN SERPL-MCNC: 8 MG/DL — SIGNIFICANT CHANGE UP (ref 7–23)
CALCIUM SERPL-MCNC: 10 MG/DL — SIGNIFICANT CHANGE UP (ref 8.4–10.5)
CHLORIDE SERPL-SCNC: 106 MMOL/L — SIGNIFICANT CHANGE UP (ref 98–107)
CO2 SERPL-SCNC: 26 MMOL/L — SIGNIFICANT CHANGE UP (ref 22–31)
CREAT SERPL-MCNC: 0.73 MG/DL — SIGNIFICANT CHANGE UP (ref 0.5–1.3)
EGFR: 80 ML/MIN/1.73M2 — SIGNIFICANT CHANGE UP
GLUCOSE SERPL-MCNC: 77 MG/DL — SIGNIFICANT CHANGE UP (ref 70–99)
HCT VFR BLD CALC: 31.1 % — LOW (ref 34.5–45)
HGB BLD-MCNC: 9.4 G/DL — LOW (ref 11.5–15.5)
MAGNESIUM SERPL-MCNC: 2.1 MG/DL — SIGNIFICANT CHANGE UP (ref 1.6–2.6)
MCHC RBC-ENTMCNC: 28.7 PG — SIGNIFICANT CHANGE UP (ref 27–34)
MCHC RBC-ENTMCNC: 30.2 GM/DL — LOW (ref 32–36)
MCV RBC AUTO: 95.1 FL — SIGNIFICANT CHANGE UP (ref 80–100)
NRBC # BLD: 0 /100 WBCS — SIGNIFICANT CHANGE UP (ref 0–0)
NRBC # FLD: 0 K/UL — SIGNIFICANT CHANGE UP (ref 0–0)
PHOSPHATE SERPL-MCNC: 3.4 MG/DL — SIGNIFICANT CHANGE UP (ref 2.5–4.5)
PLATELET # BLD AUTO: 280 K/UL — SIGNIFICANT CHANGE UP (ref 150–400)
POTASSIUM SERPL-MCNC: 4.6 MMOL/L — SIGNIFICANT CHANGE UP (ref 3.5–5.3)
POTASSIUM SERPL-SCNC: 4.6 MMOL/L — SIGNIFICANT CHANGE UP (ref 3.5–5.3)
RBC # BLD: 3.27 M/UL — LOW (ref 3.8–5.2)
RBC # FLD: 14.5 % — SIGNIFICANT CHANGE UP (ref 10.3–14.5)
SODIUM SERPL-SCNC: 139 MMOL/L — SIGNIFICANT CHANGE UP (ref 135–145)
VANCOMYCIN TROUGH SERPL-MCNC: 8.4 UG/ML — LOW (ref 10–20)
WBC # BLD: 4.53 K/UL — SIGNIFICANT CHANGE UP (ref 3.8–10.5)
WBC # FLD AUTO: 4.53 K/UL — SIGNIFICANT CHANGE UP (ref 3.8–10.5)

## 2022-09-24 PROCEDURE — 99232 SBSQ HOSP IP/OBS MODERATE 35: CPT | Mod: 24

## 2022-09-24 RX ADMIN — PANTOPRAZOLE SODIUM 40 MILLIGRAM(S): 20 TABLET, DELAYED RELEASE ORAL at 06:36

## 2022-09-24 RX ADMIN — Medication 3 MILLILITER(S): at 21:03

## 2022-09-24 RX ADMIN — Medication 3 MILLILITER(S): at 04:58

## 2022-09-24 RX ADMIN — Medication 145 MILLIGRAM(S): at 12:14

## 2022-09-24 RX ADMIN — Medication 3 MILLILITER(S): at 16:24

## 2022-09-24 RX ADMIN — Medication 1 TABLET(S): at 12:15

## 2022-09-24 RX ADMIN — CARVEDILOL PHOSPHATE 6.25 MILLIGRAM(S): 80 CAPSULE, EXTENDED RELEASE ORAL at 18:08

## 2022-09-24 RX ADMIN — CARVEDILOL PHOSPHATE 6.25 MILLIGRAM(S): 80 CAPSULE, EXTENDED RELEASE ORAL at 06:36

## 2022-09-24 RX ADMIN — Medication 100 MILLIGRAM(S): at 12:15

## 2022-09-24 RX ADMIN — MIRTAZAPINE 15 MILLIGRAM(S): 45 TABLET, ORALLY DISINTEGRATING ORAL at 22:06

## 2022-09-24 RX ADMIN — Medication 3 MILLILITER(S): at 10:10

## 2022-09-24 RX ADMIN — AMLODIPINE BESYLATE 2.5 MILLIGRAM(S): 2.5 TABLET ORAL at 06:35

## 2022-09-24 RX ADMIN — Medication 250 MILLIGRAM(S): at 02:12

## 2022-09-24 RX ADMIN — Medication 3 MILLIGRAM(S): at 22:05

## 2022-09-24 RX ADMIN — ENOXAPARIN SODIUM 30 MILLIGRAM(S): 100 INJECTION SUBCUTANEOUS at 18:09

## 2022-09-24 RX ADMIN — Medication 100 MILLIGRAM(S): at 22:05

## 2022-09-24 NOTE — PROGRESS NOTE ADULT - SUBJECTIVE AND OBJECTIVE BOX
TEAM Surgery Progress Note  Patient is a 87y old  Female who presents with a chief complaint of hepatic abscess (23 Sep 2022 16:20)      INTERVAL EVENTS: No acute events overnight.  SUBJECTIVE: Patient seen and examined at bedside with surgical team, patient without complaints. Denies fever, chills, CP, SOB nausea, vomiting, abdominal pain. +/+, kishore reg diet.      OBJECTIVE:    Vital Signs Last 24 Hrs  T(C): 36.8 (23 Sep 2022 21:10), Max: 36.8 (23 Sep 2022 12:21)  T(F): 98.2 (23 Sep 2022 21:10), Max: 98.2 (23 Sep 2022 12:21)  HR: 68 (24 Sep 2022 10:13) (55 - 81)  BP: 122/33 (23 Sep 2022 21:10) (122/33 - 168/61)  BP(mean): 90 (23 Sep 2022 11:24) (90 - 90)  RR: 17 (23 Sep 2022 21:10) (16 - 20)  SpO2: 100% (24 Sep 2022 10:13) (97% - 100%)    Parameters below as of 24 Sep 2022 10:13  Patient On (Oxygen Delivery Method): room air    I&O's Detail    23 Sep 2022 07:01  -  24 Sep 2022 07:00  --------------------------------------------------------  IN:  Total IN: 0 mL    OUT:    Drain (mL): 0 mL    Voided (mL): 650 mL  Total OUT: 650 mL    Total NET: -650 mL      MEDICATIONS  (STANDING):  albuterol/ipratropium for Nebulization 3 milliLiter(s) Nebulizer every 6 hours  allopurinol 100 milliGRAM(s) Oral at bedtime  amLODIPine   Tablet 2.5 milliGRAM(s) Oral daily  carvedilol 6.25 milliGRAM(s) Oral every 12 hours  enoxaparin Injectable 30 milliGRAM(s) SubCutaneous every 24 hours  fenofibrate Tablet 145 milliGRAM(s) Oral daily  melatonin 3 milliGRAM(s) Oral at bedtime  mirtazapine 15 milliGRAM(s) Oral at bedtime  multivitamin 1 Tablet(s) Oral daily  pantoprazole    Tablet 40 milliGRAM(s) Oral before breakfast  thiamine 100 milliGRAM(s) Oral daily  vancomycin  IVPB 750 milliGRAM(s) IV Intermittent every 24 hours    MEDICATIONS  (PRN):  acetaminophen     Tablet .. 650 milliGRAM(s) Oral every 6 hours PRN Temp greater or equal to 38C (100.4F), Mild Pain (1 - 3)      PHYSICAL EXAM:  Constitutional: A&Ox3, NAD  Respiratory: Unlabored breathing  Abdomen: Soft, nondistended, NTTP. No rebound or guarding.  Extremities: WWP, MCCANN spontaneously    LABS:                        9.4    4.53  )-----------( 280      ( 24 Sep 2022 05:39 )             31.1     09-24    139  |  106  |  8   ----------------------------<  77  4.6   |  26  |  0.73    Ca    10.0      24 Sep 2022 05:39  Phos  3.4     09-24  Mg     2.10     09-24      PT/INR - ( 23 Sep 2022 09:51 )   PT: 11.9 sec;   INR: 1.03 ratio         PTT - ( 23 Sep 2022 09:51 )  PTT:30.2 sec          IMAGING:

## 2022-09-24 NOTE — PROGRESS NOTE ADULT - ASSESSMENT
87 year old female with a PMHx of HTN, HLD GERD, and gallbladder cancer for which she underwent an ex lap, open cholecystectomy, segment 4b/5 hepatectomy, and right colectomy due to fistula tract vs metastasis on 08/19, who p/w to S ED with lethargy for 2 days and hypoxia to 40/50s % with workup remarkable for a [id number] hepatic abscess and b/l pleural effusions on CT scan, and hypoxia requiring BiPAP, transferred to [facility] for further management, admitted to SICU i/s/o persistent NIPPV, s/p IR drainage of collection. Now on floor, recovering well.     Plan  - IR tube check 9/23: tube patent  - Diet: reg  - Unasyn, fluid cultures positive for enterococcus   - appreciate ID recs: cont IV vanc  - Continue home meds   - Wean NC as tolerated  - plan for followup CTAP next week  - f/u drain output  - Dispo: home with CDPAP, home PT, and VNS for drain     D Team Surgery, #[id number]    Seen and examined with Dr. Martinez.

## 2022-09-25 LAB
ANION GAP SERPL CALC-SCNC: 7 MMOL/L — SIGNIFICANT CHANGE UP (ref 7–14)
BUN SERPL-MCNC: 12 MG/DL — SIGNIFICANT CHANGE UP (ref 7–23)
CALCIUM SERPL-MCNC: 9.8 MG/DL — SIGNIFICANT CHANGE UP (ref 8.4–10.5)
CHLORIDE SERPL-SCNC: 106 MMOL/L — SIGNIFICANT CHANGE UP (ref 98–107)
CO2 SERPL-SCNC: 27 MMOL/L — SIGNIFICANT CHANGE UP (ref 22–31)
CREAT SERPL-MCNC: 0.79 MG/DL — SIGNIFICANT CHANGE UP (ref 0.5–1.3)
EGFR: 73 ML/MIN/1.73M2 — SIGNIFICANT CHANGE UP
GLUCOSE SERPL-MCNC: 92 MG/DL — SIGNIFICANT CHANGE UP (ref 70–99)
HCT VFR BLD CALC: 30.2 % — LOW (ref 34.5–45)
HGB BLD-MCNC: 9.3 G/DL — LOW (ref 11.5–15.5)
MAGNESIUM SERPL-MCNC: 1.7 MG/DL — SIGNIFICANT CHANGE UP (ref 1.6–2.6)
MCHC RBC-ENTMCNC: 28.7 PG — SIGNIFICANT CHANGE UP (ref 27–34)
MCHC RBC-ENTMCNC: 30.8 GM/DL — LOW (ref 32–36)
MCV RBC AUTO: 93.2 FL — SIGNIFICANT CHANGE UP (ref 80–100)
NRBC # BLD: 0 /100 WBCS — SIGNIFICANT CHANGE UP (ref 0–0)
NRBC # FLD: 0 K/UL — SIGNIFICANT CHANGE UP (ref 0–0)
PHOSPHATE SERPL-MCNC: 3.4 MG/DL — SIGNIFICANT CHANGE UP (ref 2.5–4.5)
PLATELET # BLD AUTO: 298 K/UL — SIGNIFICANT CHANGE UP (ref 150–400)
POTASSIUM SERPL-MCNC: 4.1 MMOL/L — SIGNIFICANT CHANGE UP (ref 3.5–5.3)
POTASSIUM SERPL-SCNC: 4.1 MMOL/L — SIGNIFICANT CHANGE UP (ref 3.5–5.3)
RBC # BLD: 3.24 M/UL — LOW (ref 3.8–5.2)
RBC # FLD: 14.6 % — HIGH (ref 10.3–14.5)
SODIUM SERPL-SCNC: 140 MMOL/L — SIGNIFICANT CHANGE UP (ref 135–145)
VANCOMYCIN TROUGH SERPL-MCNC: 7.4 UG/ML — LOW (ref 10–20)
WBC # BLD: 4.63 K/UL — SIGNIFICANT CHANGE UP (ref 3.8–10.5)
WBC # FLD AUTO: 4.63 K/UL — SIGNIFICANT CHANGE UP (ref 3.8–10.5)

## 2022-09-25 PROCEDURE — 99232 SBSQ HOSP IP/OBS MODERATE 35: CPT | Mod: 24

## 2022-09-25 RX ORDER — MAGNESIUM SULFATE 500 MG/ML
2 VIAL (ML) INJECTION ONCE
Refills: 0 | Status: COMPLETED | OUTPATIENT
Start: 2022-09-25 | End: 2022-09-25

## 2022-09-25 RX ORDER — VANCOMYCIN HCL 1 G
1000 VIAL (EA) INTRAVENOUS EVERY 24 HOURS
Refills: 0 | Status: COMPLETED | OUTPATIENT
Start: 2022-09-26 | End: 2022-09-28

## 2022-09-25 RX ADMIN — PANTOPRAZOLE SODIUM 40 MILLIGRAM(S): 20 TABLET, DELAYED RELEASE ORAL at 09:10

## 2022-09-25 RX ADMIN — MIRTAZAPINE 15 MILLIGRAM(S): 45 TABLET, ORALLY DISINTEGRATING ORAL at 21:58

## 2022-09-25 RX ADMIN — Medication 3 MILLIGRAM(S): at 21:57

## 2022-09-25 RX ADMIN — Medication 3 MILLILITER(S): at 17:01

## 2022-09-25 RX ADMIN — Medication 3 MILLILITER(S): at 22:22

## 2022-09-25 RX ADMIN — AMLODIPINE BESYLATE 2.5 MILLIGRAM(S): 2.5 TABLET ORAL at 05:49

## 2022-09-25 RX ADMIN — Medication 3 MILLILITER(S): at 11:30

## 2022-09-25 RX ADMIN — Medication 1 TABLET(S): at 11:09

## 2022-09-25 RX ADMIN — Medication 100 MILLIGRAM(S): at 21:57

## 2022-09-25 RX ADMIN — Medication 145 MILLIGRAM(S): at 11:10

## 2022-09-25 RX ADMIN — Medication 100 MILLIGRAM(S): at 11:10

## 2022-09-25 RX ADMIN — ENOXAPARIN SODIUM 30 MILLIGRAM(S): 100 INJECTION SUBCUTANEOUS at 18:33

## 2022-09-25 RX ADMIN — Medication 250 MILLIGRAM(S): at 02:35

## 2022-09-25 RX ADMIN — Medication 25 GRAM(S): at 11:09

## 2022-09-25 RX ADMIN — CARVEDILOL PHOSPHATE 6.25 MILLIGRAM(S): 80 CAPSULE, EXTENDED RELEASE ORAL at 05:49

## 2022-09-25 RX ADMIN — CARVEDILOL PHOSPHATE 6.25 MILLIGRAM(S): 80 CAPSULE, EXTENDED RELEASE ORAL at 18:32

## 2022-09-25 NOTE — PROGRESS NOTE ADULT - SUBJECTIVE AND OBJECTIVE BOX
Surgery Progress Note    SUBJECTIVE: Pt seen and examined at bedside. Patient comfortable and in no-apparent distress. No nausea, vomiting, diarrhea. Pain is controlled. +Gas/+BM. Tolerating diet.    Vital Signs Last 24 Hrs  T(C): 37.2 (25 Sep 2022 04:35), Max: 37.2 (25 Sep 2022 04:35)  T(F): 98.9 (25 Sep 2022 04:35), Max: 98.9 (25 Sep 2022 04:35)  HR: 78 (25 Sep 2022 04:35) (63 - 80)  BP: 145/62 (25 Sep 2022 04:35) (128/58 - 148/55)  BP(mean): --  RR: 16 (25 Sep 2022 04:35) (16 - 17)  SpO2: 99% (25 Sep 2022 04:35) (97% - 100%)    Parameters below as of 25 Sep 2022 04:35  Patient On (Oxygen Delivery Method): nasal cannula  O2 Flow (L/min): 2      Physical Exam:  General Appearance: Appears well, NAD  Respiratory: No labored breathing  CV: Pulse regularly present  Abdomen: Soft, nontender, drain in place with minimal purulent output     LABS:                        9.3    4.63  )-----------( 298      ( 25 Sep 2022 07:23 )             30.2     09-25    140  |  106  |  12  ----------------------------<  92  4.1   |  27  |  0.79    Ca    9.8      25 Sep 2022 07:23  Phos  3.4     09-25  Mg     1.70     09-25      PT/INR - ( 23 Sep 2022 09:51 )   PT: 11.9 sec;   INR: 1.03 ratio         PTT - ( 23 Sep 2022 09:51 )  PTT:30.2 sec      INs and OUTs:    09-24-22 @ 07:01  -  09-25-22 @ 07:00  --------------------------------------------------------  IN: 250 mL / OUT: 350 mL / NET: -100 mL

## 2022-09-25 NOTE — PROGRESS NOTE ADULT - ASSESSMENT
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 Now on floor, recovering well.     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 Now on floor, recovering well.     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 Now on floor, recovering well.     Plan  - IR tube check 9/23: tube patent  - Diet: Regular   - Appreciate ID recs: cont IV vanc  - Vanc trough today: 7.4  - Per ID: Increase IV Vanc to 1000 q 24 hours, repeat trough after 3 doses   - Continue home meds   - Wean NC as tolerated  - F/u drain output  - Dispo: home with CDPAP, home PT, and VNS for drain     D Team Surgery, #[id number]    Seen and examined with Dr. Martinez

## 2022-09-26 LAB
ANION GAP SERPL CALC-SCNC: 7 MMOL/L — SIGNIFICANT CHANGE UP (ref 7–14)
BUN SERPL-MCNC: 11 MG/DL — SIGNIFICANT CHANGE UP (ref 7–23)
CALCIUM SERPL-MCNC: 10.2 MG/DL — SIGNIFICANT CHANGE UP (ref 8.4–10.5)
CHLORIDE SERPL-SCNC: 101 MMOL/L — SIGNIFICANT CHANGE UP (ref 98–107)
CO2 SERPL-SCNC: 29 MMOL/L — SIGNIFICANT CHANGE UP (ref 22–31)
CREAT SERPL-MCNC: 0.78 MG/DL — SIGNIFICANT CHANGE UP (ref 0.5–1.3)
EGFR: 74 ML/MIN/1.73M2 — SIGNIFICANT CHANGE UP
GLUCOSE BLDC GLUCOMTR-MCNC: 140 MG/DL — HIGH (ref 70–99)
GLUCOSE BLDC GLUCOMTR-MCNC: 77 MG/DL — SIGNIFICANT CHANGE UP (ref 70–99)
GLUCOSE SERPL-MCNC: 136 MG/DL — HIGH (ref 70–99)
HCT VFR BLD CALC: 34.3 % — LOW (ref 34.5–45)
HGB BLD-MCNC: 10.3 G/DL — LOW (ref 11.5–15.5)
MAGNESIUM SERPL-MCNC: 1.8 MG/DL — SIGNIFICANT CHANGE UP (ref 1.6–2.6)
MCHC RBC-ENTMCNC: 28.2 PG — SIGNIFICANT CHANGE UP (ref 27–34)
MCHC RBC-ENTMCNC: 30 GM/DL — LOW (ref 32–36)
MCV RBC AUTO: 94 FL — SIGNIFICANT CHANGE UP (ref 80–100)
NRBC # BLD: 0 /100 WBCS — SIGNIFICANT CHANGE UP (ref 0–0)
NRBC # FLD: 0 K/UL — SIGNIFICANT CHANGE UP (ref 0–0)
PHOSPHATE SERPL-MCNC: 2.9 MG/DL — SIGNIFICANT CHANGE UP (ref 2.5–4.5)
PLATELET # BLD AUTO: 335 K/UL — SIGNIFICANT CHANGE UP (ref 150–400)
POTASSIUM SERPL-MCNC: 4 MMOL/L — SIGNIFICANT CHANGE UP (ref 3.5–5.3)
POTASSIUM SERPL-SCNC: 4 MMOL/L — SIGNIFICANT CHANGE UP (ref 3.5–5.3)
RBC # BLD: 3.65 M/UL — LOW (ref 3.8–5.2)
RBC # FLD: 14.6 % — HIGH (ref 10.3–14.5)
SODIUM SERPL-SCNC: 137 MMOL/L — SIGNIFICANT CHANGE UP (ref 135–145)
VANCOMYCIN TROUGH SERPL-MCNC: 4.8 UG/ML — LOW (ref 10–20)
WBC # BLD: 4.9 K/UL — SIGNIFICANT CHANGE UP (ref 3.8–10.5)
WBC # FLD AUTO: 4.9 K/UL — SIGNIFICANT CHANGE UP (ref 3.8–10.5)

## 2022-09-26 PROCEDURE — 99232 SBSQ HOSP IP/OBS MODERATE 35: CPT | Mod: 24

## 2022-09-26 RX ORDER — MAGNESIUM SULFATE 500 MG/ML
1 VIAL (ML) INJECTION ONCE
Refills: 0 | Status: COMPLETED | OUTPATIENT
Start: 2022-09-26 | End: 2022-09-26

## 2022-09-26 RX ADMIN — Medication 100 MILLIGRAM(S): at 11:39

## 2022-09-26 RX ADMIN — MIRTAZAPINE 15 MILLIGRAM(S): 45 TABLET, ORALLY DISINTEGRATING ORAL at 22:16

## 2022-09-26 RX ADMIN — Medication 250 MILLIGRAM(S): at 00:48

## 2022-09-26 RX ADMIN — PANTOPRAZOLE SODIUM 40 MILLIGRAM(S): 20 TABLET, DELAYED RELEASE ORAL at 06:09

## 2022-09-26 RX ADMIN — Medication 145 MILLIGRAM(S): at 11:38

## 2022-09-26 RX ADMIN — Medication 100 MILLIGRAM(S): at 22:16

## 2022-09-26 RX ADMIN — Medication 3 MILLILITER(S): at 10:33

## 2022-09-26 RX ADMIN — Medication 3 MILLILITER(S): at 16:26

## 2022-09-26 RX ADMIN — Medication 3 MILLIGRAM(S): at 22:16

## 2022-09-26 RX ADMIN — Medication 3 MILLILITER(S): at 23:40

## 2022-09-26 RX ADMIN — Medication 100 GRAM(S): at 11:40

## 2022-09-26 RX ADMIN — CARVEDILOL PHOSPHATE 6.25 MILLIGRAM(S): 80 CAPSULE, EXTENDED RELEASE ORAL at 17:20

## 2022-09-26 RX ADMIN — CARVEDILOL PHOSPHATE 6.25 MILLIGRAM(S): 80 CAPSULE, EXTENDED RELEASE ORAL at 06:09

## 2022-09-26 RX ADMIN — AMLODIPINE BESYLATE 2.5 MILLIGRAM(S): 2.5 TABLET ORAL at 06:09

## 2022-09-26 RX ADMIN — Medication 1 TABLET(S): at 11:38

## 2022-09-26 RX ADMIN — ENOXAPARIN SODIUM 30 MILLIGRAM(S): 100 INJECTION SUBCUTANEOUS at 17:19

## 2022-09-26 NOTE — PROGRESS NOTE ADULT - ASSESSMENT
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 Now on floor, recovering well.     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 Now on floor, recovering well.     Plan  - IR tube check 9/23: tube patent  - Diet: Regular   - Abcess + Enterococcus faecalis; Appreciate ID recs: cont IV Vancomycin, monitor trough; anticipate ABX x 4 weeks, IV ABX while drain in place  - Continue home meds   - F/u drain output  - Dispo: home with CDPAP, home PT, and VNS for drain     D Team Surgery  [id number]

## 2022-09-26 NOTE — PROGRESS NOTE ADULT - SUBJECTIVE AND OBJECTIVE BOX
SURGERY DAILY PROGRESS NOTE:       SUBJECTIVE/ROS: Patient seen and evaluated on AM rounds. BRUCE o/n.  Denies nausea, vomiting, chest pain, shortness of breath       OBJECTIVE:  Vital Signs Last 24 Hrs  T(C): 37.1 (25 Sep 2022 20:22), Max: 37.2 (25 Sep 2022 04:35)  T(F): 98.8 (25 Sep 2022 20:22), Max: 98.9 (25 Sep 2022 04:35)  HR: 78 (25 Sep 2022 22:22) (63 - 80)  BP: 144/61 (25 Sep 2022 20:22) (123/62 - 145/62)  BP(mean): --  RR: 17 (25 Sep 2022 20:22) (16 - 18)  SpO2: 100% (25 Sep 2022 22:22) (96% - 100%)    Parameters below as of 25 Sep 2022 22:22  Patient On (Oxygen Delivery Method): room air      I&O's Detail    24 Sep 2022 07:01  -  25 Sep 2022 07:00  --------------------------------------------------------  IN:    Oral Fluid: 250 mL  Total IN: 250 mL    OUT:    Drain (mL): 0 mL    Voided (mL): 350 mL  Total OUT: 350 mL    Total NET: -100 mL        Daily     Daily   MEDICATIONS  (STANDING):  albuterol/ipratropium for Nebulization 3 milliLiter(s) Nebulizer every 6 hours  allopurinol 100 milliGRAM(s) Oral at bedtime  amLODIPine   Tablet 2.5 milliGRAM(s) Oral daily  carvedilol 6.25 milliGRAM(s) Oral every 12 hours  enoxaparin Injectable 30 milliGRAM(s) SubCutaneous every 24 hours  fenofibrate Tablet 145 milliGRAM(s) Oral daily  melatonin 3 milliGRAM(s) Oral at bedtime  mirtazapine 15 milliGRAM(s) Oral at bedtime  multivitamin 1 Tablet(s) Oral daily  pantoprazole    Tablet 40 milliGRAM(s) Oral before breakfast  thiamine 100 milliGRAM(s) Oral daily  vancomycin  IVPB 1000 milliGRAM(s) IV Intermittent every 24 hours    MEDICATIONS  (PRN):  acetaminophen     Tablet .. 650 milliGRAM(s) Oral every 6 hours PRN Temp greater or equal to 38C (100.4F), Mild Pain (1 - 3)      LABS:                        9.3    4.63  )-----------( 298      ( 25 Sep 2022 07:23 )             30.2     09-25    140  |  106  |  12  ----------------------------<  92  4.1   |  27  |  0.79    Ca    9.8      25 Sep 2022 07:23  Phos  3.4     09-25  Mg     1.70     09-25                      PHYSICAL EXAM:  General Appearance: Appears well, NAD  Respiratory: No labored breathing  CV: Pulse regularly present  Abdomen: Soft, nontender, drain in place with minimal purulent output        D TEAM Surgery Progress Note  Patient is a 87y old  Female who presents with a chief complaint of hepatic abscess (26 Sep 2022 01:10)    INTERVAL EVENTS: Patient is PPD#11 s/p IR hepatic abscess drainage. No acute events overnight.    SUBJECTIVE: Patient seen and examined at bedside with surgical team, patient states she's "feeling ok." Denies abdominal pain. Tolerating regular diet. Denies nausea, vomiting.  Denies fever, chills, CP, SOB.      OBJECTIVE:    Vital Signs Last 24 Hrs  T(C): 37.2 (26 Sep 2022 05:44), Max: 37.2 (26 Sep 2022 05:44)  T(F): 98.9 (26 Sep 2022 05:44), Max: 98.9 (26 Sep 2022 05:44)  HR: 72 (26 Sep 2022 05:44) (63 - 80)  BP: 135/50 (26 Sep 2022 05:44) (123/62 - 156/53)  BP(mean): --  RR: 17 (26 Sep 2022 05:44) (17 - 18)  SpO2: 96% (26 Sep 2022 05:44) (95% - 100%)    Parameters below as of 26 Sep 2022 05:44  Patient On (Oxygen Delivery Method): room air    I&O's Detail  MEDICATIONS  (STANDING):  albuterol/ipratropium for Nebulization 3 milliLiter(s) Nebulizer every 6 hours  allopurinol 100 milliGRAM(s) Oral at bedtime  amLODIPine   Tablet 2.5 milliGRAM(s) Oral daily  carvedilol 6.25 milliGRAM(s) Oral every 12 hours  enoxaparin Injectable 30 milliGRAM(s) SubCutaneous every 24 hours  fenofibrate Tablet 145 milliGRAM(s) Oral daily  melatonin 3 milliGRAM(s) Oral at bedtime  mirtazapine 15 milliGRAM(s) Oral at bedtime  multivitamin 1 Tablet(s) Oral daily  pantoprazole    Tablet 40 milliGRAM(s) Oral before breakfast  thiamine 100 milliGRAM(s) Oral daily  vancomycin  IVPB 1000 milliGRAM(s) IV Intermittent every 24 hours    MEDICATIONS  (PRN):  acetaminophen     Tablet .. 650 milliGRAM(s) Oral every 6 hours PRN Temp greater or equal to 38C (100.4F), Mild Pain (1 - 3)      PHYSICAL EXAM:  Constitutional: A&Ox3, NAD  Respiratory: Unlabored breathing  Abdomen: Soft, nondistended, NTTP. No rebound or guarding. IR drain RUQ with scant purulent output.  Extremities: WWP, MCCANN spontaneously    LABS:                        9.3    4.63  )-----------( 298      ( 25 Sep 2022 07:23 )             30.2     09-25    140  |  106  |  12  ----------------------------<  92  4.1   |  27  |  0.79    Ca    9.8      25 Sep 2022 07:23  Phos  3.4     09-25  Mg     1.70     09-25

## 2022-09-27 LAB
ANION GAP SERPL CALC-SCNC: 10 MMOL/L — SIGNIFICANT CHANGE UP (ref 7–14)
BUN SERPL-MCNC: 11 MG/DL — SIGNIFICANT CHANGE UP (ref 7–23)
CALCIUM SERPL-MCNC: 10.2 MG/DL — SIGNIFICANT CHANGE UP (ref 8.4–10.5)
CHLORIDE SERPL-SCNC: 104 MMOL/L — SIGNIFICANT CHANGE UP (ref 98–107)
CO2 SERPL-SCNC: 24 MMOL/L — SIGNIFICANT CHANGE UP (ref 22–31)
CREAT SERPL-MCNC: 0.86 MG/DL — SIGNIFICANT CHANGE UP (ref 0.5–1.3)
EGFR: 66 ML/MIN/1.73M2 — SIGNIFICANT CHANGE UP
GLUCOSE SERPL-MCNC: 95 MG/DL — SIGNIFICANT CHANGE UP (ref 70–99)
HCT VFR BLD CALC: 31.1 % — LOW (ref 34.5–45)
HGB BLD-MCNC: 9.7 G/DL — LOW (ref 11.5–15.5)
MAGNESIUM SERPL-MCNC: 1.9 MG/DL — SIGNIFICANT CHANGE UP (ref 1.6–2.6)
MCHC RBC-ENTMCNC: 28.5 PG — SIGNIFICANT CHANGE UP (ref 27–34)
MCHC RBC-ENTMCNC: 31.2 GM/DL — LOW (ref 32–36)
MCV RBC AUTO: 91.5 FL — SIGNIFICANT CHANGE UP (ref 80–100)
NRBC # BLD: 0 /100 WBCS — SIGNIFICANT CHANGE UP (ref 0–0)
NRBC # FLD: 0 K/UL — SIGNIFICANT CHANGE UP (ref 0–0)
PHOSPHATE SERPL-MCNC: 3.2 MG/DL — SIGNIFICANT CHANGE UP (ref 2.5–4.5)
PLATELET # BLD AUTO: 308 K/UL — SIGNIFICANT CHANGE UP (ref 150–400)
POTASSIUM SERPL-MCNC: 4.2 MMOL/L — SIGNIFICANT CHANGE UP (ref 3.5–5.3)
POTASSIUM SERPL-SCNC: 4.2 MMOL/L — SIGNIFICANT CHANGE UP (ref 3.5–5.3)
RBC # BLD: 3.4 M/UL — LOW (ref 3.8–5.2)
RBC # FLD: 14.6 % — HIGH (ref 10.3–14.5)
SARS-COV-2 RNA SPEC QL NAA+PROBE: SIGNIFICANT CHANGE UP
SODIUM SERPL-SCNC: 138 MMOL/L — SIGNIFICANT CHANGE UP (ref 135–145)
VANCOMYCIN TROUGH SERPL-MCNC: 12.4 UG/ML — SIGNIFICANT CHANGE UP (ref 10–20)
WBC # BLD: 4.75 K/UL — SIGNIFICANT CHANGE UP (ref 3.8–10.5)
WBC # FLD AUTO: 4.75 K/UL — SIGNIFICANT CHANGE UP (ref 3.8–10.5)

## 2022-09-27 PROCEDURE — 36569 INSJ PICC 5 YR+ W/O IMAGING: CPT

## 2022-09-27 PROCEDURE — 76937 US GUIDE VASCULAR ACCESS: CPT | Mod: 26,59

## 2022-09-27 PROCEDURE — 99232 SBSQ HOSP IP/OBS MODERATE 35: CPT | Mod: 24

## 2022-09-27 PROCEDURE — 99232 SBSQ HOSP IP/OBS MODERATE 35: CPT

## 2022-09-27 RX ADMIN — Medication 250 MILLIGRAM(S): at 00:57

## 2022-09-27 RX ADMIN — Medication 145 MILLIGRAM(S): at 12:57

## 2022-09-27 RX ADMIN — CARVEDILOL PHOSPHATE 6.25 MILLIGRAM(S): 80 CAPSULE, EXTENDED RELEASE ORAL at 18:03

## 2022-09-27 RX ADMIN — Medication 3 MILLIGRAM(S): at 22:20

## 2022-09-27 RX ADMIN — CARVEDILOL PHOSPHATE 6.25 MILLIGRAM(S): 80 CAPSULE, EXTENDED RELEASE ORAL at 05:33

## 2022-09-27 RX ADMIN — Medication 3 MILLILITER(S): at 14:09

## 2022-09-27 RX ADMIN — Medication 3 MILLILITER(S): at 10:20

## 2022-09-27 RX ADMIN — PANTOPRAZOLE SODIUM 40 MILLIGRAM(S): 20 TABLET, DELAYED RELEASE ORAL at 05:32

## 2022-09-27 RX ADMIN — Medication 100 MILLIGRAM(S): at 12:57

## 2022-09-27 RX ADMIN — ENOXAPARIN SODIUM 30 MILLIGRAM(S): 100 INJECTION SUBCUTANEOUS at 18:03

## 2022-09-27 RX ADMIN — Medication 1 TABLET(S): at 12:58

## 2022-09-27 RX ADMIN — AMLODIPINE BESYLATE 2.5 MILLIGRAM(S): 2.5 TABLET ORAL at 05:33

## 2022-09-27 RX ADMIN — MIRTAZAPINE 15 MILLIGRAM(S): 45 TABLET, ORALLY DISINTEGRATING ORAL at 22:20

## 2022-09-27 RX ADMIN — Medication 100 MILLIGRAM(S): at 22:19

## 2022-09-27 RX ADMIN — Medication 3 MILLILITER(S): at 20:19

## 2022-09-27 NOTE — PROGRESS NOTE ADULT - SUBJECTIVE AND OBJECTIVE BOX
Vital Signs Last 24 Hrs  T(C): 37 (27 Sep 2022 05:40), Max: 37.3 (26 Sep 2022 12:28)  T(F): 98.6 (27 Sep 2022 05:40), Max: 99.2 (26 Sep 2022 12:28)  HR: 73 (27 Sep 2022 05:40) (62 - 79)  BP: 118/43 (27 Sep 2022 05:40) (112/57 - 154/60)  BP(mean): --  RR: 18 (27 Sep 2022 05:40) (16 - 18)  SpO2: 97% (27 Sep 2022 05:40) (96% - 100%)    Parameters below as of 27 Sep 2022 05:40  Patient On (Oxygen Delivery Method): nasal cannula  O2 Flow (L/min): 3      I&O's Detail    26 Sep 2022 07:01  -  27 Sep 2022 07:00  --------------------------------------------------------  IN:  Total IN: 0 mL    OUT:    Drain (mL): 5 mL    Voided (mL): 1225 mL  Total OUT: 1230 mL    Total NET: -1230 mL                                9.7    4.75  )-----------( 308      ( 27 Sep 2022 05:47 )             31.1       09-26    137  |  101  |  11  ----------------------------<  136<H>  4.0   |  29  |  0.78    Ca    10.2      26 Sep 2022 10:03  Phos  2.9     09-26  Mg     1.80     09-26  pigtail drainage minimal            PLAN:  continue antibiotics per ID  Tube check later this week

## 2022-09-27 NOTE — PROCEDURE NOTE - NSPROCDETAILS_GEN_ALL_CORE
Problem List Items Addressed This Visit        Other    Bipolar 1 disorder ([facility] [address] )     She is more on the depressed spectrum of Bipolar  Has been OK  Continue medications  Check labs  Relevant Orders    Comprehensive metabolic panel    TSH, 3rd generation    CBC and differential    Hyperlipidemia - Primary     Check labs  None done recently  Not on medications  Relevant Orders    Comprehensive metabolic panel    Lipid Panel with Direct LDL reflex    Vitamin D deficiency     Has listed vitamin D defic  She needs recheck  Follow after that           Relevant Orders    Vitamin D 25 hydroxy      Other Visit Diagnoses     Postmenopausal        Relevant Orders    DXA bone density spine hip and pelvis    Screening for breast cancer        Relevant Orders    Mammo screening bilateral w 3d & cad    Need for influenza vaccination        Relevant Orders    influenza vaccine, [id number], high-dose, PF 0 5 mL, for patients 65 yr+ (FLUZONE HIGH-DOSE) (Completed)    Need for pneumococcal vaccine        Relevant Orders    PNEUMOCOCCAL POLYSACCHARIDE VACCINE 23-VALENT =>3YO SQ IM (Completed) location identified, draped/prepped, sterile technique used/sterile dressing applied/ultrasound guidance

## 2022-09-27 NOTE — PROCEDURE NOTE - NSNUMATTEMPT_GEN_A_CORE
You have been given an order for outpatient physical therapy. Call as soon as possible to schedule your appointments  
1

## 2022-09-27 NOTE — PROGRESS NOTE ADULT - SUBJECTIVE AND OBJECTIVE BOX
SURGERY DAILY PROGRESS NOTE:       SUBJECTIVE/ROS: Patient seen and evaluated on AM rounds. BRUCE o/n.  Denies nausea, vomiting, chest pain, shortness of breath       OBJECTIVE:  Vital Signs Last 24 Hrs  T(C): 37 (26 Sep 2022 21:23), Max: 37.3 (26 Sep 2022 12:28)  T(F): 98.6 (26 Sep 2022 21:23), Max: 99.2 (26 Sep 2022 12:28)  HR: 79 (26 Sep 2022 21:23) (62 - 79)  BP: 137/53 (26 Sep 2022 21:23) (112/57 - 156/53)  BP(mean): --  RR: 18 (26 Sep 2022 21:23) (16 - 18)  SpO2: 97% (26 Sep 2022 21:23) (95% - 100%)    Parameters below as of 26 Sep 2022 21:23  Patient On (Oxygen Delivery Method): room air      I&O's Detail    26 Sep 2022 07:01  -  27 Sep 2022 00:52  --------------------------------------------------------  IN:  Total IN: 0 mL    OUT:    Drain (mL): 5 mL    Voided (mL): 425 mL  Total OUT: 430 mL    Total NET: -430 mL        Daily     Daily   MEDICATIONS  (STANDING):  albuterol/ipratropium for Nebulization 3 milliLiter(s) Nebulizer every 6 hours  allopurinol 100 milliGRAM(s) Oral at bedtime  amLODIPine   Tablet 2.5 milliGRAM(s) Oral daily  carvedilol 6.25 milliGRAM(s) Oral every 12 hours  enoxaparin Injectable 30 milliGRAM(s) SubCutaneous every 24 hours  fenofibrate Tablet 145 milliGRAM(s) Oral daily  melatonin 3 milliGRAM(s) Oral at bedtime  mirtazapine 15 milliGRAM(s) Oral at bedtime  multivitamin 1 Tablet(s) Oral daily  pantoprazole    Tablet 40 milliGRAM(s) Oral before breakfast  thiamine 100 milliGRAM(s) Oral daily  vancomycin  IVPB 1000 milliGRAM(s) IV Intermittent every 24 hours    MEDICATIONS  (PRN):  acetaminophen     Tablet .. 650 milliGRAM(s) Oral every 6 hours PRN Temp greater or equal to 38C (100.4F), Mild Pain (1 - 3)      LABS:                        10.3   4.90  )-----------( 335      ( 26 Sep 2022 10:03 )             34.3     09-26    137  |  101  |  11  ----------------------------<  136<H>  4.0   |  29  |  0.78    Ca    10.2      26 Sep 2022 10:03  Phos  2.9     09-26  Mg     1.80     09-26                      PHYSICAL EXAM:  Constitutional: A&Ox3, NAD  Respiratory: Unlabored breathing  Abdomen: Soft, nondistended, NTTP. No rebound or guarding. IR drain RUQ with scant purulent output.  Extremities: WWP, MCCANN spontaneously         D TEAM Surgery Progress Note  Patient is a 87y old  Female who presents with a chief complaint of hepatic abscess (26 Sep 2022 01:10)    INTERVAL EVENTS: Patient is PPD#12 s/p IR hepatic abscess drainage. No acute events overnight.    SUBJECTIVE: Patient seen and examined at bedside with surgical team, patient feeling well. Denies abdominal pain. Tolerating regular diet. Denies nausea, vomiting.  Denies fever, chills, CP, SOB.       OBJECTIVE:  Vital Signs Last 24 Hrs  T(C): 37 (26 Sep 2022 21:23), Max: 37.3 (26 Sep 2022 12:28)  T(F): 98.6 (26 Sep 2022 21:23), Max: 99.2 (26 Sep 2022 12:28)  HR: 79 (26 Sep 2022 21:23) (62 - 79)  BP: 137/53 (26 Sep 2022 21:23) (112/57 - 156/53)  BP(mean): --  RR: 18 (26 Sep 2022 21:23) (16 - 18)  SpO2: 97% (26 Sep 2022 21:23) (95% - 100%)    Parameters below as of 26 Sep 2022 21:23  Patient On (Oxygen Delivery Method): room air      I&O's Detail    26 Sep 2022 07:01  -  27 Sep 2022 00:52  --------------------------------------------------------  IN:  Total IN: 0 mL    OUT:    Drain (mL): 5 mL    Voided (mL): 425 mL  Total OUT: 430 mL    Total NET: -430 mL        Daily     Daily   MEDICATIONS  (STANDING):  albuterol/ipratropium for Nebulization 3 milliLiter(s) Nebulizer every 6 hours  allopurinol 100 milliGRAM(s) Oral at bedtime  amLODIPine   Tablet 2.5 milliGRAM(s) Oral daily  carvedilol 6.25 milliGRAM(s) Oral every 12 hours  enoxaparin Injectable 30 milliGRAM(s) SubCutaneous every 24 hours  fenofibrate Tablet 145 milliGRAM(s) Oral daily  melatonin 3 milliGRAM(s) Oral at bedtime  mirtazapine 15 milliGRAM(s) Oral at bedtime  multivitamin 1 Tablet(s) Oral daily  pantoprazole    Tablet 40 milliGRAM(s) Oral before breakfast  thiamine 100 milliGRAM(s) Oral daily  vancomycin  IVPB 1000 milliGRAM(s) IV Intermittent every 24 hours    MEDICATIONS  (PRN):  acetaminophen     Tablet .. 650 milliGRAM(s) Oral every 6 hours PRN Temp greater or equal to 38C (100.4F), Mild Pain (1 - 3)      LABS:                                   9.7    4.75  )-----------( 308      ( 27 Sep 2022 05:47 )             31.1   09-27    138  |  104  |  11  ----------------------------<  95  4.2   |  24  |  0.86    Ca    10.2      27 Sep 2022 05:47  Phos  3.2     09-27  Mg     1.90     09-27          PHYSICAL EXAM:  Constitutional: A&Ox3, NAD  Respiratory: Unlabored breathing  Abdomen: Soft, nondistended, NTTP. No rebound or guarding. IR drain RUQ with scant purulent output.  Extremities: WWP, MCCANN spontaneously

## 2022-09-27 NOTE — PROGRESS NOTE ADULT - ASSESSMENT
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 Now on floor, recovering well.     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 Now on floor, recovering well.     Plan  - IR tube check 9/23: tube patent; Consult IR for repeat tube check this week  - Diet: Regular   - Abcess + Enterococcus faecalis; Appreciate ID recs: cont IV Vancomycin, monitor trough; anticipate ABX x 4 weeks, IV ABX while drain in place  - Continue home meds   - F/u drain output  - Dispo: Home with home PT, and +/- VNS for drain if still in place    D Team Surgery  [id number]

## 2022-09-27 NOTE — CHART NOTE - NSCHARTNOTEFT_GEN_A_CORE
NUTRITION FOLLOW-UP    Pt has a history of HTN, HLD GERD, and gallbladder cancer for which she underwent an ex lap, open cholecystectomy, segment 4b/5 hepatectomy, and right colectomy on 08/19. Pt presented to [facility] ED with lethargy for 2 days and hypoxia with workup remarkable for a hepatic abscess and b/l pleural effusions. Pt was transferred to [facility] for further management. Pt is s/p IR drainage of collection.     Pt states her appetite and po intake have been improving. She report eating about half her meals. She has no complaints of GI distress. Pt has no difficulty chewing or swallowing.   Pt willing to try Ensure supplements.    Weight: 49.8 kg (9/18/22)       Labs: 09-27 Na 138 mmol/L Glu 95 mg/dL K+ 4.2 mmol/L Cr 0.86 mg/dL BUN 11 mg/dL Phos 3.2 mg/dL  09-26 @ 18:06 POCT 140 mg/dL    Current Diet: Diet, Regular (09-23-22 @ 17:11)    Edema: No edema noted    Skin: Surgical incision           Rash    Malnutrition Status: Ongoing Moderate Malnutrition    RD to Remain Available: Christiane Arana MS, RDN, CDN pager [phone]     Additional Recommendations:   1) Encourage po intake  2) Monitor weight, labs, po intake and skin integrity.
SICU Bed 1. Patient Layla accepted to 8T, 811.    88 y/o F with HTN, bladder cancer and gallbladder cancer s/p cholecystectomy partial hepatectomy (4b/5), RT hemicolectomy presenting from [facility] for hypoxia and found to have hepatic abscess on CT scan. After her surgery in August her postoperative course was complicated by pleural edema requiring diuresis in SICU and hypoxic and hypercapnic respiratory failure treated with oxygen supplementation, chest physiotherapy and abx. The pt was found by EMS to be lethargic and hypoxic with a sat in the 40%s. She was brought to [facility] where she was initiated on Bipap. The patient was unable to be weaned off nasal canula and subsequently underwent CT A/P which demonstrated a hepatic abscess at the gallbladder fossa. The patient has received a van/zosyn before she was transferred to [facility]. She denies fever, chills, vomiting, or diarrhea.     SICU consulted for hypoxic respiratory failure requiring BiPAP.    She was weaned off BiPAP in the SICU to NC, and eventually was taken off NC to room air, which she tolerated after she received Lasix. She was hemodynamically stable. Zosyn was continued for a hepatic abscess which grew E. Faecalis. Her diet was advanced to regular. Her drain has not put out significantly in the last 24 hours.    Patient was signed out to D Team Intern    Jany Bolanos MD  SICU
GENERAL SURGERY FLOOR TRANSFER NOTE    87y Female s/p hepatic abscess drainage transferred to floor from SICU    SUBJECTIVE:  87 year old female with a PMHx of HTN, HLD GERD, and gallbladder cancer for which she underwent an ex lap, open cholecystectomy, segment 4b/5 hepatectomy, and right colectomy due to fistula tract vs metastasis on 08/19, who p/w to S ED with lethargy for 2 days and hypoxia to 40/50s % with workup remarkable for a 5b3h6gr hepatic abscess and b/l pleural effusions on CT scan, and hypoxia requiring BiPAP, transferred to [facility] for further management, admitted to SICU i/s/o persistent NIPPV, s/p IR drainage of collection.    In the SICU, patient required BiPAP and was worked up for hypoxia 2/2 fluid overload vs undiagnosed COPD. Patient received 10 Lasix and was weaned off BiPAP to 2L NC, now completely weaned off NC. Patient remained hemodynamically stable while in the SICU. Hepatic fluid cultures grew enterococcus. Patient endorses bowel movements and passing gas.     OBJECTIVE:    T(C): 36.8 (09-17-22 @ 20:00), Max: 36.8 (09-17-22 @ 20:00)  HR: 80 (09-17-22 @ 20:00) (60 - 86)  BP: 143/51 (09-17-22 @ 20:00) (103/55 - 146/70)  RR: 22 (09-17-22 @ 20:00) (20 - 26)  SpO2: 93% (09-17-22 @ 20:00) (93% - 100%)  Wt(kg): --      I&O's Summary    16 Sep 2022 07:01  -  17 Sep 2022 07:00  --------------------------------------------------------  IN: 865 mL / OUT: 420 mL / NET: 445 mL    17 Sep 2022 07:01  -  17 Sep 2022 20:08  --------------------------------------------------------  IN: 175 mL / OUT: 0 mL / NET: 175 mL                              9.7    4.21  )-----------( 255      ( 17 Sep 2022 05:05 )             32.4       09-17    138  |  100  |  6<L>  ----------------------------<  105<H>  3.9   |  30  |  0.70    Ca    9.5      17 Sep 2022 05:05  Phos  3.0     09-17  Mg     1.80     09-17    TPro  5.8<L>  /  Alb  2.9<L>  /  TBili  0.5  /  DBili  x   /  AST  18  /  ALT  11  /  AlkPhos  145<H>  09-16      MEDICATIONS  (STANDING):  albuterol/ipratropium for Nebulization 3 milliLiter(s) Nebulizer every 6 hours  allopurinol 100 milliGRAM(s) Oral at bedtime  carvedilol 6.25 milliGRAM(s) Oral every 12 hours  chlorhexidine 4% Liquid 1 Application(s) Topical every 24 hours  enoxaparin Injectable 30 milliGRAM(s) SubCutaneous every 24 hours  fenofibrate Tablet 145 milliGRAM(s) Oral daily  melatonin 3 milliGRAM(s) Oral at bedtime  mirtazapine 15 milliGRAM(s) Oral at bedtime  multivitamin 1 Tablet(s) Oral daily  pantoprazole    Tablet 40 milliGRAM(s) Oral before breakfast  piperacillin/tazobactam IVPB.. 3.375 Gram(s) IV Intermittent every 8 hours  thiamine 100 milliGRAM(s) Oral daily    MEDICATIONS  (PRN):  acetaminophen     Tablet .. 650 milliGRAM(s) Oral every 6 hours PRN Temp greater or equal to 38C (100.4F), Mild Pain (1 - 3)      PHYSICAL EXAM:  Gen: Laying in bed, NAD  Resp: Unlabored breathing  Abd: soft, NTND  Ext: WWP  Skin: No rashes      Plan:  -Diet: Regular  -Zosyn for enterococcus growth  -f/u drain output  -D5 1/2 at 50/hr
PRE-INTERVENTIONAL RADIOLOGY PROCEDURE NOTE      Patient Age: 86 y/o    Patient Gender: Female    Procedure: IR drainage of hepatic abscess    Diagnosis/Indication: Hepatic abscess    Interventional Radiology Attending Physician: Dr. Dempsey    Ordering Attending Physician: Dr. Morel    Pertinent Medical History: 87 year old female with a PMHx of HTN, HLD GERD, and gallbladder cancer for which she underwent an ex lap, open cholecystectomy, segment 4b/5 hepatectomy, and right colectomy due to fistula tract vs metastasis on 08/19, who p/w to [facility] ED with lethargy for 2 days and hypoxia to 40/50s % with workup remarkable for a [id number] hepatic abscess and b/l pleural effusions on CT scan, and hypoxia requiring BiPAP, transferred to [facility] for further management, now admitted to SICU i/s/o persistent NIPPV.     Pertinent labs:                      10.4   7.04  )-----------( 315      ( 14 Sep 2022 17:00 )             34.3       09-15    139  |  99  |  6<L>  ----------------------------<  104<H>  3.6   |  34<H>  |  0.67    Ca    9.4      15 Sep 2022 05:06  Phos  3.0     09-15  Mg     1.60     09-15    TPro  7.4  /  Alb  3.9  /  TBili  0.5  /  DBili  x   /  AST  25  /  ALT  21  /  AlkPhos  222<H>  09-14      PT/INR - ( 14 Sep 2022 17:00 )   PT: 13.2 sec;   INR: 1.14 ratio         PTT - ( 14 Sep 2022 17:00 )  PTT:30.6 sec        Patient and Family Aware ? Yes
PRE-INTERVENTIONAL RADIOLOGY PROCEDURE NOTE      Patient Age: 87    Patient Gender: female    Procedure: tube check    Diagnosis/Indication: hepatic abscess    Interventional Radiology Attending Physician: Dr. Dempsey    Ordering Attending Physician: Dr. Roche    Pertinent Medical History:  ex lap, open cholecystectomy, segment 4b/5 hepatectomy, and right colectomy due to fistula tract vs metastasis on 08/19, who p/w to [facility] ED with lethargy for 2 days and hypoxia to 40/50s % with workup remarkable for a 3l2m4me hepatic abscess and b/l pleural effusions on CT scan, and hypoxia requiring BiPAP, transferred to [facility] for further management, admitted to SICU i/s/o persistent NIPPV, s/p IR drainage of collection.       Pertinent labs:                      9.6    4.87  )-----------( 268      ( 23 Sep 2022 07:10 )             31.7       09-23    138  |  103  |  11  ----------------------------<  144<H>  4.5   |  27  |  0.75    Ca    9.7      23 Sep 2022 07:10  Phos  2.6     09-23  Mg     1.70     09-23        PT/INR - ( 23 Sep 2022 09:51 )   PT: 11.9 sec;   INR: 1.03 ratio         PTT - ( 23 Sep 2022 09:51 )  PTT:30.2 sec        Patient and Family Aware ? Yes
Post Operative Note  Patient: HARSH VERGARA 87y (1935) Female   MRN: [id number]  Location: [facility] 01  Visit: 09-14-22 Inpatient  Date: 09-15-22 @ 21:46    Procedure: S/P Gallbladder fossa abscess drainage    Subjective: Patient seen and examined post operatively. Reports pain as controlled. Denies nausea, vomiting, fever, chills, chest pain, SOB, cough. Patient reports having a bowel movement and gas however the nurse was not aware. Patient communication is difficult due to mask.       Objective:  Vitals: T(F): 96.7 (09-15-22 @ 20:00), Max: 99.1 (09-15-22 @ 04:00)  HR: 61 (09-15-22 @ 21:00)  BP: 119/75 (09-15-22 @ 21:00) (93/69 - 156/97)  RR: 25 (09-15-22 @ 21:00)  SpO2: 100% (09-15-22 @ 21:00)  Vent Settings:     In:   09-14-22 @ 07:01  -  09-15-22 @ 07:00  --------------------------------------------------------  IN: 600 mL    09-15-22 @ 07:01  -  09-15-22 @ 21:46  --------------------------------------------------------  IN: 1050 mL      IV Fluids: dextrose 5% + sodium chloride 0.45%. 1000 milliLiter(s) (50 mL/Hr) IV Continuous <Continuous>      Out:   09-14-22 @ 07:01  -  09-15-22 @ 07:00  --------------------------------------------------------  OUT: 1150 mL    09-15-22 @ 07:01  -  09-15-22 @ 21:46  --------------------------------------------------------  OUT: 975 mL      EBL:     Voided Urine:   09-14-22 @ 07:01  -  09-15-22 @ 07:00  --------------------------------------------------------  OUT: 1150 mL    09-15-22 @ 07:01  -  09-15-22 @ 21:46  --------------------------------------------------------  OUT: 975 mL      Otto Catheter: no   Drains:   CATARINA:   09-15-22 @ 07:01  -  09-15-22 @ 21:46  --------------------------------------------------------  OUT: 25 mL     ,   Chest Tube:      NG Tube:         Physical Examination:  General: NAD, resting comfortably in bed  HEENT: Normocephalic atraumatic  Respiratory: Nonlabored respirations, normal CW expansion.  Cardio: S1S2, regular rate and rhythm.  Abdomen: soft nondistended, mildly tender at catheter site, insertion site CDI without any bleeding, hematoma, or strikethrough.   Vascular: extremities are warm and well perfused.     Imaging:  No post-op imaging studies    Assessment:  87yFemale patient S/P GB fossa abscess drainage and placement of 10Fr drain for Gb fossa abscess. Patient is stable without any acute complaints and is recovering appropriately.     Plan:  - IV Abx: Zosyn  - Pain control PRN  - Diet: NPO  - IVF: D5 1/2NS at 50    Date/Time: 09-15-22 @ 21:46
Initial (On Arrival)

## 2022-09-27 NOTE — PROGRESS NOTE ADULT - SUBJECTIVE AND OBJECTIVE BOX
CC: Patient is a 87y old  Female who presents with a chief complaint of hepatic abscess (27 Sep 2022 08:11)    ID following for hepatic abscess    Interval History/ROS: Patient doing well. Remains with RUQ drain. For tube check later this week. No fevers. WBC remains elevated.    Rest of ROS negative.    Allergies  ampicillin (Rash)  ampicillin-sulbactam (Rash)    ANTIMICROBIALS:  vancomycin  IVPB 1000 every 24 hours    OTHER MEDS:  acetaminophen     Tablet .. 650 milliGRAM(s) Oral every 6 hours PRN  albuterol/ipratropium for Nebulization 3 milliLiter(s) Nebulizer every 6 hours  allopurinol 100 milliGRAM(s) Oral at bedtime  amLODIPine   Tablet 2.5 milliGRAM(s) Oral daily  carvedilol 6.25 milliGRAM(s) Oral every 12 hours  enoxaparin Injectable 30 milliGRAM(s) SubCutaneous every 24 hours  fenofibrate Tablet 145 milliGRAM(s) Oral daily  melatonin 3 milliGRAM(s) Oral at bedtime  mirtazapine 15 milliGRAM(s) Oral at bedtime  multivitamin 1 Tablet(s) Oral daily  pantoprazole    Tablet 40 milliGRAM(s) Oral before breakfast  thiamine 100 milliGRAM(s) Oral daily    PE:    Vital Signs Last 24 Hrs  T(C): 37.2 (27 Sep 2022 17:03), Max: 37.2 (27 Sep 2022 17:03)  T(F): 98.9 (27 Sep 2022 17:03), Max: 98.9 (27 Sep 2022 17:03)  HR: 81 (27 Sep 2022 17:03) (61 - 81)  BP: 157/63 (27 Sep 2022 17:03) (118/43 - 157/63)  BP(mean): --  RR: 18 (27 Sep 2022 17:03) (16 - 18)  SpO2: 98% (27 Sep 2022 17:03) (96% - 100%)    Parameters below as of 27 Sep 2022 17:03  Patient On (Oxygen Delivery Method): room air    Gen: AOx3, NAD  CV: S1+S2 normal, no murmurs  Resp: Clear bilat, no resp distress  Abd: Soft, nontender, +BS, RUQ drain with minimal purulent drainage  Ext: No LE edema, no wounds  : No Otto  IV/Skin: No thrombophlebitis  Neuro: no focal deficits    LABS:                          9.7    4.75  )-----------( 308      ( 27 Sep 2022 05:47 )             31.1       09-27    138  |  104  |  11  ----------------------------<  95  4.2   |  24  |  0.86    Ca    10.2      27 Sep 2022 05:47  Phos  3.2     09-27  Mg     1.90     09-27    MICROBIOLOGY:  v  .Body Fluid HEPATIC DRAINAGE  09-15-22   Moderate Enterococcus faecalis  --  Enterococcus faecalis    RADIOLOGY:    < from: Xray Chest 1 View- PORTABLE-Urgent (Xray Chest 1 View- PORTABLE-Urgent .) (09.22.22 @ 15:24) >  IMPRESSION: Follow-up right pleural effusion.    < end of copied text >

## 2022-09-27 NOTE — PROGRESS NOTE ADULT - ASSESSMENT
87 year old female with a PMHx of HTN, HLD GERD, and gallbladder cancer for which she underwent an ex lap, open cholecystectomy, segment 4b/5 hepatectomy, and right colectomy due to fistula tract vs metastasis on 08/19 who was admitted to [facility] for hypoxia and then transferred to [facility] for further managment in the SICU. Imaging reveals hepatic abscess.    #Abnormal imaging of the abdomen  #Hepatic abscess  Drained by IR on admission to [facility]  Enterococcus faecalis, S to ampicillin vancomycin  Drain continues to be in place  Drain check with IR later this week    #Penicillin allergy  Previously denied any penicillin allergy  Tolerated piperacillin/tazobactam on admission  Developed worsening rash and itchiness on the left arm after initiation of ampicillin/sulbactam  Antibiotic switched to vancomycin to cover enterococcus in hepatic abscess drainage cx    #Ongoing use of vancomycin  Trough is 4.8  Renal function stable    Recommendations  Continue vancomycin 1000mg IV q 24 hours, continue to monitor trough  Given drainage was completed after started on broad spectrum abx, would start coverage for GNR and anerobe - start ertapenem 1 gram IV q 24 hours. Monitor for allergic reaction. Given she tolerated zosyn, she should tolerate ertapenem.  Anticipating 4 weeks of abx - will need to followup tube check later this week.  Would favor keeping patient on IV antibiotics while drain in place, possibly can transition to PO after removal   Follow fever curve and WBC count    Taras Dominguez MD  Available through MS Teams  If no response, or after 5pm/weekends, call [phone]

## 2022-09-28 LAB
ANION GAP SERPL CALC-SCNC: 10 MMOL/L — SIGNIFICANT CHANGE UP (ref 7–14)
BUN SERPL-MCNC: 14 MG/DL — SIGNIFICANT CHANGE UP (ref 7–23)
CALCIUM SERPL-MCNC: 10.2 MG/DL — SIGNIFICANT CHANGE UP (ref 8.4–10.5)
CHLORIDE SERPL-SCNC: 104 MMOL/L — SIGNIFICANT CHANGE UP (ref 98–107)
CO2 SERPL-SCNC: 25 MMOL/L — SIGNIFICANT CHANGE UP (ref 22–31)
CREAT SERPL-MCNC: 0.86 MG/DL — SIGNIFICANT CHANGE UP (ref 0.5–1.3)
EGFR: 66 ML/MIN/1.73M2 — SIGNIFICANT CHANGE UP
GLUCOSE SERPL-MCNC: 85 MG/DL — SIGNIFICANT CHANGE UP (ref 70–99)
HCT VFR BLD CALC: 30.5 % — LOW (ref 34.5–45)
HGB BLD-MCNC: 9.4 G/DL — LOW (ref 11.5–15.5)
MAGNESIUM SERPL-MCNC: 1.7 MG/DL — SIGNIFICANT CHANGE UP (ref 1.6–2.6)
MCHC RBC-ENTMCNC: 28.7 PG — SIGNIFICANT CHANGE UP (ref 27–34)
MCHC RBC-ENTMCNC: 30.8 GM/DL — LOW (ref 32–36)
MCV RBC AUTO: 93.3 FL — SIGNIFICANT CHANGE UP (ref 80–100)
NRBC # BLD: 0 /100 WBCS — SIGNIFICANT CHANGE UP (ref 0–0)
NRBC # FLD: 0 K/UL — SIGNIFICANT CHANGE UP (ref 0–0)
PHOSPHATE SERPL-MCNC: 3.2 MG/DL — SIGNIFICANT CHANGE UP (ref 2.5–4.5)
PLATELET # BLD AUTO: 348 K/UL — SIGNIFICANT CHANGE UP (ref 150–400)
POTASSIUM SERPL-MCNC: 3.9 MMOL/L — SIGNIFICANT CHANGE UP (ref 3.5–5.3)
POTASSIUM SERPL-SCNC: 3.9 MMOL/L — SIGNIFICANT CHANGE UP (ref 3.5–5.3)
RBC # BLD: 3.27 M/UL — LOW (ref 3.8–5.2)
RBC # FLD: 14.4 % — SIGNIFICANT CHANGE UP (ref 10.3–14.5)
SODIUM SERPL-SCNC: 139 MMOL/L — SIGNIFICANT CHANGE UP (ref 135–145)
WBC # BLD: 5.74 K/UL — SIGNIFICANT CHANGE UP (ref 3.8–10.5)
WBC # FLD AUTO: 5.74 K/UL — SIGNIFICANT CHANGE UP (ref 3.8–10.5)

## 2022-09-28 PROCEDURE — 74176 CT ABD & PELVIS W/O CONTRAST: CPT | Mod: 26

## 2022-09-28 PROCEDURE — 99232 SBSQ HOSP IP/OBS MODERATE 35: CPT | Mod: 24

## 2022-09-28 PROCEDURE — 99232 SBSQ HOSP IP/OBS MODERATE 35: CPT

## 2022-09-28 RX ORDER — MAGNESIUM SULFATE 500 MG/ML
1 VIAL (ML) INJECTION ONCE
Refills: 0 | Status: COMPLETED | OUTPATIENT
Start: 2022-09-28 | End: 2022-09-28

## 2022-09-28 RX ORDER — ERTAPENEM SODIUM 1 G/1
1000 INJECTION, POWDER, LYOPHILIZED, FOR SOLUTION INTRAMUSCULAR; INTRAVENOUS EVERY 24 HOURS
Refills: 0 | Status: DISCONTINUED | OUTPATIENT
Start: 2022-09-28 | End: 2022-09-29

## 2022-09-28 RX ORDER — VANCOMYCIN HCL 1 G
1000 VIAL (EA) INTRAVENOUS EVERY 24 HOURS
Refills: 0 | Status: DISCONTINUED | OUTPATIENT
Start: 2022-09-28 | End: 2022-09-28

## 2022-09-28 RX ORDER — POTASSIUM CHLORIDE 20 MEQ
20 PACKET (EA) ORAL ONCE
Refills: 0 | Status: COMPLETED | OUTPATIENT
Start: 2022-09-28 | End: 2022-09-28

## 2022-09-28 RX ORDER — VANCOMYCIN HCL 1 G
750 VIAL (EA) INTRAVENOUS EVERY 24 HOURS
Refills: 0 | Status: DISCONTINUED | OUTPATIENT
Start: 2022-09-28 | End: 2022-09-28

## 2022-09-28 RX ORDER — VANCOMYCIN HCL 1 G
1000 VIAL (EA) INTRAVENOUS EVERY 24 HOURS
Refills: 0 | Status: DISCONTINUED | OUTPATIENT
Start: 2022-09-29 | End: 2022-09-29

## 2022-09-28 RX ADMIN — Medication 250 MILLIGRAM(S): at 00:10

## 2022-09-28 RX ADMIN — ERTAPENEM SODIUM 120 MILLIGRAM(S): 1 INJECTION, POWDER, LYOPHILIZED, FOR SOLUTION INTRAMUSCULAR; INTRAVENOUS at 11:09

## 2022-09-28 RX ADMIN — PANTOPRAZOLE SODIUM 40 MILLIGRAM(S): 20 TABLET, DELAYED RELEASE ORAL at 05:50

## 2022-09-28 RX ADMIN — Medication 3 MILLILITER(S): at 09:41

## 2022-09-28 RX ADMIN — Medication 3 MILLIGRAM(S): at 21:21

## 2022-09-28 RX ADMIN — Medication 145 MILLIGRAM(S): at 11:10

## 2022-09-28 RX ADMIN — Medication 3 MILLILITER(S): at 02:57

## 2022-09-28 RX ADMIN — CARVEDILOL PHOSPHATE 6.25 MILLIGRAM(S): 80 CAPSULE, EXTENDED RELEASE ORAL at 17:15

## 2022-09-28 RX ADMIN — MIRTAZAPINE 15 MILLIGRAM(S): 45 TABLET, ORALLY DISINTEGRATING ORAL at 21:21

## 2022-09-28 RX ADMIN — ENOXAPARIN SODIUM 30 MILLIGRAM(S): 100 INJECTION SUBCUTANEOUS at 17:15

## 2022-09-28 RX ADMIN — Medication 3 MILLILITER(S): at 15:37

## 2022-09-28 RX ADMIN — CARVEDILOL PHOSPHATE 6.25 MILLIGRAM(S): 80 CAPSULE, EXTENDED RELEASE ORAL at 05:50

## 2022-09-28 RX ADMIN — Medication 100 GRAM(S): at 08:34

## 2022-09-28 RX ADMIN — Medication 250 MILLIGRAM(S): at 23:56

## 2022-09-28 RX ADMIN — Medication 20 MILLIEQUIVALENT(S): at 08:33

## 2022-09-28 RX ADMIN — AMLODIPINE BESYLATE 2.5 MILLIGRAM(S): 2.5 TABLET ORAL at 05:50

## 2022-09-28 RX ADMIN — Medication 100 MILLIGRAM(S): at 21:21

## 2022-09-28 RX ADMIN — Medication 100 MILLIGRAM(S): at 11:10

## 2022-09-28 RX ADMIN — Medication 1 TABLET(S): at 11:10

## 2022-09-28 RX ADMIN — Medication 3 MILLILITER(S): at 21:21

## 2022-09-28 NOTE — PROGRESS NOTE ADULT - SUBJECTIVE AND OBJECTIVE BOX
CC: Patient is a 87y old  Female who presents with a chief complaint of hepatic abscess (28 Sep 2022 10:11)    ID following for hepatic abscess    Interval History/ROS: Patient ambulating in the room. Remains with hepatic drain. No complaints. Feels well.    Rest of ROS negative.    Allergies  ampicillin (Rash)  ampicillin-sulbactam (Rash)    ANTIMICROBIALS:  ertapenem  IVPB 1000 every 24 hours    OTHER MEDS:  acetaminophen     Tablet .. 650 milliGRAM(s) Oral every 6 hours PRN  albuterol/ipratropium for Nebulization 3 milliLiter(s) Nebulizer every 6 hours  allopurinol 100 milliGRAM(s) Oral at bedtime  amLODIPine   Tablet 2.5 milliGRAM(s) Oral daily  carvedilol 6.25 milliGRAM(s) Oral every 12 hours  enoxaparin Injectable 30 milliGRAM(s) SubCutaneous every 24 hours  fenofibrate Tablet 145 milliGRAM(s) Oral daily  melatonin 3 milliGRAM(s) Oral at bedtime  mirtazapine 15 milliGRAM(s) Oral at bedtime  multivitamin 1 Tablet(s) Oral daily  pantoprazole    Tablet 40 milliGRAM(s) Oral before breakfast  thiamine 100 milliGRAM(s) Oral daily    PE:    Vital Signs Last 24 Hrs  T(C): 37.2 (28 Sep 2022 08:30), Max: 37.2 (27 Sep 2022 17:03)  T(F): 99 (28 Sep 2022 08:30), Max: 99 (28 Sep 2022 08:30)  HR: 66 (28 Sep 2022 09:45) (64 - 81)  BP: 130/56 (28 Sep 2022 08:30) (121/52 - 173/67)  BP(mean): --  RR: 18 (28 Sep 2022 08:30) (17 - 18)  SpO2: 100% (28 Sep 2022 09:45) (96% - 100%)    Parameters below as of 28 Sep 2022 09:45  Patient On (Oxygen Delivery Method): nasal cannula    Gen: AOx3, NAD  CV: S1+S2 normal, no murmurs  Resp: Clear bilat, no resp distress  Abd: Soft, nontender, +BS, RUQ drain with minimal purulent drainage  Ext: No LE edema, no wounds  : No Otto  IV/Skin: No thrombophlebitis  Neuro: no focal deficits    LABS:                          9.4    5.74  )-----------( 348      ( 28 Sep 2022 04:51 )             30.5       09-28    139  |  104  |  14  ----------------------------<  85  3.9   |  25  |  0.86    Ca    10.2      28 Sep 2022 04:51  Phos  3.2     09-28  Mg     1.70     09-28    MICROBIOLOGY:  Vancomycin Level, Trough: 12.4 ug/mL (09-27-22 @ 22:45)  v  .Body Fluid HEPATIC DRAINAGE  09-15-22   Moderate Enterococcus faecalis  --  Enterococcus faecalis    RADIOLOGY:    < from: Xray Chest 1 View- PORTABLE-Urgent (Xray Chest 1 View- PORTABLE-Urgent .) (09.22.22 @ 15:24) >  IMPRESSION: Follow-up right pleural effusion.    < end of copied text >

## 2022-09-28 NOTE — PROGRESS NOTE ADULT - ASSESSMENT
87 year old female with a PMHx of HTN, HLD GERD, and gallbladder cancer for which she underwent an ex lap, open cholecystectomy, segment 4b/5 hepatectomy, and right colectomy due to fistula tract vs metastasis on 08/19 who was admitted to [facility] for hypoxia and then transferred to [facility] for further managment in the SICU. Imaging reveals hepatic abscess.    #Abnormal imaging of the abdomen  #Hepatic abscess  Drained by IR on admission to [facility]  Enterococcus faecalis, S to ampicillin and vancomycin  Drain continues to be in place  Drain check with IR later this week    #Penicillin allergy  Previously denied any penicillin allergy  Tolerated piperacillin/tazobactam on admission  Developed worsening rash and itchiness on the left arm after initiation of ampicillin/sulbactam  Antibiotic switched to vancomycin to cover enterococcus in hepatic abscess drainage cx    #Ongoing use of vancomycin  Trough is 12.4 - therapeutic  Renal function stable    Recommendations  Continue vancomycin 1000mg IV q 24 hours, continue to monitor trough  Given drainage was completed after started on broad spectrum abx, would start coverage for GNR and anerobe - started ertapenem 1 gram IV q 24 hours. Monitor for allergic reaction. Given she tolerated zosyn, she should tolerate ertapenem. Patient aware and will inform team if she develops itching again.  Anticipating 4 weeks of abx - will need to followup tube check later this week.  Would favor keeping patient on IV antibiotics while drain in place, possibly can transition to PO after removal   Follow fever curve and WBC count    Taras Dominguez MD  Available through MS Teams  If no response, or after 5pm/weekends, call [phone]    Discussed with Surgical team.

## 2022-09-28 NOTE — PROGRESS NOTE ADULT - SUBJECTIVE AND OBJECTIVE BOX
Vital Signs Last 24 Hrs  T(C): 36.9 (28 Sep 2022 13:09), Max: 37.2 (27 Sep 2022 17:03)  T(F): 98.5 (28 Sep 2022 13:09), Max: 99 (28 Sep 2022 08:30)  HR: 65 (28 Sep 2022 13:09) (64 - 81)  BP: 109/59 (28 Sep 2022 13:09) (109/59 - 173/67)  BP(mean): --  RR: 18 (28 Sep 2022 13:09) (18 - 18)  SpO2: 97% (28 Sep 2022 13:09) (96% - 100%)    Parameters below as of 28 Sep 2022 13:09  Patient On (Oxygen Delivery Method): nasal cannula  O2 Flow (L/min): 3      I&O's Detail    27 Sep 2022 07:01  -  28 Sep 2022 07:00  --------------------------------------------------------  IN:    Oral Fluid: 1440 mL  Total IN: 1440 mL    OUT:    Drain (mL): 10 mL    Voided (mL): 1400 mL  Total OUT: 1410 mL    Total NET: 30 mL      28 Sep 2022 07:01  -  28 Sep 2022 14:09  --------------------------------------------------------  IN:    Oral Fluid: 425 mL  Total IN: 425 mL    OUT:    Drain (mL): 0 mL    Voided (mL): 1050 mL  Total OUT: 1050 mL    Total NET: -625 mL                                9.4    5.74  )-----------( 348      ( 28 Sep 2022 04:51 )             30.5       09-28    139  |  104  |  14  ----------------------------<  85  3.9   |  25  |  0.86    Ca    10.2      28 Sep 2022 04:51  Phos  3.2     09-28  Mg     1.70     09-28  Tolerating diet  CT shows no residual abscess cavity      Plan:  Remove pigtail in IR  Antibiotics per ID

## 2022-09-28 NOTE — PROGRESS NOTE ADULT - ASSESSMENT
87 year old female with a PMHx of HTN, HLD GERD, and gallbladder cancer for which she underwent an ex lap, open cholecystectomy, segment 4b/5 hepatectomy, and right colectomy due to fistula tract vs metastasis on 08/19, who p/w to S ED with lethargy for 2 days and hypoxia to 40/50s % with workup remarkable for a 3h2e6zt hepatic abscess and b/l pleural effusions on CT scan, and hypoxia requiring BiPAP, transferred to [facility] for further management, admitted to SICU i/s/o hypoxia req NIPPV, s/p IR drainage of collection. Now on floor, recovering well.     Plan  - IR tube check 9/23: tube patent; Discussed with IR, will obtain CT A/P noncontrast to decide on tube check vs removal  - Diet: Regular   - Abcess + Enterococcus faecalis; Appreciate ID recs: cont IV Vancomycin, monitor trough; add ertapenem; anticipate ABX x 4 weeks, IV ABX while drain in place; possible transition to PO if drain removed  - Continue home meds   - F/u drain output  - Dispo: Home with home PT, and +/- VNS for drain if still in place    D Team Surgery  [id number]

## 2022-09-28 NOTE — PROGRESS NOTE ADULT - NUTRITIONAL ASSESSMENT
This patient has been assessed with a concern for Malnutrition and has been determined to have a diagnosis/diagnoses of Moderate protein-calorie malnutrition.    This patient is being managed with:   Diet Regular-  Entered: Sep 16 2022 12:53PM    
This patient has been assessed with a concern for Malnutrition and has been determined to have a diagnosis/diagnoses of Moderate protein-calorie malnutrition.    This patient is being managed with:   Diet Regular-  Entered: Sep 23 2022  5:40AM    Diet NPO after Midnight-     NPO Start Date: 22-Sep-2022   NPO Start Time: 23:59  Entered: Sep 22 2022  3:18PM    
This patient has been assessed with a concern for Malnutrition and has been determined to have a diagnosis/diagnoses of Moderate protein-calorie malnutrition.    This patient is being managed with:   Diet Regular-  Entered: Sep 16 2022 12:53PM    
This patient has been assessed with a concern for Malnutrition and has been determined to have a diagnosis/diagnoses of Moderate protein-calorie malnutrition.    This patient is being managed with:   Diet Regular-  Entered: Sep 23 2022  5:40AM    
This patient has been assessed with a concern for Malnutrition and has been determined to have a diagnosis/diagnoses of Moderate protein-calorie malnutrition.    This patient is being managed with:   Diet NPO after Midnight-     NPO Start Date: 22-Sep-2022   NPO Start Time: 23:59  Entered: Sep 22 2022  3:18PM    
This patient has been assessed with a concern for Malnutrition and has been determined to have a diagnosis/diagnoses of Moderate protein-calorie malnutrition.    This patient is being managed with:   Diet Regular-  Entered: Sep 16 2022 12:53PM    
This patient has been assessed with a concern for Malnutrition and has been determined to have a diagnosis/diagnoses of Moderate protein-calorie malnutrition.    This patient is being managed with:   Diet Regular-  Entered: Sep 23 2022  5:40AM    

## 2022-09-28 NOTE — PROGRESS NOTE ADULT - SUBJECTIVE AND OBJECTIVE BOX
D TEAM Surgery Progress Note  Patient is a 87y old  Female who presents with a chief complaint of hepatic abscess (26 Sep 2022 01:10)    INTERVAL EVENTS: Patient is PPD#13 s/p IR hepatic abscess drainage. No acute events overnight.    SUBJECTIVE: Patient seen and examined at bedside with surgical team, patient feeling well. Denies abdominal pain. Tolerating regular diet. Passing flatus and having bowel movements. Denies nausea, vomiting.  Denies fever, chills, CP, SOB.    OBJECTIVE:    Vital Signs Last 24 Hrs  T(C): 37.2 (28 Sep 2022 08:30), Max: 37.2 (27 Sep 2022 17:03)  T(F): 99 (28 Sep 2022 08:30), Max: 99 (28 Sep 2022 08:30)  HR: 66 (28 Sep 2022 09:45) (61 - 81)  BP: 130/56 (28 Sep 2022 08:30) (121/52 - 173/67)  BP(mean): --  RR: 18 (28 Sep 2022 08:30) (16 - 18)  SpO2: 100% (28 Sep 2022 09:45) (96% - 100%)    Parameters below as of 28 Sep 2022 09:45  Patient On (Oxygen Delivery Method): nasal cannula    I&O's Detail    27 Sep 2022 07:01  -  28 Sep 2022 07:00  --------------------------------------------------------  IN:    Oral Fluid: 1440 mL  Total IN: 1440 mL    OUT:    Drain (mL): 10 mL    Voided (mL): 1400 mL  Total OUT: 1410 mL    Total NET: 30 mL      28 Sep 2022 07:01  -  28 Sep 2022 10:11  --------------------------------------------------------  IN:    Oral Fluid: 250 mL  Total IN: 250 mL    OUT:    Drain (mL): 0 mL    Voided (mL): 1050 mL  Total OUT: 1050 mL    Total NET: -800 mL      MEDICATIONS  (STANDING):  albuterol/ipratropium for Nebulization 3 milliLiter(s) Nebulizer every 6 hours  allopurinol 100 milliGRAM(s) Oral at bedtime  amLODIPine   Tablet 2.5 milliGRAM(s) Oral daily  carvedilol 6.25 milliGRAM(s) Oral every 12 hours  enoxaparin Injectable 30 milliGRAM(s) SubCutaneous every 24 hours  ertapenem  IVPB 1000 milliGRAM(s) IV Intermittent every 24 hours  fenofibrate Tablet 145 milliGRAM(s) Oral daily  melatonin 3 milliGRAM(s) Oral at bedtime  mirtazapine 15 milliGRAM(s) Oral at bedtime  multivitamin 1 Tablet(s) Oral daily  pantoprazole    Tablet 40 milliGRAM(s) Oral before breakfast  thiamine 100 milliGRAM(s) Oral daily    MEDICATIONS  (PRN):  acetaminophen     Tablet .. 650 milliGRAM(s) Oral every 6 hours PRN Temp greater or equal to 38C (100.4F), Mild Pain (1 - 3)      PHYSICAL EXAM:  Constitutional: A&Ox3, NAD  Respiratory: Unlabored breathing  Abdomen: Soft, nondistended, NTTP. No rebound or guarding. IR drain RUQ with scant purulent output.  Extremities: WWP, MCCANN spontaneously    LABS:                        9.4    5.74  )-----------( 348      ( 28 Sep 2022 04:51 )             30.5     09-28    139  |  104  |  14  ----------------------------<  85  3.9   |  25  |  0.86    Ca    10.2      28 Sep 2022 04:51  Phos  3.2     09-28  Mg     1.70     09-28

## 2022-09-29 ENCOUNTER — TRANSCRIPTION ENCOUNTER (OUTPATIENT)
Age: 87
End: 2022-09-29

## 2022-09-29 VITALS
TEMPERATURE: 100 F | DIASTOLIC BLOOD PRESSURE: 56 MMHG | OXYGEN SATURATION: 97 % | SYSTOLIC BLOOD PRESSURE: 126 MMHG | RESPIRATION RATE: 18 BRPM | HEART RATE: 83 BPM

## 2022-09-29 LAB
ANION GAP SERPL CALC-SCNC: 10 MMOL/L — SIGNIFICANT CHANGE UP (ref 7–14)
BUN SERPL-MCNC: 16 MG/DL — SIGNIFICANT CHANGE UP (ref 7–23)
CALCIUM SERPL-MCNC: 10.3 MG/DL — SIGNIFICANT CHANGE UP (ref 8.4–10.5)
CHLORIDE SERPL-SCNC: 101 MMOL/L — SIGNIFICANT CHANGE UP (ref 98–107)
CO2 SERPL-SCNC: 25 MMOL/L — SIGNIFICANT CHANGE UP (ref 22–31)
CREAT SERPL-MCNC: 0.91 MG/DL — SIGNIFICANT CHANGE UP (ref 0.5–1.3)
EGFR: 61 ML/MIN/1.73M2 — SIGNIFICANT CHANGE UP
GLUCOSE SERPL-MCNC: 113 MG/DL — HIGH (ref 70–99)
HCT VFR BLD CALC: 32.3 % — LOW (ref 34.5–45)
HGB BLD-MCNC: 10 G/DL — LOW (ref 11.5–15.5)
MAGNESIUM SERPL-MCNC: 1.8 MG/DL — SIGNIFICANT CHANGE UP (ref 1.6–2.6)
MCHC RBC-ENTMCNC: 28.7 PG — SIGNIFICANT CHANGE UP (ref 27–34)
MCHC RBC-ENTMCNC: 31 GM/DL — LOW (ref 32–36)
MCV RBC AUTO: 92.6 FL — SIGNIFICANT CHANGE UP (ref 80–100)
NRBC # BLD: 0 /100 WBCS — SIGNIFICANT CHANGE UP (ref 0–0)
NRBC # FLD: 0 K/UL — SIGNIFICANT CHANGE UP (ref 0–0)
PHOSPHATE SERPL-MCNC: 2.9 MG/DL — SIGNIFICANT CHANGE UP (ref 2.5–4.5)
PLATELET # BLD AUTO: 355 K/UL — SIGNIFICANT CHANGE UP (ref 150–400)
POTASSIUM SERPL-MCNC: 4.2 MMOL/L — SIGNIFICANT CHANGE UP (ref 3.5–5.3)
POTASSIUM SERPL-SCNC: 4.2 MMOL/L — SIGNIFICANT CHANGE UP (ref 3.5–5.3)
RBC # BLD: 3.49 M/UL — LOW (ref 3.8–5.2)
RBC # FLD: 14.6 % — HIGH (ref 10.3–14.5)
SODIUM SERPL-SCNC: 136 MMOL/L — SIGNIFICANT CHANGE UP (ref 135–145)
WBC # BLD: 6.21 K/UL — SIGNIFICANT CHANGE UP (ref 3.8–10.5)
WBC # FLD AUTO: 6.21 K/UL — SIGNIFICANT CHANGE UP (ref 3.8–10.5)

## 2022-09-29 PROCEDURE — 76080 X-RAY EXAM OF FISTULA: CPT | Mod: 26

## 2022-09-29 PROCEDURE — 49424 ASSESS CYST CONTRAST INJECT: CPT

## 2022-09-29 PROCEDURE — 99232 SBSQ HOSP IP/OBS MODERATE 35: CPT

## 2022-09-29 PROCEDURE — 99232 SBSQ HOSP IP/OBS MODERATE 35: CPT | Mod: 24

## 2022-09-29 RX ORDER — THIAMINE MONONITRATE (VIT B1) 100 MG
1 TABLET ORAL
Qty: 0 | Refills: 0 | DISCHARGE
Start: 2022-09-29

## 2022-09-29 RX ORDER — AMLODIPINE BESYLATE 2.5 MG/1
1 TABLET ORAL
Qty: 0 | Refills: 0 | DISCHARGE
Start: 2022-09-29

## 2022-09-29 RX ORDER — LEVOFLOXACIN 5 MG/ML
1 INJECTION, SOLUTION INTRAVENOUS
Qty: 7 | Refills: 0
Start: 2022-09-29 | End: 2022-10-12

## 2022-09-29 RX ORDER — CARVEDILOL PHOSPHATE 80 MG/1
1 CAPSULE, EXTENDED RELEASE ORAL
Qty: 0 | Refills: 0 | DISCHARGE
Start: 2022-09-29

## 2022-09-29 RX ORDER — PANTOPRAZOLE SODIUM 20 MG/1
1 TABLET, DELAYED RELEASE ORAL
Qty: 0 | Refills: 0 | DISCHARGE
Start: 2022-09-29

## 2022-09-29 RX ORDER — FENOFIBRATE,MICRONIZED 130 MG
1 CAPSULE ORAL
Qty: 0 | Refills: 0 | DISCHARGE
Start: 2022-09-29

## 2022-09-29 RX ORDER — LOSARTAN POTASSIUM 100 MG/1
1 TABLET, FILM COATED ORAL
Qty: 0 | Refills: 0 | DISCHARGE
Start: 2022-09-29

## 2022-09-29 RX ORDER — ALLOPURINOL 300 MG
1 TABLET ORAL
Qty: 0 | Refills: 0 | DISCHARGE
Start: 2022-09-29

## 2022-09-29 RX ORDER — METRONIDAZOLE 500 MG
1 TABLET ORAL
Qty: 28 | Refills: 0
Start: 2022-09-29 | End: 2022-10-12

## 2022-09-29 RX ORDER — MIRTAZAPINE 45 MG/1
1 TABLET, ORALLY DISINTEGRATING ORAL
Qty: 0 | Refills: 0 | DISCHARGE
Start: 2022-09-29

## 2022-09-29 RX ORDER — LANOLIN ALCOHOL/MO/W.PET/CERES
1 CREAM (GRAM) TOPICAL
Qty: 0 | Refills: 0 | DISCHARGE
Start: 2022-09-29

## 2022-09-29 RX ADMIN — Medication 145 MILLIGRAM(S): at 13:24

## 2022-09-29 RX ADMIN — PANTOPRAZOLE SODIUM 40 MILLIGRAM(S): 20 TABLET, DELAYED RELEASE ORAL at 06:10

## 2022-09-29 RX ADMIN — ERTAPENEM SODIUM 120 MILLIGRAM(S): 1 INJECTION, POWDER, LYOPHILIZED, FOR SOLUTION INTRAMUSCULAR; INTRAVENOUS at 13:57

## 2022-09-29 RX ADMIN — Medication 3 MILLILITER(S): at 15:27

## 2022-09-29 RX ADMIN — AMLODIPINE BESYLATE 2.5 MILLIGRAM(S): 2.5 TABLET ORAL at 06:09

## 2022-09-29 RX ADMIN — Medication 100 MILLIGRAM(S): at 13:24

## 2022-09-29 RX ADMIN — ENOXAPARIN SODIUM 30 MILLIGRAM(S): 100 INJECTION SUBCUTANEOUS at 17:33

## 2022-09-29 RX ADMIN — CARVEDILOL PHOSPHATE 6.25 MILLIGRAM(S): 80 CAPSULE, EXTENDED RELEASE ORAL at 06:09

## 2022-09-29 RX ADMIN — Medication 3 MILLILITER(S): at 04:51

## 2022-09-29 RX ADMIN — Medication 1 TABLET(S): at 13:25

## 2022-09-29 RX ADMIN — CARVEDILOL PHOSPHATE 6.25 MILLIGRAM(S): 80 CAPSULE, EXTENDED RELEASE ORAL at 17:34

## 2022-09-29 RX ADMIN — Medication 3 MILLILITER(S): at 07:56

## 2022-09-29 NOTE — PROGRESS NOTE ADULT - ASSESSMENT
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 Now on floor, recovering well.     Plan  - IR tube check today   - Diet: Regular   - Abcess + Enterococcus faecalis; Appreciate ID recs: cont IV Vancomycin, monitor trough; add ertapenem; anticipate ABX x 4 weeks, IV ABX while drain in place; possible transition to PO if drain removed. F/u ID recs   - Continue home meds   - F/u drain output  - Dispo: Home with home PT, and +/- VNS for drain if still in place    D Team Surgery  [id number]

## 2022-09-29 NOTE — DISCHARGE NOTE NURSING/CASE MANAGEMENT/SOCIAL WORK - NSDCFUADDAPPT_GEN_ALL_CORE_FT
Please call [phone] to follow up with infectious disease specialist Dr. Dominguez in one week.

## 2022-09-29 NOTE — DISCHARGE NOTE NURSING/CASE MANAGEMENT/SOCIAL WORK - NSSCTYPOFSERV_GEN_ALL_CORE
A visiting nurse will visit you the day after discharge. The nurse will call you in the morning to set up a visit time. If you do not hear from the nurse, please call the agency. Physical therapy will follow.

## 2022-09-29 NOTE — DISCHARGE NOTE NURSING/CASE MANAGEMENT/SOCIAL WORK - PATIENT PORTAL LINK FT
You can access the FollowMyHealth Patient Portal offered by [facility] by registering at the following website: [email or web address]. By joining [employer] [facility] [employer]’s FollowMyHealth portal, you will also be able to view your health information using other applications (apps) compatible with our system.

## 2022-09-29 NOTE — PROGRESS NOTE ADULT - ASSESSMENT
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 Imaging reveals hepatic abscess.    #Abnormal imaging of the abdomen  #Hepatic abscess  Drained by IR on admission to [facility]  Enterococcus faecalis, S to ampicillin and vancomycin  Drain removed today with IR - tube check with noncontrast ct no drainable collection.     #Penicillin allergy  Previously denied any penicillin allergy  Tolerated piperacillin/tazobactam on admission  Developed worsening rash and itchiness on the left arm after initiation of ampicillin/sulbactam  Antibiotic switched to vancomycin to cover enterococcus in hepatic abscess drainage cx    #Ongoing use of vancomycin  Trough is 12.4 - therapeutic  Renal function stable    Recommendations  Drain removed today - can transition to oral abx - levaquin 750 mg PO q 48 hours and flagyl 500 mg po q 12 hours  Anticipating 4 weeks of abx to complete 10/13   Follow fever curve and WBC count    Taras Dominguez MD  Available through MS Teams  If no response, or after 5pm/weekends, call [phone]    Discussed with Surgical team.  Followup in ID clinic in 1 week - will check labs.  Will sign off. Please call with questions.

## 2022-09-29 NOTE — PROGRESS NOTE ADULT - PROVIDER SPECIALTY LIST ADULT
Infectious Disease
Intervent Radiology
SICU
Surgery
Intervent Radiology
Surgery
Infectious Disease
SICU
Surgery
Infectious Disease
Surgery
SICU

## 2022-09-29 NOTE — DISCHARGE NOTE NURSING/CASE MANAGEMENT/SOCIAL WORK - NSDCPEFALRISK_GEN_ALL_CORE
For information on Fall & Injury Prevention, visit: [email or web address] OR  [email or web address] OR  https://www.cdc.gov/steadi/patient.html

## 2022-09-29 NOTE — PROGRESS NOTE ADULT - SUBJECTIVE AND OBJECTIVE BOX
Surgery Progress Note     24hour Events: No acute events overnight.     Subjective:  Patient seen and examined. Patient is tolerating her diet. She denies nausea, vomiting, fever, chills, CP, SOB.      Vital Signs:  Vital Signs Last 24 Hrs  T(C): 37.4 (29 Sep 2022 08:30), Max: 37.4 (29 Sep 2022 08:30)  T(F): 99.3 (29 Sep 2022 08:30), Max: 99.3 (29 Sep 2022 08:30)  HR: 79 (29 Sep 2022 08:30) (65 - 98)  BP: 116/62 (29 Sep 2022 08:30) (109/59 - 158/89)  RR: 15 (29 Sep 2022 08:30) (15 - 18)  SpO2: 100% (29 Sep 2022 08:30) (94% - 100%)    Parameters below as of 29 Sep 2022 08:30  Patient On (Oxygen Delivery Method): room air        CAPILLARY BLOOD GLUCOSE          I&O's Detail    28 Sep 2022 07:01  -  29 Sep 2022 07:00  --------------------------------------------------------  IN:    Oral Fluid: 625 mL  Total IN: 625 mL    OUT:    Drain (mL): 0 mL    Voided (mL): 1300 mL  Total OUT: 1300 mL    Total NET: -675 mL            Physical Exam:  Constitutional: A&Ox3, NAD  Respiratory: Unlabored breathing  Abdomen: Soft, nondistended, NTTP. No rebound or guarding. IR drain RUQ with scant purulent output.  Extremities: WWP, MCCANN spontaneously      Labs:    09-29    136  |  101  |  16  ----------------------------<  113<H>  4.2   |  25  |  0.91    Ca    10.3      29 Sep 2022 06:16  Phos  2.9     09-29  Mg     1.80     09-29                              10.0   6.21  )-----------( 355      ( 29 Sep 2022 06:16 )             32.3

## 2022-09-29 NOTE — PROGRESS NOTE ADULT - REASON FOR ADMISSION
hepatic abscess

## 2022-09-29 NOTE — PROGRESS NOTE ADULT - SUBJECTIVE AND OBJECTIVE BOX
CC: Patient is a 87y old  Female who presents with a chief complaint of hepatic abscess (29 Sep 2022 11:47)    ID following for hepatic abscess    Interval History/ROS: Patient went for tube check with IR today. CT with no drainable collection on noncontrast study - drain removed. No fevers, no chills. No complaints from patient. She feels well.    Rest of ROS negative.    Allergies  ampicillin (Rash)  ampicillin-sulbactam (Rash)    ANTIMICROBIALS:  ertapenem  IVPB 1000 every 24 hours  vancomycin  IVPB 1000 every 24 hours    OTHER MEDS:  acetaminophen     Tablet .. 650 milliGRAM(s) Oral every 6 hours PRN  albuterol/ipratropium for Nebulization 3 milliLiter(s) Nebulizer every 6 hours  allopurinol 100 milliGRAM(s) Oral at bedtime  amLODIPine   Tablet 2.5 milliGRAM(s) Oral daily  carvedilol 6.25 milliGRAM(s) Oral every 12 hours  enoxaparin Injectable 30 milliGRAM(s) SubCutaneous every 24 hours  fenofibrate Tablet 145 milliGRAM(s) Oral daily  melatonin 3 milliGRAM(s) Oral at bedtime  mirtazapine 15 milliGRAM(s) Oral at bedtime  multivitamin 1 Tablet(s) Oral daily  pantoprazole    Tablet 40 milliGRAM(s) Oral before breakfast  thiamine 100 milliGRAM(s) Oral daily    PE:    Vital Signs Last 24 Hrs  T(C): 37.2 (29 Sep 2022 13:36), Max: 37.4 (29 Sep 2022 08:30)  T(F): 98.9 (29 Sep 2022 13:36), Max: 99.3 (29 Sep 2022 08:30)  HR: 82 (29 Sep 2022 15:29) (69 - 98)  BP: 157/91 (29 Sep 2022 13:36) (116/62 - 172/70)  BP(mean): --  RR: 17 (29 Sep 2022 13:36) (15 - 18)  SpO2: 100% (29 Sep 2022 15:29) (94% - 100%)    Parameters below as of 29 Sep 2022 15:29  Patient On (Oxygen Delivery Method): room air    Gen: AOx3, NAD  CV: S1+S2 normal, no murmurs  Resp: Clear bilat, no resp distress  Abd: Soft, nontender, +BS, RUQ drain removed  Ext: No LE edema, no wounds  : No Otto  IV/Skin: No thrombophlebitis  Neuro: no focal deficits    LABS:                          10.0   6.21  )-----------( 355      ( 29 Sep 2022 06:16 )             32.3       09-29    136  |  101  |  16  ----------------------------<  113<H>  4.2   |  25  |  0.91    Ca    10.3      29 Sep 2022 06:16  Phos  2.9     09-29  Mg     1.80     09-29    MICROBIOLOGY:  v  .Body Fluid HEPATIC DRAINAGE  09-15-22   Moderate Enterococcus faecalis  --  Enterococcus faecalis    RADIOLOGY:    < from: CT Abdomen and Pelvis No Cont (09.28.22 @ 11:37) >  IMPRESSION:    Status post drainage of a hepatic segment 5 collection. No drainable   collection remaining on this limited noncontrast study.      < end of copied text >

## 2022-10-11 PROBLEM — C23 GALLBLADDER CANCER: Status: ACTIVE | Noted: 2022-06-20

## 2022-10-12 ENCOUNTER — APPOINTMENT (OUTPATIENT)
Dept: INFECTIOUS DISEASE | Facility: CLINIC | Age: 87
End: 2022-10-12

## 2022-10-13 ENCOUNTER — APPOINTMENT (OUTPATIENT)
Dept: SURGICAL ONCOLOGY | Facility: CLINIC | Age: 87
End: 2022-10-13

## 2022-10-13 ENCOUNTER — INPATIENT (INPATIENT)
Facility: HOSPITAL | Age: 87
LOS: 17 days | Discharge: HOME HEALTH | End: 2022-10-31
Attending: GENERAL ACUTE CARE HOSPITAL | Admitting: GENERAL ACUTE CARE HOSPITAL

## 2022-10-13 VITALS
TEMPERATURE: 97 F | DIASTOLIC BLOOD PRESSURE: 68 MMHG | WEIGHT: 115.08 LBS | HEIGHT: 60 IN | HEART RATE: 103 BPM | RESPIRATION RATE: 26 BRPM | OXYGEN SATURATION: 99 % | SYSTOLIC BLOOD PRESSURE: 123 MMHG

## 2022-10-13 DIAGNOSIS — Z85.09 PERSONAL HISTORY OF MALIGNANT NEOPLASM OF OTHER DIGESTIVE ORGANS: Chronic | ICD-10-CM

## 2022-10-13 DIAGNOSIS — C23 MALIGNANT NEOPLASM OF GALLBLADDER: ICD-10-CM

## 2022-10-13 LAB
ALBUMIN SERPL ELPH-MCNC: 3.5 G/DL — SIGNIFICANT CHANGE UP (ref 3.3–5)
ALP SERPL-CCNC: 67 U/L — SIGNIFICANT CHANGE UP (ref 40–120)
ALT FLD-CCNC: 15 U/L — SIGNIFICANT CHANGE UP (ref 12–78)
ANION GAP SERPL CALC-SCNC: 5 MMOL/L — SIGNIFICANT CHANGE UP (ref 5–17)
APPEARANCE UR: CLEAR — SIGNIFICANT CHANGE UP
APTT BLD: 27.8 SEC — SIGNIFICANT CHANGE UP (ref 27.5–35.5)
AST SERPL-CCNC: 27 U/L — SIGNIFICANT CHANGE UP (ref 15–37)
BACTERIA # UR AUTO: ABNORMAL
BASE EXCESS BLDA CALC-SCNC: 2.5 MMOL/L — SIGNIFICANT CHANGE UP (ref -2–3)
BASE EXCESS BLDA CALC-SCNC: 3.1 MMOL/L — HIGH (ref -2–3)
BASE EXCESS BLDA CALC-SCNC: 4.7 MMOL/L — HIGH (ref -2–3)
BASE EXCESS BLDA CALC-SCNC: 4.9 MMOL/L — HIGH (ref -2–3)
BASOPHILS # BLD AUTO: 0.03 K/UL — SIGNIFICANT CHANGE UP (ref 0–0.2)
BASOPHILS NFR BLD AUTO: 0.6 % — SIGNIFICANT CHANGE UP (ref 0–2)
BILIRUB DIRECT SERPL-MCNC: 0.2 MG/DL — SIGNIFICANT CHANGE UP (ref 0–0.3)
BILIRUB SERPL-MCNC: 0.3 MG/DL — SIGNIFICANT CHANGE UP (ref 0.2–1.2)
BILIRUB UR-MCNC: NEGATIVE — SIGNIFICANT CHANGE UP
BLD GP AB SCN SERPL QL: SIGNIFICANT CHANGE UP
BLOOD GAS COMMENTS ARTERIAL: SIGNIFICANT CHANGE UP
BUN SERPL-MCNC: 12 MG/DL — SIGNIFICANT CHANGE UP (ref 7–23)
CALCIUM SERPL-MCNC: 8.6 MG/DL — SIGNIFICANT CHANGE UP (ref 8.5–10.1)
CHLORIDE SERPL-SCNC: 96 MMOL/L — SIGNIFICANT CHANGE UP (ref 96–108)
CO2 BLDA-SCNC: 34 MMOL/L — HIGH (ref 19–24)
CO2 BLDA-SCNC: 37 MMOL/L — HIGH (ref 19–24)
CO2 SERPL-SCNC: 32 MMOL/L — HIGH (ref 22–31)
COLOR SPEC: YELLOW — SIGNIFICANT CHANGE UP
CREAT SERPL-MCNC: 0.8 MG/DL — SIGNIFICANT CHANGE UP (ref 0.5–1.3)
DIFF PNL FLD: NEGATIVE — SIGNIFICANT CHANGE UP
EGFR: 72 ML/MIN/1.73M2 — SIGNIFICANT CHANGE UP
EOSINOPHIL # BLD AUTO: 0 K/UL — SIGNIFICANT CHANGE UP (ref 0–0.5)
EOSINOPHIL NFR BLD AUTO: 0 % — SIGNIFICANT CHANGE UP (ref 0–6)
EPI CELLS # UR: SIGNIFICANT CHANGE UP
FLUAV AG NPH QL: SIGNIFICANT CHANGE UP
FLUBV AG NPH QL: SIGNIFICANT CHANGE UP
GAS PNL BLDA: SIGNIFICANT CHANGE UP
GLUCOSE BLDC GLUCOMTR-MCNC: 112 MG/DL — HIGH (ref 70–99)
GLUCOSE SERPL-MCNC: 92 MG/DL — SIGNIFICANT CHANGE UP (ref 70–99)
GLUCOSE UR QL: NEGATIVE MG/DL — SIGNIFICANT CHANGE UP
HCO3 BLDA-SCNC: 32 MMOL/L — HIGH (ref 21–28)
HCO3 BLDA-SCNC: 35 MMOL/L — HIGH (ref 21–28)
HCT VFR BLD CALC: 36.5 % — SIGNIFICANT CHANGE UP (ref 34.5–45)
HGB BLD-MCNC: 10.9 G/DL — LOW (ref 11.5–15.5)
HOROWITZ INDEX BLDA+IHG-RTO: 21 — SIGNIFICANT CHANGE UP
HOROWITZ INDEX BLDA+IHG-RTO: 25 — SIGNIFICANT CHANGE UP
HOROWITZ INDEX BLDA+IHG-RTO: 35 — SIGNIFICANT CHANGE UP
HOROWITZ INDEX BLDA+IHG-RTO: 60 — SIGNIFICANT CHANGE UP
IMM GRANULOCYTES NFR BLD AUTO: 0.2 % — SIGNIFICANT CHANGE UP (ref 0–0.9)
INR BLD: 1.08 RATIO — SIGNIFICANT CHANGE UP (ref 0.88–1.16)
KETONES UR-MCNC: NEGATIVE — SIGNIFICANT CHANGE UP
LACTATE SERPL-SCNC: 1.1 MMOL/L — SIGNIFICANT CHANGE UP (ref 0.7–2)
LEUKOCYTE ESTERASE UR-ACNC: ABNORMAL
LYMPHOCYTES # BLD AUTO: 1.17 K/UL — SIGNIFICANT CHANGE UP (ref 1–3.3)
LYMPHOCYTES # BLD AUTO: 23.4 % — SIGNIFICANT CHANGE UP (ref 13–44)
MCHC RBC-ENTMCNC: 27 PG — SIGNIFICANT CHANGE UP (ref 27–34)
MCHC RBC-ENTMCNC: 29.9 G/DL — LOW (ref 32–36)
MCV RBC AUTO: 90.6 FL — SIGNIFICANT CHANGE UP (ref 80–100)
MONOCYTES # BLD AUTO: 0.29 K/UL — SIGNIFICANT CHANGE UP (ref 0–0.9)
MONOCYTES NFR BLD AUTO: 5.8 % — SIGNIFICANT CHANGE UP (ref 2–14)
NEUTROPHILS # BLD AUTO: 3.5 K/UL — SIGNIFICANT CHANGE UP (ref 1.8–7.4)
NEUTROPHILS NFR BLD AUTO: 70 % — SIGNIFICANT CHANGE UP (ref 43–77)
NITRITE UR-MCNC: NEGATIVE — SIGNIFICANT CHANGE UP
NRBC # BLD: 0 /100 WBCS — SIGNIFICANT CHANGE UP (ref 0–0)
PCO2 BLDA: 58 MMHG — HIGH (ref 32–46)
PCO2 BLDA: 70 MMHG — HIGH (ref 32–46)
PCO2 BLDA: 73 MMHG — CRITICAL HIGH (ref 32–46)
PCO2 BLDA: 77 MMHG — CRITICAL HIGH (ref 32–46)
PH BLDA: 7.25 — LOW (ref 7.35–7.45)
PH BLDA: 7.26 — LOW (ref 7.35–7.45)
PH BLDA: 7.27 — LOW (ref 7.35–7.45)
PH BLDA: 7.35 — SIGNIFICANT CHANGE UP (ref 7.35–7.45)
PH UR: 7 — SIGNIFICANT CHANGE UP (ref 5–8)
PLATELET # BLD AUTO: 277 K/UL — SIGNIFICANT CHANGE UP (ref 150–400)
PO2 BLDA: 127 MMHG — HIGH (ref 83–108)
PO2 BLDA: 152 MMHG — HIGH (ref 83–108)
PO2 BLDA: 51 MMHG — LOW (ref 83–108)
PO2 BLDA: 77 MMHG — LOW (ref 83–108)
POTASSIUM SERPL-MCNC: 3.7 MMOL/L — SIGNIFICANT CHANGE UP (ref 3.5–5.3)
POTASSIUM SERPL-SCNC: 3.7 MMOL/L — SIGNIFICANT CHANGE UP (ref 3.5–5.3)
PROT SERPL-MCNC: 8.1 GM/DL — SIGNIFICANT CHANGE UP (ref 6–8.3)
PROT UR-MCNC: 30 MG/DL
PROTHROM AB SERPL-ACNC: 13 SEC — SIGNIFICANT CHANGE UP (ref 10.5–13.4)
RBC # BLD: 4.03 M/UL — SIGNIFICANT CHANGE UP (ref 3.8–5.2)
RBC # FLD: 15 % — HIGH (ref 10.3–14.5)
RBC CASTS # UR COMP ASSIST: SIGNIFICANT CHANGE UP /HPF (ref 0–4)
SAO2 % BLDA: 80.1 % — LOW (ref 94–98)
SAO2 % BLDA: 97.5 % — SIGNIFICANT CHANGE UP (ref 94–98)
SAO2 % BLDA: 99.1 % — HIGH (ref 94–98)
SAO2 % BLDA: 99.8 % — HIGH (ref 94–98)
SARS-COV-2 RNA SPEC QL NAA+PROBE: SIGNIFICANT CHANGE UP
SODIUM SERPL-SCNC: 133 MMOL/L — LOW (ref 135–145)
SP GR SPEC: 1.01 — SIGNIFICANT CHANGE UP (ref 1.01–1.02)
UROBILINOGEN FLD QL: NEGATIVE MG/DL — SIGNIFICANT CHANGE UP
WBC # BLD: 5 K/UL — SIGNIFICANT CHANGE UP (ref 3.8–10.5)
WBC # FLD AUTO: 5 K/UL — SIGNIFICANT CHANGE UP (ref 3.8–10.5)
WBC UR QL: SIGNIFICANT CHANGE UP

## 2022-10-13 PROCEDURE — 99285 EMERGENCY DEPT VISIT HI MDM: CPT | Mod: FS

## 2022-10-13 PROCEDURE — 93010 ELECTROCARDIOGRAM REPORT: CPT

## 2022-10-13 PROCEDURE — 99223 1ST HOSP IP/OBS HIGH 75: CPT | Mod: 25

## 2022-10-13 PROCEDURE — 71045 X-RAY EXAM CHEST 1 VIEW: CPT | Mod: 26

## 2022-10-13 PROCEDURE — 71275 CT ANGIOGRAPHY CHEST: CPT | Mod: 26,MA

## 2022-10-13 PROCEDURE — 99223 1ST HOSP IP/OBS HIGH 75: CPT

## 2022-10-13 PROCEDURE — 74177 CT ABD & PELVIS W/CONTRAST: CPT | Mod: 26,MA

## 2022-10-13 PROCEDURE — 76604 US EXAM CHEST: CPT | Mod: 26

## 2022-10-13 PROCEDURE — 99497 ADVNCD CARE PLAN 30 MIN: CPT | Mod: 25

## 2022-10-13 PROCEDURE — 99284 EMERGENCY DEPT VISIT MOD MDM: CPT | Mod: GC

## 2022-10-13 PROCEDURE — 70450 CT HEAD/BRAIN W/O DYE: CPT | Mod: 26,MA

## 2022-10-13 RX ORDER — METRONIDAZOLE 500 MG
500 TABLET ORAL EVERY 8 HOURS
Refills: 0 | Status: COMPLETED | OUTPATIENT
Start: 2022-10-13 | End: 2022-10-14

## 2022-10-13 RX ORDER — ACETAMINOPHEN 500 MG
650 TABLET ORAL EVERY 6 HOURS
Refills: 0 | Status: DISCONTINUED | OUTPATIENT
Start: 2022-10-13 | End: 2022-10-31

## 2022-10-13 RX ORDER — AMLODIPINE BESYLATE 2.5 MG/1
2.5 TABLET ORAL DAILY
Refills: 0 | Status: DISCONTINUED | OUTPATIENT
Start: 2022-10-13 | End: 2022-10-27

## 2022-10-13 RX ORDER — CARVEDILOL PHOSPHATE 80 MG/1
6.25 CAPSULE, EXTENDED RELEASE ORAL EVERY 12 HOURS
Refills: 0 | Status: DISCONTINUED | OUTPATIENT
Start: 2022-10-13 | End: 2022-10-31

## 2022-10-13 RX ORDER — SODIUM CHLORIDE 9 MG/ML
1000 INJECTION INTRAMUSCULAR; INTRAVENOUS; SUBCUTANEOUS ONCE
Refills: 0 | Status: COMPLETED | OUTPATIENT
Start: 2022-10-13 | End: 2022-10-13

## 2022-10-13 RX ORDER — ALLOPURINOL 300 MG
100 TABLET ORAL DAILY
Refills: 0 | Status: DISCONTINUED | OUTPATIENT
Start: 2022-10-13 | End: 2022-10-31

## 2022-10-13 RX ORDER — PANTOPRAZOLE SODIUM 20 MG/1
40 TABLET, DELAYED RELEASE ORAL
Refills: 0 | Status: DISCONTINUED | OUTPATIENT
Start: 2022-10-13 | End: 2022-10-31

## 2022-10-13 RX ORDER — FENOFIBRATE,MICRONIZED 130 MG
145 CAPSULE ORAL DAILY
Refills: 0 | Status: DISCONTINUED | OUTPATIENT
Start: 2022-10-13 | End: 2022-10-31

## 2022-10-13 RX ORDER — HEPARIN SODIUM 5000 [USP'U]/ML
5000 INJECTION INTRAVENOUS; SUBCUTANEOUS EVERY 8 HOURS
Refills: 0 | Status: DISCONTINUED | OUTPATIENT
Start: 2022-10-13 | End: 2022-10-18

## 2022-10-13 RX ORDER — SODIUM CHLORIDE 9 MG/ML
1000 INJECTION INTRAMUSCULAR; INTRAVENOUS; SUBCUTANEOUS
Refills: 0 | Status: DISCONTINUED | OUTPATIENT
Start: 2022-10-13 | End: 2022-10-15

## 2022-10-13 RX ADMIN — Medication 100 MILLIGRAM(S): at 22:42

## 2022-10-13 RX ADMIN — SODIUM CHLORIDE 75 MILLILITER(S): 9 INJECTION INTRAMUSCULAR; INTRAVENOUS; SUBCUTANEOUS at 17:29

## 2022-10-13 RX ADMIN — SODIUM CHLORIDE 75 MILLILITER(S): 9 INJECTION INTRAMUSCULAR; INTRAVENOUS; SUBCUTANEOUS at 22:43

## 2022-10-13 RX ADMIN — SODIUM CHLORIDE 1000 MILLILITER(S): 9 INJECTION INTRAMUSCULAR; INTRAVENOUS; SUBCUTANEOUS at 09:12

## 2022-10-13 RX ADMIN — HEPARIN SODIUM 5000 UNIT(S): 5000 INJECTION INTRAVENOUS; SUBCUTANEOUS at 22:42

## 2022-10-13 NOTE — H&P ADULT - HISTORY OF PRESENT ILLNESS
87 year old female with a PMHx of HTN, HLD GERD, and gallbladder cancer s/p ex lap, open cholecystectomy, segment 4b/5 hepatectomy, and right colectomy due to fistula tract vs metastasis on 08/19, p/w unresponsiveness which was noted by family this morning. Of note family reported pt recently hospitalized 2 weeks and has been at her baseline mental state snce then. Per EMS, pt's O2 was 37% on RA, improved to 90s on NRB, Pt responsive to painful stimuli but nonverbal, not spontaneously opening eyes. No reported fever, chills, cough, or SOB.        87 year old female with a PMHx of HTN, HLD GERD, and gallbladder cancer s/p ex lap, open cholecystectomy, segment 4b/5 hepatectomy, and right colectomy due to fistula tract vs metastasis on 08/19, recently admitted at [facility] 9/19 - 9/29 for Acute hypoxic respiratory failure and Hepatic Abscess s/p drainage p/w unresponsiveness which was noted by family this morning. Family noted some intermittent confusion since pt was discharged however this morning she was completely unresponsive. Per EMS, pt's O2 was 37% on RA, improved to 90s on NRB, Pt responsive to painful stimuli but nonverbal, not spontaneously opening eyes. No reported fever, chills, cough, or SOB.

## 2022-10-13 NOTE — H&P ADULT - ASSESSMENT
87 year old female with a PMHx of HTN, HLD GERD, and gallbladder cancer s/p ex lap, open cholecystectomy, segment 4b/5 hepatectomy, and right colectomy due to fistula tract vs metastasis on 08/19, recently admitted at [facility] 9/19 - 9/29 for Acute hypoxic respiratory failure and Hepatic Abscess s/p drainage p/w unresponsiveness which was noted by family this morning.    # Acute hypoxic and hypercapnic respiratory failure, altered mental status  - Pt responsive to painful stimuli but nonverbal, not spontaneously opening eyes  - etiology unclear, likely Central   - CT head neg for acute pathology  - ABG w/ significant improvement   - Evaluated by ICU  - NPO while on Bi-PAP       87 year old female with a PMHx of HTN, HLD GERD, and gallbladder cancer s/p ex lap, open cholecystectomy, segment 4b/5 hepatectomy, and right colectomy due to fistula tract vs metastasis on 08/19, recently admitted at [facility] 9/19 - 9/29 for Acute hypoxic respiratory failure and Hepatic Abscess s/p drainage p/w unresponsiveness which was noted by family this morning.    # Acute hypoxic and hypercapnic respiratory failure, altered mental status  - Pt responsive to painful stimuli but nonverbal, not spontaneously opening eyes  - etiology unclear, likely Central related to Remeron  - CT head neg for acute pathology  - ABG w/ significant improvement   - Evaluated by ICU  - NPO while on Bi-PAP, cont AVAPS O/N  - cont to monitor    # Hepatic Abscess  - stable on CT  - last day on abx per ID noted (pt w/ different [facility] chart) today, pt on Levaquin and flagyl, dose ordered    # HTN/HLD  - c/w Coreg, fenofibrate    # GERD  - c/w PPI    # DVT ppx - HSQ    Pt FC

## 2022-10-13 NOTE — ED ADULT NURSE NOTE - OBJECTIVE STATEMENT
As per son she patient pmhx HTN, HLD recently discharge from [facility] for liver abscess drainage. As per pt son patient have been having bouts of confusion since discharge from [facility], today she was unresponsive and hypoxic (37%) on RA. BIBA patient placed on BIPaP as ordered. Labs drawn and sent and medication given as ordered.

## 2022-10-13 NOTE — ED PROVIDER NOTE - ATTENDING APP SHARED VISIT CONTRIBUTION OF CARE
I have seen the patient with the PA and agree with above examination and assessment and plan with the following addendum:    87 year old female presents today from home w AMS and hypoxia, placed on bipap    Focused PE:   General: pt keeps her eyes closed, moves w painful stimuli  Head: Normocephalic, atraumatic  Eyes: PERRLA, EOMI  Cardiac: RRR, no murmurs, rubs or gallops  Resp: CTA, no wheezes, rales or ronchi  GI: Nondistended, nontender, no rebound or guarding  MSK: normal ROM  Neuro: Alert and oriented, no focal deficits. Normal gait  Ext: Non edematous, nontender.    Plan:   labs, ekg, cardiac monitoring, ct, cxr, flu  bipap, icu eval  reassess, plan is for admission and workup

## 2022-10-13 NOTE — CONSULT NOTE ADULT - ASSESSMENT
87 year old female with a PMHx of HTN, HLD GERD, and gallbladder cancer s/p ex lap, open cholecystectomy, segment 4b/5 hepatectomy, and right colectomy due to fistula tract vs metastasis on 08/19, p/w AMS and hypoxemia, admitted for hypercapnic respiratory failure.     -AMS likely s/t hypercapnic respiratory failure, bicarb suggestive that pt is chronic retainer; CT-h negative for acute abnormalities  -c/w AVAPS, given that pt with initial improvement in AMS s/p 1h trial on AVAPS, plan to continue for additional hour, then reassess mental status and obtain ABG to check for improvement in CO2 retention.   -if patient has improved MS on AVAPS upon reassessment, as well as improved CO2, pt is stable for admission to the medicine floor on continuous pulse oximetry.   -at this time, patient remains full code; if failed trial of AVAPS, intubation is indicated.   -ICU team will re-evaluate patient this afternoon to determine if ICU appropriate.      87 year old female with a PMHx of HTN, HLD GERD, and gallbladder cancer s/p ex lap, open cholecystectomy, segment 4b/5 hepatectomy, and right colectomy due to fistula tract vs metastasis on 08/19, p/w AMS and hypoxemia, admitted for hypercapnic respiratory failure.     -AMS likely s/t hypercapnic respiratory failure, bicarb suggestive that pt is chronic retainer; CT-h negative for acute abnormalities  -c/w AVAPS, given that pt had initial improvement in AMS s/p 3h trial on AVAPS. pt pulling tidal volumes 400-500 on this mode, O2 saturation 98%. repeat ABG pending, anticipate decrease in CO2 given clinical improvement.  -pt is stable for admission to the medicine floor on continuous pulse oximetry with AVAPs.   -at this time, patient remains full code  -please re-consult ICU at any point during admission

## 2022-10-13 NOTE — ED PROVIDER NOTE - OBJECTIVE STATEMENT
88 y/o F pmhx HTN. HLD, recent liver abscess drained. at [facility] brought to ED for hypoxia and AMS this morning. son states that patient is always  hypoxic in the morning however this morning she was unarousable and found with an oxygen level of 37% on RA by EMS improved on oxygen. son states that since being discharged from [facility] 2 weeks ago she has had some episodes of confusion however has been at her baseline the last few days until this morning. denies fever, cough, sob. patient is responsive to painful stimuli on exam.

## 2022-10-13 NOTE — ED ADULT NURSE NOTE - NS PRO PASSIVE SMOKE EXP
Recommendations for Tearing    • Use artificial tears up to 6 times per day (try long-acting formulations of bottles like \"Refresh,\" \"Systane,\" \"Blink,\" etc.).    • For dry eyes, use a hot washcloth to apply heat to the eyelids for at least several minutes, one or two times per day. Alternatively, you can use a microwaveable eye mask (like a \"Sherri Moist Heat Eye Compress,\" a \"Thermalon Dry Eye Compress\" or a \"Fire and Ice Mask\"), which tend to stay hot for longer. Anything you use should be very warm--but not hot enough to be uncomfortable.     • Keep well hydrated.     • Use a room humidifier as needed.    • Call Dr. Waldron for additional treatment options if you are still symptomatic.     
Unknown

## 2022-10-13 NOTE — ED PROVIDER NOTE - NSICDXPASTMEDICALHX_GEN_ALL_CORE_FT
PAST MEDICAL HISTORY:  Bladder cancer     HLD (hyperlipidemia)     HTN (hypertension)     Liver abscess

## 2022-10-13 NOTE — H&P ADULT - NSHPLABSRESULTS_GEN_ALL_CORE
T(C): 36.8 (10-13-22 @ 16:26), Max: 36.9 (10-13-22 @ 09:07)  HR: 82 (10-13-22 @ 16:26) (76 - 103)  BP: 100/64 (10-13-22 @ 16:26) (100/64 - 155/75)  RR: 15 (10-13-22 @ 16:26) (15 - 26)  SpO2: 98% (10-13-22 @ 16:26) (93% - 100%)                        10.9   5.00  )-----------( 277      ( 13 Oct 2022 09:58 )             36.5     10-13    133<L>  |  96  |  12  ----------------------------<  92  3.7   |  32<H>  |  0.80    Ca    8.6      13 Oct 2022 09:58    TPro  8.1  /  Alb  3.5  /  TBili  0.3  /  DBili  0.2  /  AST  27  /  ALT  15  /  AlkPhos  67  10-13    LIVER FUNCTIONS - ( 13 Oct 2022 09:58 )  Alb: 3.5 g/dL / Pro: 8.1 gm/dL / ALK PHOS: 67 U/L / ALT: 15 U/L / AST: 27 U/L / GGT: x           PT/INR - ( 13 Oct 2022 09:58 )   PT: 13.0 sec;   INR: 1.08 ratio         PTT - ( 13 Oct 2022 09:58 )  PTT:27.8 sec    < from: CT Abdomen and Pelvis w/ IV Cont (10.13.22 @ 12:16) >    IMPRESSION:    1. Negative for pulmonary embolism.  2. Hepatic abscess along the gallbladder fossa, not significantly changed.  3. No CT evidence of bowel obstruction, diverticulitis or colitis.      < from: CT Head No Cont (10.13.22 @ 12:15) >    IMPRESSION: No acute intracranial hemorrhage, mass effect, or shift of   the midline structures.    Similar-appearing moderate severity chronic white matter microvascular   type changes and chronic lacunar infarcts, as discussed.    --- End of Report ---        acetaminophen     Tablet .. 650 milliGRAM(s) Oral every 6 hours PRN  sodium chloride 0.9%. 1000 milliLiter(s) IV Continuous <Continuous> T(C): 36.8 (10-13-22 @ 16:26), Max: 36.9 (10-13-22 @ 09:07)  HR: 82 (10-13-22 @ 16:26) (76 - 103)  BP: 100/64 (10-13-22 @ 16:26) (100/64 - 155/75)  RR: 15 (10-13-22 @ 16:26) (15 - 26)  SpO2: 98% (10-13-22 @ 16:26) (93% - 100%)                        10.9   5.00  )-----------( 277      ( 13 Oct 2022 09:58 )             36.5     10-13    133<L>  |  96  |  12  ----------------------------<  92  3.7   |  32<H>  |  0.80    Ca    8.6      13 Oct 2022 09:58    TPro  8.1  /  Alb  3.5  /  TBili  0.3  /  DBili  0.2  /  AST  27  /  ALT  15  /  AlkPhos  67  10-13    LIVER FUNCTIONS - ( 13 Oct 2022 09:58 )  Alb: 3.5 g/dL / Pro: 8.1 gm/dL / ALK PHOS: 67 U/L / ALT: 15 U/L / AST: 27 U/L / GGT: x           PT/INR - ( 13 Oct 2022 09:58 )   PT: 13.0 sec;   INR: 1.08 ratio         PTT - ( 13 Oct 2022 09:58 )  PTT:27.8 sec    < from: CT Abdomen and Pelvis w/ IV Cont (10.13.22 @ 12:16) >    IMPRESSION:    1. Negative for pulmonary embolism.  2. Hepatic abscess along the gallbladder fossa, not significantly changed.  3. No CT evidence of bowel obstruction, diverticulitis or colitis.      < from: CT Head No Cont (10.13.22 @ 12:15) >    IMPRESSION: No acute intracranial hemorrhage, mass effect, or shift of   the midline structures.    Similar-appearing moderate severity chronic white matter microvascular   type changes and chronic lacunar infarcts, as discussed.    --- End of Report ---    MEDICATIONS  (STANDING):  allopurinol 100 milliGRAM(s) Oral daily  amLODIPine   Tablet 2.5 milliGRAM(s) Oral daily  carvedilol 6.25 milliGRAM(s) Oral every 12 hours  fenofibrate Tablet 145 milliGRAM(s) Oral daily  heparin   Injectable 5000 Unit(s) SubCutaneous every 8 hours  levoFLOXacin IVPB 750 milliGRAM(s) IV Intermittent every 24 hours  metroNIDAZOLE  IVPB 500 milliGRAM(s) IV Intermittent every 8 hours  pantoprazole    Tablet 40 milliGRAM(s) Oral before breakfast  sodium chloride 0.9%. 1000 milliLiter(s) (75 mL/Hr) IV Continuous <Continuous>    MEDICATIONS  (PRN):  acetaminophen     Tablet .. 650 milliGRAM(s) Oral every 6 hours PRN Temp greater or equal to 38C (100.4F), Mild Pain (1 - 3)

## 2022-10-13 NOTE — ED PROVIDER NOTE - CLINICAL SUMMARY MEDICAL DECISION MAKING FREE TEXT BOX
88 y/o F pmhx HTN. HLD, recent liver abscess drained. at [facility] brought to ED for hypoxia and AMS this morning. son states that patient is always  hypoxic in the morning however this morning she was unarousable and found with an oxygen level of 37% on RA by EMS improved on oxygen. son states that since being discharged from [facility] 2 weeks ago she has had some episodes of confusion however has been at her baseline the last few days until this morning. denies fever, cough, sob. patient is responsive to painful stimuli on exam. -- patient 86 y/o F pmhx HTN. HLD, recent liver abscess drained. at [facility] brought to ED for hypoxia and AMS this morning. son states that patient is always  hypoxic in the morning however this morning she was unarousable and found with an oxygen level of 37% on RA by EMS improved on oxygen. son states that since being discharged from [facility] 2 weeks ago she has had some episodes of confusion however has been at her baseline the last few days until this morning. denies fever, cough, sob. patient is responsive to painful stimuli on exam. -- patient responsive to pain. desaturating on RA-- concern for sepsis vs hypoxia, related AMS. will check sepsis labs,CT abdomen pelvis, ct head. admit

## 2022-10-13 NOTE — CONSULT NOTE ADULT - SUBJECTIVE AND OBJECTIVE BOX
CHIEF COMPLAINT:    HPI:  87 year old female with a PMHx of HTN, HLD GERD, and gallbladder cancer s/p ex lap, open cholecystectomy, segment 4b/5 hepatectomy, and right colectomy due to fistula tract vs metastasis on 08/19, p/w AMS, hypoxia. BIBA, reportedly O2 37% on RA, improved to 90s on NRB. family endorses acute onset of AMS upon awakening this am, pt responsive to painful stimuli but nonverbal, not spontaneously opening eyes. Pt recently dc'd from hospital 2w prior, since that time at baseline MS. denies fever, cough, sob. During last hospital admission, pt with hypercapnia, improved with BIPAP and was never intubated.     PAST MEDICAL & SURGICAL HISTORY:  Bladder cancer      HTN (hypertension)      HLD (hyperlipidemia)      Liver abscess      H/O gallbladder cancer          FAMILY HISTORY:      SOCIAL HISTORY:  Smoking: [ ] Never Smoked [ ] Former Smoker (__ packs x ___ years) [ ] Current Smoker  (__ packs x ___ years)  Substance Use: [ ] Never Used [ ] Used ____  EtOH Use:  Marital Status: [ ] Single [ ]  [ ]  [ ]   Sexual History:   Occupation:  Recent Travel:  Country of Birth:  Advance Directives:    Allergies    No Known Allergies    Intolerances        HOME MEDICATIONS:    REVIEW OF SYSTEMS:  Constitutional: [ ] negative [ ] fevers [ ] chills [ ] weight loss [ ] weight gain  HEENT: [ ] negative [ ] dry eyes [ ] eye irritation [ ] postnasal drip [ ] nasal congestion  CV: [ ] negative  [ ] chest pain [ ] orthopnea [ ] palpitations [ ] murmur  Resp: [ ] negative [ ] cough [ ] shortness of breath [ ] dyspnea [ ] wheezing [ ] sputum [ ] hemoptysis  GI: [ ] negative [ ] nausea [ ] vomiting [ ] diarrhea [ ] constipation [ ] abd pain [ ] dysphagia   : [ ] negative [ ] dysuria [ ] nocturia [ ] hematuria [ ] increased urinary frequency  Musculoskeletal: [ ] negative [ ] back pain [ ] myalgias [ ] arthralgias [ ] fracture  Skin: [ ] negative [ ] rash [ ] itch  Neurological: [ ] negative [ ] headache [ ] dizziness [ ] syncope [ ] weakness [ ] numbness  Psychiatric: [ ] negative [ ] anxiety [ ] depression  Endocrine: [ ] negative [ ] diabetes [ ] thyroid problem  Hematologic/Lymphatic: [ ] negative [ ] anemia [ ] bleeding problem  Allergic/Immunologic: [ ] negative [ ] itchy eyes [ ] nasal discharge [ ] hives [ ] angioedema  [ ] All other systems negative  [ ] Unable to assess ROS because ________    OBJECTIVE:  ICU Vital Signs Last 24 Hrs  T(C): 36.9 (13 Oct 2022 09:07), Max: 36.9 (13 Oct 2022 09:07)  T(F): 98.4 (13 Oct 2022 09:07), Max: 98.4 (13 Oct 2022 09:07)  HR: 81 (13 Oct 2022 11:36) (81 - 103)  BP: 127/34 (13 Oct 2022 11:36) (123/68 - 155/75)  BP(mean): --  ABP: --  ABP(mean): --  RR: 22 (13 Oct 2022 11:36) (16 - 26)  SpO2: 96% (13 Oct 2022 11:06) (93% - 99%)    O2 Parameters below as of 13 Oct 2022 11:36  Patient On (Oxygen Delivery Method): BiPAP/CPAP,AVAPS    O2 Concentration (%): 35          CAPILLARY BLOOD GLUCOSE      POCT Blood Glucose.: 112 mg/dL (13 Oct 2022 08:12)      PHYSICAL EXAM:  **                                LABS:                        10.9   5.00  )-----------( 277      ( 13 Oct 2022 09:58 )             36.5     Hgb Trend: 10.9<--  10-13    133<L>  |  96  |  12  ----------------------------<  92  3.7   |  32<H>  |  0.80    Ca    8.6      13 Oct 2022 09:58    TPro  8.1  /  Alb  3.5  /  TBili  0.3  /  DBili  0.2  /  AST  27  /  ALT  15  /  AlkPhos  67  10-13    Creatinine Trend: 0.80<--, 0.74<--  PT/INR - ( 13 Oct 2022 09:58 )   PT: 13.0 sec;   INR: 1.08 ratio         PTT - ( 13 Oct 2022 09:58 )  PTT:27.8 sec    Arterial Blood Gas:  10-13 @ 10:22  7.25/73/152/32/99.8/2.5  ABG lactate: --  Arterial Blood Gas:  10-13 @ 08:19  7.26/77/51/35/80.1/4.9  ABG lactate: --        MICROBIOLOGY:     RADIOLOGY:  [ ] Reviewed and interpreted by me    EKG: CHIEF COMPLAINT:    HPI:  87 year old female with a PMHx of HTN, HLD GERD, and gallbladder cancer s/p ex lap, open cholecystectomy, segment 4b/5 hepatectomy, and right colectomy due to fistula tract vs metastasis on 08/19, p/w AMS, hypoxia. BIBA, reportedly O2 37% on RA, improved to 90s on NRB. family endorses acute onset of AMS upon awakening this am, pt responsive to painful stimuli but nonverbal, not spontaneously opening eyes. Pt recently dc'd from hospital 2w prior, since that time at baseline MS. denies fever, cough, sob. During last hospital admission, pt with hypercapnia, improved with BIPAP and was never intubated.     PAST MEDICAL & SURGICAL HISTORY:  Bladder cancer      HTN (hypertension)      HLD (hyperlipidemia)      Liver abscess      H/O gallbladder cancer          FAMILY HISTORY:      SOCIAL HISTORY:  Smoking: [ ] Never Smoked [ ] Former Smoker (__ packs x ___ years) [ ] Current Smoker  (__ packs x ___ years)  Substance Use: [ ] Never Used [ ] Used ____  EtOH Use:  Marital Status: [ ] Single [ ]  [ ]  [ ]   Sexual History:   Occupation:  Recent Travel:  Country of Birth:  Advance Directives:    Allergies    No Known Allergies    Intolerances        HOME MEDICATIONS:    REVIEW OF SYSTEMS:  Constitutional: [ ] negative [ ] fevers [ ] chills [ ] weight loss [ ] weight gain  HEENT: [ ] negative [ ] dry eyes [ ] eye irritation [ ] postnasal drip [ ] nasal congestion  CV: [ ] negative  [ ] chest pain [ ] orthopnea [ ] palpitations [ ] murmur  Resp: [ ] negative [ ] cough [ ] shortness of breath [ ] dyspnea [ ] wheezing [ ] sputum [ ] hemoptysis  GI: [ ] negative [ ] nausea [ ] vomiting [ ] diarrhea [ ] constipation [ ] abd pain [ ] dysphagia   : [ ] negative [ ] dysuria [ ] nocturia [ ] hematuria [ ] increased urinary frequency  Musculoskeletal: [ ] negative [ ] back pain [ ] myalgias [ ] arthralgias [ ] fracture  Skin: [ ] negative [ ] rash [ ] itch  Neurological: [ ] negative [ ] headache [ ] dizziness [ ] syncope [ ] weakness [ ] numbness  Psychiatric: [ ] negative [ ] anxiety [ ] depression  Endocrine: [ ] negative [ ] diabetes [ ] thyroid problem  Hematologic/Lymphatic: [ ] negative [ ] anemia [ ] bleeding problem  Allergic/Immunologic: [ ] negative [ ] itchy eyes [ ] nasal discharge [ ] hives [ ] angioedema  [ ] All other systems negative  [ ] Unable to assess ROS because ________    OBJECTIVE:  ICU Vital Signs Last 24 Hrs  T(C): 36.9 (13 Oct 2022 09:07), Max: 36.9 (13 Oct 2022 09:07)  T(F): 98.4 (13 Oct 2022 09:07), Max: 98.4 (13 Oct 2022 09:07)  HR: 81 (13 Oct 2022 11:36) (81 - 103)  BP: 127/34 (13 Oct 2022 11:36) (123/68 - 155/75)  BP(mean): --  ABP: --  ABP(mean): --  RR: 22 (13 Oct 2022 11:36) (16 - 26)  SpO2: 96% (13 Oct 2022 11:06) (93% - 99%)    O2 Parameters below as of 13 Oct 2022 11:36  Patient On (Oxygen Delivery Method): BiPAP/CPAP,AVAPS    O2 Concentration (%): 35          CAPILLARY BLOOD GLUCOSE      POCT Blood Glucose.: 112 mg/dL (13 Oct 2022 08:12)      PHYSICAL EXAM:  **                                LABS:                        10.9   5.00  )-----------( 277      ( 13 Oct 2022 09:58 )             36.5     Hgb Trend: 10.9<--  10-13    133<L>  |  96  |  12  ----------------------------<  92  3.7   |  32<H>  |  0.80    Ca    8.6      13 Oct 2022 09:58    TPro  8.1  /  Alb  3.5  /  TBili  0.3  /  DBili  0.2  /  AST  27  /  ALT  15  /  AlkPhos  67  10-13    Creatinine Trend: 0.80<--, 0.74<--  PT/INR - ( 13 Oct 2022 09:58 )   PT: 13.0 sec;   INR: 1.08 ratio         PTT - ( 13 Oct 2022 09:58 )  PTT:27.8 sec    Arterial Blood Gas:  10-13 @ 10:22  7.25/73/152/32/99.8/2.5  ABG lactate: --  Arterial Blood Gas:  10-13 @ 08:19  7.26/77/51/35/80.1/4.9  ABG lactate: --    ABG - ( 13 Oct 2022 11:50 )  pH, Arterial: 7.27  pH, Blood: x     /  pCO2: 70    /  pO2: 127   / HCO3: 32    / Base Excess: 3.1   /  SaO2: 99.1        MICROBIOLOGY:     RADIOLOGY:  [ ] Reviewed and interpreted by me    EKG: CHIEF COMPLAINT:    HPI:  87 year old female with a PMHx of HTN, HLD GERD, and gallbladder cancer s/p ex lap, open cholecystectomy, segment 4b/5 hepatectomy, and right colectomy due to fistula tract vs metastasis on 08/19, p/w AMS, hypoxia. BIBA, reportedly O2 37% on RA, improved to 90s on NRB. family endorses acute onset of AMS upon awakening this am, pt responsive to painful stimuli but nonverbal, not spontaneously opening eyes. Pt recently dc'd from hospital 2w prior, since that time at baseline MS. denies fever, cough, sob. During last hospital admission, pt with hypercapnia, improved with BIPAP and was never intubated.     PAST MEDICAL & SURGICAL HISTORY:  Bladder cancer      HTN (hypertension)      HLD (hyperlipidemia)      Liver abscess      H/O gallbladder cancer      Allergies    No Known Allergies    Intolerances        HOME MEDICATIONS:    REVIEW OF SYSTEMS:  [x] Unable to assess ROS because AMS    OBJECTIVE:  ICU Vital Signs Last 24 Hrs  T(C): 36.9 (13 Oct 2022 09:07), Max: 36.9 (13 Oct 2022 09:07)  T(F): 98.4 (13 Oct 2022 09:07), Max: 98.4 (13 Oct 2022 09:07)  HR: 81 (13 Oct 2022 11:36) (81 - 103)  BP: 127/34 (13 Oct 2022 11:36) (123/68 - 155/75)  BP(mean): --  ABP: --  ABP(mean): --  RR: 22 (13 Oct 2022 11:36) (16 - 26)  SpO2: 96% (13 Oct 2022 11:06) (93% - 99%)    O2 Parameters below as of 13 Oct 2022 11:36  Patient On (Oxygen Delivery Method): BiPAP/CPAP,AVAPS    O2 Concentration (%): 35          CAPILLARY BLOOD GLUCOSE  POCT Blood Glucose.: 112 mg/dL (13 Oct 2022 08:12)      PHYSICAL EXAM:  Gen: WDWN, NAD  HEENT: trachea midline  CV: RRR on cardiac monitor, extremities mildly cool  Resp: CTAB, no W/R/R, pt on AVAPS machine  GI: Abdomen soft non-distended, mild diffuse ttp, midline healing surgical scar without skin induration, purulence, erythema.  MSK: No open wounds, no bruising, no LE edema  Neuro: responsive to painful stimuli, pupils 2mm b/l      LABS:                        10.9   5.00  )-----------( 277      ( 13 Oct 2022 09:58 )             36.5     Hgb Trend: 10.9<--  10-13    133<L>  |  96  |  12  ----------------------------<  92  3.7   |  32<H>  |  0.80    Ca    8.6      13 Oct 2022 09:58    TPro  8.1  /  Alb  3.5  /  TBili  0.3  /  DBili  0.2  /  AST  27  /  ALT  15  /  AlkPhos  67  10-13    Creatinine Trend: 0.80<--, 0.74<--  PT/INR - ( 13 Oct 2022 09:58 )   PT: 13.0 sec;   INR: 1.08 ratio         PTT - ( 13 Oct 2022 09:58 )  PTT:27.8 sec    Arterial Blood Gas:  10-13 @ 10:22  7.25/73/152/32/99.8/2.5  ABG lactate: --  Arterial Blood Gas:  10-13 @ 08:19  7.26/77/51/35/80.1/4.9  ABG lactate: --    ABG - ( 13 Oct 2022 11:50 )  pH, Arterial: 7.27  pH, Blood: x     /  pCO2: 70    /  pO2: 127   / HCO3: 32    / Base Excess: 3.1   /  SaO2: 99.1

## 2022-10-13 NOTE — ED ADULT TRIAGE NOTE - CHIEF COMPLAINT QUOTE
Patient BIBA: Patient found unresponsive in bed this AM. Patient responds painful stimuli  by withdrawing. Per EMS: On arrival patient Saturation 38% on their arrival: Placed on 100% NRB with saturation improvement. Patient had Cholecystectomy 2 weeks ago, Dementia, Aspiration pneumonia

## 2022-10-13 NOTE — CONSULT NOTE ADULT - CONVERSATION DETAILS
GOC and advanced directives addressed with daughter America    daughter made aware of pt's respiratory status    we discussed her underlying recent GB cancer dx and her post op complications including hypercarbic respiratory failure, hepatic abscess requiring IR drainage persistent abscess noted on repeat imaging here    daughter states pt is DNR/DNI    all medical interventions to be continued

## 2022-10-13 NOTE — ED PROVIDER NOTE - PROGRESS NOTE DETAILS
repeat abg after one hour on bipap shows pco2 still 70, po2- 152  bipap changes 20/6, 40% ICU called for evaluation

## 2022-10-13 NOTE — CONSULT NOTE ADULT - SUBJECTIVE AND OBJECTIVE BOX
**history obtained from chart  **pt unable to provide hx due to AMS, lethargy, AVAPs use    Patient is a 87y old  Female who presents with a chief complaint of Acute hypoxic and hypercapnic respiratory failure, altered mental status (13 Oct 2022 16:22)      HPI:  87F PMH HTN, HLD GERD, and gallbladder cancer s/p ex lap, open cholecystectomy, segment 4b/5 hepatectomy, and right colectomy due to fistula tract vs metastasis on 08/19, recently admitted at [facility] 9/19 - 9/29 for Acute hypoxic respiratory failure and Hepatic Abscess s/p drainage p/w unresponsiveness which was noted by family this morning. Family noted some intermittent confusion since pt was discharged however this morning she was completely unresponsive. Per EMS, pt's O2 was 37% on RA, improved to 90s on NRB, Pt responsive to painful stimuli but nonverbal, not spontaneously opening eyes. No reported fever, chills, cough, or SOB.  (13 Oct 2022 16:22)    ABG 7.26/77/56/35/80%  placed on BiPAP without significant improvement and changed to AVAPS    ABG on AVAPS 7.35/58/77/32/97%      Allergies  No Known Allergies    Intolerances        MEDICATIONS  (STANDING):  allopurinol 100 milliGRAM(s) Oral daily  amLODIPine   Tablet 2.5 milliGRAM(s) Oral daily  carvedilol 6.25 milliGRAM(s) Oral every 12 hours  fenofibrate Tablet 145 milliGRAM(s) Oral daily  heparin   Injectable 5000 Unit(s) SubCutaneous every 8 hours  pantoprazole    Tablet 40 milliGRAM(s) Oral before breakfast  sodium chloride 0.9%. 1000 milliLiter(s) (75 mL/Hr) IV Continuous <Continuous>    MEDICATIONS  (PRN):  acetaminophen     Tablet .. 650 milliGRAM(s) Oral every 6 hours PRN Temp greater or equal to 38C (100.4F), Mild Pain (1 - 3)      Daily     Daily     Drug Dosing Weight  Height (cm): 152.4 (13 Oct 2022 08:03)  Weight (kg): 51.3 (14 Oct 2022 05:32)  BMI (kg/m2): 22.1 (14 Oct 2022 05:32)  BSA (m2): 1.46 (14 Oct 2022 05:32)    PAST MEDICAL & SURGICAL HISTORY:  Bladder cancer      HTN (hypertension)      HLD (hyperlipidemia)      Liver abscess      H/O gallbladder cancer          FAMILY HISTORY:  No pertinent family history in first degree relatives        SOCIAL HISTORY:    ADVANCE DIRECTIVES:  DNR/DNI    REVIEW OF SYSTEMS:  [x] unable to obtain due to lethagy/encephalopathy              Vital Signs Last 24 Hrs  T(C): 36.8 (13 Oct 2022 16:26), Max: 36.9 (13 Oct 2022 09:07)  T(F): 98.2 (13 Oct 2022 16:26), Max: 98.4 (13 Oct 2022 09:07)  HR: 82 (13 Oct 2022 16:26) (76 - 103)  BP: 100/64 (13 Oct 2022 16:26) (100/64 - 155/75)  RR: 15 (13 Oct 2022 16:26) (15 - 26)  SpO2: 98% (13 Oct 2022 16:26) (93% - 100%)    Parameters below as of 13 Oct 2022 13:30  Patient On (Oxygen Delivery Method): BiPAP/CPAP      ABG - ( 13 Oct 2022 14:42 )  pH, Arterial: 7.35   /  pCO2: 58    /  pO2: 77    / HCO3: 32    / Base Excess: 4.7   /  SaO2: 97.5                I&O's Detail      PHYSICAL EXAM:    GENERAL: NAD, well-groomed, well-developed  HEAD:  Atraumatic, Normocephalic  EYES: EOMI, PERRLA, conjunctiva and sclera clear  ENMT: No tonsillar erythema, exudates, or enlargement; Moist mucous membranes, Good dentition, No lesions  NECK: Supple, No JVD, Normal thyroid  NERVOUS SYSTEM:  Alert & Oriented X3, Good concentration; Motor Strength 5/5 B/L upper and lower extremities; DTRs 2+ intact and symmetric  CHEST/LUNG: Clear to percussion bilaterally; No rales, rhonchi, wheezing, or rubs  HEART: Regular rate and rhythm; No murmurs, rubs, or gallops  ABDOMEN: Soft, Nontender, Nondistended; Bowel sounds present  EXTREMITIES:  2+ Peripheral Pulses, No clubbing, cyanosis, or edema  LYMPH: No lymphadenopathy noted  SKIN: No rashes or lesions    LABS:                 10.9   5.00  )-----------( 277      ( 13 Oct 2022 09:58 )             36.5     10-13    133<L>  |  96       |  12  -----------------------------------------<  92  3.7         |  32<H>  |  0.80    Ca    8.6      13 Oct 2022 09:58    TPro  8.1  /  Alb  3.5  /  TBili  0.3  /  DBili  0.2  /  AST  27  /  ALT  15  /  AlkPhos  67  10-13        CAPILLARY BLOOD GLUCOSE  POCT Blood Glucose.: 112 mg/dL (13 Oct 2022 08:12)    PT/INR - ( 13 Oct 2022 09:58 )   PT: 13.0 sec;   INR: 1.08 ratio    PTT - ( 13 Oct 2022 09:58 )  PTT:27.8 sec              EKG:    ECHO, US:    RADIOLOGY:       **history obtained from chart  **pt unable to provide hx due to AMS, lethargy, AVAPs use    87F presents for altered mental status (13 Oct 2022 16:22)      HPI:  87F PMH HTN, HLD GERD, and 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 Family noted some intermittent confusion since pt was discharged however this morning she was completely unresponsive. Per EMS, pt's O2 was 37% on RA, improved to 90s on NRB, Pt responsive to painful stimuli only but nonverbal, not spontaneously opening eyes. No reported fever, chills, cough, or SOB.  (13 Oct 2022 16:22)    ABG 7.26/77/56/35/80%  placed on BiPAP without significant improvement and changed to AVAPS    ABG on AVAPS 7.35/58/77/32/97%      Allergies  No Known Allergies      MEDICATIONS  (STANDING):  allopurinol 100 milliGRAM(s) Oral daily  amLODIPine   Tablet 2.5 milliGRAM(s) Oral daily  carvedilol 6.25 milliGRAM(s) Oral every 12 hours  fenofibrate Tablet 145 milliGRAM(s) Oral daily  heparin   Injectable 5000 Unit(s) SubCutaneous every 8 hours  pantoprazole    Tablet 40 milliGRAM(s) Oral before breakfast  sodium chloride 0.9%. 1000 milliLiter(s) (75 mL/Hr) IV Continuous <Continuous>    MEDICATIONS  (PRN):  acetaminophen     Tablet .. 650 milliGRAM(s) Oral every 6 hours PRN Temp greater or equal to 38C (100.4F), Mild Pain (1 - 3)      Daily         Drug Dosing Weight  Height (cm): 152.4 (13 Oct 2022 08:03)  Weight (kg): 51.3 (14 Oct 2022 05:32)  BMI (kg/m2): 22.1 (14 Oct 2022 05:32)  BSA (m2): 1.46 (14 Oct 2022 05:32)    PAST MEDICAL & SURGICAL HISTORY:  Gall Bladder cancer      HTN (hypertension)      HLD (hyperlipidemia)      Liver abscess      FAMILY HISTORY:  No pertinent family history in first degree relatives      SOCIAL HISTORY:  unable to obtain due to unresponsiveness    ADVANCE DIRECTIVES:  DNR/DNI    REVIEW OF SYSTEMS:  [x] unable to obtain due to lethargy/encephalopathy              Vital Signs Last 24 Hrs  T(C): 36.8 (13 Oct 2022 16:26), Max: 36.9 (13 Oct 2022 09:07)  T(F): 98.2 (13 Oct 2022 16:26), Max: 98.4 (13 Oct 2022 09:07)  HR: 82 (13 Oct 2022 16:26) (76 - 103)  BP: 100/64 (13 Oct 2022 16:26) (100/64 - 155/75)  RR: 15 (13 Oct 2022 16:26) (15 - 26)  SpO2: 98% (13 Oct 2022 16:26) (93% - 100%)    Parameters below as of 13 Oct 2022 13:30  Patient On (Oxygen Delivery Method): BiPAP/CPAP      ABG - ( 13 Oct 2022 14:42 )  pH, Arterial: 7.35   /  pCO2: 58    /  pO2: 77    / HCO3: 32    / Base Excess: 4.7   /  SaO2: 97.5            PHYSICAL EXAM:  GENERAL: lethargic, unresponsive, on BIPAP  HEAD:  Atraumatic, Normocephalic  EYES:  conjunctiva and sclera clear  ENMT: bipap mask over face  NECK: Supple, No JVD  NERVOUS SYSTEM:  lethargic, arouses to noxious stimuli, no following commands  CHEST/LUNG:  No rales, rhonchi, wheezing, bilateral airway entry  HEART: Regular rate and rhythm  ABDOMEN: Soft,  Nondistended; Bowel sounds present, healed abdominal vertical surgical scar  EXTREMITIES:  2+ Peripheral Pulses, No clubbing, cyanosis, or edema  LYMPH: No lymphadenopathy noted  SKIN: No rashes or lesions    LABS:                 10.9   5.00  )-----------( 277      ( 13 Oct 2022 09:58 )             36.5     10-13    133<L>  |  96       |  12  -----------------------------------------<  92  3.7         |  32<H>  |  0.80    Ca    8.6      13 Oct 2022 09:58    TPro  8.1  /  Alb  3.5  /  TBili  0.3  /  DBili  0.2  /  AST  27  /  ALT  15  /  AlkPhos  67  10-13        CAPILLARY BLOOD GLUCOSE  POCT Blood Glucose.: 112 mg/dL (13 Oct 2022 08:12)    PT/INR - ( 13 Oct 2022 09:58 )   PT: 13.0 sec;   INR: 1.08 ratio    PTT - ( 13 Oct 2022 09:58 )  PTT:27.8 sec        RADIOLOGY:  CT chest < from: CT Angio Chest PE Protocol w/ IV Cont (10.13.22 @ 12:15) >  CHEST:  LUNGS AND LARGE AIRWAYS: Patent central airways. Basilaratelectasis.  PLEURA: Trace right pleural effusion. No pneumothorax.  VESSELS: Atherosclerotic changes of thoracic aorta and coronary arteries.   Ectatic ascending thoracic aorta (3.8 cm, stable). Multiple images are   degraded by respiratory motion.Excluding regions with motion artifact,   there are no pulmonary arterial filling defects identified.  HEART: Mildly enlarged. No pericardial effusion.  MEDIASTINUM AND CARMELLA: No lymphadenopathy.  CHEST WALL AND LOWER NECK: Heterogeneous and mildly enlarged thyroid   gland.    CT abdomen/pelvis  < from: CT Abdomen and Pelvis w/ IV Cont (10.13.22 @ 12:16) >  LIVER: Rim-enhancing fluid collection along the gallbladder fossa   measuring approximately 4.8 x 2.4 cm, stable. Focal region of hypodensity   adjacent to the fluid collection.  BILE DUCTS: Stable mild dilatation.  GALLBLADDER: Cholecystectomy.  SPLEEN: Within normal limits.  PANCREAS: Within normal limits.  ADRENALS: Within normal limits.  KIDNEYS/URETERS: Renal cortical scarring. Subcentimeter renal   hypodensities, too small to further characterize. No hydronephrosis.    BLADDER: Within normal limits.  REPRODUCTIVE ORGANS: Fibroid uterus.    BOWEL: No bowel obstruction. Right hemicolectomy. Colonic diverticulosis.  PERITONEUM: Mild right abdominal fluid.  VESSELS: Atherosclerotic changes.  RETROPERITONEUM/LYMPH NODES: No lymphadenopathy.  ABDOMINAL WALL: Postsurgical changes.  BONES: Degenerative changes.      CT head < from: CT Head No Cont (10.13.22 @ 12:15) >  No acute intracranial hemorrhage, mass effect, or shift of   the midline structures.    Similar-appearing moderate severity chronic white matter microvascular   type changes and chronic lacunar infarcts

## 2022-10-13 NOTE — ED PROVIDER NOTE - PHYSICAL EXAMINATION
General: responds to painful stimuli   Head: Normocephalic, atraumatic  Eyes: PERRLA, EOMI  Cardiac: RRR, no murmurs, rubs or gallops  Resp: CTA, no wheezes, rales or ronchi  GI: Nondistended, nontender, no rebound or guarding  MSK: normal ROM  neuro: moving extremities unable to complete exam due to mental status   Ext: Non edematous, nontender.

## 2022-10-13 NOTE — PATIENT PROFILE ADULT - FALL HARM RISK - HARM RISK INTERVENTIONS
Assistance with ambulation/Assistance OOB with selected safe patient handling equipment/Communicate Risk of Fall with Harm to all staff/Discuss with provider need for PT consult/Monitor gait and stability/Reinforce activity limits and safety measures with patient and family/Tailored Fall Risk Interventions/Visual Cue: Yellow wristband and red socks/Bed in lowest position, wheels locked, appropriate side rails in place/Call bell, personal items and telephone in reach/Instruct patient to call for assistance before getting out of bed or chair/Non-slip footwear when patient is out of bed/[place] to call system/Physically safe environment - no spills, clutter or unnecessary equipment/Purposeful Proactive Rounding/Room/bathroom lighting operational, light cord in reach

## 2022-10-13 NOTE — CONSULT NOTE ADULT - ASSESSMENT
87 year old female with a PMHx of HTN, HLD GERD, and gallbladder cancer s/p ex lap, open cholecystectomy, segment 4b/5 hepatectomy, and right colectomy due to fistula tract vs metastasis on 08/19, p/w AMS and hypoxemia, admitted for hypercapnic respiratory failure    full consult to follow    cont avaps  mental status remains poor/pt lethargic despite improved pCO2 on blood gas  she stirs and arouses to tactile/noxious stimuli, does not follow commands  suspect encephalopathy may be related to ?underlying infection  hepatic abscess noted on CT imaging, she had prior IR drainage  was given levaquin and flagyl  ?need for IR again  check blood cx, u cx  CT head follow up  DNR/DNI  repeat ABG in AM  will follow     87 year old female with a PMHx of HTN, HLD GERD, and 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, open cholecystectomy, segment 4b/5 hepatectomy, and right colectomy due to fistula tract vs metastasis on 08/19, p/w AMS and hypoxemia, admitted for acute hypoxic and hypercapnic respiratory failure, metabolic encephalopathy, recurrent hepatic abscess      cont avaps: ABG improving  mental status remains poor/pt lethargic despite improved pCO2 on blood gas  she stirs and arouses to tactile/noxious stimuli, does not follow commands  suspect encephalopathy may be related to ?underlying infection  hepatic abscess noted on CT imaging, she had prior IR drainage  was given levaquin and flagyl (antibx was to be completed today)  ?need for IR again  check blood cx, u cx  would extend antibx duration for now   ?ID eval  DNR/DNI  repeat ABG in AM  unclear reason for hypoxia/hypercarbia: CT with basilar atelectasis but out of proportion to degree of hypoxia and hypercarbia  will follow     87 year old female with a PMHx of HTN, HLD GERD, and 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, open cholecystectomy, segment 4b/5 hepatectomy, and right colectomy due to fistula tract vs metastasis on 08/19, p/w AMS and hypoxemia, admitted for acute hypoxic and hypercapnic respiratory failure, metabolic encephalopathy, recurrent hepatic abscess      cont avaps: ABG improving  mental status remains poor/pt lethargic despite improved pCO2 on blood gas  she stirs and arouses to tactile/noxious stimuli, does not follow commands  suspect encephalopathy may be related to ?underlying infection  hepatic abscess noted on CT imaging, she had prior IR drainage  was given levaquin and flagyl (antibx was to be completed today)  ?need for IR again  check blood cx, u cx  would extend antibx duration for now   ?ID eval  DNR/DNI  repeat ABG in AM  unclear reason for hypoxia/hypercarbia: CT with basilar atelectasis but out of proportion to degree of hypoxia and hypercarbia    POCUS lungs: bilateral a line predominance anteriorly, no significant effusions  will follow    POCUS limited echo: EF appears to be within normal limits, RV size < LV size

## 2022-10-13 NOTE — H&P ADULT - NSHPPHYSICALEXAM_GEN_ALL_CORE
PHYSICAL EXAM:    Vital Signs Last 24 Hrs  T(C): 36.8 (13 Oct 2022 16:26), Max: 36.9 (13 Oct 2022 09:07)  T(F): 98.2 (13 Oct 2022 16:26), Max: 98.4 (13 Oct 2022 09:07)  HR: 82 (13 Oct 2022 16:26) (76 - 103)  BP: 100/64 (13 Oct 2022 16:26) (100/64 - 155/75)  BP(mean): --  RR: 15 (13 Oct 2022 16:26) (15 - 26)  SpO2: 98% (13 Oct 2022 16:26) (93% - 100%)    Parameters below as of 13 Oct 2022 13:30  Patient On (Oxygen Delivery Method): BiPAP/CPAP        GENERAL: Pt lying in bed comfortably in NAD  HEENT:  Atraumatic, EOMI, PERRL, conjunctiva and sclera clear, MMM  NECK: Supple, No JVD  CHEST/LUNG: Clear to auscultation bilaterally; No rales, rhonchi, wheezing or rubs. Unlabored respirations  HEART: Regular rate and rhythm; No murmurs, rubs, or gallops  ABDOMEN: Bowel sounds present; Soft, Nontender, Nondistended. No guarding or rigidity    EXTREMITIES:  2+ Peripheral Pulses, brisk capillary refill. No clubbing, cyanosis, or edema  NEUROLOGICAL:  Alert & Oriented X3, speech clear. Answers questions appropriately. Full and equal strength B/L upper and lower extremities. No deficits   MSK: FROM x 4 extremities   SKIN: No rashes or lesions PHYSICAL EXAM:    Vital Signs Last 24 Hrs  T(C): 36.8 (13 Oct 2022 16:26), Max: 36.9 (13 Oct 2022 09:07)  T(F): 98.2 (13 Oct 2022 16:26), Max: 98.4 (13 Oct 2022 09:07)  HR: 82 (13 Oct 2022 16:26) (76 - 103)  BP: 100/64 (13 Oct 2022 16:26) (100/64 - 155/75)  BP(mean): --  RR: 15 (13 Oct 2022 16:26) (15 - 26)  SpO2: 98% (13 Oct 2022 16:26) (93% - 100%)    Parameters below as of 13 Oct 2022 13:30  Patient On (Oxygen Delivery Method): BiPAP/CPAP      GENERAL: Pt lying in bed comfortably in NAD  HEENT:  Atraumatic  NECK: Supple  CHEST/LUNG: Clear to auscultation bilaterally  HEART: Regular rate and rhythm  ABDOMEN: Bowel sounds present; Soft, tender, Nondistended.   EXTREMITIES:  2+ Peripheral Pulses. No edema  NEUROLOGICAL:  Pt responsive to painful stimuli but nonverbal, not spontaneously opening eyes  MSK: no overt deformities  SKIN: warm, dry

## 2022-10-13 NOTE — CONSULT NOTE ADULT - ATTENDING COMMENTS
Agree with above  Type 2 respiratory failure with initial AMS  Transitioned to AVAPS with improvement in ventilation, and concomitant improvement in mental status  Protecting airway, following commands  No need for ICU at this time.  Please feel free to re-consult if patient's condition worsens  Can admit to general medicine, recommend pulmonary F/U while inpatient

## 2022-10-14 DIAGNOSIS — C23 MALIGNANT NEOPLASM OF GALLBLADDER: ICD-10-CM

## 2022-10-14 DIAGNOSIS — K75.0 ABSCESS OF LIVER: ICD-10-CM

## 2022-10-14 LAB
ALBUMIN SERPL ELPH-MCNC: 2.8 G/DL — LOW (ref 3.3–5)
ALP SERPL-CCNC: 55 U/L — SIGNIFICANT CHANGE UP (ref 40–120)
ALT FLD-CCNC: 15 U/L — SIGNIFICANT CHANGE UP (ref 12–78)
AMPHET UR-MCNC: NEGATIVE — SIGNIFICANT CHANGE UP
ANION GAP SERPL CALC-SCNC: 6 MMOL/L — SIGNIFICANT CHANGE UP (ref 5–17)
ANISOCYTOSIS BLD QL: SLIGHT — SIGNIFICANT CHANGE UP
AST SERPL-CCNC: 25 U/L — SIGNIFICANT CHANGE UP (ref 15–37)
BARBITURATES UR SCN-MCNC: NEGATIVE — SIGNIFICANT CHANGE UP
BASE EXCESS BLDA CALC-SCNC: 5.9 MMOL/L — HIGH (ref -2–3)
BASOPHILS # BLD AUTO: 0.05 K/UL — SIGNIFICANT CHANGE UP (ref 0–0.2)
BASOPHILS NFR BLD AUTO: 1 % — SIGNIFICANT CHANGE UP (ref 0–2)
BENZODIAZ UR-MCNC: NEGATIVE — SIGNIFICANT CHANGE UP
BILIRUB SERPL-MCNC: 0.4 MG/DL — SIGNIFICANT CHANGE UP (ref 0.2–1.2)
BLOOD GAS COMMENTS ARTERIAL: SIGNIFICANT CHANGE UP
BUN SERPL-MCNC: 8 MG/DL — SIGNIFICANT CHANGE UP (ref 7–23)
CALCIUM SERPL-MCNC: 9.1 MG/DL — SIGNIFICANT CHANGE UP (ref 8.5–10.1)
CHLORIDE SERPL-SCNC: 100 MMOL/L — SIGNIFICANT CHANGE UP (ref 96–108)
CO2 BLDA-SCNC: 31 MMOL/L — HIGH (ref 19–24)
CO2 SERPL-SCNC: 30 MMOL/L — SIGNIFICANT CHANGE UP (ref 22–31)
COCAINE METAB.OTHER UR-MCNC: NEGATIVE — SIGNIFICANT CHANGE UP
CREAT SERPL-MCNC: 0.59 MG/DL — SIGNIFICANT CHANGE UP (ref 0.5–1.3)
EGFR: 88 ML/MIN/1.73M2 — SIGNIFICANT CHANGE UP
EOSINOPHIL # BLD AUTO: 0.1 K/UL — SIGNIFICANT CHANGE UP (ref 0–0.5)
EOSINOPHIL NFR BLD AUTO: 2 % — SIGNIFICANT CHANGE UP (ref 0–6)
GAS PNL BLDA: SIGNIFICANT CHANGE UP
GLUCOSE SERPL-MCNC: 70 MG/DL — SIGNIFICANT CHANGE UP (ref 70–99)
HCO3 BLDA-SCNC: 30 MMOL/L — HIGH (ref 21–28)
HCT VFR BLD CALC: 32.9 % — LOW (ref 34.5–45)
HGB BLD-MCNC: 10.1 G/DL — LOW (ref 11.5–15.5)
HOROWITZ INDEX BLDA+IHG-RTO: 28 — SIGNIFICANT CHANGE UP
LYMPHOCYTES # BLD AUTO: 0.31 K/UL — LOW (ref 1–3.3)
LYMPHOCYTES # BLD AUTO: 6 % — LOW (ref 13–44)
MACROCYTES BLD QL: SLIGHT — SIGNIFICANT CHANGE UP
MANUAL SMEAR VERIFICATION: SIGNIFICANT CHANGE UP
MCHC RBC-ENTMCNC: 27.1 PG — SIGNIFICANT CHANGE UP (ref 27–34)
MCHC RBC-ENTMCNC: 30.7 G/DL — LOW (ref 32–36)
MCV RBC AUTO: 88.2 FL — SIGNIFICANT CHANGE UP (ref 80–100)
METHADONE UR-MCNC: NEGATIVE — SIGNIFICANT CHANGE UP
MONOCYTES # BLD AUTO: 0.36 K/UL — SIGNIFICANT CHANGE UP (ref 0–0.9)
MONOCYTES NFR BLD AUTO: 7 % — SIGNIFICANT CHANGE UP (ref 2–14)
NEUTROPHILS # BLD AUTO: 4.35 K/UL — SIGNIFICANT CHANGE UP (ref 1.8–7.4)
NEUTROPHILS NFR BLD AUTO: 84 % — HIGH (ref 43–77)
NRBC # BLD: 0 /100 — SIGNIFICANT CHANGE UP (ref 0–0)
NRBC # BLD: SIGNIFICANT CHANGE UP /100 WBCS (ref 0–0)
OPIATES UR-MCNC: POSITIVE — SIGNIFICANT CHANGE UP
PCO2 BLDA: 39 MMHG — SIGNIFICANT CHANGE UP (ref 32–46)
PCP SPEC-MCNC: SIGNIFICANT CHANGE UP
PCP UR-MCNC: NEGATIVE — SIGNIFICANT CHANGE UP
PH BLDA: 7.49 — HIGH (ref 7.35–7.45)
PLAT MORPH BLD: NORMAL — SIGNIFICANT CHANGE UP
PLATELET # BLD AUTO: 217 K/UL — SIGNIFICANT CHANGE UP (ref 150–400)
PO2 BLDA: 66 MMHG — LOW (ref 83–108)
POLYCHROMASIA BLD QL SMEAR: SLIGHT — SIGNIFICANT CHANGE UP
POTASSIUM SERPL-MCNC: 3.8 MMOL/L — SIGNIFICANT CHANGE UP (ref 3.5–5.3)
POTASSIUM SERPL-SCNC: 3.8 MMOL/L — SIGNIFICANT CHANGE UP (ref 3.5–5.3)
PROT SERPL-MCNC: 6.6 GM/DL — SIGNIFICANT CHANGE UP (ref 6–8.3)
RBC # BLD: 3.73 M/UL — LOW (ref 3.8–5.2)
RBC # FLD: 15.2 % — HIGH (ref 10.3–14.5)
RBC BLD AUTO: ABNORMAL
SAO2 % BLDA: 95.1 % — SIGNIFICANT CHANGE UP (ref 94–98)
SODIUM SERPL-SCNC: 136 MMOL/L — SIGNIFICANT CHANGE UP (ref 135–145)
THC UR QL: NEGATIVE — SIGNIFICANT CHANGE UP
WBC # BLD: 5.18 K/UL — SIGNIFICANT CHANGE UP (ref 3.8–10.5)
WBC # FLD AUTO: 5.18 K/UL — SIGNIFICANT CHANGE UP (ref 3.8–10.5)

## 2022-10-14 PROCEDURE — 99223 1ST HOSP IP/OBS HIGH 75: CPT

## 2022-10-14 PROCEDURE — 99233 SBSQ HOSP IP/OBS HIGH 50: CPT

## 2022-10-14 RX ORDER — CASPOFUNGIN ACETATE 7 MG/ML
70 INJECTION, POWDER, LYOPHILIZED, FOR SOLUTION INTRAVENOUS ONCE
Refills: 0 | Status: COMPLETED | OUTPATIENT
Start: 2022-10-14 | End: 2022-10-14

## 2022-10-14 RX ORDER — CASPOFUNGIN ACETATE 7 MG/ML
50 INJECTION, POWDER, LYOPHILIZED, FOR SOLUTION INTRAVENOUS EVERY 24 HOURS
Refills: 0 | Status: DISCONTINUED | OUTPATIENT
Start: 2022-10-15 | End: 2022-10-21

## 2022-10-14 RX ORDER — ERTAPENEM SODIUM 1 G/1
1000 INJECTION, POWDER, LYOPHILIZED, FOR SOLUTION INTRAMUSCULAR; INTRAVENOUS EVERY 24 HOURS
Refills: 0 | Status: DISCONTINUED | OUTPATIENT
Start: 2022-10-15 | End: 2022-10-31

## 2022-10-14 RX ORDER — ERTAPENEM SODIUM 1 G/1
INJECTION, POWDER, LYOPHILIZED, FOR SOLUTION INTRAMUSCULAR; INTRAVENOUS
Refills: 0 | Status: DISCONTINUED | OUTPATIENT
Start: 2022-10-14 | End: 2022-10-31

## 2022-10-14 RX ORDER — CASPOFUNGIN ACETATE 7 MG/ML
INJECTION, POWDER, LYOPHILIZED, FOR SOLUTION INTRAVENOUS
Refills: 0 | Status: DISCONTINUED | OUTPATIENT
Start: 2022-10-14 | End: 2022-10-21

## 2022-10-14 RX ORDER — ERTAPENEM SODIUM 1 G/1
1000 INJECTION, POWDER, LYOPHILIZED, FOR SOLUTION INTRAMUSCULAR; INTRAVENOUS ONCE
Refills: 0 | Status: COMPLETED | OUTPATIENT
Start: 2022-10-14 | End: 2022-10-14

## 2022-10-14 RX ORDER — VANCOMYCIN HCL 1 G
750 VIAL (EA) INTRAVENOUS EVERY 12 HOURS
Refills: 0 | Status: DISCONTINUED | OUTPATIENT
Start: 2022-10-14 | End: 2022-10-21

## 2022-10-14 RX ORDER — HYDRALAZINE HCL 50 MG
10 TABLET ORAL ONCE
Refills: 0 | Status: COMPLETED | OUTPATIENT
Start: 2022-10-14 | End: 2022-10-14

## 2022-10-14 RX ADMIN — SODIUM CHLORIDE 75 MILLILITER(S): 9 INJECTION INTRAMUSCULAR; INTRAVENOUS; SUBCUTANEOUS at 14:50

## 2022-10-14 RX ADMIN — ERTAPENEM SODIUM 1000 MILLIGRAM(S): 1 INJECTION, POWDER, LYOPHILIZED, FOR SOLUTION INTRAMUSCULAR; INTRAVENOUS at 17:45

## 2022-10-14 RX ADMIN — Medication 100 MILLIGRAM(S): at 05:52

## 2022-10-14 RX ADMIN — HEPARIN SODIUM 5000 UNIT(S): 5000 INJECTION INTRAVENOUS; SUBCUTANEOUS at 14:47

## 2022-10-14 RX ADMIN — HEPARIN SODIUM 5000 UNIT(S): 5000 INJECTION INTRAVENOUS; SUBCUTANEOUS at 21:48

## 2022-10-14 RX ADMIN — Medication 10 MILLIGRAM(S): at 05:56

## 2022-10-14 RX ADMIN — Medication 250 MILLIGRAM(S): at 19:58

## 2022-10-14 RX ADMIN — HEPARIN SODIUM 5000 UNIT(S): 5000 INJECTION INTRAVENOUS; SUBCUTANEOUS at 05:52

## 2022-10-14 RX ADMIN — CASPOFUNGIN ACETATE 260 MILLIGRAM(S): 7 INJECTION, POWDER, LYOPHILIZED, FOR SOLUTION INTRAVENOUS at 17:45

## 2022-10-14 RX ADMIN — CARVEDILOL PHOSPHATE 6.25 MILLIGRAM(S): 80 CAPSULE, EXTENDED RELEASE ORAL at 17:45

## 2022-10-14 NOTE — CONSULT NOTE ADULT - SUBJECTIVE AND OBJECTIVE BOX
HPI:        87 year old female with a PMHx of HTN, HLD GERD, and gallbladder cancer s/p ex lap, open cholecystectomy, segment 4b/5 hepatectomy, and right colectomy due to fistula tract vs metastasis on , recently admitted at [facility]  -  for Acute hypoxic respiratory failure and Hepatic Abscess s/p drainage p/w unresponsiveness which was noted by family this morning. Family noted some intermittent confusion since pt was discharged however this morning she was completely unresponsive. Per EMS, pt's O2 was 37% on RA, improved to 90s on NRB, Pt responsive to painful stimuli but nonverbal, not spontaneously opening eyes. No reported fever, chills, cough, or SOB.  (13 Oct 2022 16:22)  ---- As Above ----- History obtained from chart, patient and family member  Patient with vague abdominal discomfort. The patient denies melena, hematochezia, hematemesis, nausea, vomiting, abdominal pain, constipation, diarrhea, or change in bowel movements       PAST MEDICAL & SURGICAL HISTORY:  Bladder cancer      HTN (hypertension)      HLD (hyperlipidemia)      Liver abscess      H/O gallbladder cancer          MEDICATIONS  (STANDING):  allopurinol 100 milliGRAM(s) Oral daily  amLODIPine   Tablet 2.5 milliGRAM(s) Oral daily  carvedilol 6.25 milliGRAM(s) Oral every 12 hours  caspofungin IVPB      caspofungin IVPB 70 milliGRAM(s) IV Intermittent once  ertapenem  IVPB      fenofibrate Tablet 145 milliGRAM(s) Oral daily  heparin   Injectable 5000 Unit(s) SubCutaneous every 8 hours  pantoprazole    Tablet 40 milliGRAM(s) Oral before breakfast  sodium chloride 0.9%. 1000 milliLiter(s) (75 mL/Hr) IV Continuous <Continuous>  vancomycin  IVPB 750 milliGRAM(s) IV Intermittent every 12 hours    MEDICATIONS  (PRN):  acetaminophen     Tablet .. 650 milliGRAM(s) Oral every 6 hours PRN Temp greater or equal to 38C (100.4F), Mild Pain (1 - 3)      Allergies    ampicillin-sulbactam (Rash)    Intolerances        FAMILY HISTORY:  No pertinent family history in first degree relatives        REVIEW OF SYSTEMS:    CONSTITUTIONAL: No fever, weight loss,   RESPIRATORY: No cough, wheezing, chills or hemoptysis; No shortness of breath  CARDIOVASCULAR: No chest pain, palpitations, dizziness, or leg swelling  GASTROINTESTINAL: No abdominal or epigastric pain. No nausea, vomiting, or hematemesis; No diarrhea or constipation. No melena or hematochezia.  GENITOURINARY: No dysuria, frequency, hematuria, or incontinence  NEUROLOGICAL: No headaches, memory loss, loss of strength, numbness, or tremors            SOCIAL HISTORY:    FAMILY HISTORY:  No pertinent family history in first degree relatives        Vital Signs Last 24 Hrs  T(C): 36.8 (14 Oct 2022 11:01), Max: 36.8 (13 Oct 2022 16:26)  T(F): 98.3 (14 Oct 2022 11:01), Max: 98.3 (14 Oct 2022 00:04)  HR: 102 (14 Oct 2022 11:45) (78 - 102)  BP: 173/80 (14 Oct 2022 11:01) (100/64 - 187/82)  BP(mean): --  RR: 18 (14 Oct 2022 11:01) (15 - 20)  SpO2: 98% (14 Oct 2022 11:45) (93% - 100%)    Parameters below as of 14 Oct 2022 05:32  Patient On (Oxygen Delivery Method): BiPAP/CPAP        PHYSICAL EXAM:    GENERAL: NAD, well-groomed, well-developed  HEAD:  Atraumatic, Normocephalic  EYES: EOMI, PERRLA, conjunctiva and sclera clear  NECK: Supple, No JVD, Normal thyroid  NERVOUS SYSTEM:  Alert & Oriented X3, Good concentration; Motor Strength 5/5 B/L upper and lower extremities;   CHEST/LUNG: Clear to percussion bilaterally; No rales, rhonchi, wheezing, or rubs  HEART: Regular rate and rhythm; No murmurs, rubs, or gallops  ABDOMEN: Soft, Nontender, Nondistended; Bowel sounds present  EXTREMITIES:  2+ Peripheral Pulses, No clubbing, cyanosis, or edema  LYMPH: No lymphadenopathy noted   RECTAL: Deferred   SKIN: No rashes or lesions    LABS:                        10.1   5.18  )-----------( 217      ( 14 Oct 2022 05:50 )             32.9       CBC:  10- @ 05:50  WBC  5.18  HGB 10.1  HCT 32.9 Plate 217  MCV 88.2  10-13 @ 09:58  WBC  5.00  HGB 10.9  HCT 36.5 Plate 277  MCV 90.6           14 Oct 2022 05:50    136    |  100    |  8      ----------------------------<  70     3.8     |  30     |  0.59   13 Oct 2022 09:58    133    |  96     |  12     ----------------------------<  92     3.7     |  32     |  0.80     Ca    9.1        14 Oct 2022 05:50  Ca    8.6        13 Oct 2022 09:58    TPro  6.6    /  Alb  2.8    /  TBili  0.4    /  DBili  x      /  AST  25     /  ALT  15     /  AlkPhos  55     14 Oct 2022 05:50  TPro  8.1    /  Alb  3.5    /  TBili  0.3    /  DBili  0.2    /  AST  27     /  ALT  15     /  AlkPhos  67     13 Oct 2022 09:58    PT/INR - ( 13 Oct 2022 09:58 )   PT: 13.0 sec;   INR: 1.08 ratio         PTT - ( 13 Oct 2022 09:58 )  PTT:27.8 sec  Urinalysis Basic - ( 13 Oct 2022 22:00 )    Color: Yellow / Appearance: Clear / S.015 / pH: x  Gluc: x / Ketone: Negative  / Bili: Negative / Urobili: Negative mg/dL   Blood: x / Protein: 30 mg/dL / Nitrite: Negative   Leuk Esterase: Trace / RBC: 0-2 /HPF / WBC 0-2   Sq Epi: x / Non Sq Epi: Few / Bacteria: Few          RADIOLOGY & ADDITIONAL STUDIES:  < from: CT Abdomen and Pelvis w/ IV Cont (10.13.22 @ 12:16) >    ACC: [id number] EXAM:  CT ABDOMEN AND PELVIS IC                        ACC: [id number] EXAM:  CT ANGIO CHEST PULM ART [facility]                          PROCEDURE DATE:  10/13/2022          INTERPRETATION:  CLINICAL INFORMATION: Hypoxia and abdominal pain    COMPARISON: 2022 and 2022.    CONTRAST/COMPLICATIONS:  IV Contrast: Omnipaque 350   90 cc administered   5 cc discarded  Oral Contrast: NONE  Complications: None reported at time of study completion    PROCEDURE:  CT Angiography of the Chest was performed followed by portal venous phase   imaging of the Abdomen and Pelvis.  Sagittal and coronal reformats were performed as well as 3D (MIP)   reconstructions.    FINDINGS:  CHEST:  LUNGS AND LARGE AIRWAYS: Patent central airways. Basilaratelectasis.  PLEURA: Trace right pleural effusion. No pneumothorax.  VESSELS: Atherosclerotic changes of thoracic aorta and coronary arteries.   Ectatic ascending thoracic aorta (3.8 cm, stable). Multiple images are   degraded by respiratory motion.Excluding regions with motion artifact,   there are no pulmonary arterial filling defects identified.  HEART: Mildly enlarged. No pericardial effusion.  MEDIASTINUM AND CARMELLA: No lymphadenopathy.  CHEST WALL AND LOWER NECK: Heterogeneous and mildly enlarged thyroid   gland.    ABDOMEN AND PELVIS:  LIVER: Rim-enhancing fluid collection along the gallbladder fossa   measuring approximately 4.8 x 2.4 cm, stable. Focal region of hypodensity   adjacent to the fluid collection.  BILE DUCTS: Stable mild dilatation.  GALLBLADDER: Cholecystectomy.  SPLEEN: Within normal limits.  PANCREAS: Within normal limits.  ADRENALS: Within normal limits.  KIDNEYS/URETERS: Renal cortical scarring. Subcentimeter renal   hypodensities, too small to further characterize. No hydronephrosis.    BLADDER: Within normal limits.  REPRODUCTIVE ORGANS: Fibroid uterus.    BOWEL: No bowel obstruction. Right hemicolectomy. Colonic diverticulosis.  PERITONEUM: Mild right abdominal fluid.  VESSELS: Atherosclerotic changes.  RETROPERITONEUM/LYMPH NODES: No lymphadenopathy.  ABDOMINAL WALL: Postsurgical changes.  BONES: Degenerative changes.    IMPRESSION:    1. Negative for pulmonary embolism.  2. Hepatic abscess along the gallbladder fossa, not significantly changed.  3. No CT evidence of bowel obstruction, diverticulitis or colitis.        --- End of Report ---            BEATRIZ ARANDA MD; Attending Radiologist  This document has been electronically signed. Oct 13 2022  1:10PM    < end of copied text >

## 2022-10-14 NOTE — CONSULT NOTE ADULT - SUBJECTIVE AND OBJECTIVE BOX
Full note to follow  Recurrent GB fossa collection  IR eval  Pending aspirate will give vanco/ertapnem/caspo  F/U Blood cx data  Patient had a rash from A/SB at [facility] allergic but tolerated carbapenems.  Thank you for the courtesy of this referral.  William Garcia MD  Attending Physician  [facility]  Division of Infectious Diseases  [phone]  ---------------------  [facility]  Division of Infectious Diseases  [phone]    HARSH VERGARA  87y, Female  [id number]    HPI--      PMH/PSH--  Bladder cancer    HTN (hypertension)    HLD (hyperlipidemia)    Liver abscess    H/O gallbladder cancer        Allergies--  No Known Allergies      Medications--  Antibiotics:   Immunologic:   Other: acetaminophen     Tablet .. PRN  allopurinol  amLODIPine   Tablet  carvedilol  fenofibrate Tablet  heparin   Injectable  pantoprazole    Tablet  sodium chloride 0.9%.    Antimicrobials last 90 days per EMR: MEDICATIONS  (STANDING):  levoFLOXacin IVPB   150 mL/Hr IV Intermittent (10-13-22 @ 19:03)    metroNIDAZOLE  IVPB   100 mL/Hr IV Intermittent (10-14-22 @ 05:52)   100 mL/Hr IV Intermittent (10-13-22 @ 22:42)        Social History--  EtOH: denies   Tobacco: denies   Drug Use: denies     Family/Marital History--  No pertinent family history in first degree relatives          Travel/Environmental/Occupational History:      Review of Systems:  A >=10-point review of systems was obtained.     Pertinent positives and negatives--  Constitutional: No fevers. No Chills. No Rigors.   Eyes:  ENMT:  Cardiovascular: No chest pain. No palpitations.  Respiratory: No shortness of breath. No cough.  Gastrointestinal: No nausea or vomiting. No diarrhea or constipation.   Genitourinary:  Musculoskeletal:  Skin:  Neurologic:  Psychiatric: Pleasant. Appropriate affect.  Endocrine:  Heme/Lymphatic:  Allergy/Immunologic:    Review of systems otherwise negative except as previously noted.    Physical Exam--  Vital Signs: T(F): 98.3 (10-14-22 @ 11:01), Max: 98.3 (10-14-22 @ 00:04)  HR: 102 (10-14-22 @ 11:45)  BP: 173/80 (10-14-22 @ 11:01)  RR: 18 (10-14-22 @ 11:01)  SpO2: 98% (10-14-22 @ 11:45)  Wt(kg): --  General: Nontoxic-appearing Female in no acute distress.  HEENT: AT/NC. PERRL. EOMI. Anicteric. Conjunctiva pink and moist. Oropharynx clear. Dentition fair.  Neck: Not rigid. No sense of mass.  Nodes: None palpable.  Lungs: Clear bilaterally without rales, wheezing or rhonchi  Heart: Regular rate and rhythm. No Murmur. No rub. No gallop. No palpable thrill.  Abdomen: Bowel sounds present and normoactive. Soft. Nondistended. Nontender. No sense of mass. No organomegaly.  Back: No spinal tenderness. No costovertebral angle tenderness.   Extremities: No cyanosis or clubbing. No edema.   Skin: Warm. Dry. Good turgor. No rash. No vasculitic stigmata.  Psychiatric: Appropriate affect and mood for situation.         Laboratory & Imaging Data--  CBC                        10.1   5.18  )-----------( 217      ( 14 Oct 2022 05:50 )             32.9       Chemistries  10-14    136  |  100  |  8   ----------------------------<  70  3.8   |  30  |  0.59    Ca    9.1      14 Oct 2022 05:50    TPro  6.6  /  Alb  2.8<L>  /  TBili  0.4  /  DBili  x   /  AST  25  /  ALT  15  /  AlkPhos  55  10-14      Culture Data       Full note to follow  Recurrent GB fossa collection  IR eval  Pending aspirate will give vanco/ertapnem/caspo  F/U Blood cx data  Patient had a rash from A/SB at [facility] allergic but tolerated carbapenems.  Thank you for the courtesy of this referral.  William Garcia MD  Attending Physician  [facility]  Division of Infectious Diseases  [phone]  ---------------------  [facility]  Division of Infectious Diseases  [phone]    HARSH VERGARA  87y, Female  [id number]    HPI--  87F with gallbladder carcinoma, s/p ex-lap and open cholecystectomy, partial hepatectomy, R colon resection for metastatic/fistulizing diseae, followed by my colleagues at [facility] during admission there (MRN [id number]); HTN; dyslipidemia; who developed a liver abscess after surgery and had percutaneous drainage performed. Follow up imaging on 9/29 revealed no residual fluid and the drain was removed. The patient was slated to complete a course of cipro and flagyl on 10/13.   Patient's recent hospital course also significant for development of a rash presumed secondary to A/SB or P/TB.     Patient a poor historian. She was brought to ED with altered mental status and found to have hypercapnic respiratory failure. She was seen by CCM and pulmonary, and her respiratory status has improved. CT without definite pneumonia. Patient was made DNR/DNI. Patient noted to have recurrent liver abscess on imaging.     Patient presently without complaints.      PMH/PSH--  Bladder cancer    HTN (hypertension)    HLD (hyperlipidemia)    Liver abscess    H/O gallbladder cancer        Allergies--  No Known Allergies      Medications--  Antibiotics:   Immunologic:   Other: acetaminophen     Tablet .. PRN  allopurinol  amLODIPine   Tablet  carvedilol  fenofibrate Tablet  heparin   Injectable  pantoprazole    Tablet  sodium chloride 0.9%.    Antimicrobials last 90 days per EMR: MEDICATIONS  (STANDING):  levoFLOXacin IVPB   150 mL/Hr IV Intermittent (10-13-22 @ 19:03)    metroNIDAZOLE  IVPB   100 mL/Hr IV Intermittent (10-14-22 @ 05:52)   100 mL/Hr IV Intermittent (10-13-22 @ 22:42)      Social History--  EtOH: denies   Tobacco: denies   Drug Use: denies     Family/Marital History--  No pertinent family history in first degree relatives              Review of Systems:  A >=10-point review of systems was obtained.   Review of systems otherwise negative except as previously noted.    Physical Exam--  Vital Signs: T(F): 98.3 (10-14-22 @ 11:01), Max: 98.3 (10-14-22 @ 00:04)  HR: 102 (10-14-22 @ 11:45)  BP: 173/80 (10-14-22 @ 11:01)  RR: 18 (10-14-22 @ 11:01)  SpO2: 98% (10-14-22 @ 11:45)  Wt(kg): --  General: Nontoxic-appearing Female in no acute distress.  HEENT: AT/NC. Anicteric. Conjunctiva pink and moist. Oropharynx clear.  Neck: Not rigid. No sense of mass.  Nodes: None palpable.  Lungs: Diminished BS B  Heart: Regular rate and rhythm @96  Abdomen: Bowel sounds present and normoactive. Soft. Nondistended. Nontender. No sense of mass. No organomegaly. Incision with one scabbed area otherwise well healed.   Back: No spinal tenderness. No costovertebral angle tenderness.   Extremities: No cyanosis or clubbing. No edema.   Skin: Warm. Dry. Good turgor. No rash. No vasculitic stigmata.  Psychiatric: Grossly appropriate affect and mood for situation.       Laboratory & Imaging Data--  CBC                        10.1   5.18  )-----------( 217      ( 14 Oct 2022 05:50 )             32.9       Chemistries  10-14    136  |  100  |  8   ----------------------------<  70  3.8   |  30  |  0.59      Ca    9.1      14 Oct 2022 05:50    TPro  6.6  /  Alb  2.8<L>  /  TBili  0.4  /  DBili  x   /  AST  25  /  ALT  15  /  AlkPhos  55  10-14      Culture Data  None as of yet    CT's (personally reviewed) < from: CT Abdomen and Pelvis w/ IV Cont (10.13.22 @ 12:16) >  ACC: [id number] EXAM:  CT ABDOMEN AND PELVIS IC                        ACC: [id number] EXAM:  CT ANGIO CHEST PULM ART [facility]                        PROCEDURE DATE:  10/13/2022    INTERPRETATION:  CLINICAL INFORMATION: Hypoxia and abdominal pain  COMPARISON: 9/22/2022 and 9/14/2022.  CONTRAST/COMPLICATIONS:  IV Contrast: Omnipaque 350   90 cc administered   5 cc discarded  Oral Contrast: NONE  Complications: None reported at time of study completion  PROCEDURE:  CT Angiography of the Chest was performed followed by portal venous phase   imaging of the Abdomen and Pelvis.  Sagittal and coronal reformats were performed as well as 3D (MIP)   reconstructions.    FINDINGS:  CHEST:  LUNGS AND LARGE AIRWAYS: Patent central airways. Basilaratelectasis.  PLEURA: Trace right pleural effusion. No pneumothorax.  VESSELS: Atherosclerotic changes of thoracic aorta and coronary arteries.   Ectatic ascending thoracic aorta (3.8 cm, stable). Multiple images are   degraded by respiratory motion.Excluding regions with motion artifact,   there are no pulmonary arterial filling defects identified.  HEART: Mildly enlarged. No pericardial effusion.  MEDIASTINUM AND CARMELLA: No lymphadenopathy.  CHEST WALL AND LOWER NECK: Heterogeneous and mildly enlarged thyroid   gland.  ABDOMEN AND PELVIS:  LIVER: Rim-enhancing fluid collection along the gallbladder fossa   measuring approximately 4.8 x 2.4 cm, stable. Focal region of hypodensity   adjacent to the fluid collection.  BILE DUCTS: Stable mild dilatation.  GALLBLADDER: Cholecystectomy.  SPLEEN: Within normal limits.  PANCREAS: Within normal limits.  ADRENALS: Within normal limits.  KIDNEYS/URETERS: Renal cortical scarring. Subcentimeter renal   hypodensities, too small to further characterize. No hydronephrosis.    BLADDER: Within normal limits.  REPRODUCTIVE ORGANS: Fibroid uterus.    BOWEL: No bowel obstruction. Right hemicolectomy. Colonic diverticulosis.  PERITONEUM: Mild right abdominal fluid.  VESSELS: Atherosclerotic changes.  RETROPERITONEUM/LYMPH NODES: No lymphadenopathy.  ABDOMINAL WALL: Postsurgical changes.  BONES: Degenerative changes.    IMPRESSION:  1. Negative for pulmonary embolism.  2. Hepatic abscess along the gallbladder fossa, not significantly changed.  3. No CT evidence of bowel obstruction, diverticulitis or colitis.    --- End of Report ---    BEATRIZ ARANDA MD; Attending Radiologist  This document has been electronically signed. Oct 13 2022  1:10PM    < end of copied text >

## 2022-10-14 NOTE — CONSULT NOTE ADULT - REASON FOR ADMISSION
Acute hypoxic and hypercapnic respiratory failure, altered mental status

## 2022-10-14 NOTE — CONSULT NOTE ADULT - SUBJECTIVE AND OBJECTIVE BOX
General Surgery Consult  HPI:        87 year old female with a PMHx of HTN, HLD GERD, and gallbladder cancer s/p ex lap, open cholecystectomy, segment 4b/5 hepatectomy, and right colectomy due to fistula tract vs metastasis on , recently admitted at [facility]  -  for Acute hypoxic respiratory failure and Hepatic Abscess s/p drainage p/w unresponsiveness which was noted by family this morning. Family noted some intermittent confusion since pt was discharged however this morning she was completely unresponsive. Per EMS, pt's O2 was 37% on RA, improved to 90s on NRB, Pt responsive to painful stimuli but nonverbal, not spontaneously opening eyes. No reported fever, chills, cough, SOB, abdominal pain, n/v.  (13 Oct 2022 16:22)      PAST MEDICAL HISTORY:  Bladder cancer    HTN (hypertension)    HLD (hyperlipidemia)    Liver abscess        PAST SURGICAL HISTORY:  H/O gallbladder cancer        MEDICATIONS:  acetaminophen     Tablet .. 650 milliGRAM(s) Oral every 6 hours PRN  allopurinol 100 milliGRAM(s) Oral daily  amLODIPine   Tablet 2.5 milliGRAM(s) Oral daily  carvedilol 6.25 milliGRAM(s) Oral every 12 hours  caspofungin IVPB      caspofungin IVPB 70 milliGRAM(s) IV Intermittent once  ertapenem  IVPB 1000 milliGRAM(s) IV Intermittent once  ertapenem  IVPB      fenofibrate Tablet 145 milliGRAM(s) Oral daily  heparin   Injectable 5000 Unit(s) SubCutaneous every 8 hours  pantoprazole    Tablet 40 milliGRAM(s) Oral before breakfast  sodium chloride 0.9%. 1000 milliLiter(s) IV Continuous <Continuous>  vancomycin  IVPB 750 milliGRAM(s) IV Intermittent every 12 hours      ALLERGIES:  ampicillin-sulbactam (Rash)      VITALS & I/Os:  Vital Signs Last 24 Hrs  T(C): 37.3 (14 Oct 2022 16:47), Max: 37.3 (14 Oct 2022 16:47)  T(F): 99.1 (14 Oct 2022 16:47), Max: 99.1 (14 Oct 2022 16:47)  HR: 111 (14 Oct 2022 16:47) (78 - 111)  BP: 183/80 (14 Oct 2022 16:47) (122/43 - 187/82)  BP(mean): 114 (14 Oct 2022 16:47) (114 - 114)  RR: 20 (14 Oct 2022 16:47) (16 - 20)  SpO2: 95% (14 Oct 2022 16:47) (93% - 100%)    Parameters below as of 14 Oct 2022 05:32  Patient On (Oxygen Delivery Method): BiPAP/CPAP        I&O's Summary      PHYSICAL EXAM:  General: No acute distress  Respiratory: Nonlabored  Cardiovascular: RRR  Abdominal: Soft, nondistended, nontender. No rebound or guarding. No organomegaly, no palpable mass.  Extremities: Warm    LABS:                        10.1   5.18  )-----------( 217      ( 14 Oct 2022 05:50 )             32.9     10-14    136  |  100  |  8   ----------------------------<  70  3.8   |  30  |  0.59    Ca    9.1      14 Oct 2022 05:50    TPro  6.6  /  Alb  2.8<L>  /  TBili  0.4  /  DBili  x   /  AST  25  /  ALT  15  /  AlkPhos  55  10-14    Lactate:    PT/INR - ( 13 Oct 2022 09:58 )   PT: 13.0 sec;   INR: 1.08 ratio         PTT - ( 13 Oct 2022 09:58 )  PTT:27.8 sec  ABG - ( 14 Oct 2022 12:33 )  pH, Arterial: 7.49  pH, Blood: x     /  pCO2: 39    /  pO2: 66    / HCO3: 30    / Base Excess: 5.9   /  SaO2: 95.1                    Urinalysis Basic - ( 13 Oct 2022 22:00 )    Color: Yellow / Appearance: Clear / S.015 / pH: x  Gluc: x / Ketone: Negative  / Bili: Negative / Urobili: Negative mg/dL   Blood: x / Protein: 30 mg/dL / Nitrite: Negative   Leuk Esterase: Trace / RBC: 0-2 /HPF / WBC 0-2   Sq Epi: x / Non Sq Epi: Few / Bacteria: Few        IMAGING:                                                                                               General Surgery Consult  HPI:        87 year old female with a PMHx of HTN, HLD GERD, and gallbladder cancer s/p ex lap, open cholecystectomy, segment 4b/5 hepatectomy, and right colectomy due to fistula tract vs metastasis on , recently admitted at [facility]  -  for Acute hypoxic respiratory failure and Hepatic Abscess s/p drainage p/w unresponsiveness which was noted by family this morning. Family noted some intermittent confusion since pt was discharged however this morning she was completely unresponsive. Per EMS, pt's O2 was 37% on RA, improved to 90s on NRB, Pt responsive to painful stimuli but nonverbal, not spontaneously opening eyes. No reported fever, chills, cough, SOB, abdominal pain, n/v.  (13 Oct 2022 16:22)      PAST MEDICAL HISTORY:  Bladder cancer    HTN (hypertension)    HLD (hyperlipidemia)    Liver abscess        PAST SURGICAL HISTORY:  H/O gallbladder cancer        MEDICATIONS:  acetaminophen     Tablet .. 650 milliGRAM(s) Oral every 6 hours PRN  allopurinol 100 milliGRAM(s) Oral daily  amLODIPine   Tablet 2.5 milliGRAM(s) Oral daily  carvedilol 6.25 milliGRAM(s) Oral every 12 hours  caspofungin IVPB      caspofungin IVPB 70 milliGRAM(s) IV Intermittent once  ertapenem  IVPB 1000 milliGRAM(s) IV Intermittent once  ertapenem  IVPB      fenofibrate Tablet 145 milliGRAM(s) Oral daily  heparin   Injectable 5000 Unit(s) SubCutaneous every 8 hours  pantoprazole    Tablet 40 milliGRAM(s) Oral before breakfast  sodium chloride 0.9%. 1000 milliLiter(s) IV Continuous <Continuous>  vancomycin  IVPB 750 milliGRAM(s) IV Intermittent every 12 hours      ALLERGIES:  ampicillin-sulbactam (Rash)      VITALS & I/Os:  Vital Signs Last 24 Hrs  T(C): 37.3 (14 Oct 2022 16:47), Max: 37.3 (14 Oct 2022 16:47)  T(F): 99.1 (14 Oct 2022 16:47), Max: 99.1 (14 Oct 2022 16:47)  HR: 111 (14 Oct 2022 16:47) (78 - 111)  BP: 183/80 (14 Oct 2022 16:47) (122/43 - 187/82)  BP(mean): 114 (14 Oct 2022 16:47) (114 - 114)  RR: 20 (14 Oct 2022 16:47) (16 - 20)  SpO2: 95% (14 Oct 2022 16:47) (93% - 100%)    Parameters below as of 14 Oct 2022 05:32  Patient On (Oxygen Delivery Method): BiPAP/CPAP        I&O's Summary      PHYSICAL EXAM:  General: No acute distress  Respiratory: Nonlabored  Cardiovascular: RRR  Abdominal: Soft, nondistended, nontender. No rebound or guarding. No organomegaly, no palpable mass.  Extremities: Warm    LABS:                        10.1   5.18  )-----------( 217      ( 14 Oct 2022 05:50 )             32.9     10-14    136  |  100  |  8   ----------------------------<  70  3.8   |  30  |  0.59    Ca    9.1      14 Oct 2022 05:50    TPro  6.6  /  Alb  2.8<L>  /  TBili  0.4  /  DBili  x   /  AST  25  /  ALT  15  /  AlkPhos  55  10-14    Lactate:    PT/INR - ( 13 Oct 2022 09:58 )   PT: 13.0 sec;   INR: 1.08 ratio         PTT - ( 13 Oct 2022 09:58 )  PTT:27.8 sec  ABG - ( 14 Oct 2022 12:33 )  pH, Arterial: 7.49  pH, Blood: x     /  pCO2: 39    /  pO2: 66    / HCO3: 30    / Base Excess: 5.9   /  SaO2: 95.1          Urinalysis Basic - ( 13 Oct 2022 22:00 )    Color: Yellow / Appearance: Clear / S.015 / pH: x  Gluc: x / Ketone: Negative  / Bili: Negative / Urobili: Negative mg/dL   Blood: x / Protein: 30 mg/dL / Nitrite: Negative   Leuk Esterase: Trace / RBC: 0-2 /HPF / WBC 0-2   Sq Epi: x / Non Sq Epi: Few / Bacteria: Few        IMAGING:  < from: CT Abdomen and Pelvis w/ IV Cont (10.13.22 @ 12:16) >    ACC: [id number] EXAM:  CT ABDOMEN AND PELVIS IC                        ACC: [id number] EXAM:  CT ANGIO CHEST PULM ART WAWI                          PROCEDURE DATE:  10/13/2022          INTERPRETATION:  CLINICAL INFORMATION: Hypoxia and abdominal pain    COMPARISON: 2022 and 2022.    CONTRAST/COMPLICATIONS:  IV Contrast: Omnipaque 350   90 cc administered   5 cc discarded  Oral Contrast: NONE  Complications: None reported at time of study completion    PROCEDURE:  CT Angiography of the Chest was performed followed by portal venous phase   imaging of the Abdomen and Pelvis.  Sagittal and coronal reformats were performed as well as 3D (MIP)   reconstructions.    FINDINGS:  CHEST:  LUNGS AND LARGE AIRWAYS: Patent central airways. Basilaratelectasis.  PLEURA: Trace right pleural effusion. No pneumothorax.  VESSELS: Atherosclerotic changes of thoracic aorta and coronary arteries.   Ectatic ascending thoracic aorta (3.8 cm, stable). Multiple images are   degraded by respiratory motion.Excluding regions with motion artifact,   there are no pulmonary arterial filling defects identified.  HEART: Mildly enlarged. No pericardial effusion.  MEDIASTINUM AND CARMELLA: No lymphadenopathy.  CHEST WALL AND LOWER NECK: Heterogeneous and mildly enlarged thyroid   gland.    ABDOMEN AND PELVIS:  LIVER: Rim-enhancing fluid collection along the gallbladder fossa   measuring approximately 4.8 x 2.4 cm, stable. Focal region of hypodensity   adjacent to the fluid collection.  BILE DUCTS: Stable mild dilatation.  GALLBLADDER: Cholecystectomy.  SPLEEN: Within normal limits.  PANCREAS: Within normal limits.  ADRENALS: Within normal limits.  KIDNEYS/URETERS: Renal cortical scarring. Subcentimeter renal   hypodensities, too small to further characterize. No hydronephrosis.    BLADDER: Within normal limits.  REPRODUCTIVE ORGANS: Fibroid uterus.    BOWEL: No bowel obstruction. Right hemicolectomy. Colonic diverticulosis.  PERITONEUM: Mild right abdominal fluid.  VESSELS: Atherosclerotic changes.  RETROPERITONEUM/LYMPH NODES: No lymphadenopathy.  ABDOMINAL WALL: Postsurgical changes.  BONES: Degenerative changes.    IMPRESSION:    1. Negative for pulmonary embolism.  2. Hepatic abscess along the gallbladder fossa, not significantly changed.  3. No CT evidence of bowel obstruction, diverticulitis or colitis.          < end of copied text >

## 2022-10-14 NOTE — PROGRESS NOTE ADULT - ASSESSMENT
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 Blood gas in ED 7.27/77/51/35/80%. Placed on BiPAP with minimal improvement in gas and was switched to AVAPS. CT abdomen with noted hepatic abscess    DX: acute hypoxic and hypercarbic respiratory failure, hepatic abscess, gallbladder cancer, acute metabolic encephalopathy    - CT chest degraded by motion artifact, but no obvious infiltrate/consolidation, noted is some bibasilar atelectasis  - son reports that the day prior to hospitalization he noticed pt's ankles were getting edematous and pt was starting to hallucinate. he also reports that her O2 sat at home had several low O2 reads  - there is no reported or known history of COPD or heart failure  - possible that her hypercarbic/hypoxic respiratory failure was in part due to some underlying infectious process  - with dysuria check U cx  - blood cx negative to date  - prior hepatic abscess was drained by IR, now on CT with recollection of abscess  - recommend IR eval for drainge  - ID eval for antibiotic management  - can currently monitor off AVAPS  - nasal cannula O2 supplementation to maintain O2 sat > 90%  - wean off O2 as tolerates, will need to check O2 sat on RA at rest and with ambulation  - d/w ID/hospitalist  - son at bedside updated  - DNR/DNI with MOLST placed in chart yesterday 10/13

## 2022-10-14 NOTE — CONSULT NOTE ADULT - NS ATTEND AMEND GEN_ALL_CORE FT
Patient seen and examined, denies abdominal pain, abdomen soft, non-distended, non-TTP. I spoke with the patient's family and at this time they want to follow with Dr. Roche. Care as per Primary team at this time.

## 2022-10-14 NOTE — PROGRESS NOTE ADULT - SUBJECTIVE AND OBJECTIVE BOX
24 hr events:  pt seen earlier in the day  awake, alert talking to family  mental status greatly improved  was obtunded yesteday  currently off AVAPS on nasal cannula  c/o dysuria  afebrile      ## ROS  [x] limited due to pt's recollection of things, ROS partly obtained fro son Gurjit at bedside  no fever, no chills  no SOB, no cough  no CP  no abdominal pain, no nausea, no emesis  reports "sour liquid taste" ?GERD  + dysuria, no frequency  + lower extremity edema  + hallucinating at home      ## Labs:  CBC:                        10.1   5.18  )-----------( 217      ( 14 Oct 2022 05:50 )             32.9     Chem:  10-14    136  |  100  |  8   ----------------------------<  70  3.8   |  30  |  0.59    Ca    9.1      14 Oct 2022 05:50    TPro  6.6  /  Alb  2.8<L>  /  TBili  0.4  /  DBili  x   /  AST  25  /  ALT  15  /  AlkPhos  55  10-14    Coags:  PT/INR - ( 13 Oct 2022 09:58 )   PT: 13.0 sec;   INR: 1.08 ratio    PTT - ( 13 Oct 2022 09:58 )  PTT:27.8 sec    Lactate, Blood (10.13.22 @ 09:58)    Lactate, Blood: 1.1 mmol/L    Culture - Blood (10.13.22 @ 09:58)    Specimen Source: .Blood Blood-Peripheral    Culture Results: No growth to date.    Culture - Blood (10.13.22 @ 09:02)    Specimen Source: .Blood Blood-Peripheral    Culture Results: No growth to date.      ## Imaging:  CT chest/abd/pelvis < from: CT Angio Chest PE Protocol w/ IV Cont (10.13.22 @ 12:15) >  CHEST:  LUNGS AND LARGE AIRWAYS: Patent central airways. Basilar atelectasis.  PLEURA: Trace right pleural effusion. No pneumothorax.  VESSELS: Atherosclerotic changes of thoracic aorta and coronary arteries.   Ectatic ascending thoracic aorta (3.8 cm, stable). Multiple images are   degraded by respiratory motion.Excluding regions with motion artifact,   there are no pulmonary arterial filling defects identified.  HEART: Mildly enlarged. No pericardial effusion.  MEDIASTINUM AND CARMELLA: No lymphadenopathy.  CHEST WALL AND LOWER NECK: Heterogeneous and mildly enlarged thyroid   gland.      LIVER: Rim-enhancing fluid collection along the gallbladder fossa   measuring approximately 4.8 x 2.4 cm, stable. Focal region of hypodensity   adjacent to the fluid collection.  BILE DUCTS: Stable mild dilatation.  GALLBLADDER: Cholecystectomy.  SPLEEN: Within normal limits.  PANCREAS: Within normal limits.  ADRENALS: Within normal limits.  KIDNEYS/URETERS: Renal cortical scarring. Subcentimeter renal   hypodensities, too small to further characterize. No hydronephrosis.    BLADDER: Within normal limits.  REPRODUCTIVE ORGANS: Fibroid uterus.    BOWEL: No bowel obstruction. Right hemicolectomy. Colonic diverticulosis.  PERITONEUM: Mild right abdominal fluid.  VESSELS: Atherosclerotic changes.  RETROPERITONEUM/LYMPH NODES: No lymphadenopathy.  ABDOMINAL WALL: Postsurgical changes.  BONES: Degenerative changes.    Impression  1. Negative for pulmonary embolism.  2. Hepatic abscess along the gallbladder fossa, not significantly changed.  3. No CT evidence of bowel obstruction, diverticulitis or colitis.    CT head < from: CT Head No Cont (10.13.22 @ 12:15) >  No acute intracranial hemorrhage, mass effect, or shift of   the midline structures.    Similar-appearing moderate severity chronic white matter microvascular   type changes and chronic lacunar infarcts        ## Medications:   caspofungin IVPB 50 milliGRAM(s) IV Intermittent every 24 hours  ertapenem  IVPB 1000 milliGRAM(s) IV Intermittent every 24 hours    vancomycin  IVPB 750 milliGRAM(s) IV Intermittent every 12 hours    amLODIPine   Tablet 2.5 milliGRAM(s) Oral daily  carvedilol 6.25 milliGRAM(s) Oral every 12 hours      allopurinol 100 milliGRAM(s) Oral daily  fenofibrate Tablet 145 milliGRAM(s) Oral daily    heparin   Injectable 5000 Unit(s) SubCutaneous every 8 hours    pantoprazole    Tablet 40 milliGRAM(s) Oral before breakfast    acetaminophen     Tablet .. 650 milliGRAM(s) Oral every 6 hours PRN      ## Vitals:  T(F): 99.1 (10-14-22 @ 16:47), Max: 99.1 (10-14-22 @ 16:47)  HR: 111 (10-14-22 @ 16:47) (78 - 111)  BP: 183/80 (10-14-22 @ 16:47) (122/43 - 187/82)  RR: 20 (10-14-22 @ 16:47) (16 - 20)  SpO2: 95% (10-14-22 @ 16:47) (93% - 100%)    ABG: ABG - ( 14 Oct 2022 12:33 )  pH, Arterial: 7.49  /  pCO2: 39    /  pO2: 66    / HCO3: 30    / Base Excess: 5.9   /  SaO2: 95.1        ## P/E:  Gen: elderly [place] female, lying comfortably in bed in no apparent distress, awake, alert  HEENT: EOMI, conjunctiva and sclera clear  Resp: CTA B/L , no rales, no rhonchi, no wheeze  CVS: RRR  Abd: soft NT/ND +BS  Ext: no c/c/e  Neuro: A&Ox3, following commands, moving all extremities      CODE STATUS: [ ] full code  [x ] DNR  [x ] DNI  [ xx] MOLST  Goals of care discussion: [x ] yes

## 2022-10-14 NOTE — CONSULT NOTE ADULT - ASSESSMENT
HPI:        87 year old female with a PMHx of HTN, HLD GERD, and gallbladder cancer s/p ex lap, open cholecystectomy, segment 4b/5 hepatectomy, and right colectomy due to fistula tract vs metastasis on 08/19, recently admitted at [facility] 9/19 - 9/29 for Acute hypoxic respiratory failure and Hepatic Abscess s/p drainage p/w unresponsiveness which was noted by family this morning. Family noted some intermittent confusion since pt was discharged however this morning she was completely unresponsive. Per EMS, pt's O2 was 37% on RA, improved to 90s on NRB, Pt responsive to painful stimuli but nonverbal, not spontaneously opening eyes. No reported fever, chills, cough, or SOB.  (13 Oct 2022 16:22)  ---- As Above ----- History obtained from chart, patient and family member  Patient with vague abdominal discomfort. The patient denies melena, hematochezia, hematemesis, nausea, vomiting, abdominal pain, constipation, diarrhea, or change in bowel movements     GB fossa abscess, recurrent - Patient will need drainage of abscess via IR. Will need surgical f/u. GI intervention not necessary at the present time. Will follow on a PRN basis.

## 2022-10-14 NOTE — CONSULT NOTE ADULT - ASSESSMENT
87 year old female admitted to medicine for acute hypoxic respiratory failure found to have hepatic abscess seen on CT 10/13.    87 year old female admitted to medicine for acute hypoxic respiratory failure found to have hepatic abscess seen on CT 10/13.    CT findings stable and unchanged from previous imaging. Patient followed by Surgical Oncologist Dr. Busch. No acute surgical intervention at this time. Recommend f/u with primary surgeon as outpatient. Continue medical management. Discussed with Dr. Park at bedside. Patient's children at bedside, agree with plan.

## 2022-10-14 NOTE — PROGRESS NOTE ADULT - SUBJECTIVE AND OBJECTIVE BOX
Patient is a 87y old  Female who presents with a chief complaint of Acute hypoxic and hypercapnic respiratory failure, altered mental status (14 Oct 2022 15:50)      OVERNIGHT EVENTS:  Patients seen and examined at bedside this morning. No acute events overnight.    REVIEW OF SYSTEMS: denies chest pain/SOB, diaphoresis, no F/C, cough, dizziness, headache, blurry vision, nausea, vomiting, abdominal pain. All others review of systems negative     MEDICATIONS  (STANDING):  allopurinol 100 milliGRAM(s) Oral daily  amLODIPine   Tablet 2.5 milliGRAM(s) Oral daily  carvedilol 6.25 milliGRAM(s) Oral every 12 hours  caspofungin IVPB      caspofungin IVPB 70 milliGRAM(s) IV Intermittent once  ertapenem  IVPB 1000 milliGRAM(s) IV Intermittent once  ertapenem  IVPB      fenofibrate Tablet 145 milliGRAM(s) Oral daily  heparin   Injectable 5000 Unit(s) SubCutaneous every 8 hours  pantoprazole    Tablet 40 milliGRAM(s) Oral before breakfast  sodium chloride 0.9%. 1000 milliLiter(s) (75 mL/Hr) IV Continuous <Continuous>  vancomycin  IVPB 750 milliGRAM(s) IV Intermittent every 12 hours    MEDICATIONS  (PRN):  acetaminophen     Tablet .. 650 milliGRAM(s) Oral every 6 hours PRN Temp greater or equal to 38C (100.4F), Mild Pain (1 - 3)      Allergies    ampicillin-sulbactam (Rash)    Intolerances        T(F): 99.1 (10-14-22 @ 16:47), Max: 99.1 (10-14-22 @ 16:47)  HR: 111 (10-14-22 @ 16:47) (78 - 111)  BP: 183/80 (10-14-22 @ 16:47) (122/43 - 187/82)  RR: 20 (10-14-22 @ 16:47) (16 - 20)  SpO2: 95% (10-14-22 @ 16:47) (93% - 100%)  Wt(kg): --    PHYSICAL EXAM:  GENERAL: NAD  HEAD:  Atraumatic, Normocephalic  EYES: PERRLA, conjunctiva and sclera clear  ENMT: Moist mucous membranes  NECK: Supple, No JVD, Normal thyroid  NERVOUS SYSTEM:  Alert & Awake  CHEST/LUNG: Clear to percussion bilaterally;   HEART: Regular rate and rhythm;   ABDOMEN: Soft, Nontender, Nondistended; Bowel sounds present  EXTREMITIES:  no edema BL LE  SKIN: moist    LABS:                        10.1   5.18  )-----------( 217      ( 14 Oct 2022 05:50 )             32.9     10-14    136  |  100  |  8   ----------------------------<  70  3.8   |  30  |  0.59    Ca    9.1      14 Oct 2022 05:50    TPro  6.6  /  Alb  2.8<L>  /  TBili  0.4  /  DBili  x   /  AST  25  /  ALT  15  /  AlkPhos  55  10-14    PT/INR - ( 13 Oct 2022 09:58 )   PT: 13.0 sec;   INR: 1.08 ratio         PTT - ( 13 Oct 2022 09:58 )  PTT:27.8 sec  Urinalysis Basic - ( 13 Oct 2022 22:00 )    Color: Yellow / Appearance: Clear / S.015 / pH: x  Gluc: x / Ketone: Negative  / Bili: Negative / Urobili: Negative mg/dL   Blood: x / Protein: 30 mg/dL / Nitrite: Negative   Leuk Esterase: Trace / RBC: 0-2 /HPF / WBC 0-2   Sq Epi: x / Non Sq Epi: Few / Bacteria: Few      Cultures;   CAPILLARY BLOOD GLUCOSE        Lipid panel:           RADIOLOGY & ADDITIONAL TESTS:    Imaging Personally Reviewed:  [x ] YES      Consultant(s) Notes Reviewed:  [x ] YES     Care Discussed with [x ] Consultants [X ] Patient [ ] Family  [x ]    [x ]  Other; RN

## 2022-10-14 NOTE — PROGRESS NOTE ADULT - ASSESSMENT
87 year old female with a PMHx of HTN, HLD GERD, and gallbladder cancer s/p ex lap, open cholecystectomy, segment 4b/5 hepatectomy, and right colectomy due to fistula tract vs metastasis on 08/19, recently admitted at [facility] 9/19 - 9/29 for Acute hypoxic respiratory failure and Hepatic Abscess s/p drainage p/w unresponsiveness which was noted by family this morning.    # Acute hypoxic and hypercapnic respiratory failure, acute metabolic encephalopathy  - Pt responsive to painful stimuli but nonverbal, not spontaneously opening eyes  - etiology unclear, likely Central related to Remeron  - CT head neg for acute pathology  - ABG w/ significant improvement   - Evaluated by ICU  - NPO while on Bi-PAP, cont AVAPS O/N  - cont to monitor    # Hepatic Abscess  - stable on CT  - last day on abx per ID noted (pt w/ different [facility] chart) 10/13, pt on Levaquin and flagyl  - IR, GI, Sx consulted  - ID; following, started on caspofungin, ertapenem, vanco 10/14    # HTN/HLD  - c/w Coreg, fenofibrate    # GERD  - c/w PPI    # DVT ppx - HSQ    Pt FC 87 year old female with a PMHx of HTN, HLD GERD, and gallbladder cancer s/p ex lap, open cholecystectomy, segment 4b/5 hepatectomy, and right colectomy due to fistula tract vs metastasis on 08/19, recently admitted at [facility] 9/19 - 9/29 for Acute hypoxic respiratory failure and Hepatic Abscess s/p drainage p/w unresponsiveness which was noted by family this morning.    # Acute hypoxic and hypercapnic respiratory failure, acute metabolic encephalopathy  - Pt responsive to painful stimuli but nonverbal, not spontaneously opening eyes  - etiology unclear, likely Central related to Remeron  - CT head neg for acute pathology  - ABG w/ significant improvement   - Evaluated by ICU  - NPO while on Bi-PAP, cont AVAPS O/N  - cont to monitor    # Hepatic Abscess  - stable on CT  - last day on abx per ID noted (pt w/ different [facility] chart) 10/13, pt on Levaquin and flagyl  - IR, GI, Sx consulted  - ID; following, started on caspofungin, ertapenem, vanco 10/14  - rpt CT abd on Monday 10/17    # HTN/HLD  - c/w Coreg, fenofibrate    # GERD  - c/w PPI    # DVT ppx - HSQ    Pt FC

## 2022-10-14 NOTE — CONSULT NOTE ADULT - CONSULT REASON
Hepatic abscess
GB fossa abscess
hypercapnic respiratory failure, altered mental status
Hepatic abscess
hypercarbic respiratory failure

## 2022-10-14 NOTE — CONSULT NOTE ADULT - TIME BILLING
GI consult
review of chart, review of prior records, review of medication/blood work/vitals, direct patient care, d/w daughter/ICU

## 2022-10-14 NOTE — CONSULT NOTE ADULT - ASSESSMENT
Recurrent collection.  Remarkably nontoxic at this juncture.  WBC normal  Afebrile  HD stabke  Benign abdomen    Suggestions  IR eval  Pending aspirate will give vanco/ertapnem/caspo  F/U Blood cx data    Thank you for the courtesy of this referral.  I will be available via Teams for any issues that may arise over the weekend.    William Garcia MD  Attending Physician  [facility]  Division of Infectious Diseases  [phone]

## 2022-10-15 LAB
ALBUMIN SERPL ELPH-MCNC: 2.3 G/DL — LOW (ref 3.3–5)
ALP SERPL-CCNC: 46 U/L — SIGNIFICANT CHANGE UP (ref 40–120)
ALT FLD-CCNC: 11 U/L — LOW (ref 12–78)
ANION GAP SERPL CALC-SCNC: 5 MMOL/L — SIGNIFICANT CHANGE UP (ref 5–17)
AST SERPL-CCNC: 16 U/L — SIGNIFICANT CHANGE UP (ref 15–37)
BASE EXCESS BLDA CALC-SCNC: 0.5 MMOL/L — SIGNIFICANT CHANGE UP (ref -2–3)
BASOPHILS # BLD AUTO: 0.04 K/UL — SIGNIFICANT CHANGE UP (ref 0–0.2)
BASOPHILS NFR BLD AUTO: 0.7 % — SIGNIFICANT CHANGE UP (ref 0–2)
BILIRUB SERPL-MCNC: 0.3 MG/DL — SIGNIFICANT CHANGE UP (ref 0.2–1.2)
BLOOD GAS COMMENTS ARTERIAL: SIGNIFICANT CHANGE UP
BUN SERPL-MCNC: 9 MG/DL — SIGNIFICANT CHANGE UP (ref 7–23)
CALCIUM SERPL-MCNC: 8.4 MG/DL — LOW (ref 8.5–10.1)
CHLORIDE SERPL-SCNC: 100 MMOL/L — SIGNIFICANT CHANGE UP (ref 96–108)
CO2 BLDA-SCNC: 31 MMOL/L — HIGH (ref 19–24)
CO2 SERPL-SCNC: 30 MMOL/L — SIGNIFICANT CHANGE UP (ref 22–31)
CREAT SERPL-MCNC: 0.68 MG/DL — SIGNIFICANT CHANGE UP (ref 0.5–1.3)
CULTURE RESULTS: SIGNIFICANT CHANGE UP
EGFR: 85 ML/MIN/1.73M2 — SIGNIFICANT CHANGE UP
EOSINOPHIL # BLD AUTO: 0.16 K/UL — SIGNIFICANT CHANGE UP (ref 0–0.5)
EOSINOPHIL NFR BLD AUTO: 3 % — SIGNIFICANT CHANGE UP (ref 0–6)
GAS PNL BLDA: SIGNIFICANT CHANGE UP
GLUCOSE SERPL-MCNC: 125 MG/DL — HIGH (ref 70–99)
HCO3 BLDA-SCNC: 29 MMOL/L — HIGH (ref 21–28)
HCT VFR BLD CALC: 32 % — LOW (ref 34.5–45)
HGB BLD-MCNC: 10 G/DL — LOW (ref 11.5–15.5)
HOROWITZ INDEX BLDA+IHG-RTO: 0.28 — SIGNIFICANT CHANGE UP
IMM GRANULOCYTES NFR BLD AUTO: 0.4 % — SIGNIFICANT CHANGE UP (ref 0–0.9)
LYMPHOCYTES # BLD AUTO: 0.65 K/UL — LOW (ref 1–3.3)
LYMPHOCYTES # BLD AUTO: 12.1 % — LOW (ref 13–44)
MCHC RBC-ENTMCNC: 27.5 PG — SIGNIFICANT CHANGE UP (ref 27–34)
MCHC RBC-ENTMCNC: 31.3 G/DL — LOW (ref 32–36)
MCV RBC AUTO: 88.2 FL — SIGNIFICANT CHANGE UP (ref 80–100)
MONOCYTES # BLD AUTO: 0.32 K/UL — SIGNIFICANT CHANGE UP (ref 0–0.9)
MONOCYTES NFR BLD AUTO: 6 % — SIGNIFICANT CHANGE UP (ref 2–14)
NEUTROPHILS # BLD AUTO: 4.17 K/UL — SIGNIFICANT CHANGE UP (ref 1.8–7.4)
NEUTROPHILS NFR BLD AUTO: 77.8 % — HIGH (ref 43–77)
NRBC # BLD: 0 /100 WBCS — SIGNIFICANT CHANGE UP (ref 0–0)
PCO2 BLDA: 65 MMHG — HIGH (ref 32–46)
PH BLDA: 7.26 — LOW (ref 7.35–7.45)
PLATELET # BLD AUTO: 220 K/UL — SIGNIFICANT CHANGE UP (ref 150–400)
PO2 BLDA: 77 MMHG — LOW (ref 83–108)
POTASSIUM SERPL-MCNC: 3.4 MMOL/L — LOW (ref 3.5–5.3)
POTASSIUM SERPL-SCNC: 3.4 MMOL/L — LOW (ref 3.5–5.3)
PROT SERPL-MCNC: 5.5 GM/DL — LOW (ref 6–8.3)
RBC # BLD: 3.63 M/UL — LOW (ref 3.8–5.2)
RBC # FLD: 15.6 % — HIGH (ref 10.3–14.5)
SAO2 % BLDA: 96 % — SIGNIFICANT CHANGE UP (ref 94–98)
SODIUM SERPL-SCNC: 135 MMOL/L — SIGNIFICANT CHANGE UP (ref 135–145)
SPECIMEN SOURCE: SIGNIFICANT CHANGE UP
WBC # BLD: 5.36 K/UL — SIGNIFICANT CHANGE UP (ref 3.8–10.5)
WBC # FLD AUTO: 5.36 K/UL — SIGNIFICANT CHANGE UP (ref 3.8–10.5)

## 2022-10-15 PROCEDURE — 99233 SBSQ HOSP IP/OBS HIGH 50: CPT

## 2022-10-15 PROCEDURE — 99232 SBSQ HOSP IP/OBS MODERATE 35: CPT

## 2022-10-15 RX ORDER — POTASSIUM CHLORIDE 20 MEQ
40 PACKET (EA) ORAL ONCE
Refills: 0 | Status: COMPLETED | OUTPATIENT
Start: 2022-10-15 | End: 2022-10-15

## 2022-10-15 RX ORDER — SODIUM CHLORIDE 9 MG/ML
1000 INJECTION INTRAMUSCULAR; INTRAVENOUS; SUBCUTANEOUS
Refills: 0 | Status: DISCONTINUED | OUTPATIENT
Start: 2022-10-15 | End: 2022-10-19

## 2022-10-15 RX ADMIN — CASPOFUNGIN ACETATE 260 MILLIGRAM(S): 7 INJECTION, POWDER, LYOPHILIZED, FOR SOLUTION INTRAVENOUS at 15:07

## 2022-10-15 RX ADMIN — SODIUM CHLORIDE 60 MILLILITER(S): 9 INJECTION INTRAMUSCULAR; INTRAVENOUS; SUBCUTANEOUS at 21:49

## 2022-10-15 RX ADMIN — Medication 250 MILLIGRAM(S): at 06:16

## 2022-10-15 RX ADMIN — SODIUM CHLORIDE 75 MILLILITER(S): 9 INJECTION INTRAMUSCULAR; INTRAVENOUS; SUBCUTANEOUS at 05:43

## 2022-10-15 RX ADMIN — CARVEDILOL PHOSPHATE 6.25 MILLIGRAM(S): 80 CAPSULE, EXTENDED RELEASE ORAL at 05:43

## 2022-10-15 RX ADMIN — Medication 250 MILLIGRAM(S): at 17:28

## 2022-10-15 RX ADMIN — HEPARIN SODIUM 5000 UNIT(S): 5000 INJECTION INTRAVENOUS; SUBCUTANEOUS at 05:43

## 2022-10-15 RX ADMIN — Medication 145 MILLIGRAM(S): at 12:54

## 2022-10-15 RX ADMIN — HEPARIN SODIUM 5000 UNIT(S): 5000 INJECTION INTRAVENOUS; SUBCUTANEOUS at 13:57

## 2022-10-15 RX ADMIN — SODIUM CHLORIDE 60 MILLILITER(S): 9 INJECTION INTRAMUSCULAR; INTRAVENOUS; SUBCUTANEOUS at 17:25

## 2022-10-15 RX ADMIN — PANTOPRAZOLE SODIUM 40 MILLIGRAM(S): 20 TABLET, DELAYED RELEASE ORAL at 05:43

## 2022-10-15 RX ADMIN — Medication 100 MILLIGRAM(S): at 12:54

## 2022-10-15 RX ADMIN — ERTAPENEM SODIUM 120 MILLIGRAM(S): 1 INJECTION, POWDER, LYOPHILIZED, FOR SOLUTION INTRAMUSCULAR; INTRAVENOUS at 15:07

## 2022-10-15 RX ADMIN — CARVEDILOL PHOSPHATE 6.25 MILLIGRAM(S): 80 CAPSULE, EXTENDED RELEASE ORAL at 17:25

## 2022-10-15 RX ADMIN — HEPARIN SODIUM 5000 UNIT(S): 5000 INJECTION INTRAVENOUS; SUBCUTANEOUS at 21:49

## 2022-10-15 RX ADMIN — Medication 40 MILLIEQUIVALENT(S): at 12:53

## 2022-10-15 RX ADMIN — AMLODIPINE BESYLATE 2.5 MILLIGRAM(S): 2.5 TABLET ORAL at 05:43

## 2022-10-15 NOTE — PROGRESS NOTE ADULT - SUBJECTIVE AND OBJECTIVE BOX
HARSH VERGARA    LVS 2C [address]    Allergies    ampicillin-sulbactam (Rash)    Intolerances        PAST MEDICAL & SURGICAL HISTORY:  Bladder cancer      HTN (hypertension)      HLD (hyperlipidemia)      Liver abscess      H/O gallbladder cancer          FAMILY HISTORY:  No pertinent family history in first degree relatives        Home Medications:  ALLOPURINOL 100 MG TABLET: TAKE 1 TABLET ONCE A DAY (AT BEDTIME) ORALLY (13 Oct 2022 18:51)  AMLODIPINE 2.5MG TAB: 1 tab(s) orally once a day (13 Oct 2022 18:51)  CARVEDILOL 6.25 MG TABLET: TAKE 1 TABLET BY MOUTH TWICE A DAY (13 Oct 2022 18:51)  FENOFIBRATE 145 MG TABLET: TAKE 1 TABLET BY MOUTH EVERYDAY AT BEDTIME (13 Oct 2022 18:51)  levoFLOXacin 750 mg oral tablet: 1 tab(s) orally every 24 hours for 7 days (13 Oct 2022 18:51)  METRONIDAZOL 500MG TAB: 1 tab(s) orally 2 times a day for 14 days (13 Oct 2022 18:51)  MIRTAZAPINE 15 MG TABLET: TAKE 1 TABLET BY MOUTH EVERYDAY AT BEDTIME (13 Oct 2022 18:51)  PANTOPRAZOLE SOD DR 40 MG TAB: TAKE 1 TABLET BY MOUTH EVERY DAY IN THE MORNING (13 Oct 2022 18:51)      MEDICATIONS  (STANDING):  allopurinol 100 milliGRAM(s) Oral daily  amLODIPine   Tablet 2.5 milliGRAM(s) Oral daily  carvedilol 6.25 milliGRAM(s) Oral every 12 hours  caspofungin IVPB      caspofungin IVPB 50 milliGRAM(s) IV Intermittent every 24 hours  ertapenem  IVPB 1000 milliGRAM(s) IV Intermittent every 24 hours  ertapenem  IVPB      fenofibrate Tablet 145 milliGRAM(s) Oral daily  heparin   Injectable 5000 Unit(s) SubCutaneous every 8 hours  pantoprazole    Tablet 40 milliGRAM(s) Oral before breakfast  sodium chloride 0.9%. 1000 milliLiter(s) (75 mL/Hr) IV Continuous <Continuous>  vancomycin  IVPB 750 milliGRAM(s) IV Intermittent every 12 hours    MEDICATIONS  (PRN):  acetaminophen     Tablet .. 650 milliGRAM(s) Oral every 6 hours PRN Temp greater or equal to 38C (100.4F), Mild Pain (1 - 3)      Diet, NPO (10-13-22 @ 16:20) [Active]          Vital Signs Last 24 Hrs  T(C): 36.9 (15 Oct 2022 05:00), Max: 37.4 (15 Oct 2022 00:44)  T(F): 98.5 (15 Oct 2022 05:00), Max: 99.3 (15 Oct 2022 00:44)  HR: 78 (15 Oct 2022 08:52) (71 - 111)  BP: 150/69 (15 Oct 2022 05:00) (130/69 - 183/80)  BP(mean): 96 (15 Oct 2022 05:00) (89 - 114)  RR: 19 (15 Oct 2022 05:00) (18 - 20)  SpO2: 97% (15 Oct 2022 08:52) (93% - 98%)    Parameters below as of 15 Oct 2022 05:00  Patient On (Oxygen Delivery Method): nasal cannula  O2 Flow (L/min): 2                LABS:                        10.0   5.36  )-----------( 220      ( 15 Oct 2022 07:25 )             32.0     10-15    135  |  100  |  9   ----------------------------<  125<H>  3.4<L>   |  30  |  0.68    Ca    8.4<L>      15 Oct 2022 07:25    TPro  5.5<L>  /  Alb  2.3<L>  /  TBili  0.3  /  DBili  x   /  AST  16  /  ALT  11<L>  /  AlkPhos  46  10-15      Urinalysis Basic - ( 13 Oct 2022 22:00 )    Color: Yellow / Appearance: Clear / S.015 / pH: x  Gluc: x / Ketone: Negative  / Bili: Negative / Urobili: Negative mg/dL   Blood: x / Protein: 30 mg/dL / Nitrite: Negative   Leuk Esterase: Trace / RBC: 0-2 /HPF / WBC 0-2   Sq Epi: x / Non Sq Epi: Few / Bacteria: Few        ABG - ( 15 Oct 2022 05:49 )  pH, Arterial: 7.26  pH, Blood: x     /  pCO2: 65    /  pO2: 77    / HCO3: 29    / Base Excess: 0.5   /  SaO2: 96.0                WBC:  WBC Count: 5.36 K/uL (10-15 @ 07:25)  WBC Count: 5.18 K/uL (10-14 @ 05:50)  WBC Count: 5.00 K/uL (10-13 @ 09:58)      MICROBIOLOGY:  RECENT CULTURES:  10-13 .Blood Blood-Peripheral XXXX XXXX   No growth to date.    10-13 .Blood Blood-Peripheral XXXX XXXX   No growth to date.                    Sodium:  Sodium, Serum: 135 mmol/L (10-15 @ 07:25)  Sodium, Serum: 136 mmol/L (10-14 @ 05:50)  Sodium, Serum: 133 mmol/L (10-13 @ 09:58)      0.68 mg/dL 10-15 @ 07:25  0.59 mg/dL 10-14 @ 05:50  0.80 mg/dL 10-13 @ 09:58      Hemoglobin:  Hemoglobin: 10.0 g/dL (10-15 @ 07:25)  Hemoglobin: 10.1 g/dL (10-14 @ 05:50)  Hemoglobin: 10.9 g/dL (10-13 @ 09:58)      Platelets: Platelet Count - Automated: 220 K/uL (10-15 @ 07:25)  Platelet Count - Automated: 217 K/uL (10-14 @ 05:50)  Platelet Count - Automated: 277 K/uL (10-13 @ 09:58)      LIVER FUNCTIONS - ( 15 Oct 2022 07:25 )  Alb: 2.3 g/dL / Pro: 5.5 gm/dL / ALK PHOS: 46 U/L / ALT: 11 U/L / AST: 16 U/L / GGT: x             Urinalysis Basic - ( 13 Oct 2022 22:00 )    Color: Yellow / Appearance: Clear / S.015 / pH: x  Gluc: x / Ketone: Negative  / Bili: Negative / Urobili: Negative mg/dL   Blood: x / Protein: 30 mg/dL / Nitrite: Negative   Leuk Esterase: Trace / RBC: 0-2 /HPF / WBC 0-2   Sq Epi: x / Non Sq Epi: Few / Bacteria: Few        RADIOLOGY & ADDITIONAL STUDIES:      MICROBIOLOGY:  RECENT CULTURES:  10-13 .Blood Blood-Peripheral XXXX XXXX   No growth to date.    10-13 .Blood Blood-Peripheral XXXX XXXX   No growth to date.

## 2022-10-15 NOTE — PROGRESS NOTE ADULT - ASSESSMENT
REVIEW OF SYMPTOMS      Able to give (reliable) ROS  NO     PHYSICAL EXAM    HEENT Unremarkable  atraumatic   RESP Fair air entry EXP prolonged    Harsh breath sound Resp distres mild   CARDIAC S1 S2 No S3     NO JVD    ABDOMEN SOFT BS PRESENT NOT DISTENDED No hepatosplenomegaly   PEDAL EDEMA present No calf tenderness  NO rash       AGE/SEX.   87 f  DOA.  10/13/2022   CC .  10/13/2022 hypercapnicresp failure   10/13/2022 liver absce  PRESENTING PROBLEMS .   LIVER ABSCESS  RESP FAILURE     GENERAL DATA .   GOC.   10/15/2022 dnr     ALLGY.    ampicillin sulbactam                         WT. 10/15/2022 51   BMI.  10/15/2022 22                             ICU STAY. none  COVID.  10/13 scv2 (-)     BEST PRACTICE ISSUES.    HOB ELEVATN. Yes  DVT PPLX.   10/13 hpsc    PARSON PPLX.    10/13 protonix 40   INFN PPLX.    SP SW PAULETTE.        DIET.   10/15/2022 npo                VS/ PO/IO/ VENT/ DRIPS.   10/15/2022 afeb 82 130/70        ASSESSMENT/RECOMMENDATIONS .   RESP.  Resp failure.  10/15/2022 5a 31%  726/65/77   10/13 avaps 30-10/8 450 .25 20   a/r encourage pt to use avaps all day   RO PE.  10/13 cta ch No pe   is on dvt p   INFECTION.  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  10/13 amlodipine 2.5  10/13 carvedilol 6.25 x2   10/13 fenofibrate 145   cont rx   GI.  GB cancer   Liver abscess   as above   HEMAT.  Hb 10/15/2022 Hb 10   RENAL.  Na 10/15/2022 Na 135  Cr 10/15/2022 Cr .6   IV fl.  monitor   10/15/2022 NS 60 x 2d     TIME SPENT   Over 25 minutes aggregate care time spent on encounter; activities included   direct patient care, counseling and/or coordinating care reviewing notes, lab data/ imaging , discussion with multidisciplinary team/ patient  /family and explaining in detail risks, benefits, alternatives  of the recommendations     URSULA HOUSE 87 f 10/13/2022 3/10/1929 DR JASPER DOVER

## 2022-10-15 NOTE — PROGRESS NOTE ADULT - SUBJECTIVE AND OBJECTIVE BOX
Patient is a 87y old  Female who presents with a chief complaint of Acute hypoxic and hypercapnic respiratory failure, altered mental status (15 Oct 2022 10:34)      OVERNIGHT EVENTS:  Patients seen and examined at bedside this morning. No acute events overnight.    REVIEW OF SYSTEMS: denies chest pain/SOB, diaphoresis, no F/C, cough, dizziness, headache, blurry vision, nausea, vomiting, abdominal pain. All others review of systems negative     MEDICATIONS  (STANDING):  allopurinol 100 milliGRAM(s) Oral daily  amLODIPine   Tablet 2.5 milliGRAM(s) Oral daily  carvedilol 6.25 milliGRAM(s) Oral every 12 hours  caspofungin IVPB      caspofungin IVPB 50 milliGRAM(s) IV Intermittent every 24 hours  ertapenem  IVPB 1000 milliGRAM(s) IV Intermittent every 24 hours  ertapenem  IVPB      fenofibrate Tablet 145 milliGRAM(s) Oral daily  heparin   Injectable 5000 Unit(s) SubCutaneous every 8 hours  pantoprazole    Tablet 40 milliGRAM(s) Oral before breakfast  sodium chloride 0.9%. 1000 milliLiter(s) (60 mL/Hr) IV Continuous <Continuous>  vancomycin  IVPB 750 milliGRAM(s) IV Intermittent every 12 hours    MEDICATIONS  (PRN):  acetaminophen     Tablet .. 650 milliGRAM(s) Oral every 6 hours PRN Temp greater or equal to 38C (100.4F), Mild Pain (1 - 3)      Allergies    ampicillin-sulbactam (Rash)    Intolerances        T(F): 97.3 (10-15-22 @ 11:10), Max: 99.3 (10-15-22 @ 00:44)  HR: 90 (10-15-22 @ 12:00) (71 - 111)  BP: 134/74 (10-15-22 @ 11:10) (130/69 - 183/80)  RR: 18 (10-15-22 @ 11:10) (18 - 20)  SpO2: 98% (10-15-22 @ 12:00) (93% - 98%)  Wt(kg): --    PHYSICAL EXAM:  GENERAL: NAD  HEAD:  Atraumatic, Normocephalic  EYES: PERRLA, conjunctiva and sclera clear  ENMT: Moist mucous membranes  NECK: Supple, No JVD, Normal thyroid  NERVOUS SYSTEM:  Alert & Awake  CHEST/LUNG: Clear to percussion bilaterally;   HEART: Regular rate and rhythm;   ABDOMEN: Soft, Nontender, Nondistended; Bowel sounds present  EXTREMITIES:  no edema BL LE  SKIN: moist    LABS:                        10.0   5.36  )-----------( 220      ( 15 Oct 2022 07:25 )             32.0     10-15    135  |  100  |  9   ----------------------------<  125<H>  3.4<L>   |  30  |  0.68    Ca    8.4<L>      15 Oct 2022 07:25    TPro  5.5<L>  /  Alb  2.3<L>  /  TBili  0.3  /  DBili  x   /  AST  16  /  ALT  11<L>  /  AlkPhos  46  10-15      Urinalysis Basic - ( 13 Oct 2022 22:00 )    Color: Yellow / Appearance: Clear / S.015 / pH: x  Gluc: x / Ketone: Negative  / Bili: Negative / Urobili: Negative mg/dL   Blood: x / Protein: 30 mg/dL / Nitrite: Negative   Leuk Esterase: Trace / RBC: 0-2 /HPF / WBC 0-2   Sq Epi: x / Non Sq Epi: Few / Bacteria: Few      Cultures;   CAPILLARY BLOOD GLUCOSE        Lipid panel:           RADIOLOGY & ADDITIONAL TESTS:    Imaging Personally Reviewed:  [x ] YES      Consultant(s) Notes Reviewed:  [x ] YES     Care Discussed with [x ] Consultants [X ] Patient [ ] Family  [x ]    [x ]  Other; RN

## 2022-10-16 LAB
ALBUMIN SERPL ELPH-MCNC: 2.4 G/DL — LOW (ref 3.3–5)
ALP SERPL-CCNC: 48 U/L — SIGNIFICANT CHANGE UP (ref 40–120)
ALT FLD-CCNC: 11 U/L — LOW (ref 12–78)
ANION GAP SERPL CALC-SCNC: 8 MMOL/L — SIGNIFICANT CHANGE UP (ref 5–17)
AST SERPL-CCNC: 14 U/L — LOW (ref 15–37)
BASE EXCESS BLDA CALC-SCNC: 1.5 MMOL/L — SIGNIFICANT CHANGE UP (ref -2–3)
BASOPHILS # BLD AUTO: 0.06 K/UL — SIGNIFICANT CHANGE UP (ref 0–0.2)
BASOPHILS NFR BLD AUTO: 0.9 % — SIGNIFICANT CHANGE UP (ref 0–2)
BILIRUB SERPL-MCNC: 0.4 MG/DL — SIGNIFICANT CHANGE UP (ref 0.2–1.2)
BLOOD GAS COMMENTS ARTERIAL: SIGNIFICANT CHANGE UP
BUN SERPL-MCNC: 10 MG/DL — SIGNIFICANT CHANGE UP (ref 7–23)
CALCIUM SERPL-MCNC: 8.9 MG/DL — SIGNIFICANT CHANGE UP (ref 8.5–10.1)
CHLORIDE SERPL-SCNC: 106 MMOL/L — SIGNIFICANT CHANGE UP (ref 96–108)
CO2 BLDA-SCNC: 27 MMOL/L — HIGH (ref 19–24)
CO2 SERPL-SCNC: 25 MMOL/L — SIGNIFICANT CHANGE UP (ref 22–31)
CREAT SERPL-MCNC: 0.61 MG/DL — SIGNIFICANT CHANGE UP (ref 0.5–1.3)
EGFR: 87 ML/MIN/1.73M2 — SIGNIFICANT CHANGE UP
EOSINOPHIL # BLD AUTO: 0.44 K/UL — SIGNIFICANT CHANGE UP (ref 0–0.5)
EOSINOPHIL NFR BLD AUTO: 6.4 % — HIGH (ref 0–6)
GAS PNL BLDA: SIGNIFICANT CHANGE UP
GLUCOSE SERPL-MCNC: 68 MG/DL — LOW (ref 70–99)
HCO3 BLDA-SCNC: 26 MMOL/L — SIGNIFICANT CHANGE UP (ref 21–28)
HCT VFR BLD CALC: 33.1 % — LOW (ref 34.5–45)
HGB BLD-MCNC: 10.2 G/DL — LOW (ref 11.5–15.5)
HOROWITZ INDEX BLDA+IHG-RTO: 25 — SIGNIFICANT CHANGE UP
IMM GRANULOCYTES NFR BLD AUTO: 0.3 % — SIGNIFICANT CHANGE UP (ref 0–0.9)
LYMPHOCYTES # BLD AUTO: 0.94 K/UL — LOW (ref 1–3.3)
LYMPHOCYTES # BLD AUTO: 13.6 % — SIGNIFICANT CHANGE UP (ref 13–44)
MCHC RBC-ENTMCNC: 26.4 PG — LOW (ref 27–34)
MCHC RBC-ENTMCNC: 30.8 G/DL — LOW (ref 32–36)
MCV RBC AUTO: 85.8 FL — SIGNIFICANT CHANGE UP (ref 80–100)
MONOCYTES # BLD AUTO: 0.4 K/UL — SIGNIFICANT CHANGE UP (ref 0–0.9)
MONOCYTES NFR BLD AUTO: 5.8 % — SIGNIFICANT CHANGE UP (ref 2–14)
NEUTROPHILS # BLD AUTO: 5.03 K/UL — SIGNIFICANT CHANGE UP (ref 1.8–7.4)
NEUTROPHILS NFR BLD AUTO: 73 % — SIGNIFICANT CHANGE UP (ref 43–77)
NRBC # BLD: 0 /100 WBCS — SIGNIFICANT CHANGE UP (ref 0–0)
PCO2 BLDA: 39 MMHG — SIGNIFICANT CHANGE UP (ref 32–46)
PH BLDA: 7.43 — SIGNIFICANT CHANGE UP (ref 7.35–7.45)
PLATELET # BLD AUTO: 227 K/UL — SIGNIFICANT CHANGE UP (ref 150–400)
PO2 BLDA: 82 MMHG — LOW (ref 83–108)
POTASSIUM SERPL-MCNC: 3.4 MMOL/L — LOW (ref 3.5–5.3)
POTASSIUM SERPL-SCNC: 3.4 MMOL/L — LOW (ref 3.5–5.3)
PROT SERPL-MCNC: 5.9 GM/DL — LOW (ref 6–8.3)
RBC # BLD: 3.86 M/UL — SIGNIFICANT CHANGE UP (ref 3.8–5.2)
RBC # FLD: 15.5 % — HIGH (ref 10.3–14.5)
SAO2 % BLDA: 97.6 % — SIGNIFICANT CHANGE UP (ref 94–98)
SODIUM SERPL-SCNC: 139 MMOL/L — SIGNIFICANT CHANGE UP (ref 135–145)
VANCOMYCIN TROUGH SERPL-MCNC: 15.1 UG/ML — SIGNIFICANT CHANGE UP (ref 10–20)
WBC # BLD: 6.89 K/UL — SIGNIFICANT CHANGE UP (ref 3.8–10.5)
WBC # FLD AUTO: 6.89 K/UL — SIGNIFICANT CHANGE UP (ref 3.8–10.5)

## 2022-10-16 PROCEDURE — 99233 SBSQ HOSP IP/OBS HIGH 50: CPT

## 2022-10-16 PROCEDURE — 99232 SBSQ HOSP IP/OBS MODERATE 35: CPT

## 2022-10-16 RX ORDER — POTASSIUM CHLORIDE 20 MEQ
40 PACKET (EA) ORAL ONCE
Refills: 0 | Status: COMPLETED | OUTPATIENT
Start: 2022-10-16 | End: 2022-10-16

## 2022-10-16 RX ORDER — AMLODIPINE BESYLATE 2.5 MG/1
5 TABLET ORAL ONCE
Refills: 0 | Status: COMPLETED | OUTPATIENT
Start: 2022-10-16 | End: 2022-10-16

## 2022-10-16 RX ADMIN — HEPARIN SODIUM 5000 UNIT(S): 5000 INJECTION INTRAVENOUS; SUBCUTANEOUS at 05:50

## 2022-10-16 RX ADMIN — HEPARIN SODIUM 5000 UNIT(S): 5000 INJECTION INTRAVENOUS; SUBCUTANEOUS at 15:35

## 2022-10-16 RX ADMIN — Medication 100 MILLIGRAM(S): at 12:05

## 2022-10-16 RX ADMIN — ERTAPENEM SODIUM 120 MILLIGRAM(S): 1 INJECTION, POWDER, LYOPHILIZED, FOR SOLUTION INTRAMUSCULAR; INTRAVENOUS at 15:35

## 2022-10-16 RX ADMIN — AMLODIPINE BESYLATE 2.5 MILLIGRAM(S): 2.5 TABLET ORAL at 05:51

## 2022-10-16 RX ADMIN — CASPOFUNGIN ACETATE 260 MILLIGRAM(S): 7 INJECTION, POWDER, LYOPHILIZED, FOR SOLUTION INTRAVENOUS at 15:34

## 2022-10-16 RX ADMIN — Medication 145 MILLIGRAM(S): at 12:05

## 2022-10-16 RX ADMIN — PANTOPRAZOLE SODIUM 40 MILLIGRAM(S): 20 TABLET, DELAYED RELEASE ORAL at 05:50

## 2022-10-16 RX ADMIN — Medication 40 MILLIEQUIVALENT(S): at 12:05

## 2022-10-16 RX ADMIN — SODIUM CHLORIDE 60 MILLILITER(S): 9 INJECTION INTRAMUSCULAR; INTRAVENOUS; SUBCUTANEOUS at 12:11

## 2022-10-16 RX ADMIN — Medication 250 MILLIGRAM(S): at 17:04

## 2022-10-16 RX ADMIN — Medication 250 MILLIGRAM(S): at 05:51

## 2022-10-16 RX ADMIN — CARVEDILOL PHOSPHATE 6.25 MILLIGRAM(S): 80 CAPSULE, EXTENDED RELEASE ORAL at 17:04

## 2022-10-16 RX ADMIN — CARVEDILOL PHOSPHATE 6.25 MILLIGRAM(S): 80 CAPSULE, EXTENDED RELEASE ORAL at 05:51

## 2022-10-16 RX ADMIN — HEPARIN SODIUM 5000 UNIT(S): 5000 INJECTION INTRAVENOUS; SUBCUTANEOUS at 22:06

## 2022-10-16 RX ADMIN — AMLODIPINE BESYLATE 5 MILLIGRAM(S): 2.5 TABLET ORAL at 19:32

## 2022-10-16 NOTE — PROGRESS NOTE ADULT - SUBJECTIVE AND OBJECTIVE BOX
Patient is a 87y old  Female who presents with a chief complaint of Acute hypoxic and hypercapnic respiratory failure, altered mental status (15 Oct 2022 15:39)      OVERNIGHT EVENTS:  Patients seen and examined at bedside this morning. No acute events overnight.    REVIEW OF SYSTEMS: denies chest pain/SOB, diaphoresis, no F/C, cough, dizziness, headache, blurry vision, nausea, vomiting, abdominal pain. All others review of systems negative     MEDICATIONS  (STANDING):  allopurinol 100 milliGRAM(s) Oral daily  amLODIPine   Tablet 2.5 milliGRAM(s) Oral daily  carvedilol 6.25 milliGRAM(s) Oral every 12 hours  caspofungin IVPB      caspofungin IVPB 50 milliGRAM(s) IV Intermittent every 24 hours  ertapenem  IVPB 1000 milliGRAM(s) IV Intermittent every 24 hours  ertapenem  IVPB      fenofibrate Tablet 145 milliGRAM(s) Oral daily  heparin   Injectable 5000 Unit(s) SubCutaneous every 8 hours  pantoprazole    Tablet 40 milliGRAM(s) Oral before breakfast  sodium chloride 0.9%. 1000 milliLiter(s) (60 mL/Hr) IV Continuous <Continuous>  vancomycin  IVPB 750 milliGRAM(s) IV Intermittent every 12 hours    MEDICATIONS  (PRN):  acetaminophen     Tablet .. 650 milliGRAM(s) Oral every 6 hours PRN Temp greater or equal to 38C (100.4F), Mild Pain (1 - 3)      Allergies    ampicillin-sulbactam (Rash)    Intolerances        T(F): 97.9 (10-16-22 @ 05:25), Max: 98.7 (10-15-22 @ 16:55)  HR: 76 (10-16-22 @ 09:40) (72 - 91)  BP: 154/70 (10-16-22 @ 06:09) (134/74 - 184/74)  RR: 20 (10-16-22 @ 05:25) (18 - 20)  SpO2: 97% (10-16-22 @ 09:40) (95% - 99%)  Wt(kg): --    PHYSICAL EXAM:  GENERAL: NAD  HEAD:  Atraumatic, Normocephalic  EYES: PERRLA, conjunctiva and sclera clear  ENMT: Moist mucous membranes  NECK: Supple, No JVD, Normal thyroid  NERVOUS SYSTEM:  Alert & Awake  CHEST/LUNG: Clear to percussion bilaterally;   HEART: Regular rate and rhythm;   ABDOMEN: Soft, Nontender, Nondistended; Bowel sounds present  EXTREMITIES:  no edema BL LE  SKIN: moist    LABS:                        10.2   6.89  )-----------( 227      ( 16 Oct 2022 05:10 )             33.1     10-16    139  |  106  |  10  ----------------------------<  68<L>  3.4<L>   |  25  |  0.61    Ca    8.9      16 Oct 2022 05:10    TPro  5.9<L>  /  Alb  2.4<L>  /  TBili  0.4  /  DBili  x   /  AST  14<L>  /  ALT  11<L>  /  AlkPhos  48  10-16        Cultures;   CAPILLARY BLOOD GLUCOSE        Lipid panel:           RADIOLOGY & ADDITIONAL TESTS:    Imaging Personally Reviewed:  [x ] YES      Consultant(s) Notes Reviewed:  [x ] YES     Care Discussed with [x ] Consultants [X ] Patient [ ] Family  [x ]    [x ]  Other; RN

## 2022-10-16 NOTE — PROGRESS NOTE ADULT - ASSESSMENT
REVIEW OF SYMPTOMS      Able to give (reliable) ROS  NO     PHYSICAL EXAM    HEENT Unremarkable  atraumatic   RESP Fair air entry EXP prolonged    Harsh breath sound Resp distres mild   CARDIAC S1 S2 No S3     NO JVD    ABDOMEN SOFT BS PRESENT NOT DISTENDED No hepatosplenomegaly   PEDAL EDEMA present No calf tenderness  NO rash       AGE/SEX.   87 f  DOA.  10/13/2022   CC .  10/13/2022 hypercapnicresp failure   10/13/2022 liver absce  PRESENTING PROBLEMS .   LIVER ABSCESS  RESP FAILURE   COURSE.       GENERAL DATA .   GOC.   10/15/2022 dnr     ALLGY.    ampicillin sulbactam                         WT. 10/15/2022 51   BMI.  10/15/2022 22                             ICU STAY. none  COVID.  10/13 scv2 (-)     BEST PRACTICE ISSUES.    HOB ELEVATN. Yes  DVT PPLX.   10/13 hpsc    PARSON PPLX.    10/13 protonix 40   INFN PPLX.    SP SW PAULETTE.        DIET.   10/16/2022 regl    VS/ PO/IO/ VENT/ DRIPS.   10/16/2022 afeb 91 170/70   10/16/2022 1l 93%   10/16/2022 2p avaps 30-10/8 450 .25 20     ASSESSMENT/RECOMMENDATIONS .   RESP.  -- Resp failure.  10/15/2022 5a 31%  726/65/77   10/13 avaps 30-10/8 450 .25 20   10/16/2022 8a avaps 30-10/8 450 .25 20  743/39/82   a/r encourage pt to use avaps during  day whenever short of breath and all night   -- RO PE.  10/13 cta ch No pe   is on dvt p   INFECTION.  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  10/13 amlodipine 2.5  10/13 carvedilol 6.25 x2   10/13 fenofibrate 145   cont rx   GI.  -- GB cancer   -- Liver abscess   as above   HEMAT.  -- Anemia   Hb 10/15-10/16/2022 Hb 10 - 10.2   RENAL.  Na 10/15/2022 Na 135  Cr 10/15/2022 Cr .6   IV fl.  monitor   10/15/2022 NS 60 x 2d     TIME SPENT   Over 25 minutes aggregate care time spent on encounter; activities included   direct patient care, counseling and/or coordinating care reviewing notes, lab data/ imaging , discussion with multidisciplinary team/ patient  /family and explaining in detail risks, benefits, alternatives  of the recommendations     URSULA HOUSE 87 f 10/13/2022 3/10/1929 DR JASPER PIERCE

## 2022-10-16 NOTE — PROGRESS NOTE ADULT - ASSESSMENT
87 year old female with a PMHx of HTN, HLD GERD, and gallbladder cancer s/p ex lap, open cholecystectomy, segment 4b/5 hepatectomy, and right colectomy due to fistula tract vs metastasis on 08/19, recently admitted at [facility] 9/19 - 9/29 for Acute hypoxic respiratory failure and Hepatic Abscess s/p drainage p/w unresponsiveness which was noted by family this morning.    Acute hypoxic and hypercapnic respiratory failure, acute metabolic encephalopathy  - CT head neg for acute pathology  - Evaluated by ICU  - NPO while on Bi-PAP, cont AVAPS O/N  - cont to monitor    Hepatic Abscess  - gallbladder cancer s/p ex lap, open cholecystectomy, segment 4b/5 hepatectomy, and right colectomy due to fistula tract vs metastasis on 08/19  - last day on abx per ID noted (pt w/ different [facility] chart) 10/13, pt completed Levaquin and flagyl  - IR, GI, Sx o/b  - ID; following, started on caspofungin, ertapenem, vanco 10/14  - rpt CT abd on Monday 10/17    Hypokalemia- replaced, monitor  HTN/HLD- c/w Coreg, Amlodipine, fenofibrate  GERD- c/w PPI  # DVT ppx - HSQ  Pt DNR, DNI

## 2022-10-16 NOTE — PROGRESS NOTE ADULT - SUBJECTIVE AND OBJECTIVE BOX
HARSH VERGARA    LVS 2C [address]    Allergies    ampicillin-sulbactam (Rash)    Intolerances        PAST MEDICAL & SURGICAL HISTORY:  Bladder cancer      HTN (hypertension)      HLD (hyperlipidemia)      Liver abscess      H/O gallbladder cancer          FAMILY HISTORY:  No pertinent family history in first degree relatives        Home Medications:  ALLOPURINOL 100 MG TABLET: TAKE 1 TABLET ONCE A DAY (AT BEDTIME) ORALLY (13 Oct 2022 18:51)  AMLODIPINE 2.5MG TAB: 1 tab(s) orally once a day (13 Oct 2022 18:51)  CARVEDILOL 6.25 MG TABLET: TAKE 1 TABLET BY MOUTH TWICE A DAY (13 Oct 2022 18:51)  FENOFIBRATE 145 MG TABLET: TAKE 1 TABLET BY MOUTH EVERYDAY AT BEDTIME (13 Oct 2022 18:51)  levoFLOXacin 750 mg oral tablet: 1 tab(s) orally every 24 hours for 7 days (13 Oct 2022 18:51)  METRONIDAZOL 500MG TAB: 1 tab(s) orally 2 times a day for 14 days (13 Oct 2022 18:51)  MIRTAZAPINE 15 MG TABLET: TAKE 1 TABLET BY MOUTH EVERYDAY AT BEDTIME (13 Oct 2022 18:51)  PANTOPRAZOLE SOD DR 40 MG TAB: TAKE 1 TABLET BY MOUTH EVERY DAY IN THE MORNING (13 Oct 2022 18:51)      MEDICATIONS  (STANDING):  allopurinol 100 milliGRAM(s) Oral daily  amLODIPine   Tablet 2.5 milliGRAM(s) Oral daily  carvedilol 6.25 milliGRAM(s) Oral every 12 hours  caspofungin IVPB      caspofungin IVPB 50 milliGRAM(s) IV Intermittent every 24 hours  ertapenem  IVPB 1000 milliGRAM(s) IV Intermittent every 24 hours  ertapenem  IVPB      fenofibrate Tablet 145 milliGRAM(s) Oral daily  heparin   Injectable 5000 Unit(s) SubCutaneous every 8 hours  pantoprazole    Tablet 40 milliGRAM(s) Oral before breakfast  potassium chloride   Powder 40 milliEquivalent(s) Oral once  sodium chloride 0.9%. 1000 milliLiter(s) (60 mL/Hr) IV Continuous <Continuous>  vancomycin  IVPB 750 milliGRAM(s) IV Intermittent every 12 hours    MEDICATIONS  (PRN):  acetaminophen     Tablet .. 650 milliGRAM(s) Oral every 6 hours PRN Temp greater or equal to 38C (100.4F), Mild Pain (1 - 3)      Diet, Regular:   DASH/TLC Sodium & Cholesterol Restricted (10-16-22 @ 10:58) [Active]          Vital Signs Last 24 Hrs  T(C): 37.2 (16 Oct 2022 11:15), Max: 37.2 (16 Oct 2022 11:15)  T(F): 98.9 (16 Oct 2022 11:15), Max: 98.9 (16 Oct 2022 11:15)  HR: 83 (16 Oct 2022 11:15) (72 - 91)  BP: 160/74 (16 Oct 2022 11:15) (154/67 - 184/74)  BP(mean): 96 (16 Oct 2022 00:46) (96 - 96)  RR: 19 (16 Oct 2022 11:15) (18 - 20)  SpO2: 95% (16 Oct 2022 11:15) (94% - 99%)    Parameters below as of 16 Oct 2022 11:15  Patient On (Oxygen Delivery Method): nasal cannula          10-15-22 @ 07:01  -  10-16-22 @ 07:00  --------------------------------------------------------  IN: 660 mL / OUT: 750 mL / NET: -90 mL              LABS:                        10.2   6.89  )-----------( 227      ( 16 Oct 2022 05:10 )             33.1     10-16    139  |  106  |  10  ----------------------------<  68<L>  3.4<L>   |  25  |  0.61    Ca    8.9      16 Oct 2022 05:10    TPro  5.9<L>  /  Alb  2.4<L>  /  TBili  0.4  /  DBili  x   /  AST  14<L>  /  ALT  11<L>  /  AlkPhos  48  10-16          ABG - ( 16 Oct 2022 08:19 )  pH, Arterial: 7.43  pH, Blood: x     /  pCO2: 39    /  pO2: 82    / HCO3: 26    / Base Excess: 1.5   /  SaO2: 97.6                WBC:  WBC Count: 6.89 K/uL (10-16 @ 05:10)  WBC Count: 5.36 K/uL (10-15 @ 07:25)  WBC Count: 5.18 K/uL (10-14 @ 05:50)  WBC Count: 5.00 K/uL (10-13 @ 09:58)      MICROBIOLOGY:  RECENT CULTURES:  10-14 Clean Catch Clean Catch (Midstream) XXXX XXXX   50,000 - 99,000 CFU/mL Gram positive organisms    10-13 .Blood Blood-Peripheral XXXX XXXX   No growth to date.    10-13 .Blood Blood-Peripheral XXXX XXXX   No growth to date.                    Sodium:  Sodium, Serum: 139 mmol/L (10-16 @ 05:10)  Sodium, Serum: 135 mmol/L (10-15 @ 07:25)  Sodium, Serum: 136 mmol/L (10-14 @ 05:50)  Sodium, Serum: 133 mmol/L (10-13 @ 09:58)      0.61 mg/dL 10-16 @ 05:10  0.68 mg/dL 10-15 @ 07:25  0.59 mg/dL 10-14 @ 05:50  0.80 mg/dL 10-13 @ 09:58      Hemoglobin:  Hemoglobin: 10.2 g/dL (10-16 @ 05:10)  Hemoglobin: 10.0 g/dL (10-15 @ 07:25)  Hemoglobin: 10.1 g/dL (10-14 @ 05:50)  Hemoglobin: 10.9 g/dL (10-13 @ 09:58)      Platelets: Platelet Count - Automated: 227 K/uL (10-16 @ 05:10)  Platelet Count - Automated: 220 K/uL (10-15 @ 07:25)  Platelet Count - Automated: 217 K/uL (10-14 @ 05:50)  Platelet Count - Automated: 277 K/uL (10-13 @ 09:58)      LIVER FUNCTIONS - ( 16 Oct 2022 05:10 )  Alb: 2.4 g/dL / Pro: 5.9 gm/dL / ALK PHOS: 48 U/L / ALT: 11 U/L / AST: 14 U/L / GGT: x                 RADIOLOGY & ADDITIONAL STUDIES:      MICROBIOLOGY:  RECENT CULTURES:  10-14 Clean Catch Clean Catch (Midstream) XXXX XXXX   50,000 - 99,000 CFU/mL Gram positive organisms    10-13 .Blood Blood-Peripheral XXXX XXXX   No growth to date.    10-13 .Blood Blood-Peripheral XXXX XXXX   No growth to date.

## 2022-10-17 LAB
-  AMPICILLIN: SIGNIFICANT CHANGE UP
-  CIPROFLOXACIN: SIGNIFICANT CHANGE UP
-  DAPTOMYCIN: SIGNIFICANT CHANGE UP
-  LEVOFLOXACIN: SIGNIFICANT CHANGE UP
-  LINEZOLID: SIGNIFICANT CHANGE UP
-  NITROFURANTOIN: SIGNIFICANT CHANGE UP
-  TETRACYCLINE: SIGNIFICANT CHANGE UP
-  VANCOMYCIN: SIGNIFICANT CHANGE UP
ALBUMIN SERPL ELPH-MCNC: 2.4 G/DL — LOW (ref 3.3–5)
ALP SERPL-CCNC: 48 U/L — SIGNIFICANT CHANGE UP (ref 40–120)
ALT FLD-CCNC: 10 U/L — LOW (ref 12–78)
ANION GAP SERPL CALC-SCNC: 3 MMOL/L — LOW (ref 5–17)
AST SERPL-CCNC: 12 U/L — LOW (ref 15–37)
BASOPHILS # BLD AUTO: 0.04 K/UL — SIGNIFICANT CHANGE UP (ref 0–0.2)
BASOPHILS NFR BLD AUTO: 0.7 % — SIGNIFICANT CHANGE UP (ref 0–2)
BILIRUB SERPL-MCNC: 0.2 MG/DL — SIGNIFICANT CHANGE UP (ref 0.2–1.2)
BUN SERPL-MCNC: 11 MG/DL — SIGNIFICANT CHANGE UP (ref 7–23)
CALCIUM SERPL-MCNC: 9.3 MG/DL — SIGNIFICANT CHANGE UP (ref 8.5–10.1)
CHLORIDE SERPL-SCNC: 109 MMOL/L — HIGH (ref 96–108)
CO2 SERPL-SCNC: 29 MMOL/L — SIGNIFICANT CHANGE UP (ref 22–31)
CREAT SERPL-MCNC: 0.83 MG/DL — SIGNIFICANT CHANGE UP (ref 0.5–1.3)
CULTURE RESULTS: SIGNIFICANT CHANGE UP
EGFR: 69 ML/MIN/1.73M2 — SIGNIFICANT CHANGE UP
EOSINOPHIL # BLD AUTO: 0.13 K/UL — SIGNIFICANT CHANGE UP (ref 0–0.5)
EOSINOPHIL NFR BLD AUTO: 2.1 % — SIGNIFICANT CHANGE UP (ref 0–6)
GLUCOSE SERPL-MCNC: 96 MG/DL — SIGNIFICANT CHANGE UP (ref 70–99)
HCT VFR BLD CALC: 30.4 % — LOW (ref 34.5–45)
HGB BLD-MCNC: 9.1 G/DL — LOW (ref 11.5–15.5)
IMM GRANULOCYTES NFR BLD AUTO: 0.3 % — SIGNIFICANT CHANGE UP (ref 0–0.9)
LYMPHOCYTES # BLD AUTO: 1.31 K/UL — SIGNIFICANT CHANGE UP (ref 1–3.3)
LYMPHOCYTES # BLD AUTO: 21.3 % — SIGNIFICANT CHANGE UP (ref 13–44)
MCHC RBC-ENTMCNC: 26.5 PG — LOW (ref 27–34)
MCHC RBC-ENTMCNC: 29.9 G/DL — LOW (ref 32–36)
MCV RBC AUTO: 88.4 FL — SIGNIFICANT CHANGE UP (ref 80–100)
METHOD TYPE: SIGNIFICANT CHANGE UP
MONOCYTES # BLD AUTO: 0.47 K/UL — SIGNIFICANT CHANGE UP (ref 0–0.9)
MONOCYTES NFR BLD AUTO: 7.7 % — SIGNIFICANT CHANGE UP (ref 2–14)
NEUTROPHILS # BLD AUTO: 4.17 K/UL — SIGNIFICANT CHANGE UP (ref 1.8–7.4)
NEUTROPHILS NFR BLD AUTO: 67.9 % — SIGNIFICANT CHANGE UP (ref 43–77)
NRBC # BLD: 0 /100 WBCS — SIGNIFICANT CHANGE UP (ref 0–0)
ORGANISM # SPEC MICROSCOPIC CNT: SIGNIFICANT CHANGE UP
ORGANISM # SPEC MICROSCOPIC CNT: SIGNIFICANT CHANGE UP
PLATELET # BLD AUTO: 224 K/UL — SIGNIFICANT CHANGE UP (ref 150–400)
POTASSIUM SERPL-MCNC: 4.4 MMOL/L — SIGNIFICANT CHANGE UP (ref 3.5–5.3)
POTASSIUM SERPL-SCNC: 4.4 MMOL/L — SIGNIFICANT CHANGE UP (ref 3.5–5.3)
PROT SERPL-MCNC: 5.9 GM/DL — LOW (ref 6–8.3)
RBC # BLD: 3.44 M/UL — LOW (ref 3.8–5.2)
RBC # FLD: 15.9 % — HIGH (ref 10.3–14.5)
SODIUM SERPL-SCNC: 141 MMOL/L — SIGNIFICANT CHANGE UP (ref 135–145)
SPECIMEN SOURCE: SIGNIFICANT CHANGE UP
WBC # BLD: 6.14 K/UL — SIGNIFICANT CHANGE UP (ref 3.8–10.5)
WBC # FLD AUTO: 6.14 K/UL — SIGNIFICANT CHANGE UP (ref 3.8–10.5)

## 2022-10-17 PROCEDURE — 99232 SBSQ HOSP IP/OBS MODERATE 35: CPT | Mod: FS

## 2022-10-17 PROCEDURE — 99233 SBSQ HOSP IP/OBS HIGH 50: CPT

## 2022-10-17 PROCEDURE — 74176 CT ABD & PELVIS W/O CONTRAST: CPT | Mod: 26

## 2022-10-17 PROCEDURE — 99232 SBSQ HOSP IP/OBS MODERATE 35: CPT

## 2022-10-17 RX ADMIN — AMLODIPINE BESYLATE 2.5 MILLIGRAM(S): 2.5 TABLET ORAL at 06:32

## 2022-10-17 RX ADMIN — HEPARIN SODIUM 5000 UNIT(S): 5000 INJECTION INTRAVENOUS; SUBCUTANEOUS at 17:14

## 2022-10-17 RX ADMIN — CARVEDILOL PHOSPHATE 6.25 MILLIGRAM(S): 80 CAPSULE, EXTENDED RELEASE ORAL at 06:32

## 2022-10-17 RX ADMIN — Medication 100 MILLIGRAM(S): at 12:33

## 2022-10-17 RX ADMIN — PANTOPRAZOLE SODIUM 40 MILLIGRAM(S): 20 TABLET, DELAYED RELEASE ORAL at 06:32

## 2022-10-17 RX ADMIN — SODIUM CHLORIDE 60 MILLILITER(S): 9 INJECTION INTRAMUSCULAR; INTRAVENOUS; SUBCUTANEOUS at 08:39

## 2022-10-17 RX ADMIN — Medication 250 MILLIGRAM(S): at 18:31

## 2022-10-17 RX ADMIN — CASPOFUNGIN ACETATE 260 MILLIGRAM(S): 7 INJECTION, POWDER, LYOPHILIZED, FOR SOLUTION INTRAVENOUS at 17:18

## 2022-10-17 RX ADMIN — HEPARIN SODIUM 5000 UNIT(S): 5000 INJECTION INTRAVENOUS; SUBCUTANEOUS at 22:27

## 2022-10-17 RX ADMIN — HEPARIN SODIUM 5000 UNIT(S): 5000 INJECTION INTRAVENOUS; SUBCUTANEOUS at 06:32

## 2022-10-17 RX ADMIN — SODIUM CHLORIDE 60 MILLILITER(S): 9 INJECTION INTRAMUSCULAR; INTRAVENOUS; SUBCUTANEOUS at 06:32

## 2022-10-17 RX ADMIN — Medication 145 MILLIGRAM(S): at 12:33

## 2022-10-17 RX ADMIN — ERTAPENEM SODIUM 120 MILLIGRAM(S): 1 INJECTION, POWDER, LYOPHILIZED, FOR SOLUTION INTRAMUSCULAR; INTRAVENOUS at 18:31

## 2022-10-17 RX ADMIN — Medication 250 MILLIGRAM(S): at 06:32

## 2022-10-17 RX ADMIN — CARVEDILOL PHOSPHATE 6.25 MILLIGRAM(S): 80 CAPSULE, EXTENDED RELEASE ORAL at 17:14

## 2022-10-17 NOTE — PROGRESS NOTE ADULT - SUBJECTIVE AND OBJECTIVE BOX
Patient is a 87y old  Female who presents with a chief complaint of Acute hypoxic and hypercapnic respiratory failure, altered mental status (17 Oct 2022 10:44)      OVERNIGHT EVENTS:  Patients seen and examined at bedside this morning. No acute events overnight.    REVIEW OF SYSTEMS: denies chest pain/SOB, diaphoresis, no F/C, cough, dizziness, headache, blurry vision, nausea, vomiting, abdominal pain. All others review of systems negative     MEDICATIONS  (STANDING):  allopurinol 100 milliGRAM(s) Oral daily  amLODIPine   Tablet 2.5 milliGRAM(s) Oral daily  carvedilol 6.25 milliGRAM(s) Oral every 12 hours  caspofungin IVPB      caspofungin IVPB 50 milliGRAM(s) IV Intermittent every 24 hours  ertapenem  IVPB 1000 milliGRAM(s) IV Intermittent every 24 hours  ertapenem  IVPB      fenofibrate Tablet 145 milliGRAM(s) Oral daily  heparin   Injectable 5000 Unit(s) SubCutaneous every 8 hours  pantoprazole    Tablet 40 milliGRAM(s) Oral before breakfast  sodium chloride 0.9%. 1000 milliLiter(s) (60 mL/Hr) IV Continuous <Continuous>  vancomycin  IVPB 750 milliGRAM(s) IV Intermittent every 12 hours    MEDICATIONS  (PRN):  acetaminophen     Tablet .. 650 milliGRAM(s) Oral every 6 hours PRN Temp greater or equal to 38C (100.4F), Mild Pain (1 - 3)      Allergies    ampicillin-sulbactam (Rash)    Intolerances        T(F): 97.8 (10-17-22 @ 11:27), Max: 98.7 (10-17-22 @ 04:40)  HR: 71 (10-17-22 @ 11:27) (62 - 108)  BP: 116/62 (10-17-22 @ 11:27) (111/68 - 181/69)  RR: 19 (10-17-22 @ 11:27) (19 - 20)  SpO2: 99% (10-17-22 @ 11:27) (93% - 99%)  Wt(kg): --    PHYSICAL EXAM:  GENERAL: NAD  HEAD:  Atraumatic, Normocephalic  EYES: PERRLA, conjunctiva and sclera clear  ENMT: Moist mucous membranes  NECK: Supple, No JVD, Normal thyroid  NERVOUS SYSTEM:  Alert & Awake  CHEST/LUNG: Clear to percussion bilaterally;   HEART: Regular rate and rhythm;   ABDOMEN: Soft, Nontender, Nondistended; Bowel sounds present  EXTREMITIES:  no edema BL LE  SKIN: moist    LABS:                        9.1    6.14  )-----------( 224      ( 17 Oct 2022 06:15 )             30.4     10-17    141  |  109<H>  |  11  ----------------------------<  96  4.4   |  29  |  0.83    Ca    9.3      17 Oct 2022 06:15    TPro  5.9<L>  /  Alb  2.4<L>  /  TBili  0.2  /  DBili  x   /  AST  12<L>  /  ALT  10<L>  /  AlkPhos  48  10-17        Cultures;   CAPILLARY BLOOD GLUCOSE        Lipid panel:           RADIOLOGY & ADDITIONAL TESTS:    Imaging Personally Reviewed:  [x ] YES      Consultant(s) Notes Reviewed:  [x ] YES     Care Discussed with [x ] Consultants [X ] Patient [ ] Family  [x ]    [x ]  Other; RN

## 2022-10-17 NOTE — PROGRESS NOTE ADULT - SUBJECTIVE AND OBJECTIVE BOX
HARSH VERGARA  MRN-[id number]    Follow Up:  hepatic liver abscess - recurring    Interval History: the pt was seen and examined earlier, no distress, answers only some of my questions. The pt denies any pain, no fevrs, no leukocytosis.     PAST MEDICAL & SURGICAL HISTORY:  Bladder cancer      HTN (hypertension)      HLD (hyperlipidemia)      Liver abscess      H/O gallbladder cancer          ROS:  limited, pt denies any pain, no sob, no chest pain, denies abd pain   [ ] Unobtainable because:  [x ] All other systems negative    Constitutional: no fever, no chills  Head: no trauma  Eyes: no vision changes, no eye pain  ENT:  no sore throat, no rhinorrhea  Cardiovascular:  no chest pain, no palpitation  Respiratory:  no SOB, no cough  GI:  no abd pain, no vomiting, no diarrhea  urinary: no dysuria, no hematuria, no flank pain  musculoskeletal:  no joint pain, no joint swelling  skin:  no rash  neurology:  no headache, no seizure, no change in mental status  psych: no anxiety, no depression         Allergies  ampicillin-sulbactam (Rash)        ANTIMICROBIALS:  caspofungin IVPB    caspofungin IVPB 50 every 24 hours  ertapenem  IVPB 1000 every 24 hours  ertapenem  IVPB    vancomycin  IVPB 750 every 12 hours      OTHER MEDS:  acetaminophen     Tablet .. 650 milliGRAM(s) Oral every 6 hours PRN  allopurinol 100 milliGRAM(s) Oral daily  amLODIPine   Tablet 2.5 milliGRAM(s) Oral daily  carvedilol 6.25 milliGRAM(s) Oral every 12 hours  fenofibrate Tablet 145 milliGRAM(s) Oral daily  heparin   Injectable 5000 Unit(s) SubCutaneous every 8 hours  pantoprazole    Tablet 40 milliGRAM(s) Oral before breakfast  sodium chloride 0.9%. 1000 milliLiter(s) IV Continuous <Continuous>      Vital Signs Last 24 Hrs  T(C): 36.5 (17 Oct 2022 16:35), Max: 37.1 (17 Oct 2022 04:40)  T(F): 97.7 (17 Oct 2022 16:35), Max: 98.7 (17 Oct 2022 04:40)  HR: 79 (17 Oct 2022 17:06) (62 - 108)  BP: 118/66 (17 Oct 2022 16:35) (111/68 - 181/69)  BP(mean): 100 (16 Oct 2022 22:33) (100 - 107)  RR: 18 (17 Oct 2022 16:35) (18 - 20)  SpO2: 98% (17 Oct 2022 17:06) (93% - 99%)    Parameters below as of 17 Oct 2022 11:27  Patient On (Oxygen Delivery Method): nasal cannula  O2 Flow (L/min): 5      Physical Exam:  General: Nontoxic-appearing Female in no acute distress.  HEENT: AT/NC. Anicteric. Conjunctiva pink and moist. Oropharynx clear.  Neck: Not rigid. No sense of mass.  Nodes: None palpable.  Lungs: Diminished BS Bilaterally   Heart: Regular rate and rhythm @96  Abdomen: Bowel sounds present and normoactive. Soft. Nondistended. Nontender. No sense of mass. No organomegaly. Incision with one scabbed area otherwise well healed.   Back: No spinal tenderness. No costovertebral angle tenderness.   Extremities: No cyanosis or clubbing. No edema.   Skin: Warm. Dry. Good turgor. No rash. No vasculitic stigmata.  Psychiatric: Grossly appropriate affect and mood for situation.     WBC Count: 6.14 K/uL (10-17 @ 06:15)  WBC Count: 6.89 K/uL (10-16 @ 05:10)  WBC Count: 5.36 K/uL (10-15 @ 07:25)  WBC Count: 5.18 K/uL (10-14 @ 05:50)  WBC Count: 5.00 K/uL (10-13 @ 09:58)                            9.1    6.14  )-----------( 224      ( 17 Oct 2022 06:15 )             30.4       10-17    141  |  109<H>  |  11  ----------------------------<  96  4.4   |  29  |  0.83    Ca    9.3      17 Oct 2022 06:15    TPro  5.9<L>  /  Alb  2.4<L>  /  TBili  0.2  /  DBili  x   /  AST  12<L>  /  ALT  10<L>  /  AlkPhos  48  10-17          Creatinine Trend: 0.83<--, 0.61<--, 0.68<--, 0.59<--, 0.80<--      MICROBIOLOGY:  v  Clean Catch Clean Catch (Midstream)  10-14-22   50,000 - 99,000 CFU/mL Enterococcus faecium (vancomycin resistant)  --  Enterococcus faecium (vancomycin resistant)      .Blood Blood-Peripheral  10-13-22   No growth to date.  --  --      .Blood Blood-Peripheral  10-13-22   No growth to date.  --  --      SARS-CoV-2 Result: NotDetec (10-13-22 @ 09:58)    SARS-CoV-2: NotDetec (14 Sep 2022 12:35)    RADIOLOGY:    < from: CT Abdomen and Pelvis No Cont (10.17.22 @ 14:36) >    ACC: [id number] EXAM:  CT ABDOMEN AND PELVIS                          PROCEDURE DATE:  10/17/2022          INTERPRETATION:  CLINICAL INFORMATION: 87 years  Female with hepatic   abscess. History of gallbladder fossa abscess status post drainage   9/30/2022    COMPARISON: Contrast-enhanced CT 10/13/2022    CONTRAST/COMPLICATIONS:  IV Contrast: NONE  Oral Contrast: NONE  Complications: None reported at time of study completion    PROCEDURE:  CT of the Abdomen and Pelvis was performed.  Sagittal andcoronal reformats were performed.    LIMITATIONS: Evaluation of the solid organs, vascular structures and GI   tract is limited without oral and IV contrast.    FINDINGS:  LOWER CHEST: Bibasilar dependent atelectasis and trace bilateral pleural   effusions. Coronary atherosclerosis. Cardiomegaly.    LIVER: Within normal limits.  BILE DUCTS: Normal caliber.  GALLBLADDER: Cholecystectomy. Fluid collection along the gallbladder   fossa grossly unchanged allowing for absence of IV contrast. Small focus   of air within the collection. Stable periportal edema. Cannot evaluate   for hepatic or portal vein thrombus without IV contrast.  SPLEEN: Within normal limits.  PANCREAS: Atrophic.  ADRENALS: Within normal limits.  KIDNEYS/URETERS: Bilateral renal cortical scarring. Minimal bilateral   hydronephrosis secondary to bladder distention.    BLADDER: Distended measuring 15.7 cm sagittal.  REPRODUCTIVE ORGANS: Unremarkable uterus.    BOWEL: No bowel obstruction. Right hemicolectomy. Scattered diverticula   without diverticulitis.  PERITONEUM: Small perihepatic ascites increased from prior.  VESSELS: Within normal limits.  RETROPERITONEUM/LYMPH NODES: No lymphadenopathy.  ABDOMINAL WALL: Postoperative changes.  BONES: Degenerative changes. Osteopenia.    IMPRESSION:  Residual collection in the gallbladder fossa/liver unchanged from   10/13/2022 overlying for absence of IV contrast.    Bladder distention resulting in mild bilateral hydroureteronephrosis.        --- End of Report ---            NED MEYER MD; Attending Radiologist  This document has been electronically signed. Oct 17 2022  2:55PM    < end of copied text >

## 2022-10-17 NOTE — PROGRESS NOTE ADULT - ASSESSMENT
Recurrent collection.  Remarkably nontoxic at this juncture.  WBC normal  Afebrile  HD stabke  Benign abdomen    10/17: no fevers, no wbc, Cr and LFTs ok, BCs with no growth, CT abd was repeated today - no change, needs to be seen by IR. Vancomycin IV, ertapenem iv and Caspofungin continued.     Suggestions  IR eval - pending   Pending aspirate will give vanco/ertapnem/caspo  follow Blood cx data - NGTD    discussed with Dr. Garcia  msg sent to Dr. Jang

## 2022-10-17 NOTE — PROGRESS NOTE ADULT - SUBJECTIVE AND OBJECTIVE BOX
HARSH VERGARA    LVS 2C [address]    Allergies    ampicillin-sulbactam (Rash)    Intolerances        PAST MEDICAL & SURGICAL HISTORY:  Bladder cancer      HTN (hypertension)      HLD (hyperlipidemia)      Liver abscess      H/O gallbladder cancer          FAMILY HISTORY:  No pertinent family history in first degree relatives        Home Medications:  ALLOPURINOL 100 MG TABLET: TAKE 1 TABLET ONCE A DAY (AT BEDTIME) ORALLY (13 Oct 2022 18:51)  AMLODIPINE 2.5MG TAB: 1 tab(s) orally once a day (13 Oct 2022 18:51)  CARVEDILOL 6.25 MG TABLET: TAKE 1 TABLET BY MOUTH TWICE A DAY (13 Oct 2022 18:51)  FENOFIBRATE 145 MG TABLET: TAKE 1 TABLET BY MOUTH EVERYDAY AT BEDTIME (13 Oct 2022 18:51)  levoFLOXacin 750 mg oral tablet: 1 tab(s) orally every 24 hours for 7 days (13 Oct 2022 18:51)  METRONIDAZOL 500MG TAB: 1 tab(s) orally 2 times a day for 14 days (13 Oct 2022 18:51)  MIRTAZAPINE 15 MG TABLET: TAKE 1 TABLET BY MOUTH EVERYDAY AT BEDTIME (13 Oct 2022 18:51)  PANTOPRAZOLE SOD DR 40 MG TAB: TAKE 1 TABLET BY MOUTH EVERY DAY IN THE MORNING (13 Oct 2022 18:51)      MEDICATIONS  (STANDING):  allopurinol 100 milliGRAM(s) Oral daily  amLODIPine   Tablet 2.5 milliGRAM(s) Oral daily  carvedilol 6.25 milliGRAM(s) Oral every 12 hours  caspofungin IVPB 50 milliGRAM(s) IV Intermittent every 24 hours  caspofungin IVPB      ertapenem  IVPB      ertapenem  IVPB 1000 milliGRAM(s) IV Intermittent every 24 hours  fenofibrate Tablet 145 milliGRAM(s) Oral daily  heparin   Injectable 5000 Unit(s) SubCutaneous every 8 hours  pantoprazole    Tablet 40 milliGRAM(s) Oral before breakfast  sodium chloride 0.9%. 1000 milliLiter(s) (60 mL/Hr) IV Continuous <Continuous>  vancomycin  IVPB 750 milliGRAM(s) IV Intermittent every 12 hours    MEDICATIONS  (PRN):  acetaminophen     Tablet .. 650 milliGRAM(s) Oral every 6 hours PRN Temp greater or equal to 38C (100.4F), Mild Pain (1 - 3)      Diet, Regular:   DASH/TLC Sodium & Cholesterol Restricted (10-16-22 @ 10:58) [Active]          Vital Signs Last 24 Hrs  T(C): 37.1 (17 Oct 2022 04:40), Max: 37.2 (16 Oct 2022 11:15)  T(F): 98.7 (17 Oct 2022 04:40), Max: 98.9 (16 Oct 2022 11:15)  HR: 66 (17 Oct 2022 09:50) (62 - 108)  BP: 129/68 (17 Oct 2022 06:30) (111/68 - 181/69)  BP(mean): 100 (16 Oct 2022 22:33) (100 - 107)  RR: 19 (17 Oct 2022 04:40) (19 - 20)  SpO2: 97% (17 Oct 2022 09:50) (93% - 99%)    Parameters below as of 16 Oct 2022 18:41  Patient On (Oxygen Delivery Method): nasal cannula  O2 Flow (L/min): 1        10-16-22 @ 07:01  -  10-17-22 @ 07:00  --------------------------------------------------------  IN: 1380 mL / OUT: 1000 mL / NET: 380 mL              LABS:                        9.1    6.14  )-----------( 224      ( 17 Oct 2022 06:15 )             30.4     10-17    141  |  109<H>  |  11  ----------------------------<  96  4.4   |  29  |  0.83    Ca    9.3      17 Oct 2022 06:15    TPro  5.9<L>  /  Alb  2.4<L>  /  TBili  0.2  /  DBili  x   /  AST  12<L>  /  ALT  10<L>  /  AlkPhos  48  10-17          ABG - ( 16 Oct 2022 08:19 )  pH, Arterial: 7.43  pH, Blood: x     /  pCO2: 39    /  pO2: 82    / HCO3: 26    / Base Excess: 1.5   /  SaO2: 97.6                WBC:  WBC Count: 6.14 K/uL (10-17 @ 06:15)  WBC Count: 6.89 K/uL (10-16 @ 05:10)  WBC Count: 5.36 K/uL (10-15 @ 07:25)  WBC Count: 5.18 K/uL (10-14 @ 05:50)      MICROBIOLOGY:  RECENT CULTURES:  10-14 Clean Catch Clean Catch (Midstream) XXXX XXXX   50,000 - 99,000 CFU/mL Gram positive organisms    10-13 .Blood Blood-Peripheral XXXX XXXX   No growth to date.    10-13 .Blood Blood-Peripheral XXXX XXXX   No growth to date.                    Sodium:  Sodium, Serum: 141 mmol/L (10-17 @ 06:15)  Sodium, Serum: 139 mmol/L (10-16 @ 05:10)  Sodium, Serum: 135 mmol/L (10-15 @ 07:25)  Sodium, Serum: 136 mmol/L (10-14 @ 05:50)      0.83 mg/dL 10-17 @ 06:15  0.61 mg/dL 10-16 @ 05:10  0.68 mg/dL 10-15 @ 07:25  0.59 mg/dL 10-14 @ 05:50      Hemoglobin:  Hemoglobin: 9.1 g/dL (10-17 @ 06:15)  Hemoglobin: 10.2 g/dL (10-16 @ 05:10)  Hemoglobin: 10.0 g/dL (10-15 @ 07:25)  Hemoglobin: 10.1 g/dL (10-14 @ 05:50)      Platelets: Platelet Count - Automated: 224 K/uL (10-17 @ 06:15)  Platelet Count - Automated: 227 K/uL (10-16 @ 05:10)  Platelet Count - Automated: 220 K/uL (10-15 @ 07:25)  Platelet Count - Automated: 217 K/uL (10-14 @ 05:50)      LIVER FUNCTIONS - ( 17 Oct 2022 06:15 )  Alb: 2.4 g/dL / Pro: 5.9 gm/dL / ALK PHOS: 48 U/L / ALT: 10 U/L / AST: 12 U/L / GGT: x                 RADIOLOGY & ADDITIONAL STUDIES:      MICROBIOLOGY:  RECENT CULTURES:  10-14 Clean Catch Clean Catch (Midstream) XXXX XXXX   50,000 - 99,000 CFU/mL Gram positive organisms    10-13 .Blood Blood-Peripheral XXXX XXXX   No growth to date.    10-13 .Blood Blood-Peripheral XXXX XXXX   No growth to date.

## 2022-10-17 NOTE — PROGRESS NOTE ADULT - ASSESSMENT
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

## 2022-10-17 NOTE — PROGRESS NOTE ADULT - ASSESSMENT
REVIEW OF SYMPTOMS      Able to give (reliable) ROS  NO     PHYSICAL EXAM    HEENT Unremarkable  atraumatic   RESP Fair air entry EXP prolonged    Harsh breath sound Resp distres mild   CARDIAC S1 S2 No S3     NO JVD    ABDOMEN SOFT BS PRESENT NOT DISTENDED No hepatosplenomegaly   PEDAL EDEMA present No calf tenderness  NO rash       AGE/SEX.   87 f  DOA.  10/13/2022   CC .  10/13/2022 hypercapnicresp failure   10/13/2022 liver absce  PRESENTING PROBLEMS .   LIVER ABSCESS  RESP FAILURE   COURSE.       GENERAL DATA .   GOC.   10/15/2022 dnr     ALLGY.    ampicillin sulbactam                         WT. 10/15/2022 51   BMI.  10/15/2022 22                             ICU STAY. none  COVID.  10/13 scv2 (-)     BEST PRACTICE ISSUES.    HOB ELEVATN. Yes  DVT PPLX.   10/13 hpsc    PARSON PPLX.    10/13 protonix 40   INFN PPLX.    SP SW PAULETTE.        DIET.   10/16/2022 regl    VS/ PO/IO/ VENT/ DRIPS.   10/17/2022 afeb 71 116/60   10/17/2022 5l 99%   10/17/2022 6a avaps 30-10 8 450 25 20    ASSESSMENT/RECOMMENDATIONS .   RESP.  -- Resp failure.  10/15/2022 5a 31%  726/65/77   10/13 avaps 30-10/8 450 .25 20   10/16/2022 8a avaps 30-10/8 450 .25 20  743/39/82   a/r encourage pt to use avaps during  day whenever short of breath and all night   -- RO PE.  10/13 cta ch No pe   is on dvt p   INFECTION.  -- Liver abscess   Hx Liver abscess was drained by IR 9/19-9/29 admission but has reaccumulatd   Has ho gb cancer segment 4b5 hepatectomy r colectomy 819 due to fistula tract v mets   w 10/15-10/16/2022 w 5.3 - 6.8   ctap 10/17 ctap nc from 10/13  ct ch 10/13 ct ch basal atelect   ct cap 10/13 ct  c ap 4.8 x 2.4 cm abscess gb fossa rim enhanc sp cholecystectomy   uc 10/14 50-99 gpo  bc 10/13 (-)   flu ab 10/13 (-)   10/14 caspofungin 50  10/14 invanz   10/14 vanco 750.2   cont rx   consider ir alyssa kiser with id   -- Vanco level  10/16/2022 vanco 16   CARDIAC.  10/13 amlodipine 2.5  10/13 carvedilol 6.25 x2   10/13 fenofibrate 145   cont rx   GI.  -- GB cancer   -- Liver abscess   as above   HEMAT.  -- Anemia   Hb 10/15-10/16/2022 Hb 10 - 10.2   RENAL.  Na 10/15/2022 Na 135  Cr 10/15/2022 Cr .6   IV fl.  monitor   10/15/2022 NS 60 x 2d     TIME SPENT   Over 25 minutes aggregate care time spent on encounter; activities included   direct patient care, counseling and/or coordinating care reviewing notes, lab data/ imaging , discussion with multidisciplinary team/ patient  /family and explaining in detail risks, benefits, alternatives  of the recommendations     URSULA HOUSE 87 f 10/13/2022 3/10/1929 DR JASPER DOVER

## 2022-10-18 LAB
ALBUMIN SERPL ELPH-MCNC: 2.4 G/DL — LOW (ref 3.3–5)
ALP SERPL-CCNC: 45 U/L — SIGNIFICANT CHANGE UP (ref 40–120)
ALT FLD-CCNC: 10 U/L — LOW (ref 12–78)
ANION GAP SERPL CALC-SCNC: 4 MMOL/L — LOW (ref 5–17)
AST SERPL-CCNC: 9 U/L — LOW (ref 15–37)
BASOPHILS # BLD AUTO: 0.04 K/UL — SIGNIFICANT CHANGE UP (ref 0–0.2)
BASOPHILS NFR BLD AUTO: 0.8 % — SIGNIFICANT CHANGE UP (ref 0–2)
BILIRUB SERPL-MCNC: 0.2 MG/DL — SIGNIFICANT CHANGE UP (ref 0.2–1.2)
BUN SERPL-MCNC: 13 MG/DL — SIGNIFICANT CHANGE UP (ref 7–23)
CALCIUM SERPL-MCNC: 9.3 MG/DL — SIGNIFICANT CHANGE UP (ref 8.5–10.1)
CHLORIDE SERPL-SCNC: 106 MMOL/L — SIGNIFICANT CHANGE UP (ref 96–108)
CO2 SERPL-SCNC: 29 MMOL/L — SIGNIFICANT CHANGE UP (ref 22–31)
CREAT SERPL-MCNC: 1.04 MG/DL — SIGNIFICANT CHANGE UP (ref 0.5–1.3)
CULTURE RESULTS: SIGNIFICANT CHANGE UP
CULTURE RESULTS: SIGNIFICANT CHANGE UP
EGFR: 52 ML/MIN/1.73M2 — LOW
EOSINOPHIL # BLD AUTO: 0.45 K/UL — SIGNIFICANT CHANGE UP (ref 0–0.5)
EOSINOPHIL NFR BLD AUTO: 8.7 % — HIGH (ref 0–6)
GLUCOSE SERPL-MCNC: 111 MG/DL — HIGH (ref 70–99)
HCT VFR BLD CALC: 32.1 % — LOW (ref 34.5–45)
HGB BLD-MCNC: 9.3 G/DL — LOW (ref 11.5–15.5)
IMM GRANULOCYTES NFR BLD AUTO: 0.4 % — SIGNIFICANT CHANGE UP (ref 0–0.9)
LYMPHOCYTES # BLD AUTO: 1.16 K/UL — SIGNIFICANT CHANGE UP (ref 1–3.3)
LYMPHOCYTES # BLD AUTO: 22.4 % — SIGNIFICANT CHANGE UP (ref 13–44)
MCHC RBC-ENTMCNC: 26.6 PG — LOW (ref 27–34)
MCHC RBC-ENTMCNC: 29 G/DL — LOW (ref 32–36)
MCV RBC AUTO: 92 FL — SIGNIFICANT CHANGE UP (ref 80–100)
MONOCYTES # BLD AUTO: 0.38 K/UL — SIGNIFICANT CHANGE UP (ref 0–0.9)
MONOCYTES NFR BLD AUTO: 7.4 % — SIGNIFICANT CHANGE UP (ref 2–14)
NEUTROPHILS # BLD AUTO: 3.12 K/UL — SIGNIFICANT CHANGE UP (ref 1.8–7.4)
NEUTROPHILS NFR BLD AUTO: 60.3 % — SIGNIFICANT CHANGE UP (ref 43–77)
NRBC # BLD: 0 /100 WBCS — SIGNIFICANT CHANGE UP (ref 0–0)
PLATELET # BLD AUTO: 188 K/UL — SIGNIFICANT CHANGE UP (ref 150–400)
POTASSIUM SERPL-MCNC: 4.4 MMOL/L — SIGNIFICANT CHANGE UP (ref 3.5–5.3)
POTASSIUM SERPL-SCNC: 4.4 MMOL/L — SIGNIFICANT CHANGE UP (ref 3.5–5.3)
PROT SERPL-MCNC: 5.9 GM/DL — LOW (ref 6–8.3)
RBC # BLD: 3.49 M/UL — LOW (ref 3.8–5.2)
RBC # FLD: 15.9 % — HIGH (ref 10.3–14.5)
SODIUM SERPL-SCNC: 139 MMOL/L — SIGNIFICANT CHANGE UP (ref 135–145)
SPECIMEN SOURCE: SIGNIFICANT CHANGE UP
SPECIMEN SOURCE: SIGNIFICANT CHANGE UP
WBC # BLD: 5.17 K/UL — SIGNIFICANT CHANGE UP (ref 3.8–10.5)
WBC # FLD AUTO: 5.17 K/UL — SIGNIFICANT CHANGE UP (ref 3.8–10.5)

## 2022-10-18 PROCEDURE — 99233 SBSQ HOSP IP/OBS HIGH 50: CPT

## 2022-10-18 PROCEDURE — 99232 SBSQ HOSP IP/OBS MODERATE 35: CPT | Mod: FS

## 2022-10-18 PROCEDURE — 99232 SBSQ HOSP IP/OBS MODERATE 35: CPT

## 2022-10-18 RX ADMIN — AMLODIPINE BESYLATE 2.5 MILLIGRAM(S): 2.5 TABLET ORAL at 05:09

## 2022-10-18 RX ADMIN — CASPOFUNGIN ACETATE 260 MILLIGRAM(S): 7 INJECTION, POWDER, LYOPHILIZED, FOR SOLUTION INTRAVENOUS at 15:00

## 2022-10-18 RX ADMIN — CARVEDILOL PHOSPHATE 6.25 MILLIGRAM(S): 80 CAPSULE, EXTENDED RELEASE ORAL at 05:09

## 2022-10-18 RX ADMIN — Medication 145 MILLIGRAM(S): at 11:38

## 2022-10-18 RX ADMIN — PANTOPRAZOLE SODIUM 40 MILLIGRAM(S): 20 TABLET, DELAYED RELEASE ORAL at 05:09

## 2022-10-18 RX ADMIN — HEPARIN SODIUM 5000 UNIT(S): 5000 INJECTION INTRAVENOUS; SUBCUTANEOUS at 05:09

## 2022-10-18 RX ADMIN — ERTAPENEM SODIUM 120 MILLIGRAM(S): 1 INJECTION, POWDER, LYOPHILIZED, FOR SOLUTION INTRAMUSCULAR; INTRAVENOUS at 15:00

## 2022-10-18 RX ADMIN — Medication 250 MILLIGRAM(S): at 17:41

## 2022-10-18 RX ADMIN — Medication 250 MILLIGRAM(S): at 05:11

## 2022-10-18 RX ADMIN — Medication 100 MILLIGRAM(S): at 11:38

## 2022-10-18 RX ADMIN — CARVEDILOL PHOSPHATE 6.25 MILLIGRAM(S): 80 CAPSULE, EXTENDED RELEASE ORAL at 17:41

## 2022-10-18 NOTE — PROGRESS NOTE ADULT - ASSESSMENT
REVIEW OF SYMPTOMS      Able to give (reliable) ROS  NO     PHYSICAL EXAM    HEENT Unremarkable  atraumatic   RESP Fair air entry EXP prolonged    Harsh breath sound Resp distres mild   CARDIAC S1 S2 No S3     NO JVD    ABDOMEN SOFT BS PRESENT NOT DISTENDED No hepatosplenomegaly   PEDAL EDEMA present No calf tenderness  NO rash       AGE/SEX.   87 f  DOA.  10/13/2022   CC .  10/13/2022 hypercapnicresp failure   10/13/2022 liver absce  PRESENTING PROBLEMS .   LIVER ABSCESS  RESP FAILURE   COURSE.     PROCEDURES.    GENERAL DATA .   GOC.   10/15/2022 dnr     ALLGY.    ampicillin sulbactam                         WT. 10/15/2022 51   BMI.  10/15/2022 22                             ICU STAY. none  COVID.  10/13 scv2 (-)     BEST PRACTICE ISSUES.    HOB ELEVATN. Yes  DVT PPLX.   10/13- 10/18/2022 hpsc    PARSON PPLX.    10/13 protonix 40   INFN PPLX.    SP SW PAULETTE.        DIET.   10/18/2022 npo after mn   10/16/2022 regl    VS/ PO/IO/ VENT/ DRIPS.   10/18/2022 afeb 76 118/60   10/18/2022 3l 97%     ASSESSMENT/RECOMMENDATIONS .   RESP.  -- Resp failure.  10/15/2022 5a 31%  726/65/77   10/13 avaps 30-10/8 450 .25 20   10/16/2022 8a avaps 30-10/8 450 .25 20  743/39/82   a/r encourage pt to use avaps during  day whenever short of breath and all night   -- RO PE.  10/13 cta ch No pe   is on dvt p   INFECTION.  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  10/13 amlodipine 2.5  10/13 carvedilol 6.25 x2   10/13 fenofibrate 145   cont rx   GI.  -- GB cancer   -- Liver abscess   as above   HEMAT.  -- Anemia   Hb 10/15-10/16-10/18/2022 Hb 10 - 10.2- 9.3   RENAL.  Na 10/15/2022 Na 135  Cr 10/15/2022 Cr .6   -- Urine retn  10/18/2022 Otto    TIME SPENT   Over 25 minutes aggregate care time spent on encounter; activities included   direct patient care, counseling and/or coordinating care reviewing notes, lab data/ imaging , discussion with multidisciplinary team/ patient  /family and explaining in detail risks, benefits, alternatives  of the recommendations     URSULA HOUSE 87 f 10/13/2022 3/10/1929 DR JASPER DOVER

## 2022-10-18 NOTE — PROGRESS NOTE ADULT - ASSESSMENT
87 year old female with a PMHx of HTN, HLD GERD, and gallbladder cancer s/p ex lap, open cholecystectomy, segment 4b/5 hepatectomy, and right colectomy due to fistula tract vs metastasis on 08/19, recently admitted at [facility] 9/19 - 9/29 for Acute hypoxic respiratory failure and Hepatic Abscess s/p drainage p/w unresponsiveness which was noted by family this morning.    Acute hypoxic and hypercapnic respiratory failure, acute metabolic encephalopathy  - CT head neg for acute pathology  - Evaluated by ICU  - s/p Bi-PAP, cont AVAPS O/N, NC  - cont to monitor    Hepatic Abscess  - gallbladder cancer s/p ex lap, open cholecystectomy, segment 4b/5 hepatectomy, and right colectomy due to fistula tract vs metastasis on 08/19  - s/p outpt abx per ID note (pt w/ different [facility] chart) 10/13, pt completed Levaquin and flagyl prior admission  - IR, GI, Sx o/b  - ID; following, started on caspofungin, ertapenem, vanco 10/14  - rpt CT abd on Monday 10/17; Residual collection in the gallbladder fossa/liver unchanged from 10/13/2022 overlying for absence of IV contrast. Bladder distention resulting in mild bilateral hydroureteronephrosis.  - spoke w IR, might need aspiration on Wednesday 10/19    bilateral hydroureteronephrosis; pls obtain bladder scan. Otto if necessary   Hypokalemia- replaced, monitor  HTN/HLD- c/w Coreg, Amlodipine, fenofibrate  GERD- c/w PPI  # DVT ppx - HSQ  Pt DNR, DNI 87 year old female with a PMHx of HTN, HLD GERD, and gallbladder cancer s/p ex lap, open cholecystectomy, segment 4b/5 hepatectomy, and right colectomy due to fistula tract vs metastasis on 08/19, recently admitted at [facility] 9/19 - 9/29 for Acute hypoxic respiratory failure and Hepatic Abscess s/p drainage p/w unresponsiveness which was noted by family this morning.    Acute hypoxic and hypercapnic respiratory failure, acute metabolic encephalopathy  - CT head neg for acute pathology  - Evaluated by ICU  - s/p Bi-PAP, cont AVAPS O/N, NC  - cont to monitor    Hepatic Abscess  - gallbladder cancer s/p ex lap, open cholecystectomy, segment 4b/5 hepatectomy, and right colectomy due to fistula tract vs metastasis on 08/19  - s/p outpt abx per ID note (pt w/ different [facility] chart) 10/13, pt completed Levaquin and flagyl prior admission  - IR, GI, Sx o/b  - ID; following, started on caspofungin, ertapenem, vanco 10/14  - rpt CT abd on Monday 10/17; Residual collection in the gallbladder fossa/liver unchanged from 10/13/2022 overlying for absence of IV contrast. Bladder distention resulting in mild bilateral hydroureteronephrosis.  - spoke w IR, might need aspiration on Wednesday 10/19. npo, cbc, cmp, coags in am    bilateral hydroureteronephrosis; pls obtain bladder scan. Otto if necessary   Hypokalemia- replaced, monitor  HTN/HLD- c/w Coreg, Amlodipine, fenofibrate  GERD- c/w PPI  # DVT ppx - HSQ, hold today for the procedure 10/18, resume after  Pt DNR, DNI

## 2022-10-18 NOTE — PROGRESS NOTE ADULT - SUBJECTIVE AND OBJECTIVE BOX
HARSH VERGARA    [facility] [address] [address]    Allergies    ampicillin-sulbactam (Rash)    Intolerances        PAST MEDICAL & SURGICAL HISTORY:  Bladder cancer      HTN (hypertension)      HLD (hyperlipidemia)      Liver abscess      H/O gallbladder cancer          FAMILY HISTORY:  No pertinent family history in first degree relatives        Home Medications:  ALLOPURINOL 100 MG TABLET: TAKE 1 TABLET ONCE A DAY (AT BEDTIME) ORALLY (13 Oct 2022 18:51)  AMLODIPINE 2.5MG TAB: 1 tab(s) orally once a day (13 Oct 2022 18:51)  CARVEDILOL 6.25 MG TABLET: TAKE 1 TABLET BY MOUTH TWICE A DAY (13 Oct 2022 18:51)  FENOFIBRATE 145 MG TABLET: TAKE 1 TABLET BY MOUTH EVERYDAY AT BEDTIME (13 Oct 2022 18:51)  levoFLOXacin 750 mg oral tablet: 1 tab(s) orally every 24 hours for 7 days (13 Oct 2022 18:51)  METRONIDAZOL 500MG TAB: 1 tab(s) orally 2 times a day for 14 days (13 Oct 2022 18:51)  MIRTAZAPINE 15 MG TABLET: TAKE 1 TABLET BY MOUTH EVERYDAY AT BEDTIME (13 Oct 2022 18:51)  PANTOPRAZOLE SOD DR 40 MG TAB: TAKE 1 TABLET BY MOUTH EVERY DAY IN THE MORNING (13 Oct 2022 18:51)      MEDICATIONS  (STANDING):  allopurinol 100 milliGRAM(s) Oral daily  amLODIPine   Tablet 2.5 milliGRAM(s) Oral daily  carvedilol 6.25 milliGRAM(s) Oral every 12 hours  caspofungin IVPB      caspofungin IVPB 50 milliGRAM(s) IV Intermittent every 24 hours  ertapenem  IVPB 1000 milliGRAM(s) IV Intermittent every 24 hours  ertapenem  IVPB      fenofibrate Tablet 145 milliGRAM(s) Oral daily  pantoprazole    Tablet 40 milliGRAM(s) Oral before breakfast  sodium chloride 0.9%. 1000 milliLiter(s) (60 mL/Hr) IV Continuous <Continuous>  vancomycin  IVPB 750 milliGRAM(s) IV Intermittent every 12 hours    MEDICATIONS  (PRN):  acetaminophen     Tablet .. 650 milliGRAM(s) Oral every 6 hours PRN Temp greater or equal to 38C (100.4F), Mild Pain (1 - 3)      Diet, NPO after Midnight:      NPO Start Date: 18-Oct-2022,   NPO Start Time: 23:59  Except Medications (10-18-22 @ 09:33) [Active]  Diet, Regular:   DASH/TLC Sodium & Cholesterol Restricted (10-16-22 @ 10:58) [Active]          Vital Signs Last 24 Hrs  T(C): 37.1 (18 Oct 2022 10:05), Max: 37.1 (18 Oct 2022 10:05)  T(F): 98.8 (18 Oct 2022 10:05), Max: 98.8 (18 Oct 2022 10:05)  HR: 76 (18 Oct 2022 10:05) (68 - 79)  BP: 118/66 (18 Oct 2022 10:05) (113/66 - 118/66)  BP(mean): --  RR: 20 (18 Oct 2022 10:05) (18 - 20)  SpO2: 96% (18 Oct 2022 10:05) (96% - 99%)    Parameters below as of 18 Oct 2022 10:05  Patient On (Oxygen Delivery Method): nasal cannula          10-17-22 @ 07:01  -  10-18-22 @ 07:00  --------------------------------------------------------  IN: 0 mL / OUT: 450 mL / NET: -450 mL              LABS:                        9.3    5.17  )-----------( 188      ( 18 Oct 2022 08:07 )             32.1     10-18    139  |  106  |  13  ----------------------------<  111<H>  4.4   |  29  |  1.04    Ca    9.3      18 Oct 2022 08:07    TPro  5.9<L>  /  Alb  2.4<L>  /  TBili  0.2  /  DBili  x   /  AST  9<L>  /  ALT  10<L>  /  AlkPhos  45  10-18              WBC:  WBC Count: 5.17 K/uL (10-18 @ 08:07)  WBC Count: 6.14 K/uL (10-17 @ 06:15)  WBC Count: 6.89 K/uL (10-16 @ 05:10)  WBC Count: 5.36 K/uL (10-15 @ 07:25)      MICROBIOLOGY:  RECENT CULTURES:  10-14 Clean Catch Clean Catch (Midstream) Enterococcus faecium (vancomycin resistant) XXXX   50,000 - 99,000 CFU/mL Enterococcus faecium (vancomycin resistant)    10-13 .Blood Blood-Peripheral XXXX XXXX   No growth to date.    10-13 .Blood Blood-Peripheral XXXX XXXX   No growth to date.                    Sodium:  Sodium, Serum: 139 mmol/L (10-18 @ 08:07)  Sodium, Serum: 141 mmol/L (10-17 @ 06:15)  Sodium, Serum: 139 mmol/L (10-16 @ 05:10)  Sodium, Serum: 135 mmol/L (10-15 @ 07:25)      1.04 mg/dL 10-18 @ 08:07  0.83 mg/dL 10-17 @ 06:15  0.61 mg/dL 10-16 @ 05:10  0.68 mg/dL 10-15 @ 07:25      Hemoglobin:  Hemoglobin: 9.3 g/dL (10-18 @ 08:07)  Hemoglobin: 9.1 g/dL (10-17 @ 06:15)  Hemoglobin: 10.2 g/dL (10-16 @ 05:10)  Hemoglobin: 10.0 g/dL (10-15 @ 07:25)      Platelets: Platelet Count - Automated: 188 K/uL (10-18 @ 08:07)  Platelet Count - Automated: 224 K/uL (10-17 @ 06:15)  Platelet Count - Automated: 227 K/uL (10-16 @ 05:10)  Platelet Count - Automated: 220 K/uL (10-15 @ 07:25)      LIVER FUNCTIONS - ( 18 Oct 2022 08:07 )  Alb: 2.4 g/dL / Pro: 5.9 gm/dL / ALK PHOS: 45 U/L / ALT: 10 U/L / AST: 9 U/L / GGT: x                 RADIOLOGY & ADDITIONAL STUDIES:      MICROBIOLOGY:  RECENT CULTURES:  10-14 Clean Catch Clean Catch (Midstream) Enterococcus faecium (vancomycin resistant) XXXX   50,000 - 99,000 CFU/mL Enterococcus faecium (vancomycin resistant)    10-13 .Blood Blood-Peripheral XXXX XXXX   No growth to date.    10-13 .Blood Blood-Peripheral XXXX XXXX   No growth to date.

## 2022-10-18 NOTE — PROGRESS NOTE ADULT - SUBJECTIVE AND OBJECTIVE BOX
Patient is a 87y old  Female who presents with a chief complaint of Acute hypoxic and hypercapnic respiratory failure, altered mental status (17 Oct 2022 17:34)      OVERNIGHT EVENTS:  Patients seen and examined at bedside this morning. No acute events overnight.    REVIEW OF SYSTEMS: denies chest pain/SOB, diaphoresis, no F/C, cough, dizziness, headache, blurry vision, nausea, vomiting, abdominal pain. All others review of systems negative     MEDICATIONS  (STANDING):  allopurinol 100 milliGRAM(s) Oral daily  amLODIPine   Tablet 2.5 milliGRAM(s) Oral daily  carvedilol 6.25 milliGRAM(s) Oral every 12 hours  caspofungin IVPB      caspofungin IVPB 50 milliGRAM(s) IV Intermittent every 24 hours  ertapenem  IVPB 1000 milliGRAM(s) IV Intermittent every 24 hours  ertapenem  IVPB      fenofibrate Tablet 145 milliGRAM(s) Oral daily  heparin   Injectable 5000 Unit(s) SubCutaneous every 8 hours  pantoprazole    Tablet 40 milliGRAM(s) Oral before breakfast  sodium chloride 0.9%. 1000 milliLiter(s) (60 mL/Hr) IV Continuous <Continuous>  vancomycin  IVPB 750 milliGRAM(s) IV Intermittent every 12 hours    MEDICATIONS  (PRN):  acetaminophen     Tablet .. 650 milliGRAM(s) Oral every 6 hours PRN Temp greater or equal to 38C (100.4F), Mild Pain (1 - 3)      Allergies    ampicillin-sulbactam (Rash)    Intolerances        T(F): 98 (10-18-22 @ 04:53), Max: 98.1 (10-17-22 @ 23:37)  HR: 76 (10-18-22 @ 05:08) (66 - 79)  BP: 113/66 (10-18-22 @ 04:53) (113/66 - 118/66)  RR: 18 (10-18-22 @ 04:53) (18 - 19)  SpO2: 96% (10-18-22 @ 05:08) (96% - 99%)  Wt(kg): --    PHYSICAL EXAM:  GENERAL: NAD  HEAD:  Atraumatic, Normocephalic  EYES: PERRLA, conjunctiva and sclera clear  ENMT: Moist mucous membranes  NECK: Supple, No JVD, Normal thyroid  NERVOUS SYSTEM:  Alert & Awake  CHEST/LUNG: Clear to percussion bilaterally;   HEART: Regular rate and rhythm;   ABDOMEN: Soft, Nontender, Nondistended; Bowel sounds present  EXTREMITIES:  no edema BL LE  SKIN: moist    LABS:                        9.3    5.17  )-----------( 188      ( 18 Oct 2022 08:07 )             32.1     10-18    139  |  106  |  13  ----------------------------<  111<H>  4.4   |  29  |  1.04    Ca    9.3      18 Oct 2022 08:07    TPro  5.9<L>  /  Alb  2.4<L>  /  TBili  0.2  /  DBili  x   /  AST  9<L>  /  ALT  10<L>  /  AlkPhos  45  10-18        Cultures;   CAPILLARY BLOOD GLUCOSE        Lipid panel:           RADIOLOGY & ADDITIONAL TESTS:    Imaging Personally Reviewed:  [x ] YES      Consultant(s) Notes Reviewed:  [x ] YES     Care Discussed with [x ] Consultants [X ] Patient [ ] Family  [x ]    [x ]  Other; RN

## 2022-10-18 NOTE — PROGRESS NOTE ADULT - ASSESSMENT
Recurrent collection.  Remarkably nontoxic at this juncture.  WBC normal  Afebrile  HD stabke  Benign abdomen    10/17: no fevers, no wbc, Cr and LFTs ok, BCs with no growth, CT abd was repeated today - no change, needs to be seen by IR. Vancomycin IV, ertapenem iv and Caspofungin continued.   10/18: remains afebrile, no leukocytosis, cr and Lfts ok, UC with VRE - no changes in abx at this time, + urinary retention, with chery now. IR evaluation possibly tomorrow for liver abscess, re-demonstrated on yesterday's CT.     Suggestions  IR eval - pending   Pending aspirate will give vanco/ertapnem/caspo  follow Blood cx data - NGTD  UC noted  consider urology consult  trend temperatures and wbc     discussed with Dr. Garcia  msg sent to Dr. Jang

## 2022-10-18 NOTE — PROGRESS NOTE ADULT - SUBJECTIVE AND OBJECTIVE BOX
HARSH VREGARA  MRN-[id number]    Follow Up:  liver abscess     Interval History: the pt was seen and examined earlier, no distress, Otto was placed by nursing, reported urinary retention. The pt is afebrile, no leukocytosis, cr and LFTs ok.     PAST MEDICAL & SURGICAL HISTORY:  Bladder cancer      HTN (hypertension)      HLD (hyperlipidemia)      Liver abscess      H/O gallbladder cancer          ROS:    [x ] Unobtainable because: answers questions on occasion  [ ] All other systems negative    Constitutional: no fever, no chills  Head: no trauma  Eyes: no vision changes, no eye pain  ENT:  no sore throat, no rhinorrhea  Cardiovascular:  no chest pain, no palpitation  Respiratory:  no SOB, no cough  GI:  no abd pain, no vomiting, no diarrhea  urinary: no dysuria, no hematuria, no flank pain  musculoskeletal:  no joint pain, no joint swelling  skin:  no rash  neurology:  no headache, no seizure, no change in mental status  psych: no anxiety, no depression         Allergies  ampicillin-sulbactam (Rash)        ANTIMICROBIALS:  caspofungin IVPB    caspofungin IVPB 50 every 24 hours  ertapenem  IVPB 1000 every 24 hours  ertapenem  IVPB    vancomycin  IVPB 750 every 12 hours      OTHER MEDS:  acetaminophen     Tablet .. 650 milliGRAM(s) Oral every 6 hours PRN  allopurinol 100 milliGRAM(s) Oral daily  amLODIPine   Tablet 2.5 milliGRAM(s) Oral daily  carvedilol 6.25 milliGRAM(s) Oral every 12 hours  fenofibrate Tablet 145 milliGRAM(s) Oral daily  pantoprazole    Tablet 40 milliGRAM(s) Oral before breakfast  sodium chloride 0.9%. 1000 milliLiter(s) IV Continuous <Continuous>      Vital Signs Last 24 Hrs  T(C): 36.6 (18 Oct 2022 16:07), Max: 37.1 (18 Oct 2022 10:05)  T(F): 97.8 (18 Oct 2022 16:07), Max: 98.8 (18 Oct 2022 10:05)  HR: 78 (18 Oct 2022 17:22) (75 - 80)  BP: 154/63 (18 Oct 2022 16:07) (113/66 - 154/63)  BP(mean): --  RR: 20 (18 Oct 2022 16:07) (18 - 20)  SpO2: 96% (18 Oct 2022 17:22) (93% - 99%)    Parameters below as of 18 Oct 2022 16:07  Patient On (Oxygen Delivery Method): nasal cannula        Physical Exam:  General: Nontoxic-appearing Female in no acute distress.  HEENT: AT/NC. Anicteric. Conjunctiva pink and moist. Oropharynx clear.  Neck: Not rigid. No sense of mass.  Nodes: None palpable.  Lungs: Diminished BS Bilaterally   Heart: Regular rate and rhythm @96  Abdomen: Bowel sounds present and normoactive. Soft. Nondistended. Nontender. No sense of mass. No organomegaly. Incision with one scabbed area otherwise well healed.   Back: No spinal tenderness. No costovertebral angle tenderness.   Extremities: No cyanosis or clubbing. No edema.   Skin: Warm. Dry. Good turgor. No rash. No vasculitic stigmata.  Psychiatric: Grossly appropriate affect and mood for situation.     WBC Count: 5.17 K/uL (10-18 @ 08:07)  WBC Count: 6.14 K/uL (10-17 @ 06:15)  WBC Count: 6.89 K/uL (10-16 @ 05:10)  WBC Count: 5.36 K/uL (10-15 @ 07:25)  WBC Count: 5.18 K/uL (10-14 @ 05:50)  WBC Count: 5.00 K/uL (10-13 @ 09:58)                            9.3    5.17  )-----------( 188      ( 18 Oct 2022 08:07 )             32.1       10-18    139  |  106  |  13  ----------------------------<  111<H>  4.4   |  29  |  1.04    Ca    9.3      18 Oct 2022 08:07    TPro  5.9<L>  /  Alb  2.4<L>  /  TBili  0.2  /  DBili  x   /  AST  9<L>  /  ALT  10<L>  /  AlkPhos  45  10-18          Creatinine Trend: 1.04<--, 0.83<--, 0.61<--, 0.68<--, 0.59<--, 0.80<--      MICROBIOLOGY:  v  Clean Catch Clean Catch (Midstream)  10-14-22   50,000 - 99,000 CFU/mL Enterococcus faecium (vancomycin resistant)  --  Enterococcus faecium (vancomycin resistant)      .Blood Blood-Peripheral  10-13-22   No Growth Final  --  --      .Blood Blood-Peripheral  10-13-22   No Growth Final  --  --      SARS-CoV-2: Angelatec (14 Sep 2022 12:35)    RADIOLOGY:

## 2022-10-19 LAB
ALBUMIN SERPL ELPH-MCNC: 2.3 G/DL — LOW (ref 3.3–5)
ALP SERPL-CCNC: 42 U/L — SIGNIFICANT CHANGE UP (ref 40–120)
ALT FLD-CCNC: 8 U/L — LOW (ref 12–78)
ANION GAP SERPL CALC-SCNC: 4 MMOL/L — LOW (ref 5–17)
APTT BLD: 33 SEC — SIGNIFICANT CHANGE UP (ref 27.5–35.5)
AST SERPL-CCNC: 5 U/L — LOW (ref 15–37)
BASOPHILS # BLD AUTO: 0.04 K/UL — SIGNIFICANT CHANGE UP (ref 0–0.2)
BASOPHILS NFR BLD AUTO: 0.8 % — SIGNIFICANT CHANGE UP (ref 0–2)
BILIRUB SERPL-MCNC: 0.3 MG/DL — SIGNIFICANT CHANGE UP (ref 0.2–1.2)
BUN SERPL-MCNC: 13 MG/DL — SIGNIFICANT CHANGE UP (ref 7–23)
CALCIUM SERPL-MCNC: 9.6 MG/DL — SIGNIFICANT CHANGE UP (ref 8.5–10.1)
CHLORIDE SERPL-SCNC: 105 MMOL/L — SIGNIFICANT CHANGE UP (ref 96–108)
CO2 SERPL-SCNC: 29 MMOL/L — SIGNIFICANT CHANGE UP (ref 22–31)
CREAT SERPL-MCNC: 0.94 MG/DL — SIGNIFICANT CHANGE UP (ref 0.5–1.3)
EGFR: 59 ML/MIN/1.73M2 — LOW
EOSINOPHIL # BLD AUTO: 0.25 K/UL — SIGNIFICANT CHANGE UP (ref 0–0.5)
EOSINOPHIL NFR BLD AUTO: 4.7 % — SIGNIFICANT CHANGE UP (ref 0–6)
GLUCOSE SERPL-MCNC: 91 MG/DL — SIGNIFICANT CHANGE UP (ref 70–99)
HCT VFR BLD CALC: 30.4 % — LOW (ref 34.5–45)
HGB BLD-MCNC: 8.9 G/DL — LOW (ref 11.5–15.5)
IMM GRANULOCYTES NFR BLD AUTO: 0.6 % — SIGNIFICANT CHANGE UP (ref 0–0.9)
INR BLD: 1.04 RATIO — SIGNIFICANT CHANGE UP (ref 0.88–1.16)
LYMPHOCYTES # BLD AUTO: 0.89 K/UL — LOW (ref 1–3.3)
LYMPHOCYTES # BLD AUTO: 16.7 % — SIGNIFICANT CHANGE UP (ref 13–44)
MCHC RBC-ENTMCNC: 26.6 PG — LOW (ref 27–34)
MCHC RBC-ENTMCNC: 29.3 G/DL — LOW (ref 32–36)
MCV RBC AUTO: 91 FL — SIGNIFICANT CHANGE UP (ref 80–100)
MONOCYTES # BLD AUTO: 0.27 K/UL — SIGNIFICANT CHANGE UP (ref 0–0.9)
MONOCYTES NFR BLD AUTO: 5.1 % — SIGNIFICANT CHANGE UP (ref 2–14)
NEUTROPHILS # BLD AUTO: 3.85 K/UL — SIGNIFICANT CHANGE UP (ref 1.8–7.4)
NEUTROPHILS NFR BLD AUTO: 72.1 % — SIGNIFICANT CHANGE UP (ref 43–77)
NRBC # BLD: 0 /100 WBCS — SIGNIFICANT CHANGE UP (ref 0–0)
PLATELET # BLD AUTO: 185 K/UL — SIGNIFICANT CHANGE UP (ref 150–400)
POTASSIUM SERPL-MCNC: 4.4 MMOL/L — SIGNIFICANT CHANGE UP (ref 3.5–5.3)
POTASSIUM SERPL-SCNC: 4.4 MMOL/L — SIGNIFICANT CHANGE UP (ref 3.5–5.3)
PROT SERPL-MCNC: 6 GM/DL — SIGNIFICANT CHANGE UP (ref 6–8.3)
PROTHROM AB SERPL-ACNC: 12.5 SEC — SIGNIFICANT CHANGE UP (ref 10.5–13.4)
RBC # BLD: 3.34 M/UL — LOW (ref 3.8–5.2)
RBC # FLD: 15.8 % — HIGH (ref 10.3–14.5)
SODIUM SERPL-SCNC: 138 MMOL/L — SIGNIFICANT CHANGE UP (ref 135–145)
VANCOMYCIN TROUGH SERPL-MCNC: 16.8 UG/ML — SIGNIFICANT CHANGE UP (ref 10–20)
WBC # BLD: 5.33 K/UL — SIGNIFICANT CHANGE UP (ref 3.8–10.5)
WBC # FLD AUTO: 5.33 K/UL — SIGNIFICANT CHANGE UP (ref 3.8–10.5)

## 2022-10-19 PROCEDURE — 99232 SBSQ HOSP IP/OBS MODERATE 35: CPT | Mod: GC

## 2022-10-19 PROCEDURE — 99233 SBSQ HOSP IP/OBS HIGH 50: CPT

## 2022-10-19 PROCEDURE — 49083 ABD PARACENTESIS W/IMAGING: CPT

## 2022-10-19 PROCEDURE — 99232 SBSQ HOSP IP/OBS MODERATE 35: CPT

## 2022-10-19 RX ADMIN — Medication 250 MILLIGRAM(S): at 09:42

## 2022-10-19 RX ADMIN — AMLODIPINE BESYLATE 2.5 MILLIGRAM(S): 2.5 TABLET ORAL at 05:34

## 2022-10-19 RX ADMIN — CARVEDILOL PHOSPHATE 6.25 MILLIGRAM(S): 80 CAPSULE, EXTENDED RELEASE ORAL at 05:34

## 2022-10-19 RX ADMIN — Medication 250 MILLIGRAM(S): at 21:20

## 2022-10-19 RX ADMIN — Medication 145 MILLIGRAM(S): at 12:17

## 2022-10-19 RX ADMIN — Medication 100 MILLIGRAM(S): at 12:18

## 2022-10-19 RX ADMIN — ERTAPENEM SODIUM 120 MILLIGRAM(S): 1 INJECTION, POWDER, LYOPHILIZED, FOR SOLUTION INTRAMUSCULAR; INTRAVENOUS at 18:12

## 2022-10-19 RX ADMIN — PANTOPRAZOLE SODIUM 40 MILLIGRAM(S): 20 TABLET, DELAYED RELEASE ORAL at 05:33

## 2022-10-19 RX ADMIN — CARVEDILOL PHOSPHATE 6.25 MILLIGRAM(S): 80 CAPSULE, EXTENDED RELEASE ORAL at 18:13

## 2022-10-19 RX ADMIN — CASPOFUNGIN ACETATE 260 MILLIGRAM(S): 7 INJECTION, POWDER, LYOPHILIZED, FOR SOLUTION INTRAVENOUS at 15:29

## 2022-10-19 NOTE — PROGRESS NOTE ADULT - SUBJECTIVE AND OBJECTIVE BOX
Patient is a 87y old  Female who presents with a chief complaint of Acute hypoxic and hypercapnic respiratory failure, altered mental status (19 Oct 2022 16:21)      INTERVAL HPI/ OVERNIGHT EVENTS: Pt was seen and examined at bedside today, No significant overnight events, pt denies any complaints but this is unreliable      MEDICATIONS  (STANDING):  allopurinol 100 milliGRAM(s) Oral daily  amLODIPine   Tablet 2.5 milliGRAM(s) Oral daily  carvedilol 6.25 milliGRAM(s) Oral every 12 hours  caspofungin IVPB      caspofungin IVPB 50 milliGRAM(s) IV Intermittent every 24 hours  ertapenem  IVPB 1000 milliGRAM(s) IV Intermittent every 24 hours  ertapenem  IVPB      fenofibrate Tablet 145 milliGRAM(s) Oral daily  pantoprazole    Tablet 40 milliGRAM(s) Oral before breakfast  sodium chloride 0.9%. 1000 milliLiter(s) (60 mL/Hr) IV Continuous <Continuous>  vancomycin  IVPB 750 milliGRAM(s) IV Intermittent every 12 hours    MEDICATIONS  (PRN):  acetaminophen     Tablet .. 650 milliGRAM(s) Oral every 6 hours PRN Temp greater or equal to 38C (100.4F), Mild Pain (1 - 3)      Allergies    ampicillin-sulbactam (Rash)    Intolerances        REVIEW OF SYSTEMS:    Unable to examine due to [ ] Encephalopathy [ ] Advanced Dementia [ ] Expressive Aphasia [ ] Non-verbal patient    CONSTITUTIONAL: No fever, NO generalized weakness/Fatigue, No weight loss  EYES: No eye pain, visual disturbances, or discharge  ENMT:  No difficulty hearing, tinnitus, vertigo; No sinus or throat pain  NECK: No pain or stiffness  RESPIRATORY: No shortness of breath,  cough, wheezing, sputum or hemoptysis   CARDIOVASCULAR: No chest pain, palpitations, or leg swelling  GASTROINTESTINAL: No abdominal pain. No nausea, vomiting, diarrhea or constipation. No melena or hematochezia.  GENITOURINARY: No dysuria, frequency, hematuria, or incontinence  NEUROLOGICAL: No headaches, Dizziness, memory loss, loss of strength, numbness, or tremors  SKIN: No itching, burning, rashes, or lesions   MUSCULOSKELETAL: No joint pain or swelling; No muscle, back, or extremity pain  PSYCHIATRIC: No depression, anxiety, mood swings, or difficulty sleeping  HEME/LYMPH: No easy bruising, or bleeding gums      Vital Signs Last 24 Hrs  T(C): 37.2 (19 Oct 2022 16:10), Max: 37.2 (19 Oct 2022 05:39)  T(F): 98.9 (19 Oct 2022 16:10), Max: 98.9 (19 Oct 2022 05:39)  HR: 77 (19 Oct 2022 17:47) (74 - 84)  BP: 176/77 (19 Oct 2022 17:47) (127/66 - 176/77)  BP(mean): --  RR: 18 (19 Oct 2022 17:47) (18 - 20)  SpO2: 100% (19 Oct 2022 17:47) (93% - 100%)    Parameters below as of 19 Oct 2022 17:47  Patient On (Oxygen Delivery Method): nasal cannula  O2 Flow (L/min): 4      PHYSICAL EXAM:  GENERAL: NAD, well-developed, well-groomed  HEAD:  Atraumatic, Normocephalic  EYES: conjunctiva and sclera clear  ENMT: Moist mucous membranes  NECK: Supple, No JVD, Normal thyroid  CHEST/LUNG: Clear to Auscultation bilaterally; No rales, rhonchi, wheezing, or rubs  HEART: Regular rate and rhythm; No murmurs, rubs, or gallops  ABDOMEN: Soft, Nontender, Nondistended; Bowel sounds present  EXTREMITIES:  2+ Peripheral Pulses, No clubbing, cyanosis, or edema  SKIN: No rashes or lesions  NERVOUS SYSTEM:  Alert & Oriented X3, Good concentration; Motor Strength 5/5 B/L upper and lower extremities    LABS:                        8.9    5.33  )-----------( 185      ( 19 Oct 2022 07:22 )             30.4     10-19    138  |  105  |  13  ----------------------------<  91  4.4   |  29  |  0.94    Ca    9.6      19 Oct 2022 07:22    TPro  6.0  /  Alb  2.3<L>  /  TBili  0.3  /  DBili  x   /  AST  5<L>  /  ALT  8<L>  /  AlkPhos  42  10-19    PT/INR - ( 19 Oct 2022 07:22 )   PT: 12.5 sec;   INR: 1.04 ratio         PTT - ( 19 Oct 2022 07:22 )  PTT:33.0 sec    CAPILLARY BLOOD GLUCOSE            Culture - Urine (collected 10-14-22)  Source: Clean Catch Clean Catch (Midstream)  Final Report (10-17-22):    50,000 - 99,000 CFU/mL Enterococcus faecium (vancomycin resistant)  Organism: Enterococcus faecium (vancomycin resistant) (10-17-22)  Organism: Enterococcus faecium (vancomycin resistant) (10-17-22)    Sensitivities:      -  Ampicillin: R >8 Predicts results to ampicillin/sulbactam, amoxacillin-clavulanate and  piperacillin-tazobactam.      -  Ciprofloxacin: R >2      -  Daptomycin: N/A >4      -  Levofloxacin: R >4      -  Linezolid: S 2      -  Nitrofurantoin: S <=32 Should not be used to treat pyelonephritis.      -  Tetra/Doxy: R >8      -  Vancomycin: R >16      Method Type: KELSEY    Culture - Blood (collected 10-13-22)  Source: .Blood Blood-Peripheral  Final Report (10-18-22):    No Growth Final    Culture - Blood (collected 10-13-22)  Source: .Blood Blood-Peripheral  Final Report (10-18-22):    No Growth Final        RADIOLOGY & ADDITIONAL TESTS:          Imaging Personally Reviewed:  [ ] YES  [ ] NO    Consultant(s) Notes Reviewed:  [ ] YES  [ ] NO    Care Discussed with Consultants/Other Providers [x ] YES  [ ] NO

## 2022-10-19 NOTE — PROGRESS NOTE ADULT - ASSESSMENT
Recurrent collection.  Remarkably nontoxic at this juncture.  WBC normal  Afebrile  HD stabke  Benign abdomen    10/17: no fevers, no wbc, Cr and LFTs ok, BCs with no growth, CT abd was repeated today - no change, needs to be seen by IR. Vancomycin IV, ertapenem iv and Caspofungin continued.   10/18: remains afebrile, no leukocytosis, cr and Lfts ok, UC with VRE - no changes in abx at this time, + urinary retention, with chery now. IR evaluation possibly tomorrow for liver abscess, re-demonstrated on yesterday's CT.   10/19: no fevers, no leukocytosis, Cr ok, awaiting for IR evaluation for possible drainage of liver abscess, will continue with our current antimicrobial selection while awaiting for possible abscess aspirate.     Suggestions  IR eval - pending   Pending aspirate will give vanco/ertapnem/caspo  follow Blood cx data - NGTD  UC noted  urology consult pending   trend temperatures and wbc     discussed with Dr. Garcia  discussed with Dr. Viramontes

## 2022-10-19 NOTE — PROGRESS NOTE ADULT - SUBJECTIVE AND OBJECTIVE BOX
HARSH VERGARA  MRN-[id number]    Follow Up:  liver abscess, urinary retention     Interval History: the pt was seen and examined earlier, no distress, somewhat lethargic. The pt is afebrile, on NC, no leukocytosis.     PAST MEDICAL & SURGICAL HISTORY:  Bladder cancer      HTN (hypertension)      HLD (hyperlipidemia)      Liver abscess      H/O gallbladder cancer          ROS:    [x ] Unobtainable because: pt is lethargic   [ ] All other systems negative    Constitutional: no fever, no chills  Head: no trauma  Eyes: no vision changes, no eye pain  ENT:  no sore throat, no rhinorrhea  Cardiovascular:  no chest pain, no palpitation  Respiratory:  no SOB, no cough  GI:  no abd pain, no vomiting, no diarrhea  urinary: no dysuria, no hematuria, no flank pain  musculoskeletal:  no joint pain, no joint swelling  skin:  no rash  neurology:  no headache, no seizure, no change in mental status  psych: no anxiety, no depression         Allergies  ampicillin-sulbactam (Rash)        ANTIMICROBIALS:  caspofungin IVPB    caspofungin IVPB 50 every 24 hours  ertapenem  IVPB 1000 every 24 hours  ertapenem  IVPB    vancomycin  IVPB 750 every 12 hours      OTHER MEDS:  acetaminophen     Tablet .. 650 milliGRAM(s) Oral every 6 hours PRN  allopurinol 100 milliGRAM(s) Oral daily  amLODIPine   Tablet 2.5 milliGRAM(s) Oral daily  carvedilol 6.25 milliGRAM(s) Oral every 12 hours  fenofibrate Tablet 145 milliGRAM(s) Oral daily  pantoprazole    Tablet 40 milliGRAM(s) Oral before breakfast  sodium chloride 0.9%. 1000 milliLiter(s) IV Continuous <Continuous>      Vital Signs Last 24 Hrs  T(C): 37.2 (19 Oct 2022 16:10), Max: 37.2 (19 Oct 2022 05:39)  T(F): 98.9 (19 Oct 2022 16:10), Max: 98.9 (19 Oct 2022 05:39)  HR: 76 (19 Oct 2022 16:10) (74 - 84)  BP: 148/71 (19 Oct 2022 16:10) (127/66 - 148/71)  BP(mean): --  RR: 19 (19 Oct 2022 16:10) (19 - 20)  SpO2: 98% (19 Oct 2022 16:10) (93% - 98%)    Parameters below as of 19 Oct 2022 05:39  Patient On (Oxygen Delivery Method): nasal cannula  O2 Flow (L/min): 3      Physical Exam:  General: Nontoxic-appearing Female in no acute distress. NC  HEENT: AT/NC. Anicteric. Conjunctiva pink and moist. Oropharynx clear.  Neck: Not rigid. No sense of mass.  Nodes: None palpable.  Lungs: Diminished BS Bilaterally   Heart: Regular rate and rhythm, no audible murmur   Abdomen: Bowel sounds present and normoactive. Soft. Nondistended. Nontender. No sense of mass. No organomegaly. Incision is well healed.   : Otto   Back: No spinal tenderness. No costovertebral angle tenderness.   Extremities: No cyanosis or clubbing. No edema.   Skin: Warm. Dry. Good turgor. No rash. No vasculitic stigmata.  Psychiatric: lethargic     WBC Count: 5.33 K/uL (10-19 @ 07:22)  WBC Count: 5.17 K/uL (10-18 @ 08:07)  WBC Count: 6.14 K/uL (10-17 @ 06:15)  WBC Count: 6.89 K/uL (10-16 @ 05:10)  WBC Count: 5.36 K/uL (10-15 @ 07:25)  WBC Count: 5.18 K/uL (10-14 @ 05:50)  WBC Count: 5.00 K/uL (10-13 @ 09:58)                            8.9    5.33  )-----------( 185      ( 19 Oct 2022 07:22 )             30.4       10-19    138  |  105  |  13  ----------------------------<  91  4.4   |  29  |  0.94    Ca    9.6      19 Oct 2022 07:22    TPro  6.0  /  Alb  2.3<L>  /  TBili  0.3  /  DBili  x   /  AST  5<L>  /  ALT  8<L>  /  AlkPhos  42  10-19          Creatinine Trend: 0.94<--, 1.04<--, 0.83<--, 0.61<--, 0.68<--, 0.59<--      MICROBIOLOGY:  Vancomycin Level, Trough: 16.8 ug/mL (10-19-22 @ 07:22)  v  Clean Catch Clean Catch (Midstream)  10-14-22   50,000 - 99,000 CFU/mL Enterococcus faecium (vancomycin resistant)  --  Enterococcus faecium (vancomycin resistant)      .Blood Blood-Peripheral  10-13-22   No Growth Final  --  --      .Blood Blood-Peripheral  10-13-22   No Growth Final  --  --      SARS-CoV-2: NotDetec (14 Sep 2022 12:35)    RADIOLOGY:

## 2022-10-19 NOTE — PROGRESS NOTE ADULT - ASSESSMENT
REVIEW OF SYMPTOMS      Able to give (reliable) ROS  NO     PHYSICAL EXAM    HEENT Unremarkable  atraumatic   RESP Fair air entry EXP prolonged    Harsh breath sound Resp distres mild   CARDIAC S1 S2 No S3     NO JVD    ABDOMEN SOFT BS PRESENT NOT DISTENDED No hepatosplenomegaly   PEDAL EDEMA present No calf tenderness  NO rash       AGE/SEX.   87 f  DOA.  10/13/2022   CC .  10/13/2022 hypercapnicresp failure   10/13/2022 liver absce  PRESENTING PROBLEMS .   LIVER ABSCESS  RESP FAILURE   COURSE.   10/18/2022 UR Otto    GENERAL DATA .   GOC.   10/15/2022 dnr     ALLGY.    ampicillin sulbactam                         WT. 10/15/2022 51   BMI.  10/15/2022 22                             ICU STAY. none  COVID.  10/13 scv2 (-)     BEST PRACTICE ISSUES.    HOB ELEVATN. Yes  DVT PPLX.   10/13- 10/18/2022 hpsc    PARSON PPLX.    10/13 protonix 40   INFN PPLX.    SP SW PAULETTE.        DIET.     10/16/2022 regl    ASSESSMENT/RECOMMENDATIONS .   RESP.  -- Resp failure.  10/15/2022 5a 31%  726/65/77   10/13 avaps 30-10/8 450 .25 20   10/16/2022 8a avaps 30-10/8 450 .25 20  743/39/82   a/r abg  improvd   -- RO PE.  10/13 cta ch No pe   is on dvt p   INFECTION.  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  10/13 amlodipine 2.5  10/13 carvedilol 6.25 x2   10/13 fenofibrate 145   cont rx   GI.  -- GB cancer   -- Liver abscess   as above   HEMAT.  -- Anemia   Hb 10/15-10/16-10/18-10/19/2022 Hb 10 - 10.2- 9.3 - 8.9   RENAL.  Na 10/15/2022 Na 135  Cr 10/15/2022 Cr .6   -- Urine retn  10/18/2022 Otto    TIME SPENT   Over 25 minutes aggregate care time spent on encounter; activities included   direct patient care, counseling and/or coordinating care reviewing notes, lab data/ imaging , discussion with multidisciplinary team/ patient  /family and explaining in detail risks, benefits, alternatives  of the recommendations     URSULA HOUSE 87 f 10/13/2022 3/10/1929 DR JASPER DOVER

## 2022-10-19 NOTE — PROGRESS NOTE ADULT - ASSESSMENT
87 year old female with a PMHx of HTN, HLD GERD, and gallbladder cancer s/p ex lap, open cholecystectomy, segment 4b/5 hepatectomy, and right colectomy due to fistula tract vs metastasis on 08/19, recently admitted at [facility] 9/19 - 9/29 for Acute hypoxic respiratory failure and Hepatic Abscess s/p drainage p/w unresponsiveness which was noted by family this morning.    Acute metabolic encephalopathy/ presumed septic encephalopathy   - CT head neg for acute pathology  - Evaluated by ICU  - s/p Bi-PAP, cont AVAPS O/N, NC  - cont to monitor    Hepatic Abscess  - gallbladder cancer s/p ex lap, open cholecystectomy, segment 4b/5 hepatectomy, and right colectomy due to fistula tract vs metastasis on 08/19  - s/p outpt abx per ID note (pt w/ different [facility] chart) 10/13, pt completed Levaquin and flagyl prior admission  - rpt CT abd on Monday 10/17; Residual collection in the gallbladder fossa/liver unchanged from 10/13/2022 overlying for absence of IV contrast. Bladder distention resulting in mild bilateral hydroureteronephrosis.  - s/p IR aspiration 10/19 3cc aspirated, f/u analysis   - ID; following, cont w/ caspofungin, ertapenem, vanco 10/14    Urinary Retention/ bilateral hydroureteronephrosis  1250 cc on Bladder scan   cont w/ indwelling chery  Hypokalemia- replaced, monitor  HTN/HLD- c/w Coreg, Amlodipine, fenofibrate  GERD- c/w PPI  # DVT ppx - HSQ, held for hepatic abscess aspiration, may resume tomorrow as per IR   Pt DNR, DNI

## 2022-10-19 NOTE — PROGRESS NOTE ADULT - SUBJECTIVE AND OBJECTIVE BOX
HARSH VERGARA    LVS 2C [address]    Allergies    ampicillin-sulbactam (Rash)    Intolerances        PAST MEDICAL & SURGICAL HISTORY:  Bladder cancer      HTN (hypertension)      HLD (hyperlipidemia)      Liver abscess      H/O gallbladder cancer          FAMILY HISTORY:  No pertinent family history in first degree relatives        Home Medications:  ALLOPURINOL 100 MG TABLET: TAKE 1 TABLET ONCE A DAY (AT BEDTIME) ORALLY (13 Oct 2022 18:51)  AMLODIPINE 2.5MG TAB: 1 tab(s) orally once a day (13 Oct 2022 18:51)  CARVEDILOL 6.25 MG TABLET: TAKE 1 TABLET BY MOUTH TWICE A DAY (13 Oct 2022 18:51)  FENOFIBRATE 145 MG TABLET: TAKE 1 TABLET BY MOUTH EVERYDAY AT BEDTIME (13 Oct 2022 18:51)  levoFLOXacin 750 mg oral tablet: 1 tab(s) orally every 24 hours for 7 days (13 Oct 2022 18:51)  METRONIDAZOL 500MG TAB: 1 tab(s) orally 2 times a day for 14 days (13 Oct 2022 18:51)  MIRTAZAPINE 15 MG TABLET: TAKE 1 TABLET BY MOUTH EVERYDAY AT BEDTIME (13 Oct 2022 18:51)  PANTOPRAZOLE SOD DR 40 MG TAB: TAKE 1 TABLET BY MOUTH EVERY DAY IN THE MORNING (13 Oct 2022 18:51)      MEDICATIONS  (STANDING):  allopurinol 100 milliGRAM(s) Oral daily  amLODIPine   Tablet 2.5 milliGRAM(s) Oral daily  carvedilol 6.25 milliGRAM(s) Oral every 12 hours  caspofungin IVPB      caspofungin IVPB 50 milliGRAM(s) IV Intermittent every 24 hours  ertapenem  IVPB 1000 milliGRAM(s) IV Intermittent every 24 hours  ertapenem  IVPB      fenofibrate Tablet 145 milliGRAM(s) Oral daily  pantoprazole    Tablet 40 milliGRAM(s) Oral before breakfast  sodium chloride 0.9%. 1000 milliLiter(s) (60 mL/Hr) IV Continuous <Continuous>  vancomycin  IVPB 750 milliGRAM(s) IV Intermittent every 12 hours    MEDICATIONS  (PRN):  acetaminophen     Tablet .. 650 milliGRAM(s) Oral every 6 hours PRN Temp greater or equal to 38C (100.4F), Mild Pain (1 - 3)      Diet, NPO after Midnight:      NPO Start Date: 18-Oct-2022,   NPO Start Time: 23:59  Except Medications (10-18-22 @ 09:33) [Active]  Diet, Regular:   DASH/TLC Sodium & Cholesterol Restricted (10-16-22 @ 10:58) [Active]          Vital Signs Last 24 Hrs  T(C): 37.2 (19 Oct 2022 05:39), Max: 37.2 (19 Oct 2022 05:39)  T(F): 98.9 (19 Oct 2022 05:39), Max: 98.9 (19 Oct 2022 05:39)  HR: 84 (19 Oct 2022 05:39) (74 - 84)  BP: 127/66 (19 Oct 2022 05:39) (118/66 - 154/63)  BP(mean): --  RR: 20 (19 Oct 2022 05:39) (20 - 20)  SpO2: 97% (19 Oct 2022 05:39) (93% - 97%)    Parameters below as of 19 Oct 2022 05:39  Patient On (Oxygen Delivery Method): nasal cannula  O2 Flow (L/min): 3        10-18-22 @ 07:01  -  10-19-22 @ 07:00  --------------------------------------------------------  IN: 0 mL / OUT: 1700 mL / NET: -1700 mL              LABS:                        8.9    5.33  )-----------( 185      ( 19 Oct 2022 07:22 )             30.4     10-18    139  |  106  |  13  ----------------------------<  111<H>  4.4   |  29  |  1.04    Ca    9.3      18 Oct 2022 08:07    TPro  5.9<L>  /  Alb  2.4<L>  /  TBili  0.2  /  DBili  x   /  AST  9<L>  /  ALT  10<L>  /  AlkPhos  45  10-18              WBC:  WBC Count: 5.33 K/uL (10-19 @ 07:22)  WBC Count: 5.17 K/uL (10-18 @ 08:07)  WBC Count: 6.14 K/uL (10-17 @ 06:15)  WBC Count: 6.89 K/uL (10-16 @ 05:10)      MICROBIOLOGY:  RECENT CULTURES:  10-14 Clean Catch Clean Catch (Midstream) Enterococcus faecium (vancomycin resistant) XXXX   50,000 - 99,000 CFU/mL Enterococcus faecium (vancomycin resistant)    10-13 .Blood Blood-Peripheral XXXX XXXX   No Growth Final    10-13 .Blood Blood-Peripheral XXXX XXXX   No Growth Final                    Sodium:  Sodium, Serum: 139 mmol/L (10-18 @ 08:07)  Sodium, Serum: 141 mmol/L (10-17 @ 06:15)  Sodium, Serum: 139 mmol/L (10-16 @ 05:10)      1.04 mg/dL 10-18 @ 08:07  0.83 mg/dL 10-17 @ 06:15  0.61 mg/dL 10-16 @ 05:10      Hemoglobin:  Hemoglobin: 8.9 g/dL (10-19 @ 07:22)  Hemoglobin: 9.3 g/dL (10-18 @ 08:07)  Hemoglobin: 9.1 g/dL (10-17 @ 06:15)  Hemoglobin: 10.2 g/dL (10-16 @ 05:10)      Platelets: Platelet Count - Automated: 185 K/uL (10-19 @ 07:22)  Platelet Count - Automated: 188 K/uL (10-18 @ 08:07)  Platelet Count - Automated: 224 K/uL (10-17 @ 06:15)  Platelet Count - Automated: 227 K/uL (10-16 @ 05:10)      LIVER FUNCTIONS - ( 18 Oct 2022 08:07 )  Alb: 2.4 g/dL / Pro: 5.9 gm/dL / ALK PHOS: 45 U/L / ALT: 10 U/L / AST: 9 U/L / GGT: x                 RADIOLOGY & ADDITIONAL STUDIES:      MICROBIOLOGY:  RECENT CULTURES:  10-14 Clean Catch Clean Catch (Midstream) Enterococcus faecium (vancomycin resistant) XXXX   50,000 - 99,000 CFU/mL Enterococcus faecium (vancomycin resistant)    10-13 .Blood Blood-Peripheral XXXX XXXX   No Growth Final    10-13 .Blood Blood-Peripheral XXXX XXXX   No Growth Final

## 2022-10-20 LAB
FLUAV AG NPH QL: SIGNIFICANT CHANGE UP
FLUBV AG NPH QL: SIGNIFICANT CHANGE UP
GLUCOSE BLDC GLUCOMTR-MCNC: 133 MG/DL — HIGH (ref 70–99)
GRAM STN FLD: SIGNIFICANT CHANGE UP
SARS-COV-2 RNA SPEC QL NAA+PROBE: SIGNIFICANT CHANGE UP
SPECIMEN SOURCE: SIGNIFICANT CHANGE UP

## 2022-10-20 PROCEDURE — 99233 SBSQ HOSP IP/OBS HIGH 50: CPT

## 2022-10-20 PROCEDURE — 99232 SBSQ HOSP IP/OBS MODERATE 35: CPT

## 2022-10-20 PROCEDURE — 99232 SBSQ HOSP IP/OBS MODERATE 35: CPT | Mod: FS

## 2022-10-20 RX ORDER — HEPARIN SODIUM 5000 [USP'U]/ML
5000 INJECTION INTRAVENOUS; SUBCUTANEOUS EVERY 8 HOURS
Refills: 0 | Status: DISCONTINUED | OUTPATIENT
Start: 2022-10-20 | End: 2022-10-31

## 2022-10-20 RX ADMIN — PANTOPRAZOLE SODIUM 40 MILLIGRAM(S): 20 TABLET, DELAYED RELEASE ORAL at 05:24

## 2022-10-20 RX ADMIN — HEPARIN SODIUM 5000 UNIT(S): 5000 INJECTION INTRAVENOUS; SUBCUTANEOUS at 21:33

## 2022-10-20 RX ADMIN — Medication 250 MILLIGRAM(S): at 17:44

## 2022-10-20 RX ADMIN — CARVEDILOL PHOSPHATE 6.25 MILLIGRAM(S): 80 CAPSULE, EXTENDED RELEASE ORAL at 05:17

## 2022-10-20 RX ADMIN — Medication 250 MILLIGRAM(S): at 05:17

## 2022-10-20 RX ADMIN — Medication 145 MILLIGRAM(S): at 12:00

## 2022-10-20 RX ADMIN — HEPARIN SODIUM 5000 UNIT(S): 5000 INJECTION INTRAVENOUS; SUBCUTANEOUS at 17:43

## 2022-10-20 RX ADMIN — Medication 100 MILLIGRAM(S): at 11:35

## 2022-10-20 RX ADMIN — ERTAPENEM SODIUM 120 MILLIGRAM(S): 1 INJECTION, POWDER, LYOPHILIZED, FOR SOLUTION INTRAMUSCULAR; INTRAVENOUS at 15:52

## 2022-10-20 RX ADMIN — AMLODIPINE BESYLATE 2.5 MILLIGRAM(S): 2.5 TABLET ORAL at 05:17

## 2022-10-20 RX ADMIN — CARVEDILOL PHOSPHATE 6.25 MILLIGRAM(S): 80 CAPSULE, EXTENDED RELEASE ORAL at 17:43

## 2022-10-20 RX ADMIN — CASPOFUNGIN ACETATE 260 MILLIGRAM(S): 7 INJECTION, POWDER, LYOPHILIZED, FOR SOLUTION INTRAVENOUS at 15:52

## 2022-10-20 NOTE — PROGRESS NOTE ADULT - SUBJECTIVE AND OBJECTIVE BOX
HARSH VERGARA    LVS 2C [address]    Allergies    ampicillin-sulbactam (Rash)    Intolerances        PAST MEDICAL & SURGICAL HISTORY:  Bladder cancer      HTN (hypertension)      HLD (hyperlipidemia)      Liver abscess      H/O gallbladder cancer          FAMILY HISTORY:  No pertinent family history in first degree relatives        Home Medications:  ALLOPURINOL 100 MG TABLET: TAKE 1 TABLET ONCE A DAY (AT BEDTIME) ORALLY (13 Oct 2022 18:51)  AMLODIPINE 2.5MG TAB: 1 tab(s) orally once a day (13 Oct 2022 18:51)  CARVEDILOL 6.25 MG TABLET: TAKE 1 TABLET BY MOUTH TWICE A DAY (13 Oct 2022 18:51)  FENOFIBRATE 145 MG TABLET: TAKE 1 TABLET BY MOUTH EVERYDAY AT BEDTIME (13 Oct 2022 18:51)  levoFLOXacin 750 mg oral tablet: 1 tab(s) orally every 24 hours for 7 days (13 Oct 2022 18:51)  METRONIDAZOL 500MG TAB: 1 tab(s) orally 2 times a day for 14 days (13 Oct 2022 18:51)  MIRTAZAPINE 15 MG TABLET: TAKE 1 TABLET BY MOUTH EVERYDAY AT BEDTIME (13 Oct 2022 18:51)  PANTOPRAZOLE SOD DR 40 MG TAB: TAKE 1 TABLET BY MOUTH EVERY DAY IN THE MORNING (13 Oct 2022 18:51)      MEDICATIONS  (STANDING):  allopurinol 100 milliGRAM(s) Oral daily  amLODIPine   Tablet 2.5 milliGRAM(s) Oral daily  carvedilol 6.25 milliGRAM(s) Oral every 12 hours  caspofungin IVPB      caspofungin IVPB 50 milliGRAM(s) IV Intermittent every 24 hours  ertapenem  IVPB 1000 milliGRAM(s) IV Intermittent every 24 hours  ertapenem  IVPB      fenofibrate Tablet 145 milliGRAM(s) Oral daily  pantoprazole    Tablet 40 milliGRAM(s) Oral before breakfast  vancomycin  IVPB 750 milliGRAM(s) IV Intermittent every 12 hours    MEDICATIONS  (PRN):  acetaminophen     Tablet .. 650 milliGRAM(s) Oral every 6 hours PRN Temp greater or equal to 38C (100.4F), Mild Pain (1 - 3)      Diet, Regular:   Low Sodium  Supplement Feeding Modality:  Oral  Health Shake Cans or Servings Per Day:  1       Frequency:  Two Times a day (10-20-22 @ 09:56) [Pending Verification By Attending]  Diet, Regular:   DASH/TLC Sodium & Cholesterol Restricted (10-16-22 @ 10:58) [Active]          Vital Signs Last 24 Hrs  T(C): 37.2 (20 Oct 2022 04:48), Max: 37.2 (19 Oct 2022 16:10)  T(F): 98.9 (20 Oct 2022 04:48), Max: 98.9 (19 Oct 2022 16:10)  HR: 68 (20 Oct 2022 08:37) (68 - 87)  BP: 147/90 (20 Oct 2022 04:48) (112/66 - 176/77)  BP(mean): --  RR: 16 (20 Oct 2022 04:48) (14 - 20)  SpO2: 100% (20 Oct 2022 08:37) (92% - 100%)    Parameters below as of 20 Oct 2022 04:48  Patient On (Oxygen Delivery Method): BiPAP/CPAP          10-19-22 @ 07:01  -  10-20-22 @ 07:00  --------------------------------------------------------  IN: 0 mL / OUT: 750 mL / NET: -750 mL              LABS:                        8.9    5.33  )-----------( 185      ( 19 Oct 2022 07:22 )             30.4     10-19    138  |  105  |  13  ----------------------------<  91  4.4   |  29  |  0.94    Ca    9.6      19 Oct 2022 07:22    TPro  6.0  /  Alb  2.3<L>  /  TBili  0.3  /  DBili  x   /  AST  5<L>  /  ALT  8<L>  /  AlkPhos  42  10-19    PT/INR - ( 19 Oct 2022 07:22 )   PT: 12.5 sec;   INR: 1.04 ratio         PTT - ( 19 Oct 2022 07:22 )  PTT:33.0 sec          WBC:  WBC Count: 5.33 K/uL (10-19 @ 07:22)  WBC Count: 5.17 K/uL (10-18 @ 08:07)  WBC Count: 6.14 K/uL (10-17 @ 06:15)      MICROBIOLOGY:  RECENT CULTURES:  10-19 .Body Fluid XXXX   polymorphonuclear leukocytes seen  No organisms seen  by cytocentrifuge XXXX    10-14 Clean Catch Clean Catch (Midstream) Enterococcus faecium (vancomycin resistant) XXXX   50,000 - 99,000 CFU/mL Enterococcus faecium (vancomycin resistant)                PT/INR - ( 19 Oct 2022 07:22 )   PT: 12.5 sec;   INR: 1.04 ratio         PTT - ( 19 Oct 2022 07:22 )  PTT:33.0 sec    Sodium:  Sodium, Serum: 138 mmol/L (10-19 @ 07:22)  Sodium, Serum: 139 mmol/L (10-18 @ 08:07)  Sodium, Serum: 141 mmol/L (10-17 @ 06:15)      0.94 mg/dL 10-19 @ 07:22  1.04 mg/dL 10-18 @ 08:07  0.83 mg/dL 10-17 @ 06:15      Hemoglobin:  Hemoglobin: 8.9 g/dL (10-19 @ 07:22)  Hemoglobin: 9.3 g/dL (10-18 @ 08:07)  Hemoglobin: 9.1 g/dL (10-17 @ 06:15)      Platelets: Platelet Count - Automated: 185 K/uL (10-19 @ 07:22)  Platelet Count - Automated: 188 K/uL (10-18 @ 08:07)  Platelet Count - Automated: 224 K/uL (10-17 @ 06:15)      LIVER FUNCTIONS - ( 19 Oct 2022 07:22 )  Alb: 2.3 g/dL / Pro: 6.0 gm/dL / ALK PHOS: 42 U/L / ALT: 8 U/L / AST: 5 U/L / GGT: x                 RADIOLOGY & ADDITIONAL STUDIES:      MICROBIOLOGY:  RECENT CULTURES:  10-19 .Body Fluid XXXX   polymorphonuclear leukocytes seen  No organisms seen  by cytocentrifuge XXXX    10-14 Clean Catch Clean Catch (Midstream) Enterococcus faecium (vancomycin resistant) XXXX   50,000 - 99,000 CFU/mL Enterococcus faecium (vancomycin resistant)

## 2022-10-20 NOTE — PROGRESS NOTE ADULT - SUBJECTIVE AND OBJECTIVE BOX
Patient is a 87y old  Female who presents with a chief complaint of Acute hypoxic and hypercapnic respiratory failure, altered mental status (20 Oct 2022 10:29)    INTERVAL HPI/OVERNIGHT EVENTS: no events     MEDICATIONS  (STANDING):  allopurinol 100 milliGRAM(s) Oral daily  amLODIPine   Tablet 2.5 milliGRAM(s) Oral daily  carvedilol 6.25 milliGRAM(s) Oral every 12 hours  caspofungin IVPB      caspofungin IVPB 50 milliGRAM(s) IV Intermittent every 24 hours  ertapenem  IVPB 1000 milliGRAM(s) IV Intermittent every 24 hours  ertapenem  IVPB      fenofibrate Tablet 145 milliGRAM(s) Oral daily  pantoprazole    Tablet 40 milliGRAM(s) Oral before breakfast  vancomycin  IVPB 750 milliGRAM(s) IV Intermittent every 12 hours    MEDICATIONS  (PRN):  acetaminophen     Tablet .. 650 milliGRAM(s) Oral every 6 hours PRN Temp greater or equal to 38C (100.4F), Mild Pain (1 - 3)    Allergies    ampicillin-sulbactam (Rash)    Intolerances      REVIEW OF SYSTEMS:  All other systems reviewed and are negative    Vital Signs Last 24 Hrs  T(C): 37 (20 Oct 2022 11:00), Max: 37.2 (19 Oct 2022 16:10)  T(F): 98.6 (20 Oct 2022 11:00), Max: 98.9 (19 Oct 2022 16:10)  HR: 66 (20 Oct 2022 11:00) (66 - 87)  BP: 129/55 (20 Oct 2022 11:00) (112/66 - 176/77)  BP(mean): --  RR: 18 (20 Oct 2022 11:00) (14 - 19)  SpO2: 100% (20 Oct 2022 11:00) (92% - 100%)    Parameters below as of 20 Oct 2022 04:48  Patient On (Oxygen Delivery Method): BiPAP/CPAP      Daily     Daily Weight in k.8 (20 Oct 2022 04:48)  I&O's Summary    19 Oct 2022 07:01  -  20 Oct 2022 07:00  --------------------------------------------------------  IN: 0 mL / OUT: 750 mL / NET: -750 mL      CAPILLARY BLOOD GLUCOSE      POCT Blood Glucose.: 133 mg/dL (20 Oct 2022 07:52)    PHYSICAL EXAM:  GENERAL: NAD,    HEAD:  Atraumatic, Normocephalic  EYES: EOMI, PERRLA, conjunctiva and sclera clear  ENMT: No tonsillar erythema, exudates, or enlargement; Moist mucous membranes, Good dentition, No lesions  NECK: Supple, No JVD, Normal thyroid  NERVOUS SYSTEM:  Alert & Oriented X3, Good concentration; Motor Strength 5/5 B/L upper and lower extremities; DTRs 2+ intact and symmetric  CHEST/LUNG: Clear to percussion bilaterally; No rales, rhonchi, wheezing, or rubs  HEART: Regular rate and rhythm; No murmurs, rubs, or gallops  ABDOMEN: Soft, Nontender, Nondistended; Bowel sounds present  EXTREMITIES:  2+ Peripheral Pulses, No clubbing, cyanosis, or edema  LYMPH: No lymphadenopathy noted  SKIN: No rashes or lesions    Labs                          8.9    5.33  )-----------( 185      ( 19 Oct 2022 07:22 )             30.4     10-19    138  |  105  |  13  ----------------------------<  91  4.4   |  29  |  0.94    Ca    9.6      19 Oct 2022 07:22    TPro  6.0  /  Alb  2.3<L>  /  TBili  0.3  /  DBili  x   /  AST  5<L>  /  ALT  8<L>  /  AlkPhos  42  10-19    PT/INR - ( 19 Oct 2022 07:22 )   PT: 12.5 sec;   INR: 1.04 ratio         PTT - ( 19 Oct 2022 07:22 )  PTT:33.0 sec          Culture - Body Fluid with Gram Stain (collected 19 Oct 2022 17:35)  Source: .Body Fluid  Gram Stain (20 Oct 2022 05:23):    polymorphonuclear leukocytes seen    No organisms seen    by cytocentrifuge                DVT prophylaxis: > Lovenox 40mg SQ daily  > Heparin   > SCD's

## 2022-10-20 NOTE — DIETITIAN INITIAL EVALUATION ADULT - PERTINENT LABORATORY DATA
10-19    138  |  105  |  13  ----------------------------<  91  4.4   |  29  |  0.94    Ca    9.6      19 Oct 2022 07:22    TPro  6.0  /  Alb  2.3<L>  /  TBili  0.3  /  DBili  x   /  AST  5<L>  /  ALT  8<L>  /  AlkPhos  42  10-19  POCT Blood Glucose.: 133 mg/dL (10-20-22 @ 07:52)

## 2022-10-20 NOTE — PROGRESS NOTE ADULT - ASSESSMENT
87 year old female with a PMHx of HTN, HLD GERD, and gallbladder cancer s/p ex lap, open cholecystectomy, segment 4b/5 hepatectomy, and right colectomy due to fistula tract vs metastasis on 08/19, recently admitted at [facility] 9/19 - 9/29 for Acute hypoxic respiratory failure and Hepatic Abscess s/p drainage p/w unresponsiveness which was noted by family this morning.    Acute metabolic encephalopathy/ presumed septic encephalopathy   - CT head neg for acute pathology  - Evaluated by ICU  - s/p Bi-PAP, cont AVAPS O/N, NC  - cont to monitor    Hepatic Abscess  - gallbladder cancer s/p ex lap, open cholecystectomy, segment 4b/5 hepatectomy, and right colectomy due to fistula tract vs metastasis on 08/19  - s/p outpt abx per ID note (pt w/ different [facility] chart) 10/13, pt completed Levaquin and flagyl prior admission  - rpt CT abd on Monday 10/17; Residual collection in the gallbladder fossa/liver unchanged from 10/13/2022 overlying for absence of IV contrast. Bladder distention resulting in mild bilateral hydroureteronephrosis.  - s/p IR aspiration 10/19 3cc aspirated, f/u analysis   - ID; following, cont w/ caspofungin, ertapenem, vanco 10/14    Urinary Retention/ bilateral hydroureteronephrosis  1250 cc on Bladder scan   cont w/ indwelling chery  Hypokalemia- replaced, monitor  HTN/HLD- c/w Coreg, Amlodipine, fenofibrate  GERD- c/w PPI  # DVT ppx - HSQ,  3  Pt DNR, DNI

## 2022-10-20 NOTE — DIETITIAN INITIAL EVALUATION ADULT - ADD RECOMMEND
If patient to remain on bipap, if in accordance with patient's/family's wishes, consider alternate means of nutrition (EN or PN).

## 2022-10-20 NOTE — PROGRESS NOTE ADULT - ASSESSMENT
Recurrent collection.  Remarkably nontoxic at this juncture.  WBC normal  Afebrile  HD stabke  Benign abdomen    10/17: no fevers, no wbc, Cr and LFTs ok, BCs with no growth, CT abd was repeated today - no change, needs to be seen by IR. Vancomycin IV, ertapenem iv and Caspofungin continued.   10/18: remains afebrile, no leukocytosis, cr and Lfts ok, UC with VRE - no changes in abx at this time, + urinary retention, with chery now. IR evaluation possibly tomorrow for liver abscess, re-demonstrated on yesterday's CT.   10/19: no fevers, no leukocytosis, Cr ok, awaiting for IR evaluation for possible drainage of liver abscess, will continue with our current antimicrobial selection while awaiting for possible abscess aspirate.   10/20: afebrile, on NC, no new cbc, s/p hepatic abscess drainage yesterday - 3 cc of purulent drainage, smear with no organisms seen, culture is being performed, will continue with ertapenem, caspofungin and vancomycin while awaiting for the culture.     Suggestions  IR eval and drainage is appreciated   Pending aspirate culture result will continue with vanco/ertapnem/caspo  follow Blood cx data - NGTD  UC noted  urology consult  trend temperatures and wbc     discussed with Dr. Garcia  msg sent to Dr. Wayne

## 2022-10-20 NOTE — PROGRESS NOTE ADULT - SUBJECTIVE AND OBJECTIVE BOX
HARSH VERGARA  MRN-[id number]    Follow Up:  hepatic abscess    Interval History: the pt was seen and examined earlier, no distress, awake, still somewhat lethargic, unclear if complains of abd pain when asked about it. The pt is on NC, no fevers, no new cbc.     PAST MEDICAL & SURGICAL HISTORY:  Bladder cancer      HTN (hypertension)      HLD (hyperlipidemia)      Liver abscess      H/O gallbladder cancer          ROS:  lethargic  [x ] Unobtainable because:  [ ] All other systems negative    Constitutional: no fever, no chills  Head: no trauma  Eyes: no vision changes, no eye pain  ENT:  no sore throat, no rhinorrhea  Cardiovascular:  no chest pain, no palpitation  Respiratory:  no SOB, no cough  GI:  no abd pain, no vomiting, no diarrhea  urinary: no dysuria, no hematuria, no flank pain  musculoskeletal:  no joint pain, no joint swelling  skin:  no rash  neurology:  no headache, no seizure, no change in mental status  psych: no anxiety, no depression         Allergies  ampicillin-sulbactam (Rash)        ANTIMICROBIALS:  caspofungin IVPB    caspofungin IVPB 50 every 24 hours  ertapenem  IVPB 1000 every 24 hours  ertapenem  IVPB    vancomycin  IVPB 750 every 12 hours      OTHER MEDS:  acetaminophen     Tablet .. 650 milliGRAM(s) Oral every 6 hours PRN  allopurinol 100 milliGRAM(s) Oral daily  amLODIPine   Tablet 2.5 milliGRAM(s) Oral daily  carvedilol 6.25 milliGRAM(s) Oral every 12 hours  fenofibrate Tablet 145 milliGRAM(s) Oral daily  heparin   Injectable 5000 Unit(s) SubCutaneous every 8 hours  pantoprazole    Tablet 40 milliGRAM(s) Oral before breakfast      Vital Signs Last 24 Hrs  T(C): 36.9 (20 Oct 2022 15:41), Max: 37.2 (19 Oct 2022 16:10)  T(F): 98.5 (20 Oct 2022 15:41), Max: 98.9 (19 Oct 2022 16:10)  HR: 72 (20 Oct 2022 15:41) (66 - 87)  BP: 127/55 (20 Oct 2022 15:41) (112/66 - 176/77)  BP(mean): --  RR: 18 (20 Oct 2022 15:41) (14 - 19)  SpO2: 97% (20 Oct 2022 15:41) (92% - 100%)    Parameters below as of 20 Oct 2022 15:41  Patient On (Oxygen Delivery Method): nasal cannula        Physical Exam:  General: Nontoxic-appearing Female in no acute distress. NC  HEENT: AT/NC. Anicteric. Conjunctiva pink and moist. Oropharynx clear.  Neck: Not rigid. No sense of mass.  Nodes: None palpable.  Lungs: Diminished BS Bilaterally   Heart: Regular rate and rhythm, no audible murmur   Abdomen: Bowel sounds present and normoactive. Soft. Nondistended. seems to be diffusely and mildly tender. No sense of mass. No organomegaly. Incision is well healed.   : Otto   Back: No spinal tenderness. No costovertebral angle tenderness.   Extremities: No cyanosis or clubbing. No edema.   Skin: Warm. Dry. Good turgor. No rash. No vasculitic stigmata.  Psychiatric: lethargic     WBC Count: 5.33 K/uL (10-19 @ 07:22)  WBC Count: 5.17 K/uL (10-18 @ 08:07)  WBC Count: 6.14 K/uL (10-17 @ 06:15)  WBC Count: 6.89 K/uL (10-16 @ 05:10)  WBC Count: 5.36 K/uL (10-15 @ 07:25)  WBC Count: 5.18 K/uL (10-14 @ 05:50)                            8.9    5.33  )-----------( 185      ( 19 Oct 2022 07:22 )             30.4       10-19    138  |  105  |  13  ----------------------------<  91  4.4   |  29  |  0.94    Ca    9.6      19 Oct 2022 07:22    TPro  6.0  /  Alb  2.3<L>  /  TBili  0.3  /  DBili  x   /  AST  5<L>  /  ALT  8<L>  /  AlkPhos  42  10-19          Creatinine Trend: 0.94<--, 1.04<--, 0.83<--, 0.61<--, 0.68<--, 0.59<--      MICROBIOLOGY:  v  .Body Fluid  10-19-22 --  --    polymorphonuclear leukocytes seen  No organisms seen  by cytocentrifuge      Clean Catch Clean Catch (Midstream)  10-14-22   50,000 - 99,000 CFU/mL Enterococcus faecium (vancomycin resistant)  --  Enterococcus faecium (vancomycin resistant)      .Blood Blood-Peripheral  10-13-22   No Growth Final  --  --      .Blood Blood-Peripheral  10-13-22   No Growth Final  --  --      SARS-CoV-2 Result: Maryam (10-20-22 @ 08:00)    SARS-CoV-2: NotKarsten (14 Sep 2022 12:35)    RADIOLOGY:

## 2022-10-20 NOTE — PROGRESS NOTE ADULT - ASSESSMENT
REVIEW OF SYMPTOMS      Able to give (reliable) ROS  NO     PHYSICAL EXAM    HEENT Unremarkable  atraumatic   RESP Fair air entry EXP prolonged    Harsh breath sound Resp distres mild   CARDIAC S1 S2 No S3     NO JVD    ABDOMEN SOFT BS PRESENT NOT DISTENDED No hepatosplenomegaly   PEDAL EDEMA present No calf tenderness  NO rash       AGE/SEX.   87 f  DOA.  10/13/2022   CC .  10/13/2022 hypercapnicresp failure   10/13/2022 liver absce  PRESENTING PROBLEMS .   LIVER ABSCESS  RESP FAILURE   COURSE.   10/18/2022 UR Otto  PROCEDURES.  10/19 ir drainage hept absc       GENERAL DATA .   GOC.   10/15/2022 dnr     ALLGY.    ampicillin sulbactam                         WT. 10/15/2022 51   BMI.  10/15/2022 22                             ICU STAY. none  COVID.  10/13 scv2 (-)     BEST PRACTICE ISSUES.    HOB ELEVATN. Yes  DVT PPLX.   10/20/2022 hpsc   10/13- 10/18/2022 hpsc    PARSON PPLX.    10/13 protonix 40   INFN PPLX.    SP SW PAULETTE.        DIET.     10/16/2022 regl    VS/ PO/IO/ VENT/ DRIPS.  10/20/2022 afeb  70  120/50   10/20/2022 On noct bpap      ASSESSMENT/RECOMMENDATIONS .   RESP.  -- Resp failure.  10/15/2022 5a 31%  726/65/77   10/13 avaps 30-10/8 450 .25 20   10/16/2022 8a avaps 30-10/8 450 .25 20  743/39/82   a/r abg  improvd   -- RO PE.  10/13 cta ch No pe   is on dvt p   INFECTION.  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  10/13 amlodipine 2.5  10/13 carvedilol 6.25 x2   10/13 fenofibrate 145   cont rx   GI.  -- GB cancer   -- Liver abscess   as above   HEMAT.  -- Anemia   Hb 10/15-10/16-10/18-10/19/2022 Hb 10 - 10.2- 9.3 - 8.9   RENAL.  Na 10/15/2022 Na 135  Cr 10/15/2022 Cr .6   -- Urine retn  10/18/2022 Otto    TIME SPENT   Over 25 minutes aggregate care time spent on encounter; activities included   direct patient care, counseling and/or coordinating care reviewing notes, lab data/ imaging , discussion with multidisciplinary team/ patient  /family and explaining in detail risks, benefits, alternatives  of the recommendations     URSULA HOUSE 87 f 10/13/2022 3/10/1929 DR JASPER DOVER

## 2022-10-20 NOTE — DIETITIAN INITIAL EVALUATION ADULT - ETIOLOGY-BASIS
Your Child's Health  [age]-Year-Old Visit      Isai Felton  February 28, 2022    Visit Vitals  BP 94/54   Pulse 88   Temp 97.8 °F (36.6 °C)   Resp 24   Ht 3' 4.75\" (1.035 m)   Wt 15.9 kg (35 lb)   BMI 14.82 kg/m²     Weight: 35 lbs      YOUR CHILD'S 4 YEAR-OLD VISIT    Nutrition:  Children at this age typically eat three small meals a day and 2 or 3 snacks. A decreased appetite is common at this age, so when your child refuses food, don't insist that they eat it, but they should sit with the family during meal times. (Do not prepare an alternative meal for them!) Instead, keep healthy choices easily available for when they want to snack. Keep veggies and fruits washed and cut so that they can access them easily. Applesauce, cheese, whole grain crackers and yogurt (with low sugar content) are also healthy choices. Your child's swallowing coordination is still developing, and hard, chunky, or sticky foods are still choking hazards. So be sure to cut foods like hotdogs, grapes, raw carrots, etc. into small pieces before offering them to your child. Do not give your child junk food, bagged snacks, candy, sweet treats (candy, juice, soda) or fast food on a regular basis--obesity is a serious medical problem in our country, and children who start eating  a lot of unhealthy food choices at an early age are more likely to become overweight.      Your child should drink lots of water. A city or municipal water supply is safe and contains fluoride which helps protect teeth from cavities. Your child should have between 16 and 24 ounces of low-fat or skim milk daily to ensure they are getting enough calcium. A multivitamin with iron is recommended to make sure they are getting enough vitamin D (600 IU per day). If you give your child juice, limit it to no more than 4 ounces a day of 100% juice. If children are going to carry around a water bottle or cup with them over the course of the day, make sure it contains  only water. Juice or even watered-down juice contains a fair amount of sugar, and frequent exposure will increase the risk of tooth decay (cavities). Avoid other sweet drinks (fruity drinks, soda, sweet tea) which also contain lots of sugar and have no nutritional value!    Dental:  Your child should be brushing at least twice daily with a pea-sized amount of regular (fluoridated) toothpaste. Brushing at bedtime is particularly important. Make sure to help your child brush so you are sure that all their back teeth are cleaned well.     Behavior and Development:  At this age most children can go to the bathroom by themselves (at least to urinate), get themselves (mostly) dressed, pedal a tricycle, and climb and jump well. They often demonstrate creative and imaginative play. They should be using short sentences regularly, enjoy telling you stories about their day and things they have seen, and their speech should be mostly understandable to strangers. They should have a good grasp of words that compare things (like bigger or shorter, higher or lower) and prepositions (like on, after, by).      Positive, healthy family interactions will help your child's social and emotional development. When your child is behaving well, notice it and tell them that you like what they are doing. Whenever possible, allow your child to make simple choices (what shirt to wear, which snack to have, which book to read it). Have realistic expectations about what your child is capable of doing and know that they will get angry and frustrated sometimes. Help them name their emotions (\"you are feeling...[angry, sad, love]”).     When your child is angry, take away the source of whatever is upsetting them if possible, speak to them respectfully (don't yell) and distract them to another more positive activity or simply give them some alone time to cool down. Do not tolerate violent behavior - intervene quickly if your child is hitting, biting or  being physically aggressive. Say \"no\" clearly and firmly, use brief time outs and direct them to other activities. Be sure that you always respond the same way to their behaviors, and make sure other caretakers are consistent in how they respond to behaviors (especially unwanted behaviors!).    Help your child's language skills continue to grow by reading, singing and playing rhyming games every day. You do not always have to read the books that you are looking at together-- talk about the pictures and let your child make up their own story.  Encourage them to use their language by describing things. For instance, in the grocery store, have them describe the size, shape and color of fruits and vegetables.     Physical activity is important at every age. Playing interactive games helps children learn to take turns (which is good preparation for school). Television (or any sort of screen time) should be limited to no more than 1 hour a day of educational, kid-friendly programming. Always watch with your child and talk to them about what they are seeing and learning. Remember that commercials are geared to make your children want what they're selling, even if it is something unhealthy or unsafe! For suggestions on developing healthy media habits, do an internet search for “healthychildren media use plan” for recommendations from the American Academy of Pediatrics. Doing this early is important because habits are hard to change once they start, and too much screen time can replace physical play and hands-on activity, impact social development, and cause sleep problems. Do not put a TV in your child's bedroom.      Safety:  Your child should still always be in the backseat in an infant car seat with a 5-point harness (straps over both shoulders) until they reach a minimum of 40 pounds. They are safest rear-facing as long as they are within the rear-facing size limits for the seat.    Your 4 year old child is very  curious-- and pretty coordinated! Never allow them to play outside unsupervised, especially around driveways and streets. They should wear helmets when riding bikes and tricycles.To protect them from falls, keep furniture away from upstairs windows and install window guards. They need close supervision around any water source - bathtubs, inground pools, baby pools.  An adult needs to supervise them around water - do not rely on older children to supervise younger ones.  If on a boat, they need to be in lifejackets approved by the  [employer]. Water classes, swimming lessons, \"floaties\" (or \"swimmies\") do not prevent  drowning. Remember to use sunscreen with an SPF of 15 or higher when outside and re-apply after time in the water.    Continue to make sure you keep all medications, cleaning solutions, insecticides, matches, lighters and any other toxic substance well out of reach of children. Make sure that the Poison Control number (1-939.316.4519) is in your cell phone.    Pets are an important part of many family's lives at this age. However, it is important to teach children to be very respectful and careful around pets. They should always ask for permission before approaching or petting any pet, and they should know never to approach an animal that is eating or sleeping.    If you store a gun or firearm in your home, make sure it is stored safely - unloaded and securely locked, with the ammunition stored separately. Make sure you know whether there are any firearms in any other homes where your child is spending time and insist on proper storage of any firearms in those places.    Lead poisoning has been a problem in [place]; the main sources are paint (lead-based) and dust and soil in and around homes where lead-based paint has been used.  Rarely, imported jewelry, canned goods, toys, antiques and dishes can be a source of lead; certain herbal or “folk” remedies and jobs or hobbies utilizing are also  exposure risks. Your child should be tested for lead at age 3 years if they are living in (or frequently spend time in) housing built before 1978 with recent or ongoing renovation or they have a sibling or playmate who has an elevated blood lead level. If you have questions about older neighborhoods in [place] that might have lead connecting (or service) pipes, do an internet search for \"[place] lead awareness and drinking water safety\".    MEDICATION FOR FEVER OR PAIN:   Acetaminophen liquid (e.g., Tylenol or Tempra) may be given every four hours as needed for pain or fever.  Acetaminophen liquid is less concentrated than the infant dropper bottle type.  Be sure to check which product CONCENTRATION you are using.    CHILDREN’S Tylenol/Acetaminophen  (160 MG/5 mL)    Child’s Weight: Dose:  24 - 35 pounds:   160 mg (5.0 mL (1 Teaspoon))  36 - 47 pounds:   240 mg (7.5 mL (1 1/2 Teaspoons))    CHILDREN’S Tylenol/Acetaminophen MELTAWAYS ( 80 MG tablets)    Child’s Weight:  Dose:  24 - 35 pounds:    160 mg (2 meltaway tablets)  36 - 47 pounds:    240 mg (3 meltaway tablets)    CHILDREN'S Ibuprofen liquid (100MG/5mL) (e.g., Advil or Motrin) may be given every six hours as needed for pain or fever. Please check the concentration of your ibuprofen to be sure you are giving the correct amount.      Child’s Weight:  Dose:  24 - 35 pounds:    100 mg (1 Teaspoon)  36 - 47 pounds:    150 mg (1 1/2 Teaspoons)    If Isai is outside these weight ranges, call your pediatrician's office for advice.    Most Recent Immunizations   Administered Date(s) Administered   • DTaP(INFANRIX) 08/25/2021   • DTaP/Hep B/IPV 2019   • Hib (PRP-OMP) 05/10/2021   • Pneumococcal Conjugate 13 Valent Vacc (Prevnar 13) 09/21/2020       If Isai develops any of the following reactions within 72 hours after an immunization, notify your pediatrician by calling the pediatric phone nurse:  1.  A temperature of 105 degrees or above.  2.  More than  3 hours of continuous crying.  3.  A shrill, high-pitched cry.  4.  A pale, limp spell.  5.  A seizure or fainting spell.  In this case, you should call 911 or go immediately to the emergency room.      NEXT VISIT:  5 YEARS OF AGE    Thank you for entrusting your care to [facility].    Also, check out “Children’s Health” on the [facility] Blog for updates on timely topics regarding children’s health!   Acute illness or injury

## 2022-10-20 NOTE — DIETITIAN INITIAL EVALUATION ADULT - OTHER INFO
Per care coordination assessment, patient lives with family PTA and has a CDPAP 25 hours per week.    Per previous RD note on 9/16/22, patient weighted 54kg. Weight stable as per daily weight 55.8kg 10/20/22.     Per flowsheets, patient consumed 51-75% of documented meals on 10/18. Per RN, patient consumed small amount of food yesterday (10/19/22) - patient was fed by daughter. RN reports patient decompensated now on bipap, was unable to consume breakfast this morning.     RD remains available.

## 2022-10-20 NOTE — DIETITIAN INITIAL EVALUATION ADULT - PERTINENT MEDS FT
MEDICATIONS  (STANDING):  allopurinol 100 milliGRAM(s) Oral daily  amLODIPine   Tablet 2.5 milliGRAM(s) Oral daily  carvedilol 6.25 milliGRAM(s) Oral every 12 hours  caspofungin IVPB      caspofungin IVPB 50 milliGRAM(s) IV Intermittent every 24 hours  ertapenem  IVPB 1000 milliGRAM(s) IV Intermittent every 24 hours  ertapenem  IVPB      fenofibrate Tablet 145 milliGRAM(s) Oral daily  pantoprazole    Tablet 40 milliGRAM(s) Oral before breakfast  vancomycin  IVPB 750 milliGRAM(s) IV Intermittent every 12 hours

## 2022-10-20 NOTE — DIETITIAN INITIAL EVALUATION ADULT - OTHER CALCULATIONS
Ht (cm): 152.4cm  Wt (kg): 55.8 kg  BMI: 24.0    IBW:45.3kg      %IBW: 1.23%     UBW:  54kg              %UBW: 1.03%

## 2022-10-21 LAB
ALBUMIN SERPL ELPH-MCNC: 2.4 G/DL — LOW (ref 3.3–5)
ALP SERPL-CCNC: 44 U/L — SIGNIFICANT CHANGE UP (ref 40–120)
ALT FLD-CCNC: 7 U/L — LOW (ref 12–78)
ANION GAP SERPL CALC-SCNC: 2 MMOL/L — LOW (ref 5–17)
AST SERPL-CCNC: 10 U/L — LOW (ref 15–37)
BILIRUB SERPL-MCNC: 0.4 MG/DL — SIGNIFICANT CHANGE UP (ref 0.2–1.2)
BUN SERPL-MCNC: 14 MG/DL — SIGNIFICANT CHANGE UP (ref 7–23)
CALCIUM SERPL-MCNC: 10 MG/DL — SIGNIFICANT CHANGE UP (ref 8.5–10.1)
CHLORIDE SERPL-SCNC: 100 MMOL/L — SIGNIFICANT CHANGE UP (ref 96–108)
CO2 SERPL-SCNC: 37 MMOL/L — HIGH (ref 22–31)
CREAT SERPL-MCNC: 0.78 MG/DL — SIGNIFICANT CHANGE UP (ref 0.5–1.3)
EGFR: 74 ML/MIN/1.73M2 — SIGNIFICANT CHANGE UP
GLUCOSE SERPL-MCNC: 90 MG/DL — SIGNIFICANT CHANGE UP (ref 70–99)
HCT VFR BLD CALC: 28.9 % — LOW (ref 34.5–45)
HGB BLD-MCNC: 8.7 G/DL — LOW (ref 11.5–15.5)
MCHC RBC-ENTMCNC: 27 PG — SIGNIFICANT CHANGE UP (ref 27–34)
MCHC RBC-ENTMCNC: 30.1 G/DL — LOW (ref 32–36)
MCV RBC AUTO: 89.8 FL — SIGNIFICANT CHANGE UP (ref 80–100)
NRBC # BLD: 0 /100 WBCS — SIGNIFICANT CHANGE UP (ref 0–0)
PLATELET # BLD AUTO: 192 K/UL — SIGNIFICANT CHANGE UP (ref 150–400)
POTASSIUM SERPL-MCNC: 4.2 MMOL/L — SIGNIFICANT CHANGE UP (ref 3.5–5.3)
POTASSIUM SERPL-SCNC: 4.2 MMOL/L — SIGNIFICANT CHANGE UP (ref 3.5–5.3)
PROT SERPL-MCNC: 6 GM/DL — SIGNIFICANT CHANGE UP (ref 6–8.3)
RBC # BLD: 3.22 M/UL — LOW (ref 3.8–5.2)
RBC # FLD: 15.7 % — HIGH (ref 10.3–14.5)
SODIUM SERPL-SCNC: 139 MMOL/L — SIGNIFICANT CHANGE UP (ref 135–145)
WBC # BLD: 8.47 K/UL — SIGNIFICANT CHANGE UP (ref 3.8–10.5)
WBC # FLD AUTO: 8.47 K/UL — SIGNIFICANT CHANGE UP (ref 3.8–10.5)

## 2022-10-21 PROCEDURE — 99232 SBSQ HOSP IP/OBS MODERATE 35: CPT | Mod: FS

## 2022-10-21 PROCEDURE — 99233 SBSQ HOSP IP/OBS HIGH 50: CPT

## 2022-10-21 PROCEDURE — 99232 SBSQ HOSP IP/OBS MODERATE 35: CPT

## 2022-10-21 RX ADMIN — Medication 250 MILLIGRAM(S): at 05:25

## 2022-10-21 RX ADMIN — ERTAPENEM SODIUM 120 MILLIGRAM(S): 1 INJECTION, POWDER, LYOPHILIZED, FOR SOLUTION INTRAMUSCULAR; INTRAVENOUS at 17:36

## 2022-10-21 RX ADMIN — HEPARIN SODIUM 5000 UNIT(S): 5000 INJECTION INTRAVENOUS; SUBCUTANEOUS at 17:39

## 2022-10-21 RX ADMIN — Medication 100 MILLIGRAM(S): at 11:50

## 2022-10-21 RX ADMIN — HEPARIN SODIUM 5000 UNIT(S): 5000 INJECTION INTRAVENOUS; SUBCUTANEOUS at 05:25

## 2022-10-21 RX ADMIN — HEPARIN SODIUM 5000 UNIT(S): 5000 INJECTION INTRAVENOUS; SUBCUTANEOUS at 21:21

## 2022-10-21 RX ADMIN — PANTOPRAZOLE SODIUM 40 MILLIGRAM(S): 20 TABLET, DELAYED RELEASE ORAL at 06:26

## 2022-10-21 RX ADMIN — Medication 145 MILLIGRAM(S): at 11:50

## 2022-10-21 RX ADMIN — CARVEDILOL PHOSPHATE 6.25 MILLIGRAM(S): 80 CAPSULE, EXTENDED RELEASE ORAL at 17:38

## 2022-10-21 RX ADMIN — CARVEDILOL PHOSPHATE 6.25 MILLIGRAM(S): 80 CAPSULE, EXTENDED RELEASE ORAL at 05:26

## 2022-10-21 RX ADMIN — AMLODIPINE BESYLATE 2.5 MILLIGRAM(S): 2.5 TABLET ORAL at 05:26

## 2022-10-21 NOTE — SWALLOW BEDSIDE ASSESSMENT ADULT - SWALLOW EVAL: RECOMMENDED FEEDING/EATING TECHNIQUES
allow for swallow between intakes/alternate food with liquid/check mouth frequently for oral residue/pocketing/maintain upright posture during/after eating for 30 mins/no straws/oral hygiene/small sips/bites

## 2022-10-21 NOTE — OCCUPATIONAL THERAPY INITIAL EVALUATION ADULT - PLANNED THERAPY INTERVENTIONS, OT EVAL
energy conservation techniques/ADL retraining/IADL retraining/balance training/bed mobility training/fine motor coordination training/neuromuscular re-education/parent/caregiver training.../strengthening/transfer training

## 2022-10-21 NOTE — SWALLOW BEDSIDE ASSESSMENT ADULT - ORAL PREPARATORY PHASE
Reduced oral grading/Bolus falls into anterior sulcus Within functional limits slow movements/Decreased mastication ability

## 2022-10-21 NOTE — SWALLOW BEDSIDE ASSESSMENT ADULT - COMMENTS
PMHx of HTN, HLD GERD, and gallbladder cancer s/p ex lap, open cholecystectomy, segment 4b/5 hepatectomy, and right colectomy due to fistula tract vs metastasis on 08/19, recently admitted at [facility] 9/19 - 9/29 for Acute hypoxic respiratory failure and Hepatic Abscess s/p drainage   10/13 CXR Present film shows a small atelectatic process at the right base increased from September 22  CT head No acute intracranial hemorrhage, mass effect, or shift of the midline structures; Similar-appearing moderate severity chronic white matter microvascular type changes and chronic lacunar infarcts  10/21 MD note Acute metabolic encephalopathy/ presumed septic encephalopathy

## 2022-10-21 NOTE — OCCUPATIONAL THERAPY INITIAL EVALUATION ADULT - STRENGTHENING, PT EVAL
Pt will increased BUE/LE muscle strength by 1 full grade to maximize independence with functional mobility and self care tasks within 12 weeks

## 2022-10-21 NOTE — PROGRESS NOTE ADULT - SUBJECTIVE AND OBJECTIVE BOX
HARSH VERGARA  MRN-[id number]    Follow Up:  liver abscess    Interval History: the pt was seen and examined earlier, no distress, a little more interactive today, more awake, still would not answer my questions and would not follow commands. The pt is afebrile, on NC, no leukocytosis.     PAST MEDICAL & SURGICAL HISTORY:  Bladder cancer      HTN (hypertension)      HLD (hyperlipidemia)      Liver abscess      H/O gallbladder cancer          ROS:    [x ] Unobtainable because:  the pt is not answering any of my questions, seems more awake but confused   [ ] All other systems negative    Constitutional: no fever, no chills  Head: no trauma  Eyes: no vision changes, no eye pain  ENT:  no sore throat, no rhinorrhea  Cardiovascular:  no chest pain, no palpitation  Respiratory:  no SOB, no cough  GI:  no abd pain, no vomiting, no diarrhea  urinary: no dysuria, no hematuria, no flank pain  musculoskeletal:  no joint pain, no joint swelling  skin:  no rash  neurology:  no headache, no seizure, no change in mental status  psych: no anxiety, no depression         Allergies  ampicillin-sulbactam (Rash)        ANTIMICROBIALS:  ertapenem  IVPB 1000 every 24 hours  ertapenem  IVPB        OTHER MEDS:  acetaminophen     Tablet .. 650 milliGRAM(s) Oral every 6 hours PRN  allopurinol 100 milliGRAM(s) Oral daily  amLODIPine   Tablet 2.5 milliGRAM(s) Oral daily  carvedilol 6.25 milliGRAM(s) Oral every 12 hours  fenofibrate Tablet 145 milliGRAM(s) Oral daily  heparin   Injectable 5000 Unit(s) SubCutaneous every 8 hours  pantoprazole    Tablet 40 milliGRAM(s) Oral before breakfast      Vital Signs Last 24 Hrs  T(C): 37.9 (21 Oct 2022 13:55), Max: 37.9 (21 Oct 2022 13:55)  T(F): 100.3 (21 Oct 2022 13:55), Max: 100.3 (21 Oct 2022 13:55)  HR: 77 (21 Oct 2022 11:08) (70 - 83)  BP: 150/68 (21 Oct 2022 11:08) (127/55 - 169/65)  BP(mean): --  RR: 18 (21 Oct 2022 11:08) (18 - 18)  SpO2: 99% (21 Oct 2022 11:08) (94% - 100%)    Parameters below as of 21 Oct 2022 01:05  Patient On (Oxygen Delivery Method): nasal cannula        Physical Exam:  General: Nontoxic-appearing Female in no acute distress. NC  HEENT: AT/NC. Anicteric. Conjunctiva pink and moist. Oropharynx unable due to non-compliance   Neck: Not rigid. No sense of mass.  Nodes: None palpable.  Lungs: Diminished BS Bilaterally   Heart: Regular rate and rhythm, no audible murmur   Abdomen: Bowel sounds present and normoactive. Soft. Nondistended. Not significanly tender on today's exam. No sense of mass. No organomegaly. Incision is well healed.   : Otto   Back: No spinal tenderness. No costovertebral angle tenderness.   Extremities: No cyanosis or clubbing. No edema.   Skin: Warm. Dry. Good turgor. No rash. No vasculitic stigmata.  Psychiatric: awake, does not answer questions or follows commands    WBC Count: 8.47 K/uL (10-21 @ 08:30)  WBC Count: 5.33 K/uL (10-19 @ 07:22)  WBC Count: 5.17 K/uL (10-18 @ 08:07)  WBC Count: 6.14 K/uL (10-17 @ 06:15)  WBC Count: 6.89 K/uL (10-16 @ 05:10)  WBC Count: 5.36 K/uL (10-15 @ 07:25)                            8.7    8.47  )-----------( 192      ( 21 Oct 2022 08:30 )             28.9       10-21    139  |  100  |  14  ----------------------------<  90  4.2   |  37<H>  |  0.78    Ca    10.0      21 Oct 2022 08:30    TPro  6.0  /  Alb  2.4<L>  /  TBili  0.4  /  DBili  x   /  AST  10<L>  /  ALT  7<L>  /  AlkPhos  44  10-21          Creatinine Trend: 0.78<--, 0.94<--, 1.04<--, 0.83<--, 0.61<--, 0.68<--      MICROBIOLOGY:  v  .Body Fluid  10-19-22   No growth to date.  --    polymorphonuclear leukocytes seen  No organisms seen  by cytocentrifuge      Clean Catch Clean Catch (Midstream)  10-14-22   50,000 - 99,000 CFU/mL Enterococcus faecium (vancomycin resistant)  --  Enterococcus faecium (vancomycin resistant)      .Blood Blood-Peripheral  10-13-22   No Growth Final  --  --      .Blood Blood-Peripheral  10-13-22   No Growth Final  --  --      SARS-CoV-2 Result: Maryam (10-20-22 @ 08:00)    SARS-CoV-2: Maryam (14 Sep 2022 12:35)    RADIOLOGY:

## 2022-10-21 NOTE — SWALLOW BEDSIDE ASSESSMENT ADULT - SLP GENERAL OBSERVATIONS
alert and oriented to name; reduced interactions and verbal output; generalized weakness including for phonation/voice

## 2022-10-21 NOTE — OCCUPATIONAL THERAPY INITIAL EVALUATION ADULT - IMPAIRMENTS CONTRIBUTING IMPAIRED BED MOBILITY, REHAB EVAL
impaired balance/cognition/impaired coordination/decreased flexibility/abnormal muscle tone/impaired postural control/decreased sensation/impaired sensory feedback/decreased strength

## 2022-10-21 NOTE — OCCUPATIONAL THERAPY INITIAL EVALUATION ADULT - BALANCE TRAINING, PT EVAL
Pt will increased sitting balance from fair to good in 12 weeks to prevent falls, optimize pt's ability for ADL management

## 2022-10-21 NOTE — OCCUPATIONAL THERAPY INITIAL EVALUATION ADULT - LIVES WITH, PROFILE
family in a private with steps/ handrail to negotiate . All living amenities are located on one level. The bathroom has a tub/shower combination and standard toilet.  Pt is a poor historian, son information provided need verification from family

## 2022-10-21 NOTE — PROGRESS NOTE ADULT - ASSESSMENT
87 year old female with a PMHx of HTN, HLD GERD, and gallbladder cancer s/p ex lap, open cholecystectomy, segment 4b/5 hepatectomy, and right colectomy due to fistula tract vs metastasis on 08/19, recently admitted at [facility] 9/19 - 9/29 for Acute hypoxic respiratory failure and Hepatic Abscess s/p drainage p/w unresponsiveness which was noted by family this morning.    Acute metabolic encephalopathy/ presumed septic encephalopathy   - CT head neg for acute pathology  - Evaluated by ICU  - s/p Bi-PAP, cont AVAPS O/N, NC  - cont to monitor    Hepatic Abscess  - gallbladder cancer s/p ex lap, open cholecystectomy, segment 4b/5 hepatectomy, and right colectomy due to fistula tract vs metastasis on 08/19  - s/p outpt abx per ID note (pt w/ different [facility] chart) 10/13, pt completed Levaquin and flagyl prior admission  - rpt CT abd on Monday 10/17; Residual collection in the gallbladder fossa/liver unchanged from 10/13/2022 overlying for absence of IV contrast. Bladder distention resulting in mild bilateral hydroureteronephrosis.  - s/p IR aspiration 10/19 3cc aspirated, f/u analysis   - ID; following, cont w/ caspofungin, ertapenem, vanco 10/14    Urinary Retention/ bilateral hydroureteronephrosis  1250 cc on Bladder scan   cont w/ indwelling chery  likely neurogenic bladder 2/2 bedbound status, will attempt tov soon     PT/OT eval     Hypokalemia- replaced, monitor  HTN/HLD- c/w Coreg, Amlodipine, fenofibrate  GERD- c/w PPI  # DVT ppx - HSQ,  3  Pt DNR, DNI

## 2022-10-21 NOTE — OCCUPATIONAL THERAPY INITIAL EVALUATION ADULT - PERTINENT HX OF CURRENT PROBLEM, REHAB EVAL
Pt is an 88 y/o female who presented to ER after she was found  unresponsiveness by her family As per chart, pt has  PMHx of HTN, HLD GERD, gallbladder cancer s/p ex lap, open cholecystectomy, segment 4b/5 hepatectomy, and right colectomy due to fistula tract vs metastasis on 08/19 Pt was  recently admitted at [facility] 9/19 - 9/29 for Acute hypoxic respiratory failure and Hepatic Abscess s/p drainage Pt is an 88 y/o female who presented to ER after she was found  unresponsiveness by her family . As per chart, pt has  PMHx of HTN, HLD GERD, gallbladder cancer s/p ex lap, open cholecystectomy, segment 4b/5 hepatectomy, and right colectomy due to fistula tract vs metastasis on 08/19 Pt was  recently admitted at [facility] 9/19 - 9/29 for Acute hypoxic respiratory failure and Hepatic Abscess s/p drainage

## 2022-10-21 NOTE — SWALLOW BEDSIDE ASSESSMENT ADULT - SPECIFY REASON(S)
Clinical Swallow Evaluation ; JIM Barajas reports tolerating minced/moist texture Clinical Swallow Evaluation ; son present for exam

## 2022-10-21 NOTE — PROGRESS NOTE ADULT - ASSESSMENT
REVIEW OF SYMPTOMS      Able to give (reliable) ROS  NO     PHYSICAL EXAM    HEENT Unremarkable  atraumatic   RESP Fair air entry EXP prolonged    Harsh breath sound Resp distres mild   CARDIAC S1 S2 No S3     NO JVD    ABDOMEN SOFT BS PRESENT NOT DISTENDED No hepatosplenomegaly   PEDAL EDEMA present No calf tenderness  NO rash       AGE/SEX.   87 f  DOA.  10/13/2022   CC .  10/13/2022 hypercapnicresp failure   10/13/2022 liver absce  PRESENTING PROBLEMS .   LIVER ABSCESS  RESP FAILURE   COURSE.   10/18/2022 UR Otto  PROCEDURES.  10/19 ir drainage hept absc       GENERAL DATA .   GOC.   10/15/2022 dnr     ALLGY.    ampicillin sulbactam                         WT. 10/15/2022 51   BMI.  10/15/2022 22                             ICU STAY. none  COVID.  10/13 scv2 (-)     BEST PRACTICE ISSUES.    HOB ELEVATN. Yes  DVT PPLX.   10/20/2022 hpsc   10/13- 10/18/2022 hpsc    PARSON PPLX.    10/13 protonix 40   INFN PPLX.    SP SW PAULETTE.        DIET.     10/16/2022 regl    VS/ PO/IO/ VENT/ DRIPS.  10/21/2022 afeb 83 150/70   10/21/2022 ra 97%    ASSESSMENT/RECOMMENDATIONS .   RESP.  -- Resp failure.  10/15/2022 5a 31%  726/65/77   10/13 avaps 30-10/8 450 .25 20   10/16/2022 8a avaps 30-10/8 450 .25 20  743/39/82   10/21/2022 ra 97%   a/r abg  improvd   -- RO PE.  10/13 cta ch No pe   is on dvt p   INFECTION.  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  10/13 amlodipine 2.5  10/13 carvedilol 6.25 x2   10/13 fenofibrate 145   cont rx   GI.  -- GB cancer   -- Liver abscess   10/19 ir drainage hept absc   as above   HEMAT.  -- Anemia   Hb 10/15-10/16-10/18-10/19/2022 Hb 10 - 10.2- 9.3 - 8.9   RENAL.  Na 10/15/2022 Na 135  Cr 10/15/2022 Cr .6   -- Urine retn  10/18/2022 Otto    TIME SPENT   Over 25 minutes aggregate care time spent on encounter; activities included   direct patient care, counseling and/or coordinating care reviewing notes, lab data/ imaging , discussion with multidisciplinary team/ patient  /family and explaining in detail risks, benefits, alternatives  of the recommendations     URSULA HOUSE 87 f 10/13/2022 3/10/1929 DR JASPER DOVER

## 2022-10-21 NOTE — PROGRESS NOTE ADULT - SUBJECTIVE AND OBJECTIVE BOX
Patient is a 87y old  Female who presents with a chief complaint of Acute hypoxic and hypercapnic respiratory failure, altered mental status (21 Oct 2022 07:40)    INTERVAL HPI/OVERNIGHT EVENTS:    MEDICATIONS  (STANDING):  allopurinol 100 milliGRAM(s) Oral daily  amLODIPine   Tablet 2.5 milliGRAM(s) Oral daily  carvedilol 6.25 milliGRAM(s) Oral every 12 hours  caspofungin IVPB      caspofungin IVPB 50 milliGRAM(s) IV Intermittent every 24 hours  ertapenem  IVPB 1000 milliGRAM(s) IV Intermittent every 24 hours  ertapenem  IVPB      fenofibrate Tablet 145 milliGRAM(s) Oral daily  heparin   Injectable 5000 Unit(s) SubCutaneous every 8 hours  pantoprazole    Tablet 40 milliGRAM(s) Oral before breakfast  vancomycin  IVPB 750 milliGRAM(s) IV Intermittent every 12 hours    MEDICATIONS  (PRN):  acetaminophen     Tablet .. 650 milliGRAM(s) Oral every 6 hours PRN Temp greater or equal to 38C (100.4F), Mild Pain (1 - 3)    Allergies    ampicillin-sulbactam (Rash)    Intolerances      REVIEW OF SYSTEMS:  All other systems reviewed and are negative    Vital Signs Last 24 Hrs  T(C): 37.2 (21 Oct 2022 05:00), Max: 37.7 (21 Oct 2022 00:23)  T(F): 98.9 (21 Oct 2022 05:00), Max: 99.8 (21 Oct 2022 00:23)  HR: 79 (21 Oct 2022 09:26) (66 - 83)  BP: 169/65 (21 Oct 2022 05:00) (127/55 - 169/65)  BP(mean): --  RR: 18 (21 Oct 2022 01:05) (18 - 18)  SpO2: 96% (21 Oct 2022 09:26) (94% - 100%)    Parameters below as of 21 Oct 2022 01:05  Patient On (Oxygen Delivery Method): nasal cannula      Daily     Daily   I&O's Summary    20 Oct 2022 07:01  -  21 Oct 2022 07:00  --------------------------------------------------------  IN: 0 mL / OUT: 1150 mL / NET: -1150 mL      CAPILLARY BLOOD GLUCOSE        PHYSICAL EXAM:  GENERAL: NAD,    HEAD:  Atraumatic, Normocephalic  EYES: EOMI, PERRLA, conjunctiva and sclera clear  ENMT: No tonsillar erythema, exudates, or enlargement; Moist mucous membranes, Good dentition, No lesions  NECK: Supple, No JVD, Normal thyroid  NERVOUS SYSTEM:  Alert & Oriented X3, Good concentration; Motor Strength 5/5 B/L upper and lower extremities; DTRs 2+ intact and symmetric  CHEST/LUNG: Clear to percussion bilaterally; No rales, rhonchi, wheezing, or rubs  HEART: Regular rate and rhythm; No murmurs, rubs, or gallops  ABDOMEN: Soft, Nontender, Nondistended; Bowel sounds present  EXTREMITIES:  2+ Peripheral Pulses, No clubbing, cyanosis, or edema  LYMPH: No lymphadenopathy noted  SKIN: No rashes or lesions    Labs                          8.7    8.47  )-----------( 192      ( 21 Oct 2022 08:30 )             28.9                     Culture - Body Fluid with Gram Stain (collected 19 Oct 2022 17:35)  Source: .Body Fluid  Gram Stain (prelim) (20 Oct 2022 19:56):    polymorphonuclear leukocytes seen    No organisms seen    by cytocentrifuge  Preliminary Report (20 Oct 2022 19:56):    No growth to date.                DVT prophylaxis: > Lovenox 40mg SQ daily  > Heparin   > SCD's

## 2022-10-21 NOTE — PROGRESS NOTE ADULT - ASSESSMENT
Recurrent collection.  Remarkably nontoxic at this juncture.  WBC normal  Afebrile  HD stabke  Benign abdomen    10/17: no fevers, no wbc, Cr and LFTs ok, BCs with no growth, CT abd was repeated today - no change, needs to be seen by IR. Vancomycin IV, ertapenem iv and Caspofungin continued.   10/18: remains afebrile, no leukocytosis, cr and Lfts ok, UC with VRE - no changes in abx at this time, + urinary retention, with chery now. IR evaluation possibly tomorrow for liver abscess, re-demonstrated on yesterday's CT.   10/19: no fevers, no leukocytosis, Cr ok, awaiting for IR evaluation for possible drainage of liver abscess, will continue with our current antimicrobial selection while awaiting for possible abscess aspirate.   10/20: afebrile, on NC, no new cbc, s/p hepatic abscess drainage yesterday - 3 cc of purulent drainage, smear with no organisms seen, culture is being performed, will continue with ertapenem, caspofungin and vancomycin while awaiting for the culture.   10/21: no fever, on NC, no wbc, liver abscess culture with no growth to date, no evidence of MRSA or fungal infection at this time, will stop vancomycin and caspofungin, will continue with ertapenem.     Suggestions  stop Vancomycin IV  stop caspofungin  continue IV ertapenem  follow aspirate culture   follow Blood cx data - NGTD  UC noted  urology consult  trend temperatures and wbc     discussed with Dr. Garcia  msg sent to Dr. Wayne

## 2022-10-21 NOTE — PROGRESS NOTE ADULT - SUBJECTIVE AND OBJECTIVE BOX
HARSH VERGARA    [address]S [address]    Allergies    ampicillin-sulbactam (Rash)    Intolerances        PAST MEDICAL & SURGICAL HISTORY:  Bladder cancer      HTN (hypertension)      HLD (hyperlipidemia)      Liver abscess      H/O gallbladder cancer          FAMILY HISTORY:  No pertinent family history in first degree relatives        Home Medications:  ALLOPURINOL 100 MG TABLET: TAKE 1 TABLET ONCE A DAY (AT BEDTIME) ORALLY (13 Oct 2022 18:51)  AMLODIPINE 2.5MG TAB: 1 tab(s) orally once a day (13 Oct 2022 18:51)  CARVEDILOL 6.25 MG TABLET: TAKE 1 TABLET BY MOUTH TWICE A DAY (13 Oct 2022 18:51)  FENOFIBRATE 145 MG TABLET: TAKE 1 TABLET BY MOUTH EVERYDAY AT BEDTIME (13 Oct 2022 18:51)  levoFLOXacin 750 mg oral tablet: 1 tab(s) orally every 24 hours for 7 days (13 Oct 2022 18:51)  METRONIDAZOL 500MG TAB: 1 tab(s) orally 2 times a day for 14 days (13 Oct 2022 18:51)  MIRTAZAPINE 15 MG TABLET: TAKE 1 TABLET BY MOUTH EVERYDAY AT BEDTIME (13 Oct 2022 18:51)  PANTOPRAZOLE SOD DR 40 MG TAB: TAKE 1 TABLET BY MOUTH EVERY DAY IN THE MORNING (13 Oct 2022 18:51)      MEDICATIONS  (STANDING):  allopurinol 100 milliGRAM(s) Oral daily  amLODIPine   Tablet 2.5 milliGRAM(s) Oral daily  carvedilol 6.25 milliGRAM(s) Oral every 12 hours  caspofungin IVPB      caspofungin IVPB 50 milliGRAM(s) IV Intermittent every 24 hours  ertapenem  IVPB 1000 milliGRAM(s) IV Intermittent every 24 hours  ertapenem  IVPB      fenofibrate Tablet 145 milliGRAM(s) Oral daily  heparin   Injectable 5000 Unit(s) SubCutaneous every 8 hours  pantoprazole    Tablet 40 milliGRAM(s) Oral before breakfast  vancomycin  IVPB 750 milliGRAM(s) IV Intermittent every 12 hours    MEDICATIONS  (PRN):  acetaminophen     Tablet .. 650 milliGRAM(s) Oral every 6 hours PRN Temp greater or equal to 38C (100.4F), Mild Pain (1 - 3)      Diet, Regular:   Low Sodium  Supplement Feeding Modality:  Oral  Health Shake Cans or Servings Per Day:  1       Frequency:  Two Times a day (10-20-22 @ 09:56) [Active]          Vital Signs Last 24 Hrs  T(C): 37.2 (21 Oct 2022 05:00), Max: 37.7 (21 Oct 2022 00:23)  T(F): 98.9 (21 Oct 2022 05:00), Max: 99.8 (21 Oct 2022 00:23)  HR: 75 (21 Oct 2022 05:31) (66 - 83)  BP: 169/65 (21 Oct 2022 05:00) (127/55 - 169/65)  BP(mean): --  RR: 18 (21 Oct 2022 01:05) (18 - 18)  SpO2: 94% (21 Oct 2022 05:31) (94% - 100%)    Parameters below as of 21 Oct 2022 01:05  Patient On (Oxygen Delivery Method): nasal cannula          10-20-22 @ 07:01  -  10-21-22 @ 07:00  --------------------------------------------------------  IN: 0 mL / OUT: 1150 mL / NET: -1150 mL              LABS:                    WBC:  WBC Count: 5.33 K/uL (10-19 @ 07:22)  WBC Count: 5.17 K/uL (10-18 @ 08:07)      MICROBIOLOGY:  RECENT CULTURES:  10-19 .Body Fluid XXXX   polymorphonuclear leukocytes seen  No organisms seen  by cytocentrifuge   No growth to date.                    Sodium:  Sodium, Serum: 138 mmol/L (10-19 @ 07:22)  Sodium, Serum: 139 mmol/L (10-18 @ 08:07)      0.94 mg/dL 10-19 @ 07:22  1.04 mg/dL 10-18 @ 08:07      Hemoglobin:  Hemoglobin: 8.9 g/dL (10-19 @ 07:22)  Hemoglobin: 9.3 g/dL (10-18 @ 08:07)      Platelets: Platelet Count - Automated: 185 K/uL (10-19 @ 07:22)  Platelet Count - Automated: 188 K/uL (10-18 @ 08:07)              RADIOLOGY & ADDITIONAL STUDIES:      MICROBIOLOGY:  RECENT CULTURES:  10-19 .Body Fluid XXXX   polymorphonuclear leukocytes seen  No organisms seen  by cytocentrifuge   No growth to date.

## 2022-10-21 NOTE — OCCUPATIONAL THERAPY INITIAL EVALUATION ADULT - ADDITIONAL COMMENTS
Prior to admission, pt was functioning in her roles, ambulating independently with rolling walker, family assisted  ADL. Presently, pt needs more assistance with functional care tasks due to generalized  weakness,. Pt is right hand dominant and wears glasses for reading.

## 2022-10-21 NOTE — OCCUPATIONAL THERAPY INITIAL EVALUATION ADULT - GENERAL OBSERVATIONS, REHAB EVAL
Pt was seen for initial OT consult, encountered OOB on cardiac monitoring and 4 Literof O2; pt was AA&Ox2, cooperative & followed commands inconsistly.Pt denied pain, but c/o SOB with minimal exertion. Pt presents with generalized weakness; this limits pt's activity tolerance ,balance, ADL management and functional mobility.

## 2022-10-21 NOTE — SWALLOW BEDSIDE ASSESSMENT ADULT - SWALLOW EVAL: DIAGNOSIS
Oropharyngeal dysphagia with generalized weakness in setting of presumed septic encephalopathy; fatigue is evident with nasal cannula in place ; airway protection deficits for swallow are not evident on exam

## 2022-10-21 NOTE — OCCUPATIONAL THERAPY INITIAL EVALUATION ADULT - LEVEL OF CONSCIOUSNESS, OT EVAL
Wants referral to spine doctor. I messaged Dr. clements who agreed to see her Monday, 4/10.  I called Lexie to give number of nurse Rena so that she can call  [phone]        Recorded as Task  Date: 04/07/2017 09:04 AM, Created By: Shari Gamez  Task Name: 4. Patient Message  Assigned To: ROBBY PROCTOR  Regarding Patient: LEXIE MORE, Status: Active  Comment:   Shari Gamez - 07 Apr 2017 9:04 AM    TASK CREATED  Patient needs a referral to a spine doctor, the ER doctors suspect she has injured her T3 & T5 vert. and she would rather see someone that is familiar with hypermobility.  She can be reached at [phone]      Electronically signed by:ROBBY PROCTOR M.D.  Apr 7 2017 10:38AM CST Author    at times/confused

## 2022-10-22 LAB
ALBUMIN SERPL ELPH-MCNC: 2.4 G/DL — LOW (ref 3.3–5)
ALP SERPL-CCNC: 44 U/L — SIGNIFICANT CHANGE UP (ref 40–120)
ALT FLD-CCNC: 9 U/L — LOW (ref 12–78)
ANION GAP SERPL CALC-SCNC: 1 MMOL/L — LOW (ref 5–17)
AST SERPL-CCNC: 11 U/L — LOW (ref 15–37)
BASE EXCESS BLDA CALC-SCNC: 15 MMOL/L — HIGH (ref -2–3)
BILIRUB SERPL-MCNC: 0.4 MG/DL — SIGNIFICANT CHANGE UP (ref 0.2–1.2)
BLOOD GAS COMMENTS ARTERIAL: SIGNIFICANT CHANGE UP
BUN SERPL-MCNC: 17 MG/DL — SIGNIFICANT CHANGE UP (ref 7–23)
CALCIUM SERPL-MCNC: 10.6 MG/DL — HIGH (ref 8.5–10.1)
CHLORIDE SERPL-SCNC: 99 MMOL/L — SIGNIFICANT CHANGE UP (ref 96–108)
CO2 BLDA-SCNC: 46 MMOL/L — HIGH (ref 19–24)
CO2 SERPL-SCNC: 41 MMOL/L — HIGH (ref 22–31)
CREAT SERPL-MCNC: 0.95 MG/DL — SIGNIFICANT CHANGE UP (ref 0.5–1.3)
EGFR: 58 ML/MIN/1.73M2 — LOW
GAS PNL BLDA: SIGNIFICANT CHANGE UP
GLUCOSE SERPL-MCNC: 112 MG/DL — HIGH (ref 70–99)
HCO3 BLDA-SCNC: 44 MMOL/L — HIGH (ref 21–28)
HCT VFR BLD CALC: 28.4 % — LOW (ref 34.5–45)
HGB BLD-MCNC: 8.5 G/DL — LOW (ref 11.5–15.5)
HOROWITZ INDEX BLDA+IHG-RTO: 50 — SIGNIFICANT CHANGE UP
MCHC RBC-ENTMCNC: 26.4 PG — LOW (ref 27–34)
MCHC RBC-ENTMCNC: 29.9 G/DL — LOW (ref 32–36)
MCV RBC AUTO: 88.2 FL — SIGNIFICANT CHANGE UP (ref 80–100)
NRBC # BLD: 0 /100 WBCS — SIGNIFICANT CHANGE UP (ref 0–0)
PCO2 BLDA: 72 MMHG — CRITICAL HIGH (ref 32–46)
PH BLDA: 7.39 — SIGNIFICANT CHANGE UP (ref 7.35–7.45)
PLATELET # BLD AUTO: 210 K/UL — SIGNIFICANT CHANGE UP (ref 150–400)
PO2 BLDA: 115 MMHG — HIGH (ref 83–108)
POTASSIUM SERPL-MCNC: 4.6 MMOL/L — SIGNIFICANT CHANGE UP (ref 3.5–5.3)
POTASSIUM SERPL-SCNC: 4.6 MMOL/L — SIGNIFICANT CHANGE UP (ref 3.5–5.3)
PROT SERPL-MCNC: 5.9 GM/DL — LOW (ref 6–8.3)
RBC # BLD: 3.22 M/UL — LOW (ref 3.8–5.2)
RBC # FLD: 15.9 % — HIGH (ref 10.3–14.5)
SAO2 % BLDA: 98.1 % — HIGH (ref 94–98)
SODIUM SERPL-SCNC: 141 MMOL/L — SIGNIFICANT CHANGE UP (ref 135–145)
WBC # BLD: 8.59 K/UL — SIGNIFICANT CHANGE UP (ref 3.8–10.5)
WBC # FLD AUTO: 8.59 K/UL — SIGNIFICANT CHANGE UP (ref 3.8–10.5)

## 2022-10-22 PROCEDURE — 99232 SBSQ HOSP IP/OBS MODERATE 35: CPT

## 2022-10-22 PROCEDURE — 99233 SBSQ HOSP IP/OBS HIGH 50: CPT

## 2022-10-22 RX ADMIN — AMLODIPINE BESYLATE 2.5 MILLIGRAM(S): 2.5 TABLET ORAL at 05:54

## 2022-10-22 RX ADMIN — HEPARIN SODIUM 5000 UNIT(S): 5000 INJECTION INTRAVENOUS; SUBCUTANEOUS at 14:26

## 2022-10-22 RX ADMIN — HEPARIN SODIUM 5000 UNIT(S): 5000 INJECTION INTRAVENOUS; SUBCUTANEOUS at 05:53

## 2022-10-22 RX ADMIN — ERTAPENEM SODIUM 120 MILLIGRAM(S): 1 INJECTION, POWDER, LYOPHILIZED, FOR SOLUTION INTRAMUSCULAR; INTRAVENOUS at 16:15

## 2022-10-22 RX ADMIN — HEPARIN SODIUM 5000 UNIT(S): 5000 INJECTION INTRAVENOUS; SUBCUTANEOUS at 21:32

## 2022-10-22 RX ADMIN — PANTOPRAZOLE SODIUM 40 MILLIGRAM(S): 20 TABLET, DELAYED RELEASE ORAL at 05:53

## 2022-10-22 RX ADMIN — CARVEDILOL PHOSPHATE 6.25 MILLIGRAM(S): 80 CAPSULE, EXTENDED RELEASE ORAL at 18:00

## 2022-10-22 RX ADMIN — CARVEDILOL PHOSPHATE 6.25 MILLIGRAM(S): 80 CAPSULE, EXTENDED RELEASE ORAL at 05:54

## 2022-10-22 NOTE — PROGRESS NOTE ADULT - ASSESSMENT
REVIEW OF SYMPTOMS      Able to give (reliable) ROS  NO     PHYSICAL EXAM    HEENT Unremarkable  atraumatic   RESP Fair air entry EXP prolonged    Harsh breath sound Resp distres mild   CARDIAC S1 S2 No S3     NO JVD    ABDOMEN SOFT BS PRESENT NOT DISTENDED No hepatosplenomegaly   PEDAL EDEMA present No calf tenderness  NO rash       AGE/SEX.   87 f  DOA.  10/13/2022   CC .  10/13/2022 hypercapnicresp failure   10/13/2022 liver absce  PRESENTING PROBLEMS .   LIVER ABSCESS  RESP FAILURE   COURSE.   10/18/2022 UR Otto  PROCEDURES.  10/19 ir drainage hept absc       GENERAL DATA .   GOC.   10/15/2022 dnr     ALLGY.    ampicillin sulbactam                         WT. 10/15/2022 51   BMI.  10/15/2022 22                             ICU STAY. none  COVID.  10/13 scv2 (-)     BEST PRACTICE ISSUES.    HOB ELEVATN. Yes  DVT PPLX.   10/20/2022 hpsc   10/13- 10/18/2022 hpsc    PARSON PPLX.    10/13 protonix 40   INFN PPLX.    SP SW PAULETTE.        DIET.     10/16/2022 regl    VS/ PO/IO/ VENT/ DRIPS.  10/22/2022 afeb 80 110/70   10/22/2022 ra 96%     ASSESSMENT/RECOMMENDATIONS .   RESP.  -- Resp failure.  10/15/2022 5a 31%  726/65/77   10/13 avaps 30-10/8 450 .25 20   10/16/2022 8a avaps 30-10/8 450 .25 20  743/39/82   10/21/2022 ra 97%   10/22/2022 avaps 450/20/r 20 epap 8 max 30 fio2 50% 739/72 115   a/r  Is using noct avaps will need noct bpap at discharg   -- RO PE.  10/13 cta ch No pe   is on dvt p   INFECTION.  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  10/13 amlodipine 2.5  10/13 carvedilol 6.25 x2   10/13 fenofibrate 145   cont rx   GI.  -- GB cancer   -- Liver abscess   10/19 ir drainage hept absc   as above   HEMAT.  -- Anemia   Hb 10/15-10/16-10/18-10/19-10/22/2022 Hb 10 - 10.2- 9.3 - 8.9 - 8.5   RENAL.  Na 10/15/2022 Na 135  Cr 10/15/2022 Cr .6   -- Urine retn  10/18/2022 Otto    TIME SPENT   Over 25 minutes aggregate care time spent on encounter; activities included   direct patient care, counseling and/or coordinating care reviewing notes, lab data/ imaging , discussion with multidisciplinary team/ patient  /family and explaining in detail risks, benefits, alternatives  of the recommendations     URSULA HOUSE 87 f 10/13/2022 3/10/1929 DR JASPER DOVER

## 2022-10-22 NOTE — PROGRESS NOTE ADULT - ASSESSMENT
87 year old female with a PMHx of HTN, HLD GERD, and gallbladder cancer s/p ex lap, open cholecystectomy, segment 4b/5 hepatectomy, and right colectomy due to fistula tract vs metastasis on 08/19, recently admitted at [facility] 9/19 - 9/29 for Acute hypoxic respiratory failure and Hepatic Abscess s/p drainage p/w unresponsiveness which was noted by family this morning.    Acute metabolic encephalopathy/ presumed septic encephalopathy   - CT head neg for acute pathology  - Evaluated by ICU  - s/p Bi-PAP, cont AVAPS O/N, NC  - cont to monitor    Hepatic Abscess  - gallbladder cancer s/p ex lap, open cholecystectomy, segment 4b/5 hepatectomy, and right colectomy due to fistula tract vs metastasis on 08/19  - s/p outpt abx per ID note (pt w/ different [facility] chart) 10/13, pt completed Levaquin and flagyl prior admission  - rpt CT abd on Monday 10/17; Residual collection in the gallbladder fossa/liver unchanged from 10/13/2022 overlying for absence of IV contrast. Bladder distention resulting in mild bilateral hydroureteronephrosis.  - s/p IR aspiration 10/19 3cc aspirated, f/u analysis   -cultures negative so far, now only on ertapenem     Urinary Retention/ bilateral hydroureteronephrosis  1250 cc on Bladder scan   cont w/ indwelling chery  likely neurogenic bladder 2/2 bedbound status, will attempt tov soon     PT/OT eval     Hypokalemia- replaced, monitor  HTN/HLD- c/w Coreg, Amlodipine, fenofibrate  GERD- c/w PPI  # DVT ppx - HSQ,  3  Pt DNR, DNI

## 2022-10-22 NOTE — PROGRESS NOTE ADULT - SUBJECTIVE AND OBJECTIVE BOX
HARSH VERGARA    LVS 2C [address]    Allergies    ampicillin-sulbactam (Rash)    Intolerances        PAST MEDICAL & SURGICAL HISTORY:  Bladder cancer      HTN (hypertension)      HLD (hyperlipidemia)      Liver abscess      H/O gallbladder cancer          FAMILY HISTORY:  No pertinent family history in first degree relatives        Home Medications:  ALLOPURINOL 100 MG TABLET: TAKE 1 TABLET ONCE A DAY (AT BEDTIME) ORALLY (13 Oct 2022 18:51)  AMLODIPINE 2.5MG TAB: 1 tab(s) orally once a day (13 Oct 2022 18:51)  CARVEDILOL 6.25 MG TABLET: TAKE 1 TABLET BY MOUTH TWICE A DAY (13 Oct 2022 18:51)  FENOFIBRATE 145 MG TABLET: TAKE 1 TABLET BY MOUTH EVERYDAY AT BEDTIME (13 Oct 2022 18:51)  levoFLOXacin 750 mg oral tablet: 1 tab(s) orally every 24 hours for 7 days (13 Oct 2022 18:51)  METRONIDAZOL 500MG TAB: 1 tab(s) orally 2 times a day for 14 days (13 Oct 2022 18:51)  MIRTAZAPINE 15 MG TABLET: TAKE 1 TABLET BY MOUTH EVERYDAY AT BEDTIME (13 Oct 2022 18:51)  PANTOPRAZOLE SOD DR 40 MG TAB: TAKE 1 TABLET BY MOUTH EVERY DAY IN THE MORNING (13 Oct 2022 18:51)      MEDICATIONS  (STANDING):  allopurinol 100 milliGRAM(s) Oral daily  amLODIPine   Tablet 2.5 milliGRAM(s) Oral daily  carvedilol 6.25 milliGRAM(s) Oral every 12 hours  ertapenem  IVPB      ertapenem  IVPB 1000 milliGRAM(s) IV Intermittent every 24 hours  fenofibrate Tablet 145 milliGRAM(s) Oral daily  heparin   Injectable 5000 Unit(s) SubCutaneous every 8 hours  pantoprazole    Tablet 40 milliGRAM(s) Oral before breakfast    MEDICATIONS  (PRN):  acetaminophen     Tablet .. 650 milliGRAM(s) Oral every 6 hours PRN Temp greater or equal to 38C (100.4F), Mild Pain (1 - 3)              Vital Signs Last 24 Hrs  T(C): 36.4 (22 Oct 2022 11:12), Max: 37.3 (22 Oct 2022 00:04)  T(F): 97.6 (22 Oct 2022 11:12), Max: 99.2 (22 Oct 2022 00:04)  HR: 68 (22 Oct 2022 11:12) (68 - 90)  BP: 116/79 (22 Oct 2022 11:12) (116/79 - 178/78)  BP(mean): --  RR: 18 (22 Oct 2022 11:12) (18 - 18)  SpO2: 96% (22 Oct 2022 11:12) (93% - 97%)    Parameters below as of 22 Oct 2022 11:12  Patient On (Oxygen Delivery Method): room air          10-21-22 @ 07:01  -  10-22-22 @ 07:00  --------------------------------------------------------  IN: 0 mL / OUT: 1700 mL / NET: -1700 mL              LABS:                        8.5    8.59  )-----------( 210      ( 22 Oct 2022 11:15 )             28.4     10-22    141  |  99  |  17  ----------------------------<  112<H>  4.6   |  41<H>  |  0.95    Ca    10.6<H>      22 Oct 2022 11:15    TPro  5.9<L>  /  Alb  2.4<L>  /  TBili  0.4  /  DBili  x   /  AST  11<L>  /  ALT  9<L>  /  AlkPhos  44  10-22          ABG - ( 22 Oct 2022 10:51 )  pH, Arterial: 7.39  pH, Blood: x     /  pCO2: 72    /  pO2: 115   / HCO3: 44    / Base Excess: 15.0  /  SaO2: 98.1                WBC:  WBC Count: 8.59 K/uL (10-22 @ 11:15)  WBC Count: 8.47 K/uL (10-21 @ 08:30)  WBC Count: 5.33 K/uL (10-19 @ 07:22)      MICROBIOLOGY:  RECENT CULTURES:  10-19 .Body Fluid XXXX   polymorphonuclear leukocytes seen  No organisms seen  by cytocentrifuge   No growth to date.                    Sodium:  Sodium, Serum: 141 mmol/L (10-22 @ 11:15)  Sodium, Serum: 139 mmol/L (10-21 @ 08:30)  Sodium, Serum: 138 mmol/L (10-19 @ 07:22)      0.95 mg/dL 10-22 @ 11:15  0.78 mg/dL 10-21 @ 08:30  0.94 mg/dL 10-19 @ 07:22      Hemoglobin:  Hemoglobin: 8.5 g/dL (10-22 @ 11:15)  Hemoglobin: 8.7 g/dL (10-21 @ 08:30)  Hemoglobin: 8.9 g/dL (10-19 @ 07:22)      Platelets: Platelet Count - Automated: 210 K/uL (10-22 @ 11:15)  Platelet Count - Automated: 192 K/uL (10-21 @ 08:30)  Platelet Count - Automated: 185 K/uL (10-19 @ 07:22)      LIVER FUNCTIONS - ( 22 Oct 2022 11:15 )  Alb: 2.4 g/dL / Pro: 5.9 gm/dL / ALK PHOS: 44 U/L / ALT: 9 U/L / AST: 11 U/L / GGT: x                 RADIOLOGY & ADDITIONAL STUDIES:      MICROBIOLOGY:  RECENT CULTURES:  10-19 .Body Fluid XXXX   polymorphonuclear leukocytes seen  No organisms seen  by cytocentrifuge   No growth to date.

## 2022-10-22 NOTE — PROGRESS NOTE ADULT - SUBJECTIVE AND OBJECTIVE BOX
Patient is a 87y old  Female who presents with a chief complaint of Acute hypoxic and hypercapnic respiratory failure, altered mental status (21 Oct 2022 13:56)    INTERVAL HPI/OVERNIGHT EVENTS: more lethargic this am,placed back on bipap     MEDICATIONS  (STANDING):  allopurinol 100 milliGRAM(s) Oral daily  amLODIPine   Tablet 2.5 milliGRAM(s) Oral daily  carvedilol 6.25 milliGRAM(s) Oral every 12 hours  ertapenem  IVPB      ertapenem  IVPB 1000 milliGRAM(s) IV Intermittent every 24 hours  fenofibrate Tablet 145 milliGRAM(s) Oral daily  heparin   Injectable 5000 Unit(s) SubCutaneous every 8 hours  pantoprazole    Tablet 40 milliGRAM(s) Oral before breakfast    MEDICATIONS  (PRN):  acetaminophen     Tablet .. 650 milliGRAM(s) Oral every 6 hours PRN Temp greater or equal to 38C (100.4F), Mild Pain (1 - 3)    Allergies    ampicillin-sulbactam (Rash)    Intolerances      REVIEW OF SYSTEMS:  All other systems reviewed and are negative    Vital Signs Last 24 Hrs  T(C): 36.3 (22 Oct 2022 04:57), Max: 37.9 (21 Oct 2022 13:55)  T(F): 97.4 (22 Oct 2022 04:57), Max: 100.3 (21 Oct 2022 13:55)  HR: 84 (22 Oct 2022 09:30) (76 - 90)  BP: 135/70 (22 Oct 2022 04:57) (135/70 - 178/78)  BP(mean): --  RR: 18 (22 Oct 2022 04:57) (18 - 18)  SpO2: 95% (22 Oct 2022 09:30) (93% - 99%)    Parameters below as of 22 Oct 2022 04:57  Patient On (Oxygen Delivery Method): room air      Daily     Daily   I&O's Summary    21 Oct 2022 07:01  -  22 Oct 2022 07:00  --------------------------------------------------------  IN: 0 mL / OUT: 1700 mL / NET: -1700 mL      CAPILLARY BLOOD GLUCOSE      POCT Blood Glucose.: 155 mg/dL (22 Oct 2022 09:24)    PHYSICAL EXAM:  GENERAL: NAD,    HEAD:  Atraumatic, Normocephalic  EYES: EOMI, PERRLA, conjunctiva and sclera clear  ENMT: No tonsillar erythema, exudates, or enlargement; Moist mucous membranes, Good dentition, No lesions  NECK: Supple, No JVD, Normal thyroid  CHEST/LUNG: Clear to percussion bilaterally; No rales, rhonchi, wheezing, or rubs  HEART: Regular rate and rhythm; No murmurs, rubs, or gallops  ABDOMEN: Soft, Nontender, Nondistended; Bowel sounds present  EXTREMITIES:  2+ Peripheral Pulses, No clubbing, cyanosis, or edema  LYMPH: No lymphadenopathy noted  SKIN: No rashes or lesions    Labs                          8.7    8.47  )-----------( 192      ( 21 Oct 2022 08:30 )             28.9     10-21    139  |  100  |  14  ----------------------------<  90  4.2   |  37<H>  |  0.78    Ca    10.0      21 Oct 2022 08:30    TPro  6.0  /  Alb  2.4<L>  /  TBili  0.4  /  DBili  x   /  AST  10<L>  /  ALT  7<L>  /  AlkPhos  44  10-21              Culture - Body Fluid with Gram Stain (collected 19 Oct 2022 17:35)  Source: .Body Fluid  Gram Stain (prelim) (20 Oct 2022 19:56):    polymorphonuclear leukocytes seen    No organisms seen    by cytocentrifuge  Preliminary Report (20 Oct 2022 19:56):    No growth to date.                DVT prophylaxis: > Lovenox 40mg SQ daily  > Heparin   > SCD's

## 2022-10-23 PROCEDURE — 99233 SBSQ HOSP IP/OBS HIGH 50: CPT

## 2022-10-23 PROCEDURE — 99232 SBSQ HOSP IP/OBS MODERATE 35: CPT

## 2022-10-23 RX ADMIN — CARVEDILOL PHOSPHATE 6.25 MILLIGRAM(S): 80 CAPSULE, EXTENDED RELEASE ORAL at 18:17

## 2022-10-23 RX ADMIN — PANTOPRAZOLE SODIUM 40 MILLIGRAM(S): 20 TABLET, DELAYED RELEASE ORAL at 05:46

## 2022-10-23 RX ADMIN — Medication 145 MILLIGRAM(S): at 12:43

## 2022-10-23 RX ADMIN — HEPARIN SODIUM 5000 UNIT(S): 5000 INJECTION INTRAVENOUS; SUBCUTANEOUS at 05:47

## 2022-10-23 RX ADMIN — HEPARIN SODIUM 5000 UNIT(S): 5000 INJECTION INTRAVENOUS; SUBCUTANEOUS at 13:51

## 2022-10-23 RX ADMIN — AMLODIPINE BESYLATE 2.5 MILLIGRAM(S): 2.5 TABLET ORAL at 05:48

## 2022-10-23 RX ADMIN — Medication 100 MILLIGRAM(S): at 12:43

## 2022-10-23 RX ADMIN — CARVEDILOL PHOSPHATE 6.25 MILLIGRAM(S): 80 CAPSULE, EXTENDED RELEASE ORAL at 05:46

## 2022-10-23 RX ADMIN — ERTAPENEM SODIUM 120 MILLIGRAM(S): 1 INJECTION, POWDER, LYOPHILIZED, FOR SOLUTION INTRAMUSCULAR; INTRAVENOUS at 14:51

## 2022-10-23 RX ADMIN — HEPARIN SODIUM 5000 UNIT(S): 5000 INJECTION INTRAVENOUS; SUBCUTANEOUS at 21:26

## 2022-10-23 NOTE — PROGRESS NOTE ADULT - ASSESSMENT
REVIEW OF SYMPTOMS      Able to give (reliable) ROS  NO     PHYSICAL EXAM    HEENT Unremarkable  atraumatic   RESP Fair air entry EXP prolonged    Harsh breath sound Resp distres mild   CARDIAC S1 S2 No S3     NO JVD    ABDOMEN SOFT BS PRESENT NOT DISTENDED No hepatosplenomegaly   PEDAL EDEMA present No calf tenderness  NO rash       GENERAL DATA .   GOC.   10/15/2022 dnr     ALLGY.    ampicillin sulbactam                         WT. 10/15/2022 51   BMI.  10/15/2022 22                             ICU STAY. none  COVID.  10/13 scv2 (-)     BEST PRACTICE ISSUES.    HOB ELEVATN. Yes  DVT PPLX.   10/20/2022 hpsc   10/13- 10/18/2022 hpsc    PARSON PPLX.    10/13 protonix 40   INFN PPLX.    SP SW PAULETTE.        DIET.     10/16/2022 regl    VS/ PO/IO/ VENT/ DRIPS.  10/23/2022 afeb 80 160/60   10/23/2022 4l 95%    ASSESSMENT/RECOMMENDATIONS .   RESP.  -- Resp failure.  10/15/2022 5a 31%  726/65/77   10/13 avaps 30-10/8 450 .25 20   10/16/2022 8a avaps 30-10/8 450 .25 20  743/39/82   10/21/2022 ra 97%   10/22/2022 avaps 450/20/r 20 epap 8 max 30 fio2 50% 739/72 115   a/r  Is using noct avaps will need noct bpap at discharg   -- RO PE.  10/13 cta ch No pe   is on dvt p   INFECTION.  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  10/13 amlodipine 2.5  10/13 carvedilol 6.25 x2   10/13 fenofibrate 145   cont rx   GI.  -- GB cancer   -- Liver abscess   10/19 ir drainage hept absc   as above   HEMAT.  -- Anemia   Hb 10/15-10/16-10/18-10/19-10/22/2022 Hb 10 - 10.2- 9.3 - 8.9 - 8.5   RENAL.  Na 10/15/2022 Na 135  Cr 10/15/2022 Cr .6   -- Urine retn  10/18/2022 Otto    OVERALL ASSESSMENT/DISPOSITION.  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 admitted 10/13/2022 with type 2 resp failure liver abscess    Liver abscess   ct cap 10/13 ct  c ap 4.8 x 2.4 cm abscess gb fossa rim enhanc sp cholecystectomy   uc 10/14 50-99 gpo vre   katja ir  10/19  (-)   10/14 invanz     resp failure   10/22/2022 avaps 450/20/r 20 epap 8 max 30 fio2 50% 739/72 115   a/r  Is using noct avaps will need noct bpap at discharg     Urine retn  10/18/2022 Otto        TIME SPENT   Over 25 minutes aggregate care time spent on encounter; activities included   direct patient care, counseling and/or coordinating care reviewing notes, lab data/ imaging , discussion with multidisciplinary team/ patient  /family and explaining in detail risks, benefits, alternatives  of the recommendations     [facility]SULA [facility]SE 87 f 10/13/2022 3/10/1929 DR JASPER DOVER

## 2022-10-23 NOTE — PROGRESS NOTE ADULT - SUBJECTIVE AND OBJECTIVE BOX
Patient is a 87y old  Female who presents with a chief complaint of Acute hypoxic and hypercapnic respiratory failure, altered mental status (23 Oct 2022 10:04)    INTERVAL HPI/OVERNIGHT EVENTS:    MEDICATIONS  (STANDING):  allopurinol 100 milliGRAM(s) Oral daily  amLODIPine   Tablet 2.5 milliGRAM(s) Oral daily  carvedilol 6.25 milliGRAM(s) Oral every 12 hours  ertapenem  IVPB      ertapenem  IVPB 1000 milliGRAM(s) IV Intermittent every 24 hours  fenofibrate Tablet 145 milliGRAM(s) Oral daily  heparin   Injectable 5000 Unit(s) SubCutaneous every 8 hours  pantoprazole    Tablet 40 milliGRAM(s) Oral before breakfast    MEDICATIONS  (PRN):  acetaminophen     Tablet .. 650 milliGRAM(s) Oral every 6 hours PRN Temp greater or equal to 38C (100.4F), Mild Pain (1 - 3)    Allergies    ampicillin-sulbactam (Rash)    Intolerances      REVIEW OF SYSTEMS:  All other systems reviewed and are negative    Vital Signs Last 24 Hrs  T(C): 36.1 (23 Oct 2022 10:28), Max: 36.9 (22 Oct 2022 16:32)  T(F): 97 (23 Oct 2022 10:28), Max: 98.5 (22 Oct 2022 16:32)  HR: 77 (23 Oct 2022 10:28) (68 - 90)  BP: 129/71 (23 Oct 2022 10:28) (116/79 - 160/66)  BP(mean): --  RR: 18 (23 Oct 2022 10:28) (18 - 18)  SpO2: 93% (23 Oct 2022 10:28) (92% - 98%)    Parameters below as of 23 Oct 2022 10:28  Patient On (Oxygen Delivery Method): nasal cannula      Daily     Daily   I&O's Summary    22 Oct 2022 07:01  -  23 Oct 2022 07:00  --------------------------------------------------------  IN: 0 mL / OUT: 2000 mL / NET: -2000 mL      CAPILLARY BLOOD GLUCOSE        PHYSICAL EXAM:  GENERAL: NAD,    HEAD:  Atraumatic, Normocephalic  EYES: EOMI, PERRLA, conjunctiva and sclera clear  ENMT: No tonsillar erythema, exudates, or enlargement; Moist mucous membranes, Good dentition, No lesions  NECK: Supple, No JVD, Normal thyroid  NERVOUS SYSTEM:  Alert & Oriented X3, Good concentration; Motor Strength 5/5 B/L upper and lower extremities; DTRs 2+ intact and symmetric  CHEST/LUNG: Clear to percussion bilaterally; No rales, rhonchi, wheezing, or rubs  HEART: Regular rate and rhythm; No murmurs, rubs, or gallops  ABDOMEN: Soft, Nontender, Nondistended; Bowel sounds present  EXTREMITIES:  2+ Peripheral Pulses, No clubbing, cyanosis, or edema  LYMPH: No lymphadenopathy noted  SKIN: No rashes or lesions    Labs                          8.5    8.59  )-----------( 210      ( 22 Oct 2022 11:15 )             28.4     10-22    141  |  99  |  17  ----------------------------<  112<H>  4.6   |  41<H>  |  0.95    Ca    10.6<H>      22 Oct 2022 11:15    TPro  5.9<L>  /  Alb  2.4<L>  /  TBili  0.4  /  DBili  x   /  AST  11<L>  /  ALT  9<L>  /  AlkPhos  44  10-22                        DVT prophylaxis: > Lovenox 40mg SQ daily  > Heparin   > SCD's

## 2022-10-23 NOTE — PROGRESS NOTE ADULT - SUBJECTIVE AND OBJECTIVE BOX
HARSH VERGARA    LVS 2[address]    Allergies    ampicillin-sulbactam (Rash)    Intolerances        PAST MEDICAL & SURGICAL HISTORY:  Bladder cancer      HTN (hypertension)      HLD (hyperlipidemia)      Liver abscess      H/O gallbladder cancer          FAMILY HISTORY:  No pertinent family history in first degree relatives        Home Medications:  ALLOPURINOL 100 MG TABLET: TAKE 1 TABLET ONCE A DAY (AT BEDTIME) ORALLY (13 Oct 2022 18:51)  AMLODIPINE 2.5MG TAB: 1 tab(s) orally once a day (13 Oct 2022 18:51)  CARVEDILOL 6.25 MG TABLET: TAKE 1 TABLET BY MOUTH TWICE A DAY (13 Oct 2022 18:51)  FENOFIBRATE 145 MG TABLET: TAKE 1 TABLET BY MOUTH EVERYDAY AT BEDTIME (13 Oct 2022 18:51)  levoFLOXacin 750 mg oral tablet: 1 tab(s) orally every 24 hours for 7 days (13 Oct 2022 18:51)  METRONIDAZOL 500MG TAB: 1 tab(s) orally 2 times a day for 14 days (13 Oct 2022 18:51)  MIRTAZAPINE 15 MG TABLET: TAKE 1 TABLET BY MOUTH EVERYDAY AT BEDTIME (13 Oct 2022 18:51)  PANTOPRAZOLE SOD DR 40 MG TAB: TAKE 1 TABLET BY MOUTH EVERY DAY IN THE MORNING (13 Oct 2022 18:51)      MEDICATIONS  (STANDING):  allopurinol 100 milliGRAM(s) Oral daily  amLODIPine   Tablet 2.5 milliGRAM(s) Oral daily  carvedilol 6.25 milliGRAM(s) Oral every 12 hours  ertapenem  IVPB 1000 milliGRAM(s) IV Intermittent every 24 hours  ertapenem  IVPB      fenofibrate Tablet 145 milliGRAM(s) Oral daily  heparin   Injectable 5000 Unit(s) SubCutaneous every 8 hours  pantoprazole    Tablet 40 milliGRAM(s) Oral before breakfast    MEDICATIONS  (PRN):  acetaminophen     Tablet .. 650 milliGRAM(s) Oral every 6 hours PRN Temp greater or equal to 38C (100.4F), Mild Pain (1 - 3)              Vital Signs Last 24 Hrs  T(C): 36.8 (23 Oct 2022 05:03), Max: 36.9 (22 Oct 2022 16:32)  T(F): 98.2 (23 Oct 2022 05:03), Max: 98.5 (22 Oct 2022 16:32)  HR: 83 (23 Oct 2022 05:33) (68 - 90)  BP: 160/66 (23 Oct 2022 05:03) (116/79 - 160/66)  BP(mean): --  RR: 18 (23 Oct 2022 05:03) (18 - 18)  SpO2: 93% (23 Oct 2022 05:33) (92% - 98%)    Parameters below as of 23 Oct 2022 05:03  Patient On (Oxygen Delivery Method): nasal cannula          10-22-22 @ 07:01  -  10-23-22 @ 07:00  --------------------------------------------------------  IN: 0 mL / OUT: 2000 mL / NET: -2000 mL              LABS:                        8.5    8.59  )-----------( 210      ( 22 Oct 2022 11:15 )             28.4     10-22    141  |  99  |  17  ----------------------------<  112<H>  4.6   |  41<H>  |  0.95    Ca    10.6<H>      22 Oct 2022 11:15    TPro  5.9<L>  /  Alb  2.4<L>  /  TBili  0.4  /  DBili  x   /  AST  11<L>  /  ALT  9<L>  /  AlkPhos  44  10-22          ABG - ( 22 Oct 2022 10:51 )  pH, Arterial: 7.39  pH, Blood: x     /  pCO2: 72    /  pO2: 115   / HCO3: 44    / Base Excess: 15.0  /  SaO2: 98.1                WBC:  WBC Count: 8.59 K/uL (10-22 @ 11:15)  WBC Count: 8.47 K/uL (10-21 @ 08:30)      MICROBIOLOGY:  RECENT CULTURES:  10-19 .Body Fluid XXXX   polymorphonuclear leukocytes seen  No organisms seen  by cytocentrifuge   No growth to date.                    Sodium:  Sodium, Serum: 141 mmol/L (10-22 @ 11:15)  Sodium, Serum: 139 mmol/L (10-21 @ 08:30)      0.95 mg/dL 10-22 @ 11:15  0.78 mg/dL 10-21 @ 08:30      Hemoglobin:  Hemoglobin: 8.5 g/dL (10-22 @ 11:15)  Hemoglobin: 8.7 g/dL (10-21 @ 08:30)      Platelets: Platelet Count - Automated: 210 K/uL (10-22 @ 11:15)  Platelet Count - Automated: 192 K/uL (10-21 @ 08:30)      LIVER FUNCTIONS - ( 22 Oct 2022 11:15 )  Alb: 2.4 g/dL / Pro: 5.9 gm/dL / ALK PHOS: 44 U/L / ALT: 9 U/L / AST: 11 U/L / GGT: x                 RADIOLOGY & ADDITIONAL STUDIES:      MICROBIOLOGY:  RECENT CULTURES:  10-19 .Body Fluid XXXX   polymorphonuclear leukocytes seen  No organisms seen  by cytocentrifuge   No growth to date.

## 2022-10-23 NOTE — PROGRESS NOTE ADULT - ASSESSMENT
87 year old female with a PMHx of HTN, HLD GERD, and gallbladder cancer s/p ex lap, open cholecystectomy, segment 4b/5 hepatectomy, and right colectomy due to fistula tract vs metastasis on 08/19, recently admitted at [facility] 9/19 - 9/29 for Acute hypoxic respiratory failure and Hepatic Abscess s/p drainage p/w unresponsiveness which was noted by family this morning.    Acute metabolic encephalopathy/ presumed septic encephalopathy   - CT head neg for acute pathology  - Evaluated by ICU  - s/p Bi-PAP, cont AVAPS O/N, NC  - cont to monitor    Acute hypercapnic respiratory failure  -improved today, off avaps this am and awake     Hepatic Abscess  - gallbladder cancer s/p ex lap, open cholecystectomy, segment 4b/5 hepatectomy, and right colectomy due to fistula tract vs metastasis on 08/19  - s/p outpt abx per ID note (pt w/ different [facility] chart) 10/13, pt completed Levaquin and flagyl prior admission  - rpt CT abd on Monday 10/17; Residual collection in the gallbladder fossa/liver unchanged from 10/13/2022 overlying for absence of IV contrast. Bladder distention resulting in mild bilateral hydroureteronephrosis.  - s/p IR aspiration 10/19 3cc aspirated, f/u analysis   -cultures negative so far, now only on ertapenem     Urinary Retention/ bilateral hydroureteronephrosis  1250 cc on Bladder scan   cont w/ indwelling chery  likely neurogenic bladder 2/2 bedbound status, will attempt tov soon     PT/OT eval     Hypokalemia- replaced, monitor  HTN/HLD- c/w Coreg, Amlodipine, fenofibrate  GERD- c/w PPI  # DVT ppx - HSQ,  3  Pt DNR, DNI

## 2022-10-24 LAB
CULTURE RESULTS: SIGNIFICANT CHANGE UP
GRAM STN FLD: SIGNIFICANT CHANGE UP
SPECIMEN SOURCE: SIGNIFICANT CHANGE UP

## 2022-10-24 PROCEDURE — 99232 SBSQ HOSP IP/OBS MODERATE 35: CPT

## 2022-10-24 PROCEDURE — 71045 X-RAY EXAM CHEST 1 VIEW: CPT | Mod: 26

## 2022-10-24 PROCEDURE — 99232 SBSQ HOSP IP/OBS MODERATE 35: CPT | Mod: FS

## 2022-10-24 PROCEDURE — 99233 SBSQ HOSP IP/OBS HIGH 50: CPT

## 2022-10-24 RX ORDER — FUROSEMIDE 40 MG
40 TABLET ORAL ONCE
Refills: 0 | Status: COMPLETED | OUTPATIENT
Start: 2022-10-24 | End: 2022-10-24

## 2022-10-24 RX ADMIN — CARVEDILOL PHOSPHATE 6.25 MILLIGRAM(S): 80 CAPSULE, EXTENDED RELEASE ORAL at 17:59

## 2022-10-24 RX ADMIN — AMLODIPINE BESYLATE 2.5 MILLIGRAM(S): 2.5 TABLET ORAL at 05:29

## 2022-10-24 RX ADMIN — ERTAPENEM SODIUM 120 MILLIGRAM(S): 1 INJECTION, POWDER, LYOPHILIZED, FOR SOLUTION INTRAMUSCULAR; INTRAVENOUS at 14:17

## 2022-10-24 RX ADMIN — Medication 40 MILLIGRAM(S): at 16:11

## 2022-10-24 RX ADMIN — PANTOPRAZOLE SODIUM 40 MILLIGRAM(S): 20 TABLET, DELAYED RELEASE ORAL at 05:31

## 2022-10-24 RX ADMIN — Medication 145 MILLIGRAM(S): at 11:34

## 2022-10-24 RX ADMIN — HEPARIN SODIUM 5000 UNIT(S): 5000 INJECTION INTRAVENOUS; SUBCUTANEOUS at 05:30

## 2022-10-24 RX ADMIN — Medication 650 MILLIGRAM(S): at 18:21

## 2022-10-24 RX ADMIN — HEPARIN SODIUM 5000 UNIT(S): 5000 INJECTION INTRAVENOUS; SUBCUTANEOUS at 14:19

## 2022-10-24 RX ADMIN — CARVEDILOL PHOSPHATE 6.25 MILLIGRAM(S): 80 CAPSULE, EXTENDED RELEASE ORAL at 05:29

## 2022-10-24 RX ADMIN — Medication 100 MILLIGRAM(S): at 11:35

## 2022-10-24 RX ADMIN — HEPARIN SODIUM 5000 UNIT(S): 5000 INJECTION INTRAVENOUS; SUBCUTANEOUS at 22:11

## 2022-10-24 NOTE — PROGRESS NOTE ADULT - ASSESSMENT
REVIEW OF SYMPTOMS      Able to give (reliable) ROS  NO     PHYSICAL EXAM    HEENT Unremarkable  atraumatic   RESP Fair air entry EXP prolonged    Harsh breath sound Resp distres mild   CARDIAC S1 S2 No S3     NO JVD    ABDOMEN SOFT BS PRESENT NOT DISTENDED No hepatosplenomegaly   PEDAL EDEMA present No calf tenderness  NO rash       GENERAL DATA .   GOC.   10/15/2022 dnr     ALLGY.    ampicillin sulbactam                         WT. 10/15/2022 51   BMI.  10/15/2022 22                             ICU STAY. none  COVID.  10/13 scv2 (-)     BEST PRACTICE ISSUES.    HOB ELEVATN. Yes  DVT PPLX.   10/20/2022 hpsc   10/13- 10/18/2022 hpsc    PARSON PPLX.    10/13 protonix 40   INFN PPLX.    SP SW PAULETTE.        DIET.     10/16/2022 regl    VS/ PO/IO/ VENT/ DRIPS.  10/24/2022 afeb 60 160/90   10/24/2022 ra 97%     ASSESSMENT/RECOMMENDATIONS .   RESP.  -- Resp failure.  10/15/2022 5a 31%  726/65/77   10/13 avaps 30-10/8 450 .25 20   10/16/2022 8a avaps 30-10/8 450 .25 20  743/39/82   10/21/2022 ra 97%   10/22/2022 avaps 450/20/r 20 epap 8 max 30 fio2 50% 739/72 115   a/r  Is using noct avaps will need noct bpap at discharg   -- RO PE.  10/13 cta ch No pe   is on dvt p   INFECTION.  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  10/13 amlodipine 2.5  10/13 carvedilol 6.25 x2   10/13 fenofibrate 145   cont rx   GI.  -- GB cancer   -- Liver abscess   10/19 ir drainage hept absc   as above   HEMAT.  -- Anemia   Hb 10/15-10/16-10/18-10/19-10/22/2022 Hb 10 - 10.2- 9.3 - 8.9 - 8.5   RENAL.  Na 10/15/2022 Na 135  Cr 10/15/2022 Cr .6   -- Urine retn  10/18/2022 Otto    OVERALL ASSESSMENT/DISPOSITION.  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 admitted 10/13/2022 with type 2 resp failure liver abscess    Liver abscess   ct cap 10/13 ct  c ap 4.8 x 2.4 cm abscess gb fossa rim enhanc sp cholecystectomy   uc 10/14 50-99 gpo vre   katja ir  10/19  (-)   10/14 invanz     resp failure   10/22/2022 avaps 450/20/r 20 epap 8 max 30 fio2 50% 739/72 115   a/r  Is using noct avaps will need noct bpap at discharg     Urine retn  10/18/2022 Otto      TIME SPENT   Over 25 minutes aggregate care time spent on encounter; activities included   direct patient care, counseling and/or coordinating care reviewing notes, lab data/ imaging , discussion with multidisciplinary team/ patient  /family and explaining in detail risks, benefits, alternatives  of the recommendations     [facility] 87 f 10/13/2022 3/10/1930 DR JASPER DOVER

## 2022-10-24 NOTE — PROGRESS NOTE ADULT - ASSESSMENT
87 year old female with a PMHx of HTN, HLD GERD, and gallbladder cancer s/p ex lap, open cholecystectomy, segment 4b/5 hepatectomy, and right colectomy due to fistula tract vs metastasis on 08/19, recently admitted at [facility] 9/19 - 9/29 for Acute hypoxic respiratory failure and Hepatic Abscess s/p drainage p/w unresponsiveness which was noted by family this morning.    Acute metabolic encephalopathy/ presumed septic encephalopathy   - CT head neg for acute pathology  - Evaluated by ICU  - s/p Bi-PAP, cont AVAPS O/N, NC  - cont to monitor    Acute hypercapnic respiratory failure  -improved today, off avaps this am and awake     Hepatic Abscess  - gallbladder cancer s/p ex lap, open cholecystectomy, segment 4b/5 hepatectomy, and right colectomy due to fistula tract vs metastasis on 08/19  - s/p outpt abx per ID note (pt w/ different [facility] chart) 10/13, pt completed Levaquin and flagyl prior admission  - rpt CT abd on Monday 10/17; Residual collection in the gallbladder fossa/liver unchanged from 10/13/2022 overlying for absence of IV contrast. Bladder distention resulting in mild bilateral hydroureteronephrosis.  - s/p IR aspiration 10/19 3cc aspirated, f/u analysis   -cultures negative so far, now only on ertapenem     Urinary Retention/ bilateral hydroureteronephrosis  1250 cc on Bladder scan   cont w/ indwelling chery  likely neurogenic bladder 2/2 bedbound status, will attempt tov soon     PT/OT eval     Hypokalemia- replaced, monitor  HTN/HLD- c/w Coreg, Amlodipine, fenofibrate  GERD- c/w PPI  # DVT ppx - HSQ,  3  Pt DNR, DNI

## 2022-10-24 NOTE — PROGRESS NOTE ADULT - SUBJECTIVE AND OBJECTIVE BOX
Patient is a 87y old  Female who presents with a chief complaint of Acute hypoxic and hypercapnic respiratory failure, altered mental status (24 Oct 2022 10:09)    INTERVAL HPI/OVERNIGHT EVENTS:    MEDICATIONS  (STANDING):  allopurinol 100 milliGRAM(s) Oral daily  amLODIPine   Tablet 2.5 milliGRAM(s) Oral daily  carvedilol 6.25 milliGRAM(s) Oral every 12 hours  ertapenem  IVPB 1000 milliGRAM(s) IV Intermittent every 24 hours  ertapenem  IVPB      fenofibrate Tablet 145 milliGRAM(s) Oral daily  heparin   Injectable 5000 Unit(s) SubCutaneous every 8 hours  pantoprazole    Tablet 40 milliGRAM(s) Oral before breakfast    MEDICATIONS  (PRN):  acetaminophen     Tablet .. 650 milliGRAM(s) Oral every 6 hours PRN Temp greater or equal to 38C (100.4F), Mild Pain (1 - 3)    Allergies    ampicillin-sulbactam (Rash)    Intolerances      REVIEW OF SYSTEMS:  All other systems reviewed and are negative    Vital Signs Last 24 Hrs  T(C): 37 (24 Oct 2022 10:23), Max: 37.1 (24 Oct 2022 08:10)  T(F): 98.6 (24 Oct 2022 10:23), Max: 98.7 (24 Oct 2022 08:10)  HR: 97 (24 Oct 2022 10:23) (61 - 97)  BP: 163/90 (24 Oct 2022 10:23) (125/62 - 167/65)  BP(mean): --  RR: 18 (24 Oct 2022 10:23) (18 - 18)  SpO2: 97% (24 Oct 2022 10:23) (93% - 97%)    Parameters below as of 24 Oct 2022 10:23  Patient On (Oxygen Delivery Method): room air      Daily     Daily Weight in k.5 (24 Oct 2022 05:00)  I&O's Summary    23 Oct 2022 07:01  -  24 Oct 2022 07:00  --------------------------------------------------------  IN: 0 mL / OUT: 1800 mL / NET: -1800 mL      CAPILLARY BLOOD GLUCOSE      POCT Blood Glucose.: 111 mg/dL (24 Oct 2022 08:02)    PHYSICAL EXAM:  GENERAL: NAD,    HEAD:  Atraumatic, Normocephalic  EYES: EOMI, PERRLA, conjunctiva and sclera clear  ENMT: No tonsillar erythema, exudates, or enlargement; Moist mucous membranes, Good dentition, No lesions  NECK: Supple, No JVD, Normal thyroid  NERVOUS SYSTEM:  Alert & Oriented X3, Good concentration; Motor Strength 5/5 B/L upper and lower extremities; DTRs 2+ intact and symmetric  CHEST/LUNG: Clear to percussion bilaterally; No rales, rhonchi, wheezing, or rubs  HEART: Regular rate and rhythm; No murmurs, rubs, or gallops  ABDOMEN: Soft, Nontender, Nondistended; Bowel sounds present  EXTREMITIES:  2+ Peripheral Pulses, No clubbing, cyanosis, or edema  LYMPH: No lymphadenopathy noted  SKIN: No rashes or lesions    Labs                                DVT prophylaxis: > Lovenox 40mg SQ daily  > Heparin   > SCD's

## 2022-10-24 NOTE — PROGRESS NOTE ADULT - SUBJECTIVE AND OBJECTIVE BOX
HARSH VERGARA  MRN-[id number]    Follow Up:  liver abscess     Interval History: the pt was seen and examined earlier, no distress, more awake and alert today, mildly confused, does not think she is in the hospital, states that she is hard of hearing, able to answer some of my questions. The pt is afebrile, on NC, no new lab work.     PAST MEDICAL & SURGICAL HISTORY:  Bladder cancer      HTN (hypertension)      HLD (hyperlipidemia)      Liver abscess      H/O gallbladder cancer          ROS: limited, pt denies chest pain, denies nausea or vomiting  [ ] Unobtainable because:  [ ] All other systems negative    Constitutional: no fever, no chills  Head: no trauma  Eyes: no vision changes, no eye pain  ENT:  no sore throat, no rhinorrhea  Cardiovascular:  no chest pain, no palpitation  Respiratory:  no SOB, no cough  GI:  no abd pain, no vomiting, no diarrhea  urinary: no dysuria, no hematuria, no flank pain  musculoskeletal:  no joint pain, no joint swelling  skin:  no rash  neurology:  no headache, no seizure, no change in mental status  psych: no anxiety, no depression         Allergies  ampicillin-sulbactam (Rash)        ANTIMICROBIALS:  ertapenem  IVPB 1000 every 24 hours  ertapenem  IVPB        OTHER MEDS:  acetaminophen     Tablet .. 650 milliGRAM(s) Oral every 6 hours PRN  allopurinol 100 milliGRAM(s) Oral daily  amLODIPine   Tablet 2.5 milliGRAM(s) Oral daily  carvedilol 6.25 milliGRAM(s) Oral every 12 hours  fenofibrate Tablet 145 milliGRAM(s) Oral daily  heparin   Injectable 5000 Unit(s) SubCutaneous every 8 hours  pantoprazole    Tablet 40 milliGRAM(s) Oral before breakfast      Vital Signs Last 24 Hrs  T(C): 37 (24 Oct 2022 10:23), Max: 37.1 (24 Oct 2022 08:10)  T(F): 98.6 (24 Oct 2022 10:23), Max: 98.7 (24 Oct 2022 08:10)  HR: 97 (24 Oct 2022 10:23) (61 - 97)  BP: 163/90 (24 Oct 2022 10:23) (125/62 - 167/65)  BP(mean): --  RR: 18 (24 Oct 2022 10:23) (18 - 18)  SpO2: 97% (24 Oct 2022 10:23) (93% - 97%)    Parameters below as of 24 Oct 2022 10:23  Patient On (Oxygen Delivery Method): room air        Physical Exam:  General: Nontoxic-appearing Female in no acute distress. NC  HEENT: AT/NC. Anicteric. Conjunctiva pink and moist. Oropharynx unable due to non-compliance   Neck: Not rigid. No sense of mass.  Nodes: None palpable.  Lungs: Diminished BS Bilaterally   Heart: Regular rate and rhythm, no audible murmur   Abdomen: Bowel sounds present and normoactive. Soft. Nondistended. Mildly tender in RUQ where abscess drainage was performed.  No sense of mass. No organomegaly. Incision is well healed.   : Otto   Back: No spinal tenderness. No costovertebral angle tenderness.   Extremities: No cyanosis or clubbing. No edema.   Skin: Warm. Dry. Good turgor. No rash. No vasculitic stigmata.  Psychiatric: awake, does not answer questions or follows commands    WBC Count: 8.59 K/uL (10-22 @ 11:15)  WBC Count: 8.47 K/uL (10-21 @ 08:30)  WBC Count: 5.33 K/uL (10-19 @ 07:22)  WBC Count: 5.17 K/uL (10-18 @ 08:07)      Creatinine Trend: 0.95<--, 0.78<--, 0.94<--, 1.04<--, 0.83<--, 0.61<--      MICROBIOLOGY:  v  .Body Fluid  10-19-22   No growth to date.  --    polymorphonuclear leukocytes seen  No organisms seen  by cytocentrifuge      Clean Catch Clean Catch (Midstream)  10-14-22   50,000 - 99,000 CFU/mL Enterococcus faecium (vancomycin resistant)  --  Enterococcus faecium (vancomycin resistant)      .Blood Blood-Peripheral  10-13-22   No Growth Final  --  --      .Blood Blood-Peripheral  10-13-22   No Growth Final  --  --    SARS-CoV-2 Result: NotDetec (10-20-22 @ 08:00)    SARS-CoV-2: NotDetec (14 Sep 2022 12:35)    RADIOLOGY:

## 2022-10-24 NOTE — PROGRESS NOTE ADULT - SUBJECTIVE AND OBJECTIVE BOX
HARSH VERGARA    [address]S [address]    Allergies    ampicillin-sulbactam (Rash)    Intolerances        PAST MEDICAL & SURGICAL HISTORY:  Bladder cancer      HTN (hypertension)      HLD (hyperlipidemia)      Liver abscess      H/O gallbladder cancer          FAMILY HISTORY:  No pertinent family history in first degree relatives        Home Medications:  ALLOPURINOL 100 MG TABLET: TAKE 1 TABLET ONCE A DAY (AT BEDTIME) ORALLY (13 Oct 2022 18:51)  AMLODIPINE 2.5MG TAB: 1 tab(s) orally once a day (13 Oct 2022 18:51)  CARVEDILOL 6.25 MG TABLET: TAKE 1 TABLET BY MOUTH TWICE A DAY (13 Oct 2022 18:51)  FENOFIBRATE 145 MG TABLET: TAKE 1 TABLET BY MOUTH EVERYDAY AT BEDTIME (13 Oct 2022 18:51)  levoFLOXacin 750 mg oral tablet: 1 tab(s) orally every 24 hours for 7 days (13 Oct 2022 18:51)  METRONIDAZOL 500MG TAB: 1 tab(s) orally 2 times a day for 14 days (13 Oct 2022 18:51)  MIRTAZAPINE 15 MG TABLET: TAKE 1 TABLET BY MOUTH EVERYDAY AT BEDTIME (13 Oct 2022 18:51)  PANTOPRAZOLE SOD DR 40 MG TAB: TAKE 1 TABLET BY MOUTH EVERY DAY IN THE MORNING (13 Oct 2022 18:51)      MEDICATIONS  (STANDING):  allopurinol 100 milliGRAM(s) Oral daily  amLODIPine   Tablet 2.5 milliGRAM(s) Oral daily  carvedilol 6.25 milliGRAM(s) Oral every 12 hours  ertapenem  IVPB 1000 milliGRAM(s) IV Intermittent every 24 hours  ertapenem  IVPB      fenofibrate Tablet 145 milliGRAM(s) Oral daily  heparin   Injectable 5000 Unit(s) SubCutaneous every 8 hours  pantoprazole    Tablet 40 milliGRAM(s) Oral before breakfast    MEDICATIONS  (PRN):  acetaminophen     Tablet .. 650 milliGRAM(s) Oral every 6 hours PRN Temp greater or equal to 38C (100.4F), Mild Pain (1 - 3)              Vital Signs Last 24 Hrs  T(C): 37.1 (24 Oct 2022 08:10), Max: 37.1 (24 Oct 2022 08:10)  T(F): 98.7 (24 Oct 2022 08:10), Max: 98.7 (24 Oct 2022 08:10)  HR: 61 (24 Oct 2022 09:13) (61 - 85)  BP: 125/62 (24 Oct 2022 08:10) (125/62 - 167/65)  BP(mean): --  RR: 18 (24 Oct 2022 08:10) (18 - 18)  SpO2: 97% (24 Oct 2022 09:13) (93% - 97%)    Parameters below as of 24 Oct 2022 08:10  Patient On (Oxygen Delivery Method): nasal cannula  O2 Flow (L/min): 6        10-23-22 @ 07:01  -  10-24-22 @ 07:00  --------------------------------------------------------  IN: 0 mL / OUT: 1800 mL / NET: -1800 mL              LABS:                        8.5    8.59  )-----------( 210      ( 22 Oct 2022 11:15 )             28.4     10-22    141  |  99  |  17  ----------------------------<  112<H>  4.6   |  41<H>  |  0.95    Ca    10.6<H>      22 Oct 2022 11:15    TPro  5.9<L>  /  Alb  2.4<L>  /  TBili  0.4  /  DBili  x   /  AST  11<L>  /  ALT  9<L>  /  AlkPhos  44  10-22          ABG - ( 22 Oct 2022 10:51 )  pH, Arterial: 7.39  pH, Blood: x     /  pCO2: 72    /  pO2: 115   / HCO3: 44    / Base Excess: 15.0  /  SaO2: 98.1                WBC:  WBC Count: 8.59 K/uL (10-22 @ 11:15)  WBC Count: 8.47 K/uL (10-21 @ 08:30)      MICROBIOLOGY:  RECENT CULTURES:  10-19 .Body Fluid XXXX   polymorphonuclear leukocytes seen  No organisms seen  by cytocentrifuge   No growth to date.                    Sodium:  Sodium, Serum: 141 mmol/L (10-22 @ 11:15)  Sodium, Serum: 139 mmol/L (10-21 @ 08:30)      0.95 mg/dL 10-22 @ 11:15  0.78 mg/dL 10-21 @ 08:30      Hemoglobin:  Hemoglobin: 8.5 g/dL (10-22 @ 11:15)  Hemoglobin: 8.7 g/dL (10-21 @ 08:30)      Platelets: Platelet Count - Automated: 210 K/uL (10-22 @ 11:15)  Platelet Count - Automated: 192 K/uL (10-21 @ 08:30)      LIVER FUNCTIONS - ( 22 Oct 2022 11:15 )  Alb: 2.4 g/dL / Pro: 5.9 gm/dL / ALK PHOS: 44 U/L / ALT: 9 U/L / AST: 11 U/L / GGT: x                 RADIOLOGY & ADDITIONAL STUDIES:      MICROBIOLOGY:  RECENT CULTURES:  10-19 .Body Fluid XXXX   polymorphonuclear leukocytes seen  No organisms seen  by cytocentrifuge   No growth to date.

## 2022-10-24 NOTE — PROGRESS NOTE ADULT - ASSESSMENT
Recurrent collection.  Remarkably nontoxic at this juncture.  WBC normal  Afebrile  HD stabke  Benign abdomen    10/17: no fevers, no wbc, Cr and LFTs ok, BCs with no growth, CT abd was repeated today - no change, needs to be seen by IR. Vancomycin IV, ertapenem iv and Caspofungin continued.   10/18: remains afebrile, no leukocytosis, cr and Lfts ok, UC with VRE - no changes in abx at this time, + urinary retention, with chery now. IR evaluation possibly tomorrow for liver abscess, re-demonstrated on yesterday's CT.   10/19: no fevers, no leukocytosis, Cr ok, awaiting for IR evaluation for possible drainage of liver abscess, will continue with our current antimicrobial selection while awaiting for possible abscess aspirate.   10/20: afebrile, on NC, no new cbc, s/p hepatic abscess drainage yesterday - 3 cc of purulent drainage, smear with no organisms seen, culture is being performed, will continue with ertapenem, caspofungin and vancomycin while awaiting for the culture.   10/21: no fever, on NC, no wbc, liver abscess culture with no growth to date, no evidence of MRSA or fungal infection at this time, will stop vancomycin and caspofungin, will continue with ertapenem.   10/24: no fever, on NC, no new lab work, abscess culture with no growth to date, still mildly tender in RUQ where the drainage was performed. The plan overall is to treat the pt with iv abx x 4 weeks from the day of drainage (10/19/22) and then repeat CT abd/pelvis to ensure the improvement.     Suggestions  continue IV ertapenem - last dose on 11/15/2022  will need f/up CT abd/pelvis prior completion of the course of abx to ensure abscess improvement   follow aspirate culture   follow Blood cx data - NGTD  UC noted  trend temperatures and wbc   CXR - personally reviewed, worsening, f/up on official reading     when ready for discharge:  midline  continue ertapenem - last dose on 11/15  repeat CT a/p prior completion of abx  weekly lab work: cbc with diff, cmp, esr, crp to be faxed to Dr. Garcia at [phone]  f/up with ID:  [id number] [place] [address]  [phone]      discussed with Dr. Garcia  discussed with Dr. Wayne

## 2022-10-25 PROCEDURE — 99233 SBSQ HOSP IP/OBS HIGH 50: CPT

## 2022-10-25 PROCEDURE — 99232 SBSQ HOSP IP/OBS MODERATE 35: CPT | Mod: GC

## 2022-10-25 PROCEDURE — 99232 SBSQ HOSP IP/OBS MODERATE 35: CPT

## 2022-10-25 RX ORDER — AMLODIPINE BESYLATE 2.5 MG/1
5 TABLET ORAL ONCE
Refills: 0 | Status: COMPLETED | OUTPATIENT
Start: 2022-10-25 | End: 2022-10-25

## 2022-10-25 RX ADMIN — PANTOPRAZOLE SODIUM 40 MILLIGRAM(S): 20 TABLET, DELAYED RELEASE ORAL at 06:03

## 2022-10-25 RX ADMIN — AMLODIPINE BESYLATE 2.5 MILLIGRAM(S): 2.5 TABLET ORAL at 05:07

## 2022-10-25 RX ADMIN — HEPARIN SODIUM 5000 UNIT(S): 5000 INJECTION INTRAVENOUS; SUBCUTANEOUS at 14:12

## 2022-10-25 RX ADMIN — Medication 100 MILLIGRAM(S): at 11:20

## 2022-10-25 RX ADMIN — HEPARIN SODIUM 5000 UNIT(S): 5000 INJECTION INTRAVENOUS; SUBCUTANEOUS at 05:07

## 2022-10-25 RX ADMIN — ERTAPENEM SODIUM 120 MILLIGRAM(S): 1 INJECTION, POWDER, LYOPHILIZED, FOR SOLUTION INTRAMUSCULAR; INTRAVENOUS at 14:19

## 2022-10-25 RX ADMIN — Medication 145 MILLIGRAM(S): at 11:20

## 2022-10-25 RX ADMIN — CARVEDILOL PHOSPHATE 6.25 MILLIGRAM(S): 80 CAPSULE, EXTENDED RELEASE ORAL at 05:07

## 2022-10-25 RX ADMIN — HEPARIN SODIUM 5000 UNIT(S): 5000 INJECTION INTRAVENOUS; SUBCUTANEOUS at 21:12

## 2022-10-25 RX ADMIN — CARVEDILOL PHOSPHATE 6.25 MILLIGRAM(S): 80 CAPSULE, EXTENDED RELEASE ORAL at 17:45

## 2022-10-25 RX ADMIN — AMLODIPINE BESYLATE 5 MILLIGRAM(S): 2.5 TABLET ORAL at 16:13

## 2022-10-25 NOTE — PROGRESS NOTE ADULT - SUBJECTIVE AND OBJECTIVE BOX
HARSH VERGARA  [id number]    Follow Up:  hepatic abscess    Interval History: the pt was seen and examined earlier, no distress, more awake and alert today, confused, does not answer any of my questions, states "I am a parent", does not seem to be complaining of anything during my exam. The pt had an isolated low grade temp yesterday evening, no fevers today, no leukocytosis, remains on NC.     PAST MEDICAL & SURGICAL HISTORY:  Bladder cancer      HTN (hypertension)      HLD (hyperlipidemia)      Liver abscess      H/O gallbladder cancer          ROS:    [x ] Unobtainable because: confused   [ ] All other systems negative    Constitutional: no fever, no chills  Head: no trauma  Eyes: no vision changes, no eye pain  ENT:  no sore throat, no rhinorrhea  Cardiovascular:  no chest pain, no palpitation  Respiratory:  no SOB, no cough  GI:  no abd pain, no vomiting, no diarrhea  urinary: no dysuria, no hematuria, no flank pain  musculoskeletal:  no joint pain, no joint swelling  skin:  no rash  neurology:  no headache, no seizure, no change in mental status  psych: no anxiety, no depression         Allergies  ampicillin-sulbactam (Rash)        ANTIMICROBIALS:  ertapenem  IVPB 1000 every 24 hours  ertapenem  IVPB        OTHER MEDS:  acetaminophen     Tablet .. 650 milliGRAM(s) Oral every 6 hours PRN  allopurinol 100 milliGRAM(s) Oral daily  amLODIPine   Tablet 2.5 milliGRAM(s) Oral daily  carvedilol 6.25 milliGRAM(s) Oral every 12 hours  fenofibrate Tablet 145 milliGRAM(s) Oral daily  heparin   Injectable 5000 Unit(s) SubCutaneous every 8 hours  pantoprazole    Tablet 40 milliGRAM(s) Oral before breakfast      Vital Signs Last 24 Hrs  T(C): 36.8 (25 Oct 2022 10:51), Max: 38.1 (24 Oct 2022 17:23)  T(F): 98.3 (25 Oct 2022 10:51), Max: 100.5 (24 Oct 2022 17:23)  HR: 86 (25 Oct 2022 10:51) (70 - 94)  BP: 145/73 (25 Oct 2022 10:51) (145/73 - 180/66)  BP(mean): --  RR: 16 (25 Oct 2022 10:51) (16 - 18)  SpO2: 98% (25 Oct 2022 10:51) (92% - 98%)    Parameters below as of 25 Oct 2022 10:51  Patient On (Oxygen Delivery Method): nasal cannula  O2 Flow (L/min): 4      Physical Exam:  General: Nontoxic-appearing Female in no acute distress. NC  HEENT: AT/NC. Anicteric. Conjunctiva pink and moist. Oropharynx unable due to non-compliance   Neck: Not rigid. No sense of mass.  Nodes: None palpable.  Lungs: Diminished BS Bilaterally   Heart: Regular rate and rhythm, no audible murmur   Abdomen: Bowel sounds present and normoactive. Soft. Nondistended. Does not seem to be tender on today's exam.  No sense of mass. No organomegaly. Incision is well healed.   : Otto   Back: No spinal tenderness. No costovertebral angle tenderness.   Extremities: No cyanosis or clubbing. No edema.   Skin: Warm. Dry. Good turgor. No rash. No vasculitic stigmata.  Psychiatric: awake, does not answer questions or follows commands    WBC Count: 8.59 K/uL (10-22 @ 11:15)  WBC Count: 8.47 K/uL (10-21 @ 08:30)  WBC Count: 5.33 K/uL (10-19 @ 07:22)        Creatinine Trend: 0.95<--, 0.78<--, 0.94<--, 1.04<--, 0.83<--, 0.61<--      MICROBIOLOGY:  v  .Body Fluid  10-19-22   No growth at 5 days  --    polymorphonuclear leukocytes seen  No organisms seen  by cytocentrifuge      Clean Catch Clean Catch (Midstream)  10-14-22   50,000 - 99,000 CFU/mL Enterococcus faecium (vancomycin resistant)  --  Enterococcus faecium (vancomycin resistant)      .Blood Blood-Peripheral  10-13-22   No Growth Final  --  --      .Blood Blood-Peripheral  10-13-22   No Growth Final  --  --      SARS-CoV-2: NotDetec (14 Sep 2022 12:35)    RADIOLOGY:

## 2022-10-25 NOTE — PHYSICAL THERAPY INITIAL EVALUATION ADULT - MANUAL MUSCLE TESTING RESULTS, REHAB EVAL
Unable to follow commands for MMT: Grossly Assessed B UE 3-/5 B LE 3-/5 Unable to follow commands for MMT: Grossly Assessed B UE 3-/5 B LE 2/5

## 2022-10-25 NOTE — PROGRESS NOTE ADULT - ASSESSMENT
REVIEW OF SYMPTOMS      Able to give (reliable) ROS  NO     PHYSICAL EXAM    HEENT Unremarkable  atraumatic   RESP Fair air entry EXP prolonged    Harsh breath sound Resp distres mild   CARDIAC S1 S2 No S3     NO JVD    ABDOMEN SOFT BS PRESENT NOT DISTENDED No hepatosplenomegaly   PEDAL EDEMA present No calf tenderness  NO rash       GENERAL DATA .   GOC.   10/15/2022 dnr     ALLGY.    ampicillin sulbactam                         WT. 10/15/2022 51   BMI.  10/15/2022 22                             ICU STAY. none  COVID.  10/13 scv2 (-)     BEST PRACTICE ISSUES.    HOB ELEVATN. Yes  DVT PPLX.   10/20/2022 hpsc   10/13- 10/18/2022 hpsc    PARSON PPLX.    10/13 protonix 40   INFN PPLX.    SP SW PAULETTE.        DIET.     10/16/2022 regl    VS/ PO/IO/ VENT/ DRIPS.  10/25/2022 afeb 87 140/70   10/25/2022 4l 98%     ASSESSMENT/RECOMMENDATIONS .   RESP.  -- Resp failure.  10/16/2022 8a avaps 30-10/8 450 .25 20  743/39/82   10/22/2022 avaps 450/20/r 20 epap 8 max 30 fio2 50% 739/72 115   a/r  Is using noct avaps will need noct bpap at discharg   -- RO PE.  10/13 cta ch No pe   is on dvt p   -- Pl effsn.  cxr 10/24 mod r small l effs   INFECTION.  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  10/13 amlodipine 2.5  10/13 carvedilol 6.25 x2   10/13 fenofibrate 145   cont rx   GI.  -- GB cancer   -- Liver abscess   10/19 ir drainage hept absc   as above   HEMAT.  -- Anemia   Hb 10/15-10/16-10/18-10/19-10/22/2022 Hb 10 - 10.2- 9.3 - 8.9 - 8.5   RENAL.  Na 10/15/2022 Na 135  Cr 10/15/2022 Cr .6   -- Urine retn  10/18/2022 Otto    OVERALL ASSESSMENT/DISPOSITION.  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 admitted 10/13/2022 with type 2 resp failure liver abscess    Liver abscess   ct cap 10/13 ct  c ap 4.8 x 2.4 cm abscess gb fossa rim enhanc sp cholecystectomy   uc 10/14 50-99 gpo vre   katja ir  10/19  (-)   10/14 invanz     resp failure   10/22/2022 avaps 450/20/r 20 epap 8 max 30 fio2 50% 739/72 115   a/r  Is using noct avaps will need noct bpap at discharg     Urine retn  10/18/2022 Otto        TIME SPENT   Over 25 minutes aggregate care time spent on encounter; activities included   direct patient care, counseling and/or coordinating care reviewing notes, lab data/ imaging , discussion with multidisciplinary team/ patient  /family and explaining in detail risks, benefits, alternatives  of the recommendations     [facility] 87 f 10/13/2022 3/10/1930 DR JASPER DOVER

## 2022-10-25 NOTE — PROGRESS NOTE ADULT - SUBJECTIVE AND OBJECTIVE BOX
Patient is a 87y old  Female who presents with a chief complaint of Acute hypoxic and hypercapnic respiratory failure, altered mental status (25 Oct 2022 09:16)    INTERVAL HPI/OVERNIGHT EVENTS: no events     MEDICATIONS  (STANDING):  allopurinol 100 milliGRAM(s) Oral daily  amLODIPine   Tablet 2.5 milliGRAM(s) Oral daily  carvedilol 6.25 milliGRAM(s) Oral every 12 hours  ertapenem  IVPB 1000 milliGRAM(s) IV Intermittent every 24 hours  ertapenem  IVPB      fenofibrate Tablet 145 milliGRAM(s) Oral daily  heparin   Injectable 5000 Unit(s) SubCutaneous every 8 hours  pantoprazole    Tablet 40 milliGRAM(s) Oral before breakfast    MEDICATIONS  (PRN):  acetaminophen     Tablet .. 650 milliGRAM(s) Oral every 6 hours PRN Temp greater or equal to 38C (100.4F), Mild Pain (1 - 3)    Allergies    ampicillin-sulbactam (Rash)    Intolerances      REVIEW OF SYSTEMS:  All other systems reviewed and are negative    Vital Signs Last 24 Hrs  T(C): 37.3 (25 Oct 2022 05:02), Max: 38.1 (24 Oct 2022 17:23)  T(F): 99.2 (25 Oct 2022 05:02), Max: 100.5 (24 Oct 2022 17:23)  HR: 87 (25 Oct 2022 08:51) (70 - 94)  BP: 160/71 (25 Oct 2022 08:51) (147/67 - 180/66)  BP(mean): --  RR: 18 (25 Oct 2022 05:02) (18 - 18)  SpO2: 97% (25 Oct 2022 08:51) (92% - 97%)    Parameters below as of 25 Oct 2022 08:51  Patient On (Oxygen Delivery Method): nasal cannula  O2 Flow (L/min): 4    Daily     Daily Weight in k.2 (25 Oct 2022 05:02)  I&O's Summary    24 Oct 2022 07:01  -  25 Oct 2022 07:00  --------------------------------------------------------  IN: 240 mL / OUT: 2600 mL / NET: -2360 mL      CAPILLARY BLOOD GLUCOSE        PHYSICAL EXAM:  GENERAL: NAD,    HEAD:  Atraumatic, Normocephalic  EYES: EOMI, PERRLA, conjunctiva and sclera clear  ENMT: No tonsillar erythema, exudates, or enlargement; Moist mucous membranes, Good dentition, No lesions  NECK: Supple, No JVD, Normal thyroid  NERVOUS SYSTEM:  Alert & Oriented X3, Good concentration; Motor Strength 5/5 B/L upper and lower extremities; DTRs 2+ intact and symmetric  CHEST/LUNG: Clear to percussion bilaterally; No rales, rhonchi, wheezing, or rubs  HEART: Regular rate and rhythm; No murmurs, rubs, or gallops  ABDOMEN: Soft, Nontender, Nondistended; Bowel sounds present  EXTREMITIES:  2+ Peripheral Pulses, No clubbing, cyanosis, or edema  LYMPH: No lymphadenopathy noted  SKIN: No rashes or lesions    Labs                                DVT prophylaxis: > Lovenox 40mg SQ daily  > Heparin   > SCD's

## 2022-10-25 NOTE — PROGRESS NOTE ADULT - ASSESSMENT
Recurrent collection.  Remarkably nontoxic at this juncture.  WBC normal  Afebrile  HD stabke  Benign abdomen    10/17: no fevers, no wbc, Cr and LFTs ok, BCs with no growth, CT abd was repeated today - no change, needs to be seen by IR. Vancomycin IV, ertapenem iv and Caspofungin continued.   10/18: remains afebrile, no leukocytosis, cr and Lfts ok, UC with VRE - no changes in abx at this time, + urinary retention, with chery now. IR evaluation possibly tomorrow for liver abscess, re-demonstrated on yesterday's CT.   10/19: no fevers, no leukocytosis, Cr ok, awaiting for IR evaluation for possible drainage of liver abscess, will continue with our current antimicrobial selection while awaiting for possible abscess aspirate.   10/20: afebrile, on NC, no new cbc, s/p hepatic abscess drainage yesterday - 3 cc of purulent drainage, smear with no organisms seen, culture is being performed, will continue with ertapenem, caspofungin and vancomycin while awaiting for the culture.   10/21: no fever, on NC, no wbc, liver abscess culture with no growth to date, no evidence of MRSA or fungal infection at this time, will stop vancomycin and caspofungin, will continue with ertapenem.   10/24: no fever, on NC, no new lab work, abscess culture with no growth to date, still mildly tender in RUQ where the drainage was performed. The plan overall is to treat the pt with iv abx x 4 weeks from the day of drainage (10/19/22) and then repeat CT abd/pelvis to ensure the improvement.   10/25: low grade temp yesterday evening 100.5, isolated fever, will continue to monitor. No WBC, abd exam is benign today, Cr and LFTs ok, ertapenem IV continued until 11/15, repeat CT a/p prior completion of the course of abx. Seen by PT earlier today - recommendation KENISHA    Suggestions  continue IV ertapenem - last dose on 11/15/2022  will need f/up CT abd/pelvis prior completion of the course of abx to ensure hepatic abscess resolution  follow aspirate culture NGTD  follow Blood cx data - NGTD  UC noted  trend temperatures and wbc   CXR - reviewed     when ready for discharge:  midline  continue ertapenem - last dose on 11/15  repeat CT a/p prior completion of abx  weekly lab work: cbc with diff, cmp, esr, crp to be faxed to Dr. Garcia at [phone]  f/up with ID:  [id number] [place] [address] [phone]      discussed with Dr. Garcia

## 2022-10-25 NOTE — PROGRESS NOTE ADULT - ASSESSMENT
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 Bladder distention resulting in mild bilateral hydroureteronephrosis.  - s/p IR aspiration 10/19 3cc aspirated, f/u analysis   -Ertapenem until 11/15  Fever yesterday, id following     Urinary Retention/ bilateral hydroureteronephrosis  1250 cc on Bladder scan   cont w/ indwelling chery  likely neurogenic bladder 2/2 bedbound status, will attempt tov soon     PT/OT eval : KENISHA     Hypokalemia- replaced, monitor  HTN/HLD- c/w Coreg, Amlodipine, fenofibrate  GERD- c/w PPI  # DVT ppx - HSQ,  3  Pt DNR, DNI

## 2022-10-25 NOTE — PROGRESS NOTE ADULT - SUBJECTIVE AND OBJECTIVE BOX
HARSH VERGARA    LVS 2C [address]    Allergies    ampicillin-sulbactam (Rash)    Intolerances        PAST MEDICAL & SURGICAL HISTORY:  Bladder cancer      HTN (hypertension)      HLD (hyperlipidemia)      Liver abscess      H/O gallbladder cancer          FAMILY HISTORY:  No pertinent family history in first degree relatives        Home Medications:  ALLOPURINOL 100 MG TABLET: TAKE 1 TABLET ONCE A DAY (AT BEDTIME) ORALLY (13 Oct 2022 18:51)  AMLODIPINE 2.5MG TAB: 1 tab(s) orally once a day (13 Oct 2022 18:51)  CARVEDILOL 6.25 MG TABLET: TAKE 1 TABLET BY MOUTH TWICE A DAY (13 Oct 2022 18:51)  FENOFIBRATE 145 MG TABLET: TAKE 1 TABLET BY MOUTH EVERYDAY AT BEDTIME (13 Oct 2022 18:51)  levoFLOXacin 750 mg oral tablet: 1 tab(s) orally every 24 hours for 7 days (13 Oct 2022 18:51)  METRONIDAZOL 500MG TAB: 1 tab(s) orally 2 times a day for 14 days (13 Oct 2022 18:51)  MIRTAZAPINE 15 MG TABLET: TAKE 1 TABLET BY MOUTH EVERYDAY AT BEDTIME (13 Oct 2022 18:51)  PANTOPRAZOLE SOD DR 40 MG TAB: TAKE 1 TABLET BY MOUTH EVERY DAY IN THE MORNING (13 Oct 2022 18:51)      MEDICATIONS  (STANDING):  allopurinol 100 milliGRAM(s) Oral daily  amLODIPine   Tablet 2.5 milliGRAM(s) Oral daily  carvedilol 6.25 milliGRAM(s) Oral every 12 hours  ertapenem  IVPB 1000 milliGRAM(s) IV Intermittent every 24 hours  ertapenem  IVPB      fenofibrate Tablet 145 milliGRAM(s) Oral daily  heparin   Injectable 5000 Unit(s) SubCutaneous every 8 hours  pantoprazole    Tablet 40 milliGRAM(s) Oral before breakfast    MEDICATIONS  (PRN):  acetaminophen     Tablet .. 650 milliGRAM(s) Oral every 6 hours PRN Temp greater or equal to 38C (100.4F), Mild Pain (1 - 3)              Vital Signs Last 24 Hrs  T(C): 37.3 (25 Oct 2022 05:02), Max: 38.1 (24 Oct 2022 17:23)  T(F): 99.2 (25 Oct 2022 05:02), Max: 100.5 (24 Oct 2022 17:23)  HR: 78 (25 Oct 2022 06:04) (70 - 97)  BP: 164/71 (25 Oct 2022 05:02) (147/67 - 180/66)  BP(mean): --  RR: 18 (25 Oct 2022 05:02) (18 - 18)  SpO2: 93% (25 Oct 2022 08:10) (92% - 97%)    Parameters below as of 25 Oct 2022 05:02  Patient On (Oxygen Delivery Method): nasal cannula  O2 Flow (L/min): 4        10-24-22 @ 07:01  -  10-25-22 @ 07:00  --------------------------------------------------------  IN: 240 mL / OUT: 2600 mL / NET: -2360 mL              LABS:                    WBC:  WBC Count: 8.59 K/uL (10-22 @ 11:15)      MICROBIOLOGY:  RECENT CULTURES:  10-19 .Body Fluid XXXX   polymorphonuclear leukocytes seen  No organisms seen  by cytocentrifuge   No growth at 5 days                    Sodium:  Sodium, Serum: 141 mmol/L (10-22 @ 11:15)      0.95 mg/dL 10-22 @ 11:15      Hemoglobin:  Hemoglobin: 8.5 g/dL (10-22 @ 11:15)      Platelets: Platelet Count - Automated: 210 K/uL (10-22 @ 11:15)              RADIOLOGY & ADDITIONAL STUDIES:      MICROBIOLOGY:  RECENT CULTURES:  10-19 .Body Fluid XXXX   polymorphonuclear leukocytes seen  No organisms seen  by cytocentrifuge   No growth at 5 days

## 2022-10-26 LAB
ANION GAP SERPL CALC-SCNC: 6 MMOL/L — SIGNIFICANT CHANGE UP (ref 5–17)
BUN SERPL-MCNC: 19 MG/DL — SIGNIFICANT CHANGE UP (ref 7–23)
CALCIUM SERPL-MCNC: 11.2 MG/DL — HIGH (ref 8.5–10.1)
CHLORIDE SERPL-SCNC: 91 MMOL/L — LOW (ref 96–108)
CO2 SERPL-SCNC: 41 MMOL/L — HIGH (ref 22–31)
CREAT SERPL-MCNC: 0.91 MG/DL — SIGNIFICANT CHANGE UP (ref 0.5–1.3)
EGFR: 61 ML/MIN/1.73M2 — SIGNIFICANT CHANGE UP
FLUAV AG NPH QL: SIGNIFICANT CHANGE UP
FLUBV AG NPH QL: SIGNIFICANT CHANGE UP
GLUCOSE SERPL-MCNC: 103 MG/DL — HIGH (ref 70–99)
HCT VFR BLD CALC: 32.4 % — LOW (ref 34.5–45)
HGB BLD-MCNC: 9.8 G/DL — LOW (ref 11.5–15.5)
MCHC RBC-ENTMCNC: 26 PG — LOW (ref 27–34)
MCHC RBC-ENTMCNC: 30.2 G/DL — LOW (ref 32–36)
MCV RBC AUTO: 85.9 FL — SIGNIFICANT CHANGE UP (ref 80–100)
NRBC # BLD: 0 /100 WBCS — SIGNIFICANT CHANGE UP (ref 0–0)
PLATELET # BLD AUTO: 342 K/UL — SIGNIFICANT CHANGE UP (ref 150–400)
POTASSIUM SERPL-MCNC: 3.6 MMOL/L — SIGNIFICANT CHANGE UP (ref 3.5–5.3)
POTASSIUM SERPL-SCNC: 3.6 MMOL/L — SIGNIFICANT CHANGE UP (ref 3.5–5.3)
RBC # BLD: 3.77 M/UL — LOW (ref 3.8–5.2)
RBC # FLD: 16.6 % — HIGH (ref 10.3–14.5)
SARS-COV-2 RNA SPEC QL NAA+PROBE: SIGNIFICANT CHANGE UP
SODIUM SERPL-SCNC: 138 MMOL/L — SIGNIFICANT CHANGE UP (ref 135–145)
WBC # BLD: 4.81 K/UL — SIGNIFICANT CHANGE UP (ref 3.8–10.5)
WBC # FLD AUTO: 4.81 K/UL — SIGNIFICANT CHANGE UP (ref 3.8–10.5)

## 2022-10-26 PROCEDURE — 99233 SBSQ HOSP IP/OBS HIGH 50: CPT

## 2022-10-26 PROCEDURE — 99232 SBSQ HOSP IP/OBS MODERATE 35: CPT | Mod: GC

## 2022-10-26 PROCEDURE — 99232 SBSQ HOSP IP/OBS MODERATE 35: CPT

## 2022-10-26 RX ADMIN — AMLODIPINE BESYLATE 2.5 MILLIGRAM(S): 2.5 TABLET ORAL at 05:32

## 2022-10-26 RX ADMIN — ERTAPENEM SODIUM 120 MILLIGRAM(S): 1 INJECTION, POWDER, LYOPHILIZED, FOR SOLUTION INTRAMUSCULAR; INTRAVENOUS at 14:05

## 2022-10-26 RX ADMIN — HEPARIN SODIUM 5000 UNIT(S): 5000 INJECTION INTRAVENOUS; SUBCUTANEOUS at 05:33

## 2022-10-26 RX ADMIN — HEPARIN SODIUM 5000 UNIT(S): 5000 INJECTION INTRAVENOUS; SUBCUTANEOUS at 21:13

## 2022-10-26 RX ADMIN — PANTOPRAZOLE SODIUM 40 MILLIGRAM(S): 20 TABLET, DELAYED RELEASE ORAL at 05:33

## 2022-10-26 RX ADMIN — HEPARIN SODIUM 5000 UNIT(S): 5000 INJECTION INTRAVENOUS; SUBCUTANEOUS at 14:05

## 2022-10-26 RX ADMIN — Medication 100 MILLIGRAM(S): at 12:09

## 2022-10-26 RX ADMIN — CARVEDILOL PHOSPHATE 6.25 MILLIGRAM(S): 80 CAPSULE, EXTENDED RELEASE ORAL at 17:10

## 2022-10-26 RX ADMIN — Medication 145 MILLIGRAM(S): at 12:09

## 2022-10-26 RX ADMIN — CARVEDILOL PHOSPHATE 6.25 MILLIGRAM(S): 80 CAPSULE, EXTENDED RELEASE ORAL at 05:32

## 2022-10-26 NOTE — PROGRESS NOTE ADULT - SUBJECTIVE AND OBJECTIVE BOX
HARSH VERGARA  MRN-[id number]    Follow Up:  hepatic abscess     Interval History: the pt was seen and examined earlier, no distress, in her bed, mental status seems to be a little better today, states "I want to go home". The pt is afebrile, no leukocytosis, on NC    PAST MEDICAL & SURGICAL HISTORY:  Bladder cancer      HTN (hypertension)      HLD (hyperlipidemia)      Liver abscess      H/O gallbladder cancer          ROS:    [x ] Unobtainable because: confused   [ ] All other systems negative    Constitutional: no fever, no chills  Head: no trauma  Eyes: no vision changes, no eye pain  ENT:  no sore throat, no rhinorrhea  Cardiovascular:  no chest pain, no palpitation  Respiratory:  no SOB, no cough  GI:  no abd pain, no vomiting, no diarrhea  urinary: no dysuria, no hematuria, no flank pain  musculoskeletal:  no joint pain, no joint swelling  skin:  no rash  neurology:  no headache, no seizure, no change in mental status  psych: no anxiety, no depression         Allergies  ampicillin-sulbactam (Rash)        ANTIMICROBIALS:  ertapenem  IVPB 1000 every 24 hours  ertapenem  IVPB        OTHER MEDS:  acetaminophen     Tablet .. 650 milliGRAM(s) Oral every 6 hours PRN  allopurinol 100 milliGRAM(s) Oral daily  amLODIPine   Tablet 2.5 milliGRAM(s) Oral daily  carvedilol 6.25 milliGRAM(s) Oral every 12 hours  fenofibrate Tablet 145 milliGRAM(s) Oral daily  heparin   Injectable 5000 Unit(s) SubCutaneous every 8 hours  pantoprazole    Tablet 40 milliGRAM(s) Oral before breakfast      Vital Signs Last 24 Hrs  T(C): 37 (26 Oct 2022 17:13), Max: 37 (26 Oct 2022 15:57)  T(F): 98.6 (26 Oct 2022 17:13), Max: 98.6 (26 Oct 2022 15:57)  HR: 96 (26 Oct 2022 17:13) (85 - 104)  BP: 190/90 (26 Oct 2022 17:13) (144/74 - 190/90)  BP(mean): --  RR: 18 (26 Oct 2022 17:13) (18 - 18)  SpO2: 97% (26 Oct 2022 17:13) (94% - 99%)    Parameters below as of 26 Oct 2022 17:13  Patient On (Oxygen Delivery Method): nasal cannula  O2 Flow (L/min): 3      Physical Exam:  General: Nontoxic-appearing Female in no acute distress. NC  HEENT: AT/NC. Anicteric. Conjunctiva pink and moist. Oropharynx unable due to non-compliance   Neck: Not rigid. No sense of mass.  Nodes: None palpable.  Lungs: Diminished BS Bilaterally   Heart: Regular rate and rhythm, no audible murmur   Abdomen: Bowel sounds present and normoactive. Soft. Nondistended. Does not seem to be tender on today's exam.  No sense of mass. No organomegaly. Incision is well healed.   : Otto   Back: No spinal tenderness. No costovertebral angle tenderness.   Extremities: No cyanosis or clubbing. No edema.   Skin: Warm. Dry. Good turgor. No rash. No vasculitic stigmata.  Psychiatric: awake, alert, confused     WBC Count: 4.81 K/uL (10-26 @ 07:23)  WBC Count: 8.59 K/uL (10-22 @ 11:15)  WBC Count: 8.47 K/uL (10-21 @ 08:30)                            9.8    4.81  )-----------( 342      ( 26 Oct 2022 07:23 )             32.4       10-26    138  |  91<L>  |  19  ----------------------------<  103<H>  3.6   |  41<H>  |  0.91    Ca    11.2<H>      26 Oct 2022 07:23            Creatinine Trend: 0.91<--, 0.95<--, 0.78<--, 0.94<--, 1.04<--, 0.83<--      MICROBIOLOGY:  v  .Body Fluid  10-19-22   No growth at 5 days  --    polymorphonuclear leukocytes seen  No organisms seen  by cytocentrifuge      Clean Catch Clean Catch (Midstream)  10-14-22   50,000 - 99,000 CFU/mL Enterococcus faecium (vancomycin resistant)  --  Enterococcus faecium (vancomycin resistant)      .Blood Blood-Peripheral  10-13-22   No Growth Final  --  --      .Blood Blood-Peripheral  10-13-22   No Growth Final  --  --      SARS-CoV-2 Result: NotDetec (10-26-22 @ 06:00)    SARS-CoV-2: NotLuz Maria (14 Sep 2022 12:35)    RADIOLOGY:

## 2022-10-26 NOTE — PROGRESS NOTE ADULT - ASSESSMENT
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 9/19 - 9/29 for Acute hypoxic respiratory failure and Hepatic Abscess s/p drainage p/w unresponsiveness which was noted by family this morning.    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 chart) 10/13, pt completed Levaquin and flagyl prior admission  - rpt CT abd on Monday 10/17; Residual collection in the gallbladder fossa/liver unchanged from 10/13/2022 overlying for absence of IV contrast. Bladder distention resulting in mild bilateral hydroureteronephrosis.  - s/p IR aspiration 10/19 3cc aspirated, f/u analysis   -Ertapenem until 11/15  Fever yesterday, id following     Urinary Retention/ bilateral hydroureteronephrosis  1250 cc on Bladder scan   cont w/ indwelling chery  likely neurogenic bladder 2/2 bedbound status, will attempt tov soon     PT/OT eval : KENISHA ; family requesting to take patient home     Hypokalemia- replaced, monitor  HTN/HLD- c/w Coreg, Amlodipine, fenofibrate  GERD- c/w PPI  # DVT ppx - HSQ,  3  Pt DNR, DNI

## 2022-10-26 NOTE — PROGRESS NOTE ADULT - SUBJECTIVE AND OBJECTIVE BOX
HARSH VERGARA    LVS 2C [address]    Allergies    ampicillin-sulbactam (Rash)    Intolerances        PAST MEDICAL & SURGICAL HISTORY:  Bladder cancer      HTN (hypertension)      HLD (hyperlipidemia)      Liver abscess      H/O gallbladder cancer          FAMILY HISTORY:  No pertinent family history in first degree relatives        Home Medications:  ALLOPURINOL 100 MG TABLET: TAKE 1 TABLET ONCE A DAY (AT BEDTIME) ORALLY (13 Oct 2022 18:51)  AMLODIPINE 2.5MG TAB: 1 tab(s) orally once a day (13 Oct 2022 18:51)  CARVEDILOL 6.25 MG TABLET: TAKE 1 TABLET BY MOUTH TWICE A DAY (13 Oct 2022 18:51)  FENOFIBRATE 145 MG TABLET: TAKE 1 TABLET BY MOUTH EVERYDAY AT BEDTIME (13 Oct 2022 18:51)  levoFLOXacin 750 mg oral tablet: 1 tab(s) orally every 24 hours for 7 days (13 Oct 2022 18:51)  METRONIDAZOL 500MG TAB: 1 tab(s) orally 2 times a day for 14 days (13 Oct 2022 18:51)  MIRTAZAPINE 15 MG TABLET: TAKE 1 TABLET BY MOUTH EVERYDAY AT BEDTIME (13 Oct 2022 18:51)  PANTOPRAZOLE SOD DR 40 MG TAB: TAKE 1 TABLET BY MOUTH EVERY DAY IN THE MORNING (13 Oct 2022 18:51)      MEDICATIONS  (STANDING):  allopurinol 100 milliGRAM(s) Oral daily  amLODIPine   Tablet 2.5 milliGRAM(s) Oral daily  carvedilol 6.25 milliGRAM(s) Oral every 12 hours  ertapenem  IVPB 1000 milliGRAM(s) IV Intermittent every 24 hours  ertapenem  IVPB      fenofibrate Tablet 145 milliGRAM(s) Oral daily  heparin   Injectable 5000 Unit(s) SubCutaneous every 8 hours  pantoprazole    Tablet 40 milliGRAM(s) Oral before breakfast    MEDICATIONS  (PRN):  acetaminophen     Tablet .. 650 milliGRAM(s) Oral every 6 hours PRN Temp greater or equal to 38C (100.4F), Mild Pain (1 - 3)              Vital Signs Last 24 Hrs  T(C): 36.6 (26 Oct 2022 04:49), Max: 36.9 (25 Oct 2022 16:00)  T(F): 97.8 (26 Oct 2022 04:49), Max: 98.5 (25 Oct 2022 16:00)  HR: 86 (26 Oct 2022 06:16) (80 - 104)  BP: 154/60 (26 Oct 2022 06:11) (145/73 - 194/79)  BP(mean): --  RR: 18 (26 Oct 2022 04:49) (16 - 18)  SpO2: 97% (26 Oct 2022 06:16) (93% - 98%)    Parameters below as of 25 Oct 2022 23:33  Patient On (Oxygen Delivery Method): room air          10-24-22 @ 07:01  -  10-25-22 @ 07:00  --------------------------------------------------------  IN: 240 mL / OUT: 2600 mL / NET: -2360 mL    10-25-22 @ 07:01  -  10-26-22 @ 06:53  --------------------------------------------------------  IN: 960 mL / OUT: 3100 mL / NET: -2140 mL              LABS:                    WBC:  WBC Count: 8.59 K/uL (10-22 @ 11:15)      MICROBIOLOGY:  RECENT CULTURES:  10-19 .Body Fluid XXXX   polymorphonuclear leukocytes seen  No organisms seen  by cytocentrifuge   No growth at 5 days                    Sodium:  Sodium, Serum: 141 mmol/L (10-22 @ 11:15)      0.95 mg/dL 10-22 @ 11:15      Hemoglobin:  Hemoglobin: 8.5 g/dL (10-22 @ 11:15)      Platelets: Platelet Count - Automated: 210 K/uL (10-22 @ 11:15)              RADIOLOGY & ADDITIONAL STUDIES:      MICROBIOLOGY:  RECENT CULTURES:  10-19 .Body Fluid XXXX   polymorphonuclear leukocytes seen  No organisms seen  by cytocentrifuge   No growth at 5 days

## 2022-10-26 NOTE — PROGRESS NOTE ADULT - SUBJECTIVE AND OBJECTIVE BOX
Patient is a 87y old  Female who presents with a chief complaint of Acute hypoxic and hypercapnic respiratory failure, altered mental status (25 Oct 2022 09:16)    INTERVAL HPI/OVERNIGHT EVENTS:   Patient seen and examined bedside.     REVIEW OF SYSTEMS: remaining ROS negative     MEDICATIONS  (STANDING):  allopurinol 100 milliGRAM(s) Oral daily  amLODIPine   Tablet 2.5 milliGRAM(s) Oral daily  carvedilol 6.25 milliGRAM(s) Oral every 12 hours  ertapenem  IVPB 1000 milliGRAM(s) IV Intermittent every 24 hours  ertapenem  IVPB      fenofibrate Tablet 145 milliGRAM(s) Oral daily  heparin   Injectable 5000 Unit(s) SubCutaneous every 8 hours  pantoprazole    Tablet 40 milliGRAM(s) Oral before breakfast    MEDICATIONS  (PRN):  acetaminophen     Tablet .. 650 milliGRAM(s) Oral every 6 hours PRN Temp greater or equal to 38C (100.4F), Mild Pain (1 - 3)    Allergies    ampicillin-sulbactam (Rash)    Intolerances      REVIEW OF SYSTEMS:  All other systems reviewed and are negative    Vital Signs Last 24 Hrs    T(C): 37 (26 Oct 2022 17:13), Max: 37 (26 Oct 2022 15:57)  T(F): 98.6 (26 Oct 2022 17:13), Max: 98.6 (26 Oct 2022 15:57)  HR: 96 (26 Oct 2022 17:13) (85 - 104)  BP: 190/90 (26 Oct 2022 17:13) (144/74 - 190/90)  BP(mean): --  RR: 18 (26 Oct 2022 17:13) (18 - 18)  SpO2: 97% (26 Oct 2022 17:13) (94% - 99%)    Parameters below as of 26 Oct 2022 17:13  Patient On (Oxygen Delivery Method): nasal cannula  O2 Flow (L/min): 3        PHYSICAL EXAM:  GENERAL: NAD,  no increased WOB   HEAD:  Atraumatic, Normocephalic  EYES: EOMI, PERRLA, conjunctiva and sclera clear  ENMT: No tonsillar erythema, exudates, or enlargement; Moist mucous membranes   NECK: Supple, No JVD,   NERVOUS SYSTEM:  Alert & Oriented X3, Good concentration; nonfocal   CHEST/LUNG: CTAB  HEART: Regular rate and rhythm; No murmurs, rubs, or gallops  ABDOMEN: Soft, Nontender, Nondistended; Bowel sounds present  EXTREMITIES: no edema or calf tenderness b/l.       Labs                            9.8    4.81  )-----------( 342      ( 26 Oct 2022 07:23 )             32.4   10-26    138  |  91<L>  |  19  ----------------------------<  103<H>  3.6   |  41<H>  |  0.91    Ca    11.2<H>      26 Oct 2022 07:23                              Consultant(s) Notes Reveiwed [ x] Yes     Care Discussed with [x ] Consultants  [x ] Patient---daughter  [ x] Family  [x] /   [x ] Other; RN

## 2022-10-26 NOTE — PROGRESS NOTE ADULT - ASSESSMENT
Recurrent collection.  Remarkably nontoxic at this juncture.  WBC normal  Afebrile  HD stabke  Benign abdomen    10/17: no fevers, no wbc, Cr and LFTs ok, BCs with no growth, CT abd was repeated today - no change, needs to be seen by IR. Vancomycin IV, ertapenem iv and Caspofungin continued.   10/18: remains afebrile, no leukocytosis, cr and Lfts ok, UC with VRE - no changes in abx at this time, + urinary retention, with chery now. IR evaluation possibly tomorrow for liver abscess, re-demonstrated on yesterday's CT.   10/19: no fevers, no leukocytosis, Cr ok, awaiting for IR evaluation for possible drainage of liver abscess, will continue with our current antimicrobial selection while awaiting for possible abscess aspirate.   10/20: afebrile, on NC, no new cbc, s/p hepatic abscess drainage yesterday - 3 cc of purulent drainage, smear with no organisms seen, culture is being performed, will continue with ertapenem, caspofungin and vancomycin while awaiting for the culture.   10/21: no fever, on NC, no wbc, liver abscess culture with no growth to date, no evidence of MRSA or fungal infection at this time, will stop vancomycin and caspofungin, will continue with ertapenem.   10/24: no fever, on NC, no new lab work, abscess culture with no growth to date, still mildly tender in RUQ where the drainage was performed. The plan overall is to treat the pt with iv abx x 4 weeks from the day of drainage (10/19/22) and then repeat CT abd/pelvis to ensure the improvement.   10/25: low grade temp yesterday evening 100.5, isolated fever, will continue to monitor. No WBC, abd exam is benign today, Cr and LFTs ok, ertapenem IV continued until 11/15, repeat CT a/p prior completion of the course of abx. Seen by PT earlier today - recommendation KENISHA  10/26: mental status somewhat better, no fevers, no leukocytosis, does not seem to be tender on abd exam today, ertapenem IV continued. The pt is on NC, pulmonology is following, with Chery - consider TOV.     Suggestions  continue IV ertapenem - last dose on 11/15/2022  will need f/up CT abd/pelvis prior completion of the course of abx to ensure hepatic abscess resolution  follow aspirate culture NGTD  follow Blood cx data - NGTD  UC noted  trend temperatures and wbc   CXR - reviewed     when ready for discharge:  midline  continue ertapenem - last dose on 11/15  repeat CT a/p prior completion of abx  weekly lab work: cbc with diff, cmp, esr, crp to be faxed to Dr. Garcia at [phone]  f/up with ID:  [id number] [place] [address]  [phone]      Msg sent to Dr. Davison

## 2022-10-26 NOTE — PROGRESS NOTE ADULT - ASSESSMENT
REVIEW OF SYMPTOMS      Able to give (reliable) ROS  NO     PHYSICAL EXAM    HEENT Unremarkable  atraumatic   RESP Fair air entry EXP prolonged    Harsh breath sound Resp distres mild   CARDIAC S1 S2 No S3     NO JVD    ABDOMEN SOFT BS PRESENT NOT DISTENDED No hepatosplenomegaly   PEDAL EDEMA present No calf tenderness  NO rash       GENERAL DATA .   GOC.   10/15/2022 dnr     ALLGY.    ampicillin sulbactam                         WT. 10/15/2022 51   BMI.  10/15/2022 22                             ICU STAY. none  COVID.  10/13 scv2 (-)     BEST PRACTICE ISSUES.    HOB ELEVATN. Yes  DVT PPLX.   10/20/2022 hpsc   10/13- 10/18/2022 hpsc    PARSON PPLX.    10/13 protonix 40   INFN PPLX.    SP SW PAULETTE.        DIET.     10/16/2022 regl    VS/ PO/IO/ VENT/ DRIPS.  10/26/2022 afeb 85 140/70   10/26/2022 4l 94%     ASSESSMENT/RECOMMENDATIONS .   RESP.  -- Resp failure.  10/16/2022 8a avaps 30-10/8 450 .25 20  743/39/82   10/22/2022 avaps 450/20/r 20 epap 8 max 30 fio2 50% 739/72 115   a/r  Is using noct avaps will need noct bpap at discharg   -- RO PE.  10/13 cta ch No pe   is on dvt p   -- Pl effsn.  cxr 10/24 mod r small l effs   INFECTION.  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  10/13 amlodipine 2.5  10/13 carvedilol 6.25 x2   10/13 fenofibrate 145   cont rx   GI.  -- GB cancer   -- Liver abscess   10/19 ir drainage hept absc   as above   HEMAT.  -- Anemia   Hb 10/15-10/16-10/18-10/19-10/22/2022 Hb 10 - 10.2- 9.3 - 8.9 - 8.5   RENAL.  Na 10/15/2022 Na 135  Cr 10/15/2022 Cr .6   -- Urine retn  10/18/2022 Otto    OVERALL ASSESSMENT/DISPOSITION.  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  hospice care at home being considered       TIME SPENT   Over 25 minutes aggregate care time spent on encounter; activities included   direct patient care, counseling and/or coordinating care reviewing notes, lab data/ imaging , discussion with multidisciplinary team/ patient  /family and explaining in detail risks, benefits, alternatives  of the recommendations     [facility]SUL[facility]SE 87 f 10/13/2022 3/10/1929 DR JASPER DOVER

## 2022-10-27 LAB
ANION GAP SERPL CALC-SCNC: 7 MMOL/L — SIGNIFICANT CHANGE UP (ref 5–17)
BUN SERPL-MCNC: 20 MG/DL — SIGNIFICANT CHANGE UP (ref 7–23)
CALCIUM SERPL-MCNC: 10.9 MG/DL — HIGH (ref 8.5–10.1)
CHLORIDE SERPL-SCNC: 94 MMOL/L — LOW (ref 96–108)
CO2 SERPL-SCNC: 36 MMOL/L — HIGH (ref 22–31)
CREAT SERPL-MCNC: 0.87 MG/DL — SIGNIFICANT CHANGE UP (ref 0.5–1.3)
EGFR: 65 ML/MIN/1.73M2 — SIGNIFICANT CHANGE UP
GLUCOSE SERPL-MCNC: 89 MG/DL — SIGNIFICANT CHANGE UP (ref 70–99)
POTASSIUM SERPL-MCNC: 3.4 MMOL/L — LOW (ref 3.5–5.3)
POTASSIUM SERPL-SCNC: 3.4 MMOL/L — LOW (ref 3.5–5.3)
SODIUM SERPL-SCNC: 137 MMOL/L — SIGNIFICANT CHANGE UP (ref 135–145)

## 2022-10-27 PROCEDURE — 99232 SBSQ HOSP IP/OBS MODERATE 35: CPT

## 2022-10-27 PROCEDURE — 99233 SBSQ HOSP IP/OBS HIGH 50: CPT

## 2022-10-27 PROCEDURE — 99232 SBSQ HOSP IP/OBS MODERATE 35: CPT | Mod: FS

## 2022-10-27 RX ORDER — SENNA PLUS 8.6 MG/1
2 TABLET ORAL AT BEDTIME
Refills: 0 | Status: DISCONTINUED | OUTPATIENT
Start: 2022-10-27 | End: 2022-10-31

## 2022-10-27 RX ORDER — SODIUM CHLORIDE 9 MG/ML
1000 INJECTION INTRAMUSCULAR; INTRAVENOUS; SUBCUTANEOUS
Refills: 0 | Status: DISCONTINUED | OUTPATIENT
Start: 2022-10-27 | End: 2022-10-30

## 2022-10-27 RX ORDER — AMLODIPINE BESYLATE 2.5 MG/1
5 TABLET ORAL DAILY
Refills: 0 | Status: DISCONTINUED | OUTPATIENT
Start: 2022-10-27 | End: 2022-10-31

## 2022-10-27 RX ORDER — MAGNESIUM HYDROXIDE 400 MG/1
30 TABLET, CHEWABLE ORAL DAILY
Refills: 0 | Status: DISCONTINUED | OUTPATIENT
Start: 2022-10-27 | End: 2022-10-31

## 2022-10-27 RX ORDER — POTASSIUM CHLORIDE 20 MEQ
40 PACKET (EA) ORAL ONCE
Refills: 0 | Status: COMPLETED | OUTPATIENT
Start: 2022-10-27 | End: 2022-10-27

## 2022-10-27 RX ADMIN — HEPARIN SODIUM 5000 UNIT(S): 5000 INJECTION INTRAVENOUS; SUBCUTANEOUS at 21:07

## 2022-10-27 RX ADMIN — Medication 145 MILLIGRAM(S): at 11:29

## 2022-10-27 RX ADMIN — Medication 40 MILLIEQUIVALENT(S): at 11:30

## 2022-10-27 RX ADMIN — AMLODIPINE BESYLATE 2.5 MILLIGRAM(S): 2.5 TABLET ORAL at 05:16

## 2022-10-27 RX ADMIN — AMLODIPINE BESYLATE 5 MILLIGRAM(S): 2.5 TABLET ORAL at 11:30

## 2022-10-27 RX ADMIN — ERTAPENEM SODIUM 120 MILLIGRAM(S): 1 INJECTION, POWDER, LYOPHILIZED, FOR SOLUTION INTRAMUSCULAR; INTRAVENOUS at 15:02

## 2022-10-27 RX ADMIN — SODIUM CHLORIDE 75 MILLILITER(S): 9 INJECTION INTRAMUSCULAR; INTRAVENOUS; SUBCUTANEOUS at 17:42

## 2022-10-27 RX ADMIN — CARVEDILOL PHOSPHATE 6.25 MILLIGRAM(S): 80 CAPSULE, EXTENDED RELEASE ORAL at 05:16

## 2022-10-27 RX ADMIN — SODIUM CHLORIDE 75 MILLILITER(S): 9 INJECTION INTRAMUSCULAR; INTRAVENOUS; SUBCUTANEOUS at 05:15

## 2022-10-27 RX ADMIN — Medication 100 MILLIGRAM(S): at 11:29

## 2022-10-27 RX ADMIN — HEPARIN SODIUM 5000 UNIT(S): 5000 INJECTION INTRAVENOUS; SUBCUTANEOUS at 15:03

## 2022-10-27 RX ADMIN — PANTOPRAZOLE SODIUM 40 MILLIGRAM(S): 20 TABLET, DELAYED RELEASE ORAL at 06:19

## 2022-10-27 RX ADMIN — HEPARIN SODIUM 5000 UNIT(S): 5000 INJECTION INTRAVENOUS; SUBCUTANEOUS at 05:15

## 2022-10-27 RX ADMIN — SENNA PLUS 2 TABLET(S): 8.6 TABLET ORAL at 21:07

## 2022-10-27 NOTE — DIETITIAN NUTRITION RISK NOTIFICATION - TREATMENT: THE FOLLOWING DIET HAS BEEN RECOMMENDED
Diet, Soft and Bite Sized:   Low Sodium  Supplement Feeding Modality:  Oral  Ensure Enlive Cans or Servings Per Day:  1       Frequency:  Two Times a day (10-27-22 @ 16:37) [Pending Verification By Attending]  Diet, Soft and Bite Sized (10-21-22 @ 18:30) [Active]      
Diet, Regular:   Low Sodium  Supplement Feeding Modality:  Oral  Health Shake Cans or Servings Per Day:  1       Frequency:  Two Times a day (10-20-22 @ 09:56) [Pending Verification By Attending]  Diet, Regular:   DASH/TLC {Sodium & Cholesterol Restricted} (10-16-22 @ 10:58) [Active]

## 2022-10-27 NOTE — PROGRESS NOTE ADULT - ASSESSMENT
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    Acute metabolic encephalopathy/ presumed septic encephalopathy   - CT head neg for acute pathology  - Evaluated by ICU  - s/p Bi-PAP, cont AVAPS O/N, NC  - cont to monitor    Acute hypercapnic respiratory failure  -improved     Hepatic Abscess  - gallbladder cancer s/p ex lap, open cholecystectomy, segment 4b/5 hepatectomy, and right colectomy due to fistula tract vs metastasis on 08/19  - s/p outpt abx per ID note (pt w/ different [facility] chart) 10/13, pt completed Levaquin and flagyl prior admission  - rpt CT abd on Monday 10/17; Residual collection in the gallbladder fossa/liver unchanged from 10/13/2022 overlying for absence of IV contrast. Bladder distention resulting in mild bilateral hydroureteronephrosis.  - s/p IR aspiration 10/19 3cc aspirated, f/u analysis   -Ertapenem until 11/15  afebrile, WBC wnl     Urinary Retention/ bilateral hydroureteronephrosis  1250 cc on Bladder scan   cont w/ indwelling chery  likely neurogenic bladder 2/2 bedbound status     PT/OT eval : KENISHA ; family requesting to take patient home     Hypokalemia- replaced   HTN/HLD- c/w Coreg, Amlodipine, fenofibrate  GERD- c/w PPI  stage 1 sacral wound , doesn't appear infected, no foul smell, erythema, discharge  -- PT wound consult placed     Moderate PCM --nutrition consult appreciated     # DVT ppx - HSQ,  3  Pt DNR, DNI  fall, aspiration precautions   HOBE

## 2022-10-27 NOTE — PROGRESS NOTE ADULT - SUBJECTIVE AND OBJECTIVE BOX
HARSH VERGARA  MRN-[id number]    Follow Up:  hepatic abscess     Interval History: The pt was seen and examined earlier, no distress, in her bed, awake and alert, not oriented. The pt is afebrile, remains on NC.     PAST MEDICAL & SURGICAL HISTORY:  Bladder cancer      HTN (hypertension)      HLD (hyperlipidemia)      Liver abscess      H/O gallbladder cancer          ROS:    [x ] Unobtainable because: confused   [ ] All other systems negative    Constitutional: no fever, no chills  Head: no trauma  Eyes: no vision changes, no eye pain  ENT:  no sore throat, no rhinorrhea  Cardiovascular:  no chest pain, no palpitation  Respiratory:  no SOB, no cough  GI:  no abd pain, no vomiting, no diarrhea  urinary: no dysuria, no hematuria, no flank pain  musculoskeletal:  no joint pain, no joint swelling  skin:  no rash  neurology:  no headache, no seizure, no change in mental status  psych: no anxiety, no depression         Allergies  ampicillin-sulbactam (Rash)        ANTIMICROBIALS:  ertapenem  IVPB    ertapenem  IVPB 1000 every 24 hours      OTHER MEDS:  acetaminophen     Tablet .. 650 milliGRAM(s) Oral every 6 hours PRN  allopurinol 100 milliGRAM(s) Oral daily  amLODIPine   Tablet 5 milliGRAM(s) Oral daily  carvedilol 6.25 milliGRAM(s) Oral every 12 hours  fenofibrate Tablet 145 milliGRAM(s) Oral daily  heparin   Injectable 5000 Unit(s) SubCutaneous every 8 hours  magnesium hydroxide Suspension 30 milliLiter(s) Oral daily PRN  pantoprazole    Tablet 40 milliGRAM(s) Oral before breakfast  senna 2 Tablet(s) Oral at bedtime  sodium chloride 0.9%. 1000 milliLiter(s) IV Continuous <Continuous>      Vital Signs Last 24 Hrs  T(C): 36.8 (27 Oct 2022 04:36), Max: 37 (26 Oct 2022 15:57)  T(F): 98.2 (27 Oct 2022 04:36), Max: 98.6 (26 Oct 2022 15:57)  HR: 90 (27 Oct 2022 04:36) (85 - 96)  BP: 170/76 (27 Oct 2022 04:36) (144/74 - 190/90)  BP(mean): --  RR: 18 (27 Oct 2022 04:36) (18 - 18)  SpO2: 98% (27 Oct 2022 04:36) (95% - 98%)    Parameters below as of 27 Oct 2022 04:36  Patient On (Oxygen Delivery Method): nasal cannula  O2 Flow (L/min): 2.5      Physical Exam:  General: Nontoxic-appearing Female in no acute distress. NC  HEENT: AT/NC. Anicteric. Conjunctiva pink and moist. Oropharynx unable due to non-compliance   Neck: Not rigid. No sense of mass.  Nodes: None palpable.  Lungs: Diminished BS Bilaterally   Heart: Regular rate and rhythm, no audible murmur   Abdomen: Bowel sounds present and normoactive. Soft. Nondistended. Not tender.  No sense of mass. No organomegaly. Incision is well healed.   : Otto   Back: No spinal tenderness. No costovertebral angle tenderness.   Extremities: No cyanosis or clubbing. No edema.   Skin: Warm. Dry. Good turgor. No rash. No vasculitic stigmata.  Psychiatric: awake, alert, confused     WBC Count: 4.81 K/uL (10-26 @ 07:23)  WBC Count: 8.59 K/uL (10-22 @ 11:15)  WBC Count: 8.47 K/uL (10-21 @ 08:30)                            9.8    4.81  )-----------( 342      ( 26 Oct 2022 07:23 )             32.4       10-27    137  |  94<L>  |  20  ----------------------------<  89  3.4<L>   |  36<H>  |  0.87    Ca    10.9<H>      27 Oct 2022 09:00            Creatinine Trend: 0.87<--, 0.91<--, 0.95<--, 0.78<--, 0.94<--, 1.04<--      MICROBIOLOGY:  v  .Body Fluid  10-19-22   No growth at 5 days  --    polymorphonuclear leukocytes seen  No organisms seen  by cytocentrifuge      Clean Catch Clean Catch (Midstream)  10-14-22   50,000 - 99,000 CFU/mL Enterococcus faecium (vancomycin resistant)  --  Enterococcus faecium (vancomycin resistant)      .Blood Blood-Peripheral  10-13-22   No Growth Final  --  --      .Blood Blood-Peripheral  10-13-22   No Growth Final  --  --    SARS[facility]-2 [facility] (10-26-22 @ 06:00)    SARS-CoV-2: [place] (14 Sep 2022 12:35)    RADIOLOGY:

## 2022-10-27 NOTE — CHART NOTE - NSCHARTNOTEFT_GEN_A_CORE
Assessment:  Pt seen for follow up for nutrition dx of moderate malnutrition.  Pt adm c dx of hypercarbia, hepatic abscess, acute hypercapnic respiratory failure, s/p BiPAP use, acute metabolic encephalopathy, CT abdomen 10/17; residual collection in the GB fossa/liver unchanged from 10/13, s/p IR aspiration 10/19, urinary retention/bilateral hydrohematonephrosis, s/p exploratory lap, open cholecystectomy, hepatectomy, right colectomy 08/19. PMH include gallbladder cancer, liver abscess, HLD, HTN, GERD, bladder cancer.  Pt is DNR, DNI, MOLST form noted, no selection for artificially administered fluids and nutrition made.  PT evaluation for KENISHA; family requesting to take patient home.     Factors impacting intake: [ ] none [ ] nausea  [ ] vomiting [ ] diarrhea [ ] constipation  [x ]chewing problems; appears to have missing teeth [x ] swallowing issues; 10/21, swallow evaluation c recommendation for soft, bite sized  [ x] other: persistent lack of appetite, family is brings food in as per RN    Diet Prescription: soft and bite sized(10/21)  Intake: <50% nutrition needs > 5 days     Current Weight: 10/27, 54.8 kg, 10/20, 55.8 kg, 10/13, 51.3 kg, wt. fluctuation c wt. gain of 3.5 kg noted since adm   % Weight Change: 6.8%  No edema noted   Pt laying across bed, not appropriate to conduct nutrition focus physical at present,  pt c visible moderate temple, orbital, clavicle, shoulder depletion.     Pertinent Medications: MEDICATIONS  (STANDING):  allopurinol 100 milliGRAM(s) Oral daily  amLODIPine   Tablet 5 milliGRAM(s) Oral daily  carvedilol 6.25 milliGRAM(s) Oral every 12 hours  ertapenem  IVPB      ertapenem  IVPB 1000 milliGRAM(s) IV Intermittent every 24 hours  fenofibrate Tablet 145 milliGRAM(s) Oral daily  heparin   Injectable 5000 Unit(s) SubCutaneous every 8 hours  pantoprazole    Tablet 40 milliGRAM(s) Oral before breakfast  senna 2 Tablet(s) Oral at bedtime  sodium chloride 0.9%. 1000 milliLiter(s) (75 mL/Hr) IV Continuous <Continuous>    MEDICATIONS  (PRN):  acetaminophen     Tablet .. 650 milliGRAM(s) Oral every 6 hours PRN Temp greater or equal to 38C (100.4F), Mild Pain (1 - 3)  magnesium hydroxide Suspension 30 milliLiter(s) Oral daily PRN Constipation    Pertinent Labs: 10-27 Na137 mmol/L Glu 89 mg/dL K+ 3.4 mmol/L<L> Cr  0.87 mg/dL BUN 20 mg/dL 10-22 Alb 2.4 g/dL<L>10-22 ALT 9 U/L<L> AST 11 U/L<L> Alkaline Phosphatase 44 U/L   CAPILLARY BLOOD GLUCOSE    Skin:   10/24, sacrum; stage I noted     Estimated Needs:   [x ] no change since previous assessment(10/20)  [ ] recalculated:     Previous Nutrition Diagnosis: (10/20)  Malnutrition: Moderate malnutrition in the context of acute illness  Etiology: inadequate protein energy intake secondary to respiratory failure  Signs/Symptoms: patient meeting <75% of needs for >7 days and physical findings of moderate muscle wasting.  Goal/Expected Outcome: Patient to meet greater than 75% of needs during length of stay; not met   Nutrition Diagnosis is [ ] ongoing  [ ] resolved [ x] not applicable (see new diagnosis due to change in severity of malnutrition)    New Nutrition Diagnosis: (10/27)  [x ] Malnutrition; severe malnutrition in context of acute illness     Related to: inadequate protein-energy intake in setting of acute hypercapnic respiratory failure, acute metabolic encephalopathy, s/p exploratory lap, open cholecystectomy, hepatectomy, right colectomy 08/19, pressure ulcer    As evidenced by: <50% nutrition needs > 5 days, physical findings of moderate muscle loss     Goal: nutrition/hydration as preferred/tolerated     Interventions:   Recommend  [ x] Change Diet To: Low sodium, soft and bite sized   [x ] Nutrition Supplement; Ensure Plus 2 x day=700 calories, 26 grams protein   [ ] Nutrition Support  [ ] Other:     Monitoring and Evaluation:   [ x] PO intake [ x ] Tolerance to diet prescription [ x ] weights [ x ] labs[ x ] follow up per protocol  [ ] other: Assessment:  Pt seen for follow up for nutrition dx of moderate malnutrition.  Pt adm c dx of hypercarbia, hepatic abscess, acute hypercapnic respiratory failure, s/p BiPAP use, acute metabolic encephalopathy, CT abdomen 10/17; residual collection in the GB fossa/liver unchanged from 10/13, s/p IR aspiration 10/19, urinary retention/bilateral hydrohematonephrosis, s/p exploratory lap, open cholecystectomy, hepatectomy, right colectomy 08/19. PMH include gallbladder cancer, liver abscess, HLD, HTN, GERD, bladder cancer.  Pt is DNR, DNI, MOLST form noted, no selection for artificially administered fluids and nutrition made.  PT evaluation for KENISHA; family requesting to take patient home.     Factors impacting intake: [ ] none [ ] nausea  [ ] vomiting [ ] diarrhea [ ] constipation  [x ]chewing problems; appears to have missing teeth [x ] swallowing issues; 10/21, swallow evaluation c recommendation for soft, bite sized  [ x] other: persistent lack of appetite, family is brings food in as per RN    Diet Prescription: soft and bite sized(10/21)  Intake: <50% nutrition needs > 5 days     Current Weight: 10/27, 54.8 kg, 10/20, 55.8 kg, 10/13, 51.3 kg, wt. fluctuation c wt. gain of 3.5 kg noted since adm   % Weight Change: 6.8%  No edema noted   Pt laying across bed, not appropriate to conduct nutrition focus physical at present,  pt c visible moderate temple, orbital, clavicle, shoulder depletion.     Pertinent Medications: MEDICATIONS  (STANDING):  allopurinol 100 milliGRAM(s) Oral daily  amLODIPine   Tablet 5 milliGRAM(s) Oral daily  carvedilol 6.25 milliGRAM(s) Oral every 12 hours  ertapenem  IVPB      ertapenem  IVPB 1000 milliGRAM(s) IV Intermittent every 24 hours  fenofibrate Tablet 145 milliGRAM(s) Oral daily  heparin   Injectable 5000 Unit(s) SubCutaneous every 8 hours  pantoprazole    Tablet 40 milliGRAM(s) Oral before breakfast  senna 2 Tablet(s) Oral at bedtime  sodium chloride 0.9%. 1000 milliLiter(s) (75 mL/Hr) IV Continuous <Continuous>    MEDICATIONS  (PRN):  acetaminophen     Tablet .. 650 milliGRAM(s) Oral every 6 hours PRN Temp greater or equal to 38C (100.4F), Mild Pain (1 - 3)  magnesium hydroxide Suspension 30 milliLiter(s) Oral daily PRN Constipation    Pertinent Labs: 10-27 Na137 mmol/L Glu 89 mg/dL K+ 3.4 mmol/L<L> Cr  0.87 mg/dL BUN 20 mg/dL 10-22 Alb 2.4 g/dL<L>10-22 ALT 9 U/L<L> AST 11 U/L<L> Alkaline Phosphatase 44 U/L   CAPILLARY BLOOD GLUCOSE    Skin:   10/24, sacrum; stage I noted     Estimated Needs:   [x ] no change since previous assessment(10/20)  [ ] recalculated:     Previous Nutrition Diagnosis: (10/20)  Malnutrition: Moderate malnutrition in the context of acute illness  Etiology: inadequate protein energy intake secondary to respiratory failure  Signs/Symptoms: patient meeting <75% of needs for >7 days and physical findings of moderate muscle wasting.  Goal/Expected Outcome: Patient to meet greater than 75% of needs during length of stay; not met   Nutrition Diagnosis is [ ] ongoing  [ ] resolved [ x] not applicable (see new diagnosis due to change in severity of malnutrition)    New Nutrition Diagnosis: (10/27)  [x ] Malnutrition; severe malnutrition in context of acute illness     Related to: inadequate protein-energy intake in setting of acute hypercapnic respiratory failure, acute metabolic encephalopathy, s/p exploratory lap, open cholecystectomy, hepatectomy, right colectomy 08/19, pressure ulcer    As evidenced by: <50% nutrition needs > 5 days, physical findings of moderate muscle loss     Goal: nutrition/hydration as preferred/tolerated     Interventions:   Recommend  [ x] Change Diet To: Low sodium, soft and bite sized   [x ] Nutrition Supplement; Ensure Plus(product substitution for Ensure Enlive) 2 x day=700 calories, 26 grams protein   [ ] Nutrition Support  [ ] Other:     Monitoring and Evaluation:   [ x] PO intake [ x ] Tolerance to diet prescription [ x ] weights [ x ] labs[ x ] follow up per protocol  [ ] other:

## 2022-10-27 NOTE — PROGRESS NOTE ADULT - SUBJECTIVE AND OBJECTIVE BOX
HARSH VERGARA    [address]S [address]    Allergies    ampicillin-sulbactam (Rash)    Intolerances        PAST MEDICAL & SURGICAL HISTORY:  Bladder cancer      HTN (hypertension)      HLD (hyperlipidemia)      Liver abscess      H/O gallbladder cancer          FAMILY HISTORY:  No pertinent family history in first degree relatives        Home Medications:  ALLOPURINOL 100 MG TABLET: TAKE 1 TABLET ONCE A DAY (AT BEDTIME) ORALLY (13 Oct 2022 18:51)  AMLODIPINE 2.5MG TAB: 1 tab(s) orally once a day (13 Oct 2022 18:51)  CARVEDILOL 6.25 MG TABLET: TAKE 1 TABLET BY MOUTH TWICE A DAY (13 Oct 2022 18:51)  FENOFIBRATE 145 MG TABLET: TAKE 1 TABLET BY MOUTH EVERYDAY AT BEDTIME (13 Oct 2022 18:51)  levoFLOXacin 750 mg oral tablet: 1 tab(s) orally every 24 hours for 7 days (13 Oct 2022 18:51)  METRONIDAZOL 500MG TAB: 1 tab(s) orally 2 times a day for 14 days (13 Oct 2022 18:51)  MIRTAZAPINE 15 MG TABLET: TAKE 1 TABLET BY MOUTH EVERYDAY AT BEDTIME (13 Oct 2022 18:51)  PANTOPRAZOLE SOD DR 40 MG TAB: TAKE 1 TABLET BY MOUTH EVERY DAY IN THE MORNING (13 Oct 2022 18:51)      MEDICATIONS  (STANDING):  allopurinol 100 milliGRAM(s) Oral daily  amLODIPine   Tablet 2.5 milliGRAM(s) Oral daily  carvedilol 6.25 milliGRAM(s) Oral every 12 hours  ertapenem  IVPB 1000 milliGRAM(s) IV Intermittent every 24 hours  ertapenem  IVPB      fenofibrate Tablet 145 milliGRAM(s) Oral daily  heparin   Injectable 5000 Unit(s) SubCutaneous every 8 hours  pantoprazole    Tablet 40 milliGRAM(s) Oral before breakfast  sodium chloride 0.9%. 1000 milliLiter(s) (75 mL/Hr) IV Continuous <Continuous>    MEDICATIONS  (PRN):  acetaminophen     Tablet .. 650 milliGRAM(s) Oral every 6 hours PRN Temp greater or equal to 38C (100.4F), Mild Pain (1 - 3)              Vital Signs Last 24 Hrs  T(C): 36.8 (27 Oct 2022 04:36), Max: 37 (26 Oct 2022 15:57)  T(F): 98.2 (27 Oct 2022 04:36), Max: 98.6 (26 Oct 2022 15:57)  HR: 90 (27 Oct 2022 04:36) (85 - 96)  BP: 170/76 (27 Oct 2022 04:36) (144/74 - 190/90)  BP(mean): --  RR: 18 (27 Oct 2022 04:36) (18 - 18)  SpO2: 98% (27 Oct 2022 04:36) (95% - 99%)    Parameters below as of 27 Oct 2022 04:36  Patient On (Oxygen Delivery Method): nasal cannula  O2 Flow (L/min): 2.5        10-25-22 @ 07:01  -  10-26-22 @ 07:00  --------------------------------------------------------  IN: 960 mL / OUT: 3100 mL / NET: -2140 mL    10-26-22 @ 07:01  -  10-27-22 @ 06:59  --------------------------------------------------------  IN: 340 mL / OUT: 2550 mL / NET: -2210 mL              LABS:                        9.8    4.81  )-----------( 342      ( 26 Oct 2022 07:23 )             32.4     10-26    138  |  91<L>  |  19  ----------------------------<  103<H>  3.6   |  41<H>  |  0.91    Ca    11.2<H>      26 Oct 2022 07:23                WBC:  WBC Count: 4.81 K/uL (10-26 @ 07:23)      MICROBIOLOGY:  RECENT CULTURES:                  Sodium:  Sodium, Serum: 138 mmol/L (10-26 @ 07:23)      0.91 mg/dL 10-26 @ 07:23      Hemoglobin:  Hemoglobin: 9.8 g/dL (10-26 @ 07:23)      Platelets: Platelet Count - Automated: 342 K/uL (10-26 @ 07:23)              RADIOLOGY & ADDITIONAL STUDIES:      MICROBIOLOGY:  RECENT CULTURES:

## 2022-10-27 NOTE — CHART NOTE - NSCHARTNOTEFT_GEN_A_CORE
Pt is being d/c'd home on oxygen.  Patient is in a chronic stable state with acute hypercarbic respiratory failure/ Hepatic Abscess.  Pulse ox of 87% was done on room air at rest.  Pt is currently on 2LPM/24 hrs via nasal cannula.  Pt is mobile within the home and needs portability.

## 2022-10-27 NOTE — CHART NOTE - NSCHARTNOTEFT_GEN_A_CORE
Transport Wheelchair:    Due to condition Hepatic Abscess, gallbladder Cancer, sacral decubitus ulcer, acute on chronic hypercarbic respiratory failure , patient has severe mobility limitation and requires a standard wheelchair to assist in daily ADLs.  Patient cannot ambulate safely with a cane or a walker.  Patient has sufficient upper body strength to self propel a standard wheelchair and has not expressed an unwillingness to use the wheelchair.  Patient has ample room in the home for said wheelchair and has a caregiver to assist with maneuvering the wheelchair.  Use of a wheelchair will significantly improve the patients ability to participate in daily ADL's and the patient will use it on a regular basis in the home.

## 2022-10-27 NOTE — PROGRESS NOTE ADULT - SUBJECTIVE AND OBJECTIVE BOX
Patient is a 87y old  Female who presents with a chief complaint of Acute hypoxic and hypercapnic respiratory failure, altered mental status (25 Oct 2022 09:16)    INTERVAL HPI/OVERNIGHT EVENTS:   Patient seen and examined bedside.     REVIEW OF SYSTEMS: remaining ROS negative     MEDICATIONS  (STANDING):  allopurinol 100 milliGRAM(s) Oral daily  amLODIPine   Tablet 2.5 milliGRAM(s) Oral daily  carvedilol 6.25 milliGRAM(s) Oral every 12 hours  ertapenem  IVPB 1000 milliGRAM(s) IV Intermittent every 24 hours  ertapenem  IVPB      fenofibrate Tablet 145 milliGRAM(s) Oral daily  heparin   Injectable 5000 Unit(s) SubCutaneous every 8 hours  pantoprazole    Tablet 40 milliGRAM(s) Oral before breakfast    MEDICATIONS  (PRN):  acetaminophen     Tablet .. 650 milliGRAM(s) Oral every 6 hours PRN Temp greater or equal to 38C (100.4F), Mild Pain (1 - 3)    Allergies    ampicillin-sulbactam (Rash)    Intolerances    Vital Signs Last 24 Hrs  T(C): 36.8 (27 Oct 2022 04:36), Max: 37 (26 Oct 2022 15:57)  T(F): 98.2 (27 Oct 2022 04:36), Max: 98.6 (26 Oct 2022 15:57)  HR: 90 (27 Oct 2022 04:36) (85 - 96)  BP: 170/76 (27 Oct 2022 04:36) (144/74 - 190/90)  BP(mean): --  RR: 18 (27 Oct 2022 04:36) (18 - 18)  SpO2: 98% (27 Oct 2022 04:36) (95% - 98%)    Parameters below as of 27 Oct 2022 04:36  Patient On (Oxygen Delivery Method): nasal cannula  O2 Flow (L/min): 2.5          PHYSICAL EXAM:  GENERAL: NAD,  no increased WOB   HEAD:  Atraumatic, Normocephalic  EYES: EOMI, PERRLA, conjunctiva and sclera clear  ENMT: No tonsillar erythema, exudates, or enlargement; Moist mucous membranes   NECK: Supple, No JVD,   NERVOUS SYSTEM:  Alert & Oriented X3, Good concentration; nonfocal   CHEST/LUNG: CTAB  HEART: Regular rate and rhythm; No murmurs, rubs, or gallops  ABDOMEN: Soft, Nontender, Nondistended; Bowel sounds present  EXTREMITIES: no edema or calf tenderness b/l.       Labs                          Consultant(s) Notes Reveiwed [ x] Yes     Care Discussed with [x ] Consultants  [x ] Patient---daughter  [ x] Family  [x] /   [x ] Other; RN

## 2022-10-27 NOTE — CHART NOTE - NSCHARTNOTEFT_GEN_A_CORE
Pt needs hospital bed due to to HX of Hepatic Abscess, gallbladder Cancer, sacral decubitus ulcer and is bed bound.  Pt requires frequent and immediate position changes that are not feasible in a regular bed with pillows.  Pt needs head of bed elevated more than 30 degrees most of the time due to Acute on Chronic Hypercarbic Respiratory Failure.

## 2022-10-27 NOTE — PROGRESS NOTE ADULT - ASSESSMENT
Recurrent collection.  Remarkably nontoxic at this juncture.  WBC normal  Afebrile  HD stabke  Benign abdomen    10/17: no fevers, no wbc, Cr and LFTs ok, BCs with no growth, CT abd was repeated today - no change, needs to be seen by IR. Vancomycin IV, ertapenem iv and Caspofungin continued.   10/18: remains afebrile, no leukocytosis, cr and Lfts ok, UC with VRE - no changes in abx at this time, + urinary retention, with chery now. IR evaluation possibly tomorrow for liver abscess, re-demonstrated on yesterday's CT.   10/19: no fevers, no leukocytosis, Cr ok, awaiting for IR evaluation for possible drainage of liver abscess, will continue with our current antimicrobial selection while awaiting for possible abscess aspirate.   10/20: afebrile, on NC, no new cbc, s/p hepatic abscess drainage yesterday - 3 cc of purulent drainage, smear with no organisms seen, culture is being performed, will continue with ertapenem, caspofungin and vancomycin while awaiting for the culture.   10/21: no fever, on NC, no wbc, liver abscess culture with no growth to date, no evidence of MRSA or fungal infection at this time, will stop vancomycin and caspofungin, will continue with ertapenem.   10/24: no fever, on NC, no new lab work, abscess culture with no growth to date, still mildly tender in RUQ where the drainage was performed. The plan overall is to treat the pt with iv abx x 4 weeks from the day of drainage (10/19/22) and then repeat CT abd/pelvis to ensure the improvement.   10/25: low grade temp yesterday evening 100.5, isolated fever, will continue to monitor. No WBC, abd exam is benign today, Cr and LFTs ok, ertapenem IV continued until 11/15, repeat CT a/p prior completion of the course of abx. Seen by PT earlier today - recommendation KENISHA  10/26: mental status somewhat better, no fevers, no leukocytosis, does not seem to be tender on abd exam today, ertapenem IV continued. The pt is on NC, pulmonology is following, with Chery - consider TOV.   10/27: no change in pt's presentation, no fevers, remains on NC, abd exam is benign today, ertapenem continued without changes.      Suggestions  continue IV ertapenem - last dose on 11/15/2022  will need f/up CT abd/pelvis prior completion of the course of abx to ensure resolution of pt's hepatic abscess  follow aspirate culture NGTD  follow Blood cx data - NGTD  UC noted  trend temperatures and wbc   CXR - reviewed     when ready for discharge:  midline  continue ertapenem - last dose on 11/15  repeat CT a/p prior completion of abx  weekly lab work: cbc with diff, cmp, esr, crp to be faxed to Dr. Garcia at [phone]  f/up with ID:  [address] [place] [address] [place] [address]  [phone]    Discussed with Dr. Garcia

## 2022-10-27 NOTE — PROGRESS NOTE ADULT - ASSESSMENT
REVIEW OF SYMPTOMS      Able to give (reliable) ROS  NO     PHYSICAL EXAM    HEENT Unremarkable  atraumatic   RESP Fair air entry EXP prolonged    Harsh breath sound Resp distres mild   CARDIAC S1 S2 No S3     NO JVD    ABDOMEN SOFT BS PRESENT NOT DISTENDED No hepatosplenomegaly   PEDAL EDEMA present No calf tenderness  NO rash       GENERAL DATA .   GOC.   10/15/2022 dnr     ALLGY.    ampicillin sulbactam                         WT. 10/15/2022 51   BMI.  10/15/2022 22                             ICU STAY. none  COVID.  10/13 scv2 (-)     BEST PRACTICE ISSUES.    HOB ELEVATN. Yes  DVT PPLX.   10/20/2022 hpsc   10/13- 10/18/2022 hpsc    PARSON PPLX.    10/13 protonix 40   INFN PPLX.    SP SW PAULETTE.        DIET.     10/16/2022 regl    VS/ PO/IO/ VENT/ DRIPS.  10/27/2022 afeb 90 160/80   10/27/2022 3l 98%     ASSESSMENT/RECOMMENDATIONS .   RESP.  -- Resp failure.  10/16/2022 8a avaps 30-10/8 450 .25 20  743/39/82   10/22/2022 avaps 450/20/r 20 epap 8 max 30 fio2 50% 739/72 115   a/r  Is using noct avaps will need noct bpap at discharg   -- RO PE.  10/13 cta ch No pe   is on dvt p   -- Pl effsn.  cxr 10/24 mod r small l effs   INFECTION.  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  10/13 amlodipine 2.5  10/13 carvedilol 6.25 x2   10/13 fenofibrate 145   cont rx   GI.  -- GB cancer   -- Liver abscess   10/19 ir drainage hept absc   as above   HEMAT.  -- Anemia   Hb 10/15-10/16-10/18-10/19-10/22-10/27/2022 Hb 10 - 10.2- 9.3 - 8.9 - 8.5- 9.8    RENAL.  Na 10/15/2022 Na 135  Cr 10/15/2022 Cr .6   -- IV fl.  10/27/2022 NS 75   -- Urine retn  10/18/2022 Clarita    OVERALL ASSESSMENT/DISPOSITION.  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  hospice care at home being considered         TIME SPENT   Over 25 minutes aggregate care time spent on encounter; activities included   direct patient care, counseling and/or coordinating care reviewing notes, lab data/ imaging , discussion with multidisciplinary team/ patient  /family and explaining in detail risks, benefits, alternatives  of the recommendations     URSULA HOUSE 87 f 10/13/2022 3/10/1929 DR JASPER DOVER

## 2022-10-28 PROCEDURE — 36410 VNPNXR 3YR/> PHY/QHP DX/THER: CPT

## 2022-10-28 PROCEDURE — 99232 SBSQ HOSP IP/OBS MODERATE 35: CPT

## 2022-10-28 PROCEDURE — 76937 US GUIDE VASCULAR ACCESS: CPT | Mod: 26

## 2022-10-28 RX ORDER — ERTAPENEM SODIUM 1 G/1
1 INJECTION, POWDER, LYOPHILIZED, FOR SOLUTION INTRAMUSCULAR; INTRAVENOUS
Qty: 18 | Refills: 0
Start: 2022-10-28 | End: 2022-11-14

## 2022-10-28 RX ADMIN — SODIUM CHLORIDE 75 MILLILITER(S): 9 INJECTION INTRAMUSCULAR; INTRAVENOUS; SUBCUTANEOUS at 05:29

## 2022-10-28 RX ADMIN — CARVEDILOL PHOSPHATE 6.25 MILLIGRAM(S): 80 CAPSULE, EXTENDED RELEASE ORAL at 18:25

## 2022-10-28 RX ADMIN — AMLODIPINE BESYLATE 5 MILLIGRAM(S): 2.5 TABLET ORAL at 11:25

## 2022-10-28 RX ADMIN — SENNA PLUS 2 TABLET(S): 8.6 TABLET ORAL at 21:29

## 2022-10-28 RX ADMIN — Medication 145 MILLIGRAM(S): at 11:25

## 2022-10-28 RX ADMIN — ERTAPENEM SODIUM 120 MILLIGRAM(S): 1 INJECTION, POWDER, LYOPHILIZED, FOR SOLUTION INTRAMUSCULAR; INTRAVENOUS at 14:03

## 2022-10-28 RX ADMIN — Medication 100 MILLIGRAM(S): at 11:25

## 2022-10-28 RX ADMIN — HEPARIN SODIUM 5000 UNIT(S): 5000 INJECTION INTRAVENOUS; SUBCUTANEOUS at 05:29

## 2022-10-28 RX ADMIN — SODIUM CHLORIDE 75 MILLILITER(S): 9 INJECTION INTRAMUSCULAR; INTRAVENOUS; SUBCUTANEOUS at 21:28

## 2022-10-28 RX ADMIN — HEPARIN SODIUM 5000 UNIT(S): 5000 INJECTION INTRAVENOUS; SUBCUTANEOUS at 21:29

## 2022-10-28 RX ADMIN — HEPARIN SODIUM 5000 UNIT(S): 5000 INJECTION INTRAVENOUS; SUBCUTANEOUS at 13:50

## 2022-10-28 RX ADMIN — PANTOPRAZOLE SODIUM 40 MILLIGRAM(S): 20 TABLET, DELAYED RELEASE ORAL at 05:28

## 2022-10-28 RX ADMIN — CARVEDILOL PHOSPHATE 6.25 MILLIGRAM(S): 80 CAPSULE, EXTENDED RELEASE ORAL at 05:29

## 2022-10-28 NOTE — PROGRESS NOTE ADULT - SUBJECTIVE AND OBJECTIVE BOX
IR Post Procedure Note    Diagnosis: Long term IV Access needed    Procedure: PICC Placement    : Marcos Hopkins MD    Contrast: None    Anesthesia: 1% Lidocaine Subcutaneous    Estimated Blood Loss: Less than 10cc    Specimens: None    Complications: No Immediate Complications    Anticoagulation: Resume without Restriction    Findings & Plan: LUE midline placed in L Basilic vein, Tip in SVC, OK to use.      Please call Interventional Radiology with any questions, concerns, or issues.

## 2022-10-28 NOTE — PROGRESS NOTE ADULT - ASSESSMENT
87 year old female with a PMHx of HTN, HLD GERD, and gallbladder cancer s/p ex lap, open cholecystectomy, segment 4b/5 hepatectomy, and right colectomy due to fistula tract vs metastasis on 08/19, recently admitted at [facility] 9/19 - 9/29 for Acute hypoxic respiratory failure and Hepatic Abscess s/p drainage p/w unresponsiveness which was noted by family this morning.    Acute metabolic encephalopathy/ presumed septic encephalopathy   - CT head neg for acute pathology  - Evaluated by ICU  - s/p Bi-PAP, cont AVAPS O/N, NC  - cont to monitor    Acute hypercapnic respiratory failure  -improved     Hepatic Abscess  - gallbladder cancer s/p ex lap, open cholecystectomy, segment 4b/5 hepatectomy, and right colectomy due to fistula tract vs metastasis on 08/19  - s/p outpt abx per ID note (pt w/ different [facility] chart) 10/13, pt completed Levaquin and flagyl prior admission  - rpt CT abd on Monday 10/17; Residual collection in the gallbladder fossa/liver unchanged from 10/13/2022 overlying for absence of IV contrast. Bladder distention resulting in mild bilateral hydroureteronephrosis.  - s/p IR aspiration 10/19 3cc aspirated, f/u analysis   -Ertapenem until 11/15  afebrile, WBC wnl     Urinary Retention/ bilateral hydroureteronephrosis  1250 cc on Bladder scan   cont w/ indwelling chery  likely neurogenic bladder 2/2 bedbound status     PT/OT eval : KENISHA ; family requesting to take patient home     Hypokalemia- replaced   HTN/HLD- c/w Coreg, Amlodipine, fenofibrate  GERD- c/w PPI  stage 1 sacral wound , doesn't appear infected, no foul smell, erythema, discharge  -- PT wound consult placed     Moderate PCM --nutrition consult appreciated     # DVT ppx - HSQ,  3  Pt DNR, DNI  fall, aspiration precautions   HOBE

## 2022-10-28 NOTE — PROGRESS NOTE ADULT - ASSESSMENT
REVIEW OF SYMPTOMS      Able to give (reliable) ROS  NO     PHYSICAL EXAM    HEENT Unremarkable  atraumatic   RESP Fair air entry EXP prolonged    Harsh breath sound Resp distres mild   CARDIAC S1 S2 No S3     NO JVD    ABDOMEN SOFT BS PRESENT NOT DISTENDED No hepatosplenomegaly   PEDAL EDEMA present No calf tenderness  NO rash       GENERAL DATA .   GOC.   10/15/2022 dnr     ALLGY.    ampicillin sulbactam                         WT. 10/15/2022 51   BMI.  10/15/2022 22                             ICU STAY. none  COVID.  10/13 scv2 (-)     BEST PRACTICE ISSUES.    HOB ELEVATN. Yes  DVT PPLX.   10/20/2022 hpsc   10/13- 10/18/2022 hpsc    PARSON PPLX.    10/13 protonix 40   INFN PPLX.    SP SW PAULETTE.        DIET.     10/16/2022 regl    VS/ PO/IO/ VENT/ DRIPS.  10/28/2022 120/60 18  10/28/2022 2l 95%    ASSESSMENT/RECOMMENDATIONS .   RESP.  -- Resp failure.  10/16/2022 8a avaps 30-10/8 450 .25 20  743/39/82   10/22/2022 avaps 450/20/r 20 epap 8 max 30 fio2 50% 739/72 115   a/r  Is using noct avaps will need noct bpap at discharg   -- RO PE.  10/13 cta ch No pe   is on dvt p   -- Pl effsn.  cxr 10/24 mod r small l effs   INFECTION.  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  10/13 amlodipine 2.5  10/13 carvedilol 6.25 x2   10/13 fenofibrate 145   cont rx   GI.  -- GB cancer   -- Liver abscess   10/19 ir drainage hept absc   as above   HEMAT.  -- Anemia   Hb 10/15-10/16-10/18-10/19-10/22-10/27/2022 Hb 10 - 10.2- 9.3 - 8.9 - 8.5- 9.8    RENAL.  Na 10/15/2022 Na 135  Cr 10/15/2022 Cr .6   -- IV fl.  10/27/2022 NS 75   -- Urine retn  10/18/2022 Clarita    OVERALL ASSESSMENT/DISPOSITION.  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  hospice care at home being considered       TIME SPENT   Over 25 minutes aggregate care time spent on encounter; activities included   direct patient care, counseling and/or coordinating care reviewing notes, lab data/ imaging , discussion with multidisciplinary team/ patient  /family and explaining in detail risks, benefits, alternatives  of the recommendations     URSULA HOUSE 87 f 10/13/2022 3/10/1929 DR JASPER DOVER

## 2022-10-28 NOTE — PHYSICAL THERAPY INITIAL EVALUATION ADULT - NSPTDMEREC_GEN_A_CORE
supplemental oxygen; roho cushion; low air loss mattress/rolling walker/toileting/patient handling equipment/hospital bed/wheelchair

## 2022-10-28 NOTE — PHYSICAL THERAPY INITIAL EVALUATION ADULT - PERTINENT HX OF CURRENT PROBLEM, REHAB EVAL
Pt is an 88 y/o female who presented to ER after she was found  unresponsiveness by her family . As per chart, pt has  PMHx of HTN, HLD GERD, gallbladder cancer s/p ex lap, open cholecystectomy, segment 4b/5 hepatectomy, and right colectomy due to fistula tract vs metastasis on 08/19 Pt was  recently admitted at [facility] 9/19 - 9/29 for Acute hypoxic respiratory failure and Hepatic Abscess s/p drainage
10/13/22 Patient comes in via ambulance was found unresponsive in bed with oxygen level of 37%. Admitted for hypercarbia and hx of hepatic abscess. Chest X-Ray showed small to moderate right and small left pleural effusions. Pulmonology, infectious disease, and gastroenterology following. Now referred for wound PT assessment.

## 2022-10-28 NOTE — PHYSICAL THERAPY INITIAL EVALUATION ADULT - STRENGTHENING, PT EVAL
Improve strength and general endurance to good and be able to perform functional task- bed mobility, transfers and ambulation at a safe level and prevent falls.
Patient's strength will improve in postural control and  B UE & LE to be able to demonstrate safe transfers by week 4.

## 2022-10-28 NOTE — PHYSICAL THERAPY INITIAL EVALUATION ADULT - PLANNED THERAPY INTERVENTIONS, PT EVAL
LTG 2 weeks/balance training/bed mobility training/gait training/strengthening/transfer training
No
balance training/bed mobility training/gait training/strengthening

## 2022-10-28 NOTE — PHYSICAL THERAPY INITIAL EVALUATION ADULT - NSPTDISCHREC_GEN_A_CORE
Sub-acute Rehab
Family prefers to continue patient care at home. PT to instruct on safe patient handling and potential progression of basic mobility./Home PT

## 2022-10-28 NOTE — PHYSICAL THERAPY INITIAL EVALUATION ADULT - BALANCE TRAINING, PT EVAL
Independent sitting, transfers, standing and ambulation with good balance using appropriate assistive device and prevent falls.
Patient will be able to sit upright in bed and chair with supervision to improve postural control for ADLs by week  3.

## 2022-10-28 NOTE — PHYSICAL THERAPY INITIAL EVALUATION ADULT - IMPAIRMENTS FOUND, PT EVAL
aerobic capacity/endurance/decreased midline orientation/gait, locomotion, and balance/integumentary integrity/muscle strength/posture

## 2022-10-28 NOTE — PHYSICAL THERAPY INITIAL EVALUATION ADULT - LEVEL OF INDEPENDENCE: STAIR NEGOTIATION, REHAB EVAL
deferred due to poor cognition and postural control. Patient is to have wheelchair and hospital bed on dc.
unable to perform

## 2022-10-28 NOTE — PHYSICAL THERAPY INITIAL EVALUATION ADULT - MANUAL MUSCLE TESTING RESULTS, REHAB EVAL
unable to fully assess due to cognitive limitations, but noted to have limited seated and standing postural control.

## 2022-10-28 NOTE — PROGRESS NOTE ADULT - SUBJECTIVE AND OBJECTIVE BOX
HARSH VERGARA    LVS 2C [address]    Allergies    ampicillin-sulbactam (Rash)    Intolerances        PAST MEDICAL & SURGICAL HISTORY:  Bladder cancer      HTN (hypertension)      HLD (hyperlipidemia)      Liver abscess      H/O gallbladder cancer          FAMILY HISTORY:  No pertinent family history in first degree relatives        Home Medications:  ALLOPURINOL 100 MG TABLET: TAKE 1 TABLET ONCE A DAY (AT BEDTIME) ORALLY (13 Oct 2022 18:51)  AMLODIPINE 2.5MG TAB: 1 tab(s) orally once a day (13 Oct 2022 18:51)  CARVEDILOL 6.25 MG TABLET: TAKE 1 TABLET BY MOUTH TWICE A DAY (13 Oct 2022 18:51)  FENOFIBRATE 145 MG TABLET: TAKE 1 TABLET BY MOUTH EVERYDAY AT BEDTIME (13 Oct 2022 18:51)  levoFLOXacin 750 mg oral tablet: 1 tab(s) orally every 24 hours for 7 days (13 Oct 2022 18:51)  METRONIDAZOL 500MG TAB: 1 tab(s) orally 2 times a day for 14 days (13 Oct 2022 18:51)  MIRTAZAPINE 15 MG TABLET: TAKE 1 TABLET BY MOUTH EVERYDAY AT BEDTIME (13 Oct 2022 18:51)  PANTOPRAZOLE SOD DR 40 MG TAB: TAKE 1 TABLET BY MOUTH EVERY DAY IN THE MORNING (13 Oct 2022 18:51)      MEDICATIONS  (STANDING):  allopurinol 100 milliGRAM(s) Oral daily  amLODIPine   Tablet 5 milliGRAM(s) Oral daily  carvedilol 6.25 milliGRAM(s) Oral every 12 hours  ertapenem  IVPB      ertapenem  IVPB 1000 milliGRAM(s) IV Intermittent every 24 hours  fenofibrate Tablet 145 milliGRAM(s) Oral daily  heparin   Injectable 5000 Unit(s) SubCutaneous every 8 hours  pantoprazole    Tablet 40 milliGRAM(s) Oral before breakfast  senna 2 Tablet(s) Oral at bedtime  sodium chloride 0.9%. 1000 milliLiter(s) (75 mL/Hr) IV Continuous <Continuous>    MEDICATIONS  (PRN):  acetaminophen     Tablet .. 650 milliGRAM(s) Oral every 6 hours PRN Temp greater or equal to 38C (100.4F), Mild Pain (1 - 3)  magnesium hydroxide Suspension 30 milliLiter(s) Oral daily PRN Constipation              Vital Signs Last 24 Hrs  T(C): 36.3 (28 Oct 2022 04:48), Max: 37 (27 Oct 2022 11:26)  T(F): 97.3 (28 Oct 2022 04:48), Max: 98.6 (27 Oct 2022 11:26)  HR: 77 (28 Oct 2022 04:48) (66 - 94)  BP: 132/70 (28 Oct 2022 04:48) (129/56 - 172/66)  BP(mean): --  RR: 18 (28 Oct 2022 04:48) (18 - 18)  SpO2: 95% (28 Oct 2022 04:48) (87% - 99%)    Parameters below as of 28 Oct 2022 04:48  Patient On (Oxygen Delivery Method): nasal cannula  O2 Flow (L/min): 2        10-27-22 @ 07:01  -  10-28-22 @ 07:00  --------------------------------------------------------  IN: 950 mL / OUT: 1501 mL / NET: -551 mL              LABS:                        9.8    4.81  )-----------( 342      ( 26 Oct 2022 07:23 )             32.4     10-27    137  |  94<L>  |  20  ----------------------------<  89  3.4<L>   |  36<H>  |  0.87    Ca    10.9<H>      27 Oct 2022 09:00                WBC:  WBC Count: 4.81 K/uL (10-26 @ 07:23)      MICROBIOLOGY:  RECENT CULTURES:                  Sodium:  Sodium, Serum: 137 mmol/L (10-27 @ 09:00)  Sodium, Serum: 138 mmol/L (10-26 @ 07:23)      0.87 mg/dL 10-27 @ 09:00  0.91 mg/dL 10-26 @ 07:23      Hemoglobin:  Hemoglobin: 9.8 g/dL (10-26 @ 07:23)      Platelets: Platelet Count - Automated: 342 K/uL (10-26 @ 07:23)              RADIOLOGY & ADDITIONAL STUDIES:      MICROBIOLOGY:  RECENT CULTURES:

## 2022-10-28 NOTE — PHYSICAL THERAPY INITIAL EVALUATION ADULT - BED MOBILITY TRAINING, PT EVAL
Independent in bed mobility- supine<>sit, rolling side<>side observing proper body mechanics, proper positioning, body alignment and precautions.
Patient will be able to reach across midline to roll onto side by week 3 to decrease skin breakdown.

## 2022-10-28 NOTE — PHYSICAL THERAPY INITIAL EVALUATION ADULT - GENERAL OBSERVATIONS, REHAB EVAL
Pt found supine with HOB elevated + Otto +4 L O2/min via NC, +telemetry. Non verbal during session. Follows commands inconsistently.
Patient returning on stretcher bed supine, +PIV lock, +chery catheter, +nasal canula and daughter by bed side.

## 2022-10-28 NOTE — PHYSICAL THERAPY INITIAL EVALUATION ADULT - AMBULATION SKILLS, REHAB EVAL
per daughter, before multiple hospitalizations in past 6 months was able to ambulate c assist. Now unable in the past weeks./independent

## 2022-10-28 NOTE — PHYSICAL THERAPY INITIAL EVALUATION ADULT - ADDITIONAL COMMENTS
Per daughter patient lives in private home with 4 stairs to enter without handrails. Her bedroom and bathroom are at the same level at main level of home. Has a regular bed and no mobility devices. Per daughter, patient has not had any wounds prior, but more recently bed bound and unable to transfer.
Pt is a poor historian and non verbal during session. Per chart review: pt lives with family in a private house with steps/ handrail to negotiate . All living amenities are located on one level. The bathroom has a tub/shower combination and standard toilet.  Pt ws ambulating independently with rolling walker, family assisted  ADLS.

## 2022-10-28 NOTE — PHYSICAL THERAPY INITIAL EVALUATION ADULT - GAIT TRAINING, PT EVAL
Patient will be able to perform a pivot transfer with moderate assist x1 from bed to commode by week 4.

## 2022-10-28 NOTE — PHYSICAL THERAPY INITIAL EVALUATION ADULT - IMPAIRMENTS CONTRIBUTING IMPAIRED BED MOBILITY, REHAB EVAL
impaired balance/cognition/impaired postural control/decreased strength
impaired balance/cognition/narrow base of support/decreased strength

## 2022-10-29 PROCEDURE — 99232 SBSQ HOSP IP/OBS MODERATE 35: CPT

## 2022-10-29 RX ADMIN — Medication 100 MILLIGRAM(S): at 11:18

## 2022-10-29 RX ADMIN — Medication 145 MILLIGRAM(S): at 11:18

## 2022-10-29 RX ADMIN — CARVEDILOL PHOSPHATE 6.25 MILLIGRAM(S): 80 CAPSULE, EXTENDED RELEASE ORAL at 17:33

## 2022-10-29 RX ADMIN — AMLODIPINE BESYLATE 5 MILLIGRAM(S): 2.5 TABLET ORAL at 11:17

## 2022-10-29 RX ADMIN — ERTAPENEM SODIUM 120 MILLIGRAM(S): 1 INJECTION, POWDER, LYOPHILIZED, FOR SOLUTION INTRAMUSCULAR; INTRAVENOUS at 14:36

## 2022-10-29 RX ADMIN — HEPARIN SODIUM 5000 UNIT(S): 5000 INJECTION INTRAVENOUS; SUBCUTANEOUS at 21:12

## 2022-10-29 RX ADMIN — SODIUM CHLORIDE 75 MILLILITER(S): 9 INJECTION INTRAMUSCULAR; INTRAVENOUS; SUBCUTANEOUS at 05:12

## 2022-10-29 RX ADMIN — SENNA PLUS 2 TABLET(S): 8.6 TABLET ORAL at 21:12

## 2022-10-29 RX ADMIN — HEPARIN SODIUM 5000 UNIT(S): 5000 INJECTION INTRAVENOUS; SUBCUTANEOUS at 05:12

## 2022-10-29 RX ADMIN — HEPARIN SODIUM 5000 UNIT(S): 5000 INJECTION INTRAVENOUS; SUBCUTANEOUS at 14:36

## 2022-10-29 RX ADMIN — CARVEDILOL PHOSPHATE 6.25 MILLIGRAM(S): 80 CAPSULE, EXTENDED RELEASE ORAL at 05:12

## 2022-10-29 RX ADMIN — SODIUM CHLORIDE 75 MILLILITER(S): 9 INJECTION INTRAMUSCULAR; INTRAVENOUS; SUBCUTANEOUS at 17:33

## 2022-10-29 RX ADMIN — PANTOPRAZOLE SODIUM 40 MILLIGRAM(S): 20 TABLET, DELAYED RELEASE ORAL at 05:13

## 2022-10-29 NOTE — PROGRESS NOTE ADULT - SUBJECTIVE AND OBJECTIVE BOX
HARSH VERGARA    LVS 2C [address]    Allergies    ampicillin-sulbactam (Rash)    Intolerances        PAST MEDICAL & SURGICAL HISTORY:  Bladder cancer      HTN (hypertension)      HLD (hyperlipidemia)      Liver abscess      H/O gallbladder cancer          FAMILY HISTORY:  No pertinent family history in first degree relatives        Home Medications:  ALLOPURINOL 100 MG TABLET: TAKE 1 TABLET ONCE A DAY (AT BEDTIME) ORALLY (13 Oct 2022 18:51)  AMLODIPINE 2.5MG TAB: 1 tab(s) orally once a day (13 Oct 2022 18:51)  CARVEDILOL 6.25 MG TABLET: TAKE 1 TABLET BY MOUTH TWICE A DAY (13 Oct 2022 18:51)  FENOFIBRATE 145 MG TABLET: TAKE 1 TABLET BY MOUTH EVERYDAY AT BEDTIME (13 Oct 2022 18:51)  levoFLOXacin 750 mg oral tablet: 1 tab(s) orally every 24 hours for 7 days (13 Oct 2022 18:51)  METRONIDAZOL 500MG TAB: 1 tab(s) orally 2 times a day for 14 days (13 Oct 2022 18:51)  MIRTAZAPINE 15 MG TABLET: TAKE 1 TABLET BY MOUTH EVERYDAY AT BEDTIME (13 Oct 2022 18:51)  PANTOPRAZOLE SOD DR 40 MG TAB: TAKE 1 TABLET BY MOUTH EVERY DAY IN THE MORNING (13 Oct 2022 18:51)      MEDICATIONS  (STANDING):  allopurinol 100 milliGRAM(s) Oral daily  amLODIPine   Tablet 5 milliGRAM(s) Oral daily  carvedilol 6.25 milliGRAM(s) Oral every 12 hours  ertapenem  IVPB 1000 milliGRAM(s) IV Intermittent every 24 hours  ertapenem  IVPB      fenofibrate Tablet 145 milliGRAM(s) Oral daily  heparin   Injectable 5000 Unit(s) SubCutaneous every 8 hours  pantoprazole    Tablet 40 milliGRAM(s) Oral before breakfast  senna 2 Tablet(s) Oral at bedtime  sodium chloride 0.9%. 1000 milliLiter(s) (75 mL/Hr) IV Continuous <Continuous>    MEDICATIONS  (PRN):  acetaminophen     Tablet .. 650 milliGRAM(s) Oral every 6 hours PRN Temp greater or equal to 38C (100.4F), Mild Pain (1 - 3)  magnesium hydroxide Suspension 30 milliLiter(s) Oral daily PRN Constipation              Vital Signs Last 24 Hrs  T(C): 36.9 (29 Oct 2022 10:36), Max: 36.9 (28 Oct 2022 16:15)  T(F): 98.4 (29 Oct 2022 10:36), Max: 98.5 (29 Oct 2022 04:46)  HR: 89 (29 Oct 2022 10:36) (71 - 89)  BP: 174/72 (29 Oct 2022 10:36) (128/67 - 174/72)  BP(mean): --  RR: 18 (29 Oct 2022 10:36) (16 - 18)  SpO2: 97% (29 Oct 2022 10:36) (95% - 99%)    Parameters below as of 29 Oct 2022 04:46  Patient On (Oxygen Delivery Method): nasal cannula  O2 Flow (L/min): 2        10-28-22 @ 07:01  -  10-29-22 @ 07:00  --------------------------------------------------------  IN: 0 mL / OUT: 550 mL / NET: -550 mL              LABS:                    WBC:  WBC Count: 4.81 K/uL (10-26 @ 07:23)      MICROBIOLOGY:  RECENT CULTURES:                  Sodium:  Sodium, Serum: 137 mmol/L (10-27 @ 09:00)  Sodium, Serum: 138 mmol/L (10-26 @ 07:23)      0.87 mg/dL 10-27 @ 09:00  0.91 mg/dL 10-26 @ 07:23      Hemoglobin:  Hemoglobin: 9.8 g/dL (10-26 @ 07:23)      Platelets: Platelet Count - Automated: 342 K/uL (10-26 @ 07:23)              RADIOLOGY & ADDITIONAL STUDIES:      MICROBIOLOGY:  RECENT CULTURES:

## 2022-10-29 NOTE — PROGRESS NOTE ADULT - ASSESSMENT
87 year old female with a PMHx of HTN, HLD GERD, and gallbladder cancer s/p ex lap, open cholecystectomy, segment 4b/5 hepatectomy, and right colectomy due to fistula tract vs metastasis on 08/19, recently admitted at [facility] 9/19 - 9/29 for Acute hypoxic respiratory failure and Hepatic Abscess s/p drainage p/w unresponsiveness which was noted by family this morning.    Acute metabolic encephalopathy/ presumed septic encephalopathy   - CT head neg for acute pathology  - Evaluated by ICU  - s/p Bi-PAP, cont AVAPS O/N, NC  - cont to monitor    Acute hypercapnic respiratory failure  -improved     Hepatic Abscess  - gallbladder cancer s/p ex lap, open cholecystectomy, segment 4b/5 hepatectomy, and right colectomy due to fistula tract vs metastasis on 08/19  - s/p outpt abx per ID note (pt w/ different [facility] chart) 10/13, pt completed Levaquin and flagyl prior admission  - rpt CT abd on Monday 10/17; Residual collection in the gallbladder fossa/liver unchanged from 10/13/2022 overlying for absence of IV contrast. Bladder distention resulting in mild bilateral hydroureteronephrosis.  - s/p IR aspiration 10/19 3cc aspirated, f/u analysis   -Ertapenem until 11/15  afebrile, WBC wnl     Urinary Retention/ bilateral hydroureteronephrosis  1250 cc on Bladder scan   cont w/ indwelling chery  likely neurogenic bladder 2/2 bedbound status     PT/OT eval : KENISHA ; family requesting to take patient home     Hypokalemia- replaced   HTN/HLD- c/w Coreg, Amlodipine, fenofibrate  GERD- c/w PPI  stage 1 sacral wound , doesn't appear infected, no foul smell, erythema, discharge  -- PT wound consult placed     Moderate PCM --nutrition consult appreciated     # DVT ppx - HSQ,  3  Pt DNR, DNI  fall, aspiration precautions   HOBE

## 2022-10-29 NOTE — PROGRESS NOTE ADULT - ASSESSMENT
REVIEW OF SYMPTOMS      Able to give (reliable) ROS  NO     PHYSICAL EXAM    HEENT Unremarkable  atraumatic   RESP Fair air entry EXP prolonged    Harsh breath sound Resp distres mild   CARDIAC S1 S2 No S3     NO JVD    ABDOMEN SOFT BS PRESENT NOT DISTENDED No hepatosplenomegaly   PEDAL EDEMA present No calf tenderness  NO rash       GENERAL DATA .   GOC.   10/15/2022 dnr     ALLGY.    ampicillin sulbactam                         WT. 10/15/2022 51   BMI.  10/15/2022 22                             ICU STAY. none  COVID.  10/13 scv2 (-)     BEST PRACTICE ISSUES.    HOB ELEVATN. Yes  DVT PPLX.   10/20/2022 hpsc   10/13- 10/18/2022 hpsc    PARSON PPLX.    10/13 protonix 40   INFN PPLX.    SP SW PAULETTE.        DIET.     10/16/2022 regl    VS/ PO/IO/ VENT/ DRIPS.  10/29/2022 afeb 70 150/70   10/29/2022 2l 99%     ASSESSMENT/RECOMMENDATIONS .   RESP.  -- Resp failure.  10/16/2022 8a avaps 30-10/8 450 .25 20  743/39/82   10/22/2022 avaps 450/20/r 20 epap 8 max 30 fio2 50% 739/72 115   a/r  Is using noct avaps will need noct bpap at discharg   -- RO PE.  10/13 cta ch No pe   is on dvt p   -- Pl effsn.  cxr 10/24 mod r small l effs   INFECTION.  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  10/13 amlodipine 2.5  10/13 carvedilol 6.25 x2   10/13 fenofibrate 145   cont rx   GI.  -- GB cancer   -- Liver abscess   10/19 ir drainage hept absc   as above   HEMAT.  -- Anemia   Hb 10/15-10/16-10/18-10/19-10/22-10/27/2022 Hb 10 - 10.2- 9.3 - 8.9 - 8.5- 9.8    RENAL.  Na 10/15/2022 Na 135  Cr 10/15/2022 Cr .6   -- IV fl.  10/27/2022 NS 75   -- Urine retn  10/18/2022 Clarita    OVERALL ASSESSMENT/DISPOSITION.  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  hospice care at home being considered           TIME SPENT   Over 25 minutes aggregate care time spent on encounter; activities included   direct patient care, counseling and/or coordinating care reviewing notes, lab data/ imaging , discussion with multidisciplinary team/ patient  /family and explaining in detail risks, benefits, alternatives  of the recommendations     [facility]SULA [facility]SE 87 f 10/13/2022 3/10/1929 DR JASPER DOVER

## 2022-10-30 LAB
ANION GAP SERPL CALC-SCNC: 4 MMOL/L — LOW (ref 5–17)
BUN SERPL-MCNC: 15 MG/DL — SIGNIFICANT CHANGE UP (ref 7–23)
CALCIUM SERPL-MCNC: 10 MG/DL — SIGNIFICANT CHANGE UP (ref 8.5–10.1)
CHLORIDE SERPL-SCNC: 105 MMOL/L — SIGNIFICANT CHANGE UP (ref 96–108)
CO2 SERPL-SCNC: 34 MMOL/L — HIGH (ref 22–31)
CREAT SERPL-MCNC: 0.6 MG/DL — SIGNIFICANT CHANGE UP (ref 0.5–1.3)
EGFR: 87 ML/MIN/1.73M2 — SIGNIFICANT CHANGE UP
GLUCOSE SERPL-MCNC: 90 MG/DL — SIGNIFICANT CHANGE UP (ref 70–99)
POTASSIUM SERPL-MCNC: 3.4 MMOL/L — LOW (ref 3.5–5.3)
POTASSIUM SERPL-SCNC: 3.4 MMOL/L — LOW (ref 3.5–5.3)
SODIUM SERPL-SCNC: 143 MMOL/L — SIGNIFICANT CHANGE UP (ref 135–145)

## 2022-10-30 PROCEDURE — 99232 SBSQ HOSP IP/OBS MODERATE 35: CPT

## 2022-10-30 RX ORDER — POTASSIUM CHLORIDE 20 MEQ
40 PACKET (EA) ORAL ONCE
Refills: 0 | Status: COMPLETED | OUTPATIENT
Start: 2022-10-30 | End: 2022-10-30

## 2022-10-30 RX ORDER — INFLUENZA VIRUS VACCINE 15; 15; 15; 15 UG/.5ML; UG/.5ML; UG/.5ML; UG/.5ML
0.7 SUSPENSION INTRAMUSCULAR ONCE
Refills: 0 | Status: COMPLETED | OUTPATIENT
Start: 2022-10-30 | End: 2022-10-30

## 2022-10-30 RX ADMIN — SODIUM CHLORIDE 75 MILLILITER(S): 9 INJECTION INTRAMUSCULAR; INTRAVENOUS; SUBCUTANEOUS at 05:16

## 2022-10-30 RX ADMIN — CARVEDILOL PHOSPHATE 6.25 MILLIGRAM(S): 80 CAPSULE, EXTENDED RELEASE ORAL at 17:19

## 2022-10-30 RX ADMIN — HEPARIN SODIUM 5000 UNIT(S): 5000 INJECTION INTRAVENOUS; SUBCUTANEOUS at 22:14

## 2022-10-30 RX ADMIN — Medication 145 MILLIGRAM(S): at 11:04

## 2022-10-30 RX ADMIN — Medication 40 MILLIEQUIVALENT(S): at 11:51

## 2022-10-30 RX ADMIN — SENNA PLUS 2 TABLET(S): 8.6 TABLET ORAL at 22:14

## 2022-10-30 RX ADMIN — AMLODIPINE BESYLATE 5 MILLIGRAM(S): 2.5 TABLET ORAL at 09:55

## 2022-10-30 RX ADMIN — HEPARIN SODIUM 5000 UNIT(S): 5000 INJECTION INTRAVENOUS; SUBCUTANEOUS at 13:18

## 2022-10-30 RX ADMIN — PANTOPRAZOLE SODIUM 40 MILLIGRAM(S): 20 TABLET, DELAYED RELEASE ORAL at 05:24

## 2022-10-30 RX ADMIN — HEPARIN SODIUM 5000 UNIT(S): 5000 INJECTION INTRAVENOUS; SUBCUTANEOUS at 05:16

## 2022-10-30 RX ADMIN — CARVEDILOL PHOSPHATE 6.25 MILLIGRAM(S): 80 CAPSULE, EXTENDED RELEASE ORAL at 05:24

## 2022-10-30 RX ADMIN — Medication 100 MILLIGRAM(S): at 11:08

## 2022-10-30 RX ADMIN — ERTAPENEM SODIUM 120 MILLIGRAM(S): 1 INJECTION, POWDER, LYOPHILIZED, FOR SOLUTION INTRAMUSCULAR; INTRAVENOUS at 14:00

## 2022-10-30 NOTE — PROGRESS NOTE ADULT - ASSESSMENT
87 year old female with a PMHx of HTN, HLD GERD, and gallbladder cancer s/p ex lap, open cholecystectomy, segment 4b/5 hepatectomy, and right colectomy due to fistula tract vs metastasis on 08/19, recently admitted at [facility] 9/19 - 9/29 for Acute hypoxic respiratory failure and Hepatic Abscess s/p drainage p/w unresponsiveness which was noted by family this morning.    Acute metabolic encephalopathy/ presumed septic encephalopathy   - CT head neg for acute pathology  - Evaluated by ICU  - s/p Bi-PAP, cont AVAPS O/N, NC  - cont to monitor    Acute hypercapnic respiratory failure  -improved     Hepatic Abscess  - gallbladder cancer s/p ex lap, open cholecystectomy, segment 4b/5 hepatectomy, and right colectomy due to fistula tract vs metastasis on 08/19  - s/p outpt abx per ID note (pt w/ different [facility] chart) 10/13, pt completed Levaquin and flagyl prior admission  - rpt CT abd on Monday 10/17; Residual collection in the gallbladder fossa/liver unchanged from 10/13/2022 overlying for absence of IV contrast. Bladder distention resulting in mild bilateral hydroureteronephrosis.  - s/p IR aspiration 10/19 3cc aspirated, f/u analysis   -Ertapenem until 11/15  afebrile, WBC wnl     Urinary Retention/ bilateral hydroureteronephrosis  1250 cc on Bladder scan   cont w/ indwelling chery  likely neurogenic bladder 2/2 bedbound status     PT/OT eval : KENISHA ; family requesting to take patient home     Hypokalemia- replaced   HTN/HLD- c/w Coreg, Amlodipine, fenofibrate  GERD- c/w PPI  stage 1 sacral wound , doesn't appear infected, no foul smell, erythema, discharge  -- PT wound consult appreciated     Moderate PCM --nutrition consult appreciated     # DVT ppx - HSQ,  3  Pt DNR, DNI  fall, aspiration precautions   HOBE

## 2022-10-30 NOTE — PROGRESS NOTE ADULT - SUBJECTIVE AND OBJECTIVE BOX
Patient is a 87y old  Female who presents with a chief complaint of Acute hypoxic and hypercapnic respiratory failure, altered mental status (25 Oct 2022 09:16)    INTERVAL HPI/OVERNIGHT EVENTS:   Patient seen and examined bedside.     REVIEW OF SYSTEMS: remaining ROS negative     MEDICATIONS  (STANDING):  allopurinol 100 milliGRAM(s) Oral daily  amLODIPine   Tablet 2.5 milliGRAM(s) Oral daily  carvedilol 6.25 milliGRAM(s) Oral every 12 hours  ertapenem  IVPB 1000 milliGRAM(s) IV Intermittent every 24 hours  ertapenem  IVPB      fenofibrate Tablet 145 milliGRAM(s) Oral daily  heparin   Injectable 5000 Unit(s) SubCutaneous every 8 hours  pantoprazole    Tablet 40 milliGRAM(s) Oral before breakfast    MEDICATIONS  (PRN):  acetaminophen     Tablet .. 650 milliGRAM(s) Oral every 6 hours PRN Temp greater or equal to 38C (100.4F), Mild Pain (1 - 3)    Allergies    ampicillin-sulbactam (Rash)    Intolerances    Vital Signs Last 24 Hrs  T(C): 36.9 (30 Oct 2022 16:17), Max: 36.9 (30 Oct 2022 16:17)  T(F): 98.4 (30 Oct 2022 16:17), Max: 98.4 (30 Oct 2022 16:17)  HR: 75 (30 Oct 2022 16:17) (72 - 85)  BP: 165/72 (30 Oct 2022 16:17) (114/71 - 178/72)  BP(mean): --  RR: 15 (30 Oct 2022 16:17) (15 - 19)  SpO2: 98% (30 Oct 2022 16:17) (92% - 98%)    Parameters below as of 30 Oct 2022 16:17  Patient On (Oxygen Delivery Method): nasal cannula  O2 Flow (L/min): 2            PHYSICAL EXAM:  GENERAL: NAD,  no increased WOB   HEAD:  Atraumatic, Normocephalic  EYES: EOMI, PERRLA, conjunctiva and sclera clear  ENMT: No tonsillar erythema, exudates, or enlargement; Moist mucous membranes   NECK: Supple, No JVD,   NERVOUS SYSTEM:  Alert & Oriented X3, Good concentration; nonfocal   CHEST/LUNG: CTAB  HEART: Regular rate and rhythm; No murmurs, rubs, or gallops  ABDOMEN: Soft, Nontender, Nondistended; Bowel sounds present  EXTREMITIES: no edema or calf tenderness b/l.       Labs        10-30    143  |  105  |  15  ----------------------------<  90  3.4<L>   |  34<H>  |  0.60    Ca    10.0      30 Oct 2022 09:35                      Consultant(s) Notes Reveiwed [ x] Yes     Care Discussed with [x ] Consultants  [x ] Patient-  [ x] Family  [x] /   [x ] Other; RN

## 2022-10-30 NOTE — PROGRESS NOTE ADULT - SUBJECTIVE AND OBJECTIVE BOX
HARSH VERGARA    [address] [address]    Allergies    ampicillin-sulbactam (Rash)    Intolerances        PAST MEDICAL & SURGICAL HISTORY:  Bladder cancer      HTN (hypertension)      HLD (hyperlipidemia)      Liver abscess      H/O gallbladder cancer          FAMILY HISTORY:  No pertinent family history in first degree relatives        Home Medications:  ALLOPURINOL 100 MG TABLET: TAKE 1 TABLET ONCE A DAY (AT BEDTIME) ORALLY (13 Oct 2022 18:51)  AMLODIPINE 2.5MG TAB: 1 tab(s) orally once a day (13 Oct 2022 18:51)  CARVEDILOL 6.25 MG TABLET: TAKE 1 TABLET BY MOUTH TWICE A DAY (13 Oct 2022 18:51)  FENOFIBRATE 145 MG TABLET: TAKE 1 TABLET BY MOUTH EVERYDAY AT BEDTIME (13 Oct 2022 18:51)  levoFLOXacin 750 mg oral tablet: 1 tab(s) orally every 24 hours for 7 days (13 Oct 2022 18:51)  METRONIDAZOL 500MG TAB: 1 tab(s) orally 2 times a day for 14 days (13 Oct 2022 18:51)  MIRTAZAPINE 15 MG TABLET: TAKE 1 TABLET BY MOUTH EVERYDAY AT BEDTIME (13 Oct 2022 18:51)  PANTOPRAZOLE SOD DR 40 MG TAB: TAKE 1 TABLET BY MOUTH EVERY DAY IN THE MORNING (13 Oct 2022 18:51)      MEDICATIONS  (STANDING):  allopurinol 100 milliGRAM(s) Oral daily  amLODIPine   Tablet 5 milliGRAM(s) Oral daily  carvedilol 6.25 milliGRAM(s) Oral every 12 hours  ertapenem  IVPB 1000 milliGRAM(s) IV Intermittent every 24 hours  ertapenem  IVPB      fenofibrate Tablet 145 milliGRAM(s) Oral daily  heparin   Injectable 5000 Unit(s) SubCutaneous every 8 hours  pantoprazole    Tablet 40 milliGRAM(s) Oral before breakfast  senna 2 Tablet(s) Oral at bedtime    MEDICATIONS  (PRN):  acetaminophen     Tablet .. 650 milliGRAM(s) Oral every 6 hours PRN Temp greater or equal to 38C (100.4F), Mild Pain (1 - 3)  magnesium hydroxide Suspension 30 milliLiter(s) Oral daily PRN Constipation              Vital Signs Last 24 Hrs  T(C): 36.8 (30 Oct 2022 09:58), Max: 37.2 (29 Oct 2022 16:57)  T(F): 98.2 (30 Oct 2022 09:58), Max: 98.9 (29 Oct 2022 16:57)  HR: 72 (30 Oct 2022 09:58) (72 - 85)  BP: 155/67 (30 Oct 2022 09:58) (114/71 - 178/72)  BP(mean): --  RR: 18 (30 Oct 2022 09:58) (18 - 19)  SpO2: 95% (30 Oct 2022 09:58) (92% - 98%)    Parameters below as of 30 Oct 2022 09:58  Patient On (Oxygen Delivery Method): nasal cannula          10-29-22 @ 07:01  -  10-30-22 @ 07:00  --------------------------------------------------------  IN: 0 mL / OUT: 2451 mL / NET: -2451 mL              LABS:    10-30    143  |  105  |  15  ----------------------------<  90  3.4<L>   |  34<H>  |  0.60    Ca    10.0      30 Oct 2022 09:35                WBC:      MICROBIOLOGY:  RECENT CULTURES:                  Sodium:  Sodium, Serum: 143 mmol/L (10-30 @ 09:35)  Sodium, Serum: 137 mmol/L (10-27 @ 09:00)      0.60 mg/dL 10-30 @ 09:35  0.87 mg/dL 10-27 @ 09:00      Hemoglobin:      Platelets:             RADIOLOGY & ADDITIONAL STUDIES:      MICROBIOLOGY:  RECENT CULTURES:

## 2022-10-30 NOTE — PROGRESS NOTE ADULT - NUTRITIONAL ASSESSMENT
This patient has been assessed with a concern for Malnutrition and has been determined to have a diagnosis/diagnoses of Moderate protein-calorie malnutrition.    This patient is being managed with:   Diet Soft and Bite Sized-  Entered: Oct 21 2022  6:30PM    
This patient has been assessed with a concern for Malnutrition and has been determined to have a diagnosis/diagnoses of Moderate protein-calorie malnutrition.    This patient is being managed with:   Diet Soft and Bite Sized-  Entered: Oct 21 2022  6:30PM    
This patient has been assessed with a concern for Malnutrition and has been determined to have a diagnosis/diagnoses of Severe protein-calorie malnutrition.    This patient is being managed with:   Diet Soft and Bite Sized-  Low Sodium  Supplement Feeding Modality:  Oral  Ensure Enlive Cans or Servings Per Day:  1       Frequency:  Two Times a day  Entered: Oct 27 2022  4:36PM    
This patient has been assessed with a concern for Malnutrition and has been determined to have a diagnosis/diagnoses of Moderate protein-calorie malnutrition.    This patient is being managed with:   Diet Soft and Bite Sized-  Entered: Oct 21 2022  6:30PM    
This patient has been assessed with a concern for Malnutrition and has been determined to have a diagnosis/diagnoses of Moderate protein-calorie malnutrition.    This patient is being managed with:   Diet Regular-  Low Sodium  Supplement Feeding Modality:  Oral  Health Shake Cans or Servings Per Day:  1       Frequency:  Two Times a day  Entered: Oct 20 2022  9:56AM    
This patient has been assessed with a concern for Malnutrition and has been determined to have a diagnosis/diagnoses of Moderate protein-calorie malnutrition.    This patient is being managed with:   Diet Soft and Bite Sized-  Entered: Oct 21 2022  6:30PM    
This patient has been assessed with a concern for Malnutrition and has been determined to have a diagnosis/diagnoses of Severe protein-calorie malnutrition.    This patient is being managed with:   Diet Soft and Bite Sized-  Low Sodium  Supplement Feeding Modality:  Oral  Ensure Enlive Cans or Servings Per Day:  1       Frequency:  Two Times a day  Entered: Oct 27 2022  4:36PM    
This patient has been assessed with a concern for Malnutrition and has been determined to have a diagnosis/diagnoses of Moderate protein-calorie malnutrition.    This patient is being managed with:   Diet Regular-  Low Sodium  Supplement Feeding Modality:  Oral  Health Shake Cans or Servings Per Day:  1       Frequency:  Two Times a day  Entered: Oct 20 2022  9:56AM    Diet Regular-  DASH/TLC {Sodium & Cholesterol Restricted}  Entered: Oct 16 2022 10:58AM    The following pending diet order is being considered for treatment of Moderate protein-calorie malnutrition:null
This patient has been assessed with a concern for Malnutrition and has been determined to have a diagnosis/diagnoses of Moderate protein-calorie malnutrition.    This patient is being managed with:   Diet Soft and Bite Sized-  Entered: Oct 21 2022  6:30PM    
This patient has been assessed with a concern for Malnutrition and has been determined to have a diagnosis/diagnoses of Moderate protein-calorie malnutrition.    This patient is being managed with:   Diet Soft and Bite Sized-  Entered: Oct 21 2022  6:30PM    
This patient has been assessed with a concern for Malnutrition and has been determined to have a diagnosis/diagnoses of Severe protein-calorie malnutrition.    This patient is being managed with:   Diet Soft and Bite Sized-  Low Sodium  Supplement Feeding Modality:  Oral  Ensure Enlive Cans or Servings Per Day:  1       Frequency:  Two Times a day  Entered: Oct 27 2022  4:36PM

## 2022-10-30 NOTE — PROGRESS NOTE ADULT - TIME BILLING
min spent reviewing chart, examining patient, discussing plan with patient and family
review of prior chart, medication/blood work/imaging/vitals, direct patient care, d/w hospitalist/ID/son/patient
min spent reviewing chart, examining patient, discussing plan with patient and family
min spent reviewing chart, examining patient, discussing plan with patient and family and staff, writing progress note and placing orders.
Lab test review, Radiology Review, Vitals review, Consultant review and discussion, Physical examination, IDR, Assessment and plan
Lab test review, Radiology Review, Vitals review, Consultant review and discussion, Physical examination, IDR, Assessment and plan
min spent reviewing chart, examining patient, discussing plan with patient and family
Lab test review, Radiology Review, Vitals review, Consultant review and discussion, Physical examination, IDR, Assessment and plan
min spent reviewing chart, examining patient, discussing plan with patient and family
Lab test review, Radiology Review, Vitals review, Consultant review and discussion, Physical examination, IDR, Assessment and plan
Lab test review, Radiology Review, Vitals review, Consultant review and discussion, Physical examination, IDR, Assessment and plan
min spent reviewing chart, examining patient, discussing plan with patient and family
min spent reviewing chart, examining patient, discussing plan with patient and family and staff, writing progress note and placing orders.
Lab test review, Radiology Review, Vitals review, Consultant review and discussion, Physical examination, IDR, Assessment and plan
min spent reviewing chart, examining patient, discussing plan with patient and family and staff, writing progress note and placing orders.
min spent reviewing chart, examining patient, discussing plan with patient and family and staff, writing progress note and placing orders.
Lab test review, Radiology Review, Vitals review, Consultant review and discussion, Physical examination, IDR, Assessment and plan
min spent reviewing chart, examining patient, discussing plan with patient and family and staff, writing progress note and placing orders.

## 2022-10-30 NOTE — PROGRESS NOTE ADULT - ASSESSMENT
REVIEW OF SYMPTOMS      Able to give (reliable) ROS  NO     PHYSICAL EXAM    HEENT Unremarkable  atraumatic   RESP Fair air entry EXP prolonged    Harsh breath sound Resp distres mild   CARDIAC S1 S2 No S3     NO JVD    ABDOMEN SOFT BS PRESENT NOT DISTENDED No hepatosplenomegaly   PEDAL EDEMA present No calf tenderness  NO rash       GENERAL DATA .   GOC.   10/15/2022 dnr     ALLGY.    ampicillin sulbactam                         WT. 10/15/2022 51   BMI.  10/15/2022 22                             ICU STAY. none  COVID.  10/13 scv2 (-)     BEST PRACTICE ISSUES.    HOB ELEVATN. Yes  DVT PPLX.   10/20/2022 hpsc   10/13- 10/18/2022 hpsc    PARSON PPLX.    10/13 protonix 40   INFN PPLX.    SP SW PAULETTE.        DIET.     10/16/2022 regl    VS/ PO/IO/ VENT/ DRIPS.  10/30/2022 afeb 70 150/60   10/30/2022 ra 92%     ASSESSMENT/RECOMMENDATIONS .   RESP.  -- Resp failure.  10/16/2022 8a avaps 30-10/8 450 .25 20  743/39/82   10/22/2022 avaps 450/20/r 20 epap 8 max 30 fio2 50% 739/72 115   a/r  Is using noct avaps will need noct bpap at discharg   -- RO PE.  10/13 cta ch No pe   is on dvt p   -- Pl effsn.  cxr 10/24 mod r small l effs   INFECTION.  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  10/13 amlodipine 2.5  10/13 carvedilol 6.25 x2   10/13 fenofibrate 145   cont rx   GI.  -- GB cancer   -- Liver abscess   10/19 ir drainage hept absc   as above   HEMAT.  -- Anemia   Hb 10/15-10/16-10/18-10/19-10/22-10/27/2022 Hb 10 - 10.2- 9.3 - 8.9 - 8.5- 9.8    RENAL.  Na 10/15/2022 Na 135  Cr 10/15/2022 Cr .6   -- IV fl.  10/27/2022 NS 75   -- Urine retn  10/18/2022 Clarita    OVERALL ASSESSMENT/DISPOSITION.  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  hospice care at home being considered       TIME SPENT   Over 25 minutes aggregate care time spent on encounter; activities included   direct patient care, counseling and/or coordinating care reviewing notes, lab data/ imaging , discussion with multidisciplinary team/ patient  /family and explaining in detail risks, benefits, alternatives  of the recommendations     [facility]A [facility]SE 87 f 10/13/2022 3/10/1929 DR JASPER DOVER

## 2022-10-31 ENCOUNTER — TRANSCRIPTION ENCOUNTER (OUTPATIENT)
Age: 87
End: 2022-10-31

## 2022-10-31 VITALS
RESPIRATION RATE: 18 BRPM | SYSTOLIC BLOOD PRESSURE: 139 MMHG | OXYGEN SATURATION: 97 % | HEART RATE: 80 BPM | TEMPERATURE: 99 F | DIASTOLIC BLOOD PRESSURE: 68 MMHG

## 2022-10-31 LAB
POTASSIUM SERPL-MCNC: 4.2 MMOL/L — SIGNIFICANT CHANGE UP (ref 3.5–5.3)
POTASSIUM SERPL-SCNC: 4.2 MMOL/L — SIGNIFICANT CHANGE UP (ref 3.5–5.3)

## 2022-10-31 PROCEDURE — 99232 SBSQ HOSP IP/OBS MODERATE 35: CPT

## 2022-10-31 PROCEDURE — 99239 HOSP IP/OBS DSCHRG MGMT >30: CPT

## 2022-10-31 RX ORDER — SENNA PLUS 8.6 MG/1
2 TABLET ORAL
Qty: 60 | Refills: 0
Start: 2022-10-31 | End: 2022-11-29

## 2022-10-31 RX ORDER — ALLOPURINOL 300 MG
1 TABLET ORAL
Qty: 30 | Refills: 0
Start: 2022-10-31 | End: 2022-11-29

## 2022-10-31 RX ORDER — CARVEDILOL PHOSPHATE 80 MG/1
1 CAPSULE, EXTENDED RELEASE ORAL
Qty: 60 | Refills: 0
Start: 2022-10-31 | End: 2022-11-29

## 2022-10-31 RX ORDER — PANTOPRAZOLE SODIUM 20 MG/1
1 TABLET, DELAYED RELEASE ORAL
Qty: 30 | Refills: 0
Start: 2022-10-31 | End: 2022-11-29

## 2022-10-31 RX ORDER — MIRTAZAPINE 45 MG/1
1 TABLET, ORALLY DISINTEGRATING ORAL
Qty: 30 | Refills: 0
Start: 2022-10-31 | End: 2022-11-29

## 2022-10-31 RX ORDER — ACETAMINOPHEN 500 MG
2 TABLET ORAL
Qty: 0 | Refills: 0 | DISCHARGE
Start: 2022-10-31

## 2022-10-31 RX ORDER — AMLODIPINE BESYLATE 2.5 MG/1
1 TABLET ORAL
Qty: 30 | Refills: 0
Start: 2022-10-31 | End: 2022-11-29

## 2022-10-31 RX ORDER — FENOFIBRATE,MICRONIZED 130 MG
1 CAPSULE ORAL
Qty: 30 | Refills: 0
Start: 2022-10-31 | End: 2022-11-29

## 2022-10-31 RX ADMIN — Medication 145 MILLIGRAM(S): at 12:06

## 2022-10-31 RX ADMIN — Medication 100 MILLIGRAM(S): at 11:05

## 2022-10-31 RX ADMIN — AMLODIPINE BESYLATE 5 MILLIGRAM(S): 2.5 TABLET ORAL at 10:27

## 2022-10-31 RX ADMIN — HEPARIN SODIUM 5000 UNIT(S): 5000 INJECTION INTRAVENOUS; SUBCUTANEOUS at 05:20

## 2022-10-31 RX ADMIN — CARVEDILOL PHOSPHATE 6.25 MILLIGRAM(S): 80 CAPSULE, EXTENDED RELEASE ORAL at 17:07

## 2022-10-31 RX ADMIN — CARVEDILOL PHOSPHATE 6.25 MILLIGRAM(S): 80 CAPSULE, EXTENDED RELEASE ORAL at 05:20

## 2022-10-31 RX ADMIN — HEPARIN SODIUM 5000 UNIT(S): 5000 INJECTION INTRAVENOUS; SUBCUTANEOUS at 13:36

## 2022-10-31 RX ADMIN — ERTAPENEM SODIUM 120 MILLIGRAM(S): 1 INJECTION, POWDER, LYOPHILIZED, FOR SOLUTION INTRAMUSCULAR; INTRAVENOUS at 13:35

## 2022-10-31 RX ADMIN — PANTOPRAZOLE SODIUM 40 MILLIGRAM(S): 20 TABLET, DELAYED RELEASE ORAL at 06:06

## 2022-10-31 NOTE — DISCHARGE NOTE NURSING/CASE MANAGEMENT/SOCIAL WORK - PATIENT PORTAL LINK FT
You can access the FollowMyHealth Patient Portal offered by [facility] by registering at the following website: [email or web address]. By joining [facility]’s FollowMyHealth portal, you will also be able to view your health information using other applications (apps) compatible with our system.

## 2022-10-31 NOTE — DISCHARGE NOTE PROVIDER - NSDCHHCONTACT_GEN_ALL_CORE_FT
Problem: Patient Care Overview (Adult)  Goal: Plan of Care Review  Outcome: Ongoing (interventions implemented as appropriate)    02/16/17 1306 02/16/17 1446   Coping/Psychosocial Response Interventions   Plan Of Care Reviewed With --  patient   Patient Care Overview   Progress --  improving   Outcome Evaluation   Outcome Summary/Follow up Plan Pt with continued progression of functional mobility as expected, trained for proper stair climbing. No new concerns.  --        Goal: Adult Individualization and Mutuality  Outcome: Ongoing (interventions implemented as appropriate)    Problem: Knee Replacement, Total (Adult)  Goal: Signs and Symptoms of Listed Potential Problems Will be Absent or Manageable (Knee Replacement, Total)  Outcome: Ongoing (interventions implemented as appropriate)    Problem: Fall Risk (Adult)  Goal: Absence of Falls  Outcome: Ongoing (interventions implemented as appropriate)       As certified below, I, or a nurse practitioner or physician assistant working with me, had a face-to-face encounter that meets the physician face-to-face encounter requirements.

## 2022-10-31 NOTE — PROGRESS NOTE ADULT - SUBJECTIVE AND OBJECTIVE BOX
HARSH VERGARA    [address]S 2[address]    Allergies    ampicillin-sulbactam (Rash)    Intolerances        PAST MEDICAL & SURGICAL HISTORY:  Bladder cancer      HTN (hypertension)      HLD (hyperlipidemia)      Liver abscess      H/O gallbladder cancer          FAMILY HISTORY:  No pertinent family history in first degree relatives        Home Medications:  ALLOPURINOL 100 MG TABLET: TAKE 1 TABLET ONCE A DAY (AT BEDTIME) ORALLY (13 Oct 2022 18:51)  AMLODIPINE 2.5MG TAB: 1 tab(s) orally once a day (13 Oct 2022 18:51)  CARVEDILOL 6.25 MG TABLET: TAKE 1 TABLET BY MOUTH TWICE A DAY (13 Oct 2022 18:51)  FENOFIBRATE 145 MG TABLET: TAKE 1 TABLET BY MOUTH EVERYDAY AT BEDTIME (13 Oct 2022 18:51)  levoFLOXacin 750 mg oral tablet: 1 tab(s) orally every 24 hours for 7 days (13 Oct 2022 18:51)  METRONIDAZOL 500MG TAB: 1 tab(s) orally 2 times a day for 14 days (13 Oct 2022 18:51)  MIRTAZAPINE 15 MG TABLET: TAKE 1 TABLET BY MOUTH EVERYDAY AT BEDTIME (13 Oct 2022 18:51)  PANTOPRAZOLE SOD DR 40 MG TAB: TAKE 1 TABLET BY MOUTH EVERY DAY IN THE MORNING (13 Oct 2022 18:51)      MEDICATIONS  (STANDING):  allopurinol 100 milliGRAM(s) Oral daily  amLODIPine   Tablet 5 milliGRAM(s) Oral daily  carvedilol 6.25 milliGRAM(s) Oral every 12 hours  ertapenem  IVPB 1000 milliGRAM(s) IV Intermittent every 24 hours  ertapenem  IVPB      fenofibrate Tablet 145 milliGRAM(s) Oral daily  heparin   Injectable 5000 Unit(s) SubCutaneous every 8 hours  influenza  Vaccine (HIGH DOSE) 0.7 milliLiter(s) IntraMuscular once  pantoprazole    Tablet 40 milliGRAM(s) Oral before breakfast  senna 2 Tablet(s) Oral at bedtime    MEDICATIONS  (PRN):  acetaminophen     Tablet .. 650 milliGRAM(s) Oral every 6 hours PRN Temp greater or equal to 38C (100.4F), Mild Pain (1 - 3)  magnesium hydroxide Suspension 30 milliLiter(s) Oral daily PRN Constipation              Vital Signs Last 24 Hrs  T(C): 37.2 (31 Oct 2022 00:00), Max: 37.2 (31 Oct 2022 00:00)  T(F): 99 (31 Oct 2022 00:00), Max: 99 (31 Oct 2022 00:00)  HR: 76 (31 Oct 2022 05:16) (65 - 76)  BP: 158/76 (31 Oct 2022 05:16) (135/69 - 165/72)  BP(mean): --  RR: 18 (31 Oct 2022 00:00) (15 - 18)  SpO2: 97% (31 Oct 2022 00:00) (95% - 98%)    Parameters below as of 31 Oct 2022 00:00  Patient On (Oxygen Delivery Method): nasal cannula  O2 Flow (L/min): 2        10-30-22 @ 07:01  -  10-31-22 @ 07:00  --------------------------------------------------------  IN: 0 mL / OUT: 1550 mL / NET: -1550 mL              LABS:    10-31    x   |  x   |  x   ----------------------------<  x   4.2   |  x   |  x     Ca    10.0      30 Oct 2022 09:35                WBC:      MICROBIOLOGY:  RECENT CULTURES:                  Sodium:  Sodium, Serum: 143 mmol/L (10-30 @ 09:35)      0.60 mg/dL 10-30 @ 09:35      Hemoglobin:      Platelets:             RADIOLOGY & ADDITIONAL STUDIES:      MICROBIOLOGY:  RECENT CULTURES:

## 2022-10-31 NOTE — DISCHARGE NOTE PROVIDER - NSDCHHPTASSISTHOME_GEN_ALL_CORE
Doing well  Had some urinary retention that seems to have worked itself out  Pain controlled  Increase activity and advance diet  Home when mobile. Patient Needs Assistance to Leave Residence...

## 2022-10-31 NOTE — DISCHARGE NOTE PROVIDER - NSDCCPCAREPLAN_GEN_ALL_CORE_FT
PRINCIPAL DISCHARGE DIAGNOSIS  Diagnosis: Acute respiratory failure with hypercapnia  Assessment and Plan of Treatment:       SECONDARY DISCHARGE DIAGNOSES  Diagnosis: Gallbladder cancer  Assessment and Plan of Treatment:     Diagnosis: Hepatic abscess  Assessment and Plan of Treatment:     Diagnosis: Urinary retention  Assessment and Plan of Treatment:     Diagnosis: Acute metabolic encephalopathy  Assessment and Plan of Treatment:

## 2022-10-31 NOTE — DISCHARGE NOTE PROVIDER - NSDCFUADDAPPT_GEN_ALL_CORE_FT
It is important to see your primary physician as well as other necessary consultants within the next week to perform a comprehensive medical review.  Call their offices for an appointment as soon as you leave the hospital.  If you do not have a primary physician or cant reach him/her, contact the [facility] Physician Referral Service at [phone].  Your medical issues appear to be stable at this time, but if your symptoms recur or worsen, contact your physicians and/or return to the hospital if necessary.  If you encounter any issues or questions with your medication, call your physicians before stopping the medication.

## 2022-10-31 NOTE — PROGRESS NOTE ADULT - ASSESSMENT
Recurrent collection.  Remarkably nontoxic at this juncture.  WBC normal  Afebrile  HD stabke  Benign abdomen    10/17: no fevers, no wbc, Cr and LFTs ok, BCs with no growth, CT abd was repeated today - no change, needs to be seen by IR. Vancomycin IV, ertapenem iv and Caspofungin continued.   10/18: remains afebrile, no leukocytosis, cr and Lfts ok, UC with VRE - no changes in abx at this time, + urinary retention, with chery now. IR evaluation possibly tomorrow for liver abscess, re-demonstrated on yesterday's CT.   10/19: no fevers, no leukocytosis, Cr ok, awaiting for IR evaluation for possible drainage of liver abscess, will continue with our current antimicrobial selection while awaiting for possible abscess aspirate.   10/20: afebrile, on NC, no new cbc, s/p hepatic abscess drainage yesterday - 3 cc of purulent drainage, smear with no organisms seen, culture is being performed, will continue with ertapenem, caspofungin and vancomycin while awaiting for the culture.   10/21: no fever, on NC, no wbc, liver abscess culture with no growth to date, no evidence of MRSA or fungal infection at this time, will stop vancomycin and caspofungin, will continue with ertapenem.   10/24: no fever, on NC, no new lab work, abscess culture with no growth to date, still mildly tender in RUQ where the drainage was performed. The plan overall is to treat the pt with iv abx x 4 weeks from the day of drainage (10/19/22) and then repeat CT abd/pelvis to ensure the improvement.   10/25: low grade temp yesterday evening 100.5, isolated fever, will continue to monitor. No WBC, abd exam is benign today, Cr and LFTs ok, ertapenem IV continued until 11/15, repeat CT a/p prior completion of the course of abx. Seen by PT earlier today - recommendation KENISHA  10/26: mental status somewhat better, no fevers, no leukocytosis, does not seem to be tender on abd exam today, ertapenem IV continued. The pt is on NC, pulmonology is following, with Chery - consider TOV.   10/27: no change in pt's presentation, no fevers, remains on NC, abd exam is benign today, ertapenem continued without changes.   10/31: Overall little change.  No ID objection to discharge.     Suggestions  continue IV ertapenem - last dose on 11/15/2022  will need f/up CT abd/pelvis prior completion of the course of abx to ensure resolution of pt's hepatic abscess  weekly lab work: cbc with diff, cmp, esr, crp to be faxed to Dr. Garcia at [phone]  f/up with ID:  [id number] [place] [address]  [phone]    William Garcia MD  Attending Physician  [facility]  Division of Infectious Diseases  [phone]

## 2022-10-31 NOTE — DISCHARGE NOTE PROVIDER - CARE PROVIDER_API CALL
William Garcia)  Infectious Disease; Internal Medicine  [address] [place] [address]  Phone: [phone]  Fax: ()-  Follow Up Time: 1 month    your primary care doctor,   Phone: (   )    -  Fax: (   )    -  Follow Up Time: 1 week    your oncologist,   Phone: (   )    -  Fax: (   )    -  Follow Up Time: Routine

## 2022-10-31 NOTE — CHART NOTE - NSCHARTNOTEFT_GEN_A_CORE
Patient needs a bed gel overlay to prevent pressure ulcers.   Patient has an advanced cancer , poor mobility and also noted to have a DTI sacrum.

## 2022-10-31 NOTE — PROGRESS NOTE ADULT - PROVIDER SPECIALTY LIST ADULT
Hospitalist
Infectious Disease
Infectious Disease
Pulmonology
Hospitalist
Pulmonology
Hospitalist
Hospitalist
Infectious Disease
Infectious Disease
Intervent Radiology
Pulmonology
Hospitalist
Infectious Disease
Hospitalist
Hospitalist
Infectious Disease
Hospitalist
Hospitalist
Infectious Disease
Pulmonology
Hospitalist

## 2022-10-31 NOTE — PROGRESS NOTE ADULT - SUBJECTIVE AND OBJECTIVE BOX
[facility]  Division of Infectious Diseases  [phone]    Name: HARSH VERGARA  Age: 87y  Gender: Female  MRN: [id number]    Interval History--  Notes reviewed.     Past Medical History--  Bladder cancer    HTN (hypertension)    HLD (hyperlipidemia)    Liver abscess    H/O gallbladder cancer        For details regarding the patient's social history, family history, and other miscellaneous elements, please refer the initial infectious diseases consultation and/or the admitting history and physical examination for this admission.    Allergies    ampicillin-sulbactam (Rash)    Intolerances        Medications--  Antibiotics:  ertapenem  IVPB 1000 milliGRAM(s) IV Intermittent every 24 hours  ertapenem  IVPB        Immunologic:  influenza  Vaccine (HIGH DOSE) 0.7 milliLiter(s) IntraMuscular once    Other:  acetaminophen     Tablet .. PRN  allopurinol  amLODIPine   Tablet  carvedilol  fenofibrate Tablet  heparin   Injectable  magnesium hydroxide Suspension PRN  pantoprazole    Tablet  senna      Review of Systems--  A 10-point review of systems was obtained.     Pertinent positives and negatives--  Constitutional: No fevers. No Chills. No Rigors.   Cardiovascular: No chest pain. No palpitations.  Respiratory: No shortness of breath. No cough.  Gastrointestinal: No nausea or vomiting. No diarrhea or constipation.   Psychiatric: Pleasant. Appropriate affect.    Review of systems otherwise negative except as previously noted.    Physical Examination--  Vital Signs: T(F): 99.1 (10-31-22 @ 10:43), Max: 99.1 (10-31-22 @ 10:43)  HR: 70 (10-31-22 @ 10:50)  BP: 163/77 (10-31-22 @ 10:50)  RR: 18 (10-31-22 @ 10:43)  SpO2: 98% (10-31-22 @ 10:50)  Wt(kg): --  General: Nontoxic-appearing Female in no acute distress.  HEENT: AT/NC. PERRL. EOMI. Anicteric. Conjunctiva pink and moist. Oropharynx clear. Dentition fair.  Neck: Not rigid. No sense of mass.  Nodes: None palpable.  Lungs: Clear bilaterally without rales, wheezing or rhonchi  Heart: Regular rate and rhythm. No Murmur. No rub. No gallop. No palpable thrill.  Abdomen: Bowel sounds present and normoactive. Soft. Nondistended. Nontender. No sense of mass. No organomegaly.  Back: No spinal tenderness. No costovertebral angle tenderness.   Extremities: No cyanosis or clubbing. No edema.   Skin: Warm. Dry. Good turgor. No rash. No vasculitic stigmata.  Psychiatric: Appropriate affect and mood for situation.         Laboratory Studies--  CBC      Chemistries  10-31    x   |  x   |  x   ----------------------------<  x   4.2   |  x   |  x     Ca    10.0      30 Oct 2022 09:35        Culture Data           [facility]  Division of Infectious Diseases  [phone]    Name: HARSH VERGARA  Age: 87y  Gender: Female  MRN: [id number]    Interval History--  Notes reviewed. Seen earlier today, eating breakfast without difficulty.  Comfortable.  Has no complaints.  Wearing 2 L nasal cannula.    Past Medical History--  Bladder cancer    HTN (hypertension)    HLD (hyperlipidemia)    Liver abscess    H/O gallbladder cancer        For details regarding the patient's social history, family history, and other miscellaneous elements, please refer the initial infectious diseases consultation and/or the admitting history and physical examination for this admission.    Allergies    ampicillin-sulbactam (Rash)    Intolerances        Medications--  Antibiotics:  ertapenem  IVPB 1000 milliGRAM(s) IV Intermittent every 24 hours  ertapenem  IVPB        Immunologic:  influenza  Vaccine (HIGH DOSE) 0.7 milliLiter(s) IntraMuscular once    Other:  acetaminophen     Tablet .. PRN  allopurinol  amLODIPine   Tablet  carvedilol  fenofibrate Tablet  heparin   Injectable  magnesium hydroxide Suspension PRN  pantoprazole    Tablet  senna      Review of Systems--  A 10-point review of systems was obtained.   Review of systems otherwise negative except as previously noted.    Physical Examination--  Vital Signs: T(F): 99.1 (10-31-22 @ 10:43), Max: 99.1 (10-31-22 @ 10:43)  HR: 70 (10-31-22 @ 10:50)  BP: 163/77 (10-31-22 @ 10:50)  RR: 18 (10-31-22 @ 10:43)  SpO2: 98% (10-31-22 @ 10:50)  Wt(kg): --  General: Nontoxic-appearing Female in no acute distress. NC O2.  HEENT: AT/NC. Anicteric. Conjunctiva pink and moist. Oropharynx grossly clear.   Neck: Not rigid. No sense of mass.  Nodes: None palpable.  Lungs: Diminished BS Bilaterally   Heart: Regular rate and rhythm, no audible murmur   Abdomen: Bowel sounds present and normoactive. Soft. Nondistended. Not tender.  No sense of mass. No organomegaly.   Back: No spinal tenderness. No costovertebral angle tenderness.   Extremities: No cyanosis or clubbing. No edema.   Skin: Warm. Dry. Good turgor. No rash. No vasculitic stigmata.  Psychiatric: grossly appropriate mood and affect        Laboratory Studies--  CBC      Chemistries  10-31    x   |  x   |  x   ----------------------------<  x   4.2   |  x   |  x     Ca    10.0      30 Oct 2022 09:35        Culture Data  No new data

## 2022-10-31 NOTE — PROGRESS NOTE ADULT - ASSESSMENT
GENERAL DATA .   GOC.   10/15/2022 dnr     ALLGY.    ampicillin sulbactam                         WT. 10/15/2022 51   BMI.  10/15/2022 22                             ICU STAY. none  COVID.  10/13 scv2 (-)     BEST PRACTICE ISSUES.    HOB ELEVATN. Yes  DVT PPLX.   10/20/2022 hpsc   10/13- 10/18/2022 hpsc    PARSON PPLX.    10/13 protonix 40   INFN PPLX.    SP SW PAULETTE.        DIET.     10/16/2022 regl    ASSESSMENT/RECOMMENDATIONS .   RESP.  -- Resp failure.  10/16/2022 8a avaps 30-10/8 450 .25 20  743/39/82   10/22/2022 avaps 450/20/r 20 epap 8 max 30 fio2 50% 739/72 115   a/r  Is using noct avaps will need noct bpap at discharg   10/31/2022 noct avaps dced by hospitalist on 10/26  dc to hospice plannd   -- RO PE.  10/13 cta ch No pe   is on dvt p   -- Pl effsn.  cxr 10/24 mod r small l effs   INFECTION.  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  10/13 amlodipine 2.5  10/13 carvedilol 6.25 x2   10/13 fenofibrate 145   cont rx   GI.  -- GB cancer   -- Liver abscess   10/19 ir drainage hept absc   as above   HEMAT.  -- Anemia   Hb 10/15-10/16-10/18-10/19-10/22-10/27/2022 Hb 10 - 10.2- 9.3 - 8.9 - 8.5- 9.8    RENAL.  Na 10/15/2022 Na 135  Cr 10/15/2022 Cr .6   -- IV fl.  10/27/2022 NS 75   -- Urine retn  10/18/2022 Otto    OVERALL ASSESSMENT/DISPOSITION.  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  hospice care at home being considered       TIME SPENT   Over 25 minutes aggregate care time spent on encounter; activities included   direct patient care, counseling and/or coordinating care reviewing notes, lab data/ imaging , discussion with multidisciplinary team/ patient  /family and explaining in detail risks, benefits, alternatives  of the recommendations     [facility] 87 f 10/13/2022 3/[birth year]/1930 DR JASPER DOVRE

## 2022-10-31 NOTE — DISCHARGE NOTE PROVIDER - NSDCMRMEDTOKEN_GEN_ALL_CORE_FT
acetaminophen 325 mg oral tablet: 2 tab(s) orally every 6 hours, As needed,   allopurinol 100 mg oral tablet: 1 tab(s) orally once a day  amLODIPine 5 mg oral tablet: 1 tab(s) orally once a day  carvedilol 6.25 mg oral tablet: 1 tab(s) orally every 12 hours  ertapenem 1 g injection: 1 gram(s) injectable once a day x 18 days (11/15/22)  fenofibrate 145 mg oral tablet: 1 tab(s) orally once a day  mirtazapine 15 mg oral tablet: 1 tab(s) orally once a day (at bedtime)   pantoprazole 40 mg oral delayed release tablet: 1 tab(s) orally once a day (before a meal)  senna leaf extract oral tablet: 2 tab(s) orally once a day (at bedtime)

## 2022-10-31 NOTE — DISCHARGE NOTE PROVIDER - HOSPITAL COURSE
87 year old female with a PMHx of HTN, HLD GERD, and gallbladder cancer s/p ex lap, open cholecystectomy, segment 4b/5 hepatectomy, and right colectomy due to fistula tract vs metastasis on 08/19, recently admitted at [facility] 9/19 - 9/29 for Acute hypoxic respiratory failure and Hepatic Abscess s/p drainage p/w unresponsiveness which was noted by family this morning.    Vital Signs Last 24 Hrs  T(C): 37.3 (31 Oct 2022 10:43), Max: 37.3 (31 Oct 2022 10:43)  T(F): 99.1 (31 Oct 2022 10:43), Max: 99.1 (31 Oct 2022 10:43)  HR: 70 (31 Oct 2022 10:50) (65 - 76)  BP: 163/77 (31 Oct 2022 10:50) (135/69 - 165/72)  BP(mean): --  RR: 18 (31 Oct 2022 10:43) (15 - 18)  SpO2: 98% (31 Oct 2022 10:50) (97% - 98%)    Parameters below as of 31 Oct 2022 10:50  Patient On (Oxygen Delivery Method): nasal cannula  O2 Flow (L/min): 2    GENERAL: NAD,  no increased WOB   HEAD:  Atraumatic, Normocephalic  EYES: EOMI, PERRLA, conjunctiva and sclera clear  ENMT: No tonsillar erythema, exudates, or enlargement; Moist mucous membranes   NECK: Supple, No JVD,   NERVOUS SYSTEM:  Alert & Oriented X3, Good concentration; nonfocal   CHEST/LUNG: CTAB  HEART: Regular rate and rhythm; No murmurs, rubs, or gallops  ABDOMEN: Soft, Nontender, Nondistended; Bowel sounds present  EXTREMITIES: no edema or calf tenderness b/l.         Acute metabolic encephalopathy/ presumed septic encephalopathy   - CT head neg for acute pathology  - Evaluated by ICU  - s/p Bi-PAP, cont AVAPS O/N, NC  - cont to monitor  now doing well on 2 L NC     Acute hypercapnic respiratory failure  -improved     Hepatic Abscess  - gallbladder cancer s/p ex lap, open cholecystectomy, segment 4b/5 hepatectomy, and right colectomy due to fistula tract vs metastasis on 08/19  - s/p outpt abx per ID note (pt w/ different [facility] chart) 10/13, pt completed Levaquin and flagyl prior admission  - rpt CT abd on Monday 10/17; Residual collection in the gallbladder fossa/liver unchanged from 10/13/2022 overlying for absence of IV contrast. Bladder distention resulting in mild bilateral hydroureteronephrosis.  - s/p IR aspiration 10/19 3cc aspirated, f/u analysis   -Ertapenem until 11/15, home with home infusion via midline   afebrile, WBC wnl     Urinary Retention/ bilateral hydroureteronephrosis  1250 cc on Bladder scan   cont w/ indwelling chery, please do TOV with home care in 1 week   likely neurogenic bladder 2/2 bedbound status     PT/OT eval : KENISHA ; family requesting to take patient home     Hypokalemia- replaced   HTN/HLD- c/w Coreg, Amlodipine, fenofibrate  GERD- c/w PPI  DTI sacrum  , doesn't appear infected, no foul smell, erythema, discharge  -- PT wound consult appreciated , DTI, not infected   supportive care, frequent repositioning to prevent pressure ulcers     Moderate PCM --nutrition consult appreciated     Discharge time : 40 min   RETURN PARAMETERS DISCUSSED WITH PATIENT, PATIENT EXPRESSED UNDERSTANDING AND IS AGREEABLE. DISCUSSED WITH PATIENT ON REFRAINING FROM DRIVING UNTIL FOLLOW-UP/ CLEARED BY PMD. PATIENT EXPRESSED UNDERSTANDING.   87 year old female with a PMHx of HTN, HLD GERD, and gallbladder cancer s/p ex lap, open cholecystectomy, segment 4b/5 hepatectomy, and right colectomy due to fistula tract vs metastasis on 08/19, recently admitted at [facility] 9/19 - 9/29 for Acute hypoxic respiratory failure and Hepatic Abscess s/p drainage p/w unresponsiveness which was noted by family this morning.    Vital Signs Last 24 Hrs  T(C): 37.3 (31 Oct 2022 10:43), Max: 37.3 (31 Oct 2022 10:43)  T(F): 99.1 (31 Oct 2022 10:43), Max: 99.1 (31 Oct 2022 10:43)  HR: 70 (31 Oct 2022 10:50) (65 - 76)  BP: 163/77 (31 Oct 2022 10:50) (135/69 - 165/72)  BP(mean): --  RR: 18 (31 Oct 2022 10:43) (15 - 18)  SpO2: 98% (31 Oct 2022 10:50) (97% - 98%)    Parameters below as of 31 Oct 2022 10:50  Patient On (Oxygen Delivery Method): nasal cannula  O2 Flow (L/min): 2    GENERAL: NAD,  no increased WOB   HEAD:  Atraumatic, Normocephalic  EYES: EOMI, PERRLA, conjunctiva and sclera clear  ENMT: No tonsillar erythema, exudates, or enlargement; Moist mucous membranes   NECK: Supple, No JVD,   NERVOUS SYSTEM:  Alert & Oriented X3, Good concentration; nonfocal   CHEST/LUNG: CTAB  HEART: Regular rate and rhythm; No murmurs, rubs, or gallops  ABDOMEN: Soft, Nontender, Nondistended; Bowel sounds present  EXTREMITIES: no edema or calf tenderness b/l.         Acute metabolic encephalopathy/ presumed septic encephalopathy   - CT head neg for acute pathology  - Evaluated by ICU  - s/p Bi-PAP, cont AVAPS O/N, NC  - cont to monitor  now doing well on 2 L NC     Acute hypercapnic respiratory failure  -improved     Hepatic Abscess  - gallbladder cancer s/p ex lap, open cholecystectomy, segment 4b/5 hepatectomy, and right colectomy due to fistula tract vs metastasis on 08/19  - s/p outpt abx per ID note (pt w/ different [facility] chart) 10/13, pt completed Levaquin and flagyl prior admission  - rpt CT abd on Monday 10/17; Residual collection in the gallbladder fossa/liver unchanged from 10/13/2022 overlying for absence of IV contrast. Bladder distention resulting in mild bilateral hydroureteronephrosis.  - s/p IR aspiration 10/19 3cc aspirated, f/u analysis   -Ertapenem until 11/15, home with home infusion via midline   afebrile, WBC wnl     Urinary Retention/ bilateral hydroureteronephrosis  1250 cc on Bladder scan   cont w/ indwelling chery, please do TOV with home care in 1 week . if she is unable to urinate within 12 hours please bring patient back to the ER  likely neurogenic bladder 2/2 bedbound status     PT/OT eval : KENISHA ; family requesting to take patient home     Hypokalemia- replaced   HTN/HLD- c/w Coreg, Amlodipine, fenofibrate  GERD- c/w PPI  DTI sacrum  , doesn't appear infected, no foul smell, erythema, discharge  -- PT wound consult appreciated , DTI, not infected   supportive care, frequent repositioning to prevent pressure ulcers     Moderate PCM --nutrition consult appreciated     Discharge time : 40 min   RETURN PARAMETERS DISCUSSED WITH PATIENT, PATIENT EXPRESSED UNDERSTANDING AND IS AGREEABLE. DISCUSSED WITH PATIENT ON REFRAINING FROM DRIVING UNTIL FOLLOW-UP/ CLEARED BY PMD. PATIENT EXPRESSED UNDERSTANDING.   87 year old female with a PMHx of HTN, HLD GERD, and gallbladder cancer s/p ex lap, open cholecystectomy, segment 4b/5 hepatectomy, and right colectomy due to fistula tract vs metastasis on 08/19, recently admitted at [facility] 9/19 - 9/29 for Acute hypoxic respiratory failure and Hepatic Abscess s/p drainage p/w unresponsiveness which was noted by family this morning.    Vital Signs Last 24 Hrs  T(C): 37.3 (31 Oct 2022 10:43), Max: 37.3 (31 Oct 2022 10:43)  T(F): 99.1 (31 Oct 2022 10:43), Max: 99.1 (31 Oct 2022 10:43)  HR: 70 (31 Oct 2022 10:50) (65 - 76)  BP: 163/77 (31 Oct 2022 10:50) (135/69 - 165/72)  BP(mean): --  RR: 18 (31 Oct 2022 10:43) (15 - 18)  SpO2: 98% (31 Oct 2022 10:50) (97% - 98%)    Parameters below as of 31 Oct 2022 10:50  Patient On (Oxygen Delivery Method): nasal cannula  O2 Flow (L/min): 2    GENERAL: NAD,  no increased WOB   HEAD:  Atraumatic, Normocephalic  EYES: EOMI, PERRLA, conjunctiva and sclera clear  ENMT: No tonsillar erythema, exudates, or enlargement; Moist mucous membranes   NECK: Supple, No JVD,   NERVOUS SYSTEM:  Alert & Oriented X3, Good concentration; nonfocal   CHEST/LUNG: CTAB  HEART: Regular rate and rhythm; No murmurs, rubs, or gallops  ABDOMEN: Soft, Nontender, Nondistended; Bowel sounds present  EXTREMITIES: no edema or calf tenderness b/l.         Acute metabolic encephalopathy/ presumed septic encephalopathy   - CT head neg for acute pathology  - Evaluated by ICU  - s/p Bi-PAP, cont AVAPS O/N, NC  - cont to monitor  now doing well on 2 L NC     Acute hypercapnic respiratory failure  -improved     Hepatic Abscess  - gallbladder cancer s/p ex lap, open cholecystectomy, segment 4b/5 hepatectomy, and right colectomy due to fistula tract vs metastasis on 08/19  - s/p outpt abx per ID note (pt w/ different [facility] chart) 10/13, pt completed Levaquin and flagyl prior admission  - rpt CT abd on Monday 10/17; Residual collection in the gallbladder fossa/liver unchanged from 10/13/2022 overlying for absence of IV contrast. Bladder distention resulting in mild bilateral hydroureteronephrosis.  - s/p IR aspiration 10/19 3cc aspirated, f/u analysis   -Ertapenem until 11/15, home with home infusion via midline   afebrile, WBC wnl     Urinary Retention/ bilateral hydroureteronephrosis  1250 cc on Bladder scan   cont w/ indwelling chery, please do TOV with home care in 1 week from 10/31/22 . if she is unable to urinate within 12 hours please bring patient back to the ER  --if there are any other issues with the chery, bring her to the ER   likely neurogenic bladder 2/2 bedbound status   /  PT/OT eval : KENISHA ; family requesting to take patient home     Hypokalemia- replaced   HTN/HLD- c/w Coreg, Amlodipine, fenofibrate  GERD- c/w PPI  DTI sacrum  , doesn't appear infected, no foul smell, erythema, discharge  -- PT wound consult appreciated , DTI, not infected   supportive care, frequent repositioning to prevent pressure ulcers     Moderate PCM --nutrition consult appreciated     Discharge time : 40 min   RETURN PARAMETERS DISCUSSED WITH PATIENT, PATIENT EXPRESSED UNDERSTANDING AND IS AGREEABLE. DISCUSSED WITH PATIENT ON REFRAINING FROM DRIVING UNTIL FOLLOW-UP/ CLEARED BY PMD. PATIENT EXPRESSED UNDERSTANDING.

## 2022-10-31 NOTE — DISCHARGE NOTE NURSING/CASE MANAGEMENT/SOCIAL WORK - NSDCPEFALRISK_GEN_ALL_CORE
For information on Fall & Injury Prevention, visit: [email or web address] OR  [email or web address] OR  https://www.cdc.gov/steadi/patient.html

## 2022-10-31 NOTE — PROGRESS NOTE ADULT - REASON FOR ADMISSION
Acute hypoxic and hypercapnic respiratory failure, altered mental status

## 2022-10-31 NOTE — DISCHARGE NOTE PROVIDER - DETAILS OF MALNUTRITION DIAGNOSIS/DIAGNOSES
This patient has been assessed with a concern for Malnutrition and was treated during this hospitalization for the following Nutrition diagnosis/diagnoses:     -  10/27/2022: Severe protein-calorie malnutrition   -  10/20/2022: Moderate protein-calorie malnutrition

## 2022-10-31 NOTE — DISCHARGE NOTE NURSING/CASE MANAGEMENT/SOCIAL WORK - NSSCNAMETXT_GEN_ALL_CORE
[facility] will provide a Skilled Nursing Evaluation and Skin Care.  [facility] will provide home infusion services

## 2022-10-31 NOTE — DISCHARGE NOTE PROVIDER - PROVIDER TOKENS
PROVIDER:[TOKEN:[[id number]],FOLLOWUP:[1 month]],FREE:[LAST:[your primary care doctor],PHONE:[(   )    -],FAX:[(   )    -],FOLLOWUP:[1 week]],FREE:[LAST:[your oncologist],PHONE:[(   )    -],FAX:[(   )    -],FOLLOWUP:[Routine]]

## 2022-11-03 DIAGNOSIS — E83.52 HYPERCALCEMIA: ICD-10-CM

## 2022-11-03 DIAGNOSIS — Z74.01 BED CONFINEMENT STATUS: ICD-10-CM

## 2022-11-03 DIAGNOSIS — K75.0 ABSCESS OF LIVER: ICD-10-CM

## 2022-11-03 DIAGNOSIS — E87.6 HYPOKALEMIA: ICD-10-CM

## 2022-11-03 DIAGNOSIS — J96.02 ACUTE RESPIRATORY FAILURE WITH HYPERCAPNIA: ICD-10-CM

## 2022-11-03 DIAGNOSIS — N13.30 UNSPECIFIED HYDRONEPHROSIS: ICD-10-CM

## 2022-11-03 DIAGNOSIS — K21.9 GASTRO-ESOPHAGEAL REFLUX DISEASE WITHOUT ESOPHAGITIS: ICD-10-CM

## 2022-11-03 DIAGNOSIS — D64.9 ANEMIA, UNSPECIFIED: ICD-10-CM

## 2022-11-03 DIAGNOSIS — C23 MALIGNANT NEOPLASM OF GALLBLADDER: ICD-10-CM

## 2022-11-03 DIAGNOSIS — R33.9 RETENTION OF URINE, UNSPECIFIED: ICD-10-CM

## 2022-11-03 DIAGNOSIS — F03.90 UNSPECIFIED DEMENTIA, UNSPECIFIED SEVERITY, WITHOUT BEHAVIORAL DISTURBANCE, PSYCHOTIC DISTURBANCE, MOOD DISTURBANCE, AND ANXIETY: ICD-10-CM

## 2022-11-03 DIAGNOSIS — R06.89 OTHER ABNORMALITIES OF BREATHING: ICD-10-CM

## 2022-11-03 DIAGNOSIS — E43 UNSPECIFIED SEVERE PROTEIN-CALORIE MALNUTRITION: ICD-10-CM

## 2022-11-03 DIAGNOSIS — N31.9 NEUROMUSCULAR DYSFUNCTION OF BLADDER, UNSPECIFIED: ICD-10-CM

## 2022-11-03 DIAGNOSIS — Z66 DO NOT RESUSCITATE: ICD-10-CM

## 2022-11-03 DIAGNOSIS — Z90.49 ACQUIRED ABSENCE OF OTHER SPECIFIED PARTS OF DIGESTIVE TRACT: ICD-10-CM

## 2022-11-03 DIAGNOSIS — J96.01 ACUTE RESPIRATORY FAILURE WITH HYPOXIA: ICD-10-CM

## 2022-11-03 DIAGNOSIS — G93.41 METABOLIC ENCEPHALOPATHY: ICD-10-CM

## 2022-11-03 DIAGNOSIS — L89.151 PRESSURE ULCER OF SACRAL REGION, STAGE 1: ICD-10-CM

## 2022-11-03 DIAGNOSIS — I10 ESSENTIAL (PRIMARY) HYPERTENSION: ICD-10-CM

## 2022-11-03 DIAGNOSIS — E78.5 HYPERLIPIDEMIA, UNSPECIFIED: ICD-10-CM

## 2022-11-09 ENCOUNTER — NON-APPOINTMENT (OUTPATIENT)
Age: 87
End: 2022-11-09

## 2022-11-18 ENCOUNTER — INPATIENT (INPATIENT)
Facility: HOSPITAL | Age: 87
LOS: 10 days | Discharge: HOME HEALTH | End: 2022-11-29
Attending: INTERNAL MEDICINE | Admitting: INTERNAL MEDICINE

## 2022-11-18 VITALS
OXYGEN SATURATION: 97 % | SYSTOLIC BLOOD PRESSURE: 134 MMHG | HEART RATE: 70 BPM | RESPIRATION RATE: 18 BRPM | TEMPERATURE: 97 F | DIASTOLIC BLOOD PRESSURE: 61 MMHG

## 2022-11-18 DIAGNOSIS — Z90.49 ACQUIRED ABSENCE OF OTHER SPECIFIED PARTS OF DIGESTIVE TRACT: Chronic | ICD-10-CM

## 2022-11-18 DIAGNOSIS — R41.82 ALTERED MENTAL STATUS, UNSPECIFIED: ICD-10-CM

## 2022-11-18 DIAGNOSIS — Z85.09 PERSONAL HISTORY OF MALIGNANT NEOPLASM OF OTHER DIGESTIVE ORGANS: Chronic | ICD-10-CM

## 2022-11-18 DIAGNOSIS — Z90.710 ACQUIRED ABSENCE OF BOTH CERVIX AND UTERUS: Chronic | ICD-10-CM

## 2022-11-18 LAB
ALBUMIN SERPL ELPH-MCNC: 4.2 G/DL — SIGNIFICANT CHANGE UP (ref 3.3–5)
ALP SERPL-CCNC: 101 U/L — SIGNIFICANT CHANGE UP (ref 40–120)
ALT FLD-CCNC: 8 U/L — SIGNIFICANT CHANGE UP (ref 4–33)
ANION GAP SERPL CALC-SCNC: 10 MMOL/L — SIGNIFICANT CHANGE UP (ref 7–14)
APPEARANCE UR: CLEAR — SIGNIFICANT CHANGE UP
APTT BLD: 24.7 SEC — LOW (ref 27–36.3)
AST SERPL-CCNC: 21 U/L — SIGNIFICANT CHANGE UP (ref 4–32)
BASOPHILS # BLD AUTO: 0.09 K/UL — SIGNIFICANT CHANGE UP (ref 0–0.2)
BASOPHILS NFR BLD AUTO: 1.5 % — SIGNIFICANT CHANGE UP (ref 0–2)
BILIRUB SERPL-MCNC: 0.4 MG/DL — SIGNIFICANT CHANGE UP (ref 0.2–1.2)
BILIRUB UR-MCNC: NEGATIVE — SIGNIFICANT CHANGE UP
BLD GP AB SCN SERPL QL: NEGATIVE — SIGNIFICANT CHANGE UP
BUN SERPL-MCNC: 22 MG/DL — SIGNIFICANT CHANGE UP (ref 7–23)
CA-I BLD-SCNC: 1.37 MMOL/L — HIGH (ref 1.15–1.29)
CALCIUM SERPL-MCNC: 11.1 MG/DL — HIGH (ref 8.4–10.5)
CHLORIDE SERPL-SCNC: 98 MMOL/L — SIGNIFICANT CHANGE UP (ref 98–107)
CO2 SERPL-SCNC: 34 MMOL/L — HIGH (ref 22–31)
COLOR SPEC: SIGNIFICANT CHANGE UP
CREAT SERPL-MCNC: 0.89 MG/DL — SIGNIFICANT CHANGE UP (ref 0.5–1.3)
DIFF PNL FLD: NEGATIVE — SIGNIFICANT CHANGE UP
EGFR: 63 ML/MIN/1.73M2 — SIGNIFICANT CHANGE UP
EOSINOPHIL # BLD AUTO: 0.3 K/UL — SIGNIFICANT CHANGE UP (ref 0–0.5)
EOSINOPHIL NFR BLD AUTO: 5 % — SIGNIFICANT CHANGE UP (ref 0–6)
FLUAV AG NPH QL: SIGNIFICANT CHANGE UP
FLUBV AG NPH QL: SIGNIFICANT CHANGE UP
GAS PNL BLDV: SIGNIFICANT CHANGE UP
GLUCOSE SERPL-MCNC: 100 MG/DL — HIGH (ref 70–99)
GLUCOSE UR QL: NEGATIVE — SIGNIFICANT CHANGE UP
HCT VFR BLD CALC: 39 % — SIGNIFICANT CHANGE UP (ref 34.5–45)
HGB BLD-MCNC: 11.7 G/DL — SIGNIFICANT CHANGE UP (ref 11.5–15.5)
IANC: 4.04 K/UL — SIGNIFICANT CHANGE UP (ref 1.8–7.4)
IMM GRANULOCYTES NFR BLD AUTO: 0.3 % — SIGNIFICANT CHANGE UP (ref 0–0.9)
INR BLD: 1.04 RATIO — SIGNIFICANT CHANGE UP (ref 0.88–1.16)
KETONES UR-MCNC: NEGATIVE — SIGNIFICANT CHANGE UP
LEUKOCYTE ESTERASE UR-ACNC: NEGATIVE — SIGNIFICANT CHANGE UP
LIDOCAIN IGE QN: 144 U/L — HIGH (ref 7–60)
LYMPHOCYTES # BLD AUTO: 1.13 K/UL — SIGNIFICANT CHANGE UP (ref 1–3.3)
LYMPHOCYTES # BLD AUTO: 18.8 % — SIGNIFICANT CHANGE UP (ref 13–44)
MAGNESIUM SERPL-MCNC: 1.9 MG/DL — SIGNIFICANT CHANGE UP (ref 1.6–2.6)
MCHC RBC-ENTMCNC: 26.1 PG — LOW (ref 27–34)
MCHC RBC-ENTMCNC: 30 GM/DL — LOW (ref 32–36)
MCV RBC AUTO: 87.1 FL — SIGNIFICANT CHANGE UP (ref 80–100)
MONOCYTES # BLD AUTO: 0.44 K/UL — SIGNIFICANT CHANGE UP (ref 0–0.9)
MONOCYTES NFR BLD AUTO: 7.3 % — SIGNIFICANT CHANGE UP (ref 2–14)
NEUTROPHILS # BLD AUTO: 4.04 K/UL — SIGNIFICANT CHANGE UP (ref 1.8–7.4)
NEUTROPHILS NFR BLD AUTO: 67.1 % — SIGNIFICANT CHANGE UP (ref 43–77)
NITRITE UR-MCNC: NEGATIVE — SIGNIFICANT CHANGE UP
NRBC # BLD: 0 /100 WBCS — SIGNIFICANT CHANGE UP (ref 0–0)
NRBC # FLD: 0 K/UL — SIGNIFICANT CHANGE UP (ref 0–0)
NT-PROBNP SERPL-SCNC: 676 PG/ML — HIGH
PH UR: 7 — SIGNIFICANT CHANGE UP (ref 5–8)
PLATELET # BLD AUTO: 270 K/UL — SIGNIFICANT CHANGE UP (ref 150–400)
POTASSIUM SERPL-MCNC: 3.9 MMOL/L — SIGNIFICANT CHANGE UP (ref 3.5–5.3)
POTASSIUM SERPL-SCNC: 3.9 MMOL/L — SIGNIFICANT CHANGE UP (ref 3.5–5.3)
PROT SERPL-MCNC: 8.2 G/DL — SIGNIFICANT CHANGE UP (ref 6–8.3)
PROT UR-MCNC: ABNORMAL
PROTHROM AB SERPL-ACNC: 12.1 SEC — SIGNIFICANT CHANGE UP (ref 10.5–13.4)
RBC # BLD: 4.48 M/UL — SIGNIFICANT CHANGE UP (ref 3.8–5.2)
RBC # FLD: 17.8 % — HIGH (ref 10.3–14.5)
RH IG SCN BLD-IMP: POSITIVE — SIGNIFICANT CHANGE UP
RSV RNA NPH QL NAA+NON-PROBE: SIGNIFICANT CHANGE UP
SARS-COV-2 RNA SPEC QL NAA+PROBE: SIGNIFICANT CHANGE UP
SODIUM SERPL-SCNC: 142 MMOL/L — SIGNIFICANT CHANGE UP (ref 135–145)
SP GR SPEC: 1.01 — SIGNIFICANT CHANGE UP (ref 1.01–1.05)
TROPONIN T, HIGH SENSITIVITY RESULT: 35 NG/L — SIGNIFICANT CHANGE UP
TSH SERPL-MCNC: 1.06 UIU/ML — SIGNIFICANT CHANGE UP (ref 0.27–4.2)
UROBILINOGEN FLD QL: SIGNIFICANT CHANGE UP
WBC # BLD: 6.02 K/UL — SIGNIFICANT CHANGE UP (ref 3.8–10.5)
WBC # FLD AUTO: 6.02 K/UL — SIGNIFICANT CHANGE UP (ref 3.8–10.5)

## 2022-11-18 PROCEDURE — 99223 1ST HOSP IP/OBS HIGH 75: CPT

## 2022-11-18 PROCEDURE — 70450 CT HEAD/BRAIN W/O DYE: CPT | Mod: 26,MA

## 2022-11-18 PROCEDURE — 71045 X-RAY EXAM CHEST 1 VIEW: CPT | Mod: 26

## 2022-11-18 PROCEDURE — 99285 EMERGENCY DEPT VISIT HI MDM: CPT | Mod: GC

## 2022-11-18 RX ORDER — SODIUM CHLORIDE 9 MG/ML
1000 INJECTION, SOLUTION INTRAVENOUS ONCE
Refills: 0 | Status: COMPLETED | OUTPATIENT
Start: 2022-11-18 | End: 2022-11-18

## 2022-11-18 RX ORDER — HALOPERIDOL DECANOATE 100 MG/ML
2.5 INJECTION INTRAMUSCULAR ONCE
Refills: 0 | Status: COMPLETED | OUTPATIENT
Start: 2022-11-18 | End: 2022-11-18

## 2022-11-18 RX ORDER — MIDAZOLAM HYDROCHLORIDE 1 MG/ML
2.5 INJECTION, SOLUTION INTRAMUSCULAR; INTRAVENOUS ONCE
Refills: 0 | Status: DISCONTINUED | OUTPATIENT
Start: 2022-11-18 | End: 2022-11-18

## 2022-11-18 RX ADMIN — SODIUM CHLORIDE 1000 MILLILITER(S): 9 INJECTION, SOLUTION INTRAVENOUS at 18:05

## 2022-11-18 RX ADMIN — MIDAZOLAM HYDROCHLORIDE 2.5 MILLIGRAM(S): 1 INJECTION, SOLUTION INTRAMUSCULAR; INTRAVENOUS at 18:51

## 2022-11-18 RX ADMIN — HALOPERIDOL DECANOATE 2.5 MILLIGRAM(S): 100 INJECTION INTRAMUSCULAR at 16:50

## 2022-11-18 NOTE — ED ADULT NURSE NOTE - OBJECTIVE STATEMENT
Patient is a 87-year-old female, brought in by EMS, for possible seizure-like activity. Pt is A&OX0, non-ambulatory, arrived with HHA (not wound care nurse). Pt was shaking for X 12 times. Pt is primarily [place] speaking. Son on phone Gurjit, #[phone]. Pt not answering questions on exam, shaking intermittently. MD Tello at bedside. Some redness to sacral area. 20 G to R wrist. Labs sent On 2L NC at baseline. Will cont. to monitor.

## 2022-11-18 NOTE — H&P ADULT - IF HCP IS NOT ON CHART, IDENTIFY THE PERSONS NAME AND PHONE NUMBER CONTACTED TO BRING IN DOCUMENT
Reportedly daughter America Butler and patient's son are co-HCP, requested copy to place in chart from [place] via phone [phone]

## 2022-11-18 NOTE — H&P ADULT - ASSESSMENT
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

## 2022-11-18 NOTE — CONSULT NOTE ADULT - ATTENDING COMMENTS
Baseline - nonverbal, dependent for all ADLs per history from son from nurse.       Exam:  MS:  Eyes closed forcefully.  No eye opening.  No follow commands,  no verbalizations.   CN:  forcefully closing eyes.  Eyes in primary gaze.  OCR - no movement.  Corneal reflex intact, symmetric.  Pupils 2-->1, B - sluggish. Gag+  Motor/sensory:  Continual myoclonus and asterixis - generalized.  Moving all 4 ext - symmetrically. W/D to stimulation all 4 ext.      A/P  Ms. Rich is an 86 yo woman with severe neurological disability at baseline with toxic metabolic septic encephalopathy.   Movements are myoclonus and asterixis and unlikely seizure - will do 2 hour EEG.   Aspirin and statin if no contraindication.  MRI brain - only after treating acute medical problems and if she is not back to baseline.   Thank you

## 2022-11-18 NOTE — H&P ADULT - PROBLEM SELECTOR PLAN 4
soft BP, hold carvedilol, amlodipine for now pending rectal temp compensated bicarb on BMP, trend CO2  monitor on continuous pulse ox  on 2L during day, 3L QHS  required NIPPV on prior admission, continue to monitor

## 2022-11-18 NOTE — CONSULT NOTE ADULT - SUBJECTIVE AND OBJECTIVE BOX
Neurology - Consult Note    -  Spectra: [phone] ([facility]), [phone] ([facility])  -    HPI: Patient HARSH VERGARA is a 87y (1935) wo/man with a PMHx significant for  HTN, HLD GERD, and gallbladder cancer has presented to ED due to concern for first time seizure episode. Got 2 versed in ED for imaging pt currently lethargic-history from daughter:    States pt not sleeping for 2 days. No recent fever or chills. Recently finished abx for liver abscess on monday (was hospitalized). Per physical therapy, pt's legs were shaking but pt was alert with eyes open with no LOC, confusion or tongue biting. Pt has had past tremors but increased tremors in the last 3 days along with confusion and pt also seeing things. Pt has no known hx of seizures, not on AC/AP.AAO x1 at baseline, wheel chair bound, needs help with ADLs.    NIH not obtainable  preMRS 4      Review of Systems:    CONSTITUTIONAL: No fevers or chills  EYES AND ENT: No visual changes or no throat pain   NECK: No pain or stiffness  NEUROLOGICAL: +As stated in HPI above  SKIN: No itching, burning, rashes, or lesions   All other review of systems is negative unless indicated above.    Allergies:  ampicillin (Rash)  ampicillin-sulbactam (Rash)      PMHx/PSHx/Family Hx: As above, otherwise see below   No pertinent past medical history    Hypertension    Hyperlipidemia    Gout    GERD (gastroesophageal reflux disease)    Insomnia    Other gallbladder disorder        Social Hx:  No current use of tobacco, alcohol, or illicit drugs  Lives with ***    Medications:  MEDICATIONS  (STANDING):    MEDICATIONS  (PRN):      Vitals:  T(C): 36.9 (11-18-22 @ 18:14), Max: 37.3 (11-18-22 @ 14:55)  HR: 78 (11-18-22 @ 22:56) (70 - 87)  BP: 100/51 (11-18-22 @ 22:56) (100/51 - 156/65)  RR: 22 (11-18-22 @ 22:56) (18 - 22)  SpO2: 99% (11-18-22 @ 22:56) (97% - 100%)    Physical Examination: LIMITED as PT received sedation recently  General - NAD, on NRB.   Cardiovascular - Peripheral pulses palpable, no edema  Eyes -  Fundoscopy not performed due to safety precautions in the setting of the COVID-19 pandemic    Neurologic Exam:  Mental status - Eyes closed, does not open them to noxious stimuli but grimaces. Groans but no words said.     Cranial nerves -unable able to test pupil reactivity as eyes tightly shut, no facial asymmetry b/l, tongue midline on protrusion with full lateral movement    Motor/sensory - Normal bulk and tone throughout. No pronator drift. Tremoulous in all extremities.   Withdraws extremities to noxious stimuli. Unable to do manual motor testing.    DTR's -Unable to perform due to nonoptimal positioning    Coordination - unable to do    Gait and station - unable to do  Labs:                        11.7   6.02  )-----------( 270      ( 18 Nov 2022 14:46 )             39.0     11-18    142  |  98  |  22  ----------------------------<  100<H>  3.9   |  34<H>  |  0.89    Ca    11.1<H>      18 Nov 2022 14:46  Mg     1.90     11-18    TPro  8.2  /  Alb  4.2  /  TBili  0.4  /  DBili  x   /  AST  21  /  ALT  8   /  AlkPhos  101  11-18    CAPILLARY BLOOD GLUCOSE      POCT Blood Glucose.: 110 mg/dL (18 Nov 2022 13:58)    LIVER FUNCTIONS - ( 18 Nov 2022 14:46 )  Alb: 4.2 g/dL / Pro: 8.2 g/dL / ALK PHOS: 101 U/L / ALT: 8 U/L / AST: 21 U/L / GGT: x             PT/INR - ( 18 Nov 2022 14:46 )   PT: 12.1 sec;   INR: 1.04 ratio         PTT - ( 18 Nov 2022 14:46 )  PTT:24.7 sec  CSF:                  Radiology:  CT Head No Cont:  (18 Nov 2022 19:20)  CT head:   Moderate prominence of the sulci and ventricles are consistent with   age-appropriate volume loss. There are hemispheric white matter areas of   low attenuation which are nonspecific but likely related to sequelae of   microvascular disease. Small chronic left frontal infarct. Chronic   lacunar infarct in the right cerebellar hemisphere. Chronic left thalamic   lacunar infarct. There is no intraparenchymal hematoma, mass effect or   midline shift. The basal cisterns are patent.  No abnormal extra-axial   fluid collectionsare present.    The visualized paranasal sinuses are clear. The mastoid air cells and   middle ear cavities are clear. The intraorbital compartments are   unremarkable.    The soft tissues of the scalp are unremarkable. The calvarium is intact.      IMPRESSION:    No acute hemorrhage or mass effect.     Neurology - Consult Note    -  Spectra: [phone] ([facility]), [phone] ([facility])  -    HPI: Patient HARSH VERGARA is a 87y (1935) wo/man with a PMHx significant for  HTN, HLD GERD, and gallbladder cancer has presented to ED due to concern for first time seizure episode. Got 2 versed in ED for imaging pt currently lethargic-history from daughter:    States pt not sleeping for 2 days. No recent fever or chills. Recently finished abx for liver abscess on monday (was hospitalized). Per physical therapy, pt's legs were shaking but pt was alert with eyes open with no LOC, confusion or tongue biting. Pt has had past tremors but increased tremors in the last 3 days along with confusion and pt also seeing things. Pt has no known hx of seizures, not on AC/AP.AAO x1 at baseline, wheel chair bound, needs help with ADLs.    NIH not obtainable  preMRS 4      Review of Systems:    CONSTITUTIONAL: No fevers or chills  EYES AND ENT: No visual changes or no throat pain   NECK: No pain or stiffness  NEUROLOGICAL: +As stated in HPI above  SKIN: No itching, burning, rashes, or lesions   All other review of systems is negative unless indicated above.    Allergies:  ampicillin (Rash)  ampicillin-sulbactam (Rash)      PMHx/PSHx/Family Hx: As above, otherwise see below   No pertinent past medical history    Hypertension    Hyperlipidemia    Gout    GERD (gastroesophageal reflux disease)    Insomnia    Other gallbladder disorder        Social Hx:  No current use of tobacco, alcohol, or illicit drugs  Lives with ***    Medications:  MEDICATIONS  (STANDING):    MEDICATIONS  (PRN):      Vitals:  T(C): 36.9 (11-18-22 @ 18:14), Max: 37.3 (11-18-22 @ 14:55)  HR: 78 (11-18-22 @ 22:56) (70 - 87)  BP: 100/51 (11-18-22 @ 22:56) (100/51 - 156/65)  RR: 22 (11-18-22 @ 22:56) (18 - 22)  SpO2: 99% (11-18-22 @ 22:56) (97% - 100%)    Physical Examination: LIMITED as PT received sedation recently  General - NAD, on NRB.   Cardiovascular - Peripheral pulses palpable, no edema      Neurologic Exam:  Mental status - Eyes closed, does not open them to noxious stimuli but grimaces. Groans but no words said.     Cranial nerves -unable able to test pupil reactivity as eyes tightly shut, no facial asymmetry b/l, tongue midline on protrusion with full lateral movement    Motor/sensory - Normal bulk and tone throughout. No pronator drift. Tremoulous in all extremities.   Withdraws extremities to noxious stimuli. Unable to do manual motor testing.    DTR's -Unable to perform due to nonoptimal positioning    Coordination - unable to do    Gait and station - unable to do  Labs:                        11.7   6.02  )-----------( 270      ( 18 Nov 2022 14:46 )             39.0     11-18    142  |  98  |  22  ----------------------------<  100<H>  3.9   |  34<H>  |  0.89    Ca    11.1<H>      18 Nov 2022 14:46  Mg     1.90     11-18    TPro  8.2  /  Alb  4.2  /  TBili  0.4  /  DBili  x   /  AST  21  /  ALT  8   /  AlkPhos  101  11-18    CAPILLARY BLOOD GLUCOSE      POCT Blood Glucose.: 110 mg/dL (18 Nov 2022 13:58)    LIVER FUNCTIONS - ( 18 Nov 2022 14:46 )  Alb: 4.2 g/dL / Pro: 8.2 g/dL / ALK PHOS: 101 U/L / ALT: 8 U/L / AST: 21 U/L / GGT: x             PT/INR - ( 18 Nov 2022 14:46 )   PT: 12.1 sec;   INR: 1.04 ratio         PTT - ( 18 Nov 2022 14:46 )  PTT:24.7 sec  CSF:                  Radiology:  CT Head No Cont:  (18 Nov 2022 19:20)  CT head:   Moderate prominence of the sulci and ventricles are consistent with   age-appropriate volume loss. There are hemispheric white matter areas of   low attenuation which are nonspecific but likely related to sequelae of   microvascular disease. Small chronic left frontal infarct. Chronic   lacunar infarct in the right cerebellar hemisphere. Chronic left thalamic   lacunar infarct. There is no intraparenchymal hematoma, mass effect or   midline shift. The basal cisterns are patent.  No abnormal extra-axial   fluid collectionsare present.    The visualized paranasal sinuses are clear. The mastoid air cells and   middle ear cavities are clear. The intraorbital compartments are   unremarkable.    The soft tissues of the scalp are unremarkable. The calvarium is intact.      IMPRESSION:    No acute hemorrhage or mass effect.

## 2022-11-18 NOTE — ED PROVIDER NOTE - PROGRESS NOTE DETAILS
Earl PGY4: Labs/imaging non actionable. Pending neuro eval for seizure work up. Will admit to med for further management, case endorsed to Dr. Shay.

## 2022-11-18 NOTE — H&P ADULT - PROBLEM SELECTOR PLAN 5
c/w PPI soft BP, hold carvedilol, amlodipine for now pending rectal temp    Addendum: patient dropped BP to MAP 55, rectal temp pending, NS IVF bolus 500cc IVF ordered, assess for response, continue to monitor closely soft BP, hold carvedilol, amlodipine for now pending rectal temp    Addendum: patient dropped BP to MAP 55, rectal temp pending, NS IVF bolus 500cc IVF ordered, repeat BP during infusion 95/44, repeat BP after completion of bolus, assess for response, continue to monitor closely soft BP, hold carvedilol, amlodipine for now pending rectal temp    Addendum: patient dropped BP to MAP 55, rectal temp pending, NS IVF bolus 500cc IVF ordered, repeat BP during infusion 95/44, repeat BP after completion of bolus, assess for response, continue to monitor closely, patient with elevated lipase, no nausea/vomiting/diarrhea per daughter, no apparent abdominal pain on exam, if elevated rectal temp would check CT abdomen given recent liver abscess

## 2022-11-18 NOTE — ED PROVIDER NOTE - OBJECTIVE STATEMENT
87 year old female with a PMHx of HTN, HLD GERD, and gallbladder cancer presents w/ ? seizure episode. Patient was at PT, had b/l LE shaking, no loc, no post ictal period, no urinary/bowel incontience, no tongue biting. Episode lasted 5 seconds. No other complaints. History obtained from son.

## 2022-11-18 NOTE — ED PROVIDER NOTE - PHYSICAL EXAMINATION
General: elderly appearing   HEENT: neck supple, anicteric sclera  Cardiovascular: Normal s1, s2, RRR  Respiratory: CTA b/l   Abdominal: Soft, ntnd  Extremities: No swelling in LEs  Neurologic: Non focal  Psych: Awake, alert

## 2022-11-18 NOTE — H&P ADULT - NSHPLABSRESULTS_GEN_ALL_CORE
11.7   6.02  )-----------( 270      ( 2022 14:46 )             39.0         142  |  98  |  22  ----------------------------<  100<H>  3.9   |  34<H>  |  0.89    Ca    11.1<H>      2022 14:46  Mg     1.90         TPro  8.2  /  Alb  4.2  /  TBili  0.4  /  DBili  x   /  AST  21  /  ALT  8   /  AlkPhos  101      Lipase, Serum: 144 U/L (22 @ 14:46)    Blood Gas Calcium, Ionized - Venous: 1.38 mmol/L (22 @ 15:21)    Thyroid Stimulating Hormone, Serum: 1.06 uIU/mL (22 @ 14:46)    Troponin T, High Sensitivity Result: 35 ng/L (22 @ 14:46)    Serum Pro-Brain Natriuretic Peptide: 676 pg/mL (22 @ 14:46)    PT/INR - ( 2022 14:46 )   PT: 12.1 sec;   INR: 1.04 ratio    PTT - ( 2022 14:46 )  PTT:24.7 sec    15:21 - VBG - pH: 7.34  | pCO2: 75    | pO2: 23    | Lactate: 1.1      Urinalysis Basic - ( 2022 15:45 )  Color: Light Yellow / Appearance: Clear / S.010 / pH: x  Gluc: x / Ketone: Negative  / Bili: Negative / Urobili: <2 mg/dL   Blood: x / Protein: Trace / Nitrite: Negative   Leuk Esterase: Negative / RBC: x / WBC x   Sq Epi: x / Non Sq Epi: x / Bacteria: x    Flu With COVID-19 By SATISH (22 @ 15:26)    SARS-CoV-2 Result: NotDetec    Influenza A Result: NotDetec    Influenza B Result: NotDetec    Resp Syn Virus Result: NotDetec    < from: CT Head No Cont (22 @ 19:20) >  Moderate prominence of the sulci and ventricles are consistent with age-appropriate volume loss. There are hemispheric white matter areas of low attenuation which are nonspecific but likely related to sequelae of microvascular disease. Small chronic left frontal infarct. Chronic lacunar infarct in the right cerebellar hemisphere. Chronic left thalamic lacunar infarct. There is no intraparenchymal hematoma, mass effect or midline shift. The basal cisterns are patent.  No abnormal extra-axial fluid collections are present. The visualized paranasal sinuses are clear. The mastoid air cells and middle ear cavities are clear. The intraorbital compartments are unremarkable. The soft tissues of the scalp are unremarkable. The calvarium is intact.  IMPRESSION: No acute hemorrhage or mass effect.  < end of copied text >    < from: Xray Chest 1 View- PORTABLE-Urgent (Xray Chest 1 View- PORTABLE-Urgent .) (18. @ 15:37) >  The lungs are clear with elevation of the right hemidiaphragm. Heart appears enlarged but stable and there are no effusions or congestion to indicate CHF. There is no pneumothorax. The heart is not well evaluated in this position. The visualized osseous structures demonstrate no acute pathology.   IMPRESSION: Clear lungs with elevated right hemidiaphragm.  < end of copied text > 11.7   6.02  )-----------( 270      ( 2022 14:46 )             39.0         142  |  98  |  22  ----------------------------<  100<H>  3.9   |  34<H>  |  0.89    Ca    11.1<H>      2022 14:46  Mg     1.90         TPro  8.2  /  Alb  4.2  /  TBili  0.4  /  DBili  x   /  AST  21  /  ALT  8   /  AlkPhos  101      Lipase, Serum: 144 U/L (22 @ 14:46)    Blood Gas Calcium, Ionized - Venous: 1.38 mmol/L (22 @ 15:21)    Thyroid Stimulating Hormone, Serum: 1.06 uIU/mL (22 @ 14:46)    Troponin T, High Sensitivity Result: 35 ng/L (22 @ 14:46)    Serum Pro-Brain Natriuretic Peptide: 676 pg/mL (22 @ 14:46)    PT/INR - ( 2022 14:46 )   PT: 12.1 sec;   INR: 1.04 ratio    PTT - ( 2022 14:46 )  PTT:24.7 sec    15:21 - VBG - pH: 7.34  | pCO2: 75    | pO2: 23    | Lactate: 1.1      Urinalysis Basic - ( 2022 15:45 )  Color: Light Yellow / Appearance: Clear / S.010 / pH: x  Gluc: x / Ketone: Negative  / Bili: Negative / Urobili: <2 mg/dL   Blood: x / Protein: Trace / Nitrite: Negative   Leuk Esterase: Negative / RBC: x / WBC x   Sq Epi: x / Non Sq Epi: x / Bacteria: x    Flu With COVID-19 By SATISH (22 @ 15:26)    SARS-CoV-2 Result: NotDetec    Influenza A Result: NotDetec    Influenza B Result: NotDetec    Resp Syn Virus Result: NotDetec    < from: CT Head No Cont (22 @ 19:20) >  Moderate prominence of the sulci and ventricles are consistent with age-appropriate volume loss. There are hemispheric white matter areas of low attenuation which are nonspecific but likely related to sequelae of microvascular disease. Small chronic left frontal infarct. Chronic lacunar infarct in the right cerebellar hemisphere. Chronic left thalamic lacunar infarct. There is no intraparenchymal hematoma, mass effect or midline shift. The basal cisterns are patent.  No abnormal extra-axial fluid collections are present. The visualized paranasal sinuses are clear. The mastoid air cells and middle ear cavities are clear. The intraorbital compartments are unremarkable. The soft tissues of the scalp are unremarkable. The calvarium is intact.  IMPRESSION: No acute hemorrhage or mass effect.  < end of copied text >    < from: Xray Chest 1 View- PORTABLE-Urgent (Xray Chest 1 View- PORTABLE-Urgent .) (18. @ 15:37) >  The lungs are clear with elevation of the right hemidiaphragm. Heart appears enlarged but stable and there are no effusions or congestion to indicate CHF. There is no pneumothorax. The heart is not well evaluated in this position. The visualized osseous structures demonstrate no acute pathology.   IMPRESSION: Clear lungs with elevated right hemidiaphragm.  < end of copied text >    No EKG In chart 11.7   6.02  )-----------( 270      ( 2022 14:46 )             39.0         142  |  98  |  22  ----------------------------<  100<H>  3.9   |  34<H>  |  0.89    Ca    11.1<H>      2022 14:46  Mg     1.90         TPro  8.2  /  Alb  4.2  /  TBili  0.4  /  DBili  x   /  AST  21  /  ALT  8   /  AlkPhos  101      Lipase, Serum: 144 U/L (22 @ 14:46)    Blood Gas Calcium, Ionized - Venous: 1.38 mmol/L (22 @ 15:21)    Thyroid Stimulating Hormone, Serum: 1.06 uIU/mL (22 @ 14:46)    Troponin T, High Sensitivity Result: 35 ng/L (22 @ 14:46)    Serum Pro-Brain Natriuretic Peptide: 676 pg/mL (22 @ 14:46)    PT/INR - ( 2022 14:46 )   PT: 12.1 sec;   INR: 1.04 ratio    PTT - ( 2022 14:46 )  PTT:24.7 sec    15:21 - VBG - pH: 7.34  | pCO2: 75    | pO2: 23    | Lactate: 1.1      Urinalysis Basic - ( 2022 15:45 )  Color: Light Yellow / Appearance: Clear / S.010 / pH: x  Gluc: x / Ketone: Negative  / Bili: Negative / Urobili: <2 mg/dL   Blood: x / Protein: Trace / Nitrite: Negative   Leuk Esterase: Negative / RBC: x / WBC x   Sq Epi: x / Non Sq Epi: x / Bacteria: x    Flu With COVID-19 By SATISH (22 @ 15:26)    SARS-CoV-2 Result: NotDetec    Influenza A Result: NotDetec    Influenza B Result: NotDetec    Resp Syn Virus Result: NotDetec    < from: CT Head No Cont (22 @ 19:20) >  Moderate prominence of the sulci and ventricles are consistent with age-appropriate volume loss. There are hemispheric white matter areas of low attenuation which are nonspecific but likely related to sequelae of microvascular disease. Small chronic left frontal infarct. Chronic lacunar infarct in the right cerebellar hemisphere. Chronic left thalamic lacunar infarct. There is no intraparenchymal hematoma, mass effect or midline shift. The basal cisterns are patent.  No abnormal extra-axial fluid collections are present. The visualized paranasal sinuses are clear. The mastoid air cells and middle ear cavities are clear. The intraorbital compartments are unremarkable. The soft tissues of the scalp are unremarkable. The calvarium is intact.  IMPRESSION: No acute hemorrhage or mass effect.  < end of copied text >    < from: Xray Chest 1 View- PORTABLE-Urgent (Xray Chest 1 View- PORTABLE-Urgent .) (18. @ 15:37) >  The lungs are clear with elevation of the right hemidiaphragm. Heart appears enlarged but stable and there are no effusions or congestion to indicate CHF. There is no pneumothorax. The heart is not well evaluated in this position. The visualized osseous structures demonstrate no acute pathology.   IMPRESSION: Clear lungs with elevated right hemidiaphragm.  < end of copied text >    No EKG In chart, ordered/pending

## 2022-11-18 NOTE — H&P ADULT - NSHPSOCIALHISTORY_GEN_ALL_CORE
Lives with daughter and daughter's family  , has 2 children (per daughter, they are co-HCPs)  NO tobacco or alcohol use

## 2022-11-18 NOTE — H&P ADULT - PROBLEM SELECTOR PLAN 2
leg shaking, concern for possible seizure  no prior history of seizure  no incontinence  no LOC  appreciate neuro recs  check routine EEG, MRI  check UA, rectal temp, procalcitonin   neuro checks Q4

## 2022-11-18 NOTE — H&P ADULT - NSHPPHYSICALEXAM_GEN_ALL_CORE
Vital Signs Last 24 Hrs  T(C): 36.9 (18 Nov 2022 18:14), Max: 37.3 (18 Nov 2022 14:55)  T(F): 98.5 (18 Nov 2022 18:14), Max: 99.1 (18 Nov 2022 14:55)  HR: 78 (18 Nov 2022 22:56) (70 - 87)  BP: 100/51 (18 Nov 2022 22:56) (100/51 - 156/65)  RR: 22 (18 Nov 2022 22:56) (18 - 22)  SpO2: 99% (18 Nov 2022 22:56) (97% - 100%)    Parameters below as of 18 Nov 2022 22:56  Patient On (Oxygen Delivery Method): mask, Venturi  O2 Flow (L/min): 6 Vital Signs Last 24 Hrs  T(C): 36.9 (18 Nov 2022 18:14), Max: 37.3 (18 Nov 2022 14:55)  T(F): 98.5 (18 Nov 2022 18:14), Max: 99.1 (18 Nov 2022 14:55)  HR: 78 (18 Nov 2022 22:56) (70 - 87)  BP: 100/51 (18 Nov 2022 22:56) (100/51 - 156/65)  RR: 22 (18 Nov 2022 22:56) (18 - 22)  SpO2: 99% (18 Nov 2022 22:56) (97% - 100%)    Parameters below as of 18 Nov 2022 22:56  Patient On (Oxygen Delivery Method): mask, Venturi  O2 Flow (L/min): 6    PHYSICAL EXAM:  GENERAL: NAD, well-developed, well-nourished  HEAD:  Atraumatic, Normocephalic  EYES: PERRL, conjunctiva and sclera clear  NECK: Supple, No JVD  CHEST/LUNG: Clear to auscultation bilaterally; No wheezes, rales or rhonchi; normal work of breathing, speaking in full sentences  HEART: Regular rate and rhythm; No murmurs, rubs, or gallops, (+)S1, S2  ABDOMEN: Soft, Nontender, Nondistended; Normal Bowel sounds   EXTREMITIES:  2+ Peripheral Pulses, No clubbing, cyanosis, or edema  PSYCH: alert to light touch, non-verbal, not following directions  NEUROLOGY: no focal neuro deficits, MCCANN x4, patient not following directions, unable to complete full neuro exam  SKIN: No rashes or lesions on limited exam in ED hallway

## 2022-11-18 NOTE — CONSULT NOTE ADULT - ASSESSMENT
Patient HARSH VERGARA is a 87y (1935) wo/man with a PMHx significant for  HTN, HLD GERD, and gallbladder cancer has presented to ED due to concern for first time seizure episode. Got 2 versed in ED for imaging pt currently lethargic-history from daughter: States pt not sleeping for 2 days.  Per physical therapy, pt's legs were shaking but pt was alert with eyes open with no LOC, confusion or tongue biting. Pt has had past tremors but increased tremors in the last 3 days along with confusion and pt also seeing things. No recent fever or chills. Recently finished abx for liver abscess on monday 11/14/22 (was hospitalized). Pt has no known hx of seizures, not on AC/AP.AAO x1 at baseline, wheel chair bound, needs help with ADLs.    NIH not obtainable  preMRS 4    Neuro exam revealed Eyes closed, does not open them to noxious stimuli but grimaces. Groans but no words said. unable able to test pupil reactivity as eyes tightly shut.  no facial asymmetry b/l. Withdraws extremities to noxious stimuli. Unable to do manual motor testing. Tremoulous in all extremities. Unable to test DTRs, coordination and gait.     CT head showed Moderate prominence of the sulci and ventricles are consistent with age-appropriate volume loss. There are hemispheric white matter areas of low attenuation which are nonspecific but likely related to sequelae of microvascular disease. Small chronic left frontal infarct. Chronic lacunar infarct in the right cerebellar hemisphere. Chronic left thalamic lacunar infarct. There is no intraparenchymal hematoma, mass effect or midline shift. The basal cisterns are patent.  No abnormal extra-axial fluid collections are present.    Impression: Reported shaking unlikely to be actual seizures as not consistent with it from daughters history. Progressive AMS may be 2/2 delirium vs. recrudescence of prior strokes vs. new strokes vs. other etiology in setting of possible toxic/metabolic/infectious etiology.     Recommendations:  Imaging:  -MRI brain w/o contrast, if no contraindications, if pt can tolerate  -TTE w/ bubble study  -Routine EEG  -Can consider STAT CT head non-contrast for any change in neuro exam    Secondary prevention of stroke:  -Start 81 mg ASA daily (rectal 325 mg ASA if dysphagia fails) if no contraindication.  -Normotension  -Atorvastatin 80 mg daily (long-term goal LDL < 70)  -Tight glucose control (long-term goal HgbA1c < 6%)    Misc/additional recs:  -dementia work up: TSH, Folate/B12, B1, B2, B5, B6, B12, MMA, homocysteine, Vit E, ammonia, Ca, heavy metals, VDRL, UA  -toxic/metabolic/infectious w/u per primary team  -Dysphagia screen  -Speech and swallow eval if dysphagia screen fails  -NPO except meds until dysphagia screen passed  -Head of bed > 30 degrees for aspiration prevention   -Continuous  telemetry to monitor for arrhythmia  -Stroke labwork: HgbA1C, fasting lipid panel, CBC, CMP, coag pannel, troponin  - Baseline EKG  -Neuro checks Q4  -PT/OT  -DVT Prophylaxis: Lovenox 40 mg Sq daily unless contraindicated in which case SCDs   Fall precautions, aspiration precautions  -ILR - inpatient or outpatient- to rule out arrhythmia if found to have ischemic stroke (if pt does not have ILR already)  Measures to reduce delirium: frequent reorientation, maintain sleep/wake routine, avoid sleeping during the day, keep lights on during the day, use of hearing aids or glasses if patient needs them, regular toileting.     To be discussed with neurology attending and will be formally staffed by attending during morning rounds. Recommendations will be finalized once signed by attending.          Patient HARSH VERGARA is a 87y (1935) wo/man with a PMHx significant for  HTN, HLD GERD, and gallbladder cancer has presented to ED due to concern for first time seizure episode. Got 2 versed in ED for imaging pt currently lethargic-history from daughter: States pt not sleeping for 2 days.  Per physical therapy, pt's legs were shaking but pt was alert with eyes open with no LOC, confusion or tongue biting. Pt has had past tremors but increased tremors in the last 3 days along with confusion and pt also seeing things. No recent fever or chills. Recently finished abx for liver abscess on monday 11/14/22 (was hospitalized). Pt has no known hx of seizures, not on AC/AP.AAO x1 at baseline, wheel chair bound, needs help with ADLs.    NIH not obtainable  preMRS 4    Neuro exam revealed Eyes closed, does not open them to noxious stimuli but grimaces. Groans but no words said. unable able to test pupil reactivity as eyes tightly shut.  no facial asymmetry b/l. Withdraws extremities to noxious stimuli. Unable to do manual motor testing. Tremoulous in all extremities. Unable to test DTRs, coordination and gait.     CT head showed Moderate prominence of the sulci and ventricles are consistent with age-appropriate volume loss. There are hemispheric white matter areas of low attenuation which are nonspecific but likely related to sequelae of microvascular disease. Small chronic left frontal infarct. Chronic lacunar infarct in the right cerebellar hemisphere. Chronic left thalamic lacunar infarct. There is no intraparenchymal hematoma, mass effect or midline shift. The basal cisterns are patent.  No abnormal extra-axial fluid collections are present.    Impression: Reported shaking unlikely to be actual seizures as not consistent with it from daughters history. Progressive AMS may be 2/2 delirium vs. recrudescence of prior strokes vs. new strokes vs. other etiology in setting of possible toxic/metabolic/infectious etiology.     Recommendations:  Imaging:  -MRI brain w/o contrast, if no contraindications, if pt can tolerate - only after treating acute medical problems and if she is not back to baseline.   -Routine EEG  -Can consider STAT CT head non-contrast for any change in neuro exam    Secondary prevention of stroke:  -Start 81 mg ASA daily (rectal 325 mg ASA if dysphagia fails) if no contraindication.  -Normotension  -Atorvastatin 80 mg daily (long-term goal LDL < 70)  -Tight glucose control (long-term goal HgbA1c < 6%)    Misc/additional recs:  -dementia work up: TSH, Folate/B12, B1, B2, B5, B6, B12, MMA, homocysteine, Vit E, ammonia, Ca, heavy metals, VDRL, UA  -toxic/metabolic/infectious w/u per primary team  -Dysphagia screen  -Speech and swallow eval if dysphagia screen fails  -NPO except meds until dysphagia screen passed  -Head of bed > 30 degrees for aspiration prevention   -Continuous  telemetry to monitor for arrhythmia  -Stroke labwork: HgbA1C, fasting lipid panel, CBC, CMP, coag pannel, troponin  - Baseline EKG  -Neuro checks Q4  -PT/OT  -DVT Prophylaxis: Lovenox 40 mg Sq daily unless contraindicated in which case SCDs   Fall precautions, aspiration precautions  Measures to reduce delirium: frequent reorientation, maintain sleep/wake routine, avoid sleeping during the day, keep lights on during the day, use of hearing aids or glasses if patient needs them, regular toileting.     To be discussed with neurology attending and will be formally staffed by attending during morning rounds. Recommendations will be finalized once signed by attending.

## 2022-11-18 NOTE — H&P ADULT - PROBLEM SELECTOR PLAN 8
enoxaparin for DVT Ppx KASSIDY in chart, DNR/DNI confirmed with daughter America Butler via phone [phone], reportedly she and her brother are co-HCP, requested copy in AM.

## 2022-11-18 NOTE — H&P ADULT - PROBLEM SELECTOR PLAN 7
KASSIDY in chart, DNR/DNI confirmed with daughter America Butler via phone [phone], reportedly she and her brother are co-HCP, requested copy in AM. check FLP with AM labs  add atorvastatin pending S&S eval

## 2022-11-18 NOTE — H&P ADULT - HISTORY OF PRESENT ILLNESS
87-year-old female with HTN, HLD, GERD, history of gallbladder ca, presenting with seizure like activity while at PT.    In the ED VS: 99.1  70-87  134-156/61-73  18-20  %RA, received haloperidol 2.5mg IM x1, midazolam 2.5mg IV x1 87-year-old female with HTN, HLD, GERD, history of gallbladder ca (s/p resection w/liver resection and partial colectomy, not on any treatment) presenting with seizure like activity while at PT. Patient reportedly without LOC at that time. Patient non verbal on my exam, information obtained from daughter via phone (America Butler, [phone]). Patient reportedly has not slept much in 3 days, has been trying to get out of bed, hallucinating (seeing dead family members). She is verbal, A&Ox1 at baseline, recognizes her children, non-ambulatory for the past month since recent hospitalization at [facility] for acute respiratory failure with hypercapnia, found to have a hepatic abscess s/p 18 days of treatment with ertapenem, completed last dose on Monday. Patient has been on ATC O2 2L during the day and 3L at night at home.  Per daughter, no recent fevers, chills, nausea, vomiting, diarrhea.     In the ED VS: 99.1  70-87  134-156/61-73  18-20  %RA, received haloperidol 2.5mg IM x1, midazolam 2.5mg IV x1 87-year-old female with HTN, HLD, GERD, history of gallbladder ca (s/p resection w/liver resection and partial colectomy, not on any treatment) presenting with shaking legs at PT. Patient reportedly without LOC at that time, no prior history of seizures. There was no reported postictal state, tongue biting or incontinence. Patient non verbal on my exam, information obtained from daughter via phone (America Butler, [phone]). Patient reportedly has not slept much in 3 days, has been trying to get out of bed, hallucinating (seeing dead family members). She is verbal, A&Ox1 at baseline, recognizes her children, non-ambulatory for the past month since recent hospitalization at [facility] (patient has alternate MRN: [id number]) for encephalopathy thought to be secondary to acute respiratory failure with hypercapnia and infection, found to have a hepatic abscess, s/p aspiration and 18 days of treatment with ertapenem, completed last dose on Monday. Patient has been on ATC O2 2L during the day and 3L at night at home.  Per daughter, no recent fevers, chills, nausea, vomiting, diarrhea. Of note, patient understand English and Tagalog however of recent has been reverting back to using dialect [place].    In the ED VS: 99.1  70-87  134-156/61-73  18-20  %RA, received haloperidol 2.5mg IM x1, midazolam 2.5mg IV x1 87-year-old female with HTN, HLD, GERD, history of gallbladder ca (s/p resection w/liver resection and partial colectomy, not on any treatment) presenting with shaking legs at PT. Patient reportedly without LOC at that time, no prior history of seizures. There was no reported postictal state, tongue biting or incontinence. Patient non verbal on my exam, information obtained from daughter via phone (America Butler, [phone]). Patient reportedly has not slept much in 3 days, has been trying to get out of bed, hallucinating (seeing dead family members). She is verbal, A&Ox1 at baseline, recognizes her children, non-ambulatory for the past month since recent hospitalization at [facility] (patient has alternate MRN: [id number]) for encephalopathy thought to be secondary to acute respiratory failure with hypercapnia and infection, found to have a hepatic abscess, s/p aspiration and 18 days of treatment with ertapenem, completed last dose on Monday. Patient has been on ATC O2 2L during the day and 3L at night at home.  Per daughter, no recent fevers, chills, nausea, vomiting, diarrhea. Of note, patient understands English and Tagalog however of recent has been reverting back to using dialect [place].    In the ED VS: 99.1  70-87  134-156/61-73  18-20  %RA, received haloperidol 2.5mg IM x1, midazolam 2.5mg IV x1

## 2022-11-18 NOTE — H&P ADULT - CONVERSATION DETAILS
CINTHYA In chart reviewed, signed 10/13/22. Discussed with America Butler via phone [phone], confirmed patient is DNR/DNI. Per America, she and her brother are co-HCP, requested copy of HCP.

## 2022-11-18 NOTE — ED ADULT TRIAGE NOTE - CHIEF COMPLAINT QUOTE
pt was receiving care from wound care RN, who told EMS she witnessed the pts body shaking with a hyper extended neck x 11 episodes lasting 15 seconds each. Son states pt has not slept ib 3 days. as per EMS pt is currently ay her baseline. on 2L NC as per home routine. MOLST form in chart.  pt speaking warren patino #[phone] (aniyah)

## 2022-11-18 NOTE — H&P ADULT - NS PRO AD PATIENT TYPE ON CHART
DNR/DNI, signed 10/13/22, confirmed wishes with America via phone/Medical Orders for Life-Sustaining Treatment (MOLST)

## 2022-11-18 NOTE — H&P ADULT - NSHPREVIEWOFSYSTEMS_GEN_ALL_CORE
No fevers/chills  No nausea, vomiting, diarrhea, had regular BM yesterday   Able to make needs known  (+)incontinent, able to state when she has to use the bathroom, history of urinary retention, required catheter on prior admission  No malodorous urine  Bed to wheelchair since October, No falls at home Per daughter:  No fevers/chills  No nausea, vomiting, diarrhea, had regular BM yesterday   Able to make needs known  (+)incontinent, able to state when she has to use the bathroom, history of urinary retention, required catheter on prior admission  No malodorous urine  Bed to wheelchair since October, able to help with transfers, No falls at home  Eats regular soft diet at home

## 2022-11-18 NOTE — ED PROVIDER NOTE - CLINICAL SUMMARY MEDICAL DECISION MAKING FREE TEXT BOX
87 year old female with a PMHx of HTN, HLD GERD, and gallbladder cancer presents w/ b/l leg shaking in the setting of normal conciousness and PT activity, less likely seizure given history. More likely MSK given activity during episode. PE unremarkable. Labs, CT, reassess. 87 year old female with a PMHx of HTN, HLD GERD, and gallbladder cancer presents w/ b/l leg shaking in the setting of normal conciousness and PT activity, less likely seizure given history. More likely MSK given activity during episode. PE unremarkable. Labs, CT, reassess.  Fabiana: 88yo who presents with shaking of lower extremities while awake and communicating but in a language not available on language line. her son was used to translate and get hx. Patient did have confusion and the son attributed this to insomnia. Sz possible vs MSK, vs stroke.

## 2022-11-18 NOTE — H&P ADULT - PROBLEM SELECTOR PLAN 1
unclear etiology   appreciate neuro recs  check rectal temp, procalcitonin   VS, neuro checks Q4H  fall, aspiration precautions, dysphagia screen  check MR brain  no acute path on CTH, as above  monitor on tele w/continuous pulse ox given desat on O2  hypercalcemia noted on labwork, history of prior hypercalcemia in past per dtr, tx as below  ASA pending dysphagia screen

## 2022-11-18 NOTE — ED ADULT NURSE NOTE - CHIEF COMPLAINT QUOTE
pt was receiving care from wound care RN, who told EMS she witnessed the pts body shaking with a hyper extended neck x 11 episodes lasting 15 seconds each. Son states pt has not slept ib 3 days. as per EMS pt is currently ay her baseline. on 2L NC as per home routine. MOLST form in chart.  pt speaking warren patino #[phone] (aniyah)

## 2022-11-19 DIAGNOSIS — J96.11 CHRONIC RESPIRATORY FAILURE WITH HYPOXIA: ICD-10-CM

## 2022-11-19 DIAGNOSIS — Z29.9 ENCOUNTER FOR PROPHYLACTIC MEASURES, UNSPECIFIED: ICD-10-CM

## 2022-11-19 DIAGNOSIS — R25.1 TREMOR, UNSPECIFIED: ICD-10-CM

## 2022-11-19 DIAGNOSIS — I10 ESSENTIAL (PRIMARY) HYPERTENSION: ICD-10-CM

## 2022-11-19 DIAGNOSIS — G93.40 ENCEPHALOPATHY, UNSPECIFIED: ICD-10-CM

## 2022-11-19 DIAGNOSIS — E78.5 HYPERLIPIDEMIA, UNSPECIFIED: ICD-10-CM

## 2022-11-19 DIAGNOSIS — K21.9 GASTRO-ESOPHAGEAL REFLUX DISEASE WITHOUT ESOPHAGITIS: ICD-10-CM

## 2022-11-19 DIAGNOSIS — E83.52 HYPERCALCEMIA: ICD-10-CM

## 2022-11-19 DIAGNOSIS — Z71.89 OTHER SPECIFIED COUNSELING: ICD-10-CM

## 2022-11-19 LAB
A1C WITH ESTIMATED AVERAGE GLUCOSE RESULT: 4.8 % — SIGNIFICANT CHANGE UP (ref 4–5.6)
ANION GAP SERPL CALC-SCNC: 7 MMOL/L — SIGNIFICANT CHANGE UP (ref 7–14)
ANION GAP SERPL CALC-SCNC: 8 MMOL/L — SIGNIFICANT CHANGE UP (ref 7–14)
BASOPHILS # BLD AUTO: 0.03 K/UL — SIGNIFICANT CHANGE UP (ref 0–0.2)
BASOPHILS NFR BLD AUTO: 0.9 % — SIGNIFICANT CHANGE UP (ref 0–2)
BLOOD GAS VENOUS COMPREHENSIVE RESULT: SIGNIFICANT CHANGE UP
BUN SERPL-MCNC: 15 MG/DL — SIGNIFICANT CHANGE UP (ref 7–23)
BUN SERPL-MCNC: 19 MG/DL — SIGNIFICANT CHANGE UP (ref 7–23)
CALCIUM SERPL-MCNC: 7.3 MG/DL — LOW (ref 8.4–10.5)
CALCIUM SERPL-MCNC: 9.7 MG/DL — SIGNIFICANT CHANGE UP (ref 8.4–10.5)
CHLORIDE SERPL-SCNC: 105 MMOL/L — SIGNIFICANT CHANGE UP (ref 98–107)
CHLORIDE SERPL-SCNC: 112 MMOL/L — HIGH (ref 98–107)
CHOLEST SERPL-MCNC: 120 MG/DL — SIGNIFICANT CHANGE UP
CO2 SERPL-SCNC: 27 MMOL/L — SIGNIFICANT CHANGE UP (ref 22–31)
CO2 SERPL-SCNC: 31 MMOL/L — SIGNIFICANT CHANGE UP (ref 22–31)
CREAT SERPL-MCNC: 0.64 MG/DL — SIGNIFICANT CHANGE UP (ref 0.5–1.3)
CREAT SERPL-MCNC: 0.75 MG/DL — SIGNIFICANT CHANGE UP (ref 0.5–1.3)
CULTURE RESULTS: SIGNIFICANT CHANGE UP
EGFR: 77 ML/MIN/1.73M2 — SIGNIFICANT CHANGE UP
EGFR: 86 ML/MIN/1.73M2 — SIGNIFICANT CHANGE UP
EOSINOPHIL # BLD AUTO: 0.1 K/UL — SIGNIFICANT CHANGE UP (ref 0–0.5)
EOSINOPHIL NFR BLD AUTO: 2.9 % — SIGNIFICANT CHANGE UP (ref 0–6)
ESTIMATED AVERAGE GLUCOSE: 91 — SIGNIFICANT CHANGE UP
FOLATE SERPL-MCNC: 15.1 NG/ML — SIGNIFICANT CHANGE UP (ref 3.1–17.5)
GLUCOSE BLDC GLUCOMTR-MCNC: 79 MG/DL — SIGNIFICANT CHANGE UP (ref 70–99)
GLUCOSE SERPL-MCNC: 58 MG/DL — LOW (ref 70–99)
GLUCOSE SERPL-MCNC: 73 MG/DL — SIGNIFICANT CHANGE UP (ref 70–99)
HCT VFR BLD CALC: 26.9 % — LOW (ref 34.5–45)
HCT VFR BLD CALC: 33 % — LOW (ref 34.5–45)
HDLC SERPL-MCNC: 27 MG/DL — LOW
HGB BLD-MCNC: 8 G/DL — LOW (ref 11.5–15.5)
HGB BLD-MCNC: 9.7 G/DL — LOW (ref 11.5–15.5)
IANC: 1.84 K/UL — SIGNIFICANT CHANGE UP (ref 1.8–7.4)
LIPID PNL WITH DIRECT LDL SERPL: 64 MG/DL — SIGNIFICANT CHANGE UP
LYMPHOCYTES # BLD AUTO: 0.47 K/UL — LOW (ref 1–3.3)
LYMPHOCYTES # BLD AUTO: 14.4 % — SIGNIFICANT CHANGE UP (ref 13–44)
MAGNESIUM SERPL-MCNC: 1.3 MG/DL — LOW (ref 1.6–2.6)
MAGNESIUM SERPL-MCNC: 1.8 MG/DL — SIGNIFICANT CHANGE UP (ref 1.6–2.6)
MCHC RBC-ENTMCNC: 26.1 PG — LOW (ref 27–34)
MCHC RBC-ENTMCNC: 26.7 PG — LOW (ref 27–34)
MCHC RBC-ENTMCNC: 29.4 GM/DL — LOW (ref 32–36)
MCHC RBC-ENTMCNC: 29.7 GM/DL — LOW (ref 32–36)
MCV RBC AUTO: 88.7 FL — SIGNIFICANT CHANGE UP (ref 80–100)
MCV RBC AUTO: 89.7 FL — SIGNIFICANT CHANGE UP (ref 80–100)
MONOCYTES # BLD AUTO: 0.19 K/UL — SIGNIFICANT CHANGE UP (ref 0–0.9)
MONOCYTES NFR BLD AUTO: 5.8 % — SIGNIFICANT CHANGE UP (ref 2–14)
NEUTROPHILS # BLD AUTO: 2.4 K/UL — SIGNIFICANT CHANGE UP (ref 1.8–7.4)
NEUTROPHILS NFR BLD AUTO: 73.1 % — SIGNIFICANT CHANGE UP (ref 43–77)
NON HDL CHOLESTEROL: 93 MG/DL — SIGNIFICANT CHANGE UP
NRBC # BLD: 0 /100 WBCS — SIGNIFICANT CHANGE UP (ref 0–0)
NRBC # FLD: 0 K/UL — SIGNIFICANT CHANGE UP (ref 0–0)
PHOSPHATE SERPL-MCNC: 2.6 MG/DL — SIGNIFICANT CHANGE UP (ref 2.5–4.5)
PHOSPHATE SERPL-MCNC: 3.5 MG/DL — SIGNIFICANT CHANGE UP (ref 2.5–4.5)
PLATELET # BLD AUTO: 197 K/UL — SIGNIFICANT CHANGE UP (ref 150–400)
PLATELET # BLD AUTO: 231 K/UL — SIGNIFICANT CHANGE UP (ref 150–400)
POTASSIUM SERPL-MCNC: 2.6 MMOL/L — CRITICAL LOW (ref 3.5–5.3)
POTASSIUM SERPL-MCNC: 3.8 MMOL/L — SIGNIFICANT CHANGE UP (ref 3.5–5.3)
POTASSIUM SERPL-SCNC: 2.6 MMOL/L — CRITICAL LOW (ref 3.5–5.3)
POTASSIUM SERPL-SCNC: 3.8 MMOL/L — SIGNIFICANT CHANGE UP (ref 3.5–5.3)
RBC # BLD: 3 M/UL — LOW (ref 3.8–5.2)
RBC # BLD: 3.72 M/UL — LOW (ref 3.8–5.2)
RBC # FLD: 17.7 % — HIGH (ref 10.3–14.5)
RBC # FLD: 17.8 % — HIGH (ref 10.3–14.5)
SODIUM SERPL-SCNC: 144 MMOL/L — SIGNIFICANT CHANGE UP (ref 135–145)
SODIUM SERPL-SCNC: 146 MMOL/L — HIGH (ref 135–145)
SPECIMEN SOURCE: SIGNIFICANT CHANGE UP
TRIGL SERPL-MCNC: 145 MG/DL — SIGNIFICANT CHANGE UP
TROPONIN T, HIGH SENSITIVITY RESULT: 27 NG/L — SIGNIFICANT CHANGE UP
VIT B12 SERPL-MCNC: 298 PG/ML — SIGNIFICANT CHANGE UP (ref 200–900)
WBC # BLD: 3.28 K/UL — LOW (ref 3.8–10.5)
WBC # BLD: 3.88 K/UL — SIGNIFICANT CHANGE UP (ref 3.8–10.5)
WBC # FLD AUTO: 3.28 K/UL — LOW (ref 3.8–10.5)
WBC # FLD AUTO: 3.88 K/UL — SIGNIFICANT CHANGE UP (ref 3.8–10.5)

## 2022-11-19 PROCEDURE — 95720 EEG PHY/QHP EA INCR W/VEEG: CPT

## 2022-11-19 PROCEDURE — 99223 1ST HOSP IP/OBS HIGH 75: CPT

## 2022-11-19 RX ORDER — SODIUM CHLORIDE 9 MG/ML
1000 INJECTION INTRAMUSCULAR; INTRAVENOUS; SUBCUTANEOUS
Refills: 0 | Status: DISCONTINUED | OUTPATIENT
Start: 2022-11-19 | End: 2022-11-19

## 2022-11-19 RX ORDER — PANTOPRAZOLE SODIUM 20 MG/1
40 TABLET, DELAYED RELEASE ORAL
Refills: 0 | Status: DISCONTINUED | OUTPATIENT
Start: 2022-11-19 | End: 2022-11-29

## 2022-11-19 RX ORDER — SENNA PLUS 8.6 MG/1
2 TABLET ORAL AT BEDTIME
Refills: 0 | Status: DISCONTINUED | OUTPATIENT
Start: 2022-11-19 | End: 2022-11-29

## 2022-11-19 RX ORDER — SODIUM CHLORIDE 9 MG/ML
1000 INJECTION, SOLUTION INTRAVENOUS
Refills: 0 | Status: DISCONTINUED | OUTPATIENT
Start: 2022-11-19 | End: 2022-11-29

## 2022-11-19 RX ORDER — ALLOPURINOL 300 MG
100 TABLET ORAL AT BEDTIME
Refills: 0 | Status: DISCONTINUED | OUTPATIENT
Start: 2022-11-19 | End: 2022-11-29

## 2022-11-19 RX ORDER — FENOFIBRATE,MICRONIZED 130 MG
145 CAPSULE ORAL DAILY
Refills: 0 | Status: DISCONTINUED | OUTPATIENT
Start: 2022-11-19 | End: 2022-11-29

## 2022-11-19 RX ORDER — SODIUM CHLORIDE 9 MG/ML
500 INJECTION INTRAMUSCULAR; INTRAVENOUS; SUBCUTANEOUS ONCE
Refills: 0 | Status: COMPLETED | OUTPATIENT
Start: 2022-11-19 | End: 2022-11-19

## 2022-11-19 RX ORDER — POTASSIUM CHLORIDE 20 MEQ
10 PACKET (EA) ORAL
Refills: 0 | Status: DISCONTINUED | OUTPATIENT
Start: 2022-11-19 | End: 2022-11-19

## 2022-11-19 RX ORDER — AMLODIPINE BESYLATE 2.5 MG/1
1 TABLET ORAL
Qty: 0 | Refills: 0 | DISCHARGE

## 2022-11-19 RX ORDER — MIRTAZAPINE 45 MG/1
15 TABLET, ORALLY DISINTEGRATING ORAL AT BEDTIME
Refills: 0 | Status: DISCONTINUED | OUTPATIENT
Start: 2022-11-19 | End: 2022-11-29

## 2022-11-19 RX ORDER — SODIUM CHLORIDE 9 MG/ML
1000 INJECTION, SOLUTION INTRAVENOUS
Refills: 0 | Status: DISCONTINUED | OUTPATIENT
Start: 2022-11-19 | End: 2022-11-19

## 2022-11-19 RX ORDER — SENNA PLUS 8.6 MG/1
2 TABLET ORAL
Qty: 0 | Refills: 0 | DISCHARGE

## 2022-11-19 RX ORDER — ENOXAPARIN SODIUM 100 MG/ML
30 INJECTION SUBCUTANEOUS EVERY 24 HOURS
Refills: 0 | Status: DISCONTINUED | OUTPATIENT
Start: 2022-11-19 | End: 2022-11-29

## 2022-11-19 RX ADMIN — SODIUM CHLORIDE 50 MILLILITER(S): 9 INJECTION, SOLUTION INTRAVENOUS at 14:29

## 2022-11-19 RX ADMIN — ENOXAPARIN SODIUM 30 MILLIGRAM(S): 100 INJECTION SUBCUTANEOUS at 12:39

## 2022-11-19 RX ADMIN — SODIUM CHLORIDE 75 MILLILITER(S): 9 INJECTION INTRAMUSCULAR; INTRAVENOUS; SUBCUTANEOUS at 04:24

## 2022-11-19 RX ADMIN — SODIUM CHLORIDE 1000 MILLILITER(S): 9 INJECTION INTRAMUSCULAR; INTRAVENOUS; SUBCUTANEOUS at 01:30

## 2022-11-19 RX ADMIN — Medication 100 MILLIEQUIVALENT(S): at 10:23

## 2022-11-19 RX ADMIN — SODIUM CHLORIDE 50 MILLILITER(S): 9 INJECTION, SOLUTION INTRAVENOUS at 11:35

## 2022-11-19 NOTE — PHYSICAL THERAPY INITIAL EVALUATION ADULT - PERTINENT HX OF CURRENT PROBLEM, REHAB EVAL
87 year old female presenting with possible seizure like activity while at PT. CT Head - No acute hemorrhage or mass effect.

## 2022-11-19 NOTE — OCCUPATIONAL THERAPY INITIAL EVALUATION ADULT - LEVEL OF INDEPENDENCE, REHAB EVAL
Not appropriate to assess at this time as patient is lethargic, on EEG, intermittently following commands/unable to perform

## 2022-11-19 NOTE — CHART NOTE - NSCHARTNOTEFT_GEN_A_CORE
Patient assessed at bedside prior to transport. Patient sleeping comfortably on venti mask 4L satting 96%. Patient w/ chronic respiratory failure with hypoxia and hypercapnia on home O2 NC3L. Patient breathing through mouth, better sats with venti mask while asleeping. Patient stable to transport to floor w/ O2 and pulse ox monitoring. Pulm casandra pending, will continue to monitor. Patient assessed at bedside prior to transport. Patient sleeping comfortably on venti mask 6L satting 96%. Patient w/ chronic respiratory failure with hypoxia and hypercapnia on home O2 NC3L. Patient breathing through mouth, better sats with venti mask while asleeping. Patient stable to transport to floor w/ O2 and pulse ox monitoring. Pulm casandra pending, will continue to monitor. Patient assessed at bedside prior to transport. Patient sleeping comfortably on venti mask 6L satting 96%. Patient w/ chronic respiratory failure with hypoxia and hypercapnia on home O2 NC3L. Patient breathing through mouth, better sats with venti mask while asleeping. Patient stable to transport to floor w/ O2 and pulse ox monitoring. Pulm lioal pending, will wean O2 as tolerated. Will continue to monitor.

## 2022-11-19 NOTE — OCCUPATIONAL THERAPY INITIAL EVALUATION ADULT - NSOTDISCHREC_GEN_A_CORE
Anticipating home with home OT services pending patient's medical progression and ability to participate in OT sessions. OT to continue to follow.

## 2022-11-19 NOTE — PHYSICAL THERAPY INITIAL EVALUATION ADULT - MANUAL MUSCLE TESTING RESULTS, REHAB EVAL
unable to formally assess due to lethargy, pt able to move extremities through full range of motion while in supine.

## 2022-11-19 NOTE — OCCUPATIONAL THERAPY INITIAL EVALUATION ADULT - PERTINENT HX OF CURRENT PROBLEM, REHAB EVAL
87 year old female with HTN, HLD, GERD, history of gallbladder ca (s/p resection w/liver resection and partial colectomy, not on any treatment) presenting s/p shaking legs while at PT with concern for seizure.

## 2022-11-19 NOTE — CONSULT NOTE ADULT - SUBJECTIVE AND OBJECTIVE BOX
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 Patient reportedly without LOC at that time, no prior history of seizures. There was no reported postictal state, tongue biting or incontinence. Patient non verbal on my exam, information obtained from daughter via phone (America Butler, [phone]). Patient reportedly has not slept much in 3 days, has been trying to get out of bed, hallucinating (seeing dead family members). She is verbal, A&Ox1 at baseline, recognizes her children, non-ambulatory for the past month since recent hospitalization at [facility] (patient has alternate MRN: [id number]) for encephalopathy thought to be secondary to acute respiratory failure with hypercapnia and infection, found to have a hepatic abscess, s/p aspiration and 18 days of treatment with ertapenem, completed last dose on Monday. Patient has been on ATC O2 2L during the day and 3L at night at home.  Per daughter, no recent fevers, chills, nausea, vomiting, diarrhea. Of note, patient understands English and Tagalog however of recent has been reverting back to using dialect [place].    In the ED VS: 99.1  70-87  134-156/61-73  18-20  %RA, received haloperidol 2.5mg IM x1, midazolam 2.5mg IV x1 (18 Nov 2022 23:47)      PAST MEDICAL & SURGICAL HISTORY:  Hypertension      Hyperlipidemia      Gout      GERD (gastroesophageal reflux disease)      Insomnia      Other gallbladder disorder      H/O: hysterectomy      History of cholecystectomy      H/O right hemicolectomy      History of resection of liver              MEDICATIONS:  MEDICATIONS  (STANDING):  allopurinol 100 milliGRAM(s) Oral at bedtime  dextrose 5% + sodium chloride 0.9%. 1000 milliLiter(s) (50 mL/Hr) IV Continuous <Continuous>  enoxaparin Injectable 30 milliGRAM(s) SubCutaneous every 24 hours  fenofibrate Tablet 145 milliGRAM(s) Oral daily  mirtazapine 15 milliGRAM(s) Oral at bedtime  pantoprazole    Tablet 40 milliGRAM(s) Oral before breakfast  senna 2 Tablet(s) Oral at bedtime      Allergies    ampicillin (Rash)  ampicillin-sulbactam (Rash)    Intolerances        FAMILY HISTORY:  FH: hypertension (Father, Mother)      Non-contributary for premature coronary disease or sudden cardiac death    SOCIAL HISTORY:    [ ] Non-smoker  [ ] Smoker  [ ] Alcohol    FLU VACCINE THIS YEAR STARTS IN AUGUST:  [ ] Yes    [ ] No    IF OVER 65 HAVE YOU EVER HAD A PNA VACCINE:  [ ] Yes    [ ] No       [ ] N/A      REVIEW OF SYSTEMS:  [ ]chest pain  [  ]shortness of breath  [  ]palpitations  [  ]syncope  [ ]near syncope [ ]upper extremity weakness   [ ] lower extremity weakness  [  ]diplopia  [  ]altered mental status   [  ]fevers  [ ]chills [ ]nausea  [ ]vomitting  [  ]dysphagia    [ ]abdominal pain  [ ]melena  [ ]BRBPR    [  ]epistaxis  [  ]rash    [ ]lower extremity edema        [ ] All others negative	  [ X] Unable to obtain      LABS:	 	    CARDIAC MARKERS:                                  9.7    3.88  )-----------( 231      ( 19 Nov 2022 09:53 )             33.0     Hb Trend: 9.7<--, 8.0<--    11-19    144  |  105  |  19  ----------------------------<  73  3.8   |  31  |  0.75    Ca    9.7      19 Nov 2022 09:53  Phos  3.5     11-19  Mg     1.80     11-19    TPro  8.2  /  Alb  4.2  /  TBili  0.4  /  DBili  x   /  AST  21  /  ALT  8   /  AlkPhos  101  11-18    Creatinine Trend: 0.75<--, 0.64<--, 0.89<--        PHYSICAL EXAM:  T(C): 36.3 (11-19-22 @ 13:29), Max: 37.1 (11-19-22 @ 01:15)  HR: 81 (11-19-22 @ 13:29) (76 - 81)  BP: 132/50 (11-19-22 @ 13:29) (87/39 - 156/65)  RR: 22 (11-19-22 @ 13:29) (18 - 22)  SpO2: 100% (11-19-22 @ 13:29) (93% - 100%)  Wt(kg): --     I&O's Summary      HEENT:  (-)icterus (-)pallor  CV: N S1 S2 1/6 SUJATA (+)2 Pulses B/l  Resp:  Clear to ausculatation B/L, normal effort  GI: (+) BS Soft, NT, ND  Lymph:  (-)Edema, (-)obvious lymphadenopathy  Skin: Warm to touch, Normal turgor  Psych: Unable to assess mood and affect        ECG:  	PENDING    RADIOLOGY:         CXR:   < from: Xray Chest 1 View- PORTABLE-Urgent (Xray Chest 1 View- PORTABLE-Urgent .) (11.18.22 @ 15:37) >  Clear lungs with elevated right hemidiaphragm.      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 ? Near syncope.    - Events of presentation unclear   - Check echo  - Unclear why she is requiring a venti mask for O2 supplementation would wean per medical team  - If she is hypoxic consider r/o PE as she has no airspace disease on CXR    I once again thank you for allowing me to participate in the care of your patient.  If you have any questions or concerns please do not hesitate to contact me.    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 Patient reportedly without LOC at that time, no prior history of seizures. There was no reported postictal state, tongue biting or incontinence. Patient non verbal on my exam, information obtained from daughter via phone (America Butler, [phone]). Patient reportedly has not slept much in 3 days, has been trying to get out of bed, hallucinating (seeing dead family members). She is verbal, A&Ox1 at baseline, recognizes her children, non-ambulatory for the past month since recent hospitalization at [facility] (patient has alternate MRN: [id number]) for encephalopathy thought to be secondary to acute respiratory failure with hypercapnia and infection, found to have a hepatic abscess, s/p aspiration and 18 days of treatment with ertapenem, completed last dose on Monday. Patient has been on ATC O2 2L during the day and 3L at night at home.  Per daughter, no recent fevers, chills, nausea, vomiting, diarrhea. Of note, patient understands English and Tagalog however of recent has been reverting back to using dialect [place].    In the ED VS: 99.1  70-87  134-156/61-73  18-20  %RA, received haloperidol 2.5mg IM x1, midazolam 2.5mg IV x1 (18 Nov 2022 23:47)      PAST MEDICAL & SURGICAL HISTORY:  Hypertension      Hyperlipidemia      Gout      GERD (gastroesophageal reflux disease)      Insomnia      Other gallbladder disorder      H/O: hysterectomy      History of cholecystectomy      H/O right hemicolectomy      History of resection of liver              MEDICATIONS:  MEDICATIONS  (STANDING):  allopurinol 100 milliGRAM(s) Oral at bedtime  dextrose 5% + sodium chloride 0.9%. 1000 milliLiter(s) (50 mL/Hr) IV Continuous <Continuous>  enoxaparin Injectable 30 milliGRAM(s) SubCutaneous every 24 hours  fenofibrate Tablet 145 milliGRAM(s) Oral daily  mirtazapine 15 milliGRAM(s) Oral at bedtime  pantoprazole    Tablet 40 milliGRAM(s) Oral before breakfast  senna 2 Tablet(s) Oral at bedtime      Allergies    ampicillin (Rash)  ampicillin-sulbactam (Rash)    Intolerances        FAMILY HISTORY:  FH: hypertension (Father, Mother)      Non-contributary for premature coronary disease or sudden cardiac death    SOCIAL HISTORY:    [ ] Non-smoker  [ ] Smoker  [ ] Alcohol    FLU VACCINE THIS YEAR STARTS IN AUGUST:  [ ] Yes    [ ] No    IF OVER 65 HAVE YOU EVER HAD A PNA VACCINE:  [ ] Yes    [ ] No       [ ] N/A      REVIEW OF SYSTEMS:  [ ]chest pain  [  ]shortness of breath  [  ]palpitations  [  ]syncope  [ ]near syncope [ ]upper extremity weakness   [ ] lower extremity weakness  [  ]diplopia  [  ]altered mental status   [  ]fevers  [ ]chills [ ]nausea  [ ]vomitting  [  ]dysphagia    [ ]abdominal pain  [ ]melena  [ ]BRBPR    [  ]epistaxis  [  ]rash    [ ]lower extremity edema        [ ] All others negative	  [ X] Unable to obtain      LABS:	 	    CARDIAC MARKERS:                                  9.7    3.88  )-----------( 231      ( 19 Nov 2022 09:53 )             33.0     Hb Trend: 9.7<--, 8.0<--    11-19    144  |  105  |  19  ----------------------------<  73  3.8   |  31  |  0.75    Ca    9.7      19 Nov 2022 09:53  Phos  3.5     11-19  Mg     1.80     11-19    TPro  8.2  /  Alb  4.2  /  TBili  0.4  /  DBili  x   /  AST  21  /  ALT  8   /  AlkPhos  101  11-18    Creatinine Trend: 0.75<--, 0.64<--, 0.89<--        PHYSICAL EXAM:  T(C): 36.3 (11-19-22 @ 13:29), Max: 37.1 (11-19-22 @ 01:15)  HR: 81 (11-19-22 @ 13:29) (76 - 81)  BP: 132/50 (11-19-22 @ 13:29) (87/39 - 156/65)  RR: 22 (11-19-22 @ 13:29) (18 - 22)  SpO2: 100% (11-19-22 @ 13:29) (93% - 100%)  Wt(kg): --     I&O's Summary      HEENT:  (-)icterus (-)pallor  CV: N S1 S2 1/6 SUJATA (+)2 Pulses B/l  Resp:  Clear to ausculatation B/L, normal effort  GI: (+) BS Soft, NT, ND  Lymph:  (-)Edema, (-)obvious lymphadenopathy  Skin: Warm to touch, Normal turgor  Psych: Unable to assess mood and affect        ECG:  	PENDING    RADIOLOGY:         CXR:   < from: Xray Chest 1 View- PORTABLE-Urgent (Xray Chest 1 View- PORTABLE-Urgent .) (11.18.22 @ 15:37) >  Clear lungs with elevated right hemidiaphragm.      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 ? Near syncope.    - Events of presentation unclear   - Check echo  - Unclear why she is requiring a venti mask for O2 supplementation would wean per medical team  - If she is hypoxic consider r/o PE as she has no airspace disease on CXR  - D/W team    I once again thank you for allowing me to participate in the care of your patient.  If you have any questions or concerns please do not hesitate to contact me.    Deonte Diego MD, [facility]  BEEPER [phone]

## 2022-11-19 NOTE — ED ADULT NURSE REASSESSMENT NOTE - NS ED NURSE REASSESS COMMENT FT1
PT alert and responsive.  No distress noted.  Straight cath done and tolerated well.  Will continue to monitor.
pt awake ,confused, restless ,attempting to take gown off. md to deanna- evaluate. medicated  as ordered. pt re positioned ,blankets provided.  will continue to monitor.
At approximately 2240 pt desat to 70s while on NC 5L, pt showing no signs of respiratory distress, NSR on cardiac monitor. Pt switched to venturi mask, 6L, O2 sat increased to 99%. Respirations are even and unlabored, no signs of respiratory distress. Airway is patent. MD Magallon made aware, no further interventions at this time.
Pt at baseline mental status, NSR on cardiac monitor, medicated as per MD orders. Respirations are even and unlabored, no signs of respiratory distress.

## 2022-11-19 NOTE — OCCUPATIONAL THERAPY INITIAL EVALUATION ADULT - ADDITIONAL COMMENTS
Daughter at bedside to provide social history. Patient lives in a private home with her son and daughter with 5 steps to enter. Until early October 2022, patient was independent with most ADLs and functional mobility without a device, benefitted from supervision and assist as needed from her family. Since then, patient has been hospitalized, returned home ~3 weeks ago. Patient needed 1-2 person assistance with bed<>wheelchair transfers and needed total assistance with ADLs (able to feed herself at times). Was able to walk ~10 feet with a rolling walker and wheelchair follow at best and was getting home PT. Has a hospital bed, home O2, wheelchair, commode, and rolling walker.

## 2022-11-19 NOTE — PATIENT PROFILE ADULT - FALL HARM RISK - HARM RISK INTERVENTIONS

## 2022-11-19 NOTE — PHYSICAL THERAPY INITIAL EVALUATION ADULT - ADDITIONAL COMMENTS
As per daughter at bedside, pt lives with family in a house with 5 steps to enter. For the past month pt was mostly wheelchair bound, walking approximately 10 feet with rolling walker and assistance, required assistance with ADLs. Previously pt was fully independent. At home pt is on 2LO2, was getting home PT services.    Following evaluation, pt was left semireclined in bed in no distress, all lines in tact.

## 2022-11-20 LAB
AMMONIA BLD-MCNC: 31 UMOL/L — SIGNIFICANT CHANGE UP (ref 11–55)
ANION GAP SERPL CALC-SCNC: 8 MMOL/L — SIGNIFICANT CHANGE UP (ref 7–14)
BUN SERPL-MCNC: 15 MG/DL — SIGNIFICANT CHANGE UP (ref 7–23)
CALCIUM SERPL-MCNC: 9.9 MG/DL — SIGNIFICANT CHANGE UP (ref 8.4–10.5)
CHLORIDE SERPL-SCNC: 105 MMOL/L — SIGNIFICANT CHANGE UP (ref 98–107)
CO2 SERPL-SCNC: 31 MMOL/L — SIGNIFICANT CHANGE UP (ref 22–31)
CREAT SERPL-MCNC: 0.64 MG/DL — SIGNIFICANT CHANGE UP (ref 0.5–1.3)
EGFR: 86 ML/MIN/1.73M2 — SIGNIFICANT CHANGE UP
GLUCOSE BLDC GLUCOMTR-MCNC: 96 MG/DL — SIGNIFICANT CHANGE UP (ref 70–99)
GLUCOSE SERPL-MCNC: 94 MG/DL — SIGNIFICANT CHANGE UP (ref 70–99)
HCT VFR BLD CALC: 37.8 % — SIGNIFICANT CHANGE UP (ref 34.5–45)
HGB BLD-MCNC: 10.6 G/DL — LOW (ref 11.5–15.5)
MAGNESIUM SERPL-MCNC: 1.7 MG/DL — SIGNIFICANT CHANGE UP (ref 1.6–2.6)
MCHC RBC-ENTMCNC: 26 PG — LOW (ref 27–34)
MCHC RBC-ENTMCNC: 28 GM/DL — LOW (ref 32–36)
MCV RBC AUTO: 92.6 FL — SIGNIFICANT CHANGE UP (ref 80–100)
NRBC # BLD: 0 /100 WBCS — SIGNIFICANT CHANGE UP (ref 0–0)
NRBC # FLD: 0 K/UL — SIGNIFICANT CHANGE UP (ref 0–0)
PHOSPHATE SERPL-MCNC: 2.4 MG/DL — LOW (ref 2.5–4.5)
PLATELET # BLD AUTO: 221 K/UL — SIGNIFICANT CHANGE UP (ref 150–400)
POTASSIUM SERPL-MCNC: 4.1 MMOL/L — SIGNIFICANT CHANGE UP (ref 3.5–5.3)
POTASSIUM SERPL-SCNC: 4.1 MMOL/L — SIGNIFICANT CHANGE UP (ref 3.5–5.3)
RBC # BLD: 4.08 M/UL — SIGNIFICANT CHANGE UP (ref 3.8–5.2)
RBC # FLD: 17.6 % — HIGH (ref 10.3–14.5)
SODIUM SERPL-SCNC: 144 MMOL/L — SIGNIFICANT CHANGE UP (ref 135–145)
WBC # BLD: 3.72 K/UL — LOW (ref 3.8–10.5)
WBC # FLD AUTO: 3.72 K/UL — LOW (ref 3.8–10.5)

## 2022-11-20 PROCEDURE — 95718 EEG PHYS/QHP 2-12 HR W/VEEG: CPT

## 2022-11-20 RX ORDER — AMLODIPINE BESYLATE 2.5 MG/1
10 TABLET ORAL DAILY
Refills: 0 | Status: DISCONTINUED | OUTPATIENT
Start: 2022-11-20 | End: 2022-11-29

## 2022-11-20 RX ORDER — CARVEDILOL PHOSPHATE 80 MG/1
6.25 CAPSULE, EXTENDED RELEASE ORAL EVERY 12 HOURS
Refills: 0 | Status: DISCONTINUED | OUTPATIENT
Start: 2022-11-20 | End: 2022-11-29

## 2022-11-20 RX ORDER — AMLODIPINE BESYLATE 2.5 MG/1
5 TABLET ORAL DAILY
Refills: 0 | Status: DISCONTINUED | OUTPATIENT
Start: 2022-11-20 | End: 2022-11-20

## 2022-11-20 RX ADMIN — MIRTAZAPINE 15 MILLIGRAM(S): 45 TABLET, ORALLY DISINTEGRATING ORAL at 22:26

## 2022-11-20 RX ADMIN — CARVEDILOL PHOSPHATE 6.25 MILLIGRAM(S): 80 CAPSULE, EXTENDED RELEASE ORAL at 17:24

## 2022-11-20 RX ADMIN — ENOXAPARIN SODIUM 30 MILLIGRAM(S): 100 INJECTION SUBCUTANEOUS at 11:38

## 2022-11-20 RX ADMIN — Medication 100 MILLIGRAM(S): at 22:26

## 2022-11-20 RX ADMIN — SODIUM CHLORIDE 50 MILLILITER(S): 9 INJECTION, SOLUTION INTRAVENOUS at 07:30

## 2022-11-20 RX ADMIN — SENNA PLUS 2 TABLET(S): 8.6 TABLET ORAL at 22:26

## 2022-11-20 NOTE — CHART NOTE - NSCHARTNOTEFT_GEN_A_CORE
Spoke to neuro provider regarding EEG results-- per neuro, no need for changes in patient management at this time. Will follow up MRI head and continue to closely monitor patient

## 2022-11-20 NOTE — CONSULT NOTE ADULT - PROBLEM SELECTOR RECOMMENDATION 9
the reason seems unclear: she was on bipap before on prior admissions and was dced on oxygen : she hasno underlying lung disease and has had cameron ct sqacn chest  :  she had no pe before:  no emphysema  noted ? resp muscle weakness:  NOW ON VBG SHE IS ACIDOTIC:  WOULD ADD BIPAP IN THE NIGHT : HER MENTAL STATE SEEMS ENOUGH TO USE BIPAP AT THIS TIME: :  ADD BD Q 6 HOURS

## 2022-11-20 NOTE — SWALLOW BEDSIDE ASSESSMENT ADULT - SWALLOW EVAL: DIAGNOSIS
1) Mild oral dysphagia for soft/bite sized solids marked by prolonged manipulation, slow chewing/gumming, and reduced collection likely 2/2 edentulous state, resulting in delayed posterior bolus transfer. Lingual stasis noted which was cleared with liquid wash. 2) Functional oral stage of swallow for minced and moist solids, puree, moderately thick fluids, mildly thick fluids, and thin fluids marked by adequate containment, bolus manipulation/mastication, and coordination. Anterior to posterior oral transit/transfer noted. 3) Functional pharyngeal stage of swallow for minced and moist solids, soft and bite sized, puree, moderately thick fluids, mildly thick fluids, and thin fluids marked by initiation of pharyngeal swallow trigger and hyolaryngeal excursion noted upon digital palpation. No overt signs/symptoms of penetration/aspiration noted.

## 2022-11-20 NOTE — CONSULT NOTE ADULT - SUBJECTIVE AND OBJECTIVE BOX
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 Patient reportedly without LOC at that time, no prior history of seizures. There was no reported postictal state, tongue biting or incontinence. Patient non verbal on my exam, information obtained from daughter via phone (America Butler, [phone]). Patient reportedly has not slept much in 3 days, has been trying to get out of bed, hallucinating (seeing dead family members). She is verbal, A&Ox1 at baseline, recognizes her children, non-ambulatory for the past month since recent hospitalization at [facility] (patient has alternate MRN: [id number]) for encephalopathy thought to be secondary to acute respiratory failure with hypercapnia and infection, found to have a hepatic abscess, s/p aspiration and 18 days of treatment with ertapenem, completed last dose on Monday. Patient has been on ATC O2 2L during the day and 3L at night at home.  Per daughter, no recent fevers, chills, nausea, vomiting, diarrhea. Of note, patient understands English and Tagalog however of recent has been reverting back to using dialect [place].    In the ED VS: 99.1  70-87  134-156/61-73  18-20  %RA, received haloperidol 2.5mg IM x1, midazolam 2.5mg IV x1 (2022 23:47)   pt at this me getting eeg:  she is alert and awake and tries to talk to me and responds to simple questions :  she is n 4 L of oxygen at this time:  she denies having any lung condition:    called her daughter to get more information : she is using home oxygen: she was dc from hospital with oxygen :  she was on bipap machine at the hospital:   she has no underlying lung issue   she never smoked:  no asthma or copd:  she dis not have covid and neve rh ad pe or dvt  :    In past one month she was not able to walk  :  prior to that she was walking wih tout any device:   after she had GB removed in august and her lungs seem to be weak after that  : she had two hospitalizations for liver abscess and hypoxia:     ?FOLLOWING PRESENT  [ x] Hx of PE/DVT, [ x] Hx COPD, [ x] Hx of Asthma, [y ] Hx of Hospitalization, [x ]  Hx of BiPAP/CPAP use, [x ] Hx of RONALD    Allergies    ampicillin (Rash)  ampicillin-sulbactam (Rash)    Intolerances        PAST MEDICAL & SURGICAL HISTORY:  Hypertension      Hyperlipidemia      Gout      GERD (gastroesophageal reflux disease)      Insomnia      Other gallbladder disorder      H/O: hysterectomy      History of cholecystectomy      H/O right hemicolectomy      History of resection of liver          FAMILY HISTORY:  FH: hypertension (Father, Mother)        Social History: [x  ] TOBACCO                  [  x] ETOH                                 [ x ] IVDA/DRUGS    REVIEW OF SYSTEMS    pt cant tell me much    General:	    Skin/Breast:  	  Ophthalmologic:  	  ENMT:	    Respiratory and Thorax:  	  Cardiovascular:	    Gastrointestinal:	    Genitourinary:	    Musculoskeletal:	    Neurological:	    Psychiatric:	    Hematology/Lymphatics:	    Endocrine:	    Allergic/Immunologic:	    MEDICATIONS  (STANDING):  allopurinol 100 milliGRAM(s) Oral at bedtime  dextrose 5% + sodium chloride 0.9%. 1000 milliLiter(s) (50 mL/Hr) IV Continuous <Continuous>  enoxaparin Injectable 30 milliGRAM(s) SubCutaneous every 24 hours  fenofibrate Tablet 145 milliGRAM(s) Oral daily  mirtazapine 15 milliGRAM(s) Oral at bedtime  pantoprazole    Tablet 40 milliGRAM(s) Oral before breakfast  senna 2 Tablet(s) Oral at bedtime    MEDICATIONS  (PRN):       Vital Signs Last 24 Hrs  T(C): 36.8 (2022 06:45), Max: 37.1 (2022 02:30)  T(F): 98.2 (2022 06:45), Max: 98.7 (2022 02:30)  HR: 76 (2022 06:45) (76 - 81)  BP: 161/61 (2022 06:45) (132/50 - 161/61)  BP(mean): --  RR: 18 (2022 06:45) (18 - 22)  SpO2: 97% (2022 06:45) (96% - 100%)    Parameters below as of 2022 06:45  Patient On (Oxygen Delivery Method): mask, Venturi  O2 Flow (L/min): 4  Orthostatic VS          I&O's Summary      Physical Exam:   GENERAL: NAD, well-groomed, well-developed  HEENT: JASON/   Atraumatic, Normocephalic  ENMT: No tonsillar erythema, exudates, or enlargement; Moist mucous membranes, Good dentition, No lesions  NECK: Supple, No JVD, Normal thyroid  CHEST/LUNG: no wheezing:   CVS: Regular rate and rhythm; No murmurs, rubs, or gallops  GI: : Soft, Nontender, Nondistended; Bowel sounds present  NERVOUS SYSTEM:  Alert & awake  EXTREMITIES: - edema  LYMPH: No lymphadenopathy noted  SKIN: No rashes or lesions  ENDOCRINOLOGY: No Thyromegaly  PSYCH: calm     Labs:  Venous<66<4>>36<<7.265>>Venous<<3><<4><<5<<369>>, 11.9<75<4>>23<<7.345>>11.9<<3><<4><<5<<239>>COVID-19 PCR: NotDetec (27 Sep 2022 07:02)  COVID-19 PCR: NotDetec (23 Sep 2022 07:37)  COVID-19 PCR: NotDetec (21 Sep 2022 09:16)  COVID-19 PCR: NotDetec (14 Sep 2022 20:17)  COVID-19 PCR: NotDetec (06 Sep 2022 06:40)  COVID-19 PCR: NotDetec (31 Aug 2022 07:29)  COVID-19 PCR: NotDetec (25 Aug 2022 08:01)  COVID-19 PCR: NotDetec (2022 06:15)                              10.6   3.72  )-----------( 221      ( 2022 06:12 )             37.8                         9.7    3.88  )-----------( 231      ( 2022 09:53 )             33.0                         8.0    3.28  )-----------( 197      ( 2022 06:10 )             26.9                         11.7   6.02  )-----------( 270      ( 2022 14:46 )             39.0     11-20    144  |  105  |  15  ----------------------------<  94  4.1   |  31  |  0.64      144  |  105  |  19  ----------------------------<  73  3.8   |  31  |  0.75      146<H>  |  112<H>  |  15  ----------------------------<  58<L>  2.6<LL>   |  27  |  0.64      142  |  98  |  22  ----------------------------<  100<H>  3.9   |  34<H>  |  0.89    Ca    9.9      2022 06:12  Ca    9.7      2022 09:53  Ca    7.3<L>      2022 06:10  Ca    11.1<H>      2022 14:46  Phos  2.4     11-20  Phos  3.5     11-19  Phos  2.6     11-19  Mg     1.70     11-20  Mg     1.80     11-19  Mg     1.30     11-19  Mg     1.90     11-18    TPro  8.2  /  Alb  4.2  /  TBili  0.4  /  DBili  x   /  AST  21  /  ALT  8   /  AlkPhos  101  11-18    CAPILLARY BLOOD GLUCOSE        LIVER FUNCTIONS - ( 2022 14:46 )  Alb: 4.2 g/dL / Pro: 8.2 g/dL / ALK PHOS: 101 U/L / ALT: 8 U/L / AST: 21 U/L / GGT: x           PT/INR - ( 2022 14:46 )   PT: 12.1 sec;   INR: 1.04 ratio         PTT - ( 2022 14:46 )  PTT:24.7 sec  Urinalysis Basic - ( 2022 15:45 )    Color: Light Yellow / Appearance: Clear / S.010 / pH: x  Gluc: x / Ketone: Negative  / Bili: Negative / Urobili: <2 mg/dL   Blood: x / Protein: Trace / Nitrite: Negative   Leuk Esterase: Negative / RBC: x / WBC x   Sq Epi: x / Non Sq Epi: x / Bacteria: x      Culture - Urine (collected 2022 15:45)  Source: Clean Catch Clean Catch (Midstream)  Final Report (2022 16:13):    <10,000 CFU/mL Normal Urogenital Va      D DImer  Serum Pro-Brain Natriuretic Peptide: 676 pg/mL ( @ 14:46)      Studies  Chest X-RAY  CT SCAN Chest   CT Abdomen  Venous Dopplers: LE:   Others    rad< from: Xray Chest 1 View- PORTABLE-Urgent (Xray Chest 1 View- PORTABLE-Urgent .) (22 @ 15:37) >    ACC: [id number] EXAM:  XR CHEST PORTABLE URGENT 1V                          PROCEDURE DATE:  2022          INTERPRETATION:  CLINICAL INDICATION: Patient noted to be shaking, still   at baseline.    EXAM: Frontal radiograph of the chest.    COMPARISON: Chest radiograph from 10/24/2022    FINDINGS:    The lungs are clear with elevation of the right hemidiaphragm. Heart   appears enlarged but stable and there are no effusions or congestion to   indicate CHF.  There is no pneumothorax.  The heart is not well evaluated in this position  The visualized osseous structures demonstrate no acute pathology.    IMPRESSION: Clear lungs with elevated right hemidiaphragm.          --- End of Report ---        < end of copied text >  < from: Xray Chest 1 View- PORTABLE-Urgent (Xray Chest 1 View- PORTABLE-Urgent .) (22 @ 05:37) >      < end of copied text >      c< from: CT Chest w/ IV Cont (22 @ 12:52) >  ACC: [id number] EXAM:  CT ABDOMEN AND PELVIS IC                          PROCEDURE DATE:  2022          INTERPRETATION:  CLINICAL INFORMATION: Hepatic abscess status post IR   drainage, follow-up.    COMPARISON: CT 2022.    CONTRAST/COMPLICATIONS:  IV Contrast: Omnipaque 350  64 cc administered   6 cc discarded  Oral Contrast: NONE  Complications: None reported at time of study completion    PROCEDURE:  CT of the Chest, Abdomen and Pelvis was performed.  Sagittal and coronal reformats were performed.    FINDINGS:  Motion degraded exam.    CHEST:  LUNGS AND LARGE AIRWAYS: Patent central airways. Bibasilar atelectasis,   decreased.  PLEURA: Trace right pleural effusion, unchanged.  VESSELS: Atherosclerotic changes of the aorta and coronary arteries.  HEART: Cardiomegaly. No pericardial effusion.  MEDIASTINUM AND CARMELLA: No lymphadenopathy.  CHEST WALL AND LOWER NECK: Within normal limits.    ABDOMEN AND PELVIS:  LIVER: Status post interval pigtail drainage catheter placement within   the intrahepatic abscess along the gallbladder fossa, which is decreased   in size measuring 4.8 x 2.4 x 3.8 cm, previously 7.3 x 4.2 x 6.3 cm.  BILE DUCTS: Mild central intrahepatic biliary ductal dilatation and   enhancement, decreased from the previous exam.  GALLBLADDER: Cholecystectomy.  SPLEEN: Within normal limits.  PANCREAS: Within normal limits.  ADRENALS: Within normal limits.  KIDNEYS/URETERS: No hydronephrosis. Bilateral renal subcent    < end of copied text >  < from: CT Angio Chest PE Protocol w/ IV Cont (22 @ 21:46) >  HEART: Cardiomegaly. No pericardial effusion. Mitral annular   calcifications.  CHEST WALL AND LOWER NECK: Heterogeneity of the thyroid gland. 9 mm   hypodense nodule within the right lobe of the thyroid gland. 6 mm   hyperdense and possibly enhancing nodule within the right lobe of the   thyroid gland.  VISUALIZED UPPER ABDOMEN: Interval cholecystectomy when compared to CT   abdomen pelvis from 2022 with their the imaged postoperative changes   in the gallbladder fossae including tiny droplets of air.  BONES: Within normal limits.    IMPRESSION:  No pulmonary embolism.    Small right-sided pleural effusion with adjacent right lower lobe   consolidation suggestive of atelectasis.    --- End of Report ---          GARTH MEIER MD; Resident Radiologist  This document has been electronically signed.  RAQUEL MARTINS MD; Attending Radiologist    < end of copied text >  < from: CT Chest w/ IV Cont (22 @ 04:05) >  endometrial malignancy.  Reidentified left adnexal soft tissue focus   measures 3.5 x 2.2 cm contiguous with the uterus, unchanged. This likely   represent pedunculated fibroid as better delineated on recent MRI.    BOWEL: Colonic diverticulosis without acute diverticulitis. No bowel   obstruction. Additional findings described above.  PERITONEUM: No ascites.  VESSELS: Atherosclerotic changes.  RETROPERITONEUM/LYMPH NODES: No lymphadenopathy.  ABDOMINAL WALL: Tiny fat-containing umbilical hernia. Suggestion of mild   soft tissue contusion the left anterior abdominal wall (75:3).  BONES: No acute displaced fracture. Multilevel degenerative changes of   the spine. Vertebral body heights and alignment appear maintained.    IMPRESSION:    Suggestion of mild soft tissue contusion the left anterior abdominal wall   (75:3).    Otherwise, no sequela of acute traumatic injury in the chest, abdomen or   pelvis.    Redemonstrated marked irregular gallbladder thickening, gangrenous acute   cholecystitis or neoplasm difficult to exclude as better delineated on   recent MRI. The ascending colon/hepatic flexure traverses a region of   previously identified contained perforation; fistulization again cannot   be excluded.    Continued prominence of endometrial echo complex for which nonemergent   pelvic ultrasound correlation is advised to exclude endometrial   malignancy.    < end of copied text >  < from: Transthoracic Echocardiogram (22 @ 12:22) >  effusion. Right pleural effusion.  ------------------------------------------------------------------------  CONCLUSIONS:  1. Mitral annular calcification and calcified mitral  leaflets with normal diastolic opening. Minimal mitral  regurgitation.  2. Calcified trileaflet aortic valve with normal opening.  Mild aortic regurgitation.  3. Mildly dilated left atrium.  LA volume index = 35 cc/m2.  4. Hyperdynamic left ventricle.  5. Normal right ventricular size and function.  *** Compared with echocardiogram of 2022, no  significant changes noted.  ------------------------------------------------------------------------    < end of copied text >

## 2022-11-21 LAB
ANION GAP SERPL CALC-SCNC: 10 MMOL/L — SIGNIFICANT CHANGE UP (ref 7–14)
BASE EXCESS BLDV CALC-SCNC: 10.2 MMOL/L — HIGH (ref -2–3)
BUN SERPL-MCNC: 7 MG/DL — SIGNIFICANT CHANGE UP (ref 7–23)
CA-I SERPL-SCNC: 1.36 MMOL/L — HIGH (ref 1.15–1.33)
CALCIUM SERPL-MCNC: 10.7 MG/DL — HIGH (ref 8.4–10.5)
CHLORIDE BLDV-SCNC: 102 MMOL/L — SIGNIFICANT CHANGE UP (ref 96–108)
CHLORIDE SERPL-SCNC: 101 MMOL/L — SIGNIFICANT CHANGE UP (ref 98–107)
CO2 BLDV-SCNC: 38.6 MMOL/L — HIGH (ref 22–26)
CO2 SERPL-SCNC: 30 MMOL/L — SIGNIFICANT CHANGE UP (ref 22–31)
CREAT SERPL-MCNC: 0.51 MG/DL — SIGNIFICANT CHANGE UP (ref 0.5–1.3)
EGFR: 91 ML/MIN/1.73M2 — SIGNIFICANT CHANGE UP
GAS PNL BLDV: 139 MMOL/L — SIGNIFICANT CHANGE UP (ref 136–145)
GAS PNL BLDV: SIGNIFICANT CHANGE UP
GAS PNL BLDV: SIGNIFICANT CHANGE UP
GLUCOSE BLDV-MCNC: 163 MG/DL — HIGH (ref 70–99)
GLUCOSE SERPL-MCNC: 134 MG/DL — HIGH (ref 70–99)
HCO3 BLDV-SCNC: 37 MMOL/L — HIGH (ref 22–29)
HCT VFR BLD CALC: 39.5 % — SIGNIFICANT CHANGE UP (ref 34.5–45)
HCT VFR BLDA CALC: 36 % — SIGNIFICANT CHANGE UP (ref 34.5–46.5)
HGB BLD CALC-MCNC: 11.9 G/DL — SIGNIFICANT CHANGE UP (ref 11.5–15.5)
HGB BLD-MCNC: 11.6 G/DL — SIGNIFICANT CHANGE UP (ref 11.5–15.5)
LACTATE BLDV-MCNC: 1.7 MMOL/L — SIGNIFICANT CHANGE UP (ref 0.5–2)
MAGNESIUM SERPL-MCNC: 1.5 MG/DL — LOW (ref 1.6–2.6)
MCHC RBC-ENTMCNC: 25.8 PG — LOW (ref 27–34)
MCHC RBC-ENTMCNC: 29.4 GM/DL — LOW (ref 32–36)
MCV RBC AUTO: 87.8 FL — SIGNIFICANT CHANGE UP (ref 80–100)
NRBC # BLD: 0 /100 WBCS — SIGNIFICANT CHANGE UP (ref 0–0)
NRBC # FLD: 0 K/UL — SIGNIFICANT CHANGE UP (ref 0–0)
PCO2 BLDV: 58 MMHG — HIGH (ref 39–42)
PH BLDV: 7.41 — SIGNIFICANT CHANGE UP (ref 7.32–7.43)
PHOSPHATE SERPL-MCNC: 1.1 MG/DL — LOW (ref 2.5–4.5)
PLATELET # BLD AUTO: 276 K/UL — SIGNIFICANT CHANGE UP (ref 150–400)
PO2 BLDV: 41 MMHG — SIGNIFICANT CHANGE UP
POTASSIUM BLDV-SCNC: 3.3 MMOL/L — LOW (ref 3.5–5.1)
POTASSIUM SERPL-MCNC: 3.4 MMOL/L — LOW (ref 3.5–5.3)
POTASSIUM SERPL-SCNC: 3.4 MMOL/L — LOW (ref 3.5–5.3)
RBC # BLD: 4.5 M/UL — SIGNIFICANT CHANGE UP (ref 3.8–5.2)
RBC # FLD: 17.3 % — HIGH (ref 10.3–14.5)
SAO2 % BLDV: 70.5 % — SIGNIFICANT CHANGE UP
SARS-COV-2 RNA SPEC QL NAA+PROBE: SIGNIFICANT CHANGE UP
SODIUM SERPL-SCNC: 141 MMOL/L — SIGNIFICANT CHANGE UP (ref 135–145)
T PALLIDUM AB TITR SER: NEGATIVE — SIGNIFICANT CHANGE UP
WBC # BLD: 4.71 K/UL — SIGNIFICANT CHANGE UP (ref 3.8–10.5)
WBC # FLD AUTO: 4.71 K/UL — SIGNIFICANT CHANGE UP (ref 3.8–10.5)

## 2022-11-21 PROCEDURE — 93306 TTE W/DOPPLER COMPLETE: CPT | Mod: 26

## 2022-11-21 RX ORDER — MAGNESIUM SULFATE 500 MG/ML
2 VIAL (ML) INJECTION ONCE
Refills: 0 | Status: COMPLETED | OUTPATIENT
Start: 2022-11-21 | End: 2022-11-21

## 2022-11-21 RX ORDER — POTASSIUM CHLORIDE 20 MEQ
40 PACKET (EA) ORAL ONCE
Refills: 0 | Status: COMPLETED | OUTPATIENT
Start: 2022-11-21 | End: 2022-11-21

## 2022-11-21 RX ORDER — POTASSIUM PHOSPHATE, MONOBASIC POTASSIUM PHOSPHATE, DIBASIC 236; 224 MG/ML; MG/ML
15 INJECTION, SOLUTION INTRAVENOUS ONCE
Refills: 0 | Status: COMPLETED | OUTPATIENT
Start: 2022-11-21 | End: 2022-11-21

## 2022-11-21 RX ADMIN — POTASSIUM PHOSPHATE, MONOBASIC POTASSIUM PHOSPHATE, DIBASIC 62.5 MILLIMOLE(S): 236; 224 INJECTION, SOLUTION INTRAVENOUS at 11:48

## 2022-11-21 RX ADMIN — Medication 145 MILLIGRAM(S): at 11:47

## 2022-11-21 RX ADMIN — PANTOPRAZOLE SODIUM 40 MILLIGRAM(S): 20 TABLET, DELAYED RELEASE ORAL at 05:40

## 2022-11-21 RX ADMIN — ENOXAPARIN SODIUM 30 MILLIGRAM(S): 100 INJECTION SUBCUTANEOUS at 11:47

## 2022-11-21 RX ADMIN — SODIUM CHLORIDE 50 MILLILITER(S): 9 INJECTION, SOLUTION INTRAVENOUS at 04:31

## 2022-11-21 RX ADMIN — Medication 40 MILLIEQUIVALENT(S): at 10:08

## 2022-11-21 RX ADMIN — CARVEDILOL PHOSPHATE 6.25 MILLIGRAM(S): 80 CAPSULE, EXTENDED RELEASE ORAL at 05:40

## 2022-11-21 RX ADMIN — Medication 25 GRAM(S): at 10:12

## 2022-11-21 RX ADMIN — CARVEDILOL PHOSPHATE 6.25 MILLIGRAM(S): 80 CAPSULE, EXTENDED RELEASE ORAL at 18:48

## 2022-11-21 RX ADMIN — AMLODIPINE BESYLATE 10 MILLIGRAM(S): 2.5 TABLET ORAL at 05:40

## 2022-11-21 NOTE — EEG REPORT - NS EEG TEXT BOX
HARSH VERGARA [id number]     Study Date: 		11-20 08:00-12:01 11-20-22  x4:01hrs  --------------------------------------------------------------------------------------------------  History:  CC/ HPI Patient is a 87y old  Female     MEDICATIONS  (STANDING):  allopurinol 100 milliGRAM(s) Oral at bedtime  dextrose 5% + sodium chloride 0.9%. 1000 milliLiter(s) (50 mL/Hr) IV Continuous <Continuous>  enoxaparin Injectable 30 milliGRAM(s) SubCutaneous every 24 hours  fenofibrate Tablet 145 milliGRAM(s) Oral daily  mirtazapine 15 milliGRAM(s) Oral at bedtime  pantoprazole    Tablet 40 milliGRAM(s) Oral before breakfast  senna 2 Tablet(s) Oral at bedtime    --------------------------------------------------------------------------------------------------  Study Interpretation:    [Abbreviation Key:  PDR=alpha rhythm/posterior dominant rhythm. A-P=anterior posterior.  Amplitude: ‘very low’:<20; ‘low’:20-49; ‘medium’:; ‘high’:>150uV.  Persistence for periodic/rhythmic patterns (% of epoch) ‘rare’:<1%; ‘occasional’:1-10%; ‘frequent’:10-50%; ‘abundant’:50-90%; ‘continuous’:>90%.  Persistence for sporadic discharges: ‘rare’:<1/hr; ‘occasional’:1/min-1/hr; ‘frequent’:>1/min; ‘abundant’:>1/10 sec.  RPP=rhythmic and periodic patterns; GRDA=generalized rhythmic delta activity; FIRDA=frontal intermittent GRDA; LRDA=lateralized rhythmic delta activity; TIRDA=temporal intermittent rhythmic delta activity;  LPD=PLED=lateralized periodic discharges; GPD=generalized periodic discharges; BIPDs =bilateral independent periodic discharges; Mf=multifocal; SIRPDs=stimulus induced rhythmic, periodic, or ictal appearing discharges; BIRDs=brief potentially ictal rhythmic discharges >4 Hz, lasting .5-10s; PFA (paroxysmal bursts >13 Hz or =8 Hz <10s).  Modifiers: +F=with fast component; +S=with spike component; +R=with rhythmic component.  S-B=burst suppression pattern.  Max=maximal. N1-drowsy; N2-stage II sleep; N3-slow wave sleep. SSS/BETS=small sharp spikes/benign epileptiform transients of sleep. HV=hyperventilation; PS=photic stimulation]    Daily EEG Visual Analysis  FINDINGS:      Background:  Continuous: continuous  Symmetry: symmetric  PDR: 7 Hz activity, with amplitude to 40 uV, that attenuated to eye opening.    Reactivity: present  Voltage: normal (between 20-150uV)  Anterior Posterior Gradient: present  Other background findings: none  Breach: absent    Background Slowing:  Generalized slowing: diffuse irregular delta and theta activity.  Focal slowing: right occipital maximal slowing    State Changes:   -N2 sleep transients were recorded.    Sporadic Epileptiform Discharges:    None    Rhythmic and Periodic Patterns (RPPs):  None     Electrographic and Electroclinical seizures:  None    Other Clinical Events:  None    Prominent photic driving.    Artifacts:  Intermittent myogenic and movement artifacts were noted.      ECG:  The heart rate on single channel ECG was predominantly between 66-72 BPM.    EEG Classification / Summary:  Abnormal EEG study  Right occipital max focal slowing  Mild generalized background slowing    -----------------------------------------------------------------------------------------------------    Clinical Impression:  Right occipital  focal cerebral dysfunction.  Mild superimposed diffuse/multifocal cerebral dysfunction, not specific as to etiology.      -------------------------------------------------------------------------------------------------------  [facility] EEG Reading Room Ph#: [phone]  Epilepsy Answering Service after 5PM and before 8:30AM: Ph#: [phone]    Farhan Suarez M.D.   of Neurology, [facility] Epilepsy [place]	  
HARSH VERGARA [id number]     Study Date: 		11-19 12:45-08:00 11-20-22  x17:53hrs  --------------------------------------------------------------------------------------------------  History:  CC/ HPI Patient is a 87y old  Female     MEDICATIONS  (STANDING):  allopurinol 100 milliGRAM(s) Oral at bedtime  dextrose 5% + sodium chloride 0.9%. 1000 milliLiter(s) (50 mL/Hr) IV Continuous <Continuous>  enoxaparin Injectable 30 milliGRAM(s) SubCutaneous every 24 hours  fenofibrate Tablet 145 milliGRAM(s) Oral daily  mirtazapine 15 milliGRAM(s) Oral at bedtime  pantoprazole    Tablet 40 milliGRAM(s) Oral before breakfast  senna 2 Tablet(s) Oral at bedtime    --------------------------------------------------------------------------------------------------  Study Interpretation:    [Abbreviation Key:  PDR=alpha rhythm/posterior dominant rhythm. A-P=anterior posterior.  Amplitude: ‘very low’:<20; ‘low’:20-49; ‘medium’:; ‘high’:>150uV.  Persistence for periodic/rhythmic patterns (% of epoch) ‘rare’:<1%; ‘occasional’:1-10%; ‘frequent’:10-50%; ‘abundant’:50-90%; ‘continuous’:>90%.  Persistence for sporadic discharges: ‘rare’:<1/hr; ‘occasional’:1/min-1/hr; ‘frequent’:>1/min; ‘abundant’:>1/10 sec.  RPP=rhythmic and periodic patterns; GRDA=generalized rhythmic delta activity; FIRDA=frontal intermittent GRDA; LRDA=lateralized rhythmic delta activity; TIRDA=temporal intermittent rhythmic delta activity;  LPD=PLED=lateralized periodic discharges; GPD=generalized periodic discharges; BIPDs =bilateral independent periodic discharges; Mf=multifocal; SIRPDs=stimulus induced rhythmic, periodic, or ictal appearing discharges; BIRDs=brief potentially ictal rhythmic discharges >4 Hz, lasting .5-10s; PFA (paroxysmal bursts >13 Hz or =8 Hz <10s).  Modifiers: +F=with fast component; +S=with spike component; +R=with rhythmic component.  S-B=burst suppression pattern.  Max=maximal. N1-drowsy; N2-stage II sleep; N3-slow wave sleep. SSS/BETS=small sharp spikes/benign epileptiform transients of sleep. HV=hyperventilation; PS=photic stimulation]    Daily EEG Visual Analysis  FINDINGS:      Background:  Continuous: continuous  Symmetry: symmetric  PDR: 7 Hz activity, with amplitude to 40 uV, that attenuated to eye opening.    Reactivity: present  Voltage: normal (between 20-150uV)  Anterior Posterior Gradient: present  Other background findings: none  Breach: absent    Background Slowing:  Generalized slowing: diffuse irregular delta and theta activity.  Focal slowing: right occipital maximal slowing    State Changes:   -N2 sleep transients were recorded.    Sporadic Epileptiform Discharges:    O2 max sharp waves field to O1 noted on occasion more in sleep    Rhythmic and Periodic Patterns (RPPs):  None     Electrographic and Electroclinical seizures:  None    Other Clinical Events:  None    Prominent photic driving.    Artifacts:  Intermittent myogenic and movement artifacts were noted.      ECG:  The heart rate on single channel ECG was predominantly between 66-72 BPM.    EEG Classification / Summary:  Abnormal EEG study  Right occipital sharp waves and focal slowing  Mild generalized background slowing    -----------------------------------------------------------------------------------------------------    Clinical Impression:  Risk of seizures from the right occipital region, associated focal cerebral dysfunction.  Mild superimposed diffuse/multifocal cerebral dysfunction, not specific as to etiology.      -------------------------------------------------------------------------------------------------------  [facility] EEG Reading Room Ph#: [phone]  Epilepsy Answering Service after 5PM and before 8:30AM: Ph#: [phone]    Farhan Suarez M.D.   of Neurology, [facility] Epilepsy [place]

## 2022-11-22 LAB
ALBUMIN SERPL ELPH-MCNC: 4.4 G/DL — SIGNIFICANT CHANGE UP (ref 3.3–5)
ALP SERPL-CCNC: 107 U/L — SIGNIFICANT CHANGE UP (ref 40–120)
ALT FLD-CCNC: 10 U/L — SIGNIFICANT CHANGE UP (ref 4–33)
ANION GAP SERPL CALC-SCNC: 12 MMOL/L — SIGNIFICANT CHANGE UP (ref 7–14)
AST SERPL-CCNC: 20 U/L — SIGNIFICANT CHANGE UP (ref 4–32)
BILIRUB SERPL-MCNC: 0.8 MG/DL — SIGNIFICANT CHANGE UP (ref 0.2–1.2)
BUN SERPL-MCNC: 6 MG/DL — LOW (ref 7–23)
CALCIUM SERPL-MCNC: 10.9 MG/DL — HIGH (ref 8.4–10.5)
CHLORIDE SERPL-SCNC: 99 MMOL/L — SIGNIFICANT CHANGE UP (ref 98–107)
CO2 SERPL-SCNC: 28 MMOL/L — SIGNIFICANT CHANGE UP (ref 22–31)
CREAT SERPL-MCNC: 0.58 MG/DL — SIGNIFICANT CHANGE UP (ref 0.5–1.3)
EGFR: 88 ML/MIN/1.73M2 — SIGNIFICANT CHANGE UP
GLUCOSE SERPL-MCNC: 87 MG/DL — SIGNIFICANT CHANGE UP (ref 70–99)
HCT VFR BLD CALC: 40.5 % — SIGNIFICANT CHANGE UP (ref 34.5–45)
HGB BLD-MCNC: 12.2 G/DL — SIGNIFICANT CHANGE UP (ref 11.5–15.5)
MAGNESIUM SERPL-MCNC: 2 MG/DL — SIGNIFICANT CHANGE UP (ref 1.6–2.6)
MCHC RBC-ENTMCNC: 26.3 PG — LOW (ref 27–34)
MCHC RBC-ENTMCNC: 30.1 GM/DL — LOW (ref 32–36)
MCV RBC AUTO: 87.5 FL — SIGNIFICANT CHANGE UP (ref 80–100)
NRBC # BLD: 0 /100 WBCS — SIGNIFICANT CHANGE UP (ref 0–0)
NRBC # FLD: 0 K/UL — SIGNIFICANT CHANGE UP (ref 0–0)
PHOSPHATE SERPL-MCNC: 1.7 MG/DL — LOW (ref 2.5–4.5)
PLATELET # BLD AUTO: 291 K/UL — SIGNIFICANT CHANGE UP (ref 150–400)
POTASSIUM SERPL-MCNC: 3.9 MMOL/L — SIGNIFICANT CHANGE UP (ref 3.5–5.3)
POTASSIUM SERPL-SCNC: 3.9 MMOL/L — SIGNIFICANT CHANGE UP (ref 3.5–5.3)
PROT SERPL-MCNC: 8.2 G/DL — SIGNIFICANT CHANGE UP (ref 6–8.3)
RBC # BLD: 4.63 M/UL — SIGNIFICANT CHANGE UP (ref 3.8–5.2)
RBC # FLD: 18 % — HIGH (ref 10.3–14.5)
SODIUM SERPL-SCNC: 139 MMOL/L — SIGNIFICANT CHANGE UP (ref 135–145)
WBC # BLD: 4.94 K/UL — SIGNIFICANT CHANGE UP (ref 3.8–10.5)
WBC # FLD AUTO: 4.94 K/UL — SIGNIFICANT CHANGE UP (ref 3.8–10.5)

## 2022-11-22 RX ORDER — ASPIRIN/CALCIUM CARB/MAGNESIUM 324 MG
81 TABLET ORAL DAILY
Refills: 0 | Status: DISCONTINUED | OUTPATIENT
Start: 2022-11-22 | End: 2022-11-22

## 2022-11-22 RX ORDER — SODIUM CHLORIDE 9 MG/ML
500 INJECTION INTRAMUSCULAR; INTRAVENOUS; SUBCUTANEOUS
Refills: 0 | Status: DISCONTINUED | OUTPATIENT
Start: 2022-11-22 | End: 2022-11-29

## 2022-11-22 RX ORDER — ASPIRIN/CALCIUM CARB/MAGNESIUM 324 MG
81 TABLET ORAL DAILY
Refills: 0 | Status: DISCONTINUED | OUTPATIENT
Start: 2022-11-22 | End: 2022-11-29

## 2022-11-22 RX ORDER — POTASSIUM PHOSPHATE, MONOBASIC POTASSIUM PHOSPHATE, DIBASIC 236; 224 MG/ML; MG/ML
15 INJECTION, SOLUTION INTRAVENOUS ONCE
Refills: 0 | Status: COMPLETED | OUTPATIENT
Start: 2022-11-22 | End: 2022-11-22

## 2022-11-22 RX ADMIN — Medication 81 MILLIGRAM(S): at 12:32

## 2022-11-22 RX ADMIN — Medication 145 MILLIGRAM(S): at 12:20

## 2022-11-22 RX ADMIN — ENOXAPARIN SODIUM 30 MILLIGRAM(S): 100 INJECTION SUBCUTANEOUS at 12:21

## 2022-11-22 RX ADMIN — CARVEDILOL PHOSPHATE 6.25 MILLIGRAM(S): 80 CAPSULE, EXTENDED RELEASE ORAL at 17:34

## 2022-11-22 RX ADMIN — POTASSIUM PHOSPHATE, MONOBASIC POTASSIUM PHOSPHATE, DIBASIC 62.5 MILLIMOLE(S): 236; 224 INJECTION, SOLUTION INTRAVENOUS at 10:38

## 2022-11-22 RX ADMIN — SODIUM CHLORIDE 125 MILLILITER(S): 9 INJECTION INTRAMUSCULAR; INTRAVENOUS; SUBCUTANEOUS at 12:32

## 2022-11-22 NOTE — CHART NOTE - NSCHARTNOTEFT_GEN_A_CORE
Neurology previously recommended aspirin 81mg daily if not otherwise contraindicated. Discussed with attending Dr. Shay, states it is okay to start patient on aspirin 81mg daily.

## 2022-11-22 NOTE — CHART NOTE - NSCHARTNOTEFT_GEN_A_CORE
Neurology    Recent CT head showed Small chronic left frontal infarct. Chronic lacunar infarct in the right cerebellar hemisphere. Chronic left thalamic lacunar infarct. For secondary stroke prevention, neurology previously recommended aspirin 81mg daily if not otherwise contraindicated. Communicated in AM w/ primary team ACP. Otherwise, will await MRI brain w/o con

## 2022-11-23 LAB
ANION GAP SERPL CALC-SCNC: 8 MMOL/L — SIGNIFICANT CHANGE UP (ref 7–14)
BUN SERPL-MCNC: 16 MG/DL — SIGNIFICANT CHANGE UP (ref 7–23)
CALCIUM SERPL-MCNC: 10.2 MG/DL — SIGNIFICANT CHANGE UP (ref 8.4–10.5)
CHLORIDE SERPL-SCNC: 104 MMOL/L — SIGNIFICANT CHANGE UP (ref 98–107)
CO2 SERPL-SCNC: 26 MMOL/L — SIGNIFICANT CHANGE UP (ref 22–31)
CREAT SERPL-MCNC: 1.21 MG/DL — SIGNIFICANT CHANGE UP (ref 0.5–1.3)
EGFR: 44 ML/MIN/1.73M2 — LOW
GLUCOSE SERPL-MCNC: 89 MG/DL — SIGNIFICANT CHANGE UP (ref 70–99)
HCT VFR BLD CALC: 35.2 % — SIGNIFICANT CHANGE UP (ref 34.5–45)
HGB BLD-MCNC: 10.5 G/DL — LOW (ref 11.5–15.5)
HOMOCYSTEINE LEVEL: 9.8 UMOL/L — SIGNIFICANT CHANGE UP
MAGNESIUM SERPL-MCNC: 1.7 MG/DL — SIGNIFICANT CHANGE UP (ref 1.6–2.6)
MCHC RBC-ENTMCNC: 26.3 PG — LOW (ref 27–34)
MCHC RBC-ENTMCNC: 29.8 GM/DL — LOW (ref 32–36)
MCV RBC AUTO: 88.2 FL — SIGNIFICANT CHANGE UP (ref 80–100)
METHYLMALONIC ACID LEVEL: 366 NMOL/L — HIGH (ref 87–318)
NRBC # BLD: 0 /100 WBCS — SIGNIFICANT CHANGE UP (ref 0–0)
NRBC # FLD: 0 K/UL — SIGNIFICANT CHANGE UP (ref 0–0)
PHOSPHATE SERPL-MCNC: 4.9 MG/DL — HIGH (ref 2.5–4.5)
PLATELET # BLD AUTO: 245 K/UL — SIGNIFICANT CHANGE UP (ref 150–400)
POTASSIUM SERPL-MCNC: 4.5 MMOL/L — SIGNIFICANT CHANGE UP (ref 3.5–5.3)
POTASSIUM SERPL-SCNC: 4.5 MMOL/L — SIGNIFICANT CHANGE UP (ref 3.5–5.3)
RBC # BLD: 3.99 M/UL — SIGNIFICANT CHANGE UP (ref 3.8–5.2)
RBC # FLD: 18.3 % — HIGH (ref 10.3–14.5)
SODIUM SERPL-SCNC: 138 MMOL/L — SIGNIFICANT CHANGE UP (ref 135–145)
VIT B1 SERPL-MCNC: 123.4 NMOL/L — SIGNIFICANT CHANGE UP (ref 66.5–200)
WBC # BLD: 4.02 K/UL — SIGNIFICANT CHANGE UP (ref 3.8–10.5)
WBC # FLD AUTO: 4.02 K/UL — SIGNIFICANT CHANGE UP (ref 3.8–10.5)

## 2022-11-23 PROCEDURE — 70551 MRI BRAIN STEM W/O DYE: CPT | Mod: 26

## 2022-11-23 PROCEDURE — 71275 CT ANGIOGRAPHY CHEST: CPT | Mod: 26

## 2022-11-23 RX ORDER — SODIUM CHLORIDE 9 MG/ML
500 INJECTION INTRAMUSCULAR; INTRAVENOUS; SUBCUTANEOUS ONCE
Refills: 0 | Status: COMPLETED | OUTPATIENT
Start: 2022-11-23 | End: 2022-11-23

## 2022-11-23 RX ADMIN — CARVEDILOL PHOSPHATE 6.25 MILLIGRAM(S): 80 CAPSULE, EXTENDED RELEASE ORAL at 17:53

## 2022-11-23 RX ADMIN — SODIUM CHLORIDE 1000 MILLILITER(S): 9 INJECTION INTRAMUSCULAR; INTRAVENOUS; SUBCUTANEOUS at 12:45

## 2022-11-23 RX ADMIN — CARVEDILOL PHOSPHATE 6.25 MILLIGRAM(S): 80 CAPSULE, EXTENDED RELEASE ORAL at 05:51

## 2022-11-23 RX ADMIN — Medication 81 MILLIGRAM(S): at 12:39

## 2022-11-23 RX ADMIN — SENNA PLUS 2 TABLET(S): 8.6 TABLET ORAL at 21:23

## 2022-11-23 RX ADMIN — PANTOPRAZOLE SODIUM 40 MILLIGRAM(S): 20 TABLET, DELAYED RELEASE ORAL at 05:51

## 2022-11-23 RX ADMIN — AMLODIPINE BESYLATE 10 MILLIGRAM(S): 2.5 TABLET ORAL at 05:51

## 2022-11-23 RX ADMIN — MIRTAZAPINE 15 MILLIGRAM(S): 45 TABLET, ORALLY DISINTEGRATING ORAL at 21:23

## 2022-11-23 RX ADMIN — ENOXAPARIN SODIUM 30 MILLIGRAM(S): 100 INJECTION SUBCUTANEOUS at 12:39

## 2022-11-23 RX ADMIN — Medication 145 MILLIGRAM(S): at 12:40

## 2022-11-24 LAB
ANION GAP SERPL CALC-SCNC: 7 MMOL/L — SIGNIFICANT CHANGE UP (ref 7–14)
BASE EXCESS BLDV CALC-SCNC: 2 MMOL/L — SIGNIFICANT CHANGE UP (ref -2–3)
BUN SERPL-MCNC: 18 MG/DL — SIGNIFICANT CHANGE UP (ref 7–23)
CALCIUM SERPL-MCNC: 10.1 MG/DL — SIGNIFICANT CHANGE UP (ref 8.4–10.5)
CHLORIDE SERPL-SCNC: 103 MMOL/L — SIGNIFICANT CHANGE UP (ref 98–107)
CO2 BLDV-SCNC: 31.9 MMOL/L — HIGH (ref 22–26)
CO2 SERPL-SCNC: 26 MMOL/L — SIGNIFICANT CHANGE UP (ref 22–31)
CREAT SERPL-MCNC: 0.98 MG/DL — SIGNIFICANT CHANGE UP (ref 0.5–1.3)
EGFR: 56 ML/MIN/1.73M2 — LOW
GLUCOSE SERPL-MCNC: 93 MG/DL — SIGNIFICANT CHANGE UP (ref 70–99)
HCO3 BLDV-SCNC: 30 MMOL/L — HIGH (ref 22–29)
HCT VFR BLD CALC: 34.6 % — SIGNIFICANT CHANGE UP (ref 34.5–45)
HGB BLD-MCNC: 10.3 G/DL — LOW (ref 11.5–15.5)
MAGNESIUM SERPL-MCNC: 1.8 MG/DL — SIGNIFICANT CHANGE UP (ref 1.6–2.6)
MCHC RBC-ENTMCNC: 26.1 PG — LOW (ref 27–34)
MCHC RBC-ENTMCNC: 29.8 GM/DL — LOW (ref 32–36)
MCV RBC AUTO: 87.8 FL — SIGNIFICANT CHANGE UP (ref 80–100)
NRBC # BLD: 0 /100 WBCS — SIGNIFICANT CHANGE UP (ref 0–0)
NRBC # FLD: 0 K/UL — SIGNIFICANT CHANGE UP (ref 0–0)
PCO2 BLDV: 61 MMHG — HIGH (ref 39–42)
PH BLDV: 7.3 — LOW (ref 7.32–7.43)
PHOSPHATE SERPL-MCNC: 3.1 MG/DL — SIGNIFICANT CHANGE UP (ref 2.5–4.5)
PLATELET # BLD AUTO: 242 K/UL — SIGNIFICANT CHANGE UP (ref 150–400)
PO2 BLDV: 55 MMHG — SIGNIFICANT CHANGE UP
POTASSIUM SERPL-MCNC: 4.5 MMOL/L — SIGNIFICANT CHANGE UP (ref 3.5–5.3)
POTASSIUM SERPL-SCNC: 4.5 MMOL/L — SIGNIFICANT CHANGE UP (ref 3.5–5.3)
RBC # BLD: 3.94 M/UL — SIGNIFICANT CHANGE UP (ref 3.8–5.2)
RBC # FLD: 17.9 % — HIGH (ref 10.3–14.5)
SAO2 % BLDV: 78.9 % — SIGNIFICANT CHANGE UP
SODIUM SERPL-SCNC: 136 MMOL/L — SIGNIFICANT CHANGE UP (ref 135–145)
WBC # BLD: 3.56 K/UL — LOW (ref 3.8–10.5)
WBC # FLD AUTO: 3.56 K/UL — LOW (ref 3.8–10.5)

## 2022-11-24 RX ADMIN — CARVEDILOL PHOSPHATE 6.25 MILLIGRAM(S): 80 CAPSULE, EXTENDED RELEASE ORAL at 17:41

## 2022-11-24 RX ADMIN — ENOXAPARIN SODIUM 30 MILLIGRAM(S): 100 INJECTION SUBCUTANEOUS at 12:13

## 2022-11-24 RX ADMIN — Medication 81 MILLIGRAM(S): at 12:13

## 2022-11-24 RX ADMIN — PANTOPRAZOLE SODIUM 40 MILLIGRAM(S): 20 TABLET, DELAYED RELEASE ORAL at 05:57

## 2022-11-24 RX ADMIN — CARVEDILOL PHOSPHATE 6.25 MILLIGRAM(S): 80 CAPSULE, EXTENDED RELEASE ORAL at 05:57

## 2022-11-24 RX ADMIN — SODIUM CHLORIDE 50 MILLILITER(S): 9 INJECTION, SOLUTION INTRAVENOUS at 15:52

## 2022-11-24 RX ADMIN — AMLODIPINE BESYLATE 10 MILLIGRAM(S): 2.5 TABLET ORAL at 05:57

## 2022-11-24 RX ADMIN — SODIUM CHLORIDE 50 MILLILITER(S): 9 INJECTION, SOLUTION INTRAVENOUS at 07:49

## 2022-11-24 RX ADMIN — Medication 145 MILLIGRAM(S): at 12:13

## 2022-11-25 ENCOUNTER — TRANSCRIPTION ENCOUNTER (OUTPATIENT)
Age: 87
End: 2022-11-25

## 2022-11-25 LAB
ANION GAP SERPL CALC-SCNC: 5 MMOL/L — LOW (ref 7–14)
BUN SERPL-MCNC: 12 MG/DL — SIGNIFICANT CHANGE UP (ref 7–23)
CALCIUM SERPL-MCNC: 10.4 MG/DL — SIGNIFICANT CHANGE UP (ref 8.4–10.5)
CHLORIDE SERPL-SCNC: 109 MMOL/L — HIGH (ref 98–107)
CO2 SERPL-SCNC: 28 MMOL/L — SIGNIFICANT CHANGE UP (ref 22–31)
CREAT SERPL-MCNC: 0.73 MG/DL — SIGNIFICANT CHANGE UP (ref 0.5–1.3)
EGFR: 80 ML/MIN/1.73M2 — SIGNIFICANT CHANGE UP
GLUCOSE SERPL-MCNC: 82 MG/DL — SIGNIFICANT CHANGE UP (ref 70–99)
HCT VFR BLD CALC: 31.9 % — LOW (ref 34.5–45)
HGB BLD-MCNC: 9.5 G/DL — LOW (ref 11.5–15.5)
MAGNESIUM SERPL-MCNC: 1.6 MG/DL — SIGNIFICANT CHANGE UP (ref 1.6–2.6)
MCHC RBC-ENTMCNC: 26.5 PG — LOW (ref 27–34)
MCHC RBC-ENTMCNC: 29.8 GM/DL — LOW (ref 32–36)
MCV RBC AUTO: 88.9 FL — SIGNIFICANT CHANGE UP (ref 80–100)
NRBC # BLD: 0 /100 WBCS — SIGNIFICANT CHANGE UP (ref 0–0)
NRBC # FLD: 0 K/UL — SIGNIFICANT CHANGE UP (ref 0–0)
PHOSPHATE SERPL-MCNC: 2.2 MG/DL — LOW (ref 2.5–4.5)
PLATELET # BLD AUTO: 251 K/UL — SIGNIFICANT CHANGE UP (ref 150–400)
POTASSIUM SERPL-MCNC: 4.7 MMOL/L — SIGNIFICANT CHANGE UP (ref 3.5–5.3)
POTASSIUM SERPL-SCNC: 4.7 MMOL/L — SIGNIFICANT CHANGE UP (ref 3.5–5.3)
RBC # BLD: 3.59 M/UL — LOW (ref 3.8–5.2)
RBC # FLD: 18.3 % — HIGH (ref 10.3–14.5)
SODIUM SERPL-SCNC: 142 MMOL/L — SIGNIFICANT CHANGE UP (ref 135–145)
WBC # BLD: 3.04 K/UL — LOW (ref 3.8–10.5)
WBC # FLD AUTO: 3.04 K/UL — LOW (ref 3.8–10.5)

## 2022-11-25 PROCEDURE — 99231 SBSQ HOSP IP/OBS SF/LOW 25: CPT

## 2022-11-25 RX ADMIN — AMLODIPINE BESYLATE 10 MILLIGRAM(S): 2.5 TABLET ORAL at 06:57

## 2022-11-25 RX ADMIN — CARVEDILOL PHOSPHATE 6.25 MILLIGRAM(S): 80 CAPSULE, EXTENDED RELEASE ORAL at 18:25

## 2022-11-25 RX ADMIN — MIRTAZAPINE 15 MILLIGRAM(S): 45 TABLET, ORALLY DISINTEGRATING ORAL at 22:01

## 2022-11-25 RX ADMIN — CARVEDILOL PHOSPHATE 6.25 MILLIGRAM(S): 80 CAPSULE, EXTENDED RELEASE ORAL at 06:58

## 2022-11-25 RX ADMIN — SODIUM CHLORIDE 50 MILLILITER(S): 9 INJECTION, SOLUTION INTRAVENOUS at 11:19

## 2022-11-25 RX ADMIN — Medication 145 MILLIGRAM(S): at 12:21

## 2022-11-25 RX ADMIN — ENOXAPARIN SODIUM 30 MILLIGRAM(S): 100 INJECTION SUBCUTANEOUS at 12:22

## 2022-11-25 RX ADMIN — SENNA PLUS 2 TABLET(S): 8.6 TABLET ORAL at 22:01

## 2022-11-25 RX ADMIN — Medication 81 MILLIGRAM(S): at 12:21

## 2022-11-25 RX ADMIN — PANTOPRAZOLE SODIUM 40 MILLIGRAM(S): 20 TABLET, DELAYED RELEASE ORAL at 06:57

## 2022-11-25 RX ADMIN — Medication 100 MILLIGRAM(S): at 22:02

## 2022-11-25 NOTE — DISCHARGE NOTE PROVIDER - DETAILS OF MALNUTRITION DIAGNOSIS/DIAGNOSES
This patient has been assessed with a concern for Malnutrition and was treated during this hospitalization for the following Nutrition diagnosis/diagnoses:     -  11/29/2022: Moderate protein-calorie malnutrition

## 2022-11-25 NOTE — DISCHARGE NOTE PROVIDER - NSDCFUSCHEDAPPT_GEN_ALL_CORE_FT
Deep Roche  [facility]  SURGONC [address]  Scheduled Appointment: 12/01/2022     Deep Roche  [facility]  SURGONC [address]  Scheduled Appointment: 12/01/2022    Gurjit Encarnacion  [facility]  NEUROSURG [address]  Scheduled Appointment: 12/08/2022

## 2022-11-25 NOTE — DIETITIAN INITIAL EVALUATION ADULT - ENTER TO (CAL/KG)
You had some inflammatory changes around your sigmoid colon which may be due to inflammation versus infection, if you develop worsening pain, fevers, bloody stools or any other concerning symptoms please return for evaluation otherwise please follow-up with gastroenterology for further care  He had some abnormalities as noted on the CT report below which will need follow-up with non emergent ultrasound imaging of your liver and your uterus please follow-up with your primary care provider for further care  1  Cholelithiasis without CT evidence for acute cholecystitis  If there is continued concern for acute cholecystitis, consider follow-up nuclear medicine HIDA scan to evaluate for cystic duct patency  2   Diffuse abnormal appearance of the sigmoid colon  Correlate for mild colitis, either infectious or inflammatory  Follow-up GI consultation recommended  3   Diffuse heterogeneous appearance of the liver with ill-defined area of enhancement in the inferior right lobe  Consider follow-up nonemergent right upper quadrant ultrasound  Ultimately, hepatic MRI may be necessary  4   Ill-defined enhancing lesion in the uterine fundus, likely uterine fibroid  Consider follow-up nonemergent pelvic ultrasound for further evaluation 
30

## 2022-11-25 NOTE — DISCHARGE NOTE PROVIDER - NSDCFUADDAPPT_GEN_ALL_CORE_FT
Follow up with PCP within 1-2 weeks of discharge. If you are in need of a general medicine physician and post-discharge medical follow-up for further care/recommendations you may contact the [facility] for an appointment at [phone].     Follow up with cardiology within 1-2 weeks of discharge. If you are in need of a general cardiologist after discharge, you may contact the [facility] for an appointment at [phone].     Follow up with neurology within 1-2 weeks of discharge. You may follow up at [address] [place] [id number]. Please call [phone] for an appointment.

## 2022-11-25 NOTE — DISCHARGE NOTE PROVIDER - NSDCMRMEDTOKEN_GEN_ALL_CORE_FT
allopurinol 100 mg oral tablet: 1 tab(s) orally once a day (at bedtime)  amLODIPine 5 mg oral tablet: 1 tab(s) orally once a day  carvedilol 6.25 mg oral tablet: 1 tab(s) orally every 12 hours  fenofibrate 145 mg oral tablet: 1 tab(s) orally once a day  mirtazapine 15 mg oral tablet: 1 tab(s) orally once a day (at bedtime)  pantoprazole 40 mg oral delayed release tablet: 1 tab(s) orally once a day (before a meal)  Senna 8.6 mg oral tablet: 2 tab(s) orally once a day (at bedtime)   allopurinol 100 mg oral tablet: 1 tab(s) orally once a day (at bedtime)  amLODIPine 5 mg oral tablet: 1 tab(s) orally once a day  aspirin 81 mg oral delayed release tablet: 1 tab(s) orally once a day  carvedilol 6.25 mg oral tablet: 1 tab(s) orally every 12 hours  fenofibrate 145 mg oral tablet: 1 tab(s) orally once a day  mirtazapine 15 mg oral tablet: 1 tab(s) orally once a day (at bedtime)  pantoprazole 40 mg oral delayed release tablet: 1 tab(s) orally once a day (before a meal)  Senna 8.6 mg oral tablet: 2 tab(s) orally once a day (at bedtime)

## 2022-11-25 NOTE — CHART NOTE - NSCHARTNOTEFT_GEN_A_CORE
Neurology    Reviewed MRI brain and EEG, and patient was seen by neuro attending today, await final note. Recommend obtaining CTA head and CTA neck with con for L ICA aneurysm evaluation as it may be done more promptly than MRA head/neck. Should then be followed up by neurosurgery.   From neurology perspective, can continue aspirin and statin for secondary stroke prevention. Otherwise patient can follow up with stroke neurology outpatient with Dr Silver Chávez ([phone] [facility] [phone]) or Dr Wesley Turcios (same number). Above recommendations discussed with neuro attending Dr Cheung. Neurology service will sign off. Consult PRN.        MR Head No Cont (11.23.22 @ 17:31)    FINDINGS:  There is moderate diffuse parenchymal volume loss. There are T2   prolongation signal abnormalities in the periventricular subcortical   white matter likely related to moderate to severe chronic microvascular   ischemic changes.    Small chronic infarcts bilateral cerebellum. Small chronic lacunar   infarcts basal ganglia and thalami    There is no acute parenchymal hemorrhage, parenchymal mass, mass effect   or midline shift. There is no extra-axial fluid collection.  There is no   hydrocephalus. There is no acute infarct.    The visualized paranasal sinuses are well aerated. Partial opacification   right mastoid air cells    4 mm outpouching noted at the terminal segment of left internal carotid   artery 7-11.    IMPRESSION:  No acute intracranial hemorrhage or acute infarct.    Incidental 4 mm outpouching at the distal left internal carotid artery   suspicious for aneurysm. MRA head or CTA head recommended for further   evaluation    --- End of Report ---

## 2022-11-25 NOTE — DISCHARGE NOTE PROVIDER - PROVIDER TOKENS
PROVIDER:[TOKEN:[[id number]]],PROVIDER:[TOKEN:[[id number]]],PROVIDER:[TOKEN:[[id number]]]

## 2022-11-25 NOTE — DIETITIAN INITIAL EVALUATION ADULT - OTHER INFO
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    Pt seen confused, as per RN consuming 75% meals with No GI distress (nausea/vomiting/diarrhea/constipation.) at present. Pt seen by SLP- recommends Minced and Moist consistency. UBW- 115.7# (9/12/22), Current wt- 106# (11/23/22). Noted wt. loss of 9.7#/ 8.3% in past 3 months. Pt with skin ecchymosis no edema per nursing flow sheet. Rec supplement Ensure Plus HP x3/day to prevent further wt. loss.

## 2022-11-25 NOTE — DISCHARGE NOTE PROVIDER - HOSPITAL COURSE
88 y/o female with PMHx of HTN, HLD, gallbladder CA s/p resection w /liver resection and partial colectomy who presented with possible seizure like activity while at PT. Head CT wtihout acute hemorrhage or mass effect. MRI showed no intracranial hemorrhage or stroke, but found incidental 4mm outpouching at left ICA suspicious for aneurysm. 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 Per neurosurgery, no acute intervention or further imaging needed. To follow up with Dr. Encarnacion. EEG with right occipital sharp waves and focal slowing; risk of seizures R occipital region. Per neuro, no need for further EEG/no change in meds. Echo with minimal MR, grossly normal RV systolic function, inconclusive for PFO. Patient should continue aspirin for lacunar infarcts found on head CT. Course c/b orthostatic positive, s/p IVF now resolved. Continue Norvasc and Coreg. From pulmonary standpoint, patient has hx of chronic resp failure/crhonic hypercarbia. Blood gases reviewed - no need for bipap. CTA neg for PE. Home PT recommended.     Patient seen and evaluated, no acute somatic complaints. Reviewed discharge medications with patient; All new medications requiring new prescriptions were sent to the pharmacy of patient's choice. Reviewed need for prescription for previous home medications and new prescriptions sent if requested. Medically cleared/stable for discharge as per Dr. Shay with close outpatient follow up within 1-2 weeks of discharge. Patient understands and agrees with plan of care.

## 2022-11-25 NOTE — DIETITIAN INITIAL EVALUATION ADULT - ETIOLOGY
Please keep the appointments in August- need to check his HgbA1c and FLP at that time.   related to difficulty chewing/swallowing

## 2022-11-25 NOTE — DIETITIAN INITIAL EVALUATION ADULT - PERTINENT LABORATORY DATA
11-25    142  |  109<H>  |  12  ----------------------------<  82  4.7   |  28  |  0.73    Ca    10.4      25 Nov 2022 09:01  Phos  2.2     11-25  Mg     1.60     11-25    A1C with Estimated Average Glucose Result: 4.8 % (11-19-22 @ 06:10)  A1C with Estimated Average Glucose Result: 5.3 % (05-28-22 @ 05:29)  A1C with Estimated Average Glucose Result: 5.6 % (05-24-22 @ 11:25)

## 2022-11-25 NOTE — DISCHARGE NOTE PROVIDER - CARE PROVIDER_API CALL
Shane Shay)  [facility] Medicine; Internal Medicine  [address] [place] [address]  Phone: [phone]  Fax: [phone]  Follow Up Time:     Mack Shay)  Internal Medicine  [address] [place] [address]  Phone: [phone]  Fax: [phone]  Follow Up Time:     Gera Moon)  Critical Care Medicine; Internal Medicine; Pulmonary Disease  [address] [place] [address]  Phone: [phone]  Fax: [phone]  Follow Up Time:

## 2022-11-25 NOTE — DISCHARGE NOTE PROVIDER - NSDCCPCAREPLAN_GEN_ALL_CORE_FT
PRINCIPAL DISCHARGE DIAGNOSIS  Diagnosis: Altered mental state  Assessment and Plan of Treatment: You presented with possible seizure like activity. Your head CT showed old strokes, no new strokes. Your MRI showed no hemorrhage or stroke, but you were found to have a 4mm outpouching at the left internal carotid artery suspicious for aneurysm. Your CTA confirmed this. You were seen by the neurosurgery team and there is no acute intervention or further imaging needed. Follow up with Dr. Encarnacion. You did not have any seizures. You should continue aspirin for your old stroke history. Continue Norvasc and Coreg. Continue to use your home oxygen.

## 2022-11-25 NOTE — DIETITIAN INITIAL EVALUATION ADULT - PERTINENT MEDS FT
MEDICATIONS  (STANDING):  allopurinol 100 milliGRAM(s) Oral at bedtime  amLODIPine   Tablet 10 milliGRAM(s) Oral daily  aspirin enteric coated 81 milliGRAM(s) Oral daily  carvedilol 6.25 milliGRAM(s) Oral every 12 hours  dextrose 5% + sodium chloride 0.9%. 1000 milliLiter(s) (50 mL/Hr) IV Continuous <Continuous>  enoxaparin Injectable 30 milliGRAM(s) SubCutaneous every 24 hours  fenofibrate Tablet 145 milliGRAM(s) Oral daily  mirtazapine 15 milliGRAM(s) Oral at bedtime  pantoprazole    Tablet 40 milliGRAM(s) Oral before breakfast  senna 2 Tablet(s) Oral at bedtime  sodium chloride 0.9%. 500 milliLiter(s) (125 mL/Hr) IV Continuous <Continuous>    MEDICATIONS  (PRN):

## 2022-11-26 LAB
A-TOCOPHEROL VIT E SERPL-MCNC: 15.9 MG/L — SIGNIFICANT CHANGE UP (ref 9–29)
ANION GAP SERPL CALC-SCNC: 9 MMOL/L — SIGNIFICANT CHANGE UP (ref 7–14)
BASE EXCESS BLDV CALC-SCNC: 6.6 MMOL/L — HIGH (ref -2–3)
BASOPHILS # BLD AUTO: 0.04 K/UL — SIGNIFICANT CHANGE UP (ref 0–0.2)
BASOPHILS NFR BLD AUTO: 1.3 % — SIGNIFICANT CHANGE UP (ref 0–2)
BETA+GAMMA TOCOPHEROL SERPL-MCNC: 1.3 MG/L — SIGNIFICANT CHANGE UP (ref 0.5–4.9)
BUN SERPL-MCNC: 8 MG/DL — SIGNIFICANT CHANGE UP (ref 7–23)
CA-I SERPL-SCNC: 1.49 MMOL/L — HIGH (ref 1.15–1.33)
CALCIUM SERPL-MCNC: 10.7 MG/DL — HIGH (ref 8.4–10.5)
CHLORIDE BLDV-SCNC: 104 MMOL/L — SIGNIFICANT CHANGE UP (ref 96–108)
CHLORIDE SERPL-SCNC: 102 MMOL/L — SIGNIFICANT CHANGE UP (ref 98–107)
CO2 BLDV-SCNC: 36.6 MMOL/L — HIGH (ref 22–26)
CO2 SERPL-SCNC: 29 MMOL/L — SIGNIFICANT CHANGE UP (ref 22–31)
CREAT SERPL-MCNC: 0.62 MG/DL — SIGNIFICANT CHANGE UP (ref 0.5–1.3)
EGFR: 87 ML/MIN/1.73M2 — SIGNIFICANT CHANGE UP
EOSINOPHIL # BLD AUTO: 0.21 K/UL — SIGNIFICANT CHANGE UP (ref 0–0.5)
EOSINOPHIL NFR BLD AUTO: 6.7 % — HIGH (ref 0–6)
GAS PNL BLDV: 138 MMOL/L — SIGNIFICANT CHANGE UP (ref 136–145)
GAS PNL BLDV: SIGNIFICANT CHANGE UP
GLUCOSE BLDV-MCNC: 88 MG/DL — SIGNIFICANT CHANGE UP (ref 70–99)
GLUCOSE SERPL-MCNC: 92 MG/DL — SIGNIFICANT CHANGE UP (ref 70–99)
HCO3 BLDV-SCNC: 34 MMOL/L — HIGH (ref 22–29)
HCT VFR BLD CALC: 35.6 % — SIGNIFICANT CHANGE UP (ref 34.5–45)
HCT VFR BLDA CALC: 33 % — LOW (ref 34.5–46.5)
HGB BLD CALC-MCNC: 11 G/DL — LOW (ref 11.5–15.5)
HGB BLD-MCNC: 10.7 G/DL — LOW (ref 11.5–15.5)
IANC: 1.95 K/UL — SIGNIFICANT CHANGE UP (ref 1.8–7.4)
IMM GRANULOCYTES NFR BLD AUTO: 0.3 % — SIGNIFICANT CHANGE UP (ref 0–0.9)
LACTATE BLDV-MCNC: 1.4 MMOL/L — SIGNIFICANT CHANGE UP (ref 0.5–2)
LYMPHOCYTES # BLD AUTO: 0.73 K/UL — LOW (ref 1–3.3)
LYMPHOCYTES # BLD AUTO: 23.2 % — SIGNIFICANT CHANGE UP (ref 13–44)
MAGNESIUM SERPL-MCNC: 1.5 MG/DL — LOW (ref 1.6–2.6)
MCHC RBC-ENTMCNC: 26.5 PG — LOW (ref 27–34)
MCHC RBC-ENTMCNC: 30.1 GM/DL — LOW (ref 32–36)
MCV RBC AUTO: 88.1 FL — SIGNIFICANT CHANGE UP (ref 80–100)
MONOCYTES # BLD AUTO: 0.2 K/UL — SIGNIFICANT CHANGE UP (ref 0–0.9)
MONOCYTES NFR BLD AUTO: 6.4 % — SIGNIFICANT CHANGE UP (ref 2–14)
NEUTROPHILS # BLD AUTO: 1.95 K/UL — SIGNIFICANT CHANGE UP (ref 1.8–7.4)
NEUTROPHILS NFR BLD AUTO: 62.1 % — SIGNIFICANT CHANGE UP (ref 43–77)
NRBC # BLD: 0 /100 WBCS — SIGNIFICANT CHANGE UP (ref 0–0)
NRBC # FLD: 0 K/UL — SIGNIFICANT CHANGE UP (ref 0–0)
PCO2 BLDV: 67 MMHG — HIGH (ref 39–42)
PH BLDV: 7.32 — SIGNIFICANT CHANGE UP (ref 7.32–7.43)
PHOSPHATE SERPL-MCNC: 2.3 MG/DL — LOW (ref 2.5–4.5)
PLATELET # BLD AUTO: 293 K/UL — SIGNIFICANT CHANGE UP (ref 150–400)
PO2 BLDV: 31 MMHG — SIGNIFICANT CHANGE UP
POTASSIUM BLDV-SCNC: 4.1 MMOL/L — SIGNIFICANT CHANGE UP (ref 3.5–5.1)
POTASSIUM SERPL-MCNC: 4.1 MMOL/L — SIGNIFICANT CHANGE UP (ref 3.5–5.3)
POTASSIUM SERPL-SCNC: 4.1 MMOL/L — SIGNIFICANT CHANGE UP (ref 3.5–5.3)
RBC # BLD: 4.04 M/UL — SIGNIFICANT CHANGE UP (ref 3.8–5.2)
RBC # FLD: 18.4 % — HIGH (ref 10.3–14.5)
SAO2 % BLDV: 44.1 % — SIGNIFICANT CHANGE UP
SODIUM SERPL-SCNC: 140 MMOL/L — SIGNIFICANT CHANGE UP (ref 135–145)
VIT B2 SERPL-MCNC: 227 UG/L — SIGNIFICANT CHANGE UP (ref 137–370)
WBC # BLD: 3.14 K/UL — LOW (ref 3.8–10.5)
WBC # FLD AUTO: 3.14 K/UL — LOW (ref 3.8–10.5)

## 2022-11-26 RX ORDER — MAGNESIUM SULFATE 500 MG/ML
2 VIAL (ML) INJECTION ONCE
Refills: 0 | Status: COMPLETED | OUTPATIENT
Start: 2022-11-26 | End: 2022-11-26

## 2022-11-26 RX ADMIN — PANTOPRAZOLE SODIUM 40 MILLIGRAM(S): 20 TABLET, DELAYED RELEASE ORAL at 06:59

## 2022-11-26 RX ADMIN — SENNA PLUS 2 TABLET(S): 8.6 TABLET ORAL at 22:53

## 2022-11-26 RX ADMIN — CARVEDILOL PHOSPHATE 6.25 MILLIGRAM(S): 80 CAPSULE, EXTENDED RELEASE ORAL at 06:59

## 2022-11-26 RX ADMIN — Medication 81 MILLIGRAM(S): at 11:44

## 2022-11-26 RX ADMIN — CARVEDILOL PHOSPHATE 6.25 MILLIGRAM(S): 80 CAPSULE, EXTENDED RELEASE ORAL at 17:39

## 2022-11-26 RX ADMIN — MIRTAZAPINE 15 MILLIGRAM(S): 45 TABLET, ORALLY DISINTEGRATING ORAL at 22:54

## 2022-11-26 RX ADMIN — AMLODIPINE BESYLATE 10 MILLIGRAM(S): 2.5 TABLET ORAL at 06:59

## 2022-11-26 RX ADMIN — Medication 25 GRAM(S): at 10:32

## 2022-11-26 RX ADMIN — Medication 100 MILLIGRAM(S): at 22:54

## 2022-11-26 RX ADMIN — ENOXAPARIN SODIUM 30 MILLIGRAM(S): 100 INJECTION SUBCUTANEOUS at 11:45

## 2022-11-26 RX ADMIN — Medication 145 MILLIGRAM(S): at 11:44

## 2022-11-26 NOTE — PROGRESS NOTE ADULT - NS ATTEND AMEND GEN_ALL_CORE FT
Patient seen and examined.  Agree with above.   Monitor orthostatics  Follow up neurology     Carolyn Pike MD
Patient seen and examined. Agree with plan as detailed in PA/NP Note.     Left internal carotid with aneursym, f/u Vascular.    Mack Shay MD  Pager: [phone]
Patient seen and examined. Agree with plan as detailed in PA/NP Note.     NO AS murmur on exam, echo without significant stenotic disease, Tele NSR. check orthostats    Mack Shay MD  Pager: [phone]
resting comfortably, no new complaints.  will follow with you.
Patient seen and examined.  Agree with above.   TTE with normal LV function   Monitor orthostatics  Follow up neurology     Carolyn Pike MD

## 2022-11-27 PROCEDURE — 70496 CT ANGIOGRAPHY HEAD: CPT | Mod: 26

## 2022-11-27 PROCEDURE — 70498 CT ANGIOGRAPHY NECK: CPT | Mod: 26

## 2022-11-27 RX ADMIN — Medication 81 MILLIGRAM(S): at 11:26

## 2022-11-27 RX ADMIN — SODIUM CHLORIDE 50 MILLILITER(S): 9 INJECTION, SOLUTION INTRAVENOUS at 03:53

## 2022-11-27 RX ADMIN — ENOXAPARIN SODIUM 30 MILLIGRAM(S): 100 INJECTION SUBCUTANEOUS at 11:23

## 2022-11-27 RX ADMIN — MIRTAZAPINE 15 MILLIGRAM(S): 45 TABLET, ORALLY DISINTEGRATING ORAL at 22:11

## 2022-11-27 RX ADMIN — CARVEDILOL PHOSPHATE 6.25 MILLIGRAM(S): 80 CAPSULE, EXTENDED RELEASE ORAL at 16:23

## 2022-11-27 RX ADMIN — AMLODIPINE BESYLATE 10 MILLIGRAM(S): 2.5 TABLET ORAL at 05:08

## 2022-11-27 RX ADMIN — SENNA PLUS 2 TABLET(S): 8.6 TABLET ORAL at 22:11

## 2022-11-27 RX ADMIN — PANTOPRAZOLE SODIUM 40 MILLIGRAM(S): 20 TABLET, DELAYED RELEASE ORAL at 05:09

## 2022-11-27 RX ADMIN — Medication 145 MILLIGRAM(S): at 11:26

## 2022-11-27 RX ADMIN — CARVEDILOL PHOSPHATE 6.25 MILLIGRAM(S): 80 CAPSULE, EXTENDED RELEASE ORAL at 05:09

## 2022-11-27 RX ADMIN — Medication 100 MILLIGRAM(S): at 22:11

## 2022-11-27 NOTE — PROVIDER CONTACT NOTE (OTHER) - RECOMMENDATIONS
Notify provider
Administer coreg as ordered
Administer coreg as ordered
Pt will be transported via [employer] RN with oxygen and O2 sensor.
Continue to monitor vitals q4 as ordered
Notify provider

## 2022-11-27 NOTE — PROVIDER CONTACT NOTE (OTHER) - REASON
Pt being transferred to N via Oxygen tank and EEG.
Patients BP is 106/49
/64
bp
/67
Patients BP is 136/45

## 2022-11-27 NOTE — PROVIDER CONTACT NOTE (OTHER) - SITUATION
bp 174/68 hr 71  asymptomatic  acp aware
/67
Patients BP is 136/45
Pt currently on 6L venturi mask stating at 96
/64
Patients BP is 106/49

## 2022-11-27 NOTE — PROVIDER CONTACT NOTE (OTHER) - BACKGROUND
Patient admitted for altered mental status. Pt has pmh of dementia
Patient admitted for altered mental status
Patient admitted for altered mental status
Patient admitted for altered mental status. Pt has pmh of dementia
Pt admitted for AMS. A&Ox0-1. Pt history of HTN and chronic respiratory failure.
Patient admitted for altered mental status. Pt has pmh of dementia

## 2022-11-27 NOTE — PROVIDER CONTACT NOTE (OTHER) - ASSESSMENT
Patient asymptomatic, standing order of carvedilol given
Patient is asymptomatic and denies chest pain. pt is resting comfortably in bed
Pt stating 96% comfortably on venturi mask 4L. [facility] consented to transport pt to 4N [address] A.
Patient asymptomatic
Patient is asymptomatic and denies chest pain. pt is resting comfortably in bed
Patient is asymptomatic and denies chest pain. pt is sleeping comfortably in bed

## 2022-11-27 NOTE — PROVIDER CONTACT NOTE (OTHER) - DATE AND TIME:
19-Nov-2022 21:04
20-Nov-2022 17:30
24-Nov-2022 13:30
20-Nov-2022 10:50
24-Nov-2022 17:30
27-Nov-2022 16:39

## 2022-11-27 NOTE — PROVIDER CONTACT NOTE (OTHER) - ACTION/TREATMENT ORDERED:
continue to monitor patient
As per ACP Rosa, will administer coreg as its within parameters and notify ACP if patient becomes symptomatic
As per ACP Rosa, will continue to monitor vitals q4 as ordered and notify ACP if patient becomes symptomatic
acp aware  received coreg as ordered  awaiting order
awaiting any further orders
Pt will be transported via [facility] [employer] RN with oxygen and O2 sensor as per provider.

## 2022-11-28 ENCOUNTER — TRANSCRIPTION ENCOUNTER (OUTPATIENT)
Age: 87
End: 2022-11-28

## 2022-11-28 LAB
ANION GAP SERPL CALC-SCNC: 11 MMOL/L — SIGNIFICANT CHANGE UP (ref 7–14)
BASOPHILS # BLD AUTO: 0.08 K/UL — SIGNIFICANT CHANGE UP (ref 0–0.2)
BASOPHILS NFR BLD AUTO: 1.5 % — SIGNIFICANT CHANGE UP (ref 0–2)
BUN SERPL-MCNC: 8 MG/DL — SIGNIFICANT CHANGE UP (ref 7–23)
CALCIUM SERPL-MCNC: 10.5 MG/DL — SIGNIFICANT CHANGE UP (ref 8.4–10.5)
CHLORIDE SERPL-SCNC: 98 MMOL/L — SIGNIFICANT CHANGE UP (ref 98–107)
CO2 SERPL-SCNC: 26 MMOL/L — SIGNIFICANT CHANGE UP (ref 22–31)
CREAT SERPL-MCNC: 0.61 MG/DL — SIGNIFICANT CHANGE UP (ref 0.5–1.3)
EGFR: 87 ML/MIN/1.73M2 — SIGNIFICANT CHANGE UP
EOSINOPHIL # BLD AUTO: 0.29 K/UL — SIGNIFICANT CHANGE UP (ref 0–0.5)
EOSINOPHIL NFR BLD AUTO: 5.6 % — SIGNIFICANT CHANGE UP (ref 0–6)
GAS PNL BLDA: SIGNIFICANT CHANGE UP
GLUCOSE SERPL-MCNC: 163 MG/DL — HIGH (ref 70–99)
HCT VFR BLD CALC: 39.3 % — SIGNIFICANT CHANGE UP (ref 34.5–45)
HGB BLD-MCNC: 12.1 G/DL — SIGNIFICANT CHANGE UP (ref 11.5–15.5)
IANC: 3.3 K/UL — SIGNIFICANT CHANGE UP (ref 1.8–7.4)
IMM GRANULOCYTES NFR BLD AUTO: 0.2 % — SIGNIFICANT CHANGE UP (ref 0–0.9)
LYMPHOCYTES # BLD AUTO: 1.18 K/UL — SIGNIFICANT CHANGE UP (ref 1–3.3)
LYMPHOCYTES # BLD AUTO: 22.8 % — SIGNIFICANT CHANGE UP (ref 13–44)
MAGNESIUM SERPL-MCNC: 1.6 MG/DL — SIGNIFICANT CHANGE UP (ref 1.6–2.6)
MCHC RBC-ENTMCNC: 26.6 PG — LOW (ref 27–34)
MCHC RBC-ENTMCNC: 30.8 GM/DL — LOW (ref 32–36)
MCV RBC AUTO: 86.4 FL — SIGNIFICANT CHANGE UP (ref 80–100)
MONOCYTES # BLD AUTO: 0.31 K/UL — SIGNIFICANT CHANGE UP (ref 0–0.9)
MONOCYTES NFR BLD AUTO: 6 % — SIGNIFICANT CHANGE UP (ref 2–14)
NEUTROPHILS # BLD AUTO: 3.3 K/UL — SIGNIFICANT CHANGE UP (ref 1.8–7.4)
NEUTROPHILS NFR BLD AUTO: 63.9 % — SIGNIFICANT CHANGE UP (ref 43–77)
NRBC # BLD: 0 /100 WBCS — SIGNIFICANT CHANGE UP (ref 0–0)
NRBC # FLD: 0 K/UL — SIGNIFICANT CHANGE UP (ref 0–0)
PHOSPHATE SERPL-MCNC: 2.9 MG/DL — SIGNIFICANT CHANGE UP (ref 2.5–4.5)
PLATELET # BLD AUTO: 331 K/UL — SIGNIFICANT CHANGE UP (ref 150–400)
POTASSIUM SERPL-MCNC: 5.7 MMOL/L — HIGH (ref 3.5–5.3)
POTASSIUM SERPL-SCNC: 5.7 MMOL/L — HIGH (ref 3.5–5.3)
RBC # BLD: 4.55 M/UL — SIGNIFICANT CHANGE UP (ref 3.8–5.2)
RBC # FLD: 18.7 % — HIGH (ref 10.3–14.5)
SODIUM SERPL-SCNC: 135 MMOL/L — SIGNIFICANT CHANGE UP (ref 135–145)
WBC # BLD: 5.17 K/UL — SIGNIFICANT CHANGE UP (ref 3.8–10.5)
WBC # FLD AUTO: 5.17 K/UL — SIGNIFICANT CHANGE UP (ref 3.8–10.5)

## 2022-11-28 PROCEDURE — 99222 1ST HOSP IP/OBS MODERATE 55: CPT | Mod: FS

## 2022-11-28 RX ORDER — LANOLIN ALCOHOL/MO/W.PET/CERES
3 CREAM (GRAM) TOPICAL AT BEDTIME
Refills: 0 | Status: COMPLETED | OUTPATIENT
Start: 2022-11-28 | End: 2022-11-28

## 2022-11-28 RX ADMIN — CARVEDILOL PHOSPHATE 6.25 MILLIGRAM(S): 80 CAPSULE, EXTENDED RELEASE ORAL at 17:20

## 2022-11-28 RX ADMIN — CARVEDILOL PHOSPHATE 6.25 MILLIGRAM(S): 80 CAPSULE, EXTENDED RELEASE ORAL at 05:07

## 2022-11-28 RX ADMIN — Medication 100 MILLIGRAM(S): at 21:37

## 2022-11-28 RX ADMIN — SODIUM CHLORIDE 50 MILLILITER(S): 9 INJECTION, SOLUTION INTRAVENOUS at 01:47

## 2022-11-28 RX ADMIN — ENOXAPARIN SODIUM 30 MILLIGRAM(S): 100 INJECTION SUBCUTANEOUS at 14:18

## 2022-11-28 RX ADMIN — SENNA PLUS 2 TABLET(S): 8.6 TABLET ORAL at 21:37

## 2022-11-28 RX ADMIN — Medication 3 MILLIGRAM(S): at 21:31

## 2022-11-28 RX ADMIN — PANTOPRAZOLE SODIUM 40 MILLIGRAM(S): 20 TABLET, DELAYED RELEASE ORAL at 05:04

## 2022-11-28 RX ADMIN — MIRTAZAPINE 15 MILLIGRAM(S): 45 TABLET, ORALLY DISINTEGRATING ORAL at 21:37

## 2022-11-28 RX ADMIN — Medication 145 MILLIGRAM(S): at 13:40

## 2022-11-28 RX ADMIN — Medication 81 MILLIGRAM(S): at 13:42

## 2022-11-28 RX ADMIN — AMLODIPINE BESYLATE 10 MILLIGRAM(S): 2.5 TABLET ORAL at 05:06

## 2022-11-28 NOTE — DISCHARGE NOTE NURSING/CASE MANAGEMENT/SOCIAL WORK - PATIENT PORTAL LINK FT
You can access the FollowMyHealth Patient Portal offered by [facility] by registering at the following website: [email or web address]. By joining [facility]’s FollowMyHealth portal, you will also be able to view your health information using other applications (apps) compatible with our system.

## 2022-11-28 NOTE — CONSULT NOTE ADULT - SUBJECTIVE AND OBJECTIVE BOX
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 Patient reportedly without LOC at that time, no prior history of seizures. There was no reported postictal state, tongue biting or incontinence. Patient non verbal on my exam, information obtained from daughter via phone (America Butler, [phone]). Patient reportedly has not slept much in 3 days, has been trying to get out of bed, hallucinating (seeing dead family members). She is verbal, A&Ox1 at baseline, recognizes her children, non-ambulatory for the past month since recent hospitalization at [facility] (patient has alternate MRN: [id number]) for encephalopathy thought to be secondary to acute respiratory failure with hypercapnia and infection, found to have a hepatic abscess, s/p aspiration and 18 days of treatment with ertapenem, completed last dose on Monday. Patient has been on ATC O2 2L during the day and 3L at night at home.  Per daughter, no recent fevers, chills, nausea, vomiting, diarrhea. Of note, patient understands English and Tagalog however of recent has been reverting back to using dialect [place].    In the ED VS: 99.1  70-87  134-156/61-73  18-20  %RA, received haloperidol 2.5mg IM x1, midazolam 2.5mg IV x1 (18 Nov 2022 23:47)    RADIOLOGY:   < from: CT Angio Neck w/ IV Cont (11.27.22 @ 20:42) >  IMPRESSION: Old infarcts as described above.    CT angiogram of the neck appears normal    Aneurysms suspected involving the distal left internal carotid artery as   described above.    < from: MR Head No Cont (11.23.22 @ 17:31) >  IMPRESSION:  No acute intracranial hemorrhage or acute infarct.    Incidental 4 mm outpouching at the distal left internal carotid artery   suspicious for aneurysm. MRA head or CTA head recommended for further   evaluation    MEDS:  allopurinol 100 milliGRAM(s) Oral at bedtime  amLODIPine   Tablet 10 milliGRAM(s) Oral daily  aspirin enteric coated 81 milliGRAM(s) Oral daily  carvedilol 6.25 milliGRAM(s) Oral every 12 hours  dextrose 5% + sodium chloride 0.9%. 1000 milliLiter(s) IV Continuous <Continuous>  enoxaparin Injectable 30 milliGRAM(s) SubCutaneous every 24 hours  fenofibrate Tablet 145 milliGRAM(s) Oral daily  mirtazapine 15 milliGRAM(s) Oral at bedtime  pantoprazole    Tablet 40 milliGRAM(s) Oral before breakfast  senna 2 Tablet(s) Oral at bedtime  sodium chloride 0.9%. 500 milliLiter(s) IV Continuous <Continuous>    PHYSICAL EXAM:  Vital Signs Last 24 Hrs  T(C): 36.7 (28 Nov 2022 13:00), Max: 36.7 (27 Nov 2022 22:15)  T(F): 98 (28 Nov 2022 13:00), Max: 98 (27 Nov 2022 22:15)  HR: 74 (28 Nov 2022 13:00) (71 - 74)  BP: 145/64 (28 Nov 2022 13:00) (140/62 - 174/68)  BP(mean): --  RR: 17 (28 Nov 2022 13:00) (17 - 17)  SpO2: 99% (28 Nov 2022 13:00) (95% - 99%)    Parameters below as of 28 Nov 2022 13:00  Patient On (Oxygen Delivery Method): nasal cannula  O2 Flow (L/min): 2    LABS:                        12.1   5.17  )-----------( 331      ( 28 Nov 2022 04:05 )             39.3     11-28    135  |  98  |  8   ----------------------------<  163<H>  5.7<H>   |  26  |  0.61    Ca    10.5      28 Nov 2022 04:05  Phos  2.9     11-28  Mg     1.60     11-28

## 2022-11-28 NOTE — CONSULT NOTE ADULT - ASSESSMENT
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 Encarnacion upon discharge    Case discussed with attending neurosurgeon Dr. Encarnacion

## 2022-11-28 NOTE — DISCHARGE NOTE NURSING/CASE MANAGEMENT/SOCIAL WORK - NSDCPEFALRISK_GEN_ALL_CORE
For information on Fall & Injury Prevention, visit: [email or web address] OR  [email or web address] OR  https://www.cdc.gov/steadi/patient.html

## 2022-11-28 NOTE — DISCHARGE NOTE NURSING/CASE MANAGEMENT/SOCIAL WORK - NSDCFUADDAPPT_GEN_ALL_CORE_FT
Follow up with PCP within 1-2 weeks of discharge. If you are in need of a general medicine physician and post-discharge medical follow-up for further care/recommendations you may contact the [facility] for an appointment at [phone].     Follow up with cardiology within 1-2 weeks of discharge. If you are in need of a general cardiologist after discharge, you may contact the [facility] for an appointment at [phone].     Follow up with neurology within 1-2 weeks of discharge. You may follow up at [address] [place] [address]. Please call [phone] for an appointment.

## 2022-11-29 VITALS
HEART RATE: 68 BPM | SYSTOLIC BLOOD PRESSURE: 135 MMHG | TEMPERATURE: 98 F | DIASTOLIC BLOOD PRESSURE: 65 MMHG | RESPIRATION RATE: 18 BRPM | OXYGEN SATURATION: 97 %

## 2022-11-29 LAB
ANION GAP SERPL CALC-SCNC: 9 MMOL/L — SIGNIFICANT CHANGE UP (ref 7–14)
ARSENIC SERPL-MCNC: 3 UG/L — SIGNIFICANT CHANGE UP (ref 0–9)
BASOPHILS # BLD AUTO: 0.06 K/UL — SIGNIFICANT CHANGE UP (ref 0–0.2)
BASOPHILS NFR BLD AUTO: 1.4 % — SIGNIFICANT CHANGE UP (ref 0–2)
BUN SERPL-MCNC: 10 MG/DL — SIGNIFICANT CHANGE UP (ref 7–23)
CADMIUM SERPL-MCNC: 1.2 UG/L — SIGNIFICANT CHANGE UP (ref 0–1.2)
CALCIUM SERPL-MCNC: 10.7 MG/DL — HIGH (ref 8.4–10.5)
CHLORIDE SERPL-SCNC: 103 MMOL/L — SIGNIFICANT CHANGE UP (ref 98–107)
CO2 SERPL-SCNC: 28 MMOL/L — SIGNIFICANT CHANGE UP (ref 22–31)
CREAT SERPL-MCNC: 0.72 MG/DL — SIGNIFICANT CHANGE UP (ref 0.5–1.3)
EGFR: 81 ML/MIN/1.73M2 — SIGNIFICANT CHANGE UP
EOSINOPHIL # BLD AUTO: 0.21 K/UL — SIGNIFICANT CHANGE UP (ref 0–0.5)
EOSINOPHIL NFR BLD AUTO: 5 % — SIGNIFICANT CHANGE UP (ref 0–6)
GLUCOSE SERPL-MCNC: 84 MG/DL — SIGNIFICANT CHANGE UP (ref 70–99)
HCT VFR BLD CALC: 36 % — SIGNIFICANT CHANGE UP (ref 34.5–45)
HGB BLD-MCNC: 11 G/DL — LOW (ref 11.5–15.5)
IANC: 2.67 K/UL — SIGNIFICANT CHANGE UP (ref 1.8–7.4)
IMM GRANULOCYTES NFR BLD AUTO: 0.5 % — SIGNIFICANT CHANGE UP (ref 0–0.9)
LEAD BLD-MCNC: <1 UG/DL — SIGNIFICANT CHANGE UP (ref 0–3.4)
LYMPHOCYTES # BLD AUTO: 0.97 K/UL — LOW (ref 1–3.3)
LYMPHOCYTES # BLD AUTO: 23.2 % — SIGNIFICANT CHANGE UP (ref 13–44)
MAGNESIUM SERPL-MCNC: 1.5 MG/DL — LOW (ref 1.6–2.6)
MCHC RBC-ENTMCNC: 26.6 PG — LOW (ref 27–34)
MCHC RBC-ENTMCNC: 30.6 GM/DL — LOW (ref 32–36)
MCV RBC AUTO: 87.2 FL — SIGNIFICANT CHANGE UP (ref 80–100)
MERCURY SERPL-MCNC: 1.4 UG/L — SIGNIFICANT CHANGE UP (ref 0–14.9)
MONOCYTES # BLD AUTO: 0.26 K/UL — SIGNIFICANT CHANGE UP (ref 0–0.9)
MONOCYTES NFR BLD AUTO: 6.2 % — SIGNIFICANT CHANGE UP (ref 2–14)
NEUTROPHILS # BLD AUTO: 2.67 K/UL — SIGNIFICANT CHANGE UP (ref 1.8–7.4)
NEUTROPHILS NFR BLD AUTO: 63.7 % — SIGNIFICANT CHANGE UP (ref 43–77)
NRBC # BLD: 0 /100 WBCS — SIGNIFICANT CHANGE UP (ref 0–0)
NRBC # FLD: 0 K/UL — SIGNIFICANT CHANGE UP (ref 0–0)
PHOSPHATE SERPL-MCNC: 3.4 MG/DL — SIGNIFICANT CHANGE UP (ref 2.5–4.5)
PLATELET # BLD AUTO: 321 K/UL — SIGNIFICANT CHANGE UP (ref 150–400)
POTASSIUM SERPL-MCNC: 3.8 MMOL/L — SIGNIFICANT CHANGE UP (ref 3.5–5.3)
POTASSIUM SERPL-SCNC: 3.8 MMOL/L — SIGNIFICANT CHANGE UP (ref 3.5–5.3)
RBC # BLD: 4.13 M/UL — SIGNIFICANT CHANGE UP (ref 3.8–5.2)
RBC # FLD: 18.2 % — HIGH (ref 10.3–14.5)
SODIUM SERPL-SCNC: 140 MMOL/L — SIGNIFICANT CHANGE UP (ref 135–145)
WBC # BLD: 4.19 K/UL — SIGNIFICANT CHANGE UP (ref 3.8–10.5)
WBC # FLD AUTO: 4.19 K/UL — SIGNIFICANT CHANGE UP (ref 3.8–10.5)

## 2022-11-29 RX ORDER — ASPIRIN/CALCIUM CARB/MAGNESIUM 324 MG
1 TABLET ORAL
Qty: 30 | Refills: 0
Start: 2022-11-29 | End: 2022-12-28

## 2022-11-29 RX ADMIN — ENOXAPARIN SODIUM 30 MILLIGRAM(S): 100 INJECTION SUBCUTANEOUS at 13:01

## 2022-11-29 RX ADMIN — CARVEDILOL PHOSPHATE 6.25 MILLIGRAM(S): 80 CAPSULE, EXTENDED RELEASE ORAL at 06:13

## 2022-11-29 RX ADMIN — CARVEDILOL PHOSPHATE 6.25 MILLIGRAM(S): 80 CAPSULE, EXTENDED RELEASE ORAL at 17:28

## 2022-11-29 RX ADMIN — Medication 145 MILLIGRAM(S): at 11:34

## 2022-11-29 RX ADMIN — AMLODIPINE BESYLATE 10 MILLIGRAM(S): 2.5 TABLET ORAL at 06:12

## 2022-11-29 RX ADMIN — Medication 81 MILLIGRAM(S): at 11:34

## 2022-11-29 RX ADMIN — PANTOPRAZOLE SODIUM 40 MILLIGRAM(S): 20 TABLET, DELAYED RELEASE ORAL at 06:12

## 2022-11-29 NOTE — PROGRESS NOTE ADULT - ASSESSMENT
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    Problem/Plan - 1:  ·  Problem: Encephalopathy acute.   ·  Plan: unclear etiology   neuro fu   VS, neuro checks Q4H  fall, aspiration precautions, dysphagia screen  check MR brain  no acute path on CTH    Problem/Plan - 2:  ·  Problem: Shaking.   ·  Plan: leg shaking, concern for possible seizure  no prior history of seizure  no incontinenceno LOC  appreciate neuro recs      Problem/Plan - 3:·  Problem: Hypercalcemia.   ·  Plan: IVF overnight  repeat level     Problem/Plan - 4:  ·  Problem: Chronic respiratory failure with hypoxia and hypercapnia.   ·  Plan: compensated bicarb on BMP, trend CO2  monitor on continuous pulse ox  on 2L during day, 3L QHS  required NIPPV on prior admission, continue to monitor.  pulmonary fu     Problem/Plan - 5:  ·  Problem: Essential hypertension.   ·  Plan: current meds     
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    Problem/Plan - 1:  ·  Problem: Encephalopathy acute.   ·  Plan: unclear etiology   neuro fu   check CT andio of head and neck  neuro fu     Problem/Plan - 2:  ·  Problem: Shaking.   ·  Plan: leg shaking, concern for possible seizure  no prior history of seizure  no incontinenceno LOC  appreciate neuro recs  check CT andio of head and neck  neuro fu       Problem/Plan - 3:·  Problem: Hypercalcemia.   ·  Plan: IVF overnight  repeat level     Problem/Plan - 4:  ·  Problem: Chronic respiratory failure with hypoxia and hypercapnia.   ·  Plan: compensated bicarb on BMP, trend CO2  monitor on continuous pulse ox  on 2L during day, 3L QHS  pulmonary fu     Problem/Plan - 5:  ·  Problem: Essential hypertension.   DASH diet   ·  Plan:cw current meds 
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    Problem/Plan - 1:  ·  Problem: Encephalopathy acute.   ·  Plan: unclear etiology   neuro fu   check CT andio of head and neck  neuro fu     Problem/Plan - 2:  ·  Problem: Shaking.   ·  Plan: leg shaking, concern for possible seizure  no prior history of seizure  no incontinenceno LOC  appreciate neuro recs  check CT andio of head and neck  neuro fu       Problem/Plan - 3:·  Problem: Hypercalcemia.   ·  Plan: IVF overnight  repeat level     Problem/Plan - 4:  ·  Problem: Chronic respiratory failure with hypoxia and hypercapnia.   ·  Plan: compensated bicarb on BMP, trend CO2  monitor on continuous pulse ox  on 2L during day, 3L QHS  pulmonary fu     Problem/Plan - 5:  ·  Problem: Essential hypertension.   DASH diet   ·  Plan:cw current meds     
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    Problem/Plan - 1:  ·  Problem: Encephalopathy acute.   ·  Plan: unclear etiology   neuro fu   check CT andio of head and neck  neuro fu     Problem/Plan - 2:  ·  Problem: Shaking.   ·  Plan: leg shaking, concern for possible seizure  no prior history of seizure  no incontinenceno LOC  appreciate neuro recs  neuro fu   neurosurgery fu appreciated     Problem/Plan - 3:·  Problem: Hypercalcemia.   ·  Plan: IVF overnight  repeat level     Problem/Plan - 4:  ·  Problem: Chronic respiratory failure with hypoxia and hypercapnia.   ·  Plan: compensated bicarb on BMP, trend CO2  monitor on continuous pulse ox  on 2L during day, 3L QHS  pulmonary fu     Problem/Plan - 5:  ·  Problem: Essential hypertension.   DASH diet   ·  Plan:cw current meds 
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    Problem/Plan - 1:  ·  Problem: Encephalopathy acute.   ·  Plan: unclear etiology   neuro fu   check CT andio of head and neck  neuro fu     Problem/Plan - 2:  ·  Problem: Shaking.   ·  Plan: leg shaking, concern for possible seizure  no prior history of seizure  no incontinenceno LOC  appreciate neuro recs  neuro fu   neurosurgery fu appreciated     Problem/Plan - 3:·  Problem: Hypercalcemia.   ·  Plan: IVF overnight  repeat level     Problem/Plan - 4:  ·  Problem: Chronic respiratory failure with hypoxia and hypercapnia.   ·  Plan: compensated bicarb on BMP, trend CO2  monitor on continuous pulse ox  on 2L during day, 3L QHS  pulmonary fu     Problem/Plan - 5:  ·  Problem: Essential hypertension.   DASH diet   ·  Plan:cw current meds     dc planning 
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    Problem/Plan - 1:  ·  Problem: Encephalopathy acute.   ·  Plan: unclear etiology   neuro fu   VS, neuro checks Q4H  fall, aspiration precautions, dysphagia screen  check MR brain  no acute path on CTH    Problem/Plan - 2:  ·  Problem: Shaking.   ·  Plan: leg shaking, concern for possible seizure  no prior history of seizure  no incontinenceno LOC  appreciate neuro recs      Problem/Plan - 3:·  Problem: Hypercalcemia.   ·  Plan: IVF overnight  repeat level     Problem/Plan - 4:  ·  Problem: Chronic respiratory failure with hypoxia and hypercapnia.   ·  Plan: compensated bicarb on BMP, trend CO2  monitor on continuous pulse ox  on 2L during day, 3L QHS  required NIPPV on prior admission, continue to monitor.  pulmonary fu     Problem/Plan - 5:  ·  Problem: Essential hypertension.   ·  Plan: current meds     
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    Problem/Plan - 1:  ·  Problem: Encephalopathy acute.   ·  Plan: unclear etiology   neuro fu   VS, neuro checks Q4H  fall, aspiration precautions, dysphagia screen  check MR brain  no acute path on CTH    Problem/Plan - 2:  ·  Problem: Shaking.   ·  Plan: leg shaking, concern for possible seizure  no prior history of seizure  no incontinenceno LOC  appreciate neuro recs  check routine EEG, MRI    Problem/Plan - 3:·  Problem: Hypercalcemia.   ·  Plan: IVF overnight  repeat level     Problem/Plan - 4:  ·  Problem: Chronic respiratory failure with hypoxia and hypercapnia.   ·  Plan: compensated bicarb on BMP, trend CO2  monitor on continuous pulse ox  on 2L during day, 3L QHS  required NIPPV on prior admission, continue to monitor.  pulmonary fu     Problem/Plan - 5:  ·  Problem: Essential hypertension.   ·  Plan:cw current meds     Hypokalemia repleted 
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    Problem/Plan - 1:  ·  Problem: Encephalopathy acute.   ·  Plan: unclear etiology   neuro fu   VS, neuro checks Q4H  fall, aspiration precautions, dysphagia screen  check MR brain  no acute path on CTH    Problem/Plan - 2:  ·  Problem: Shaking.   ·  Plan: leg shaking, concern for possible seizure  no prior history of seizure  no incontinenceno LOC  appreciate neuro recs      Problem/Plan - 3:·  Problem: Hypercalcemia.   ·  Plan: IVF overnight  repeat level     Problem/Plan - 4:  ·  Problem: Chronic respiratory failure with hypoxia and hypercapnia.   ·  Plan: compensated bicarb on BMP, trend CO2  monitor on continuous pulse ox  on 2L during day, 3L QHS  required NIPPV on prior admission, continue to monitor.  pulmonary fu     Problem/Plan - 5:  ·  Problem: Essential hypertension.   ·  Plan: current meds   
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    Problem/Plan - 1:  ·  Problem: Encephalopathy acute.   ·  Plan: unclear etiology   neuro fu   VS, neuro checks Q4H  fall, aspiration precautions, dysphagia screen  check MR brain  no acute path on CTH    Problem/Plan - 2:  ·  Problem: Shaking.   ·  Plan: leg shaking, concern for possible seizure  no prior history of seizure  no incontinenceno LOC  appreciate neuro recs      Problem/Plan - 3:·  Problem: Hypercalcemia.   ·  Plan: IVF overnight  repeat level     Problem/Plan - 4:  ·  Problem: Chronic respiratory failure with hypoxia and hypercapnia.   ·  Plan: compensated bicarb on BMP, trend CO2  monitor on continuous pulse ox  on 2L during day, 3L QHS  required NIPPV on prior admission, continue to monitor.  pulmonary fu     Problem/Plan - 5:  ·  Problem: Essential hypertension.   ·  Plan: current meds     
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    Problem/Plan - 1:  ·  Problem: Encephalopathy acute.   ·  Plan: unclear etiology   neuro fu   VS, neuro checks Q4H  fall, aspiration precautions, dysphagia screen  check MR brain  no acute path on CTH    Problem/Plan - 2:  ·  Problem: Shaking.   ·  Plan: leg shaking, concern for possible seizure  no prior history of seizure  no incontinenceno LOC  appreciate neuro recs  check routine EEG, MRI    Problem/Plan - 3:  ·  Problem: Hypercalcemia.   ·  Plan: IVF overnight  repeat level     Problem/Plan - 4:  ·  Problem: Chronic respiratory failure with hypoxia and hypercapnia.   ·  Plan: compensated bicarb on BMP, trend CO2  monitor on continuous pulse ox  on 2L during day, 3L QHS  required NIPPV on prior admission, continue to monitor.  pulmonary fu     Problem/Plan - 5:  ·  Problem: Essential hypertension.   ·  Plan:cw current meds     Hypokalemia repleted 
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    Problem/Plan - 1:  ·  Problem: Encephalopathy acute.   ·  Plan: unclear etiology   neuro fu   fall, aspiration precautions, dysphagia screen  no acute path on CTH    Problem/Plan - 2:  ·  Problem: Shaking.   ·  Plan: leg shaking, concern for possible seizure  no prior history of seizure  no incontinenceno LOC  appreciate neuro recs      Problem/Plan - 3:·  Problem: Hypercalcemia.   ·  Plan: IVF overnight  repeat level     Problem/Plan - 4:  ·  Problem: Chronic respiratory failure with hypoxia and hypercapnia.   ·  Plan: compensated bicarb on BMP, trend CO2  monitor on continuous pulse ox  on 2L during day, 3L QHS  pulmonary fu     Problem/Plan - 5:  ·  Problem: Essential hypertension.   DASH diet   ·  Plan:cw current meds 
Agree with above assessment and plan as outlined above.    - increased BP, increase Norvac to 10 QD    Deonte Diego MD, [facility]  BEEPER [phone]  
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

## 2022-11-29 NOTE — DIETITIAN NUTRITION RISK NOTIFICATION - TREATMENT: THE FOLLOWING DIET HAS BEEN RECOMMENDED
Diet, Minced and Moist:   DASH/TLC {Sodium & Cholesterol Restricted} (DASH) (11-20-22 @ 14:36) [Active]

## 2022-11-29 NOTE — PROGRESS NOTE ADULT - PROBLEM SELECTOR PLAN 2
to me she looks much better: she is alert and awake and responding to questions well    11/22: she is confused and at times answers simple questions : defer to primary team    11/23; to me she cold understand basic questions today:    11/24: she is doing  ok: no sob: no cough:
to me she looks much better: she is alert and awake and responding to questions well    11/22: she is confused and at times answers simple questions : defer to primary team    11/23; to me she cold understand basic questions today:    11/24: she is doing  ok: no sob: no cough:    11/25: sheis pretty awake and alert    11/27: she is awake and alert and responds to simple questions:    11/28: much better: :
to me she looks much better: she is alert and awake and responding to questions well    11/22: she is confused and at times answers simple questions : defer to primary team
to me she looks much better: she is alert and awake and responding to questions well    11/22: she is confused and at times answers simple questions : defer to primary team    11/23; to me she cold understand basic questions today:    11/24: she is doing  ok: no sob: no cough:    11/25: sheis pretty awake and alert    11/27: she is awake and alert and responds to simple questions:
to me she looks much better: she is alert and awake and responding to questions well    11/22: she is confused and at times answers simple questions : defer to primary team    11/23; to me she cold undestand basic questions today
to me she looks much better: she is alert and awake and responding to questions well    11/22: she is confused and at times answers simple questions : defer to primary team    11/23; to me she cold understand basic questions today:    11/24: she is doing  ok: no sob: no cough:    11/25: sheis pretty awake and alert    11/27: she is awake and alert and responds to simple questions:    11/28: much better: :    11/29; Improved!
to me she looks much better: she is alert and awake and responding to questions well    11/22: she is confused and at times answers simple questions : defer to primary team    11/23; to me she cold understand basic questions today:    11/24: she is doing  ok: no sob: no cough:    11/25: sheis pretty awake and alert
to me she looks much better: she is alert and awake and responding to questions well

## 2022-11-29 NOTE — CHART NOTE - NSCHARTNOTEFT_GEN_A_CORE
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    Pt f/u per protocol, consuming 50% meals with No GI distress (nausea/vomiting/diarrhea/constipation.) at present. Noted 4.2#/3.8% wt. loss in past one week likely due to inadequate po intake. Re-suggest supplement Ensure Plus HP x3 /day to promote po intake. Pt now meets criteria for malnutrition.    Weight: 103.8# (11/29/22), 106# (11/23/22), 108.7# (11/20/22).    Labs: 11-29 Na 140 mmol/L Glu 84 mg/dL K+ 3.8 mmol/L Cr 0.72 mg/dL BUN 10 mg/dL Phos 3.4 mg/dL       Current Diet: Minced and Moist, DASH/TLC    Skin- ecchymosis, no edema as per RN flow sheet    MEDICATIONS  (STANDING):  allopurinol 100 milliGRAM(s) Oral at bedtime  amLODIPine   Tablet 10 milliGRAM(s) Oral daily  aspirin enteric coated 81 milliGRAM(s) Oral daily  carvedilol 6.25 milliGRAM(s) Oral every 12 hours  dextrose 5% + sodium chloride 0.9%. 1000 milliLiter(s) (50 mL/Hr) IV Continuous <Continuous>  enoxaparin Injectable 30 milliGRAM(s) SubCutaneous every 24 hours  fenofibrate Tablet 145 milliGRAM(s) Oral daily  mirtazapine 15 milliGRAM(s) Oral at bedtime  pantoprazole    Tablet 40 milliGRAM(s) Oral before breakfast  senna 2 Tablet(s) Oral at bedtime  sodium chloride 0.9%. 500 milliLiter(s) (125 mL/Hr) IV Continuous <Continuous>    Estimated needs:  no changes since previous assessment.    Nutrition Diagnosis:  New Diagnosis- Moderate Protein calorie Malnutrition in acute setting related to inadequate energy intake as evidenced by meeting <75% nutritional needs >7 days, 3.8% wt. loss in one week.    Goal/Expected Outcome- Pt will meet >75% nutritional needs.     RD to Remain Available:    Additional Recommendations:   Re-suggest Ensure Plus HP x3/day.  Monitor PO intake, weight trends, nutrition related lab values, BMs/GI distress, hydration status, skin integrity.       Ayleen Rico RDN #[id number]

## 2022-11-29 NOTE — PROGRESS NOTE ADULT - PROBLEM SELECTOR PROBLEM 2
Encephalopathy acute

## 2022-11-29 NOTE — PROGRESS NOTE ADULT - PROBLEM SELECTOR PLAN 1
resp wise she seems to be fine : vbg noted today  : ph is normal  and has chr elevated co2: cont bipap at night time ? wean it off over next few days : her ct scan chest in november with no lung abnormality;  no emphysema was noted:    11/22: she seems ok : did not use bipap last night:  can hold for now and follow abg : she does not appear to be in any resp distress:   she is confused but doubt because of high co2:    11/23: she is alert and awake:   do not use bipap: check VBG I n am    11/24:  she did not use bipap last time: vbg is with acidosis: repeat vbg in am : she is alert and awake and talking to me    11/25: off bipap for three days : her vbg showed mild ac on chr hypercarbic resp failure:  she was also previously treated for pneumonia:  she has no  underlying lung disease:  the etiology is difficult to elucidate:  will cont to monitor her VBG: If she cont to have increasing co2 then she would most likely need NIV: cta with no parenchymal lung disease except some atelectasis:  11/27: seems same:  mental status is good:  off bipap for many days now: repeat abg in am:    11/28: seems alert and awake:  after week of off bipap: her ABG today is excellent:  no retention of co2: ct scan chest do not reveal any pulm parenchymal disease    11/29: seems OK : alert and awake:  yesterdays ABG noted: off bipap  : dc planing on oxygen
resp wise she seems to be fine : vbg noted today  : ph is normal  and has chr elevated co2: cont bipap at night time ? wean it off over next few days : her ct scan chest in november with no lung abnormality;  no emphysema was noted:    11/22: she seems ok : did not use bipap last night:  can hold for now and follow abg : she does not appear to be in any resp distress:   she is confused but doiubt because of high co2:
resp wise she seems to be fine : vbg noted today  : ph is normal  and has chr elevated co2: cont bipap at night time ? wean it off over next few days : her ct scan chest in november with no lung abnormality;  no emphysema was noted:    11/22: she seems ok : did not use bipap last night:  can hold for now and follow abg : she does not appear to be in any resp distress:   she is confused but doubt because of high co2:    11/23: she is alert and awake:   do not use bipap: check VBG I n am    11/24:  she did not use bipap last time: vbg is with acidosis: repeat vbg in am : she is alert and awake and talking to me
resp wise she seems to be fine : vbg noted today  : ph is normal  and has chr elevated co2: cont bipap at night time ? wean it off over next few days : her ct scan chest in november with no lung abnormality;  no emphysema was noted:    11/22: she seems ok : did not use bipap last night:  can hold for now and follow abg : she does not appear to be in any resp distress:   she is confused but doubt because of high co2:    11/23: she is alert and awake:   do not use bipap: check VBG I n am    11/24:  she did not use bipap last time: vbg is with acidosis: repeat vbg in am : she is alert and awake and talking to me    11/25: off bipap for three days : her vbg showed mild ac on chr hypercarbic resp failure:  she was also previously treated for pneumonia:  she has no  underlying lung disease:  the etiology is difficult to elucidate:  will cont to monitor her VBG: If she cont to have increasing co2 then she would most likely need NIV: cta with no parenchymal lung disease except some atelectasis:  11/27: seems same:  mental status is good:  off bipap for many days now: repeat abg in am:    11/28: seems alert and awake:  after week of off bipap: her ABG today is excellent:  no retention of co2: ct scan chest do not reveal any pulm parenchymal disease
resp wise she seems to be fine : vbg noted today  : ph is normal  and has chr elevated co2: cont bipap at night time ? wean it off over next few days : her ct scan chest in november with no lung abnormality;  no emphysema was noted:    11/22: she seems ok : did not use bipap last night:  can hold for now and follow abg : she does not appear to be in any resp distress:   she is confused but doubt because of high co2:    11/23: she is alert and awake:   do not use bipap: check VBG I n am
resp wise she seems to be fine : vbg noted today  : ph is normal  and has chr elevated co2: cont bipap at night time ? wean it off over next few days : her ct scan chest in november with no lung abnormality;  no emphysema was noted:    11/22: she seems ok : did not use bipap last night:  can hold for now and follow abg : she does not appear to be in any resp distress:   she is confused but doubt because of high co2:    11/23: she is alert and awake:   do not use bipap: check VBG I n am    11/24:  she did not use bipap last time: vbg is with acidosis: repeat vbg in am : she is alert and awake and talking to me    11/25: off bipap for three days : her vbg showed mild ac on chr hypercarbic resp failure:  she was also previously treated for pneumonia:  she has no  underlying lung disease:  the etiology is difficult to elucidate:  will cont to monitor her VBG: If she cont to have increasing co2 then she would most likely need NIV: cta with no parenchymal lung disease except some atelectasis:  11/27: seems same:  mental status is good:  off bipap for many days now: repeat abg in am:
resp wise she seems to be fine : vbg noted today  : ph is normal  and has chr elevated co2: cont bipap at night time ? wean it off over next few days : her ct scan chest in november with no lung abnormality;  no emphysema was noted:
resp wise she seems to be fine : vbg noted today  : ph is normal  and has chr elevated co2: cont bipap at night time ? wean it off over next few days : her ct scan chest in november with no lung abnormality;  no emphysema was noted:    11/22: she seems ok : did not use bipap last night:  can hold for now and follow abg : she does not appear to be in any resp distress:   she is confused but doubt because of high co2:    11/23: she is alert and awake:   do not use bipap: check VBG I n am    11/24:  she did not use bipap last time: vbg is with acidosis: repeat vbg in am : she is alert and awake and talking to me    11/25: off bipap for three days : her vbg showed mild ac on chr hypercarbic resp failure:  she was also previously treated for pneumonia:  she has no  underlying lung disease:  the etiology is difficult to elucidate:  will cont to monitor her VBG: If she cont to have increasing co2 then she would most likely need NIV: cta with no parenchymal lung disease except some atelectasis:

## 2022-11-29 NOTE — PROGRESS NOTE ADULT - SUBJECTIVE AND OBJECTIVE BOX
pt seen and examined, no complaints, ROS - .     allopurinol 100 milliGRAM(s) Oral at bedtime  dextrose 5% + sodium chloride 0.9%. 1000 milliLiter(s) IV Continuous <Continuous>  enoxaparin Injectable 30 milliGRAM(s) SubCutaneous every 24 hours  fenofibrate Tablet 145 milliGRAM(s) Oral daily  mirtazapine 15 milliGRAM(s) Oral at bedtime  pantoprazole    Tablet 40 milliGRAM(s) Oral before breakfast  senna 2 Tablet(s) Oral at bedtime                            10.6   3.72  )-----------( 221      ( 20 Nov 2022 06:12 )             37.8       Hemoglobin: 10.6 g/dL (11-20 @ 06:12)  Hemoglobin: 9.7 g/dL (11-19 @ 09:53)  Hemoglobin: 8.0 g/dL (11-19 @ 06:10)  Hemoglobin: 11.7 g/dL (11-18 @ 14:46)      11-20    144  |  105  |  15  ----------------------------<  94  4.1   |  31  |  0.64    Ca    9.9      20 Nov 2022 06:12  Phos  2.4     11-20  Mg     1.70     11-20    TPro  8.2  /  Alb  4.2  /  TBili  0.4  /  DBili  x   /  AST  21  /  ALT  8   /  AlkPhos  101  11-18    Creatinine Trend: 0.64<--, 0.75<--, 0.64<--, 0.89<--    COAGS:           T(C): 36.8 (11-20-22 @ 06:45), Max: 37.1 (11-20-22 @ 02:30)  HR: 76 (11-20-22 @ 06:45) (76 - 81)  BP: 161/61 (11-20-22 @ 06:45) (132/50 - 161/61)  RR: 18 (11-20-22 @ 06:45) (18 - 22)  SpO2: 97% (11-20-22 @ 06:45) (96% - 100%)  Wt(kg): --    I&O's Summary    HEENT:  (-)icterus (-)pallor  CV: N S1 S2 1/6 SUJATA (+)2 Pulses B/l  Resp:  Clear to ausculatation B/L, normal effort  GI: (+) BS Soft, NT, ND  Lymph:  (-)Edema, (-)obvious lymphadenopathy  Skin: Warm to touch, Normal turgor  Psych: Unable to assess mood and affect        ECG:  	PENDING    RADIOLOGY:         CXR:   < from: Xray Chest 1 View- PORTABLE-Urgent (Xray Chest 1 View- PORTABLE-Urgent .) (11.18.22 @ 15:37) >  Clear lungs with elevated right hemidiaphragm.      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 ? Near syncope.    - Check echo  - COnt to keep net positive at present.   * GI / DVT prophylaxis.  - keep K>4, mag >2.0    - wean fio2 as pox allows 
Date of Service: 11-23-22 @ 16:31    Patient is a 87y old  Female who presents with a chief complaint of AMS, shaking (23 Nov 2022 12:15)      Any change in ROS: she understand basic commands:  she sticked her tongue out on asking  not in any resp distress   did not use bipap last night     MEDICATIONS  (STANDING):  allopurinol 100 milliGRAM(s) Oral at bedtime  amLODIPine   Tablet 10 milliGRAM(s) Oral daily  aspirin enteric coated 81 milliGRAM(s) Oral daily  carvedilol 6.25 milliGRAM(s) Oral every 12 hours  dextrose 5% + sodium chloride 0.9%. 1000 milliLiter(s) (50 mL/Hr) IV Continuous <Continuous>  enoxaparin Injectable 30 milliGRAM(s) SubCutaneous every 24 hours  fenofibrate Tablet 145 milliGRAM(s) Oral daily  mirtazapine 15 milliGRAM(s) Oral at bedtime  pantoprazole    Tablet 40 milliGRAM(s) Oral before breakfast  senna 2 Tablet(s) Oral at bedtime  sodium chloride 0.9%. 500 milliLiter(s) (125 mL/Hr) IV Continuous <Continuous>    MEDICATIONS  (PRN):    Vital Signs Last 24 Hrs  T(C): 36.6 (23 Nov 2022 13:41), Max: 37 (22 Nov 2022 16:34)  T(F): 97.8 (23 Nov 2022 13:41), Max: 98.6 (22 Nov 2022 16:34)  HR: 65 (23 Nov 2022 12:23) (64 - 77)  BP: 101/56 (23 Nov 2022 12:23) (101/56 - 131/69)  BP(mean): --  RR: 18 (23 Nov 2022 12:23) (18 - 19)  SpO2: 98% (23 Nov 2022 12:23) (98% - 100%)    Parameters below as of 23 Nov 2022 12:23  Patient On (Oxygen Delivery Method): nasal cannula  O2 Flow (L/min): 2      I&O's Summary        Physical Exam:   GENERAL: NAD, well-groomed, well-developed  HEENT: JASON/   Atraumatic, Normocephalic  ENMT: No tonsillar erythema, exudates, or enlargement; Moist mucous membranes, Good dentition, No lesions  NECK: Supple, No JVD, Normal thyroid  CHEST/LUNG: Clear to auscultaion, ; No rales, rhonchi, wheezing, or rubs  CVS: Regular rate and rhythm; No murmurs, rubs, or gallops  GI: : Soft, Nontender, Nondistended; Bowel sounds present  NERVOUS SYSTEM:  Alert & Oriented X3, Good concentration; Motor Strength 5/5 B/L upper and lower extremities; DTRs 2+ intact and symmetric  EXTREMITIES:  2+ Peripheral Pulses, No clubbing, cyanosis, or edema  LYMPH: No lymphadenopathy noted  SKIN: No rashes or lesions  ENDOCRINOLOGY: No Thyromegaly  PSYCH: Appropriate    Labs:  37, 30, 40                            10.5   4.02  )-----------( 245      ( 23 Nov 2022 06:50 )             35.2                         12.2   4.94  )-----------( 291      ( 22 Nov 2022 05:00 )             40.5                         11.6   4.71  )-----------( 276      ( 21 Nov 2022 06:00 )             39.5                         10.6   3.72  )-----------( 221      ( 20 Nov 2022 06:12 )             37.8     11-23    138  |  104  |  16  ----------------------------<  89  4.5   |  26  |  1.21  11-22    139  |  99  |  6<L>  ----------------------------<  87  3.9   |  28  |  0.58  11-21    141  |  101  |  7   ----------------------------<  134<H>  3.4<L>   |  30  |  0.51  11-20    144  |  105  |  15  ----------------------------<  94  4.1   |  31  |  0.64    Ca    10.2      23 Nov 2022 06:50  Ca    10.9<H>      22 Nov 2022 05:00  Phos  4.9     11-23  Phos  1.7     11-22  Mg     1.70     11-23  Mg     2.00     11-22    TPro  8.2  /  Alb  4.4  /  TBili  0.8  /  DBili  x   /  AST  20  /  ALT  10  /  AlkPhos  107  11-22    CAPILLARY BLOOD GLUCOSE          LIVER FUNCTIONS - ( 22 Nov 2022 05:00 )  Alb: 4.4 g/dL / Pro: 8.2 g/dL / ALK PHOS: 107 U/L / ALT: 10 U/L / AST: 20 U/L / GGT: x                     RECENT CULTURES:  11-18 @ 15:45 Clean Catch Clean Catch (Midstream)                <10,000 CFU/mL Normal Urogenital Va          RESPIRATORY CULTURES:          Studies  Chest X-RAY  CT SCAN Chest   Venous Dopplers: LE:   CT Abdomen  Others    rad< from: CT Angio Chest PE Protocol w/ IV Cont (11.23.22 @ 12:21) >  administration of 90 mL of Omnipaque 350. Maximum intensity projection   images were generated.    COMPARISON: CT chest 10/13/2022.    FINDINGS:    PULMONARY ANGIOGRAM:  Obscuration of several subsegmental branches due to   respiratory motion. No pulmonary embolism through the segmental branches.    LUNGS/AIRWAYS/PLEURA: No endobronchial lesion. Atelectasis in bilateral   upper and lower lobes. No pleural effusion.    LYMPH NODES/MEDIASTINUM: No enlarged lymph nodes.    HEART/VASCULATURE: Normal heart size. Unremarkable pericardium. 4.0 cm   midascending aorta. Coronary artery calcifications.    UPPER ABDOMEN: Cholecystectomy. Unchanged size but resolved gas component   of hepatic abscess near the gallbladder fossa (4-124). Unchanged adjacent   fat stranding.    BONES/SOFT TISSUES: Unremarkable.      IMPRESSION:    No pulmonary embolism through the segmental branches. Multiple   subsegmental branches are not well evaluated.    --- End of Report ---            CHRIS SPENCE M.D., ATTENDING RADIOGIST  This document has been electronically signed. Nov 23 2022 12:32PM    < end of copied text >            
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 ? Near syncope.    Patient denies chest pain or shortness of breath.   Review of symptoms otherwise negative.      Review of Systems:   Constitutional: [ ] fevers, [ ] chills.   Skin: [ ] dry skin. [ ] rashes.  Psychiatric: [ ] depression, [ ] anxiety.   Gastrointestinal: [ ] BRBPR, [ ] melena.   Neurological: [ ] confusion. [ ] seizures. [ ] shuffling gait.   Ears,Nose,Mouth and Throat: [ ] ear pain [ ] sore throat.   Eyes: [ ] diplopia.   Respiratory: [ ] hemoptysis. [ ] shortness of breath  Cardiovascular: See HPI above  Hematologic/Lymphatic: [ ] anemia. [ ] painful nodes. [ ] prolonged bleeding.   Genitourinary: [ ] hematuria. [ ] flank pain.   Endocrine: [ ] significant change in weight. [ ] intolerance to heat and cold.     Review of systems [x ] otherwise negative, [ ] otherwise unable to obtain    FH: no family history of sudden cardiac death in first degree relatives    SH: [ ] tobacco, [ ] alcohol, [ ] drugs    allopurinol 100 milliGRAM(s) Oral at bedtime  amLODIPine   Tablet 10 milliGRAM(s) Oral daily  aspirin enteric coated 81 milliGRAM(s) Oral daily  carvedilol 6.25 milliGRAM(s) Oral every 12 hours  dextrose 5% + sodium chloride 0.9%. 1000 milliLiter(s) IV Continuous <Continuous>  enoxaparin Injectable 30 milliGRAM(s) SubCutaneous every 24 hours  fenofibrate Tablet 145 milliGRAM(s) Oral daily  LORazepam     Tablet 0.5 milliGRAM(s) Oral once  mirtazapine 15 milliGRAM(s) Oral at bedtime  pantoprazole    Tablet 40 milliGRAM(s) Oral before breakfast  senna 2 Tablet(s) Oral at bedtime  sodium chloride 0.9% Bolus 500 milliLiter(s) IV Bolus once  sodium chloride 0.9%. 500 milliLiter(s) IV Continuous <Continuous>                            10.5   4.02  )-----------( 245      ( 23 Nov 2022 06:50 )             35.2       11-23    138  |  104  |  16  ----------------------------<  89  4.5   |  26  |  1.21    Ca    10.2      23 Nov 2022 06:50  Phos  4.9     11-23  Mg     1.70     11-23    TPro  8.2  /  Alb  4.4  /  TBili  0.8  /  DBili  x   /  AST  20  /  ALT  10  /  AlkPhos  107  11-22      T(C): 36.5 (11-23-22 @ 05:25), Max: 37 (11-22-22 @ 16:34)  HR: 66 (11-23-22 @ 05:25) (65 - 81)  BP: 114/50 (11-23-22 @ 05:25) (111/51 - 131/69)  RR: 19 (11-23-22 @ 05:25) (18 - 19)  SpO2: 100% (11-23-22 @ 05:25) (91% - 100%)  Wt(kg): --    General: Well nourished in no acute distress.  Head: Normocephalic and atraumatic.   Neck: No JVD. No bruits. Supple. Does not appear to be enlarged.   Cardiovascular: + S1,S2 ; RRR Soft systolic murmur at the left lower sternal border. No rubs noted.    Lungs: CTA b/l. No rhonchi, rales or wheezes.   Abdomen: + BS, soft. Non tender. Non distended. No rebound. No guarding.   Extremities: No clubbing/cyanosis/edema.   Neurologic: Moves all four extremities. Full range of motion.   Skin: Warm and moist. The patient's skin has normal elasticity and good skin turgor.   Psychiatric: unable to fully assess  Musculoskeletal: Normal range of motion, normal strength    DATA    < from: Transthoracic Echocardiogram (11.21.22 @ 09:03) >  CONCLUSIONS:  1. Mitral annular calcification, otherwise normal mitral  valve. Minimal mitral regurgitation.  2. Aortic valve leaflet morphology not well visualized.  Mild aortic regurgitation.  3. Endocardium not well visualized; grossly normal left  ventricular systolic function.  4. The right ventricle is not well visualized; grossly  normal right ventricular systolic function.  5. A bubble study was performed with the intravenous  injection of agitated saline contrast and was inconclusive  regarding the presence of an interatrial shunt.  No obvious cardiac source of embolus was identified on this  transthoracic study.  If clinical suspicion is high,  consider STACEY for further evaluation.  ------------------------------------------------------------------------  Confirmed on  11/21/2022 - 10:08:18 by Marlo Mahajan M.D.,  [facility], [facility]    < end of copied text >    < from: CT Head No Cont (11.18.22 @ 19:20) >    IMPRESSION:    No acute hemorrhage or mass effect.    < end of copied text >      TTE:  CONCLUSIONS:  1. Mitral annular calcification, otherwise normal mitral  valve. Minimal mitral regurgitation.  2. Aortic valve leaflet morphology not well visualized.  Mild aortic regurgitation.  3. Endocardium not well visualized; grossly normal left  ventricular systolic function.  4. The right ventricle is not well visualized; grossly  normal right ventricular systolic function.  5. A bubble study was performed with the intravenous  injection of agitated saline contrast and was inconclusive  regarding the presence of an interatrial shunt.  No obvious cardiac source of embolus was identified on this  transthoracic study.  If clinical suspicion is high,  consider STACEY for further evaluation.      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 ? Near syncope.    1. Near syncope  --Neuro w/u in progress to r/o seizures, EEG and MRI pending. ASA started for lacunar infarcts found on CT  --TTE as noted above, preserved LV function  --orthostatics positive, s/p IVF, please re check today      2. hx HTN  --on Coreg and Norvasc, continue as tolerated                    
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 ? Near syncope.    Patient denies chest pain or shortness of breath.   Review of symptoms otherwise limited      Review of Systems:   Constitutional: [ ] fevers, [ ] chills.   Skin: [ ] dry skin. [ ] rashes.  Psychiatric: [ ] depression, [ ] anxiety.   Gastrointestinal: [ ] BRBPR, [ ] melena.   Neurological: [ ] confusion. [ ] seizures. [ ] shuffling gait.   Ears,Nose,Mouth and Throat: [ ] ear pain [ ] sore throat.   Eyes: [ ] diplopia.   Respiratory: [ ] hemoptysis. [ ] shortness of breath  Cardiovascular: See HPI above  Hematologic/Lymphatic: [ ] anemia. [ ] painful nodes. [ ] prolonged bleeding.   Genitourinary: [ ] hematuria. [ ] flank pain.   Endocrine: [ ] significant change in weight. [ ] intolerance to heat and cold.     Review of systems [x ] otherwise negative, [ ] otherwise unable to obtain    FH: no family history of sudden cardiac death in first degree relatives    SH: [ ] tobacco, [ ] alcohol, [ ] drugs    allopurinol 100 milliGRAM(s) Oral at bedtime  amLODIPine   Tablet 10 milliGRAM(s) Oral daily  aspirin enteric coated 81 milliGRAM(s) Oral daily  carvedilol 6.25 milliGRAM(s) Oral every 12 hours  dextrose 5% + sodium chloride 0.9%. 1000 milliLiter(s) IV Continuous <Continuous>  enoxaparin Injectable 30 milliGRAM(s) SubCutaneous every 24 hours  fenofibrate Tablet 145 milliGRAM(s) Oral daily  mirtazapine 15 milliGRAM(s) Oral at bedtime  pantoprazole    Tablet 40 milliGRAM(s) Oral before breakfast  senna 2 Tablet(s) Oral at bedtime  sodium chloride 0.9%. 500 milliLiter(s) IV Continuous <Continuous>                            9.5    3.04  )-----------( 251      ( 25 Nov 2022 09:01 )             31.9       11-25    142  |  109<H>  |  12  ----------------------------<  82  4.7   |  28  |  0.73    Ca    10.4      25 Nov 2022 09:01  Phos  2.2     11-25  Mg     1.60     11-25      T(C): 36.9 (11-25-22 @ 10:40), Max: 36.9 (11-25-22 @ 10:40)  HR: 64 (11-25-22 @ 10:40) (60 - 64)  BP: 122/60 (11-25-22 @ 10:40) (106/49 - 136/45)  RR: 16 (11-25-22 @ 10:40) (16 - 17)  SpO2: 100% (11-25-22 @ 10:40) (98% - 100%)  Wt(kg): --    General: Well nourished in no acute distress.  Head: Normocephalic and atraumatic.   Neck: No JVD. No bruits. Supple. Does not appear to be enlarged.   Cardiovascular: + S1,S2 ; RRR Soft systolic murmur at the left lower sternal border. No rubs noted.    Lungs: CTA b/l. No rhonchi, rales or wheezes.   Abdomen: + BS, soft. Non tender. Non distended. No rebound. No guarding.   Extremities: No clubbing/cyanosis/edema.   Neurologic: Moves all four extremities. Full range of motion.   Skin: Warm and moist. The patient's skin has normal elasticity and good skin turgor.   Psychiatric: unable to fully assess  Musculoskeletal: Normal range of motion, normal strength    DATA    < from: Transthoracic Echocardiogram (11.21.22 @ 09:03) >  CONCLUSIONS:  1. Mitral annular calcification, otherwise normal mitral  valve. Minimal mitral regurgitation.  2. Aortic valve leaflet morphology not well visualized.  Mild aortic regurgitation.  3. Endocardium not well visualized; grossly normal left  ventricular systolic function.  4. The right ventricle is not well visualized; grossly  normal right ventricular systolic function.  5. A bubble study was performed with the intravenous  injection of agitated saline contrast and was inconclusive  regarding the presence of an interatrial shunt.  No obvious cardiac source of embolus was identified on this  transthoracic study.  If clinical suspicion is high,  consider STACEY for further evaluation.  ------------------------------------------------------------------------  Confirmed on  11/21/2022 - 10:08:18 by Marlo Mahajan M.D.,  [facility], [facility]    < end of copied text >    < from: CT Head No Cont (11.18.22 @ 19:20) >    IMPRESSION:    No acute hemorrhage or mass effect.    < end of copied text >      < from: Transthoracic Echocardiogram (11.21.22 @ 09:03) >  CONCLUSIONS:  1. Mitral annular calcification, otherwise normal mitral  valve. Minimal mitral regurgitation.  2. Aortic valve leaflet morphology not well visualized.  Mild aortic regurgitation.  3. Endocardium not well visualized; grossly normal left  ventricular systolic function.  4. The right ventricle is not well visualized; grossly  normal right ventricular systolic function.  5. A bubble study was performed with the intravenous  injection of agitated saline contrast and was inconclusive  regarding the presence of an interatrial shunt.  No obvious cardiac source of embolus was identified on this  transthoracic study.  If clinical suspicion is high,  consider STACEY for further evaluation.  ------------------------------------------------------------------------  Confirmed on  11/21/2022 - 10:08:18 by Marlo Mahajan M.D.,  [facility], [facility]    < end of copied text >    < from: MR Head No Cont (11.23.22 @ 17:31) >  IMPRESSION:  No acute intracranial hemorrhage or acute infarct.    Incidental 4 mm outpouching at the distal left internal carotid artery   suspicious for aneurysm. MRA head or CTA head recommended for further   evaluation    < end of copied text >    EEG Classification / Summary:  Abnormal EEG study  Right occipital max focal slowing  Mild generalized background slowing  -----------------------------------------------------------------------------------------------------  Clinical Impression:  Right occipital  focal cerebral dysfunction.  Mild superimposed diffuse/multifocal cerebral dysfunction, not specific as to etiology.    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 ? Near syncope.    1. Near syncope  --Neuro w/u in progress to r/o seizures, EEG and MRI done, reports as above  --f/u Neuro  --ASA started for lacunar infarcts found on CT  --TTE as noted above, preserved LV function  --orthostatics positive, s/p IVF, repeat is negative       2. hx HTN  --on Coreg and Norvasc, continue as tolerated                    
Patient is a 87y old  Female who presents with a chief complaint of AMS, shaking (2022 12:35)    Date of servie : 22 @ 14:42  INTERVAL HPI/OVERNIGHT EVENTS:  T(C): 36.9 (22 @ 10:45), Max: 37.1 (22 @ 02:30)  HR: 81 (22 @ 10:45) (76 - 81)  BP: 167/67 (22 @ 10:45) (136/58 - 167/67)  RR: 18 (22 @ 10:45) (18 - 20)  SpO2: 99% (22 @ 10:45) (96% - 99%)  Wt(kg): --  I&O's Summary      LABS:                        10.6   3.72  )-----------( 221      ( 2022 06:12 )             37.8     -    144  |  105  |  15  ----------------------------<  94  4.1   |  31  |  0.64    Ca    9.9      2022 06:12  Phos  2.4     11-20  Mg     1.70     -    TPro  8.2  /  Alb  4.2  /  TBili  0.4  /  DBili  x   /  AST  21  /  ALT  8   /  AlkPhos  101  11-18    PT/INR - ( 2022 14:46 )   PT: 12.1 sec;   INR: 1.04 ratio         PTT - ( 2022 14:46 )  PTT:24.7 sec  Urinalysis Basic - ( 2022 15:45 )    Color: Light Yellow / Appearance: Clear / S.010 / pH: x  Gluc: x / Ketone: Negative  / Bili: Negative / Urobili: <2 mg/dL   Blood: x / Protein: Trace / Nitrite: Negative   Leuk Esterase: Negative / RBC: x / WBC x   Sq Epi: x / Non Sq Epi: x / Bacteria: x      CAPILLARY BLOOD GLUCOSE      POCT Blood Glucose.: 96 mg/dL (2022 12:35)        Urinalysis Basic - ( 2022 15:45 )    Color: Light Yellow / Appearance: Clear / S.010 / pH: x  Gluc: x / Ketone: Negative  / Bili: Negative / Urobili: <2 mg/dL   Blood: x / Protein: Trace / Nitrite: Negative   Leuk Esterase: Negative / RBC: x / WBC x   Sq Epi: x / Non Sq Epi: x / Bacteria: x        MEDICATIONS  (STANDING):  allopurinol 100 milliGRAM(s) Oral at bedtime  amLODIPine   Tablet 5 milliGRAM(s) Oral daily  carvedilol 6.25 milliGRAM(s) Oral every 12 hours  dextrose 5% + sodium chloride 0.9%. 1000 milliLiter(s) (50 mL/Hr) IV Continuous <Continuous>  enoxaparin Injectable 30 milliGRAM(s) SubCutaneous every 24 hours  fenofibrate Tablet 145 milliGRAM(s) Oral daily  mirtazapine 15 milliGRAM(s) Oral at bedtime  pantoprazole    Tablet 40 milliGRAM(s) Oral before breakfast  senna 2 Tablet(s) Oral at bedtime    MEDICATIONS  (PRN):          PHYSICAL EXAM:  GENERAL: NAD, well-groomed, well-developed  HEAD:  Atraumatic, Normocephalic  CHEST/LUNG: Clear to percussion bilaterally; No rales, rhonchi, wheezing, or rubs  HEART: Regular rate and rhythm; No murmurs, rubs, or gallops  ABDOMEN: Soft, Nontender, Nondistended; Bowel sounds present  EXTREMITIES:  2+ Peripheral Pulses, No clubbing, cyanosis, or edema  LYMPH: No lymphadenopathy noted  SKIN: No rashes or lesions    Care Discussed with Consultants/Other Providers [ ] YES  [ ] NO
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 ? Near syncope.    no events overnight     Review of Systems:   Constitutional: [ ] fevers, [ ] chills.   Skin: [ ] dry skin. [ ] rashes.  Psychiatric: [ ] depression, [ ] anxiety.   Gastrointestinal: [ ] BRBPR, [ ] melena.   Neurological: [ ] confusion. [ ] seizures. [ ] shuffling gait.   Ears,Nose,Mouth and Throat: [ ] ear pain [ ] sore throat.   Eyes: [ ] diplopia.   Respiratory: [ ] hemoptysis. [ ] shortness of breath  Cardiovascular: See HPI above  Hematologic/Lymphatic: [ ] anemia. [ ] painful nodes. [ ] prolonged bleeding.   Genitourinary: [ ] hematuria. [ ] flank pain.   Endocrine: [ ] significant change in weight. [ ] intolerance to heat and cold.     Review of systems [x ] otherwise negative, [ ] otherwise unable to obtain    FH: no family history of sudden cardiac death in first degree relatives    SH: [ ] tobacco, [ ] alcohol, [ ] drugs           allopurinol 100 milliGRAM(s) Oral at bedtime  amLODIPine   Tablet 10 milliGRAM(s) Oral daily  aspirin enteric coated 81 milliGRAM(s) Oral daily  carvedilol 6.25 milliGRAM(s) Oral every 12 hours  dextrose 5% + sodium chloride 0.9%. 1000 milliLiter(s) IV Continuous <Continuous>  enoxaparin Injectable 30 milliGRAM(s) SubCutaneous every 24 hours  fenofibrate Tablet 145 milliGRAM(s) Oral daily  mirtazapine 15 milliGRAM(s) Oral at bedtime  pantoprazole    Tablet 40 milliGRAM(s) Oral before breakfast  senna 2 Tablet(s) Oral at bedtime  sodium chloride 0.9%. 500 milliLiter(s) IV Continuous <Continuous>                            10.7   3.14  )-----------( 293      ( 26 Nov 2022 05:57 )             35.6       Hemoglobin: 10.7 g/dL (11-26 @ 05:57)  Hemoglobin: 9.5 g/dL (11-25 @ 09:01)  Hemoglobin: 10.3 g/dL (11-24 @ 08:00)  Hemoglobin: 10.5 g/dL (11-23 @ 06:50)      11-26    140  |  102  |  8   ----------------------------<  92  4.1   |  29  |  0.62    Ca    10.7<H>      26 Nov 2022 05:57  Phos  2.3     11-26  Mg     1.50     11-26      Creatinine Trend: 0.62<--, 0.73<--, 0.98<--, 1.21<--, 0.58<--, 0.51<--    COAGS:           T(C): 36.7 (11-27-22 @ 05:05), Max: 37.1 (11-26-22 @ 14:22)  HR: 66 (11-27-22 @ 05:05) (64 - 70)  BP: 132/74 (11-27-22 @ 05:05) (128/61 - 153/62)  RR: 18 (11-27-22 @ 05:05) (18 - 18)  SpO2: 98% (11-27-22 @ 05:05) (95% - 100%)  Wt(kg): --    I&O's Summary      General: Well nourished in no acute distress.  Head: Normocephalic and atraumatic.   Neck: No JVD. No bruits. Supple. Does not appear to be enlarged.   Cardiovascular: + S1,S2 ; RRR Soft systolic murmur at the left lower sternal border. No rubs noted.    Lungs: CTA b/l. No rhonchi, rales or wheezes.   Abdomen: + BS, soft. Non tender. Non distended. No rebound. No guarding.   Extremities: No clubbing/cyanosis/edema.   Neurologic: Moves all four extremities. Full range of motion.   Skin: Warm and moist. The patient's skin has normal elasticity and good skin turgor.   Psychiatric: unable to fully assess  Musculoskeletal: Normal range of motion, normal strength    DATA    < from: Transthoracic Echocardiogram (11.21.22 @ 09:03) >  CONCLUSIONS:  1. Mitral annular calcification, otherwise normal mitral  valve. Minimal mitral regurgitation.  2. Aortic valve leaflet morphology not well visualized.  Mild aortic regurgitation.  3. Endocardium not well visualized; grossly normal left  ventricular systolic function.  4. The right ventricle is not well visualized; grossly  normal right ventricular systolic function.  5. A bubble study was performed with the intravenous  injection of agitated saline contrast and was inconclusive  regarding the presence of an interatrial shunt.  No obvious cardiac source of embolus was identified on this  transthoracic study.  If clinical suspicion is high,  consider STACEY for further evaluation.  ------------------------------------------------------------------------  Confirmed on  11/21/2022 - 10:08:18 by Marlo Mahajan M.D.,  [facility], [place]    < end of copied text >    < from: CT Head No Cont (11.18.22 @ 19:20) >    IMPRESSION:    No acute hemorrhage or mass effect.    < end of copied text >      < from: Transthoracic Echocardiogram (11.21.22 @ 09:03) >  CONCLUSIONS:  1. Mitral annular calcification, otherwise normal mitral  valve. Minimal mitral regurgitation.  2. Aortic valve leaflet morphology not well visualized.  Mild aortic regurgitation.  3. Endocardium not well visualized; grossly normal left  ventricular systolic function.  4. The right ventricle is not well visualized; grossly  normal right ventricular systolic function.  5. A bubble study was performed with the intravenous  injection of agitated saline contrast and was inconclusive  regarding the presence of an interatrial shunt.  No obvious cardiac source of embolus was identified on this  transthoracic study.  If clinical suspicion is high,  consider STACEY for further evaluation.  ------------------------------------------------------------------------  Confirmed on  11/21/2022 - 10:08:18 by Marlo Mahajan M.D.,  [facility], [place]    < end of copied text >    < from: MR Head No Cont (11.23.22 @ 17:31) >  IMPRESSION:  No acute intracranial hemorrhage or acute infarct.    Incidental 4 mm outpouching at the distal left internal carotid artery   suspicious for aneurysm. MRA head or CTA head recommended for further   evaluation    < end of copied text >    EEG Classification / Summary:  Abnormal EEG study  Right occipital max focal slowing  Mild generalized background slowing  -----------------------------------------------------------------------------------------------------  Clinical Impression:  Right occipital  focal cerebral dysfunction.  Mild superimposed diffuse/multifocal cerebral dysfunction, not specific as to etiology.    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 ? Near syncope.    1. Near syncope  --Neuro w/u in progress to r/o seizures, EEG and MRI done, reports as above  --f/u Neuro  --ASA started for lacunar infarcts found on CT  --TTE as noted above, preserved LV function  --orthostatics positive, s/p IVF, repeat is negative       2. hx HTN  --on Coreg and Norvasc, continue as tolerated                    
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 ? Near syncope.    DATE OF SERVICE: 11-28-22    Patient denies chest pain or shortness of breath.   Review of symptoms otherwise negative.    MEDICATIONS:  allopurinol 100 milliGRAM(s) Oral at bedtime  amLODIPine   Tablet 10 milliGRAM(s) Oral daily  aspirin enteric coated 81 milliGRAM(s) Oral daily  carvedilol 6.25 milliGRAM(s) Oral every 12 hours  dextrose 5% + sodium chloride 0.9%. 1000 milliLiter(s) IV Continuous <Continuous>  enoxaparin Injectable 30 milliGRAM(s) SubCutaneous every 24 hours  fenofibrate Tablet 145 milliGRAM(s) Oral daily  mirtazapine 15 milliGRAM(s) Oral at bedtime  pantoprazole    Tablet 40 milliGRAM(s) Oral before breakfast  senna 2 Tablet(s) Oral at bedtime  sodium chloride 0.9%. 500 milliLiter(s) IV Continuous <Continuous>      LABS:                        12.1   5.17  )-----------( 331      ( 28 Nov 2022 04:05 )             39.3       Hemoglobin: 12.1 g/dL (11-28 @ 04:05)  Hemoglobin: 10.7 g/dL (11-26 @ 05:57)  Hemoglobin: 9.5 g/dL (11-25 @ 09:01)  Hemoglobin: 10.3 g/dL (11-24 @ 08:00)      11-28    135  |  98  |  8   ----------------------------<  163<H>  5.7<H>   |  26  |  0.61    Ca    10.5      28 Nov 2022 04:05  Phos  2.9     11-28  Mg     1.60     11-28      Creatinine Trend: 0.61<--, 0.62<--, 0.73<--, 0.98<--, 1.21<--, 0.58<--    COAGS:           PHYSICAL EXAM:  T(C): 36.6 (11-28-22 @ 05:00), Max: 36.7 (11-27-22 @ 22:15)  HR: 72 (11-28-22 @ 05:00) (71 - 72)  BP: 149/76 (11-28-22 @ 05:00) (140/62 - 174/68)  RR: 17 (11-28-22 @ 05:00) (17 - 17)  SpO2: 98% (11-28-22 @ 05:00) (95% - 98%)  Wt(kg): --    I&O's Summary    27 Nov 2022 07:01  -  28 Nov 2022 07:00  --------------------------------------------------------  IN: 0 mL / OUT: 1000 mL / NET: -1000 mL        General: Well nourished in no acute distress.  Head: Normocephalic and atraumatic.   Neck: No JVD. No bruits. Supple. Does not appear to be enlarged.   Cardiovascular: + S1,S2 ; RRR Soft systolic murmur at the left lower sternal border. No rubs noted.    Lungs: CTA b/l. No rhonchi, rales or wheezes.   Abdomen: + BS, soft. Non tender. Non distended. No rebound. No guarding.   Extremities: No clubbing/cyanosis/edema.   Neurologic: Moves all four extremities. Full range of motion.   Skin: Warm and moist. The patient's skin has normal elasticity and good skin turgor.   Psychiatric: unable to fully assess  Musculoskeletal: Normal range of motion, normal strength    DATA    < from: Transthoracic Echocardiogram (11.21.22 @ 09:03) >  CONCLUSIONS:  1. Mitral annular calcification, otherwise normal mitral  valve. Minimal mitral regurgitation.  2. Aortic valve leaflet morphology not well visualized.  Mild aortic regurgitation.  3. Endocardium not well visualized; grossly normal left  ventricular systolic function.  4. The right ventricle is not well visualized; grossly  normal right ventricular systolic function.  5. A bubble study was performed with the intravenous  injection of agitated saline contrast and was inconclusive  regarding the presence of an interatrial shunt.  No obvious cardiac source of embolus was identified on this  transthoracic study.  If clinical suspicion is high,  consider STACEY for further evaluation.  ------------------------------------------------------------------------  Confirmed on  11/21/2022 - 10:08:18 by Marlo Mahajan M.D.,  [facility], [place]    < end of copied text >    < from: CT Head No Cont (11.18.22 @ 19:20) >    IMPRESSION:    No acute hemorrhage or mass effect.    < end of copied text >      < from: Transthoracic Echocardiogram (11.21.22 @ 09:03) >  CONCLUSIONS:  1. Mitral annular calcification, otherwise normal mitral  valve. Minimal mitral regurgitation.  2. Aortic valve leaflet morphology not well visualized.  Mild aortic regurgitation.  3. Endocardium not well visualized; grossly normal left  ventricular systolic function.  4. The right ventricle is not well visualized; grossly  normal right ventricular systolic function.  5. A bubble study was performed with the intravenous  injection of agitated saline contrast and was inconclusive  regarding the presence of an interatrial shunt.  No obvious cardiac source of embolus was identified on this  transthoracic study.  If clinical suspicion is high,  consider STACEY for further evaluation.  ------------------------------------------------------------------------  Confirmed on  11/21/2022 - 10:08:18 by Marlo Mahajan M.D.,  [facility], [place]    < end of copied text >    < from: MR Head No Cont (11.23.22 @ 17:31) >  IMPRESSION:  No acute intracranial hemorrhage or acute infarct.    Incidental 4 mm outpouching at the distal left internal carotid artery   suspicious for aneurysm. MRA head or CTA head recommended for further   evaluation    < end of copied text >    EEG Classification / Summary:  Abnormal EEG study  Right occipital max focal slowing  Mild generalized background slowing  -----------------------------------------------------------------------------------------------------  Clinical Impression:  Right occipital  focal cerebral dysfunction.  Mild superimposed diffuse/multifocal cerebral dysfunction, not specific as to etiology.    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 ? Near syncope.    1. Near syncope  --Neuro w/u in progress to r/o seizures, EEG and MRI done, reports as above  --ASA started for lacunar infarcts found on CT  --TTE as noted above, preserved LV function  --orthostatics positive, s/p IVF, repeat is negative       2. hx HTN  --on Coreg and Norvasc, continue as tolerated      3. Left internal carotid aneursym  - f/u with vascular    Mack Shay MD  Pager: [phone]               
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 ? Near syncope.    Pt unsure of events leading to hospitalization, denies chest pain, SOB, palps or dizziness.    Review of Systems:   Constitutional: [ ] fevers, [ ] chills.   Skin: [ ] dry skin. [ ] rashes.  Psychiatric: [ ] depression, [ ] anxiety.   Gastrointestinal: [ ] BRBPR, [ ] melena.   Neurological: [ ] confusion. [ ] seizures. [ ] shuffling gait.   Ears,Nose,Mouth and Throat: [ ] ear pain [ ] sore throat.   Eyes: [ ] diplopia.   Respiratory: [ ] hemoptysis. [ ] shortness of breath  Cardiovascular: See HPI above  Hematologic/Lymphatic: [ ] anemia. [ ] painful nodes. [ ] prolonged bleeding.   Genitourinary: [ ] hematuria. [ ] flank pain.   Endocrine: [ ] significant change in weight. [ ] intolerance to heat and cold.     Review of systems [ x] otherwise negative, [ ] otherwise unable to obtain    FH: no family history of sudden cardiac death in first degree relatives    SH: [ ] tobacco, [ ] alcohol, [ ] drugs    allopurinol 100 milliGRAM(s) Oral at bedtime  amLODIPine   Tablet 10 milliGRAM(s) Oral daily  carvedilol 6.25 milliGRAM(s) Oral every 12 hours  dextrose 5% + sodium chloride 0.9%. 1000 milliLiter(s) IV Continuous <Continuous>  enoxaparin Injectable 30 milliGRAM(s) SubCutaneous every 24 hours  fenofibrate Tablet 145 milliGRAM(s) Oral daily  mirtazapine 15 milliGRAM(s) Oral at bedtime  pantoprazole    Tablet 40 milliGRAM(s) Oral before breakfast  senna 2 Tablet(s) Oral at bedtime                          11.6   4.71  )-----------( 276      ( 21 Nov 2022 06:00 )             39.5       141  |  101  |  7   ----------------------------<  134<H>  3.4<L>   |  30  |  0.51    Ca    10.7<H>      21 Nov 2022 06:00  Phos  1.1     11-21  Mg     1.50     11-21    T(C): 37.1 (11-21-22 @ 10:20), Max: 37.2 (11-20-22 @ 17:20)  HR: 89 (11-21-22 @ 10:20) (68 - 104)  BP: 139/71 (11-21-22 @ 10:20) (139/71 - 175/64)  RR: 17 (11-21-22 @ 10:20) (17 - 18)  SpO2: 100% (11-21-22 @ 10:20) (96% - 100%)    General: Well nourished in no acute distress.  Head: Normocephalic and atraumatic.   Neck: No JVD. No bruits. Supple. Does not appear to be enlarged.   Cardiovascular: + S1,S2 ; RRR Soft systolic murmur at the left lower sternal border. No rubs noted.    Lungs: CTA b/l. No rhonchi, rales or wheezes.   Abdomen: + BS, soft. Non tender. Non distended. No rebound. No guarding.   Extremities: No clubbing/cyanosis/edema.   Neurologic: Moves all four extremities. Full range of motion.   Skin: Warm and moist. The patient's skin has normal elasticity and good skin turgor.   Psychiatric: unable to fully assess  Musculoskeletal: Normal range of motion, normal strength    DATA    < from: Transthoracic Echocardiogram (11.21.22 @ 09:03) >  CONCLUSIONS:  1. Mitral annular calcification, otherwise normal mitral  valve. Minimal mitral regurgitation.  2. Aortic valve leaflet morphology not well visualized.  Mild aortic regurgitation.  3. Endocardium not well visualized; grossly normal left  ventricular systolic function.  4. The right ventricle is not well visualized; grossly  normal right ventricular systolic function.  5. A bubble study was performed with the intravenous  injection of agitated saline contrast and was inconclusive  regarding the presence of an interatrial shunt.  No obvious cardiac source of embolus was identified on this  transthoracic study.  If clinical suspicion is high,  consider STACEY for further evaluation.  ------------------------------------------------------------------------  Confirmed on  11/21/2022 - 10:08:18 by Marlo Mahajan M.D.,  [facility], [facility]    < end of copied text >    < from: CT Head No Cont (11.18.22 @ 19:20) >    IMPRESSION:    No acute hemorrhage or mass effect.    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 ? Near syncope.    1. Near syncope  --Neuro w/u in progress to r/o seizures, EEG and MRI pending  --TTE as noted above, preserved LV function  --monitor on tele  --check orthostatic vitals  --PT    2. hx HTN  --on Coreg and Norvasc, continue as tolerated                  
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 ? Near syncope.      Patient denies chest pain or shortness of breath.   Review of symptoms otherwise negative.    MEDICATIONS:  allopurinol 100 milliGRAM(s) Oral at bedtime  amLODIPine   Tablet 10 milliGRAM(s) Oral daily  aspirin enteric coated 81 milliGRAM(s) Oral daily  carvedilol 6.25 milliGRAM(s) Oral every 12 hours  dextrose 5% + sodium chloride 0.9%. 1000 milliLiter(s) IV Continuous <Continuous>  enoxaparin Injectable 30 milliGRAM(s) SubCutaneous every 24 hours  fenofibrate Tablet 145 milliGRAM(s) Oral daily  LORazepam     Tablet 0.5 milliGRAM(s) Oral once  mirtazapine 15 milliGRAM(s) Oral at bedtime  pantoprazole    Tablet 40 milliGRAM(s) Oral before breakfast  potassium phosphate IVPB 15 milliMole(s) IV Intermittent once  senna 2 Tablet(s) Oral at bedtime      LABS:                        12.2   4.94  )-----------( 291      ( 22 Nov 2022 05:00 )             40.5       Hemoglobin: 12.2 g/dL (11-22 @ 05:00)  Hemoglobin: 11.6 g/dL (11-21 @ 06:00)  Hemoglobin: 10.6 g/dL (11-20 @ 06:12)  Hemoglobin: 9.7 g/dL (11-19 @ 09:53)  Hemoglobin: 8.0 g/dL (11-19 @ 06:10)      11-22    139  |  99  |  6<L>  ----------------------------<  87  3.9   |  28  |  0.58    Ca    10.9<H>      22 Nov 2022 05:00  Phos  1.7     11-22  Mg     2.00     11-22    TPro  8.2  /  Alb  4.4  /  TBili  0.8  /  DBili  x   /  AST  20  /  ALT  10  /  AlkPhos  107  11-22    Creatinine Trend: 0.58<--, 0.51<--, 0.64<--, 0.75<--, 0.64<--, 0.89<--      PHYSICAL EXAM:  T(C): 36.4 (11-22-22 @ 06:12), Max: 37.2 (11-21-22 @ 18:40)  HR: 92 (11-22-22 @ 06:26) (82 - 92)  BP: 167/98 (11-22-22 @ 06:12) (139/71 - 185/77)  RR: 20 (11-22-22 @ 06:12) (17 - 20)  SpO2: 100% (11-22-22 @ 06:26) (98% - 100%)  Wt(kg): --    I&O's Summary    21 Nov 2022 07:01  -  22 Nov 2022 07:00  --------------------------------------------------------  IN: 0 mL / OUT: 1 mL / NET: -1 mL      General: Well nourished in no acute distress.  Head: Normocephalic and atraumatic.   Neck: No JVD. No bruits. Supple. Does not appear to be enlarged.   Cardiovascular: + S1,S2 ; RRR Soft systolic murmur at the left lower sternal border. No rubs noted.    Lungs: CTA b/l. No rhonchi, rales or wheezes.   Abdomen: + BS, soft. Non tender. Non distended. No rebound. No guarding.   Extremities: No clubbing/cyanosis/edema.   Neurologic: Moves all four extremities. Full range of motion.   Skin: Warm and moist. The patient's skin has normal elasticity and good skin turgor.   Psychiatric: unable to fully assess  Musculoskeletal: Normal range of motion, normal strength    DATA    < from: Transthoracic Echocardiogram (11.21.22 @ 09:03) >  CONCLUSIONS:  1. Mitral annular calcification, otherwise normal mitral  valve. Minimal mitral regurgitation.  2. Aortic valve leaflet morphology not well visualized.  Mild aortic regurgitation.  3. Endocardium not well visualized; grossly normal left  ventricular systolic function.  4. The right ventricle is not well visualized; grossly  normal right ventricular systolic function.  5. A bubble study was performed with the intravenous  injection of agitated saline contrast and was inconclusive  regarding the presence of an interatrial shunt.  No obvious cardiac source of embolus was identified on this  transthoracic study.  If clinical suspicion is high,  consider STACEY for further evaluation.  ------------------------------------------------------------------------  Confirmed on  11/21/2022 - 10:08:18 by Marlo Mahajan M.D.,  [facility]    < end of copied text >    < from: CT Head No Cont (11.18.22 @ 19:20) >    IMPRESSION:    No acute hemorrhage or mass effect.    < end of copied text >      TTE:  CONCLUSIONS:  1. Mitral annular calcification, otherwise normal mitral  valve. Minimal mitral regurgitation.  2. Aortic valve leaflet morphology not well visualized.  Mild aortic regurgitation.  3. Endocardium not well visualized; grossly normal left  ventricular systolic function.  4. The right ventricle is not well visualized; grossly  normal right ventricular systolic function.  5. A bubble study was performed with the intravenous  injection of agitated saline contrast and was inconclusive  regarding the presence of an interatrial shunt.  No obvious cardiac source of embolus was identified on this  transthoracic study.  If clinical suspicion is high,  consider STACEY for further evaluation.      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 ? Near syncope.    1. Near syncope  --Neuro w/u in progress to r/o seizures, EEG and MRI pending. ASA started for lacunar infarcts founs on CT  --TTE as noted above, preserved LV function  --orthostatics positive, can give 500 cc IV fluid has preserved LV fucntion      2. hx HTN  --on Coreg and Norvasc, continue as tolerated    Mack Shay MD  Pager: [phone]                   
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 ? Near syncope.    Patient denies chest pain or shortness of breath.   Review of symptoms otherwise limited    Review of Systems:   Constitutional: [ ] fevers, [ ] chills.   Skin: [ ] dry skin. [ ] rashes.  Psychiatric: [ ] depression, [ ] anxiety.   Gastrointestinal: [ ] BRBPR, [ ] melena.   Neurological: [ ] confusion. [ ] seizures. [ ] shuffling gait.   Ears,Nose,Mouth and Throat: [ ] ear pain [ ] sore throat.   Eyes: [ ] diplopia.   Respiratory: [ ] hemoptysis. [ ] shortness of breath  Cardiovascular: See HPI above  Hematologic/Lymphatic: [ ] anemia. [ ] painful nodes. [ ] prolonged bleeding.   Genitourinary: [ ] hematuria. [ ] flank pain.   Endocrine: [ ] significant change in weight. [ ] intolerance to heat and cold.     Review of systems [ x] otherwise negative, [ ] otherwise unable to obtain    FH: no family history of sudden cardiac death in first degree relatives    SH: [ ] tobacco, [ ] alcohol, [ ] drugs    allopurinol 100 milliGRAM(s) Oral at bedtime  amLODIPine   Tablet 10 milliGRAM(s) Oral daily  aspirin enteric coated 81 milliGRAM(s) Oral daily  carvedilol 6.25 milliGRAM(s) Oral every 12 hours  dextrose 5% + sodium chloride 0.9%. 1000 milliLiter(s) IV Continuous <Continuous>  enoxaparin Injectable 30 milliGRAM(s) SubCutaneous every 24 hours  fenofibrate Tablet 145 milliGRAM(s) Oral daily  mirtazapine 15 milliGRAM(s) Oral at bedtime  pantoprazole    Tablet 40 milliGRAM(s) Oral before breakfast  senna 2 Tablet(s) Oral at bedtime  sodium chloride 0.9%. 500 milliLiter(s) IV Continuous <Continuous>                            10.3   3.56  )-----------( 242      ( 24 Nov 2022 08:00 )             34.6       136  |  103  |  18  ----------------------------<  93  4.5   |  26  |  0.98    Ca    10.1      24 Nov 2022 08:00  Phos  3.1     11-24  Mg     1.80     11-24      T(C): 36.6 (11-24-22 @ 09:30), Max: 36.8 (11-23-22 @ 17:30)  HR: 66 (11-24-22 @ 09:30) (65 - 69)  BP: 114/51 (11-24-22 @ 09:30) (114/51 - 123/52)  RR: 17 (11-24-22 @ 09:30) (17 - 18)  SpO2: 100% (11-24-22 @ 09:30) (98% - 100%)  Wt(kg): --    General: Well nourished in no acute distress.  Head: Normocephalic and atraumatic.   Neck: No JVD. No bruits. Supple. Does not appear to be enlarged.   Cardiovascular: + S1,S2 ; RRR Soft systolic murmur at the left lower sternal border. No rubs noted.    Lungs: CTA b/l. No rhonchi, rales or wheezes.   Abdomen: + BS, soft. Non tender. Non distended. No rebound. No guarding.   Extremities: No clubbing/cyanosis/edema.   Neurologic: Moves all four extremities. Full range of motion.   Skin: Warm and moist. The patient's skin has normal elasticity and good skin turgor.   Psychiatric: unable to fully assess  Musculoskeletal: Normal range of motion, normal strength    DATA    < from: Transthoracic Echocardiogram (11.21.22 @ 09:03) >  CONCLUSIONS:  1. Mitral annular calcification, otherwise normal mitral  valve. Minimal mitral regurgitation.  2. Aortic valve leaflet morphology not well visualized.  Mild aortic regurgitation.  3. Endocardium not well visualized; grossly normal left  ventricular systolic function.  4. The right ventricle is not well visualized; grossly  normal right ventricular systolic function.  5. A bubble study was performed with the intravenous  injection of agitated saline contrast and was inconclusive  regarding the presence of an interatrial shunt.  No obvious cardiac source of embolus was identified on this  transthoracic study.  If clinical suspicion is high,  consider STACEY for further evaluation.  ------------------------------------------------------------------------  Confirmed on  11/21/2022 - 10:08:18 by Marlo Mahajan M.D.,  [facility], [place]    < end of copied text >    < from: CT Head No Cont (11.18.22 @ 19:20) >    IMPRESSION:    No acute hemorrhage or mass effect.    < end of copied text >      < from: Transthoracic Echocardiogram (11.21.22 @ 09:03) >  CONCLUSIONS:  1. Mitral annular calcification, otherwise normal mitral  valve. Minimal mitral regurgitation.  2. Aortic valve leaflet morphology not well visualized.  Mild aortic regurgitation.  3. Endocardium not well visualized; grossly normal left  ventricular systolic function.  4. The right ventricle is not well visualized; grossly  normal right ventricular systolic function.  5. A bubble study was performed with the intravenous  injection of agitated saline contrast and was inconclusive  regarding the presence of an interatrial shunt.  No obvious cardiac source of embolus was identified on this  transthoracic study.  If clinical suspicion is high,  consider STACEY for further evaluation.  ------------------------------------------------------------------------  Confirmed on  11/21/2022 - 10:08:18 by Marlo Mahajan M.D.,  [facility], [place]    < end of copied text >    < from: MR Head No Cont (11.23.22 @ 17:31) >  IMPRESSION:  No acute intracranial hemorrhage or acute infarct.    Incidental 4 mm outpouching at the distal left internal carotid artery   suspicious for aneurysm. MRA head or CTA head recommended for further   evaluation    < end of copied text >    EEG Classification / Summary:  Abnormal EEG study  Right occipital max focal slowing  Mild generalized background slowing  -----------------------------------------------------------------------------------------------------  Clinical Impression:  Right occipital  focal cerebral dysfunction.  Mild superimposed diffuse/multifocal cerebral dysfunction, not specific as to etiology.    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 ? Near syncope.    1. Near syncope  --Neuro w/u in progress to r/o seizures, EEG and MRI done, reports as above  --f/u Neuro  --ASA started for lacunar infarcts found on CT  --TTE as noted above, preserved LV function  --orthostatics positive, s/p IVF, repeat is negative       2. hx HTN  --on Coreg and Norvasc, continue as tolerated                    
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 ? Near syncope.    pt seen and examined, no complaints, ROS - .     Review of Systems:   Constitutional: [ ] fevers, [ ] chills.   Skin: [ ] dry skin. [ ] rashes.  Psychiatric: [ ] depression, [ ] anxiety.   Gastrointestinal: [ ] BRBPR, [ ] melena.   Neurological: [ ] confusion. [ ] seizures. [ ] shuffling gait.   Ears,Nose,Mouth and Throat: [ ] ear pain [ ] sore throat.   Eyes: [ ] diplopia.   Respiratory: [ ] hemoptysis. [ ] shortness of breath  Cardiovascular: See HPI above  Hematologic/Lymphatic: [ ] anemia. [ ] painful nodes. [ ] prolonged bleeding.   Genitourinary: [ ] hematuria. [ ] flank pain.   Endocrine: [ ] significant change in weight. [ ] intolerance to heat and cold.     Review of systems [x ] otherwise negative, [ ] otherwise unable to obtain    FH: no family history of sudden cardiac death in first degree relatives    SH: [ ] tobacco, [ ] alcohol, [ ] drugs          allopurinol 100 milliGRAM(s) Oral at bedtime  amLODIPine   Tablet 10 milliGRAM(s) Oral daily  aspirin enteric coated 81 milliGRAM(s) Oral daily  carvedilol 6.25 milliGRAM(s) Oral every 12 hours  dextrose 5% + sodium chloride 0.9%. 1000 milliLiter(s) IV Continuous <Continuous>  enoxaparin Injectable 30 milliGRAM(s) SubCutaneous every 24 hours  fenofibrate Tablet 145 milliGRAM(s) Oral daily  magnesium sulfate  IVPB 2 Gram(s) IV Intermittent once  mirtazapine 15 milliGRAM(s) Oral at bedtime  pantoprazole    Tablet 40 milliGRAM(s) Oral before breakfast  senna 2 Tablet(s) Oral at bedtime  sodium chloride 0.9%. 500 milliLiter(s) IV Continuous <Continuous>                            10.7   3.14  )-----------( 293      ( 26 Nov 2022 05:57 )             35.6       Hemoglobin: 10.7 g/dL (11-26 @ 05:57)  Hemoglobin: 9.5 g/dL (11-25 @ 09:01)  Hemoglobin: 10.3 g/dL (11-24 @ 08:00)  Hemoglobin: 10.5 g/dL (11-23 @ 06:50)  Hemoglobin: 12.2 g/dL (11-22 @ 05:00)      11-26    140  |  102  |  8   ----------------------------<  92  4.1   |  29  |  0.62    Ca    10.7<H>      26 Nov 2022 05:57  Phos  2.3     11-26  Mg     1.50     11-26      Creatinine Trend: 0.62<--, 0.73<--, 0.98<--, 1.21<--, 0.58<--, 0.51<--    COAGS:           T(C): 36.7 (11-26-22 @ 06:45), Max: 36.9 (11-25-22 @ 10:40)  HR: 70 (11-26-22 @ 06:45) (60 - 70)  BP: 150/76 (11-26-22 @ 06:45) (109/50 - 150/76)  RR: 16 (11-26-22 @ 06:45) (16 - 17)  SpO2: 100% (11-26-22 @ 06:45) (100% - 100%)  Wt(kg): --    I&O's Summary      General: Well nourished in no acute distress.  Head: Normocephalic and atraumatic.   Neck: No JVD. No bruits. Supple. Does not appear to be enlarged.   Cardiovascular: + S1,S2 ; RRR Soft systolic murmur at the left lower sternal border. No rubs noted.    Lungs: CTA b/l. No rhonchi, rales or wheezes.   Abdomen: + BS, soft. Non tender. Non distended. No rebound. No guarding.   Extremities: No clubbing/cyanosis/edema.   Neurologic: Moves all four extremities. Full range of motion.   Skin: Warm and moist. The patient's skin has normal elasticity and good skin turgor.   Psychiatric: unable to fully assess  Musculoskeletal: Normal range of motion, normal strength    DATA    < from: Transthoracic Echocardiogram (11.21.22 @ 09:03) >  CONCLUSIONS:  1. Mitral annular calcification, otherwise normal mitral  valve. Minimal mitral regurgitation.  2. Aortic valve leaflet morphology not well visualized.  Mild aortic regurgitation.  3. Endocardium not well visualized; grossly normal left  ventricular systolic function.  4. The right ventricle is not well visualized; grossly  normal right ventricular systolic function.  5. A bubble study was performed with the intravenous  injection of agitated saline contrast and was inconclusive  regarding the presence of an interatrial shunt.  No obvious cardiac source of embolus was identified on this  transthoracic study.  If clinical suspicion is high,  consider STACEY for further evaluation.  ------------------------------------------------------------------------  Confirmed on  11/21/2022 - 10:08:18 by Marlo Mahajan M.D.,  [facility], [facility]    < end of copied text >    < from: CT Head No Cont (11.18.22 @ 19:20) >    IMPRESSION:    No acute hemorrhage or mass effect.    < end of copied text >      < from: Transthoracic Echocardiogram (11.21.22 @ 09:03) >  CONCLUSIONS:  1. Mitral annular calcification, otherwise normal mitral  valve. Minimal mitral regurgitation.  2. Aortic valve leaflet morphology not well visualized.  Mild aortic regurgitation.  3. Endocardium not well visualized; grossly normal left  ventricular systolic function.  4. The right ventricle is not well visualized; grossly  normal right ventricular systolic function.  5. A bubble study was performed with the intravenous  injection of agitated saline contrast and was inconclusive  regarding the presence of an interatrial shunt.  No obvious cardiac source of embolus was identified on this  transthoracic study.  If clinical suspicion is high,  consider STACEY for further evaluation.  ------------------------------------------------------------------------  Confirmed on  11/21/2022 - 10:08:18 by Marlo Mhaajan M.D.,  [facility], [facility]    < end of copied text >    < from: MR Head No Cont (11.23.22 @ 17:31) >  IMPRESSION:  No acute intracranial hemorrhage or acute infarct.    Incidental 4 mm outpouching at the distal left internal carotid artery   suspicious for aneurysm. MRA head or CTA head recommended for further   evaluation    < end of copied text >    EEG Classification / Summary:  Abnormal EEG study  Right occipital max focal slowing  Mild generalized background slowing  -----------------------------------------------------------------------------------------------------  Clinical Impression:  Right occipital  focal cerebral dysfunction.  Mild superimposed diffuse/multifocal cerebral dysfunction, not specific as to etiology.    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 ? Near syncope.    1. Near syncope  --Neuro w/u in progress to r/o seizures, EEG and MRI done, reports as above  --f/u Neuro  --ASA started for lacunar infarcts found on CT  --TTE as noted above, preserved LV function  --orthostatics positive, s/p IVF, repeat is negative       2. hx HTN  --on Coreg and Norvasc, continue as tolerated                    
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 ? Near syncope.    Patient denies chest pain or shortness of breath.   Review of symptoms otherwise negative.    Review of Systems:   Constitutional: [ ] fevers, [ ] chills.   Skin: [ ] dry skin. [ ] rashes.  Psychiatric: [ ] depression, [ ] anxiety.   Gastrointestinal: [ ] BRBPR, [ ] melena.   Neurological: [ ] confusion. [ ] seizures. [ ] shuffling gait.   Ears,Nose,Mouth and Throat: [ ] ear pain [ ] sore throat.   Eyes: [ ] diplopia.   Respiratory: [ ] hemoptysis. [ ] shortness of breath  Cardiovascular: See HPI above  Hematologic/Lymphatic: [ ] anemia. [ ] painful nodes. [ ] prolonged bleeding.   Genitourinary: [ ] hematuria. [ ] flank pain.   Endocrine: [ ] significant change in weight. [ ] intolerance to heat and cold.     Review of systems [ x] otherwise negative, [ ] otherwise unable to obtain    FH: no family history of sudden cardiac death in first degree relatives    SH: [ ] tobacco, [ ] alcohol, [ ] drugs    allopurinol 100 milliGRAM(s) Oral at bedtime  amLODIPine   Tablet 10 milliGRAM(s) Oral daily  aspirin enteric coated 81 milliGRAM(s) Oral daily  carvedilol 6.25 milliGRAM(s) Oral every 12 hours  dextrose 5% + sodium chloride 0.9%. 1000 milliLiter(s) IV Continuous <Continuous>  enoxaparin Injectable 30 milliGRAM(s) SubCutaneous every 24 hours  fenofibrate Tablet 145 milliGRAM(s) Oral daily  mirtazapine 15 milliGRAM(s) Oral at bedtime  pantoprazole    Tablet 40 milliGRAM(s) Oral before breakfast  senna 2 Tablet(s) Oral at bedtime  sodium chloride 0.9%. 500 milliLiter(s) IV Continuous <Continuous>                          11.0   4.19  )-----------( 321      ( 29 Nov 2022 01:50 )             36.0     140  |  103  |  10  ----------------------------<  84  3.8   |  28  |  0.72    Ca    10.7<H>      29 Nov 2022 01:50  Phos  3.4     11-29  Mg     1.50     11-29    T(C): 36.6 (11-29-22 @ 12:20), Max: 36.7 (11-29-22 @ 06:10)  HR: 65 (11-29-22 @ 12:20) (65 - 75)  BP: 111/56 (11-29-22 @ 12:20) (111/56 - 122/61)  RR: 18 (11-29-22 @ 12:20) (17 - 18)  SpO2: 100% (11-29-22 @ 12:20) (95% - 100%)    General: Well nourished in no acute distress.  Head: Normocephalic and atraumatic.   Neck: No JVD. No bruits. Supple. Does not appear to be enlarged.   Cardiovascular: + S1,S2 ; RRR Soft systolic murmur at the left lower sternal border. No rubs noted.    Lungs: CTA b/l. No rhonchi, rales or wheezes.   Abdomen: + BS, soft. Non tender. Non distended. No rebound. No guarding.   Extremities: No clubbing/cyanosis/edema.   Neurologic: Moves all four extremities. Full range of motion.   Skin: Warm and moist. The patient's skin has normal elasticity and good skin turgor.   Psychiatric: unable to fully assess  Musculoskeletal: Normal range of motion, normal strength    DATA    < from: Transthoracic Echocardiogram (11.21.22 @ 09:03) >  CONCLUSIONS:  1. Mitral annular calcification, otherwise normal mitral  valve. Minimal mitral regurgitation.  2. Aortic valve leaflet morphology not well visualized.  Mild aortic regurgitation.  3. Endocardium not well visualized; grossly normal left  ventricular systolic function.  4. The right ventricle is not well visualized; grossly  normal right ventricular systolic function.  5. A bubble study was performed with the intravenous  injection of agitated saline contrast and was inconclusive  regarding the presence of an interatrial shunt.  No obvious cardiac source of embolus was identified on this  transthoracic study.  If clinical suspicion is high,  consider STACEY for further evaluation.  ------------------------------------------------------------------------  Confirmed on  11/21/2022 - 10:08:18 by Marlo Mahajan M.D.,  [facility], Replaced by [facility]  < end of copied text >    < from: CT Head No Cont (11.18.22 @ 19:20) >    IMPRESSION:  No acute hemorrhage or mass effect.  < end of copied text >    < from: MR Head No Cont (11.23.22 @ 17:31) >  IMPRESSION:  No acute intracranial hemorrhage or acute infarct.    Incidental 4 mm outpouching at the distal left internal carotid artery   suspicious for aneurysm. MRA head or CTA head recommended for further   evaluation    < end of copied text >    EEG Classification / Summary:  Abnormal EEG study  Right occipital max focal slowing  Mild generalized background slowing  -----------------------------------------------------------------------------------------------------  Clinical Impression:  Right occipital  focal cerebral dysfunction.  Mild superimposed diffuse/multifocal cerebral dysfunction, not specific as to etiology.    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 ? Near syncope.    1. Near syncope  --Neuro w/u in progress to r/o seizures, EEG and MRI done, reports as above  --ASA started for lacunar infarcts found on CT  --TTE as noted above, preserved LV function  --orthostatics positive, s/p IVF, repeat is negative       2. hx HTN  --on Coreg and Norvasc, continue as tolerated      3. Left internal carotid aneursym  - f/u with vascular                
General neurology follow-up consult note:    87 F with gallbladder cancer (s/p resection w/liver resection and partial colectomy, not on any treatment), recent hospitalization for encephalopathy with respiratory failure and hepatic abscess; HTN, HLD. Currently admitted for shaking of legs while awake with eyes open; also not sleeping much and hallucinating (seeing dead family members), worsening tremors and confusion, for a few days prior to admission. Seen by neurology 11/18-11/19, found with generalized myoclonus and asterixis.    Pt was seen 11/25/22. TV was showing a [place] show, although she doesn't speak [place] - she said she didn't know how to change the channel. She was having appropriate conversation in English.    Exam 11/25/22:  Cognition: Awake, alert, oriented to self and "hospital", answers questions and follows commands, attentive to conversation, not able to provide much history  Language: speaks in short sentences, comprehension somewhat impaired (may be partly due to hearing loss, although does appear to have some comprehension limitation in addition to hearing loss)  CN: no dysarthria. hard of hearing. R tongue deviation on protrusion  Motor: mild asterixis b/l hands. no drift BUE  Coord: FNF and HTS no dysmetria b/l    EEG 11/19-11/20 (approximately 22 hours total): occasional R occipital sharp waves and focal slowing. mild diffuse slowing  11/23 MRI brain w/o: 4-mm distal L ICA possible aneurysm. 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    Assessment:  Presenting event of BLE shaking with preserved awareness, with findings of myoclonus and asterixis on initial neurology exam - not consistent with seizure. Likely toxic-metabolic encephalopathy.  R occipital epileptiform discharges on EEG do not warrant antiseizure medication since there is no history of seizure and no lesion seen on MRI brain that would be a significant risk for seizure.  Pt appears improved compared to exams listed in initial neurology note. Has R tongue deviation of unclear duration.    Recommendations:  -MRA head w/o contrast, or CTA head - to evaluate possible L ICA aneurysm seen on MRI brain  -if patient is not back to baseline, can obtain post-contrast MRI brain  -follow up with neurosurgery for aneurysm  -stroke clinic after discharge - for outpatient aneurysm follow-up (if aneurysm confirmed on vessel imaging)  -general neurology clinic after discharge - for tremors, hallucinations  -aspirin 81 mg daily and atorvastatin 40 mg qhs with LDL goal < 70 - if no contraindication - due to chronic infarcts seen on MRI brain    Please call neurology with further questions or if post-contrast MRI brain is being done.    Corazon Cheung MD  
Patient is a 87y old  Female who presents with a chief complaint of AMS, shaking (23 Nov 2022 16:30)    Date of servie : 11-23-22 @ 18:26  INTERVAL HPI/OVERNIGHT EVENTS:  T(C): 36.8 (11-23-22 @ 17:30), Max: 36.8 (11-23-22 @ 17:30)  HR: 65 (11-23-22 @ 17:30) (64 - 66)  BP: 118/60 (11-23-22 @ 17:30) (101/56 - 118/60)  RR: 18 (11-23-22 @ 17:30) (18 - 19)  SpO2: 98% (11-23-22 @ 17:30) (98% - 100%)  Wt(kg): --  I&O's Summary      LABS:                        10.5   4.02  )-----------( 245      ( 23 Nov 2022 06:50 )             35.2     11-23    138  |  104  |  16  ----------------------------<  89  4.5   |  26  |  1.21    Ca    10.2      23 Nov 2022 06:50  Phos  4.9     11-23  Mg     1.70     11-23    TPro  8.2  /  Alb  4.4  /  TBili  0.8  /  DBili  x   /  AST  20  /  ALT  10  /  AlkPhos  107  11-22        CAPILLARY BLOOD GLUCOSE                MEDICATIONS  (STANDING):  allopurinol 100 milliGRAM(s) Oral at bedtime  amLODIPine   Tablet 10 milliGRAM(s) Oral daily  aspirin enteric coated 81 milliGRAM(s) Oral daily  carvedilol 6.25 milliGRAM(s) Oral every 12 hours  dextrose 5% + sodium chloride 0.9%. 1000 milliLiter(s) (50 mL/Hr) IV Continuous <Continuous>  enoxaparin Injectable 30 milliGRAM(s) SubCutaneous every 24 hours  fenofibrate Tablet 145 milliGRAM(s) Oral daily  mirtazapine 15 milliGRAM(s) Oral at bedtime  pantoprazole    Tablet 40 milliGRAM(s) Oral before breakfast  senna 2 Tablet(s) Oral at bedtime  sodium chloride 0.9%. 500 milliLiter(s) (125 mL/Hr) IV Continuous <Continuous>    MEDICATIONS  (PRN):          PHYSICAL EXAM:  GENERAL: NAD, well-groomed, well-developed  HEAD:  Atraumatic, Normocephalic  CHEST/LUNG: Clear to percussion bilaterally; No rales, rhonchi, wheezing, or rubs  HEART: Regular rate and rhythm; No murmurs, rubs, or gallops  ABDOMEN: Soft, Nontender, Nondistended; Bowel sounds present  EXTREMITIES:  2+ Peripheral Pulses, No clubbing, cyanosis, or edema  LYMPH: No lymphadenopathy noted  SKIN: No rashes or lesions    Care Discussed with Consultants/Other Providers [ ] YES  [ ] NO
Patient is a 87y old  Female who presents with a chief complaint of AMS, shaking (2022 23:49)    Date of servie : 22 @ 16:57  INTERVAL HPI/OVERNIGHT EVENTS:  T(C): 36.3 (22 @ 13:29), Max: 37.1 (22 @ 01:15)  HR: 81 (22 @ 13:29) (76 - 86)  BP: 132/50 (22 @ 13:29) (87/39 - 156/65)  RR: 22 (22 @ 13:29) (18 - 22)  SpO2: 100% (22 @ 13:29) (93% - 100%)  Wt(kg): --  I&O's Summary      LABS:                        9.7    3.88  )-----------( 231      ( 2022 09:53 )             33.0         144  |  105  |  19  ----------------------------<  73  3.8   |  31  |  0.75    Ca    9.7      2022 09:53  Phos  3.5       Mg     1.80         TPro  8.2  /  Alb  4.2  /  TBili  0.4  /  DBili  x   /  AST  21  /  ALT  8   /  AlkPhos  101  11-18    PT/INR - ( 2022 14:46 )   PT: 12.1 sec;   INR: 1.04 ratio         PTT - ( 2022 14:46 )  PTT:24.7 sec  Urinalysis Basic - ( 2022 15:45 )    Color: Light Yellow / Appearance: Clear / S.010 / pH: x  Gluc: x / Ketone: Negative  / Bili: Negative / Urobili: <2 mg/dL   Blood: x / Protein: Trace / Nitrite: Negative   Leuk Esterase: Negative / RBC: x / WBC x   Sq Epi: x / Non Sq Epi: x / Bacteria: x      CAPILLARY BLOOD GLUCOSE      POCT Blood Glucose.: 79 mg/dL (2022 07:20)        Urinalysis Basic - ( 2022 15:45 )    Color: Light Yellow / Appearance: Clear / S.010 / pH: x  Gluc: x / Ketone: Negative  / Bili: Negative / Urobili: <2 mg/dL   Blood: x / Protein: Trace / Nitrite: Negative   Leuk Esterase: Negative / RBC: x / WBC x   Sq Epi: x / Non Sq Epi: x / Bacteria: x        MEDICATIONS  (STANDING):  allopurinol 100 milliGRAM(s) Oral at bedtime  dextrose 5% + sodium chloride 0.9%. 1000 milliLiter(s) (50 mL/Hr) IV Continuous <Continuous>  enoxaparin Injectable 30 milliGRAM(s) SubCutaneous every 24 hours  fenofibrate Tablet 145 milliGRAM(s) Oral daily  mirtazapine 15 milliGRAM(s) Oral at bedtime  pantoprazole    Tablet 40 milliGRAM(s) Oral before breakfast  senna 2 Tablet(s) Oral at bedtime    MEDICATIONS  (PRN):          PHYSICAL EXAM:  GENERAL: frail  CHEST/LUNG: Clear to percussion bilaterally; No rales, rhonchi, wheezing, or rubs  HEART: Regular rate and rhythm; No murmurs, rubs, or gallops  ABDOMEN: Soft, Nontender, Nondistended; Bowel sounds present  EXTREMITIES: edema +     Care Discussed with Consultants/Other Providers [ ] YES  [ ] NO
Patient is a 87y old  Female who presents with a chief complaint of AMS, shaking (21 Nov 2022 12:17)    Date of servie : 11-21-22 @ 16:34  INTERVAL HPI/OVERNIGHT EVENTS:  T(C): 37 (11-21-22 @ 15:00), Max: 37.2 (11-20-22 @ 17:20)  HR: 82 (11-21-22 @ 15:00) (68 - 104)  BP: 147/68 (11-21-22 @ 15:00) (139/71 - 175/64)  RR: 18 (11-21-22 @ 15:00) (17 - 18)  SpO2: 98% (11-21-22 @ 15:00) (96% - 100%)  Wt(kg): --  I&O's Summary      LABS:                        11.6   4.71  )-----------( 276      ( 21 Nov 2022 06:00 )             39.5     11-21    141  |  101  |  7   ----------------------------<  134<H>  3.4<L>   |  30  |  0.51    Ca    10.7<H>      21 Nov 2022 06:00  Phos  1.1     11-21  Mg     1.50     11-21          CAPILLARY BLOOD GLUCOSE                MEDICATIONS  (STANDING):  allopurinol 100 milliGRAM(s) Oral at bedtime  amLODIPine   Tablet 10 milliGRAM(s) Oral daily  carvedilol 6.25 milliGRAM(s) Oral every 12 hours  dextrose 5% + sodium chloride 0.9%. 1000 milliLiter(s) (50 mL/Hr) IV Continuous <Continuous>  enoxaparin Injectable 30 milliGRAM(s) SubCutaneous every 24 hours  fenofibrate Tablet 145 milliGRAM(s) Oral daily  mirtazapine 15 milliGRAM(s) Oral at bedtime  pantoprazole    Tablet 40 milliGRAM(s) Oral before breakfast  senna 2 Tablet(s) Oral at bedtime    MEDICATIONS  (PRN):          PHYSICAL EXAM:  GENERAL: NAD, well-groomed, well-developed  HEAD:  Atraumatic, Normocephalic  CHEST/LUNG: Clear to percussion bilaterally; No rales, rhonchi, wheezing, or rubs  HEART: Regular rate and rhythm; No murmurs, rubs, or gallops  ABDOMEN: Soft, Nontender, Nondistended; Bowel sounds present  EXTREMITIES:  2+ Peripheral Pulses, No clubbing, cyanosis, or edema  LYMPH: No lymphadenopathy noted  SKIN: No rashes or lesions    Care Discussed with Consultants/Other Providers [ ] YES  [ ] NO
Patient is a 87y old  Female who presents with a chief complaint of AMS, shaking (22 Nov 2022 14:27)    Date of servie : 11-22-22 @ 16:55  INTERVAL HPI/OVERNIGHT EVENTS:  T(C): 37 (11-22-22 @ 16:34), Max: 37.2 (11-21-22 @ 18:40)  HR: 77 (11-22-22 @ 16:34) (77 - 92)  BP: 131/69 (11-22-22 @ 16:34) (131/69 - 185/77)  RR: 18 (11-22-22 @ 16:34) (17 - 20)  SpO2: 100% (11-22-22 @ 16:34) (91% - 100%)  Wt(kg): --  I&O's Summary    21 Nov 2022 07:01  -  22 Nov 2022 07:00  --------------------------------------------------------  IN: 0 mL / OUT: 1 mL / NET: -1 mL        LABS:                        12.2   4.94  )-----------( 291      ( 22 Nov 2022 05:00 )             40.5     11-22    139  |  99  |  6<L>  ----------------------------<  87  3.9   |  28  |  0.58    Ca    10.9<H>      22 Nov 2022 05:00  Phos  1.7     11-22  Mg     2.00     11-22    TPro  8.2  /  Alb  4.4  /  TBili  0.8  /  DBili  x   /  AST  20  /  ALT  10  /  AlkPhos  107  11-22        CAPILLARY BLOOD GLUCOSE                MEDICATIONS  (STANDING):  allopurinol 100 milliGRAM(s) Oral at bedtime  amLODIPine   Tablet 10 milliGRAM(s) Oral daily  aspirin enteric coated 81 milliGRAM(s) Oral daily  carvedilol 6.25 milliGRAM(s) Oral every 12 hours  dextrose 5% + sodium chloride 0.9%. 1000 milliLiter(s) (50 mL/Hr) IV Continuous <Continuous>  enoxaparin Injectable 30 milliGRAM(s) SubCutaneous every 24 hours  fenofibrate Tablet 145 milliGRAM(s) Oral daily  LORazepam     Tablet 0.5 milliGRAM(s) Oral once  mirtazapine 15 milliGRAM(s) Oral at bedtime  pantoprazole    Tablet 40 milliGRAM(s) Oral before breakfast  senna 2 Tablet(s) Oral at bedtime  sodium chloride 0.9%. 500 milliLiter(s) (125 mL/Hr) IV Continuous <Continuous>    MEDICATIONS  (PRN):          PHYSICAL EXAM:  GENERAL: frail  CHEST/LUNG: Clear to percussion bilaterally; No rales, rhonchi, wheezing, or rubs  HEART: Regular rate and rhythm; No murmurs, rubs, or gallops  ABDOMEN: Soft, Nontender, Nondistended; Bowel sounds present  EXTREMITIES: edema +    Care Discussed with Consultants/Other Providers [x ] YES  [ ] NO
Patient is a 87y old  Female who presents with a chief complaint of AMS, shaking (24 Nov 2022 13:17)    Date of servie : 11-24-22 @ 14:03  INTERVAL HPI/OVERNIGHT EVENTS:  T(C): 36.3 (11-24-22 @ 13:30), Max: 36.8 (11-23-22 @ 17:30)  HR: 61 (11-24-22 @ 13:30) (61 - 69)  BP: 106/49 (11-24-22 @ 13:30) (106/49 - 123/52)  RR: 16 (11-24-22 @ 13:30) (16 - 18)  SpO2: 98% (11-24-22 @ 13:30) (98% - 100%)  Wt(kg): --  I&O's Summary      LABS:                        10.3   3.56  )-----------( 242      ( 24 Nov 2022 08:00 )             34.6     11-24    136  |  103  |  18  ----------------------------<  93  4.5   |  26  |  0.98    Ca    10.1      24 Nov 2022 08:00  Phos  3.1     11-24  Mg     1.80     11-24          CAPILLARY BLOOD GLUCOSE                MEDICATIONS  (STANDING):  allopurinol 100 milliGRAM(s) Oral at bedtime  amLODIPine   Tablet 10 milliGRAM(s) Oral daily  aspirin enteric coated 81 milliGRAM(s) Oral daily  carvedilol 6.25 milliGRAM(s) Oral every 12 hours  dextrose 5% + sodium chloride 0.9%. 1000 milliLiter(s) (50 mL/Hr) IV Continuous <Continuous>  enoxaparin Injectable 30 milliGRAM(s) SubCutaneous every 24 hours  fenofibrate Tablet 145 milliGRAM(s) Oral daily  mirtazapine 15 milliGRAM(s) Oral at bedtime  pantoprazole    Tablet 40 milliGRAM(s) Oral before breakfast  senna 2 Tablet(s) Oral at bedtime  sodium chloride 0.9%. 500 milliLiter(s) (125 mL/Hr) IV Continuous <Continuous>    MEDICATIONS  (PRN):          PHYSICAL EXAM:  GENERAL: NAD, well-groomed, well-developed  HEAD:  Atraumatic, Normocephalic  CHEST/LUNG: Clear to percussion bilaterally; No rales, rhonchi, wheezing, or rubs  HEART: Regular rate and rhythm; No murmurs, rubs, or gallops  ABDOMEN: Soft, Nontender, Nondistended; Bowel sounds present  EXTREMITIES:  2+ Peripheral Pulses, No clubbing, cyanosis, or edema  LYMPH: No lymphadenopathy noted  SKIN: No rashes or lesions    Care Discussed with Consultants/Other Providers [ ] YES  [ ] NO
Patient is a 87y old  Female who presents with a chief complaint of AMS, shaking (25 Nov 2022 13:03)    Date of servie : 11-25-22 @ 14:24  INTERVAL HPI/OVERNIGHT EVENTS:  T(C): 36.9 (11-25-22 @ 10:40), Max: 36.9 (11-25-22 @ 10:40)  HR: 64 (11-25-22 @ 10:40) (60 - 64)  BP: 122/60 (11-25-22 @ 10:40) (114/58 - 136/45)  RR: 16 (11-25-22 @ 10:40) (16 - 17)  SpO2: 100% (11-25-22 @ 10:40) (98% - 100%)  Wt(kg): --  I&O's Summary      LABS:                        9.5    3.04  )-----------( 251      ( 25 Nov 2022 09:01 )             31.9     11-25    142  |  109<H>  |  12  ----------------------------<  82  4.7   |  28  |  0.73    Ca    10.4      25 Nov 2022 09:01  Phos  2.2     11-25  Mg     1.60     11-25          CAPILLARY BLOOD GLUCOSE                MEDICATIONS  (STANDING):  allopurinol 100 milliGRAM(s) Oral at bedtime  amLODIPine   Tablet 10 milliGRAM(s) Oral daily  aspirin enteric coated 81 milliGRAM(s) Oral daily  carvedilol 6.25 milliGRAM(s) Oral every 12 hours  dextrose 5% + sodium chloride 0.9%. 1000 milliLiter(s) (50 mL/Hr) IV Continuous <Continuous>  enoxaparin Injectable 30 milliGRAM(s) SubCutaneous every 24 hours  fenofibrate Tablet 145 milliGRAM(s) Oral daily  mirtazapine 15 milliGRAM(s) Oral at bedtime  pantoprazole    Tablet 40 milliGRAM(s) Oral before breakfast  senna 2 Tablet(s) Oral at bedtime  sodium chloride 0.9%. 500 milliLiter(s) (125 mL/Hr) IV Continuous <Continuous>    MEDICATIONS  (PRN):          PHYSICAL EXAM:  GENERAL: NAD, well-groomed, well-developed  HEAD:  Atraumatic, Normocephalic  CHEST/LUNG: Clear to percussion bilaterally; No rales, rhonchi, wheezing, or rubs  HEART: Regular rate and rhythm; No murmurs, rubs, or gallops  ABDOMEN: Soft, Nontender, Nondistended; Bowel sounds present  EXTREMITIES:  2+ Peripheral Pulses, No clubbing, cyanosis, or edema  LYMPH: No lymphadenopathy noted  SKIN: No rashes or lesions    Care Discussed with Consultants/Other Providers [ ] YES  [ ] NO
Patient is a 87y old  Female who presents with a chief complaint of AMS, shaking (26 Nov 2022 09:51)    Date of servie : 11-26-22 @ 15:18  INTERVAL HPI/OVERNIGHT EVENTS:  T(C): 37.1 (11-26-22 @ 14:22), Max: 37.1 (11-26-22 @ 14:22)  HR: 70 (11-26-22 @ 14:22) (60 - 70)  BP: 138/64 (11-26-22 @ 14:22) (109/50 - 150/76)  RR: 18 (11-26-22 @ 14:22) (16 - 18)  SpO2: 100% (11-26-22 @ 14:22) (99% - 100%)  Wt(kg): --  I&O's Summary      LABS:                        10.7   3.14  )-----------( 293      ( 26 Nov 2022 05:57 )             35.6     11-26    140  |  102  |  8   ----------------------------<  92  4.1   |  29  |  0.62    Ca    10.7<H>      26 Nov 2022 05:57  Phos  2.3     11-26  Mg     1.50     11-26          CAPILLARY BLOOD GLUCOSE                MEDICATIONS  (STANDING):  allopurinol 100 milliGRAM(s) Oral at bedtime  amLODIPine   Tablet 10 milliGRAM(s) Oral daily  aspirin enteric coated 81 milliGRAM(s) Oral daily  carvedilol 6.25 milliGRAM(s) Oral every 12 hours  dextrose 5% + sodium chloride 0.9%. 1000 milliLiter(s) (50 mL/Hr) IV Continuous <Continuous>  enoxaparin Injectable 30 milliGRAM(s) SubCutaneous every 24 hours  fenofibrate Tablet 145 milliGRAM(s) Oral daily  mirtazapine 15 milliGRAM(s) Oral at bedtime  pantoprazole    Tablet 40 milliGRAM(s) Oral before breakfast  senna 2 Tablet(s) Oral at bedtime  sodium chloride 0.9%. 500 milliLiter(s) (125 mL/Hr) IV Continuous <Continuous>    MEDICATIONS  (PRN):          PHYSICAL EXAM:  GENERAL: NAD, well-groomed, well-developed  HEAD:  Atraumatic, Normocephalic  CHEST/LUNG: Clear to percussion bilaterally; No rales, rhonchi, wheezing, or rubs  HEART: Regular rate and rhythm; No murmurs, rubs, or gallops  ABDOMEN: Soft, Nontender, Nondistended; Bowel sounds present  EXTREMITIES:  2+ Peripheral Pulses, No clubbing, cyanosis, or edema  LYMPH: No lymphadenopathy noted  SKIN: No rashes or lesions    Care Discussed with Consultants/Other Providers [ ] YES  [ ] NO
Patient is a 87y old  Female who presents with a chief complaint of AMS, shaking (27 Nov 2022 15:02)    Date of servie : 11-27-22 @ 15:13  INTERVAL HPI/OVERNIGHT EVENTS:  T(C): 36.7 (11-27-22 @ 11:32), Max: 36.7 (11-26-22 @ 17:33)  HR: 67 (11-27-22 @ 11:32) (64 - 67)  BP: 131/71 (11-27-22 @ 11:32) (128/61 - 153/62)  RR: 18 (11-27-22 @ 11:32) (18 - 18)  SpO2: 98% (11-27-22 @ 11:32) (95% - 99%)  Wt(kg): --  I&O's Summary      LABS:                        10.7   3.14  )-----------( 293      ( 26 Nov 2022 05:57 )             35.6     11-26    140  |  102  |  8   ----------------------------<  92  4.1   |  29  |  0.62    Ca    10.7<H>      26 Nov 2022 05:57  Phos  2.3     11-26  Mg     1.50     11-26          CAPILLARY BLOOD GLUCOSE                MEDICATIONS  (STANDING):  allopurinol 100 milliGRAM(s) Oral at bedtime  amLODIPine   Tablet 10 milliGRAM(s) Oral daily  aspirin enteric coated 81 milliGRAM(s) Oral daily  carvedilol 6.25 milliGRAM(s) Oral every 12 hours  dextrose 5% + sodium chloride 0.9%. 1000 milliLiter(s) (50 mL/Hr) IV Continuous <Continuous>  enoxaparin Injectable 30 milliGRAM(s) SubCutaneous every 24 hours  fenofibrate Tablet 145 milliGRAM(s) Oral daily  mirtazapine 15 milliGRAM(s) Oral at bedtime  pantoprazole    Tablet 40 milliGRAM(s) Oral before breakfast  senna 2 Tablet(s) Oral at bedtime  sodium chloride 0.9%. 500 milliLiter(s) (125 mL/Hr) IV Continuous <Continuous>    MEDICATIONS  (PRN):          PHYSICAL EXAM:  GENERAL: NAD, well-groomed, well-developed  HEAD:  Atraumatic, Normocephalic  CHEST/LUNG: Clear to percussion bilaterally; No rales, rhonchi, wheezing, or rubs  HEART: Regular rate and rhythm; No murmurs, rubs, or gallops  ABDOMEN: Soft, Nontender, Nondistended; Bowel sounds present  EXTREMITIES:  2+ Peripheral Pulses, No clubbing, cyanosis, or edema  LYMPH: No lymphadenopathy noted  SKIN: No rashes or lesions    Care Discussed with Consultants/Other Providers [ ] YES  [ ] NO
Patient is a 87y old  Female who presents with a chief complaint of AMS, shaking (28 Nov 2022 14:52)    Date of servie : 11-28-22 @ 20:05  INTERVAL HPI/OVERNIGHT EVENTS:  T(C): 36.6 (11-28-22 @ 16:57), Max: 36.7 (11-27-22 @ 22:15)  HR: 66 (11-28-22 @ 16:57) (66 - 74)  BP: 122/61 (11-28-22 @ 16:57) (122/61 - 149/76)  RR: 17 (11-28-22 @ 16:57) (17 - 17)  SpO2: 100% (11-28-22 @ 16:57) (96% - 100%)  Wt(kg): --  I&O's Summary    27 Nov 2022 07:01  -  28 Nov 2022 07:00  --------------------------------------------------------  IN: 0 mL / OUT: 1000 mL / NET: -1000 mL        LABS:                        12.1   5.17  )-----------( 331      ( 28 Nov 2022 04:05 )             39.3     11-28    135  |  98  |  8   ----------------------------<  163<H>  5.7<H>   |  26  |  0.61    Ca    10.5      28 Nov 2022 04:05  Phos  2.9     11-28  Mg     1.60     11-28          CAPILLARY BLOOD GLUCOSE        ABG - ( 28 Nov 2022 11:52 )  pH, Arterial: 7.42  pH, Blood: x     /  pCO2: 44    /  pO2: 149   / HCO3: 28    / Base Excess: 3.5   /  SaO2: 98.9                    MEDICATIONS  (STANDING):  allopurinol 100 milliGRAM(s) Oral at bedtime  amLODIPine   Tablet 10 milliGRAM(s) Oral daily  aspirin enteric coated 81 milliGRAM(s) Oral daily  carvedilol 6.25 milliGRAM(s) Oral every 12 hours  dextrose 5% + sodium chloride 0.9%. 1000 milliLiter(s) (50 mL/Hr) IV Continuous <Continuous>  enoxaparin Injectable 30 milliGRAM(s) SubCutaneous every 24 hours  fenofibrate Tablet 145 milliGRAM(s) Oral daily  mirtazapine 15 milliGRAM(s) Oral at bedtime  pantoprazole    Tablet 40 milliGRAM(s) Oral before breakfast  senna 2 Tablet(s) Oral at bedtime  sodium chloride 0.9%. 500 milliLiter(s) (125 mL/Hr) IV Continuous <Continuous>    MEDICATIONS  (PRN):          PHYSICAL EXAM:  GENERAL: NAD, well-groomed, well-developed  HEAD:  Atraumatic, Normocephalic  CHEST/LUNG: Clear to percussion bilaterally; No rales, rhonchi, wheezing, or rubs  HEART: Regular rate and rhythm; No murmurs, rubs, or gallops  ABDOMEN: Soft, Nontender, Nondistended; Bowel sounds present  EXTREMITIES:  2+ Peripheral Pulses, No clubbing, cyanosis, or edema  LYMPH: No lymphadenopathy noted  SKIN: No rashes or lesions    Care Discussed with Consultants/Other Providers [ ] YES  [ ] NO
Patient is a 87y old  Female who presents with a chief complaint of AMS, shaking (28 Nov 2022 20:05)    Date of servie : 11-29-22 @ 14:44  INTERVAL HPI/OVERNIGHT EVENTS:  T(C): 36.6 (11-29-22 @ 12:20), Max: 36.7 (11-29-22 @ 06:10)  HR: 65 (11-29-22 @ 12:20) (65 - 75)  BP: 111/56 (11-29-22 @ 12:20) (111/56 - 122/61)  RR: 18 (11-29-22 @ 12:20) (17 - 18)  SpO2: 100% (11-29-22 @ 12:20) (95% - 100%)  Wt(kg): --  I&O's Summary      LABS:                        11.0   4.19  )-----------( 321      ( 29 Nov 2022 01:50 )             36.0     11-29    140  |  103  |  10  ----------------------------<  84  3.8   |  28  |  0.72    Ca    10.7<H>      29 Nov 2022 01:50  Phos  3.4     11-29  Mg     1.50     11-29          CAPILLARY BLOOD GLUCOSE        ABG - ( 28 Nov 2022 11:52 )  pH, Arterial: 7.42  pH, Blood: x     /  pCO2: 44    /  pO2: 149   / HCO3: 28    / Base Excess: 3.5   /  SaO2: 98.9                    MEDICATIONS  (STANDING):  allopurinol 100 milliGRAM(s) Oral at bedtime  amLODIPine   Tablet 10 milliGRAM(s) Oral daily  aspirin enteric coated 81 milliGRAM(s) Oral daily  carvedilol 6.25 milliGRAM(s) Oral every 12 hours  dextrose 5% + sodium chloride 0.9%. 1000 milliLiter(s) (50 mL/Hr) IV Continuous <Continuous>  enoxaparin Injectable 30 milliGRAM(s) SubCutaneous every 24 hours  fenofibrate Tablet 145 milliGRAM(s) Oral daily  mirtazapine 15 milliGRAM(s) Oral at bedtime  pantoprazole    Tablet 40 milliGRAM(s) Oral before breakfast  senna 2 Tablet(s) Oral at bedtime  sodium chloride 0.9%. 500 milliLiter(s) (125 mL/Hr) IV Continuous <Continuous>    MEDICATIONS  (PRN):          PHYSICAL EXAM:  GENERAL: frail  CHEST/LUNG: Clear to percussion bilaterally; No rales, rhonchi, wheezing, or rubs  HEART: Regular rate and rhythm; No murmurs, rubs, or gallops  ABDOMEN: Soft, Nontender, Nondistended; Bowel sounds present  EXTREMITIES:  edema +    Care Discussed with Consultants/Other Providers [ ] YES  [ ] NO
Date of Service: 11-29-22 @ 16:44    Patient is a 87y old  Female who presents with a chief complaint of AMS, shaking (29 Nov 2022 16:39)      Any change in ROS: she seems comfortable:  no sob:  no cough:     MEDICATIONS  (STANDING):  allopurinol 100 milliGRAM(s) Oral at bedtime  amLODIPine   Tablet 10 milliGRAM(s) Oral daily  aspirin enteric coated 81 milliGRAM(s) Oral daily  carvedilol 6.25 milliGRAM(s) Oral every 12 hours  dextrose 5% + sodium chloride 0.9%. 1000 milliLiter(s) (50 mL/Hr) IV Continuous <Continuous>  enoxaparin Injectable 30 milliGRAM(s) SubCutaneous every 24 hours  fenofibrate Tablet 145 milliGRAM(s) Oral daily  mirtazapine 15 milliGRAM(s) Oral at bedtime  pantoprazole    Tablet 40 milliGRAM(s) Oral before breakfast  senna 2 Tablet(s) Oral at bedtime  sodium chloride 0.9%. 500 milliLiter(s) (125 mL/Hr) IV Continuous <Continuous>    MEDICATIONS  (PRN):    Vital Signs Last 24 Hrs  T(C): 36.6 (29 Nov 2022 12:20), Max: 36.7 (29 Nov 2022 06:10)  T(F): 97.9 (29 Nov 2022 12:20), Max: 98.1 (29 Nov 2022 06:10)  HR: 65 (29 Nov 2022 12:20) (65 - 75)  BP: 111/56 (29 Nov 2022 12:20) (111/56 - 122/61)  BP(mean): --  RR: 18 (29 Nov 2022 12:20) (17 - 18)  SpO2: 100% (29 Nov 2022 12:20) (95% - 100%)    Parameters below as of 29 Nov 2022 12:20  Patient On (Oxygen Delivery Method): nasal cannula  O2 Flow (L/min): 1      I&O's Summary        Physical Exam:   GENERAL: NAD, well-groomed, well-developed  HEENT: JASON/   Atraumatic, Normocephalic  ENMT: No tonsillar erythema, exudates, or enlargement; Moist mucous membranes, Good dentition, No lesions  NECK: Supple, No JVD, Normal thyroid  CHEST/LUNG: scattered craceskl bilaterally: no wheezing;   CVS: Regular rate and rhythm; No murmurs, rubs, or gallops  GI: : Soft, Nontender, Nondistended; Bowel sounds present  NERVOUS SYSTEM:  Alert & Oriented X3  EXTREMITIES: - edema  LYMPH: No lymphadenopathy noted  SKIN: No rashes or lesions  ENDOCRINOLOGY: No Thyromegaly  PSYCH: Appropriate    Labs:  ABG - ( 28 Nov 2022 11:52 )  pH, Arterial: 7.42  pH, Blood: x     /  pCO2: 44    /  pO2: 149   / HCO3: 28    / Base Excess: 3.5   /  SaO2: 98.9            34, 30                            11.0   4.19  )-----------( 321      ( 29 Nov 2022 01:50 )             36.0                         12.1   5.17  )-----------( 331      ( 28 Nov 2022 04:05 )             39.3                         10.7   3.14  )-----------( 293      ( 26 Nov 2022 05:57 )             35.6     11-29    140  |  103  |  10  ----------------------------<  84  3.8   |  28  |  0.72  11-28    135  |  98  |  8   ----------------------------<  163<H>  5.7<H>   |  26  |  0.61  11-26    140  |  102  |  8   ----------------------------<  92  4.1   |  29  |  0.62    Ca    10.7<H>      29 Nov 2022 01:50  Ca    10.5      28 Nov 2022 04:05  Phos  3.4     11-29  Phos  2.9     11-28  Mg     1.50     11-29  Mg     1.60     11-28      CAPILLARY BLOOD GLUCOSE            rad< from: CT Angio Neck w/ IV Cont (11.27.22 @ 20:42) >  Evaluation of the paraspinal soft tissue neck region appears normal    The visualized posterior both lung apices appear clear.    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    --- End of Report ---            JENN MORENO MD; Attending Radiologist  This document has been electronically signed. Nov 28 2022  9:29AM    < end of copied text >  < from: CT Angio Chest PE Protocol w/ IV Cont (11.23.22 @ 12:21) >  images were generated.    COMPARISON: CT chest 10/13/2022.    FINDINGS:    PULMONARY ANGIOGRAM:  Obscuration of several subsegmental branches due to   respiratory motion. No pulmonary embolism through the segmental branches.    LUNGS/AIRWAYS/PLEURA: No endobronchial lesion. Atelectasis in bilateral   upper and lower lobes. No pleural effusion.    LYMPH NODES/MEDIASTINUM: No enlarged lymph nodes.    HEART/VASCULATURE: Normal heart size. Unremarkable pericardium. 4.0 cm   midascending aorta. Coronary artery calcifications.    UPPER ABDOMEN: Cholecystectomy. Unchanged size but resolved gas component   of hepatic abscess near the gallbladder fossa (4-124). Unchanged adjacent   fat stranding.    BONES/SOFT TISSUES: Unremarkable.      IMPRESSION:    No pulmonary embolism through the segmental branches. Multiple   subsegmental branches are not well evaluated.    --- End of Report ---            CHRIS SPENCE M.D., ATTENDING RADIOGIST  This document has been electronically signed. Nov 23 2022 12:32PM    < end of copied text >            RECENT CULTURES:        RESPIRATORY CULTURES:          Studies  Chest X-RAY  CT SCAN Chest   Venous Dopplers: LE:   CT Abdomen  Others              
Date of Service: 11-24-22 @ 13:17    Patient is a 87y old  Female who presents with a chief complaint of AMS, shaking (24 Nov 2022 13:09)      Any change in ROS:  seems Ok: no sob:    MEDICATIONS  (STANDING):  allopurinol 100 milliGRAM(s) Oral at bedtime  amLODIPine   Tablet 10 milliGRAM(s) Oral daily  aspirin enteric coated 81 milliGRAM(s) Oral daily  carvedilol 6.25 milliGRAM(s) Oral every 12 hours  dextrose 5% + sodium chloride 0.9%. 1000 milliLiter(s) (50 mL/Hr) IV Continuous <Continuous>  enoxaparin Injectable 30 milliGRAM(s) SubCutaneous every 24 hours  fenofibrate Tablet 145 milliGRAM(s) Oral daily  mirtazapine 15 milliGRAM(s) Oral at bedtime  pantoprazole    Tablet 40 milliGRAM(s) Oral before breakfast  senna 2 Tablet(s) Oral at bedtime  sodium chloride 0.9%. 500 milliLiter(s) (125 mL/Hr) IV Continuous <Continuous>    MEDICATIONS  (PRN):    Vital Signs Last 24 Hrs  T(C): 36.6 (24 Nov 2022 09:30), Max: 36.8 (23 Nov 2022 17:30)  T(F): 97.8 (24 Nov 2022 09:30), Max: 98.2 (23 Nov 2022 17:30)  HR: 66 (24 Nov 2022 09:30) (65 - 69)  BP: 114/51 (24 Nov 2022 09:30) (114/51 - 123/52)  BP(mean): --  RR: 17 (24 Nov 2022 09:30) (17 - 18)  SpO2: 100% (24 Nov 2022 09:30) (98% - 100%)    Parameters below as of 24 Nov 2022 09:30  Patient On (Oxygen Delivery Method): nasal cannula  O2 Flow (L/min): 2      I&O's Summary        Physical Exam:   GENERAL: NAD, well-groomed, well-developed  HEENT: JASON/   Atraumatic, Normocephalic  ENMT: No tonsillar erythema, exudates, or enlargement; Moist mucous membranes, Good dentition, No lesions  NECK: Supple, No JVD, Normal thyroid  CHEST/LUNG: Clear to auscultaion  CVS: Regular rate and rhythm; No murmurs, rubs, or gallops  GI: : Soft, Nontender, Nondistended; Bowel sounds present  NERVOUS SYSTEM:  Alert & awake  EXTREMITIES:  -edema  LYMPH: No lymphadenopathy noted  SKIN: No rashes or lesions  ENDOCRINOLOGY: No Thyromegaly  PSYCH: Appropriate    Labs:  30, 37, 30, 40                            10.3   3.56  )-----------( 242      ( 24 Nov 2022 08:00 )             34.6                         10.5   4.02  )-----------( 245      ( 23 Nov 2022 06:50 )             35.2                         12.2   4.94  )-----------( 291      ( 22 Nov 2022 05:00 )             40.5                         11.6   4.71  )-----------( 276      ( 21 Nov 2022 06:00 )             39.5     11-24    136  |  103  |  18  ----------------------------<  93  4.5   |  26  |  0.98  11-23    138  |  104  |  16  ----------------------------<  89  4.5   |  26  |  1.21  11-22    139  |  99  |  6<L>  ----------------------------<  87  3.9   |  28  |  0.58  11-21    141  |  101  |  7   ----------------------------<  134<H>  3.4<L>   |  30  |  0.51    Ca    10.1      24 Nov 2022 08:00  Ca    10.2      23 Nov 2022 06:50  Phos  3.1     11-24  Phos  4.9     11-23  Mg     1.80     11-24  Mg     1.70     11-23    TPro  8.2  /  Alb  4.4  /  TBili  0.8  /  DBili  x   /  AST  20  /  ALT  10  /  AlkPhos  107  11-22    CAPILLARY BLOOD GLUCOSE          < from: CT Angio Chest PE Protocol w/ IV Cont (11.23.22 @ 12:21) >  LYMPH NODES/MEDIASTINUM: No enlarged lymph nodes.    HEART/VASCULATURE: Normal heart size. Unremarkable pericardium. 4.0 cm   midascending aorta. Coronary artery calcifications.    UPPER ABDOMEN: Cholecystectomy. Unchanged size but resolved gas component   of hepatic abscess near the gallbladder fossa (4-124). Unchanged adjacent   fat stranding.    BONES/SOFT TISSUES: Unremarkable.      IMPRESSION:    No pulmonary embolism through the segmental branches. Multiple   subsegmental branches are not well evaluated.    --- End of Report ---            CHRIS SPENCE M.D., ATTENDING RADIOGIST  This document has been electronically signed. Nov 23 2022 12:32PM    < end of copied text >              RECENT CULTURES:  11-18 @ 15:45 Clean Catch Clean Catch (Midstream)                <10,000 CFU/mL Normal Urogenital Va          RESPIRATORY CULTURES:    rad< from: CT Angio Chest PE Protocol w/ IV Cont (11.23.22 @ 12:21) >  upper and lower lobes. No pleural effusion.    LYMPH NODES/MEDIASTINUM: No enlarged lymph nodes.    HEART/VASCULATURE: Normal heart size. Unremarkable pericardium. 4.0 cm   midascending aorta. Coronary artery calcifications.    UPPER ABDOMEN: Cholecystectomy. Unchanged size but resolved gas component   of hepatic abscess near the gallbladder fossa (4-124). Unchanged adjacent   fat stranding.    BONES/SOFT TISSUES: Unremarkable.      IMPRESSION:    No pulmonary embolism through the segmental branches. Multiple   subsegmental branches are not well evaluated.    --- End of Report ---            CHRIS SPENCE M.D., ATTENDING RADIOGIST  This document has been electronically signed. Nov 23 2022 12:32PM    < end of copied text >  < from: Xray Chest 1 View- PORTABLE-Urgent (Xray Chest 1 View- PORTABLE-Urgent .) (11.18.22 @ 15:37) >      INTERPRETATION:  CLINICAL INDICATION: Patient noted to be shaking, still   at baseline.    EXAM: Frontal radiograph of the chest.    COMPARISON: Chest radiograph from 10/24/2022    FINDINGS:    The lungs are clear with elevation of the right hemidiaphragm. Heart   appears enlarged but stable and there are no effusions or congestion to   indicate CHF.  There is no pneumothorax.  The heart is not well evaluated in this position  The visualized osseous structures demonstrate no acute pathology.    IMPRESSION: Clear lungs with elevated right hemidiaphragm.          --- End of Report ---    < end of copied text >        Studies  Chest X-RAY  CT SCAN Chest   Venous Dopplers: LE:   CT Abdomen  Others              
Date of Service: 11-22-22 @ 14:27    Patient is a 87y old  Female who presents with a chief complaint of AMS, shaking (22 Nov 2022 09:53)      Any change in ROS: sheis confused but alert and awake:  on 2 L of oxygen :  did not  use bipap last night :       MEDICATIONS  (STANDING):  allopurinol 100 milliGRAM(s) Oral at bedtime  amLODIPine   Tablet 10 milliGRAM(s) Oral daily  aspirin enteric coated 81 milliGRAM(s) Oral daily  carvedilol 6.25 milliGRAM(s) Oral every 12 hours  dextrose 5% + sodium chloride 0.9%. 1000 milliLiter(s) (50 mL/Hr) IV Continuous <Continuous>  enoxaparin Injectable 30 milliGRAM(s) SubCutaneous every 24 hours  fenofibrate Tablet 145 milliGRAM(s) Oral daily  LORazepam     Tablet 0.5 milliGRAM(s) Oral once  mirtazapine 15 milliGRAM(s) Oral at bedtime  pantoprazole    Tablet 40 milliGRAM(s) Oral before breakfast  senna 2 Tablet(s) Oral at bedtime  sodium chloride 0.9%. 500 milliLiter(s) (125 mL/Hr) IV Continuous <Continuous>    MEDICATIONS  (PRN):    Vital Signs Last 24 Hrs  T(C): 36.4 (22 Nov 2022 06:12), Max: 37.2 (21 Nov 2022 18:40)  T(F): 97.5 (22 Nov 2022 06:12), Max: 98.9 (21 Nov 2022 18:40)  HR: 92 (22 Nov 2022 06:26) (82 - 92)  BP: 167/98 (22 Nov 2022 06:12) (147/68 - 185/77)  BP(mean): --  RR: 20 (22 Nov 2022 06:12) (17 - 20)  SpO2: 100% (22 Nov 2022 06:26) (98% - 100%)    Parameters below as of 21 Nov 2022 22:31  Patient On (Oxygen Delivery Method): nasal cannula  O2 Flow (L/min): 2      I&O's Summary    21 Nov 2022 07:01  -  22 Nov 2022 07:00  --------------------------------------------------------  IN: 0 mL / OUT: 1 mL / NET: -1 mL          Physical Exam:   GENERAL: NAD, well-groomed, well-developed  HEENT: JASON/   Atraumatic, Normocephalic  ENMT: No tonsillar erythema, exudates, or enlargement; Moist mucous membranes, Good dentition, No lesions  NECK: Supple, No JVD, Normal thyroid  CHEST/LUNG: Clear to auscultaion  CVS: Regular rate and rhythm; No murmurs, rubs, or gallops  GI: : Soft, Nontender, Nondistended; Bowel sounds present  NERVOUS SYSTEM:  Alert & awake : confused  EXTREMITIES:  -edema  LYMPH: No lymphadenopathy noted  SKIN: No rashes or lesions  ENDOCRINOLOGY: No Thyromegaly  PSYCH: confused    Labs:  37, 30, 40                            12.2   4.94  )-----------( 291      ( 22 Nov 2022 05:00 )             40.5                         11.6   4.71  )-----------( 276      ( 21 Nov 2022 06:00 )             39.5                         10.6   3.72  )-----------( 221      ( 20 Nov 2022 06:12 )             37.8                         9.7    3.88  )-----------( 231      ( 19 Nov 2022 09:53 )             33.0                         8.0    3.28  )-----------( 197      ( 19 Nov 2022 06:10 )             26.9                         11.7   6.02  )-----------( 270      ( 18 Nov 2022 14:46 )             39.0     11-22    139  |  99  |  6<L>  ----------------------------<  87  3.9   |  28  |  0.58  11-21    141  |  101  |  7   ----------------------------<  134<H>  3.4<L>   |  30  |  0.51  11-20    144  |  105  |  15  ----------------------------<  94  4.1   |  31  |  0.64  11-19    144  |  105  |  19  ----------------------------<  73  3.8   |  31  |  0.75  11-19    146<H>  |  112<H>  |  15  ----------------------------<  58<L>  2.6<LL>   |  27  |  0.64  11-18    142  |  98  |  22  ----------------------------<  100<H>  3.9   |  34<H>  |  0.89    Ca    10.9<H>      22 Nov 2022 05:00  Ca    10.7<H>      21 Nov 2022 06:00  Phos  1.7     11-22  Phos  1.1     11-21  Mg     2.00     11-22  Mg     1.50     11-21    TPro  8.2  /  Alb  4.4  /  TBili  0.8  /  DBili  x   /  AST  20  /  ALT  10  /  AlkPhos  107  11-22  TPro  8.2  /  Alb  4.2  /  TBili  0.4  /  DBili  x   /  AST  21  /  ALT  8   /  AlkPhos  101  11-18    CAPILLARY BLOOD GLUCOSE          LIVER FUNCTIONS - ( 22 Nov 2022 05:00 )  Alb: 4.4 g/dL / Pro: 8.2 g/dL / ALK PHOS: 107 U/L / ALT: 10 U/L / AST: 20 U/L / GGT: x               < from: CT Head No Cont (11.18.22 @ 19:20) >  FINDINGS:  Moderate prominence of the sulci and ventricles are consistent with   age-appropriate volume loss. There are hemispheric white matter areas of   low attenuation which are nonspecific but likely related to sequelae of   microvascular disease. Small chronic left frontal infarct. Chronic   lacunar infarct in the right cerebellar hemisphere. Chronic left thalamic   lacunar infarct. There is no intraparenchymal hematoma, mass effect or   midline shift. The basal cisterns are patent.  No abnormal extra-axial   fluid collectionsare present.    The visualized paranasal sinuses are clear. The mastoid air cells and   middle ear cavities are clear. The intraorbital compartments are   unremarkable.    The soft tissues of the scalp are unremarkable. The calvarium is intact.      IMPRESSION:    No acute hemorrhage or mass effect.    --- End of Report ---    < end of copied text >  < from: Xray Chest 1 View- PORTABLE-Urgent (Xray Chest 1 View- PORTABLE-Urgent .) (11.18.22 @ 15:37) >    ACC: [id number] EXAM:  XR CHEST PORTABLE URGENT 1V                          PROCEDURE DATE:  11/18/2022          INTERPRETATION:  CLINICAL INDICATION: Patient noted to be shaking, still   at baseline.    EXAM: Frontal radiograph of the chest.    COMPARISON: Chest radiograph from 10/24/2022    FINDINGS:    The lungs are clear with elevation of the right hemidiaphragm. Heart   appears enlarged but stable and there are no effusions or congestion to   indicate CHF.  There is no pneumothorax.  The heart is not well evaluated in this position  The visualized osseous structures demonstrate no acute pathology.    IMPRESSION: Clear lungs with elevated right hemidiaphragm.          --- End of Report ---          SUZETTE RAMOS MD; Resident Radiologist  This document has been electronically signed.  ZHAO DIAZ MD; Attending Radiologist  This document has been electronically signed. Nov 18 2022  4:39PM    < end of copied text >        RECENT CULTURES:  11-18 @ 15:45 Clean Catch Clean Catch (Midstream)                <10,000 CFU/mL Normal Urogenital Va          RESPIRATORY CULTURES:          Studies  Chest X-RAY  CT SCAN Chest   Venous Dopplers: LE:   CT Abdomen  Others              
Date of Service: 11-21-22 @ 17:46    Patient is a 87y old  Female who presents with a chief complaint of AMS, shaking (21 Nov 2022 16:34)      Any change in ROS: pt seems OK today  : no sob: off bipap : alert and awake   now exposed to covid pt   MEDICATIONS  (STANDING):  allopurinol 100 milliGRAM(s) Oral at bedtime  amLODIPine   Tablet 10 milliGRAM(s) Oral daily  carvedilol 6.25 milliGRAM(s) Oral every 12 hours  dextrose 5% + sodium chloride 0.9%. 1000 milliLiter(s) (50 mL/Hr) IV Continuous <Continuous>  enoxaparin Injectable 30 milliGRAM(s) SubCutaneous every 24 hours  fenofibrate Tablet 145 milliGRAM(s) Oral daily  mirtazapine 15 milliGRAM(s) Oral at bedtime  pantoprazole    Tablet 40 milliGRAM(s) Oral before breakfast  senna 2 Tablet(s) Oral at bedtime    MEDICATIONS  (PRN):    Vital Signs Last 24 Hrs  T(C): 37 (21 Nov 2022 15:00), Max: 37.1 (21 Nov 2022 10:20)  T(F): 98.6 (21 Nov 2022 15:00), Max: 98.8 (21 Nov 2022 10:20)  HR: 82 (21 Nov 2022 15:00) (68 - 104)  BP: 147/68 (21 Nov 2022 15:00) (139/71 - 170/86)  BP(mean): --  RR: 18 (21 Nov 2022 15:00) (17 - 18)  SpO2: 98% (21 Nov 2022 15:00) (96% - 100%)    Parameters below as of 21 Nov 2022 15:00  Patient On (Oxygen Delivery Method): nasal cannula  O2 Flow (L/min): 2      I&O's Summary        Physical Exam:   GENERAL: NAD, well-groomed, well-developed  HEENT: JASON/   Atraumatic, Normocephalic  ENMT: No tonsillar erythema, exudates, or enlargement; Moist mucous membranes, Good dentition, No lesions  NECK: Supple, No JVD, Normal thyroid  CHEST/LUNG: Clear to auscultaion  CVS: Regular rate and rhythm; No murmurs, rubs, or gallops  GI: : Soft, Nontender, Nondistended; Bowel sounds present  NERVOUS SYSTEM:  Alert & Oriented X3  EXTREMITIES:  - edema  LYMPH: No lymphadenopathy noted  SKIN: No rashes or lesions  ENDOCRINOLOGY: No Thyromegaly  PSYCH: Appropriate    Labs:  37, 30, 40                            11.6   4.71  )-----------( 276      ( 21 Nov 2022 06:00 )             39.5                         10.6   3.72  )-----------( 221      ( 20 Nov 2022 06:12 )             37.8                         9.7    3.88  )-----------( 231      ( 19 Nov 2022 09:53 )             33.0                         8.0    3.28  )-----------( 197      ( 19 Nov 2022 06:10 )             26.9                         11.7   6.02  )-----------( 270      ( 18 Nov 2022 14:46 )             39.0     11-21    141  |  101  |  7   ----------------------------<  134<H>  3.4<L>   |  30  |  0.51  11-20    144  |  105  |  15  ----------------------------<  94  4.1   |  31  |  0.64  11-19    144  |  105  |  19  ----------------------------<  73  3.8   |  31  |  0.75  11-19    146<H>  |  112<H>  |  15  ----------------------------<  58<L>  2.6<LL>   |  27  |  0.64  11-18    142  |  98  |  22  ----------------------------<  100<H>  3.9   |  34<H>  |  0.89    Ca    10.7<H>      21 Nov 2022 06:00  Ca    9.9      20 Nov 2022 06:12  Phos  1.1     11-21  Phos  2.4     11-20  Mg     1.50     11-21  Mg     1.70     11-20    TPro  8.2  /  Alb  4.2  /  TBili  0.4  /  DBili  x   /  AST  21  /  ALT  8   /  AlkPhos  101  11-18    CAPILLARY BLOOD GLUCOSE                      RECENT CULTURES:  11-18 @ 15:45 Clean Catch Clean Catch (Midstream)     < from: Xray Chest 1 View- PORTABLE-Urgent (Xray Chest 1 View- PORTABLE-Urgent .) (11.18.22 @ 15:37) >  ACC: [id number] EXAM:  XR CHEST PORTABLE URGENT 1V                          PROCEDURE DATE:  11/18/2022          INTERPRETATION:  CLINICAL INDICATION: Patient noted to be shaking, still   at baseline.    EXAM: Frontal radiograph of the chest.    COMPARISON: Chest radiograph from 10/24/2022    FINDINGS:    The lungs are clear with elevation of the right hemidiaphragm. Heart   appears enlarged but stable and there are no effusions or congestion to   indicate CHF.  There is no pneumothorax.  The heart is not well evaluated in this position  The visualized osseous structures demonstrate no acute pathology.    IMPRESSION: Clear lungs with elevated right hemidiaphragm.    < end of copied text >  < from: CT Chest w/ IV Cont (09.22.22 @ 12:52) >  ACC: [id number] EXAM:  CT CHEST IC                        ACC: [id number] EXAM:  CT ABDOMEN AND PELVIS IC                          PROCEDURE DATE:  09/22/2022          INTERPRETATION:  CLINICAL INFORMATION: Hepatic abscess status post IR   drainage, follow-up.    COMPARISON: CT 9/14/2022.    CONTRAST/COMPLICATIONS:  IV Contrast: Omnipaque 350  64 cc administered   6 cc discarded  Oral Contrast: NONE  Complications: None reported at time of study completion    PROCEDURE:  CT of the Chest, Abdomen and Pelvis was performed.  Sagittal and coronal reformats were performed.    FINDINGS:  Motion degraded exam.    CHEST:  LUNGS AND LARGE AIRWAYS: Patent central airways. Bibasilar atelectasis,   decreased.  PLEURA: Trace right pleural effusion, unchanged.  VESSELS: Atherosclerotic changes of the aorta and coronary arteries.  HEART: Cardiomegaly. No pericardial effusion.  MEDIASTINUM AND CARMELLA: No lymphadenopathy.  CHEST WALL AND LOWER NECK: Within normal limits.    ABDOMEN AND PELVIS:  LIVER: Status post interval pigtail drainage catheter placement within   the intrahepatic abscess along the gallbladder fossa, which is decreased   in size measuring 4.8 x 2.4 x 3.8 cm, previously 7.3 x 4.2 x 6.3 cm.  BILE DUCTS: Mild central intrahepatic biliary ductal dilatation and   enhancement, decreased from the previous exam.  GALLBLADDER: Cholecystectomy.  SPLEEN: Within normal limits.  PANCREAS: Within normal limits.  ADRENALS: Within normal limits.  KIDNEYS/URETERS: No hydronephrosis. Bilateral renal subcentimeter   hypodensities too small to characterize.    BLADDER: Small foci of intraluminal air, which may related to recent   instrumentation.  REPRODUCTIVE ORGANS: Fibroid uterus.    BOWEL: Small hiatal hernia. No bowel obstruction. Status post right   hemicolectomy. Colonic diverticulosis.  PERITONEUM: Mild perihepatic ascites.  VESSELS: Atherosclerotic changes.  RETROPERITONEUM/LYMPH NODES: No lymphadenopathy.  ABDOMINAL WALL: Postsurgical changes.  BONES: Mild degenerative changes.    IMPRESSION:  Intrahepatic abscess decreased in size status post pigtail drainage   catheter placement.    < end of copied text >    ra         <10,000 CFU/mL Normal Urogenital Va          RESPIRATORY CULTURES:          Studies  Chest X-RAY  CT SCAN Chest   Venous Dopplers: LE:   CT Abdomen  Others              
Date of Service: 11-25-22 @ 16:26    Patient is a 87y old  Female who presents with a chief complaint of Altered mental status     (25 Nov 2022 15:04)      Any change in ROS: she is on 2 L of oxygen : haven to used bipap for few dyas now:   she is alert and awake:       MEDICATIONS  (STANDING):  allopurinol 100 milliGRAM(s) Oral at bedtime  amLODIPine   Tablet 10 milliGRAM(s) Oral daily  aspirin enteric coated 81 milliGRAM(s) Oral daily  carvedilol 6.25 milliGRAM(s) Oral every 12 hours  dextrose 5% + sodium chloride 0.9%. 1000 milliLiter(s) (50 mL/Hr) IV Continuous <Continuous>  enoxaparin Injectable 30 milliGRAM(s) SubCutaneous every 24 hours  fenofibrate Tablet 145 milliGRAM(s) Oral daily  mirtazapine 15 milliGRAM(s) Oral at bedtime  pantoprazole    Tablet 40 milliGRAM(s) Oral before breakfast  senna 2 Tablet(s) Oral at bedtime  sodium chloride 0.9%. 500 milliLiter(s) (125 mL/Hr) IV Continuous <Continuous>    MEDICATIONS  (PRN):    Vital Signs Last 24 Hrs  T(C): 36.8 (25 Nov 2022 14:40), Max: 36.9 (25 Nov 2022 10:40)  T(F): 98.3 (25 Nov 2022 14:40), Max: 98.5 (25 Nov 2022 10:40)  HR: 60 (25 Nov 2022 14:40) (60 - 64)  BP: 133/55 (25 Nov 2022 14:40) (114/58 - 136/45)  BP(mean): --  RR: 16 (25 Nov 2022 14:40) (16 - 17)  SpO2: 100% (25 Nov 2022 14:40) (98% - 100%)    Parameters below as of 25 Nov 2022 14:40  Patient On (Oxygen Delivery Method): nasal cannula  O2 Flow (L/min): 2      I&O's Summary        Physical Exam:   GENERAL: NAD, well-groomed, well-developed  HEENT: JASON/   Atraumatic, Normocephalic  ENMT: No tonsillar erythema, exudates, or enlargement; Moist mucous membranes, Good dentition, No lesions  NECK: Supple, No JVD, Normal thyroid  CHEST/LUNG: Clear to auscultaion  CVS: Regular rate and rhythm; No murmurs, rubs, or gallops  GI: : Soft, Nontender, Nondistended; Bowel sounds present  NERVOUS SYSTEM:  Alert & Oriented X3  EXTREMITIES: -edema  LYMPH: No lymphadenopathy noted  SKIN: No rashes or lesions  ENDOCRINOLOGY: No Thyromegaly  PSYCH: Appropriate    Labs:  30, 37, 30                            9.5    3.04  )-----------( 251      ( 25 Nov 2022 09:01 )             31.9                         10.3   3.56  )-----------( 242      ( 24 Nov 2022 08:00 )             34.6                         10.5   4.02  )-----------( 245      ( 23 Nov 2022 06:50 )             35.2                         12.2   4.94  )-----------( 291      ( 22 Nov 2022 05:00 )             40.5     11-25    142  |  109<H>  |  12  ----------------------------<  82  4.7   |  28  |  0.73  11-24    136  |  103  |  18  ----------------------------<  93  4.5   |  26  |  0.98  11-23    138  |  104  |  16  ----------------------------<  89  4.5   |  26  |  1.21  11-22    139  |  99  |  6<L>  ----------------------------<  87  3.9   |  28  |  0.58    Ca    10.4      25 Nov 2022 09:01  Ca    10.1      24 Nov 2022 08:00  Phos  2.2     11-25  Phos  3.1     11-24  Mg     1.60     11-25  Mg     1.80     11-24    TPro  8.2  /  Alb  4.4  /  TBili  0.8  /  DBili  x   /  AST  20  /  ALT  10  /  AlkPhos  107  11-22    CAPILLARY BLOOD GLUCOSE          ra< from: CT Angio Chest PE Protocol w/ IV Cont (11.23.22 @ 12:21) >    LUNGS/AIRWAYS/PLEURA: No endobronchial lesion. Atelectasis in bilateral   upper and lower lobes. No pleural effusion.    LYMPH NODES/MEDIASTINUM: No enlarged lymph nodes.    HEART/VASCULATURE: Normal heart size. Unremarkable pericardium. 4.0 cm   midascending aorta. Coronary artery calcifications.    UPPER ABDOMEN: Cholecystectomy. Unchanged size but resolved gas component   of hepatic abscess near the gallbladder fossa (4-124). Unchanged adjacent   fat stranding.    BONES/SOFT TISSUES: Unremarkable.      IMPRESSION:    No pulmonary embolism through the segmental branches. Multiple   subsegmental branches are not well evaluated.    --- End of Report ---            CHRIS SPENCE M.D., ATTENDING RADIOGIST  This document has been electronically signed. Nov 23 2022 12:32PM    < end of copied text >              RECENT CULTURES:        RESPIRATORY CULTURES:          Studies  Chest X-RAY  CT SCAN Chest   Venous Dopplers: LE:   CT Abdomen  Others              
Date of Service: 11-28-22 @ 14:52    Patient is a 87y old  Female who presents with a chief complaint of AMS, shaking (28 Nov 2022 14:15)      Any change in ROS:  on 2 L of oxygen  : alert and awake:     MEDICATIONS  (STANDING):  allopurinol 100 milliGRAM(s) Oral at bedtime  amLODIPine   Tablet 10 milliGRAM(s) Oral daily  aspirin enteric coated 81 milliGRAM(s) Oral daily  carvedilol 6.25 milliGRAM(s) Oral every 12 hours  dextrose 5% + sodium chloride 0.9%. 1000 milliLiter(s) (50 mL/Hr) IV Continuous <Continuous>  enoxaparin Injectable 30 milliGRAM(s) SubCutaneous every 24 hours  fenofibrate Tablet 145 milliGRAM(s) Oral daily  mirtazapine 15 milliGRAM(s) Oral at bedtime  pantoprazole    Tablet 40 milliGRAM(s) Oral before breakfast  senna 2 Tablet(s) Oral at bedtime  sodium chloride 0.9%. 500 milliLiter(s) (125 mL/Hr) IV Continuous <Continuous>    MEDICATIONS  (PRN):    Vital Signs Last 24 Hrs  T(C): 36.7 (28 Nov 2022 13:00), Max: 36.7 (27 Nov 2022 22:15)  T(F): 98 (28 Nov 2022 13:00), Max: 98 (27 Nov 2022 22:15)  HR: 74 (28 Nov 2022 13:00) (71 - 74)  BP: 145/64 (28 Nov 2022 13:00) (140/62 - 174/68)  BP(mean): --  RR: 17 (28 Nov 2022 13:00) (17 - 17)  SpO2: 99% (28 Nov 2022 13:00) (95% - 99%)    Parameters below as of 28 Nov 2022 13:00  Patient On (Oxygen Delivery Method): nasal cannula  O2 Flow (L/min): 2      I&O's Summary    27 Nov 2022 07:01  -  28 Nov 2022 07:00  --------------------------------------------------------  IN: 0 mL / OUT: 1000 mL / NET: -1000 mL          Physical Exam:   GENERAL: NAD, well-groomed, well-developed  HEENT: JASON/   Atraumatic, Normocephalic  ENMT: No tonsillar erythema, exudates, or enlargement; Moist mucous membranes, Good dentition, No lesions  NECK: Supple, No JVD, Normal thyroid  CHEST/LUNG: Clear to auscultaion- no wheezing  CVS: Regular rate and rhythm; No murmurs, rubs, or gallops  GI: : Soft, Nontender, Nondistended; Bowel sounds present  NERVOUS SYSTEM:  Alert & Oriented X3  EXTREMITIES:  - edema  LYMPH: No lymphadenopathy noted  SKIN: No rashes or lesions  ENDOCRINOLOGY: No Thyromegaly  PSYCH: Appropriate    Labs:  ABG - ( 28 Nov 2022 11:52 )  pH, Arterial: 7.42  pH, Blood: x     /  pCO2: 44    /  pO2: 149   / HCO3: 28    / Base Excess: 3.5   /  SaO2: 98.9            34, 30                            12.1   5.17  )-----------( 331      ( 28 Nov 2022 04:05 )             39.3                         10.7   3.14  )-----------( 293      ( 26 Nov 2022 05:57 )             35.6                         9.5    3.04  )-----------( 251      ( 25 Nov 2022 09:01 )             31.9     11-28    135  |  98  |  8   ----------------------------<  163<H>  5.7<H>   |  26  |  0.61  11-26    140  |  102  |  8   ----------------------------<  92  4.1   |  29  |  0.62  11-25    142  |  109<H>  |  12  ----------------------------<  82  4.7   |  28  |  0.73    Ca    10.5      28 Nov 2022 04:05  Phos  2.9     11-28  Mg     1.60     11-28      CAPILLARY BLOOD GLUCOSE              rad< from: CT Angio Neck w/ IV Cont (11.27.22 @ 20:42) >  Evaluation of the paraspinal soft tissue neck region appears normal    The visualized posterior both lung apices appear clear.    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    --- End of Report ---            JENN MORENO MD; Attending Radiologist  This document has been electronically signed. Nov 28 2022  9:29AM    < end of copied text >  < from: CT Angio Chest PE Protocol w/ IV Cont (11.23.22 @ 12:21) >    ACC: [id number] EXAM:  CT ANGIO CHEST PULM ART [facility]AWI                          PROCEDURE DATE:  11/23/2022          INTERPRETATION:  INDICATION: Hypoxia    TECHNIQUE: Volumetric images of the chest were obtained after the   administration of 90 mL of Omnipaque 350. Maximum intensity projection   images were generated.    COMPARISON: CT chest 10/13/2022.    FINDINGS:    PULMONARY ANGIOGRAM:  Obscuration of several subsegmental branches due to   respiratory motion. No pulmonary embolism through the segmental branches.    LUNGS/AIRWAYS/PLEURA: No endobronchial lesion. Atelectasis in bilateral   upper and lower lobes. No pleural effusion.    LYMPH NODES/MEDIASTINUM: No enlarged lymph nodes.    HEART/VASCULATURE: Normal heart size. Unremarkable pericardium. 4.0 cm   midascending aorta. Coronary artery calcifications.    UPPER ABDOMEN: Cholecystectomy. Unchanged size but resolved gas component   of hepatic abscess near the gallbladder fossa (4-124). Unchanged adjacent   fat stranding.    BONES/SOFT TISSUES: Unremarkable.      IMPRESSION:    No pulmonary embolism through the segmental branches. Multiple   subsegmental branches are not well evaluated.    --- End of Report ---            CHRIS SPENCE M.D., ATTENDING RADIOGIST  This document has been electronically signed. Nov 23 2022 12:32PM    < end of copied text >          RECENT CULTURES:        RESPIRATORY CULTURES:          Studies  Chest X-RAY  CT SCAN Chest   Venous Dopplers: LE:   CT Abdomen  Others              
Date of Service: 11-27-22 @ 15:03    Patient is a 87y old  Female who presents with a chief complaint of AMS, shaking (27 Nov 2022 08:07)      Any change in ROS: pt is alert and awake:  on 2 L     MEDICATIONS  (STANDING):  allopurinol 100 milliGRAM(s) Oral at bedtime  amLODIPine   Tablet 10 milliGRAM(s) Oral daily  aspirin enteric coated 81 milliGRAM(s) Oral daily  carvedilol 6.25 milliGRAM(s) Oral every 12 hours  dextrose 5% + sodium chloride 0.9%. 1000 milliLiter(s) (50 mL/Hr) IV Continuous <Continuous>  enoxaparin Injectable 30 milliGRAM(s) SubCutaneous every 24 hours  fenofibrate Tablet 145 milliGRAM(s) Oral daily  mirtazapine 15 milliGRAM(s) Oral at bedtime  pantoprazole    Tablet 40 milliGRAM(s) Oral before breakfast  senna 2 Tablet(s) Oral at bedtime  sodium chloride 0.9%. 500 milliLiter(s) (125 mL/Hr) IV Continuous <Continuous>    MEDICATIONS  (PRN):    Vital Signs Last 24 Hrs  T(C): 36.7 (27 Nov 2022 11:32), Max: 36.7 (26 Nov 2022 17:33)  T(F): 98 (27 Nov 2022 11:32), Max: 98.1 (26 Nov 2022 17:33)  HR: 67 (27 Nov 2022 11:32) (64 - 67)  BP: 131/71 (27 Nov 2022 11:32) (128/61 - 153/62)  BP(mean): --  RR: 18 (27 Nov 2022 11:32) (18 - 18)  SpO2: 98% (27 Nov 2022 11:32) (95% - 99%)    Parameters below as of 27 Nov 2022 11:32  Patient On (Oxygen Delivery Method): nasal cannula  O2 Flow (L/min): 2      I&O's Summary        Physical Exam:   GENERAL: NAD, well-groomed, well-developed  HEENT: JASON/   Atraumatic, Normocephalic  ENMT: No tonsillar erythema, exudates, or enlargement; Moist mucous membranes, Good dentition, No lesions  NECK: Supple, No JVD, Normal thyroid  CHEST/LUNG: Clear to auscultaion- no wheezing  CVS: Regular rate and rhythm; No murmurs, rubs, or gallops  GI: : Soft, Nontender, Nondistended; Bowel sounds present  NERVOUS SYSTEM:  Alert & Oriented X3  EXTREMITIES: -edema  LYMPH: No lymphadenopathy noted  SKIN: No rashes or lesions  ENDOCRINOLOGY: No Thyromegaly  PSYCH: Appropriate    Labs:  34, 30, 37                            10.7   3.14  )-----------( 293      ( 26 Nov 2022 05:57 )             35.6                         9.5    3.04  )-----------( 251      ( 25 Nov 2022 09:01 )             31.9                         10.3   3.56  )-----------( 242      ( 24 Nov 2022 08:00 )             34.6     11-26    140  |  102  |  8   ----------------------------<  92  4.1   |  29  |  0.62  11-25    142  |  109<H>  |  12  ----------------------------<  82  4.7   |  28  |  0.73  11-24    136  |  103  |  18  ----------------------------<  93  4.5   |  26  |  0.98    Ca    10.7<H>      26 Nov 2022 05:57  Phos  2.3     11-26  Mg     1.50     11-26      CAPILLARY BLOOD GLUCOSE              rad< from: MR Head No Cont (11.23.22 @ 17:31) >  Small chronic infarcts bilateral cerebellum. Small chronic lacunar   infarcts basal ganglia and thalami    There is no acute parenchymal hemorrhage, parenchymal mass, mass effect   or midline shift. There is no extra-axial fluid collection.  There is no   hydrocephalus. There is no acute infarct.    The visualized paranasal sinuses are well aerated. Partial opacification   right mastoid air cells    4 mm outpouching noted at the terminal segment of left internal carotid   artery 7-11.    IMPRESSION:  No acute intracranial hemorrhage or acute infarct.    Incidental 4 mm outpouching at the distal left internal carotid artery   suspicious for aneurysm. MRA head or CTA head recommended for further   evaluation    --- End of Report ---            USAMA DALEY MD; Attending Radiologist  This document has been electronically signed. Nov 23 2022  5:48PM    < end of copied text >          RECENT CULTURES:        RESPIRATORY CULTURES:          Studies  Chest X-RAY  CT SCAN Chest   Venous Dopplers: LE:   CT Abdomen  Others

## 2022-11-29 NOTE — PROGRESS NOTE ADULT - PROBLEM SELECTOR PLAN 4
still slightly hypercalcemic ; would defer to primary team    11/22: per primary team    11/24: per primary team,    11/25: per IRWIN LION
still slightly hypercalcemic ; would defer to primary team
still slightly hypercalcemic ; would defer to primary team    11/22: per primary team    11/24: per primary team,    11/25: per IRWIN LION
would defer to primary team
would defer to primary team
still slightly hypercalcemic ; would defer to primary team    11/22: per primary team    11/24: per primary team,
still slightly hypercalcemic ; would defer to primary team    11/22: per primary team
still slightly hypercalcemic ; would defer to primary team    11/22: per primary team

## 2022-11-29 NOTE — PROGRESS NOTE ADULT - REASON FOR ADMISSION
AMS, shaking

## 2022-11-29 NOTE — PROGRESS NOTE ADULT - PROBLEM SELECTOR PROBLEM 1
Chronic respiratory failure with hypoxia and hypercapnia

## 2022-11-29 NOTE — PROGRESS NOTE ADULT - PROVIDER SPECIALTY LIST ADULT
Cardiology
Hospitalist
Cardiology
Cardiology
Hospitalist
Hospitalist
Neurology
Cardiology
Hospitalist
Pulmonology

## 2022-11-29 NOTE — PROGRESS NOTE ADULT - PROBLEM SELECTOR PLAN 5
Controlled    11/22: controlled    11/23; controlled    11/24: controlled    11/25: controlled
Controlled
Controlled    11/22: controlled
Controlled
Controlled    11/28: controlled    11/29: controlled
Controlled    11/28: controlled
Controlled    11/22: controlled    11/23; controlled    11/24: controlled
Controlled    11/22: controlled    11/23; controlled
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
## 2022-11-29 NOTE — PROGRESS NOTE ADULT - NUTRITIONAL ASSESSMENT
This patient has been assessed with a concern for Malnutrition and has been determined to have a diagnosis/diagnoses of Moderate protein-calorie malnutrition.    This patient is being managed with:   Diet Minced and Moist-  DASH/TLC {Sodium & Cholesterol Restricted} (DASH)  Entered: Nov 20 2022  2:36PM

## 2022-12-01 ENCOUNTER — APPOINTMENT (OUTPATIENT)
Dept: SURGICAL ONCOLOGY | Facility: CLINIC | Age: 87
End: 2022-12-01

## 2022-12-01 NOTE — HISTORY OF PRESENT ILLNESS
[de-identified] : Ms. HARSH VERGARA is a 87 year old female here today for a post-op visit\par \par PMH: HTN, HLD, cholecystitis, s/p fall. \par \par colonoscopy 5/18/22: benign colonic mucosa with mildly hypercellular lamina propria. Negative for malignancy. 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 Four lymph nodes, negative for carcinoma (0/4)\par 3. Portion of cystic duct, negative for carcinoma\par 4. Liver, segment 4 and 5, resection: Hepatic parenchyma with focal collection of acute inflammation\par at the junction with the gallbladder, focal portal tracts shows chronic inflammation, Negative for carcinoma\par 5. Right hemicolectomy: segment of colon c/w fistula, sessile serrate adenoma, 0.8 cm, involving ascending colon. proximal, distal & mesenteric resection margins all negative, 14 negative lymph nodes, \par \par \par Labs 11/2022 Hepatic panel WNL \par

## 2022-12-01 NOTE — ASSESSMENT
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

## 2022-12-08 ENCOUNTER — NON-APPOINTMENT (OUTPATIENT)
Age: 87
End: 2022-12-08

## 2022-12-08 ENCOUNTER — APPOINTMENT (OUTPATIENT)
Dept: NEUROSURGERY | Facility: CLINIC | Age: 87
End: 2022-12-08

## 2022-12-08 VITALS
OXYGEN SATURATION: 97 % | HEIGHT: 59 IN | BODY MASS INDEX: 21.17 KG/M2 | SYSTOLIC BLOOD PRESSURE: 164 MMHG | WEIGHT: 105 LBS | DIASTOLIC BLOOD PRESSURE: 69 MMHG | HEART RATE: 70 BPM

## 2022-12-08 DIAGNOSIS — I67.1 CEREBRAL ANEURYSM, NONRUPTURED: ICD-10-CM

## 2022-12-08 PROCEDURE — 99212 OFFICE O/P EST SF 10 MIN: CPT

## 2022-12-09 PROBLEM — I67.1 ANEURYSM, CEREBRAL: Status: ACTIVE | Noted: 2022-12-09

## 2022-12-09 NOTE — RESULTS/DATA
[FreeTextEntry1] : ACC: [id number] EXAM: CT ANGIO BRAIN (W)AW IC\par ACC: [id number] EXAM: CT ANGIO NECK (W)AW IC\par \par PROCEDURE DATE: 11/27/2022\par \par \par \par INTERPRETATION: Clinical indication: Evaluation of aneurysm.\par \par Multiple axial sections were performed from base of vertex without contrast enhancement. Coronal and sagittal reconstructions performed.\par \par The patient was injected with approximately 90 cc of Omnipaque 350 IV. CTA of the neck and [place] of [place] were performed. Coronal and sagittal reconstructions performed. 3-D MIPS were performed.\par \par This exams compare prior brain MRI performed on November 23, 2022.\par \par Parenchymal volume loss and chronic microvascular ischemic changes identified\par \par Old lacunar infarct versus prominent VR space is seen involving the right cerebellar region. Old infarct involving the left cerebellar region is identified.\par \par Old lacunar infarcts are seen involving the left thalamus and left basal ganglia region.\par \par There is no acute hemorrhage mass mass effect seen.\par \par Evaluation of the osseous structures appropriate window appears normal\par \par The visualized paranasal sinuses mastoid and middle ear appear clear.\par \par Calcification is seen involving the carotid bifurcation region bilaterally.\par \par Both distal common carotid arteries appear normal.\par \par Using the NASCET criteria there is evidence of a 76% stenosis involving the proximal right internal carotid artery. Evaluation of the proximal right external carotid artery appears normal. Mild narrowing of the proximal left internal carotid artery seen. Evaluation of the proximal left external carotid appears normal\par \par Both vertebral arteries demonstrate normal enhancement.\par \par Calcification and stenosis involving both distal internal carotid arteries. Prominent areas of enhancement is seen involving the distal left internal carotid artery. One of these findings appears to involve the proximal portion and supraclinoid segment and projects laterally (series 10 image 36). This finding is likely compatible with an aneurysm measures approximately 6.4 mm. A second area is seen involving the distal portion of the supraclinoid segment (best seen on series 10 image 34) and measures approximately 8.7 mm. This too is suspicious for an underlying aneurysm. Both anterior cerebral middle cerebral basilar and posterior cerebral arteries appear normal without significant stenosis seen. 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

## 2022-12-09 NOTE — ASSESSMENT
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

## 2022-12-09 NOTE — REASON FOR VISIT
[Follow-Up: _____] : a [unfilled] follow-up visit [FreeTextEntry1] : Hospital Course: \par Discharge Date	29-Nov-2022 \par Admission Date	18-Nov-2022 22:35 \par Reason for Admission	AMS, shaking \par Hospital Course	 \par 88 y/o female with PMHx of HTN, HLD, gallbladder CA s/p resection w /liver \par resection and partial colectomy who presented with possible seizure like \par activity while at PT. Head CT wtihout acute hemorrhage or mass effect. MRI \par showed no intracranial hemorrhage or stroke, but found incidental 4mm \par outpouching at left ICA suspicious for aneurysm. CTA H/N with 6.4 mm aneurysm \par in the distal left internal carotid artery, 8.7mm L distal supraclinoid \par segment. Per neurosurgery, no acute intervention or further imaging needed. To \par follow up with Dr. Encarnacion. EEG with right occipital sharp waves and focal slowing; \par risk of seizures R occipital region. Per neuro, no need for further EEG/no \par change in meds. Echo with minimal MR, grossly normal RV systolic function, \par inconclusive for PFO. Patient should continue aspirin for lacunar infarcts \par found on head CT. Course c/b orthostatic positive, s/p IVF now resolved. \par Continue Norvasc and Coreg. From pulmonary standpoint, patient has hx of \par chronic resp failure/crhonic hypercarbia. Blood gases reviewed - no need for \par bipap. CTA neg for PE. Home PT recommended. \par \par Today she presents for her first hospital follow up visit.

## 2022-12-27 NOTE — CONSULT NOTE ADULT - SUBJECTIVE AND OBJECTIVE BOX
Monitor: The problem is improving with treatment.  Evaluation: No labs/tests required today.  Assessment/Treatment:  Continue current treatment/monitoring regimen.  
Patient is a 87y old  Female who presents with a chief complaint of Bowel prep for surgery tomorrow (26 Aug 2022 08:32)      HPI:  Patient is a 87 year old female with PMHx of HTN, HLD, GERD, who presents for bowel prep prior to surgery tomorrow. Has a history of gangrenous cholecystitis. Patient is scheduled to undergo exploratory laparotomy, cholecystectomy, possible bowel resection. Last time she attempted bowel prep at home, she got dizzy and fell. (18 Aug 2022 12:39)    Patient's hospital course: surgery now POD 6 after findings R hepatic flexure fistula, cholecystectomy, R hemicolectomy with findings c/f carcinoma. Course c/b persistent delirium and now hypoxic/hypercapnic respiratory failure. Patient seen and examined at bedside. She arouses to stimulation but goes back to sleep. Per primary team this is has been her baseline mental status since admission.      Of note patient had recent admission after fall with findings of SDH/SAh and hyponatremia.   PAST MEDICAL & SURGICAL HISTORY:  Hypertension      Hyperlipidemia      Gout      GERD (gastroesophageal reflux disease)      Insomnia      Other gallbladder disorder      H/O: hysterectomy      ROS: Unable to assess            MEDICATIONS  (STANDING):  acetaminophen     Tablet .. 1000 milliGRAM(s) Oral every 6 hours  heparin   Injectable 5000 Unit(s) SubCutaneous every 8 hours  losartan 50 milliGRAM(s) Oral daily  metoprolol tartrate 12.5 milliGRAM(s) Oral every 12 hours  multivitamin 1 Tablet(s) Oral daily  pantoprazole    Tablet 40 milliGRAM(s) Oral before breakfast  thiamine 100 milliGRAM(s) Oral daily    MEDICATIONS  (PRN):          ABG - ( 25 Aug 2022 10:36 )  pH, Arterial: 7.41  pH, Blood: x     /  pCO2: 55    /  pO2: 119   / HCO3: 35    / Base Excess: 9.1   /  SaO2: 98.4      PHYSICAL EXAM:  GENERAL: NAD, lying in bed comfortably, lethargic  HEAD:  Atraumatic, Normocephalic  EYES: EOMI, PERRLA, conjunctiva and sclera clear  ENT: Moist mucous membranes  NECK: Supple, No JVD  CHEST/LUNG: Clear to auscultation bilaterally; crackles at right base, no wheezing  HEART: Regular rate and rhythm; No murmurs, rubs, or gallops  ABDOMEN: Bowel sounds present; Soft, Nontender, Nondistended. No hepatomegally, abdominal tenderness   EXTREMITIES:  2+ Peripheral Pulses, brisk capillary refill. No clubbing, cyanosis, or edema  NERVOUS SYSTEM:  Alert & Oriented X 0     MSK: FROM all 4 extremities, full and equal strength  SKIN: No rashes or lesions        08-26    133<L>  |  94<L>  |  12  ----------------------------<  139<H>  4.1   |  27  |  0.61    Ca    9.9      26 Aug 2022 01:05  Phos  2.0     08-26  Mg     1.90     08-26    TPro  5.6<L>  /  Alb  2.7<L>  /  TBili  0.6  /  DBili  0.3  /  AST  27  /  ALT  39<H>  /  AlkPhos  211<H>  08-25                            8.7    12.90 )-----------( 400      ( 26 Aug 2022 01:05 )             27.6     < from: CT Angio Chest PE Protocol w/ IV Cont (08.24.22 @ 21:46) >    FINDINGS:    LUNGS AND AIRWAYS: Small right pleural effusion. Right lower lobe   homogeneously enhancing consolidation with some associated volume loss   and internal air bronchograms suggestive of atelectasis. Mild right   middle lobe partial atelectasis. Mild left mid to lower lung areas of   linear or subsegmental atelectasis.  MEDIASTINUM AND CARMELLA: No lymphadenopathy. Nonspecific calcification   within the upper esophagus.  VESSELS: No pulmonary embolism. Diffuse coronary artery and aortic   calcifications with interval calcified and noncalcified plaques within   the aorta.  HEART: Cardiomegaly. No pericardial effusion. Mitral annular   calcifications.  CHEST WALL AND LOWER NECK: Heterogeneity of the thyroid gland. 9 mm   hypodense nodule within the right lobe of the thyroid gland. 6 mm   hyperdense and possibly enhancing nodule within the right lobe of the   thyroid gland.  VISUALIZED UPPER ABDOMEN: Interval cholecystectomy when compared to CT   abdomen pelvis from 8/18/2022 with their the imaged postoperative changes   in the gallbladder fossae including tiny droplets of air.  BONES: Within normal limits.    IMPRESSION:  No pulmonary embolism.    Small right-sided pleural effusion with adjacent right lower lobe   consolidation suggestive of atelectasis.    --- End of Report ---    < end of copied text >  < from: Transthoracic Echocardiogram (05.05.22 @ 15:36) >  ------------------------------------------------------------------------  CONCLUSIONS:  1. Calcified trileaflet aortic valve with normal opening.  Minimal aortic regurgitation.  2. Normal left ventricular internal dimensions and wall  thicknesses.  3. Hyperdynamic left ventricle.  4. Normal right ventricular size and function.  ------------------------------------------------------------------------  Confirmed on  5/5/2022 - 19:03:12 by AGUSTIN Crowe  ------------------------------------------------------------------------    < end of copied text >        
[facility]   MD KRISTEN MARQUES MD KRISTINE SOLTANPOUR, DO ANGELA WONG, PA        TEL:  OFFICE: [phone]  From 5pm-7am answering service [phone]    --- INITIAL RENAL CONSULT NOTE ---date of service 09-02-22 @ 20:53    HPI:  Ms. Rich is an 87 year old lady with PMHx of HTN, HLD, GERD, who presents for bowel prep prior to surgery tomorrow. Has a history of gangrenous cholecystitis. Patient is scheduled to undergo exploratory laparotomy, cholecystectomy, possible bowel resection. Last time she attempted bowel prep at home, she got dizzy and fell. (18 Aug 2022 12:39). Previous admission she had hyponatremia and work up suggested polydipsia. Ms. Rich was fluid restricted and now is admitted to get bowel prep prior to surgery. Nephrology consulted to manage fluids and electrolytes.         Allergies:  penicillin (Unknown)      PAST MEDICAL & SURGICAL HISTORY:  Hypertension  Hyperlipidemia  Gout  GERD (gastroesophageal reflux disease)  Insomnia  Other gallbladder disorder  H/O: hysterectomy        Home Medications:  acetaminophen 500 mg oral tablet: 2 tab(s) orally every 6 hours (25 Aug 2022 09:36)  allopurinol 100 mg oral tablet: 100 milligram(s) orally once a day (at bedtime) (24 May 2022 09:07)  fenofibrate 160 mg oral tablet: 1 tab(s) orally once a day (24 May 2022 09:07)  Multiple Vitamins oral tablet: 1 tab(s) orally once a day (25 Aug 2022 09:36)  Protonix 40 mg oral delayed release tablet: 1 tab(s) orally once a day (24 May 2022 09:07)  thiamine 100 mg oral tablet: 1 tab(s) orally once a day (24 May 2022 09:07)  Toprol-XL 25 mg oral tablet, extended release: 1 tab(s) orally once a day (24 May 2022 09:07)    Home Medications Reviewed    Hospital Medications:   MEDICATIONS  (STANDING):  acetaminophen     Tablet .. 1000 milliGRAM(s) Oral every 6 hours  heparin   Injectable 5000 Unit(s) SubCutaneous every 8 hours  losartan 100 milliGRAM(s) Oral daily  meropenem  IVPB 1000 milliGRAM(s) IV Intermittent every 12 hours  metoprolol tartrate 50 milliGRAM(s) Oral two times a day  multivitamin 1 Tablet(s) Oral daily  pantoprazole    Tablet 40 milliGRAM(s) Oral before breakfast  thiamine 100 milliGRAM(s) Oral daily      SOCIAL HISTORY:  Denies ETOh, Smoking,     FAMILY HISTORY:  FH: hypertension (Father, Mother)        REVIEW OF SYSTEMS:  CONSTITUTIONAL: No weakness, fevers or chills  EYES/ENT: No visual changes;  No vertigo or throat pain   NECK: No pain or stiffness  RESPIRATORY: No cough, wheezing, hemoptysis; No shortness of breath  CARDIOVASCULAR: No chest pain or palpitations.  GASTROINTESTINAL: No abdominal or epigastric pain. No nausea, vomiting, or hematemesis; No diarrhea or constipation. No melena or hematochezia.  GENITOURINARY: No dysuria, frequency, foamy urine, urinary urgency, incontinence or hematuria  NEUROLOGICAL: No numbness or weakness  SKIN: No itching, burning, rashes, or lesions   VASCULAR: No bilateral lower extremity edema.   All other review of systems is negative unless indicated above.    VITALS:  T(F): 98 (09-02-22 @ 17:11), Max: 98.8 (09-02-22 @ 05:10)  HR: 73 (09-02-22 @ 17:11)  BP: 118/49 (09-02-22 @ 17:11)  RR: 18 (09-02-22 @ 17:11)  SpO2: 95% (09-02-22 @ 17:11)  Wt(kg): --    09-01 @ 07:01  -  09-02 @ 07:00  --------------------------------------------------------  IN: 600 mL / OUT: 1350 mL / NET: -750 mL          PHYSICAL EXAM:  General: NAD  HEENT: anicteric sclera, oropharynx clear, MMM  Neck: No JVD  Respiratory: CTAB, no wheezes, rales or rhonchi  Cardiovascular: S1, S2, RRR  Gastrointestinal: BS+, soft, NT/ND  Extremities: No cyanosis or clubbing. No peripheral edema  Neurological: A/O x 3, no focal deficits  Psychiatric: Normal mood, normal affect  : No CVA tenderness. No chery.   Skin: No rashes      LABS:  09-02    136  |  95<L>  |  10  ----------------------------<  89  4.0   |  34<H>  |  0.56    Ca    9.8      02 Sep 2022 05:13  Phos  3.3     09-02  Mg     2.10     09-02    TPro  6.2  /  Alb  3.2<L>  /  TBili  0.4  /  DBili      /  AST  23  /  ALT  19  /  AlkPhos  276<H>  09-02    Creatinine Trend: 0.56 <--, 0.59 <--, 0.63 <--, 0.73 <--, 0.77 <--, 0.80 <--, 0.74 <--                        9.6    12.52 )-----------( 547      ( 02 Sep 2022 05:13 )             29.8     Urine Studies:        RADIOLOGY & ADDITIONAL STUDIES:                
C A R D I O L O G Y  *********************    DATE OF SERVICE: 09-02-22    HISTORY OF PRESENT ILLNESS: HPI:  Pt is a 87F PMHx of recent SAH/SDHHTN, HLD and GERD who presented for open cholecystectomy, portal lymphadenectomy, liver segment 4b / 5 resection and right hemicolectomy on 8/19/22 she was unable to be extubated at the end of the case due to sedation and inadequate ventilation hence was admitted to SICU.  Her course was further c/b pulmonary edema requiring diuresis in SICU and hypoxemic and hypercapnic respiratory insufficiency requiring supplemental O2 and chest physiotherapy. Has had increased WBC count concerning for aspiration PNA being treated with ABX. Recent chest Ct showing Small right-sided pleural effusion with adjacent right lower lobe  consolidation suggestive of atelectasis.    Son by bedside states his mom has had no history of MI, CHF, or arrhythmias. PAtient denies any chest pain but does state she becomes winded walking to bathroom    PAST MEDICAL & SURGICAL HISTORY:  Hypertension  Hyperlipidemia  Gout  GERD   H/O hysterectomy  recent SAH/SDH  open cholecystectomy with findings c/f carcinoma       MEDICATIONS:  MEDICATIONS  (STANDING):  acetaminophen     Tablet .. 1000 milliGRAM(s) Oral every 6 hours  heparin   Injectable 5000 Unit(s) SubCutaneous every 8 hours  losartan 100 milliGRAM(s) Oral daily  meropenem  IVPB 1000 milliGRAM(s) IV Intermittent every 12 hours  metoprolol tartrate 50 milliGRAM(s) Oral two times a day  multivitamin 1 Tablet(s) Oral daily  pantoprazole    Tablet 40 milliGRAM(s) Oral before breakfast  thiamine 100 milliGRAM(s) Oral daily      Allergiespenicillin (Unknown)      FAMILY HISTORY:  FH: hypertension (Father, Mother)  Non-contributary for premature coronary disease or sudden cardiac death    SOCIAL HISTORY:    [ X] Non-smoker  [ ] Smoker  [ ] Alcohol        REVIEW OF SYSTEMS:  [ ]chest pain  [ X ]shortness of breath  [  ]palpitations  [  ]syncope  [ ]near syncope [ ]upper extremity weakness   [ ] lower extremity weakness  [  ]diplopia  [  ]altered mental status   [  ]fevers  [ ]chills [ ]nausea  [ ]vomiting  [  ]dysphagia    [ ]abdominal pain  [ ]melena  [ ]BRBPR    [  ]epistaxis  [  ]rash    [ ]lower extremity edema    [X] All others negative	  [ ] Unable to obtain      LABS:	 	    CARDIAC MARKERS:                          9.6    12.52 )-----------( 547      ( 02 Sep 2022 05:13 )             29.8     Hb Trend: 9.6<--    09-02    136  |  95<L>  |  10  ----------------------------<  89  4.0   |  34<H>  |  0.56    Ca    9.8      02 Sep 2022 05:13  Phos  3.3     09-02  Mg     2.10     09-02    TPro  6.2  /  Alb  3.2<L>  /  TBili  0.4  /  DBili  x   /  AST  23  /  ALT  19  /  AlkPhos  276<H>  09-02    Creatinine Trend: 0.56<--, 0.59<--, 0.63<--, 0.73<--, 0.77<--, 0.80<--      PHYSICAL EXAM:  T(C): 36.7 (09-02-22 @ 17:11), Max: 37.1 (09-02-22 @ 05:10)  HR: 73 (09-02-22 @ 17:11) (60 - 90)  BP: 118/49 (09-02-22 @ 17:11) (115/82 - 166/55)  RR: 18 (09-02-22 @ 17:11) (16 - 18)  SpO2: 95% (09-02-22 @ 17:11) (95% - 99%)  Wt(kg): --   BMI (kg/m2): 23.8 (08-19-22 @ 06:59)  I&O's Summary    01 Sep 2022 07:01  -  02 Sep 2022 07:00  --------------------------------------------------------  IN: 600 mL / OUT: 1350 mL / NET: -750 mL    General: Well nourished in no acute distress. Alert and Oriented * 3.   Head: Normocephalic and atraumatic.   Neck: No JVD. No bruits. Supple. Does not appear to be enlarged.   Cardiovascular: + S1,S2 ; RRR Soft systolic murmur at the left lower sternal border. No rubs noted.    Lungs: CTA b/l. No rhonchi, rales or wheezes.   Abdomen: + BS, soft. Non tender. Non distended. No rebound. No guarding.   Extremities: No clubbing/cyanosis/edema.   Neurologic: Moves all four extremities. Full range of motion.   Skin: Warm and moist. The patient's skin has normal elasticity and good skin turgor.   Psychiatric: Appropriate mood and affect.  Musculoskeletal: Normal range of motion, normal strength       TELEMETRY: 	  Sinus rhythm with occasional sinus tachycardia    ECG:  	Sinus, LAD, LVH    RADIOLOGY:         CXR: *  Right subpulmonic effusion decreased from previous study.  *  Small left effusion appears increased.    ct chest  UNGS AND AIRWAYS: Small right pleural effusion. Right lower lobe   homogeneously enhancing consolidation with some associated volume loss   and internal air bronchograms suggestive of atelectasis. Mild right   middle lobe partial atelectasis. Mild left mid to lower lung areas of   linear or subsegmental atelectasis.  MEDIASTINUM AND CARMELLA: No lymphadenopathy. Nonspecific calcification   within the upper esophagus.  VESSELS: No pulmonary embolism. Diffuse coronary artery and aortic   calcifications with interval calcified and noncalcified plaques within   the aorta.  HEART: Cardiomegaly. No pericardial effusion. Mitral annular   calcifications.  CHEST WALL AND LOWER NECK: Heterogeneity of the thyroid gland. 9 mm   hypodense nodule within the right lobe of the thyroid gland. 6 mm   hyperdense and possibly enhancing nodule within the right lobe of the   thyroid gland.  VISUALIZED UPPER ABDOMEN: Interval cholecystectomy when compared to CT   abdomen pelvis from 8/18/2022 with their the imaged postoperative changes   in the gallbladder fossae including tiny droplets of air.  BONES: Within normal limits.    IMPRESSION:  No pulmonary embolism.    Small right-sided pleural effusion with adjacent right lower lobe   consolidation suggestive of atelectasis.      TTE: BSERVATIONS:  Mitral Valve: Mitral annular calcification and calcified  mitral leaflets with normal diastolic opening. Minimal  mitral regurgitation.  Aortic Root: Normal aortic root.  Aortic Valve: Calcified trileaflet aortic valve with normal  opening. Mild aortic regurgitation.  Left Atrium: Mildly dilated left atrium.  LA volume index =  35 cc/m2.  Left Ventricle: Hyperdynamic left ventricle. Normal left  ventricular internal dimensions and wall thicknesses.  Right Heart: Normal right atrium. Normal right ventricular  sizeand function. Normal tricuspid valve. Minimal  tricuspid regurgitation. Normal pulmonic valve. Minimal  pulmonic regurgitation.  Pericardium/PleuraNormal pericardium with no pericardial  effusion. Right pleural effusion.  ------------------------------------------------------------------------  CONCLUSIONS:  1. Mitral annular calcification and calcified mitral  leaflets with normal diastolic opening. Minimal mitral  regurgitation.  2. Calcified trileaflet aortic valve with normal opening.  Mild aortic regurgitation.  3. Mildly dilated left atrium.  LA volume index = 35 cc/m2.  4. Hyperdynamic left ventricle.  5. Normal right ventricular size and function.        ASSESSMENT/PLAN: Pt is a 87F PMHx of recent SAH/SDH, HTN, HLD and GERD who presented for open cholecystectomy, portal lymphadenectomy, liver segment 4b / 5 resection and right hemicolectomy on 8/19/22 she was unable to be extubated at the end of the case due to sedation and inadequate ventilation hence was admitted to SICU.  Her course was further c/b pulmonary edema requiring diuresis in SICU and hypoxemic and hypercapnic respiratory insufficiency requiring supplemental O2 and chest physiotherapy.     - acute hypoxic failure likely primary pulmonary process, she has a WBC count and is improving with chest PT and ABX  - defer ABX to pulm  - blood pressure elevated, suggest changing lopressor 50 mg BID to Carvedilol 6.25 mg BID  - EKG non-ischemic but with LVH, ECHo with normal LV wall thickness  - c/w Losartan at current dose      Mack Shay MD  Pager: [phone]     
                                                           =====================================================                                                             SICU Consultation Note                                                             =====================================================  Patient is a 87y old  Female who presents with a chief complaint of Bowel prep for surgery tomorrow (19 Aug 2022 06:09)        Surgery Information: ex lap, cholecystectomy, IOC, segment 4b/5 hepatectomy, right colectomy with ileocolic anastomosis.   EBL:   600    IV Fluids:  1750L crystolloid, 750 cc albumin    Blood Products:       1 pRBC          UOP:        500 cc  Complications:        HISTORY  87 year old female with a PMHx of HTN, HLD and GERD presented for cholecystectomy. 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 SICU consulted for hemodynamic monitoring.       Allergies: penicillin (Unknown)    PAST MEDICAL & SURGICAL HISTORY:  Hypertension  Hyperlipidemia  Gout  GERD (gastroesophageal reflux disease)  Insomnia  Other gallbladder disorder  H/O: hysterectomy    FAMILY HISTORY:  FH: hypertension (Father, Mother)    ALLERGIES  penicillin (Unknown)      SOCIAL HISTORY:      HOME MEDICATIONS:   chlorhexidine 0.12% Liquid 15 milliLiter(s) Oral Mucosa every 12 hours  HYDROmorphone  Injectable 0.5 milliGRAM(s) IV Push every 10 minutes PRN  HYDROmorphone  Injectable 1 milliGRAM(s) IV Push every 10 minutes PRN  lactated ringers. 1000 milliLiter(s) IV Continuous <Continuous>  metoclopramide Injectable 10 milliGRAM(s) IV Push once PRN  metoprolol succinate ER 25 milliGRAM(s) Oral daily  ondansetron Injectable 4 milliGRAM(s) IV Push once PRN      CURRENT MEDICATIONS:   --------------------------------------------------------------------------------------  Neurologic Medications  HYDROmorphone  Injectable 0.5 milliGRAM(s) IV Push every 10 minutes PRN Moderate Pain (4 - 6)  HYDROmorphone  Injectable 1 milliGRAM(s) IV Push every 10 minutes PRN Severe Pain (7 - 10)  metoclopramide Injectable 10 milliGRAM(s) IV Push once PRN Nausea and/or Vomiting  ondansetron Injectable 4 milliGRAM(s) IV Push once PRN Nausea and/or Vomiting    Respiratory Medications    Cardiovascular Medications  metoprolol succinate ER 25 milliGRAM(s) Oral daily    Gastrointestinal Medications  lactated ringers. 1000 milliLiter(s) IV Continuous <Continuous>    Genitourinary Medications    Hematologic/Oncologic Medications    Antimicrobial/Immunologic Medications    Endocrine/Metabolic Medications    Topical/Other Medications  chlorhexidine 0.12% Liquid 15 milliLiter(s) Oral Mucosa every 12 hours    --------------------------------------------------------------------------------------    VITAL SIGNS, INS/OUTS (last 24 hours):    T(C): 36.6 (08-19-22 @ 06:39), Max: 36.6 (08-18-22 @ 17:18)  HR: 70 (08-19-22 @ 06:39) (54 - 78)  BP: 186/53 (08-19-22 @ 06:39) (148/77 - 186/53)  ABP: --  ABP(mean): --  RR: 18 (08-19-22 @ 06:39) (17 - 18)  SpO2: 96% (08-19-22 @ 06:39) (96% - 100%)  --------------------------------------------------------------------------------------  ((Insert SICU Vitals / Is+Os here)) ***    08-18-22 @ 07:01  -  08-19-22 @ 07:00  --------------------------------------------------------  IN: 740 mL / OUT: 0 mL / NET: 740 mL      --------------------------------------------------------------------------------------    EXAM  NEUROLOGY  RASS:  -3  Exam: Normal, NAD,     HEENT  Exam: Normocephalic, atraumatic.  EOMI     RESPIRATORY  Exam:Normal expansion/effort.    Mechanical Ventilation: SIMV RR 4  PEEP 5 Spp2 30%     CARDIOVASCULAR  Exam: Regular rate and rhythm.     GI/NUTRITION  Exam: Abdomen soft, Non-tender. Provena in place holding suction. CATARINA drain with ss output. NGT in place.   Current Diet:  NPO    VASCULAR  Exam: Extremities warm, pink, well-perfused.     MUSCULOSKELETAL  Exam: All extremities moving spontaneously without limitations.      SKIN:  Exam: Good skin turgor, no skin breakdown.      METABOLIC/FLUIDS/ELECTROLYTES  lactated ringers. 1000 milliLiter(s) IV Continuous <Continuous>      HEMATOLOGIC  [x] DVT Prophylaxis:   Transfusions:	[x] PRBC	[] Platelets		[] FFP	[] Cryoprecipitate    INFECTIOUS DISEASE  Antimicrobials/Immunologic Medications:    Day #  	of    ***    Tubes/Lines/Drains  ***  [x] Peripheral IV  [] Central Venous Line     	[] R	[] L	[] IJ	[] Fem	[] SC	Date Placed:   [] Arterial Line		[] R	[] L	[] Fem	[] Rad	[] Ax	Date Placed:   [] PICC:         	[] Midline		[] Mediport  [] Urinary Catheter		Date Placed:     LABS  --------------------------------------------------------------------------------------  ((Insert SICU Labs here))***    --------------------------------------------------------------------------------------    OTHER LABS    IMAGING RESULTS  Echo:   CT:   Xray:     
DATE OF SERVICE: 08-31-22 @ 13:28    Patient is a 87y old  Female who presents with a chief complaint of Bowel prep for surgery tomorrow (31 Aug 2022 08:01)      HPI:    Patient is a 87 year old female with PMH of HTN, HLD, GERD, who presents for bowel prep  s/p operative intervention now medicine consulted for ongoing confusion and pleural effusions. Pt not cooperative with history      PAST MEDICAL & SURGICAL HISTORY:    Hypertension      Hyperlipidemia      Gout      GERD (gastroesophageal reflux disease)      Insomnia      Other gallbladder disorder      H/O: hysterectomy          Review of Systems:   not available     Allergies    penicillin (Unknown)        Social History: non smoker  no IVDA  no ETOH abuse   lives with family per chart    FAMILY HISTORY:  FH: hypertension (Father, Mother)        MEDICATIONS  (STANDING):  acetaminophen     Tablet .. 1000 milliGRAM(s) Oral every 6 hours  heparin   Injectable 5000 Unit(s) SubCutaneous every 8 hours  losartan 50 milliGRAM(s) Oral daily  meropenem  IVPB 1000 milliGRAM(s) IV Intermittent every 12 hours  metoprolol tartrate 25 milliGRAM(s) Oral daily  multivitamin 1 Tablet(s) Oral daily  pantoprazole    Tablet 40 milliGRAM(s) Oral before breakfast  thiamine 100 milliGRAM(s) Oral daily    MEDICATIONS  (PRN):      CAPILLARY BLOOD GLUCOSE        I&O's Summary    30 Aug 2022 07:01  -  31 Aug 2022 07:00  --------------------------------------------------------  IN: 120 mL / OUT: 400 mL / NET: -280 mL    31 Aug 2022 07:01  -  31 Aug 2022 13:28  --------------------------------------------------------  IN: 240 mL / OUT: 200 mL / NET: 40 mL        24hrs Vital:  T(C): 36.3 (08-31-22 @ 12:17), Max: 37 (08-31-22 @ 01:13)  HR: 77 (08-31-22 @ 12:17) (70 - 122)  BP: 164/49 (08-31-22 @ 12:17) (142/55 - 184/85)  RR: 18 (08-31-22 @ 12:17) (18 - 20)  SpO2: 97% (08-31-22 @ 12:17) (95% - 99%)    PHYSICAL EXAM:  GENERAL: NAD, asthenic  HEAD:  Atraumatic, Normocephalic  EYES: EOMI, PERRLA, conjunctiva and sclera clear  NECK: Supple, No JVD  CHEST/LUNG: decreased breath sounds bases   HEART: S1S2;  ejection systolic murmur +   ABDOMEN: Soft, Nontender; Bowel sounds present  EXTREMITIES:  + Peripheral Pulses, No clubbing or cyanosis, no edema  NEUROLOGY: Alert, nonfocal moving all 4 limbs  SKIN: No rashes or lesions    LABS:                        7.0    12.81 )-----------( 586      ( 31 Aug 2022 05:10 )             23.2     08-31    137  |  95<L>  |  13  ----------------------------<  95  4.0   |  37<H>  |  0.63    Ca    9.6      31 Aug 2022 05:10  Phos  3.1     08-31  Mg     1.80     08-31    TPro  6.0  /  Alb  3.0<L>  /  TBili  0.4  /  DBili  <0.2  /  AST  22  /  ALT  20  /  AlkPhos  257<H>  08-31              RADIOLOGY & ADDITIONAL TESTS:    Consultant(s) Notes Reviewed:      Care Discussed with Consultants/Other Providers:

## 2023-01-05 NOTE — H&P ADULT - PROBLEM SELECTOR PROBLEM 1
Debbie Avalos  [facility]  Pager #[phone]  Available on [employer] Teams    Patient is a 80y old  Female who presents with a chief complaint of cervical, thoracic stenosis (05 Jan 2023 12:09)      SUBJECTIVE / OVERNIGHT EVENTS: Patient seen and examined at bedside. Pt feeling tired, wants to sleep more. Still having dysuria.    ADDITIONAL REVIEW OF SYSTEMS:    MEDICATIONS  (STANDING):  baclofen 10 milliGRAM(s) Oral every 8 hours  ciprofloxacin     Tablet 500 milliGRAM(s) Oral every 12 hours  dextrose 5%. 1000 milliLiter(s) (100 mL/Hr) IV Continuous <Continuous>  dextrose 5%. 1000 milliLiter(s) (50 mL/Hr) IV Continuous <Continuous>  dextrose 50% Injectable 25 Gram(s) IV Push once  dextrose 50% Injectable 12.5 Gram(s) IV Push once  dextrose 50% Injectable 25 Gram(s) IV Push once  enoxaparin Injectable 40 milliGRAM(s) SubCutaneous <User Schedule>  FLUoxetine 20 milliGRAM(s) Oral daily  gabapentin 400 milliGRAM(s) Oral three times a day  glucagon  Injectable 1 milliGRAM(s) IntraMuscular once  insulin lispro (ADMELOG) corrective regimen sliding scale   SubCutaneous three times a day before meals  insulin lispro (ADMELOG) corrective regimen sliding scale   SubCutaneous at bedtime  lactulose Syrup 10 Gram(s) Oral two times a day  levothyroxine 75 MICROGram(s) Oral daily  losartan 100 milliGRAM(s) Oral daily  pantoprazole    Tablet 40 milliGRAM(s) Oral before breakfast  polyethylene glycol 3350 17 Gram(s) Oral two times a day  saline laxative (FLEET) Rectal Enema 1 Enema Rectal daily  senna 2 Tablet(s) Oral at bedtime  simvastatin 20 milliGRAM(s) Oral at bedtime    MEDICATIONS  (PRN):  acetaminophen     Tablet .. 650 milliGRAM(s) Oral every 6 hours PRN Temp greater or equal to 38C (100.4F), Moderate Pain (4 - 6)  bisacodyl Suppository 10 milliGRAM(s) Rectal daily PRN Constipation  dextrose Oral Gel 15 Gram(s) Oral once PRN Blood Glucose LESS THAN 70 milliGRAM(s)/deciliter  melatonin 6 milliGRAM(s) Oral at bedtime PRN Sleep  oxyCODONE    IR 5 milliGRAM(s) Oral every 6 hours PRN Severe Pain (7 - 10)  SUMAtriptan 50 milliGRAM(s) Oral daily PRN Migraine      CAPILLARY BLOOD GLUCOSE      POCT Blood Glucose.: 213 mg/dL (05 Jan 2023 11:12)  POCT Blood Glucose.: 141 mg/dL (05 Jan 2023 07:19)  POCT Blood Glucose.: 135 mg/dL (04 Jan 2023 21:41)  POCT Blood Glucose.: 189 mg/dL (04 Jan 2023 16:50)    I&O's Summary    04 Jan 2023 07:01  -  05 Jan 2023 07:00  --------------------------------------------------------  IN: 0 mL / OUT: 2750 mL / NET: -2750 mL        PHYSICAL EXAM:    Vital Signs Last 24 Hrs  T(C): 37 (05 Jan 2023 10:10), Max: 37.2 (04 Jan 2023 17:29)  T(F): 98.6 (05 Jan 2023 10:10), Max: 99 (04 Jan 2023 17:29)  HR: 72 (05 Jan 2023 10:10) (60 - 72)  BP: 149/65 (05 Jan 2023 10:10) (128/80 - 149/65)  BP(mean): --  RR: 16 (05 Jan 2023 10:10) (16 - 18)  SpO2: 100% (05 Jan 2023 10:10) (94% - 100%)    Parameters below as of 05 Jan 2023 10:10  Patient On (Oxygen Delivery Method): room air    CONSTITUTIONAL: NAD, well-developed, well-groomed  EYES: Conjunctiva and sclera clear  RESPIRATORY: Normal respiratory effort; lungs are clear to auscultation bilaterally  CARDIOVASCULAR: Regular rate and rhythm, normal S1 and S2, no murmur/rub/gallop; No lower extremity edema  ABDOMEN: Soft, nontender to palpation, normoactive bowel sounds  PSYCH: A+O to person, place  NEUROLOGY: 3/5 strength lower extremities  SKIN: Old surgical scar on abdomen    LABS:                        9.4    8.12  )-----------( 310      ( 05 Jan 2023 07:27 )             29.6     01-05    134<L>  |  98  |  22  ----------------------------<  141<H>  4.2   |  26  |  0.86    Ca    9.4      05 Jan 2023 07:27    Culture - Urine (collected 03 Jan 2023 23:27)  Source: Clean Catch Clean Catch (Midstream)  Preliminary Report (05 Jan 2023 13:05):    Culture in progress    RADIOLOGY & ADDITIONAL TESTS: No new imaging  Results Reviewed: Yes  Imaging Personally Reviewed:  Electrocardiogram Personally Reviewed:    COORDINATION OF CARE:  Care Discussed with Consultants/Other Providers [Y/N]: Yes, neurosurgery- no plan for surgery given baseline poor functional status  Prior or Outpatient Records Reviewed [Y/N]:   Encephalopathy acute

## 2023-01-16 NOTE — PROVIDER CONTACT NOTE (CRITICAL VALUE NOTIFICATION) - ACTION/TREATMENT ORDERED:
EI referral to be place by NICU; please let PT know 
Marilin Gamble from Pt called, stating the PT referral is not in the system  Would like the referral to be placed 
provider wants repeat lab and ordered potassium suppliment

## 2023-03-06 ENCOUNTER — INPATIENT (INPATIENT)
Facility: HOSPITAL | Age: 88
LOS: 4 days | Discharge: HOME | End: 2023-03-11
Attending: INTERNAL MEDICINE | Admitting: INTERNAL MEDICINE
Payer: MEDICARE

## 2023-03-06 VITALS
RESPIRATION RATE: 28 BRPM | DIASTOLIC BLOOD PRESSURE: 59 MMHG | HEIGHT: 56 IN | HEART RATE: 94 BPM | SYSTOLIC BLOOD PRESSURE: 123 MMHG | TEMPERATURE: 98 F | WEIGHT: 89.95 LBS | OXYGEN SATURATION: 100 %

## 2023-03-06 DIAGNOSIS — R41.82 ALTERED MENTAL STATUS, UNSPECIFIED: ICD-10-CM

## 2023-03-06 DIAGNOSIS — Z85.09 PERSONAL HISTORY OF MALIGNANT NEOPLASM OF OTHER DIGESTIVE ORGANS: ICD-10-CM

## 2023-03-06 DIAGNOSIS — J96.01 ACUTE RESPIRATORY FAILURE WITH HYPOXIA: ICD-10-CM

## 2023-03-06 DIAGNOSIS — Z85.09 PERSONAL HISTORY OF MALIGNANT NEOPLASM OF OTHER DIGESTIVE ORGANS: Chronic | ICD-10-CM

## 2023-03-06 DIAGNOSIS — K21.9 GASTRO-ESOPHAGEAL REFLUX DISEASE WITHOUT ESOPHAGITIS: ICD-10-CM

## 2023-03-06 DIAGNOSIS — I10 ESSENTIAL (PRIMARY) HYPERTENSION: ICD-10-CM

## 2023-03-06 DIAGNOSIS — E78.5 HYPERLIPIDEMIA, UNSPECIFIED: ICD-10-CM

## 2023-03-06 DIAGNOSIS — D64.9 ANEMIA, UNSPECIFIED: ICD-10-CM

## 2023-03-06 LAB
ALBUMIN SERPL ELPH-MCNC: 3 G/DL — LOW (ref 3.3–5)
ALP SERPL-CCNC: 88 U/L — SIGNIFICANT CHANGE UP (ref 40–120)
ALT FLD-CCNC: 30 U/L — SIGNIFICANT CHANGE UP (ref 12–78)
ANION GAP SERPL CALC-SCNC: 5 MMOL/L — SIGNIFICANT CHANGE UP (ref 5–17)
ANISOCYTOSIS BLD QL: SIGNIFICANT CHANGE UP
APPEARANCE UR: CLEAR — SIGNIFICANT CHANGE UP
AST SERPL-CCNC: 65 U/L — HIGH (ref 15–37)
BASE EXCESS BLDA CALC-SCNC: -1.9 MMOL/L — SIGNIFICANT CHANGE UP (ref -2–3)
BASE EXCESS BLDV CALC-SCNC: -2.1 MMOL/L — LOW (ref -2–3)
BASOPHILS # BLD AUTO: 0 K/UL — SIGNIFICANT CHANGE UP (ref 0–0.2)
BASOPHILS NFR BLD AUTO: 0 % — SIGNIFICANT CHANGE UP (ref 0–2)
BILIRUB SERPL-MCNC: 0.3 MG/DL — SIGNIFICANT CHANGE UP (ref 0.2–1.2)
BILIRUB UR-MCNC: NEGATIVE — SIGNIFICANT CHANGE UP
BLD GP AB SCN SERPL QL: SIGNIFICANT CHANGE UP
BLOOD GAS COMMENTS ARTERIAL: SIGNIFICANT CHANGE UP
BLOOD GAS COMMENTS, VENOUS: SIGNIFICANT CHANGE UP
BLOOD GAS COMMENTS, VENOUS: SIGNIFICANT CHANGE UP
BUN SERPL-MCNC: 13 MG/DL — SIGNIFICANT CHANGE UP (ref 7–23)
CALCIUM SERPL-MCNC: 9.8 MG/DL — SIGNIFICANT CHANGE UP (ref 8.5–10.1)
CHLORIDE BLDV-SCNC: 95 MMOL/L — LOW (ref 98–107)
CHLORIDE SERPL-SCNC: 96 MMOL/L — SIGNIFICANT CHANGE UP (ref 96–108)
CO2 BLDA-SCNC: 27 MMOL/L — HIGH (ref 19–24)
CO2 BLDV-SCNC: 30 MMOL/L — HIGH (ref 22–26)
CO2 BLDV-SCNC: 32 MMOL/L — HIGH (ref 22–26)
CO2 SERPL-SCNC: 27 MMOL/L — SIGNIFICANT CHANGE UP (ref 22–31)
COLOR SPEC: YELLOW — SIGNIFICANT CHANGE UP
CREAT SERPL-MCNC: 0.96 MG/DL — SIGNIFICANT CHANGE UP (ref 0.5–1.3)
D DIMER BLD IA.RAPID-MCNC: 585 NG/ML DDU — HIGH
DIFF PNL FLD: NEGATIVE — SIGNIFICANT CHANGE UP
EGFR: 58 ML/MIN/1.73M2 — LOW
EOSINOPHIL # BLD AUTO: 0 K/UL — SIGNIFICANT CHANGE UP (ref 0–0.5)
EOSINOPHIL NFR BLD AUTO: 0 % — SIGNIFICANT CHANGE UP (ref 0–6)
FERRITIN SERPL-MCNC: 30 NG/ML — SIGNIFICANT CHANGE UP (ref 15–150)
FLUAV AG NPH QL: SIGNIFICANT CHANGE UP
FLUBV AG NPH QL: SIGNIFICANT CHANGE UP
GAS PNL BLDA: SIGNIFICANT CHANGE UP
GAS PNL BLDV: 124 MMOL/L — LOW (ref 136–145)
GAS PNL BLDV: SIGNIFICANT CHANGE UP
GAS PNL BLDV: SIGNIFICANT CHANGE UP
GLUCOSE BLDV-MCNC: 114 MG/DL — HIGH (ref 65–95)
GLUCOSE SERPL-MCNC: 113 MG/DL — HIGH (ref 70–99)
GLUCOSE UR QL: NEGATIVE MG/DL — SIGNIFICANT CHANGE UP
HCO3 BLDA-SCNC: 26 MMOL/L — SIGNIFICANT CHANGE UP (ref 21–28)
HCO3 BLDV-SCNC: 28 MMOL/L — SIGNIFICANT CHANGE UP (ref 22–28)
HCO3 BLDV-SCNC: 29 MMOL/L — HIGH (ref 22–28)
HCT VFR BLD CALC: 21.7 % — LOW (ref 34.5–45)
HCT VFR BLDA CALC: 19 % — CRITICAL LOW (ref 37–47)
HGB BLD CALC-MCNC: 6.2 G/DL — CRITICAL LOW (ref 11.7–16.1)
HGB BLD-MCNC: 6.3 G/DL — CRITICAL LOW (ref 11.5–15.5)
HOROWITZ INDEX BLDA+IHG-RTO: 35 — SIGNIFICANT CHANGE UP
HOROWITZ INDEX BLDV+IHG-RTO: 100 — SIGNIFICANT CHANGE UP
HYPOCHROMIA BLD QL: SIGNIFICANT CHANGE UP
KETONES UR-MCNC: NEGATIVE — SIGNIFICANT CHANGE UP
LACTATE BLDV-MCNC: 1.7 MMOL/L — HIGH (ref 0.56–1.39)
LACTATE SERPL-SCNC: 0.6 MMOL/L — LOW (ref 0.7–2)
LACTATE SERPL-SCNC: 3.1 MMOL/L — HIGH (ref 0.7–2)
LEUKOCYTE ESTERASE UR-ACNC: NEGATIVE — SIGNIFICANT CHANGE UP
LYMPHOCYTES # BLD AUTO: 0.71 K/UL — LOW (ref 1–3.3)
LYMPHOCYTES # BLD AUTO: 7 % — LOW (ref 13–44)
MAGNESIUM SERPL-MCNC: 2.2 MG/DL — SIGNIFICANT CHANGE UP (ref 1.6–2.6)
MANUAL SMEAR VERIFICATION: YES — SIGNIFICANT CHANGE UP
MCHC RBC-ENTMCNC: 24.2 PG — LOW (ref 27–34)
MCHC RBC-ENTMCNC: 29 G/DL — LOW (ref 32–36)
MCV RBC AUTO: 83.5 FL — SIGNIFICANT CHANGE UP (ref 80–100)
MICROCYTES BLD QL: SLIGHT — SIGNIFICANT CHANGE UP
MONOCYTES # BLD AUTO: 0.31 K/UL — SIGNIFICANT CHANGE UP (ref 0–0.9)
MONOCYTES NFR BLD AUTO: 3 % — SIGNIFICANT CHANGE UP (ref 2–14)
NEUTROPHILS # BLD AUTO: 9.17 K/UL — HIGH (ref 1.8–7.4)
NEUTROPHILS NFR BLD AUTO: 90 % — HIGH (ref 43–77)
NITRITE UR-MCNC: NEGATIVE — SIGNIFICANT CHANGE UP
NRBC # BLD: 1 /100 — HIGH (ref 0–0)
NRBC # BLD: SIGNIFICANT CHANGE UP /100 WBCS (ref 0–0)
NT-PROBNP SERPL-SCNC: 4176 PG/ML — HIGH (ref 0–450)
PCO2 BLDA: 53 MMHG — HIGH (ref 32–46)
PCO2 BLDV: 70 MMHG — HIGH (ref 42–55)
PCO2 BLDV: 86 MMHG — HIGH (ref 42–55)
PH BLDA: 7.29 — LOW (ref 7.35–7.45)
PH BLDV: 7.14 — CRITICAL LOW (ref 7.32–7.43)
PH BLDV: 7.21 — LOW (ref 7.32–7.43)
PH UR: 6 — SIGNIFICANT CHANGE UP (ref 5–8)
PHOSPHATE SERPL-MCNC: 4 MG/DL — SIGNIFICANT CHANGE UP (ref 2.5–4.5)
PLAT MORPH BLD: NORMAL — SIGNIFICANT CHANGE UP
PLATELET # BLD AUTO: 399 K/UL — SIGNIFICANT CHANGE UP (ref 150–400)
PO2 BLDA: 89 MMHG — SIGNIFICANT CHANGE UP (ref 83–108)
PO2 BLDV: 22 MMHG — LOW (ref 25–45)
PO2 BLDV: 23 MMHG — LOW (ref 25–45)
POTASSIUM BLDV-SCNC: 4.1 MMOL/L — SIGNIFICANT CHANGE UP (ref 3.5–5.1)
POTASSIUM SERPL-MCNC: 4.7 MMOL/L — SIGNIFICANT CHANGE UP (ref 3.5–5.3)
POTASSIUM SERPL-SCNC: 4.7 MMOL/L — SIGNIFICANT CHANGE UP (ref 3.5–5.3)
PROCALCITONIN SERPL-MCNC: 0.14 NG/ML — HIGH (ref 0.02–0.1)
PROT SERPL-MCNC: 6.9 GM/DL — SIGNIFICANT CHANGE UP (ref 6–8.3)
PROT UR-MCNC: 100 MG/DL
RBC # BLD: 2.6 M/UL — LOW (ref 3.8–5.2)
RBC # FLD: 16.7 % — HIGH (ref 10.3–14.5)
RBC BLD AUTO: ABNORMAL
RBC CASTS # UR COMP ASSIST: SIGNIFICANT CHANGE UP /HPF (ref 0–4)
SAO2 % BLDA: 97.8 % — SIGNIFICANT CHANGE UP (ref 94–98)
SAO2 % BLDV: 23.5 % — LOW (ref 94–98)
SAO2 % BLDV: 28.8 % — LOW (ref 94–98)
SARS-COV-2 RNA SPEC QL NAA+PROBE: SIGNIFICANT CHANGE UP
SCHISTOCYTES BLD QL AUTO: SLIGHT — SIGNIFICANT CHANGE UP
SODIUM SERPL-SCNC: 128 MMOL/L — LOW (ref 135–145)
SP GR SPEC: 1.02 — SIGNIFICANT CHANGE UP (ref 1.01–1.02)
TROPONIN I, HIGH SENSITIVITY RESULT: 47.3 NG/L — SIGNIFICANT CHANGE UP
UROBILINOGEN FLD QL: NEGATIVE MG/DL — SIGNIFICANT CHANGE UP
WBC # BLD: 10.19 K/UL — SIGNIFICANT CHANGE UP (ref 3.8–10.5)
WBC # FLD AUTO: 10.19 K/UL — SIGNIFICANT CHANGE UP (ref 3.8–10.5)
WBC UR QL: SIGNIFICANT CHANGE UP

## 2023-03-06 PROCEDURE — 99285 EMERGENCY DEPT VISIT HI MDM: CPT

## 2023-03-06 PROCEDURE — 70450 CT HEAD/BRAIN W/O DYE: CPT | Mod: 26,MA

## 2023-03-06 PROCEDURE — 71045 X-RAY EXAM CHEST 1 VIEW: CPT | Mod: 26

## 2023-03-06 PROCEDURE — 74178 CT ABD&PLV WO CNTR FLWD CNTR: CPT | Mod: 26

## 2023-03-06 PROCEDURE — 99223 1ST HOSP IP/OBS HIGH 75: CPT

## 2023-03-06 PROCEDURE — 71250 CT THORAX DX C-: CPT | Mod: 26

## 2023-03-06 RX ORDER — ALLOPURINOL 300 MG
100 TABLET ORAL DAILY
Refills: 0 | Status: DISCONTINUED | OUTPATIENT
Start: 2023-03-06 | End: 2023-03-11

## 2023-03-06 RX ORDER — DEXTROSE 50 % IN WATER 50 %
12.5 SYRINGE (ML) INTRAVENOUS ONCE
Refills: 0 | Status: DISCONTINUED | OUTPATIENT
Start: 2023-03-06 | End: 2023-03-11

## 2023-03-06 RX ORDER — AZITHROMYCIN 500 MG/1
500 TABLET, FILM COATED ORAL EVERY 24 HOURS
Refills: 0 | Status: DISCONTINUED | OUTPATIENT
Start: 2023-03-07 | End: 2023-03-08

## 2023-03-06 RX ORDER — DEXTROSE 50 % IN WATER 50 %
25 SYRINGE (ML) INTRAVENOUS ONCE
Refills: 0 | Status: DISCONTINUED | OUTPATIENT
Start: 2023-03-06 | End: 2023-03-11

## 2023-03-06 RX ORDER — VANCOMYCIN HCL 1 G
1000 VIAL (EA) INTRAVENOUS ONCE
Refills: 0 | Status: COMPLETED | OUTPATIENT
Start: 2023-03-06 | End: 2023-03-06

## 2023-03-06 RX ORDER — CEFTRIAXONE 500 MG/1
1000 INJECTION, POWDER, FOR SOLUTION INTRAMUSCULAR; INTRAVENOUS EVERY 24 HOURS
Refills: 0 | Status: COMPLETED | OUTPATIENT
Start: 2023-03-06 | End: 2023-03-10

## 2023-03-06 RX ORDER — SENNA PLUS 8.6 MG/1
2 TABLET ORAL AT BEDTIME
Refills: 0 | Status: DISCONTINUED | OUTPATIENT
Start: 2023-03-06 | End: 2023-03-11

## 2023-03-06 RX ORDER — FUROSEMIDE 40 MG
40 TABLET ORAL ONCE
Refills: 0 | Status: COMPLETED | OUTPATIENT
Start: 2023-03-06 | End: 2023-03-06

## 2023-03-06 RX ORDER — DEXTROSE 50 % IN WATER 50 %
15 SYRINGE (ML) INTRAVENOUS ONCE
Refills: 0 | Status: DISCONTINUED | OUTPATIENT
Start: 2023-03-06 | End: 2023-03-11

## 2023-03-06 RX ORDER — SODIUM CHLORIDE 9 MG/ML
1000 INJECTION INTRAMUSCULAR; INTRAVENOUS; SUBCUTANEOUS ONCE
Refills: 0 | Status: COMPLETED | OUTPATIENT
Start: 2023-03-06 | End: 2023-03-06

## 2023-03-06 RX ORDER — CEFEPIME 1 G/1
1000 INJECTION, POWDER, FOR SOLUTION INTRAMUSCULAR; INTRAVENOUS ONCE
Refills: 0 | Status: COMPLETED | OUTPATIENT
Start: 2023-03-06 | End: 2023-03-06

## 2023-03-06 RX ORDER — FENOFIBRATE,MICRONIZED 130 MG
145 CAPSULE ORAL DAILY
Refills: 0 | Status: DISCONTINUED | OUTPATIENT
Start: 2023-03-06 | End: 2023-03-06

## 2023-03-06 RX ORDER — PANTOPRAZOLE SODIUM 20 MG/1
40 TABLET, DELAYED RELEASE ORAL
Refills: 0 | Status: DISCONTINUED | OUTPATIENT
Start: 2023-03-06 | End: 2023-03-11

## 2023-03-06 RX ORDER — AZITHROMYCIN 500 MG/1
TABLET, FILM COATED ORAL
Refills: 0 | Status: DISCONTINUED | OUTPATIENT
Start: 2023-03-06 | End: 2023-03-08

## 2023-03-06 RX ORDER — ACETAMINOPHEN 500 MG
650 TABLET ORAL EVERY 6 HOURS
Refills: 0 | Status: DISCONTINUED | OUTPATIENT
Start: 2023-03-06 | End: 2023-03-11

## 2023-03-06 RX ORDER — GLUCAGON INJECTION, SOLUTION 0.5 MG/.1ML
1 INJECTION, SOLUTION SUBCUTANEOUS ONCE
Refills: 0 | Status: DISCONTINUED | OUTPATIENT
Start: 2023-03-06 | End: 2023-03-11

## 2023-03-06 RX ORDER — AZITHROMYCIN 500 MG/1
500 TABLET, FILM COATED ORAL ONCE
Refills: 0 | Status: COMPLETED | OUTPATIENT
Start: 2023-03-06 | End: 2023-03-06

## 2023-03-06 RX ORDER — FENOFIBRATE,MICRONIZED 130 MG
48 CAPSULE ORAL DAILY
Refills: 0 | Status: DISCONTINUED | OUTPATIENT
Start: 2023-03-06 | End: 2023-03-11

## 2023-03-06 RX ADMIN — SODIUM CHLORIDE 1000 MILLILITER(S): 9 INJECTION INTRAMUSCULAR; INTRAVENOUS; SUBCUTANEOUS at 08:03

## 2023-03-06 RX ADMIN — CEFTRIAXONE 100 MILLIGRAM(S): 500 INJECTION, POWDER, FOR SOLUTION INTRAMUSCULAR; INTRAVENOUS at 20:31

## 2023-03-06 RX ADMIN — AZITHROMYCIN 500 MILLIGRAM(S): 500 TABLET, FILM COATED ORAL at 16:48

## 2023-03-06 RX ADMIN — CEFEPIME 100 MILLIGRAM(S): 1 INJECTION, POWDER, FOR SOLUTION INTRAMUSCULAR; INTRAVENOUS at 10:02

## 2023-03-06 RX ADMIN — Medication 40 MILLIGRAM(S): at 14:54

## 2023-03-06 RX ADMIN — SODIUM CHLORIDE 1000 MILLILITER(S): 9 INJECTION INTRAMUSCULAR; INTRAVENOUS; SUBCUTANEOUS at 11:16

## 2023-03-06 RX ADMIN — Medication 1000 MILLIGRAM(S): at 11:16

## 2023-03-06 RX ADMIN — Medication 250 MILLIGRAM(S): at 08:03

## 2023-03-06 NOTE — ED PROVIDER NOTE - OBJECTIVE STATEMENT
88 y/o F w/ pmh of liver abscess, bladder cancer, HLD, HTN gallbladder cnacer p/w AMS. Pt was found by daugther to not be responsive at home per EMS. no family at bedside. per EMS, pt was found to be satting 32%, improved with cpap to 99%. other vitals WNL, pt cannt provide history due to AMS

## 2023-03-06 NOTE — H&P ADULT - PROBLEM SELECTOR PLAN 2
new onset anemia Hb 6.3. Per daughter, no bleeding. Normal stool  1 unit of PRBC ordered  Monitor H/H  add on ferritin/TIBC  Check CT abd/pelvis to evaluate for bleeding  Fecal occult blood

## 2023-03-06 NOTE — ED PROVIDER NOTE - CHIEF COMPLAINT
Endpoint: mild erythema
Detail Level: Zone
Extraction Method: extractor
Treatment Number: 0
The patient is a 87y Female complaining of unresponsive.
Vacuum Pressure: [address]
Prep Text: The patient's skin was prepped with steam and Lacti Foam Cleanser. After vacuum was performed Weekly HA Pads, toner, moisture therapy, and SPF 45 were applied.
Vacuum Pressure Units: inches Hg
Facial Steaming: steamed
Prefacial Cleansing: The face was washed with a cleanser.
Indication: acne
Consent: Written consent obtained, risks reviewed including but not limited to crusting, scabbing, blistering, scarring, darker or lighter pigmentary change, bruising, and/or incomplete response.
Post-Care Instructions: I reviewed with the patient in detail post-care instructions. Patient should stay away from the sun and wear sun protection until treated areas are fully healed.
Wand (Optional): medium
Number Of Passes: 2

## 2023-03-06 NOTE — ED ADULT NURSE REASSESSMENT NOTE - NS ED NURSE REASSESS COMMENT FT1
Patient received in stable condition on bi-pap. Daughter at bedside. No noted distress at this time. Vitals remain stable.

## 2023-03-06 NOTE — ED ADULT NURSE NOTE - ED STAT RN HANDOFF DETAILS
report endorsed to Kisha Liz. Pt maintaining on BIPAP, on cardiac monitor, daughter at bedside. safety and security measures maintained. IV access in place.

## 2023-03-06 NOTE — ED ADULT NURSE REASSESSMENT NOTE - NS ED NURSE REASSESS COMMENT FT1
Patient noted in stable condition. Remains on the Bipap without any distress. All v/s stable post blood infusion.

## 2023-03-06 NOTE — H&P ADULT - NSHPPHYSICALEXAM_GEN_ALL_CORE
CONSTITUTIONAL: confused, altered, mild respiratory distress  EYES: PERRL, no scleral icterus  NECK: Neck supple, trachea midline, non-tender  CARDIAC: Normal S1 and S2. Regular rate and rhythms. Pedal edema +  LUNGS: Equal air entry both lungs. Coarse breath sound, slight crackles +. Increase respiratory effort.   ABDOMEN: Soft, nondistended, nontender. No guarding or rebound tenderness. No hepatomegaly or splenomegaly. Bowel sound normal.  MUSCULOSKELETAL: Normocephalic, atraumatic. No clubbing or cyanosis  NEUROLOGICAL: altered, unable to assess at this time  PSYCHIATRIC: A&O x 0, altered

## 2023-03-06 NOTE — H&P ADULT - ASSESSMENT
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 Today AM, daughter woke up and found patient struggling to breath (may had accidentally removed oxygen during sleep). ? sat at 50%, improved with CPAP by EMS. Denied any cough, fever, nausea, vomiting, diarrhea or abdominal pain recently.   Hemodynamically stable, afebrile, sat well with BiPAP. VBG with pH 7.14-->7.21, pCO2 86-->70. No leukocytosis, Hb 6.3, plt 399, Na 128, Cr 0.96, lactate 3.1. CT head with no acute pathology. CXR image reviewed, noted pul congestion.     Admitted with acute respiratory failure with hypoxia/hypercapnea, AMS, anemia

## 2023-03-06 NOTE — H&P ADULT - HISTORY OF PRESENT ILLNESS
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 Today AM, daughter woke up and found patient struggling to breath (may had accidentally removed oxygen during sleep). ? sat at 50%, improved with CPAP by EMS. Denied any cough, fever, nausea, vomiting, diarrhea or abdominal pain recently.   Hemodynamically stable, afebrile, sat well with BiPAP. VBG with pH 7.14-->7.21, pCO2 86-->70. No leukocytosis, Hb 6.3, plt 399, Na 128, Cr 0.96, lactate 3.1. CT head with no acute pathology. CXR image reviewed, noted pul congestion.     SH: no toxic habits. h/o 2nd hand smoker exposure     [email or web address]_FT:[email or web address]],[[email or web address]_ALL_OB_FT:[email or web address]],[[email or web address]_OBGYN_ALL_OB_FT:[id number]]

## 2023-03-06 NOTE — ED ADULT NURSE NOTE - SUICIDE SCREENING QUESTION 1
Pt was seen and examined.  Reports mild pleuritic CP but reports Pt was seen and examined.  Reports mild pleuritic CP but states improvement from before.    Trops neg x3. Pt wanting to go home.     Vital Signs Last 24 Hrs  T(C): 36.4 (11 Jan 2021 13:06), Max: 36.7 (10 Joby 2021 17:04)  T(F): 97.5 (11 Jan 2021 13:06), Max: 98 (10 Joby 2021 17:04)  HR: 66 (11 Jan 2021 13:06) (60 - 80)  BP: 142/63 (11 Jan 2021 13:06) (113/69 - 186/67)  BP(mean): --  RR: 18 (11 Jan 2021 13:06) (16 - 18)  SpO2: 98% (11 Jan 2021 13:06) (96% - 98%) Patient unable to complete

## 2023-03-06 NOTE — CONSULT NOTE ADULT - SUBJECTIVE AND OBJECTIVE BOX
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 Today AM, daughter woke up and found patient struggling to breath (may had accidentally removed oxygen during sleep). ? sat at 50%, improved with CPAP by EMS. Denied any cough, fever, nausea, vomiting, diarrhea or abdominal pain recently.   Hemodynamically stable, afebrile, sat well with BiPAP. VBG with pH 7.14-->7.21, pCO2 86-->70. No leukocytosis, Hb 6.3, plt 399, Na 128, Cr 0.96, lactate 3.1. CT head with no acute pathology. CXR image reviewed, noted pul congestion.     SH: no toxic habits. h/o 2nd hand smoker exposure     (06 Mar 2023 15:09)      Allergies    ampicillin-sulbactam (Rash)    Intolerances        MEDICATIONS  (STANDING):  allopurinol 100 milliGRAM(s) Oral daily  azithromycin  IVPB      cefTRIAXone   IVPB 1000 milliGRAM(s) IV Intermittent every 24 hours  dextrose 50% Injectable 25 Gram(s) IV Push once  dextrose 50% Injectable 12.5 Gram(s) IV Push once  dextrose 50% Injectable 25 Gram(s) IV Push once  dextrose Oral Gel 15 Gram(s) Oral once  fenofibrate Tablet 48 milliGRAM(s) Oral daily  glucagon  Injectable 1 milliGRAM(s) IntraMuscular once  pantoprazole    Tablet 40 milliGRAM(s) Oral before breakfast  senna 2 Tablet(s) Oral at bedtime    MEDICATIONS  (PRN):  acetaminophen     Tablet .. 650 milliGRAM(s) Oral every 6 hours PRN Mild Pain (1 - 3), Moderate Pain (4 - 6)      Daily Height in cm: 142.24 (06 Mar 2023 06:59)    Daily     Drug Dosing Weight  Height (cm): 142.2 (06 Mar 2023 06:59)  Weight (kg): 40.778 (06 Mar 2023 06:59)  BMI (kg/m2): 20.2 (06 Mar 2023 06:59)  BSA (m2): 1.26 (06 Mar 2023 06:59)    PAST MEDICAL & SURGICAL HISTORY:  Bladder cancer      HTN (hypertension)      HLD (hyperlipidemia)      Liver abscess      H/O gallbladder cancer          FAMILY HISTORY:  No pertinent family history in first degree relatives        SOCIAL HISTORY:    ADVANCE DIRECTIVES:    REVIEW OF SYSTEMS:    CONSTITUTIONAL: No fever, weight loss, or fatigue  EYES: No eye pain, visual disturbances, or discharge  ENMT:  No difficulty hearing, tinnitus, vertigo; No sinus or throat pain  NECK: No pain or stiffness  BREASTS: No pain, masses, or nipple discharge  RESPIRATORY: No cough, wheezing, chills or hemoptysis; No shortness of breath  CARDIOVASCULAR: No chest pain, palpitations, dizziness, or leg swelling  GASTROINTESTINAL: No abdominal or epigastric pain. No nausea, vomiting, or hematemesis; No diarrhea or constipation. No melena or hematochezia.  GENITOURINARY: No dysuria, frequency, hematuria, or incontinence  NEUROLOGICAL: No headaches, memory loss, loss of strength, numbness, or tremors  SKIN: No itching, burning, rashes, or lesions   LYMPH NODES: No enlarged glands  ENDOCRINE: No heat or cold intolerance; No hair loss  MUSCULOSKELETAL: No joint pain or swelling; No muscle, back, or extremity pain  PSYCHIATRIC: No depression, anxiety, mood swings, or difficulty sleeping  HEME/LYMPH: No easy bruising, or bleeding gums  ALLERGY AND IMMUNOLOGIC: No hives or eczema          ICU Vital Signs Last 24 Hrs  T(C): 36.9 (06 Mar 2023 17:05), Max: 37 (06 Mar 2023 13:26)  T(F): 98.5 (06 Mar 2023 17:05), Max: 98.6 (06 Mar 2023 13:26)  HR: 71 (06 Mar 2023 17:05) (70 - 98)  BP: 143/91 (06 Mar 2023 17:05) (112/50 - 143/91)  BP(mean): 73 (06 Mar 2023 17:05) (73 - 99)  ABP: --  ABP(mean): --  RR: 16 (06 Mar 2023 17:05) (16 - 28)  SpO2: 96% (06 Mar 2023 17:05) (95% - 100%)    O2 Parameters below as of 06 Mar 2023 17:05  Patient On (Oxygen Delivery Method): BiPAP/CPAP            ABG - ( 06 Mar 2023 14:11 )  pH, Arterial: 7.29  pH, Blood: x     /  pCO2: 53    /  pO2: 89    / HCO3: 26    / Base Excess: -1.9  /  SaO2: 97.8                I&O's Detail      PHYSICAL EXAM:    GENERAL: NAD, well-groomed, well-developed  HEAD:  Atraumatic, Normocephalic  EYES: EOMI, PERRLA, conjunctiva and sclera clear  ENMT: No tonsillar erythema, exudates, or enlargement; Moist mucous membranes, Good dentition, No lesions  NECK: Supple, No JVD, Normal thyroid  NERVOUS SYSTEM:  Alert & Oriented X3, Good concentration; Motor Strength 5/5 B/L upper and lower extremities; DTRs 2+ intact and symmetric  CHEST/LUNG: Clear to percussion bilaterally; No rales, rhonchi, wheezing, or rubs  HEART: Regular rate and rhythm; No murmurs, rubs, or gallops  ABDOMEN: Soft, Nontender, Nondistended; Bowel sounds present  EXTREMITIES:  2+ Peripheral Pulses, No clubbing, cyanosis, or edema  LYMPH: No lymphadenopathy noted  SKIN: No rashes or lesions    LABS:  CBC Full  -  ( 06 Mar 2023 07:00 )  WBC Count : 10.19 K/uL  RBC Count : 2.60 M/uL  Hemoglobin : 6.3 g/dL  Hematocrit : 21.7 %  Platelet Count - Automated : 399 K/uL  Mean Cell Volume : 83.5 fl  Mean Cell Hemoglobin : 24.2 pg  Mean Cell Hemoglobin Concentration : 29.0 g/dL  Auto Neutrophil # : 9.17 K/uL  Auto Lymphocyte # : 0.71 K/uL  Auto Monocyte # : 0.31 K/uL  Auto Eosinophil # : 0.00 K/uL  Auto Basophil # : 0.00 K/uL  Auto Neutrophil % : 90.0 %  Auto Lymphocyte % : 7.0 %  Auto Monocyte % : 3.0 %  Auto Eosinophil % : 0.0 %  Auto Basophil % : 0.0 %    0306    128<L>  |  96  |  13  ----------------------------<  113<H>  4.7   |  27  |  0.96    Ca    9.8      06 Mar 2023 07:00  Phos  4.0     03-06  Mg     2.2     03-06    TPro  6.9  /  Alb  3.0<L>  /  TBili  0.3  /  DBili  x   /  AST  65<H>  /  ALT  30  /  AlkPhos  88  03-06    CAPILLARY BLOOD GLUCOSE          Urinalysis Basic - ( 06 Mar 2023 09:00 )    Color: Yellow / Appearance: Clear / S.025 / pH: x  Gluc: x / Ketone: Negative  / Bili: Negative / Urobili: Negative mg/dL   Blood: x / Protein: 100 mg/dL / Nitrite: Negative   Leuk Esterase: Negative / RBC: 0-2 /HPF / WBC 3-5   Sq Epi: x / Non Sq Epi: x / Bacteria: x              EKG:    ECHO, US:    RADIOLOGY:

## 2023-03-06 NOTE — H&P ADULT - PROBLEM SELECTOR PLAN 1
present with AMS, hypoxic, hypercapnic  VBG with pH 7.14-->7.21, pCO2 86-->70 after BiPAP  Continue BiPAP  Repeat VBG, lactate ordered.   Pul consulted  CXR noted some pulmonary congestion, Trial of IV lasix 40mg once. Monitor response  CT chest without contrast  Check ddimer  CT abd/pelvis to evaluate for intra abdominal pathology  Fall/aspiration precaution present with AMS, hypoxic, hypercapnic  VBG with pH 7.14-->7.21, pCO2 86-->70 after BiPAP  Continue BiPAP  Repeat VBG, lactate ordered.   Pul consulted  CXR noted some pulmonary congestion, Trial of IV lasix 40mg once. Monitor response  CT chest without contrast  Check ddimer, add on BNP  CT abd/pelvis to evaluate for intra abdominal pathology  Fall/aspiration precaution present with AMS, hypoxic, hypercapnic  VBG with pH 7.14-->7.21, pCO2 86-->70 after BiPAP  Continue BiPAP  Repeat VBG, lactate ordered.   Pul consulted  CXR noted some pulmonary congestion, Trial of IV lasix 40mg once. Monitor response  CT chest without contrast  Check ddimer, add on BNP  CT abd/pelvis to evaluate for intra abdominal pathology  Fall/aspiration precaution    Addendum  CT chest reported RLL pneumonia. Received vanco/Cefepime in ED. Will start ceftriaxone and azithromycin ( daughter reported no h/o hospitalization since last year admit here) . Check legionella Ag, strep pneumon Ag, mycoplasma IgM

## 2023-03-06 NOTE — ED PROVIDER NOTE - CLINICAL SUMMARY MEDICAL DECISION MAKING FREE TEXT BOX
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  Patient DNR/DNI, reported UTI last week finished course of antibiotics several days ago with reports of cough since 2 days ago. likely aspiration pna on CXR, maintained on Bipap, admit to medicine for acute hypercapneic respiratory failure 2/2 pna, broad spectrum abx given.

## 2023-03-06 NOTE — ED PROVIDER NOTE - PHYSICAL EXAMINATION
General: somnolent  HEENT: NCAT, Neck supple without meningismus, PERRL, no conjunctival injection  Lungs: CTAB, No wheeze or crackles, No retractions, No increased work of breathing  Heart: S1S2 RRR, No M/R/G, Pules equal Bilaterally in upper and lower extremities distally  Abd: soft, NT/ND, No guarding, No rebound.  No hernias, no palpable masses.  Extrem: no gross deformities appreciated, no significant edema noted, No ulcers. Cap refil < 2sec.  Skin: No rash noted, warm dry.  Neuro:  not responsive to verbal or tactile stimuli

## 2023-03-06 NOTE — ED ADULT NURSE REASSESSMENT NOTE - NS ED NURSE REASSESS COMMENT FT1
Pt noted resting in bed without any distress, bi-pap in place. Pt noted with hemoglobin of 6.2 Provider aware with orders made for 1 unit of packed red blood cells to be given. Patient v/s stable at start of infusion. No noted reaction at this time.

## 2023-03-06 NOTE — ED ADULT NURSE NOTE - NSIMPLEMENTINTERV_GEN_ALL_ED
Implemented All Fall with Harm Risk Interventions:  [place] to call system. Call bell, personal items and telephone within reach. Instruct patient to call for assistance. Room bathroom lighting operational. Non-slip footwear when patient is off stretcher. Physically safe environment: no spills, clutter or unnecessary equipment. Stretcher in lowest position, wheels locked, appropriate side rails in place. Provide visual cue, wrist band, yellow gown, etc. Monitor gait and stability. Monitor for mental status changes and reorient to person, place, and time. Review medications for side effects contributing to fall risk. Reinforce activity limits and safety measures with patient and family. Provide visual clues: red socks.

## 2023-03-06 NOTE — H&P ADULT - NSHPLABSRESULTS_GEN_ALL_CORE
ECHO 11/2022  CONCLUSIONS:  1. Mitral annular calcification, otherwise normal mitral valve. Minimal mitral regurgitation.  2. Aortic valve leaflet morphology not well visualized. Mild aortic regurgitation.   3. Endocardium not well visualized; grossly normal left ventricular systolic function.  4. The right ventricle is not well visualized; grossly normal right ventricular systolic function.  5. A bubble study was performed with the intravenous injection of agitated saline contrast and was inconclusive regarding the presence of an interatrial shunt. No obvious cardiac source of embolus was identified on this transthoracic study.

## 2023-03-06 NOTE — H&P ADULT - PROBLEM SELECTOR PLAN 7
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

## 2023-03-06 NOTE — ED ADULT TRIAGE NOTE - CHIEF COMPLAINT QUOTE
As per EMS states pt's daughter woke up and found pt struggling to breath. EMS states on arrival to scene pt unresponsive, pulse ox 31%RA. States CPAP applied and pulse ox improved to 98%

## 2023-03-06 NOTE — ED ADULT NURSE NOTE - OBJECTIVE STATEMENT
Pt received in [facility], 88 y/o F, BIBA unresponsive, in respiratory distress, maintaining on CPAP. As per EMS states pt's daughter woke up and found pt struggling to breath. EMS states on arrival to scene pt unresponsive, pulse ox 31%RA. States CPAP applied and pulse ox improved to 98%. Pt switched over to BIPAP in the ED. As per daughter, HTN, gout, on home oxygen 2L NC on need basis since November 2022. As per daughter, pt had a cough X 2-3 days. No prior intubations. As per daughter, pt DNR/DNI. Allergic to penicillin.

## 2023-03-07 LAB
ALBUMIN SERPL ELPH-MCNC: 2.7 G/DL — LOW (ref 3.3–5)
ALP SERPL-CCNC: 74 U/L — SIGNIFICANT CHANGE UP (ref 40–120)
ALT FLD-CCNC: 31 U/L — SIGNIFICANT CHANGE UP (ref 12–78)
ANION GAP SERPL CALC-SCNC: 8 MMOL/L — SIGNIFICANT CHANGE UP (ref 5–17)
AST SERPL-CCNC: 65 U/L — HIGH (ref 15–37)
BILIRUB SERPL-MCNC: 0.3 MG/DL — SIGNIFICANT CHANGE UP (ref 0.2–1.2)
BUN SERPL-MCNC: 14 MG/DL — SIGNIFICANT CHANGE UP (ref 7–23)
CALCIUM SERPL-MCNC: 9.3 MG/DL — SIGNIFICANT CHANGE UP (ref 8.5–10.1)
CHLORIDE SERPL-SCNC: 97 MMOL/L — SIGNIFICANT CHANGE UP (ref 96–108)
CO2 SERPL-SCNC: 29 MMOL/L — SIGNIFICANT CHANGE UP (ref 22–31)
CREAT SERPL-MCNC: 0.67 MG/DL — SIGNIFICANT CHANGE UP (ref 0.5–1.3)
EGFR: 85 ML/MIN/1.73M2 — SIGNIFICANT CHANGE UP
GLUCOSE SERPL-MCNC: 67 MG/DL — LOW (ref 70–99)
HCT VFR BLD CALC: 27.4 % — LOW (ref 34.5–45)
HGB BLD-MCNC: 8.3 G/DL — LOW (ref 11.5–15.5)
IRON SATN MFR SERPL: 12 UG/DL — LOW (ref 30–160)
IRON SATN MFR SERPL: 2 % — LOW (ref 14–50)
LEGIONELLA AG UR QL: NEGATIVE — SIGNIFICANT CHANGE UP
MAGNESIUM SERPL-MCNC: 2 MG/DL — SIGNIFICANT CHANGE UP (ref 1.6–2.6)
MCHC RBC-ENTMCNC: 24.9 PG — LOW (ref 27–34)
MCHC RBC-ENTMCNC: 30.3 G/DL — LOW (ref 32–36)
MCV RBC AUTO: 82 FL — SIGNIFICANT CHANGE UP (ref 80–100)
NRBC # BLD: 0 /100 WBCS — SIGNIFICANT CHANGE UP (ref 0–0)
PHOSPHATE SERPL-MCNC: 2.6 MG/DL — SIGNIFICANT CHANGE UP (ref 2.5–4.5)
PLATELET # BLD AUTO: 304 K/UL — SIGNIFICANT CHANGE UP (ref 150–400)
POTASSIUM SERPL-MCNC: 3.2 MMOL/L — LOW (ref 3.5–5.3)
POTASSIUM SERPL-SCNC: 3.2 MMOL/L — LOW (ref 3.5–5.3)
PROT SERPL-MCNC: 6.4 GM/DL — SIGNIFICANT CHANGE UP (ref 6–8.3)
RAPID RVP RESULT: SIGNIFICANT CHANGE UP
RBC # BLD: 3.34 M/UL — LOW (ref 3.8–5.2)
RBC # FLD: 16.4 % — HIGH (ref 10.3–14.5)
S PNEUM AG UR QL: NEGATIVE — SIGNIFICANT CHANGE UP
SARS-COV-2 RNA SPEC QL NAA+PROBE: SIGNIFICANT CHANGE UP
SODIUM SERPL-SCNC: 134 MMOL/L — LOW (ref 135–145)
TIBC SERPL-MCNC: 688 UG/DL — HIGH (ref 220–430)
UIBC SERPL-MCNC: 676 UG/DL — HIGH (ref 110–370)
WBC # BLD: 5.8 K/UL — SIGNIFICANT CHANGE UP (ref 3.8–10.5)
WBC # FLD AUTO: 5.8 K/UL — SIGNIFICANT CHANGE UP (ref 3.8–10.5)

## 2023-03-07 PROCEDURE — 99233 SBSQ HOSP IP/OBS HIGH 50: CPT

## 2023-03-07 PROCEDURE — 99232 SBSQ HOSP IP/OBS MODERATE 35: CPT

## 2023-03-07 RX ORDER — FERROUS SULFATE 325(65) MG
325 TABLET ORAL DAILY
Refills: 0 | Status: DISCONTINUED | OUTPATIENT
Start: 2023-03-07 | End: 2023-03-11

## 2023-03-07 RX ORDER — POTASSIUM CHLORIDE 20 MEQ
40 PACKET (EA) ORAL ONCE
Refills: 0 | Status: COMPLETED | OUTPATIENT
Start: 2023-03-07 | End: 2023-03-07

## 2023-03-07 RX ORDER — SODIUM CHLORIDE 9 MG/ML
1000 INJECTION INTRAMUSCULAR; INTRAVENOUS; SUBCUTANEOUS
Refills: 0 | Status: DISCONTINUED | OUTPATIENT
Start: 2023-03-07 | End: 2023-03-09

## 2023-03-07 RX ADMIN — Medication 100 MILLIGRAM(S): at 18:21

## 2023-03-07 RX ADMIN — SODIUM CHLORIDE 75 MILLILITER(S): 9 INJECTION INTRAMUSCULAR; INTRAVENOUS; SUBCUTANEOUS at 23:00

## 2023-03-07 RX ADMIN — SENNA PLUS 2 TABLET(S): 8.6 TABLET ORAL at 22:59

## 2023-03-07 RX ADMIN — Medication 40 MILLIEQUIVALENT(S): at 18:20

## 2023-03-07 RX ADMIN — Medication 325 MILLIGRAM(S): at 17:19

## 2023-03-07 RX ADMIN — AZITHROMYCIN 255 MILLIGRAM(S): 500 TABLET, FILM COATED ORAL at 18:57

## 2023-03-07 RX ADMIN — PANTOPRAZOLE SODIUM 40 MILLIGRAM(S): 20 TABLET, DELAYED RELEASE ORAL at 07:33

## 2023-03-07 RX ADMIN — CEFTRIAXONE 100 MILLIGRAM(S): 500 INJECTION, POWDER, FOR SOLUTION INTRAMUSCULAR; INTRAVENOUS at 18:57

## 2023-03-07 RX ADMIN — Medication 48 MILLIGRAM(S): at 17:20

## 2023-03-07 NOTE — PROGRESS NOTE ADULT - SUBJECTIVE AND OBJECTIVE BOX
Patient is a 87y old  Female who presents with a chief complaint of acute respiratory failure with hypoxia/hypercapnia, AMS (06 Mar 2023 18:00)    INTERVAL HPI/OVERNIGHT EVENTS: no events     MEDICATIONS  (STANDING):  allopurinol 100 milliGRAM(s) Oral daily  azithromycin  IVPB      azithromycin  IVPB 500 milliGRAM(s) IV Intermittent every 24 hours  cefTRIAXone   IVPB 1000 milliGRAM(s) IV Intermittent every 24 hours  dextrose 50% Injectable 25 Gram(s) IV Push once  dextrose 50% Injectable 12.5 Gram(s) IV Push once  dextrose 50% Injectable 25 Gram(s) IV Push once  dextrose Oral Gel 15 Gram(s) Oral once  fenofibrate Tablet 48 milliGRAM(s) Oral daily  ferrous    sulfate 325 milliGRAM(s) Oral daily  glucagon  Injectable 1 milliGRAM(s) IntraMuscular once  pantoprazole    Tablet 40 milliGRAM(s) Oral before breakfast  senna 2 Tablet(s) Oral at bedtime  sodium chloride 0.9%. 1000 milliLiter(s) (75 mL/Hr) IV Continuous <Continuous>    MEDICATIONS  (PRN):  acetaminophen     Tablet .. 650 milliGRAM(s) Oral every 6 hours PRN Mild Pain (1 - 3), Moderate Pain (4 - 6)    Allergies    ampicillin-sulbactam (Rash)    Intolerances      REVIEW OF SYSTEMS:  All other systems reviewed and are negative    Vital Signs Last 24 Hrs  T(C): 36.6 (07 Mar 2023 11:00), Max: 37 (06 Mar 2023 13:26)  T(F): 97.8 (07 Mar 2023 11:00), Max: 98.6 (06 Mar 2023 13:26)  HR: 79 (07 Mar 2023 11:00) (70 - 98)  BP: 134/69 (07 Mar 2023 11:00) (127/56 - 143/91)  BP(mean): 73 (06 Mar 2023 17:05) (73 - 99)  RR: 18 (07 Mar 2023 11:00) (16 - 18)  SpO2: 98% (07 Mar 2023 11:00) (93% - 100%)    Parameters below as of 07 Mar 2023 05:03  Patient On (Oxygen Delivery Method): BiPAP/CPAP      Daily     Daily   I&O's Summary    06 Mar 2023 07:01  -  07 Mar 2023 07:00  --------------------------------------------------------  IN: 0 mL / OUT: 1475 mL / NET: -1475 mL      CAPILLARY BLOOD GLUCOSE        PHYSICAL EXAM:  GENERAL: NAD,  sleepy   HEAD:  Atraumatic, Normocephalic  EYES: EOMI, PERRLA, conjunctiva and sclera clear  ENMT: No tonsillar erythema, exudates, or enlargement; Moist mucous membranes, Good dentition, No lesions  NECK: Supple, No JVD, Normal thyroid  CHEST/LUNG: Clear to percussion bilaterally; No rales, rhonchi, wheezing, or rubs  HEART: Regular rate and rhythm; No murmurs, rubs, or gallops  ABDOMEN: Soft, Nontender, Nondistended; Bowel sounds present  EXTREMITIES:  2+ Peripheral Pulses, No clubbing, cyanosis, or edema  LYMPH: No lymphadenopathy noted  SKIN: No rashes or lesions    Labs                          8.3    5.80  )-----------( 304      ( 07 Mar 2023 08:06 )             27.4     03-07    134<L>  |  97  |  14  ----------------------------<  67<L>  3.2<L>   |  29  |  0.67    Ca    9.3      07 Mar 2023 08:06  Phos  2.6     03-07  Mg     2.0     03-07    TPro  6.4  /  Alb  2.7<L>  /  TBili  0.3  /  DBili  x   /  AST  65<H>  /  ALT  31  /  AlkPhos  74  03-07          Urinalysis Basic - ( 06 Mar 2023 09:00 )    Color: Yellow / Appearance: Clear / S.025 / pH: x  Gluc: x / Ketone: Negative  / Bili: Negative / Urobili: Negative mg/dL   Blood: x / Protein: 100 mg/dL / Nitrite: Negative   Leuk Esterase: Negative / RBC: 0-2 /HPF / WBC 3-5   Sq Epi: x / Non Sq Epi: x / Bacteria: x        Culture - Blood (collected 06 Mar 2023 07:00)  Source: .Blood Blood  Preliminary Report (07 Mar 2023 10:01):    No growth to date.    Culture - Blood (collected 06 Mar 2023 07:00)  Source: .Blood Blood  Preliminary Report (07 Mar 2023 10:01):    No growth to date.                DVT prophylaxis: > Lovenox 40mg SQ daily  > Heparin   > SCD's

## 2023-03-07 NOTE — PROGRESS NOTE ADULT - SUBJECTIVE AND OBJECTIVE BOX
24 hr events:      ## ROS:  [ ] unable to obtain  CONSTITUTIONAL: No fever, weight loss, or fatigue  EYES: No eye pain, visual disturbances, or discharge  ENMT:  No difficulty hearing, tinnitus, vertigo; No sinus or throat pain  NECK: No pain or stiffness  RESPIRATORY: No cough, wheezing, chills or hemoptysis; No shortness of breath  CARDIOVASCULAR: No chest pain, palpitations, dizziness, or leg swelling  GASTROINTESTINAL: No abdominal or epigastric pain. No nausea, vomiting, or hematemesis; No diarrhea or constipation. No melena or hematochezia.  GENITOURINARY: No dysuria, frequency, hematuria, or incontinence  NEUROLOGICAL: No headaches, memory loss, loss of strength, numbness, or tremors  SKIN: No itching, burning, rashes, or lesions   LYMPH NODES: No enlarged glands  ENDOCRINE: No heat or cold intolerance; No hair loss  MUSCULOSKELETAL: No joint pain or swelling; No muscle, back, or extremity pain  PSYCHIATRIC: No depression, anxiety, mood swings, or difficulty sleeping  HEME/LYMPH: No easy bruising, or bleeding gums  ALLERGY AND IMMUNOLOGIC: No hives or eczema      ## Labs:  CBC:                        8.3    5.80  )-----------( 304      ( 07 Mar 2023 08:06 )             27.4     Chem:  03-07    134<L>  |  97  |  14  ----------------------------<  67<L>  3.2<L>   |  29  |  0.67    Ca    9.3      07 Mar 2023 08:06  Phos  2.6     03-07  Mg     2.0     03-07    TPro  6.4  /  Alb  2.7<L>  /  TBili  0.3  /  DBili  x   /  AST  65<H>  /  ALT  31  /  AlkPhos  74  03-07    Coags:          ## Imaging:    ## Medications:  azithromycin  IVPB      azithromycin  IVPB 500 milliGRAM(s) IV Intermittent every 24 hours  cefTRIAXone   IVPB 1000 milliGRAM(s) IV Intermittent every 24 hours        allopurinol 100 milliGRAM(s) Oral daily  dextrose 50% Injectable 25 Gram(s) IV Push once  dextrose 50% Injectable 12.5 Gram(s) IV Push once  dextrose 50% Injectable 25 Gram(s) IV Push once  dextrose Oral Gel 15 Gram(s) Oral once  fenofibrate Tablet 48 milliGRAM(s) Oral daily  glucagon  Injectable 1 milliGRAM(s) IntraMuscular once      pantoprazole    Tablet 40 milliGRAM(s) Oral before breakfast  senna 2 Tablet(s) Oral at bedtime    acetaminophen     Tablet .. 650 milliGRAM(s) Oral every 6 hours PRN      ## Vitals:  T(C): 36.8 (03-07-23 @ 23:14), Max: 36.8 (03-07-23 @ 23:14)  HR: 90 (03-07-23 @ 23:14) (79 - 90)  BP: 147/64 (03-07-23 @ 23:14) (133/70 - 147/66)  RR: 18 (03-07-23 @ 23:14) (17 - 18)  SpO2: 95% (03-07-23 @ 23:14) (93% - 98%)    ABG: ABG - ( 06 Mar 2023 14:11 )  pH, Arterial: 7.29  /  pCO2: 53    /  pO2: 89    / HCO3: 26    / Base Excess: -1.9  /  SaO2: 97.8            03-06 @ 07:01  -  03-07 @ 07:00  --------------------------------------------------------  IN: 0 mL / OUT: 1475 mL / NET: -1475 mL          ## P/E:  Gen: lying comfortably in bed in no apparent distress  HEENT: PERRL, EOMI  Resp: CTA B/L no c/r/w  CVS: S1S2 no m/r/g  Abd: soft NT/ND +BS  Ext: no c/c/e  Neuro: A&Ox3    CENTRAL LINE: [ ] YES [ ] NO  LOCATION:   DATE INSERTED:  REMOVE: [ ] YES [ ] NO      TAYLOR: [ ] YES [ ] NO    DATE INSERTED:  REMOVE:  [ ] YES [ ] NO      A-LINE:  [ ] YES [ ] NO  LOCATION:   DATE INSERTED:  REMOVE:  [ ] YES [ ] NO  EXPLAIN:    GLOBAL ISSUE/BEST PRACTICE:  Analgesia:  Sedation:  HOB elevation: yes  Stress ulcer prophylaxis:  VTE prophylaxis:  Oral Care:  Glycemic control:  Nutrition:    CODE STATUS: [ ] full code  [x ] DNR  [x ] DNI  [ x] MOLST  Goals of care discussion: [ ] yes  24 hr events:  mental status improved  more awake and alert  weaned off bipap this AM      ## ROS:  no fever, no chills  no HA, no dizziness  no SOB, no cough  no chest pain  no abdominal pain, no N/V/D  "thirsty"  denies back pain  no myalgias  no arthralgias  no pruritis  [x] ROS otherwise negative        ## Labs:  CBC:                        8.3    5.80  )-----------( 304      ( 07 Mar 2023 08:06 )             27.4     Chem:  03-07    134<L>  |  97  |  14  ----------------------------<  67<L>  3.2<L>   |  29  |  0.67    Ca    9.3      07 Mar 2023 08:06  Phos  2.6     03-07  Mg     2.0     03-07    TPro  6.4  /  Alb  2.7<L>  /  TBili  0.3  /  DBili  x   /  AST  65<H>  /  ALT  31  /  AlkPhos  74  03-07    Respiratory Viral Panel with COVID-19 by SATISH (03.07.23 @ 00:45)    Rapid RVP Result: [facility]    SARS-CoV-2: NotDete:    Culture - Blood (03.06.23 @ 07:00)    Specimen Source: .Blood Blood    Culture Results: No growth to date.    Serum Pro-Brain Natriuretic Peptide (03.06.23 @ 07:00)    Serum Pro-Brain Natriuretic Peptide: 4176 pg/mL      ## Imaging:  CT chest < from: CT Chest No Cont (03.06.23 @ 14:32) >  CHEST:  LUNGS AND LARGE AIRWAYS:  Partial opacification of the right lower lobe   bronchus with dense consolidation in the right lower lobe. Small   bibasilar atelectasis.  PLEURA: No pleural effusion.  VESSELS: Atherosclerotic changes of the aorta and coronary arteries.  HEART: Heart size is mildly enlarged. No pericardial effusion.  MEDIASTINUM AND CARMELLA: No lymphadenopathy.  CHEST WALL AND LOWER NECK: Thyroid gland is heterogeneous.    ABDOMEN AND PELVIS:  LIVER: Within normallimits.  BILE DUCTS: Common bile duct is dilated up to 1.2 cm. Mild intrahepatic   biliary ductal dilatation.  GALLBLADDER: Cholecystectomy.  SPLEEN: Within normal limits.  PANCREAS: Within normal limits.  ADRENALS: Within normal limits.  KIDNEYS/URETERS: Bilateral subcentimeter renal hypodensities, too small   to characterize.    BLADDER: Within normal limits.  REPRODUCTIVE ORGANS: Fibroid uterus    BOWEL: Mild fluid and stranding surrounding the duodenal bulb No bowel   obstruction. Colonic diverticulosis. Status post right hemicolectomy .  PERITONEUM: Small amount of free fluid in the gallbladder fossa.  VESSELS: Atherosclerotic changes.  RETROPERITONEUM/LYMPH NODES: No lymphadenopathy.  ABDOMINAL WALL: Postsurgical changes.  BONES: Withinnormal limits.    IMPRESSION:  Right lower lobe consolidation compatible with pneumonia. Follow-up to   resolution is recommended. Small bibasilar atelectasis.    Extrahepatic biliary ductal dilatation, increased compared to priors.    Post cholecystectomy with small amount of fluid in the gallbladder fossa   and surrounding the first portion of the duodenum, nonspecific.        ## Medications:    azithromycin  IVPB 500 milliGRAM(s) IV Intermittent every 24 hours  cefTRIAXone   IVPB 1000 milliGRAM(s) IV Intermittent every 24 hours        allopurinol 100 milliGRAM(s) Oral daily  dextrose 50% Injectable 25 Gram(s) IV Push once  dextrose 50% Injectable 12.5 Gram(s) IV Push once  dextrose 50% Injectable 25 Gram(s) IV Push once  dextrose Oral Gel 15 Gram(s) Oral once  fenofibrate Tablet 48 milliGRAM(s) Oral daily  glucagon  Injectable 1 milliGRAM(s) IntraMuscular once      pantoprazole    Tablet 40 milliGRAM(s) Oral before breakfast  senna 2 Tablet(s) Oral at bedtime    acetaminophen     Tablet .. 650 milliGRAM(s) Oral every 6 hours PRN      ## Vitals:  T(C): 36.8 (03-07-23 @ 23:14), Max: 36.8 (03-07-23 @ 23:14)  HR: 90 (03-07-23 @ 23:14) (79 - 90)  BP: 147/64 (03-07-23 @ 23:14) (133/70 - 147/66)  RR: 18 (03-07-23 @ 23:14) (17 - 18)  SpO2: 95% (03-07-23 @ 23:14) (93% - 98%)    ABG: ABG - ( 06 Mar 2023 14:11 )  pH, Arterial: 7.29  /  pCO2: 53    /  pO2: 89    / HCO3: 26    / Base Excess: -1.9  /  SaO2: 97.8            03-06 @ 07:01 - 03-07 @ 07:00  --------------------------------------------------------  IN: 0 mL / OUT: 1475 mL / NET: -1475 mL          ## P/E:  Gen: elderly female, lying comfortably in bed in no apparent distress  HEENT: EOMI, dry mucus membrane  Resp: CTA B/L , no wheeze  CVS: RRR  Abd: soft NT/ND +BS + abdominal scar  Ext: no c/c/e  Neuro: awake, alert, following commands      CODE STATUS: [ ] full code  [x ] DNR  [x ] DNI  [ x] [facility]  Goals of care discussion: [ ] yes

## 2023-03-07 NOTE — PROGRESS NOTE ADULT - ASSESSMENT
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 VBG with pH 7.1, pCO2: 86. Placed on BiPAP. Afebrile, no leukocytosis. CT chest with RLL consolidation. Labs with Hb 6.3. DNR/DNI. Admitted to medicine    DX: acute hypoxic and hypercarbic respiratory failure, PNA, acute metabolic encephalopathy, anemia, gallbladder cancer, HTN    - mental status much improved  - obtunded yesterday, awake and responsive today  - weaned off BIPAP this morning  - CT chest with RLL consolidation  - on antibiotics with coverage for CAP  - follow up legionella, if negative would d/c azithromycin  - ?aspiration PNA  - cont with ceftriaxone  - check sputum cx if pt able to expectorate  - RVP negative  - O2 supplementation to maintain O2 sat > 90%  - wean off O2 as tolerates  - cont with nocturnal BiPAP for underlying hypercarbia  - check ABG in AM (off bipap)  - speech and swallow eval, noted to cough after drinking water, NPO for now  - anemia s/p 1 unit pRBC with improvement in Hb  - DNR/DNI  - d/w bedside nurse, NP

## 2023-03-08 LAB
ANION GAP SERPL CALC-SCNC: 9 MMOL/L — SIGNIFICANT CHANGE UP (ref 5–17)
BASE EXCESS BLDA CALC-SCNC: 5.6 MMOL/L — HIGH (ref -2–3)
BLOOD GAS COMMENTS ARTERIAL: SIGNIFICANT CHANGE UP
BUN SERPL-MCNC: 11 MG/DL — SIGNIFICANT CHANGE UP (ref 7–23)
CALCIUM SERPL-MCNC: 9.7 MG/DL — SIGNIFICANT CHANGE UP (ref 8.5–10.1)
CHLORIDE SERPL-SCNC: 99 MMOL/L — SIGNIFICANT CHANGE UP (ref 96–108)
CO2 BLDA-SCNC: 35 MMOL/L — HIGH (ref 19–24)
CO2 SERPL-SCNC: 30 MMOL/L — SIGNIFICANT CHANGE UP (ref 22–31)
CREAT SERPL-MCNC: 0.61 MG/DL — SIGNIFICANT CHANGE UP (ref 0.5–1.3)
EGFR: 87 ML/MIN/1.73M2 — SIGNIFICANT CHANGE UP
GAS PNL BLDA: SIGNIFICANT CHANGE UP
GLUCOSE SERPL-MCNC: 75 MG/DL — SIGNIFICANT CHANGE UP (ref 70–99)
HCO3 BLDA-SCNC: 33 MMOL/L — HIGH (ref 21–28)
HCT VFR BLD CALC: 29.8 % — LOW (ref 34.5–45)
HGB BLD-MCNC: 8.9 G/DL — LOW (ref 11.5–15.5)
HOROWITZ INDEX BLDA+IHG-RTO: 0.28 — SIGNIFICANT CHANGE UP
M PNEUMO IGM SER-ACNC: 0.24 INDEX — SIGNIFICANT CHANGE UP (ref 0–0.9)
MCHC RBC-ENTMCNC: 24.7 PG — LOW (ref 27–34)
MCHC RBC-ENTMCNC: 29.9 G/DL — LOW (ref 32–36)
MCV RBC AUTO: 82.8 FL — SIGNIFICANT CHANGE UP (ref 80–100)
MYCOPLASMA AG SPEC QL: NEGATIVE — SIGNIFICANT CHANGE UP
NRBC # BLD: 0 /100 WBCS — SIGNIFICANT CHANGE UP (ref 0–0)
PCO2 BLDA: 58 MMHG — HIGH (ref 32–46)
PH BLDA: 7.36 — SIGNIFICANT CHANGE UP (ref 7.35–7.45)
PLATELET # BLD AUTO: 344 K/UL — SIGNIFICANT CHANGE UP (ref 150–400)
PO2 BLDA: 108 MMHG — SIGNIFICANT CHANGE UP (ref 83–108)
POTASSIUM SERPL-MCNC: 3.8 MMOL/L — SIGNIFICANT CHANGE UP (ref 3.5–5.3)
POTASSIUM SERPL-SCNC: 3.8 MMOL/L — SIGNIFICANT CHANGE UP (ref 3.5–5.3)
RBC # BLD: 3.6 M/UL — LOW (ref 3.8–5.2)
RBC # FLD: 16.3 % — HIGH (ref 10.3–14.5)
SAO2 % BLDA: 97.2 % — SIGNIFICANT CHANGE UP (ref 94–98)
SODIUM SERPL-SCNC: 138 MMOL/L — SIGNIFICANT CHANGE UP (ref 135–145)
WBC # BLD: 6.45 K/UL — SIGNIFICANT CHANGE UP (ref 3.8–10.5)
WBC # FLD AUTO: 6.45 K/UL — SIGNIFICANT CHANGE UP (ref 3.8–10.5)

## 2023-03-08 PROCEDURE — 93306 TTE W/DOPPLER COMPLETE: CPT | Mod: 26

## 2023-03-08 PROCEDURE — 99232 SBSQ HOSP IP/OBS MODERATE 35: CPT

## 2023-03-08 RX ORDER — IPRATROPIUM/ALBUTEROL SULFATE 18-103MCG
3 AEROSOL WITH ADAPTER (GRAM) INHALATION EVERY 6 HOURS
Refills: 0 | Status: DISCONTINUED | OUTPATIENT
Start: 2023-03-08 | End: 2023-03-11

## 2023-03-08 RX ORDER — QUETIAPINE FUMARATE 200 MG/1
25 TABLET, FILM COATED ORAL ONCE
Refills: 0 | Status: COMPLETED | OUTPATIENT
Start: 2023-03-08 | End: 2023-03-08

## 2023-03-08 RX ADMIN — CEFTRIAXONE 100 MILLIGRAM(S): 500 INJECTION, POWDER, FOR SOLUTION INTRAMUSCULAR; INTRAVENOUS at 18:12

## 2023-03-08 RX ADMIN — SODIUM CHLORIDE 75 MILLILITER(S): 9 INJECTION INTRAMUSCULAR; INTRAVENOUS; SUBCUTANEOUS at 18:12

## 2023-03-08 RX ADMIN — Medication 3 MILLILITER(S): at 18:33

## 2023-03-08 RX ADMIN — Medication 3 MILLILITER(S): at 23:37

## 2023-03-08 RX ADMIN — Medication 48 MILLIGRAM(S): at 13:46

## 2023-03-08 RX ADMIN — QUETIAPINE FUMARATE 25 MILLIGRAM(S): 200 TABLET, FILM COATED ORAL at 02:05

## 2023-03-08 RX ADMIN — Medication 325 MILLIGRAM(S): at 13:45

## 2023-03-08 RX ADMIN — PANTOPRAZOLE SODIUM 40 MILLIGRAM(S): 20 TABLET, DELAYED RELEASE ORAL at 06:29

## 2023-03-08 RX ADMIN — Medication 100 MILLIGRAM(S): at 13:44

## 2023-03-08 NOTE — SWALLOW BEDSIDE ASSESSMENT ADULT - MODE OF PRESENTATION
cup/spoon/self fed/fed by clinician used right hand to hold cup/cup/spoon/self fed/fed by clinician spoon/fed by clinician

## 2023-03-08 NOTE — PHARMACOTHERAPY INTERVENTION NOTE - COMMENTS
Modified penicillin allergy to state that patient received ertapenem, cefepime x1, and ceftriaxone with no documented reaction.

## 2023-03-08 NOTE — PROGRESS NOTE ADULT - SUBJECTIVE AND OBJECTIVE BOX
24 hr events:  mental status improved, more awake and alert  Nocturnal BIPAP only now on 2 L NC with SpO2 96 %      ## ROS:  no fever, no chills  no HA, no dizziness  no SOB, no cough  no chest pain  no abdominal pain, no N/V/D  "thirsty"  denies back pain  no myalgias  no arthralgias  no pruritis  [x] ROS otherwise negative        Vital Signs Last 24 Hrs  T(C): 36.7 (08 Mar 2023 17:21), Max: 36.8 (07 Mar 2023 23:14)  T(F): 98 (08 Mar 2023 17:21), Max: 98.2 (07 Mar 2023 23:14)  HR: 91 (08 Mar 2023 17:21) (83 - 91)  BP: 122/70 (08 Mar 2023 17:21) (122/70 - 155/72)  BP(mean): --  RR: 18 (08 Mar 2023 17:21) (18 - 18)  SpO2: 99% (08 Mar 2023 17:21) (95% - 100%)        LABS:                        8.9    6.45  )-----------( 344      ( 08 Mar 2023 06:10 )             29.8     03-08    138  |  99  |  11  ----------------------------<  75  3.8   |  30  |  0.61    Ca    9.7      08 Mar 2023 06:10  Phos  2.6     03-07  Mg     2.0     03-07    TPro  6.4  /  Alb  2.7<L>  /  TBili  0.3  /  DBili  x   /  AST  65<H>  /  ALT  31  /  AlkPhos  74  03-07    ABG - ( 08 Mar 2023 05:05 )  pH, Arterial: 7.36  pH, Blood: x     /  pCO2: 58    /  pO2: 108   / HCO3: 33    / Base Excess: 5.6   /  SaO2: 97.2          CULTURES:  Culture Results:   No growth to date. (03-06 @ 07:00)  Culture Results:   No growth to date. (03-06 @ 07:00)      Respiratory Viral Panel with COVID-19 by SATISH (03.07.23 @ 00:45)    Rapid RVP Result: NotDetec    SARS-CoV-2: NotDetec:          ## Imaging:  CT chest < from: CT Chest No Cont (03.06.23 @ 14:32) >  CHEST:  LUNGS AND LARGE AIRWAYS:  Partial opacification of the right lower lobe   bronchus with dense consolidation in the right lower lobe. Small   bibasilar atelectasis.  PLEURA: No pleural effusion.  VESSELS: Atherosclerotic changes of the aorta and coronary arteries.  HEART: Heart size is mildly enlarged. No pericardial effusion.  MEDIASTINUM AND CARMELLA: No lymphadenopathy.  CHEST WALL AND LOWER NECK: Thyroid gland is heterogeneous.    ABDOMEN AND PELVIS:  LIVER: Within normallimits.  BILE DUCTS: Common bile duct is dilated up to 1.2 cm. Mild intrahepatic   biliary ductal dilatation.  GALLBLADDER: Cholecystectomy.  SPLEEN: Within normal limits.  PANCREAS: Within normal limits.  ADRENALS: Within normal limits.  KIDNEYS/URETERS: Bilateral subcentimeter renal hypodensities, too small   to characterize.    BLADDER: Within normal limits.  REPRODUCTIVE ORGANS: Fibroid uterus    BOWEL: Mild fluid and stranding surrounding the duodenal bulb No bowel   obstruction. Colonic diverticulosis. Status post right hemicolectomy .  PERITONEUM: Small amount of free fluid in the gallbladder fossa.  VESSELS: Atherosclerotic changes.  RETROPERITONEUM/LYMPH NODES: No lymphadenopathy.  ABDOMINAL WALL: Postsurgical changes.  BONES: Withinnormal limits.    IMPRESSION:  Right lower lobe consolidation compatible with pneumonia. Follow-up to   resolution is recommended. Small bibasilar atelectasis.    Extrahepatic biliary ductal dilatation, increased compared to priors.    Post cholecystectomy with small amount of fluid in the gallbladder fossa   and surrounding the first portion of the duodenum, nonspecific.        Medications:  cefTRIAXone   IVPB 1000 milliGRAM(s) IV Intermittent every 24 hours        acetaminophen     Tablet .. 650 milliGRAM(s) Oral every 6 hours PRN        pantoprazole    Tablet 40 milliGRAM(s) Oral before breakfast  senna 2 Tablet(s) Oral at bedtime      allopurinol 100 milliGRAM(s) Oral daily  dextrose 50% Injectable 25 Gram(s) IV Push once  dextrose 50% Injectable 12.5 Gram(s) IV Push once  dextrose 50% Injectable 25 Gram(s) IV Push once  dextrose Oral Gel 15 Gram(s) Oral once  fenofibrate Tablet 48 milliGRAM(s) Oral daily  glucagon  Injectable 1 milliGRAM(s) IntraMuscular once    ferrous    sulfate 325 milliGRAM(s) Oral daily  sodium chloride 0.9%. 1000 milliLiter(s) IV Continuous <Continuous>            ## P/E:  Gen: elderly female, lying comfortably in bed in no apparent distress  HEENT: EOMI, PERRL dry mucus membrane  Resp: CTA B/L , no wheeze  CVS: RRR, no m/r/g  Abd: soft NT/ND +BS + abdominal scar  Ext: no c/c/e  Neuro: awake, alert, following commands      CODE STATUS: [ ] full code  [x ] DNR  [x ] DNI  [ x] MOLST  Goals of care discussion: [ ] yes

## 2023-03-08 NOTE — SWALLOW BEDSIDE ASSESSMENT ADULT - SWALLOW EVAL: RECOMMENDED DIET
Puree texture with Thin liquids; NO STRAWS; PILLS WITH APPLESAUCE Puree texture with Thin liquids; NO STRAWS; PILLS WITH APPLESAUCE; ENCOURAGE SINGLE SIP CUP DRINKING

## 2023-03-08 NOTE — SWALLOW BEDSIDE ASSESSMENT ADULT - COMMENTS
PMH HTN, HLD, GERD, gallbladder Ca s/p ex lab, open cholecystectomy, segment 4b/5 hepatectomy, and right colectomy due to fistula tract vs metastasis on 08/19, hepatic abscess s/p drainage  3/6 CT chest Right lower lobe consolidation compatible with pneumonia. Small bibasilar atelectasis.  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  Evaluation of the osseous structures with the appropriate window demonstrates hyperostosis frontalis interna.  The visualized paranasal sinuses mastoid and mastoid clear.  CXR CHF presently seen. Heart enlargement again noted. there were no changes between thin and thick liquids evident on exam soft solids deferred at this time

## 2023-03-08 NOTE — SWALLOW BEDSIDE ASSESSMENT ADULT - SWALLOW EVAL: RECOMMENDED FEEDING/EATING TECHNIQUES
alternate food with liquid/maintain upright posture during/after eating for 30 mins/no straws/oral hygiene

## 2023-03-08 NOTE — SWALLOW BEDSIDE ASSESSMENT ADULT - SWALLOW EVAL: DIAGNOSIS
Oropharyngeal dysphagia with weakness and cognitive deficits in setting of Oropharyngeal dysphagia with weakness in setting of metabolic encephalopathy, Pna ;  there are no airway protection deficits on exam for modified textures

## 2023-03-08 NOTE — PROGRESS NOTE ADULT - ASSESSMENT
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 VBG with pH 7.1, pCO2: 86. Placed on BiPAP. Afebrile, no leukocytosis. CT chest with RLL consolidation. Labs with Hb 6.3. DNR/DNI. Admitted to medicine    DX: acute hypoxic and hypercarbic respiratory failure, PNA, acute metabolic encephalopathy, anemia, gallbladder cancer, HTN    - mental status much improved, awake and responsive today  - weaned off BIPAP 3/6, now with nocturnal BIPAP and 2L NC during day  - check sputum cx if pt able to expectorate: Duonebs q 6 assist in mobilization with Aerobika  - CT chest with RLL consolidation  - on antibiotics with coverage for CAP  - legionella negative, d/c azithromycin  - ?aspiration PNA  - cont with ceftriaxone  - RVP negative  - O2 supplementation to maintain O2 sat > 90%  - wean off O2 as tolerates  - cont with nocturnal BiPAP for underlying hypercarbia  - speech and swallow eval reccs noted: Puree texture with Thin liquids; NO STRAWS; PILLS WITH APPLESAUCE  - anemia s/p 1 unit pRBC with improvement in Hb  - DNR/DNI  - Plan of care discussed with Pulmonology attending MD Grant

## 2023-03-08 NOTE — SWALLOW BEDSIDE ASSESSMENT ADULT - SWALLOW EVAL: PATIENT/FAMILY GOALS STATEMENT
pt remained alert and aware of eating/swallowing context; repeated single words and willingly accepted all po trials

## 2023-03-08 NOTE — SWALLOW BEDSIDE ASSESSMENT ADULT - ADDITIONAL RECOMMENDATIONS
FEED ONLY ALERT/AWARE AND ACCEPTING PO INTAKE; AVOID GULPING OF LIQUIDS; SLOW PACE/ SMALL BITES/SIPS ; ALLOW EXTRA TIME BETWEEN SWALLOWS;

## 2023-03-08 NOTE — SWALLOW BEDSIDE ASSESSMENT ADULT - PHARYNGEAL PHASE
Decreased laryngeal elevation/Multiple swallows Delayed pharyngeal swallow/Decreased laryngeal elevation

## 2023-03-08 NOTE — SWALLOW BEDSIDE ASSESSMENT ADULT - NS SPL SWALLOW CLINIC TRIAL FT
there are no changes in breathing nor vocal quality evident; pt also able to produce clear upper airway flow after po swallows

## 2023-03-08 NOTE — SWALLOW BEDSIDE ASSESSMENT ADULT - SLP GENERAL OBSERVATIONS
alert and oriented to name; cooperative and clear speech for limited verbalizations;  followed simple directions

## 2023-03-08 NOTE — PROGRESS NOTE ADULT - SUBJECTIVE AND OBJECTIVE BOX
Patient is a 87y old  Female who presents with a chief complaint of acute respiratory failure with hypoxia/hypercapnia, AMS (07 Mar 2023 23:28)    INTERVAL HPI/OVERNIGHT EVENTS: no events     MEDICATIONS  (STANDING):  allopurinol 100 milliGRAM(s) Oral daily  azithromycin  IVPB      azithromycin  IVPB 500 milliGRAM(s) IV Intermittent every 24 hours  cefTRIAXone   IVPB 1000 milliGRAM(s) IV Intermittent every 24 hours  dextrose 50% Injectable 25 Gram(s) IV Push once  dextrose 50% Injectable 12.5 Gram(s) IV Push once  dextrose 50% Injectable 25 Gram(s) IV Push once  dextrose Oral Gel 15 Gram(s) Oral once  fenofibrate Tablet 48 milliGRAM(s) Oral daily  ferrous    sulfate 325 milliGRAM(s) Oral daily  glucagon  Injectable 1 milliGRAM(s) IntraMuscular once  pantoprazole    Tablet 40 milliGRAM(s) Oral before breakfast  senna 2 Tablet(s) Oral at bedtime  sodium chloride 0.9%. 1000 milliLiter(s) (75 mL/Hr) IV Continuous <Continuous>    MEDICATIONS  (PRN):  acetaminophen     Tablet .. 650 milliGRAM(s) Oral every 6 hours PRN Mild Pain (1 - 3), Moderate Pain (4 - 6)    Allergies    ampicillin-sulbactam (Rash)    Intolerances      REVIEW OF SYSTEMS:  All other systems reviewed and are negative    Vital Signs Last 24 Hrs  T(C): 36.6 (08 Mar 2023 05:18), Max: 36.8 (07 Mar 2023 23:14)  T(F): 97.9 (08 Mar 2023 05:18), Max: 98.2 (07 Mar 2023 23:14)  HR: 91 (08 Mar 2023 05:18) (83 - 91)  BP: 155/72 (08 Mar 2023 05:18) (147/64 - 155/72)  BP(mean): --  RR: 18 (08 Mar 2023 05:18) (18 - 18)  SpO2: 100% (08 Mar 2023 05:18) (95% - 100%)      Daily     Daily   I&O's Summary    CAPILLARY BLOOD GLUCOSE        PHYSICAL EXAM:  GENERAL: NAD,    HEAD:  Atraumatic, Normocephalic  EYES: EOMI, PERRLA, conjunctiva and sclera clear  ENMT: No tonsillar erythema, exudates, or enlargement; Moist mucous membranes, Good dentition, No lesions  NECK: Supple, No JVD, Normal thyroid  NERVOUS SYSTEM: Motor Strength 5/5 B/L upper and lower extremities; DTRs 2+ intact and symmetric  CHEST/LUNG: Clear to percussion bilaterally; No rales, rhonchi, wheezing, or rubs  HEART: Regular rate and rhythm; No murmurs, rubs, or gallops  ABDOMEN: Soft, Nontender, Nondistended; Bowel sounds present  EXTREMITIES:  2+ Peripheral Pulses, No clubbing, cyanosis, or edema  LYMPH: No lymphadenopathy noted  SKIN: No rashes or lesions    Labs                          8.9    6.45  )-----------( 344      ( 08 Mar 2023 06:10 )             29.8     03-08    138  |  99  |  11  ----------------------------<  75  3.8   |  30  |  0.61    Ca    9.7      08 Mar 2023 06:10  Phos  2.6     03-07  Mg     2.0     03-07    TPro  6.4  /  Alb  2.7<L>  /  TBili  0.3  /  DBili  x   /  AST  65<H>  /  ALT  31  /  AlkPhos  74  03-07              Culture - Blood (collected 06 Mar 2023 07:00)  Source: .Blood Blood  Preliminary Report (07 Mar 2023 10:01):    No growth to date.    Culture - Blood (collected 06 Mar 2023 07:00)  Source: .Blood Blood  Preliminary Report (07 Mar 2023 10:01):    No growth to date.                DVT prophylaxis: > Lovenox 40mg SQ daily  > Heparin   > SCD's

## 2023-03-09 PROCEDURE — 99232 SBSQ HOSP IP/OBS MODERATE 35: CPT

## 2023-03-09 RX ORDER — AMLODIPINE BESYLATE 2.5 MG/1
5 TABLET ORAL DAILY
Refills: 0 | Status: DISCONTINUED | OUTPATIENT
Start: 2023-03-09 | End: 2023-03-11

## 2023-03-09 RX ADMIN — Medication 48 MILLIGRAM(S): at 12:50

## 2023-03-09 RX ADMIN — PANTOPRAZOLE SODIUM 40 MILLIGRAM(S): 20 TABLET, DELAYED RELEASE ORAL at 06:21

## 2023-03-09 RX ADMIN — Medication 100 MILLIGRAM(S): at 12:50

## 2023-03-09 RX ADMIN — Medication 3 MILLILITER(S): at 12:21

## 2023-03-09 RX ADMIN — Medication 3 MILLILITER(S): at 05:22

## 2023-03-09 RX ADMIN — SENNA PLUS 2 TABLET(S): 8.6 TABLET ORAL at 21:25

## 2023-03-09 RX ADMIN — CEFTRIAXONE 100 MILLIGRAM(S): 500 INJECTION, POWDER, FOR SOLUTION INTRAMUSCULAR; INTRAVENOUS at 15:58

## 2023-03-09 RX ADMIN — Medication 3 MILLILITER(S): at 23:28

## 2023-03-09 RX ADMIN — AMLODIPINE BESYLATE 5 MILLIGRAM(S): 2.5 TABLET ORAL at 09:53

## 2023-03-09 RX ADMIN — Medication 3 MILLILITER(S): at 17:10

## 2023-03-09 RX ADMIN — Medication 325 MILLIGRAM(S): at 12:50

## 2023-03-09 NOTE — PROGRESS NOTE ADULT - SUBJECTIVE AND OBJECTIVE BOX
24 hour events: patient lethargic but arousable; non-compliant with a full history. Refused bipap overnight per RN.       ROS: patient reports improvement with SOB and states she is "thirsty". Unable to attain further ROS.     ICU Vital Signs Last 24 Hrs  T(C): 36.5 (09 Mar 2023 08:33), Max: 36.7 (08 Mar 2023 17:21)  T(F): 97.7 (09 Mar 2023 08:33), Max: 98.1 (08 Mar 2023 23:30)  HR: 99 (09 Mar 2023 09:05) (82 - 99)  BP: 165/80 (09 Mar 2023 08:33) (122/70 - 179/69)  BP(mean): --  ABP: --  ABP(mean): --  RR: 18 (09 Mar 2023 08:33) (18 - 28)  SpO2: 97% (09 Mar 2023 09:05) (95% - 99%)    O2 Parameters below as of 09 Mar 2023 08:33  Patient On (Oxygen Delivery Method): nasal cannula      LABS:                          8.9    6.45  )-----------( 344      ( 08 Mar 2023 06:10 )             29.8     03-08    138  |  99  |  11  ----------------------------<  75  3.8   |  30  |  0.61    Ca    9.7      08 Mar 2023 06:10      CULTURES:  Culture Results:   No growth to date. (03-06 @ 07:00)  Culture Results:   No growth to date. (03-06 @ 07:00)      Respiratory Viral Panel with COVID-19 by SATISH (03.07.23 @ 00:45)    Rapid RVP Result: [facility]    SARS-CoV-2: [facility]:    ## Imaging:  CT chest < from: CT Chest No Cont (03.06.23 @ 14:32) >  CHEST:  LUNGS AND LARGE AIRWAYS:  Partial opacification of the right lower lobe   bronchus with dense consolidation in the right lower lobe. Small   bibasilar atelectasis.  PLEURA: No pleural effusion.  VESSELS: Atherosclerotic changes of the aorta and coronary arteries.  HEART: Heart size is mildly enlarged. No pericardial effusion.  MEDIASTINUM AND CARMELLA: No lymphadenopathy.  CHEST WALL AND LOWER NECK: Thyroid gland is heterogeneous.    ABDOMEN AND PELVIS:  LIVER: Within normallimits.  BILE DUCTS: Common bile duct is dilated up to 1.2 cm. Mild intrahepatic   biliary ductal dilatation.  GALLBLADDER: Cholecystectomy.  SPLEEN: Within normal limits.  PANCREAS: Within normal limits.  ADRENALS: Within normal limits.  KIDNEYS/URETERS: Bilateral subcentimeter renal hypodensities, too small   to characterize.    BLADDER: Within normal limits.  REPRODUCTIVE ORGANS: Fibroid uterus    BOWEL: Mild fluid and stranding surrounding the duodenal bulb No bowel   obstruction. Colonic diverticulosis. Status post right hemicolectomy .  PERITONEUM: Small amount of free fluid in the gallbladder fossa.  VESSELS: Atherosclerotic changes.  RETROPERITONEUM/LYMPH NODES: No lymphadenopathy.  ABDOMINAL WALL: Postsurgical changes.  BONES: Withinnormal limits.    IMPRESSION:  Right lower lobe consolidation compatible with pneumonia. Follow-up to   resolution is recommended. Small bibasilar atelectasis.    Extrahepatic biliary ductal dilatation, increased compared to priors.    Post cholecystectomy with small amount of fluid in the gallbladder fossa   and surrounding the first portion of the duodenum, nonspecific.      MEDICATIONS  (STANDING):  albuterol/ipratropium for Nebulization 3 milliLiter(s) Nebulizer every 6 hours  allopurinol 100 milliGRAM(s) Oral daily  amLODIPine   Tablet 5 milliGRAM(s) Oral daily  cefTRIAXone   IVPB 1000 milliGRAM(s) IV Intermittent every 24 hours  dextrose 50% Injectable 25 Gram(s) IV Push once  dextrose 50% Injectable 12.5 Gram(s) IV Push once  dextrose 50% Injectable 25 Gram(s) IV Push once  dextrose Oral Gel 15 Gram(s) Oral once  fenofibrate Tablet 48 milliGRAM(s) Oral daily  ferrous    sulfate 325 milliGRAM(s) Oral daily  glucagon  Injectable 1 milliGRAM(s) IntraMuscular once  pantoprazole    Tablet 40 milliGRAM(s) Oral before breakfast  senna 2 Tablet(s) Oral at bedtime  sodium chloride 0.9%. 1000 milliLiter(s) (75 mL/Hr) IV Continuous <Continuous>    MEDICATIONS  (PRN):  acetaminophen     Tablet .. 650 milliGRAM(s) Oral every 6 hours PRN Mild Pain (1 - 3), Moderate Pain (4 - 6)      ## P/E:  Gen: elderly female, lying comfortably in bed in no apparent distress,  HEENT: EOMI, PERRL dry mucus membrane  Resp: CTA B/L , no wheeze, 2L NC   CVS: RRR, no m/r/g  Abd: soft NT/ND +BS + abdominal scar  Ext: no c/c/e  Neuro: lethargic, following commands simple commands      CODE STATUS: [ ] full code  [x ] DNR  [x ] DNI  [ x] MOLST             24 hour events: patient lethargic but arousable; non-compliant with a full history. Refused bipap overnight per RN. When reassess in afternoon my alert and interactive with family while eating dinner.     ROS: patient reports improvement with SOB and states she is "thirsty". Unable to attain further ROS.     ICU Vital Signs Last 24 Hrs  T(C): 36.5 (09 Mar 2023 08:33), Max: 36.7 (08 Mar 2023 17:21)  T(F): 97.7 (09 Mar 2023 08:33), Max: 98.1 (08 Mar 2023 23:30)  HR: 99 (09 Mar 2023 09:05) (82 - 99)  BP: 165/80 (09 Mar 2023 08:33) (122/70 - 179/69)  BP(mean): --  ABP: --  ABP(mean): --  RR: 18 (09 Mar 2023 08:33) (18 - 28)  SpO2: 97% (09 Mar 2023 09:05) (95% - 99%)    O2 Parameters below as of 09 Mar 2023 08:33  Patient On (Oxygen Delivery Method): nasal cannula      LABS:                          8.9    6.45  )-----------( 344      ( 08 Mar 2023 06:10 )             29.8     03-08    138  |  99  |  11  ----------------------------<  75  3.8   |  30  |  0.61    Ca    9.7      08 Mar 2023 06:10      CULTURES:  Culture Results:   No growth to date. (03-06 @ 07:00)  Culture Results:   No growth to date. (03-06 @ 07:00)      Respiratory Viral Panel with COVID-19 by SATISH (03.07.23 @ 00:45)    Rapid RVP Result: [facility]    SARS-CoV-2: [facility]:    ## Imaging:  CT chest < from: CT Chest No Cont (03.06.23 @ 14:32) >  CHEST:  LUNGS AND LARGE AIRWAYS:  Partial opacification of the right lower lobe   bronchus with dense consolidation in the right lower lobe. Small   bibasilar atelectasis.  PLEURA: No pleural effusion.  VESSELS: Atherosclerotic changes of the aorta and coronary arteries.  HEART: Heart size is mildly enlarged. No pericardial effusion.  MEDIASTINUM AND CARMELLA: No lymphadenopathy.  CHEST WALL AND LOWER NECK: Thyroid gland is heterogeneous.    ABDOMEN AND PELVIS:  LIVER: Within normallimits.  BILE DUCTS: Common bile duct is dilated up to 1.2 cm. Mild intrahepatic   biliary ductal dilatation.  GALLBLADDER: Cholecystectomy.  SPLEEN: Within normal limits.  PANCREAS: Within normal limits.  ADRENALS: Within normal limits.  KIDNEYS/URETERS: Bilateral subcentimeter renal hypodensities, too small   to characterize.    BLADDER: Within normal limits.  REPRODUCTIVE ORGANS: Fibroid uterus    BOWEL: Mild fluid and stranding surrounding the duodenal bulb No bowel   obstruction. Colonic diverticulosis. Status post right hemicolectomy .  PERITONEUM: Small amount of free fluid in the gallbladder fossa.  VESSELS: Atherosclerotic changes.  RETROPERITONEUM/LYMPH NODES: No lymphadenopathy.  ABDOMINAL WALL: Postsurgical changes.  BONES: Withinnormal limits.    IMPRESSION:  Right lower lobe consolidation compatible with pneumonia. Follow-up to   resolution is recommended. Small bibasilar atelectasis.    Extrahepatic biliary ductal dilatation, increased compared to priors.    Post cholecystectomy with small amount of fluid in the gallbladder fossa   and surrounding the first portion of the duodenum, nonspecific.      MEDICATIONS  (STANDING):  albuterol/ipratropium for Nebulization 3 milliLiter(s) Nebulizer every 6 hours  allopurinol 100 milliGRAM(s) Oral daily  amLODIPine   Tablet 5 milliGRAM(s) Oral daily  cefTRIAXone   IVPB 1000 milliGRAM(s) IV Intermittent every 24 hours  dextrose 50% Injectable 25 Gram(s) IV Push once  dextrose 50% Injectable 12.5 Gram(s) IV Push once  dextrose 50% Injectable 25 Gram(s) IV Push once  dextrose Oral Gel 15 Gram(s) Oral once  fenofibrate Tablet 48 milliGRAM(s) Oral daily  ferrous    sulfate 325 milliGRAM(s) Oral daily  glucagon  Injectable 1 milliGRAM(s) IntraMuscular once  pantoprazole    Tablet 40 milliGRAM(s) Oral before breakfast  senna 2 Tablet(s) Oral at bedtime  sodium chloride 0.9%. 1000 milliLiter(s) (75 mL/Hr) IV Continuous <Continuous>    MEDICATIONS  (PRN):  acetaminophen     Tablet .. 650 milliGRAM(s) Oral every 6 hours PRN Mild Pain (1 - 3), Moderate Pain (4 - 6)      ## P/E:  Gen: elderly female, lying comfortably in bed in no apparent distress,  HEENT: EOMI, PERRL dry mucus membrane  Resp: CTA B/L , no wheeze, 2L NC   CVS: RRR, no m/r/g  Abd: soft NT/ND +BS + abdominal scar  Ext: no c/c/e  Neuro: lethargic, following commands simple commands      CODE STATUS: [ ] full code  [x ] DNR  [x ] DNI  [ x] MOLST

## 2023-03-09 NOTE — PHYSICAL THERAPY INITIAL EVALUATION ADULT - PERTINENT HX OF CURRENT PROBLEM, REHAB EVAL
Patient admitted with SOB, weakness and requiring O2. Patient found to have acute respiratory failure with hypoxia/hypercapnia and AMS.

## 2023-03-09 NOTE — PROGRESS NOTE ADULT - ASSESSMENT
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 VBG with pH 7.1, pCO2: 86. Placed on BiPAP. Afebrile, no leukocytosis. CT chest with RLL consolidation. Labs with Hb 6.3. DNR/DNI. Admitted to medicine for further monitoring.     DX: acute hypoxic and hypercarbic respiratory failure, PNA, acute metabolic encephalopathy, anemia, gallbladder cancer, HTN    - mental status much improved from admission where patient was obtunded, however pt lethargic on exam today  - weaned off BIPAP 3/6, now with ordered nocturnal BIPAP and 2L NC during day but per RN pt has refused BiPAP overnight.  - Recommend respiratory to put bipap, and assess for improvement in mental status- ABG if no improvement.   - check sputum cx if pt able to expectorate but given weakness would need to use Duonebs q 6 assist in mobilization with Aerobika  - CT chest with a RLL consolidation  - on antibiotics with coverage for CAP vs aspiration PNA   - legionella negative, azithromycin d/c'd, cont with ceftriaxone  - RVP negative  - Continue O2 supplementation to maintain O2 sat > 90% and wean off O2 as tolerates  - cont with nocturnal BiPAP for underlying hypercarbia, stressing the importance.   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 VBG with pH 7.1, pCO2: 86. Placed on BiPAP. Afebrile, no leukocytosis. CT chest with RLL consolidation. Labs with Hb 6.3. DNR/DNI. Admitted to medicine for further monitoring.     DX: acute hypoxic and hypercarbic respiratory failure, PNA, acute metabolic encephalopathy, anemia, gallbladder cancer, HTN    - mental status much improved from admission where patient was obtunded during admission, however pt lethargic on exam today in the AM but as day went on patient more alert and interactive.   - weaned off BiPAP 3/6, now with ordered nocturnal BIPAP and 2L NC during day but per RN pt has refused BiPAP overnight.  - Encouraged the use of overnight BiPAP while in the acute phase of her PNA to patient and daughter  - check sputum cx if pt able to expectorate but given weakness would need to use Duonebs q 6 assist in mobilization with Aerobika  - CT chest with a RLL consolidation  - on antibiotics with coverage for CAP vs aspiration PNA   - legionella negative, azithromycin d/c'd, cont with ceftriaxone  - RVP negative  - Continue O2 supplementation to maintain O2 sat > 90% and wean off O2 as tolerates  - cont with nocturnal BiPAP for underlying hypercarbia, stressing the importance.   - Enforce speech and swallow eval recs of Puree texture with Thin liquids; NO STRAWS; PILLS WITH APPLESAUCE w/ aspiration precautions  - anemia s/p 1 unit pRBC with improvement in Hb  - DNR/DNI, continue GOC conversations       * Plan of care discussed with Pulmonology attending MD Grant

## 2023-03-09 NOTE — PROGRESS NOTE ADULT - SUBJECTIVE AND OBJECTIVE BOX
Patient is a 87y old  Female who presents with a chief complaint of acute respiratory failure with hypoxia/hypercapnia, AMS (09 Mar 2023 10:07)    INTERVAL HPI/OVERNIGHT EVENTS: no events     MEDICATIONS  (STANDING):  albuterol/ipratropium for Nebulization 3 milliLiter(s) Nebulizer every 6 hours  allopurinol 100 milliGRAM(s) Oral daily  amLODIPine   Tablet 5 milliGRAM(s) Oral daily  cefTRIAXone   IVPB 1000 milliGRAM(s) IV Intermittent every 24 hours  dextrose 50% Injectable 25 Gram(s) IV Push once  dextrose 50% Injectable 12.5 Gram(s) IV Push once  dextrose 50% Injectable 25 Gram(s) IV Push once  dextrose Oral Gel 15 Gram(s) Oral once  fenofibrate Tablet 48 milliGRAM(s) Oral daily  ferrous    sulfate 325 milliGRAM(s) Oral daily  glucagon  Injectable 1 milliGRAM(s) IntraMuscular once  pantoprazole    Tablet 40 milliGRAM(s) Oral before breakfast  senna 2 Tablet(s) Oral at bedtime  sodium chloride 0.9%. 1000 milliLiter(s) (75 mL/Hr) IV Continuous <Continuous>    MEDICATIONS  (PRN):  acetaminophen     Tablet .. 650 milliGRAM(s) Oral every 6 hours PRN Mild Pain (1 - 3), Moderate Pain (4 - 6)    Allergies    ampicillin-sulbactam (Rash)    Intolerances      REVIEW OF SYSTEMS:  All other systems reviewed and are negative    Vital Signs Last 24 Hrs  T(C): 37 (09 Mar 2023 12:04), Max: 37 (09 Mar 2023 12:04)  T(F): 98.6 (09 Mar 2023 12:04), Max: 98.6 (09 Mar 2023 12:04)  HR: 96 (09 Mar 2023 12:04) (82 - 99)  BP: 145/80 (09 Mar 2023 12:04) (122/70 - 179/69)  BP(mean): --  RR: 18 (09 Mar 2023 12:04) (18 - 28)  SpO2: 100% (09 Mar 2023 12:04) (95% - 100%)    Parameters below as of 09 Mar 2023 12:04  Patient On (Oxygen Delivery Method): nasal cannula      Daily     Daily   I&O's Summary    CAPILLARY BLOOD GLUCOSE        PHYSICAL EXAM:  GENERAL: NAD,    HEAD:  Atraumatic, Normocephalic  EYES: EOMI, PERRLA, conjunctiva and sclera clear  ENMT: No tonsillar erythema, exudates, or enlargement; Moist mucous membranes, Good dentition, No lesions  NECK: Supple, No JVD, Normal thyroid  CHEST/LUNG: Clear to percussion bilaterally; No rales, rhonchi, wheezing, or rubs  HEART: Regular rate and rhythm; No murmurs, rubs, or gallops  ABDOMEN: Soft, Nontender, Nondistended; Bowel sounds present  EXTREMITIES:  2+ Peripheral Pulses, No clubbing, cyanosis, or edema  LYMPH: No lymphadenopathy noted  SKIN: No rashes or lesions    Labs                          8.9    6.45  )-----------( 344      ( 08 Mar 2023 06:10 )             29.8     03-08    138  |  99  |  11  ----------------------------<  75  3.8   |  30  |  0.61    Ca    9.7      08 Mar 2023 06:10                          DVT prophylaxis: > Lovenox 40mg SQ daily  > Heparin   > SCD's

## 2023-03-09 NOTE — PHYSICAL THERAPY INITIAL EVALUATION ADULT - ADDITIONAL COMMENTS
Per care coordination and corroborated with son at bedside, patient lives in a private house with 6 steps to enter, no rails. Once inside patient is situated on the 1st floor. Patient requires assistance for stair negotiation, uses wheelchair when leaving home but able to walk with a shopping cart in the supermarket. Patient uses cane in home to ambulate. Patient has a HHA 6 hours x 7 days, who assists with feeding and bathing.

## 2023-03-10 LAB
GLUCOSE BLDC GLUCOMTR-MCNC: 124 MG/DL — HIGH (ref 70–99)
GLUCOSE BLDC GLUCOMTR-MCNC: 153 MG/DL — HIGH (ref 70–99)
GLUCOSE BLDC GLUCOMTR-MCNC: 166 MG/DL — HIGH (ref 70–99)
GLUCOSE BLDC GLUCOMTR-MCNC: 98 MG/DL — SIGNIFICANT CHANGE UP (ref 70–99)

## 2023-03-10 PROCEDURE — 99232 SBSQ HOSP IP/OBS MODERATE 35: CPT

## 2023-03-10 RX ADMIN — Medication 100 MILLIGRAM(S): at 11:15

## 2023-03-10 RX ADMIN — Medication 3 MILLILITER(S): at 17:09

## 2023-03-10 RX ADMIN — Medication 3 MILLILITER(S): at 23:17

## 2023-03-10 RX ADMIN — SENNA PLUS 2 TABLET(S): 8.6 TABLET ORAL at 21:09

## 2023-03-10 RX ADMIN — AMLODIPINE BESYLATE 5 MILLIGRAM(S): 2.5 TABLET ORAL at 06:19

## 2023-03-10 RX ADMIN — Medication 48 MILLIGRAM(S): at 11:15

## 2023-03-10 RX ADMIN — Medication 3 MILLILITER(S): at 05:09

## 2023-03-10 RX ADMIN — PANTOPRAZOLE SODIUM 40 MILLIGRAM(S): 20 TABLET, DELAYED RELEASE ORAL at 09:54

## 2023-03-10 RX ADMIN — Medication 325 MILLIGRAM(S): at 11:15

## 2023-03-10 RX ADMIN — CEFTRIAXONE 100 MILLIGRAM(S): 500 INJECTION, POWDER, FOR SOLUTION INTRAMUSCULAR; INTRAVENOUS at 16:25

## 2023-03-10 RX ADMIN — Medication 3 MILLILITER(S): at 11:03

## 2023-03-10 NOTE — PROGRESS NOTE ADULT - PROBLEM SELECTOR PLAN 3
due to hypoxia, hypercapnia  Pulmonary consulted  Aspiration/fall precaution  Monitor mental status  Swallow eval
due to hypoxia, hypercapnia  Pulmonary consulted  Aspiration/fall precaution  Monitor mental status  Swallow eval-passed
due to hypoxia, hypercapnia  Pulmonary consulted  Aspiration/fall precaution  Monitor mental status  Swallow eval-passed
due to hypoxia, hypercapnia  Pulmonary consulted  Aspiration/fall precaution  Monitor mental status  Swallow eval

## 2023-03-10 NOTE — PROGRESS NOTE ADULT - SUBJECTIVE AND OBJECTIVE BOX
24 hr events:  no acute events  mental status improved  awake and eating with assist  refused bipap night prior, did not use bipap again yesterday night    ## ROS:  no fever, no chills  feeling "good"  no sore throat, no sinus congestion  no rhinorrhea  denies SOB, + cough  no chest pain, no palpitations  no abdominal pain, no N/V/D  no dysuria  [x] all ROS otherwise negative    ## Labs:  no new blood work    Complete Blood Count in AM (03.08.23 @ 06:10)    Nucleated RBC: 0 /100 WBCs    WBC Count: 6.45 K/uL    RBC Count: 3.60 M/uL    Hemoglobin: 8.9 g/dL    Hematocrit: 29.8 %    Mean Cell Volume: 82.8 fl    Mean Cell Hemoglobin: 24.7 pg    Mean Cell Hemoglobin Conc: 29.9 g/dL    Red Cell Distrib Width: 16.3 %    Platelet Count - Automated: 344 K/uL      Basic Metabolic Panel in AM (03.08.23 @ 06:10)    Sodium, Serum: 138 mmol/L    Potassium, Serum: 3.8 mmol/L    Chloride, Serum: 99 mmol/L    Carbon Dioxide, Serum: 30 mmol/L    Anion Gap, Serum: 9 mmol/L    Blood Urea Nitrogen, Serum: 11 mg/dL    Creatinine, Serum: 0.61 mg/dL    Glucose, Serum: 75 mg/dL    Calcium, Total Serum: 9.7 mg/dL       Culture - Blood (03.06.23 @ 07:00)    Specimen Source: .Blood Blood    Culture Results: No growth to date.    Respiratory Viral Panel with COVID-19 by SATISH (03.07.23 @ 00:45)    Rapid RVP Result: [facility]    SARS-CoV-2: [facility]        ## Imaging:  CT chest < from: CT Chest No Cont (03.06.23 @ 14:32) >  CHEST:  LUNGS AND LARGE AIRWAYS:  Partial opacification of the right lower lobe   bronchus with dense consolidation in the right lower lobe. Small   bibasilar atelectasis.  PLEURA: No pleural effusion.  VESSELS: Atherosclerotic changes of the aorta and coronary arteries.  HEART: Heart size is mildly enlarged. No pericardial effusion.  MEDIASTINUM AND CARMELLA: No lymphadenopathy.  CHEST WALL AND LOWER NECK: Thyroid gland is heterogeneous.    ABDOMEN AND PELVIS:  LIVER: Within normallimits.  BILE DUCTS: Common bile duct is dilated up to 1.2 cm. Mild intrahepatic   biliary ductal dilatation.  GALLBLADDER: Cholecystectomy.  SPLEEN: Within normal limits.  PANCREAS: Within normal limits.  ADRENALS: Within normal limits.  KIDNEYS/URETERS: Bilateral subcentimeter renal hypodensities, too small   to characterize.    BLADDER: Within normal limits.  REPRODUCTIVE ORGANS: Fibroid uterus    BOWEL: Mild fluid and stranding surrounding the duodenal bulb No bowel   obstruction. Colonic diverticulosis. Status post right hemicolectomy .  PERITONEUM: Small amount of free fluid in the gallbladder fossa.  VESSELS: Atherosclerotic changes.  RETROPERITONEUM/LYMPH NODES: No lymphadenopathy.  ABDOMINAL WALL: Postsurgical changes.  BONES: Withinnormal limits.    IMPRESSION:  Right lower lobe consolidation compatible with pneumonia. Follow-up to   resolution is recommended. Small bibasilar atelectasis.    Extrahepatic biliary ductal dilatation, increased compared to priors.    Post cholecystectomy with small amount of fluid in the gallbladder fossa   and surrounding the first portion of the duodenum, nonspecific.        ## Medications:  albuterol/ipratropium for Nebulization 3 milliLiter(s) Nebulizer every 6 hours  allopurinol 100 milliGRAM(s) Oral daily  amLODIPine   Tablet 5 milliGRAM(s) Oral daily  cefTRIAXone   IVPB 1000 milliGRAM(s) IV Intermittent every 24 hours  dextrose 50% Injectable 25 Gram(s) IV Push once  dextrose 50% Injectable 12.5 Gram(s) IV Push once  dextrose 50% Injectable 25 Gram(s) IV Push once  dextrose Oral Gel 15 Gram(s) Oral once  fenofibrate Tablet 48 milliGRAM(s) Oral daily  ferrous    sulfate 325 milliGRAM(s) Oral daily  glucagon  Injectable 1 milliGRAM(s) IntraMuscular once  pantoprazole    Tablet 40 milliGRAM(s) Oral before breakfast  senna 2 Tablet(s) Oral at bedtime    MEDICATIONS  (PRN):  acetaminophen     Tablet .. 650 milliGRAM(s) Oral every 6 hours PRN Mild Pain (1 - 3), Moderate Pain (4 - 6)        ## Vitals:  T(C): 36.7 (10 Mar 2023 05:02), Max: 37 (09 Mar 2023 12:04)  T(F): 98.1 (10 Mar 2023 05:02), Max: 98.6 (09 Mar 2023 12:04)  HR: 95 (10 Mar 2023 07:30) (90 - 96)  BP: 146/73 (10 Mar 2023 05:02) (144/67 - 161/70)  RR: 17 (10 Mar 2023 05:02) (17 - 19)  SpO2: 97% (10 Mar 2023 07:30) (95% - 100%)        ## P/E:  Gen: elderly female, lying comfortably in bed in no apparent distress  HEENT: NC/AT, EOMI, sclera white  Resp: CTA B/L, no wheeze, no rhonchi  CVS: RRR  Abd: soft NT/ND +BS  Ext: no c/c/e  Neuro: more awake and alert, moving all extremities          CODE STATUS: [ ] full code  [x ] DNR  [x ] DNI  [x ] MOLST  Goals of care discussion: [ ] yes

## 2023-03-10 NOTE — PROGRESS NOTE ADULT - PROBLEM SELECTOR PLAN 5
low CrCl, lower fenofibrate dose. Discussed with pharmacy

## 2023-03-10 NOTE — PROGRESS NOTE ADULT - PROBLEM SELECTOR PLAN 4
Monitor BP, if elevated, will consider IV BP med

## 2023-03-10 NOTE — PROGRESS NOTE ADULT - PROBLEM SELECTOR PLAN 7
Ca s/p ex lab, open cholecystectomy, segment 4b/5 hepatectomy, and right colectomy due to fistula tract vs metastasis on 08/19, hepatic abscess s/p drainage  Currently with AMS and anemia  CT abd/pelvis with IV contrast, reviewed

## 2023-03-10 NOTE — PROGRESS NOTE ADULT - ASSESSMENT
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 VBG with pH 7.1, pCO2: 86. Placed on BiPAP. Afebrile, no leukocytosis. CT chest with RLL consolidation. Labs with Hb 6.3. Transfused 1 unit pRBC. DNR/DNI. Admitted to medicine    DX: acute on chronic hypoxic and hypercarbic respiratory failure, PNA, acute metabolic encephalopathy, anemia, gallbladder cancer, HTN    - on ceftriaxone for underlying PNA  - mental status much improved  - reiterated to patient importance of BiPAP compliance at night to help her with her breathing and CO2 levels  - recent blood gas on NC 7.36/58/108/33/97%, stable pCO2  - pt with underlying chronic hypercarbia with elevated serum bicarb levels  - exacerbation of chronic hypoxia/hypercarbia likely in setting of PNA  - O2 supplementation to maintain O2 sat > 90%, wean down/off as tolerates  - reportedly family weaned pt off oxygen in Dec 2022 but recently had to reinitiate O2 therapy at home due to SOB  - passed swallow eval 3/8, tolerating puree  - cont PT, OOB to chair  - anemia work up per primary team  - antihypertensives for HTN

## 2023-03-10 NOTE — PROGRESS NOTE ADULT - PROBLEM SELECTOR PLAN 1
present with AMS, hypoxic, hypercapnic resp failure. Pneumonia   Pulm consulted  Abx  oxygenation improving
present with AMS, hypoxic, hypercapnic resp failure. Pneumonia   Pulm consulted  Abx

## 2023-03-10 NOTE — PROGRESS NOTE ADULT - REASON FOR ADMISSION
acute respiratory failure with hypoxia/hypercapnia, AMS

## 2023-03-10 NOTE — PROGRESS NOTE ADULT - PROBLEM SELECTOR PLAN 2
Iron deficiency anemia  add iron   new onset anemia Hb 6.3. Per daughter, no bleeding. Normal stool  1 unit of PRBC ordered  Monitor H/H

## 2023-03-10 NOTE — PROGRESS NOTE ADULT - PROBLEM SELECTOR PROBLEM 7
History of gallbladder cancer

## 2023-03-10 NOTE — PROGRESS NOTE ADULT - SUBJECTIVE AND OBJECTIVE BOX
Patient is a 88y old  Female who presents with a chief complaint of acute respiratory failure with hypoxia/hypercapnia, AMS (09 Mar 2023 13:06)    INTERVAL HPI/OVERNIGHT EVENTS: no events     MEDICATIONS  (STANDING):  albuterol/ipratropium for Nebulization 3 milliLiter(s) Nebulizer every 6 hours  allopurinol 100 milliGRAM(s) Oral daily  amLODIPine   Tablet 5 milliGRAM(s) Oral daily  cefTRIAXone   IVPB 1000 milliGRAM(s) IV Intermittent every 24 hours  dextrose 50% Injectable 25 Gram(s) IV Push once  dextrose 50% Injectable 12.5 Gram(s) IV Push once  dextrose 50% Injectable 25 Gram(s) IV Push once  dextrose Oral Gel 15 Gram(s) Oral once  fenofibrate Tablet 48 milliGRAM(s) Oral daily  ferrous    sulfate 325 milliGRAM(s) Oral daily  glucagon  Injectable 1 milliGRAM(s) IntraMuscular once  pantoprazole    Tablet 40 milliGRAM(s) Oral before breakfast  senna 2 Tablet(s) Oral at bedtime    MEDICATIONS  (PRN):  acetaminophen     Tablet .. 650 milliGRAM(s) Oral every 6 hours PRN Mild Pain (1 - 3), Moderate Pain (4 - 6)    Allergies    ampicillin-sulbactam (Rash)    Intolerances      REVIEW OF SYSTEMS:  All other systems reviewed and are negative    Vital Signs Last 24 Hrs  T(C): 36.7 (10 Mar 2023 05:02), Max: 37 (09 Mar 2023 12:04)  T(F): 98.1 (10 Mar 2023 05:02), Max: 98.6 (09 Mar 2023 12:04)  HR: 95 (10 Mar 2023 07:30) (90 - 96)  BP: 146/73 (10 Mar 2023 05:02) (144/67 - 161/70)  BP(mean): --  RR: 17 (10 Mar 2023 05:02) (17 - 19)  SpO2: 97% (10 Mar 2023 07:30) (95% - 100%)    Parameters below as of 10 Mar 2023 05:48  Patient On (Oxygen Delivery Method): room air      Daily     Daily   I&O's Summary    09 Mar 2023 07:01  -  10 Mar 2023 07:00  --------------------------------------------------------  IN: 0 mL / OUT: 350 mL / NET: -350 mL      CAPILLARY BLOOD GLUCOSE      POCT Blood Glucose.: 98 mg/dL (10 Mar 2023 07:56)    PHYSICAL EXAM:  GENERAL: NAD,    HEAD:  Atraumatic, Normocephalic  EYES: EOMI, PERRLA, conjunctiva and sclera clear  ENMT: No tonsillar erythema, exudates, or enlargement; Moist mucous membranes, Good dentition, No lesions  NECK: Supple, No JVD, Normal thyroid  NERVOUS SYSTEM:  Alert & Oriented X3, Good concentration; Motor Strength 5/5 B/L upper and lower extremities; DTRs 2+ intact and symmetric  CHEST/LUNG: Clear to percussion bilaterally; No rales, rhonchi, wheezing, or rubs  HEART: Regular rate and rhythm; No murmurs, rubs, or gallops  ABDOMEN: Soft, Nontender, Nondistended; Bowel sounds present  EXTREMITIES:  2+ Peripheral Pulses, No clubbing, cyanosis, or edema  LYMPH: No lymphadenopathy noted  SKIN: No rashes or lesions    Labs                                DVT prophylaxis: > Lovenox 40mg SQ daily  > Heparin   > SCD's

## 2023-03-11 ENCOUNTER — TRANSCRIPTION ENCOUNTER (OUTPATIENT)
Age: 88
End: 2023-03-11

## 2023-03-11 VITALS
RESPIRATION RATE: 17 BRPM | HEART RATE: 104 BPM | OXYGEN SATURATION: 98 % | SYSTOLIC BLOOD PRESSURE: 131 MMHG | TEMPERATURE: 98 F | DIASTOLIC BLOOD PRESSURE: 70 MMHG

## 2023-03-11 LAB
CULTURE RESULTS: SIGNIFICANT CHANGE UP
CULTURE RESULTS: SIGNIFICANT CHANGE UP
GLUCOSE BLDC GLUCOMTR-MCNC: 108 MG/DL — HIGH (ref 70–99)
GLUCOSE BLDC GLUCOMTR-MCNC: 129 MG/DL — HIGH (ref 70–99)
SPECIMEN SOURCE: SIGNIFICANT CHANGE UP
SPECIMEN SOURCE: SIGNIFICANT CHANGE UP

## 2023-03-11 PROCEDURE — 99239 HOSP IP/OBS DSCHRG MGMT >30: CPT

## 2023-03-11 RX ORDER — FERROUS SULFATE 325(65) MG
1 TABLET ORAL
Qty: 30 | Refills: 0
Start: 2023-03-11 | End: 2023-04-09

## 2023-03-11 RX ADMIN — Medication 100 MILLIGRAM(S): at 11:38

## 2023-03-11 RX ADMIN — Medication 3 MILLILITER(S): at 11:03

## 2023-03-11 RX ADMIN — Medication 325 MILLIGRAM(S): at 11:37

## 2023-03-11 RX ADMIN — Medication 3 MILLILITER(S): at 05:02

## 2023-03-11 RX ADMIN — AMLODIPINE BESYLATE 5 MILLIGRAM(S): 2.5 TABLET ORAL at 05:46

## 2023-03-11 RX ADMIN — PANTOPRAZOLE SODIUM 40 MILLIGRAM(S): 20 TABLET, DELAYED RELEASE ORAL at 05:45

## 2023-03-11 RX ADMIN — Medication 48 MILLIGRAM(S): at 11:37

## 2023-03-11 NOTE — DISCHARGE NOTE PROVIDER - NSDCMRMEDTOKEN_GEN_ALL_CORE_FT
acetaminophen 325 mg oral tablet: 2 tab(s) orally every 6 hours, As needed,   allopurinol 100 mg oral tablet: 1 tab(s) orally once a day  amLODIPine 5 mg oral tablet: 1 tab(s) orally once a day  carvedilol 6.25 mg oral tablet: 1 tab(s) orally every 12 hours  fenofibrate 145 mg oral tablet: 1 tab(s) orally once a day  ferrous sulfate 325 mg (65 mg elemental iron) oral tablet: 1 tab(s) orally once a day  mirtazapine 15 mg oral tablet: 1 tab(s) orally once a day (at bedtime)   pantoprazole 40 mg oral delayed release tablet: 1 tab(s) orally once a day (before a meal)  senna leaf extract oral tablet: 2 tab(s) orally once a day (at bedtime)

## 2023-03-11 NOTE — DISCHARGE NOTE NURSING/CASE MANAGEMENT/SOCIAL WORK - PATIENT PORTAL LINK FT
You can access the FollowMyHealth Patient Portal offered by [facility] by registering at the following website: [email or web address]. By joining [facility]’s FollowMyHealth portal, you will also be able to view your health information using other applications (apps) compatible with our system.

## 2023-03-11 NOTE — DISCHARGE NOTE PROVIDER - NSDCCPCAREPLAN_GEN_ALL_CORE_FT
PRINCIPAL DISCHARGE DIAGNOSIS  Diagnosis: Acute respiratory failure with hypercapnia  Assessment and Plan of Treatment: continue home oxygen  antibiotics completed in hospital  continue physical therapy  follow up with your primary care doctor

## 2023-03-11 NOTE — DISCHARGE NOTE NURSING/CASE MANAGEMENT/SOCIAL WORK - NSDCPEFALRISK_GEN_ALL_CORE
For information on Fall & Injury Prevention, visit: [email or web address] OR  [email or web address] OR  https://www.cdc.gov/steadi/patient.html

## 2023-03-11 NOTE — DISCHARGE NOTE PROVIDER - HOSPITAL COURSE
88 years old female with h/o HTN, HLD, GERD, gallbladder Ca s/p ex lab, open cholecystectomy, segment 4b/5 hepatectomy, and right colectomy due to fistula tract vs metastasis on 08/19, hepatic abscess s/p drainage present to ED with responsiveness Per daughter, patient was off oxygen since 12/2022 until around 2 days ago, patient appear weak, SOB and required oxygen use at home. Today AM, daughter woke up and found patient struggling to breath (may had accidentally removed oxygen during sleep). ? sat at 50%, improved with CPAP by EMS. Denied any cough, fever, nausea, vomiting, diarrhea or abdominal pain recently.   Hemodynamically stable, afebrile, sat well with BiPAP. VBG with pH 7.14-->7.21, pCO2 86-->70. No leukocytosis, Hb 6.3, plt 399, Na 128, Cr 0.96, lactate 3.1. CT head with no acute pathology. CXR image reviewed, noted pul congestion.     Admitted with acute respiratory failure with hypoxia/hypercapnea, AMS, anemia    Problem/Plan - 1:  ·  Problem: Acute respiratory failure with hypoxia and hypercapnia.   ·  Plan: present with AMS, hypoxic, hypercapnic resp failure. Pneumonia   Pulm consulted  Abx completed   oxygenation improving., encouraged to wear oxygen at home  weaned off bipap, pt refused to wear it at night     Problem/Plan - 2:  ·  Problem: Anemia.   ·  Plan: Iron deficiency anemia  add iron   new onset anemia Hb 6.3. Per daughter, no bleeding. Normal stool  1 unit of PRBC given   Monitor H/H.    Problem/Plan - 3:  ·  Problem: AMS (altered mental status).   ·  Plan: due to hypoxia, hypercapnia  Pulmonary consulted  Aspiration/fall precaution  Monitor mental status  Swallow eval-passed.    AT baseline now     Problem/Plan - 4:  ·  Problem: Benign essential HTN.   ·  Plan: home meds     Problem/Plan - 5:  ·  Problem: Hyperlipidemia, unspecified.   ·  Plan: home meds     Problem/Plan - 6:  ·  Problem: GERD (gastroesophageal reflux disease).   ·  Plan: on PPI.    Problem/Plan - 7:  ·  Problem: History of gallbladder cancer.   ·  Plan: Ca s/p ex lab, open cholecystectomy, segment 4b/5 hepatectomy, and right colectomy due to fistula tract vs metastasis on 08/19, hepatic abscess s/p drainage  Currently with AMS and anemia  CT abd/pelvis with IV contrast, reviewed.          pt seen and examined 45 min spent on dc planning     Lab test review, Radiology Review, Vitals review, Consultant review and discussion, Physical examination, IDR, Assessment and plan; Plan discussion with patient and family

## 2023-03-13 NOTE — PROGRESS NOTE ADULT - GENITOURINARY
Patient verbalized understanding and has no further questions or concerns at this time.      details…

## 2023-03-16 DIAGNOSIS — I10 ESSENTIAL (PRIMARY) HYPERTENSION: ICD-10-CM

## 2023-03-16 DIAGNOSIS — G93.41 METABOLIC ENCEPHALOPATHY: ICD-10-CM

## 2023-03-16 DIAGNOSIS — E78.5 HYPERLIPIDEMIA, UNSPECIFIED: ICD-10-CM

## 2023-03-16 DIAGNOSIS — D50.9 IRON DEFICIENCY ANEMIA, UNSPECIFIED: ICD-10-CM

## 2023-03-16 DIAGNOSIS — D64.9 ANEMIA, UNSPECIFIED: ICD-10-CM

## 2023-03-16 DIAGNOSIS — J96.21 ACUTE AND CHRONIC RESPIRATORY FAILURE WITH HYPOXIA: ICD-10-CM

## 2023-03-16 DIAGNOSIS — Z90.49 ACQUIRED ABSENCE OF OTHER SPECIFIED PARTS OF DIGESTIVE TRACT: ICD-10-CM

## 2023-03-16 DIAGNOSIS — J18.9 PNEUMONIA, UNSPECIFIED ORGANISM: ICD-10-CM

## 2023-03-16 DIAGNOSIS — J96.01 ACUTE RESPIRATORY FAILURE WITH HYPOXIA: ICD-10-CM

## 2023-03-16 DIAGNOSIS — K21.9 GASTRO-ESOPHAGEAL REFLUX DISEASE WITHOUT ESOPHAGITIS: ICD-10-CM

## 2023-03-20 ENCOUNTER — TRANSCRIPTION ENCOUNTER (OUTPATIENT)
Age: 88
End: 2023-03-20

## 2023-03-23 ENCOUNTER — APPOINTMENT (OUTPATIENT)
Age: 88
End: 2023-03-23
Payer: MEDICARE

## 2023-03-23 VITALS
DIASTOLIC BLOOD PRESSURE: 62 MMHG | RESPIRATION RATE: 16 BRPM | OXYGEN SATURATION: 95 % | HEART RATE: 77 BPM | SYSTOLIC BLOOD PRESSURE: 124 MMHG | TEMPERATURE: 97.5 F

## 2023-03-23 DIAGNOSIS — Z74.9 PROBLEM RELATED TO CARE PROVIDER DEPENDENCY, UNSPECIFIED: ICD-10-CM

## 2023-03-23 DIAGNOSIS — Z85.09 PERSONAL HISTORY OF MALIGNANT NEOPLASM OF OTHER DIGESTIVE ORGANS: ICD-10-CM

## 2023-03-23 DIAGNOSIS — D64.9 ANEMIA, UNSPECIFIED: ICD-10-CM

## 2023-03-23 DIAGNOSIS — R60.9 EDEMA, UNSPECIFIED: ICD-10-CM

## 2023-03-23 PROCEDURE — 99348 HOME/RES VST EST LOW MDM 30: CPT

## 2023-03-23 RX ORDER — ASPIRIN 81 MG/1
81 TABLET ORAL DAILY
Refills: 0 | Status: ACTIVE | COMMUNITY

## 2023-03-23 RX ORDER — CARVEDILOL 6.25 MG/1
6.25 TABLET, FILM COATED ORAL TWICE DAILY
Refills: 0 | Status: ACTIVE | COMMUNITY

## 2023-03-23 RX ORDER — FERROUS SULFATE TAB EC 325 MG (65 MG FE EQUIVALENT) 325 (65 FE) MG
325 (65 FE) TABLET DELAYED RESPONSE ORAL DAILY
Refills: 0 | Status: ACTIVE | COMMUNITY

## 2023-03-23 RX ORDER — ALLOPURINOL 100 MG/1
100 TABLET ORAL DAILY
Refills: 0 | Status: ACTIVE | COMMUNITY

## 2023-03-23 RX ORDER — AMLODIPINE BESYLATE 5 MG/1
5 TABLET ORAL DAILY
Refills: 0 | Status: ACTIVE | COMMUNITY

## 2023-03-24 PROBLEM — Z74.9 STAYING WITH CAREGIVER: Status: ACTIVE | Noted: 2023-03-24

## 2023-03-24 PROBLEM — Z85.09 HISTORY OF MALIGNANT NEOPLASM OF GALLBLADDER: Status: RESOLVED | Noted: 2023-03-24 | Resolved: 2023-03-24

## 2023-03-24 PROBLEM — R60.9 DEPENDENT EDEMA: Status: ACTIVE | Noted: 2023-03-24

## 2023-03-24 PROBLEM — D64.9 ANEMIA: Status: ACTIVE | Noted: 2023-03-24

## 2023-03-24 NOTE — HEALTH RISK ASSESSMENT
[No] : No [Any fall with injury in past year] : Patient reported fall with injury in the past year [Patient refused screening] : Patient refused screening [Language] : difficulty with language [Cultural] : cultural [With Family] : lives with family

## 2023-03-24 NOTE — PHYSICAL EXAM
[No Acute Distress] : no acute distress [Normal Voice/Communication] : normal voice/communication [Normal Sclera/Conjunctiva] : normal sclera/conjunctiva [PERRL] : pupils equal round and reactive to light [EOMI] : extraocular movements intact [No JVD] : no jugular venous distention [Supple] : supple [No Lymphadenopathy] : no lymphadenopathy [No Respiratory Distress] : no respiratory distress  [No Accessory Muscle Use] : no accessory muscle use [Decreased Breath Sounds Unilaterally Left Lung Base] : breath sounds were diminished over the left base [Pedal Pulses Present] : the pedal pulses are present [No Extremity Clubbing/Cyanosis] : no extremity clubbing/cyanosis [Soft] : abdomen soft [Non Tender] : non-tender [Non-distended] : non-distended [Normal Bowel Sounds] : normal bowel sounds [No Joint Swelling] : no joint swelling [No Rash] : no rash [Normal Gait] : normal gait [Coordination Grossly Intact] : coordination grossly intact [No Focal Deficits] : no focal deficits [Normal Affect] : the affect was normal [Alert and Oriented x3] : oriented to person, place, and time [Normal Insight/Judgement] : insight and judgment were intact [de-identified] : LE edema L>R

## 2023-03-24 NOTE — ASSESSMENT
[FreeTextEntry1] : Ms. Rich seen in the home today. Son present during home visit. Currently, she reports feeling better but still feels weak. NP walked patient around house with walker twice and patient without CHANG or need to rest. [facility] home care RN/OT/PT have been in the home. She is doing the exercises provided by PT. She uses oxygen at night: 2L and remains on RA during the day. Denies increased shortness of breath, chest pain, fever/chill, increased fatigue and/or poor appetite. Adhering with medications. PCP, Dr. Jackson currently out of the office and next appointment is in April. \par \par # pneumonia\par s/p abx course completion\par encouraged pulmonary toileting: demonstrated incentive spirometer use with +teachback\par oob to chair\par ambulate\par f/u with PCP\par \par #Anemia\par Denies increased fatigue\par No SOB, CP, palpitations.\par No melena or blood in the stool.\par f/u with PCP for follow up blood work\par \par #dependent edema\par Gravity dependent.\par Decrease Na in diet.\par Leg elevation recommended.\par closely monitor, ?amlodipine at PCP visit\par ?\par \par \par \par \par

## 2023-03-24 NOTE — PLAN
[FreeTextEntry1] : f/u with PCP, Dr. Jackson\par continue with PT/OT\par \par Patient/family was informed about NP’s role/ STARS program and overview of transitional care reviewed with patient. Patient/family educated on topics of importance such as compliance with all provider visits, prescribed medication regimen, and low salt / heart healthy diet. Patient/Family encouraged calling NP with any issues, concerns or questions, also educated to notify NP if experiencing CP, SOB , cough, increased mucus/phlegm production, abdominal discomfort/swelling, difficulty sleeping or lying flat, fever, chills, fatigue, weight gain of 2-3lbs in 24 hours or 5lbs in one week, dizziness, lightheadedness, n/v/d/c, swelling to extremities and/or any c/o or concerns. Reassurance provided. \par \par

## 2023-03-24 NOTE — HISTORY OF PRESENT ILLNESS
[Post-hospitalization from ___ Hospital] : Post-hospitalization from [unfilled] Hospital [Admitted on: ___] : The patient was admitted on [unfilled] [Discharged on ___] : discharged on [unfilled] [Discharge Summary] : discharge summary [Pertinent Labs] : pertinent labs [Radiology Findings] : radiology findings [Discharge Med List] : discharge medication list [Med Reconciliation] : medication reconciliation has been completed [FreeTextEntry3] : [facility] Hospitalization Follow up: Pneumonia  [FreeTextEntry2] : Ms. Rich is a 89 y/o woman with HTN, Gallbladder Cancer s/p open cholecystectomy, segment 4b/5\par  hepatectomy, Right colectomy with post operative complications who presented to [facility] with shortness of breath, anemic and AMS. Admitted and treated for hypoxia secondary to pna, anemia and encephalopathy. s/p 1u PRBC and abx course completed. Pulmonary consulted. Discharged home with home care services and continue home oxygen.

## 2023-03-24 NOTE — REVIEW OF SYSTEMS
[Fatigue] : fatigue [Lower Ext Edema] : lower extremity edema [Joint Pain] : joint pain [Negative] : Psychiatric

## 2023-03-28 ENCOUNTER — TRANSCRIPTION ENCOUNTER (OUTPATIENT)
Age: 88
End: 2023-03-28

## 2023-03-29 ENCOUNTER — NON-APPOINTMENT (OUTPATIENT)
Age: 88
End: 2023-03-29

## 2023-03-30 ENCOUNTER — LABORATORY RESULT (OUTPATIENT)
Age: 88
End: 2023-03-30

## 2023-04-11 ENCOUNTER — TRANSCRIPTION ENCOUNTER (OUTPATIENT)
Age: 88
End: 2023-04-11

## 2023-05-31 ENCOUNTER — NON-APPOINTMENT (OUTPATIENT)
Age: 88
End: 2023-05-31

## 2023-06-08 NOTE — PATIENT PROFILE ADULT - FUNCTIONAL ASSESSMENT - DAILY ACTIVITY 2.
H&P is reviewed.  Relevant update exam conducted.  Changes significant for the planned course of treatment are none.  I have personally reviewed patient's OR holding area vital signs.     2 = A lot of assistance

## 2023-06-29 NOTE — PROGRESS NOTE ADULT - SUBJECTIVE AND OBJECTIVE BOX
D team surgery    Patient seen and examined at bedside. Patient resting in bed, states she slept well, denies N/V pain or any other complaints at this time    Vitals  T(C): 36.9 (09-21-22 @ 04:40), Max: 37 (09-20-22 @ 16:24)  HR: 80 (09-21-22 @ 04:40) (65 - 84)  BP: 144/40 (09-21-22 @ 04:40) (105/47 - 151/67)  BP(mean): --  ABP: --  ABP(mean): --  RR: 19 (09-21-22 @ 04:40) (17 - 19)  SpO2: 99% (09-21-22 @ 04:40) (95% - 100%)  Wt(kg): --  CVP(mm Hg): --  CI: --  CAPILLARY BLOOD GLUCOSE       N/A      09-20 @ 07:01  -  09-21 @ 07:00  --------------------------------------------------------  IN:    Oral Fluid: 470 mL  Total IN: 470 mL    OUT:    Drain (mL): 25 mL    Voided (mL): 1550 mL  Total OUT: 1575 mL    Total NET: -1105 mL      Exam  Gen: NAD  Abd: soft, NT, ND, drain seropurulent    LABS  CBC (09-21 @ 05:32)                              10.0<L>                         6.31    )----------------(  273        --    % Neutrophils, --    % Lymphocytes, ANC: --                                  32.5<L>  CBC (09-20 @ 06:33)                              9.6<L>                         4.33    )----------------(  254        --    % Neutrophils, --    % Lymphocytes, ANC: --                                  30.7<L>    BMP (09-21 @ 05:32)             139     |  105     |  14    		Ca++ --      Ca 10.1               ---------------------------------( 103<H>		Mg 1.80               4.5     |  26      |  0.96  			Ph 2.0<L>  BMP (09-20 @ 06:33)             139     |  103     |  12    		Ca++ --      Ca 9.6                ---------------------------------( 94    		Mg 1.90               4.4     |  27      |  1.16  			Ph 3.5         -> .Body Fluid HEPATIC DRAINAGE Culture (09-15 @ 12:45)       Numerous polymorphonuclear leukocytes seen per low power field  Few Gram positive cocci in pairs seen per oil power field    Enterococcus faecalis    Moderate Enterococcus faecalis    A/P 87 year old female with a PMHx of HTN, HLD GERD, and gallbladder cancer for which she underwent an ex lap, open cholecystectomy, segment 4b/5 hepatectomy, and right colectomy due to fistula tract vs metastasis on 08/19, who p/w to S ED with lethargy for 2 days and hypoxia to 40/50s % with workup remarkable for a [id number] hepatic abscess and b/l pleural effusions on CT scan, and hypoxia requiring BiPAP, transferred to [facility] for further management, admitted to SICU i/s/o persistent NIPPV, s/p IR drainage of collection. Now on floor, recovering well.     Plan  - Diet: reg  - Unasyn, fluid cultures positive for enterococcus   - f/u ID abx recs   - CT scan to evaluate collection  - Continue home meds   - Wean NC as tolerated  - Dispo: home with CDPAP, home PT, and VNS for drain     D Team Surgery   [id number] Cervical dysplasia

## 2023-08-21 NOTE — SWALLOW BEDSIDE ASSESSMENT ADULT - COMMENTS
Hospitalist note 11/19 "87-year-old female with HTN, HLD, GERD, history of gallbladder ca (s/p resection w/liver resection and partial colectomy, not on any treatment) presenting with possible seizure like activity while at PT."    CXR 11/18 "Clear lungs with elevated right hemidiaphragm."    Patient known to this service and seen on a previous admission with swallow evaluation completed on 9/4/22 at which time regular solids and thin liquids was recommended (please see note).     Patient seen at bedside, awake/alert and oriented, during clinical swallow evaluation this AM. Patient able to follow simple directions and answer questions. Patient denied pain and/or difficulty swallowing. Patient reported "I do not have my dentures".
Attending with

## 2023-09-30 NOTE — PATIENT PROFILE ADULT - FUNCTIONAL ASSESSMENT - DAILY ACTIVITY 1.
Operative Note      Patient: Jasvir Anthony  YOB: 1954  MRN: [id number]    Date of Procedure: 9/30/2023    Pre-Op Diagnosis Codes:     * S/P small bowel resection [Z90.49]    Post-Op Diagnosis:  bowel obstruction secondary to adhesive band with associated internal hernia        Procedure(s):  LAPAROTOMY EXPLORATORY    Surgeon(s): Bita Beach MD    Assistant:   * No surgical staff found *    Anesthesia: General    Estimated Blood Loss (mL): 15 ml    Complications: None    Specimens:   * No specimens in log *    Implants:  * No implants in log *      Drains:   Urinary Catheter 09/30/23 Haynes-Temperature (Active)       Findings:   Adhesive band between the mesentery as lead point for internal hernia and obstruction         Detailed Description of Procedure:   He was seen department holding informed consent was used to examine placed operative after adequate anesthesia formed out performed prepped and draped normal sterile fashion. Generous midline incision was then made, cautery was used to deepen this. .  Cavity was opened. Upon abdominal cavity multiple loops of small bowel that were injected hemorrhagic were identified they were significantly dilated. These were grasped and elevated and started to urine proximal.  Adhesive band was identified that the small bowel had herniated through and there was a constriction on 2 sides of the bowel. This adhesive band was divided. This liberated the small bowel. The small bowel was twisted looks its own mesentery and distally there was significant decompressed small bowel. The small bowel was then placed back into its anatomical position was able be run from the terminal ileum to the ligament of Treitz. The area of bowel that had been entrapped within the adhesive band was hemorrhagic but appeared to be viable after letting it sit for 10 minutes in warm water. This was then suctioned away.   The dental cavity was then irrigated with Irrisept solution 1 = Total assistance

## 2023-10-16 NOTE — PROGRESS NOTE ADULT - ASSESSMENT
Ms. Rich is an 87 year old lady with PMHx of HTN, HLD, GERD, who presents for bowel prep prior to surgery tomorrow. Has a history of gangrenous cholecystitis. Patient is scheduled to undergo exploratory laparotomy, cholecystectomy, possible bowel resection. Last time she attempted bowel prep at home, she got dizzy and fell. (18 Aug 2022 12:39). Previous admission she had hyponatremia and work up suggested polydipsia. Ms. Rich was fluid restricted and now is admitted to get bowel prep prior to surgery. Nephrology consulted to manage fluids and electrolytes.       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     Anemia  Check iron studies, ferritin and retic.  Ms. Rich is an 87 year old lady with PMHx of HTN, HLD, GERD, who presents for bowel prep prior to surgery tomorrow. Has a history of gangrenous cholecystitis. Patient is scheduled to undergo exploratory laparotomy, cholecystectomy, possible bowel resection. Last time she attempted bowel prep at home, she got dizzy and fell. (18 Aug 2022 12:39). Previous admission she had hyponatremia and work up suggested polydipsia. Ms. Rich was fluid restricted and now is admitted to get bowel prep prior to surgery. Nephrology consulted to manage fluids and electrolytes.       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     Anemia  Check iron studies, ferritin and retic.  0.3-0.38 ms

## 2023-11-22 ENCOUNTER — NON-APPOINTMENT (OUTPATIENT)
Age: 88
End: 2023-11-22

## 2023-11-23 ENCOUNTER — RESULT REVIEW (OUTPATIENT)
Age: 88
End: 2023-11-23

## 2023-11-25 ENCOUNTER — INPATIENT (INPATIENT)
Facility: HOSPITAL | Age: 88
LOS: 5 days | Discharge: HOME | End: 2023-12-01
Attending: STUDENT IN AN ORGANIZED HEALTH CARE EDUCATION/TRAINING PROGRAM | Admitting: STUDENT IN AN ORGANIZED HEALTH CARE EDUCATION/TRAINING PROGRAM
Payer: MEDICARE

## 2023-11-25 VITALS
OXYGEN SATURATION: 85 % | HEIGHT: 62 IN | SYSTOLIC BLOOD PRESSURE: 114 MMHG | WEIGHT: 145.06 LBS | HEART RATE: 85 BPM | DIASTOLIC BLOOD PRESSURE: 71 MMHG

## 2023-11-25 DIAGNOSIS — G93.41 METABOLIC ENCEPHALOPATHY: ICD-10-CM

## 2023-11-25 DIAGNOSIS — R41.82 ALTERED MENTAL STATUS, UNSPECIFIED: ICD-10-CM

## 2023-11-25 DIAGNOSIS — E87.1 HYPO-OSMOLALITY AND HYPONATREMIA: ICD-10-CM

## 2023-11-25 DIAGNOSIS — Z90.49 ACQUIRED ABSENCE OF OTHER SPECIFIED PARTS OF DIGESTIVE TRACT: Chronic | ICD-10-CM

## 2023-11-25 DIAGNOSIS — J18.9 PNEUMONIA, UNSPECIFIED ORGANISM: ICD-10-CM

## 2023-11-25 DIAGNOSIS — Z85.09 PERSONAL HISTORY OF MALIGNANT NEOPLASM OF OTHER DIGESTIVE ORGANS: Chronic | ICD-10-CM

## 2023-11-25 DIAGNOSIS — J96.01 ACUTE RESPIRATORY FAILURE WITH HYPOXIA: ICD-10-CM

## 2023-11-25 DIAGNOSIS — Z90.710 ACQUIRED ABSENCE OF BOTH CERVIX AND UTERUS: Chronic | ICD-10-CM

## 2023-11-25 DIAGNOSIS — A41.9 SEPSIS, UNSPECIFIED ORGANISM: ICD-10-CM

## 2023-11-25 LAB
ALBUMIN SERPL ELPH-MCNC: 3 G/DL — LOW (ref 3.3–5)
ALBUMIN SERPL ELPH-MCNC: 3 G/DL — LOW (ref 3.3–5)
ALP SERPL-CCNC: 95 U/L — SIGNIFICANT CHANGE UP (ref 40–120)
ALP SERPL-CCNC: 95 U/L — SIGNIFICANT CHANGE UP (ref 40–120)
ALT FLD-CCNC: 43 U/L — SIGNIFICANT CHANGE UP (ref 12–78)
ALT FLD-CCNC: 43 U/L — SIGNIFICANT CHANGE UP (ref 12–78)
ANION GAP SERPL CALC-SCNC: 4 MMOL/L — LOW (ref 5–17)
ANION GAP SERPL CALC-SCNC: 4 MMOL/L — LOW (ref 5–17)
ANION GAP SERPL CALC-SCNC: 5 MMOL/L — SIGNIFICANT CHANGE UP (ref 5–17)
ANION GAP SERPL CALC-SCNC: 5 MMOL/L — SIGNIFICANT CHANGE UP (ref 5–17)
APPEARANCE UR: CLEAR — SIGNIFICANT CHANGE UP
APPEARANCE UR: CLEAR — SIGNIFICANT CHANGE UP
APTT BLD: 31.2 SEC — SIGNIFICANT CHANGE UP (ref 24.5–35.6)
APTT BLD: 31.2 SEC — SIGNIFICANT CHANGE UP (ref 24.5–35.6)
AST SERPL-CCNC: 39 U/L — HIGH (ref 15–37)
AST SERPL-CCNC: 39 U/L — HIGH (ref 15–37)
BACTERIA # UR AUTO: ABNORMAL /HPF
BACTERIA # UR AUTO: ABNORMAL /HPF
BASE EXCESS BLDA CALC-SCNC: 0.5 MMOL/L — SIGNIFICANT CHANGE UP (ref -2–3)
BASE EXCESS BLDA CALC-SCNC: 0.5 MMOL/L — SIGNIFICANT CHANGE UP (ref -2–3)
BASOPHILS # BLD AUTO: 0.02 K/UL — SIGNIFICANT CHANGE UP (ref 0–0.2)
BASOPHILS # BLD AUTO: 0.02 K/UL — SIGNIFICANT CHANGE UP (ref 0–0.2)
BASOPHILS NFR BLD AUTO: 0.2 % — SIGNIFICANT CHANGE UP (ref 0–2)
BASOPHILS NFR BLD AUTO: 0.2 % — SIGNIFICANT CHANGE UP (ref 0–2)
BILIRUB SERPL-MCNC: 0.8 MG/DL — SIGNIFICANT CHANGE UP (ref 0.2–1.2)
BILIRUB SERPL-MCNC: 0.8 MG/DL — SIGNIFICANT CHANGE UP (ref 0.2–1.2)
BILIRUB UR-MCNC: NEGATIVE — SIGNIFICANT CHANGE UP
BILIRUB UR-MCNC: NEGATIVE — SIGNIFICANT CHANGE UP
BLD GP AB SCN SERPL QL: SIGNIFICANT CHANGE UP
BLD GP AB SCN SERPL QL: SIGNIFICANT CHANGE UP
BLOOD GAS COMMENTS ARTERIAL: SIGNIFICANT CHANGE UP
BLOOD GAS COMMENTS ARTERIAL: SIGNIFICANT CHANGE UP
BUN SERPL-MCNC: 21 MG/DL — SIGNIFICANT CHANGE UP (ref 7–23)
BUN SERPL-MCNC: 21 MG/DL — SIGNIFICANT CHANGE UP (ref 7–23)
BUN SERPL-MCNC: 22 MG/DL — SIGNIFICANT CHANGE UP (ref 7–23)
BUN SERPL-MCNC: 22 MG/DL — SIGNIFICANT CHANGE UP (ref 7–23)
CALCIUM SERPL-MCNC: 8.3 MG/DL — LOW (ref 8.5–10.1)
CALCIUM SERPL-MCNC: 8.3 MG/DL — LOW (ref 8.5–10.1)
CALCIUM SERPL-MCNC: 9.3 MG/DL — SIGNIFICANT CHANGE UP (ref 8.5–10.1)
CALCIUM SERPL-MCNC: 9.3 MG/DL — SIGNIFICANT CHANGE UP (ref 8.5–10.1)
CHLORIDE SERPL-SCNC: 85 MMOL/L — LOW (ref 96–108)
CHLORIDE SERPL-SCNC: 85 MMOL/L — LOW (ref 96–108)
CHLORIDE SERPL-SCNC: 93 MMOL/L — LOW (ref 96–108)
CHLORIDE SERPL-SCNC: 93 MMOL/L — LOW (ref 96–108)
CO2 BLDA-SCNC: 30 MMOL/L — HIGH (ref 19–24)
CO2 BLDA-SCNC: 30 MMOL/L — HIGH (ref 19–24)
CO2 SERPL-SCNC: 29 MMOL/L — SIGNIFICANT CHANGE UP (ref 22–31)
CO2 SERPL-SCNC: 29 MMOL/L — SIGNIFICANT CHANGE UP (ref 22–31)
CO2 SERPL-SCNC: 36 MMOL/L — HIGH (ref 22–31)
CO2 SERPL-SCNC: 36 MMOL/L — HIGH (ref 22–31)
COLOR SPEC: YELLOW — SIGNIFICANT CHANGE UP
COLOR SPEC: YELLOW — SIGNIFICANT CHANGE UP
CREAT SERPL-MCNC: 0.7 MG/DL — SIGNIFICANT CHANGE UP (ref 0.5–1.3)
CREAT SERPL-MCNC: 0.7 MG/DL — SIGNIFICANT CHANGE UP (ref 0.5–1.3)
CREAT SERPL-MCNC: 0.83 MG/DL — SIGNIFICANT CHANGE UP (ref 0.5–1.3)
CREAT SERPL-MCNC: 0.83 MG/DL — SIGNIFICANT CHANGE UP (ref 0.5–1.3)
DIFF PNL FLD: ABNORMAL
DIFF PNL FLD: ABNORMAL
EGFR: 68 ML/MIN/1.73M2 — SIGNIFICANT CHANGE UP
EGFR: 68 ML/MIN/1.73M2 — SIGNIFICANT CHANGE UP
EGFR: 84 ML/MIN/1.73M2 — SIGNIFICANT CHANGE UP
EGFR: 84 ML/MIN/1.73M2 — SIGNIFICANT CHANGE UP
EOSINOPHIL # BLD AUTO: 0 K/UL — SIGNIFICANT CHANGE UP (ref 0–0.5)
EOSINOPHIL # BLD AUTO: 0 K/UL — SIGNIFICANT CHANGE UP (ref 0–0.5)
EOSINOPHIL NFR BLD AUTO: 0 % — SIGNIFICANT CHANGE UP (ref 0–6)
EOSINOPHIL NFR BLD AUTO: 0 % — SIGNIFICANT CHANGE UP (ref 0–6)
EPI CELLS # UR: PRESENT
EPI CELLS # UR: PRESENT
FLUAV AG NPH QL: SIGNIFICANT CHANGE UP
FLUAV AG NPH QL: SIGNIFICANT CHANGE UP
FLUBV AG NPH QL: SIGNIFICANT CHANGE UP
FLUBV AG NPH QL: SIGNIFICANT CHANGE UP
GAS PNL BLDA: SIGNIFICANT CHANGE UP
GLUCOSE SERPL-MCNC: 107 MG/DL — HIGH (ref 70–99)
GLUCOSE SERPL-MCNC: 107 MG/DL — HIGH (ref 70–99)
GLUCOSE SERPL-MCNC: 183 MG/DL — HIGH (ref 70–99)
GLUCOSE SERPL-MCNC: 183 MG/DL — HIGH (ref 70–99)
GLUCOSE UR QL: 500 MG/DL
GLUCOSE UR QL: 500 MG/DL
HCO3 BLDA-SCNC: 28 MMOL/L — SIGNIFICANT CHANGE UP (ref 21–28)
HCO3 BLDA-SCNC: 28 MMOL/L — SIGNIFICANT CHANGE UP (ref 21–28)
HCT VFR BLD CALC: 41.1 % — SIGNIFICANT CHANGE UP (ref 34.5–45)
HCT VFR BLD CALC: 41.1 % — SIGNIFICANT CHANGE UP (ref 34.5–45)
HGB BLD-MCNC: 13.3 G/DL — SIGNIFICANT CHANGE UP (ref 11.5–15.5)
HGB BLD-MCNC: 13.3 G/DL — SIGNIFICANT CHANGE UP (ref 11.5–15.5)
HOROWITZ INDEX BLDA+IHG-RTO: 40 — SIGNIFICANT CHANGE UP
HOROWITZ INDEX BLDA+IHG-RTO: 40 — SIGNIFICANT CHANGE UP
IMM GRANULOCYTES NFR BLD AUTO: 0.5 % — SIGNIFICANT CHANGE UP (ref 0–0.9)
IMM GRANULOCYTES NFR BLD AUTO: 0.5 % — SIGNIFICANT CHANGE UP (ref 0–0.9)
INR BLD: 0.96 RATIO — SIGNIFICANT CHANGE UP (ref 0.85–1.18)
INR BLD: 0.96 RATIO — SIGNIFICANT CHANGE UP (ref 0.85–1.18)
KETONES UR-MCNC: NEGATIVE MG/DL — SIGNIFICANT CHANGE UP
KETONES UR-MCNC: NEGATIVE MG/DL — SIGNIFICANT CHANGE UP
LACTATE SERPL-SCNC: 0.9 MMOL/L — SIGNIFICANT CHANGE UP (ref 0.7–2)
LACTATE SERPL-SCNC: 0.9 MMOL/L — SIGNIFICANT CHANGE UP (ref 0.7–2)
LEUKOCYTE ESTERASE UR-ACNC: NEGATIVE — SIGNIFICANT CHANGE UP
LEUKOCYTE ESTERASE UR-ACNC: NEGATIVE — SIGNIFICANT CHANGE UP
LYMPHOCYTES # BLD AUTO: 0.64 K/UL — LOW (ref 1–3.3)
LYMPHOCYTES # BLD AUTO: 0.64 K/UL — LOW (ref 1–3.3)
LYMPHOCYTES # BLD AUTO: 6.6 % — LOW (ref 13–44)
LYMPHOCYTES # BLD AUTO: 6.6 % — LOW (ref 13–44)
MAGNESIUM SERPL-MCNC: 1.6 MG/DL — SIGNIFICANT CHANGE UP (ref 1.6–2.6)
MAGNESIUM SERPL-MCNC: 1.6 MG/DL — SIGNIFICANT CHANGE UP (ref 1.6–2.6)
MCHC RBC-ENTMCNC: 29.2 PG — SIGNIFICANT CHANGE UP (ref 27–34)
MCHC RBC-ENTMCNC: 29.2 PG — SIGNIFICANT CHANGE UP (ref 27–34)
MCHC RBC-ENTMCNC: 32.4 G/DL — SIGNIFICANT CHANGE UP (ref 32–36)
MCHC RBC-ENTMCNC: 32.4 G/DL — SIGNIFICANT CHANGE UP (ref 32–36)
MCV RBC AUTO: 90.1 FL — SIGNIFICANT CHANGE UP (ref 80–100)
MCV RBC AUTO: 90.1 FL — SIGNIFICANT CHANGE UP (ref 80–100)
MONOCYTES # BLD AUTO: 0.49 K/UL — SIGNIFICANT CHANGE UP (ref 0–0.9)
MONOCYTES # BLD AUTO: 0.49 K/UL — SIGNIFICANT CHANGE UP (ref 0–0.9)
MONOCYTES NFR BLD AUTO: 5.1 % — SIGNIFICANT CHANGE UP (ref 2–14)
MONOCYTES NFR BLD AUTO: 5.1 % — SIGNIFICANT CHANGE UP (ref 2–14)
NEUTROPHILS # BLD AUTO: 8.43 K/UL — HIGH (ref 1.8–7.4)
NEUTROPHILS # BLD AUTO: 8.43 K/UL — HIGH (ref 1.8–7.4)
NEUTROPHILS NFR BLD AUTO: 87.6 % — HIGH (ref 43–77)
NEUTROPHILS NFR BLD AUTO: 87.6 % — HIGH (ref 43–77)
NITRITE UR-MCNC: NEGATIVE — SIGNIFICANT CHANGE UP
NITRITE UR-MCNC: NEGATIVE — SIGNIFICANT CHANGE UP
NRBC # BLD: 0 /100 WBCS — SIGNIFICANT CHANGE UP (ref 0–0)
NRBC # BLD: 0 /100 WBCS — SIGNIFICANT CHANGE UP (ref 0–0)
PCO2 BLDA: 59 MMHG — HIGH (ref 32–46)
PCO2 BLDA: 59 MMHG — HIGH (ref 32–46)
PH BLDA: 7.29 — LOW (ref 7.35–7.45)
PH BLDA: 7.29 — LOW (ref 7.35–7.45)
PH UR: 6 — SIGNIFICANT CHANGE UP (ref 5–8)
PH UR: 6 — SIGNIFICANT CHANGE UP (ref 5–8)
PHOSPHATE SERPL-MCNC: 2.8 MG/DL — SIGNIFICANT CHANGE UP (ref 2.5–4.5)
PHOSPHATE SERPL-MCNC: 2.8 MG/DL — SIGNIFICANT CHANGE UP (ref 2.5–4.5)
PLATELET # BLD AUTO: 208 K/UL — SIGNIFICANT CHANGE UP (ref 150–400)
PLATELET # BLD AUTO: 208 K/UL — SIGNIFICANT CHANGE UP (ref 150–400)
PO2 BLDA: 79 MMHG — LOW (ref 83–108)
PO2 BLDA: 79 MMHG — LOW (ref 83–108)
POTASSIUM SERPL-MCNC: 3.3 MMOL/L — LOW (ref 3.5–5.3)
POTASSIUM SERPL-MCNC: 3.3 MMOL/L — LOW (ref 3.5–5.3)
POTASSIUM SERPL-MCNC: 3.6 MMOL/L — SIGNIFICANT CHANGE UP (ref 3.5–5.3)
POTASSIUM SERPL-MCNC: 3.6 MMOL/L — SIGNIFICANT CHANGE UP (ref 3.5–5.3)
POTASSIUM SERPL-SCNC: 3.3 MMOL/L — LOW (ref 3.5–5.3)
POTASSIUM SERPL-SCNC: 3.3 MMOL/L — LOW (ref 3.5–5.3)
POTASSIUM SERPL-SCNC: 3.6 MMOL/L — SIGNIFICANT CHANGE UP (ref 3.5–5.3)
POTASSIUM SERPL-SCNC: 3.6 MMOL/L — SIGNIFICANT CHANGE UP (ref 3.5–5.3)
PROT SERPL-MCNC: 7.4 GM/DL — SIGNIFICANT CHANGE UP (ref 6–8.3)
PROT SERPL-MCNC: 7.4 GM/DL — SIGNIFICANT CHANGE UP (ref 6–8.3)
PROT UR-MCNC: 300 MG/DL
PROT UR-MCNC: 300 MG/DL
PROTHROM AB SERPL-ACNC: 11.5 SEC — SIGNIFICANT CHANGE UP (ref 9.5–13)
PROTHROM AB SERPL-ACNC: 11.5 SEC — SIGNIFICANT CHANGE UP (ref 9.5–13)
RBC # BLD: 4.56 M/UL — SIGNIFICANT CHANGE UP (ref 3.8–5.2)
RBC # BLD: 4.56 M/UL — SIGNIFICANT CHANGE UP (ref 3.8–5.2)
RBC # FLD: 12.9 % — SIGNIFICANT CHANGE UP (ref 10.3–14.5)
RBC # FLD: 12.9 % — SIGNIFICANT CHANGE UP (ref 10.3–14.5)
RBC CASTS # UR COMP ASSIST: SIGNIFICANT CHANGE UP /HPF (ref 0–4)
RBC CASTS # UR COMP ASSIST: SIGNIFICANT CHANGE UP /HPF (ref 0–4)
SAO2 % BLDA: 96.4 % — SIGNIFICANT CHANGE UP (ref 94–98)
SAO2 % BLDA: 96.4 % — SIGNIFICANT CHANGE UP (ref 94–98)
SARS-COV-2 RNA SPEC QL NAA+PROBE: SIGNIFICANT CHANGE UP
SARS-COV-2 RNA SPEC QL NAA+PROBE: SIGNIFICANT CHANGE UP
SODIUM SERPL-SCNC: 125 MMOL/L — LOW (ref 135–145)
SODIUM SERPL-SCNC: 125 MMOL/L — LOW (ref 135–145)
SODIUM SERPL-SCNC: 127 MMOL/L — LOW (ref 135–145)
SODIUM SERPL-SCNC: 127 MMOL/L — LOW (ref 135–145)
SP GR SPEC: 1.02 — SIGNIFICANT CHANGE UP (ref 1–1.03)
SP GR SPEC: 1.02 — SIGNIFICANT CHANGE UP (ref 1–1.03)
UROBILINOGEN FLD QL: 1 MG/DL — SIGNIFICANT CHANGE UP (ref 0.2–1)
UROBILINOGEN FLD QL: 1 MG/DL — SIGNIFICANT CHANGE UP (ref 0.2–1)
WBC # BLD: 9.63 K/UL — SIGNIFICANT CHANGE UP (ref 3.8–10.5)
WBC # BLD: 9.63 K/UL — SIGNIFICANT CHANGE UP (ref 3.8–10.5)
WBC # FLD AUTO: 9.63 K/UL — SIGNIFICANT CHANGE UP (ref 3.8–10.5)
WBC # FLD AUTO: 9.63 K/UL — SIGNIFICANT CHANGE UP (ref 3.8–10.5)
WBC UR QL: SIGNIFICANT CHANGE UP /HPF (ref 0–5)
WBC UR QL: SIGNIFICANT CHANGE UP /HPF (ref 0–5)

## 2023-11-25 PROCEDURE — 93010 ELECTROCARDIOGRAM REPORT: CPT

## 2023-11-25 PROCEDURE — 99291 CRITICAL CARE FIRST HOUR: CPT | Mod: FS

## 2023-11-25 PROCEDURE — 71250 CT THORAX DX C-: CPT | Mod: 26

## 2023-11-25 PROCEDURE — 71045 X-RAY EXAM CHEST 1 VIEW: CPT | Mod: 26

## 2023-11-25 PROCEDURE — 99223 1ST HOSP IP/OBS HIGH 75: CPT

## 2023-11-25 RX ORDER — CEFEPIME 1 G/1
2000 INJECTION, POWDER, FOR SOLUTION INTRAMUSCULAR; INTRAVENOUS EVERY 12 HOURS
Refills: 0 | Status: DISCONTINUED | OUTPATIENT
Start: 2023-11-26 | End: 2023-12-01

## 2023-11-25 RX ORDER — AZITHROMYCIN 500 MG/1
TABLET, FILM COATED ORAL
Refills: 0 | Status: DISCONTINUED | OUTPATIENT
Start: 2023-11-25 | End: 2023-11-26

## 2023-11-25 RX ORDER — AZITHROMYCIN 500 MG/1
500 TABLET, FILM COATED ORAL ONCE
Refills: 0 | Status: COMPLETED | OUTPATIENT
Start: 2023-11-25 | End: 2023-11-25

## 2023-11-25 RX ORDER — SODIUM CHLORIDE 9 MG/ML
2000 INJECTION INTRAMUSCULAR; INTRAVENOUS; SUBCUTANEOUS ONCE
Refills: 0 | Status: COMPLETED | OUTPATIENT
Start: 2023-11-25 | End: 2023-11-25

## 2023-11-25 RX ORDER — MAGNESIUM SULFATE 500 MG/ML
1 VIAL (ML) INJECTION ONCE
Refills: 0 | Status: COMPLETED | OUTPATIENT
Start: 2023-11-25 | End: 2023-11-25

## 2023-11-25 RX ORDER — POTASSIUM CHLORIDE 20 MEQ
10 PACKET (EA) ORAL
Refills: 0 | Status: DISCONTINUED | OUTPATIENT
Start: 2023-11-25 | End: 2023-11-25

## 2023-11-25 RX ORDER — CEFEPIME 1 G/1
2000 INJECTION, POWDER, FOR SOLUTION INTRAMUSCULAR; INTRAVENOUS ONCE
Refills: 0 | Status: COMPLETED | OUTPATIENT
Start: 2023-11-25 | End: 2023-11-25

## 2023-11-25 RX ORDER — VANCOMYCIN HCL 1 G
1000 VIAL (EA) INTRAVENOUS ONCE
Refills: 0 | Status: COMPLETED | OUTPATIENT
Start: 2023-11-25 | End: 2023-11-25

## 2023-11-25 RX ORDER — UREA 15 G
15 POWDER IN PACKET (EA) ORAL DAILY
Refills: 0 | Status: DISCONTINUED | OUTPATIENT
Start: 2023-11-25 | End: 2023-11-28

## 2023-11-25 RX ORDER — CEFEPIME 1 G/1
INJECTION, POWDER, FOR SOLUTION INTRAMUSCULAR; INTRAVENOUS
Refills: 0 | Status: DISCONTINUED | OUTPATIENT
Start: 2023-11-25 | End: 2023-12-01

## 2023-11-25 RX ORDER — HEPARIN SODIUM 5000 [USP'U]/ML
5000 INJECTION INTRAVENOUS; SUBCUTANEOUS EVERY 8 HOURS
Refills: 0 | Status: DISCONTINUED | OUTPATIENT
Start: 2023-11-25 | End: 2023-12-01

## 2023-11-25 RX ORDER — AZITHROMYCIN 500 MG/1
500 TABLET, FILM COATED ORAL EVERY 24 HOURS
Refills: 0 | Status: DISCONTINUED | OUTPATIENT
Start: 2023-11-26 | End: 2023-11-26

## 2023-11-25 RX ORDER — SODIUM CHLORIDE 9 MG/ML
250 INJECTION INTRAMUSCULAR; INTRAVENOUS; SUBCUTANEOUS ONCE
Refills: 0 | Status: COMPLETED | OUTPATIENT
Start: 2023-11-25 | End: 2023-11-25

## 2023-11-25 RX ORDER — FUROSEMIDE 40 MG
20 TABLET ORAL ONCE
Refills: 0 | Status: COMPLETED | OUTPATIENT
Start: 2023-11-25 | End: 2023-11-25

## 2023-11-25 RX ORDER — IPRATROPIUM/ALBUTEROL SULFATE 18-103MCG
3 AEROSOL WITH ADAPTER (GRAM) INHALATION EVERY 6 HOURS
Refills: 0 | Status: DISCONTINUED | OUTPATIENT
Start: 2023-11-25 | End: 2023-12-01

## 2023-11-25 RX ORDER — INFLUENZA VIRUS VACCINE 15; 15; 15; 15 UG/.5ML; UG/.5ML; UG/.5ML; UG/.5ML
0.7 SUSPENSION INTRAMUSCULAR ONCE
Refills: 0 | Status: DISCONTINUED | OUTPATIENT
Start: 2023-11-25 | End: 2023-12-01

## 2023-11-25 RX ADMIN — Medication 3 MILLILITER(S): at 17:05

## 2023-11-25 RX ADMIN — Medication 100 GRAM(S): at 15:38

## 2023-11-25 RX ADMIN — Medication 3 MILLILITER(S): at 23:14

## 2023-11-25 RX ADMIN — Medication 250 MILLIGRAM(S): at 13:06

## 2023-11-25 RX ADMIN — HEPARIN SODIUM 5000 UNIT(S): 5000 INJECTION INTRAVENOUS; SUBCUTANEOUS at 21:52

## 2023-11-25 RX ADMIN — AZITHROMYCIN 255 MILLIGRAM(S): 500 TABLET, FILM COATED ORAL at 15:38

## 2023-11-25 RX ADMIN — Medication 3 MILLILITER(S): at 14:30

## 2023-11-25 RX ADMIN — SODIUM CHLORIDE 2000 MILLILITER(S): 9 INJECTION INTRAMUSCULAR; INTRAVENOUS; SUBCUTANEOUS at 10:31

## 2023-11-25 RX ADMIN — Medication 1000 MILLIGRAM(S): at 14:06

## 2023-11-25 RX ADMIN — CEFEPIME 100 MILLIGRAM(S): 1 INJECTION, POWDER, FOR SOLUTION INTRAMUSCULAR; INTRAVENOUS at 14:37

## 2023-11-25 NOTE — H&P ADULT - NSHPPHYSICALEXAM_GEN_ALL_CORE
GENERAL: somnolent, sleeping in bed but does awake to voice intermittently   HEAD:  Atraumatic, Normocephalic  EYES: EOMI, PERRLA, conjunctiva and sclera clear  NECK: Supple, No JVD, no HJ reflex   CHEST/LUNG: Clear to auscultation bilaterally; No wheeze or crackles appreciated today   HEART: Regular rate and rhythm; No murmurs, rubs, or gallops  ABDOMEN: Soft, Nontender, Nondistended; Bowel sounds present  EXTREMITIES:  2+ Peripheral Pulses, No clubbing, cyanosis, or edema  PSYCH: AAOx0, appropriate affect  NEUROLOGY: lethargic, does respond to pain, awakes on command occasionally but does not follow any commands   SKIN: No rashes or lesions

## 2023-11-25 NOTE — ED PROVIDER NOTE - CRITICAL CARE ATTENDING CONTRIBUTION TO CARE
88 year old female with h/ HTN, TIA and recently diagnosed with pna presents today biba from home unresponsive x 2 hours, pt's daughter states that she is taking levofloxacin,+fever yesterday, today appeared weak, this morning pt was sitting up and able to eat but needed to be fed, then unresponsive, unable to wake her up, EMS report pts o2 sat on RA was 66%, increased to 96% with NRB, DNR/DNI paperwork provided, on exam pt's eyes closed, maintaining airway w nrb, does not follow commands chest with decreased breath sounds, abdomen soft and nondistended, (-) pedal edema or signs of trauma, sepsis workup ordered    labs reiv  cxr with bilateral pleural effusions, ?underlying pna, 88 year old female with h/ HTN, TIA and recently diagnosed with pna presents today biba from home unresponsive x 2 hours, pt's daughter states that she is taking levofloxacin,+fever yesterday, today appeared weak, this morning pt was sitting up and able to eat but needed to be fed, then unresponsive, unable to wake her up, EMS report pts o2 sat on RA was 66%, increased to 96% with NRB, DNR/DNI paperwork provided, on exam pt's eyes closed, maintaining airway w nrb, does not follow commands chest with decreased breath sounds, abdomen soft and nondistended, (-) pedal edema or signs of trauma, sepsis workup ordered    labs reviewed, pt hyponatremia (received ivf) and hypokalemia ( k rider x 1 ordered), elevated bnp (unable to give lasix due to low bp)  cxr with bilateral pleural effusions, ?underlying pna,    pt admitted for further treatment

## 2023-11-25 NOTE — ED PROVIDER NOTE - OBJECTIVE STATEMENT
88-year-old female presents the ER with EMS for respiratory distress.  Per EMS, patient DNR/DNI, most above form, patient found to be hypoxic and unresponsive at home, started on CPAP with.  Called daughter/emergency contacts and phone, reports patient has had a cough for the past 2 days, herself and her grandson are also sick, went to urgent care and had negative flu COVID test, diagnosed with pneumonia on chest x-ray and given levofloxacin for 5 days and Tessalon Perles.  Patient is on 2 L nasal cannula as needed for her oxygen, states today she was feeling weak and her oxygen was low but did eat breakfast this morning and then was sleepy so went to take a nap.  When the daughter went to check on her an hour later her oxygen went from 93% to 20%.  Confirmed patient is DNR/DNI, will come up to hospital shortly.

## 2023-11-25 NOTE — ED PROVIDER NOTE - PHYSICAL EXAMINATION
Unable to perform neuro exam, patient not arousable to verbal or painful stimuli at the time of initial examination.

## 2023-11-25 NOTE — ED PROVIDER NOTE - CLINICAL SUMMARY MEDICAL DECISION MAKING FREE TEXT BOX
88-year-old female presents the ER with EMS for respiratory distress.  Per EMS, patient DNR/DNI, most above form, patient found to be hypoxic and unresponsive at home, started on CPAP with.  Called daughter/emergency contacts and phone, reports patient has had a cough for the past 2 days, herself and her grandson are also sick, went to urgent care and had negative flu COVID test, diagnosed with pneumonia on chest x-ray and given levofloxacin for 5 days and Tessalon Perles. Oxygen went from 93% to 20%.  Confirmed patient is DNR/DNI, will come up to hospital shortly. Tachypneic, hypoxic, afebrile, FS normal, diminished lung sounds throughout, not arousable, no bipap candidate at this time, will get sepsis labs, IVF (does not appear overloaded), urine, CXR, reassess. Plan for admission, less likely ICU given DNR/DNI

## 2023-11-25 NOTE — H&P ADULT - HISTORY OF PRESENT ILLNESS
COPD, TIA, HTN   Takes 2L at night  Patient had significant cough and weakness (could not get out of the bed) about 2 days around thanksgiving eve. She was brought to the urgent care on thanksgiving day, Xray was done, she was told she had pneumonia and was given levaquin orally ( today would be the third day). Last night patient took off the oxygen, confused, and threw up ( could not get her phlegm out ), and had to increase her oxygen level. Had been taking off her oxygen intermittently. Patient was still unable to walk today, and today she was not responding at home, oxygen saturation was around 20% and so she was brought to the hospital. At home, did not have any fevers, chills, no LE swelling, abdominal pain.  History unattainable from patient, hx obtained from daughter America at [phone].  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 daughter states that Patient had significant cough and weakness (could not get out of the bed) for about 2 days around thanksgiving eve. She was brought to the urgent care on thanksgiving day, Xray was done, she was told she had pneumonia and was given levaquin orally ( today would be the third day). Last night patient took off the oxygen, she was confused, and she threw up ( daughter believes this is because she could not get her phlegm out ), and had to increase her oxygen level (she takes 2L at home). Patient was still unable to walk today, and today she was not responding at home, oxygen saturation was around 20% and so she was brought to the hospital. At home, did not have any fevers, chills, no LE swelling, abdominal pain. At home, her grandson is also sick with a viral illness.

## 2023-11-25 NOTE — ED ADULT TRIAGE NOTE - CHIEF COMPLAINT QUOTE
brought by ems for respiratory distress . initial saturation was 66. pt is O2 dependant and on 2Liter oxygen at home .currently being treated for pneumonia . h/o HTN, TIA. ems placed the patient on CPAP with 15liter and it went up to 88% and med lock 20 gauge on the left A/C

## 2023-11-25 NOTE — ED ADULT NURSE NOTE - NSFALLHARMRISKINTERV_ED_ALL_ED

## 2023-11-25 NOTE — H&P ADULT - NSHPLABSRESULTS_GEN_ALL_CORE
EKG personally reviewed: NSR in the 80s, 1st degree av block  CXR personally reviewed: cardiomegaly, mild pulm congestion, pleff worse on r>L  Labs below are personally reviewed:

## 2023-11-25 NOTE — H&P ADULT - ASSESSMENT
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    #Acute Metabolic Encephalopathy with #Acute Respiratory Failure with Hypoxia and Hypercarbia  - likely multifactorial with sepsis and hypercarbia (PCO2 106->59 and PH 7.14 -> 7.29)  - hypercarbia likely from PNA, plan below  - Patient is currently AOX0, but does respond to name, does not follow  commands, does wake up when called, which is an improvement from before  - switch from BI-Level to HiFlow given improvement in hypercarbia, would avoid bi-level given AMS.   - patient is DNR/DNI ( family confirmed)    #Sepsis 2/2 PNA  - present on admission - hypothermic to 96.5 rectally and tachypneic to 23   - got 2L, BP improved  - likely form PNA, was given levaquin outpatient and failed this outpatient   - switch to Cefepime for broad spectrum coverage, add on azithromycin for atypical coverage and legionella coverage (given elevated AST and hyponatremia)  - f/u CT chest   - f/u urine strep and urine legionella ( in specimen received)  - duonebs q6h  - f/u BCx x2  - consider ID consult pending culture data      #Hyponatremia  - Na 125 on arrival  - likely from vomiting, decreased po intake, improved with IVF  - f/u urine studies, trend Na q8h to prevent rapid overcorrection  - consider nephrology consult if no improvement    #HTN  - holding home amlodipine and carvedilol given sepsis  - BNP elevated to 10k but clinically seems more euvolemic on exam, recent Echo in 3/2023 showed EF 65-70%, will get repeat Echo on this admission    #ETC  - Diet: NPO given tenuous respiratory status  - DVT PPx: HSQ TID  - ETC: discussed with daughter America [phone]. Pt is DNR/DNI with MOLST completed by ED. Daughter would want other supportive measures including IVF, ABx, and Pressors in the ICU if needed to sustain life.

## 2023-11-25 NOTE — PATIENT PROFILE ADULT - MONEY FOR FOOD
PC to [place]. She is doing better BP wise as well as physically. She is taking Labetolol 100 mg bid, Amlo 5 mg a day and started the Losartan 100 mg a day for a BP yesterday of 024 systolic. BP is 122 this morning. Admits she was under stress due to remodeling and noise going on next door but that is ending. Doesn't have fogginess or dizziness in head and gait is better. Dtr is asking for  to come and help her with household chores, etc.  Explained HHN only checks vitals and meds. Will have to get caregiver and that is self pay. Would also like mother to h ave pt. Advised that mother has severe back DJD that requires surgery but she is not a candidate. Would aree to getting her pt in the [facility] for strengthening. Will ask Deedee Pruett about [employer] for [employer].       Will see on Thursday at 2:45 pm.
no

## 2023-11-25 NOTE — CONSULT NOTE ADULT - SUBJECTIVE AND OBJECTIVE BOX
NEPHROLOGY CONSULTATION    CHIEF COMPLAINT: AMS    HPI:  Pt is 89 yo female with h/o HTN, HLD, GERD, COPD, TIA, gallbladder Ca s/p open cholecystectomy, segment 4b/5 hepatectomy, and R colectomy due to fistula tract vs metastasis on 08/19/23, hepatic abscess s/p drainage presented to ED today due to AMS. Per family patient had cough and weakness (could not get out of the bed) for about 2 days. She was brought to the urgent care on thanksgiving day, Xray was done, she was told she had pneumonia and was given levaquin orally (today is the third day). Last night patient threw up and needed increased oxygen level (on 2L at home). No reported fevers, chills, no LE swelling, abdominal pain. Her grandson at home is sick with a viral illness. Pt noted to have hyponatremia.     ROS:  as above    Allergies:  ampicillin-sulbactam (Rash)    PAST MEDICAL & SURGICAL HISTORY:  Bladder cancer  HTN (hypertension)  HLD (hyperlipidemia)  Liver abscess  H/O gallbladder cancer    SOCIAL HISTORY:  negative    FAMILY HISTORY:  NC    MEDICATIONS  (STANDING):  albuterol/ipratropium for Nebulization 3 milliLiter(s) Nebulizer every 6 hours  azithromycin  IVPB      cefepime   IVPB      heparin   Injectable 5000 Unit(s) SubCutaneous every 8 hours    Vital Signs Last 24 Hrs  T(C): 36.3 (11-25-23 @ 15:17), Max: 36.8 (11-25-23 @ 10:16)  T(F): 97.4 (11-25-23 @ 15:17), Max: 98.3 (11-25-23 @ 10:16)  HR: 75 (11-25-23 @ 16:35) (75 - 85)  BP: 129/74 (11-25-23 @ 15:17) (105/92 - 155/77)  RR: 20 (11-25-23 @ 16:35) (18 - 23)  SpO2: 100% (11-25-23 @ 16:35) (85% - 100%)    LABS:                        13.3   9.63  )-----------( 208      ( 25 Nov 2023 10:15 )             41.1     11-25    127<L>  |  93<L>  |  21  ----------------------------<  107<H>  3.6   |  29  |  0.70    Ca    8.3<L>      25 Nov 2023 12:57  Phos  2.8     11-25  Mg     1.6     11-25    TPro  7.4  /  Alb  3.0<L>  /  TBili  0.8  /  DBili  x   /  AST  39<H>  /  ALT  43  /  AlkPhos  95  11-25    LIVER FUNCTIONS - ( 25 Nov 2023 10:15 )  Alb: 3.0 g/dL / Pro: 7.4 gm/dL / ALK PHOS: 95 U/L / ALT: 43 U/L / AST: 39 U/L / GGT: x           PT/INR - ( 25 Nov 2023 10:15 )   PT: 11.5 sec;   INR: 0.96 ratio       PTT - ( 25 Nov 2023 10:15 )  PTT:31.2 sec    A/P:    full consult to follow    [phone]     NEPHROLOGY CONSULTATION    CHIEF COMPLAINT: AMS    HPI:  Pt is 89 yo female with h/o HTN, HLD, GERD, COPD, TIA, gallbladder Ca s/p open cholecystectomy, segment 4b/5 hepatectomy, and R colectomy due to fistula tract vs metastasis on 08/19/23, hepatic abscess s/p drainage presented to ED today due to AMS. Per family patient had cough and weakness (could not get out of the bed) for about 2 days. She was brought to the urgent care on thanksgiving day, Xray was done, she was told she had pneumonia and was given levaquin (today is the third day). Last night patient threw up and needed increased oxygen level (on 2L O2 at home). No reported fevers, chills, no LE swelling, abdominal pain. Her grandson at home is sick with a viral illness. Pt noted to have hyponatremia. Hx from chart and daughter. No reported CP, D/C.     ROS:  as above    Allergies:  ampicillin-sulbactam (Rash)    PAST MEDICAL & SURGICAL HISTORY:  Bladder cancer  HTN (hypertension)  HLD (hyperlipidemia)  Liver abscess  H/O gallbladder cancer    SOCIAL HISTORY:  negative    FAMILY HISTORY:  NC    MEDICATIONS  (STANDING):  albuterol/ipratropium for Nebulization 3 milliLiter(s) Nebulizer every 6 hours  azithromycin  IVPB      cefepime   IVPB      heparin   Injectable 5000 Unit(s) SubCutaneous every 8 hours    Vital Signs Last 24 Hrs  T(C): 36.3 (11-25-23 @ 15:17), Max: 36.8 (11-25-23 @ 10:16)  T(F): 97.4 (11-25-23 @ 15:17), Max: 98.3 (11-25-23 @ 10:16)  HR: 75 (11-25-23 @ 16:35) (75 - 85)  BP: 129/74 (11-25-23 @ 15:17) (105/92 - 155/77)  RR: 20 (11-25-23 @ 16:35) (18 - 23)  SpO2: 100% (11-25-23 @ 16:35) (85% - 100%)    on HF O2  s1s2  b/l air entry  soft, ND  no edema    LABS:                        13.3   9.63  )-----------( 208      ( 25 Nov 2023 10:15 )             41.1     11-25    127<L>  |  93<L>  |  21  ----------------------------<  107<H>  3.6   |  29  |  0.70    Ca    8.3<L>      25 Nov 2023 12:57  Phos  2.8     11-25  Mg     1.6     11-25    TPro  7.4  /  Alb  3.0<L>  /  TBili  0.8  /  DBili  x   /  AST  39<H>  /  ALT  43  /  AlkPhos  95  11-25    LIVER FUNCTIONS - ( 25 Nov 2023 10:15 )  Alb: 3.0 g/dL / Pro: 7.4 gm/dL / ALK PHOS: 95 U/L / ALT: 43 U/L / AST: 39 U/L / GGT: x           PT/INR - ( 25 Nov 2023 10:15 )   PT: 11.5 sec;   INR: 0.96 ratio       PTT - ( 25 Nov 2023 10:15 )  PTT:31.2 sec    A/P:    R PNA, resp distress, CO2 retention, on Abx  Hyponatremia iso pulm process  FR 1L/day  No objection to diuretics if needed  Will f/u urine lytes, osms  Will f/u BMP, TSH, Cortisol in am  D/w daughter at bedside    [phone]

## 2023-11-25 NOTE — ED ADULT NURSE NOTE - OBJECTIVE STATEMENT
patient BIBA unresponsive and on CPAP machine. patient is not responsive to verbal or painful stimulation. as per EMS, patient has history of HTN, TIA, and was currently being treated for pneumonia at home with antibiotics. states patient was saturating 66% on RA in the field. family also reports patient had fever yesterday. patient placed on cardiac monitor, sepsis protocol initiated and Dr. Morales and ALBIN Gonzales art bedside to evaluate. L AC 20 gauge in place by EMS. patient has DNR/DNI. poor historian at this time, unable to obtain further history.

## 2023-11-26 LAB
ALBUMIN SERPL ELPH-MCNC: 2.6 G/DL — LOW (ref 3.3–5)
ALBUMIN SERPL ELPH-MCNC: 2.6 G/DL — LOW (ref 3.3–5)
ALP SERPL-CCNC: 77 U/L — SIGNIFICANT CHANGE UP (ref 40–120)
ALP SERPL-CCNC: 77 U/L — SIGNIFICANT CHANGE UP (ref 40–120)
ALT FLD-CCNC: 37 U/L — SIGNIFICANT CHANGE UP (ref 12–78)
ALT FLD-CCNC: 37 U/L — SIGNIFICANT CHANGE UP (ref 12–78)
ANION GAP SERPL CALC-SCNC: 6 MMOL/L — SIGNIFICANT CHANGE UP (ref 5–17)
ANION GAP SERPL CALC-SCNC: 6 MMOL/L — SIGNIFICANT CHANGE UP (ref 5–17)
AST SERPL-CCNC: 28 U/L — SIGNIFICANT CHANGE UP (ref 15–37)
AST SERPL-CCNC: 28 U/L — SIGNIFICANT CHANGE UP (ref 15–37)
BASOPHILS # BLD AUTO: 0.01 K/UL — SIGNIFICANT CHANGE UP (ref 0–0.2)
BASOPHILS # BLD AUTO: 0.01 K/UL — SIGNIFICANT CHANGE UP (ref 0–0.2)
BASOPHILS NFR BLD AUTO: 0.1 % — SIGNIFICANT CHANGE UP (ref 0–2)
BASOPHILS NFR BLD AUTO: 0.1 % — SIGNIFICANT CHANGE UP (ref 0–2)
BILIRUB SERPL-MCNC: 0.5 MG/DL — SIGNIFICANT CHANGE UP (ref 0.2–1.2)
BILIRUB SERPL-MCNC: 0.5 MG/DL — SIGNIFICANT CHANGE UP (ref 0.2–1.2)
BUN SERPL-MCNC: 33 MG/DL — HIGH (ref 7–23)
BUN SERPL-MCNC: 33 MG/DL — HIGH (ref 7–23)
CALCIUM SERPL-MCNC: 9.2 MG/DL — SIGNIFICANT CHANGE UP (ref 8.5–10.1)
CALCIUM SERPL-MCNC: 9.2 MG/DL — SIGNIFICANT CHANGE UP (ref 8.5–10.1)
CHLORIDE SERPL-SCNC: 93 MMOL/L — LOW (ref 96–108)
CHLORIDE SERPL-SCNC: 93 MMOL/L — LOW (ref 96–108)
CO2 SERPL-SCNC: 30 MMOL/L — SIGNIFICANT CHANGE UP (ref 22–31)
CO2 SERPL-SCNC: 30 MMOL/L — SIGNIFICANT CHANGE UP (ref 22–31)
CORTIS AM PEAK SERPL-MCNC: 29.5 UG/DL — HIGH (ref 6–18.4)
CORTIS AM PEAK SERPL-MCNC: 29.5 UG/DL — HIGH (ref 6–18.4)
CREAT SERPL-MCNC: 0.72 MG/DL — SIGNIFICANT CHANGE UP (ref 0.5–1.3)
CREAT SERPL-MCNC: 0.72 MG/DL — SIGNIFICANT CHANGE UP (ref 0.5–1.3)
CULTURE RESULTS: NO GROWTH — SIGNIFICANT CHANGE UP
CULTURE RESULTS: NO GROWTH — SIGNIFICANT CHANGE UP
EGFR: 81 ML/MIN/1.73M2 — SIGNIFICANT CHANGE UP
EGFR: 81 ML/MIN/1.73M2 — SIGNIFICANT CHANGE UP
EOSINOPHIL # BLD AUTO: 0.01 K/UL — SIGNIFICANT CHANGE UP (ref 0–0.5)
EOSINOPHIL # BLD AUTO: 0.01 K/UL — SIGNIFICANT CHANGE UP (ref 0–0.5)
EOSINOPHIL NFR BLD AUTO: 0.1 % — SIGNIFICANT CHANGE UP (ref 0–6)
EOSINOPHIL NFR BLD AUTO: 0.1 % — SIGNIFICANT CHANGE UP (ref 0–6)
GAS PNL BLDA: SIGNIFICANT CHANGE UP
GAS PNL BLDA: SIGNIFICANT CHANGE UP
GLUCOSE SERPL-MCNC: 86 MG/DL — SIGNIFICANT CHANGE UP (ref 70–99)
GLUCOSE SERPL-MCNC: 86 MG/DL — SIGNIFICANT CHANGE UP (ref 70–99)
GRAM STN FLD: ABNORMAL
GRAM STN FLD: ABNORMAL
HCT VFR BLD CALC: 37.3 % — SIGNIFICANT CHANGE UP (ref 34.5–45)
HCT VFR BLD CALC: 37.3 % — SIGNIFICANT CHANGE UP (ref 34.5–45)
HGB BLD-MCNC: 12.2 G/DL — SIGNIFICANT CHANGE UP (ref 11.5–15.5)
HGB BLD-MCNC: 12.2 G/DL — SIGNIFICANT CHANGE UP (ref 11.5–15.5)
IMM GRANULOCYTES NFR BLD AUTO: 0.4 % — SIGNIFICANT CHANGE UP (ref 0–0.9)
IMM GRANULOCYTES NFR BLD AUTO: 0.4 % — SIGNIFICANT CHANGE UP (ref 0–0.9)
LEGIONELLA AG UR QL: NEGATIVE — SIGNIFICANT CHANGE UP
LEGIONELLA AG UR QL: NEGATIVE — SIGNIFICANT CHANGE UP
LYMPHOCYTES # BLD AUTO: 0.56 K/UL — LOW (ref 1–3.3)
LYMPHOCYTES # BLD AUTO: 0.56 K/UL — LOW (ref 1–3.3)
LYMPHOCYTES # BLD AUTO: 7.8 % — LOW (ref 13–44)
LYMPHOCYTES # BLD AUTO: 7.8 % — LOW (ref 13–44)
MAGNESIUM SERPL-MCNC: 2.1 MG/DL — SIGNIFICANT CHANGE UP (ref 1.6–2.6)
MAGNESIUM SERPL-MCNC: 2.1 MG/DL — SIGNIFICANT CHANGE UP (ref 1.6–2.6)
MCHC RBC-ENTMCNC: 29.3 PG — SIGNIFICANT CHANGE UP (ref 27–34)
MCHC RBC-ENTMCNC: 29.3 PG — SIGNIFICANT CHANGE UP (ref 27–34)
MCHC RBC-ENTMCNC: 32.7 G/DL — SIGNIFICANT CHANGE UP (ref 32–36)
MCHC RBC-ENTMCNC: 32.7 G/DL — SIGNIFICANT CHANGE UP (ref 32–36)
MCV RBC AUTO: 89.4 FL — SIGNIFICANT CHANGE UP (ref 80–100)
MCV RBC AUTO: 89.4 FL — SIGNIFICANT CHANGE UP (ref 80–100)
MONOCYTES # BLD AUTO: 0.53 K/UL — SIGNIFICANT CHANGE UP (ref 0–0.9)
MONOCYTES # BLD AUTO: 0.53 K/UL — SIGNIFICANT CHANGE UP (ref 0–0.9)
MONOCYTES NFR BLD AUTO: 7.4 % — SIGNIFICANT CHANGE UP (ref 2–14)
MONOCYTES NFR BLD AUTO: 7.4 % — SIGNIFICANT CHANGE UP (ref 2–14)
MRSA PCR RESULT.: SIGNIFICANT CHANGE UP
MRSA PCR RESULT.: SIGNIFICANT CHANGE UP
NEUTROPHILS # BLD AUTO: 6 K/UL — SIGNIFICANT CHANGE UP (ref 1.8–7.4)
NEUTROPHILS # BLD AUTO: 6 K/UL — SIGNIFICANT CHANGE UP (ref 1.8–7.4)
NEUTROPHILS NFR BLD AUTO: 84.2 % — HIGH (ref 43–77)
NEUTROPHILS NFR BLD AUTO: 84.2 % — HIGH (ref 43–77)
NRBC # BLD: 0 /100 WBCS — SIGNIFICANT CHANGE UP (ref 0–0)
NRBC # BLD: 0 /100 WBCS — SIGNIFICANT CHANGE UP (ref 0–0)
OSMOLALITY UR: 476 MOSM/KG — SIGNIFICANT CHANGE UP (ref 50–1200)
OSMOLALITY UR: 476 MOSM/KG — SIGNIFICANT CHANGE UP (ref 50–1200)
PHOSPHATE SERPL-MCNC: 1.6 MG/DL — LOW (ref 2.5–4.5)
PHOSPHATE SERPL-MCNC: 1.6 MG/DL — LOW (ref 2.5–4.5)
PLATELET # BLD AUTO: 179 K/UL — SIGNIFICANT CHANGE UP (ref 150–400)
PLATELET # BLD AUTO: 179 K/UL — SIGNIFICANT CHANGE UP (ref 150–400)
POTASSIUM SERPL-MCNC: 3.1 MMOL/L — LOW (ref 3.5–5.3)
POTASSIUM SERPL-MCNC: 3.1 MMOL/L — LOW (ref 3.5–5.3)
POTASSIUM SERPL-SCNC: 3.1 MMOL/L — LOW (ref 3.5–5.3)
POTASSIUM SERPL-SCNC: 3.1 MMOL/L — LOW (ref 3.5–5.3)
PROT SERPL-MCNC: 6.3 GM/DL — SIGNIFICANT CHANGE UP (ref 6–8.3)
PROT SERPL-MCNC: 6.3 GM/DL — SIGNIFICANT CHANGE UP (ref 6–8.3)
RBC # BLD: 4.17 M/UL — SIGNIFICANT CHANGE UP (ref 3.8–5.2)
RBC # BLD: 4.17 M/UL — SIGNIFICANT CHANGE UP (ref 3.8–5.2)
RBC # FLD: 12.7 % — SIGNIFICANT CHANGE UP (ref 10.3–14.5)
RBC # FLD: 12.7 % — SIGNIFICANT CHANGE UP (ref 10.3–14.5)
S AUREUS DNA NOSE QL NAA+PROBE: SIGNIFICANT CHANGE UP
S AUREUS DNA NOSE QL NAA+PROBE: SIGNIFICANT CHANGE UP
S PNEUM AG UR QL: NEGATIVE — SIGNIFICANT CHANGE UP
S PNEUM AG UR QL: NEGATIVE — SIGNIFICANT CHANGE UP
SODIUM SERPL-SCNC: 129 MMOL/L — LOW (ref 135–145)
SODIUM SERPL-SCNC: 129 MMOL/L — LOW (ref 135–145)
SODIUM UR-SCNC: <20 MMOL/L — SIGNIFICANT CHANGE UP
SODIUM UR-SCNC: <20 MMOL/L — SIGNIFICANT CHANGE UP
SPECIMEN SOURCE: SIGNIFICANT CHANGE UP
TSH SERPL-MCNC: 0.68 UIU/ML — SIGNIFICANT CHANGE UP (ref 0.36–3.74)
TSH SERPL-MCNC: 0.68 UIU/ML — SIGNIFICANT CHANGE UP (ref 0.36–3.74)
URATE SERPL-MCNC: 3.4 MG/DL — SIGNIFICANT CHANGE UP (ref 2.5–7)
URATE SERPL-MCNC: 3.4 MG/DL — SIGNIFICANT CHANGE UP (ref 2.5–7)
WBC # BLD: 7.14 K/UL — SIGNIFICANT CHANGE UP (ref 3.8–10.5)
WBC # BLD: 7.14 K/UL — SIGNIFICANT CHANGE UP (ref 3.8–10.5)
WBC # FLD AUTO: 7.14 K/UL — SIGNIFICANT CHANGE UP (ref 3.8–10.5)
WBC # FLD AUTO: 7.14 K/UL — SIGNIFICANT CHANGE UP (ref 3.8–10.5)

## 2023-11-26 PROCEDURE — 99232 SBSQ HOSP IP/OBS MODERATE 35: CPT

## 2023-11-26 RX ORDER — POTASSIUM PHOSPHATE, MONOBASIC POTASSIUM PHOSPHATE, DIBASIC 236; 224 MG/ML; MG/ML
15 INJECTION, SOLUTION INTRAVENOUS ONCE
Refills: 0 | Status: COMPLETED | OUTPATIENT
Start: 2023-11-26 | End: 2023-11-26

## 2023-11-26 RX ORDER — POTASSIUM CHLORIDE 20 MEQ
40 PACKET (EA) ORAL ONCE
Refills: 0 | Status: COMPLETED | OUTPATIENT
Start: 2023-11-26 | End: 2023-11-26

## 2023-11-26 RX ADMIN — CEFEPIME 100 MILLIGRAM(S): 1 INJECTION, POWDER, FOR SOLUTION INTRAMUSCULAR; INTRAVENOUS at 17:08

## 2023-11-26 RX ADMIN — Medication 3 MILLILITER(S): at 17:19

## 2023-11-26 RX ADMIN — POTASSIUM PHOSPHATE, MONOBASIC POTASSIUM PHOSPHATE, DIBASIC 62.5 MILLIMOLE(S): 236; 224 INJECTION, SOLUTION INTRAVENOUS at 17:00

## 2023-11-26 RX ADMIN — Medication 40 MILLIEQUIVALENT(S): at 09:10

## 2023-11-26 RX ADMIN — CEFEPIME 100 MILLIGRAM(S): 1 INJECTION, POWDER, FOR SOLUTION INTRAMUSCULAR; INTRAVENOUS at 05:52

## 2023-11-26 RX ADMIN — Medication 3 MILLILITER(S): at 11:18

## 2023-11-26 RX ADMIN — Medication 3 MILLILITER(S): at 05:29

## 2023-11-26 RX ADMIN — HEPARIN SODIUM 5000 UNIT(S): 5000 INJECTION INTRAVENOUS; SUBCUTANEOUS at 21:59

## 2023-11-26 RX ADMIN — HEPARIN SODIUM 5000 UNIT(S): 5000 INJECTION INTRAVENOUS; SUBCUTANEOUS at 05:52

## 2023-11-26 RX ADMIN — HEPARIN SODIUM 5000 UNIT(S): 5000 INJECTION INTRAVENOUS; SUBCUTANEOUS at 13:34

## 2023-11-26 RX ADMIN — Medication 15 GRAM(S): at 00:08

## 2023-11-26 RX ADMIN — Medication 15 GRAM(S): at 11:48

## 2023-11-26 RX ADMIN — AZITHROMYCIN 255 MILLIGRAM(S): 500 TABLET, FILM COATED ORAL at 13:34

## 2023-11-26 NOTE — PROGRESS NOTE ADULT - SUBJECTIVE AND OBJECTIVE BOX
Patient is a 88y old  Female who presents with a chief complaint of shortness of breath  pneumonia (25 Nov 2023 17:53)      INTERVAL HPI/OVERNIGHT EVENTS:  Pt was seen and examined, no acute events.      MEDICATIONS  (STANDING):  albuterol/ipratropium for Nebulization 3 milliLiter(s) Nebulizer every 6 hours  cefepime   IVPB 2000 milliGRAM(s) IV Intermittent every 12 hours  cefepime   IVPB      heparin   Injectable 5000 Unit(s) SubCutaneous every 8 hours  influenza  Vaccine (HIGH DOSE) 0.7 milliLiter(s) IntraMuscular once  potassium phosphate IVPB 15 milliMole(s) IV Intermittent once  urea Oral Powder 15 Gram(s) Oral daily    MEDICATIONS  (PRN):      Allergies  ampicillin-sulbactam (Rash)      Vital Signs Last 24 Hrs  T(C): 36.4 (26 Nov 2023 10:27), Max: 36.8 (26 Nov 2023 04:59)  T(F): 97.6 (26 Nov 2023 10:27), Max: 98.3 (26 Nov 2023 04:59)  HR: 84 (26 Nov 2023 11:30) (75 - 88)  BP: 148/75 (26 Nov 2023 10:27) (133/68 - 148/75)  BP(mean): --  RR: 20 (26 Nov 2023 10:27) (18 - 20)  SpO2: 100% (26 Nov 2023 11:30) (79% - 100%)    Parameters below as of 26 Nov 2023 11:30  Patient On (Oxygen Delivery Method): nasal cannula, high flow        PHYSICAL EXAM:  GENERAL: NAD,    HEAD:  Atraumatic, Normocephalic  EYES: EOMI, PERRLA, conjunctiva and sclera clear  ENMT: No tonsillar erythema, exudates, or enlargement; Moist mucous membranes, Good dentition, No lesions  NECK: Supple, No JVD, Normal thyroid  NERVOUS SYSTEM:  Awake, Alert, non focal  CHEST/LUNG: Clear to percussion bilaterally; No rales, rhonchi, wheezing, or rubs  HEART: Regular rate and rhythm; No murmurs, rubs, or gallops  ABDOMEN: Soft, Nontender, Nondistended; Bowel sounds present  EXTREMITIES:  2+ Peripheral Pulses, No clubbing, cyanosis, or edema  LYMPH: No lymphadenopathy noted  SKIN: No rashes or lesions        LABS:                        12.2   7.14  )-----------( 179      ( 26 Nov 2023 06:20 )             37.3     11-26    129<L>  |  93<L>  |  33<H>  ----------------------------<  86  3.1<L>   |  30  |  0.72    Ca    9.2      26 Nov 2023 06:20  Phos  1.6     11-26  Mg     2.1     11-26    TPro  6.3  /  Alb  2.6<L>  /  TBili  0.5  /  DBili  x   /  AST  28  /  ALT  37  /  AlkPhos  77  11-26    PT/INR - ( 25 Nov 2023 10:15 )   PT: 11.5 sec;   INR: 0.96 ratio         PTT - ( 25 Nov 2023 10:15 )  PTT:31.2 sec  Urinalysis Basic - ( 26 Nov 2023 06:20 )    Color: x / Appearance: x / SG: x / pH: x  Gluc: 86 mg/dL / Ketone: x  / Bili: x / Urobili: x   Blood: x / Protein: x / Nitrite: x   Leuk Esterase: x / RBC: x / WBC x   Sq Epi: x / Non Sq Epi: x / Bacteria: x      CAPILLARY BLOOD GLUCOSE        Culture - Blood (collected 25 Nov 2023 10:24)  Source: .Blood Blood-Peripheral  Preliminary Report (26 Nov 2023 13:01):    No growth at 24 hours    Culture - Blood (collected 25 Nov 2023 10:15)  Source: .Blood Blood-Peripheral  Preliminary Report (26 Nov 2023 13:01):    No growth at 24 hours      RADIOLOGY & ADDITIONAL TESTS:    Imaging Personally Reviewed:  [ ] YES  [ ] NO    Consultant(s) Notes Reviewed:  [ ] YES  [ ] NO    Care Discussed with Consultants/Other Providers [ ] YES  [ ] NO

## 2023-11-26 NOTE — PROGRESS NOTE ADULT - ASSESSMENT
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    #Acute Metabolic Encephalopathy with #Acute Respiratory Failure with Hypoxia and Hypercarbia  - likely multifactorial with sepsis and hypercarbia (PCO2 106->59 and PH 7.14 -> 7.29)  - hypercarbia likely from PNA, plan below  - On high  flow , SO2 improved, taper down FiO2  - patient is DNR/DNI ( family confirmed)  - Pulm eval    #Sepsis 2/2 PNA  - present on admission - hypothermic to 96.5 rectally and tachypneic to 23   - got 2L, BP improved  - likely form PNA, was given levaquin outpatient and failed this outpatient   - switch to Cefepime for broad spectrum coverage,  neg legionella, dc  azithromycin   -  CT chest : 1.  Secretions in the trachea, bronchus intermedius and right lower lobe   bronchus.  2.  Patchy consolidation of the right lower lobe and right middle lobe.  - f/u urine strep   - duonebs q6h  - f/u BCx x2  - consider ID consult pending culture data      #Hyponatremia  - Na 125 on arrival  - likely from vomiting, decreased po intake, improved with IVF  - f/u urine studies, trend Na q8h to prevent rapid overcorrection  -Renal following     #HTN  - holding home amlodipine and carvedilol given sepsis  - BNP elevated to 10k but clinically seems more euvolemic on exam, recent Echo in 3/2023 showed EF 65-70%, will get repeat Echo on this admission    #ETC  - Diet: NPO given tenuous respiratory status  - DVT PPx: HSQ TID       daughter America [phone]. Pt is DNR/DNI with MOLST completed by ED. Daughter would want other supportive measures including IVF, ABx, and Pressors in the ICU if needed to sustain life.

## 2023-11-26 NOTE — PROGRESS NOTE ADULT - SUBJECTIVE AND OBJECTIVE BOX
Resting, on HF O2    Vital Signs Last 24 Hrs  T(C): 36.4 (11-26-23 @ 16:55), Max: 36.8 (11-26-23 @ 04:59)  T(F): 97.6 (11-26-23 @ 16:55), Max: 98.3 (11-26-23 @ 04:59)  HR: 106 (11-26-23 @ 21:41) (78 - 106)  BP: 157/79 (11-26-23 @ 17:05) (133/68 - 192/80)  BP(mean): 117 (11-26-23 @ 16:55) (117 - 117)  RR: 22 (11-26-23 @ 21:41) (18 - 24)  SpO2: 97% (11-26-23 @ 21:41) (79% - 100%)    I&O's Detail    25 Nov 2023 07:01  -  26 Nov 2023 07:00  --------------------------------------------------------  OUT:    Voided (mL): 600 mL  Total OUT: 600 mL    26 Nov 2023 07:01  -  26 Nov 2023 22:46  --------------------------------------------------------  IN:    Oral Fluid: 840 mL  Total IN: 840 mL    OUT:    Voided (mL): 360 mL  Total OUT: 360 mL    s1s2  b/l air entry  soft, ND  no edema                        12.2   7.14  )-----------( 179      ( 26 Nov 2023 06:20 )             37.3     26 Nov 2023 06:20    129    |  93     |  33     ----------------------------<  86     3.1     |  30     |  0.72     Ca    9.2        26 Nov 2023 06:20  Phos  1.6       26 Nov 2023 06:20  Mg     2.1       26 Nov 2023 06:20    TPro  6.3    /  Alb  2.6    /  TBili  0.5    /  DBili  x      /  AST  28     /  ALT  37     /  AlkPhos  77     26 Nov 2023 06:20    LIVER FUNCTIONS - ( 26 Nov 2023 06:20 )  Alb: 2.6 g/dL / Pro: 6.3 gm/dL / ALK PHOS: 77 U/L / ALT: 37 U/L / AST: 28 U/L / GGT: x           PT/INR - ( 25 Nov 2023 10:15 )   PT: 11.5 sec;   INR: 0.96 ratio      Culture - Urine (collected 25 Nov 2023 10:40)  Source: Clean Catch Clean Catch (Midstream)  Final Report (26 Nov 2023 17:38):    No growth    Culture - Blood (collected 25 Nov 2023 10:24)  Source: .Blood Blood-Peripheral  Preliminary Report (26 Nov 2023 13:01):    No growth at 24 hours    Culture - Blood (collected 25 Nov 2023 10:15)  Source: .Blood Blood-Peripheral  Preliminary Report (26 Nov 2023 13:01):    No growth at 24 hours    albuterol/ipratropium for Nebulization 3 milliLiter(s) Nebulizer every 6 hours  cefepime   IVPB 2000 milliGRAM(s) IV Intermittent every 12 hours  cefepime   IVPB      heparin   Injectable 5000 Unit(s) SubCutaneous every 8 hours  influenza  Vaccine (HIGH DOSE) 0.7 milliLiter(s) IntraMuscular once  urea Oral Powder 15 Gram(s) Oral daily    A/P:    Adm w/R PNA, resp distress, CO2 retention, on Cefepime   SIADH iso pulm process  FR 1L/day, urea powder as osms intake not great   Na is improving   No objection to diuretics if needed  F/u BMP, Mg, Phos  Suppl K, Phos    [phone]

## 2023-11-27 LAB
ALBUMIN SERPL ELPH-MCNC: 3 G/DL — LOW (ref 3.3–5)
ALBUMIN SERPL ELPH-MCNC: 3 G/DL — LOW (ref 3.3–5)
ALP SERPL-CCNC: 93 U/L — SIGNIFICANT CHANGE UP (ref 40–120)
ALP SERPL-CCNC: 93 U/L — SIGNIFICANT CHANGE UP (ref 40–120)
ALT FLD-CCNC: 36 U/L — SIGNIFICANT CHANGE UP (ref 12–78)
ALT FLD-CCNC: 36 U/L — SIGNIFICANT CHANGE UP (ref 12–78)
ANION GAP SERPL CALC-SCNC: 3 MMOL/L — LOW (ref 5–17)
ANION GAP SERPL CALC-SCNC: 3 MMOL/L — LOW (ref 5–17)
AST SERPL-CCNC: 28 U/L — SIGNIFICANT CHANGE UP (ref 15–37)
AST SERPL-CCNC: 28 U/L — SIGNIFICANT CHANGE UP (ref 15–37)
BASE EXCESS BLDA CALC-SCNC: 15.9 MMOL/L — HIGH (ref -2–3)
BASE EXCESS BLDA CALC-SCNC: 15.9 MMOL/L — HIGH (ref -2–3)
BILIRUB SERPL-MCNC: 0.8 MG/DL — SIGNIFICANT CHANGE UP (ref 0.2–1.2)
BILIRUB SERPL-MCNC: 0.8 MG/DL — SIGNIFICANT CHANGE UP (ref 0.2–1.2)
BLOOD GAS COMMENTS ARTERIAL: SIGNIFICANT CHANGE UP
BLOOD GAS COMMENTS ARTERIAL: SIGNIFICANT CHANGE UP
BUN SERPL-MCNC: 19 MG/DL — SIGNIFICANT CHANGE UP (ref 7–23)
BUN SERPL-MCNC: 19 MG/DL — SIGNIFICANT CHANGE UP (ref 7–23)
CALCIUM SERPL-MCNC: 10.1 MG/DL — SIGNIFICANT CHANGE UP (ref 8.5–10.1)
CALCIUM SERPL-MCNC: 10.1 MG/DL — SIGNIFICANT CHANGE UP (ref 8.5–10.1)
CHLORIDE SERPL-SCNC: 98 MMOL/L — SIGNIFICANT CHANGE UP (ref 96–108)
CHLORIDE SERPL-SCNC: 98 MMOL/L — SIGNIFICANT CHANGE UP (ref 96–108)
CO2 BLDA-SCNC: 42 MMOL/L — HIGH (ref 19–24)
CO2 BLDA-SCNC: 42 MMOL/L — HIGH (ref 19–24)
CO2 SERPL-SCNC: 37 MMOL/L — HIGH (ref 22–31)
CO2 SERPL-SCNC: 37 MMOL/L — HIGH (ref 22–31)
CREAT SERPL-MCNC: 0.67 MG/DL — SIGNIFICANT CHANGE UP (ref 0.5–1.3)
CREAT SERPL-MCNC: 0.67 MG/DL — SIGNIFICANT CHANGE UP (ref 0.5–1.3)
EGFR: 85 ML/MIN/1.73M2 — SIGNIFICANT CHANGE UP
EGFR: 85 ML/MIN/1.73M2 — SIGNIFICANT CHANGE UP
GAS PNL BLDA: SIGNIFICANT CHANGE UP
GAS PNL BLDA: SIGNIFICANT CHANGE UP
GLUCOSE SERPL-MCNC: 115 MG/DL — HIGH (ref 70–99)
GLUCOSE SERPL-MCNC: 115 MG/DL — HIGH (ref 70–99)
HCO3 BLDA-SCNC: 41 MMOL/L — HIGH (ref 21–28)
HCO3 BLDA-SCNC: 41 MMOL/L — HIGH (ref 21–28)
HCT VFR BLD CALC: 44.6 % — SIGNIFICANT CHANGE UP (ref 34.5–45)
HCT VFR BLD CALC: 44.6 % — SIGNIFICANT CHANGE UP (ref 34.5–45)
HGB BLD-MCNC: 14.6 G/DL — SIGNIFICANT CHANGE UP (ref 11.5–15.5)
HGB BLD-MCNC: 14.6 G/DL — SIGNIFICANT CHANGE UP (ref 11.5–15.5)
HMPV RNA SPEC QL NAA+PROBE: DETECTED
HMPV RNA SPEC QL NAA+PROBE: DETECTED
HOROWITZ INDEX BLDA+IHG-RTO: 50 — SIGNIFICANT CHANGE UP
HOROWITZ INDEX BLDA+IHG-RTO: 50 — SIGNIFICANT CHANGE UP
MAGNESIUM SERPL-MCNC: 2.2 MG/DL — SIGNIFICANT CHANGE UP (ref 1.6–2.6)
MAGNESIUM SERPL-MCNC: 2.2 MG/DL — SIGNIFICANT CHANGE UP (ref 1.6–2.6)
MCHC RBC-ENTMCNC: 29 PG — SIGNIFICANT CHANGE UP (ref 27–34)
MCHC RBC-ENTMCNC: 29 PG — SIGNIFICANT CHANGE UP (ref 27–34)
MCHC RBC-ENTMCNC: 32.7 G/DL — SIGNIFICANT CHANGE UP (ref 32–36)
MCHC RBC-ENTMCNC: 32.7 G/DL — SIGNIFICANT CHANGE UP (ref 32–36)
MCV RBC AUTO: 88.7 FL — SIGNIFICANT CHANGE UP (ref 80–100)
MCV RBC AUTO: 88.7 FL — SIGNIFICANT CHANGE UP (ref 80–100)
NRBC # BLD: 0 /100 WBCS — SIGNIFICANT CHANGE UP (ref 0–0)
NRBC # BLD: 0 /100 WBCS — SIGNIFICANT CHANGE UP (ref 0–0)
PCO2 BLDA: 49 MMHG — HIGH (ref 32–46)
PCO2 BLDA: 49 MMHG — HIGH (ref 32–46)
PH BLDA: 7.53 — HIGH (ref 7.35–7.45)
PH BLDA: 7.53 — HIGH (ref 7.35–7.45)
PHOSPHATE SERPL-MCNC: 1.2 MG/DL — LOW (ref 2.5–4.5)
PHOSPHATE SERPL-MCNC: 1.2 MG/DL — LOW (ref 2.5–4.5)
PLATELET # BLD AUTO: 257 K/UL — SIGNIFICANT CHANGE UP (ref 150–400)
PLATELET # BLD AUTO: 257 K/UL — SIGNIFICANT CHANGE UP (ref 150–400)
PO2 BLDA: 75 MMHG — LOW (ref 83–108)
PO2 BLDA: 75 MMHG — LOW (ref 83–108)
POTASSIUM SERPL-MCNC: 3.5 MMOL/L — SIGNIFICANT CHANGE UP (ref 3.5–5.3)
POTASSIUM SERPL-MCNC: 3.5 MMOL/L — SIGNIFICANT CHANGE UP (ref 3.5–5.3)
POTASSIUM SERPL-SCNC: 3.5 MMOL/L — SIGNIFICANT CHANGE UP (ref 3.5–5.3)
POTASSIUM SERPL-SCNC: 3.5 MMOL/L — SIGNIFICANT CHANGE UP (ref 3.5–5.3)
PROCALCITONIN SERPL-MCNC: 0.93 NG/ML — HIGH (ref 0.02–0.1)
PROCALCITONIN SERPL-MCNC: 0.93 NG/ML — HIGH (ref 0.02–0.1)
PROT SERPL-MCNC: 7.3 GM/DL — SIGNIFICANT CHANGE UP (ref 6–8.3)
PROT SERPL-MCNC: 7.3 GM/DL — SIGNIFICANT CHANGE UP (ref 6–8.3)
RAPID RVP RESULT: DETECTED
RAPID RVP RESULT: DETECTED
RBC # BLD: 5.03 M/UL — SIGNIFICANT CHANGE UP (ref 3.8–5.2)
RBC # BLD: 5.03 M/UL — SIGNIFICANT CHANGE UP (ref 3.8–5.2)
RBC # FLD: 12.8 % — SIGNIFICANT CHANGE UP (ref 10.3–14.5)
RBC # FLD: 12.8 % — SIGNIFICANT CHANGE UP (ref 10.3–14.5)
SAO2 % BLDA: 96.2 % — SIGNIFICANT CHANGE UP (ref 94–98)
SAO2 % BLDA: 96.2 % — SIGNIFICANT CHANGE UP (ref 94–98)
SARS-COV-2 RNA SPEC QL NAA+PROBE: SIGNIFICANT CHANGE UP
SARS-COV-2 RNA SPEC QL NAA+PROBE: SIGNIFICANT CHANGE UP
SODIUM SERPL-SCNC: 138 MMOL/L — SIGNIFICANT CHANGE UP (ref 135–145)
SODIUM SERPL-SCNC: 138 MMOL/L — SIGNIFICANT CHANGE UP (ref 135–145)
WBC # BLD: 8.44 K/UL — SIGNIFICANT CHANGE UP (ref 3.8–10.5)
WBC # BLD: 8.44 K/UL — SIGNIFICANT CHANGE UP (ref 3.8–10.5)
WBC # FLD AUTO: 8.44 K/UL — SIGNIFICANT CHANGE UP (ref 3.8–10.5)
WBC # FLD AUTO: 8.44 K/UL — SIGNIFICANT CHANGE UP (ref 3.8–10.5)

## 2023-11-27 PROCEDURE — 99223 1ST HOSP IP/OBS HIGH 75: CPT

## 2023-11-27 PROCEDURE — 99232 SBSQ HOSP IP/OBS MODERATE 35: CPT

## 2023-11-27 RX ORDER — POTASSIUM PHOSPHATE, MONOBASIC POTASSIUM PHOSPHATE, DIBASIC 236; 224 MG/ML; MG/ML
30 INJECTION, SOLUTION INTRAVENOUS ONCE
Refills: 0 | Status: COMPLETED | OUTPATIENT
Start: 2023-11-27 | End: 2023-11-27

## 2023-11-27 RX ORDER — AMLODIPINE BESYLATE 2.5 MG/1
5 TABLET ORAL DAILY
Refills: 0 | Status: DISCONTINUED | OUTPATIENT
Start: 2023-11-27 | End: 2023-11-30

## 2023-11-27 RX ORDER — PANTOPRAZOLE SODIUM 20 MG/1
40 TABLET, DELAYED RELEASE ORAL
Refills: 0 | Status: DISCONTINUED | OUTPATIENT
Start: 2023-11-27 | End: 2023-11-30

## 2023-11-27 RX ORDER — ALLOPURINOL 300 MG
100 TABLET ORAL DAILY
Refills: 0 | Status: DISCONTINUED | OUTPATIENT
Start: 2023-11-27 | End: 2023-12-01

## 2023-11-27 RX ORDER — MIRTAZAPINE 45 MG/1
15 TABLET, ORALLY DISINTEGRATING ORAL AT BEDTIME
Refills: 0 | Status: DISCONTINUED | OUTPATIENT
Start: 2023-11-27 | End: 2023-11-28

## 2023-11-27 RX ORDER — SENNA PLUS 8.6 MG/1
2 TABLET ORAL AT BEDTIME
Refills: 0 | Status: DISCONTINUED | OUTPATIENT
Start: 2023-11-27 | End: 2023-12-01

## 2023-11-27 RX ORDER — CARVEDILOL PHOSPHATE 80 MG/1
6.25 CAPSULE, EXTENDED RELEASE ORAL EVERY 12 HOURS
Refills: 0 | Status: DISCONTINUED | OUTPATIENT
Start: 2023-11-27 | End: 2023-12-01

## 2023-11-27 RX ORDER — FERROUS SULFATE 325(65) MG
325 TABLET ORAL DAILY
Refills: 0 | Status: DISCONTINUED | OUTPATIENT
Start: 2023-11-27 | End: 2023-12-01

## 2023-11-27 RX ORDER — ASPIRIN/CALCIUM CARB/MAGNESIUM 324 MG
81 TABLET ORAL DAILY
Refills: 0 | Status: DISCONTINUED | OUTPATIENT
Start: 2023-11-27 | End: 2023-11-30

## 2023-11-27 RX ADMIN — HEPARIN SODIUM 5000 UNIT(S): 5000 INJECTION INTRAVENOUS; SUBCUTANEOUS at 16:35

## 2023-11-27 RX ADMIN — HEPARIN SODIUM 5000 UNIT(S): 5000 INJECTION INTRAVENOUS; SUBCUTANEOUS at 22:15

## 2023-11-27 RX ADMIN — CARVEDILOL PHOSPHATE 6.25 MILLIGRAM(S): 80 CAPSULE, EXTENDED RELEASE ORAL at 17:52

## 2023-11-27 RX ADMIN — Medication 15 GRAM(S): at 16:34

## 2023-11-27 RX ADMIN — Medication 3 MILLILITER(S): at 05:06

## 2023-11-27 RX ADMIN — Medication 3 MILLILITER(S): at 00:34

## 2023-11-27 RX ADMIN — Medication 3 MILLILITER(S): at 23:09

## 2023-11-27 RX ADMIN — Medication 3 MILLILITER(S): at 11:52

## 2023-11-27 RX ADMIN — Medication 3 MILLILITER(S): at 17:23

## 2023-11-27 RX ADMIN — CEFEPIME 100 MILLIGRAM(S): 1 INJECTION, POWDER, FOR SOLUTION INTRAMUSCULAR; INTRAVENOUS at 17:52

## 2023-11-27 RX ADMIN — HEPARIN SODIUM 5000 UNIT(S): 5000 INJECTION INTRAVENOUS; SUBCUTANEOUS at 05:23

## 2023-11-27 RX ADMIN — CEFEPIME 100 MILLIGRAM(S): 1 INJECTION, POWDER, FOR SOLUTION INTRAMUSCULAR; INTRAVENOUS at 05:23

## 2023-11-27 RX ADMIN — SENNA PLUS 2 TABLET(S): 8.6 TABLET ORAL at 22:15

## 2023-11-27 RX ADMIN — MIRTAZAPINE 15 MILLIGRAM(S): 45 TABLET, ORALLY DISINTEGRATING ORAL at 22:16

## 2023-11-27 RX ADMIN — POTASSIUM PHOSPHATE, MONOBASIC POTASSIUM PHOSPHATE, DIBASIC 83.33 MILLIMOLE(S): 236; 224 INJECTION, SOLUTION INTRAVENOUS at 10:00

## 2023-11-27 NOTE — SWALLOW BEDSIDE ASSESSMENT ADULT - SWALLOW EVAL: DIAGNOSIS
Pt presented with overtly adequate oropharyngeal skills for puree and thin liquid consistencies with no overt signs of suspected aspiration. Pt unwilling to accept trial of soft solid, however it should be noted that pt with ill fitting upper dentures may affect mastication ability.

## 2023-11-27 NOTE — PROGRESS NOTE ADULT - SUBJECTIVE AND OBJECTIVE BOX
[facility] NEPHROLOGY SERVICES, [facility]  NEPHROLOGY AND HYPERTENSION  [address]  [address]  [place] [address]  [phone]    MD RAI HANNAH, MD ROBBI BROWNE, MD ESVIN HENDERSON, MD ZHAO EARL MD          Patient events noted    MEDICATIONS  (STANDING):  albuterol/ipratropium for Nebulization 3 milliLiter(s) Nebulizer every 6 hours  allopurinol 100 milliGRAM(s) Oral daily  amLODIPine   Tablet 5 milliGRAM(s) Oral daily  aspirin enteric coated 81 milliGRAM(s) Oral daily  carvedilol 6.25 milliGRAM(s) Oral every 12 hours  cefepime   IVPB 2000 milliGRAM(s) IV Intermittent every 12 hours  cefepime   IVPB      ferrous    sulfate 325 milliGRAM(s) Oral daily  heparin   Injectable 5000 Unit(s) SubCutaneous every 8 hours  influenza  Vaccine (HIGH DOSE) 0.7 milliLiter(s) IntraMuscular once  mirtazapine 15 milliGRAM(s) Oral at bedtime  pantoprazole    Tablet 40 milliGRAM(s) Oral before breakfast  senna 2 Tablet(s) Oral at bedtime  urea Oral Powder 15 Gram(s) Oral daily    MEDICATIONS  (PRN):      11-26-23 @ 07:01  -  11-27-23 @ 07:00  --------------------------------------------------------  IN: 840 mL / OUT: 860 mL / NET: -20 mL    11-27-23 @ 07:01  -  11-27-23 @ 21:01  --------------------------------------------------------  IN: 240 mL / OUT: 0 mL / NET: 240 mL      PHYSICAL EXAM:      T(C): 36.3 (11-27-23 @ 16:30), Max: 36.6 (11-27-23 @ 05:12)  HR: 112 (11-27-23 @ 18:10) (82 - 120)  BP: 180/80 (11-27-23 @ 16:30) (160/84 - 188/83)  RR: 20 (11-27-23 @ 17:52) (19 - 22)  SpO2: 95% (11-27-23 @ 18:10) (94% - 98%)  Wt(kg): --  Lungs clear  Heart S1S2  Abd soft NT ND  Extremities:   tr edema                                    14.6   8.44  )-----------( 257      ( 27 Nov 2023 07:30 )             44.6     11-27    138  |  98  |  19  ----------------------------<  115<H>  3.5   |  37<H>  |  0.67    Ca    10.1      27 Nov 2023 07:30  Phos  1.2     11-27  Mg     2.2     11-27    TPro  7.3  /  Alb  3.0<L>  /  TBili  0.8  /  DBili  x   /  AST  28  /  ALT  36  /  AlkPhos  93  11-27    ABG - ( 27 Nov 2023 04:06 )  pH, Arterial: 7.53  pH, Blood: x     /  pCO2: 49    /  pO2: 75    / HCO3: 41    / Base Excess: 15.9  /  SaO2: 96.2          < from: CT Chest No Cont (11.25.23 @ 14:52) >  IMPRESSION:    1.  Secretions in the trachea, bronchus intermedius and right lower lobe   bronchus.  2.  Patchy consolidation of the right lower lobe and right middle lobe.    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

## 2023-11-27 NOTE — SWALLOW BEDSIDE ASSESSMENT ADULT - SLP GENERAL OBSERVATIONS
Pt approached seated upright in bed with HFNC in place; significant baseline confusion, noted to be fidgeting with bed sheets and required max encouragement/re-direction to task.

## 2023-11-27 NOTE — SWALLOW BEDSIDE ASSESSMENT ADULT - COMMENTS
IMPRESSION: Chest CT 11/25/23: 1.  Secretions in the trachea, bronchus intermedius and right lower lobe bronchus. 2.  Patchy consolidation of the right lower lobe and right middle lobe.

## 2023-11-27 NOTE — PROGRESS NOTE ADULT - ASSESSMENT
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    #Acute Metabolic Encephalopathy with #Acute Respiratory Failure with Hypoxia and Hypercarbia  - likely multifactorial with sepsis and hypercarbia (PCO2 106->59 and PH 7.14 -> 7.29)  - hypercarbia likely from PNA, plan below  - On high  flow , SO2 improved, taper down FiO2  - patient is DNR/DNI ( family confirmed)  - Pulm eval    #Sepsis 2/2 PNA  - present on admission - hypothermic to 96.5 rectally and tachypneic to 23   - got 2L, BP improved  - likely form PNA, was given levaquin outpatient and failed this outpatient   - switched to Cefepime for broad spectrum coverage,  neg legionella, dc  azithromycin   -  CT chest : 1.  Secretions in the trachea, bronchus intermedius and right lower lobe   bronchus.  2.  Patchy consolidation of the right lower lobe and right middle lobe.  - f/u urine strep   - duonebs q6h  - f/u BCx x2  -Pulm consulted       #Hyponatremia  - Na 125 on arrival  - likely from vomiting, decreased po intake, improved with IVF  - Added urea  - Na improving   -Renal following     #HTN  - BP elevated, resume home meds coreg and amlodipine   - BNP elevated to 10k but clinically seems more euvolemic on exam, recent Echo in 3/2023 showed EF 65-70%, will get repeat Echo on this admission      DVT PPx: HSQ TID      Daughter America [phone]. Pt is DNR/DNI with MOLST completed by ED. Daughter would want other supportive measures including IVF, ABx, and Pressors in the ICU if needed to sustain life.

## 2023-11-27 NOTE — SWALLOW BEDSIDE ASSESSMENT ADULT - H & P REVIEW
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 Patient had significant cough and weakness (could not get out of the bed) for about 2 days. X-Ray done at urgent care on 11/23/23 and was given a dx of  pneumonia and was given levaquin orally.At home, her grandson is also sick with a viral illness./yes

## 2023-11-27 NOTE — PROGRESS NOTE ADULT - CONVERSATION DETAILS
Discussed with daughter America [phone]. Pt is DNR/DNI with MOLST completed by ED. Daughter would want other supportive measures including IVF, ABx, and Pressors in the ICU if needed to sustain life.

## 2023-11-27 NOTE — CONSULT NOTE ADULT - SUBJECTIVE AND OBJECTIVE BOX
CHIEF COMPLAINT: SOB, AMS    HPI:  History unattainable from patient, hx obtained from daughter America at [phone].  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 daughter states that Patient had significant cough and weakness (could not get out of the bed) for about 2 days around thanksgiving eve. She was brought to the urgent care on thanksgiving day, Xray was done, she was told she had pneumonia and was given levaquin orally ( today would be the third day). Last night patient took off the oxygen, she was confused, and she threw up ( daughter believes this is because she could not get her phlegm out ), and had to increase her oxygen level (she takes 2L at home). Patient was still unable to walk today, and today she was not responding at home, oxygen saturation was around 20% and so she was brought to the hospital. At home, did not have any fevers, chills, no LE swelling, abdominal pain. At home, her grandson is also sick with a viral illness.  (25 Nov 2023 13:58)      PAST MEDICAL & SURGICAL HISTORY:  Bladder cancer      HTN (hypertension)      HLD (hyperlipidemia)      Liver abscess      H/O gallbladder cancer          FAMILY HISTORY:      Allergies    ampicillin-sulbactam (Rash)    Intolerances        HOME MEDICATIONS:    REVIEW OF SYSTEMS:  Constitutional: [x ] negative [ ] fevers [ ] chills [ ] weight loss [ ] weight gain  HEENT: [x ] negative [ ] dry eyes [ ] eye irritation [ ] postnasal drip [ ] nasal congestion  CV: [ x] negative  [ ] chest pain [ ] orthopnea [ ] palpitations [ ] murmur  Resp: [ ] negative [x ] cough [x ] shortness of breath [ x] dyspnea [ ] wheezing [ ] sputum [ ] hemoptysis  GI: [x ] negative [ ] nausea [ ] vomiting [ ] diarrhea [ ] constipation [ ] abd pain [ ] dysphagia   : [x ] negative [ ] dysuria [ ] nocturia [ ] hematuria [ ] increased urinary frequency  Musculoskeletal: [ x] negative [ ] back pain [ ] myalgias [ ] arthralgias [ ] fracture  Skin: [x ] negative [ ] rash [ ] itch  Neurological: [ ] negative [ ] headache [ ] dizziness [ ] syncope [x ] weakness [ ] numbness  Psychiatric: [ x] negative [ ] anxiety [ ] depression      OBJECTIVE:  Vital Signs Last 24 Hrs  T(C): 36.5 (27 Nov 2023 10:26), Max: 36.6 (27 Nov 2023 05:12)  T(F): 97.7 (27 Nov 2023 10:26), Max: 97.9 (27 Nov 2023 05:12)  HR: 97 (27 Nov 2023 14:02) (82 - 116)  BP: 188/83 (27 Nov 2023 10:26) (157/79 - 192/80)  BP(mean): 117 (26 Nov 2023 16:55) (117 - 117)  ABP: --  ABP(mean): --  RR: 20 (27 Nov 2023 14:02) (19 - 24)  SpO2: 96% (27 Nov 2023 14:02) (95% - 98%)    O2 Parameters below as of 27 Nov 2023 14:02  Patient On (Oxygen Delivery Method): nasal cannula, high flow  O2 Flow (L/min): 50  O2 Concentration (%): 40          11-26 @ 07:01 - 11-27 @ 07:00  --------------------------------------------------------  IN: 840 mL / OUT: 860 mL / NET: -20 mL    11-27 @ 07:01  -  11-27 @ 14:15  --------------------------------------------------------  IN: 240 mL / OUT: 0 mL / NET: 240 mL      CAPILLARY BLOOD GLUCOSE          PHYSICAL EXAM:  General: NAD, elderly female on HFNC  HEENT: NC/AT, PERRL, conjunctiva and sclera clear, MMM,   Neck: supple, No JVD, trachea midline   Respiratory: Diffuse Rhonchi bilaterally  Cardiovascular: RRR, no m/r/g  Abdomen: Soft, NTTP, ND, + BS  Extremities: no c/c/e  Skin: intact, no rashes or lesions  Neurological: awake, alert, non focal  Psychiatry: appropriate affect     HOSPITAL MEDICATIONS:  aspirin enteric coated 81 milliGRAM(s) Oral daily  heparin   Injectable 5000 Unit(s) SubCutaneous every 8 hours    cefepime   IVPB 2000 milliGRAM(s) IV Intermittent every 12 hours  cefepime   IVPB        amLODIPine   Tablet 5 milliGRAM(s) Oral daily  carvedilol 6.25 milliGRAM(s) Oral every 12 hours  urea Oral Powder 15 Gram(s) Oral daily    allopurinol 100 milliGRAM(s) Oral daily    albuterol/ipratropium for Nebulization 3 milliLiter(s) Nebulizer every 6 hours    mirtazapine 15 milliGRAM(s) Oral at bedtime    pantoprazole    Tablet 40 milliGRAM(s) Oral before breakfast  senna 2 Tablet(s) Oral at bedtime        ferrous    sulfate 325 milliGRAM(s) Oral daily    influenza  Vaccine (HIGH DOSE) 0.7 milliLiter(s) IntraMuscular once          LABS:                        14.6   8.44  )-----------( 257      ( 27 Nov 2023 07:30 )             44.6     Hgb Trend: 14.6<--, 12.2<--, 13.3<--  11-27    138  |  98  |  19  ----------------------------<  115<H>  3.5   |  37<H>  |  0.67    Ca    10.1      27 Nov 2023 07:30  Phos  1.2     11-27  Mg     2.2     11-27    TPro  7.3  /  Alb  3.0<L>  /  TBili  0.8  /  DBili  x   /  AST  28  /  ALT  36  /  AlkPhos  93  11-27    Creatinine Trend: 0.67<--, 0.72<--, 0.70<--, 0.83<--    Urinalysis Basic - ( 27 Nov 2023 07:30 )    Color: x / Appearance: x / SG: x / pH: x  Gluc: 115 mg/dL / Ketone: x  / Bili: x / Urobili: x   Blood: x / Protein: x / Nitrite: x   Leuk Esterase: x / RBC: x / WBC x   Sq Epi: x / Non Sq Epi: x / Bacteria: x      Arterial Blood Gas:  11-27 @ 04:06  7.53/49/75/41/96.2/15.9  ABG lactate: --  Arterial Blood Gas:  11-26 @ 06:10  7.48/39/51/29/91.8/5.2  ABG lactate: --        MICROBIOLOGY:     RADIOLOGY:    < from: CT Chest No Cont (11.25.23 @ 14:52) >  FINDINGS:    Tubes/Lines: None.    Lungs, airways and pleura: Secretions in the trachea, bronchus   intermedius, and right lower lobe bronchus along with the segmental   bronchi of the right lower lobe. There is patchy consolidation of the   right lower lobe and patchy consolidation of the right middle lobe. Left   lower lobe linear atelectasis. No pneumothorax. Small left pleural   effusion.    Mediastinum: The visualized thyroid gland contains bilobar hyperdense   nodules. The chest lymph nodes measure less than 10 mm in the short axis.   Unremarkable esophagus. Cardiomegaly. Coronary calcifications. Mitral   annular calcification. The aorta is ectatic. Aortic calcifications.    Upper Abdomen: Cholecystectomy.The left adrenal gland is thickened.    Bones And Soft Tissues: The bones are unremarkable.  The soft tissues are   unremarkable.      IMPRESSION:    1.  Secretions in the trachea, bronchus intermedius and right lower lobe   bronchus.  2.  Patchy consolidation of the right lower lobe and right middle lobe.    --- End of Report ---    < end of copied text >        < from: Xray Chest 1 View- PORTABLE-Urgent (11.25.23 @ 10:33) >  IMPRESSION: New onset moderate right effusion. Heart enlargement again   noted.    --- End of Report---    < end of copied text >

## 2023-11-27 NOTE — PROGRESS NOTE ADULT - SUBJECTIVE AND OBJECTIVE BOX
Patient is a 88y old  Female who presents with a chief complaint of shortness of breath  pneumonia (26 Nov 2023 22:45)      INTERVAL HPI/OVERNIGHT EVENTS:  Pt was seen and examined, no acute events.      MEDICATIONS  (STANDING):  albuterol/ipratropium for Nebulization 3 milliLiter(s) Nebulizer every 6 hours  cefepime   IVPB 2000 milliGRAM(s) IV Intermittent every 12 hours  cefepime   IVPB      heparin   Injectable 5000 Unit(s) SubCutaneous every 8 hours  influenza  Vaccine (HIGH DOSE) 0.7 milliLiter(s) IntraMuscular once  urea Oral Powder 15 Gram(s) Oral daily    MEDICATIONS  (PRN):      Allergies    ampicillin-sulbactam (Rash)    Intolerances          Vital Signs Last 24 Hrs  T(C): 36.5 (27 Nov 2023 10:26), Max: 36.6 (27 Nov 2023 05:12)  T(F): 97.7 (27 Nov 2023 10:26), Max: 97.9 (27 Nov 2023 05:12)  HR: 114 (27 Nov 2023 12:17) (82 - 116)  BP: 188/83 (27 Nov 2023 10:26) (157/79 - 192/80)  BP(mean): 117 (26 Nov 2023 16:55) (117 - 117)  RR: 19 (27 Nov 2023 10:26) (19 - 24)  SpO2: 96% (27 Nov 2023 12:17) (95% - 98%)    Parameters below as of 27 Nov 2023 12:17  Patient On (Oxygen Delivery Method): nasal cannula, high flow        PHYSICAL EXAM:  GENERAL: NAD,    HEAD:  Atraumatic, Normocephalic  EYES: EOMI, PERRLA, conjunctiva and sclera clear  ENMT: No tonsillar erythema, exudates, or enlargement; Moist mucous membranes, Good dentition, No lesions  NECK: Supple, No JVD, Normal thyroid  NERVOUS SYSTEM:  Awake, Alert, non focal  CHEST/LUNG: Diminsihed  HEART: Regular rate and rhythm; No murmurs, rubs, or gallops  ABDOMEN: Soft, Nontender, Nondistended; Bowel sounds present  EXTREMITIES:  2+ Peripheral Pulses, No clubbing, cyanosis, or edema  LYMPH: No lymphadenopathy noted  SKIN: No rashes or lesions          LABS:                        14.6   8.44  )-----------( 257      ( 27 Nov 2023 07:30 )             44.6     11-27    138  |  98  |  19  ----------------------------<  115<H>  3.5   |  37<H>  |  0.67    Ca    10.1      27 Nov 2023 07:30  Phos  1.2     11-27  Mg     2.2     11-27    TPro  7.3  /  Alb  3.0<L>  /  TBili  0.8  /  DBili  x   /  AST  28  /  ALT  36  /  AlkPhos  93  11-27      Urinalysis Basic - ( 27 Nov 2023 07:30 )    Color: x / Appearance: x / SG: x / pH: x  Gluc: 115 mg/dL / Ketone: x  / Bili: x / Urobili: x   Blood: x / Protein: x / Nitrite: x   Leuk Esterase: x / RBC: x / WBC x   Sq Epi: x / Non Sq Epi: x / Bacteria: x      CAPILLARY BLOOD GLUCOSE          Culture - Sputum (collected 26 Nov 2023 14:00)  Source: .Sputum Sputum  Gram Stain (26 Nov 2023 23:09):    No polymorphonuclear leukocytes per low power field    No Squamous epithelial cells per low power field    Rare Yeast per oil power field    Few Gram positive cocci in pairs per oil power field    Culture - Urine (collected 25 Nov 2023 10:40)  Source: Clean Catch Clean Catch (Midstream)  Final Report (26 Nov 2023 17:38):    No growth    Culture - Blood (collected 25 Nov 2023 10:24)  Source: .Blood Blood-Peripheral  Preliminary Report (27 Nov 2023 13:02):    No growth at 48 Hours    Culture - Blood (collected 25 Nov 2023 10:15)  Source: .Blood Blood-Peripheral  Preliminary Report (27 Nov 2023 13:02):    No growth at 48 Hours      RADIOLOGY & ADDITIONAL TESTS:    Imaging Personally Reviewed:  [ ] YES  [ ] NO    Consultant(s) Notes Reviewed:  [ ] YES  [ ] NO    Care Discussed with Consultants/Other Providers [ ] YES  [ ] NO

## 2023-11-28 DIAGNOSIS — G93.41 METABOLIC ENCEPHALOPATHY: ICD-10-CM

## 2023-11-28 DIAGNOSIS — R53.81 OTHER MALAISE: ICD-10-CM

## 2023-11-28 DIAGNOSIS — Z51.5 ENCOUNTER FOR PALLIATIVE CARE: ICD-10-CM

## 2023-11-28 LAB
ALBUMIN SERPL ELPH-MCNC: 2.7 G/DL — LOW (ref 3.3–5)
ALBUMIN SERPL ELPH-MCNC: 2.7 G/DL — LOW (ref 3.3–5)
ALP SERPL-CCNC: 84 U/L — SIGNIFICANT CHANGE UP (ref 40–120)
ALP SERPL-CCNC: 84 U/L — SIGNIFICANT CHANGE UP (ref 40–120)
ALT FLD-CCNC: 31 U/L — SIGNIFICANT CHANGE UP (ref 12–78)
ALT FLD-CCNC: 31 U/L — SIGNIFICANT CHANGE UP (ref 12–78)
ANION GAP SERPL CALC-SCNC: 3 MMOL/L — LOW (ref 5–17)
ANION GAP SERPL CALC-SCNC: 3 MMOL/L — LOW (ref 5–17)
AST SERPL-CCNC: 15 U/L — SIGNIFICANT CHANGE UP (ref 15–37)
AST SERPL-CCNC: 15 U/L — SIGNIFICANT CHANGE UP (ref 15–37)
BILIRUB SERPL-MCNC: 0.5 MG/DL — SIGNIFICANT CHANGE UP (ref 0.2–1.2)
BILIRUB SERPL-MCNC: 0.5 MG/DL — SIGNIFICANT CHANGE UP (ref 0.2–1.2)
BUN SERPL-MCNC: 30 MG/DL — HIGH (ref 7–23)
BUN SERPL-MCNC: 30 MG/DL — HIGH (ref 7–23)
CALCIUM SERPL-MCNC: 10.5 MG/DL — HIGH (ref 8.5–10.1)
CALCIUM SERPL-MCNC: 10.5 MG/DL — HIGH (ref 8.5–10.1)
CALCIUM SERPL-MCNC: 10.5 MG/DL — SIGNIFICANT CHANGE UP (ref 8.4–10.5)
CALCIUM SERPL-MCNC: 10.5 MG/DL — SIGNIFICANT CHANGE UP (ref 8.4–10.5)
CHLORIDE SERPL-SCNC: 100 MMOL/L — SIGNIFICANT CHANGE UP (ref 96–108)
CHLORIDE SERPL-SCNC: 100 MMOL/L — SIGNIFICANT CHANGE UP (ref 96–108)
CO2 SERPL-SCNC: 40 MMOL/L — HIGH (ref 22–31)
CO2 SERPL-SCNC: 40 MMOL/L — HIGH (ref 22–31)
CREAT SERPL-MCNC: 0.89 MG/DL — SIGNIFICANT CHANGE UP (ref 0.5–1.3)
CREAT SERPL-MCNC: 0.89 MG/DL — SIGNIFICANT CHANGE UP (ref 0.5–1.3)
CULTURE RESULTS: ABNORMAL
CULTURE RESULTS: ABNORMAL
EGFR: 63 ML/MIN/1.73M2 — SIGNIFICANT CHANGE UP
EGFR: 63 ML/MIN/1.73M2 — SIGNIFICANT CHANGE UP
GLUCOSE SERPL-MCNC: 113 MG/DL — HIGH (ref 70–99)
GLUCOSE SERPL-MCNC: 113 MG/DL — HIGH (ref 70–99)
GRAM STN FLD: ABNORMAL
GRAM STN FLD: ABNORMAL
HCT VFR BLD CALC: 44.2 % — SIGNIFICANT CHANGE UP (ref 34.5–45)
HCT VFR BLD CALC: 44.2 % — SIGNIFICANT CHANGE UP (ref 34.5–45)
HGB BLD-MCNC: 14.1 G/DL — SIGNIFICANT CHANGE UP (ref 11.5–15.5)
HGB BLD-MCNC: 14.1 G/DL — SIGNIFICANT CHANGE UP (ref 11.5–15.5)
M PNEUMO IGM SER-ACNC: 0.34 INDEX — SIGNIFICANT CHANGE UP (ref 0–0.9)
M PNEUMO IGM SER-ACNC: 0.34 INDEX — SIGNIFICANT CHANGE UP (ref 0–0.9)
MCHC RBC-ENTMCNC: 29.3 PG — SIGNIFICANT CHANGE UP (ref 27–34)
MCHC RBC-ENTMCNC: 29.3 PG — SIGNIFICANT CHANGE UP (ref 27–34)
MCHC RBC-ENTMCNC: 31.9 G/DL — LOW (ref 32–36)
MCHC RBC-ENTMCNC: 31.9 G/DL — LOW (ref 32–36)
MCV RBC AUTO: 91.9 FL — SIGNIFICANT CHANGE UP (ref 80–100)
MCV RBC AUTO: 91.9 FL — SIGNIFICANT CHANGE UP (ref 80–100)
MYCOPLASMA AG SPEC QL: NEGATIVE — SIGNIFICANT CHANGE UP
MYCOPLASMA AG SPEC QL: NEGATIVE — SIGNIFICANT CHANGE UP
NRBC # BLD: 0 /100 WBCS — SIGNIFICANT CHANGE UP (ref 0–0)
NRBC # BLD: 0 /100 WBCS — SIGNIFICANT CHANGE UP (ref 0–0)
PLATELET # BLD AUTO: 233 K/UL — SIGNIFICANT CHANGE UP (ref 150–400)
PLATELET # BLD AUTO: 233 K/UL — SIGNIFICANT CHANGE UP (ref 150–400)
POTASSIUM SERPL-MCNC: 3.7 MMOL/L — SIGNIFICANT CHANGE UP (ref 3.5–5.3)
POTASSIUM SERPL-MCNC: 3.7 MMOL/L — SIGNIFICANT CHANGE UP (ref 3.5–5.3)
POTASSIUM SERPL-SCNC: 3.7 MMOL/L — SIGNIFICANT CHANGE UP (ref 3.5–5.3)
POTASSIUM SERPL-SCNC: 3.7 MMOL/L — SIGNIFICANT CHANGE UP (ref 3.5–5.3)
PROT SERPL-MCNC: 6.8 GM/DL — SIGNIFICANT CHANGE UP (ref 6–8.3)
PROT SERPL-MCNC: 6.8 GM/DL — SIGNIFICANT CHANGE UP (ref 6–8.3)
PTH-INTACT FLD-MCNC: 69 PG/ML — HIGH (ref 15–65)
PTH-INTACT FLD-MCNC: 69 PG/ML — HIGH (ref 15–65)
RBC # BLD: 4.81 M/UL — SIGNIFICANT CHANGE UP (ref 3.8–5.2)
RBC # BLD: 4.81 M/UL — SIGNIFICANT CHANGE UP (ref 3.8–5.2)
RBC # FLD: 13.2 % — SIGNIFICANT CHANGE UP (ref 10.3–14.5)
RBC # FLD: 13.2 % — SIGNIFICANT CHANGE UP (ref 10.3–14.5)
SODIUM SERPL-SCNC: 143 MMOL/L — SIGNIFICANT CHANGE UP (ref 135–145)
SODIUM SERPL-SCNC: 143 MMOL/L — SIGNIFICANT CHANGE UP (ref 135–145)
SPECIMEN SOURCE: SIGNIFICANT CHANGE UP
SPECIMEN SOURCE: SIGNIFICANT CHANGE UP
WBC # BLD: 6.8 K/UL — SIGNIFICANT CHANGE UP (ref 3.8–10.5)
WBC # BLD: 6.8 K/UL — SIGNIFICANT CHANGE UP (ref 3.8–10.5)
WBC # FLD AUTO: 6.8 K/UL — SIGNIFICANT CHANGE UP (ref 3.8–10.5)
WBC # FLD AUTO: 6.8 K/UL — SIGNIFICANT CHANGE UP (ref 3.8–10.5)

## 2023-11-28 PROCEDURE — 99232 SBSQ HOSP IP/OBS MODERATE 35: CPT

## 2023-11-28 PROCEDURE — 93306 TTE W/DOPPLER COMPLETE: CPT | Mod: 26

## 2023-11-28 PROCEDURE — 99223 1ST HOSP IP/OBS HIGH 75: CPT | Mod: FS

## 2023-11-28 PROCEDURE — 99233 SBSQ HOSP IP/OBS HIGH 50: CPT

## 2023-11-28 RX ADMIN — Medication 100 MILLIGRAM(S): at 11:26

## 2023-11-28 RX ADMIN — CEFEPIME 100 MILLIGRAM(S): 1 INJECTION, POWDER, FOR SOLUTION INTRAMUSCULAR; INTRAVENOUS at 05:53

## 2023-11-28 RX ADMIN — Medication 81 MILLIGRAM(S): at 11:27

## 2023-11-28 RX ADMIN — Medication 3 MILLILITER(S): at 11:14

## 2023-11-28 RX ADMIN — AMLODIPINE BESYLATE 5 MILLIGRAM(S): 2.5 TABLET ORAL at 05:46

## 2023-11-28 RX ADMIN — Medication 325 MILLIGRAM(S): at 11:27

## 2023-11-28 RX ADMIN — HEPARIN SODIUM 5000 UNIT(S): 5000 INJECTION INTRAVENOUS; SUBCUTANEOUS at 21:29

## 2023-11-28 RX ADMIN — CARVEDILOL PHOSPHATE 6.25 MILLIGRAM(S): 80 CAPSULE, EXTENDED RELEASE ORAL at 05:46

## 2023-11-28 RX ADMIN — CARVEDILOL PHOSPHATE 6.25 MILLIGRAM(S): 80 CAPSULE, EXTENDED RELEASE ORAL at 17:20

## 2023-11-28 RX ADMIN — Medication 3 MILLILITER(S): at 17:24

## 2023-11-28 RX ADMIN — HEPARIN SODIUM 5000 UNIT(S): 5000 INJECTION INTRAVENOUS; SUBCUTANEOUS at 15:00

## 2023-11-28 RX ADMIN — Medication 3 MILLILITER(S): at 05:05

## 2023-11-28 RX ADMIN — SENNA PLUS 2 TABLET(S): 8.6 TABLET ORAL at 21:29

## 2023-11-28 RX ADMIN — Medication 15 GRAM(S): at 11:27

## 2023-11-28 RX ADMIN — PANTOPRAZOLE SODIUM 40 MILLIGRAM(S): 20 TABLET, DELAYED RELEASE ORAL at 05:46

## 2023-11-28 RX ADMIN — CEFEPIME 100 MILLIGRAM(S): 1 INJECTION, POWDER, FOR SOLUTION INTRAMUSCULAR; INTRAVENOUS at 17:20

## 2023-11-28 RX ADMIN — HEPARIN SODIUM 5000 UNIT(S): 5000 INJECTION INTRAVENOUS; SUBCUTANEOUS at 05:45

## 2023-11-28 NOTE — CONSULT NOTE ADULT - NS ATTEND AMEND GEN_ALL_CORE FT
87F w/ HTN. HLD, COPD on O2, gallbladder Ca s/p ex-lap, open cholecystectomy, partial hepatectomy, R colectomy due to fistula tract, hepatic abscess. Presents w/ AMS and hypoxia. She was treated for pneumonia as oupt but she worsened. Increasingly confused and increased O2 requirements at home. Admitted to hospital w/ acute on chronic hypoxic / hypercapnic respiratory failure and pneumonia. Initially on NRB and then BiPAP and now on HFNC.     - FiO2 requirements improving, currently on HFNC 50%, 50L satting 98%, decreaed to FiO2 40% w/ sats remaining the same  - maintain O2 sat >88%  - currently on cefepime as she "failed" outpt treatment with levofloxacin; plan for 5-7 day course pending clinical response  - sputum cx pending, awaiting final results  - continues to have cough w/ thick secretions, continue duonebs and chest PT for airway clearance   - Flu and COVID19 negative, send full RVP  - send mycoplasma IgM  - urine legionella, strept pneumo ag negative  - unclear if pt is on inhalers for COPD, continue w/ duonebs for now  - pt would benefit from pulmonary f/u as outpt upon d/c   - DNR/DNI  - pulmonary to continue to follow
88 y F hx gallbladder CA s/p ex-lap with cholecystectomy, right colectomy, hepatectomy 2019, COPD, HTN, TIA  presented to the ED for hypoxia and altered mental status. Recently started on Levaquin for pna. Currently req HFNC, on antibiotics. +HMPV. Pulm following. Previously on O2 at home, off since past 1 yr. MOLST on file for DNR/DNI. Pt usually ambulatory w RW and verbal at baseline, currrently remains altered. Palliative will follow for ongoing GOC discussions pending clinical course.

## 2023-11-28 NOTE — CONSULT NOTE ADULT - PROBLEM SELECTOR RECOMMENDATION 4
was becoming progressively weaker prior to admission  assistance with ADLs uses rolling walker at baseline  assistance with ADLs

## 2023-11-28 NOTE — CONSULT NOTE ADULT - PROBLEM SELECTOR RECOMMENDATION 3
was becoming progressively weaker prior to admission  assistance with ADLs very lethargic, likely worse in the setting of acute illness  promote sleep/wake cycles and family presence, cluster care very lethargic, likely worse in the setting of acute illness, unfamiliar environment and disrupted sleep/wake cycles  has been off Remeron for 1 year, would discontinue, can use melatonin if needed for sleep  promote sleep/wake cycles and family presence, cluster care

## 2023-11-28 NOTE — CONSULT NOTE ADULT - ASSESSMENT
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 Was being treated for outpatient PNA on Levaquin x 3 days PTA. Admitted to medicine with RML/ RLL PNA requiring BIPAP. Pulmonary consulted.     Diagnosis: Acute on chronic hypoxemic respiratory failure, PNA, COPD    Reccs:    - Acute on Chronic hypoxemic respiratory failure likely 2/2 RML/ RLL PNA seen on Ct imaging with secretions in right bronchus and trachea  - Initially on BIPAP now HFNC. Titrated from 50L/50% FiO2--> 50L/ 40% FiO2 today.  - Continue to titrate FiO2 as tolerates for goal O2 > 88%  - No wheezing on exam, unlikely COPD exacerbation would hold off on corticosteroids unless patient clinically worsens .  - ABX as per primary team  - Junie q6  - Chest PT for secretion mobilization  - F/U official sputum cx results  - Blood cx NTD  - Flu/ COVID negative. F/U full RVP result  - F/O Mycoplasma result  - Legionella/strep/MRSA negative  - DNR/DNI  - Plan of care discussed with Pulmonology attending MD Bell and patient at bedside.       
88 year old female PMH of gallbaldder cancer s/p ex-lap with cholecystectomy, right colectomy, hepatectomy, COPD, HTN, TIA and HLD presented to the ED for hypoxia and altered mental status.  Patient was seen at an urgent care on Thanksgiving, started on Levaquin for pna. Patient became more confused at home found to be hypoxic and on HFNC.  Patient with DNR/I on chart.  Palliative care consulted for complex decision making.

## 2023-11-28 NOTE — CONSULT NOTE ADULT - SUBJECTIVE AND OBJECTIVE BOX
HPI:  History unattainable from patient, hx obtained from daughter America at [phone].  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 daughter states that Patient had significant cough and weakness (could not get out of the bed) for about 2 days around thanksgiving eve. She was brought to the urgent care on thanksgiving day, Xray was done, she was told she had pneumonia and was given levaquin orally ( today would be the third day). Last night patient took off the oxygen, she was confused, and she threw up ( daughter believes this is because she could not get her phlegm out ), and had to increase her oxygen level (she takes 2L at home). Patient was still unable to walk today, and today she was not responding at home, oxygen saturation was around 20% and so she was brought to the hospital. At home, did not have any fevers, chills, no LE swelling, abdominal pain. At home, her grandson is also sick with a viral illness.  (25 Nov 2023 13:58)    PERTINENT PM/SXH:   Bladder cancer    HTN (hypertension)    HLD (hyperlipidemia)    Liver abscess      H/O gallbladder cancer      FAMILY HISTORY:    ITEMS NOT CHECKED ARE NOT PRESENT    SOCIAL HISTORY:   Significant other/partner:  [ ]  Children: yes [ ]  Hindu/Spirituality: Spiritism  Substance hx:  [ ]   Tobacco hx:  [ ]   Alcohol hx: [ ]   Home Opioid hx:  [ ] I-Stop Reference No: Reference #: [id number]  Living Situation: [x ]Home  [ ]Long term care  [ ]Rehab [ ]Other    ADVANCE DIRECTIVES:    DNR  MOLST  [x ]  Living Will  [ ]   DECISION MAKER(s):  [ ] Health Care Proxy(s)  [x ] Surrogate(s)  [ ] Guardian           Name(s): Phone Number(s): America Butler (daughter) [phone]    BASELINE (I)ADL(s) (prior to admission):  [place]: [ ]Total  [ ] Moderate [x ]Dependent    Allergies    ampicillin-sulbactam (Rash)    Intolerances    MEDICATIONS  (STANDING):  albuterol/ipratropium for Nebulization 3 milliLiter(s) Nebulizer every 6 hours  allopurinol 100 milliGRAM(s) Oral daily  amLODIPine   Tablet 5 milliGRAM(s) Oral daily  aspirin enteric coated 81 milliGRAM(s) Oral daily  carvedilol 6.25 milliGRAM(s) Oral every 12 hours  cefepime   IVPB 2000 milliGRAM(s) IV Intermittent every 12 hours  cefepime   IVPB      ferrous    sulfate 325 milliGRAM(s) Oral daily  heparin   Injectable 5000 Unit(s) SubCutaneous every 8 hours  influenza  Vaccine (HIGH DOSE) 0.7 milliLiter(s) IntraMuscular once  mirtazapine 15 milliGRAM(s) Oral at bedtime  pantoprazole    Tablet 40 milliGRAM(s) Oral before breakfast  senna 2 Tablet(s) Oral at bedtime  urea Oral Powder 15 Gram(s) Oral daily    MEDICATIONS  (PRN):    PRESENT SYMPTOMS: [ ]Unable to obtain due to poor mentation   Source if other than patient:  [ ]Family   [ ]Team     Pain: [ ] yes [ ] no  QOL impact -   Location -                    Aggravating factors -  Quality -  Radiation -  Timing-  Severity (0-10 scale):  Minimal acceptable level (0-10 scale):     PAIN AD Score:     [email or web address]/[email or web address] (press ctrl +  left click to view)    Dyspnea:                           [ ]Mild [ ]Moderate [ ]Severe  Anxiety:                             [ ]Mild [ ]Moderate [ ]Severe  Fatigue:                             [ ]Mild [ ]Moderate [ ]Severe  Nausea:                             [ ]Mild [ ]Moderate [ ]Severe  Loss of appetite:              [ ]Mild [ ]Moderate [ ]Severe  Constipation:                    [ ]Mild [ ]Moderate [ ]Severe    Other Symptoms:  [ ]All other review of systems negative     Karnofsky Performance Score/Palliative Performance Status Version 2:         %    http://npcrc.org/files/news/palliative_performance_scale_ppsv2.pdf  PHYSICAL EXAM:  Vital Signs Last 24 Hrs  T(C): 37 (28 Nov 2023 05:19), Max: 37.1 (27 Nov 2023 23:49)  T(F): 98.6 (28 Nov 2023 05:19), Max: 98.8 (27 Nov 2023 23:49)  HR: 104 (28 Nov 2023 06:01) (92 - 120)  BP: 152/74 (28 Nov 2023 05:19) (152/74 - 180/80)  BP(mean): --  RR: 18 (28 Nov 2023 05:45) (16 - 20)  SpO2: 95% (28 Nov 2023 06:01) (94% - 97%)    Parameters below as of 28 Nov 2023 06:01  Patient On (Oxygen Delivery Method): nasal cannula, high flow     I&O's Summary    27 Nov 2023 07:01  -  28 Nov 2023 07:00  --------------------------------------------------------  IN: 240 mL / OUT: 0 mL / NET: 240 mL    GENERAL:  [ ]Alert  [ ]Oriented x   [ ]Lethargic  [ ]Cachexia  [ ]Unarousable  [ ]Verbal  [ ]Non-Verbal  Behavioral:   [ ] Anxiety  [ ] Delirium [ ] Agitation [ ] Other  HEENT:  [ ]Normal   [ ]Dry mouth   [ ]ET Tube/Trach  [ ]Oral lesions  PULMONARY:   [ ]Clear [ ]Tachypnea  [ ]Audible excessive secretions   [ ]Rhonchi        [ ]Right [ ]Left [ ]Bilateral  [ ]Crackles        [ ]Right [ ]Left [ ]Bilateral  [ ]Wheezing     [ ]Right [ ]Left [ ]Bilateral  CARDIOVASCULAR:    [ ]Regular [ ]Irregular [ ]Tachy  [ ]Michael [ ]Murmur [ ]Other  GASTROINTESTINAL:  [ ]Soft  [ ]Distended   [ ]+BS  [ ]Non tender [ ]Tender  [ ]PEG [ ]OGT/ NGT  Last BM: 11/27/23 GENITOURINARY:  [ ]Normal [ ] Incontinent   [ ]Oliguria/Anuria   [ ]Otto  MUSCULOSKELETAL:   [ ]Normal   [ ]Weakness  [ ]Bed/Wheelchair bound [ ]Edema  NEUROLOGIC:   [ ]No focal deficits  [ ] Cognitive impairment  [ ] Dysphagia [ ]Dysarthria [ ] Paresis [ ]Other   SKIN:   [ ]Normal   [ ]Pressure ulcer(s)  [ ]Rash    CRITICAL CARE:  [ ] Shock Present  [ ]Septic [ ]Cardiogenic [ ]Neurologic [ ]Hypovolemic  [ ]  Vasopressors [ ]  Inotropes   [ ] Respiratory failure present [ ] mechanical ventilation [ ] non-invasive ventilatory support [ ] High flow  [ ] Acute  [ ] Chronic [ ] Hypoxic  [ ] Hypercarbic [ ] Other  [ ] Other organ failure     LABS:                        14.1   6.80  )-----------( 233      ( 28 Nov 2023 08:00 )             44.2   11-28    143  |  100  |  30<H>  ----------------------------<  113<H>  3.7   |  40<H>  |  0.89    Ca    10.5<H>      28 Nov 2023 08:00  Phos  1.2     11-27  Mg     2.2     11-27    TPro  6.8  /  Alb  2.7<L>  /  TBili  0.5  /  DBili  x   /  AST  15  /  ALT  31  /  AlkPhos  84  11-28    Culture - Sputum (11.26.23 @ 14:00)    Gram Stain:   No polymorphonuclear leukocytes per low power field  No Squamous epithelial cells per low power field  Rare Yeast per oil power field  Few Gram positive cocci in pairs per oil power field   Specimen Source: .Sputum Sputum   Culture Results:   Normal Respiratory Va present    Culture - Urine (11.25.23 @ 10:40)    Specimen Source: Clean Catch Clean Catch (Midstream)   Culture Results:   No growth    Culture - Blood (11.25.23 @ 10:15)    Specimen Source: .Blood Blood-Peripheral   Culture Results:   No growth at 48 Hours    RADIOLOGY & ADDITIONAL STUDIES:  < from: CT Chest No Cont (11.25.23 @ 14:52) >  IMPRESSION:  1.  Secretions in the trachea, bronchus intermedius and right lower lobe   bronchus.  2.  Patchy consolidation of the right lower lobe and right middle lobe.  --- End of Report ---  < end of copied text >    < from: Xray Chest 1 View- PORTABLE-Urgent (11.25.23 @ 10:33) >  IMPRESSION: New onset moderate right effusion. Heart enlargement again   noted.  --- End of Report---  < end of copied text >    PROTEIN CALORIE MALNUTRITION PRESENT: [ ] Yes [ ] No  [ ] PPSV2 < or = to 30% [ ] significant weight loss  [ ] poor nutritional intake [ ] catabolic state [ ] anasarca     Artificial Nutrition [ ]     REFERRALS:   [ ]Chaplaincy  [ ] Hospice  [ ]Child Life  [ ]Social Work  [ ]Case management [ ]Holistic Therapy     Goals of Care Document:     HPI:  History unattainable from patient, hx obtained from daughter America at [phone].  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 daughter states that Patient had significant cough and weakness (could not get out of the bed) for about 2 days around thanksgiving eve. She was brought to the urgent care on thanksgiving day, Xray was done, she was told she had pneumonia and was given levaquin orally ( today would be the third day). Last night patient took off the oxygen, she was confused, and she threw up ( daughter believes this is because she could not get her phlegm out ), and had to increase her oxygen level (she takes 2L at home). Patient was still unable to walk today, and today she was not responding at home, oxygen saturation was around 20% and so she was brought to the hospital. At home, did not have any fevers, chills, no LE swelling, abdominal pain. At home, her grandson is also sick with a viral illness.  (25 Nov 2023 13:58)    PERTINENT PM/SXH:   Bladder cancer    HTN (hypertension)    HLD (hyperlipidemia)    Liver abscess      H/O gallbladder cancer      FAMILY HISTORY:    ITEMS NOT CHECKED ARE NOT PRESENT    SOCIAL HISTORY:   Significant other/partner:  [ ]  Children: yes [ ]  Taoist/Spirituality: Druze  Substance hx:  [ ]   Tobacco hx:  [ ]   Alcohol hx: [ ]   Home Opioid hx:  [ ] I-Stop Reference No: Reference #: [id number]  Living Situation: [x ]Home  [ ]Long term care  [ ]Rehab [ ]Other    ADVANCE DIRECTIVES:    DNR  MOLST  [x ]  Living Will  [ ]   DECISION MAKER(s):  [ ] Health Care Proxy(s)  [x ] Surrogate(s)  [ ] Guardian           Name(s): Phone Number(s): America Butler (daughter) [phone]    BASELINE (I)ADL(s) (prior to admission):  [place]: [ ]Total  [ ] Moderate [x ]Dependent    Allergies    ampicillin-sulbactam (Rash)    Intolerances    MEDICATIONS  (STANDING):  albuterol/ipratropium for Nebulization 3 milliLiter(s) Nebulizer every 6 hours  allopurinol 100 milliGRAM(s) Oral daily  amLODIPine   Tablet 5 milliGRAM(s) Oral daily  aspirin enteric coated 81 milliGRAM(s) Oral daily  carvedilol 6.25 milliGRAM(s) Oral every 12 hours  cefepime   IVPB 2000 milliGRAM(s) IV Intermittent every 12 hours  cefepime   IVPB      ferrous    sulfate 325 milliGRAM(s) Oral daily  heparin   Injectable 5000 Unit(s) SubCutaneous every 8 hours  influenza  Vaccine (HIGH DOSE) 0.7 milliLiter(s) IntraMuscular once  mirtazapine 15 milliGRAM(s) Oral at bedtime  pantoprazole    Tablet 40 milliGRAM(s) Oral before breakfast  senna 2 Tablet(s) Oral at bedtime  urea Oral Powder 15 Gram(s) Oral daily    MEDICATIONS  (PRN):    PRESENT SYMPTOMS: [ ]Unable to obtain due to poor mentation   Source if other than patient:  [ ]Family   [ ]Team     Pain: [ ] yes [ ] no  QOL impact -   Location -                    Aggravating factors -  Quality -  Radiation -  Timing-  Severity (0-10 scale):  Minimal acceptable level (0-10 scale):     PAIN AD Score:     [email or web address]/cog/[email or web address] (press ctrl +  left click to view)    Dyspnea:                           [ ]Mild [ ]Moderate [ ]Severe  Anxiety:                             [ ]Mild [ ]Moderate [ ]Severe  Fatigue:                             [ ]Mild [ ]Moderate [ ]Severe  Nausea:                             [ ]Mild [ ]Moderate [ ]Severe  Loss of appetite:              [ ]Mild [ ]Moderate [ ]Severe  Constipation:                    [ ]Mild [ ]Moderate [ ]Severe    Other Symptoms:  [ ]All other review of systems negative     Karnofsky Performance Score/Palliative Performance Status Version 2:         %    http://npcrc.org/files/news/palliative_performance_scale_ppsv2.pdf  PHYSICAL EXAM:  Vital Signs Last 24 Hrs  T(C): 37 (28 Nov 2023 05:19), Max: 37.1 (27 Nov 2023 23:49)  T(F): 98.6 (28 Nov 2023 05:19), Max: 98.8 (27 Nov 2023 23:49)  HR: 104 (28 Nov 2023 06:01) (92 - 120)  BP: 152/74 (28 Nov 2023 05:19) (152/74 - 180/80)  BP(mean): --  RR: 18 (28 Nov 2023 05:45) (16 - 20)  SpO2: 95% (28 Nov 2023 06:01) (94% - 97%)    Parameters below as of 28 Nov 2023 06:01  Patient On (Oxygen Delivery Method): nasal cannula, high flow     I&O's Summary    27 Nov 2023 07:01  -  28 Nov 2023 07:00  --------------------------------------------------------  IN: 240 mL / OUT: 0 mL / NET: 240 mL    GENERAL:  [ ]Alert  [ ]Oriented x   [ x]Lethargic  [ ]Cachexia  [ ]Unarousable  [ x]Verbal mumbled sounds, minimal speech  [ ]Non-Verbal  Behavioral:   [ ] Anxiety  [ ] Delirium [ ] Agitation [ ] Other  HEENT:  [ x]Normal   [ ]Dry mouth   [ ]ET Tube/Trach  [ ]Oral lesions  PULMONARY:   [ ]Clear [ ]Tachypnea  [ ]Audible excessive secretions   [ ]Rhonchi        [ ]Right [ ]Left [ ]Bilateral  [ ]Crackles        [ ]Right [ ]Left [ ]Bilateral  [ ]Wheezing     [ ]Right [ ]Left [ ]Bilateral  diminished breath sounds bilaterally   CARDIOVASCULAR:    [ x]Regular [ ]Irregular [ ]Tachy  [ ]Michael [ ]Murmur [ ]Other  GASTROINTESTINAL:  [x]Soft  [ ]Distended   [ ]+BS  [ ]Non tender [ ]Tender  [ ]PEG [ ]OGT/ NGT  Last BM: 11/27/23 GENITOURINARY:  [ ]Normal [x ] Incontinent   [ ]Oliguria/Anuria   [ ]Otto  MUSCULOSKELETAL:   [ ]Normal   [x ]Weakness  [ ]Bed/Wheelchair bound [ ]Edema  NEUROLOGIC:   [ ]No focal deficits  [x ] Cognitive impairment  [ ] Dysphagia [ ]Dysarthria [ ] Paresis [ ]Other   SKIN:   [ ]Normal   [ ]Pressure ulcer(s)  [ ]Rash    CRITICAL CARE:  [ ] Shock Present  [ ]Septic [ ]Cardiogenic [ ]Neurologic [ ]Hypovolemic  [ ]  Vasopressors [ ]  Inotropes   [ x] Respiratory failure present [ ] mechanical ventilation [ ] non-invasive ventilatory support [ x] High flow  [ ] Acute  [ ] Chronic [ ] Hypoxic  [ ] Hypercarbic [ ] Other  [ ] Other organ failure     LABS:                        14.1   6.80  )-----------( 233      ( 28 Nov 2023 08:00 )             44.2   11-28    143  |  100  |  30<H>  ----------------------------<  113<H>  3.7   |  40<H>  |  0.89    Ca    10.5<H>      28 Nov 2023 08:00  Phos  1.2     11-27  Mg     2.2     11-27    TPro  6.8  /  Alb  2.7<L>  /  TBili  0.5  /  DBili  x   /  AST  15  /  ALT  31  /  AlkPhos  84  11-28    Culture - Sputum (11.26.23 @ 14:00)    Gram Stain:   No polymorphonuclear leukocytes per low power field  No Squamous epithelial cells per low power field  Rare Yeast per oil power field  Few Gram positive cocci in pairs per oil power field   Specimen Source: .Sputum Sputum   Culture Results:   Normal Respiratory Va present    Culture - Urine (11.25.23 @ 10:40)    Specimen Source: Clean Catch Clean Catch (Midstream)   Culture Results:   No growth    Culture - Blood (11.25.23 @ 10:15)    Specimen Source: .Blood Blood-Peripheral   Culture Results:   No growth at 48 Hours    Respiratory Viral Panel with COVID-19 by SATISH (11.27.23 @ 16:30)    Rapid RVP Result: Detected   SARS-CoV-2: NotDetec: This Respiratory Panel uses polymerase chain reaction (PCR) to detect for  adenovirus; coronavirus (HKU1, NL63, 229E, OC43); human metapneumovirus  (hMPV); human enterovirus/rhinovirus (Entero/RV); influenza A; influenza  A/H1; influenza A/H3; influenza A/H1-2009; influenza B; parainfluenza  viruses 1, 2, 3, 4; respiratory syncytial virus; Mycoplasma pneumoniae;  Chlamydophila pneumoniae; and SARS-CoV-2.   hMPV (RapRVP): Detected    RADIOLOGY & ADDITIONAL STUDIES:  < from: CT Chest No Cont (11.25.23 @ 14:52) >  IMPRESSION:  1.  Secretions in the trachea, bronchus intermedius and right lower lobe   bronchus.  2.  Patchy consolidation of the right lower lobe and right middle lobe.  --- End of Report ---  < end of copied text >    < from: Xray Chest 1 View- PORTABLE-Urgent (11.25.23 @ 10:33) >  IMPRESSION: New onset moderate right effusion. Heart enlargement again   noted.  --- End of Report---  < end of copied text >    PROTEIN CALORIE MALNUTRITION PRESENT: [ ] Yes [ ] No  [ ] PPSV2 < or = to 30% [ ] significant weight loss  [ ] poor nutritional intake [ ] catabolic state [ ] anasarca     Artificial Nutrition [ ]     REFERRALS:   [ ]Chaplaincy  [ ] Hospice  [ ]Child Life  [ ]Social Work  [ ]Case management [ ]Holistic Therapy     Goals of Care Document:     HPI:  History unattainable from patient, hx obtained from daughter America at [phone].  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 daughter states that Patient had significant cough and weakness (could not get out of the bed) for about 2 days around thanksgiving eve. She was brought to the urgent care on thanksgiving day, Xray was done, she was told she had pneumonia and was given levaquin orally ( today would be the third day). Last night patient took off the oxygen, she was confused, and she threw up ( daughter believes this is because she could not get her phlegm out ), and had to increase her oxygen level (she takes 2L at home). Patient was still unable to walk today, and today she was not responding at home, oxygen saturation was around 20% and so she was brought to the hospital. At home, did not have any fevers, chills, no LE swelling, abdominal pain. At home, her grandson is also sick with a viral illness.  (25 Nov 2023 13:58)    PERTINENT PM/SXH:   Bladder cancer    HTN (hypertension)    HLD (hyperlipidemia)    Liver abscess      H/O gallbladder cancer      FAMILY HISTORY:    ITEMS NOT CHECKED ARE NOT PRESENT    SOCIAL HISTORY:   Significant other/partner: no [ ]  Children: yes [ ]  Holiness/Spirituality: Nondenominational  Substance hx:  [ ]   Tobacco hx:  [ ]   Alcohol hx: [ ]   Home Opioid hx:  [ ] I-Stop Reference No: Reference #: [id number]  Living Situation: [x ]Home  [ ]Long term care  [ ]Rehab [ ]Other    ADVANCE DIRECTIVES:    DNR  MOLST  [x ]  Living Will  [ ]   DECISION MAKER(s):  [ ] Health Care Proxy(s)  [x ] Surrogate(s)  [ ] Guardian           Name(s): Phone Number(s): America Butler (daughter) [phone]    BASELINE (I)ADL(s) (prior to admission):  [place]: [ ]Total  [ x] Moderate [ ]Dependent    Allergies    ampicillin-sulbactam (Rash)    Intolerances    MEDICATIONS  (STANDING):  albuterol/ipratropium for Nebulization 3 milliLiter(s) Nebulizer every 6 hours  allopurinol 100 milliGRAM(s) Oral daily  amLODIPine   Tablet 5 milliGRAM(s) Oral daily  aspirin enteric coated 81 milliGRAM(s) Oral daily  carvedilol 6.25 milliGRAM(s) Oral every 12 hours  cefepime   IVPB 2000 milliGRAM(s) IV Intermittent every 12 hours  cefepime   IVPB      ferrous    sulfate 325 milliGRAM(s) Oral daily  heparin   Injectable 5000 Unit(s) SubCutaneous every 8 hours  influenza  Vaccine (HIGH DOSE) 0.7 milliLiter(s) IntraMuscular once  mirtazapine 15 milliGRAM(s) Oral at bedtime  pantoprazole    Tablet 40 milliGRAM(s) Oral before breakfast  senna 2 Tablet(s) Oral at bedtime  urea Oral Powder 15 Gram(s) Oral daily    MEDICATIONS  (PRN):    PRESENT SYMPTOMS: [x ]Unable to obtain due to poor mentation   Source if other than patient:  [ ]Family   [ ]Team     Pain: [ ] yes [ ] no  QOL impact -   Location -                    Aggravating factors -  Quality -  Radiation -  Timing-  Severity (0-10 scale):  Minimal acceptable level (0-10 scale):     PAIN AD Score:     [email or web address]/[email or web address] (press ctrl +  left click to view)    Dyspnea:                           [ ]Mild [ ]Moderate [ ]Severe  Anxiety:                             [ ]Mild [ ]Moderate [ ]Severe  Fatigue:                             [ ]Mild [ ]Moderate [ ]Severe  Nausea:                             [ ]Mild [ ]Moderate [ ]Severe  Loss of appetite:              [ ]Mild [ ]Moderate [ ]Severe  Constipation:                    [ ]Mild [ ]Moderate [ ]Severe    Other Symptoms:  [ ]All other review of systems negative     Karnofsky Performance Score/Palliative Performance Status Version 2:       40  %    http://npcrc.org/files/news/palliative_performance_scale_ppsv2.pdf  PHYSICAL EXAM:  Vital Signs Last 24 Hrs  T(C): 37 (28 Nov 2023 05:19), Max: 37.1 (27 Nov 2023 23:49)  T(F): 98.6 (28 Nov 2023 05:19), Max: 98.8 (27 Nov 2023 23:49)  HR: 104 (28 Nov 2023 06:01) (92 - 120)  BP: 152/74 (28 Nov 2023 05:19) (152/74 - 180/80)  BP(mean): --  RR: 18 (28 Nov 2023 05:45) (16 - 20)  SpO2: 95% (28 Nov 2023 06:01) (94% - 97%)    Parameters below as of 28 Nov 2023 06:01  Patient On (Oxygen Delivery Method): nasal cannula, high flow     I&O's Summary    27 Nov 2023 07:01  -  28 Nov 2023 07:00  --------------------------------------------------------  IN: 240 mL / OUT: 0 mL / NET: 240 mL    GENERAL:  [ ]Alert  [ ]Oriented x   [ x]Lethargic  [ ]Cachexia  [ ]Unarousable  [ x]Verbal mumbled sounds, minimal speech  [ ]Non-Verbal  Behavioral:   [ ] Anxiety  [ ] Delirium [ ] Agitation [ ] Other  HEENT:  [ x]Normal   [ ]Dry mouth   [ ]ET Tube/Trach  [ ]Oral lesions  PULMONARY:   [ ]Clear [ ]Tachypnea  [ ]Audible excessive secretions   [ ]Rhonchi        [ ]Right [ ]Left [ ]Bilateral  [ ]Crackles        [ ]Right [ ]Left [ ]Bilateral  [ ]Wheezing     [ ]Right [ ]Left [ ]Bilateral  diminished breath sounds bilaterally   CARDIOVASCULAR:    [ x]Regular [ ]Irregular [ ]Tachy  [ ]Michael [ ]Murmur [ ]Other  GASTROINTESTINAL:  [x]Soft  [ ]Distended   [ ]+BS  [ ]Non tender [ ]Tender  [ ]PEG [ ]OGT/ NGT  Last BM: 11/27/23 GENITOURINARY:  [ ]Normal [x ] Incontinent   [ ]Oliguria/Anuria   [ ]Otto  MUSCULOSKELETAL:   [ ]Normal   [x ]Weakness  [ ]Bed/Wheelchair bound [ ]Edema  NEUROLOGIC:   [ ]No focal deficits  [x ] Cognitive impairment  [ ] Dysphagia [ ]Dysarthria [ ] Paresis [ ]Other   SKIN:   [ ]Normal   [ ]Pressure ulcer(s)  [ ]Rash    CRITICAL CARE:  [ ] Shock Present  [ ]Septic [ ]Cardiogenic [ ]Neurologic [ ]Hypovolemic  [ ]  Vasopressors [ ]  Inotropes   [ x] Respiratory failure present [ ] mechanical ventilation [ ] non-invasive ventilatory support [ x] High flow  [ ] Acute  [ ] Chronic [ ] Hypoxic  [ ] Hypercarbic [ ] Other  [ ] Other organ failure     LABS:                        14.1   6.80  )-----------( 233      ( 28 Nov 2023 08:00 )             44.2   11-28    143  |  100  |  30<H>  ----------------------------<  113<H>  3.7   |  40<H>  |  0.89    Ca    10.5<H>      28 Nov 2023 08:00  Phos  1.2     11-27  Mg     2.2     11-27    TPro  6.8  /  Alb  2.7<L>  /  TBili  0.5  /  DBili  x   /  AST  15  /  ALT  31  /  AlkPhos  84  11-28    Culture - Sputum (11.26.23 @ 14:00)    Gram Stain:   No polymorphonuclear leukocytes per low power field  No Squamous epithelial cells per low power field  Rare Yeast per oil power field  Few Gram positive cocci in pairs per oil power field   Specimen Source: .Sputum Sputum   Culture Results:   Normal Respiratory Va present    Culture - Urine (11.25.23 @ 10:40)    Specimen Source: Clean Catch Clean Catch (Midstream)   Culture Results:   No growth    Culture - Blood (11.25.23 @ 10:15)    Specimen Source: .Blood Blood-Peripheral   Culture Results:   No growth at 48 Hours    Respiratory Viral Panel with COVID-19 by [facility] (11.27.23 @ 16:30)    Rapid RVP Result: Detected   SARS-CoV-2: NotDete: This Respiratory Panel uses polymerase chain reaction (PCR) to detect for  adenovirus; coronavirus (HKU1, NL63, 229E, OC43); human metapneumovirus  (hMPV); human enterovirus/rhinovirus (Entero/RV); influenza A; influenza  A/H1; influenza A/H3; influenza A/H1-2009; influenza B; parainfluenza  viruses 1, 2, 3, 4; respiratory syncytial virus; Mycoplasma pneumoniae;  Chlamydophila pneumoniae; and SARS-CoV-2.   hMPV (RapRVP): Detected    RADIOLOGY & ADDITIONAL STUDIES:  < from: CT Chest No Cont (11.25.23 @ 14:52) >  IMPRESSION:  1.  Secretions in the trachea, bronchus intermedius and right lower lobe   bronchus.  2.  Patchy consolidation of the right lower lobe and right middle lobe.  --- End of Report ---  < end of copied text >    < from: Xray Chest 1 View- PORTABLE-Urgent (11.25.23 @ 10:33) >  IMPRESSION: New onset moderate right effusion. Heart enlargement again   noted.  --- End of Report---  < end of copied text >    PROTEIN CALORIE MALNUTRITION PRESENT: [ ] Yes [ ] No  [ ] PPSV2 < or = to 30% [ ] significant weight loss  [ ] poor nutritional intake [ ] catabolic state [ ] anasarca     Artificial Nutrition [ ]     REFERRALS:   [ ]Chaplaincy  [ ] Hospice  [ ]Child Life  [ ]Social Work  [ ]Case management [ ]Holistic Therapy     Goals of Care Document:

## 2023-11-28 NOTE — PROGRESS NOTE ADULT - SUBJECTIVE AND OBJECTIVE BOX
INTERVAL HPI/OVERNIGHT EVENTS:  - on high flow NC 50/40 overnight, weaned to 40/35% this AM     SUBJECTIVE: Patient seen and examined at bedside.   ROS: All negative except as listed above.    VITAL SIGNS:  ICU Vital Signs Last 24 Hrs  T(C): 37 (28 Nov 2023 05:19), Max: 37.1 (27 Nov 2023 23:49)  T(F): 98.6 (28 Nov 2023 05:19), Max: 98.8 (27 Nov 2023 23:49)  HR: 104 (28 Nov 2023 06:01) (82 - 120)  BP: 152/74 (28 Nov 2023 05:19) (152/74 - 188/83)  BP(mean): --  ABP: --  ABP(mean): --  RR: 18 (28 Nov 2023 05:45) (16 - 20)  SpO2: 95% (28 Nov 2023 06:01) (94% - 97%)    O2 Parameters below as of 28 Nov 2023 06:01  Patient On (Oxygen Delivery Method): nasal cannula, high flow      11-27 @ 07:01  -  11-28 @ 07:00  --------------------------------------------------------  IN: 240 mL / OUT: 0 mL / NET: 240 mL    ECG: reviewed.    PHYSICAL EXAM:  General: NAD, elderly female on HFNC  HEENT: NC/AT, PERRL, conjunctiva and sclera clear, MMM,   Neck: supple, No JVD, trachea midline   Respiratory: Diffuse Rhonchi bilaterally  Cardiovascular: RRR, no m/r/g  Abdomen: Soft, NTTP, ND, + BS  Extremities: no c/c/e  Skin: intact, no rashes or lesions  Neurological: awake, alert, non focal  Psychiatry: appropriate affect     MEDICATIONS:  MEDICATIONS  (STANDING):  albuterol/ipratropium for Nebulization 3 milliLiter(s) Nebulizer every 6 hours  allopurinol 100 milliGRAM(s) Oral daily  amLODIPine   Tablet 5 milliGRAM(s) Oral daily  aspirin enteric coated 81 milliGRAM(s) Oral daily  carvedilol 6.25 milliGRAM(s) Oral every 12 hours  cefepime   IVPB 2000 milliGRAM(s) IV Intermittent every 12 hours  cefepime   IVPB      ferrous    sulfate 325 milliGRAM(s) Oral daily  heparin   Injectable 5000 Unit(s) SubCutaneous every 8 hours  influenza  Vaccine (HIGH DOSE) 0.7 milliLiter(s) IntraMuscular once  mirtazapine 15 milliGRAM(s) Oral at bedtime  pantoprazole    Tablet 40 milliGRAM(s) Oral before breakfast  senna 2 Tablet(s) Oral at bedtime  urea Oral Powder 15 Gram(s) Oral daily    MEDICATIONS  (PRN):      ALLERGIES:  Allergies  ampicillin-sulbactam (Rash)    Intolerances        LABS:                        14.6   8.44  )-----------( 257      ( 27 Nov 2023 07:30 )             44.6     11-27    138  |  98  |  19  ----------------------------<  115<H>  3.5   |  37<H>  |  0.67    Ca    10.1      27 Nov 2023 07:30  Phos  1.2     11-27  Mg     2.2     11-27    TPro  7.3  /  Alb  3.0<L>  /  TBili  0.8  /  DBili  x   /  AST  28  /  ALT  36  /  AlkPhos  93  11-27      Urinalysis Basic - ( 27 Nov 2023 07:30 )    Color: x / Appearance: x / SG: x / pH: x  Gluc: 115 mg/dL / Ketone: x  / Bili: x / Urobili: x   Blood: x / Protein: x / Nitrite: x   Leuk Esterase: x / RBC: x / WBC x   Sq Epi: x / Non Sq Epi: x / Bacteria: x      ABG:      vBG:    Micro:    Culture - Blood (collected 11-25-23 @ 10:24)  Source: .Blood Blood-Peripheral  Preliminary Report (11-27-23 @ 13:02):    No growth at 48 Hours    Culture - Blood (collected 11-25-23 @ 10:15)  Source: .Blood Blood-Peripheral  Preliminary Report (11-27-23 @ 13:02):    No growth at 48 Hours        Culture - Sputum (collected 11-26-23 @ 14:00)  Source: .Sputum Sputum  Gram Stain (prelim) (11-27-23 @ 16:30):    No polymorphonuclear leukocytes per low power field    No Squamous epithelial cells per low power field    Rare Yeast per oil power field    Few Gram positive cocci in pairs per oil power field  Preliminary Report (11-27-23 @ 16:30):    Normal Respiratory Va present        RADIOLOGY & ADDITIONAL TESTS: Reviewed.  < from: CT Chest No Cont (11.25.23 @ 14:52) >  FINDINGS:    Tubes/Lines: None.    Lungs, airways and pleura: Secretions in the trachea, bronchus   intermedius, and right lower lobe bronchus along with the segmental   bronchi of the right lower lobe. There is patchy consolidation of the   right lower lobe and patchy consolidation of the right middle lobe. Left   lower lobe linear atelectasis. No pneumothorax. Small left pleural   effusion.    Mediastinum: The visualized thyroid gland contains bilobar hyperdense   nodules. The chest lymph nodes measure less than 10 mm in the short axis.   Unremarkable esophagus. Cardiomegaly. Coronary calcifications. Mitral   annular calcification. The aorta is ectatic. Aortic calcifications.    Upper Abdomen: Cholecystectomy.The left adrenal gland is thickened.    Bones And Soft Tissues: The bones are unremarkable.  The soft tissues are   unremarkable.      IMPRESSION:    1.  Secretions in the trachea, bronchus intermedius and right lower lobe   bronchus.  2.  Patchy consolidation of the right lower lobe and right middle lobe.    --- End of Report ---    < end of copied text >        < from: Xray Chest 1 View- PORTABLE-Urgent (11.25.23 @ 10:33) >  IMPRESSION: New onset moderate right effusion. Heart enlargement again   noted.    --- End of Report---    < end of copied text >       INTERVAL HPI/OVERNIGHT EVENTS:  - on high flow NC 50/40 overnight, weaned to 30/30% this AM     SUBJECTIVE: Patient seen and examined at bedside.   ROS: All negative except as listed above.    VITAL SIGNS:  ICU Vital Signs Last 24 Hrs  T(C): 37 (28 Nov 2023 05:19), Max: 37.1 (27 Nov 2023 23:49)  T(F): 98.6 (28 Nov 2023 05:19), Max: 98.8 (27 Nov 2023 23:49)  HR: 104 (28 Nov 2023 06:01) (82 - 120)  BP: 152/74 (28 Nov 2023 05:19) (152/74 - 188/83)  BP(mean): --  ABP: --  ABP(mean): --  RR: 18 (28 Nov 2023 05:45) (16 - 20)  SpO2: 95% (28 Nov 2023 06:01) (94% - 97%)    O2 Parameters below as of 28 Nov 2023 06:01  Patient On (Oxygen Delivery Method): nasal cannula, high flow      11-27 @ 07:01  -  11-28 @ 07:00  --------------------------------------------------------  IN: 240 mL / OUT: 0 mL / NET: 240 mL    ECG: reviewed.    PHYSICAL EXAM:  General: NAD, elderly female on HFNC  HEENT: NC/AT, PERRL, conjunctiva and sclera clear, MMM,   Neck: supple, No JVD, trachea midline   Respiratory: Diffuse Rhonchi bilaterally  Cardiovascular: RRR, no m/r/g  Abdomen: Soft, NTTP, ND, + BS  Extremities: no c/c/e  Skin: intact, no rashes or lesions  Neurological: awake, alert, non focal  Psychiatry: appropriate affect     MEDICATIONS:  MEDICATIONS  (STANDING):  albuterol/ipratropium for Nebulization 3 milliLiter(s) Nebulizer every 6 hours  allopurinol 100 milliGRAM(s) Oral daily  amLODIPine   Tablet 5 milliGRAM(s) Oral daily  aspirin enteric coated 81 milliGRAM(s) Oral daily  carvedilol 6.25 milliGRAM(s) Oral every 12 hours  cefepime   IVPB 2000 milliGRAM(s) IV Intermittent every 12 hours  cefepime   IVPB      ferrous    sulfate 325 milliGRAM(s) Oral daily  heparin   Injectable 5000 Unit(s) SubCutaneous every 8 hours  influenza  Vaccine (HIGH DOSE) 0.7 milliLiter(s) IntraMuscular once  mirtazapine 15 milliGRAM(s) Oral at bedtime  pantoprazole    Tablet 40 milliGRAM(s) Oral before breakfast  senna 2 Tablet(s) Oral at bedtime  urea Oral Powder 15 Gram(s) Oral daily    MEDICATIONS  (PRN):      ALLERGIES:  Allergies  ampicillin-sulbactam (Rash)    Intolerances        LABS:                        14.6   8.44  )-----------( 257      ( 27 Nov 2023 07:30 )             44.6     11-27    138  |  98  |  19  ----------------------------<  115<H>  3.5   |  37<H>  |  0.67    Ca    10.1      27 Nov 2023 07:30  Phos  1.2     11-27  Mg     2.2     11-27    TPro  7.3  /  Alb  3.0<L>  /  TBili  0.8  /  DBili  x   /  AST  28  /  ALT  36  /  AlkPhos  93  11-27      Urinalysis Basic - ( 27 Nov 2023 07:30 )    Color: x / Appearance: x / SG: x / pH: x  Gluc: 115 mg/dL / Ketone: x  / Bili: x / Urobili: x   Blood: x / Protein: x / Nitrite: x   Leuk Esterase: x / RBC: x / WBC x   Sq Epi: x / Non Sq Epi: x / Bacteria: x      ABG:      vBG:    Micro:    Culture - Blood (collected 11-25-23 @ 10:24)  Source: .Blood Blood-Peripheral  Preliminary Report (11-27-23 @ 13:02):    No growth at 48 Hours    Culture - Blood (collected 11-25-23 @ 10:15)  Source: .Blood Blood-Peripheral  Preliminary Report (11-27-23 @ 13:02):    No growth at 48 Hours        Culture - Sputum (collected 11-26-23 @ 14:00)  Source: .Sputum Sputum  Gram Stain (prelim) (11-27-23 @ 16:30):    No polymorphonuclear leukocytes per low power field    No Squamous epithelial cells per low power field    Rare Yeast per oil power field    Few Gram positive cocci in pairs per oil power field  Preliminary Report (11-27-23 @ 16:30):    Normal Respiratory Va present        RADIOLOGY & ADDITIONAL TESTS: Reviewed.  < from: CT Chest No Cont (11.25.23 @ 14:52) >  FINDINGS:    Tubes/Lines: None.    Lungs, airways and pleura: Secretions in the trachea, bronchus   intermedius, and right lower lobe bronchus along with the segmental   bronchi of the right lower lobe. There is patchy consolidation of the   right lower lobe and patchy consolidation of the right middle lobe. Left   lower lobe linear atelectasis. No pneumothorax. Small left pleural   effusion.    Mediastinum: The visualized thyroid gland contains bilobar hyperdense   nodules. The chest lymph nodes measure less than 10 mm in the short axis.   Unremarkable esophagus. Cardiomegaly. Coronary calcifications. Mitral   annular calcification. The aorta is ectatic. Aortic calcifications.    Upper Abdomen: Cholecystectomy.The left adrenal gland is thickened.    Bones And Soft Tissues: The bones are unremarkable.  The soft tissues are   unremarkable.      IMPRESSION:    1.  Secretions in the trachea, bronchus intermedius and right lower lobe   bronchus.  2.  Patchy consolidation of the right lower lobe and right middle lobe.    --- End of Report ---    < end of copied text >        < from: Xray Chest 1 View- PORTABLE-Urgent (11.25.23 @ 10:33) >  IMPRESSION: New onset moderate right effusion. Heart enlargement again   noted.    --- End of Report---    < end of copied text >

## 2023-11-28 NOTE — CONSULT NOTE ADULT - CONVERSATION DETAILS
Spoke with patient's daughter, America [phone] via phone as she was not available to speak in person.  Patient was living with her prior and able to get around with a rolling walker, get to doctor's appointments, but was incontinent.  She Spoke with patient's daughter, America [phone] via phone as she was not available to speak in person.  Introduced palliative care and our role in the care team.  Patient is , has 4 children, 2 of whom live in [facility] and America is the point of contact, but they make decisions together.  Patient was living with her prior and able to get around with a rolling walker, is conversant, able to get to doctor's appointments, but is incontinent.  She was on oxygen prior, but for the past year, was able to breathe on RA.  She said patient's mental status is not her baseline and she has declined since being here.  She confirmed DNR/I on the MOLST.

## 2023-11-28 NOTE — PROGRESS NOTE ADULT - ASSESSMENT
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 Was being treated for outpatient PNA on Levaquin x 3 days PTA. Admitted to medicine with RML/ RLL PNA requiring BIPAP. Pulmonary consulted.     Diagnosis: Acute on chronic hypoxemic respiratory failure, PNA, COPD    Recommendations:  - Acute on Chronic hypoxemic respiratory failure likely 2/2 RML/ RLL PNA seen on CT imaging with secretions in right bronchus and trachea  - Initially on BIPAP on admission was changed to HFNC. Titrated from 50L/50% FiO2 to now 40/35%. Will continue to wean and attempt green NC    - Continue to titrate FiO2 as tolerates for goal O2 > 88%  - No wheezing on exam, unlikely COPD exacerbation would hold off on corticosteroids unless patient clinically worsens  - ABX as per primary team  - Junie q6   - Chest PT for secretion mobilization  - + HMPV, most likely driving cause of exacerbation. 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 Was being treated for outpatient PNA on Levaquin x 3 days PTA. Admitted to medicine with RML/ RLL PNA requiring BIPAP. Pulmonary consulted.     Diagnosis: Acute on chronic hypoxemic respiratory failure, PNA, COPD    Recommendations:  - Acute on Chronic hypoxemic respiratory failure likely 2/2 RML/ RLL PNA seen on CT imaging with secretions in right bronchus and trachea  - Initially on BIPAP on admission was changed to HFNC. Titrated from 50L/50% FiO2 to now 30/30%. Will continue to wean and attempt green NC    - Continue to titrate FiO2 as tolerates for goal O2 > 88%.   - No wheezing on exam, unlikely COPD exacerbation would hold off on corticosteroids unless patient clinically worsens  - ABX as per primary team  - Junie q6   - Chest PT for secretion mobilization  - + HMPV, most likely driving cause of exacerbation. supportive care   - F/U official sputum cx results, Mycoplasma result  - Blood cx NTD, COVID neg  - Legionella/strep/MRSA negative  - DNR/DNI      **Plan of care discussed with Pulmonology attending MD Grant

## 2023-11-28 NOTE — PROGRESS NOTE ADULT - ASSESSMENT
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    #Acute Metabolic Encephalopathy  #Acute Respiratory Failure with Hypoxia and Hypercarbia  - likely multifactorial with sepsis and hypercarbia  - hypercarbia likely from PNA, plan below  - On high  flow , SO2 improved, taper down FiO2  - patient is DNR/DNI  - Pulm eval appreciated     #Sepsis 2/2 PNA  - present on admission - hypothermic to 96.5 rectally and tachypneic to 23   - got 2L IVF bolus, BP improved  - likely form PNA, was given levaquin outpatient and failed this outpatient   - switched to Cefepime for broad spectrum coverage,  neg legionella, dc  azithromycin   - + Hmpv   -  CT chest : 1.  Secretions in the trachea, bronchus intermedius and right lower lobe   bronchus.  2.  Patchy consolidation of the right lower lobe and right middle lobe.  - duonebs q6h  - BCx x2 neg  - Pulm consulted       #Hyponatremia  - likely 2/2 hypovolemia,  from vomiting, decreased po intake, improved with IVF  - Added urea  - Na improving   - Renal following     #HTN  - BP elevated, resume home meds coreg and amlodipine   - BNP elevated to 10k but clinically seems more euvolemic on exam, recent Echo in 3/2023 showed EF 65-70%, follow up  repeat Echo       DVT PPx: HSQ TID      Daughter America [phone]. Pt is DNR/DNI with MOLST completed by ED. Daughter would want other supportive measures including IVF, ABx, and Pressors in the ICU if needed to sustain life.    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    #Acute Metabolic Encephalopathy  #Acute Respiratory Failure with Hypoxia and Hypercarbia  - likely multifactorial with sepsis and hypercarbia  - hypercarbia likely from PNA, plan below  - On high  flow , SO2 improved, taper down FiO2  - patient is DNR/DNI  - Pulm eval appreciated     #Sepsis 2/2 PNA  - present on admission - hypothermic to 96.5 rectally and tachypneic to 23   - got 2L IVF bolus, BP improved  - likely form PNA, was given levaquin outpatient and failed this outpatient   - switched to Cefepime for broad spectrum coverage,  neg legionella, dc  azithromycin   - + Hmpv   -  CT chest : 1.  Secretions in the trachea, bronchus intermedius and right lower lobe   bronchus.  2.  Patchy consolidation of the right lower lobe and right middle lobe.  - duonebs q6h  - BCx x2 neg  - Pulm consulted       #Hyponatremia  - likely 2/2 hypovolemia,  from vomiting, decreased po intake, improved with IVF  - Added urea  - Na improving   - Renal following     #HTN  - BP elevated, resume home meds coreg and amlodipine   - BNP elevated to 10k but clinically seems more euvolemic on exam, recent Echo in 3/2023 showed EF 65-70%, follow up  repeat Echo       DVT PPx: HSQ TID      Discussed with son at bedside.

## 2023-11-28 NOTE — CONSULT NOTE ADULT - PROBLEM SELECTOR RECOMMENDATION 5
See GOC above.  Patient with respiratory failure and encephalopathy expressed concern about patient's overall condition.  MOLST on file with DNR/I/  Palliative to follow.      Message sent to Dr. Pichardo

## 2023-11-28 NOTE — PROGRESS NOTE ADULT - SUBJECTIVE AND OBJECTIVE BOX
Patient is a 88y old  Female who presents with a chief complaint of shortness of breath  pneumonia (28 Nov 2023 10:33)      INTERVAL HPI/OVERNIGHT EVENTS:  Pt was seen and examined, no acute events.      MEDICATIONS  (STANDING):  albuterol/ipratropium for Nebulization 3 milliLiter(s) Nebulizer every 6 hours  allopurinol 100 milliGRAM(s) Oral daily  amLODIPine   Tablet 5 milliGRAM(s) Oral daily  aspirin enteric coated 81 milliGRAM(s) Oral daily  carvedilol 6.25 milliGRAM(s) Oral every 12 hours  cefepime   IVPB 2000 milliGRAM(s) IV Intermittent every 12 hours  cefepime   IVPB      ferrous    sulfate 325 milliGRAM(s) Oral daily  heparin   Injectable 5000 Unit(s) SubCutaneous every 8 hours  influenza  Vaccine (HIGH DOSE) 0.7 milliLiter(s) IntraMuscular once  mirtazapine 15 milliGRAM(s) Oral at bedtime  pantoprazole    Tablet 40 milliGRAM(s) Oral before breakfast  senna 2 Tablet(s) Oral at bedtime  urea Oral Powder 15 Gram(s) Oral daily    MEDICATIONS  (PRN):      Allergies  ampicillin-sulbactam (Rash)        Vital Signs Last 24 Hrs  T(C): 37.1 (28 Nov 2023 10:56), Max: 37.1 (27 Nov 2023 23:49)  T(F): 98.8 (28 Nov 2023 10:56), Max: 98.8 (27 Nov 2023 23:49)  HR: 91 (28 Nov 2023 10:56) (91 - 120)  BP: 138/70 (28 Nov 2023 10:56) (138/70 - 180/80)  BP(mean): --  RR: 18 (28 Nov 2023 10:56) (16 - 20)  SpO2: 95% (28 Nov 2023 11:15) (94% - 97%)    Parameters below as of 28 Nov 2023 11:15  Patient On (Oxygen Delivery Method): nasal cannula, high flow  O2 Flow (L/min): 30  O2 Concentration (%): 30        PHYSICAL EXAM:  GENERAL: NAD,    HEAD:  Atraumatic, Normocephalic  EYES: EOMI, PERRLA, conjunctiva and sclera clear  ENMT: No tonsillar erythema, exudates, or enlargement; Moist mucous membranes, Good dentition, No lesions  NECK: Supple, No JVD, Normal thyroid  NERVOUS SYSTEM:  Awake, Alert, non focal  CHEST/LUNG: Diminsihed  HEART: Regular rate and rhythm; No murmurs, rubs, or gallops  ABDOMEN: Soft, Nontender, Nondistended; Bowel sounds present  EXTREMITIES:  2+ Peripheral Pulses, No clubbing, cyanosis, or edema  LYMPH: No lymphadenopathy noted  SKIN: No rashes or lesions          LABS:                        14.1   6.80  )-----------( 233      ( 28 Nov 2023 08:00 )             44.2     11-28    143  |  100  |  30<H>  ----------------------------<  113<H>  3.7   |  40<H>  |  0.89    Ca    10.5<H>      28 Nov 2023 08:00  Phos  1.2     11-27  Mg     2.2     11-27    TPro  6.8  /  Alb  2.7<L>  /  TBili  0.5  /  DBili  x   /  AST  15  /  ALT  31  /  AlkPhos  84  11-28      Urinalysis Basic - ( 28 Nov 2023 08:00 )    Color: x / Appearance: x / SG: x / pH: x  Gluc: 113 mg/dL / Ketone: x  / Bili: x / Urobili: x   Blood: x / Protein: x / Nitrite: x   Leuk Esterase: x / RBC: x / WBC x   Sq Epi: x / Non Sq Epi: x / Bacteria: x      CAPILLARY BLOOD GLUCOSE          Culture - Sputum (collected 26 Nov 2023 14:00)  Source: .Sputum Sputum  Gram Stain (prelim) (27 Nov 2023 16:30):    No polymorphonuclear leukocytes per low power field    No Squamous epithelial cells per low power field    Rare Yeast per oil power field    Few Gram positive cocci in pairs per oil power field  Preliminary Report (27 Nov 2023 16:30):    Normal Respiratory Va present    Culture - Urine (collected 25 Nov 2023 10:40)  Source: Clean Catch Clean Catch (Midstream)  Final Report (26 Nov 2023 17:38):    No growth    Culture - Blood (collected 25 Nov 2023 10:24)  Source: .Blood Blood-Peripheral  Preliminary Report (27 Nov 2023 13:02):    No growth at 48 Hours    Culture - Blood (collected 25 Nov 2023 10:15)  Source: .Blood Blood-Peripheral  Preliminary Report (27 Nov 2023 13:02):    No growth at 48 Hours      RADIOLOGY & ADDITIONAL TESTS:    Imaging Personally Reviewed:  [ ] YES  [ ] NO    Consultant(s) Notes Reviewed:  [ ] YES  [ ] NO    Care Discussed with Consultants/Other Providers [ ] YES  [ ] NO

## 2023-11-28 NOTE — CONSULT NOTE ADULT - PROBLEM SELECTOR RECOMMENDATION 9
on HFNC, was on abx for pna started on Levaquin outpatient, now on cefepime  h/o COPD on oxygen at baseline on HFNC, was on abx for pna started on Levaquin outpatient, now on cefepime  h/o COPD on oxygen at baseline  RVP positive for human metapneumo virus  titrate down oxygen as able, FIO2 on HFNC decreasing on HFNC, was on abx for pna started on Levaquin outpatient, now on cefepime  h/o COPD weaned off oxygen for one year  RVP positive for human metapneumo virus  titrate down oxygen as able, FIO2 on HFNC decreasing

## 2023-11-28 NOTE — CONSULT NOTE ADULT - PROBLEM SELECTOR RECOMMENDATION 2
CT chest:  Secretions in the trachea, bronchus intermedius and right lower lobe bronchus. 2.  Patchy consolidation of the right lower lobe and right middle lobe.  on abx, blood cultures with no growth at 48 hours, sputum unremarkable, follow culture data  remains on HFNC

## 2023-11-29 LAB
ALBUMIN SERPL ELPH-MCNC: 2.7 G/DL — LOW (ref 3.3–5)
ALBUMIN SERPL ELPH-MCNC: 2.7 G/DL — LOW (ref 3.3–5)
ALP SERPL-CCNC: 96 U/L — SIGNIFICANT CHANGE UP (ref 40–120)
ALP SERPL-CCNC: 96 U/L — SIGNIFICANT CHANGE UP (ref 40–120)
ALT FLD-CCNC: 27 U/L — SIGNIFICANT CHANGE UP (ref 12–78)
ALT FLD-CCNC: 27 U/L — SIGNIFICANT CHANGE UP (ref 12–78)
ANION GAP SERPL CALC-SCNC: 3 MMOL/L — LOW (ref 5–17)
ANION GAP SERPL CALC-SCNC: 3 MMOL/L — LOW (ref 5–17)
AST SERPL-CCNC: 18 U/L — SIGNIFICANT CHANGE UP (ref 15–37)
AST SERPL-CCNC: 18 U/L — SIGNIFICANT CHANGE UP (ref 15–37)
BILIRUB SERPL-MCNC: 0.6 MG/DL — SIGNIFICANT CHANGE UP (ref 0.2–1.2)
BILIRUB SERPL-MCNC: 0.6 MG/DL — SIGNIFICANT CHANGE UP (ref 0.2–1.2)
BUN SERPL-MCNC: 34 MG/DL — HIGH (ref 7–23)
BUN SERPL-MCNC: 34 MG/DL — HIGH (ref 7–23)
CA-I BLD-SCNC: 5.6 MG/DL — SIGNIFICANT CHANGE UP (ref 4.5–5.6)
CA-I BLD-SCNC: 5.6 MG/DL — SIGNIFICANT CHANGE UP (ref 4.5–5.6)
CALCIUM SERPL-MCNC: 10.5 MG/DL — HIGH (ref 8.5–10.1)
CALCIUM SERPL-MCNC: 10.5 MG/DL — HIGH (ref 8.5–10.1)
CHLORIDE SERPL-SCNC: 104 MMOL/L — SIGNIFICANT CHANGE UP (ref 96–108)
CHLORIDE SERPL-SCNC: 104 MMOL/L — SIGNIFICANT CHANGE UP (ref 96–108)
CO2 SERPL-SCNC: 37 MMOL/L — HIGH (ref 22–31)
CO2 SERPL-SCNC: 37 MMOL/L — HIGH (ref 22–31)
CREAT SERPL-MCNC: 0.84 MG/DL — SIGNIFICANT CHANGE UP (ref 0.5–1.3)
CREAT SERPL-MCNC: 0.84 MG/DL — SIGNIFICANT CHANGE UP (ref 0.5–1.3)
EGFR: 67 ML/MIN/1.73M2 — SIGNIFICANT CHANGE UP
EGFR: 67 ML/MIN/1.73M2 — SIGNIFICANT CHANGE UP
GLUCOSE SERPL-MCNC: 116 MG/DL — HIGH (ref 70–99)
GLUCOSE SERPL-MCNC: 116 MG/DL — HIGH (ref 70–99)
HCT VFR BLD CALC: 44.1 % — SIGNIFICANT CHANGE UP (ref 34.5–45)
HCT VFR BLD CALC: 44.1 % — SIGNIFICANT CHANGE UP (ref 34.5–45)
HGB BLD-MCNC: 13.9 G/DL — SIGNIFICANT CHANGE UP (ref 11.5–15.5)
HGB BLD-MCNC: 13.9 G/DL — SIGNIFICANT CHANGE UP (ref 11.5–15.5)
MCHC RBC-ENTMCNC: 29.4 PG — SIGNIFICANT CHANGE UP (ref 27–34)
MCHC RBC-ENTMCNC: 29.4 PG — SIGNIFICANT CHANGE UP (ref 27–34)
MCHC RBC-ENTMCNC: 31.5 G/DL — LOW (ref 32–36)
MCHC RBC-ENTMCNC: 31.5 G/DL — LOW (ref 32–36)
MCV RBC AUTO: 93.4 FL — SIGNIFICANT CHANGE UP (ref 80–100)
MCV RBC AUTO: 93.4 FL — SIGNIFICANT CHANGE UP (ref 80–100)
NRBC # BLD: 0 /100 WBCS — SIGNIFICANT CHANGE UP (ref 0–0)
NRBC # BLD: 0 /100 WBCS — SIGNIFICANT CHANGE UP (ref 0–0)
PHOSPHATE SERPL-MCNC: 2.7 MG/DL — SIGNIFICANT CHANGE UP (ref 2.5–4.5)
PHOSPHATE SERPL-MCNC: 2.7 MG/DL — SIGNIFICANT CHANGE UP (ref 2.5–4.5)
PLATELET # BLD AUTO: 257 K/UL — SIGNIFICANT CHANGE UP (ref 150–400)
PLATELET # BLD AUTO: 257 K/UL — SIGNIFICANT CHANGE UP (ref 150–400)
POTASSIUM SERPL-MCNC: 4.1 MMOL/L — SIGNIFICANT CHANGE UP (ref 3.5–5.3)
POTASSIUM SERPL-MCNC: 4.1 MMOL/L — SIGNIFICANT CHANGE UP (ref 3.5–5.3)
POTASSIUM SERPL-SCNC: 4.1 MMOL/L — SIGNIFICANT CHANGE UP (ref 3.5–5.3)
POTASSIUM SERPL-SCNC: 4.1 MMOL/L — SIGNIFICANT CHANGE UP (ref 3.5–5.3)
PROT SERPL-MCNC: 7.1 GM/DL — SIGNIFICANT CHANGE UP (ref 6–8.3)
PROT SERPL-MCNC: 7.1 GM/DL — SIGNIFICANT CHANGE UP (ref 6–8.3)
RBC # BLD: 4.72 M/UL — SIGNIFICANT CHANGE UP (ref 3.8–5.2)
RBC # BLD: 4.72 M/UL — SIGNIFICANT CHANGE UP (ref 3.8–5.2)
RBC # FLD: 13.3 % — SIGNIFICANT CHANGE UP (ref 10.3–14.5)
RBC # FLD: 13.3 % — SIGNIFICANT CHANGE UP (ref 10.3–14.5)
SODIUM SERPL-SCNC: 144 MMOL/L — SIGNIFICANT CHANGE UP (ref 135–145)
SODIUM SERPL-SCNC: 144 MMOL/L — SIGNIFICANT CHANGE UP (ref 135–145)
WBC # BLD: 7.01 K/UL — SIGNIFICANT CHANGE UP (ref 3.8–10.5)
WBC # BLD: 7.01 K/UL — SIGNIFICANT CHANGE UP (ref 3.8–10.5)
WBC # FLD AUTO: 7.01 K/UL — SIGNIFICANT CHANGE UP (ref 3.8–10.5)
WBC # FLD AUTO: 7.01 K/UL — SIGNIFICANT CHANGE UP (ref 3.8–10.5)

## 2023-11-29 PROCEDURE — 99233 SBSQ HOSP IP/OBS HIGH 50: CPT

## 2023-11-29 PROCEDURE — 99232 SBSQ HOSP IP/OBS MODERATE 35: CPT

## 2023-11-29 RX ADMIN — CEFEPIME 100 MILLIGRAM(S): 1 INJECTION, POWDER, FOR SOLUTION INTRAMUSCULAR; INTRAVENOUS at 05:13

## 2023-11-29 RX ADMIN — Medication 3 MILLILITER(S): at 17:07

## 2023-11-29 RX ADMIN — SENNA PLUS 2 TABLET(S): 8.6 TABLET ORAL at 21:49

## 2023-11-29 RX ADMIN — HEPARIN SODIUM 5000 UNIT(S): 5000 INJECTION INTRAVENOUS; SUBCUTANEOUS at 14:43

## 2023-11-29 RX ADMIN — CEFEPIME 100 MILLIGRAM(S): 1 INJECTION, POWDER, FOR SOLUTION INTRAMUSCULAR; INTRAVENOUS at 17:45

## 2023-11-29 RX ADMIN — PANTOPRAZOLE SODIUM 40 MILLIGRAM(S): 20 TABLET, DELAYED RELEASE ORAL at 05:18

## 2023-11-29 RX ADMIN — HEPARIN SODIUM 5000 UNIT(S): 5000 INJECTION INTRAVENOUS; SUBCUTANEOUS at 05:15

## 2023-11-29 RX ADMIN — HEPARIN SODIUM 5000 UNIT(S): 5000 INJECTION INTRAVENOUS; SUBCUTANEOUS at 21:49

## 2023-11-29 RX ADMIN — Medication 3 MILLILITER(S): at 00:19

## 2023-11-29 RX ADMIN — Medication 3 MILLILITER(S): at 12:03

## 2023-11-29 RX ADMIN — Medication 100 MILLIGRAM(S): at 11:34

## 2023-11-29 RX ADMIN — Medication 81 MILLIGRAM(S): at 11:34

## 2023-11-29 RX ADMIN — Medication 325 MILLIGRAM(S): at 11:34

## 2023-11-29 RX ADMIN — Medication 3 MILLILITER(S): at 05:18

## 2023-11-29 RX ADMIN — CARVEDILOL PHOSPHATE 6.25 MILLIGRAM(S): 80 CAPSULE, EXTENDED RELEASE ORAL at 17:45

## 2023-11-29 RX ADMIN — AMLODIPINE BESYLATE 5 MILLIGRAM(S): 2.5 TABLET ORAL at 05:15

## 2023-11-29 RX ADMIN — CARVEDILOL PHOSPHATE 6.25 MILLIGRAM(S): 80 CAPSULE, EXTENDED RELEASE ORAL at 05:15

## 2023-11-29 NOTE — PROGRESS NOTE ADULT - SUBJECTIVE AND OBJECTIVE BOX
Patient is a 88y old  Female who presents with a chief complaint of shortness of breath  pneumonia (29 Nov 2023 09:24)      INTERVAL HPI/OVERNIGHT EVENTS: No overnight events. On 4L NC.     MEDICATIONS  (STANDING):  albuterol/ipratropium for Nebulization 3 milliLiter(s) Nebulizer every 6 hours  allopurinol 100 milliGRAM(s) Oral daily  amLODIPine   Tablet 5 milliGRAM(s) Oral daily  aspirin enteric coated 81 milliGRAM(s) Oral daily  carvedilol 6.25 milliGRAM(s) Oral every 12 hours  cefepime   IVPB 2000 milliGRAM(s) IV Intermittent every 12 hours  cefepime   IVPB      ferrous    sulfate 325 milliGRAM(s) Oral daily  heparin   Injectable 5000 Unit(s) SubCutaneous every 8 hours  influenza  Vaccine (HIGH DOSE) 0.7 milliLiter(s) IntraMuscular once  pantoprazole    Tablet 40 milliGRAM(s) Oral before breakfast  senna 2 Tablet(s) Oral at bedtime    MEDICATIONS  (PRN):      Allergies    ampicillin-sulbactam (Rash)    Intolerances        REVIEW OF SYSTEMS:  Unable to obtain     Vital Signs Last 24 Hrs  T(C): 37 (29 Nov 2023 16:53), Max: 37 (29 Nov 2023 16:53)  T(F): 98.6 (29 Nov 2023 16:53), Max: 98.6 (29 Nov 2023 16:53)  HR: 93 (29 Nov 2023 16:53) (84 - 103)  BP: 145/73 (29 Nov 2023 16:53) (131/79 - 145/82)  BP(mean): 97 (29 Nov 2023 16:53) (97 - 97)  RR: 18 (29 Nov 2023 16:53) (18 - 20)  SpO2: 95% (29 Nov 2023 16:53) (93% - 97%)    Parameters below as of 29 Nov 2023 12:16  Patient On (Oxygen Delivery Method): nasal cannula w/ humidification        PHYSICAL EXAM:  GENERAL: NAD on 4L NC  HEAD:  Atraumatic, Normocephalic  EYES: EOMI, sclera anicteric  ENMT: Moist mucous membranes  NECK: Supple, No JVD  NERVOUS SYSTEM: Awake, does not respond to questions   CHEST/LUNG: Decreased BS BL  HEART: RRR; No murmurs, rubs, or gallops  ABDOMEN: Soft, Nontender, Nondistended; Bowel sounds present  EXTREMITIES:  2+ Peripheral Pulses, No clubbing, cyanosis, or edema  LYMPH: No lymphadenopathy   SKIN: No rashes or lesions  PSYCH: Mood appropriate      LABS:                        13.9   7.01  )-----------( 257      ( 29 Nov 2023 07:30 )             44.1     11-29    144  |  104  |  34<H>  ----------------------------<  116<H>  4.1   |  37<H>  |  0.84    Ca    10.5<H>      29 Nov 2023 07:30  Phos  2.7     11-29    TPro  7.1  /  Alb  2.7<L>  /  TBili  0.6  /  DBili  x   /  AST  18  /  ALT  27  /  AlkPhos  96  11-29      Urinalysis Basic - ( 29 Nov 2023 07:30 )    Color: x / Appearance: x / SG: x / pH: x  Gluc: 116 mg/dL / Ketone: x  / Bili: x / Urobili: x   Blood: x / Protein: x / Nitrite: x   Leuk Esterase: x / RBC: x / WBC x   Sq Epi: x / Non Sq Epi: x / Bacteria: x      CAPILLARY BLOOD GLUCOSE          RADIOLOGY & ADDITIONAL TESTS:    Imaging Personally Reviewed:  [ X] YES  [ ] NO    Consultant(s) Notes Reviewed:  [ X] YES  [ ] NO    Care Discussed with Consultants/Other Providers [X ] YES  [ ] NO

## 2023-11-29 NOTE — PHYSICAL THERAPY INITIAL EVALUATION ADULT - PERTINENT HX OF CURRENT PROBLEM, REHAB EVAL
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

## 2023-11-29 NOTE — PROGRESS NOTE ADULT - ASSESSMENT
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 Was being treated for outpatient PNA on Levaquin x 3 days PTA. Admitted to medicine with RML/ RLL PNA requiring BIPAP. Pulmonary consulted.     Diagnosis: Acute on chronic hypoxemic respiratory failure, PNA, COPD    Recommendations:  - Acute on Chronic hypoxemic respiratory failure likely 2/2 RML/ RLL PNA seen on CT imaging with secretions in right bronchus and trachea  - Initially on BIPAP on admission was changed to HFNC and now weaned successfully to NC 5L (baseline NC 3-4L)   - Continue to titrate FiO2 as tolerates for goal O2 > 88%.   - No wheezing on exam, unlikely COPD exacerbation would hold off on corticosteroids unless patient clinically worsens  - ABX as per primary team  - Duonebs q6, Incentive Spirometry, & Chest PT for secretion mobilization  - + HMPV, most likely driving cause of exacerbation. supportive care   - Blood cx NTD, COVID neg, sputum neg, mycoplasma neg, Legionella/strep/MRSA negative  - DNR/DNI, palliative care following       *Plan of care discussed with Pulmonology attending Arabella LION

## 2023-11-29 NOTE — PHYSICAL THERAPY INITIAL EVALUATION ADULT - GAIT DEVIATIONS NOTED, PT EVAL
decreased shane/decreased velocity of limb motion/decreased step length/decreased stride length/decreased weight-shifting ability

## 2023-11-29 NOTE — PROGRESS NOTE ADULT - SUBJECTIVE AND OBJECTIVE BOX
INTERVAL HPI/OVERNIGHT EVENTS:  - weaned from HFNC to 5L NC   - afebrile     SUBJECTIVE: Patient seen and examined at bedside.     ROS: All negative except as listed above.    VITAL SIGNS:  ICU Vital Signs Last 24 Hrs  T(C): 36.4 (29 Nov 2023 04:44), Max: 37.1 (28 Nov 2023 10:56)  T(F): 97.6 (29 Nov 2023 04:44), Max: 98.8 (28 Nov 2023 10:56)  HR: 93 (29 Nov 2023 06:10) (84 - 103)  BP: 145/82 (29 Nov 2023 04:44) (131/81 - 150/81)  BP(mean): 104 (28 Nov 2023 16:26) (104 - 104)  ABP: --  ABP(mean): --  RR: 20 (29 Nov 2023 05:20) (18 - 20)  SpO2: 97% (29 Nov 2023 06:10) (93% - 97%)    O2 Parameters below as of 29 Nov 2023 05:20  Patient On (Oxygen Delivery Method): nasal cannula w/ humidification, 5LPM  O2 Flow (L/min): 5 11-28 @ 07:01  -  11-29 @ 07:00  --------------------------------------------------------  IN: 120 mL / OUT: 600 mL / NET: -480 mL      ECG: reviewed.    PHYSICAL EXAM:  General: NAD, elderly female on HFNC  HEENT: NC/AT, PERRL, conjunctiva and sclera clear, MMM,   Neck: supple, No JVD, trachea midline   Respiratory: Diffuse Rhonchi bilaterally  Cardiovascular: RRR, no m/r/g  Abdomen: Soft, NTTP, ND, + BS  Extremities: no c/c/e  Skin: intact, no rashes or lesions  Neurological: awake, alert, non focal  Psychiatry: appropriate affect     MEDICATIONS:  MEDICATIONS  (STANDING):  albuterol/ipratropium for Nebulization 3 milliLiter(s) Nebulizer every 6 hours  allopurinol 100 milliGRAM(s) Oral daily  amLODIPine   Tablet 5 milliGRAM(s) Oral daily  aspirin enteric coated 81 milliGRAM(s) Oral daily  carvedilol 6.25 milliGRAM(s) Oral every 12 hours  cefepime   IVPB 2000 milliGRAM(s) IV Intermittent every 12 hours  cefepime   IVPB      ferrous    sulfate 325 milliGRAM(s) Oral daily  heparin   Injectable 5000 Unit(s) SubCutaneous every 8 hours  influenza  Vaccine (HIGH DOSE) 0.7 milliLiter(s) IntraMuscular once  pantoprazole    Tablet 40 milliGRAM(s) Oral before breakfast  senna 2 Tablet(s) Oral at bedtime    MEDICATIONS  (PRN):      ALLERGIES:  Allergies    ampicillin-sulbactam (Rash)    Intolerances        LABS:                        13.9   7.01  )-----------( 257      ( 29 Nov 2023 07:30 )             44.1     11-29    144  |  104  |  34<H>  ----------------------------<  116<H>  4.1   |  37<H>  |  0.84    Ca    10.5<H>      29 Nov 2023 07:30    TPro  7.1  /  Alb  2.7<L>  /  TBili  0.6  /  DBili  x   /  AST  18  /  ALT  27  /  AlkPhos  96  11-29      Urinalysis Basic - ( 29 Nov 2023 07:30 )    Color: x / Appearance: x / SG: x / pH: x  Gluc: 116 mg/dL / Ketone: x  / Bili: x / Urobili: x   Blood: x / Protein: x / Nitrite: x   Leuk Esterase: x / RBC: x / WBC x   Sq Epi: x / Non Sq Epi: x / Bacteria: x      Micro:  Culture - Blood (collected 11-25-23 @ 10:24)  Source: .Blood Blood-Peripheral  Preliminary Report (11-28-23 @ 13:01):    No growth at 72 Hours    Culture - Blood (collected 11-25-23 @ 10:15)  Source: .Blood Blood-Peripheral  Preliminary Report (11-28-23 @ 13:01):    No growth at 72 Hours        Culture - Sputum (collected 11-26-23 @ 14:00)  Source: .Sputum Sputum  Gram Stain (11-28-23 @ 13:08):    No polymorphonuclear leukocytes per low power field    No Squamous epithelial cells per low power field    Rare Yeast per oil power field    Few Gram positive cocci in pairs per oil power field  Final Report (11-28-23 @ 13:08):    Normal Respiratory Va present        RADIOLOGY & ADDITIONAL TESTS: Reviewed.  < from: CT Chest No Cont (11.25.23 @ 14:52) >  FINDINGS:    Tubes/Lines: None.    Lungs, airways and pleura: Secretions in the trachea, bronchus   intermedius, and right lower lobe bronchus along with the segmental   bronchi of the right lower lobe. There is patchy consolidation of the   right lower lobe and patchy consolidation of the right middle lobe. Left   lower lobe linear atelectasis. No pneumothorax. Small left pleural   effusion.    Mediastinum: The visualized thyroid gland contains bilobar hyperdense   nodules. The chest lymph nodes measure less than 10 mm in the short axis.   Unremarkable esophagus. Cardiomegaly. Coronary calcifications. Mitral   annular calcification. The aorta is ectatic. Aortic calcifications.    Upper Abdomen: Cholecystectomy.The left adrenal gland is thickened.    Bones And Soft Tissues: The bones are unremarkable.  The soft tissues are   unremarkable.      IMPRESSION:    1.  Secretions in the trachea, bronchus intermedius and right lower lobe   bronchus.  2.  Patchy consolidation of the right lower lobe and right middle lobe.    --- End of Report ---    < end of copied text >        < from: Xray Chest 1 View- PORTABLE-Urgent (11.25.23 @ 10:33) >  IMPRESSION: New onset moderate right effusion. Heart enlargement again   noted.    --- End of Report---    < end of copied text >          RADIOLOGY & ADDITIONAL TESTS: Reviewed.

## 2023-11-29 NOTE — PHYSICAL THERAPY INITIAL EVALUATION ADULT - TRANSFER TRAINING, PT EVAL
Pt will perform sit to stand to sit transfers, under supervision , without LOB, using rolling walker by 4 weeks

## 2023-11-29 NOTE — PROGRESS NOTE ADULT - SUBJECTIVE AND OBJECTIVE BOX
[facility] NEPHROLOGY SERVICES, [facility]  NEPHROLOGY AND HYPERTENSION  [address]  [address]  [place] [address]  [phone]    MD RAI HANNAH, MD TRUONG MCCARTHY MD CHRISTOPHER CAPUTO, MD ZHAO EARL MD          Patient events noted  On high flow oxygen     MEDICATIONS  (STANDING):  albuterol/ipratropium for Nebulization 3 milliLiter(s) Nebulizer every 6 hours  allopurinol 100 milliGRAM(s) Oral daily  amLODIPine   Tablet 5 milliGRAM(s) Oral daily  aspirin enteric coated 81 milliGRAM(s) Oral daily  carvedilol 6.25 milliGRAM(s) Oral every 12 hours  cefepime   IVPB 2000 milliGRAM(s) IV Intermittent every 12 hours  cefepime   IVPB      ferrous    sulfate 325 milliGRAM(s) Oral daily  heparin   Injectable 5000 Unit(s) SubCutaneous every 8 hours  influenza  Vaccine (HIGH DOSE) 0.7 milliLiter(s) IntraMuscular once  pantoprazole    Tablet 40 milliGRAM(s) Oral before breakfast  senna 2 Tablet(s) Oral at bedtime    MEDICATIONS  (PRN):      11-28-23 @ 07:01  -  11-29-23 @ 07:00  --------------------------------------------------------  IN: 120 mL / OUT: 600 mL / NET: -480 mL    11-29-23 @ 07:01  -  11-29-23 @ 18:35  --------------------------------------------------------  IN: 360 mL / OUT: 0 mL / NET: 360 mL      PHYSICAL EXAM:      T(C): 37 (11-29-23 @ 16:53), Max: 37 (11-29-23 @ 16:53)  HR: 89 (11-29-23 @ 18:17) (84 - 102)  BP: 145/73 (11-29-23 @ 16:53) (131/79 - 145/82)  RR: 18 (11-29-23 @ 17:59) (18 - 20)  SpO2: 94% (11-29-23 @ 18:17) (93% - 97%)  Wt(kg): --  Lungs decreased BS right > L   Heart S1S2  Abd soft NT ND  Extremities:   1 edema                                    13.9   7.01  )-----------( 257      ( 29 Nov 2023 07:30 )             44.1     11-29    144  |  104  |  34<H>  ----------------------------<  116<H>  4.1   |  37<H>  |  0.84    Ca    10.5<H>      29 Nov 2023 07:30  Phos  2.7     11-29    TPro  7.1  /  Alb  2.7<L>  /  TBili  0.6  /  DBili  x   /  AST  18  /  ALT  27  /  AlkPhos  96  11-29      LIVER FUNCTIONS - ( 29 Nov 2023 07:30 )  Alb: 2.7 g/dL / Pro: 7.1 gm/dL / ALK PHOS: 96 U/L / ALT: 27 U/L / AST: 18 U/L / GGT: x           Creatinine Trend: 0.84<--, 0.89<--, 0.67<--, 0.72<--, 0.70<--, 0.83<--        Assessment       Hyponatremia, chronic, euvolemic, 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

## 2023-11-29 NOTE — PROGRESS NOTE ADULT - NS ATTEST RISK PROBLEM GEN_ALL_CORE FT
acute on chronic hypercapnic/hypoxic respiratory failure, pneumonia. acute on chronic hypercapnic/hypoxic respiratory failure, pneumonia, human meta-pneumonvirus

## 2023-11-29 NOTE — PHYSICAL THERAPY INITIAL EVALUATION ADULT - ADDITIONAL COMMENTS
As per pt's daughterAmerica : There are 5 steps, w/o rail, at the entry of the house and  no steps to negotiate at home. Prior to admission, pt was able to ambulate c a Rolling Walker indoors independently. However, pt needed assist in stairs negotiation and bathing. Pt's daughter and granddaughter assist pt as needed for all ADL. Pt has O2 at home but pt hasn't need it for almost a year.

## 2023-11-29 NOTE — PHYSICAL THERAPY INITIAL EVALUATION ADULT - GENERAL OBSERVATIONS, REHAB EVAL
Pt was seen in semi-supine c cardiac monitor +, O2 at 3l/min  through nasal cannula + and Primafit donned, alert and oriented to name and place. Pt was comfortable and motivated. Pt was able to follow simple command and express herself. Pt needed constant verbal cues to perfiorm all tasks.

## 2023-11-29 NOTE — PHYSICAL THERAPY INITIAL EVALUATION ADULT - GAIT TRAINING, PT EVAL
Pt will ambulate 20 feet with rolling walker, under supervision, without loss of balance, by 4 weeks.

## 2023-11-29 NOTE — PROGRESS NOTE ADULT - ASSESSMENT
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    #Acute Metabolic Encephalopathy  #Acute Respiratory Failure with Hypoxia and Hypercarbia  - likely multifactorial with sepsis and hypercarbia  - hypercarbia likely from PNA, plan below  - HFNC, no on NC, wean as tolerated   - patient is DNR/DNI  - Pulm eval appreciated     #Sepsis 2/2 PNA  - present on admission - hypothermic to 96.5 rectally and tachypneic to 23   - got 2L IVF bolus, BP improved  - likely form PNA, was given levaquin outpatient and failed this outpatient   - switched to Cefepime for broad spectrum coverage,  neg legionella, dc  azithromycin   - + Hmpv   -  CT chest : 1.  Secretions in the trachea, bronchus intermedius and right lower lobe   bronchus.  2.  Patchy consolidation of the right lower lobe and right middle lobe.  - duonebs q6h  - BCx x2 neg  - Pulm consulted       #Hyponatremia  - likely 2/2 hypovolemia,  from vomiting, decreased po intake, improved with IVF  - Added urea  - Na improving   - Renal following     #HTN  - BP elevated, resume home meds coreg and amlodipine   - BNP elevated to 10k but clinically seems more euvolemic on exam, recent Echo in 3/2023 showed EF 65-70%, follow up  repeat Echo       DVT PPx: HSQ TID

## 2023-11-30 LAB
ANION GAP SERPL CALC-SCNC: 1 MMOL/L — LOW (ref 5–17)
ANION GAP SERPL CALC-SCNC: 1 MMOL/L — LOW (ref 5–17)
BUN SERPL-MCNC: 24 MG/DL — HIGH (ref 7–23)
BUN SERPL-MCNC: 24 MG/DL — HIGH (ref 7–23)
CALCIUM SERPL-MCNC: 10.2 MG/DL — HIGH (ref 8.5–10.1)
CALCIUM SERPL-MCNC: 10.2 MG/DL — HIGH (ref 8.5–10.1)
CHLORIDE SERPL-SCNC: 104 MMOL/L — SIGNIFICANT CHANGE UP (ref 96–108)
CHLORIDE SERPL-SCNC: 104 MMOL/L — SIGNIFICANT CHANGE UP (ref 96–108)
CO2 SERPL-SCNC: 33 MMOL/L — HIGH (ref 22–31)
CO2 SERPL-SCNC: 33 MMOL/L — HIGH (ref 22–31)
CREAT SERPL-MCNC: 0.71 MG/DL — SIGNIFICANT CHANGE UP (ref 0.5–1.3)
CREAT SERPL-MCNC: 0.71 MG/DL — SIGNIFICANT CHANGE UP (ref 0.5–1.3)
CULTURE RESULTS: SIGNIFICANT CHANGE UP
EGFR: 82 ML/MIN/1.73M2 — SIGNIFICANT CHANGE UP
EGFR: 82 ML/MIN/1.73M2 — SIGNIFICANT CHANGE UP
GLUCOSE SERPL-MCNC: 104 MG/DL — HIGH (ref 70–99)
GLUCOSE SERPL-MCNC: 104 MG/DL — HIGH (ref 70–99)
HCT VFR BLD CALC: 42.6 % — SIGNIFICANT CHANGE UP (ref 34.5–45)
HCT VFR BLD CALC: 42.6 % — SIGNIFICANT CHANGE UP (ref 34.5–45)
HGB BLD-MCNC: 13.5 G/DL — SIGNIFICANT CHANGE UP (ref 11.5–15.5)
HGB BLD-MCNC: 13.5 G/DL — SIGNIFICANT CHANGE UP (ref 11.5–15.5)
MCHC RBC-ENTMCNC: 29.5 PG — SIGNIFICANT CHANGE UP (ref 27–34)
MCHC RBC-ENTMCNC: 29.5 PG — SIGNIFICANT CHANGE UP (ref 27–34)
MCHC RBC-ENTMCNC: 31.7 G/DL — LOW (ref 32–36)
MCHC RBC-ENTMCNC: 31.7 G/DL — LOW (ref 32–36)
MCV RBC AUTO: 93 FL — SIGNIFICANT CHANGE UP (ref 80–100)
MCV RBC AUTO: 93 FL — SIGNIFICANT CHANGE UP (ref 80–100)
NRBC # BLD: 0 /100 WBCS — SIGNIFICANT CHANGE UP (ref 0–0)
NRBC # BLD: 0 /100 WBCS — SIGNIFICANT CHANGE UP (ref 0–0)
PLATELET # BLD AUTO: 233 K/UL — SIGNIFICANT CHANGE UP (ref 150–400)
PLATELET # BLD AUTO: 233 K/UL — SIGNIFICANT CHANGE UP (ref 150–400)
POTASSIUM SERPL-MCNC: 4.5 MMOL/L — SIGNIFICANT CHANGE UP (ref 3.5–5.3)
POTASSIUM SERPL-MCNC: 4.5 MMOL/L — SIGNIFICANT CHANGE UP (ref 3.5–5.3)
POTASSIUM SERPL-SCNC: 4.5 MMOL/L — SIGNIFICANT CHANGE UP (ref 3.5–5.3)
POTASSIUM SERPL-SCNC: 4.5 MMOL/L — SIGNIFICANT CHANGE UP (ref 3.5–5.3)
RBC # BLD: 4.58 M/UL — SIGNIFICANT CHANGE UP (ref 3.8–5.2)
RBC # BLD: 4.58 M/UL — SIGNIFICANT CHANGE UP (ref 3.8–5.2)
RBC # FLD: 13.2 % — SIGNIFICANT CHANGE UP (ref 10.3–14.5)
RBC # FLD: 13.2 % — SIGNIFICANT CHANGE UP (ref 10.3–14.5)
SODIUM SERPL-SCNC: 138 MMOL/L — SIGNIFICANT CHANGE UP (ref 135–145)
SODIUM SERPL-SCNC: 138 MMOL/L — SIGNIFICANT CHANGE UP (ref 135–145)
SPECIMEN SOURCE: SIGNIFICANT CHANGE UP
WBC # BLD: 6.48 K/UL — SIGNIFICANT CHANGE UP (ref 3.8–10.5)
WBC # BLD: 6.48 K/UL — SIGNIFICANT CHANGE UP (ref 3.8–10.5)
WBC # FLD AUTO: 6.48 K/UL — SIGNIFICANT CHANGE UP (ref 3.8–10.5)
WBC # FLD AUTO: 6.48 K/UL — SIGNIFICANT CHANGE UP (ref 3.8–10.5)

## 2023-11-30 PROCEDURE — 94667 MNPJ CHEST WALL 1ST: CPT

## 2023-11-30 PROCEDURE — 99232 SBSQ HOSP IP/OBS MODERATE 35: CPT

## 2023-11-30 PROCEDURE — 99233 SBSQ HOSP IP/OBS HIGH 50: CPT

## 2023-11-30 RX ORDER — AMLODIPINE BESYLATE 2.5 MG/1
5 TABLET ORAL ONCE
Refills: 0 | Status: COMPLETED | OUTPATIENT
Start: 2023-11-30 | End: 2023-11-30

## 2023-11-30 RX ORDER — PANTOPRAZOLE SODIUM 20 MG/1
40 TABLET, DELAYED RELEASE ORAL EVERY 24 HOURS
Refills: 0 | Status: DISCONTINUED | OUTPATIENT
Start: 2023-12-01 | End: 2023-12-01

## 2023-11-30 RX ORDER — AMLODIPINE BESYLATE 2.5 MG/1
10 TABLET ORAL DAILY
Refills: 0 | Status: DISCONTINUED | OUTPATIENT
Start: 2023-11-30 | End: 2023-12-01

## 2023-11-30 RX ORDER — ASPIRIN/CALCIUM CARB/MAGNESIUM 324 MG
81 TABLET ORAL DAILY
Refills: 0 | Status: DISCONTINUED | OUTPATIENT
Start: 2023-11-30 | End: 2023-12-01

## 2023-11-30 RX ADMIN — Medication 3 MILLILITER(S): at 11:39

## 2023-11-30 RX ADMIN — Medication 3 MILLILITER(S): at 00:08

## 2023-11-30 RX ADMIN — HEPARIN SODIUM 5000 UNIT(S): 5000 INJECTION INTRAVENOUS; SUBCUTANEOUS at 05:48

## 2023-11-30 RX ADMIN — Medication 81 MILLIGRAM(S): at 11:57

## 2023-11-30 RX ADMIN — CARVEDILOL PHOSPHATE 6.25 MILLIGRAM(S): 80 CAPSULE, EXTENDED RELEASE ORAL at 05:48

## 2023-11-30 RX ADMIN — CARVEDILOL PHOSPHATE 6.25 MILLIGRAM(S): 80 CAPSULE, EXTENDED RELEASE ORAL at 17:03

## 2023-11-30 RX ADMIN — Medication 100 MILLIGRAM(S): at 11:57

## 2023-11-30 RX ADMIN — PANTOPRAZOLE SODIUM 40 MILLIGRAM(S): 20 TABLET, DELAYED RELEASE ORAL at 05:48

## 2023-11-30 RX ADMIN — HEPARIN SODIUM 5000 UNIT(S): 5000 INJECTION INTRAVENOUS; SUBCUTANEOUS at 21:43

## 2023-11-30 RX ADMIN — SENNA PLUS 2 TABLET(S): 8.6 TABLET ORAL at 21:40

## 2023-11-30 RX ADMIN — CEFEPIME 100 MILLIGRAM(S): 1 INJECTION, POWDER, FOR SOLUTION INTRAMUSCULAR; INTRAVENOUS at 05:49

## 2023-11-30 RX ADMIN — Medication 325 MILLIGRAM(S): at 11:57

## 2023-11-30 RX ADMIN — Medication 3 MILLILITER(S): at 05:35

## 2023-11-30 RX ADMIN — AMLODIPINE BESYLATE 5 MILLIGRAM(S): 2.5 TABLET ORAL at 10:20

## 2023-11-30 RX ADMIN — Medication 3 MILLILITER(S): at 17:59

## 2023-11-30 RX ADMIN — CEFEPIME 100 MILLIGRAM(S): 1 INJECTION, POWDER, FOR SOLUTION INTRAMUSCULAR; INTRAVENOUS at 17:04

## 2023-11-30 RX ADMIN — HEPARIN SODIUM 5000 UNIT(S): 5000 INJECTION INTRAVENOUS; SUBCUTANEOUS at 13:53

## 2023-11-30 RX ADMIN — AMLODIPINE BESYLATE 5 MILLIGRAM(S): 2.5 TABLET ORAL at 05:48

## 2023-11-30 NOTE — PROGRESS NOTE ADULT - SUBJECTIVE AND OBJECTIVE BOX
INTERVAL HPI/OVERNIGHT EVENTS:  - on baseline 3L NC     SUBJECTIVE: Patient seen and examined at bedside.   ROS: All negative except as listed above.    VITAL SIGNS:  ICU Vital Signs Last 24 Hrs  T(C): 37.2 (30 Nov 2023 04:47), Max: 37.2 (30 Nov 2023 04:47)  T(F): 99 (30 Nov 2023 04:47), Max: 99 (30 Nov 2023 04:47)  HR: 90 (30 Nov 2023 06:05) (83 - 102)  BP: 165/80 (30 Nov 2023 04:47) (131/79 - 182/77)  BP(mean): 97 (29 Nov 2023 16:53) (97 - 97)  ABP: --  ABP(mean): --  RR: 18 (30 Nov 2023 04:47) (18 - 18)  SpO2: 96% (30 Nov 2023 06:05) (92% - 96%)    O2 Parameters below as of 30 Nov 2023 01:20  Patient On (Oxygen Delivery Method): nasal cannula        11-29 @ 07:01  -  11-30 @ 07:00  --------------------------------------------------------  IN: 360 mL / OUT: 800 mL / NET: -440 mL      CAPILLARY BLOOD GLUCOSE          ECG: reviewed.    PHYSICAL EXAM:  General: NAD, elderly female on HFNC  HEENT: NC/AT, PERRL, conjunctiva and sclera clear, MMM,   Neck: supple, No JVD, trachea midline   Respiratory: Diffuse Rhonchi bilaterally  Cardiovascular: RRR, no m/r/g  Abdomen: Soft, NTTP, ND, + BS  Extremities: no c/c/e  Skin: intact, no rashes or lesions  Neurological: awake, alert, non focal  Psychiatry: appropriate affect     MEDICATIONS:  MEDICATIONS  (STANDING):  albuterol/ipratropium for Nebulization 3 milliLiter(s) Nebulizer every 6 hours  allopurinol 100 milliGRAM(s) Oral daily  amLODIPine   Tablet 5 milliGRAM(s) Oral daily  aspirin enteric coated 81 milliGRAM(s) Oral daily  carvedilol 6.25 milliGRAM(s) Oral every 12 hours  cefepime   IVPB 2000 milliGRAM(s) IV Intermittent every 12 hours  cefepime   IVPB      ferrous    sulfate 325 milliGRAM(s) Oral daily  heparin   Injectable 5000 Unit(s) SubCutaneous every 8 hours  influenza  Vaccine (HIGH DOSE) 0.7 milliLiter(s) IntraMuscular once  pantoprazole    Tablet 40 milliGRAM(s) Oral before breakfast  senna 2 Tablet(s) Oral at bedtime    MEDICATIONS  (PRN):      ALLERGIES:  Allergies    ampicillin-sulbactam (Rash)    Intolerances        LABS:                        13.5   6.48  )-----------( 233      ( 30 Nov 2023 08:26 )             42.6     11-29    144  |  104  |  34<H>  ----------------------------<  116<H>  4.1   |  37<H>  |  0.84    Ca    10.5<H>      29 Nov 2023 07:30  Phos  2.7     11-29    TPro  7.1  /  Alb  2.7<L>  /  TBili  0.6  /  DBili  x   /  AST  18  /  ALT  27  /  AlkPhos  96  11-29      Urinalysis Basic - ( 29 Nov 2023 07:30 )    Color: x / Appearance: x / SG: x / pH: x  Gluc: 116 mg/dL / Ketone: x  / Bili: x / Urobili: x   Blood: x / Protein: x / Nitrite: x   Leuk Esterase: x / RBC: x / WBC x   Sq Epi: x / Non Sq Epi: x / Bacteria: x    Micro:    Culture - Blood (collected 11-25-23 @ 10:24)  Source: .Blood Blood-Peripheral  Preliminary Report (11-29-23 @ 13:00):    No growth at 4 days    Culture - Blood (collected 11-25-23 @ 10:15)  Source: .Blood Blood-Peripheral  Preliminary Report (11-29-23 @ 13:00):    No growth at 4 days        Culture - Sputum (collected 11-26-23 @ 14:00)  Source: .Sputum Sputum  Gram Stain (11-28-23 @ 13:08):    No polymorphonuclear leukocytes per low power field    No Squamous epithelial cells per low power field    Rare Yeast per oil power field    Few Gram positive cocci in pairs per oil power field  Final Report (11-28-23 @ 13:08):    Normal Respiratory Va present        RADIOLOGY & ADDITIONAL TESTS: Reviewed.  Culture - Sputum (collected 11-26-23 @ 14:00)  Source: .Sputum Sputum  Gram Stain (11-28-23 @ 13:08):    No polymorphonuclear leukocytes per low power field    No Squamous epithelial cells per low power field    Rare Yeast per oil power field    Few Gram positive cocci in pairs per oil power field  Final Report (11-28-23 @ 13:08):    Normal Respiratory Va present        RADIOLOGY & ADDITIONAL TESTS: Reviewed.  < from: CT Chest No Cont (11.25.23 @ 14:52) >  FINDINGS:    Tubes/Lines: None.    Lungs, airways and pleura: Secretions in the trachea, bronchus   intermedius, and right lower lobe bronchus along with the segmental   bronchi of the right lower lobe. There is patchy consolidation of the   right lower lobe and patchy consolidation of the right middle lobe. Left   lower lobe linear atelectasis. No pneumothorax. Small left pleural   effusion.    Mediastinum: The visualized thyroid gland contains bilobar hyperdense   nodules. The chest lymph nodes measure less than 10 mm in the short axis.   Unremarkable esophagus. Cardiomegaly. Coronary calcifications. Mitral   annular calcification. The aorta is ectatic. Aortic calcifications.    Upper Abdomen: Cholecystectomy.The left adrenal gland is thickened.    Bones And Soft Tissues: The bones are unremarkable.  The soft tissues are   unremarkable.      IMPRESSION:    1.  Secretions in the trachea, bronchus intermedius and right lower lobe   bronchus.  2.  Patchy consolidation of the right lower lobe and right middle lobe.    --- End of Report ---    < end of copied text >        < from: Xray Chest 1 View- PORTABLE-Urgent (11.25.23 @ 10:33) >  IMPRESSION: New onset moderate right effusion. Heart enlargement again   noted.    --- End of Report---    < end of copied text >

## 2023-11-30 NOTE — PROGRESS NOTE ADULT - ASSESSMENT
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 Was being treated for outpatient PNA on Levaquin x 3 days PTA. Admitted to medicine with RML/ RLL PNA requiring BIPAP. Pulmonary consulted.     Diagnosis: Acute on chronic hypoxemic respiratory failure, PNA, COPD    Recommendations:  - Acute on Chronic hypoxemic respiratory failure likely 2/2 RML/ RLL PNA seen on CT imaging with secretions in right bronchus and trachea  - Initially on BIPAP on admission was changed to HFNC and now weaned successfully to NC 3L, which is her baseline.    - Continue to titrate FiO2 as tolerates for goal O2 > 88%.   - No wheezing on exam, unlikely COPD exacerbation would hold off on corticosteroids unless patient clinically worsens  - Currently on cefepime as she "failed" outpt treatment with levofloxacin; plan for 7 day course pending clinical response. Continue ABX as per primary team  - Duonebs q6, Incentive Spirometry, & Chest PT for secretion mobilization  - + HMPV, most likely driving cause of exacerbation. supportive care   - Blood cx NTD, COVID neg, sputum neg, mycoplasma neg, Legionella/strep/MRSA negative  - unclear if pt is on inhalers for COPD, would benefit from pulmonary f/u upon d/c home   - DNR/DNI, palliative care following       *Plan of care discussed with Pulmonology attending Juanita LION

## 2023-11-30 NOTE — PROGRESS NOTE ADULT - SUBJECTIVE AND OBJECTIVE BOX
Patient is a 88y old  Female who presents with a chief complaint of shortness of breath  pneumonia (30 Nov 2023 08:46)      INTERVAL HPI/OVERNIGHT EVENTS: Overnight no acute events. On 3L NC saturating well, currently getting Duonebs treatments. More responsive today.     MEDICATIONS  (STANDING):  albuterol/ipratropium for Nebulization 3 milliLiter(s) Nebulizer every 6 hours  allopurinol 100 milliGRAM(s) Oral daily  amLODIPine   Tablet 10 milliGRAM(s) Oral daily  aspirin  chewable 81 milliGRAM(s) Oral daily  carvedilol 6.25 milliGRAM(s) Oral every 12 hours  cefepime   IVPB 2000 milliGRAM(s) IV Intermittent every 12 hours  cefepime   IVPB      ferrous    sulfate 325 milliGRAM(s) Oral daily  heparin   Injectable 5000 Unit(s) SubCutaneous every 8 hours  influenza  Vaccine (HIGH DOSE) 0.7 milliLiter(s) IntraMuscular once  senna 2 Tablet(s) Oral at bedtime    MEDICATIONS  (PRN):      Allergies    ampicillin-sulbactam (Rash)    Intolerances        REVIEW OF SYSTEMS:  Limited due to clinical status.     Vital Signs Last 24 Hrs  T(C): 37 (30 Nov 2023 10:15), Max: 37.2 (30 Nov 2023 04:47)  T(F): 98.6 (30 Nov 2023 10:15), Max: 99 (30 Nov 2023 04:47)  HR: 79 (30 Nov 2023 11:35) (79 - 97)  BP: 136/77 (30 Nov 2023 10:15) (136/77 - 182/77)  BP(mean): --  RR: 17 (30 Nov 2023 10:15) (17 - 18)  SpO2: 96% (30 Nov 2023 11:35) (89% - 96%)    Parameters below as of 30 Nov 2023 11:35  Patient On (Oxygen Delivery Method): nasal cannula        PHYSICAL EXAM:  GENERAL: NAD on 3L NC  HEAD:  Atraumatic, Normocephalic  EYES: EOMI, sclera anicteric  ENMT: Moist mucous membranes  NECK: Supple, No JVD  NERVOUS SYSTEM: Awake, responding to some questions   CHEST/LUNG: Decreased BS BL  HEART: RRR; No murmurs, rubs, or gallops  ABDOMEN: Soft, Nontender, Nondistended; Bowel sounds present  EXTREMITIES:  2+ Peripheral Pulses  LYMPH: No lymphadenopathy   SKIN: No rashes or lesions    LABS:                        13.5   6.48  )-----------( 233      ( 30 Nov 2023 08:26 )             42.6     11-30    138  |  104  |  24<H>  ----------------------------<  104<H>  4.5   |  33<H>  |  0.71    Ca    10.2<H>      30 Nov 2023 08:26  Phos  2.7     11-29    TPro  7.1  /  Alb  2.7<L>  /  TBili  0.6  /  DBili  x   /  AST  18  /  ALT  27  /  AlkPhos  96  11-29      Urinalysis Basic - ( 30 Nov 2023 08:26 )    Color: x / Appearance: x / SG: x / pH: x  Gluc: 104 mg/dL / Ketone: x  / Bili: x / Urobili: x   Blood: x / Protein: x / Nitrite: x   Leuk Esterase: x / RBC: x / WBC x   Sq Epi: x / Non Sq Epi: x / Bacteria: x      CAPILLARY BLOOD GLUCOSE          RADIOLOGY & ADDITIONAL TESTS:    Imaging Personally Reviewed:  [ X] YES  [ ] NO    Consultant(s) Notes Reviewed:  [ X] YES  [ ] NO    Care Discussed with Consultants/Other Providers [X ] YES  [ ] NO

## 2023-11-30 NOTE — PROGRESS NOTE ADULT - ASSESSMENT
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    #Acute Metabolic Encephalopathy  #Acute Respiratory Failure with Hypoxia and Hypercarbia  - likely multifactorial with sepsis and hypercarbia  - hypercarbia likely from PNA, will complete cefepime tomorrow   - HFNC, no on NC, wean as tolerated - seems to be back at her baseline, lungs sounds have improved   - patient is DNR/DNI  - Pulm eval appreciated     #Sepsis 2/2 PNA  - present on admission - hypothermic to 96.5 rectally and tachypneic to 23   - got 2L IVF bolus, BP improved  - likely form PNA, was given levaquin outpatient and failed this outpatient   - switched to Cefepime for broad spectrum coverage,  neg legionella, dc  azithromycin   - + Hmpv   -  CT chest :   Secretions in the trachea, bronchus intermedius and right lower lobe   bronchus. Patchy consolidation of the right lower lobe and right middle lobe.  - duonebs q6h  - BCx x2 neg  - Pulm      #Hyponatremia  - likely 2/2 hypovolemia,  from vomiting, decreased po intake, improved with IVF  - Added urea  - Na improving   - Renal following     #HTN  - BP elevated, resume home meds coreg and amlodipine   - BNP elevated to 10k but clinically seems more euvolemic on exam, recent Echo in 3/2023 showed EF 65-70%      DVT PPx: HSQ TID    Dispo: [facility]

## 2023-11-30 NOTE — PHARMACOTHERAPY INTERVENTION NOTE - COMMENTS
Recommended adjusting aspirin to chewable formulation so medication can be crushed.
Recommended obtaining legionella test to determine if patient needs atypical coverage for PNA. 
Recommended cefepime stop date adjustment to allow for 7 day treatment course.
Modified ampicillin/sulbactam allergy history to state that patient tolerated cefepime during this admission.    Mai Ramesh, PharmD, BCIDP  Clinical Pharmacy Specialist, Infectious Diseases  Tele-Antimicrobial Stewardship Program (Tele-ASP)  Tele-ASP Phone: [phone]  
Recommended switching pantoprazole from tablet to packet form since tablet can't be crushed.

## 2023-12-01 ENCOUNTER — TRANSCRIPTION ENCOUNTER (OUTPATIENT)
Age: 88
End: 2023-12-01

## 2023-12-01 VITALS — OXYGEN SATURATION: 98 %

## 2023-12-01 DIAGNOSIS — E87.70 FLUID OVERLOAD, UNSPECIFIED: ICD-10-CM

## 2023-12-01 LAB
ANION GAP SERPL CALC-SCNC: 2 MMOL/L — LOW (ref 5–17)
ANION GAP SERPL CALC-SCNC: 2 MMOL/L — LOW (ref 5–17)
BUN SERPL-MCNC: 25 MG/DL — HIGH (ref 7–23)
BUN SERPL-MCNC: 25 MG/DL — HIGH (ref 7–23)
CALCIUM SERPL-MCNC: 10.5 MG/DL — HIGH (ref 8.5–10.1)
CALCIUM SERPL-MCNC: 10.5 MG/DL — HIGH (ref 8.5–10.1)
CHLORIDE SERPL-SCNC: 103 MMOL/L — SIGNIFICANT CHANGE UP (ref 96–108)
CHLORIDE SERPL-SCNC: 103 MMOL/L — SIGNIFICANT CHANGE UP (ref 96–108)
CO2 SERPL-SCNC: 35 MMOL/L — HIGH (ref 22–31)
CO2 SERPL-SCNC: 35 MMOL/L — HIGH (ref 22–31)
CREAT SERPL-MCNC: 0.92 MG/DL — SIGNIFICANT CHANGE UP (ref 0.5–1.3)
CREAT SERPL-MCNC: 0.92 MG/DL — SIGNIFICANT CHANGE UP (ref 0.5–1.3)
EGFR: 60 ML/MIN/1.73M2 — SIGNIFICANT CHANGE UP
EGFR: 60 ML/MIN/1.73M2 — SIGNIFICANT CHANGE UP
GLUCOSE SERPL-MCNC: 99 MG/DL — SIGNIFICANT CHANGE UP (ref 70–99)
GLUCOSE SERPL-MCNC: 99 MG/DL — SIGNIFICANT CHANGE UP (ref 70–99)
HCT VFR BLD CALC: 42.4 % — SIGNIFICANT CHANGE UP (ref 34.5–45)
HCT VFR BLD CALC: 42.4 % — SIGNIFICANT CHANGE UP (ref 34.5–45)
HGB BLD-MCNC: 13.1 G/DL — SIGNIFICANT CHANGE UP (ref 11.5–15.5)
HGB BLD-MCNC: 13.1 G/DL — SIGNIFICANT CHANGE UP (ref 11.5–15.5)
MCHC RBC-ENTMCNC: 28.9 PG — SIGNIFICANT CHANGE UP (ref 27–34)
MCHC RBC-ENTMCNC: 28.9 PG — SIGNIFICANT CHANGE UP (ref 27–34)
MCHC RBC-ENTMCNC: 30.9 G/DL — LOW (ref 32–36)
MCHC RBC-ENTMCNC: 30.9 G/DL — LOW (ref 32–36)
MCV RBC AUTO: 93.6 FL — SIGNIFICANT CHANGE UP (ref 80–100)
MCV RBC AUTO: 93.6 FL — SIGNIFICANT CHANGE UP (ref 80–100)
NRBC # BLD: 0 /100 WBCS — SIGNIFICANT CHANGE UP (ref 0–0)
NRBC # BLD: 0 /100 WBCS — SIGNIFICANT CHANGE UP (ref 0–0)
PLATELET # BLD AUTO: 251 K/UL — SIGNIFICANT CHANGE UP (ref 150–400)
PLATELET # BLD AUTO: 251 K/UL — SIGNIFICANT CHANGE UP (ref 150–400)
POTASSIUM SERPL-MCNC: 5.3 MMOL/L — SIGNIFICANT CHANGE UP (ref 3.5–5.3)
POTASSIUM SERPL-MCNC: 5.3 MMOL/L — SIGNIFICANT CHANGE UP (ref 3.5–5.3)
POTASSIUM SERPL-SCNC: 5.3 MMOL/L — SIGNIFICANT CHANGE UP (ref 3.5–5.3)
POTASSIUM SERPL-SCNC: 5.3 MMOL/L — SIGNIFICANT CHANGE UP (ref 3.5–5.3)
RBC # BLD: 4.53 M/UL — SIGNIFICANT CHANGE UP (ref 3.8–5.2)
RBC # BLD: 4.53 M/UL — SIGNIFICANT CHANGE UP (ref 3.8–5.2)
RBC # FLD: 13.4 % — SIGNIFICANT CHANGE UP (ref 10.3–14.5)
RBC # FLD: 13.4 % — SIGNIFICANT CHANGE UP (ref 10.3–14.5)
SODIUM SERPL-SCNC: 140 MMOL/L — SIGNIFICANT CHANGE UP (ref 135–145)
SODIUM SERPL-SCNC: 140 MMOL/L — SIGNIFICANT CHANGE UP (ref 135–145)
WBC # BLD: 6.4 K/UL — SIGNIFICANT CHANGE UP (ref 3.8–10.5)
WBC # BLD: 6.4 K/UL — SIGNIFICANT CHANGE UP (ref 3.8–10.5)
WBC # FLD AUTO: 6.4 K/UL — SIGNIFICANT CHANGE UP (ref 3.8–10.5)
WBC # FLD AUTO: 6.4 K/UL — SIGNIFICANT CHANGE UP (ref 3.8–10.5)

## 2023-12-01 PROCEDURE — 99232 SBSQ HOSP IP/OBS MODERATE 35: CPT

## 2023-12-01 RX ORDER — SODIUM ZIRCONIUM CYCLOSILICATE 10 G/10G
10 POWDER, FOR SUSPENSION ORAL ONCE
Refills: 0 | Status: COMPLETED | OUTPATIENT
Start: 2023-12-01 | End: 2023-12-01

## 2023-12-01 RX ORDER — IPRATROPIUM/ALBUTEROL SULFATE 18-103MCG
3 AEROSOL WITH ADAPTER (GRAM) INHALATION
Qty: 360 | Refills: 0
Start: 2023-12-01 | End: 2023-12-30

## 2023-12-01 RX ORDER — FERROUS SULFATE 325(65) MG
1 TABLET ORAL
Qty: 0 | Refills: 0 | DISCHARGE
Start: 2023-12-01

## 2023-12-01 RX ORDER — SENNA PLUS 8.6 MG/1
2 TABLET ORAL
Qty: 0 | Refills: 0 | DISCHARGE
Start: 2023-12-01

## 2023-12-01 RX ADMIN — CEFEPIME 100 MILLIGRAM(S): 1 INJECTION, POWDER, FOR SOLUTION INTRAMUSCULAR; INTRAVENOUS at 17:29

## 2023-12-01 RX ADMIN — CEFEPIME 100 MILLIGRAM(S): 1 INJECTION, POWDER, FOR SOLUTION INTRAMUSCULAR; INTRAVENOUS at 05:36

## 2023-12-01 RX ADMIN — Medication 3 MILLILITER(S): at 00:49

## 2023-12-01 RX ADMIN — Medication 100 MILLIGRAM(S): at 11:23

## 2023-12-01 RX ADMIN — HEPARIN SODIUM 5000 UNIT(S): 5000 INJECTION INTRAVENOUS; SUBCUTANEOUS at 05:36

## 2023-12-01 RX ADMIN — Medication 81 MILLIGRAM(S): at 11:23

## 2023-12-01 RX ADMIN — Medication 3 MILLILITER(S): at 18:40

## 2023-12-01 RX ADMIN — CARVEDILOL PHOSPHATE 6.25 MILLIGRAM(S): 80 CAPSULE, EXTENDED RELEASE ORAL at 17:29

## 2023-12-01 RX ADMIN — SODIUM ZIRCONIUM CYCLOSILICATE 10 GRAM(S): 10 POWDER, FOR SUSPENSION ORAL at 14:19

## 2023-12-01 RX ADMIN — AMLODIPINE BESYLATE 10 MILLIGRAM(S): 2.5 TABLET ORAL at 05:35

## 2023-12-01 RX ADMIN — Medication 3 MILLILITER(S): at 12:25

## 2023-12-01 RX ADMIN — PANTOPRAZOLE SODIUM 40 MILLIGRAM(S): 20 TABLET, DELAYED RELEASE ORAL at 05:35

## 2023-12-01 RX ADMIN — CARVEDILOL PHOSPHATE 6.25 MILLIGRAM(S): 80 CAPSULE, EXTENDED RELEASE ORAL at 05:36

## 2023-12-01 RX ADMIN — Medication 325 MILLIGRAM(S): at 11:23

## 2023-12-01 RX ADMIN — HEPARIN SODIUM 5000 UNIT(S): 5000 INJECTION INTRAVENOUS; SUBCUTANEOUS at 13:00

## 2023-12-01 RX ADMIN — Medication 3 MILLILITER(S): at 05:29

## 2023-12-01 NOTE — PROGRESS NOTE ADULT - SUBJECTIVE AND OBJECTIVE BOX
CHIEF COMPLAINT:  ===============  RESPIRATORY DISTRESS  PNEUMONIA AND FLUID OVERLOAD    INTERVAL EVENTS:  ==================    - T(F): , Max: 99.2 (11-30-23 @ 23:34)  SpO2:  (93% - 99%)      REVIEW OF SYSTEMS:  ==================  - no fever, no chills  - no HA, no dizziness  - no visual changes, no auditory changes  - no sore throat, no sinus congestion  - no SOB, no cough  - no chest pain, no palpitations  - no abdominal pain, no N/V/D  - no dysuria, no hematuria  - no myalgias, no arthralgias  - no pain in extremity, no swelling   - no rashes, no pruritis  - no neuro deficits  - no psychiatric concerns       HPI:  Assessment: 87 years old female with h/o HTN, HLD, GERD,COPD (2L NC home O2 baseline), TIA, gallbladder Ca s/p ex lab, open cholecystectomy, segment 4b/5 hepatectomy, and right 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 Was being treated for outpatient PNA on Levaquin x 3 days PTA. Admitted to medicine with RML/ RLL PNA requiring BIPAP. Pulmonary consulted.       OBJECTIVE:  ===========    ICU Vital Signs Last 24 Hrs  T(C): 36.5 (01 Dec 2023 04:49), Max: 37.3 (30 Nov 2023 23:34)  T(F): 97.7 (01 Dec 2023 04:49), Max: 99.2 (30 Nov 2023 23:34)  HR: 79 (01 Dec 2023 07:28) (79 - 91)  BP: 126/72 (01 Dec 2023 04:49) (126/72 - 162/76)  BP(mean): 105 (30 Nov 2023 16:19) (105 - 105)  RR: 19 (01 Dec 2023 04:49) (17 - 19)  SpO2: 96% (01 Dec 2023 07:28) (93% - 99%)    O2 Parameters below as of 01 Dec 2023 07:28  Patient On (Oxygen Delivery Method): nasal cannula  O2 Flow (L/min): 3      11-30 @ 07:01  -  12-01 @ 07:00  --------------------------------------------------------  IN: 540 mL / OUT: 500 mL / NET: 40 mL      CAPILLARY BLOOD GLUCOSE      PHYSICAL EXAM:  ==============  GENERAL: NAD, well-groomed, well-developed  HEAD:  Atraumatic, Normocephalic  EYES: EOMI, PERRLA, conjunctiva and sclera clear  ENMT: No tonsillar erythema, exudates, or enlargement; Moist mucous membranes, Good dentition, No lesions  NECK: Supple, No JVD, Normal thyroid  NERVOUS SYSTEM:  Alert & Oriented X3, Good concentration; Motor Strength 5/5 B/L upper and lower extremities; DTRs 2+ intact and symmetric  CHEST/LUNG: Clear to percussion bilaterally; No rales, rhonchi, wheezing, or rubs  HEART: Regular rate and rhythm; No murmurs, rubs, or gallops  ABDOMEN: Soft, Nontender, Nondistended; Bowel sounds present  EXTREMITIES:  2+ Peripheral Pulses, No clubbing, cyanosis, or edema  LYMPH: No lymphadenopathy noted  SKIN: No rashes or lesions        HOSPITAL MEDICATIONS:  ======================  aspirin  chewable 81 milliGRAM(s) Oral daily  heparin   Injectable 5000 Unit(s) SubCutaneous every 8 hours  cefepime   IVPB 2000 milliGRAM(s) IV Intermittent every 12 hours  amLODIPine   Tablet 10 milliGRAM(s) Oral daily  carvedilol 6.25 milliGRAM(s) Oral every 12 hours  allopurinol 100 milliGRAM(s) Oral daily  albuterol/ipratropium for Nebulization 3 milliLiter(s) Nebulizer every 6 hours  pantoprazole   Suspension 40 milliGRAM(s) Oral every 24 hours  senna 2 Tablet(s) Oral at bedtime  ferrous    sulfate 325 milliGRAM(s) Oral daily  influenza  Vaccine (HIGH DOSE) 0.7 milliLiter(s) IntraMuscular once    LABS:  =====                          13.1   6.40  )-----------( 251      ( 01 Dec 2023 05:15 )             42.4     Hgb Trend: 13.1<--, 13.5<--, 13.9<--, 14.1<--, 14.6<--  12-01    140  |  103  |  25<H>  ----------------------------<  99  5.3   |  35<H>  |  0.92    Ca    10.5<H>      01 Dec 2023 05:15      Creatinine Trend: 0.92<--, 0.71<--, 0.84<--, 0.89<--, 0.67<--, 0.72<--    Lactate, Blood: 0.9  Procalcitonin, Serum: 0.93 ng/mL (11-26-23 @ 06:20)    Urinalysis Basic - ( 01 Dec 2023 05:15 )    Color: x / Appearance: x / SG: x / pH: x  Gluc: 99 mg/dL / Ketone: x  / Bili: x / Urobili: x   Blood: x / Protein: x / Nitrite: x   Leuk Esterase: x / RBC: x / WBC x   Sq Epi: x / Non Sq Epi: x / Bacteria: x    MICROBIOLOGY:     Culture - Sputum  Source: .Sputum Sputum  Gram Stain (11-28-23 @ 13:08):    No polymorphonuclear leukocytes per low power field    No Squamous epithelial cells per low power field    Rare Yeast per oil power field    Few Gram positive cocci in pairs per oil power field  Final Report (11-28-23 @ 13:08):    Normal Respiratory Va present    Culture - Urine  Source: Clean Catch Clean Catch (Midstream)  Final Report (11-26-23 @ 17:38):    No growth    Culture - Blood  Source: .Blood Blood-Peripheral  Final Report (11-30-23 @ 13:01):    No growth at 5 days    Culture - Blood  Source: .Blood Blood-Peripheral  Final Report (11-30-23 @ 13:01):    No growth at 5 days    cefepime   IVPB 2000 every 12 hours  cefepime   IVPB        Legionella Antigen, Urine: Negative (11-25-23 @ 12:57)  MRSA PCR Result.: NotDetec (11-26-23 @ 09:47)  Rapid RVP Result: Detected (11-27-23 @ 16:30)      RADIOLOGY:  ==========  IMPRESSION:    1.  Secretions in the trachea, bronchus intermedius and right lower lobe   bronchus.  2.  Patchy consolidation of the right lower lobe and right middle lobe.    PULMONARY:  ============    albuterol/ipratropium for Nebulization 3 Nebulizer every 6 hours      CARDIAC:  ========  Summary:   1. Technically difficult study.   2. Poor subcostal views.   3. Normal global left ventricular systolic function.   4. Sclerotic aortic valve with normal opening.   5. Moderate aortic regurgitation.   6. Small pericardial effusion.   7. Mild thickening and calcification of the anterior and posterior   mitral valve leaflets.   8. Moderate mitral annular calcification.   9. Moderately decreased posterior mitral leaflet mobility.  10. Trace mitral valve regurgitation.

## 2023-12-01 NOTE — PROGRESS NOTE ADULT - PROVIDER SPECIALTY LIST ADULT
Hospitalist
Hospitalist
Nephrology
Nephrology
Pulmonology
Hospitalist
Nephrology
Pulmonology
Hospitalist
Hospitalist

## 2023-12-01 NOTE — DISCHARGE NOTE PROVIDER - HOSPITAL COURSE
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    #Acute Metabolic Encephalopathy  #Acute Respiratory Failure with Hypoxia and Hypercarbia  - likely multifactorial with sepsis and hypercarbia  - hypercarbia likely from PNA, will complete cefepime tomorrow   - HFNC, no on NC, wean as tolerated - seems to be back at her baseline, lungs sounds have improved   - patient is DNR/DNI  - Pulm casandra appreciated   - spoke with daughter, they do have a nebulizer at home- can send duonebs to pharmacy     #Sepsis 2/2 PNA  - present on admission - hypothermic to 96.5 rectally and tachypneic to 23   - got 2L IVF bolus, BP improved  - likely form PNA, was given levaquin outpatient and failed this outpatient   - switched to Cefepime for broad spectrum coverage,  neg legionella, dc  azithromycin ---conpleted 7 day course of cefepime  - + Hmpv   -  CT chest :   Secretions in the trachea, bronchus intermedius and right lower lobe   bronchus. Patchy consolidation of the right lower lobe and right middle lobe.  - duonebs q6h  - BCx x2 neg  - Pulm      #Hyponatremia  - likely 2/2 hypovolemia,  from vomiting, decreased po intake, improved with IVF  - Added urea  - Na improved  - Renal following   - Encourage PO intake     #HTN  - BP elevated, resume home meds coreg and amlodipine   - BNP elevated to 10k but clinically seems more euvolemic on exam, recent Echo in 3/2023 showed EF 65-70%      DVT PPx: HSQ TID    Patient seen and evaluated. She looks much better today. She is conversing with me. PT rec [facility] however family would like to take her home with home PT. Needs to follow up with pulmonology outpatient.

## 2023-12-01 NOTE — PROGRESS NOTE ADULT - REASON FOR ADMISSION
shortness of breath  pneumonia

## 2023-12-01 NOTE — DISCHARGE NOTE PROVIDER - NSDCCPCAREPLAN_GEN_ALL_CORE_FT
PRINCIPAL DISCHARGE DIAGNOSIS  Diagnosis: Pneumonia  Assessment and Plan of Treatment: Completed course of cefepime  Required BIPAP/ High flow nasal cannula but now back to 3L nasal cannula.   Use home oxygen as needed to maintain oxygen saturation> 92%  Take Mucinex as needed for congestion.   Duonebs as needed via nebulizer.   Please see Pulmonologist outpatient.      SECONDARY DISCHARGE DIAGNOSES  Diagnosis: Acute metabolic encephalopathy  Assessment and Plan of Treatment: because of pneumonia    Diagnosis: Hyponatremia  Assessment and Plan of Treatment:     Diagnosis: Fluid overload  Assessment and Plan of Treatment:

## 2023-12-01 NOTE — DISCHARGE NOTE PROVIDER - CARE PROVIDER_API CALL
Rosy Grant  Internal Medicine  [address] [place] [address] - [place] [address]  Phone: [phone]  Fax: [phone]  Follow Up Time:     Your PCP,   Phone: (   )    -  Fax: (   )    -  Follow Up Time:

## 2023-12-01 NOTE — DISCHARGE NOTE PROVIDER - CARE PROVIDERS DIRECT ADDRESSES
[email or web address]

## 2023-12-01 NOTE — DISCHARGE NOTE NURSING/CASE MANAGEMENT/SOCIAL WORK - PATIENT PORTAL LINK FT
You can access the FollowMyHealth Patient Portal offered by [facility] by registering at the following website: [email or web address]. By joining [employer]’s FollowMyHealth portal, you will also be able to view your health information using other applications (apps) compatible with our system.

## 2023-12-01 NOTE — DISCHARGE NOTE PROVIDER - NSDCMRMEDTOKEN_GEN_ALL_CORE_FT
allopurinol 100 mg oral tablet: 1 tab(s) orally once a day  amLODIPine 5 mg oral tablet: 1 tab(s) orally once a day  aspirin 81 mg oral tablet: 1 tab(s) orally once a day  carvedilol 6.25 mg oral tablet: 1 tab(s) orally every 12 hours  fenofibrate 145 mg oral tablet: 1 tab(s) orally once a day  ferrous sulfate 325 mg (65 mg elemental iron) oral tablet: 1 tab(s) orally once a day  ipratropium-albuterol 0.5 mg-2.5 mg/3 mL inhalation solution: 3 milliliter(s) inhaled every 6 hours as needed for  shortness of breath and/or wheezing  senna leaf extract oral tablet: 2 tab(s) orally once a day (at bedtime)

## 2023-12-01 NOTE — PROGRESS NOTE ADULT - ASSESSMENT
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 Was being treated for outpatient PNA on Levaquin x 3 days PTA. Admitted to medicine with RML/ RLL PNA requiring BIPAP. Pulmonary consulted.     Diagnosis: Acute on chronic hypoxemic respiratory failure, PNA, COPD    Recommendations:    - O2 saturation 96% on 3 L NC   - Acute on Chronic hypoxemic respiratory failure likely 2/2 RML/ RLL PNA seen on CT imaging with secretions in right bronchus and trachea  - Initially on BIPAP on admission was changed to HFNC and now weaned successfully to NC 3L, which is her baseline.    - Continue to titrate FiO2 as tolerates for goal O2 > 88%.   - No wheezing on exam, unlikely COPD exacerbation would hold off on corticosteroids unless patient clinically worsens  - Currently on cefepime as she "failed" outpt treatment with levofloxacin; plan for 7 day course pending clinical response. Continue ABX as per primary team  - Duonebs q6, Incentive Spirometry, & Chest PT for secretion mobilization  - + HMPV, most likely driving cause of exacerbation. 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 Was being treated for outpatient PNA on Levaquin x 3 days PTA. Admitted to medicine with RML/ RLL PNA requiring BIPAP. Pulmonary consulted.     Diagnosis: Acute on chronic hypoxemic respiratory failure, PNA, COPD    Recommendations:    - O2 saturation 96% on 3 L NC   - Acute on Chronic hypoxemic respiratory failure likely 2/2 RML/ RLL PNA seen on CT imaging with secretions in right bronchus and trachea  - Initially on BIPAP on admission was changed to HFNC and now weaned successfully to NC 3L, which is her baseline.    - Continue to titrate FiO2 as tolerates for goal O2 > 88%.   - No wheezing on exam, unlikely COPD exacerbation would hold off on corticosteroids unless patient clinically worsens  - Currently on cefepime as she "failed" outpt treatment with levofloxacin; plan for 7 day course pending clinical response. Continue ABX as per primary team  - Duonebs q6, Incentive Spirometry, & Chest PT for secretion mobilization  - + HMPV, most likely driving cause of exacerbation. supportive care   - Blood cx NTD, COVID neg, sputum neg, mycoplasma neg, Legionella/strep/MRSA negative  - unclear if pt is on inhalers for COPD, would benefit from pulmonary f/u upon d/c home   - DNR/DNI, palliative care following     - no objection from pulmonary to discharge  - patient does have home O2  - will have a follow up tele-health visit post discharge     *Plan of care discussed with Pulmonology attending Juanita LION

## 2023-12-02 ENCOUNTER — TRANSCRIPTION ENCOUNTER (OUTPATIENT)
Age: 88
End: 2023-12-02

## 2023-12-04 ENCOUNTER — TRANSCRIPTION ENCOUNTER (OUTPATIENT)
Age: 88
End: 2023-12-04

## 2023-12-05 ENCOUNTER — APPOINTMENT (OUTPATIENT)
Dept: PULMONOLOGY | Facility: CLINIC | Age: 88
End: 2023-12-05
Payer: MEDICARE

## 2023-12-05 DIAGNOSIS — E83.39 OTHER DISORDERS OF PHOSPHORUS METABOLISM: ICD-10-CM

## 2023-12-05 DIAGNOSIS — G93.41 METABOLIC ENCEPHALOPATHY: ICD-10-CM

## 2023-12-05 DIAGNOSIS — Z85.51 PERSONAL HISTORY OF MALIGNANT NEOPLASM OF BLADDER: ICD-10-CM

## 2023-12-05 DIAGNOSIS — J96.22 ACUTE AND CHRONIC RESPIRATORY FAILURE WITH HYPERCAPNIA: ICD-10-CM

## 2023-12-05 DIAGNOSIS — Z88.1 ALLERGY STATUS TO OTHER ANTIBIOTIC AGENTS STATUS: ICD-10-CM

## 2023-12-05 DIAGNOSIS — J44.9 CHRONIC OBSTRUCTIVE PULMONARY DISEASE, UNSPECIFIED: ICD-10-CM

## 2023-12-05 DIAGNOSIS — E22.2 SYNDROME OF INAPPROPRIATE SECRETION OF ANTIDIURETIC HORMONE: ICD-10-CM

## 2023-12-05 DIAGNOSIS — J12.3 HUMAN METAPNEUMOVIRUS PNEUMONIA: ICD-10-CM

## 2023-12-05 DIAGNOSIS — J96.21 ACUTE AND CHRONIC RESPIRATORY FAILURE WITH HYPOXIA: ICD-10-CM

## 2023-12-05 DIAGNOSIS — E83.52 HYPERCALCEMIA: ICD-10-CM

## 2023-12-05 DIAGNOSIS — J18.9 PNEUMONIA, UNSPECIFIED ORGANISM: ICD-10-CM

## 2023-12-05 DIAGNOSIS — A41.9 SEPSIS, UNSPECIFIED ORGANISM: ICD-10-CM

## 2023-12-05 DIAGNOSIS — Z66 DO NOT RESUSCITATE: ICD-10-CM

## 2023-12-05 DIAGNOSIS — E87.70 FLUID OVERLOAD, UNSPECIFIED: ICD-10-CM

## 2023-12-05 DIAGNOSIS — E78.5 HYPERLIPIDEMIA, UNSPECIFIED: ICD-10-CM

## 2023-12-05 DIAGNOSIS — Z86.73 PERSONAL HISTORY OF TRANSIENT ISCHEMIC ATTACK (TIA), AND CEREBRAL INFARCTION WITHOUT RESIDUAL DEFICITS: ICD-10-CM

## 2023-12-05 DIAGNOSIS — I10 ESSENTIAL (PRIMARY) HYPERTENSION: ICD-10-CM

## 2023-12-05 DIAGNOSIS — K21.9 GASTRO-ESOPHAGEAL REFLUX DISEASE WITHOUT ESOPHAGITIS: ICD-10-CM

## 2023-12-05 PROBLEM — Z00.00 ENCOUNTER FOR PREVENTIVE HEALTH EXAMINATION: Status: ACTIVE | Noted: 2023-12-05

## 2023-12-05 PROCEDURE — 99496 TRANSJ CARE MGMT HIGH F2F 7D: CPT | Mod: 95

## 2023-12-07 ENCOUNTER — APPOINTMENT (OUTPATIENT)
Age: 88
End: 2023-12-07
Payer: MEDICARE

## 2023-12-07 VITALS
TEMPERATURE: 97.9 F | SYSTOLIC BLOOD PRESSURE: 136 MMHG | HEART RATE: 70 BPM | DIASTOLIC BLOOD PRESSURE: 74 MMHG | RESPIRATION RATE: 17 BRPM | OXYGEN SATURATION: 98 %

## 2023-12-07 DIAGNOSIS — J18.9 PNEUMONIA, UNSPECIFIED ORGANISM: ICD-10-CM

## 2023-12-07 DIAGNOSIS — Z71.89 OTHER SPECIFIED COUNSELING: ICD-10-CM

## 2023-12-07 DIAGNOSIS — I10 ESSENTIAL (PRIMARY) HYPERTENSION: ICD-10-CM

## 2023-12-07 DIAGNOSIS — R29.898 OTHER SYMPTOMS AND SIGNS INVOLVING THE MUSCULOSKELETAL SYSTEM: ICD-10-CM

## 2023-12-07 PROCEDURE — 99349 HOME/RES VST EST MOD MDM 40: CPT

## 2023-12-07 RX ORDER — FENOFIBRATE 145 MG/1
145 TABLET, COATED ORAL
Refills: 0 | Status: ACTIVE | COMMUNITY

## 2023-12-07 RX ORDER — PANTOPRAZOLE 40 MG/1
40 TABLET, DELAYED RELEASE ORAL
Refills: 0 | Status: DISCONTINUED | COMMUNITY
End: 2023-12-07

## 2023-12-07 RX ORDER — FENOFIBRATE 134 MG/1
134 CAPSULE ORAL DAILY
Refills: 0 | Status: DISCONTINUED | COMMUNITY
End: 2023-12-07

## 2023-12-07 RX ORDER — IPRATROPIUM BROMIDE AND ALBUTEROL SULFATE 2.5; .5 MG/3ML; MG/3ML
0.5-2.5 (3) SOLUTION RESPIRATORY (INHALATION)
Refills: 0 | Status: ACTIVE | COMMUNITY

## 2023-12-13 ENCOUNTER — TRANSCRIPTION ENCOUNTER (OUTPATIENT)
Age: 88
End: 2023-12-13

## 2023-12-13 ENCOUNTER — APPOINTMENT (OUTPATIENT)
Dept: PULMONOLOGY | Facility: CLINIC | Age: 88
End: 2023-12-13
Payer: MEDICARE

## 2023-12-13 DIAGNOSIS — J12.3 HUMAN METAPNEUMOVIRUS PNEUMONIA: ICD-10-CM

## 2023-12-13 DIAGNOSIS — J96.01 ACUTE RESPIRATORY FAILURE WITH HYPOXIA: ICD-10-CM

## 2023-12-13 PROCEDURE — 99213 OFFICE O/P EST LOW 20 MIN: CPT | Mod: 95

## 2023-12-13 NOTE — PHYSICAL EXAM
[No Acute Distress] : no acute distress [Normal Oropharynx] : normal oropharynx [Normal Appearance] : normal appearance [No Resp Distress] : no resp distress [Normal Color/ Pigmentation] : normal color/ pigmentation [No Focal Deficits] : no focal deficits [Oriented x3] : oriented x3

## 2023-12-13 NOTE — HISTORY OF PRESENT ILLNESS
[Never] : never [TextBox_4] : Patient is an 89y/o F with a history of recent admissions for sepsis, pneumonia, having failed outpatient treatment with Levaquin. She then tested positive for human metapneumovirus. She was also treated with cefepime for possible bacterial superinfection. She had a CT of the chest that showed secretions in the airways in the right middle and right lower lobe pneumonia. She was treated with bronchodilators, and dischrged home where she has home oxygen, presents with son for F/U.  Patient is doing well overall, she is using oxygen at night only, family is checking oxygen saturations which are in the high nineties on room air. She is receiving PT 2 times weekly and is able to walk around home without oxygen. Is currently receiving homecare visits, she will be discharged next week. Continue to use Duenebs BID with Aerokika device. With improved appetite, and with Methylprednisone on hand with exacerbation of symptoms,

## 2023-12-13 NOTE — REASON FOR VISIT
[Home] : at home, [unfilled] , at the time of the visit. [Medical Office: ([facility])___] : at the medical office located in  [Patient] : the patient [This encounter was initiated by telehealth (audio with video) and converted to telephone (audio only) due to technical difficulties.] : This encounter was initiated by telehealth (audio with video) and converted to telephone (audio only) due to technical difficulties. [Follow-Up] : a follow-up visit [Pneumonia] : pneumonia [Cough] : cough [Shortness of Breath] : shortness of breath [Family Member] : family member

## 2023-12-13 NOTE — ASSESSMENT
[FreeTextEntry1] : Plan:  -F/U in 1 month with F/U CT -Continue use of DueNebs PRN with Aerobika device -Continue use of 2L oxygen, continue monitoring of o2 saturations -Use oxygen at night and if o2 falls below 90% -Medrol pack on hand for exacerbation of symptoms, call office immediately with any S/S of acute symptoms of respiratory distress.

## 2023-12-29 ENCOUNTER — TRANSCRIPTION ENCOUNTER (OUTPATIENT)
Age: 88
End: 2023-12-29

## 2024-01-25 ENCOUNTER — APPOINTMENT (OUTPATIENT)
Dept: PULMONOLOGY | Facility: CLINIC | Age: 89
End: 2024-01-25

## 2024-02-13 NOTE — ED ADULT NURSE NOTE - UNABLE TO OBTAIN
Advised pt no update for arrival time of .  Pt given socks as he states he has no shoes with him.      Mica Lara RN  02/13/24 1171     Unresponsive

## 2024-04-05 NOTE — PRE-OP CHECKLIST - AS BP NONINV METHOD
Patient's mother returned call to get lab results. Please try to call patient's mother again.    4 electronic

## 2024-05-24 RX ORDER — CARVEDILOL PHOSPHATE 80 MG/1
0 CAPSULE, EXTENDED RELEASE ORAL
Qty: 0 | Refills: 3 | DISCHARGE

## 2024-05-24 RX ORDER — LOSARTAN POTASSIUM 100 MG/1
0 TABLET, FILM COATED ORAL
Qty: 0 | Refills: 3 | DISCHARGE

## 2024-05-24 RX ORDER — LEVOFLOXACIN 5 MG/ML
1 INJECTION, SOLUTION INTRAVENOUS
Qty: 0 | Refills: 0 | DISCHARGE

## 2024-05-24 RX ORDER — METRONIDAZOLE 500 MG
0 TABLET ORAL
Qty: 0 | Refills: 0 | DISCHARGE

## 2024-05-24 RX ORDER — AMLODIPINE BESYLATE 2.5 MG/1
1 TABLET ORAL
Qty: 0 | Refills: 0 | DISCHARGE

## 2024-05-24 RX ORDER — ASPIRIN/CALCIUM CARB/MAGNESIUM 324 MG
1 TABLET ORAL
Refills: 0 | DISCHARGE

## 2024-05-24 RX ORDER — ALLOPURINOL 300 MG
0 TABLET ORAL
Qty: 0 | Refills: 3 | DISCHARGE

## 2024-05-24 RX ORDER — METOPROLOL TARTRATE 50 MG
0 TABLET ORAL
Qty: 0 | Refills: 0 | DISCHARGE

## 2024-05-24 RX ORDER — PANTOPRAZOLE SODIUM 20 MG/1
0 TABLET, DELAYED RELEASE ORAL
Qty: 0 | Refills: 0 | DISCHARGE

## 2024-05-24 RX ORDER — FENOFIBRATE,MICRONIZED 130 MG
0 CAPSULE ORAL
Qty: 0 | Refills: 3 | DISCHARGE

## 2024-05-24 RX ORDER — METRONIDAZOLE 500 MG
1 TABLET ORAL
Qty: 0 | Refills: 0 | DISCHARGE

## 2024-05-24 RX ORDER — AMLODIPINE BESYLATE 2.5 MG/1
0 TABLET ORAL
Qty: 0 | Refills: 0 | DISCHARGE

## 2024-05-24 RX ORDER — LEVOFLOXACIN 5 MG/ML
0 INJECTION, SOLUTION INTRAVENOUS
Qty: 0 | Refills: 0 | DISCHARGE

## 2024-05-24 RX ORDER — MIRTAZAPINE 45 MG/1
0 TABLET, ORALLY DISINTEGRATING ORAL
Qty: 0 | Refills: 0 | DISCHARGE

## 2024-06-14 NOTE — PATIENT PROFILE ADULT - LIVING ENVIRONMENT
Patient identification verified with 2 identifiers.    Location: [facility] - [id number] [address] [phone] option #1     Referring Physician: Dr. Carlos Valencia  Enrollment/ Re-enrollment date: 24   INR Goal: 2.0-3.0  INR monitoring is per AMS protocol.  Anticoagulation Medication: warfarin  Indication: Mitral Valve Replacement    Subjective   Bleeding signs/symptoms: No    Bruising: No   Major bleeding event: No  Thrombosis signs/symptoms: No  Thromboembolic event: No  Missed doses: No  Extra doses: No  Medication changes: No  Dietary changes: Yes  Change in health: No  Change in activity: No  Alcohol: No  Other concerns: No    Upcoming Procedures:  Does the Patient Have any upcoming procedures that require interruption in anticoagulation therapy? no  Does the patient require bridging? no      Anticoagulation Summary  As of 2024      INR goal:  2.0-3.0   TTR:  69.2% (8.1 mo)   INR used for dosin.60 (2024)   Weekly warfarin total:  21 mg               Assessment/Plan   subTherapeutic     1. New dose: increase dose    2. Next INR: 1 week      Education provided to patient during the visit:  Patient instructed to call in interim with questions, concerns and changes.   Patient educated on interactions between medications and warfarin.   Patient educated on dietary consistency in vitamin k consumption.   Patient educated on affects of alcohol consumption while taking warfarin.   Patient educated on signs of bleeding/clotting.   Patient educated on compliance with dosing, follow up appointments, and prescribed plan of care.        
no

## 2024-06-28 ENCOUNTER — APPOINTMENT (OUTPATIENT)
Dept: PULMONOLOGY | Facility: CLINIC | Age: 89
End: 2024-06-28
Payer: MEDICARE

## 2024-06-28 VITALS
OXYGEN SATURATION: 94 % | HEART RATE: 65 BPM | BODY MASS INDEX: 25.46 KG/M2 | HEIGHT: 57 IN | DIASTOLIC BLOOD PRESSURE: 77 MMHG | WEIGHT: 118 LBS | SYSTOLIC BLOOD PRESSURE: 132 MMHG

## 2024-06-28 PROCEDURE — 99214 OFFICE O/P EST MOD 30 MIN: CPT | Mod: 25

## 2024-06-28 PROCEDURE — ZZZZZ: CPT

## 2024-06-28 PROCEDURE — 94010 BREATHING CAPACITY TEST: CPT

## 2024-07-08 PROBLEM — C67.9 MALIGNANT NEOPLASM OF BLADDER, UNSPECIFIED: Chronic | Status: ACTIVE | Noted: 2022-09-14

## 2024-07-08 PROBLEM — K75.0 ABSCESS OF LIVER: Chronic | Status: ACTIVE | Noted: 2022-10-13

## 2024-07-08 PROBLEM — I10 ESSENTIAL (PRIMARY) HYPERTENSION: Chronic | Status: ACTIVE | Noted: 2022-09-14

## 2024-07-08 PROBLEM — E78.5 HYPERLIPIDEMIA, UNSPECIFIED: Chronic | Status: ACTIVE | Noted: 2022-10-13

## 2024-07-13 ENCOUNTER — TRANSCRIPTION ENCOUNTER (OUTPATIENT)
Age: 89
End: 2024-07-13

## 2024-07-13 ENCOUNTER — OUTPATIENT (OUTPATIENT)
Dept: OUTPATIENT SERVICES | Facility: HOSPITAL | Age: 89
LOS: 1 days | End: 2024-07-13
Payer: MEDICARE

## 2024-07-13 ENCOUNTER — APPOINTMENT (OUTPATIENT)
Dept: CT IMAGING | Facility: IMAGING CENTER | Age: 89
End: 2024-07-13
Payer: MEDICARE

## 2024-07-13 DIAGNOSIS — Z85.09 PERSONAL HISTORY OF MALIGNANT NEOPLASM OF OTHER DIGESTIVE ORGANS: Chronic | ICD-10-CM

## 2024-07-13 DIAGNOSIS — Z90.49 ACQUIRED ABSENCE OF OTHER SPECIFIED PARTS OF DIGESTIVE TRACT: Chronic | ICD-10-CM

## 2024-07-13 DIAGNOSIS — Z90.710 ACQUIRED ABSENCE OF BOTH CERVIX AND UTERUS: Chronic | ICD-10-CM

## 2024-07-13 DIAGNOSIS — J12.3 HUMAN METAPNEUMOVIRUS PNEUMONIA: ICD-10-CM

## 2024-07-13 PROCEDURE — 71250 CT THORAX DX C-: CPT | Mod: 26,MH

## 2024-07-13 PROCEDURE — 71250 CT THORAX DX C-: CPT

## 2024-07-19 ENCOUNTER — NON-APPOINTMENT (OUTPATIENT)
Age: 89
End: 2024-07-19

## 2024-08-07 ENCOUNTER — APPOINTMENT (OUTPATIENT)
Dept: PULMONOLOGY | Facility: CLINIC | Age: 89
End: 2024-08-07

## 2024-08-07 PROBLEM — J98.11 ATELECTASIS: Status: ACTIVE | Noted: 2024-08-07

## 2024-08-07 PROBLEM — R91.8 ABNORMAL CT SCAN, LUNG: Status: ACTIVE | Noted: 2024-08-07

## 2024-08-07 PROCEDURE — 99214 OFFICE O/P EST MOD 30 MIN: CPT

## 2024-08-07 PROCEDURE — G2211 COMPLEX E/M VISIT ADD ON: CPT

## 2024-08-07 NOTE — ASSESSMENT
[FreeTextEntry1] :  88 y/o F with a history HTN, HLD, GERD, COPD, TIA, Gallbladder CA s/p open cholecystectomy, segment hepatectomy and right colectomy, and hepatic abscess s/p drainage w/ multiple recent admissions for sepsis, pneumonia, having failed outpatient treatment with Levaquin. She then tested positive for human metapneumovirus. She was also treated with cefepime for possible bacterial superinfection. She had a CT of the chest that showed secretions in the airways in the right middle and right lower lobe pneumonia. She was treated with bronchodilators, and dischrged home where she has home oxygen, presents with daughter for F/U.  -repeat CT chest - with improvement in R sided infiltrates, some LLL atelectasis, residual scarring  -Continue use of DueNebs PRN with Aerobika device, encouraged to get oob to chair, ambulation  -Continue use of 2L oxygen prn, monitor SpO2 -Medrol pack on hand for exacerbation of symptoms, call office immediately with any S/S of acute symptoms of respiratory distress. - pt unable to complete PFTs - LE duplex - evaluate asymmetry, r/o DVT  - requires TTE, cardiology fu    I spent 39 minutes during this encounter. Total time excludes separate billing services.

## 2024-08-07 NOTE — HISTORY OF PRESENT ILLNESS
[TextBox_4] : 88 y/o F with a history HTN, HLD, GERD, COPD, TIA, Gallbladder CA s/p open cholecystectomy, segment hepatectomy and right colectomy, and hepatic abscess s/p drainage w/ multiple recent admissions for sepsis, pneumonia, having failed outpatient treatment with Levaquin in Dec 2023. She then tested positive for human metapneumovirus. She was also treated with cefepime for possible bacterial superinfection. She had a CT of the chest that showed secretions in the airways in the right middle and right lower lobe pneumonia. She was treated with bronchodilators, and discharged home where she has home oxygen, presents for F/U. No recent hospitalizations.   Patient is doing well overall, she is using oxygen at night only, family is checking oxygen saturations which are in the high nineties on room air.   Continue to use Duenebs BID with Aerokika device. With improved appetite, and with Methylprednisone on hand for potential exacerbations.  She had one URI since last visit - with wheezing, dyspnea, per [facility] they gave her the course of methylprednisone and she felt that helped them avoid rehospitalization. She is at baseline currently. No complaints.  OVerall feeling improved, uses nebulizer about twice a day.   Does have orthopnea, LE edema and some dyspnea on exertion. Encouraged to see cardiology - in case there is element of HF. Will also check LE DVT studies given asymmetry in LE edema.

## 2024-08-07 NOTE — ASSESSMENT
[FreeTextEntry1] :  89 y/o F with a history HTN, HLD, GERD, COPD, TIA, Gallbladder CA s/p open cholecystectomy, segment hepatectomy and right colectomy, and hepatic abscess s/p drainage w/ multiple recent admissions for sepsis, pneumonia, having failed outpatient treatment with Levaquin. She then tested positive for human metapneumovirus. She was also treated with cefepime for possible bacterial superinfection. She had a CT of the chest that showed secretions in the airways in the right middle and right lower lobe pneumonia. She was treated with bronchodilators, and dischrged home where she has home oxygen, presents with daughter for F/U.  -repeat CT chest - with improvement in R sided infiltrates, some LLL atelectasis, residual scarring  -Continue use of DueNebs PRN with Aerobika device, encouraged to get oob to chair, ambulation  -Continue use of 2L oxygen prn, monitor SpO2 -Medrol pack on hand for exacerbation of symptoms, call office immediately with any S/S of acute symptoms of respiratory distress. - pt unable to complete PFTs - LE duplex - evaluate asymmetry, r/o DVT  - requires TTE, cardiology fu    I spent 39 minutes during this encounter. Total time excludes separate billing services.

## 2024-08-07 NOTE — HISTORY OF PRESENT ILLNESS
[TextBox_4] : 89 y/o F with a history HTN, HLD, GERD, COPD, TIA, Gallbladder CA s/p open cholecystectomy, segment hepatectomy and right colectomy, and hepatic abscess s/p drainage w/ multiple recent admissions for sepsis, pneumonia, having failed outpatient treatment with Levaquin in Dec 2023. She then tested positive for human metapneumovirus. She was also treated with cefepime for possible bacterial superinfection. She had a CT of the chest that showed secretions in the airways in the right middle and right lower lobe pneumonia. She was treated with bronchodilators, and discharged home where she has home oxygen, presents for F/U. No recent hospitalizations.   Patient is doing well overall, she is using oxygen at night only, family is checking oxygen saturations which are in the high nineties on room air.   Continue to use Duenebs BID with Aerokika device. With improved appetite, and with Methylprednisone on hand for potential exacerbations.  She had one URI since last visit - with wheezing, dyspnea, per [facility] they gave her the course of methylprednisone and she felt that helped them avoid rehospitalization. She is at baseline currently. No complaints.  OVerall feeling improved, uses nebulizer about twice a day.   Does have orthopnea, LE edema and some dyspnea on exertion. Encouraged to see cardiology - in case there is element of HF. Will also check LE DVT studies given asymmetry in LE edema.

## 2024-08-07 NOTE — PHYSICAL EXAM
[No Acute Distress] : no acute distress [Normal Oropharynx] : normal oropharynx [Normal Appearance] : normal appearance [Normal Rate/Rhythm] : normal rate/rhythm [Normal S1, S2] : normal s1, s2 [TextBox_68] : coarse breath sounds  [TextBox_140] : awake, alert, answering simple questions

## 2024-09-05 NOTE — PATIENT PROFILE ADULT - HEALTH LITERACY
Assume care of patient. Patient AAOx4. Patient is experience SOB. Patient is tachypneic with use of accessory muscles. She is unable to complete a sentence without taking a breath. IV intact. Patient maintained on cardiac monitor. Patient will be going to [facility] after report is given. no

## 2024-09-24 NOTE — PATIENT PROFILE ADULT - DO YOU FEEL THREATENED BY OTHERS?
Detail Level: Detailed Products Recommended: Mineral sunscreen rather than chemical, brands such as Blue Lizard or Neutrogena General Sunscreen Counseling: I recommended a broad spectrum sunscreen with a SPF of 30 or higher.  I explained that SPF 30 sunscreens block approximately 97 percent of the sun's harmful rays.  Sunscreens should be applied at least 15 minutes prior to expected sun exposure and then every 2 hours after that as long as sun exposure continues. If swimming or exercising, sunscreen should be reapplied every 45 minutes to an hour after getting wet or sweating. I also recommended a lip balm with a sunscreen as well. Sun protective clothing can be used in lieu of sunscreen but must be worn the entire time you are exposed to the sun's rays, and they only help to mitigate sun exposure in the areas they cover. no
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
## 2024-09-26 NOTE — PATIENT PROFILE ADULT - DOMESTIC TRAVEL HIGH RISK QUESTION
What Type Of Note Output Would You Prefer (Optional)?: Standard Output What Is The Reason For Today's Visit?: Annual Full Body Skin Examination with No Concerns How Severe Are Your Spot(S)?: mild No

## 2024-10-15 ENCOUNTER — APPOINTMENT (OUTPATIENT)
Dept: CARDIOLOGY | Facility: CLINIC | Age: 89
End: 2024-10-15
Payer: MEDICARE

## 2024-10-15 ENCOUNTER — NON-APPOINTMENT (OUTPATIENT)
Age: 89
End: 2024-10-15

## 2024-10-15 VITALS
DIASTOLIC BLOOD PRESSURE: 62 MMHG | SYSTOLIC BLOOD PRESSURE: 120 MMHG | BODY MASS INDEX: 27.27 KG/M2 | WEIGHT: 126 LBS | OXYGEN SATURATION: 95 % | HEART RATE: 71 BPM

## 2024-10-15 DIAGNOSIS — I10 ESSENTIAL (PRIMARY) HYPERTENSION: ICD-10-CM

## 2024-10-15 DIAGNOSIS — J18.9 PNEUMONIA, UNSPECIFIED ORGANISM: ICD-10-CM

## 2024-10-15 DIAGNOSIS — R60.0 LOCALIZED EDEMA: ICD-10-CM

## 2024-10-15 PROCEDURE — 93000 ELECTROCARDIOGRAM COMPLETE: CPT

## 2024-10-15 PROCEDURE — 99204 OFFICE O/P NEW MOD 45 MIN: CPT

## 2024-10-15 RX ORDER — SPIRONOLACTONE 25 MG/1
25 TABLET ORAL DAILY
Qty: 90 | Refills: 2 | Status: ACTIVE | COMMUNITY
Start: 2024-10-15

## 2024-10-20 LAB
ALBUMIN SERPL ELPH-MCNC: 4.4 G/DL
ALP BLD-CCNC: 152 U/L
ALT SERPL-CCNC: 41 U/L
ANION GAP SERPL CALC-SCNC: 15 MMOL/L
AST SERPL-CCNC: 40 U/L
BILIRUB SERPL-MCNC: 0.4 MG/DL
BUN SERPL-MCNC: 25 MG/DL
CALCIUM SERPL-MCNC: 10.6 MG/DL
CHLORIDE SERPL-SCNC: 93 MMOL/L
CO2 SERPL-SCNC: 18 MMOL/L
CREAT SERPL-MCNC: 0.91 MG/DL
EGFR: 61 ML/MIN/1.73M2
GLUCOSE SERPL-MCNC: 93 MG/DL
HCT VFR BLD CALC: 44.4 %
HGB BLD-MCNC: 14.9 G/DL
MCHC RBC-ENTMCNC: 31.4 PG
MCHC RBC-ENTMCNC: 33.6 GM/DL
MCV RBC AUTO: 93.7 FL
NT-PROBNP SERPL-MCNC: 74 PG/ML
PLATELET # BLD AUTO: 231 K/UL
POTASSIUM SERPL-SCNC: 5.9 MMOL/L
PROT SERPL-MCNC: 7.7 G/DL
RBC # BLD: 4.74 M/UL
RBC # FLD: 13.6 %
SODIUM SERPL-SCNC: 126 MMOL/L
WBC # FLD AUTO: 7.37 K/UL

## 2024-10-21 RX ORDER — FUROSEMIDE 20 MG/1
20 TABLET ORAL
Qty: 90 | Refills: 1 | Status: ACTIVE | COMMUNITY
Start: 2024-10-21 | End: 1900-01-01

## 2024-10-23 ENCOUNTER — APPOINTMENT (OUTPATIENT)
Dept: CARDIOLOGY | Facility: CLINIC | Age: 89
End: 2024-10-23

## 2024-11-09 ENCOUNTER — APPOINTMENT (OUTPATIENT)
Dept: CARDIOLOGY | Facility: CLINIC | Age: 89
End: 2024-11-09
Payer: MEDICARE

## 2024-11-09 PROCEDURE — 93306 TTE W/DOPPLER COMPLETE: CPT

## 2024-11-10 NOTE — PHYSICAL THERAPY INITIAL EVALUATION ADULT - ASSISTIVE DEVICE, REHAB EVAL
SUBJECTIVE:    Patient is a 66y old Female who presents with a chief complaint of s/p fall (07 Nov 2024 02:52)    Currently admitted to medicine with the primary diagnosis of Ambulatory dysfunction       Today is hospital day 11d.     PAST MEDICAL & SURGICAL HISTORY  Asthma    Chronic obstructive pulmonary disease      ALLERGIES:  ampicillin (Unknown)    MEDICATIONS:  STANDING MEDICATIONS  acetaminophen     Tablet .. 1000 milliGRAM(s) Oral every 8 hours  ALPRAZolam 0.5 milliGRAM(s) Oral three times a day  chlorhexidine 2% Cloths 1 Application(s) Topical daily  heparin   Injectable 5000 Unit(s) SubCutaneous every 8 hours  influenza  Vaccine (HIGH DOSE) 0.5 milliLiter(s) IntraMuscular once  lidocaine   4% Patch 1 Patch Transdermal daily  methocarbamol 500 milliGRAM(s) Oral three times a day  pantoprazole    Tablet 40 milliGRAM(s) Oral before breakfast  polyethylene glycol 3350 17 Gram(s) Oral daily  senna 2 Tablet(s) Oral at bedtime    PRN MEDICATIONS  albuterol    90 MICROgram(s) HFA Inhaler 2 Puff(s) Inhalation every 6 hours PRN  oxyCODONE    IR 5 milliGRAM(s) Oral every 4 hours PRN    VITALS:   T(F): 97.7  HR: 85  BP: 138/84  RR: 18  SpO2: 98%    LABS:                        15.6   15.00 )-----------( 394      ( 09 Nov 2024 05:56 )             46.4     11-09    140  |  107  |  26[H]  ----------------------------<  68[L]  4.6   |  24  |  0.8    Ca    9.6      09 Nov 2024 05:56  Mg     2.0     11-09    TPro  5.5[L]  /  Alb  3.2[L]  /  TBili  <0.2  /  DBili  x   /  AST  21  /  ALT  23  /  AlkPhos  258[H]  11-09      Urinalysis Basic - ( 09 Nov 2024 05:56 )    Color: x / Appearance: x / SG: x / pH: x  Gluc: 68 mg/dL / Ketone: x  / Bili: x / Urobili: x   Blood: x / Protein: x / Nitrite: x   Leuk Esterase: x / RBC: x / WBC x   Sq Epi: x / Non Sq Epi: x / Bacteria: x                RADIOLOGY:    PHYSICAL EXAM:  GEN: No acute distress  LUNGS: Clear to auscultation bilaterally   HEART: S1/S2 present. RRR.   ABD/ GI: Soft, non-tender, non-distended. Bowel sounds present  EXT: NC/NC/NE/2+PP/MCADAMS  NEURO: AAOX3     bed rails

## 2024-12-06 ENCOUNTER — NON-APPOINTMENT (OUTPATIENT)
Age: 89
End: 2024-12-06

## 2025-01-01 ENCOUNTER — INPATIENT (INPATIENT)
Facility: HOSPITAL | Age: 89
LOS: 8 days | End: 2025-01-21
Attending: STUDENT IN AN ORGANIZED HEALTH CARE EDUCATION/TRAINING PROGRAM | Admitting: STUDENT IN AN ORGANIZED HEALTH CARE EDUCATION/TRAINING PROGRAM
Payer: MEDICARE

## 2025-01-01 VITALS
HEART RATE: 85 BPM | SYSTOLIC BLOOD PRESSURE: 136 MMHG | DIASTOLIC BLOOD PRESSURE: 76 MMHG | OXYGEN SATURATION: 89 % | TEMPERATURE: 97 F | RESPIRATION RATE: 18 BRPM

## 2025-01-01 VITALS
HEIGHT: 59 IN | SYSTOLIC BLOOD PRESSURE: 130 MMHG | WEIGHT: 126.1 LBS | DIASTOLIC BLOOD PRESSURE: 64 MMHG | HEART RATE: 88 BPM | OXYGEN SATURATION: 100 % | RESPIRATION RATE: 22 BRPM

## 2025-01-01 DIAGNOSIS — Z99.81 DEPENDENCE ON SUPPLEMENTAL OXYGEN: ICD-10-CM

## 2025-01-01 DIAGNOSIS — J96.22 ACUTE AND CHRONIC RESPIRATORY FAILURE WITH HYPERCAPNIA: ICD-10-CM

## 2025-01-01 DIAGNOSIS — R41.82 ALTERED MENTAL STATUS, UNSPECIFIED: ICD-10-CM

## 2025-01-01 DIAGNOSIS — I10 ESSENTIAL (PRIMARY) HYPERTENSION: ICD-10-CM

## 2025-01-01 DIAGNOSIS — Z90.710 ACQUIRED ABSENCE OF BOTH CERVIX AND UTERUS: Chronic | ICD-10-CM

## 2025-01-01 DIAGNOSIS — J44.9 CHRONIC OBSTRUCTIVE PULMONARY DISEASE, UNSPECIFIED: ICD-10-CM

## 2025-01-01 DIAGNOSIS — J96.90 RESPIRATORY FAILURE, UNSPECIFIED, UNSPECIFIED WHETHER WITH HYPOXIA OR HYPERCAPNIA: ICD-10-CM

## 2025-01-01 DIAGNOSIS — J85.0 GANGRENE AND NECROSIS OF LUNG: ICD-10-CM

## 2025-01-01 DIAGNOSIS — Z86.73 PERSONAL HISTORY OF TRANSIENT ISCHEMIC ATTACK (TIA), AND CEREBRAL INFARCTION WITHOUT RESIDUAL DEFICITS: ICD-10-CM

## 2025-01-01 DIAGNOSIS — E80.7 DISORDER OF BILIRUBIN METABOLISM, UNSPECIFIED: ICD-10-CM

## 2025-01-01 DIAGNOSIS — Z90.49 ACQUIRED ABSENCE OF OTHER SPECIFIED PARTS OF DIGESTIVE TRACT: Chronic | ICD-10-CM

## 2025-01-01 DIAGNOSIS — Z79.82 LONG TERM (CURRENT) USE OF ASPIRIN: ICD-10-CM

## 2025-01-01 DIAGNOSIS — R74.01 ELEVATION OF LEVELS OF LIVER TRANSAMINASE LEVELS: ICD-10-CM

## 2025-01-01 DIAGNOSIS — J96.21 ACUTE AND CHRONIC RESPIRATORY FAILURE WITH HYPOXIA: ICD-10-CM

## 2025-01-01 DIAGNOSIS — Z85.09 PERSONAL HISTORY OF MALIGNANT NEOPLASM OF OTHER DIGESTIVE ORGANS: Chronic | ICD-10-CM

## 2025-01-01 DIAGNOSIS — J90 PLEURAL EFFUSION, NOT ELSEWHERE CLASSIFIED: ICD-10-CM

## 2025-01-01 DIAGNOSIS — K21.9 GASTRO-ESOPHAGEAL REFLUX DISEASE WITHOUT ESOPHAGITIS: ICD-10-CM

## 2025-01-01 DIAGNOSIS — Z90.710 ACQUIRED ABSENCE OF BOTH CERVIX AND UTERUS: ICD-10-CM

## 2025-01-01 DIAGNOSIS — Z85.09 PERSONAL HISTORY OF MALIGNANT NEOPLASM OF OTHER DIGESTIVE ORGANS: ICD-10-CM

## 2025-01-01 DIAGNOSIS — E78.5 HYPERLIPIDEMIA, UNSPECIFIED: ICD-10-CM

## 2025-01-01 DIAGNOSIS — Z88.1 ALLERGY STATUS TO OTHER ANTIBIOTIC AGENTS: ICD-10-CM

## 2025-01-01 DIAGNOSIS — Z66 DO NOT RESUSCITATE: ICD-10-CM

## 2025-01-01 DIAGNOSIS — Z85.51 PERSONAL HISTORY OF MALIGNANT NEOPLASM OF BLADDER: ICD-10-CM

## 2025-01-01 DIAGNOSIS — J12.3 HUMAN METAPNEUMOVIRUS PNEUMONIA: ICD-10-CM

## 2025-01-01 LAB
ALBUMIN SERPL ELPH-MCNC: 2 G/DL — LOW (ref 3.3–5)
ALBUMIN SERPL ELPH-MCNC: 2.1 G/DL — LOW (ref 3.3–5)
ALBUMIN SERPL ELPH-MCNC: 2.2 G/DL — LOW (ref 3.3–5)
ALBUMIN SERPL ELPH-MCNC: 2.3 G/DL — LOW (ref 3.3–5)
ALBUMIN SERPL ELPH-MCNC: 2.4 G/DL — LOW (ref 3.3–5)
ALBUMIN SERPL ELPH-MCNC: 2.4 G/DL — LOW (ref 3.3–5)
ALBUMIN SERPL ELPH-MCNC: 2.6 G/DL — LOW (ref 3.3–5)
ALBUMIN SERPL ELPH-MCNC: 2.7 G/DL — LOW (ref 3.3–5)
ALP SERPL-CCNC: 238 U/L — HIGH (ref 40–120)
ALP SERPL-CCNC: 241 U/L — HIGH (ref 40–120)
ALP SERPL-CCNC: 248 U/L — HIGH (ref 40–120)
ALP SERPL-CCNC: 250 U/L — HIGH (ref 40–120)
ALP SERPL-CCNC: 279 U/L — HIGH (ref 40–120)
ALP SERPL-CCNC: 284 U/L — HIGH (ref 40–120)
ALP SERPL-CCNC: 369 U/L — HIGH (ref 40–120)
ALP SERPL-CCNC: 402 U/L — HIGH (ref 40–120)
ALT FLD-CCNC: 116 U/L — HIGH (ref 12–78)
ALT FLD-CCNC: 144 U/L — HIGH (ref 12–78)
ALT FLD-CCNC: 181 U/L — HIGH (ref 12–78)
ALT FLD-CCNC: 274 U/L — HIGH (ref 12–78)
ALT FLD-CCNC: 55 U/L — SIGNIFICANT CHANGE UP (ref 12–78)
ALT FLD-CCNC: 78 U/L — SIGNIFICANT CHANGE UP (ref 12–78)
ALT FLD-CCNC: 81 U/L — HIGH (ref 12–78)
ALT FLD-CCNC: 81 U/L — HIGH (ref 12–78)
AMYLASE P1 CFR SERPL: 48 U/L — SIGNIFICANT CHANGE UP (ref 25–115)
ANION GAP SERPL CALC-SCNC: -1 MMOL/L — LOW (ref 5–17)
ANION GAP SERPL CALC-SCNC: 0 MMOL/L — LOW (ref 5–17)
ANION GAP SERPL CALC-SCNC: 1 MMOL/L — LOW (ref 5–17)
ANION GAP SERPL CALC-SCNC: 1 MMOL/L — LOW (ref 5–17)
ANION GAP SERPL CALC-SCNC: 3 MMOL/L — LOW (ref 5–17)
ANION GAP SERPL CALC-SCNC: 3 MMOL/L — LOW (ref 5–17)
ANION GAP SERPL CALC-SCNC: 4 MMOL/L — LOW (ref 5–17)
ANION GAP SERPL CALC-SCNC: 6 MMOL/L — SIGNIFICANT CHANGE UP (ref 5–17)
APPEARANCE UR: CLEAR — SIGNIFICANT CHANGE UP
APTT BLD: 28.6 SEC — SIGNIFICANT CHANGE UP (ref 24.5–35.6)
AST SERPL-CCNC: 109 U/L — HIGH (ref 15–37)
AST SERPL-CCNC: 118 U/L — HIGH (ref 15–37)
AST SERPL-CCNC: 138 U/L — HIGH (ref 15–37)
AST SERPL-CCNC: 24 U/L — SIGNIFICANT CHANGE UP (ref 15–37)
AST SERPL-CCNC: 26 U/L — SIGNIFICANT CHANGE UP (ref 15–37)
AST SERPL-CCNC: 380 U/L — HIGH (ref 15–37)
AST SERPL-CCNC: 43 U/L — HIGH (ref 15–37)
AST SERPL-CCNC: 67 U/L — HIGH (ref 15–37)
BACTERIA # UR AUTO: ABNORMAL /HPF
BASE EXCESS BLDA CALC-SCNC: 11.5 MMOL/L — HIGH (ref -2–3)
BASE EXCESS BLDA CALC-SCNC: 4.1 MMOL/L — HIGH (ref -2–3)
BASE EXCESS BLDV CALC-SCNC: 1.2 MMOL/L — SIGNIFICANT CHANGE UP (ref -2–3)
BASE EXCESS BLDV CALC-SCNC: 12.2 MMOL/L — HIGH (ref -2–3)
BASE EXCESS BLDV CALC-SCNC: 15 MMOL/L — HIGH (ref -2–3)
BASOPHILS # BLD AUTO: 0.02 K/UL — SIGNIFICANT CHANGE UP (ref 0–0.2)
BASOPHILS # BLD AUTO: 0.02 K/UL — SIGNIFICANT CHANGE UP (ref 0–0.2)
BASOPHILS # BLD AUTO: 0.04 K/UL — SIGNIFICANT CHANGE UP (ref 0–0.2)
BASOPHILS # BLD AUTO: 0.05 K/UL — SIGNIFICANT CHANGE UP (ref 0–0.2)
BASOPHILS # BLD AUTO: 0.05 K/UL — SIGNIFICANT CHANGE UP (ref 0–0.2)
BASOPHILS # BLD AUTO: 0.06 K/UL — SIGNIFICANT CHANGE UP (ref 0–0.2)
BASOPHILS NFR BLD AUTO: 0.2 % — SIGNIFICANT CHANGE UP (ref 0–2)
BASOPHILS NFR BLD AUTO: 0.3 % — SIGNIFICANT CHANGE UP (ref 0–2)
BASOPHILS NFR BLD AUTO: 0.4 % — SIGNIFICANT CHANGE UP (ref 0–2)
BASOPHILS NFR BLD AUTO: 0.4 % — SIGNIFICANT CHANGE UP (ref 0–2)
BASOPHILS NFR BLD AUTO: 0.6 % — SIGNIFICANT CHANGE UP (ref 0–2)
BASOPHILS NFR BLD AUTO: 0.8 % — SIGNIFICANT CHANGE UP (ref 0–2)
BILIRUB DIRECT SERPL-MCNC: 8.2 MG/DL — HIGH (ref 0–0.3)
BILIRUB INDIRECT FLD-MCNC: 1.4 MG/DL — HIGH (ref 0.2–1)
BILIRUB SERPL-MCNC: 1.1 MG/DL — SIGNIFICANT CHANGE UP (ref 0.2–1.2)
BILIRUB SERPL-MCNC: 1.4 MG/DL — HIGH (ref 0.2–1.2)
BILIRUB SERPL-MCNC: 2.7 MG/DL — HIGH (ref 0.2–1.2)
BILIRUB SERPL-MCNC: 3 MG/DL — HIGH (ref 0.2–1.2)
BILIRUB SERPL-MCNC: 9.6 MG/DL — HIGH (ref 0.2–1.2)
BILIRUB SERPL-MCNC: 9.6 MG/DL — HIGH (ref 0.2–1.2)
BILIRUB UR-MCNC: ABNORMAL
BLOOD GAS COMMENTS ARTERIAL: SIGNIFICANT CHANGE UP
BLOOD GAS COMMENTS ARTERIAL: SIGNIFICANT CHANGE UP
BLOOD GAS COMMENTS, VENOUS: SIGNIFICANT CHANGE UP
BUN SERPL-MCNC: 13 MG/DL — SIGNIFICANT CHANGE UP (ref 7–23)
BUN SERPL-MCNC: 17 MG/DL — SIGNIFICANT CHANGE UP (ref 7–23)
BUN SERPL-MCNC: 20 MG/DL — SIGNIFICANT CHANGE UP (ref 7–23)
BUN SERPL-MCNC: 22 MG/DL — SIGNIFICANT CHANGE UP (ref 7–23)
BUN SERPL-MCNC: 25 MG/DL — HIGH (ref 7–23)
BUN SERPL-MCNC: 25 MG/DL — HIGH (ref 7–23)
BUN SERPL-MCNC: 28 MG/DL — HIGH (ref 7–23)
BUN SERPL-MCNC: 9 MG/DL — SIGNIFICANT CHANGE UP (ref 7–23)
CALCIUM SERPL-MCNC: 8.9 MG/DL — SIGNIFICANT CHANGE UP (ref 8.5–10.1)
CALCIUM SERPL-MCNC: 9 MG/DL — SIGNIFICANT CHANGE UP (ref 8.5–10.1)
CALCIUM SERPL-MCNC: 9.2 MG/DL — SIGNIFICANT CHANGE UP (ref 8.5–10.1)
CALCIUM SERPL-MCNC: 9.2 MG/DL — SIGNIFICANT CHANGE UP (ref 8.5–10.1)
CALCIUM SERPL-MCNC: 9.3 MG/DL — SIGNIFICANT CHANGE UP (ref 8.5–10.1)
CALCIUM SERPL-MCNC: 9.4 MG/DL — SIGNIFICANT CHANGE UP (ref 8.5–10.1)
CALCIUM SERPL-MCNC: 9.5 MG/DL — SIGNIFICANT CHANGE UP (ref 8.5–10.1)
CALCIUM SERPL-MCNC: 9.6 MG/DL — SIGNIFICANT CHANGE UP (ref 8.5–10.1)
CHLORIDE BLDV-SCNC: 89 MMOL/L — LOW (ref 98–107)
CHLORIDE BLDV-SCNC: 92 MMOL/L — LOW (ref 98–107)
CHLORIDE SERPL-SCNC: 101 MMOL/L — SIGNIFICANT CHANGE UP (ref 96–108)
CHLORIDE SERPL-SCNC: 93 MMOL/L — LOW (ref 96–108)
CHLORIDE SERPL-SCNC: 94 MMOL/L — LOW (ref 96–108)
CHLORIDE SERPL-SCNC: 95 MMOL/L — LOW (ref 96–108)
CHLORIDE SERPL-SCNC: 96 MMOL/L — SIGNIFICANT CHANGE UP (ref 96–108)
CHLORIDE SERPL-SCNC: 96 MMOL/L — SIGNIFICANT CHANGE UP (ref 96–108)
CHLORIDE SERPL-SCNC: 97 MMOL/L — SIGNIFICANT CHANGE UP (ref 96–108)
CHLORIDE SERPL-SCNC: 99 MMOL/L — SIGNIFICANT CHANGE UP (ref 96–108)
CO2 BLDA-SCNC: 36 MMOL/L — HIGH (ref 19–24)
CO2 BLDA-SCNC: 42 MMOL/L — HIGH (ref 19–24)
CO2 BLDV-SCNC: 39 MMOL/L — HIGH (ref 22–26)
CO2 BLDV-SCNC: 44 MMOL/L — HIGH (ref 22–26)
CO2 BLDV-SCNC: 45 MMOL/L — CRITICAL HIGH (ref 22–26)
CO2 SERPL-SCNC: 30 MMOL/L — SIGNIFICANT CHANGE UP (ref 22–31)
CO2 SERPL-SCNC: 35 MMOL/L — HIGH (ref 22–31)
CO2 SERPL-SCNC: 36 MMOL/L — HIGH (ref 22–31)
CO2 SERPL-SCNC: 37 MMOL/L — HIGH (ref 22–31)
CO2 SERPL-SCNC: 38 MMOL/L — HIGH (ref 22–31)
CO2 SERPL-SCNC: 41 MMOL/L — HIGH (ref 22–31)
COLOR SPEC: SIGNIFICANT CHANGE UP
CREAT SERPL-MCNC: 0.56 MG/DL — SIGNIFICANT CHANGE UP (ref 0.5–1.3)
CREAT SERPL-MCNC: 0.59 MG/DL — SIGNIFICANT CHANGE UP (ref 0.5–1.3)
CREAT SERPL-MCNC: 0.62 MG/DL — SIGNIFICANT CHANGE UP (ref 0.5–1.3)
CREAT SERPL-MCNC: 0.7 MG/DL — SIGNIFICANT CHANGE UP (ref 0.5–1.3)
CREAT SERPL-MCNC: 0.72 MG/DL — SIGNIFICANT CHANGE UP (ref 0.5–1.3)
CREAT SERPL-MCNC: 0.77 MG/DL — SIGNIFICANT CHANGE UP (ref 0.5–1.3)
CREAT SERPL-MCNC: 0.94 MG/DL — SIGNIFICANT CHANGE UP (ref 0.5–1.3)
CREAT SERPL-MCNC: 1.09 MG/DL — SIGNIFICANT CHANGE UP (ref 0.5–1.3)
CULTURE RESULTS: SIGNIFICANT CHANGE UP
CULTURE RESULTS: SIGNIFICANT CHANGE UP
DIFF PNL FLD: ABNORMAL
EGFR: 49 ML/MIN/1.73M2 — LOW
EGFR: 58 ML/MIN/1.73M2 — LOW
EGFR: 74 ML/MIN/1.73M2 — SIGNIFICANT CHANGE UP
EGFR: 80 ML/MIN/1.73M2 — SIGNIFICANT CHANGE UP
EGFR: 83 ML/MIN/1.73M2 — SIGNIFICANT CHANGE UP
EGFR: 86 ML/MIN/1.73M2 — SIGNIFICANT CHANGE UP
EGFR: 87 ML/MIN/1.73M2 — SIGNIFICANT CHANGE UP
EGFR: 88 ML/MIN/1.73M2 — SIGNIFICANT CHANGE UP
EOSINOPHIL # BLD AUTO: 0.01 K/UL — SIGNIFICANT CHANGE UP (ref 0–0.5)
EOSINOPHIL # BLD AUTO: 0.02 K/UL — SIGNIFICANT CHANGE UP (ref 0–0.5)
EOSINOPHIL # BLD AUTO: 0.04 K/UL — SIGNIFICANT CHANGE UP (ref 0–0.5)
EOSINOPHIL # BLD AUTO: 0.06 K/UL — SIGNIFICANT CHANGE UP (ref 0–0.5)
EOSINOPHIL # BLD AUTO: 0.11 K/UL — SIGNIFICANT CHANGE UP (ref 0–0.5)
EOSINOPHIL # BLD AUTO: 0.16 K/UL — SIGNIFICANT CHANGE UP (ref 0–0.5)
EOSINOPHIL NFR BLD AUTO: 0.1 % — SIGNIFICANT CHANGE UP (ref 0–6)
EOSINOPHIL NFR BLD AUTO: 0.3 % — SIGNIFICANT CHANGE UP (ref 0–6)
EOSINOPHIL NFR BLD AUTO: 0.3 % — SIGNIFICANT CHANGE UP (ref 0–6)
EOSINOPHIL NFR BLD AUTO: 0.7 % — SIGNIFICANT CHANGE UP (ref 0–6)
EOSINOPHIL NFR BLD AUTO: 1 % — SIGNIFICANT CHANGE UP (ref 0–6)
EOSINOPHIL NFR BLD AUTO: 2.7 % — SIGNIFICANT CHANGE UP (ref 0–6)
EPI CELLS # UR: PRESENT
FLUAV AG NPH QL: SIGNIFICANT CHANGE UP
FLUBV AG NPH QL: SIGNIFICANT CHANGE UP
GAS PNL BLDA: SIGNIFICANT CHANGE UP
GAS PNL BLDV: 126 MMOL/L — LOW (ref 136–145)
GAS PNL BLDV: 131 MMOL/L — LOW (ref 136–145)
GAS PNL BLDV: SIGNIFICANT CHANGE UP
GLUCOSE BLDC GLUCOMTR-MCNC: 104 MG/DL — HIGH (ref 70–99)
GLUCOSE BLDC GLUCOMTR-MCNC: 124 MG/DL — HIGH (ref 70–99)
GLUCOSE BLDC GLUCOMTR-MCNC: 134 MG/DL — HIGH (ref 70–99)
GLUCOSE BLDC GLUCOMTR-MCNC: 259 MG/DL — HIGH (ref 70–99)
GLUCOSE BLDC GLUCOMTR-MCNC: 71 MG/DL — SIGNIFICANT CHANGE UP (ref 70–99)
GLUCOSE BLDC GLUCOMTR-MCNC: 85 MG/DL — SIGNIFICANT CHANGE UP (ref 70–99)
GLUCOSE BLDC GLUCOMTR-MCNC: 88 MG/DL — SIGNIFICANT CHANGE UP (ref 70–99)
GLUCOSE BLDC GLUCOMTR-MCNC: 93 MG/DL — SIGNIFICANT CHANGE UP (ref 70–99)
GLUCOSE BLDV-MCNC: 172 MG/DL — HIGH (ref 65–95)
GLUCOSE BLDV-MCNC: 203 MG/DL — HIGH (ref 65–95)
GLUCOSE SERPL-MCNC: 103 MG/DL — HIGH (ref 70–99)
GLUCOSE SERPL-MCNC: 107 MG/DL — HIGH (ref 70–99)
GLUCOSE SERPL-MCNC: 107 MG/DL — HIGH (ref 70–99)
GLUCOSE SERPL-MCNC: 108 MG/DL — HIGH (ref 70–99)
GLUCOSE SERPL-MCNC: 131 MG/DL — HIGH (ref 70–99)
GLUCOSE SERPL-MCNC: 166 MG/DL — HIGH (ref 70–99)
GLUCOSE SERPL-MCNC: 177 MG/DL — HIGH (ref 70–99)
GLUCOSE SERPL-MCNC: 96 MG/DL — SIGNIFICANT CHANGE UP (ref 70–99)
GLUCOSE UR QL: 100 MG/DL
HCO3 BLDA-SCNC: 34 MMOL/L — HIGH (ref 21–28)
HCO3 BLDA-SCNC: 40 MMOL/L — HIGH (ref 21–28)
HCO3 BLDV-SCNC: 35 MMOL/L — HIGH (ref 22–28)
HCO3 BLDV-SCNC: 42 MMOL/L — HIGH (ref 22–28)
HCO3 BLDV-SCNC: 43 MMOL/L — HIGH (ref 22–28)
HCT VFR BLD CALC: 41.6 % — SIGNIFICANT CHANGE UP (ref 34.5–45)
HCT VFR BLD CALC: 42.6 % — SIGNIFICANT CHANGE UP (ref 34.5–45)
HCT VFR BLD CALC: 43.5 % — SIGNIFICANT CHANGE UP (ref 34.5–45)
HCT VFR BLD CALC: 44.3 % — SIGNIFICANT CHANGE UP (ref 34.5–45)
HCT VFR BLD CALC: 44.7 % — SIGNIFICANT CHANGE UP (ref 34.5–45)
HCT VFR BLD CALC: 46.3 % — HIGH (ref 34.5–45)
HCT VFR BLD CALC: 46.4 % — HIGH (ref 34.5–45)
HCT VFR BLDA CALC: 41 % — SIGNIFICANT CHANGE UP (ref 37–47)
HCT VFR BLDA CALC: 47 % — SIGNIFICANT CHANGE UP (ref 37–47)
HGB BLD CALC-MCNC: 13.8 G/DL — SIGNIFICANT CHANGE UP (ref 11.7–16.1)
HGB BLD CALC-MCNC: 15.5 G/DL — SIGNIFICANT CHANGE UP (ref 11.7–16.1)
HGB BLD-MCNC: 13 G/DL — SIGNIFICANT CHANGE UP (ref 11.5–15.5)
HGB BLD-MCNC: 13.5 G/DL — SIGNIFICANT CHANGE UP (ref 11.5–15.5)
HGB BLD-MCNC: 13.5 G/DL — SIGNIFICANT CHANGE UP (ref 11.5–15.5)
HGB BLD-MCNC: 14.2 G/DL — SIGNIFICANT CHANGE UP (ref 11.5–15.5)
HGB BLD-MCNC: 14.2 G/DL — SIGNIFICANT CHANGE UP (ref 11.5–15.5)
HGB BLD-MCNC: 14.3 G/DL — SIGNIFICANT CHANGE UP (ref 11.5–15.5)
HGB BLD-MCNC: 14.3 G/DL — SIGNIFICANT CHANGE UP (ref 11.5–15.5)
HOROWITZ INDEX BLDA+IHG-RTO: 40 — SIGNIFICANT CHANGE UP
HOROWITZ INDEX BLDA+IHG-RTO: 50 — SIGNIFICANT CHANGE UP
HOROWITZ INDEX BLDV+IHG-RTO: 100 — SIGNIFICANT CHANGE UP
HOROWITZ INDEX BLDV+IHG-RTO: 40 — SIGNIFICANT CHANGE UP
HOROWITZ INDEX BLDV+IHG-RTO: SIGNIFICANT CHANGE UP
IMM GRANULOCYTES NFR BLD AUTO: 0.2 % — SIGNIFICANT CHANGE UP (ref 0–0.9)
IMM GRANULOCYTES NFR BLD AUTO: 0.3 % — SIGNIFICANT CHANGE UP (ref 0–0.9)
IMM GRANULOCYTES NFR BLD AUTO: 0.7 % — SIGNIFICANT CHANGE UP (ref 0–0.9)
IMM GRANULOCYTES NFR BLD AUTO: 0.7 % — SIGNIFICANT CHANGE UP (ref 0–0.9)
INR BLD: 1.03 RATIO — SIGNIFICANT CHANGE UP (ref 0.85–1.16)
KETONES UR-MCNC: NEGATIVE MG/DL — SIGNIFICANT CHANGE UP
LACTATE BLDV-MCNC: 0.9 MMOL/L — SIGNIFICANT CHANGE UP (ref 0.56–1.39)
LACTATE BLDV-MCNC: 1.9 MMOL/L — HIGH (ref 0.56–1.39)
LACTATE SERPL-SCNC: 0.9 MMOL/L — SIGNIFICANT CHANGE UP (ref 0.7–2)
LEGIONELLA AG UR QL: NEGATIVE — SIGNIFICANT CHANGE UP
LEUKOCYTE ESTERASE UR-ACNC: NEGATIVE — SIGNIFICANT CHANGE UP
LIDOCAIN IGE QN: 45 U/L — SIGNIFICANT CHANGE UP (ref 13–75)
LYMPHOCYTES # BLD AUTO: 0.42 K/UL — LOW (ref 1–3.3)
LYMPHOCYTES # BLD AUTO: 0.46 K/UL — LOW (ref 1–3.3)
LYMPHOCYTES # BLD AUTO: 0.61 K/UL — LOW (ref 1–3.3)
LYMPHOCYTES # BLD AUTO: 0.72 K/UL — LOW (ref 1–3.3)
LYMPHOCYTES # BLD AUTO: 0.75 K/UL — LOW (ref 1–3.3)
LYMPHOCYTES # BLD AUTO: 0.81 K/UL — LOW (ref 1–3.3)
LYMPHOCYTES # BLD AUTO: 11.7 % — LOW (ref 13–44)
LYMPHOCYTES # BLD AUTO: 12.5 % — LOW (ref 13–44)
LYMPHOCYTES # BLD AUTO: 2.7 % — LOW (ref 13–44)
LYMPHOCYTES # BLD AUTO: 3.7 % — LOW (ref 13–44)
LYMPHOCYTES # BLD AUTO: 5.8 % — LOW (ref 13–44)
LYMPHOCYTES # BLD AUTO: 9.4 % — LOW (ref 13–44)
MAGNESIUM SERPL-MCNC: 1.7 MG/DL — SIGNIFICANT CHANGE UP (ref 1.6–2.6)
MAGNESIUM SERPL-MCNC: 1.7 MG/DL — SIGNIFICANT CHANGE UP (ref 1.6–2.6)
MAGNESIUM SERPL-MCNC: 2 MG/DL — SIGNIFICANT CHANGE UP (ref 1.6–2.6)
MAGNESIUM SERPL-MCNC: 2.2 MG/DL — SIGNIFICANT CHANGE UP (ref 1.6–2.6)
MCHC RBC-ENTMCNC: 29.5 PG — SIGNIFICANT CHANGE UP (ref 27–34)
MCHC RBC-ENTMCNC: 29.5 PG — SIGNIFICANT CHANGE UP (ref 27–34)
MCHC RBC-ENTMCNC: 29.6 PG — SIGNIFICANT CHANGE UP (ref 27–34)
MCHC RBC-ENTMCNC: 29.9 PG — SIGNIFICANT CHANGE UP (ref 27–34)
MCHC RBC-ENTMCNC: 30.1 PG — SIGNIFICANT CHANGE UP (ref 27–34)
MCHC RBC-ENTMCNC: 30.4 PG — SIGNIFICANT CHANGE UP (ref 27–34)
MCHC RBC-ENTMCNC: 30.4 PG — SIGNIFICANT CHANGE UP (ref 27–34)
MCHC RBC-ENTMCNC: 30.6 G/DL — LOW (ref 32–36)
MCHC RBC-ENTMCNC: 30.9 G/DL — LOW (ref 32–36)
MCHC RBC-ENTMCNC: 31 G/DL — LOW (ref 32–36)
MCHC RBC-ENTMCNC: 31.3 G/DL — LOW (ref 32–36)
MCHC RBC-ENTMCNC: 31.7 G/DL — LOW (ref 32–36)
MCHC RBC-ENTMCNC: 32 G/DL — SIGNIFICANT CHANGE UP (ref 32–36)
MCHC RBC-ENTMCNC: 32.1 G/DL — SIGNIFICANT CHANGE UP (ref 32–36)
MCV RBC AUTO: 94.8 FL — SIGNIFICANT CHANGE UP (ref 80–100)
MCV RBC AUTO: 94.9 FL — SIGNIFICANT CHANGE UP (ref 80–100)
MCV RBC AUTO: 95 FL — SIGNIFICANT CHANGE UP (ref 80–100)
MCV RBC AUTO: 95.7 FL — SIGNIFICANT CHANGE UP (ref 80–100)
MCV RBC AUTO: 97.7 FL — SIGNIFICANT CHANGE UP (ref 80–100)
MONOCYTES # BLD AUTO: 0.51 K/UL — SIGNIFICANT CHANGE UP (ref 0–0.9)
MONOCYTES # BLD AUTO: 0.61 K/UL — SIGNIFICANT CHANGE UP (ref 0–0.9)
MONOCYTES # BLD AUTO: 0.67 K/UL — SIGNIFICANT CHANGE UP (ref 0–0.9)
MONOCYTES # BLD AUTO: 0.7 K/UL — SIGNIFICANT CHANGE UP (ref 0–0.9)
MONOCYTES # BLD AUTO: 0.8 K/UL — SIGNIFICANT CHANGE UP (ref 0–0.9)
MONOCYTES # BLD AUTO: 0.82 K/UL — SIGNIFICANT CHANGE UP (ref 0–0.9)
MONOCYTES NFR BLD AUTO: 11.2 % — SIGNIFICANT CHANGE UP (ref 2–14)
MONOCYTES NFR BLD AUTO: 4.1 % — SIGNIFICANT CHANGE UP (ref 2–14)
MONOCYTES NFR BLD AUTO: 5.3 % — SIGNIFICANT CHANGE UP (ref 2–14)
MONOCYTES NFR BLD AUTO: 7.6 % — SIGNIFICANT CHANGE UP (ref 2–14)
MONOCYTES NFR BLD AUTO: 8.1 % — SIGNIFICANT CHANGE UP (ref 2–14)
MONOCYTES NFR BLD AUTO: 9.9 % — SIGNIFICANT CHANGE UP (ref 2–14)
MRSA PCR RESULT.: SIGNIFICANT CHANGE UP
NEUTROPHILS # BLD AUTO: 11.3 K/UL — HIGH (ref 1.8–7.4)
NEUTROPHILS # BLD AUTO: 13.91 K/UL — HIGH (ref 1.8–7.4)
NEUTROPHILS # BLD AUTO: 4.35 K/UL — SIGNIFICANT CHANGE UP (ref 1.8–7.4)
NEUTROPHILS # BLD AUTO: 4.78 K/UL — SIGNIFICANT CHANGE UP (ref 1.8–7.4)
NEUTROPHILS # BLD AUTO: 6.97 K/UL — SIGNIFICANT CHANGE UP (ref 1.8–7.4)
NEUTROPHILS # BLD AUTO: 8.85 K/UL — HIGH (ref 1.8–7.4)
NEUTROPHILS NFR BLD AUTO: 72.5 % — SIGNIFICANT CHANGE UP (ref 43–77)
NEUTROPHILS NFR BLD AUTO: 77.6 % — HIGH (ref 43–77)
NEUTROPHILS NFR BLD AUTO: 80.9 % — HIGH (ref 43–77)
NEUTROPHILS NFR BLD AUTO: 84.5 % — HIGH (ref 43–77)
NEUTROPHILS NFR BLD AUTO: 90.6 % — HIGH (ref 43–77)
NEUTROPHILS NFR BLD AUTO: 91.6 % — HIGH (ref 43–77)
NITRITE UR-MCNC: NEGATIVE — SIGNIFICANT CHANGE UP
NRBC # BLD: 0 /100 WBCS — SIGNIFICANT CHANGE UP (ref 0–0)
NRBC BLD-RTO: 0 /100 WBCS — SIGNIFICANT CHANGE UP (ref 0–0)
NT-PROBNP SERPL-SCNC: 1403 PG/ML — HIGH (ref 0–450)
PCO2 BLDA: 71 MMHG — CRITICAL HIGH (ref 32–46)
PCO2 BLDA: 75 MMHG — CRITICAL HIGH (ref 32–46)
PCO2 BLDV: 122 MMHG — CRITICAL HIGH (ref 42–55)
PCO2 BLDV: 64 MMHG — HIGH (ref 42–55)
PCO2 BLDV: 83 MMHG — CRITICAL HIGH (ref 42–55)
PH BLDA: 7.26 — LOW (ref 7.35–7.45)
PH BLDA: 7.36 — SIGNIFICANT CHANGE UP (ref 7.35–7.45)
PH BLDV: 7.07 — CRITICAL LOW (ref 7.32–7.43)
PH BLDV: 7.32 — SIGNIFICANT CHANGE UP (ref 7.32–7.43)
PH BLDV: 7.43 — SIGNIFICANT CHANGE UP (ref 7.32–7.43)
PH UR: 6 — SIGNIFICANT CHANGE UP (ref 5–8)
PHOSPHATE SERPL-MCNC: 1.2 MG/DL — LOW (ref 2.5–4.5)
PHOSPHATE SERPL-MCNC: 1.3 MG/DL — LOW (ref 2.5–4.5)
PHOSPHATE SERPL-MCNC: 1.7 MG/DL — LOW (ref 2.5–4.5)
PHOSPHATE SERPL-MCNC: 1.9 MG/DL — LOW (ref 2.5–4.5)
PHOSPHATE SERPL-MCNC: 2.5 MG/DL — SIGNIFICANT CHANGE UP (ref 2.5–4.5)
PHOSPHATE SERPL-MCNC: 3 MG/DL — SIGNIFICANT CHANGE UP (ref 2.5–4.5)
PLATELET # BLD AUTO: 150 K/UL — SIGNIFICANT CHANGE UP (ref 150–400)
PLATELET # BLD AUTO: 152 K/UL — SIGNIFICANT CHANGE UP (ref 150–400)
PLATELET # BLD AUTO: 153 K/UL — SIGNIFICANT CHANGE UP (ref 150–400)
PLATELET # BLD AUTO: 158 K/UL — SIGNIFICANT CHANGE UP (ref 150–400)
PLATELET # BLD AUTO: 159 K/UL — SIGNIFICANT CHANGE UP (ref 150–400)
PLATELET # BLD AUTO: 163 K/UL — SIGNIFICANT CHANGE UP (ref 150–400)
PLATELET # BLD AUTO: 207 K/UL — SIGNIFICANT CHANGE UP (ref 150–400)
PO2 BLDA: 106 MMHG — SIGNIFICANT CHANGE UP (ref 83–108)
PO2 BLDA: 63 MMHG — LOW (ref 83–108)
PO2 BLDV: 106 MMHG — HIGH (ref 25–45)
PO2 BLDV: 63 MMHG — HIGH (ref 25–45)
PO2 BLDV: 89 MMHG — HIGH (ref 25–45)
POTASSIUM BLDV-SCNC: 4 MMOL/L — SIGNIFICANT CHANGE UP (ref 3.5–5.1)
POTASSIUM BLDV-SCNC: 4.7 MMOL/L — SIGNIFICANT CHANGE UP (ref 3.5–5.1)
POTASSIUM SERPL-MCNC: 3.7 MMOL/L — SIGNIFICANT CHANGE UP (ref 3.5–5.3)
POTASSIUM SERPL-MCNC: 3.7 MMOL/L — SIGNIFICANT CHANGE UP (ref 3.5–5.3)
POTASSIUM SERPL-MCNC: 4 MMOL/L — SIGNIFICANT CHANGE UP (ref 3.5–5.3)
POTASSIUM SERPL-MCNC: 4.1 MMOL/L — SIGNIFICANT CHANGE UP (ref 3.5–5.3)
POTASSIUM SERPL-MCNC: 4.6 MMOL/L — SIGNIFICANT CHANGE UP (ref 3.5–5.3)
POTASSIUM SERPL-MCNC: 4.6 MMOL/L — SIGNIFICANT CHANGE UP (ref 3.5–5.3)
POTASSIUM SERPL-MCNC: 4.8 MMOL/L — SIGNIFICANT CHANGE UP (ref 3.5–5.3)
POTASSIUM SERPL-MCNC: 4.9 MMOL/L — SIGNIFICANT CHANGE UP (ref 3.5–5.3)
POTASSIUM SERPL-SCNC: 3.7 MMOL/L — SIGNIFICANT CHANGE UP (ref 3.5–5.3)
POTASSIUM SERPL-SCNC: 3.7 MMOL/L — SIGNIFICANT CHANGE UP (ref 3.5–5.3)
POTASSIUM SERPL-SCNC: 4 MMOL/L — SIGNIFICANT CHANGE UP (ref 3.5–5.3)
POTASSIUM SERPL-SCNC: 4.1 MMOL/L — SIGNIFICANT CHANGE UP (ref 3.5–5.3)
POTASSIUM SERPL-SCNC: 4.6 MMOL/L — SIGNIFICANT CHANGE UP (ref 3.5–5.3)
POTASSIUM SERPL-SCNC: 4.6 MMOL/L — SIGNIFICANT CHANGE UP (ref 3.5–5.3)
POTASSIUM SERPL-SCNC: 4.8 MMOL/L — SIGNIFICANT CHANGE UP (ref 3.5–5.3)
POTASSIUM SERPL-SCNC: 4.9 MMOL/L — SIGNIFICANT CHANGE UP (ref 3.5–5.3)
PROT SERPL-MCNC: 5.3 GM/DL — LOW (ref 6–8.3)
PROT SERPL-MCNC: 5.4 GM/DL — LOW (ref 6–8.3)
PROT SERPL-MCNC: 5.4 GM/DL — LOW (ref 6–8.3)
PROT SERPL-MCNC: 5.9 GM/DL — LOW (ref 6–8.3)
PROT SERPL-MCNC: 5.9 GM/DL — LOW (ref 6–8.3)
PROT SERPL-MCNC: 6 GM/DL — SIGNIFICANT CHANGE UP (ref 6–8.3)
PROT SERPL-MCNC: 6.3 GM/DL — SIGNIFICANT CHANGE UP (ref 6–8.3)
PROT SERPL-MCNC: 6.9 GM/DL — SIGNIFICANT CHANGE UP (ref 6–8.3)
PROT UR-MCNC: 300 MG/DL
PROTHROM AB SERPL-ACNC: 11.6 SEC — SIGNIFICANT CHANGE UP (ref 9.9–13.4)
RBC # BLD: 4.39 M/UL — SIGNIFICANT CHANGE UP (ref 3.8–5.2)
RBC # BLD: 4.49 M/UL — SIGNIFICANT CHANGE UP (ref 3.8–5.2)
RBC # BLD: 4.58 M/UL — SIGNIFICANT CHANGE UP (ref 3.8–5.2)
RBC # BLD: 4.67 M/UL — SIGNIFICANT CHANGE UP (ref 3.8–5.2)
RBC # BLD: 4.71 M/UL — SIGNIFICANT CHANGE UP (ref 3.8–5.2)
RBC # BLD: 4.75 M/UL — SIGNIFICANT CHANGE UP (ref 3.8–5.2)
RBC # BLD: 4.84 M/UL — SIGNIFICANT CHANGE UP (ref 3.8–5.2)
RBC # FLD: 12.6 % — SIGNIFICANT CHANGE UP (ref 10.3–14.5)
RBC # FLD: 13 % — SIGNIFICANT CHANGE UP (ref 10.3–14.5)
RBC # FLD: 13.1 % — SIGNIFICANT CHANGE UP (ref 10.3–14.5)
RBC # FLD: 13.1 % — SIGNIFICANT CHANGE UP (ref 10.3–14.5)
RBC # FLD: 13.5 % — SIGNIFICANT CHANGE UP (ref 10.3–14.5)
RBC # FLD: 14 % — SIGNIFICANT CHANGE UP (ref 10.3–14.5)
RBC # FLD: 14.6 % — HIGH (ref 10.3–14.5)
RBC CASTS # UR COMP ASSIST: 6 /HPF — HIGH (ref 0–4)
RSV RNA NPH QL NAA+NON-PROBE: SIGNIFICANT CHANGE UP
S AUREUS DNA NOSE QL NAA+PROBE: SIGNIFICANT CHANGE UP
S PNEUM AG UR QL: NEGATIVE — SIGNIFICANT CHANGE UP
SAO2 % BLDA: 92.5 % — LOW (ref 94–98)
SAO2 % BLDA: 99.4 % — HIGH (ref 94–98)
SAO2 % BLDV: 88.4 % — LOW (ref 94–98)
SAO2 % BLDV: 97 % — SIGNIFICANT CHANGE UP (ref 94–98)
SAO2 % BLDV: 99.3 % — HIGH (ref 94–98)
SARS-COV-2 RNA SPEC QL NAA+PROBE: SIGNIFICANT CHANGE UP
SODIUM SERPL-SCNC: 129 MMOL/L — LOW (ref 135–145)
SODIUM SERPL-SCNC: 132 MMOL/L — LOW (ref 135–145)
SODIUM SERPL-SCNC: 135 MMOL/L — SIGNIFICANT CHANGE UP (ref 135–145)
SODIUM SERPL-SCNC: 135 MMOL/L — SIGNIFICANT CHANGE UP (ref 135–145)
SODIUM SERPL-SCNC: 137 MMOL/L — SIGNIFICANT CHANGE UP (ref 135–145)
SP GR SPEC: 1.02 — SIGNIFICANT CHANGE UP (ref 1–1.03)
SPECIMEN SOURCE: SIGNIFICANT CHANGE UP
SPECIMEN SOURCE: SIGNIFICANT CHANGE UP
TROPONIN I, HIGH SENSITIVITY RESULT: 20.6 NG/L — SIGNIFICANT CHANGE UP
UROBILINOGEN FLD QL: 1 MG/DL — SIGNIFICANT CHANGE UP (ref 0.2–1)
VANCOMYCIN FLD-MCNC: 10.2 UG/ML — SIGNIFICANT CHANGE UP
WBC # BLD: 10.48 K/UL — SIGNIFICANT CHANGE UP (ref 3.8–10.5)
WBC # BLD: 12.34 K/UL — HIGH (ref 3.8–10.5)
WBC # BLD: 15.35 K/UL — HIGH (ref 3.8–10.5)
WBC # BLD: 6 K/UL — SIGNIFICANT CHANGE UP (ref 3.8–10.5)
WBC # BLD: 6.16 K/UL — SIGNIFICANT CHANGE UP (ref 3.8–10.5)
WBC # BLD: 7.95 K/UL — SIGNIFICANT CHANGE UP (ref 3.8–10.5)
WBC # BLD: 8.62 K/UL — SIGNIFICANT CHANGE UP (ref 3.8–10.5)
WBC # FLD AUTO: 10.48 K/UL — SIGNIFICANT CHANGE UP (ref 3.8–10.5)
WBC # FLD AUTO: 12.34 K/UL — HIGH (ref 3.8–10.5)
WBC # FLD AUTO: 15.35 K/UL — HIGH (ref 3.8–10.5)
WBC # FLD AUTO: 6 K/UL — SIGNIFICANT CHANGE UP (ref 3.8–10.5)
WBC # FLD AUTO: 6.16 K/UL — SIGNIFICANT CHANGE UP (ref 3.8–10.5)
WBC # FLD AUTO: 7.95 K/UL — SIGNIFICANT CHANGE UP (ref 3.8–10.5)
WBC # FLD AUTO: 8.62 K/UL — SIGNIFICANT CHANGE UP (ref 3.8–10.5)
WBC UR QL: 4 /HPF — SIGNIFICANT CHANGE UP (ref 0–5)

## 2025-01-01 PROCEDURE — 99223 1ST HOSP IP/OBS HIGH 75: CPT

## 2025-01-01 PROCEDURE — 99291 CRITICAL CARE FIRST HOUR: CPT

## 2025-01-01 PROCEDURE — 93010 ELECTROCARDIOGRAM REPORT: CPT

## 2025-01-01 PROCEDURE — 71045 X-RAY EXAM CHEST 1 VIEW: CPT | Mod: 26

## 2025-01-01 PROCEDURE — G0545: CPT

## 2025-01-01 PROCEDURE — 99232 SBSQ HOSP IP/OBS MODERATE 35: CPT

## 2025-01-01 PROCEDURE — 99233 SBSQ HOSP IP/OBS HIGH 50: CPT

## 2025-01-01 PROCEDURE — 74177 CT ABD & PELVIS W/CONTRAST: CPT | Mod: 26

## 2025-01-01 PROCEDURE — 76604 US EXAM CHEST: CPT | Mod: 26,GC

## 2025-01-01 PROCEDURE — 99222 1ST HOSP IP/OBS MODERATE 55: CPT

## 2025-01-01 PROCEDURE — 76705 ECHO EXAM OF ABDOMEN: CPT | Mod: 26

## 2025-01-01 PROCEDURE — 70450 CT HEAD/BRAIN W/O DYE: CPT | Mod: 26

## 2025-01-01 PROCEDURE — 99239 HOSP IP/OBS DSCHRG MGMT >30: CPT

## 2025-01-01 PROCEDURE — G0508: CPT

## 2025-01-01 RX ORDER — CEFEPIME HCL 1 G
1000 IV SOLUTION, PIGGYBACK, BOTTLE (EA) INTRAVENOUS ONCE
Refills: 0 | Status: COMPLETED | OUTPATIENT
Start: 2025-01-01 | End: 2025-01-01

## 2025-01-01 RX ORDER — SOD PHOSPHATE,MONOBASIC-DIBAS 3MMOL/ML
30 VIAL (ML) INTRAVENOUS ONCE
Refills: 0 | Status: COMPLETED | OUTPATIENT
Start: 2025-01-01 | End: 2025-01-01

## 2025-01-01 RX ORDER — HYDRALAZINE HCL 100 MG
10 TABLET ORAL ONCE
Refills: 0 | Status: COMPLETED | OUTPATIENT
Start: 2025-01-01 | End: 2025-01-01

## 2025-01-01 RX ORDER — POTASSIUM PHOSPHATE, MONOBASIC POTASSIUM PHOSPHATE, DIBASIC 236; 224 MG/ML; MG/ML
30 INJECTION, SOLUTION INTRAVENOUS ONCE
Refills: 0 | Status: COMPLETED | OUTPATIENT
Start: 2025-01-01 | End: 2025-01-01

## 2025-01-01 RX ORDER — ANTISEPTIC SURGICAL SCRUB 0.04 MG/ML
1 SOLUTION TOPICAL
Refills: 0 | Status: DISCONTINUED | OUTPATIENT
Start: 2025-01-01 | End: 2025-01-01

## 2025-01-01 RX ORDER — VANCOMYCIN HYDROCHLORIDE 50 MG/ML
1000 KIT ORAL ONCE
Refills: 0 | Status: COMPLETED | OUTPATIENT
Start: 2025-01-01 | End: 2025-01-01

## 2025-01-01 RX ORDER — CEFEPIME HCL 1 G
2000 IV SOLUTION, PIGGYBACK, BOTTLE (EA) INTRAVENOUS EVERY 12 HOURS
Refills: 0 | Status: DISCONTINUED | OUTPATIENT
Start: 2025-01-01 | End: 2025-01-01

## 2025-01-01 RX ORDER — METOPROLOL SUCCINATE 25 MG
5 TABLET, EXTENDED RELEASE 24 HR ORAL ONCE
Refills: 0 | Status: COMPLETED | OUTPATIENT
Start: 2025-01-01 | End: 2025-01-01

## 2025-01-01 RX ORDER — MAGNESIUM SULFATE 0.8 MEQ/ML
2 AMPUL (ML) INJECTION ONCE
Refills: 0 | Status: COMPLETED | OUTPATIENT
Start: 2025-01-01 | End: 2025-01-01

## 2025-01-01 RX ORDER — METRONIDAZOLE 500 MG
500 TABLET ORAL EVERY 8 HOURS
Refills: 0 | Status: DISCONTINUED | OUTPATIENT
Start: 2025-01-01 | End: 2025-01-01

## 2025-01-01 RX ORDER — ENOXAPARIN SODIUM 100 MG/ML
40 INJECTION SUBCUTANEOUS EVERY 24 HOURS
Refills: 0 | Status: DISCONTINUED | OUTPATIENT
Start: 2025-01-01 | End: 2025-01-01

## 2025-01-01 RX ORDER — METRONIDAZOLE 500 MG
500 TABLET ORAL EVERY 8 HOURS
Refills: 0 | Status: COMPLETED | OUTPATIENT
Start: 2025-01-01 | End: 2025-01-01

## 2025-01-01 RX ORDER — CEFEPIME HCL 1 G
1000 IV SOLUTION, PIGGYBACK, BOTTLE (EA) INTRAVENOUS EVERY 12 HOURS
Refills: 0 | Status: DISCONTINUED | OUTPATIENT
Start: 2025-01-01 | End: 2025-01-01

## 2025-01-01 RX ORDER — SODIUM CHLORIDE 9 G/ML
1000 INJECTION, SOLUTION INTRAVENOUS
Refills: 0 | Status: DISCONTINUED | OUTPATIENT
Start: 2025-01-01 | End: 2025-01-01

## 2025-01-01 RX ORDER — IPRATROPIUM BROMIDE AND ALBUTEROL SULFATE .5; 2.5 MG/3ML; MG/3ML
3 SOLUTION RESPIRATORY (INHALATION) EVERY 6 HOURS
Refills: 0 | Status: DISCONTINUED | OUTPATIENT
Start: 2025-01-01 | End: 2025-01-01

## 2025-01-01 RX ORDER — METHYLPREDNISOLONE ACETATE 40 MG/ML
40 VIAL (ML) INJECTION EVERY 8 HOURS
Refills: 0 | Status: DISCONTINUED | OUTPATIENT
Start: 2025-01-01 | End: 2025-01-01

## 2025-01-01 RX ORDER — ONDANSETRON 4 MG/1
4 TABLET, ORALLY DISINTEGRATING ORAL ONCE
Refills: 0 | Status: COMPLETED | OUTPATIENT
Start: 2025-01-01 | End: 2025-01-01

## 2025-01-01 RX ORDER — LABETALOL HYDROCHLORIDE 300 MG/1
10 TABLET, FILM COATED ORAL ONCE
Refills: 0 | Status: COMPLETED | OUTPATIENT
Start: 2025-01-01 | End: 2025-01-01

## 2025-01-01 RX ORDER — CEFEPIME HCL 1 G
1000 IV SOLUTION, PIGGYBACK, BOTTLE (EA) INTRAVENOUS
Refills: 0 | Status: DISCONTINUED | OUTPATIENT
Start: 2025-01-01 | End: 2025-01-01

## 2025-01-01 RX ORDER — CEFEPIME HCL 1 G
2000 IV SOLUTION, PIGGYBACK, BOTTLE (EA) INTRAVENOUS EVERY 12 HOURS
Refills: 0 | Status: COMPLETED | OUTPATIENT
Start: 2025-01-01 | End: 2025-01-01

## 2025-01-01 RX ORDER — CARVEDILOL 6.25 MG
6.25 TABLET ORAL EVERY 12 HOURS
Refills: 0 | Status: DISCONTINUED | OUTPATIENT
Start: 2025-01-01 | End: 2025-01-01

## 2025-01-01 RX ORDER — SODIUM CHLORIDE 9 G/ML
500 INJECTION, SOLUTION INTRAVENOUS ONCE
Refills: 0 | Status: COMPLETED | OUTPATIENT
Start: 2025-01-01 | End: 2025-01-01

## 2025-01-01 RX ORDER — ASPIRIN 81 MG/1
81 TABLET, COATED ORAL DAILY
Refills: 0 | Status: DISCONTINUED | OUTPATIENT
Start: 2025-01-01 | End: 2025-01-01

## 2025-01-01 RX ORDER — CEFEPIME HCL 1 G
IV SOLUTION, PIGGYBACK, BOTTLE (EA) INTRAVENOUS
Refills: 0 | Status: DISCONTINUED | OUTPATIENT
Start: 2025-01-01 | End: 2025-01-01

## 2025-01-01 RX ORDER — AMLODIPINE BESYLATE 5 MG
5 TABLET ORAL DAILY
Refills: 0 | Status: DISCONTINUED | OUTPATIENT
Start: 2025-01-01 | End: 2025-01-01

## 2025-01-01 RX ADMIN — IPRATROPIUM BROMIDE AND ALBUTEROL SULFATE 3 MILLILITER(S): .5; 2.5 SOLUTION RESPIRATORY (INHALATION) at 17:19

## 2025-01-01 RX ADMIN — ENOXAPARIN SODIUM 40 MILLIGRAM(S): 100 INJECTION SUBCUTANEOUS at 12:01

## 2025-01-01 RX ADMIN — POTASSIUM PHOSPHATE, MONOBASIC POTASSIUM PHOSPHATE, DIBASIC 83.33 MILLIMOLE(S): 236; 224 INJECTION, SOLUTION INTRAVENOUS at 05:54

## 2025-01-01 RX ADMIN — Medication 40 MILLIGRAM(S): at 06:12

## 2025-01-01 RX ADMIN — ANTISEPTIC SURGICAL SCRUB 1 APPLICATION(S): 0.04 SOLUTION TOPICAL at 06:11

## 2025-01-01 RX ADMIN — IPRATROPIUM BROMIDE AND ALBUTEROL SULFATE 3 MILLILITER(S): .5; 2.5 SOLUTION RESPIRATORY (INHALATION) at 05:58

## 2025-01-01 RX ADMIN — IPRATROPIUM BROMIDE AND ALBUTEROL SULFATE 3 MILLILITER(S): .5; 2.5 SOLUTION RESPIRATORY (INHALATION) at 00:30

## 2025-01-01 RX ADMIN — IPRATROPIUM BROMIDE AND ALBUTEROL SULFATE 3 MILLILITER(S): .5; 2.5 SOLUTION RESPIRATORY (INHALATION) at 05:21

## 2025-01-01 RX ADMIN — Medication 5 MILLIGRAM(S): at 06:12

## 2025-01-01 RX ADMIN — Medication 100 MILLIGRAM(S): at 13:40

## 2025-01-01 RX ADMIN — Medication 6.25 MILLIGRAM(S): at 17:24

## 2025-01-01 RX ADMIN — Medication 100 MILLIGRAM(S): at 11:35

## 2025-01-01 RX ADMIN — ENOXAPARIN SODIUM 40 MILLIGRAM(S): 100 INJECTION SUBCUTANEOUS at 11:50

## 2025-01-01 RX ADMIN — Medication 100 MILLIGRAM(S): at 14:02

## 2025-01-01 RX ADMIN — Medication 100 MILLIGRAM(S): at 05:24

## 2025-01-01 RX ADMIN — Medication 6.25 MILLIGRAM(S): at 17:44

## 2025-01-01 RX ADMIN — Medication 100 MILLIGRAM(S): at 02:27

## 2025-01-01 RX ADMIN — ANTISEPTIC SURGICAL SCRUB 1 APPLICATION(S): 0.04 SOLUTION TOPICAL at 05:31

## 2025-01-01 RX ADMIN — Medication 100 MILLIGRAM(S): at 05:58

## 2025-01-01 RX ADMIN — ANTISEPTIC SURGICAL SCRUB 1 APPLICATION(S): 0.04 SOLUTION TOPICAL at 06:12

## 2025-01-01 RX ADMIN — ANTISEPTIC SURGICAL SCRUB 1 APPLICATION(S): 0.04 SOLUTION TOPICAL at 05:25

## 2025-01-01 RX ADMIN — Medication 100 MILLIGRAM(S): at 11:50

## 2025-01-01 RX ADMIN — Medication 100 MILLIGRAM(S): at 12:43

## 2025-01-01 RX ADMIN — ENOXAPARIN SODIUM 40 MILLIGRAM(S): 100 INJECTION SUBCUTANEOUS at 11:48

## 2025-01-01 RX ADMIN — Medication 100 MILLIGRAM(S): at 21:18

## 2025-01-01 RX ADMIN — SODIUM CHLORIDE 75 MILLILITER(S): 9 INJECTION, SOLUTION INTRAVENOUS at 05:58

## 2025-01-01 RX ADMIN — ASPIRIN 81 MILLIGRAM(S): 81 TABLET, COATED ORAL at 11:50

## 2025-01-01 RX ADMIN — Medication 100 MILLIGRAM(S): at 06:43

## 2025-01-01 RX ADMIN — Medication 100 MILLIGRAM(S): at 12:03

## 2025-01-01 RX ADMIN — IPRATROPIUM BROMIDE AND ALBUTEROL SULFATE 3 MILLILITER(S): .5; 2.5 SOLUTION RESPIRATORY (INHALATION) at 11:08

## 2025-01-01 RX ADMIN — ANTISEPTIC SURGICAL SCRUB 1 APPLICATION(S): 0.04 SOLUTION TOPICAL at 05:58

## 2025-01-01 RX ADMIN — Medication 100 MILLIGRAM(S): at 21:22

## 2025-01-01 RX ADMIN — Medication 100 MILLIGRAM(S): at 11:49

## 2025-01-01 RX ADMIN — Medication 100 MILLIGRAM(S): at 17:43

## 2025-01-01 RX ADMIN — IPRATROPIUM BROMIDE AND ALBUTEROL SULFATE 3 MILLILITER(S): .5; 2.5 SOLUTION RESPIRATORY (INHALATION) at 05:10

## 2025-01-01 RX ADMIN — Medication 100 MILLIGRAM(S): at 03:05

## 2025-01-01 RX ADMIN — Medication 100 MILLIGRAM(S): at 05:32

## 2025-01-01 RX ADMIN — Medication 6.25 MILLIGRAM(S): at 06:12

## 2025-01-01 RX ADMIN — Medication 100 MILLIGRAM(S): at 23:18

## 2025-01-01 RX ADMIN — Medication 5 MILLIGRAM(S): at 06:41

## 2025-01-01 RX ADMIN — IPRATROPIUM BROMIDE AND ALBUTEROL SULFATE 3 MILLILITER(S): .5; 2.5 SOLUTION RESPIRATORY (INHALATION) at 17:07

## 2025-01-01 RX ADMIN — Medication 25 GRAM(S): at 05:58

## 2025-01-01 RX ADMIN — ENOXAPARIN SODIUM 40 MILLIGRAM(S): 100 INJECTION SUBCUTANEOUS at 12:03

## 2025-01-01 RX ADMIN — IPRATROPIUM BROMIDE AND ALBUTEROL SULFATE 3 MILLILITER(S): .5; 2.5 SOLUTION RESPIRATORY (INHALATION) at 11:56

## 2025-01-01 RX ADMIN — ENOXAPARIN SODIUM 40 MILLIGRAM(S): 100 INJECTION SUBCUTANEOUS at 11:26

## 2025-01-01 RX ADMIN — POTASSIUM PHOSPHATE, MONOBASIC POTASSIUM PHOSPHATE, DIBASIC 83.33 MILLIMOLE(S): 236; 224 INJECTION, SOLUTION INTRAVENOUS at 05:29

## 2025-01-01 RX ADMIN — IPRATROPIUM BROMIDE AND ALBUTEROL SULFATE 3 MILLILITER(S): .5; 2.5 SOLUTION RESPIRATORY (INHALATION) at 17:25

## 2025-01-01 RX ADMIN — SODIUM CHLORIDE 75 MILLILITER(S): 9 INJECTION, SOLUTION INTRAVENOUS at 12:31

## 2025-01-01 RX ADMIN — IPRATROPIUM BROMIDE AND ALBUTEROL SULFATE 3 MILLILITER(S): .5; 2.5 SOLUTION RESPIRATORY (INHALATION) at 23:13

## 2025-01-01 RX ADMIN — Medication 10 MILLIGRAM(S): at 03:51

## 2025-01-01 RX ADMIN — IPRATROPIUM BROMIDE AND ALBUTEROL SULFATE 3 MILLILITER(S): .5; 2.5 SOLUTION RESPIRATORY (INHALATION) at 11:40

## 2025-01-01 RX ADMIN — ONDANSETRON 4 MILLIGRAM(S): 4 TABLET, ORALLY DISINTEGRATING ORAL at 11:56

## 2025-01-01 RX ADMIN — LABETALOL HYDROCHLORIDE 10 MILLIGRAM(S): 300 TABLET, FILM COATED ORAL at 03:05

## 2025-01-01 RX ADMIN — Medication 100 MILLIGRAM(S): at 21:10

## 2025-01-01 RX ADMIN — Medication 100 MILLIGRAM(S): at 00:02

## 2025-01-01 RX ADMIN — Medication 6.25 MILLIGRAM(S): at 05:54

## 2025-01-01 RX ADMIN — IPRATROPIUM BROMIDE AND ALBUTEROL SULFATE 3 MILLILITER(S): .5; 2.5 SOLUTION RESPIRATORY (INHALATION) at 11:12

## 2025-01-01 RX ADMIN — SODIUM CHLORIDE 75 MILLILITER(S): 9 INJECTION, SOLUTION INTRAVENOUS at 12:44

## 2025-01-01 RX ADMIN — ASPIRIN 81 MILLIGRAM(S): 81 TABLET, COATED ORAL at 11:36

## 2025-01-01 RX ADMIN — Medication 100 MILLIGRAM(S): at 12:01

## 2025-01-01 RX ADMIN — Medication 100 MILLIGRAM(S): at 05:28

## 2025-01-01 RX ADMIN — Medication 100 MILLIGRAM(S): at 13:30

## 2025-01-01 RX ADMIN — IPRATROPIUM BROMIDE AND ALBUTEROL SULFATE 3 MILLILITER(S): .5; 2.5 SOLUTION RESPIRATORY (INHALATION) at 00:44

## 2025-01-01 RX ADMIN — Medication 100 MILLIGRAM(S): at 22:50

## 2025-01-01 RX ADMIN — IPRATROPIUM BROMIDE AND ALBUTEROL SULFATE 3 MILLILITER(S): .5; 2.5 SOLUTION RESPIRATORY (INHALATION) at 17:05

## 2025-01-01 RX ADMIN — Medication 25 GRAM(S): at 05:27

## 2025-01-01 RX ADMIN — Medication 5 MILLIGRAM(S): at 05:41

## 2025-01-01 RX ADMIN — VANCOMYCIN HYDROCHLORIDE 250 MILLIGRAM(S): KIT at 09:19

## 2025-01-01 RX ADMIN — ANTISEPTIC SURGICAL SCRUB 1 APPLICATION(S): 0.04 SOLUTION TOPICAL at 06:42

## 2025-01-01 RX ADMIN — IPRATROPIUM BROMIDE AND ALBUTEROL SULFATE 3 MILLILITER(S): .5; 2.5 SOLUTION RESPIRATORY (INHALATION) at 05:34

## 2025-01-01 RX ADMIN — ASPIRIN 81 MILLIGRAM(S): 81 TABLET, COATED ORAL at 11:49

## 2025-01-01 RX ADMIN — Medication 100 MILLIGRAM(S): at 13:11

## 2025-01-01 RX ADMIN — SODIUM CHLORIDE 75 MILLILITER(S): 9 INJECTION, SOLUTION INTRAVENOUS at 09:17

## 2025-01-01 RX ADMIN — Medication 5 MILLIGRAM(S): at 17:20

## 2025-01-01 RX ADMIN — Medication 100 MILLIGRAM(S): at 13:15

## 2025-01-01 RX ADMIN — Medication 5 MILLIGRAM(S): at 05:53

## 2025-01-01 RX ADMIN — Medication 5 MILLIGRAM(S): at 05:26

## 2025-01-01 RX ADMIN — IPRATROPIUM BROMIDE AND ALBUTEROL SULFATE 3 MILLILITER(S): .5; 2.5 SOLUTION RESPIRATORY (INHALATION) at 06:41

## 2025-01-01 RX ADMIN — Medication 5 MILLIGRAM(S): at 05:31

## 2025-01-01 RX ADMIN — SODIUM CHLORIDE 500 MILLILITER(S): 9 INJECTION, SOLUTION INTRAVENOUS at 06:13

## 2025-01-01 RX ADMIN — ASPIRIN 81 MILLIGRAM(S): 81 TABLET, COATED ORAL at 13:10

## 2025-01-01 RX ADMIN — IPRATROPIUM BROMIDE AND ALBUTEROL SULFATE 3 MILLILITER(S): .5; 2.5 SOLUTION RESPIRATORY (INHALATION) at 05:44

## 2025-01-01 RX ADMIN — IPRATROPIUM BROMIDE AND ALBUTEROL SULFATE 3 MILLILITER(S): .5; 2.5 SOLUTION RESPIRATORY (INHALATION) at 17:13

## 2025-01-01 RX ADMIN — Medication 100 MILLIGRAM(S): at 21:59

## 2025-01-01 RX ADMIN — Medication 100 MILLIGRAM(S): at 23:19

## 2025-01-01 RX ADMIN — ENOXAPARIN SODIUM 40 MILLIGRAM(S): 100 INJECTION SUBCUTANEOUS at 12:43

## 2025-01-01 RX ADMIN — ONDANSETRON 4 MILLIGRAM(S): 4 TABLET, ORALLY DISINTEGRATING ORAL at 04:06

## 2025-01-01 RX ADMIN — Medication 6.25 MILLIGRAM(S): at 05:26

## 2025-01-01 RX ADMIN — Medication 6.25 MILLIGRAM(S): at 17:22

## 2025-01-01 RX ADMIN — Medication 100 MILLIGRAM(S): at 21:31

## 2025-01-01 RX ADMIN — Medication 100 MILLIGRAM(S): at 11:26

## 2025-01-01 RX ADMIN — ANTISEPTIC SURGICAL SCRUB 1 APPLICATION(S): 0.04 SOLUTION TOPICAL at 05:41

## 2025-01-01 RX ADMIN — IPRATROPIUM BROMIDE AND ALBUTEROL SULFATE 3 MILLILITER(S): .5; 2.5 SOLUTION RESPIRATORY (INHALATION) at 17:41

## 2025-01-01 RX ADMIN — ANTISEPTIC SURGICAL SCRUB 1 APPLICATION(S): 0.04 SOLUTION TOPICAL at 05:39

## 2025-01-01 RX ADMIN — Medication 100 MILLIGRAM(S): at 05:42

## 2025-01-01 RX ADMIN — Medication 100 MILLIGRAM(S): at 14:10

## 2025-01-01 RX ADMIN — IPRATROPIUM BROMIDE AND ALBUTEROL SULFATE 3 MILLILITER(S): .5; 2.5 SOLUTION RESPIRATORY (INHALATION) at 00:57

## 2025-01-01 RX ADMIN — Medication 6.25 MILLIGRAM(S): at 05:31

## 2025-01-01 RX ADMIN — Medication 6.25 MILLIGRAM(S): at 06:41

## 2025-01-01 RX ADMIN — IPRATROPIUM BROMIDE AND ALBUTEROL SULFATE 3 MILLILITER(S): .5; 2.5 SOLUTION RESPIRATORY (INHALATION) at 11:05

## 2025-01-01 RX ADMIN — Medication 100 MILLIGRAM(S): at 05:53

## 2025-01-01 RX ADMIN — ANTISEPTIC SURGICAL SCRUB 1 APPLICATION(S): 0.04 SOLUTION TOPICAL at 05:54

## 2025-01-01 RX ADMIN — Medication 100 MILLIGRAM(S): at 21:49

## 2025-01-01 RX ADMIN — IPRATROPIUM BROMIDE AND ALBUTEROL SULFATE 3 MILLILITER(S): .5; 2.5 SOLUTION RESPIRATORY (INHALATION) at 23:32

## 2025-01-01 RX ADMIN — Medication 85 MILLIMOLE(S): at 07:48

## 2025-01-01 RX ADMIN — IPRATROPIUM BROMIDE AND ALBUTEROL SULFATE 3 MILLILITER(S): .5; 2.5 SOLUTION RESPIRATORY (INHALATION) at 05:16

## 2025-01-01 RX ADMIN — Medication 5 MILLIGRAM(S): at 23:19

## 2025-01-01 RX ADMIN — IPRATROPIUM BROMIDE AND ALBUTEROL SULFATE 3 MILLILITER(S): .5; 2.5 SOLUTION RESPIRATORY (INHALATION) at 05:23

## 2025-01-01 RX ADMIN — Medication 100 MILLIGRAM(S): at 15:18

## 2025-01-01 RX ADMIN — ENOXAPARIN SODIUM 40 MILLIGRAM(S): 100 INJECTION SUBCUTANEOUS at 11:36

## 2025-01-01 RX ADMIN — Medication 100 MILLIGRAM(S): at 06:16

## 2025-01-01 RX ADMIN — Medication 100 MILLIGRAM(S): at 23:22

## 2025-01-01 RX ADMIN — Medication 5 MILLIGRAM(S): at 11:49

## 2025-01-01 RX ADMIN — IPRATROPIUM BROMIDE AND ALBUTEROL SULFATE 3 MILLILITER(S): .5; 2.5 SOLUTION RESPIRATORY (INHALATION) at 17:11

## 2025-01-01 RX ADMIN — Medication 100 MILLIGRAM(S): at 12:31

## 2025-01-01 RX ADMIN — Medication 6.25 MILLIGRAM(S): at 05:41

## 2025-01-01 RX ADMIN — IPRATROPIUM BROMIDE AND ALBUTEROL SULFATE 3 MILLILITER(S): .5; 2.5 SOLUTION RESPIRATORY (INHALATION) at 17:38

## 2025-01-01 RX ADMIN — IPRATROPIUM BROMIDE AND ALBUTEROL SULFATE 3 MILLILITER(S): .5; 2.5 SOLUTION RESPIRATORY (INHALATION) at 11:24

## 2025-01-01 RX ADMIN — IPRATROPIUM BROMIDE AND ALBUTEROL SULFATE 3 MILLILITER(S): .5; 2.5 SOLUTION RESPIRATORY (INHALATION) at 23:16

## 2025-01-01 RX ADMIN — Medication 100 MILLIGRAM(S): at 06:41

## 2025-01-01 RX ADMIN — IPRATROPIUM BROMIDE AND ALBUTEROL SULFATE 3 MILLILITER(S): .5; 2.5 SOLUTION RESPIRATORY (INHALATION) at 23:59

## 2025-01-01 RX ADMIN — Medication 6.25 MILLIGRAM(S): at 17:43

## 2025-01-01 RX ADMIN — ENOXAPARIN SODIUM 40 MILLIGRAM(S): 100 INJECTION SUBCUTANEOUS at 13:10

## 2025-01-01 RX ADMIN — Medication 100 MILLIGRAM(S): at 17:45

## 2025-01-01 RX ADMIN — Medication 100 MILLIGRAM(S): at 22:20

## 2025-01-12 NOTE — ED ADULT NURSE NOTE - NSFALLHARMRISKINTERV_ED_ALL_ED

## 2025-01-12 NOTE — ED ADULT NURSE NOTE - OBJECTIVE STATEMENT
Pt presents to ED BIBA for unresponsive found by family. H/O COPD, CHF. Pt on CPAP placed on BIPAP here in ED 10/5 100% O2. minimally responsive to tacticle and verbal stimuli

## 2025-01-13 NOTE — PATIENT PROFILE ADULT - FALL HARM RISK - HARM RISK INTERVENTIONS

## 2025-01-13 NOTE — CONSULT NOTE ADULT - ASSESSMENT
89 year old female PMH of COPD (on oxygen), HTN, HLD, gallbladder cancer with h/o cholecystectomy, hepatectomy, and right colectomy presented to the ED for being unresponsive on CPAP.  Patient admitted to ICU on NIV with CT chest with necrotizing pna.  Palliative consulted for GOC.

## 2025-01-13 NOTE — H&P ADULT - NSICDXPASTMEDICALHX_GEN_ALL_CORE_FT
PAST MEDICAL HISTORY:  Bladder cancer     GERD (gastroesophageal reflux disease)     Gout     HLD (hyperlipidemia)     HTN (hypertension)     Hyperlipidemia     Hypertension     Insomnia     Liver abscess     Other gallbladder disorder

## 2025-01-13 NOTE — ED PROVIDER NOTE - CRITICAL CARE ATTENDING CONTRIBUTION TO CARE
89 F pmh COPD, HTN, gallbladder ca, hepatic abscess presenting to the ED for unresponsiveness    pt placed on BPAP by EMS  concern for sepsis, hypercapnia, hypoxic respiratory distress  labs, ABG  XR  CT CAP  IV fluids, abx, antipyretics  BIPAP    hypercapneic respiratory failure with transaminitis  code status: DNR/DNI  ICU admit

## 2025-01-13 NOTE — H&P ADULT - NSICDXPASTSURGICALHX_GEN_ALL_CORE_FT
PAST SURGICAL HISTORY:  H/O gallbladder cancer     H/O right hemicolectomy     H/O: hysterectomy     History of cholecystectomy     History of resection of liver

## 2025-01-13 NOTE — CONSULT NOTE ADULT - SUBJECTIVE AND OBJECTIVE BOX
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  In ED VBG obtained revealing hypercapnic respiratory failure placed on Bipap 10/5, fio2 100%.  Patient seen at bedside, unresponsive pulling TV ~100-150, spo2 100%.  Stat abg obtained, settings adjusted.  Admit to ICU. (13 Jan 2025 02:00)    PERTINENT PM/SXH:   No pertinent past medical history    Hypertension    Hyperlipidemia    Gout    GERD (gastroesophageal reflux disease)    Insomnia    Other gallbladder disorder    Bladder cancer    HTN (hypertension)    HLD (hyperlipidemia)    Liver abscess    No significant past surgical history    H/O: hysterectomy    H/O gallbladder cancer    History of cholecystectomy    H/O right hemicolectomy    History of resection of liver      FAMILY HISTORY:  FH: hypertension (Father, Mother)      ITEMS NOT CHECKED ARE NOT PRESENT    SOCIAL HISTORY:   Significant other/partner:  [ ]  Children: yes [ ]  Advent/Spirituality: Shinto  Substance hx:  [ ]   Tobacco hx:  [ ]   Alcohol hx: [ ]   Home Opioid hx:  [ ] I-Stop Reference No:  Reference #: [id number]  Living Situation: [ ]Home  [ ]Long term care  [ ]Rehab [ ]Other    ADVANCE DIRECTIVES:    DNR  MOLST  [ ]  Living Will  [ ]   DECISION MAKER(s):  [ ] Health Care Proxy(s)  [x ] Surrogate(s)  [ ] Guardian           Name(s): Phone Number(s): America (daughter) [phone]    BASELINE (I)ADL(s) (prior to admission):  [place]: [ ]Total  [ ] Moderate [ ]Dependent    Allergies    ampicillin-sulbactam (Rash)  ampicillin (Rash)    Intolerances    MEDICATIONS  (STANDING):  albuterol/ipratropium for Nebulization 3 milliLiter(s) Nebulizer every 6 hours  cefepime   IVPB 1000 milliGRAM(s) IV Intermittent <User Schedule>  chlorhexidine 2% Cloths 1 Application(s) Topical <User Schedule>  enoxaparin Injectable 40 milliGRAM(s) SubCutaneous every 24 hours  influenza  Vaccine (HIGH DOSE) 0.5 milliLiter(s) IntraMuscular once  lactated ringers. 1000 milliLiter(s) (75 mL/Hr) IV Continuous <Continuous>  metroNIDAZOLE  IVPB 500 milliGRAM(s) IV Intermittent every 8 hours    MEDICATIONS  (PRN):    PRESENT SYMPTOMS: [ ]Unable to obtain due to poor mentation   Source if other than patient:  [ ]Family   [ ]Team     Pain: [ ] yes [ ] no  QOL impact -   Location -                    Aggravating factors -  Quality -  Radiation -  Timing-  Severity (0-10 scale):  Minimal acceptable level (0-10 scale):     PAIN AD Score:     http://geriatrictoolkit.[facility]/cog/painad.pdf (press ctrl +  left click to view)    Dyspnea:                           [ ]Mild [ ]Moderate [ ]Severe  Anxiety:                             [ ]Mild [ ]Moderate [ ]Severe  Fatigue:                             [ ]Mild [ ]Moderate [ ]Severe  Nausea:                             [ ]Mild [ ]Moderate [ ]Severe  Loss of appetite:              [ ]Mild [ ]Moderate [ ]Severe  Constipation:                    [ ]Mild [ ]Moderate [ ]Severe    Other Symptoms:  [ ]All other review of systems negative     Karnofsky Performance Score/Palliative Performance Status Version 2:         %    http://npcrc.org/files/news/palliative_performance_scale_ppsv2.pdf  PHYSICAL EXAM:  Vital Signs Last 24 Hrs  T(C): 34.2 (13 Jan 2025 07:45), Max: 37.8 (12 Jan 2025 23:13)  T(F): 93.6 (13 Jan 2025 07:45), Max: 100 (12 Jan 2025 23:13)  HR: 68 (13 Jan 2025 11:00) (56 - 89)  BP: 100/49 (13 Jan 2025 10:30) (91/44 - 130/64)  BP(mean): 66 (13 Jan 2025 10:30) (51 - 69)  RR: 17 (13 Jan 2025 11:00) (15 - 24)  SpO2: 95% (13 Jan 2025 11:00) (90% - 100%)    Parameters below as of 13 Jan 2025 06:17  Patient On (Oxygen Delivery Method): nasal cannula, 3 LPM     I&O's Summary    12 Jan 2025 07:01  -  13 Jan 2025 07:00  --------------------------------------------------------  IN: 500 mL / OUT: 0 mL / NET: 500 mL    GENERAL:  [ ]Alert  [ ]Oriented x   [ ]Lethargic  [ ]Cachexia  [ ]Unarousable  [ ]Verbal  [ ]Non-Verbal  Behavioral:   [ ] Anxiety  [ ] Delirium [ ] Agitation [ ] Other  HEENT:  [ ]Normal   [ ]Dry mouth   [ ]ET Tube/Trach  [ ]Oral lesions  PULMONARY:   [ ]Clear [ ]Tachypnea  [ ]Audible excessive secretions   [ ]Rhonchi        [ ]Right [ ]Left [ ]Bilateral  [ ]Crackles        [ ]Right [ ]Left [ ]Bilateral  [ ]Wheezing     [ ]Right [ ]Left [ ]Bilateral  CARDIOVASCULAR:    [ ]Regular [ ]Irregular [ ]Tachy  [ ]Michael [ ]Murmur [ ]Other  GASTROINTESTINAL:  [ ]Soft  [ ]Distended   [ ]+BS  [ ]Non tender [ ]Tender  [ ]PEG [ ]OGT/ NGT  Last BM: GENITOURINARY:  [ ]Normal [ x] Incontinent   [ ]Oliguria/Anuria   [ ]Otto  MUSCULOSKELETAL:   [ ]Normal   [ ]Weakness  [ ]Bed/Wheelchair bound [ ]Edema  NEUROLOGIC:   [ ]No focal deficits  [ ] Cognitive impairment  [ ] Dysphagia [ ]Dysarthria [ ] Paresis [ ]Other   SKIN:   [ ]Normal   [ ]Pressure ulcer(s)  [ ]Rash    CRITICAL CARE:  [ ] Shock Present  [ ]Septic [ ]Cardiogenic [ ]Neurologic [ ]Hypovolemic  [ ]  Vasopressors [ ]  Inotropes   [ ] Respiratory failure present [ ] mechanical ventilation [ ] non-invasive ventilatory support [ ] High flow  [ ] Acute  [ ] Chronic [ ] Hypoxic  [ ] Hypercarbic [ ] Other  [ ] Other organ failure     LABS:                        14.3   15.35 )-----------( 207      ( 12 Jan 2025 22:45 )             44.7   01-12    129[L]  |  93[L]  |  22  ----------------------------<  166[H]  4.8   |  36[H]  |  1.09    Ca    8.9      12 Jan 2025 22:45    TPro  6.9  /  Alb  2.7[L]  /  TBili  3.0[H]  /  DBili  x   /  AST  380[H]  /  ALT  274[H]  /  AlkPhos  284[H]  01-12  PT/INR - ( 12 Jan 2025 22:45 )   PT: 11.6 sec;   INR: 1.03 ratio         PTT - ( 12 Jan 2025 22:45 )  PTT:28.6 sec    Urinalysis with Rflx Culture (01.12.25 @ 23:03)    Urine Appearance: Clear   Color: Dark Yellow   Specific Gravity: 1.018   pH Urine: 6.0   Protein, Urine: 300 mg/dL   Glucose Qualitative, Urine: 100 mg/dL   Ketone - Urine: Negative mg/dL   Blood, Urine: Moderate   Bilirubin: Moderate   Urobilinogen: 1.0 mg/dL   Leukocyte Esterase Concentration: Negative   Nitrite: Negative    RADIOLOGY & ADDITIONAL STUDIES:  < from: CT Abdomen and Pelvis w/ IV Cont (01.13.25 @ 04:53) >  IMPRESSION:  Bibasilar consolidations with areas of cavitation on the left, concerning   for necrotizing pneumonia.  Interval worsening of intrahepatic and extra hepatic biliary ductal   dilatation with sludge noted within the proximal common hepatic duct and   possibly within the distal common bile duct near the ampulla. Recommend   GI consultation and ERCP.  --- End of Report ---  < end of copied text >    < from: CT Head No Cont (01.13.25 @ 04:52) >  IMPRESSION:  No acute intracranial hemorrhage, mass effect, or midline shift.  --- End of Report ---  < end of copied text >    < from: Xray Chest 1 View- PORTABLE-Urgent (Xray Chest 1 View- PORTABLE-Urgent .) (01.13.25 @ 01:02) >  IMPRESSION:  Small left pleural effusion. Right basilar atelectasis.  --- End of Report ---  < end of copied text >    < from: TTE Echo Complete w/o Contrast w/ Doppler (11.28.23 @ 09:25) >  Summary:   1. Technically difficult study.   2. Poor subcostal views.   3. Normal global left ventricular systolic function.   4. Sclerotic aortic valve with normal opening.   5. Moderate aortic regurgitation.   6. Small pericardial effusion.   7. Mild thickening and calcification of the anterior and posterior   mitral valve leaflets.   8. Moderate mitral annular calcification.   9. Moderately decreased posterior mitral leaflet mobility.  10. Trace mitral valve regurgitation.  [id number] Andrey Rubio MD, FACC  < end of copied text >    PROTEIN CALORIE MALNUTRITION PRESENT: [ ] Yes [ ] No  [ ] PPSV2 < or = to 30% [ ] significant weight loss  [ ] poor nutritional intake [ ] catabolic state [ ] anasarca     Artificial Nutrition [ ]     REFERRALS:   [ ]Chaplaincy  [ ] Hospice  [ ]Child Life  [ ]Social Work  [ ]Case management [ ]Holistic Therapy     Goals of Care Document:

## 2025-01-13 NOTE — ED PROVIDER NOTE - PHYSICAL EXAMINATION
General: elderly unresponsive female   HEENT: Normocephalic, atraumatic. Moist mucous membranes. Oropharynx clear. No lymphadenopathy.  Eyes: No scleral icterus. EOMI. JASON.  Neck:. Soft and supple. Full ROM without pain. No midline tenderness  Cardiac: Regular rate and regular rhythm. No murmurs, rubs, gallops. Peripheral pulses 2+ and symmetric. chest scar clean dry healed  Resp: Lungs CTAB. No wheezes, rales or rhonchi.  Abd: Soft, non-tender, non-distended. No guarding or rebound. abd scar clean  Back: Spine midline and non-tender. No CVA tenderness.    Skin: No rashes, abrasions, or lacerations.  Neuro: AO x 0.

## 2025-01-13 NOTE — PATIENT PROFILE ADULT - FUNCTIONAL ASSESSMENT - BASIC MOBILITY 6.
2-calculated by average/Not able to assess (calculate score using [facility] averaging method)

## 2025-01-13 NOTE — ED PROVIDER NOTE - CARE PLAN
Principal Discharge DX:	Acute respiratory failure with hypercapnia  Secondary Diagnosis:	Sepsis  Secondary Diagnosis:	Altered mental status   1

## 2025-01-13 NOTE — CONSULT NOTE ADULT - SUBJECTIVE AND OBJECTIVE BOX
89F with hx gallbladder cancer s/p cholecystectomy with hepatectomy in 2022 c/b liver abscesses, HTN, HL, presenting for evaluation of AMS and SOB, found to have necrotizing pneumonia on CT. Was initially on bipap due to hypercapnea but vomited into bipap. Is now off bipap on NC. Is hypothermic and borderline hypotensive.     GI consulted for abnormal LFTs - new since 2023. Patient denies abdominal pain. CT scan noted below.     PMH/PSH--  Hypertension  Hyperlipidemia  Gout  GERD (gastroesophageal reflux disease)  Other gallbladder disorder  Liver abscess  H/O: hysterectomy  H/O gallbladder cancer  H/O right hemicolectomy  History of resection of liver    Allergies--  Tolerated piperacillin/tazobactam (9/22), cefepime (3/23 and 11/23), and ceftriaxone (3/23). (Other)  ampicillin-sulbactam (Rash)  ampicillin (Rash)    Medications--  Other: albuterol/ipratropium for Nebulization  chlorhexidine 2% Cloths  enoxaparin Injectable  lactated ringers.    Social History--  EtOH: denies   Tobacco: denies   Drug Use: denies     Family/Marital History--  FH: hypertension (Father, Mother)    Review of Systems: unable to obtain due to mental status    Physical Exam--  Vital Signs: T(F): 96.5 (01-13-25 @ 12:00), Max: 100 (01-12-25 @ 23:13)  HR: 74 (01-13-25 @ 12:00)  BP: 116/61 (01-13-25 @ 12:00)  RR: 15 (01-13-25 @ 12:00)  SpO2: 97% (01-13-25 @ 12:00)    General: Nontoxic-appearing in no acute distress. Sleepy but arousable.  HEENT: AT/NC. Anicteric. Conjunctiva pink and moist.   Neck: Not rigid. No sense of mass.  Nodes: None palpable.  Lungs: Clear bilaterally without rales  Heart: Regular rate and rhythm. No Murmur.   Abdomen: Soft. Nondistended. Nontender.   Extremities: No cyanosis or clubbing. No edema.   Skin: Warm. Dry. Good turgor. No rash.   Psychiatric: Sleepy but arousable.     Laboratory & Imaging Data--  CBC                        14.3   15.35 )-----------( 207      ( 12 Jan 2025 22:45 )             44.7       Chemistries  01-12    129[L]  |  93[L]  |  22  ----------------------------<  166[H]  4.8   |  36[H]  |  1.09    Ca    8.9      12 Jan 2025 22:45    TPro  6.9  /  Alb  2.7[L]  /  TBili  3.0[H]  /  DBili  x   /  AST  380[H]  /  ALT  274[H]  /  AlkPhos  284[H]  01-12      ACC: [id number] EXAM:  CT ABDOMEN AND PELVIS IC   ORDERED BY:   JULISSA CASTANEDA     PROCEDURE DATE:  01/13/2025      INTERPRETATION:  CLINICAL INFORMATION: Altered mental status    COMPARISON: CT abdomen/pelvis 3/6/2023    CONTRAST/COMPLICATIONS:  IV Contrast: Omnipaque 350  95 cc administered   5 cc discarded  Oral Contrast: NONE    PROCEDURE:  CT of the Abdomen and Pelvis was performed.  Sagittal and coronal reformats were performed.    FINDINGS:  LOWER CHEST: Coronary artery, aortic valve, and mitral valve   calcifications. Bibasilar consolidations with areas of cavitation on the   left.    LIVER: Within normal limits.  BILE DUCTS: Interval worsening of intrahepatic and extrahepatic biliary   ductal dilatation, with the common bile duct now measuring up to 2.4 cm   sludge is noted within the proximal common hepatic duct. Mild increased   attenuation within the common bile duct near the ampulla, may reflect   sludge.  GALLBLADDER: Within normal limits.  SPLEEN: Within normal limits.  PANCREAS: Within normal limits.  ADRENALS: Within normal limits.  KIDNEYS/URETERS: Bilateral renal cortical scarring. Bilateral   subcentimeter low-density lesions, too small to characterize.    BLADDER: Within normal limits.  REPRODUCTIVE ORGANS: Uterus and adnexa within normal limits.    BOWEL: Status post right hemicolectomy. No bowel obstruction. Colonic   diverticulosis.  PERITONEUM/RETROPERITONEUM: Within normal limits.  VESSELS: Atherosclerotic changes.  LYMPH NODES: No lymphadenopathy.  ABDOMINAL WALL: Postsurgical changes.  BONES: Degenerative changes. Decreased bone mineral density.    IMPRESSION:  Bibasilar consolidations with areas of cavitation on the left, concerning   for necrotizing pneumonia.    Interval worsening of intrahepatic and extra hepatic biliary ductal   dilatation with sludge noted within the proximal common hepatic duct and   possibly within the distal common bile duct near the ampulla. Recommend   GI consultation and ERCP.      Impression: Biliary obstruction. Benign vs malignant. Patient is currently admitted with necrotizing pneumonia. No signs of cholangitis at this time. Currently respiratory status is tenuous given necrotizing PNA, which would make extubation after an ERCP difficult.    Recommend:  - MRI with and without IV contrast with MRCP for further evaluation  - trend LFTs daily until normalize  - should biliary decompression become urgent/emergent, would recommend IR evaluation for PTC    Discussed with ICU team

## 2025-01-13 NOTE — H&P ADULT - HISTORY OF PRESENT ILLNESS
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  In ED VBG obtained revealing hypercapnic respiratory failure placed on Bipap 10/5, fio2 100%.  Patient seen at bedside, unresponsive pulling TV ~100-150, spo2 100%.  Stat abg obtained, settings adjusted.  Admit to ICU.

## 2025-01-13 NOTE — ED PROVIDER NOTE - INPATIENT RECORD SUMMARY
previous admission for sepsis, PNATN, HLD, GERD,COPD, TIA, gallbladder Ca s/p ex lab, open cholecystectomy, segment 4b/5 hepatectomy, and right colectomy

## 2025-01-13 NOTE — H&P ADULT - NSHPPHYSICALEXAM_GEN_ALL_CORE
GENERAL: elderly female, lying in bed, unresponsive  HEENT: NIPPV in place  CV: rrr  RESP: diminished b/l   ABD: soft, nontender, nondistended, +healed surgical scars  : diaper, wnl   MSK: appropriate for age  EXT: +edema  SKIN: warm  NEURO: unresponsive

## 2025-01-13 NOTE — ED PROVIDER NOTE - ADMIT DISPOSITION PRESENT ON ADMISSION SEPSIS
Jaky Selby  1954  [id number]      HISTORY OF PRESENT ILLNESS: Ms. Selby is a 70 y.o. female returns for a follow up visit for pain management  She has a diagnosis of   1. Lumbosacral spondylosis without myelopathy    2. Chronic pain syndrome    3. Lumbar radiculopathy    4. Encounter for long-term opiate analgesic use    5. Encounter for therapeutic drug monitoring    .      New Medications since Last Office visit have been reviewed with patient.     As per Information Obtained from the PADT (Patient Assessment and Documentation Tool)    She complains of pain in the neck, right shoulder, mid back, lower back. She rates the pain 7/10 and describes it as aching. Current treatment regimen has helped relieve about 80% of the pain since beginning treatment plan.  She denies any side effects from the current pain regimen. Patient reports that since implementation of their treatment plan; their physical functioning is unchanged, family/social relationships are unchanged, mood is unchanged sleep patterns are unchanged, and that the overall functioning is unchanged.  Patient denies/admits that any of the above have changed since last office visit. Patient denies misusing/abusing her narcotic pain medications or using any illegal drugs.      Upon obtaining medical history from Ms. Selby    ALLERGIES: Patients list of allergies were reviewed     MEDICATIONS: Ms. Selby list of medications were reviewed.Her current medications are   Outpatient Medications Prior to Visit   Medication Sig Dispense Refill    oxyCODONE (OXAYDO) 5 MG immediate release tablet Take 1 tablet by mouth every 6-8 hours as needed for Pain for up to 28 days. Max Daily Amount: 20 mg 95 tablet 0     No facility-administered medications prior to visit.       SOCIAL/FAMILY/PAST MEDICAL HISTORY: Ms. Selby social, family and past medical history was reviewed.     REVIEW OF SYSTEMS:    Respiratory: Negative for apnea, chest tightness and shortness 
Yes

## 2025-01-13 NOTE — ED PROVIDER NOTE - CLINICAL SUMMARY MEDICAL DECISION MAKING FREE TEXT BOX
elderly female w/ COPD, HTN presenting to the ED for unresponsiveness    concern for sepsis, hypercapnia, hypoxic respiratory distress  labs  XR  BIPAP    code status: DNR/DNI  ICU admit 89 F pmh COPD, HTN, gallbladder ca, hepatic abscess presenting to the ED for unresponsiveness    pt placed on BPAP by EMS  concern for sepsis, hypercapnia, hypoxic respiratory distress  labs  XR  CT CAP  IV fluids, abx, antipyretics  BIPAP      hypercapneic resp failure  code status: DNR/DNI  ICU admit

## 2025-01-13 NOTE — PROVIDER CONTACT NOTE (EICU) - ACTION/TREATMENT ORDERED:
I have personally provided care, evaluated the patient remotely, reviewed all orders/labs/imaging and/or discussed case with site/Tele provider(s) through telehealth encounter.     Total CC Telehealth Time :

## 2025-01-13 NOTE — PATIENT PROFILE ADULT - INTERNATIONAL TRAVEL
Received denial as patient has not tried and failed the formulary alternatives which include; Estropipate tablets, Climara patches, amd Vagifem vaginal tablets.     Routing to provider to review.     Renetta Fabian MA     Unable to Assess

## 2025-01-13 NOTE — PROVIDER CONTACT NOTE (EICU) - BACKGROUND
88 yo f pmhx HTN, HLD, GERD, COPD 2L NC, TIA, gall bladder cancer s/p cholecystectomy, segment 4b/5 hepatectomy and R colectomy 2/2 fistula track v metastasis (08/19), hepatic abscess s/p drainage heref or respiratory failure.

## 2025-01-13 NOTE — PHARMACOTHERAPY INTERVENTION NOTE - COMMENTS
Performed chart review to evaluate patient's ampicillin allergy.    Modified ampicillin allergy to state that patient tolerated cefepime during this admission. Patient tolerated cefepime during previous admission on 3/2023 and 11/2023, ceftriaxone on 3/2023, and piperacillin/tazobactam on 9/2022 with no documented reaction. Of note, patient developed rash and itchiness on arm after initiation of ampicillin/sulbactam during 9/2022 admission.

## 2025-01-13 NOTE — PROGRESS NOTE ADULT - ASSESSMENT
89 yo f hx HTN, HLD, GERD, COPD 2L NC, TIA, gall bladder cancer s/p cholecystectomy, segment 4b/5 hepatectomy and R colectomy 2/2 fistula track v metastasis (08/19), hepatic abscess s/p drainage, admission last month for hMPV pna admitted with acute on chronic hypercapnic/hypoxic respiratory failure.    NEURO: hob >30 degrees, aspiration. Patient does not appear to be in pain.  CV: close hd monitoring   RESP: Hypercapnic respiratory failure. Will try   RENAL: monitor urine output. bun/cr and electrolytes. replace lytes as needed  GI:NPO   ENDO: poct q6hr   ID: cefepime+flagyl+vancomycin for necrotizing pneumonia. F/u MRSA, culture.s  HEME: lovenox for vte ppx   DISPO: DNR/I. MOLST in chart. Patient is not doing well. D/w daughter America. Discussed risk of aspiration with bipap. Will obtain an ABG and if patient is still hypercapnic will give zofran and try the bipap. Pt understands risk of aspiration with the bipap but she wants to try.  88 yo f hx HTN, HLD, GERD, COPD 2L NC, TIA, gall bladder cancer s/p cholecystectomy, segment 4b/5 hepatectomy and R colectomy 2/2 fistula track v metastasis (08/19), hepatic abscess s/p drainage, admission last month for hMPV pna admitted with acute on chronic hypercapnic/hypoxic respiratory failure.    NEURO: hob >30 degrees, aspiration. Patient does not appear to be in pain.  CV: close hd monitoring   RESP: Hypercapnic respiratory failure. Will try   RENAL: monitor urine output. bun/cr and electrolytes. replace lytes as needed  GI:NPO. Transaminitis. Vomiting? Zofran prn. Obtaining RUQ sono  given CT findings of interval worsening of intrahepatic and extra hepatic biliary ductal dilatation with sludge noted within the proximal common hepatic duct and   possibly within the distal common bile duct near the ampulla. Will consult GI.  ENDO: poct q6hr   ID: cefepime+flagyl+vancomycin for necrotizing pneumonia. F/u MRSA, culture.s  HEME: lovenox for vte ppx   DISPO: DNR/I. CINTHYA in chart. Patient is not doing well. D/w daughter America who says patient is able to function at baseline- walks around, speaks to them. Cancer has not been active in some time. Discussed risk of aspiration with bipap. Will obtain an ABG and if patient is still hypercapnic will give zofran and try the bipap. Pt understands risk of aspiration with the bipap but she wants to try. Update, pt just vomited again. Will not do BiPAP.

## 2025-01-13 NOTE — H&P ADULT - ASSESSMENT
86 yo f pmhx HTN, HLD, GERD, COPD 2L NC, TIA, gall bladder cancer s/p cholecystectomy, segment 4b/5 hepatectomy and R colectomy 2/2 fistula track v metastasis (08/19), hepatic abscess s/p drainage, admission last month for hMPV pna admitted with     1. Acute on chronic hypercapnic/hypoxic respiratory failure    NEURO: hob >30 degrees, aspiration precuations  CV: close hd monitoring   RESP: Transitioned to AVAPS, f/u abg, nebs prn, sterid therapy   RENAL: monitor urine output. bun/cr and electrolytes. replace lytes as needed  GI:NPO   ENDO: poct q6hr   ID: cefepime for coverage  HEME: lovenox for vte ppx   DISPO: DNR/I. MOLST in chart    Case discussed with eICU attending Dr. Patton.      DATE OF ENTRY OF THIS NOTE IS EQUAL TO THE DATE OF SERVICES RENDERED  88 yo f pmhx HTN, HLD, GERD, COPD 2L NC, TIA, gall bladder cancer s/p cholecystectomy, segment 4b/5 hepatectomy and R colectomy 2/2 fistula track v metastasis (08/19), hepatic abscess s/p drainage, admission last month for hMPV pna admitted with     1. Acute on chronic hypercapnic/hypoxic respiratory failure    NEURO: hob >30 degrees, aspiration precuations  CV: close hd monitoring   RESP: Transitioned to AVAPS, f/u abg, nebs prn, sterid therapy   RENAL: monitor urine output. bun/cr and electrolytes. replace lytes as needed  GI:NPO   ENDO: poct q6hr   ID: cefepime for coverage  HEME: lovenox for vte ppx   DISPO: DNR/I. MOLST in chart    Case discussed with eICU attending Dr. Patton.      DATE OF ENTRY OF THIS NOTE IS EQUAL TO THE DATE OF SERVICES RENDERED

## 2025-01-14 NOTE — PROGRESS NOTE ADULT - ASSESSMENT
Mrs. 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    NEURO: hob >30 degrees, aspiration. Patient does not appear to be in pain.  CV: close hd monitoring   RESP: Hypercapnic respiratory failure actually improving. Shes now hypoxic requiring HFNC. Will POCUS lungs to assess if diureseable. De-recruits immediately if HFNC comes off accidentally.   RENAL: monitor urine output. bun/cr and electrolytes. replace lytes as needed  GI:NPO. Transaminitis and bili improving. RUQ sono with duct dilatation. MRI w/w/o contrast and MRCP ordered non-urgently. Question whether her respiratory status would allow for intervention. GI following.  ENDO: poct q6hr   ID: cefepime+flagyl+vancomycin for necrotizing pneumonia. MRSA negative, culture.s  HEME: lovenox for vte ppx   DISPO: DNR/I. CINTHYA in chart. Daughter updated yesterday. Will update when comes/if calls.

## 2025-01-15 NOTE — DIETITIAN INITIAL EVALUATION ADULT - PERTINENT LABORATORY DATA
01-15    137  |  101  |  17  ----------------------------<  131[H]  4.1   |  35[H]  |  0.70    Ca    9.2      15 Forrest 2025 02:55  Phos  1.7     01-15  Mg     2.0     01-15    TPro  5.9[L]  /  Alb  2.4[L]  /  TBili  1.1  /  DBili  x   /  AST  67[H]  /  ALT  144[H]  /  AlkPhos  248[H]  01-15  POCT Blood Glucose.: 124 mg/dL (01-15-25 @ 06:21)

## 2025-01-15 NOTE — PROGRESS NOTE ADULT - ASSESSMENT
Report rec'd from SEBLE Manriquez.   Mrs. 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    NEURO: hob >30 degrees, aspiration. Patient does not appear to be in pain.  CV: close hd monitoring   RESP: Hypercapnic respiratory failure actually improving. Shes now hypoxic requiring HFNC. De-recruits immediately if HFNC comes off accidentally.   RENAL: monitor urine output. bun/cr and electrolytes. replace lytes as needed  GI:NPO. Transaminitis and bili improving. RUQ sono with duct dilatation. MRI w/w/o contrast and MRCP ordered non-urgently. Numbers improving and patient is not tender. Question whether her respiratory status would allow for intervention. GI following.  ENDO: poct q6hr   ID: cefepime+flagyl for necrotizing pneumonia.   HEME: lovenox for vte ppx   DISPO: DNR/I. MOLST in chart.  actively seizing with gaze preference to the L

## 2025-01-15 NOTE — DIETITIAN INITIAL EVALUATION ADULT - PERTINENT MEDS FT
MEDICATIONS  (STANDING):  albuterol/ipratropium for Nebulization 3 milliLiter(s) Nebulizer every 6 hours  amLODIPine   Tablet 5 milliGRAM(s) Oral daily  aspirin  chewable 81 milliGRAM(s) Oral daily  carvedilol 6.25 milliGRAM(s) Oral every 12 hours  cefepime   IVPB 2000 milliGRAM(s) IV Intermittent every 12 hours  chlorhexidine 2% Cloths 1 Application(s) Topical <User Schedule>  enoxaparin Injectable 40 milliGRAM(s) SubCutaneous every 24 hours  influenza  Vaccine (HIGH DOSE) 0.5 milliLiter(s) IntraMuscular once  metroNIDAZOLE  IVPB 500 milliGRAM(s) IV Intermittent every 8 hours

## 2025-01-15 NOTE — DIETITIAN INITIAL EVALUATION ADULT - OTHER INFO
Pt lethargic, asleep at time of visit. Family at bedside provided nutrition history. Reports pt with good appetite and PO inatke PTA; no diet restrictions or known food allergies PTA.  Reports pt consumed some lunch today (~30%); amenable to Ensure Plus High Protein x 2/day (provides 700 kcal, 40 g protein)- discussed use of nutritional supplement to optimize PO intake. Denies pt having difficulty chewing/swallowing. Reports weight stable;  pounds.  Preferences taken and relayed to diet office. Made aware RD remains available.

## 2025-01-16 NOTE — PROGRESS NOTE ADULT - ASSESSMENT
Mrs. 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    NEURO: hob >30 degrees, aspiration. Patient does not appear to be in pain.  CV: close hd monitoring   RESP: Hypercapnic respiratory failure actually improving. Shes now hypoxic requiring HFNC. De-recruits immediately if HFNC comes off accidentally.   RENAL: monitor urine output. bun/cr and electrolytes. replace lytes as needed  GI:NPO. Transaminitis and bili improving. RUQ sono with duct dilatation. MRI w/w/o contrast and MRCP ordered non-urgently. Numbers improving and patient is not tender. Question whether her respiratory status would allow for intervention. GI following.  ENDO: poct q6hr   ID: cefepime+flagyl for necrotizing pneumonia.   HEME: lovenox for vte ppx   DISPO: DNR/I. MOLST in chart.

## 2025-01-17 NOTE — CHART NOTE - NSCHARTNOTEFT_GEN_A_CORE
: OLIVER Jensen    INDICATION: Acute respiratory failure    PROCEDURE:  [ ] LIMITED ECHO  [ x] LIMITED CHEST  [ ] LIMITED RETROPERITONEAL  [ ] LIMITED ABDOMINAL  [ ] LIMITED DVT  [ ] NEEDLE GUIDANCE VASCULAR  [ ] NEEDLE GUIDANCE THORACENTESIS  [ ] NEEDLE GUIDANCE PARACENTESIS  [ ] NEEDLE GUIDANCE PERICARDIOCENTESIS  [ ] OTHER    FINDINGS: RUL and RML a-line pattern, DEMETRICE a-line pattern.  RLL very dense consolidation with scattered patches of loss of visualization of the consolidation (not obviously a-line pattern of intra-consolidation air), no associated pleural effusion.  LLL consolidation with small associated pleural effusion and hima-lingula consolidation with small associated pleural effusion.    INTERPRETATION: Lung ultrasound is consistent with necrotizing pneumonia, possibly slight progression of disease process in RLL, compared to incompletely imaged prior lungs in CT abdomen 1/13/25.    -Bon Thomas M.D.   PGY-6 EM/IM/CC  Pager #[phone]    Images stored in [facility]
Admitting MD: Dr. Batista   Case discussed with Dr. 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  In ED VBG obtained revealing hypercapnic respiratory failure placed on Bipap 10/5, fio2 100%.  Patient seen at bedside, unresponsive pulling TV ~100-150, spo2 100%.  Stat abg obtained, settings adjusted.  Admit to ICU. (13 Jan 2025 02:00)    Brief hospital course:  Pt with an admission last month for hMPV pna admitted to ICU with acute on chronic hypercapnic/hypoxic respiratory failure requiring HFNC/BIPAP. Pt weaned down to 3LNC with no complications satting comfortably in bed. HD stable off pressors.     Recs:  NEURO: hob >30 degrees, aspiration. Patient appears to be baseline.   CV: HD stable off pressors, continue to maintain MAP >65. C/w anti-hypertensive meds   RESP: Hypercapnic hypoxic respiratory failure now improving. Initially requiring HFNC now on 4LNC with as needed/nocturnal NIV for WOB and chronic hypercapnia. C/w duonebs q6. pulmonary to follow   RENAL: monitor urine output. bun/cr and electrolytes. replace lytes as needed  GI: tolerating PO diet. Transaminitis and bili improving. RUQ sono with duct dilatation. MRI w/w/o contrast and MRCP ordered non-urgently. Numbers improving and patient is not tender. GI consulted.   ID: cefepime+flagyl for necrotizing pneumonia.   HEME: lovenox for vte ppx   DISPO: DNR/I. MOLST in chart.     Pt at this time does not require ICU level of care and is hemodynamically stable for downgrade to medicine service. Case discussed with ICU attending and hospitalist.
Patient vomited into NIPPV mask, mask removed, zofran ordered, placed on nc. Patient's daughter called and notified

## 2025-01-17 NOTE — PROGRESS NOTE ADULT - ASSESSMENT
Mrs. 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    NEURO: hob >30 degrees, aspiration. Patient does not appear to be in pain.  CV: close hd monitoring   RESP: Hypercapnic respiratory failure actually improving. on nasal cannula. intermittently requiring niv  RENAL: monitor urine output. bun/cr and electrolytes. replace lytes as needed  GI:NPO. Transaminitis and bili improving. RUQ sono with duct dilatation. . GI following.  ENDO: poct q6hr   ID: cefepime+flagyl for necrotizing pneumonia.   HEME: lovenox for vte ppx   DISPO: DNR/I. MOLST in chart.     transfer to F

## 2025-01-17 NOTE — PHARMACOTHERAPY INTERVENTION NOTE - COMMENTS
Recommended 7-day duration of antibiotic therapy with cefepime and metronidazole for CAP treatment.  Recommended 7-day duration of antibiotic therapy with cefepime and metronidazole for necrotizing pneumonia.

## 2025-01-18 NOTE — RAPID RESPONSE TEAM SUMMARY - NSSITUATIONBACKGROUNDRRT_GEN_ALL_CORE
89 yo female pmh of hypercapnic respiratory  failure on cpap at home admitted to [facility] with hypercapnic respiratory failure pneumonia

## 2025-01-18 NOTE — PROGRESS NOTE ADULT - ASSESSMENT
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    # Acute hypoxic/hypercapnic respiratory failure  # L sided necrotizing pna   # aspiration pna   # transaminitis, resolved    NEURO: hob >30 degrees, aspiration risk. opens eyes  CV: close hd monitoring   RESP:   - Patient admitted to ICU on 1/13   - Was on HFNC, aspiration risk with NIPPV weaned to 3L NC  - downgraded to medicine floor on 1/17  - RRT called on 1/18: placed on biPAP with O2 92-95%, family at beside   - remained on medicine floor  RENAL: monitor urine output. bun/cr and electrolytes. replace lytes as needed  GI :NPO. Transaminitis resolved, GI following. - MRI w/wo IV contrast MRCP ordered but  due to respiratory status, likely will not tolerate   ENDO: poct q6hr   ID: cefepime+flagyl for necrotizing pneumonia. ID consulted   HEME: lovenox for vte ppx       DNR/I. MOLST in chart.   Discussed with son at bedside. 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    # Acute hypoxic/hypercapnic respiratory failure  # L sided necrotizing pna   # aspiration risk  # transaminitis, resolved    NEURO: hob >30 degrees, aspiration risk. opens eyes  CV: close hd monitoring   RESP:   - Patient admitted to ICU on 1/13   - Was on HFNC, aspiration risk with NIPPV weaned to 3L NC  - downgraded to medicine floor on 1/17  - RRT called on 1/18: placed on biPAP with O2 92-95%, family at beside   - remained on medicine floor  RENAL: monitor urine output. bun/cr and electrolytes. replace lytes as needed  GI :NPO. Transaminitis resolved, GI following. - MRI w/wo IV contrast MRCP ordered but  due to respiratory status, likely will not tolerate   ENDO: poct q6hr   ID: cefepime+flagyl for necrotizing pneumonia. ID consulted   HEME: lovenox for vte ppx       DNR/I. MOLST in chart.   Discussed with son at bedside. 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    # Acute hypoxic/hypercapnic respiratory failure  # L sided necrotizing pna   # aspiration risk  # transaminitis, resolved    NEURO: hob >30 degrees, aspiration risk. opens eyes  CV: close hd monitoring   RESP:   - Patient admitted to ICU on 1/13   - Was on HFNC, aspiration risk with NIPPV weaned to 3L NC  - downgraded to medicine floor on 1/17  - RRT called on 1/18: placed on biPAP with O2 92-95%, family at beside   - remained on medicine floor  - speech and and swallow eval ordered due to concern for aspiration   RENAL: monitor urine output. bun/cr and electrolytes. replace lytes as needed  GI :NPO. Transaminitis resolved, GI following. - MRI w/wo IV contrast MRCP ordered but  due to respiratory status, likely will not tolerate   ENDO: poct q6hr   ID: cefepime+flagyl for necrotizing pneumonia. ID consulted   HEME: lovenox for vte ppx       DNR/I. MOLST in chart.   Discussed with son at bedside. Guarded prognosis

## 2025-01-18 NOTE — CONSULT NOTE ADULT - SUBJECTIVE AND OBJECTIVE BOX
[facility]  INFECTIOUS DISEASES   [address] [place] [address]  Tel: [phone]     Fax: [phone]  ==============================================================================  DO Tasha Delgadillo MD Sehrish Shahid, MD Patricia Sotelo, NP   ==============================================================================    SHANONTHEOHARSH  [id number]  Female  89y ([birth year])        Patient is a 89y old  Female who presents with a chief complaint of Hypercapnic Respiratory Failure (18 Jan 2025 13:22)      HPI:  88 yo f pmhx HTN, HLD, GERD, COPD 2L NC, TIA, gall bladder cancer s/p cholecystectomy, segment 4b/5 hepatectomy and 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  In ED VBG obtained revealing hypercapnic respiratory failure placed on Bipap 10/5, fio2 100%.  Patient seen at bedside, unresponsive pulling TV ~100-150, spo2 100%.  Stat abg obtained, settings adjusted.  Admit to ICU. (13 Jan 2025 02:00)    Pt with an admission last month for hMPV pna admitted to ICU with acute on chronic hypercapnic/hypoxic respiratory failure requiring HFNC/BIPAP. Pt weaned down to 3LNC with no complications satting comfortably in bed. HD stable off pressors. Now found to have necrotizing pneumonia D consulted for workup and antibiotic management     PAST MEDICAL & SURGICAL HISTORY:  Hypertension      Hyperlipidemia      Gout      GERD (gastroesophageal reflux disease)      Insomnia      Other gallbladder disorder      Bladder cancer      HTN (hypertension)      HLD (hyperlipidemia)      Liver abscess      H/O: hysterectomy      H/O gallbladder cancer      History of cholecystectomy      H/O right hemicolectomy      History of resection of liver          Allergies  Tolerated piperacillin/tazobactam (9/22), cefepime (3/23 and 11/23), and ceftriaxone (3/23). (Other)  ampicillin-sulbactam (Rash)  ampicillin (Rash)        ANTIMICROBIALS:  cefepime   IVPB 2000 every 12 hours  metroNIDAZOLE  IVPB 500 every 8 hours      MEDICATIONS  (STANDING):    cefepime   IVPB   100 mL/Hr IV Intermittent (01-13-25 @ 11:26)    cefepime   IVPB   100 mL/Hr IV Intermittent (01-13-25 @ 12:31)    cefepime   IVPB   100 mL/Hr IV Intermittent (01-19-25 @ 00:02)   100 mL/Hr IV Intermittent (01-18-25 @ 11:35)   100 mL/Hr IV Intermittent (01-17-25 @ 23:19)   100 mL/Hr IV Intermittent (01-17-25 @ 11:50)   100 mL/Hr IV Intermittent (01-17-25 @ 03:05)   100 mL/Hr IV Intermittent (01-16-25 @ 12:01)   100 mL/Hr IV Intermittent (01-15-25 @ 23:19)   100 mL/Hr IV Intermittent (01-15-25 @ 11:49)   100 mL/Hr IV Intermittent (01-14-25 @ 23:18)   100 mL/Hr IV Intermittent (01-14-25 @ 12:43)   100 mL/Hr IV Intermittent (01-13-25 @ 23:22)    cefepime   IVPB   100 mL/Hr IV Intermittent (01-13-25 @ 02:27)    levoFLOXacin IVPB   100 mL/Hr IV Intermittent (01-12-25 @ 23:54)    metroNIDAZOLE  IVPB   100 mL/Hr IV Intermittent (01-18-25 @ 21:31)   100 mL/Hr IV Intermittent (01-18-25 @ 13:11)   100 mL/Hr IV Intermittent (01-18-25 @ 06:16)   100 mL/Hr IV Intermittent (01-17-25 @ 21:59)   100 mL/Hr IV Intermittent (01-17-25 @ 15:18)   100 mL/Hr IV Intermittent (01-17-25 @ 05:53)   100 mL/Hr IV Intermittent (01-16-25 @ 22:20)   100 mL/Hr IV Intermittent (01-16-25 @ 13:15)   100 mL/Hr IV Intermittent (01-16-25 @ 05:28)   100 mL/Hr IV Intermittent (01-15-25 @ 21:18)   100 mL/Hr IV Intermittent (01-15-25 @ 14:02)   100 mL/Hr IV Intermittent (01-15-25 @ 05:24)   100 mL/Hr IV Intermittent (01-14-25 @ 21:22)   100 mL/Hr IV Intermittent (01-14-25 @ 13:40)   100 mL/Hr IV Intermittent (01-14-25 @ 05:58)   100 mL/Hr IV Intermittent (01-13-25 @ 21:10)   100 mL/Hr IV Intermittent (01-13-25 @ 13:30)    vancomycin  IVPB   250 mL/Hr IV Intermittent (01-13-25 @ 09:19)        OTHER MEDS: MEDICATIONS  (STANDING):  albuterol/ipratropium for Nebulization 3 every 6 hours  amLODIPine   Tablet 5 daily  aspirin  chewable 81 daily  carvedilol 6.25 every 12 hours  enoxaparin Injectable 40 every 24 hours  influenza  Vaccine (HIGH DOSE) 0.5 once      SOCIAL HISTORY:     Smoking Cigarettes [ ]Active [ ] Former [ ]Denies   ETOH [ ]denies [ ]Former [ ]Current Use denies   Drug Use [ ]Never [ ] Former [ ] Active     FAMILY HISTORY:  FH: hypertension (Father, Mother)        REVIEW OF SYSTEMS  [x  ] ROS unobtainable because:  AMS  [  ] All other systems negative except as noted below:	    Constitutional:  [ ] fever [ ] chills  [ ] weight loss  [ ] weakness  Skin:  [ ] rash [ ] phlebitis	  Eyes: [ ] icterus [ ] pain  [ ] discharge	  ENMT: [ ] sore throat  [ ] thrush [ ] ulcers [ ] exudates  Respiratory: [ ] dyspnea [ ] hemoptysis [ ] cough [ ] sputum	  Cardiovascular:  [ ] chest pain [ ] palpitations [ ] edema	  Gastrointestinal:  [ ] nausea [ ] vomiting [ ] diarrhea [ ] constipation [ ] pain	  Genitourinary:  [ ] dysuria [ ] frequency [ ] hematuria [ ] discharge [ ] flank pain  [ ] incontinence  Musculoskeletal:  [ ] myalgias [ ] arthralgias [ ] arthritis  [ ] back pain  Neurological:  [ ] headache [ ] seizures  [ ] confusion/altered mental status  Psychiatric:  [ ] anxiety [ ] depression	  Hematology/Lymphatics:  [ ] lymphadenopathy  Endocrine:  [ ] adrenal [ ] thyroid  Allergic/Immunologic:	 [ ] transplant [ ] seasonal    Vital Signs Last 24 Hrs  T(F): 98 (01-19-25 @ 00:26), Max: 100 (01-12-25 @ 23:13)    Vital Signs Last 24 Hrs  HR: 90 (01-19-25 @ 00:26) (82 - 104)  BP: 134/75 (01-19-25 @ 00:26) (117/77 - 145/80)  RR: 19 (01-19-25 @ 00:26)  SpO2: 97% (01-19-25 @ 00:26) (95% - 100%)  Wt(kg): --    PHYSICAL EXAM:  Constitutional: non-toxic, no distress, elderly female with nasal cannula   HEAD/EYES: anicteric, no conjunctival injection  ENT:  supple, no thrush  Cardiovascular:   normal S1, S2, no murmur, no edema  Respiratory:  poor effort,  no wheezes, no rales  GI:  soft, non-tender, normal bowel sounds  :  no chery, no CVA tenderness  Musculoskeletal:  no synovitis, normal ROM  Neurologic: awake and alert, normal strength, no focal findings  Skin:  no rash, no erythema, no phlebitis  Heme/Onc: no lymphadenopathy   Psychiatric:  awake, alert, appropriate mood        WBC  WBC Count: 6.00 K/uL (01-18 @ 07:15)  WBC Count: 6.16 K/uL (01-17 @ 02:35)  WBC Count: 8.62 K/uL (01-16 @ 03:30)  WBC Count: 7.95 K/uL (01-15 @ 02:55)  WBC Count: 12.34 K/uL (01-14 @ 03:32)  WBC Count: 15.35 K/uL (01-12 @ 22:45)      Auto Neutrophil %: 72.5 % (01-18-25 @ 07:15)  Auto Neutrophil #: 4.35 K/uL (01-18-25 @ 07:15)  Auto Neutrophil %: 77.6 % *H* (01-17-25 @ 02:35)  Auto Neutrophil #: 4.78 K/uL (01-17-25 @ 02:35)  Auto Neutrophil %: 80.9 % *H* (01-16-25 @ 03:30)  Auto Neutrophil #: 6.97 K/uL (01-16-25 @ 03:30)  Auto Neutrophil %: 91.6 % *H* (01-14-25 @ 03:32)  Auto Neutrophil #: 11.30 K/uL *H* (01-14-25 @ 03:32)    CBC                        13.5   6.00  )-----------( 150      ( 18 Jan 2025 07:15 )             43.5     BMP/CMP  01-18    137  |  96  |  20  ----------------------------<  103[H]  4.0   |  37[H]  |  0.62    Ca    9.4      18 Jan 2025 07:15  Phos  2.5     01-18  Mg     2.0     01-18    TPro  5.4[L]  /  Alb  2.2[L]  /  TBili  1.4[H]  /  DBili  x   /  AST  24  /  ALT  55  /  AlkPhos  238[H]  01-18    Creatinine Trend  Creatinine Trend: 0.62<--, 0.59<--, 0.56<--, 0.70<--, 0.94<--, 1.09<--        Blood Gas Venous - Lactate: 1.90 mmol/L (01-18-25 @ 13:39)            MICROBIOLOGY:  .Blood BLOOD  01-12-25   No growth at 5 days  --  --      .Blood BLOOD  01-12-25   No growth at 5 days  --  --        RADIOLOGY:      ACC: [id number] EXAM:  XR CHEST PORTABLE URGENT 1V   ORDERED BY:   JULISSA CASTANEDA     PROCEDURE DATE:  01/13/2025          INTERPRETATION:  CLINICAL HISTORY: sepsis AMS.    COMPARISON: 11/25/2023.    TECHNIQUE: Portable frontal chest radiograph. Adequate positioning.    FINDINGS:    Support devices: None.    Cardiac/mediastinum/hilum: Stable.    Lung parenchyma/Pleura: Small left pleural effusion. Right basilar   atelectasis.    Skeleton/soft tissues: Stable.      IMPRESSION:    Small left pleural effusion. Right basilar atelectasis.    --- End of Report ---            CARINA ROBLERO MD  This document has been electronically signed. Jan 13 2025  6:29AM      ACC: [id number] EXAM:  CT BRAIN   ORDERED BY: JULISSA CASTANEDA     PROCEDURE DATE:  01/13/2025          INTERPRETATION:  CLINICAL INFORMATION: AMS    COMPARISON: CT head 3/6/2023    CONTRAST:  IV Contrast: None      TECHNIQUE:  Serial axial images were obtained from the skull base to the   vertex using multi-slice helical technique. Sagittal and coronal   reformats were obtained.    FINDINGS:    VENTRICLES AND SULCI: Age appropriate involutional changes.  INTRA-AXIAL: No mass effect, acute hemorrhage, or midline shift.  There   are periventricular and subcortical white matter hypodensities,   consistent with microvascular type changes. 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  EXTRA-AXIAL: No mass or fluid collection. Basal cisterns are normal in   appearance.    VISUALIZED SINUSES:  Clear.  TYMPANOMASTOID CAVITIES:  Clear.  VISUALIZED ORBITS: Normal.  CALVARIUM: Intact.    MISCELLANEOUS: Atherosclerotic calcifications of the intracranial   vasculature.      IMPRESSION:  No acute intracranial hemorrhage, mass effect, or midline shift.        --- End of Report ---            KAMLESH GURROLA MD  This document has been electronically signed. Jan 13 2025  5:01AM      ACC: [id number] EXAM:  CT ABDOMEN AND PELVIS IC   ORDERED BY:   JULISSA CASTANEDA     PROCEDURE DATE:  01/13/2025          INTERPRETATION:  CLINICAL INFORMATION: Altered mental status    COMPARISON: CT abdomen/pelvis 3/6/2023    CONTRAST/COMPLICATIONS:  IV Contrast: Omnipaque 350  95 cc administered   5 cc discarded  Oral Contrast: NONE  .    PROCEDURE:  CT of the Abdomen and Pelvis was performed.  Sagittal and coronal reformats were performed.    FINDINGS:  LOWER CHEST: Coronary artery, aortic valve, and mitral valve   calcifications. Bibasilar consolidations with areas of cavitation on the   left.    LIVER: Within normal limits.  BILE DUCTS: Interval worsening of intrahepatic and extrahepatic biliary   ductal dilatation, with the common bile duct now measuring up to 2.4 cm   sludge is noted within the proximal common hepatic duct. Mild increased   attenuation within the common bile duct near the ampulla, may reflect   sludge.  GALLBLADDER: Within normal limits.  SPLEEN: Within normal limits.  PANCREAS: Within normal limits.  ADRENALS: Within normal limits.  KIDNEYS/URETERS: Bilateral renal cortical scarring. Bilateral   subcentimeter low-density lesions, too small to characterize.    BLADDER: Within normal limits.  REPRODUCTIVE ORGANS: Uterus and adnexa within normal limits.    BOWEL: Status post right hemicolectomy. No bowel obstruction. Colonic   diverticulosis.  PERITONEUM/RETROPERITONEUM: Within normal limits.  VESSELS: Atherosclerotic changes.  LYMPH NODES: No lymphadenopathy.  ABDOMINAL WALL: Postsurgical changes.  BONES: Degenerative changes. Decreased bone mineral density.    IMPRESSION:  Bibasilar consolidations with areas of cavitation on the left, concerning   for necrotizing pneumonia.    Interval worsening of intrahepatic and extra hepatic biliary ductal   dilatation with sludge noted within the proximal common hepatic duct and   possibly within the distal common bile duct near the ampulla. Recommend   GI consultation and ERCP.        KAMLESH GURROLA MD  This document has been electronically signed. Jan 13 2025  5:00AM    I have personally reviewed the above imaging

## 2025-01-18 NOTE — RAPID RESPONSE TEAM SUMMARY - NSOTHERINTERVENTIONSRRT_GEN_ALL_CORE
Patient was placed on niv and saturation improved   pt on niv overnight and prn  continue abx   id consult

## 2025-01-18 NOTE — RAPID RESPONSE TEAM SUMMARY - NSDISPOSITIONRRT_GEN_ALL_CORE
Remained on unit Hydroxyzine Counseling: Patient advised that the medication is sedating and not to drive a car after taking this medication.  Patient informed of potential adverse effects including but not limited to dry mouth, urinary retention, and blurry vision.  The patient verbalized understanding of the proper use and possible adverse effects of hydroxyzine.  All of the patient's questions and concerns were addressed.

## 2025-01-18 NOTE — CONSULT NOTE ADULT - ASSESSMENT
89 yo f pmhx HTN, HLD, GERD, COPD 2L NC, TIA, gall bladder cancer s/p cholecystectomy, segment 4b/5 hepatectomy and 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  CT chest: Bibasilar consolidations with areas of cavitation on the left, concerning for necrotizing pneumonia.      #Necrotizing pneumonia   # Acute hypoxic/hypercapnic respiratory failure  # L sided necrotizing pna   # aspiration risk      Plan:  continue cefepime 2g q12hrs   continue Flagyl 500mg q8hrs   Blood cultures x2 sets  if can obtain sputum cultures send sputum cultures  Streptococcus urine ag negative   legionella negative  mrsa negative   chest PT     Discussed plan with primary team     Cyrus Chamberlain DO  Chief, Infectious Disease at [facility]  Reachable via Microsoft Teams or ID office: [phone]  Weekdays: After 5pm, please call [phone] for all inquiries and new consults  Weekends: Message on-call infectious disease physician via teams (laci Lee)

## 2025-01-19 NOTE — PROGRESS NOTE ADULT - ASSESSMENT
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    # Acute hypoxic/hypercapnic respiratory failure  # L sided necrotizing pna   # aspiration risk  # transaminitis, resolved    NEURO: hob >30 degrees, aspiration risk. opens eyes  CV: close hd monitoring   RESP:   - Patient admitted to ICU on 1/13   - Was on HFNC, aspiration risk with NIPPV weaned to 3L NC  - downgraded to medicine floor on 1/17  - RRT called on 1/18: placed on biPAP with O2 92-95%, family at beside   - remained on medicine floor  - speech and and swallow eval ordered due to concern for aspiration   RENAL: monitor urine output. bun/cr and electrolytes. replace lytes as needed  GI :NPO. Transaminitis resolved, GI following. - MRI w/wo IV contrast MRCP ordered but  due to respiratory status, likely will not tolerate   ENDO: poct q6hr   ID: cefepime+flagyl for necrotizing pneumonia. ID following  HEME: lovenox for vte ppx       DNR/I. MOLST in chart.    Guarded prognosis

## 2025-01-20 NOTE — PROGRESS NOTE ADULT - ASSESSMENT
89 yo f pmhx HTN, HLD, GERD, COPD 2L NC, TIA, gall bladder cancer s/p cholecystectomy, segment 4b/5 hepatectomy and 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    # Acute hypoxic/hypercapnic respiratory failure  # L sided necrotizing pna   # aspiration risk  # transaminitis,      NEURO: hob >30 degrees, aspiration risk. opens eyes  CV: close hd monitoring   RESP:   - Patient admitted to ICU on 1/13   - Was on HFNC, aspiration risk with NIPPV weaned to 3L NC  - downgraded to medicine floor on 1/17  - RRT called on 1/18: placed on biPAP with O2 92-95%, family at beside   - remained on medicine floor  - speech and and swallow eval ordered due to concern for aspiration   RENAL: monitor urine output. bun/cr and electrolytes. replace lytes as needed  GI : Transaminitis and hyperbiliriunemia    worsening,  gi recalled    MRI w/wo IV contrast MRCP ordered but  due to respiratory status, likely will not tolerate     repeat CT a/p   HEME: lovenox for vte ppx       DNR/I. MOLST in chart.    Guarded prognosis

## 2025-01-20 NOTE — PROGRESS NOTE ADULT - PROVIDER SPECIALTY LIST ADULT
Internal Medicine
Critical Care
Internal Medicine
Hospitalist
Critical Care

## 2025-01-20 NOTE — PROGRESS NOTE ADULT - SUBJECTIVE AND OBJECTIVE BOX
Progress Note    HARSH VERGARA 89y (1935) Female [id number]  01-13-25 (2d)      Chief Complaint: Hypercapnic Respiratory Failure    Subjective:  This morning, patient desaturated acutely. Not complaining of anything.     Review of Systems:  CONSTITUTIONAL: No fever, weight loss, or fatigue  EYES: No eye pain, visual disturbances, or discharge  ENMT:  No difficulty hearing, tinnitus, vertigo; No sinus or throat pain  NECK: No pain or stiffness  RESPIRATORY: No cough, wheezing, chills or hemoptysis; +sob  CARDIOVASCULAR: No chest pain, palpitations, dizziness, or leg swelling  GASTROINTESTINAL: No abdominal or epigastric pain.   GENITOURINARY: No dysuria, frequency, hematuria, or incontinence  NEUROLOGICAL: No headaches, memory loss, loss of strength, numbness, or tremors        PAST MEDICAL & SURGICAL HISTORY:  No pertinent past medical history [[id number]]    Hypertension [I10]    Hyperlipidemia [E78.5]    Gout [M10.9]    GERD (gastroesophageal reflux disease) [K21.9]    Insomnia [G47.00]    Other gallbladder disorder [K82.8]    Bladder cancer [C67.9]    HTN (hypertension) [I10]    HLD (hyperlipidemia) [E78.5]    Liver abscess [K75.0]    No significant past surgical history [[id number]]    H/O: hysterectomy [Z90.710]    H/O gallbladder cancer [Z85.09]    History of cholecystectomy [Z90.49]    H/O right hemicolectomy [Z90.49]    History of resection of liver [Z90.49]      albuterol/ipratropium for Nebulization 3 milliLiter(s) Nebulizer every 6 hours  amLODIPine   Tablet 5 milliGRAM(s) Oral daily  aspirin  chewable 81 milliGRAM(s) Oral daily  carvedilol 6.25 milliGRAM(s) Oral every 12 hours  cefepime   IVPB 2000 milliGRAM(s) IV Intermittent every 12 hours  chlorhexidine 2% Cloths 1 Application(s) Topical <User Schedule>  enoxaparin Injectable 40 milliGRAM(s) SubCutaneous every 24 hours  influenza  Vaccine (HIGH DOSE) 0.5 milliLiter(s) IntraMuscular once  metroNIDAZOLE  IVPB 500 milliGRAM(s) IV Intermittent every 8 hours    Objective:  T(C): 36.6 (01-15-25 @ 07:32), Max: 36.9 (01-14-25 @ 19:01)  HR: 110 (01-15-25 @ 11:12) (77 - 112)  BP: 171/85 (01-15-25 @ 11:05) (107/84 - 173/92)  RR: 34 (01-15-25 @ 11:05) (16 - 42)  SpO2: 95% (01-15-25 @ 11:12) (85% - 100%)    Physical exam:  GENERAL: NAD, well-developed  HEAD:  Atraumatic, Normocephalic  EYES: EOMI, PERRLA, conjunctiva and sclera clear  NECK: Supple, No JVD  CHEST/LUNG: Coarse breath sounds bilaterally.  HEART: Regular rate and rhythm; No murmurs, rubs, or gallops  ABDOMEN: Soft, Nontender, Nondistended; Bowel sounds present  EXTREMITIES:  2+ Peripheral Pulses, No clubbing, cyanosis, or edema      01-14-25 @ 07:01  -  01-15-25 @ 07:00  --------------------------------------------------------  IN: 1975 mL / OUT: 1150 mL / NET: 825 mL    01-15-25 @ 07:01  -  01-15-25 @ 11:59  --------------------------------------------------------  IN: 75 mL / OUT: 0 mL / NET: 75 mL        CAPILLARY BLOOD GLUCOSE      (01-15 @ 02:55)                      14.2  7.95 )-----------( 163                 46.4    Neutrophils = -- (--%)  Lymphocytes = -- (--%)  Eosinophils = -- (--%)  Basophils = -- (--%)  Monocytes = -- (--%)  Bands = --%    01-15    137  |  101  |  17  ----------------------------<  131[H]  4.1   |  35[H]  |  0.70    Ca    9.2      15 Forrest 2025 02:55  Phos  1.7     01-15  Mg     2.0     01-15    TPro  5.9[L]  /  Alb  2.4[L]  /  TBili  1.1  /  DBili  x   /  AST  67[H]  /  ALT  144[H]  /  AlkPhos  248[H]  01-15          RVP:          Tox:         Urinalysis Basic - ( 15 Forrest 2025 02:55 )    Color: x / Appearance: x / SG: x / pH: x  Gluc: 131 mg/dL / Ketone: x  / Bili: x / Urobili: x   Blood: x / Protein: x / Nitrite: x   Leuk Esterase: x / RBC: x / WBC x   Sq Epi: x / Non Sq Epi: x / Bacteria: x        WBC Trend: 7.95<--, 12.34<--, 15.35<--    Hb Trend: 14.2<--, 13.5<--, 14.3<--        New imaging in last 24 hours:  Consult notes reviewed:
    seen for pna    no events  more awake but not conversant     MEDICATIONS  (STANDING):  albuterol/ipratropium for Nebulization 3 milliLiter(s) Nebulizer every 6 hours  amLODIPine   Tablet 5 milliGRAM(s) Oral daily  aspirin  chewable 81 milliGRAM(s) Oral daily  carvedilol 6.25 milliGRAM(s) Oral every 12 hours  cefepime   IVPB 2000 milliGRAM(s) IV Intermittent every 12 hours  chlorhexidine 2% Cloths 1 Application(s) Topical <User Schedule>  enoxaparin Injectable 40 milliGRAM(s) SubCutaneous every 24 hours  influenza  Vaccine (HIGH DOSE) 0.5 milliLiter(s) IntraMuscular once  metroNIDAZOLE  IVPB 500 milliGRAM(s) IV Intermittent every 8 hours    MEDICATIONS  (PRN):      Allergies    Tolerated piperacillin/tazobactam (9/22), cefepime (3/23 and 11/23), and ceftriaxone (3/23). (Other)  ampicillin-sulbactam (Rash)  ampicillin (Rash)       Vital Signs Last 24 Hrs  T(C): 36.7 (20 Jan 2025 10:54), Max: 37.3 (19 Jan 2025 17:21)  T(F): 98.1 (20 Jan 2025 10:54), Max: 99.1 (19 Jan 2025 17:21)  HR: 92 (20 Jan 2025 10:54) (79 - 92)  BP: 153/78 (20 Jan 2025 10:54) (131/68 - 168/64)  BP(mean): --  RR: 17 (20 Jan 2025 10:54) (17 - 19)  SpO2: 95% (20 Jan 2025 10:54) (93% - 100%)    Parameters below as of 20 Jan 2025 10:54  Patient On (Oxygen Delivery Method): room air        PHYSICAL EXAM:    GENERAL: NAD,   CHEST/LUNG: Clear to ausculation bilaterally;    HEART: Regular rate and rhythm; S1 S2; n   ABDOMEN: Soft, Nontender, Nondistended; Bowel sounds present  EXTREMITIES:  no edema   NERVOUS SYSTEM:  awake, follows simple commands. minimally verbal    LABS:                        13.0   10.48 )-----------( 152      ( 20 Jan 2025 07:32 )             41.6     01-20    135  |  99  |  25[H]  ----------------------------<  108[H]  4.6   |  37[H]  |  0.72    Ca    9.6      20 Jan 2025 07:32  Phos  1.9     01-20  Mg     2.2     01-20    TPro  5.4[L]  /  Alb  2.1[L]  /  TBili  9.6[H]  /  DBili  x   /  AST  138[H]  /  ALT  81[H]  /  AlkPhos  369[H]  01-20      Urinalysis Basic - ( 20 Jan 2025 07:32 )    Color: x / Appearance: x / SG: x / pH: x  Gluc: 108 mg/dL / Ketone: x  / Bili: x / Urobili: x   Blood: x / Protein: x / Nitrite: x   Leuk Esterase: x / RBC: x / WBC x   Sq Epi: x / Non Sq Epi: x / Bacteria: x        CAPILLARY BLOOD GLUCOSE            RADIOLOGY & ADDITIONAL TESTS:  
Date of service  is equal to the date of entry    Patient is a 89y old  Female who presents with a chief complaint of Hypercapnic Respiratory Failure (2025 13:01)    PAST MEDICAL & SURGICAL HISTORY:  Hypertension      Hyperlipidemia      Gout      GERD (gastroesophageal reflux disease)      Insomnia      Other gallbladder disorder      Bladder cancer      HTN (hypertension)      HLD (hyperlipidemia)      Liver abscess      H/O: hysterectomy      H/O gallbladder cancer      History of cholecystectomy      H/O right hemicolectomy      History of resection of liver        HARSH VERGARA   89y    Female    BRIEF HOSPITAL COURSE:    Review of Systems:                       All other ROS are negative.    Allergies    Tolerated piperacillin/tazobactam (), cefepime (3/23 and ), and ceftriaxone (3/23). (Other)  ampicillin-sulbactam (Rash)  ampicillin (Rash)    Intolerances          ICU Vital Signs Last 24 Hrs  T(C): 36.2 (2025 23:43), Max: 36.6 (2025 16:38)  T(F): 97.2 (2025 23:43), Max: 97.8 (2025 16:38)  HR: 93 (2025 23:00) (56 - 93)  BP: 130/69 (2025 23:00) (88/64 - 135/68)  BP(mean): 86 (2025 23:00) (51 - 107)  ABP: --  ABP(mean): --  RR: 33 (2025 23:00) (15 - 37)  SpO2: 100% (2025 23:00) (90% - 100%)    O2 Parameters below as of 2025 19:00  Patient On (Oxygen Delivery Method): nasal cannula  O2 Flow (L/min): 4        ABG - ( 2025 11:48 )  pH, Arterial: 7.15  pH, Blood: x     /  pCO2: 95    /  pO2: 62    / HCO3: 33    / Base Excess: 1.0   /  SaO2: 92.1          LABS:                        14.3   15.35 )-----------( 207      ( 2025 22:45 )             44.7     01-12    129[L]  |  93[L]  |  22  ----------------------------<  166[H]  4.8   |  36[H]  |  1.09    Ca    8.9      2025 22:45    TPro  6.9  /  Alb  2.7[L]  /  TBili  3.0[H]  /  DBili  x   /  AST  380[H]  /  ALT  274[H]  /  AlkPhos  284[H]            CAPILLARY BLOOD GLUCOSE      POCT Blood Glucose.: 93 mg/dL (2025 23:08)  POCT Blood Glucose.: 88 mg/dL (2025 16:39)  POCT Blood Glucose.: 134 mg/dL (2025 11:18)    PT/INR - ( 2025 22:45 )   PT: 11.6 sec;   INR: 1.03 ratio         PTT - ( 2025 22:45 )  PTT:28.6 sec  Urinalysis Basic - ( 2025 23:03 )    Color: Dark Yellow / Appearance: Clear / S.018 / pH: x  Gluc: x / Ketone: Negative mg/dL  / Bili: Moderate / Urobili: 1.0 mg/dL   Blood: x / Protein: 300 mg/dL / Nitrite: Negative   Leuk Esterase: Negative / RBC: 6 /HPF / WBC 4 /HPF   Sq Epi: x / Non Sq Epi: x / Bacteria: Occasional /HPF      CULTURES:      Medications:  MEDICATIONS  (STANDING):  albuterol/ipratropium for Nebulization 3 milliLiter(s) Nebulizer every 6 hours  cefepime   IVPB 2000 milliGRAM(s) IV Intermittent every 12 hours  chlorhexidine 2% Cloths 1 Application(s) Topical <User Schedule>  enoxaparin Injectable 40 milliGRAM(s) SubCutaneous every 24 hours  influenza  Vaccine (HIGH DOSE) 0.5 milliLiter(s) IntraMuscular once  lactated ringers. 1000 milliLiter(s) (75 mL/Hr) IV Continuous <Continuous>  metroNIDAZOLE  IVPB 500 milliGRAM(s) IV Intermittent every 8 hours    MEDICATIONS  (PRN):         @ 07:  -   @ 07:00  --------------------------------------------------------  IN: 500 mL / OUT: 0 mL / NET: 500 mL     @ 07:01  -   @ 01:12  --------------------------------------------------------  IN: 1575 mL / OUT: 425 mL / NET: 1150 mL        RADIOLOGY/IMAGING/ECHO    < from: US Abdomen Upper Quadrant Right (25 @ 14:33) >  IMPRESSION:  Technically difficult exam due to overlying bowel gas.    Limited exam demonstrating intrahepatic and extrahepatic biliary ductal   dilation measuring up to 2 cm, which is more dilated than expected post   cholecystectomy.  Recommend MRCP for further evaluation.      < end of copied text >    < from: CT Abdomen and Pelvis w/ IV Cont (25 @ 04:53) >  IMPRESSION:  Bibasilar consolidations with areas of cavitation on the left, concerning   for necrotizing pneumonia.    Interval worsening of intrahepatic and extra hepatic biliary ductal   dilatation with sludge noted within the proximal common hepatic duct and   possibly within the distal common bile duct near the ampulla. Recommend   GI consultation and ERCP.            Assessment/Plan:    89F hx HTN, HLD, GERD, COPD, cholangiocarcinoma  s/p cholecystectomy, partial liver rsxn  R colectomy previous hepatic abscess    Admit  with AMS  type 1 and 2 respiratory failure with L sided necrotizing PNA and suggestion of biliary obstruction on imaging    Unable to use NIPPV due to emesis with a clear aspiration risk     Despite the ABG 7.15//62 she is surprisingly alert     ABX Cefepime, metronidazole added for anaerobic coverage  MRSA PCR negative    Eventual MRCP     DVP prophylaxis    DNR/I after GOC discussion               CRITICAL CARE TIME SPENT:    37 minutes assessing presenting problems of acute illness, which pose high probability of life threatening deterioration or end organ damage/dysfunction, as well as medical decision making including initiating plan of care, reviewing data, reviewing radiologic exams, discussing with multidisciplinary team,  discussing goals of care with patient/family, and writing this note.  Non-inclusive of procedures performed.  The date of service for this encounter is the entered date.        
Progress Note    HARSH VERGARA 89y (1935) Female [id number]  01-13-25 (1d)      Chief Complaint: Hypercapnic Respiratory Failure    Subjective:  Overnight, patient became acutely hypoxic requiring high flow nasal cannula.     Review of Systems:  CONSTITUTIONAL: No fever, weight loss, or fatigue  EYES: No eye pain, visual disturbances, or discharge  ENMT:  No difficulty hearing, tinnitus, vertigo; No sinus or throat pain  NECK: No pain or stiffness  RESPIRATORY: +sob  CARDIOVASCULAR: No chest pain, palpitations, dizziness,  GASTROINTESTINAL: No abdominal or epigastric pain. No more nausea.  GENITOURINARY: No dysuria, frequency, hematuria, or incontinence      PAST MEDICAL & SURGICAL HISTORY:  No pertinent past medical history [[id number]]    Hypertension [I10]    Hyperlipidemia [E78.5]    Gout [M10.9]    GERD (gastroesophageal reflux disease) [K21.9]    Insomnia [G47.00]    Other gallbladder disorder [K82.8]    Bladder cancer [C67.9]    HTN (hypertension) [I10]    HLD (hyperlipidemia) [E78.5]    Liver abscess [K75.0]    No significant past surgical history [[id number]]    H/O: hysterectomy [Z90.710]    H/O gallbladder cancer [Z85.09]    History of cholecystectomy [Z90.49]    H/O right hemicolectomy [Z90.49]    History of resection of liver [Z90.49]      albuterol/ipratropium for Nebulization 3 milliLiter(s) Nebulizer every 6 hours  cefepime   IVPB 2000 milliGRAM(s) IV Intermittent every 12 hours  chlorhexidine 2% Cloths 1 Application(s) Topical <User Schedule>  dextrose 5% + lactated ringers. 1000 milliLiter(s) IV Continuous <Continuous>  enoxaparin Injectable 40 milliGRAM(s) SubCutaneous every 24 hours  influenza  Vaccine (HIGH DOSE) 0.5 milliLiter(s) IntraMuscular once  metroNIDAZOLE  IVPB 500 milliGRAM(s) IV Intermittent every 8 hours    Objective:  T(C): 36.7 (01-14-25 @ 08:00), Max: 36.7 (01-14-25 @ 08:00)  HR: 95 (01-14-25 @ 09:00) (71 - 97)  BP: 126/54 (01-14-25 @ 09:00) (88/64 - 157/73)  RR: 17 (01-14-25 @ 09:00) (15 - 37)  SpO2: 91% (01-14-25 @ 09:00) (65% - 100%)    Physical exam:  General: Nontoxic-appearing in no acute distress.HEENT: AT/NC. Anicteric. Conjunctiva pink and moist.   Neck: Not rigid. Trachea midline  Nodes: None palpable.  Lungs:  Coarse breath sounds in b/l bases  Heart: Regular rate and rhythm. No Murmur.   Abdomen: Soft. Nondistended. Nontender.   Skin: Warm. Dry. Good turgor. No rash.         01-13-25 @ 07:01  -  01-14-25 @ 07:00  --------------------------------------------------------  IN: 2100 mL / OUT: 975 mL / NET: 1125 mL    01-14-25 @ 07:01  -  01-14-25 @ 12:03  --------------------------------------------------------  IN: 225 mL / OUT: 0 mL / NET: 225 mL        CAPILLARY BLOOD GLUCOSE      (01-14 @ 03:32)                      13.5  12.34 )-----------( 159                 42.6    Neutrophils = 11.30 (91.6%)  Lymphocytes = 0.46 (3.7%)  Eosinophils = 0.01 (0.1%)  Basophils = 0.02 (0.2%)  Monocytes = 0.51 (4.1%)  Bands = --%    01-14    132[L]  |  96  |  25[H]  ----------------------------<  96  4.6   |  30  |  0.94    Ca    9.0      14 Jan 2025 03:32  Phos  3.0     01-14  Mg     1.7     01-14    TPro  6.0  /  Alb  2.3[L]  /  TBili  2.7[H]  /  DBili  x   /  AST  118[H]  /  ALT  181[H]  /  AlkPhos  241[H]  01-14    ( 12 Jan 2025 22:45 )   PT: 11.6 sec;   INR: 1.03 ratio;       PTT:28.6 sec      RVP:      Arterial Blood Gas:  01-14 @ 04:00  7.26/75/63/34/92.5/4.1  ABG lactate: --      Tox:         Urinalysis Basic - ( 14 Jan 2025 03:32 )    Color: x / Appearance: x / SG: x / pH: x  Gluc: 96 mg/dL / Ketone: x  / Bili: x / Urobili: x   Blood: x / Protein: x / Nitrite: x   Leuk Esterase: x / RBC: x / WBC x   Sq Epi: x / Non Sq Epi: x / Bacteria: x        WBC Trend: 12.34<--, 15.35<--    Hb Trend: 13.5<--, 14.3<--        New imaging in last 24 hours:  Consult notes reviewed:
  seen for pna, resp failure    opens eyes not verbal  ros unable to obtain    MEDICATIONS  (STANDING):  albuterol/ipratropium for Nebulization 3 milliLiter(s) Nebulizer every 6 hours  amLODIPine   Tablet 5 milliGRAM(s) Oral daily  aspirin  chewable 81 milliGRAM(s) Oral daily  carvedilol 6.25 milliGRAM(s) Oral every 12 hours  chlorhexidine 2% Cloths 1 Application(s) Topical <User Schedule>  enoxaparin Injectable 40 milliGRAM(s) SubCutaneous every 24 hours  influenza  Vaccine (HIGH DOSE) 0.5 milliLiter(s) IntraMuscular once  metroNIDAZOLE  IVPB 500 milliGRAM(s) IV Intermittent every 8 hours    MEDICATIONS  (PRN):      Allergies    Tolerated piperacillin/tazobactam (9/22), cefepime (3/23 and 11/23), and ceftriaxone (3/23). (Other)  ampicillin-sulbactam (Rash)  ampicillin (Rash)    Intolerances      Vital Signs Last 24 Hrs  T(C): 36.6 (19 Jan 2025 11:11), Max: 36.7 (19 Jan 2025 00:26)  T(F): 97.9 (19 Jan 2025 11:11), Max: 98 (19 Jan 2025 00:26)  HR: 76 (19 Jan 2025 11:11) (76 - 104)  BP: 101/63 (19 Jan 2025 11:11) (101/63 - 171/83)  BP(mean): --  RR: 20 (19 Jan 2025 11:11) (18 - 20)  SpO2: 94% (19 Jan 2025 11:11) (94% - 98%)    Parameters below as of 19 Jan 2025 08:45  Patient On (Oxygen Delivery Method): nasal cannula        PHYSICAL EXAM:    GENERAL: NAD on nc  CHEST/LUNG: Coarse bs  HEART: Regular rate and rhythm; S1 S2   ABDOMEN: Soft,  , Nondistended; Bowel sounds present  EXTREMITIES: no edema     LABS:                        13.5   6.00  )-----------( 150      ( 18 Jan 2025 07:15 )             43.5     01-18    137  |  96  |  20  ----------------------------<  103[H]  4.0   |  37[H]  |  0.62    Ca    9.4      18 Jan 2025 07:15  Phos  2.5     01-18  Mg     2.0     01-18    TPro  5.4[L]  /  Alb  2.2[L]  /  TBili  1.4[H]  /  DBili  x   /  AST  24  /  ALT  55  /  AlkPhos  238[H]  01-18      Urinalysis Basic - ( 18 Jan 2025 07:15 )    Color: x / Appearance: x / SG: x / pH: x  Gluc: 103 mg/dL / Ketone: x  / Bili: x / Urobili: x   Blood: x / Protein: x / Nitrite: x   Leuk Esterase: x / RBC: x / WBC x   Sq Epi: x / Non Sq Epi: x / Bacteria: x        CAPILLARY BLOOD GLUCOSE            RADIOLOGY & ADDITIONAL TESTS:  
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  In ED VBG obtained revealing hypercapnic respiratory failure placed on Bipap 10/5, fio2 100%.  Patient seen at bedside, unresponsive pulling TV ~100-150, spo2 100%.  Stat abg obtained, settings adjusted.  Admit to ICU. (13 Jan 2025 02:00)      O/N: intermittent requiring bipap for episodes of altered mental status and hypercarbia    ## ROS:  complains of shortness of breath. denies pain  ## EXAM  ICU Vital Signs Last 24 Hrs  T(C): 36.7 (16 Jan 2025 07:17), Max: 37.2 (15 Forrest 2025 20:13)  T(F): 98 (16 Jan 2025 07:17), Max: 99 (15 Forrest 2025 20:13)  HR: 104 (16 Jan 2025 10:36) (91 - 120)  BP: 148/71 (16 Jan 2025 10:00) (119/83 - 191/90)  BP(mean): 92 (16 Jan 2025 10:00) (87 - 121)  ABP: --  ABP(mean): --  RR: 38 (16 Jan 2025 10:36) (17 - 42)  SpO2: 93% (16 Jan 2025 10:36) (85% - 100%)    O2 Parameters below as of 16 Jan 2025 09:05  Patient On (Oxygen Delivery Method): nasal cannula, high flow  O2 Flow (L/min): 50  O2 Concentration (%): 55      CON : NAD  EENT : EOMI, MMM  NECK : Full ROM  RESP : increased wob. decreased bs at bases  CARD : rrr no m/r/g  ABD : S NT ND NABS no rebound  EXT : No edema  NEURO : AAOX3   ## Labs:  Lab Results:  CBC  CBC Full  -  ( 16 Jan 2025 03:30 )  WBC Count : 8.62 K/uL  RBC Count : 4.84 M/uL  Hemoglobin : 14.3 g/dL  Hematocrit : 46.3 %  Platelet Count - Automated : 158 K/uL  Mean Cell Volume : 95.7 fl  Mean Cell Hemoglobin : 29.5 pg  Mean Cell Hemoglobin Concentration : 30.9 g/dL  Auto Neutrophil # : 6.97 K/uL  Auto Lymphocyte # : 0.81 K/uL  Auto Monocyte # : 0.70 K/uL  Auto Eosinophil # : 0.06 K/uL  Auto Basophil # : 0.05 K/uL  Auto Neutrophil % : 80.9 %  Auto Lymphocyte % : 9.4 %  Auto Monocyte % : 8.1 %  Auto Eosinophil % : 0.7 %  Auto Basophil % : 0.6 %    .		Differential:	[] Automated		[] Manual  Chemistry                        14.3   8.62  )-----------( 158      ( 16 Jan 2025 03:30 )             46.3     01-16    135  |  94[L]  |  9   ----------------------------<  107[H]  3.7   |  38[H]  |  0.56    Ca    9.3      16 Jan 2025 03:30  Phos  1.2     01-16  Mg     1.7     01-16    TPro  6.3  /  Alb  2.6[L]  /  TBili  1.4[H]  /  DBili  x   /  AST  43[H]  /  ALT  116[H]  /  AlkPhos  279[H]  01-16    LIVER FUNCTIONS - ( 16 Jan 2025 03:30 )  Alb: 2.6 g/dL / Pro: 6.3 gm/dL / ALK PHOS: 279 U/L / ALT: 116 U/L / AST: 43 U/L / GGT: x             Urinalysis Basic - ( 16 Jan 2025 03:30 )    Color: x / Appearance: x / SG: x / pH: x  Gluc: 107 mg/dL / Ketone: x  / Bili: x / Urobili: x   Blood: x / Protein: x / Nitrite: x   Leuk Esterase: x / RBC: x / WBC x   Sq Epi: x / Non Sq Epi: x / Bacteria: x        ABG - ( 15 Forrest 2025 14:50 )  pH, Arterial: 7.27  pH, Blood: x     /  pCO2: 87    /  pO2: 72    / HCO3: 40    / Base Excess: 9.0   /  SaO2: 94.7                      MICROBIOLOGY/CULTURES:  Culture Results:   No growth at 72 Hours (01-12 @ 22:45)  Culture Results:   No growth at 72 Hours (01-12 @ 22:38)      RADIOLOGY RESULTS:        ## Medications:  MEDICATIONS  (STANDING):  albuterol/ipratropium for Nebulization 3 milliLiter(s) Nebulizer every 6 hours  amLODIPine   Tablet 5 milliGRAM(s) Oral daily  aspirin  chewable 81 milliGRAM(s) Oral daily  carvedilol 6.25 milliGRAM(s) Oral every 12 hours  cefepime   IVPB 2000 milliGRAM(s) IV Intermittent every 12 hours  chlorhexidine 2% Cloths 1 Application(s) Topical <User Schedule>  enoxaparin Injectable 40 milliGRAM(s) SubCutaneous every 24 hours  influenza  Vaccine (HIGH DOSE) 0.5 milliLiter(s) IntraMuscular once  metroNIDAZOLE  IVPB 500 milliGRAM(s) IV Intermittent every 8 hours    ## O/E:I&O's Summary    15 Forrest 2025 07:01  -  16 Jan 2025 07:00  --------------------------------------------------------  IN: 1625 mL / OUT: 2925 mL / NET: -1300 mL        POCUS :   DVT PPX lovenox  ## Code status:  Goals of care discussion: [] yes [ ] no  [ full code  [X ] DNR  [ X] DNI  [ ] CINTHYA  
CC: Patient is a 89y old  Female who presents with a chief complaint of Hypercapnic Respiratory Failure (16 Jan 2025 10:48)      ## HPI:HPI:  89 yo f pmhx HTN, HLD, GERD, COPD 2L NC, TIA, gall bladder cancer s/p cholecystectomy, segment 4b/5 hepatectomy and 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  In ED VBG obtained revealing hypercapnic respiratory failure placed on Bipap 10/5, fio2 100%.  Patient seen at bedside, unresponsive pulling TV ~100-150, spo2 100%.  Stat abg obtained, settings adjusted.  Admit to ICU. (13 Jan 2025 02:00)      O/N: episode of desaturation. now off hfnc and bipap    ## ROS:  complains she is hungry  ## EXAM  ICU Vital Signs Last 24 Hrs  T(C): 37.6 (17 Jan 2025 08:00), Max: 37.6 (17 Jan 2025 08:00)  T(F): 99.6 (17 Jan 2025 08:00), Max: 99.6 (17 Jan 2025 08:00)  HR: 99 (17 Jan 2025 09:00) (92 - 115)  BP: 140/71 (17 Jan 2025 09:00) (112/87 - 176/90)  BP(mean): 92 (17 Jan 2025 09:00) (78 - 116)  ABP: --  ABP(mean): --  RR: 32 (17 Jan 2025 09:00) (11 - 49)  SpO2: 98% (17 Jan 2025 09:00) (78% - 100%)    O2 Parameters below as of 17 Jan 2025 08:10  Patient On (Oxygen Delivery Method): nasal cannula w/ humidification  O2 Flow (L/min): 3        CON : NAD  EENT : EOMI, MMM  NECK : Full ROM  RESP : CTAB no increased WOB  CARD : rrr no m/r/g  ABD : S NT ND NABS no rebound  EXT : No edema  NEURO : AAOX3   ## Labs:  Lab Results:  CBC  CBC Full  -  ( 17 Jan 2025 02:35 )  WBC Count : 6.16 K/uL  RBC Count : 4.67 M/uL  Hemoglobin : 14.2 g/dL  Hematocrit : 44.3 %  Platelet Count - Automated : 153 K/uL  Mean Cell Volume : 94.9 fl  Mean Cell Hemoglobin : 30.4 pg  Mean Cell Hemoglobin Concentration : 32.1 g/dL  Auto Neutrophil # : 4.78 K/uL  Auto Lymphocyte # : 0.72 K/uL  Auto Monocyte # : 0.61 K/uL  Auto Eosinophil # : 0.02 K/uL  Auto Basophil # : 0.02 K/uL  Auto Neutrophil % : 77.6 %  Auto Lymphocyte % : 11.7 %  Auto Monocyte % : 9.9 %  Auto Eosinophil % : 0.3 %  Auto Basophil % : 0.3 %    .		Differential:	[] Automated		[] Manual  Chemistry                        14.2   6.16  )-----------( 153      ( 17 Jan 2025 02:35 )             44.3     01-17    137  |  95[L]  |  13  ----------------------------<  107[H]  3.7   |  41[H]  |  0.59    Ca    9.2      17 Jan 2025 02:35  Phos  1.3     01-17  Mg     2.0     01-17    TPro  5.9[L]  /  Alb  2.4[L]  /  TBili  1.4[H]  /  DBili  x   /  AST  26  /  ALT  78  /  AlkPhos  250[H]  01-17    LIVER FUNCTIONS - ( 17 Jan 2025 02:35 )  Alb: 2.4 g/dL / Pro: 5.9 gm/dL / ALK PHOS: 250 U/L / ALT: 78 U/L / AST: 26 U/L / GGT: x             Urinalysis Basic - ( 17 Jan 2025 02:35 )    Color: x / Appearance: x / SG: x / pH: x  Gluc: 107 mg/dL / Ketone: x  / Bili: x / Urobili: x   Blood: x / Protein: x / Nitrite: x   Leuk Esterase: x / RBC: x / WBC x   Sq Epi: x / Non Sq Epi: x / Bacteria: x        ABG - ( 15 Forrest 2025 14:50 )  pH, Arterial: 7.27  pH, Blood: x     /  pCO2: 87    /  pO2: 72    / HCO3: 40    / Base Excess: 9.0   /  SaO2: 94.7                      MICROBIOLOGY/CULTURES:  Culture Results:   No growth at 4 days (01-12 @ 22:45)  Culture Results:   No growth at 4 days (01-12 @ 22:38)      ## Medications:  MEDICATIONS  (STANDING):  albuterol/ipratropium for Nebulization 3 milliLiter(s) Nebulizer every 6 hours  amLODIPine   Tablet 5 milliGRAM(s) Oral daily  aspirin  chewable 81 milliGRAM(s) Oral daily  carvedilol 6.25 milliGRAM(s) Oral every 12 hours  cefepime   IVPB 2000 milliGRAM(s) IV Intermittent every 12 hours  chlorhexidine 2% Cloths 1 Application(s) Topical <User Schedule>  enoxaparin Injectable 40 milliGRAM(s) SubCutaneous every 24 hours  influenza  Vaccine (HIGH DOSE) 0.5 milliLiter(s) IntraMuscular once  metroNIDAZOLE  IVPB 500 milliGRAM(s) IV Intermittent every 8 hours    ## O/E:I&O's Summary    16 Jan 2025 07:01  -  17 Jan 2025 07:00  --------------------------------------------------------  IN: 1350 mL / OUT: 1550 mL / NET: -200 mL        POCUS :   DVT PPX love  ## Code status:  Goals of care discussion: [X] yes [ ] no  [] full code  [ X] DNR  [X ] DNI  [ ] CINTHYA  
Patient is a 89y old  Female who presents with a chief complaint of Hypercapnic Respiratory Failure (2025 12:21)    INTERVAL HPI/OVERNIGHT EVENTS:  seen at bedside, opens eyes   *RRT called    MEDICATIONS  (STANDING):  albuterol/ipratropium for Nebulization 3 milliLiter(s) Nebulizer every 6 hours  amLODIPine   Tablet 5 milliGRAM(s) Oral daily  aspirin  chewable 81 milliGRAM(s) Oral daily  carvedilol 6.25 milliGRAM(s) Oral every 12 hours  cefepime   IVPB 2000 milliGRAM(s) IV Intermittent every 12 hours  chlorhexidine 2% Cloths 1 Application(s) Topical <User Schedule>  enoxaparin Injectable 40 milliGRAM(s) SubCutaneous every 24 hours  influenza  Vaccine (HIGH DOSE) 0.5 milliLiter(s) IntraMuscular once  metroNIDAZOLE  IVPB 500 milliGRAM(s) IV Intermittent every 8 hours    MEDICATIONS  (PRN):    Allergies    Tolerated piperacillin/tazobactam (), cefepime (3/23 and ), and ceftriaxone (3/23). (Other)  ampicillin-sulbactam (Rash)  ampicillin (Rash)    Intolerances      REVIEW OF SYSTEMS:  All other systems reviewed and are negative    Vital Signs Last 24 Hrs  T(C): 36.2 (2025 11:08), Max: 36.9 (2025 15:29)  T(F): 97.2 (2025 11:08), Max: 98.4 (2025 15:29)  HR: 104 (2025 13:00) (94 - 104)  BP: 145/80 (2025 11:08) (138/78 - 167/83)  BP(mean): 89 (2025 16:00) (89 - 89)  RR: 18 (2025 11:08) (18 - 28)  SpO2: 95% (2025 13:00) (93% - 100%)    Parameters below as of 2025 11:08  Patient On (Oxygen Delivery Method): nasal cannula      Daily     Daily Weight in k.4 (2025 06:00)  I&O's Summary    2025 07:01  -  2025 07:00  --------------------------------------------------------  IN: 800 mL / OUT: 1501 mL / NET: -701 mL      CAPILLARY BLOOD GLUCOSE      POCT Blood Glucose.: 259 mg/dL (2025 12:44)    PHYSICAL EXAM:  GENERAL: NAD, lying in bed, mild respiratory distress  HEAD:  Atraumatic, normocephalic  EYES: EOMI, PERRLA, conjunctiva and sclera clear  NECK: Supple, trachea midline, no JVD  HEART: Regular rate and rhythm, no murmurs, rubs, or gallops  LUNGS: labored respirations.  Clear to auscultation bilaterally, no crackles, wheezing, or rhonchi + BIPAP  ABDOMEN: Soft, nontender, nondistended, +BS  EXTREMITIES: 2+ peripheral pulses bilaterally. No clubbing, cyanosis, or edema  NERVOUS SYSTEM:  A&Ox2, moving all extremities, no focal deficits   SKIN: No rashes or lesions    Labs                          13.5   6.00  )-----------( 150      ( 2025 07:15 )             43.5     -18    137  |  96  |  20  ----------------------------<  103[H]  4.0   |  37[H]  |  0.62    Ca    9.4      2025 07:15  Phos  2.5     -  Mg     2.0     18    TPro  5.4[L]  /  Alb  2.2[L]  /  TBili  1.4[H]  /  DBili  x   /  AST  24  /  ALT  55  /  AlkPhos  238[H]  -18          Urinalysis Basic - ( 2025 07:15 )    Color: x / Appearance: x / SG: x / pH: x  Gluc: 103 mg/dL / Ketone: x  / Bili: x / Urobili: x   Blood: x / Protein: x / Nitrite: x   Leuk Esterase: x / RBC: x / WBC x   Sq Epi: x / Non Sq Epi: x / Bacteria: x                        
Progress Note    HARSH VERGARA 89y (1935) Female [id number]  25      Chief Complaint: Hypercapnic Respiratory Failure    Subjective:  Necrotizing pneumonia. Pt hypercapnic. Vomited into BiPAP mask.     Review of Systems: Unable to obtain.     PAST MEDICAL & SURGICAL HISTORY:  No pertinent past medical history [120358574]  Hypertension [I10]  Hyperlipidemia [E78.5]  Gout [M10.9]  GERD (gastroesophageal reflux disease) [K21.9]  Insomnia [G47.00]  Other gallbladder disorder [K82.8]  Bladder cancer [C67.9]  HTN (hypertension) [I10]  HLD (hyperlipidemia) [E78.5]  Liver abscess [K75.0]  No significant past surgical history [806804468]  H/O: hysterectomy [Z90.710]  H/O gallbladder cancer [Z85.09]  History of cholecystectomy [Z90.49]  H/O right hemicolectomy [Z90.49]  History of resection of liver [Z90.49]      albuterol/ipratropium for Nebulization 3 milliLiter(s) Nebulizer every 6 hours  cefepime   IVPB 1000 milliGRAM(s) IV Intermittent <User Schedule>  chlorhexidine 2% Cloths 1 Application(s) Topical <User Schedule>  enoxaparin Injectable 40 milliGRAM(s) SubCutaneous every 24 hours  influenza  Vaccine (HIGH DOSE) 0.5 milliLiter(s) IntraMuscular once  lactated ringers. 1000 milliLiter(s) IV Continuous <Continuous>  metroNIDAZOLE  IVPB 500 milliGRAM(s) IV Intermittent every 8 hours    Objective:  T(C): 34.2 (25 @ 07:45), Max: 37.8 (25 @ 23:13)  HR: 68 (25 @ 11:00) (56 - 89)  BP: 100/49 (25 @ 10:30) (91/44 - 130/64)  RR: 17 (25 @ 11:00) (15 - 24)  SpO2: 95% (25 @ 11:00) (90% - 100%)    Physical exam:  GENERAL: comfortable.   HEAD:  Atraumatic, Normocephalic  EYES: EOMI, PERRLA, conjunctiva and sclera clear  NECK: Supple, No JVD  CHEST/LUNG: Clear to auscultation bilaterally anteriorly; No wheeze. Shallow breathing.  HEART: Regular rate and rhythm; No murmurs, rubs, or gallops  ABDOMEN: Soft, Nondistended; Bowel sounds present  EXTREMITIES:  2+ Peripheral Pulses, No clubbing, cyanosis, or edema      25 @ 07:01  -  25 @ 07:00  --------------------------------------------------------  IN: 500 mL / OUT: 0 mL / NET: 500 mL        CAPILLARY BLOOD GLUCOSE      ( @ 22:45)                      14.3  15.35 )-----------( 207                 44.7    Neutrophils = 13.91 (90.6%)  Lymphocytes = 0.42 (2.7%)  Eosinophils = 0.04 (0.3%)  Basophils = 0.06 (0.4%)  Monocytes = 0.82 (5.3%)  Bands = --%        129[L]  |  93[L]  |  22  ----------------------------<  166[H]  4.8   |  36[H]  |  1.09    Ca    8.9      2025 22:45    TPro  6.9  /  Alb  2.7[L]  /  TBili  3.0[H]  /  DBili  x   /  AST  380[H]  /  ALT  274[H]  /  AlkPhos  284[H]      ( 2025 22:45 )   PT: 11.6 sec;   INR: 1.03 ratio;       PTT:28.6 sec      RVP:    Venous Blood Gas:   @ 00:16  7.07/122/63/35/88.4  VBG Lactate: 0.90    Arterial Blood Gas:   @ 02:15  7.12/>112/239/37/100.0/3.8  ABG lactate: --      Tox:         Urinalysis Basic - ( 2025 23:03 )    Color: Dark Yellow / Appearance: Clear / S.018 / pH: x  Gluc: x / Ketone: Negative mg/dL  / Bili: Moderate / Urobili: 1.0 mg/dL   Blood: x / Protein: 300 mg/dL / Nitrite: Negative   Leuk Esterase: Negative / RBC: 6 /HPF / WBC 4 /HPF   Sq Epi: x / Non Sq Epi: x / Bacteria: Occasional /HPF        WBC Trend: 15.35<--    Hb Trend: 14.3<--      Urinalysis with Rflx Culture (collected 25 @ 23:03)        New imaging in last 24 hours:  Consult notes reviewed:

## 2025-01-20 NOTE — PROGRESS NOTE ADULT - REASON FOR ADMISSION
Hypercapnic Respiratory Failure

## 2025-01-21 NOTE — CONSULT NOTE ADULT - NS ATTEND AMEND GEN_ALL_CORE FT
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 Recent admission for hMPV pneumonia. Admitted now w/ acute on chronic hypercapnic/hypoxic respiratory failure on NIV and HFNC and found to have necrotizing pneumonia initially admitted to ICU. Weaned down to 3LNC and transferred to [facility].     - this morning pt noted to vomit and to be minimally responsive but in NAD  - sats ~89% on 3LNC, remainder of VS stable at this time  - remains on cefepime and flagyl for necrotizing pneumonia   - continue AVAPS qhs and PRN for chronic hypercania, which is well compensated at this time   - duonebs ATC  - remainder of care per primary team

## 2025-01-21 NOTE — CONSULT NOTE ADULT - REASON FOR ADMISSION
Hypercapnic Respiratory Failure

## 2025-01-21 NOTE — CONSULT NOTE ADULT - CONSULT REASON
biliary obstruction
pneumonia
nec pna, copd
necrotizing pna, COPD, gallbladder cancer, respiratory failure, GOC

## 2025-01-21 NOTE — DISCHARGE NOTE FOR THE EXPIRED PATIENT - HOSPITAL COURSE
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  In ED VBG obtained revealing hypercapnic respiratory failure placed on Bipap 10/5, fio2 100%.  Patient seen at bedside, unresponsive pulling TV ~100-150, spo2 100%.  Stat abg obtained, settings adjusted.  Admit to ICU. (13 Jan 2025 02:00)  No respirations no apical pulse, pt pronounced by Dr. Yusuf at 10:50. family at bedside

## 2025-01-21 NOTE — CONSULT NOTE ADULT - SUBJECTIVE AND OBJECTIVE BOX
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  In ED VBG obtained revealing hypercapnic respiratory failure placed on Bipap 10/5, fio2 100%.  Patient seen at bedside, unresponsive pulling TV ~100-150, spo2 100%.  Stat abg obtained, settings adjusted.  Admit to ICU. (13 Jan 2025 02:00)    Pt with an admission last month for hMPV pna admitted to ICU with acute on chronic hypercapnic/hypoxic respiratory failure requiring HFNC/BIPAP. Pt weaned down to 3LNC with no complications satting comfortably in bed. HD stable off pressors. Now found to have necrotizing pneumonia and started on IV abx. Pt came off bipap and downgraded to regular medical floor. Pulm consulted for follow up.    Subjective:     PAST MEDICAL & SURGICAL HISTORY:  Hypertension      Hyperlipidemia      Gout      GERD (gastroesophageal reflux disease)      Insomnia      Other gallbladder disorder      Bladder cancer      HTN (hypertension)      HLD (hyperlipidemia)      Liver abscess      H/O: hysterectomy      H/O gallbladder cancer      History of cholecystectomy      H/O right hemicolectomy      History of resection of liver          FAMILY HISTORY:  FH: hypertension (Father, Mother)        SOCIAL HISTORY:  Smoking: __ packs x ___ years  EtOH Use:  Marital Status:  Occupation:  Recent Travel:  Country of Birth:  Advance Directives:    Allergies    Tolerated piperacillin/tazobactam (9/22), cefepime (3/23 and 11/23), and ceftriaxone (3/23). (Other)  ampicillin-sulbactam (Rash)  ampicillin (Rash)    Intolerances        HOME MEDICATIONS:    REVIEW OF SYSTEMS:  Constitutional:   Eyes:  ENT:  CV:  Resp:  GI:  :  MSK:  Integumentary:  Neurological:  Psychiatric:  Endocrine:  Hematologic/Lymphatic:  Allergic/Immunologic:  [ ] All other systems negative  [ ] Unable to assess ROS because ________    OBJECTIVE:  ICU Vital Signs Last 24 Hrs  T(C): 36.3 (21 Jan 2025 04:58), Max: 36.7 (20 Jan 2025 08:30)  T(F): 97.3 (21 Jan 2025 04:58), Max: 98.1 (20 Jan 2025 08:30)  HR: 88 (21 Jan 2025 05:37) (81 - 95)  BP: 168/78 (21 Jan 2025 04:58) (119/65 - 168/78)  BP(mean): --  ABP: --  ABP(mean): --  RR: 17 (21 Jan 2025 04:58) (17 - 19)  SpO2: 95% (21 Jan 2025 05:37) (93% - 97%)    O2 Parameters below as of 21 Jan 2025 05:37  Patient On (Oxygen Delivery Method): nasal cannula              01-20 @ 07:01  -  01-21 @ 07:00  --------------------------------------------------------  IN: 0 mL / OUT: 800 mL / NET: -800 mL      CAPILLARY BLOOD GLUCOSE          PHYSICAL EXAM:  General:   HEENT:   Lymph Nodes:  Neck:   Respiratory:   Cardiovascular:   Abdomen:   Extremities:   Skin:   Neurological:      HOSPITAL MEDICATIONS:  MEDICATIONS  (STANDING):  albuterol/ipratropium for Nebulization 3 milliLiter(s) Nebulizer every 6 hours  amLODIPine   Tablet 5 milliGRAM(s) Oral daily  aspirin  chewable 81 milliGRAM(s) Oral daily  carvedilol 6.25 milliGRAM(s) Oral every 12 hours  cefepime   IVPB 2000 milliGRAM(s) IV Intermittent every 12 hours  chlorhexidine 2% Cloths 1 Application(s) Topical <User Schedule>  enoxaparin Injectable 40 milliGRAM(s) SubCutaneous every 24 hours  influenza  Vaccine (HIGH DOSE) 0.5 milliLiter(s) IntraMuscular once  metroNIDAZOLE  IVPB 500 milliGRAM(s) IV Intermittent every 8 hours    MEDICATIONS  (PRN):      LABS:                        13.0   10.48 )-----------( 152      ( 20 Jan 2025 07:32 )             41.6     01-20    137  |  97  |  28[H]  ----------------------------<  177[H]  4.9   |  37[H]  |  0.77    Ca    9.5      20 Jan 2025 15:10  Phos  1.9     01-20  Mg     2.2     01-20    TPro  5.3[L]  /  Alb  2.0[L]  /  TBili  9.6[H]  /  DBili  8.2[H]  /  AST  109[H]  /  ALT  81[H]  /  AlkPhos  402[H]  01-20      Urinalysis Basic - ( 20 Jan 2025 15:10 )    Color: x / Appearance: x / SG: x / pH: x  Gluc: 177 mg/dL / Ketone: x  / Bili: x / Urobili: x   Blood: x / Protein: x / Nitrite: x   Leuk Esterase: x / RBC: x / WBC x   Sq Epi: x / Non Sq Epi: x / Bacteria: x      Arterial Blood Gas:  01-19 @ 14:32  7.36/71/106/40/99.4/11.5  ABG lactate: --        MICROBIOLOGY: reviewed     RADIOLOGY:  [x ] Reviewed and interpreted by me    EKG: reviewed  CHIEF COMPLAINT: sob    HPI:  89 yo f pmhx HTN, HLD, GERD, COPD 2L NC, TIA, gall bladder cancer s/p cholecystectomy, segment 4b/5 hepatectomy and 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  In ED VBG obtained revealing hypercapnic respiratory failure placed on Bipap 10/5, fio2 100%.  Patient seen at bedside, unresponsive pulling TV ~100-150, spo2 100%.  Stat abg obtained, settings adjusted.  Admit to ICU. (13 Jan 2025 02:00)    Pt with an admission last month for hMPV pna admitted to ICU with acute on chronic hypercapnic/hypoxic respiratory failure requiring HFNC/BIPAP. Pt weaned down to 3LNC with no complications satting comfortably in bed. HD stable off pressors. Now found to have necrotizing pneumonia and started on IV abx. Pt came off bipap and downgraded to regular medical floor. Pulm consulted for follow up.    Subjective: Pt seen and examined at bedside. Pt breathing comfortably on 2LNC satting 89%. ROS limited 2/2 lethargy    PAST MEDICAL & SURGICAL HISTORY:  Hypertension      Hyperlipidemia      Gout      GERD (gastroesophageal reflux disease)      Insomnia      Other gallbladder disorder      Bladder cancer      HTN (hypertension)      HLD (hyperlipidemia)      Liver abscess      H/O: hysterectomy      H/O gallbladder cancer      History of cholecystectomy      H/O right hemicolectomy      History of resection of liver          FAMILY HISTORY:  FH: hypertension (Father, Mother)        SOCIAL HISTORY:  Smoking: unable to eval  EtOH Use: unable to eval  Marital Status:  Occupation:  Recent Travel:  Country of Birth:  Advance Directives: dnr/i    Allergies    Tolerated piperacillin/tazobactam (9/22), cefepime (3/23 and 11/23), and ceftriaxone (3/23). (Other)  ampicillin-sulbactam (Rash)  ampicillin (Rash)    Intolerances        HOME MEDICATIONS:    REVIEW OF SYSTEMS:  Constitutional:   Eyes:  ENT:  CV:  Resp:  GI:  :  MSK:  Integumentary:  Neurological:  Psychiatric:  Endocrine:  Hematologic/Lymphatic:  Allergic/Immunologic:  [ ] All other systems negative  [x ] Unable to assess ROS because AMS    OBJECTIVE:  ICU Vital Signs Last 24 Hrs  T(C): 36.3 (21 Jan 2025 04:58), Max: 36.7 (20 Jan 2025 08:30)  T(F): 97.3 (21 Jan 2025 04:58), Max: 98.1 (20 Jan 2025 08:30)  HR: 88 (21 Jan 2025 05:37) (81 - 95)  BP: 168/78 (21 Jan 2025 04:58) (119/65 - 168/78)  BP(mean): --  ABP: --  ABP(mean): --  RR: 17 (21 Jan 2025 04:58) (17 - 19)  SpO2: 95% (21 Jan 2025 05:37) (93% - 97%)    O2 Parameters below as of 21 Jan 2025 05:37  Patient On (Oxygen Delivery Method): nasal cannula              01-20 @ 07:01  -  01-21 @ 07:00  --------------------------------------------------------  IN: 0 mL / OUT: 800 mL / NET: -800 mL      CAPILLARY BLOOD GLUCOSE          PHYSICAL EXAM:  GENERAL: NAD, sitting in bed comfortably  NECK: Supple, trachea midline, no JVD  HEART: Regular rate and rhythm  LUNGS: Unlabored respirations.  Clear to auscultation bilaterally, no crackles, wheezing, or rhonchi  ABDOMEN: Soft, nontender, nondistended  EXTREMITIES: warm, no LE edema          [facility] MEDICATIONS:  MEDICATIONS  (STANDING):  albuterol/ipratropium for Nebulization 3 milliLiter(s) Nebulizer every 6 hours  amLODIPine   Tablet 5 milliGRAM(s) Oral daily  aspirin  chewable 81 milliGRAM(s) Oral daily  carvedilol 6.25 milliGRAM(s) Oral every 12 hours  cefepime   IVPB 2000 milliGRAM(s) IV Intermittent every 12 hours  chlorhexidine 2% Cloths 1 Application(s) Topical <User Schedule>  enoxaparin Injectable 40 milliGRAM(s) SubCutaneous every 24 hours  influenza  Vaccine (HIGH DOSE) 0.5 milliLiter(s) IntraMuscular once  metroNIDAZOLE  IVPB 500 milliGRAM(s) IV Intermittent every 8 hours    MEDICATIONS  (PRN):      LABS:                        13.0   10.48 )-----------( 152      ( 20 Jan 2025 07:32 )             41.6     01-20    137  |  97  |  28[H]  ----------------------------<  177[H]  4.9   |  37[H]  |  0.77    Ca    9.5      20 Jan 2025 15:10  Phos  1.9     01-20  Mg     2.2     01-20    TPro  5.3[L]  /  Alb  2.0[L]  /  TBili  9.6[H]  /  DBili  8.2[H]  /  AST  109[H]  /  ALT  81[H]  /  AlkPhos  402[H]  01-20      Urinalysis Basic - ( 20 Jan 2025 15:10 )    Color: x / Appearance: x / SG: x / pH: x  Gluc: 177 mg/dL / Ketone: x  / Bili: x / Urobili: x   Blood: x / Protein: x / Nitrite: x   Leuk Esterase: x / RBC: x / WBC x   Sq Epi: x / Non Sq Epi: x / Bacteria: x      Arterial Blood Gas:  01-19 @ 14:32  7.36/71/106/40/99.4/11.5  ABG lactate: --        MICROBIOLOGY: reviewed     RADIOLOGY:  [x ] Reviewed and interpreted by me    EKG: reviewed

## 2025-01-21 NOTE — CONSULT NOTE ADULT - ASSESSMENT
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 Pulm consulted for follow up.    recs:  - acute hypoxic and hypercapnic resp failure 2/2 HmnV + necrotizing pna   - transitioned off NIV to NC. cont to wean as able  - cont abx per ID for necrotizing pna. Will need repeat CT scan in 4-6 weeks.  - would cont AVAPS qhs for chronic hypercapnia with COPD  - duonebs prn and restart home inhalers  -rest of mngmt per primary team    note incomplete 89 year old female with hx HTN, HLD, GERD, COPD 2L NC, TIA, gall bladder cancer s/p cholecystectomy, segment 4b/5 hepatectomy and R colectomy 2/2 fistula track v metastasis (08/19), hepatic abscess s/p drainage, admission last month for hMPV pna admitted with acute on chronic hypercapnic/hypoxic respiratory failure. Pulm consulted for follow up.    recs:  - acute hypoxic and hypercapnic resp failure 2/2 HmnV + necrotizing pna   - transitioned off NIV to NC. cont to wean as able  - cont abx per ID for necrotizing pna. Will need repeat CT scan in 4-6 weeks.  - would cont AVAPS qhs for chronic hypercapnia with COPD  - cont duonebs for now and resume home inhalers once nebs are prn  -rest of mngmt per primary team    d/w dr francisco

## 2025-01-23 LAB
CULTURE RESULTS: SIGNIFICANT CHANGE UP
CULTURE RESULTS: SIGNIFICANT CHANGE UP
SPECIMEN SOURCE: SIGNIFICANT CHANGE UP
SPECIMEN SOURCE: SIGNIFICANT CHANGE UP

## 2025-03-08 ENCOUNTER — RX RENEWAL (OUTPATIENT)
Age: 89
End: 2025-03-08

## 2025-04-05 NOTE — H&P PST ADULT - ALCOHOL USE HISTORY SINGLE SELECT
Problem: At Risk for Falls  Goal: Patient does not fall  Outcome: Monitoring/Evaluating progress  Goal: Patient takes action to control fall-related risks  Outcome: Monitoring/Evaluating progress     Problem: At Risk for Injury Due to Fall  Goal: Patient does not fall  Outcome: Monitoring/Evaluating progress  Goal: Takes action to control condition specific risks  Outcome: Monitoring/Evaluating progress  Goal: Verbalizes understanding of fall-related injury personal risks  Description: Document education using the patient education activity  Outcome: Monitoring/Evaluating progress     Problem: Impaired Physical Mobility  Goal: Functional status is maintained or returned to baseline during hospitalization  Outcome: Monitoring/Evaluating progress  Goal: Tolerates activity for discharge setting with no abnormal symptoms  Outcome: Monitoring/Evaluating progress     Problem: Self Care Deficit  Goal: # Functional status is maintained or returned to baseline - REHAB ONLY  Outcome: Monitoring/Evaluating progress  Goal: Functional status is maintained or returned to baseline  Outcome: Monitoring/Evaluating progress  Goal: # Tolerates activity for d/c setting with no clinical problems  Outcome: Monitoring/Evaluating progress     Problem: Delirium, Risk for  Goal: # No symptoms of delirium  Description: Evaluate delirium symptoms under active problem when present  Outcome: Monitoring/Evaluating progress  Goal: # Verbalizes understanding of delirium preventive strategies  Description: Document on Patient Education Activity   Outcome: Monitoring/Evaluating progress     Problem: Dysphagia  Goal: # Exhibits no s/s of aspiration  Description: Symptoms of aspiration include coughing, choking, throat clearing, change in voice quality, and/or a decrease in oxygen saturation by more than 2% points from baseline after swallowing.  Outcome: Monitoring/Evaluating progress  Goal: # Verbalizes understanding of dysphagia  management  Description: Document education using the patient education activity.  Outcome: Monitoring/Evaluating progress     Problem: Skin Integrity Alteration  Goal: Skin remains intact with no new/deterioration of wound or pressure injury  Outcome: Monitoring/Evaluating progress  Goal: Participates in wound care activities  Outcome: Monitoring/Evaluating progress     Problem: Stroke: Ischemic (Transient/Permanent)  Goal: Neurological status is maintained/restored to status at baseline  Description: Monitor neurological and mental status including symptoms of increasing intracranial pressure (headache, nausea/vomiting, or change in behavior). Hypertension (greater than 180 systolic) may also indicate a change in status related to stroke.  Outcome: Monitoring/Evaluating progress      never

## 2025-04-10 NOTE — PATIENT PROFILE ADULT - SAFE PLACE TO LIVE
AUDIOLOGY EVALUATION    Leti Zuniga had Tympanometry and Audiometry performed today.    The patient reports hearing loss.     Results as follows:    Tympanometry    Type A -  on left  Type As -  on right    Audiometry    Test Performed - Comprehensive Audiogram    Type of Loss - Right Ear: abnormal hearing: degree of loss is normal to moderately severe sensorineural hearing loss                           Left Ear: abnormal hearing: degree of loss is normal to moderately severe sensorineural hearing loss     SRT   Measurement Right Ear Left Ear   Value 30 35   Unit dB dB     Discrimination  Measurement Right Ear Left Ear   Value 80% 84%   Unit dB dB     Recommend  Binaural amplification and annual audios    Valarie Paul [facility]  Audiologist   no
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
## 2025-07-02 NOTE — PROGRESS NOTE ADULT - SUBJECTIVE AND OBJECTIVE BOX
Surgery Daily Progress Note  =====================================================  Interval / Overnight Events: Intermittent episodes of hypoxia.      HPI:  Patient is an 87 year old female with a PMHx of HTN, HLD and GERD who presented for bowel prep prior to scheduled surgery tomorrow.  CT Abdomen / Pelvis performed showing marked gallbladder wall thickening and irregularity as previously demonstrated with possible involvement of adjacent transverse colon.      PAST MEDICAL & SURGICAL HISTORY:  Hypertension  Hyperlipidemia  Gout  GERD (gastroesophageal reflux disease)  Insomnia  Other gallbladder disorder  H/O: hysterectomy      ALLERGIES:  penicillin (Unknown)    --------------------------------------------------------------------------------------    MEDICATIONS:    Cardiovascular Medications  metoprolol succinate ER 25 milliGRAM(s) Oral daily    Gastrointestinal Medications  magnesium sulfate  IVPB 2 Gram(s) IV Intermittent once  multivitamin 1 Tablet(s) Oral daily  pantoprazole    Tablet 40 milliGRAM(s) Oral before breakfast  sodium phosphate IVPB 30 milliMole(s) IV Intermittent once  thiamine 100 milliGRAM(s) Oral daily    Hematologic/Oncologic Medications  heparin   Injectable 5000 Unit(s) SubCutaneous every 8 hours    --------------------------------------------------------------------------------------    VITAL SIGNS:  T(C): 36.7 (23 Aug 2022 04:00), Max: 38 (22 Aug 2022 10:30)  T(F): 98 (23 Aug 2022 04:00), Max: 100.4 (22 Aug 2022 10:30)  HR: 101 (23 Aug 2022 04:00) (79 - 101)  BP: 157/92 (23 Aug 2022 04:00) (136/51 - 190/94)  BP(mean): 81 (22 Aug 2022 13:00) (76 - 91)  RR: 20 (23 Aug 2022 04:00) (16 - 31)  SpO2: 98% (23 Aug 2022 04:00) (87% - 100%)    --------------------------------------------------------------------------------------    INS AND OUTS:    22 Aug 2022 07:01  -  23 Aug 2022 07:00  --------------------------------------------------------  IN:    dextrose 5% + sodium chloride 0.45%: 580 mL    Oral Fluid: 120 mL  Total IN: 700 mL    OUT:    Bulb (mL): 185 mL    Voided (mL): 1500 mL  Total OUT: 1685 mL    Total NET: -985 mL    --------------------------------------------------------------------------------------    EXAM    NEUROLOGY  Exam: Normal, in no acute distress.    HEENT  Exam: Normocephalic, atraumatic.    RESPIRATORY  Exam: Normal expansion / effort.    CARDIOVASCULAR  Exam: S1, S2.  Regular rate and rhythm.    GI/NUTRITION  Exam: Abdomen soft, Non-tender, Non-distended.  Provena VAC in place.  CATARINA drain x1 with serosanguinous output.  Current Diet: Clear liquid diet    MUSCULOSKELETAL  Exam: All extremities moving spontaneously without limitations.      METABOLIC / FLUIDS / ELECTROLYTES  magnesium sulfate  IVPB 2 Gram(s) IV Intermittent once  multivitamin 1 Tablet(s) Oral daily  sodium phosphate IVPB 30 milliMole(s) IV Intermittent once  thiamine 100 milliGRAM(s) Oral daily      HEMATOLOGIC  [x] VTE Prophylaxis: heparin   Injectable 5000 Unit(s) SubCutaneous every 8 hours    -------------------------------------------------------------------------------------- Kasey

## 2025-07-18 NOTE — ED PROVIDER NOTE - PROGRESS NOTE DETAILS
1.64
Pee Parks, PGY4: Per surgery, patient to be evaluated by SICU and admitted under Dr. Roche. Spoke w/ IR who will assess the need for procedure tonight vs tomorrow. Patient remains hemodynamically stable and comfortable on bipap.

## (undated) DEVICE — BIOPSY FORCEP COLD DISP

## (undated) DEVICE — DRSG BANDAID 0.75X3"

## (undated) DEVICE — DRSG CURITY GAUZE SPONGE 4 X 4" 12-PLY NON-STERILE

## (undated) DEVICE — ELCTR ECG CONDUCTIVE ADHESIVE

## (undated) DEVICE — BASIN EMESIS 10IN GRADUATED MAUVE

## (undated) DEVICE — TUBING SUCTION NONCONDUCTIVE 6MM X 12FT

## (undated) DEVICE — LUBRICATING JELLY HR ONE SHOT 3G

## (undated) DEVICE — PACK IV START WITH CHG

## (undated) DEVICE — CONTAINER FORMALIN 80ML YELLOW

## (undated) DEVICE — CATH IV SAFE BC 22G X 1" (BLUE)

## (undated) DEVICE — FACESHIELD FULL VISOR

## (undated) DEVICE — GOWN LG

## (undated) DEVICE — BIOPSY FORCEP RADIAL JAW 4 STANDARD WITH NEEDLE

## (undated) DEVICE — CONTAINER FORMALIN 10% 60ML

## (undated) DEVICE — TUBING MEDI-VAC W MAXIGRIP CONNECTORS 1/4"X6'

## (undated) DEVICE — SALIVA EJECTOR (BLUE)

## (undated) DEVICE — LINE BREATHE SAMPLNG

## (undated) DEVICE — DRSG 2X2

## (undated) DEVICE — TUBING IV SET GRAVITY 3Y 100" MACRO

## (undated) DEVICE — ELCTR GROUNDING PAD ADULT COVIDIEN